# Patient Record
Sex: FEMALE | Race: BLACK OR AFRICAN AMERICAN | NOT HISPANIC OR LATINO | Employment: OTHER | URBAN - METROPOLITAN AREA
[De-identification: names, ages, dates, MRNs, and addresses within clinical notes are randomized per-mention and may not be internally consistent; named-entity substitution may affect disease eponyms.]

---

## 2018-03-23 ENCOUNTER — OFFICE VISIT (OUTPATIENT)
Dept: INTERNAL MEDICINE | Facility: CLINIC | Age: 78
End: 2018-03-23
Attending: INTERNAL MEDICINE
Payer: MEDICARE

## 2018-03-23 VITALS
WEIGHT: 121.5 LBS | HEIGHT: 65 IN | SYSTOLIC BLOOD PRESSURE: 131 MMHG | DIASTOLIC BLOOD PRESSURE: 68 MMHG | BODY MASS INDEX: 20.24 KG/M2 | HEART RATE: 85 BPM | OXYGEN SATURATION: 99 %

## 2018-03-23 DIAGNOSIS — E78.5 HYPERLIPIDEMIA, UNSPECIFIED HYPERLIPIDEMIA TYPE: ICD-10-CM

## 2018-03-23 DIAGNOSIS — R10.11 CHRONIC RUQ PAIN: ICD-10-CM

## 2018-03-23 DIAGNOSIS — R41.3 MEMORY DEFICITS: ICD-10-CM

## 2018-03-23 DIAGNOSIS — R25.1 TREMOR: ICD-10-CM

## 2018-03-23 DIAGNOSIS — G89.29 CHRONIC RUQ PAIN: ICD-10-CM

## 2018-03-23 DIAGNOSIS — I10 HYPERTENSION, BENIGN: ICD-10-CM

## 2018-03-23 DIAGNOSIS — R63.4 WEIGHT LOSS: Primary | ICD-10-CM

## 2018-03-23 DIAGNOSIS — K80.20 CALCULUS OF GALLBLADDER WITHOUT CHOLECYSTITIS WITHOUT OBSTRUCTION: ICD-10-CM

## 2018-03-23 PROCEDURE — 99204 OFFICE O/P NEW MOD 45 MIN: CPT | Mod: S$GLB,,, | Performed by: INTERNAL MEDICINE

## 2018-03-23 PROCEDURE — 3075F SYST BP GE 130 - 139MM HG: CPT | Mod: CPTII,S$GLB,, | Performed by: INTERNAL MEDICINE

## 2018-03-23 PROCEDURE — 99999 PR PBB SHADOW E&M-NEW PATIENT-LVL IV: CPT | Mod: PBBFAC,,, | Performed by: INTERNAL MEDICINE

## 2018-03-23 PROCEDURE — 3078F DIAST BP <80 MM HG: CPT | Mod: CPTII,S$GLB,, | Performed by: INTERNAL MEDICINE

## 2018-03-23 RX ORDER — FERROUS SULFATE 325(65) MG
325 TABLET, DELAYED RELEASE (ENTERIC COATED) ORAL
COMMUNITY

## 2018-03-23 RX ORDER — LISINOPRIL AND HYDROCHLOROTHIAZIDE 10; 12.5 MG/1; MG/1
1 TABLET ORAL DAILY
Qty: 90 TABLET | Refills: 3
Start: 2018-03-23 | End: 2018-03-23 | Stop reason: SDUPTHER

## 2018-03-23 RX ORDER — LISINOPRIL 10 MG/1
10 TABLET ORAL DAILY
COMMUNITY
End: 2018-03-23 | Stop reason: CLARIF

## 2018-03-23 RX ORDER — LISINOPRIL AND HYDROCHLOROTHIAZIDE 10; 12.5 MG/1; MG/1
1 TABLET ORAL DAILY
Qty: 90 TABLET | Refills: 1 | Status: ON HOLD | OUTPATIENT
Start: 2018-03-23 | End: 2018-07-12 | Stop reason: HOSPADM

## 2018-03-23 RX ORDER — ROSUVASTATIN CALCIUM 5 MG/1
5 TABLET, COATED ORAL DAILY
COMMUNITY
End: 2018-03-23 | Stop reason: SDUPTHER

## 2018-03-23 RX ORDER — ROSUVASTATIN CALCIUM 5 MG/1
5 TABLET, COATED ORAL DAILY
Qty: 90 TABLET | Refills: 1 | Status: ON HOLD | OUTPATIENT
Start: 2018-03-23 | End: 2018-07-12 | Stop reason: HOSPADM

## 2018-03-23 RX ORDER — CYANOCOBALAMIN (VITAMIN B-12) 500 MCG
TABLET ORAL DAILY
COMMUNITY
End: 2020-10-06

## 2018-03-23 RX ORDER — CHOLESTYRAMINE 4 G/4.8G
POWDER, FOR SUSPENSION ORAL
COMMUNITY
End: 2022-02-11

## 2018-03-23 NOTE — PROGRESS NOTES
"Subjective:   Patient ID: Shahram Hills is a 77 y.o. female  Chief complaint:   Chief Complaint   Patient presents with    [facility]  Pt here to [facility] care  Prior pcp Dr. Barreto    Hld: on crestor  HTN: taking lisinopril  Taking iron: rec by prior pcp - taking for many years - does not recall hx of anemia    Today reports wt loss over past year- 140 at baseline currently 121# on our scale  Was having ruq pain - this is now chronic over past few months - same in quality and frequency.   Eating less over past year   + Poor appetite + occ early satiety  Just dx with gallstone by US 2 weeks ago when seen by GI for thsi and symptoms and MRI abd scheduled  No fevers, easy bruising or bleeding, no night sweats.   No cough.   Started wearing size 10 pants around gregor and now fitting loose    Makes herself eat.   Currently wearing 10 and 8 size  GI: Dr. Alvarado - pt reports just had blood work with Dr. Grace - planning on MRI Friday  Has f/u with GI in April after mri    Hx of memory deficits + difficulty with short term memory over past 6 months. No falls  Not locked self out of house   Paying bills on time  No burining food   + Head tremor over past few years  No gait changes or incontinence.     Does not think had dexa  Declines all vaccines  Mmg: declines for now - she may consider this in future  Never had cscope - declines    Review of Systems    Objective:  Vitals:    03/23/18 1500   BP: 131/68   Pulse: 85   SpO2: 99%   Weight: 55.1 kg (121 lb 7.6 oz)   Height: 5' 5" (1.651 m)     Body mass index is 20.21 kg/m².    Physical Exam   Constitutional: She is oriented to person, place, and time. She appears well-developed and well-nourished.   HENT:   Head: Normocephalic and atraumatic.   Right Ear: External ear normal.   Left Ear: External ear normal.   Nose: Nose normal.   Mouth/Throat: Oropharynx is clear and moist. No oropharyngeal exudate.   No carotid bruits   Eyes: Conjunctivae and EOM are " "normal.   Neck: Neck supple. No thyromegaly present.   Cardiovascular: Normal rate, regular rhythm, normal heart sounds and intact distal pulses.    Pulmonary/Chest: Effort normal and breath sounds normal.   Abdominal: Soft. Bowel sounds are normal. She exhibits no distension and no mass. There is tenderness. There is no rebound and no guarding. No hernia.   Mild ttp at ruq - no rebound or guarding    Musculoskeletal: She exhibits no edema or tenderness.   Lymphadenopathy:     She has no cervical adenopathy.   Neurological: She is alert and oriented to person, place, and time.   + head tremor   Skin: Skin is warm and dry.   Psychiatric: Her behavior is normal. Thought content normal.   Vitals reviewed.    Assessment:  1. Tremor    2. Memory deficits    3. Weight loss    4. Calculus of gallbladder without cholecystitis without obstruction    5. Chronic RUQ pain    6. Hypertension, benign    7. Hyperlipidemia, unspecified hyperlipidemia type        Plan:  Shahram was seen today for [facility] care.    Diagnoses and all orders for this visit:    Tremor  -     Ambulatory Referral to Neurology  Present for a few years   Refer to neuro     Memory deficits  -     Ambulatory Referral to Neurology  See below - review records for baseline labs   Will order tsh, b12, rpr, hiv if not checked by prior pcp    Weight loss  GI eval started - may be related to chronic ruq pain and gallstone  rtc in 4 weeks for wt check  GINNY completed to obtain labs by GI and labs by PCP  MRI abd scheduled  Denies "B" symptoms  rec add snacks and inc intake at breakfast and lunch     Calculus of gallbladder without cholecystitis without obstruction  See below    Chronic RUQ pain  Followed by gi - mild ruq ttp today - no guarding - per pt and her sister this is at baseline    Hypertension, benign  Controlled, cont med    Hyperlipidemia, unspecified hyperlipidemia type  Stable, cont med    Other orders  -     Discontinue: lisinopril-hydrochlorothiazide " (PRINZIDE,ZESTORETIC) 10-12.5 mg per tablet; Take 1 tablet by mouth once daily.  -     rosuvastatin (CRESTOR) 5 MG tablet; Take 1 tablet (5 mg total) by mouth once daily.  -     lisinopril-hydrochlorothiazide (PRINZIDE,ZESTORETIC) 10-12.5 mg per tablet; Take 1 tablet by mouth once daily.        Health Maintenance   Topic Date Due    Lipid Panel  1940    DEXA SCAN  05/08/1978    Zoster Vaccine  05/08/1998    Pneumococcal (65+) (1 of 2 - PCV13) 05/08/2003    TETANUS VACCINE  11/17/2015    Influenza Vaccine  08/01/2017

## 2018-04-05 ENCOUNTER — DOCUMENTATION ONLY (OUTPATIENT)
Dept: INTERNAL MEDICINE | Facility: CLINIC | Age: 78
End: 2018-04-05

## 2018-04-05 NOTE — PROGRESS NOTES
Labs 3/9/18 ordered by GI Dr. Grace received:     CMP wnl x bili 1.8, CO2 33  Cr 0.6  CBC wnl  TSH wnl  ESR wnl  B12 low 173  AFP wnl at 2.6      HPI  Review of Systems  Physical Exam

## 2018-04-20 ENCOUNTER — OFFICE VISIT (OUTPATIENT)
Dept: INTERNAL MEDICINE | Facility: CLINIC | Age: 78
End: 2018-04-20
Attending: INTERNAL MEDICINE
Payer: MEDICARE

## 2018-04-20 ENCOUNTER — TELEPHONE (OUTPATIENT)
Dept: ADMINISTRATIVE | Facility: HOSPITAL | Age: 78
End: 2018-04-20

## 2018-04-20 VITALS
HEART RATE: 86 BPM | DIASTOLIC BLOOD PRESSURE: 61 MMHG | SYSTOLIC BLOOD PRESSURE: 112 MMHG | OXYGEN SATURATION: 98 % | WEIGHT: 117.5 LBS | BODY MASS INDEX: 19.58 KG/M2 | HEIGHT: 65 IN

## 2018-04-20 DIAGNOSIS — E53.8 B12 DEFICIENCY: ICD-10-CM

## 2018-04-20 DIAGNOSIS — Z12.39 ENCOUNTER FOR SCREENING FOR MALIGNANT NEOPLASM OF BREAST: ICD-10-CM

## 2018-04-20 DIAGNOSIS — R63.4 WEIGHT LOSS: Primary | ICD-10-CM

## 2018-04-20 PROCEDURE — 3074F SYST BP LT 130 MM HG: CPT | Mod: CPTII,S$GLB,, | Performed by: INTERNAL MEDICINE

## 2018-04-20 PROCEDURE — 3078F DIAST BP <80 MM HG: CPT | Mod: CPTII,S$GLB,, | Performed by: INTERNAL MEDICINE

## 2018-04-20 PROCEDURE — 99214 OFFICE O/P EST MOD 30 MIN: CPT | Mod: S$GLB,,, | Performed by: INTERNAL MEDICINE

## 2018-04-20 PROCEDURE — 99999 PR PBB SHADOW E&M-EST. PATIENT-LVL IV: CPT | Mod: PBBFAC,,, | Performed by: INTERNAL MEDICINE

## 2018-04-20 RX ORDER — LIFITEGRAST 50 MG/ML
SOLUTION/ DROPS OPHTHALMIC
Refills: 3 | COMMUNITY
Start: 2018-04-02

## 2018-04-20 RX ORDER — LANOLIN ALCOHOL/MO/W.PET/CERES
1000 CREAM (GRAM) TOPICAL DAILY
COMMUNITY
Start: 2018-04-20

## 2018-04-20 NOTE — PROGRESS NOTES
"Subjective:   Patient ID: Shahram Hills is a 77 y.o. female  Chief complaint:   Chief Complaint   Patient presents with    Weight Check       HPI  Pt here with her sister who is her primary caregiver    Pt here for 4 week wt check - est care at [facility]  Have not received old records from prior pcp   Lost 5 pounds since last seen - no fevers or chills, night sweats, easy bruising or bleeding    Has had MRI abd and CT chest ordered by GI specialist ([facility]) but has not recieved results   Our clinic did received results of labs ordered by gi: cbc, cmp, sed rate, TSH wnl, D wnl at 38  K 3.4  B12 low - 170s  She is still Skipping meals - still with early satiety   Still with ruq pain - has known hx of gallstones  Had CT chest with contrast per [facility] notes and MRI abdomen  Had hysterectomy 2/2 fibroids - no vaginal bleeding. no recent mmg    Has appt with neuro for memory deficits   Review of Systems    Objective:  Vitals:    04/20/18 1112   BP: 112/61   Pulse: 86   SpO2: 98%   Weight: 53.3 kg (117 lb 8.1 oz)   Height: 5' 5" (1.651 m)     Body mass index is 19.55 kg/m².    Physical Exam   Constitutional: She is oriented to person, place, and time. She appears well-developed and well-nourished.   HENT:   Head: Normocephalic and atraumatic.   Eyes: Conjunctivae and EOM are normal.   Neck: Normal range of motion. Neck supple.   Cardiovascular: Normal rate, regular rhythm and intact distal pulses.    Pulmonary/Chest: Effort normal and breath sounds normal. Right breast exhibits no inverted nipple, no mass, no nipple discharge, no skin change and no tenderness. Left breast exhibits no inverted nipple, no mass, no nipple discharge, no skin change and no tenderness. Breasts are symmetrical. There is no breast swelling.   Abdominal: Soft. Normal appearance and bowel sounds are normal.   Genitourinary: No breast tenderness, discharge or bleeding.   Neurological: She is alert and oriented to person, place, and time. She has normal " strength. Gait normal.   Skin: Skin is warm, dry and intact. No cyanosis. Nails show no clubbing.   Psychiatric: She has a normal mood and affect. Her speech is normal and behavior is normal. Cognition and memory are normal.   Vitals reviewed.    Assessment:  1. Weight loss    2. B12 deficiency    3. Encounter for screening for malignant neoplasm of breast        Plan:  Shahram was seen today for weight check.    Diagnoses and all orders for this visit:    B12 deficiency  -     cyanocobalamin (VITAMIN B-12) 1000 MCG tablet; Take 1 tablet (1,000 mcg total) by mouth once daily    Weight loss  See below     Encounter for screening for malignant neoplasm of breast  -     Mammo Digital Screening Bilat with CAD; Future    get records from GI - MRI , CT scan  Had hysterectomy 2/2 fibroids  Normal breast exam - get mmg   Defer to GI for scopes  Ensure or boost tid in bt meals  Ad bp or nutella to snacks   Eat small reg meals - do not skip meals   Keep appt with neuro and schedule f/u with GI    Will order dexa and flp after review old records if due at this time   Health Maintenance   Topic Date Due    Lipid Panel  1940    DEXA SCAN  05/08/1978    Pneumococcal (65+) (1 of 2 - PCV13) 05/08/2003    TETANUS VACCINE  03/23/2028    Zoster Vaccine  Addressed    Influenza Vaccine  Addressed

## 2018-04-23 ENCOUNTER — TELEPHONE (OUTPATIENT)
Dept: INTERNAL MEDICINE | Facility: CLINIC | Age: 78
End: 2018-04-23

## 2018-04-23 ENCOUNTER — HOSPITAL ENCOUNTER (OUTPATIENT)
Dept: RADIOLOGY | Facility: OTHER | Age: 78
Discharge: HOME | End: 2018-04-23
Attending: INTERNAL MEDICINE
Payer: MEDICARE

## 2018-04-23 VITALS — WEIGHT: 117.5 LBS | BODY MASS INDEX: 19.58 KG/M2 | HEIGHT: 65 IN

## 2018-04-23 DIAGNOSIS — Z12.39 ENCOUNTER FOR SCREENING FOR MALIGNANT NEOPLASM OF BREAST: ICD-10-CM

## 2018-04-23 PROCEDURE — 77067 SCR MAMMO BI INCL CAD: CPT | Mod: 26,,, | Performed by: RADIOLOGY

## 2018-04-23 PROCEDURE — 77067 SCR MAMMO BI INCL CAD: CPT | Mod: TC

## 2018-04-27 ENCOUNTER — TELEPHONE (OUTPATIENT)
Dept: ADMINISTRATIVE | Facility: HOSPITAL | Age: 78
End: 2018-04-27

## 2018-04-27 NOTE — TELEPHONE ENCOUNTER
CHEST CT W/CONTRAST 4.6.18 FROM [facility] RECEIVED AND SCANNED INTO PATIENT CHART CAN BE REVIEWED UNDER MEDIA TAB.

## 2018-05-08 NOTE — TELEPHONE ENCOUNTER
Please call pt or her sister and inquire if she has f/u with her gastroenterologist - if so what is the next step in eval her symptoms  - if not then is her appt scheduled?     - please call [facility] and inquire if they can send her MRI abdomen results to our office

## 2018-05-09 NOTE — TELEPHONE ENCOUNTER
Spoke with Ms Omalley and she did not seem to understand my questions and she states that Ms Mcguire will be home at 400pm. Will call back at this time

## 2018-05-10 NOTE — TELEPHONE ENCOUNTER
Spoke w/ pt sister Shayla and she stated that pt seen   this Monday or Tuesday she is not sure but she remember they did a test that consist of putting an light down in her stomach (she does not remember the name of the procedure).   I called  office and they confirmed that pt was seen for an office visit on 05/08/2018 and they are in the process of faxing all of pt results over .    Pt sister also stated that  next step is to complete an biopsy of her liver , the nurse in  office has also confirmed that this order has been put in and they are contacting pt today to schedule an appt to complete biopsy

## 2018-05-11 NOTE — TELEPHONE ENCOUNTER
Update reviewed - pt being evaluated for liver masses and c/f mets - primary unknown at this time - liver Biopsy planned     ADDENDUM: received records from [facility] office of Dr. Grace    CT chest with contrast showed normal lungs but innumerable hepatic mets with 7mm lytic lesion at T6 vertebral body most likely representing metastatic disease. Pt seen by GI at appt 5/8/2018 and liver biopsy scheduled per records

## 2018-05-16 ENCOUNTER — PATIENT OUTREACH (OUTPATIENT)
Dept: ADMINISTRATIVE | Facility: HOSPITAL | Age: 78
End: 2018-05-16

## 2018-05-16 NOTE — PROGRESS NOTES
[facility] is committed to your overall health.  To help you get the most out of each of your visits, we will review your information to make sure you are up to date on all of your recommended tests and/or procedures.       Your PCP  Trixie Xie MD   found that you may be due for:       Health Maintenance Due   Topic Date Due    DEXA SCAN  05/08/1978    Pneumococcal (65+) (1 of 2 - PCV13) 05/08/2003     You are more than welcome to visit our pharmacy here on campus to receive your vaccinations on the same day of your upcoming scheduled visit.           If you have had any of the above done at another facility, please bring the records or information with you so that your record at [facility] will be complete.  If you would like to schedule any of these, please contact me.     If you are currently taking medication, please bring it with you to your appointment for review.     Also, if you have any type of Advanced Directives, please bring them with you to your office visit so we may scan them into your chart.       Thank you for Choosing [facility] for your healthcare needs.        Additional Information  If you have questions, you can email [email or web address] or call [phone]  to talk to our [facility] staff. Remember, [facility] is NOT to be used for urgent needs. For medical emergencies, dial 911.

## 2018-05-28 ENCOUNTER — OFFICE VISIT (OUTPATIENT)
Dept: NEUROLOGY | Facility: CLINIC | Age: 78
End: 2018-05-28
Attending: INTERNAL MEDICINE
Payer: MEDICARE

## 2018-05-28 VITALS
DIASTOLIC BLOOD PRESSURE: 73 MMHG | HEART RATE: 100 BPM | BODY MASS INDEX: 19.47 KG/M2 | HEIGHT: 65 IN | WEIGHT: 116.88 LBS | SYSTOLIC BLOOD PRESSURE: 107 MMHG

## 2018-05-28 DIAGNOSIS — E53.8 B12 DEFICIENCY: ICD-10-CM

## 2018-05-28 DIAGNOSIS — E78.5 HYPERLIPIDEMIA, UNSPECIFIED HYPERLIPIDEMIA TYPE: ICD-10-CM

## 2018-05-28 DIAGNOSIS — R63.4 WEIGHT LOSS: ICD-10-CM

## 2018-05-28 DIAGNOSIS — R16.0 LIVER MASSES: ICD-10-CM

## 2018-05-28 DIAGNOSIS — R41.3 MEMORY DEFICITS: Primary | ICD-10-CM

## 2018-05-28 DIAGNOSIS — I10 ESSENTIAL HYPERTENSION: ICD-10-CM

## 2018-05-28 PROCEDURE — 99204 OFFICE O/P NEW MOD 45 MIN: CPT | Mod: S$GLB,,, | Performed by: PSYCHIATRY & NEUROLOGY

## 2018-05-28 PROCEDURE — 3078F DIAST BP <80 MM HG: CPT | Mod: CPTII,S$GLB,, | Performed by: PSYCHIATRY & NEUROLOGY

## 2018-05-28 PROCEDURE — 99999 PR PBB SHADOW E&M-EST. PATIENT-LVL III: CPT | Mod: PBBFAC,,, | Performed by: PSYCHIATRY & NEUROLOGY

## 2018-05-28 PROCEDURE — 3074F SYST BP LT 130 MM HG: CPT | Mod: CPTII,S$GLB,, | Performed by: PSYCHIATRY & NEUROLOGY

## 2018-05-28 NOTE — PROGRESS NOTES
Subjective:       Patient ID: Shahram Hills is a 78 y.o. female.    Chief Complaint:  Memory Loss      History of Present Illness  HPI   This is an 78-year-old  female who was referred for evaluation of cognitive difficulties.  The patient lives with the sister who was in her 60s.  Her sister was present today and collaborated with the history.  She reports that she has had intermittent difficulty with recall of recent conversations she may have had an occasional difficulty with recalling of recent information however she is otherwise quite functional at and has no difficulty with taking care of her day-to-day activities including possible hygiene.  She has no difficulty with managing her medications, her finances and doing the cooking.  The patient does not drive.  She denies any headaches or visual difficulties.  Sleep is good however she has decreased appetite and has gradually lost weight over the past 1 year.  She is presently being evaluated by Gastroenterology for liver masses with a strong possibility of metastatic disease or hepatic cancer.  Recent medical records from gastroenterology including a biopsy report was not available for review.  Recent lab studies were reviewed and is noted that her B12 levels were low.  She was just started on OTC B12 by her primary care physician.       Review of Systems  Review of Systems   Constitutional: Positive for unexpected weight change.   HENT: Negative.  Negative for hearing loss.    Eyes: Negative.  Negative for visual disturbance.   Respiratory: Negative.  Negative for shortness of breath.    Cardiovascular: Negative.  Negative for chest pain and palpitations.   Gastrointestinal: Negative.    Genitourinary: Negative.    Musculoskeletal: Negative.  Negative for back pain, gait problem and neck pain.   Skin: Negative.    Neurological: Negative.  Negative for tremors, seizures, syncope, speech difficulty, weakness, numbness and headaches.    Psychiatric/Behavioral: Positive for decreased concentration. Negative for confusion.       Objective:      Neurologic Exam     Mental Status   Oriented to person, place, and time.   Oriented to person.   Oriented to place.   Disoriented to day. Oriented to year, month, date and season.   Registration: recalls 3 of 3 objects. Recall at 5 minutes: recalls 2 of 3 objects. Follows 3 step commands.   Attention: normal. Concentration: normal.   Speech: speech is normal   Level of consciousness: alert  Knowledge: good. Able to perform simple calculations.   Able to name object. Able to read. Able to repeat. Able to write. Normal comprehension.    No difficulty spelling backwards     Cranial Nerves   Cranial nerves II through XII intact.     Motor Exam   Muscle bulk: normal  Overall muscle tone: normal    Strength   Strength 5/5 throughout.     Sensory Exam   Light touch normal.   Proprioception normal.   Pinprick normal.     Gait, Coordination, and Reflexes     Gait  Gait: normal    Coordination   Romberg: negative  Finger to nose coordination: normal    Tremor   Resting tremor: absent  Intention tremor: The patient has minimal  titubation.  Action tremor: absent    Reflexes   Right brachioradialis: 1+  Left brachioradialis: 1+  Right biceps: 1+  Left biceps: 1+  Right triceps: 1+  Left triceps: 1+  Right patellar: 1+  Left patellar: 1+  Right achilles: 1+  Left achilles: 1+  Right plantar: normal  Left plantar: normal      Physical Exam   Constitutional: She is oriented to person, place, and time. She appears well-developed and well-nourished.   HENT:   Head: Normocephalic and atraumatic.   Neck: Normal range of motion. Neck supple. Carotid bruit is not present.   Neurological: She is oriented to person, place, and time. She has normal strength. She has a normal Finger-Nose-Finger Test and a normal Romberg Test. Gait normal.   Reflex Scores:       Tricep reflexes are 1+ on the right side and 1+ on the left side.        Bicep reflexes are 1+ on the right side and 1+ on the left side.       Brachioradialis reflexes are 1+ on the right side and 1+ on the left side.       Patellar reflexes are 1+ on the right side and 1+ on the left side.       Achilles reflexes are 1+ on the right side and 1+ on the left side.  Psychiatric: Her speech is normal.   Vitals reviewed.        Assessment:        1. Memory deficits    2. Hyperlipidemia, unspecified hyperlipidemia type    3. B12 deficiency    4. Liver masses    5. Weight loss    6. Essential hypertension            Plan:        Discussed with patient and sister.  Her memory difficulties and minimal and may be related to inattention.  In addition the B12 deficiency may be a contributing factor.  Other complicating factors may be the possibility of neoplastic disease as the patient is presently being worked up for her liver masses.  Will get an MRI scan of the brain, noncontrast.  Advised patient to continue with B12 supplementation.  She will follow up with me on an as-needed basis depending on the neoplastic workup.

## 2018-05-28 NOTE — PATIENT INSTRUCTIONS
Discussed with patient and sister.  Her memory difficulties and minimal and may be related to inattention.  In addition the B12 deficiency may be a contributing factor.  Other complicating factors may be the possibility of neoplastic disease as the patient is presently being worked up for her liver masses.  Will get an MRI scan of the brain, noncontrast.  Advised patient to continue with B12 supplementation.  She will follow up with me on an as-needed basis depending on the neoplastic workup.

## 2018-05-29 ENCOUNTER — OFFICE VISIT (OUTPATIENT)
Dept: INTERNAL MEDICINE | Facility: CLINIC | Age: 78
End: 2018-05-29
Attending: INTERNAL MEDICINE
Payer: MEDICARE

## 2018-05-29 VITALS
BODY MASS INDEX: 19.47 KG/M2 | SYSTOLIC BLOOD PRESSURE: 124 MMHG | HEART RATE: 94 BPM | HEIGHT: 65 IN | DIASTOLIC BLOOD PRESSURE: 63 MMHG | WEIGHT: 116.88 LBS | OXYGEN SATURATION: 98 %

## 2018-05-29 DIAGNOSIS — I10 ESSENTIAL HYPERTENSION: ICD-10-CM

## 2018-05-29 DIAGNOSIS — C7B.8 METASTATIC MALIGNANT NEUROENDOCRINE TUMOR TO LIVER: Primary | ICD-10-CM

## 2018-05-29 PROCEDURE — 99214 OFFICE O/P EST MOD 30 MIN: CPT | Mod: S$GLB,,, | Performed by: INTERNAL MEDICINE

## 2018-05-29 PROCEDURE — 99999 PR PBB SHADOW E&M-EST. PATIENT-LVL IV: CPT | Mod: PBBFAC,,, | Performed by: INTERNAL MEDICINE

## 2018-05-29 PROCEDURE — 3078F DIAST BP <80 MM HG: CPT | Mod: CPTII,S$GLB,, | Performed by: INTERNAL MEDICINE

## 2018-05-29 PROCEDURE — 3074F SYST BP LT 130 MM HG: CPT | Mod: CPTII,S$GLB,, | Performed by: INTERNAL MEDICINE

## 2018-05-29 NOTE — PROGRESS NOTES
"Subjective:   Patient ID: Shahram Hills is a 78 y.o. female  Chief complaint:   Chief Complaint   Patient presents with    Hypertension     f/u    Flank Pain       HPI   Pt here for f/u of wt loss, ruq pain, and liver masses   Had liver bx arranged her her gastroenterologist at [facility]   Received records andreviewed with pt and her sister who is also present with pt today  Drinking ensure and making herself eat - wt stable over past month     Dx with metastatic well differentiated neuroendocrine tumor - discussed dx with pt, sister and family member Ambreen on speaker phone.       HTN: well controlled con current med regimen     Review of Systems    Objective:  Vitals:    05/29/18 1125   BP: 124/63   Pulse: 94   SpO2: 98%   Weight: 53 kg (116 lb 13.5 oz)   Height: 5' 5" (1.651 m)     Body mass index is 19.44 kg/m².    Physical Exam   Constitutional: She is oriented to person, place, and time. She appears well-developed and well-nourished.   HENT:   Head: Normocephalic and atraumatic.   Eyes: Conjunctivae and EOM are normal.   Neck: Normal range of motion. Neck supple.   Cardiovascular: Normal rate, regular rhythm and intact distal pulses.    Pulmonary/Chest: Effort normal and breath sounds normal.   Abdominal: Soft. Normal appearance and bowel sounds are normal.   Neurological: She is alert and oriented to person, place, and time. She has normal strength. Gait normal.   Skin: Skin is warm, dry and intact. No cyanosis. Nails show no clubbing.   Psychiatric: She has a normal mood and affect. Her speech is normal and behavior is normal. Cognition and memory are normal.   Vitals reviewed.      Assessment:  1. 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 Essential hypertension        Plan:  Shahram was seen today for hypertension and flank pain.    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     Health Maintenance   Topic Date Due    DEXA SCAN  05/08/1978    Pneumococcal (65+) (1 of 2 - PCV13) 05/08/2003    Influenza Vaccine  08/01/2018    Lipid Panel  11/27/2022    TETANUS VACCINE  03/23/2028    Zoster Vaccine  Addressed

## 2018-06-01 ENCOUNTER — APPOINTMENT (OUTPATIENT)
Dept: RADIATION THERAPY | Facility: OTHER | Age: 78
End: 2018-06-01
Attending: RADIOLOGY
Payer: MEDICARE

## 2018-06-04 ENCOUNTER — HOSPITAL ENCOUNTER (OUTPATIENT)
Dept: RADIOLOGY | Facility: OTHER | Age: 78
Discharge: HOME | End: 2018-06-04
Attending: PSYCHIATRY & NEUROLOGY
Payer: MEDICARE

## 2018-06-04 DIAGNOSIS — R41.3 MEMORY DEFICITS: ICD-10-CM

## 2018-06-04 DIAGNOSIS — E78.5 HYPERLIPIDEMIA, UNSPECIFIED HYPERLIPIDEMIA TYPE: ICD-10-CM

## 2018-06-04 PROCEDURE — 70551 MRI BRAIN STEM W/O DYE: CPT | Mod: 26,,, | Performed by: RADIOLOGY

## 2018-06-04 PROCEDURE — 70551 MRI BRAIN STEM W/O DYE: CPT | Mod: TC

## 2018-06-06 NOTE — PROGRESS NOTES
"  CC:  Metastatic low grade, well differentiated neuroendocrine tumor    HPI:  Ms. Hills is an 79 yo woman who presents today for further evaluation of neuroendocrine tumor. She initially presented with diarrhea, abdominal discomfort, weight loss. She was evaluated at [facility] and underwent workup below:  US abdomen on 3/14/18 showed numerous bilobar mixed echogenicity and mildly vascular hepatic lesions. Cholelithiasis without e/o acute cholecystitis.   MRI abdomen on 3/30/2018 showed innumerable hepatic metastases. L3 vertebral body metastasis with soft tissue component along the right margin of the L3 and upper L4 vertebral bodies, filling the right L3/4 neural foramen, and extending into the anterior aspect of the spinal canal on the right at the L3 and upper T4 levels. Associated with mild spinal canal narrowing.  CT chest on 2018 showed one lucent nodule measuring 7 mm in the T7 vertebral body, most likely representing metastatic disease.    EGD on 18 showed normal duodenum. Schatzki's ring in the lower third of the esophagus. Grade 1 esophagitis in the GE junction c/w mild distal esophagitis. Congestion and erythema in the antrum, pre-pyloric region and stomach body c/w gastritis. Biopsy of the stomach antrum showed mild chronic gastritis, neg for H. pylori.   Labs on 2018: WBC 6.9, H/H 11.6/34.3, MCV 87.5, plt count 279. On 3/9/18: Vit B12 level 173, folic acid 6.6  She was started on oral vit B12 and underwent a liver biopsy on 18. Pathology showed "metastatic well differentiated neuroendocrine tumor. Ki67 3%    She presents today for further management. Has lost 26 lbs over the past 3-4 months, also has diarrhea. No flushing, wheezing, palpitations. Has been having pain in right flank radiating down the right leg. No tingling/numbness, weakness, bladder or bowel incontinence.     ECO        Past Medical History:   Diagnosis Date    B12 deficiency     Depression     per pcp note " 7/2017 and given prozac and diazepam     Hyperlipidemia     Hypertension     PVC (premature ventricular contraction)     seen on ekg from prior pcp    Weight loss     reviewed prior pcp Dr. Russo notes 2017 - labs reviewed: cmp wnl x tbili 1.5, tsh wnl, A1c 5.0, cbc wnl,D 36, hiv neg, hep c neg, hep b surg ag neg          Family History   Problem Relation Age of Onset    Glaucoma Mother     Cancer Father         unknown type    Diabetes Sister     Cancer Brother         lung cancer, + tobacco    Diabetes Brother     Hypertension Brother     Stroke Brother     Asthma Sister     No Known Problems Sister     Hyperlipidemia Sister        Past Surgical History:   Procedure Laterality Date    ESOPHAGOGASTRODUODENOSCOPY  05/04/2018    esophageal ring, grade 1 esophagitis, gastritis     HYSTERECTOMY      fibroids       Social History     Social History    Marital status:      Spouse name: N/A    Number of children: N/A    Years of education: N/A     Occupational History    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 Negative for activity change, chills, fatigue and fever.   HENT: Negative for mouth sores, nosebleeds, tinnitus, trouble swallowing and voice change.    Eyes: Negative for pain, redness and visual disturbance.   Respiratory: Negative for cough, shortness of breath and wheezing.    Cardiovascular: Negative for chest pain, palpitations and leg swelling.   Gastrointestinal: Positive for abdominal pain and diarrhea. Negative for blood in stool, constipation, nausea and  vomiting.   Endocrine: Negative for polydipsia, polyphagia and polyuria.   Genitourinary: Negative for frequency and hematuria.   Musculoskeletal: Negative for arthralgias, back pain, myalgias, neck pain and neck stiffness.        Right flank pain radiating down the right leg   Skin: Negative for color change, pallor, rash and wound.   Neurological: Negative for tremors, seizures, syncope, speech difficulty, weakness, light-headedness, numbness and headaches.   Hematological: Negative for adenopathy. Does not bruise/bleed easily.   Psychiatric/Behavioral: Negative for confusion and hallucinations. The patient is not nervous/anxious.    All other systems reviewed and are negative.      Objective:  Physical Exam   Constitutional: She is oriented to person, place, and time. She appears well-developed and well-nourished. No distress.   HENT:   Head: Normocephalic and atraumatic.   Mouth/Throat: Oropharynx is clear and moist. No oropharyngeal exudate.   Eyes: Conjunctivae and EOM are normal. Pupils are equal, round, and reactive to light. No scleral icterus.   Neck: Normal range of motion. Neck supple. No JVD present. No thyromegaly present.   Cardiovascular: Normal rate, regular rhythm, normal heart sounds and intact distal pulses.  Exam reveals no gallop and no friction rub.    No murmur heard.  Pulmonary/Chest: Effort normal and breath sounds normal. No respiratory distress. She has no wheezes. She has no rales.   Abdominal: Soft. Bowel sounds are normal. She exhibits no distension and no mass. There is hepatomegaly (liver edge 5 cm below the right costal margin). There is no splenomegaly. There is no tenderness. No hernia.   Musculoskeletal: Normal range of motion. She exhibits no edema, tenderness or deformity.   Lymphadenopathy:     She has no cervical adenopathy.   Neurological: She is alert and oriented to person, place, and time. No cranial nerve deficit. She exhibits normal muscle tone.   Skin: Skin is warm and  dry. No rash noted. She is not diaphoretic. No erythema. No pallor.   Psychiatric: She has a normal mood and affect. Her behavior is normal. Judgment and thought content normal.   Vitals reviewed.      Labs:  Labs from today pending. Previous labs reviewed. Unremarkable except for low vit B12.     Assessment and Plan:  1. 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 Metastasis to bone    3. Weight loss    4. Diarrhea, unspecified type    5. Cancer associated pain    6. Other malignant neuroendocrine tumors       1.6  - Long discussion with Ms. Hills and her family re the diagnosis of metastatic neuroendocrine tumor. It is metastatic and not curative. Treatment will be of a palliative intent and to control her symptoms from the tumor. She does have a low grade well differentiated tumor which usually grows very slowly. Given her symptoms, I will start lanreotide to improve her symptoms. We discussed the side effects including injection site reaction, anemia, weight loss, abd pain, flatulence, and worsening of her gallbladder stones. She understands. Hand-outs provided to patient.   - Gallium scan  - will get MRI abdomen images from [facility] to be uploaded to our system  - request OSH pathology to be reviewed by our pathologist    2.  - She does have right flank pain radiating down the right leg. She can hold her urine and bowel movement for a while but not very long. No weakness or numbness in her legs  - dex 4 mg q6h  - MRI spine  - stat refer to rad onc for palliative radiation  - asked her to go to ER if her incontinence gets worse or if she develops weakness or numbness in her legs      3.  - 2/2 tumor  - refer to nutrition    4.  - will start lanreotide    5.  - tramadol sent to pharmacy      RTC after Gallium scan to review results and start lanreotide    I spent 60 minutes with patient with at least 50% of the time on face-to-face counseling.     Distress Screening Results: Psychosocial Distress  screening score of Distress Score: 2 noted and reviewed. No intervention indicated.

## 2018-06-07 ENCOUNTER — LAB VISIT (OUTPATIENT)
Dept: LAB | Facility: OTHER | Age: 78
End: 2018-06-07
Attending: INTERNAL MEDICINE
Payer: MEDICARE

## 2018-06-07 ENCOUNTER — INITIAL CONSULT (OUTPATIENT)
Dept: HEMATOLOGY/ONCOLOGY | Facility: CLINIC | Age: 78
End: 2018-06-07
Attending: INTERNAL MEDICINE
Payer: MEDICARE

## 2018-06-07 VITALS
HEART RATE: 82 BPM | BODY MASS INDEX: 19.76 KG/M2 | HEIGHT: 64 IN | SYSTOLIC BLOOD PRESSURE: 110 MMHG | OXYGEN SATURATION: 99 % | DIASTOLIC BLOOD PRESSURE: 61 MMHG | TEMPERATURE: 98 F | RESPIRATION RATE: 18 BRPM | WEIGHT: 115.75 LBS

## 2018-06-07 DIAGNOSIS — R19.7 DIARRHEA, UNSPECIFIED TYPE: ICD-10-CM

## 2018-06-07 DIAGNOSIS — G89.3 CANCER ASSOCIATED PAIN: ICD-10-CM

## 2018-06-07 DIAGNOSIS — C7B.8 METASTATIC MALIGNANT NEUROENDOCRINE TUMOR TO LIVER: Primary | ICD-10-CM

## 2018-06-07 DIAGNOSIS — C79.51 METASTASIS TO BONE: ICD-10-CM

## 2018-06-07 DIAGNOSIS — C7B.8 METASTATIC MALIGNANT NEUROENDOCRINE TUMOR TO LIVER: ICD-10-CM

## 2018-06-07 DIAGNOSIS — R63.4 WEIGHT LOSS: ICD-10-CM

## 2018-06-07 DIAGNOSIS — C7A.8 OTHER MALIGNANT NEUROENDOCRINE TUMORS: ICD-10-CM

## 2018-06-07 LAB
ALBUMIN SERPL BCP-MCNC: 3.8 G/DL
ALP SERPL-CCNC: 50 U/L
ALT SERPL W/O P-5'-P-CCNC: 12 U/L
ANION GAP SERPL CALC-SCNC: 10 MMOL/L
AST SERPL-CCNC: 22 U/L
BILIRUB SERPL-MCNC: 1.6 MG/DL
BUN SERPL-MCNC: 6 MG/DL
CALCIUM SERPL-MCNC: 10.1 MG/DL
CHLORIDE SERPL-SCNC: 101 MMOL/L
CO2 SERPL-SCNC: 29 MMOL/L
CREAT SERPL-MCNC: 0.7 MG/DL
ERYTHROCYTE [DISTWIDTH] IN BLOOD BY AUTOMATED COUNT: 12.6 %
EST. GFR  (AFRICAN AMERICAN): >60 ML/MIN/1.73 M^2
EST. GFR  (NON AFRICAN AMERICAN): >60 ML/MIN/1.73 M^2
GLUCOSE SERPL-MCNC: 104 MG/DL
HCT VFR BLD AUTO: 39.1 %
HGB BLD-MCNC: 12.6 G/DL
MCH RBC QN AUTO: 29.4 PG
MCHC RBC AUTO-ENTMCNC: 32.2 G/DL
MCV RBC AUTO: 91 FL
NEUTROPHILS # BLD AUTO: 4.4 K/UL
PLATELET # BLD AUTO: 319 K/UL
PMV BLD AUTO: 10 FL
POTASSIUM SERPL-SCNC: 3.5 MMOL/L
PROT SERPL-MCNC: 7.5 G/DL
RBC # BLD AUTO: 4.29 M/UL
SODIUM SERPL-SCNC: 140 MMOL/L
WBC # BLD AUTO: 8.01 K/UL

## 2018-06-07 PROCEDURE — 85027 COMPLETE CBC AUTOMATED: CPT

## 2018-06-07 PROCEDURE — 36415 COLL VENOUS BLD VENIPUNCTURE: CPT

## 2018-06-07 PROCEDURE — 99205 OFFICE O/P NEW HI 60 MIN: CPT | Mod: S$GLB,,, | Performed by: INTERNAL MEDICINE

## 2018-06-07 PROCEDURE — 86316 IMMUNOASSAY TUMOR OTHER: CPT

## 2018-06-07 PROCEDURE — 3078F DIAST BP <80 MM HG: CPT | Mod: CPTII,S$GLB,, | Performed by: INTERNAL MEDICINE

## 2018-06-07 PROCEDURE — 99999 PR PBB SHADOW E&M-EST. PATIENT-LVL IV: CPT | Mod: PBBFAC,,, | Performed by: INTERNAL MEDICINE

## 2018-06-07 PROCEDURE — 3074F SYST BP LT 130 MM HG: CPT | Mod: CPTII,S$GLB,, | Performed by: INTERNAL MEDICINE

## 2018-06-07 PROCEDURE — 80053 COMPREHEN METABOLIC PANEL: CPT

## 2018-06-07 RX ORDER — DEXAMETHASONE 4 MG/1
4 TABLET ORAL EVERY 6 HOURS
Qty: 120 TABLET | Refills: 0 | Status: SHIPPED | OUTPATIENT
Start: 2018-06-07 | End: 2018-07-11 | Stop reason: DRUGHIGH

## 2018-06-07 RX ORDER — TRAMADOL HYDROCHLORIDE 50 MG/1
50 TABLET ORAL EVERY 6 HOURS PRN
Qty: 50 TABLET | Refills: 0 | Status: SHIPPED | OUTPATIENT
Start: 2018-06-07 | End: 2018-06-17

## 2018-06-07 NOTE — LETTER
June 7, 2018      Trixie Xie MD  [address]  [facility] [address]           [facility] - Hematology Oncology  [address], [address]  [facility] [address]  Phone: [phone]          Patient: Shahram Hills   MR Number: [id number]   YOB: 1940   Date of Visit: 6/7/2018       Dear Dr. Trixie Xie:    Thank you for referring Shahram Hills to me for evaluation. Attached you will find relevant portions of my assessment and plan of care.    If you have questions, please do not hesitate to call me. I look forward to following Shahram Hills along with you.    Sincerely,    Sebastian Granados MD    Enclosure  CC:  No Recipients    If you would like to receive this communication electronically, please contact [email or web address] or [phone] to request more information on [facility] access.    For providers and/or their staff who would like to refer a patient to [facility], please contact us through our one-stop-shop provider referral line, [facility], at [phone].    If you feel you have received this communication in error or would no longer like to receive these types of communications, please e-mail [email or web address]

## 2018-06-07 NOTE — Clinical Note
Need to request her slides to be reviewed by our pathologist and add Ki-67 test on tissue CBC, CMP, chromogranin A today Schedule for gallium scan Verify with prior auth re lanreotide RTC after gallium scan with appt for lanreotide

## 2018-06-08 ENCOUNTER — TELEPHONE (OUTPATIENT)
Dept: HEMATOLOGY/ONCOLOGY | Facility: CLINIC | Age: 78
End: 2018-06-08

## 2018-06-08 ENCOUNTER — HOSPITAL ENCOUNTER (OUTPATIENT)
Dept: RADIOLOGY | Facility: HOSPITAL | Age: 78
Discharge: HOME | End: 2018-06-08
Attending: INTERNAL MEDICINE
Payer: MEDICARE

## 2018-06-08 DIAGNOSIS — C79.51 METASTASIS TO BONE: ICD-10-CM

## 2018-06-08 PROCEDURE — 72158 MRI LUMBAR SPINE W/O & W/DYE: CPT | Mod: 26,,, | Performed by: RADIOLOGY

## 2018-06-08 PROCEDURE — 72156 MRI NECK SPINE W/O & W/DYE: CPT | Mod: 26,,, | Performed by: RADIOLOGY

## 2018-06-08 PROCEDURE — 25500020 PHARM REV CODE 255: Performed by: INTERNAL MEDICINE

## 2018-06-08 PROCEDURE — 72157 MRI CHEST SPINE W/O & W/DYE: CPT | Mod: 26,,, | Performed by: RADIOLOGY

## 2018-06-08 PROCEDURE — 72156 MRI NECK SPINE W/O & W/DYE: CPT | Mod: TC

## 2018-06-08 PROCEDURE — A9585 GADOBUTROL INJECTION: HCPCS | Performed by: INTERNAL MEDICINE

## 2018-06-08 PROCEDURE — 72157 MRI CHEST SPINE W/O & W/DYE: CPT | Mod: TC

## 2018-06-08 PROCEDURE — 72158 MRI LUMBAR SPINE W/O & W/DYE: CPT | Mod: TC

## 2018-06-08 RX ORDER — GADOBUTROL 604.72 MG/ML
5 INJECTION INTRAVENOUS
Status: COMPLETED | OUTPATIENT
Start: 2018-06-08 | End: 2018-06-08

## 2018-06-08 RX ADMIN — GADOBUTROL 5 ML: 604.72 INJECTION INTRAVENOUS at 08:06

## 2018-06-08 NOTE — TELEPHONE ENCOUNTER
Called and spoke with Mrs Hills. Informed Mrs Hills, per Dr Granados, her inpatient admittance has been cancelled. Her admitting Dx does not meet criteria for her admit per her insurance. Dr Granados has ordered MRI of L,T and C Spine today @ 7:00pm at [facility]. Patient instructed not to eat 4 hours prior to her MRI. Patient not to eat or drink anything after 3:00pm today.  Informed Ms Hills and her sister, once MRI has been completed, it will be determined if she will be admitted to impatient service at that time. Also informed Mrs Hills, Dr Granados will be on impatient service this weekend, if she will be admitted she will be the physician on call.

## 2018-06-09 ENCOUNTER — HOSPITAL ENCOUNTER (EMERGENCY)
Facility: HOSPITAL | Age: 78
Discharge: HOME | End: 2018-06-09
Attending: EMERGENCY MEDICINE
Payer: MEDICARE

## 2018-06-09 VITALS
TEMPERATURE: 98 F | DIASTOLIC BLOOD PRESSURE: 62 MMHG | WEIGHT: 114 LBS | OXYGEN SATURATION: 100 % | HEART RATE: 70 BPM | SYSTOLIC BLOOD PRESSURE: 107 MMHG | HEIGHT: 64 IN | RESPIRATION RATE: 18 BRPM | BODY MASS INDEX: 19.46 KG/M2

## 2018-06-09 DIAGNOSIS — C79.51 BONY METASTASIS: Primary | ICD-10-CM

## 2018-06-09 PROCEDURE — 99283 EMERGENCY DEPT VISIT LOW MDM: CPT

## 2018-06-09 PROCEDURE — 99283 EMERGENCY DEPT VISIT LOW MDM: CPT | Mod: ,,, | Performed by: EMERGENCY MEDICINE

## 2018-06-09 PROCEDURE — 99284 EMERGENCY DEPT VISIT MOD MDM: CPT | Mod: GC,,, | Performed by: INTERNAL MEDICINE

## 2018-06-09 NOTE — DISCHARGE INSTRUCTIONS
-Follow up with Neurosurgery for outpatient appointment  -Follow-up with Oncologist for outpatient appointment  -Follow-up Rad Onc for outpatient appointment.  -Return to ED with worsening urinary incontinence or new fecal incontinence, numbness/tingling, loss of motion, fever, chills.

## 2018-06-09 NOTE — ED NOTES
Pt identifiers Shahram Hills were checked and are correct  LOC: The patient is awake, alert, aware of environment with an appropriate affect. Oriented x3, speaking appropriately  APPEARANCE: Pt resting comfortably, in no acute distress, pt is clean and well groomed, clothing properly fastened  SKIN: Skin warm, dry and intact, normal skin turgor, moist mucus membranes  RESPIRATORY: Airway is open and patent, respirations are spontaneous, even and unlabored, normal effort and rate Breath sounds clear brendan to all lung fields on auscultation  CARDIAC: Normal rate and rhythm, no peripheral edema noted, capillary refill < 3 seconds, bilateral radial pulses 2+ Dorsalis pedis pulses palpable and strong   ABDOMEN: Soft, nontender, nondistended. Bowel sounds present to all four quad of abd on auscultation  NEUROLOGIC: PERRL, facial expression is symmetrical, patient moving all extremities spontaneously, normal sensation in all extremities when touched with a finger.  Follows all commands appropriately  MUSCULOSKELETAL: No obvious deformities.

## 2018-06-09 NOTE — CONSULTS
[facility]  Hematology/Oncology  Consult Note    Patient Name: Shahram Hills  MRN: [id number]  Admission Date: 6/9/2018  Hospital Length of Stay: 0 days  Code Status: No Order   Attending Provider: Cierra Ken MD  Consulting Provider: Eloy Soto MD  Primary Care Physician: Trixie Xie MD  Principal Problem:<principal problem not specified>    Consults  Subjective:     HPI: Ms. 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 Sebastian Granados. She recently presented to oncologist 2 days ago (06/07) with complaints of urinary incontinence and right flank pain that radiated down leg. She is scheduled to lanreotide therapy in the near future. Also started on dex 4 mg q6h with rad onc consult for spinal mets. MRI obtained demonstrating spinal lesions stable from prior scans. Patient was called by Dr. Granados and was told to present to ED for hospital admission. Per inpatient heme/onc service, NSGY had been contacted and it was their desire to evaluate patient. Per patient, she has had no increased urinary incontinence the last few days. Pain improved with tramadol. No numbness/tingling, fecal incontinence.      Medications:  Continuous Infusions:  Scheduled Meds:  PRN Meds:     Review of patient's allergies indicates:  No Known Allergies     Past Medical History:   Diagnosis Date    B12 deficiency     Depression     per pcp note 7/2017 and given prozac and diazepam     Hyperlipidemia     Hypertension     PVC (premature ventricular contraction)     seen on ekg from prior pcp    Weight loss     reviewed prior pcp Dr. Russo notes 2017 - labs reviewed: cmp wnl x tbili 1.5, tsh wnl, A1c 5.0, cbc wnl,D 36, hiv neg, hep c neg, hep b surg ag neg      Past Surgical History:   Procedure Laterality Date    ESOPHAGOGASTRODUODENOSCOPY  05/04/2018    esophageal ring, grade 1 esophagitis, gastritis      HYSTERECTOMY      fibroids     Family History     Problem Relation (Age of Onset)    Asthma Sister    COPD Brother    Cancer Brother    Diabetes Sister, Brother    Emphysema Brother    Glaucoma Mother    Heart attack Father, Sister    Hyperlipidemia Sister, Sister    Hypertension Brother    No Known Problems Sister    Stroke Brother        Social History Main Topics    Smoking status: Never Smoker    Smokeless tobacco: Never Used    Alcohol use No      Comment: stopped in 2007 - hx of social drinking    Drug use: No    Sexual activity: No       Review of Systems   Constitutional: Positive for appetite change and unexpected weight change.   HENT: Negative for congestion, rhinorrhea and sore throat.    Eyes: Negative for visual disturbance.   Respiratory: Negative for cough and shortness of breath.    Cardiovascular: Negative for chest pain, palpitations and leg swelling.   Gastrointestinal: Negative for abdominal pain, constipation, diarrhea, nausea and vomiting.   Musculoskeletal: Positive for back pain.   Skin: Negative for color change, rash and wound.   Neurological: Negative for dizziness, weakness, light-headedness, numbness and headaches.   Psychiatric/Behavioral: Negative for sleep disturbance.     Objective:     Vital Signs (Most Recent):  Temp: 98.2 °F (36.8 °C) (06/09/18 1531)  Pulse: 70 (06/09/18 1531)  Resp: 18 (06/09/18 1531)  BP: 107/62 (06/09/18 1531)  SpO2: 100 % (06/09/18 1531) Vital Signs (24h Range):  Temp:  [98.2 °F (36.8 °C)] 98.2 °F (36.8 °C)  Pulse:  [70-88] 70  Resp:  [18] 18  SpO2:  [98 %-100 %] 100 %  BP: (107-109)/(62-65) 107/62     Weight: 51.7 kg (114 lb)  Body mass index is 19.57 kg/m².  Body surface area is 1.53 meters squared.    No intake or output data in the 24 hours ending 06/09/18 1605    Physical Exam  Constitutional: She appears well-developed.   HENT:   Head: Normocephalic and atraumatic.   Eyes: EOM are normal. Pupils are equal, round, and reactive to light.   Neck:  Normal range of motion. Neck supple.   Cardiovascular: Normal rate, regular rhythm and normal heart sounds.   Pulmonary/Chest:   Decreased breath sounds right lower & middle lobes   Abdominal: Soft. Bowel sounds are normal. There is no tenderness.   Musculoskeletal: Normal range of motion. She exhibits no edema or tenderness.   Neurological: She is alert and oriented to person, place, and time.   Skin: Skin is warm and dry. No erythema.   Psychiatric: She has a normal mood and affect. Thought content normal.     Significant Labs:   N/A    Diagnostic Results:    MRI Spine W WO Cont 6/8/18:     Marrow replacing metastatic lesion of the L3 vertebral body with associated extra osseous expansion and complete effacement of the right L3-L4 neural foramina and additional effacement of the right lateral recess.  There is additional lateral extension and abutment of the right psoas muscle.  Superimposed degenerative change at this level results in mild spinal canal stenosis.  No other osseous metastatic lesions.    Enhancing hepatic lesions corresponding to patient's history of metastatic neuroendocrine tumor.    Assessment/Plan:     L3 Vertebral Lesion  Metastatic NET    - ER discussed with Neurosurgery Consult service today, no plan for intervention from their standpoint. Can follow up outpatient.   - advised to continue with Decadron 4 mg Q6H and start PPI for GI PPx.   - patient clinically doing well otherwise, denies any complaints and request to be discharged if no plan for intervention. ER in agreement in discharge.    - discussed with her primary oncologist, Dr. Granados, who agreed with discharge and f/u outpatient with her and consultants.   - scheduled for Gallium Scan on 6/13.   - patient will f/u with Dr. Granados on 6/15.   - will make arrangements for outpatient Neurosurgery and Radiation Oncology follow up this week.   - patient advised to call Dr. Granados or visit ER if she has progressively worsening back, lower extremities  weakness, and urinary/bowel incontinence.        Thank you for your consult.     Eloy Soto MD  Hematology/Oncology  [facility]

## 2018-06-09 NOTE — ED TRIAGE NOTES
"Pt was sent by Dr Granados after a "spot showed pressing against her nerve on her spinal column " in the MRI  Pt states she had right side and leg pain "for a while" and was scheduled for an MRI last night  States has no pain at present   "

## 2018-06-09 NOTE — ED NOTES
Hospitalists in to examine and consult with patient - hold IV lock and labwork orders until after consult as they may be cancelled

## 2018-06-09 NOTE — ED PROVIDER NOTES
Encounter Date: 6/9/2018       History     Chief Complaint   Patient presents with    Abnormal MRI     mri last night and called to come to er today neuro endocrine tumor     Shahram Hills is an 78 yr old female with malignant neuroendocrine tumor (low grade, well differentiated) with metastasis to liver & spine who presents to ED per advice of her oncologist, Dr. Sebastian Granados. She recently presented to oncologist 2 days ago (06/07) with complaints of urinary incontinence and right flank pain that radiated down leg. She was started on lanreotide therapy for tumor. Also started on dex 4 mg q6h with rad onc consult for spinal mets. MRI obtained demonstrating spinal lesions stable from prior scans. Patient was called by Dr. Granados and was told to present to ED for hospital admission. Per inpatient heme/onc service, NSGY had been contacted and it was their desire to evaluate patient. Per patient, she has had no increased urinary incontinence the last few days. Pain improved with tramadol. No numbness/tingling, fecal incontinence.          Review of patient's allergies indicates:  No Known Allergies  Past Medical History:   Diagnosis Date    B12 deficiency     Depression     per pcp note 7/2017 and given prozac and diazepam     Hyperlipidemia     Hypertension     PVC (premature ventricular contraction)     seen on ekg from prior pcp    Weight loss     reviewed prior pcp Dr. Russo notes 2017 - labs reviewed: cmp wnl x tbili 1.5, tsh wnl, A1c 5.0, cbc wnl,D 36, hiv neg, hep c neg, hep b surg ag neg      Past Surgical History:   Procedure Laterality Date    ESOPHAGOGASTRODUODENOSCOPY  05/04/2018    esophageal ring, grade 1 esophagitis, gastritis     HYSTERECTOMY      fibroids     Family History   Problem Relation Age of Onset    Glaucoma Mother     Heart attack Father     Diabetes Sister     Cancer Brother         lung cancer, + tobacco    Diabetes Brother     Hypertension Brother     Stroke Brother      Emphysema Brother     COPD Brother     Asthma Sister     No Known Problems Sister     Hyperlipidemia Sister     Hyperlipidemia Sister     Heart attack Sister      Social History   Substance Use Topics    Smoking status: Never Smoker    Smokeless tobacco: Never Used    Alcohol use No      Comment: stopped in 2007 - hx of social drinking     Review of Systems   Constitutional: Positive for appetite change (decreased) and unexpected weight change.   HENT: Negative for congestion, rhinorrhea and sore throat.    Eyes: Negative for visual disturbance.   Respiratory: Negative for cough and shortness of breath.    Cardiovascular: Negative for chest pain, palpitations and leg swelling.   Gastrointestinal: Negative for abdominal pain, constipation, diarrhea, nausea and vomiting.   Genitourinary:        Urinary incontinence   Musculoskeletal: Positive for back pain.   Skin: Negative for color change, rash and wound.   Neurological: Negative for dizziness, weakness, light-headedness, numbness and headaches.   Psychiatric/Behavioral: Negative for sleep disturbance.       Physical Exam     Initial Vitals [06/09/18 1243]   BP Pulse Resp Temp SpO2   109/65 88 18 98.2 °F (36.8 °C) 98 %      MAP       79.67         Physical Exam    Constitutional: She appears well-developed.   HENT:   Head: Normocephalic and atraumatic.   Eyes: EOM are normal. Pupils are equal, round, and reactive to light.   Neck: Normal range of motion. Neck supple.   constricted   Cardiovascular: Normal rate, regular rhythm and normal heart sounds.   Pulmonary/Chest:   Decreased breath sounds right lower & middle lobes   Abdominal: Soft. Bowel sounds are normal. There is no tenderness.   Musculoskeletal: Normal range of motion. She exhibits no edema or tenderness.   Neurological: She is alert and oriented to person, place, and time.   Skin: Skin is warm and dry. No erythema.   Psychiatric: She has a normal mood and affect. Thought content normal.          ED Course   Procedures  Labs Reviewed - No data to display       No orders to display        Medical Decision Making:   Initial Assessment:   Shahram Hills is 78 yr old female with metastatic neuroendocrine tumor to liver & spine who presents for admission to heme/onc service for NSGY evaluation of spinal lesions.   Differential Diagnosis:   Radiculopathy vs spinal canal stenosis vs spinal cord compression.  ED Management:  Heme/onc contacted  NSGY contacted    4:02 PM  Evaluated by heme/onc & NSGY services. Patient is medically stable for discharge. Appointments with outpatient radiation oncology and neurosurgery have been arranged.  Other:   I discussed test(s) with the performing physician.       <> Summary of the Findings: Discussed with Dr. Cierra Garza.  I have discussed this case with another health care provider.       <> Summary of the Discussion: Discussed with NSGY resident & Heme/Onc fellow.              Attending Attestation:   Physician Attestation Statement for Resident:  As the supervising MD   Physician Attestation Statement: I have personally seen and examined this patient.   I agree with the above history. -: 77 yo f, h/o neuroendocrine malignancy, referred to ED for NSG eval, had MRI yesterday demonstrating large lesion at L3/L4 (though in heme/onc note there's documentation of MRI from March 2018 at OSH that demonstrated similar lesion at L3).  No neurologic sx (no LE weakness, numbness, no bowel/bladder sx).  Will d/c NSG but anticipate pt can continue outpatient work-up   As the supervising MD I agree with the above PE.    As the supervising MD I agree with the above treatment, course, plan, and disposition.  I have reviewed the following: old records at this facility.                       Clinical Impression:   The encounter diagnosis was Bony metastasis.                             Cierra Ken MD  06/10/18 1042

## 2018-06-10 DIAGNOSIS — C79.51 BONE METASTASIS: ICD-10-CM

## 2018-06-10 DIAGNOSIS — C7B.8 METASTATIC MALIGNANT NEUROENDOCRINE TUMOR TO LIVER: Primary | ICD-10-CM

## 2018-06-11 ENCOUNTER — TELEPHONE (OUTPATIENT)
Dept: ADMINISTRATIVE | Facility: HOSPITAL | Age: 78
End: 2018-06-11

## 2018-06-11 ENCOUNTER — INITIAL CONSULT (OUTPATIENT)
Dept: RADIATION ONCOLOGY | Facility: CLINIC | Age: 78
End: 2018-06-11
Attending: RADIOLOGY
Payer: MEDICARE

## 2018-06-11 VITALS
DIASTOLIC BLOOD PRESSURE: 63 MMHG | HEIGHT: 64 IN | HEART RATE: 88 BPM | BODY MASS INDEX: 20 KG/M2 | SYSTOLIC BLOOD PRESSURE: 131 MMHG | RESPIRATION RATE: 18 BRPM | TEMPERATURE: 98 F | WEIGHT: 117.13 LBS

## 2018-06-11 DIAGNOSIS — C7B.8 SECONDARY NEUROENDOCRINE TUMOR OF BONE: ICD-10-CM

## 2018-06-11 PROCEDURE — 3075F SYST BP GE 130 - 139MM HG: CPT | Mod: CPTII,S$GLB,, | Performed by: RADIOLOGY

## 2018-06-11 PROCEDURE — 3078F DIAST BP <80 MM HG: CPT | Mod: CPTII,S$GLB,, | Performed by: RADIOLOGY

## 2018-06-11 PROCEDURE — 99999 PR PBB SHADOW E&M-EST. PATIENT-LVL III: CPT | Mod: PBBFAC,,, | Performed by: RADIOLOGY

## 2018-06-11 PROCEDURE — 99204 OFFICE O/P NEW MOD 45 MIN: CPT | Mod: S$GLB,,, | Performed by: RADIOLOGY

## 2018-06-11 NOTE — PROGRESS NOTES
REFERRING PHYSICIAN: Dr. 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  Liver biopsy revealed well differentiated neuroendocrine carcinoma.  She was seen by Dr. Granados on 6/07/18 with complaints of urinary incontinence and right flank pain that radiated down leg. She is scheduled to lanreotide therapy in the near future. She was started on dexamethasone 4 mg q6h.    MRI of the brain and spinal axis showed a marrow replacing metastatic lesion of the L3 vertebral body with associated extra osseous expansion and complete effacement of the right L3-L4 neural foramina and additional effacement of the right lateral recess.  There is additional lateral extension and abutment of the right psoas muscle.  Superimposed degenerative change at this level results in mild spinal canal stenosis.   The remainder of the brain and spine were negative for metastatic disease.  She was instructed to go to the ED yesterday for neurosurgical evaluation at their request.  Per Dr. Soto's note from yesterday neurosurgery does not plan any intervention, and the patient was discharged.  I have been consulted for consideration of palliative RT.  She was discharged from the ED on tramadol with improvement in her pain.    Today, she is here with her niece and feels well.  She did have some right leg pain this morning that was relieved by Tramadol, and she is currently pain free.  She denies having had low back pain.  Minimal R LE weakness when initially standing.  She was having frequent diarrhea which has been relieved by cholestyramine.  She had complained of needing to urinate every time she had a loose stool, but now that her stool is controlled she has control of her urine and  stool separately.  She thinks her weight is stable now at around 115#.    ECO  ECOG SCORE           REVIEW OF SYSTEMS:   As above.  In addition, patient denies headaches, visual problems, dizziness, chest pain, shortness of breath, cough, nausea, vomiting, diarrhea, or any new bony pains.  Patient also denies easy bruising, skin rashes, or numbness or tingling.    PAST MEDICAL HISTORY:  Past Medical History:   Diagnosis Date    B12 deficiency     Depression     per pcp note 2017 and given prozac and diazepam     Hyperlipidemia     Hypertension     PVC (premature ventricular contraction)     seen on ekg from prior pcp    Weight loss     reviewed prior pcp Dr. Russo notes 2017 - labs reviewed: cmp wnl x tbili 1.5, tsh wnl, A1c 5.0, cbc wnl,D 36, hiv neg, hep c neg, hep b surg ag neg        PAST SURGICAL HISTORY:  Past Surgical History:   Procedure Laterality Date    ESOPHAGOGASTRODUODENOSCOPY  2018    esophageal ring, grade 1 esophagitis, gastritis     HYSTERECTOMY      fibroids       ALLERGIES:   Review of patient's allergies indicates:  No Known Allergies    MEDICATIONS:  Current Outpatient Prescriptions   Medication Sig    cholecalciferol, vitamin D3, (VITAMIN D3) 400 unit Tab Take by mouth once daily.    cholestyramine-aspartame (CHOLESTYRAMINE LIGHT) 4 gram PwPk Take by mouth.    cyanocobalamin (VITAMIN B-12) 1000 MCG tablet Take 1 tablet (1,000 mcg total) by mouth once daily.    dexamethasone (DECADRON) 4 MG Tab Take 1 tablet (4 mg total) by mouth every 6 (six) hours.    ferrous sulfate 325 (65 FE) MG EC tablet Take 325 mg by mouth 3 (three) times daily with meals.    lisinopril-hydrochlorothiazide (PRINZIDE,ZESTORETIC) 10-12.5 mg per tablet Take 1 tablet by mouth once daily.    rosuvastatin (CRESTOR) 5 MG tablet Take 1 tablet (5 mg total) by mouth once daily.    traMADol (ULTRAM) 50 mg tablet Take 1 tablet (50 mg total) by mouth every 6 (six) hours as needed for Pain.    XIIDRA 5  "% Dpet INSTILL ONE DROP INTO OU BID     No current facility-administered medications for this visit.        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  Retired caregiver.      FAMILY HISTORY:  Family History   Problem Relation Age of Onset    Glaucoma Mother     Heart attack Father     Diabetes Sister     Cancer Brother         lung cancer, + tobacco    Diabetes Brother     Hypertension Brother     Stroke Brother     Emphysema Brother     COPD Brother     Asthma Sister     No Known Problems Sister     Hyperlipidemia Sister     Hyperlipidemia Sister     Heart attack Sister          PHYSICAL EXAMINATION:  /63 (BP Location: Right arm, Patient Position: Sitting)   Pulse 88   Temp 97.6 °F (36.4 °C) (Oral)   Resp 18   Ht 5' 4" (1.626 m)   Wt 53.1 kg (117 lb 1.6 oz)   BMI 20.10 kg/m²   GENERAL: Patient is alert and oriented, in no acute distress.  HEENT:Extraocular muscles are intact.  Oropharynx is clear without lesions.    LYMPH: There is no cervical or supraclavicular lymphadenopathy palpated.    HEART: Regular rate and rhythm.  LUNGS: Clear to auscultation bilaterally.  ABDOMEN:Soft, nontender, nondistended, without hepatosplenomegaly.  Normoactive bowel sounds.  EXTREMITIES: No clubbing, cyanosis, or edema.  MSK: Spine is nontender.  NEUROLOGICAL: Cranial nerve II through XII grossly intact.  Sensation is intact.  Strength is 5 out of 5 in the upper and lower extremities " bilaterally.  Gait is normal.    ASSESSMENT:  L3 vertebral body metastasis with extension to R psoas muscle, stage IV, with resultant R leg pain and questionable urinary incontinence.  Pain c/w tramadol.    PLAN:  Palliative RT to the area of the L3 metastasis with the goal of pain control, and relief/prevention of any neuropathy and/or urinary/stool incontinence.    The risks, benefits, and side effects of radiation were explained in detail to the patient and her niece.  All of their questions were answered and informed consent was signed.  I plan to see the patient back for radiation planning CT as soon as possible.  Plan will be for 30Gy/10 fractions over two weeks, M-F.    I spent approximately 45 minutes reviewing the available records and evaluating the patient, out of which over 50% of the time was spent face to face with the patient in counseling and coordinating this patient's care.

## 2018-06-11 NOTE — LETTER
June 11, 2018      Sebastian Granados MD  [address]  [address]  [facility] [address]           [facility]  [address]  [facility] [address]  Phone: [phone]          Patient: Shahram Hills   MR Number: [id number]   YOB: 1940   Date of Visit: 6/11/2018       Dear Dr. Sebastian Granados:    Thank you for referring Shahram Hills to me for evaluation. Attached you will find relevant portions of my assessment and plan of care.    If you have questions, please do not hesitate to call me. I look forward to following Shahram Hills along with you.    Sincerely,    Gamaliel Adames MD    Enclosure  CC:  No Recipients    If you would like to receive this communication electronically, please contact [email or web address] or [phone] to request more information on [facility] Link access.    For providers and/or their staff who would like to refer a patient to [facility], please contact us through our one-stop-shop provider referral line, [facility], at [phone].    If you feel you have received this communication in error or would no longer like to receive these types of communications, please e-mail [email or web address]

## 2018-06-12 DIAGNOSIS — C7B.8 METASTATIC MALIGNANT NEUROENDOCRINE TUMOR TO LIVER: Primary | ICD-10-CM

## 2018-06-12 LAB — CGA SERPL-MCNC: 6320 NG/ML (ref 0–95)

## 2018-06-13 ENCOUNTER — HOSPITAL ENCOUNTER (OUTPATIENT)
Dept: RADIATION THERAPY | Facility: HOSPITAL | Age: 78
Discharge: HOME | End: 2018-06-13
Attending: RADIOLOGY
Payer: MEDICARE

## 2018-06-13 ENCOUNTER — HOSPITAL ENCOUNTER (OUTPATIENT)
Dept: RADIOLOGY | Facility: HOSPITAL | Age: 78
Discharge: HOME | End: 2018-06-13
Attending: INTERNAL MEDICINE
Payer: MEDICARE

## 2018-06-13 DIAGNOSIS — C7B.8 METASTATIC MALIGNANT NEUROENDOCRINE TUMOR TO LIVER: ICD-10-CM

## 2018-06-13 DIAGNOSIS — C7A.8 OTHER MALIGNANT NEUROENDOCRINE TUMORS: ICD-10-CM

## 2018-06-13 LAB — POCT GLUCOSE: 93 MG/DL (ref 70–110)

## 2018-06-13 PROCEDURE — 77263 THER RADIOLOGY TX PLNG CPLX: CPT | Mod: ,,, | Performed by: RADIOLOGY

## 2018-06-13 PROCEDURE — 77290 THER RAD SIMULAJ FIELD CPLX: CPT | Mod: 26,,, | Performed by: RADIOLOGY

## 2018-06-13 PROCEDURE — 78815 PET IMAGE W/CT SKULL-THIGH: CPT | Mod: TC,PI

## 2018-06-13 PROCEDURE — A9587 GALLIUM GA-68: HCPCS | Mod: TB

## 2018-06-13 PROCEDURE — 78815 PET IMAGE W/CT SKULL-THIGH: CPT | Mod: 26,PI,, | Performed by: RADIOLOGY

## 2018-06-13 PROCEDURE — 77014 HC CT GUIDANCE RADIATION THERAPY FLDS PLACEMENT: CPT | Mod: TC | Performed by: RADIOLOGY

## 2018-06-13 PROCEDURE — 77290 THER RAD SIMULAJ FIELD CPLX: CPT | Mod: TC | Performed by: RADIOLOGY

## 2018-06-14 NOTE — PROGRESS NOTES
"                                                         PROGRESS NOTE    Subjective:       Patient ID: Shahram Hills is a 78 y.o. female.    Chief Complaint:  Metastatic well differentiated, low grade neuroendocrine tumor of the ileum, with mets to liver, bones, LN    Oncologic History:  1. Ms. Hilsl is an 77 yo woman who initially saw me on 6/7/18 for further evaluation of neuroendocrine tumor. She initially presented with diarrhea, abdominal discomfort, weight loss. She was evaluated at [facility] and underwent workup below:  US abdomen on 3/14/18 showed numerous bilobar mixed echogenicity and mildly vascular hepatic lesions. Cholelithiasis without e/o acute cholecystitis.   MRI abdomen on 3/30/2018 showed innumerable hepatic metastases. L3 vertebral body metastasis with soft tissue component along the right margin of the L3 and upper L4 vertebral bodies, filling the right L3/4 neural foramen, and extending into the anterior aspect of the spinal canal on the right at the L3 and upper T4 levels. Associated with mild spinal canal narrowing.  CT chest on 4/6/2018 showed one lucent nodule measuring 7 mm in the T7 vertebral body, most likely representing metastatic disease.    EGD on 5/4/18 showed normal duodenum. Schatzki's ring in the lower third of the esophagus. Grade 1 esophagitis in the GE junction c/w mild distal esophagitis. Congestion and erythema in the antrum, pre-pyloric region and stomach body c/w gastritis. Biopsy of the stomach antrum showed mild chronic gastritis, neg for H. pylori.   Labs on 5/9/2018: WBC 6.9, H/H 11.6/34.3, MCV 87.5, plt count 279. On 3/9/18: Vit B12 level 173, folic acid 6.6  She was started on oral vit B12 and underwent a liver biopsy on 5/18/18. Pathology showed "metastatic well differentiated neuroendocrine tumor. Ki67 3%"     She saw me on 6/7/18 and complained of weight loss of 26 lbs over the past 3-4 months, also has diarrhea. No flushing, wheezing, palpitations. Has " "been having pain in right flank radiating down the right leg. No tingling/numbness, weakness. She can hold her bladder but when she urinates her diarrhea would come out as well. Started on dex 4 mg q6h the evening of 18.     2. MRI spine on 18 showed "Marrow replacing metastatic lesion of the L3 vertebral body with associated extra osseous expansion and complete effacement of the right L3-L4 neural foramina and additional effacement of the right lateral recess.  There is additional lateral extension and abutment of the right psoas muscle.  Superimposed degenerative change at this level results in mild spinal canal stenosis." I sent the patient to ED on 18 where she was evaluated by neurosurgery. Neurosurgery did not feel she was a candidate for surgical intervention.     3. Seen by Dr. Adames on 18. Will start radiation to the area of the L3 metastasis with the goal of pain control, and relief/prevention of any neuropathy and/or urinary/stool incontinence.    4. 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  There is an adjacent metastatic lymph node, diffuse liver metastases, and multiple bone metastases. Index primary neoplasm SUV max 33.13. Adjacent lymph node SUV max 23. L3 bone metastasis SUV max 36.86. Left lobe SUV max 46.13      History of Present Illness:   Mr. Hills returns for follow up. Doing well. Right leg pain better since tramadol. Taking dex 4 mg q6h and prilosec. Saw Dr. Adames this Monday and will start radiation to L3 metastasis next Monday. Diarrhea improved.     ECO    ROS:   See HPI. Otherwise negative.     Physical Examination:   Vital signs reviewed.   Gen: well hydrated, well developed, in no acute distress.  HEENT: normocephalic, anicteric, PERRLA, EOMI, oropharynx clear  Neck: supple, no JVD, thyromegaly, cervical or supraclavicular LAD  Lungs: CTAB, no wheezes or rales  Heart: RRR, no M/R/G  Abdomen: soft, no tenderness, " non-distended, liver edge 5 cm below the right costal margin, no splenomegaly, no mass, or hernia.   Ext: no cyanosis, edema, deformity  Neuro: alert and oriented x 4, no focal neuro deficit  Skin: no rash, open wounds or ulcers  Psych: pleasant and appropriate mood and affect    Objective:     Diagnostic Tests:  Gallium study on 6/13/18: Distal ileal primary neoplasm consistent with a carcinoid.  There is an adjacent metastatic lymph node, diffuse liver metastases, and multiple bone metastases. Index primary neoplasm SUV max 33.13. Adjacent lymph node SUV max 23. L3 bone metastasis SUV max 36.86. Left lobe SUV max 46.13    Laboratory Data:  Labs reviewed. Chromogranin A 6320  Assessment/Plan:     1. Primary malignant neuroendocrine tumor of ileum    2. 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 Metastasis to bone    4. Malignant neoplasm metastatic to intra-abdominal lymph node    5. Diarrhea, unspecified type    6. Cancer related pain      1-4  - Discussed Gallium scan result. Neuroendocrine tumor of ileum with mets to liver, bones, lymph node.  - lanreotide not approved yet. Will have her return for lanreotide once it gets approved  - will start palliative radiation next Monday  - c/w decadron 4 q6h with prilosec    5.  - better now    6.  - c/w tramadol prn    RTC in 2 weeks    Electronically signed by Sebastian Granados

## 2018-06-15 ENCOUNTER — OFFICE VISIT (OUTPATIENT)
Dept: HEMATOLOGY/ONCOLOGY | Facility: CLINIC | Age: 78
End: 2018-06-15
Payer: MEDICARE

## 2018-06-15 VITALS
RESPIRATION RATE: 18 BRPM | DIASTOLIC BLOOD PRESSURE: 67 MMHG | OXYGEN SATURATION: 100 % | WEIGHT: 118.63 LBS | HEART RATE: 106 BPM | TEMPERATURE: 99 F | HEIGHT: 64 IN | SYSTOLIC BLOOD PRESSURE: 132 MMHG | BODY MASS INDEX: 20.25 KG/M2

## 2018-06-15 DIAGNOSIS — C7B.8 METASTATIC MALIGNANT NEUROENDOCRINE TUMOR TO LIVER: ICD-10-CM

## 2018-06-15 DIAGNOSIS — C7A.8 PRIMARY MALIGNANT NEUROENDOCRINE TUMOR OF ILEUM: Primary | ICD-10-CM

## 2018-06-15 DIAGNOSIS — R19.7 DIARRHEA, UNSPECIFIED TYPE: ICD-10-CM

## 2018-06-15 DIAGNOSIS — G89.3 CANCER RELATED PAIN: ICD-10-CM

## 2018-06-15 DIAGNOSIS — C77.2 MALIGNANT NEOPLASM METASTATIC TO INTRA-ABDOMINAL LYMPH NODE: ICD-10-CM

## 2018-06-15 DIAGNOSIS — C79.51 METASTASIS TO BONE: ICD-10-CM

## 2018-06-15 PROCEDURE — 77295 3-D RADIOTHERAPY PLAN: CPT | Mod: TC | Performed by: RADIOLOGY

## 2018-06-15 PROCEDURE — 88321 CONSLTJ&REPRT SLD PREP ELSWR: CPT | Mod: ,,, | Performed by: PATHOLOGY

## 2018-06-15 PROCEDURE — 99214 OFFICE O/P EST MOD 30 MIN: CPT | Mod: S$GLB,,, | Performed by: INTERNAL MEDICINE

## 2018-06-15 PROCEDURE — 99999 PR PBB SHADOW E&M-EST. PATIENT-LVL III: CPT | Mod: PBBFAC,,, | Performed by: INTERNAL MEDICINE

## 2018-06-15 PROCEDURE — 3075F SYST BP GE 130 - 139MM HG: CPT | Mod: CPTII,S$GLB,, | Performed by: INTERNAL MEDICINE

## 2018-06-15 PROCEDURE — 77295 3-D RADIOTHERAPY PLAN: CPT | Mod: 26,,, | Performed by: RADIOLOGY

## 2018-06-15 PROCEDURE — 77300 RADIATION THERAPY DOSE PLAN: CPT | Mod: TC | Performed by: RADIOLOGY

## 2018-06-15 PROCEDURE — 77334 RADIATION TREATMENT AID(S): CPT | Mod: TC | Performed by: RADIOLOGY

## 2018-06-15 PROCEDURE — 3078F DIAST BP <80 MM HG: CPT | Mod: CPTII,S$GLB,, | Performed by: INTERNAL MEDICINE

## 2018-06-15 PROCEDURE — 77334 RADIATION TREATMENT AID(S): CPT | Mod: 26,,, | Performed by: RADIOLOGY

## 2018-06-15 PROCEDURE — 77300 RADIATION THERAPY DOSE PLAN: CPT | Mod: 26,,, | Performed by: RADIOLOGY

## 2018-06-15 NOTE — Clinical Note
Schedule for lanreotide injection next Friday. However if lanreotide is approved early next week will give it to her as soon as it gets approved. RTC in 2 weeks with CBC, CMP

## 2018-06-18 ENCOUNTER — DOCUMENTATION ONLY (OUTPATIENT)
Dept: RADIATION ONCOLOGY | Facility: CLINIC | Age: 78
End: 2018-06-18

## 2018-06-18 DIAGNOSIS — C7B.8 SECONDARY NEUROENDOCRINE TUMOR OF BONE: Primary | ICD-10-CM

## 2018-06-18 PROCEDURE — 77387 GUIDANCE FOR RADJ TX DLVR: CPT | Mod: TC | Performed by: RADIOLOGY

## 2018-06-18 PROCEDURE — G6002 STEREOSCOPIC X-RAY GUIDANCE: HCPCS | Mod: 26,,, | Performed by: RADIOLOGY

## 2018-06-18 PROCEDURE — 77417 THER RADIOLOGY PORT IMAGE(S): CPT | Performed by: RADIOLOGY

## 2018-06-18 PROCEDURE — 77412 RADIATION TX DELIVERY LVL 3: CPT | Performed by: RADIOLOGY

## 2018-06-18 RX ORDER — HYDROCODONE BITARTRATE AND ACETAMINOPHEN 5; 325 MG/1; MG/1
1-2 TABLET ORAL EVERY 6 HOURS PRN
Qty: 90 TABLET | Refills: 0 | Status: SHIPPED | OUTPATIENT
Start: 2018-06-18 | End: 2018-08-17

## 2018-06-18 NOTE — PLAN OF CARE
Problem: Patient Care Overview  Goal: Plan of Care Review  Outcome: Ongoing (interventions implemented as appropriate)  Day 1 of XLT to L4. Nursing education done and questions answered.

## 2018-06-19 ENCOUNTER — TELEPHONE (OUTPATIENT)
Dept: HEMATOLOGY/ONCOLOGY | Facility: CLINIC | Age: 78
End: 2018-06-19

## 2018-06-19 PROCEDURE — G6002 STEREOSCOPIC X-RAY GUIDANCE: HCPCS | Mod: 26,,, | Performed by: RADIOLOGY

## 2018-06-19 PROCEDURE — 77412 RADIATION TX DELIVERY LVL 3: CPT | Performed by: RADIOLOGY

## 2018-06-19 PROCEDURE — 77387 GUIDANCE FOR RADJ TX DLVR: CPT | Mod: TC | Performed by: RADIOLOGY

## 2018-06-20 PROCEDURE — 77387 GUIDANCE FOR RADJ TX DLVR: CPT | Mod: TC | Performed by: RADIOLOGY

## 2018-06-20 PROCEDURE — G6002 STEREOSCOPIC X-RAY GUIDANCE: HCPCS | Mod: 26,,, | Performed by: RADIOLOGY

## 2018-06-20 PROCEDURE — 77412 RADIATION TX DELIVERY LVL 3: CPT | Performed by: RADIOLOGY

## 2018-06-21 ENCOUNTER — TELEPHONE (OUTPATIENT)
Dept: HEMATOLOGY/ONCOLOGY | Facility: CLINIC | Age: 78
End: 2018-06-21

## 2018-06-21 PROCEDURE — 77412 RADIATION TX DELIVERY LVL 3: CPT | Performed by: RADIOLOGY

## 2018-06-21 PROCEDURE — G6002 STEREOSCOPIC X-RAY GUIDANCE: HCPCS | Mod: 26,,, | Performed by: RADIOLOGY

## 2018-06-21 PROCEDURE — 77387 GUIDANCE FOR RADJ TX DLVR: CPT | Mod: TC | Performed by: RADIOLOGY

## 2018-06-21 NOTE — TELEPHONE ENCOUNTER
----- Message from Estelle Eddy sent at 6/21/2018  8:51 AM CDT -----  Contact: sister Shayla Rosenthal  Patient's sister stated that the nurse called the patient yesterday but she has health problems & really can't remember what the call was about.  Please call Mrs Rosenthal as she is the care taker at [phone]

## 2018-06-21 NOTE — TELEPHONE ENCOUNTER
Informed Mrs. Rosenthal that it was the Registered Dietician Yina Nguyễn who called the pt yesterday. I stated that I think Ms. Nguyễn was calling to set up an appt with the pt and asked Mrs. Rosenthal if she needed the contact number. Mrs. Rosenthal stated she has Ms. Nguyễn's contact number and will call her. I voiced understanding.

## 2018-06-22 ENCOUNTER — INFUSION (OUTPATIENT)
Dept: INFUSION THERAPY | Facility: HOSPITAL | Age: 78
End: 2018-06-22
Attending: INTERNAL MEDICINE
Payer: MEDICARE

## 2018-06-22 DIAGNOSIS — C7B.8 METASTATIC MALIGNANT NEUROENDOCRINE TUMOR TO LIVER: Primary | ICD-10-CM

## 2018-06-22 PROCEDURE — G6002 STEREOSCOPIC X-RAY GUIDANCE: HCPCS | Mod: 26,,, | Performed by: RADIOLOGY

## 2018-06-22 PROCEDURE — 96402 CHEMO HORMON ANTINEOPL SQ/IM: CPT

## 2018-06-22 PROCEDURE — 77412 RADIATION TX DELIVERY LVL 3: CPT | Performed by: RADIOLOGY

## 2018-06-22 PROCEDURE — 63600175 PHARM REV CODE 636 W HCPCS: Mod: JG | Performed by: INTERNAL MEDICINE

## 2018-06-22 PROCEDURE — 77336 RADIATION PHYSICS CONSULT: CPT | Performed by: RADIOLOGY

## 2018-06-22 PROCEDURE — 77387 GUIDANCE FOR RADJ TX DLVR: CPT | Mod: TC | Performed by: RADIOLOGY

## 2018-06-22 RX ORDER — LANREOTIDE ACETATE 120 MG/.5ML
120 INJECTION SUBCUTANEOUS
Status: DISCONTINUED | OUTPATIENT
Start: 2018-06-22 | End: 2018-06-22 | Stop reason: HOSPADM

## 2018-06-22 RX ORDER — LANREOTIDE ACETATE 120 MG/.5ML
120 INJECTION SUBCUTANEOUS
Status: CANCELLED | OUTPATIENT
Start: 2018-06-22

## 2018-06-22 RX ADMIN — LANREOTIDE ACETATE 120 MG: 120 INJECTION SUBCUTANEOUS at 04:06

## 2018-06-22 NOTE — NURSING
2185- Patient arrived ambulatory to the unit for injection.  Patient here for first time lanreotide, medication explained and all questions answered.  Patient tolerated injection without issue and was discharged to home setting.

## 2018-06-25 ENCOUNTER — CLINICAL SUPPORT (OUTPATIENT)
Dept: HEMATOLOGY/ONCOLOGY | Facility: CLINIC | Age: 78
End: 2018-06-25
Payer: MEDICARE

## 2018-06-25 ENCOUNTER — DOCUMENTATION ONLY (OUTPATIENT)
Dept: RADIATION ONCOLOGY | Facility: CLINIC | Age: 78
End: 2018-06-25

## 2018-06-25 ENCOUNTER — OFFICE VISIT (OUTPATIENT)
Dept: NEUROSURGERY | Facility: CLINIC | Age: 78
End: 2018-06-25
Payer: MEDICARE

## 2018-06-25 VITALS
HEIGHT: 64 IN | WEIGHT: 113.56 LBS | TEMPERATURE: 99 F | DIASTOLIC BLOOD PRESSURE: 76 MMHG | WEIGHT: 113.38 LBS | HEART RATE: 107 BPM | BODY MASS INDEX: 19.47 KG/M2 | BODY MASS INDEX: 19.39 KG/M2 | SYSTOLIC BLOOD PRESSURE: 128 MMHG

## 2018-06-25 DIAGNOSIS — C7B.8 METASTATIC MALIGNANT NEUROENDOCRINE TUMOR TO LIVER: ICD-10-CM

## 2018-06-25 DIAGNOSIS — Z71.3 NUTRITIONAL COUNSELING: Primary | ICD-10-CM

## 2018-06-25 DIAGNOSIS — C7B.8 SECONDARY NEUROENDOCRINE TUMOR OF BONE: ICD-10-CM

## 2018-06-25 DIAGNOSIS — C79.51 SPINE METASTASIS: Primary | ICD-10-CM

## 2018-06-25 PROCEDURE — 99204 OFFICE O/P NEW MOD 45 MIN: CPT | Mod: S$GLB,,, | Performed by: NEUROLOGICAL SURGERY

## 2018-06-25 PROCEDURE — 99999 PR PBB SHADOW E&M-EST. PATIENT-LVL III: CPT | Mod: PBBFAC,,, | Performed by: NEUROLOGICAL SURGERY

## 2018-06-25 PROCEDURE — 99999 PR PBB SHADOW E&M-EST. PATIENT-LVL II: CPT | Mod: PBBFAC,,,

## 2018-06-25 PROCEDURE — 3078F DIAST BP <80 MM HG: CPT | Mod: CPTII,S$GLB,, | Performed by: NEUROLOGICAL SURGERY

## 2018-06-25 PROCEDURE — 3074F SYST BP LT 130 MM HG: CPT | Mod: CPTII,S$GLB,, | Performed by: NEUROLOGICAL SURGERY

## 2018-06-25 PROCEDURE — 97802 MEDICAL NUTRITION INDIV IN: CPT | Mod: S$GLB,,, | Performed by: DIETITIAN, REGISTERED

## 2018-06-25 PROCEDURE — 77387 GUIDANCE FOR RADJ TX DLVR: CPT | Mod: TC | Performed by: RADIOLOGY

## 2018-06-25 PROCEDURE — G6002 STEREOSCOPIC X-RAY GUIDANCE: HCPCS | Mod: 26,,, | Performed by: RADIOLOGY

## 2018-06-25 PROCEDURE — 77412 RADIATION TX DELIVERY LVL 3: CPT | Performed by: RADIOLOGY

## 2018-06-25 NOTE — PLAN OF CARE
Problem: Patient Care Overview  Goal: Plan of Care Review  Outcome: Ongoing (interventions implemented as appropriate)  Day 6 of XRT to L spine. Denies pain. States she has a lite headache, swelling in legs. Sweating from time to time and shaking in extremities.

## 2018-06-25 NOTE — PROGRESS NOTES
"Medical Nutrition Therapy Oncology Progress Note    Name: Shahram Hills MRN: [id number]  : 1940    Age: 78 y.o.  Ethnicity: /Black Language: English    Diagnosis: No diagnosis found.    Chemo Regimen:    Referring MD: Dr. Granados Frequency:  Radiation:            Goal of Cancer treatment Palliative         Nutrition Assessment     Chief Complaint:   Chief Complaint   Patient presents with    Nutrition Counseling    Cancer        Anthropometric Measurements:  Height: 5' 4" (1.626 m)  Current Weight: 51.5 kg (113 lb 8.6 oz)  Ideal Body Weight: 120#  Percent Ideal Body Weight:: 94%  BMI: Body mass index is 19.49 kg/m².     Weight History:   Wt Readings from Last 8 Encounters:   18 51.5 kg (113 lb 8.6 oz)   06/15/18 53.8 kg (118 lb 9.7 oz)   18 53.1 kg (117 lb 1.6 oz)   18 51.7 kg (114 lb)   18 52.5 kg (115 lb 11.9 oz)   18 53 kg (116 lb 13.5 oz)   18 53 kg (116 lb 13.5 oz)   18 53.3 kg (117 lb 8 oz)       Medical Health History:  Feeding tube placed: No  Pre-treatment: No    Past Surgical History:   Procedure Laterality Date    ESOPHAGOGASTRODUODENOSCOPY  2018    esophageal ring, grade 1 esophagitis, gastritis     HYSTERECTOMY      fibroids        Medications:   Current Outpatient Prescriptions:     cholecalciferol, vitamin D3, (VITAMIN D3) 400 unit Tab, Take by mouth once daily., Disp: , Rfl:     cholestyramine-aspartame (CHOLESTYRAMINE LIGHT) 4 gram PwPk, Take by mouth., Disp: , Rfl:     cyanocobalamin (VITAMIN B-12) 1000 MCG tablet, Take 1 tablet (1,000 mcg total) by mouth once daily., Disp: , Rfl:     dexamethasone (DECADRON) 4 MG Tab, Take 1 tablet (4 mg total) by mouth every 6 (six) hours., Disp: 120 tablet, Rfl: 0    ferrous sulfate 325 (65 FE) MG EC tablet, Take 325 mg by mouth 3 (three) times daily with meals., Disp: , Rfl:     HYDROcodone-acetaminophen (NORCO) 5-325 mg per tablet, Take 1-2 tablets by mouth every 6 (six) hours as " needed for Pain., Disp: 90 tablet, Rfl: 0    lisinopril-hydrochlorothiazide (PRINZIDE,ZESTORETIC) 10-12.5 mg per tablet, Take 1 tablet by mouth once daily., Disp: 90 tablet, Rfl: 1    rosuvastatin (CRESTOR) 5 MG tablet, Take 1 tablet (5 mg total) by mouth once daily., Disp: 90 tablet, Rfl: 1    XIIDRA 5 % Dpet, INSTILL ONE DROP INTO OU BID, Disp: , Rfl: 3    Last Labs:  Last Labs:  Glucose   Date Value Ref Range Status   06/07/2018 104 70 - 110 mg/dL Final     BUN, Bld   Date Value Ref Range Status   06/07/2018 6 (L) 8 - 23 mg/dL Final     Creatinine   Date Value Ref Range Status   06/07/2018 0.7 0.5 - 1.4 mg/dL Final     Sodium   Date Value Ref Range Status   06/07/2018 140 136 - 145 mmol/L Final     Potassium   Date Value Ref Range Status   06/07/2018 3.5 3.5 - 5.1 mmol/L Final     No results found for: PHOS  Calcium   Date Value Ref Range Status   06/07/2018 10.1 8.7 - 10.5 mg/dL Final     No results found for: PREALBUMIN  Total Protein   Date Value Ref Range Status   06/07/2018 7.5 6.0 - 8.4 g/dL Final     No results found for: CHOL  No results found for: HGBA1C  Hemoglobin   Date Value Ref Range Status   06/07/2018 12.6 12.0 - 16.0 g/dL Final     Hematocrit   Date Value Ref Range Status   06/07/2018 39.1 37.0 - 48.5 % Final     No results found for: IRON  No components found for: FROLATE  No results found for: [id number]  WBC   Date Value Ref Range Status   06/07/2018 8.01 3.90 - 12.70 K/uL Final       Client History/Food Access:    Living Situation: Lives with family   Who: Shops for Groceries? Patient and Sister   Who : Prepares meals? Patient and Sister   Who: Fills prescriptions? Patient and Sister   Are there financial difficulties purchasing food? No   Personal History (occupation, physical activity level, exercise):      Baseline for Outcomes Monitoring  Food and Nutrition History: Pt here with sister for nutrition counseling. Pt with current weight of 113# which is a 5# weight loss in 10 days. Pt  reports UBW of 147# (last weighed in March 2017). Pt states weight loss has been progressive since then with poor appetite. Pt lives with sister who prepares most meals. Pt eats 3 small meals/day and drinks 1 Ensure original/day. Pt recently switched to Ensure Enlive. Encouraged pt to continue Ensure Enlive drinking at least 1/day, ideally 2/day. Discussed importance of adequate calories and protein. Discussed ways to increase calories consumed (nuts, dried fruit, olive oil, butter, nut butter, cream, etc). Pt dislikes cheese. Pt's sister made smoothie this morning with nuts and seeds in addition to fruit and spinach. RD provided pt with Ensure Enlive sample. Encouraged convenience snacks to keep on hand. Pt reports normal bowels. Pt likes to stay active by walking around when she can. Encouraged pt to continue activity to maintain muscle mass.   24-hour recall:  Breakfast: coffee with evaporated milk and sugar, oatmeal with whole milk and sugar OR cereal with whole milk  Lunch: 6 peanut butter cracker sandwiches, apple  Dinner: chicken with potatoes OR cold cut sandwich     Nutritional Needs:  Estimated Needs Method Use    1500 kcal/day [] Predictive Equation: Mckeon-Menno   [x]  30 kcal/kg   Protein 70 g 1.4 gm/kg/day   Fluid 1500 ml 30 ml/kg/day     Food/Nutrient Intake (oral, enteral or parenteral) and Patient Behaviors     Calorie intake: Inadequate   Protein intake: Inadequate     Yes/No    Yes Uses medical food supplements   Yes Cooking techniques to minimize fatigue   No Currently modifying food textures   Yes Able to maintain usual physical actiivty     Nutrition Diagnosis     Nutrition Diagnosis Related to (Etiology) As Evidenced By (Signs/Symptoms)   Inadequate energy intake Physiological causes Estimated intake less than estimated needs                 Nutritional Intervention and Prescription        Nutrition Intervention Energy-modified diet and Protein-modified diet   Goals/Expected Outcomes Pt to  choose high calorie, high protein foods and beverages to promote weight maintenance/gain.  Pt to add calories to food using olive oil, butter, cream, nuts, nut butter, etc.    Progress Initial     Nutrition Intervention Medical food supplement: Commercial beverage   Goals/Expected Outcomes Pt to consume 2 Ensure Enlive daily for additional 700 calories and 40 g protein.   Progress Progressing towards goal     Nutrition Intervention Schedule of food/fluids   Goals/Expected Outcomes Pt to eat at least 3 meals/day with 2 Ensure as snacks.   Progress Initial     Pt needs education? yes (see intervention)    Coordination of Nutrition Care: Comments:   Collaboration with other providers MD         Monitoring and Evaluation     Monitor: weight    Next Visit: PRN    Materials Provided:  1. RD contact information 2. Increasing calories and protein   3. Soft and moist high protein foods 4. Suggestions for increasing calories and protein   5. High calorie, high protein nutrition therapy        Total time: 30 minutes    Nutrition Score:      ©2010 [employer] Oncology Toolkit

## 2018-06-25 NOTE — LETTER
June 26, 2018      Eloy Soto MD  [address]  [facility] [address]           [facility]  [address]  [facility] [address]  Phone: [phone]          Patient: Shahram Hills   MR Number: [id number]   YOB: 1940   Date of Visit: 6/25/2018       Dear Dr. Eloy Soto:    Thank you for referring Shahram Hills to me for evaluation. Attached you will find relevant portions of my assessment and plan of care.    If you have questions, please do not hesitate to call me. I look forward to following Shahram Hills along with you.    Sincerely,    Robe Barrera MD    Enclosure  CC:  No Recipients    If you would like to receive this communication electronically, please contact [email or web address] or [phone] to request more information on [employer] Link access.    For providers and/or their staff who would like to refer a patient to [facility], please contact us through our one-stop-shop provider referral line, [facility], at [phone].    If you feel you have received this communication in error or would no longer like to receive these types of communications, please e-mail [email or web address]

## 2018-06-25 NOTE — PROGRESS NOTES
History & Physical    I, Edilberto Canales, attest that this documentation has been prepared under the direction and in the presence of Robe Barrera MD.    06/26/2018    Chief Complaint   Patient presents with    Consult       History of Present Illness:  Shahram Hills is a 78 y.o. 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 Patient was referred to me by Dr. Eloy Soto MD for evaluation of bone metastases to the lumbar spine.     Patient complains of intermittent right lateral tigh pain. The pain does not radiate past the right knee. Her pain is 4-5/10 in intensity, is worst when she initiates standing up, and improves as she walks around. She states that the leg pain completely resolves when she takes pain medications. Patient states being able to walk without issues and perform her ADLs. Symptoms have been present for about 1 month. Patient denies bowel / bladder changes and denies back pain. Patient is currently receiving chemotherapy and radiation therapy.     Review of patient's allergies indicates:  No Known Allergies    Current Outpatient Prescriptions   Medication Sig Dispense Refill    cholecalciferol, vitamin D3, (VITAMIN D3) 400 unit Tab Take by mouth once daily.      cholestyramine-aspartame (CHOLESTYRAMINE LIGHT) 4 gram PwPk Take by mouth.      cyanocobalamin (VITAMIN B-12) 1000 MCG tablet Take 1 tablet (1,000 mcg total) by mouth once daily.      dexamethasone (DECADRON) 4 MG Tab Take 1 tablet (4 mg total) by mouth every 6 (six) hours. 120 tablet 0    ferrous sulfate 325 (65 FE) MG EC tablet Take 325 mg by mouth 3 (three) times daily with meals.      HYDROcodone-acetaminophen (NORCO) 5-325 mg per tablet Take 1-2 tablets by mouth every 6 (six) hours as needed for Pain. 90 tablet 0    lisinopril-hydrochlorothiazide (PRINZIDE,ZESTORETIC) 10-12.5 mg per tablet Take 1 tablet by mouth once daily. 90 tablet 1    rosuvastatin (CRESTOR) 5 MG tablet Take 1 tablet  (5 mg total) by mouth once daily. 90 tablet 1    XIIDRA 5 % Dpet INSTILL ONE DROP INTO OU BID  3     No current facility-administered medications for this visit.        Past Medical History:   Diagnosis Date    B12 deficiency     Depression     per pcp note 7/2017 and given prozac and diazepam     Hyperlipidemia     Hypertension     PVC (premature ventricular contraction)     seen on ekg from prior pcp    Weight loss     reviewed prior pcp Dr. Russo notes 2017 - labs reviewed: cmp wnl x tbili 1.5, tsh wnl, A1c 5.0, cbc wnl,D 36, hiv neg, hep c neg, hep b surg ag neg        Past Surgical History:   Procedure Laterality Date    ESOPHAGOGASTRODUODENOSCOPY  05/04/2018    esophageal ring, grade 1 esophagitis, gastritis     HYSTERECTOMY      fibroids       Family History   Problem Relation Age of Onset    Glaucoma Mother     Heart attack Father     Diabetes Sister     Cancer Brother         lung cancer, + tobacco    Diabetes Brother     Hypertension Brother     Stroke Brother     Emphysema Brother     COPD Brother     Asthma Sister     No Known Problems Sister     Hyperlipidemia Sister     Hyperlipidemia Sister     Heart attack Sister        Social History   Substance Use Topics    Smoking status: Never Smoker    Smokeless tobacco: Never Used    Alcohol use No      Comment: stopped in 2007 - hx of social drinking        Review of Systems:  Review of Systems   Constitutional: Negative for activity change.   HENT: Negative for congestion.    Eyes: Negative for discharge.   Respiratory: Negative for apnea.    Cardiovascular: Negative for chest pain.   Gastrointestinal: Negative for abdominal distention and constipation.   Endocrine: Negative for cold intolerance.   Genitourinary: Negative for difficulty urinating and enuresis.   Musculoskeletal: Positive for myalgias. Negative for arthralgias and back pain.   Neurological: Negative for dizziness, weakness and headaches.   Psychiatric/Behavioral:  Negative for agitation.       Vital Signs (Most Recent)  Temp: 98.5 °F (36.9 °C) (06/25/18 1038)  Pulse: 107 (06/25/18 1038)  BP: 128/76 (06/25/18 1038)     51.4 kg (113 lb 6.4 oz)       Physical Exam:  Physical Exam:    Constitutional: She appears well-developed.     Eyes: Pupils are equal, round, and reactive to light. EOM are normal. Right eye exhibits no discharge. Left eye exhibits no discharge.     Abdominal: Soft.     Skin: Skin displays no rash on trunk and no rash on extremities.     Psych/Behavior: She is alert. She is oriented to person, place, and time.     Musculoskeletal:        Neck: There is no tenderness.        Back: Range of motion is full.        Right Upper Extremities: Range of motion is full. Muscle strength is 5/5. Tone is normal.        Left Upper Extremities: Range of motion is full. Muscle strength is 5/5. Tone is normal.       Right Lower Extremities: Range of motion is full. Tone is normal.        Left Lower Extremities: Range of motion is full. Muscle strength is 5/5. Tone is normal.     Neurological:        Coordination: She has a normal Romberg Test and normal tandem walking coordination.        Sensory: There is no sensory deficit in the trunk. There is no sensory deficit in the extremities.        DTRs: DTRs are DTRS NORMAL AND SYMMETRICnormal and symmetric. Tricep reflexes are 2+ on the right side and 2+ on the left side. Bicep reflexes are 2+ on the right side and 2+ on the left side. Brachioradialis reflexes are 2+ on the right side and 2+ on the left side. Patellar reflexes are 2+ on the right side and 2+ on the left side. Achilles reflexes are 2+ on the right side and 2+ on the left side. She displays no Babinski's sign on the right side. She displays no Babinski's sign on the left side.        Cranial nerves: Cranial nerve(s) II, III, IV, V, VI, VII, VIII, IX, X, XI and XII are intact.     4/5 iliopsoas on the right side.   Sensation intact to light touch throughout.    Negative Watson's.   Reflexes 2+ throughout.   2+ pitting edema on bilateral lower extremities.     Laboratory  CMP: Reviewed    Diagnostic Results:  MRI: Reviewed   MRI Cervical Spine W WO Cont;  MRI Thoracic Spine W WO Contrast; and MRI Lumbar Spine W WO Contrast, dated 6/08/2018: Reviewed personally, and discussed them with the patient  and her daughter.   Marrow replacing metastatic lesion of the L3 vertebral body with associated extra osseous expansion and complete effacement of the right L3-L4 neural foramina and additional effacement of the right lateral recess.  There is additional lateral extension and abutment of the right psoas muscle.  Superimposed degenerative change at this level results in mild spinal canal stenosis.  These findings are known by the ordering physician as documented in the electronic medical record.    No other osseous metastatic lesions.    Enhancing hepatic lesions corresponding to patient's history of metastatic neuroendocrine tumor.    Other degenerative changes of the cervical and lumbar spine as detailed above.      ASSESSMENT/PLAN:       ICD-10-CM ICD-9-CM   1. Spine metastasis C79.51 198.5   2. Secondary neuroendocrine tumor of bone C7B.8 209.73   3. 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 C7B.8 209.72       PLAN:    1. Metastatic malignant neuroendocrine tumor to the liver with L3 metastatic lesion with invasion to the iliopsoas and L3-4 foramen on the right side. Patient has  radiculopathic signs with 4/5 weakness of the right iliopsoas; otherwise, she is neurologically intact. At this point, I do not recommend any surgical interventions, I rather recommend to continue radiation treatment to the L3 metastatic lesion.  I think that at her age, given that there is no major neurological deficits, the risks of surgery does not justify any possible clinical benefit. I will personally contact Dr. Adames to notify him about these recommendations.     2. Follow up in  clinic in 3 months with MRI W WO contrast of the lumbar spine.     3. I spent 25 minutes with the patent and family, more than 50% in counseling.     Shahram was seen today for consult.    Diagnoses and all orders for this visit:    Spine metastasis    Secondary neuroendocrine tumor of bone    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        I, Dr. Robe Barrera, personally performed the services described in this documentation. All medical record entries made by the scribe were at my direction and in my presence.  I have reviewed the chart and agree that the record reflects my personal performance and is accurate and complete. Robe Barrera MD.  5:44 PM 06/26/2018

## 2018-06-26 ENCOUNTER — TELEPHONE (OUTPATIENT)
Dept: NEUROSURGERY | Facility: CLINIC | Age: 78
End: 2018-06-26

## 2018-06-26 DIAGNOSIS — D49.2 LUMBAR SPINE TUMOR: Primary | ICD-10-CM

## 2018-06-26 PROBLEM — C79.51 SPINE METASTASIS: Status: ACTIVE | Noted: 2018-06-26

## 2018-06-26 PROCEDURE — G6002 STEREOSCOPIC X-RAY GUIDANCE: HCPCS | Mod: 26,,, | Performed by: RADIOLOGY

## 2018-06-26 PROCEDURE — 77417 THER RADIOLOGY PORT IMAGE(S): CPT | Performed by: RADIOLOGY

## 2018-06-26 PROCEDURE — 77412 RADIATION TX DELIVERY LVL 3: CPT | Performed by: RADIOLOGY

## 2018-06-26 PROCEDURE — 77387 GUIDANCE FOR RADJ TX DLVR: CPT | Mod: TC | Performed by: RADIOLOGY

## 2018-06-27 PROCEDURE — 77387 GUIDANCE FOR RADJ TX DLVR: CPT | Mod: TC | Performed by: RADIOLOGY

## 2018-06-27 PROCEDURE — 77412 RADIATION TX DELIVERY LVL 3: CPT | Performed by: RADIOLOGY

## 2018-06-27 PROCEDURE — G6002 STEREOSCOPIC X-RAY GUIDANCE: HCPCS | Mod: 26,,, | Performed by: RADIOLOGY

## 2018-06-28 ENCOUNTER — TELEPHONE (OUTPATIENT)
Dept: HEMATOLOGY/ONCOLOGY | Facility: CLINIC | Age: 78
End: 2018-06-28

## 2018-06-28 PROCEDURE — 77412 RADIATION TX DELIVERY LVL 3: CPT | Performed by: RADIOLOGY

## 2018-06-28 PROCEDURE — G6002 STEREOSCOPIC X-RAY GUIDANCE: HCPCS | Mod: 26,,, | Performed by: RADIOLOGY

## 2018-06-28 PROCEDURE — 77387 GUIDANCE FOR RADJ TX DLVR: CPT | Mod: TC | Performed by: RADIOLOGY

## 2018-06-28 NOTE — TELEPHONE ENCOUNTER
Spoke with pt's caregiver and she stated that she saw Dr. Robe Barrera on Friday while Dr. Granados gave her specific instructions regarding the Decadron, Dr. Barrera gave her new instructions on how to give the pt the Decadron and she wanted to verify that she was giving the medication correctly. I informed pt that she should bring the directions to her follow up visit with Dr. Granados so that she can review the directions with Dr. Granados. The caregiver voiced understanding.

## 2018-06-28 NOTE — TELEPHONE ENCOUNTER
----- Message from Xi Bird MA sent at 6/28/2018  4:49 PM CDT -----  Good Afternoon,   Can staff give the pt care giver  a call to address how  would like the pt to take the Decadron. She seems to be confused as to which physician instructed her on medication management.  Thank You  ----- Message -----  From: Robe Barrera MD  Sent: 6/28/2018   4:48 PM  To: Xi Bird MA    I did not recommend Decadron.    It was probably recommended by radiation oncology.    E  ----- Message -----  From: Xi Bird MA  Sent: 6/28/2018   4:39 PM  To: Robe Barrera MD    Good Afternoon ,  Would you please how you instructed the pt to take the decadron. It is not mentioned in your note.  Thank You  ----- Message -----  From: Rahul Frazier  Sent: 6/28/2018   4:16 PM  To: Holly [facility] Staff    Needs Advice    Reason for call:  Stephanie is asking to speak w/ someone on staff to confirm how the doctor wanted pt to take dexamethasone (DECADRON) 4 MG Tab   Communication Preference: Stephanie (sister) @ [phone]  Additional Information:

## 2018-06-29 ENCOUNTER — OFFICE VISIT (OUTPATIENT)
Dept: HEMATOLOGY/ONCOLOGY | Facility: CLINIC | Age: 78
End: 2018-06-29
Payer: MEDICARE

## 2018-06-29 ENCOUNTER — LAB VISIT (OUTPATIENT)
Dept: LAB | Facility: OTHER | Age: 78
End: 2018-06-29
Attending: INTERNAL MEDICINE
Payer: MEDICARE

## 2018-06-29 VITALS
DIASTOLIC BLOOD PRESSURE: 66 MMHG | SYSTOLIC BLOOD PRESSURE: 104 MMHG | OXYGEN SATURATION: 100 % | RESPIRATION RATE: 16 BRPM | WEIGHT: 114.88 LBS | TEMPERATURE: 99 F | HEART RATE: 92 BPM | BODY MASS INDEX: 19.61 KG/M2 | HEIGHT: 64 IN

## 2018-06-29 DIAGNOSIS — C7B.8 METASTATIC MALIGNANT NEUROENDOCRINE TUMOR TO LIVER: ICD-10-CM

## 2018-06-29 DIAGNOSIS — C7A.8 PRIMARY MALIGNANT NEUROENDOCRINE TUMOR OF ILEUM: Primary | ICD-10-CM

## 2018-06-29 DIAGNOSIS — C79.51 METASTATIC CANCER TO SPINE: ICD-10-CM

## 2018-06-29 DIAGNOSIS — D64.9 ANEMIA, UNSPECIFIED TYPE: ICD-10-CM

## 2018-06-29 DIAGNOSIS — C79.51 METASTASIS TO BONE: ICD-10-CM

## 2018-06-29 DIAGNOSIS — E09.9 DRUG-INDUCED DIABETES MELLITUS: ICD-10-CM

## 2018-06-29 LAB
ALBUMIN SERPL BCP-MCNC: 3.2 G/DL
ALP SERPL-CCNC: 112 U/L
ALT SERPL W/O P-5'-P-CCNC: 97 U/L
ANION GAP SERPL CALC-SCNC: 10 MMOL/L
AST SERPL-CCNC: 45 U/L
BILIRUB SERPL-MCNC: 0.9 MG/DL
BUN SERPL-MCNC: 23 MG/DL
CALCIUM SERPL-MCNC: 9.2 MG/DL
CHLORIDE SERPL-SCNC: 91 MMOL/L
CO2 SERPL-SCNC: 30 MMOL/L
CREAT SERPL-MCNC: 0.7 MG/DL
ERYTHROCYTE [DISTWIDTH] IN BLOOD BY AUTOMATED COUNT: 13.1 %
EST. GFR  (AFRICAN AMERICAN): >60 ML/MIN/1.73 M^2
EST. GFR  (NON AFRICAN AMERICAN): >60 ML/MIN/1.73 M^2
GLUCOSE SERPL-MCNC: 307 MG/DL
HCT VFR BLD AUTO: 29.6 %
HGB BLD-MCNC: 9.7 G/DL
MCH RBC QN AUTO: 29.3 PG
MCHC RBC AUTO-ENTMCNC: 32.8 G/DL
MCV RBC AUTO: 89 FL
NEUTROPHILS # BLD AUTO: 17.8 K/UL
PLATELET # BLD AUTO: 349 K/UL
PMV BLD AUTO: 8.7 FL
POTASSIUM SERPL-SCNC: 3.9 MMOL/L
PROT SERPL-MCNC: 6.3 G/DL
RBC # BLD AUTO: 3.31 M/UL
SODIUM SERPL-SCNC: 131 MMOL/L
WBC # BLD AUTO: 18.98 K/UL

## 2018-06-29 PROCEDURE — G6002 STEREOSCOPIC X-RAY GUIDANCE: HCPCS | Mod: 26,,, | Performed by: RADIOLOGY

## 2018-06-29 PROCEDURE — 80053 COMPREHEN METABOLIC PANEL: CPT

## 2018-06-29 PROCEDURE — 36415 COLL VENOUS BLD VENIPUNCTURE: CPT

## 2018-06-29 PROCEDURE — 77387 GUIDANCE FOR RADJ TX DLVR: CPT | Mod: TC | Performed by: RADIOLOGY

## 2018-06-29 PROCEDURE — 99999 PR PBB SHADOW E&M-EST. PATIENT-LVL III: CPT | Mod: PBBFAC,,, | Performed by: INTERNAL MEDICINE

## 2018-06-29 PROCEDURE — 85027 COMPLETE CBC AUTOMATED: CPT

## 2018-06-29 PROCEDURE — 3074F SYST BP LT 130 MM HG: CPT | Mod: CPTII,S$GLB,, | Performed by: INTERNAL MEDICINE

## 2018-06-29 PROCEDURE — 3078F DIAST BP <80 MM HG: CPT | Mod: CPTII,S$GLB,, | Performed by: INTERNAL MEDICINE

## 2018-06-29 PROCEDURE — 77336 RADIATION PHYSICS CONSULT: CPT | Performed by: RADIOLOGY

## 2018-06-29 PROCEDURE — 99214 OFFICE O/P EST MOD 30 MIN: CPT | Mod: S$GLB,,, | Performed by: INTERNAL MEDICINE

## 2018-06-29 PROCEDURE — 77412 RADIATION TX DELIVERY LVL 3: CPT | Performed by: RADIOLOGY

## 2018-06-29 RX ORDER — METFORMIN HYDROCHLORIDE 1000 MG/1
1000 TABLET ORAL
Qty: 90 TABLET | Refills: 3 | Status: SHIPPED | OUTPATIENT
Start: 2018-06-29 | End: 2018-07-20

## 2018-06-29 NOTE — PROGRESS NOTES
"Subjective:       Patient ID: Shahram Hills is a 78 y.o. female.     Chief Complaint:  Metastatic well differentiated, low grade neuroendocrine tumor of the ileum, with mets to liver, bones, LN     Oncologic History:  1. Ms. Hills is an 79 yo woman who initially saw me on 6/7/18 for further evaluation of neuroendocrine tumor. She initially presented with diarrhea, abdominal discomfort, weight loss. She was evaluated at [facility] and underwent workup below:  US abdomen on 3/14/18 showed numerous bilobar mixed echogenicity and mildly vascular hepatic lesions. Cholelithiasis without e/o acute cholecystitis.   MRI abdomen on 3/30/2018 showed innumerable hepatic metastases. L3 vertebral body metastasis with soft tissue component along the right margin of the L3 and upper L4 vertebral bodies, filling the right L3/4 neural foramen, and extending into the anterior aspect of the spinal canal on the right at the L3 and upper T4 levels. Associated with mild spinal canal narrowing.  CT chest on 4/6/2018 showed one lucent nodule measuring 7 mm in the T7 vertebral body, most likely representing metastatic disease.    EGD on 5/4/18 showed normal duodenum. Schatzki's ring in the lower third of the esophagus. Grade 1 esophagitis in the GE junction c/w mild distal esophagitis. Congestion and erythema in the antrum, pre-pyloric region and stomach body c/w gastritis. Biopsy of the stomach antrum showed mild chronic gastritis, neg for H. pylori.   Labs on 5/9/2018: WBC 6.9, H/H 11.6/34.3, MCV 87.5, plt count 279. On 3/9/18: Vit B12 level 173, folic acid 6.6  She was started on oral vit B12 and underwent a liver biopsy on 5/18/18. Pathology showed "metastatic well differentiated neuroendocrine tumor. Ki67 3%"     She saw me on 6/7/18 and complained of weight loss of 26 lbs over the past 3-4 months, also has diarrhea. No flushing, wheezing, palpitations. Has been having pain in right flank radiating down the right leg. No " "tingling/numbness, weakness. She can hold her bladder but when she urinates her diarrhea would come out as well. Started on dex 4 mg q6h the evening of 18.      2. MRI spine on 18 showed "Marrow replacing metastatic lesion of the L3 vertebral body with associated extra osseous expansion and complete effacement of the right L3-L4 neural foramina and additional effacement of the right lateral recess.  There is additional lateral extension and abutment of the right psoas muscle.  Superimposed degenerative change at this level results in mild spinal canal stenosis." I sent the patient to ED on 18 where she was evaluated by neurosurgery. Neurosurgery did not feel she was a candidate for surgical intervention.      3. Seen by Dr. Adames on 18. palliative radiation to the area of the L3 metastasis 18-18   4. 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  There is an adjacent metastatic lymph node, diffuse liver metastases, and multiple bone metastases. Index primary neoplasm SUV max 33.13. Adjacent lymph node SUV max 23. L3 bone metastasis SUV max 36.86. Left lobe SUV max 46.13   5. Lanreotide started 18.      History of Present Illness:   Mr. Hills returns for follow up. Just got last dose of radiation today. Feels tired. No diarrhea. Pain better. Being weaned off steroids. No incontinence.   ECO     ROS:   See HPI. Otherwise negative.      Physical Examination:   Vital signs reviewed.   Gen: well hydrated, well developed, in no acute distress.  HEENT: normocephalic, anicteric, PERRLA, EOMI, oropharynx clear  Neck: supple, no JVD, thyromegaly, cervical or supraclavicular LAD  Lungs: CTAB, no wheezes or rales  Heart: RRR, no M/R/G  Abdomen: soft, no tenderness, non-distended, liver edge 5 cm below the right costal margin, no splenomegaly, no mass, or hernia.   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  There is an adjacent metastatic lymph node, diffuse liver metastases, and multiple bone metastases. Index primary neoplasm SUV max 33.13. Adjacent lymph node SUV max 23. L3 bone metastasis SUV max 36.86. Left lobe SUV max 46.13     Laboratory Data:  Labs reviewed. Chromogranin A 6320  Assessment/Plan:      1. Primary malignant neuroendocrine tumor of ileum    2. 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 Metastasis to bone    4. 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 Drug-induced diabetes mellitus    6. Anemia, unspecified type      1-3  - s/p lanreotide on 6/22  - next lanreotide on 7/20    4.  - finished palliative radiation  - being weaned off steroids by Dr. Adames    5.  - 2/2 steroids  - start metformin 1000 mg daily  - Ask them to check BS every morning on an empty stomach, then again 2 hours after she eats. Call my office next Tuesday and then every week to report BS. I will adjust metformin dose according. She is being weaned off steroids so hopefully high BS would resolve after she is off steroids    6.  - 2/2 radiation  - no need for transfusion. Monitor    F/U:  RTC in 3 weeks with CBC, CMP, chromogranin A, office visit, then lanreotide.

## 2018-06-30 ENCOUNTER — NURSE TRIAGE (OUTPATIENT)
Dept: ADMINISTRATIVE | Facility: CLINIC | Age: 78
End: 2018-06-30

## 2018-06-30 ENCOUNTER — HOSPITAL ENCOUNTER (EMERGENCY)
Facility: OTHER | Age: 78
Discharge: HOME | End: 2018-06-30
Attending: EMERGENCY MEDICINE
Payer: MEDICARE

## 2018-06-30 VITALS
OXYGEN SATURATION: 100 % | WEIGHT: 114 LBS | HEIGHT: 64 IN | HEART RATE: 75 BPM | RESPIRATION RATE: 18 BRPM | DIASTOLIC BLOOD PRESSURE: 71 MMHG | TEMPERATURE: 99 F | BODY MASS INDEX: 19.46 KG/M2 | SYSTOLIC BLOOD PRESSURE: 129 MMHG

## 2018-06-30 DIAGNOSIS — E86.0 DEHYDRATION: ICD-10-CM

## 2018-06-30 DIAGNOSIS — R73.9 HYPERGLYCEMIA: ICD-10-CM

## 2018-06-30 DIAGNOSIS — R55 NEAR SYNCOPE: Primary | ICD-10-CM

## 2018-06-30 LAB
ALBUMIN SERPL BCP-MCNC: 2.9 G/DL
ALP SERPL-CCNC: 106 U/L
ALT SERPL W/O P-5'-P-CCNC: 87 U/L
ANION GAP SERPL CALC-SCNC: 12 MMOL/L
AST SERPL-CCNC: 64 U/L
BACTERIA #/AREA URNS HPF: NORMAL /HPF
BASOPHILS # BLD AUTO: 0 K/UL
BASOPHILS NFR BLD: 0 %
BILIRUB SERPL-MCNC: 1 MG/DL
BILIRUB UR QL STRIP: NEGATIVE
BUN SERPL-MCNC: 25 MG/DL
CALCIUM SERPL-MCNC: 8.7 MG/DL
CHLORIDE SERPL-SCNC: 94 MMOL/L
CLARITY UR: CLEAR
CO2 SERPL-SCNC: 26 MMOL/L
COLOR UR: YELLOW
CREAT SERPL-MCNC: 0.7 MG/DL
DIFFERENTIAL METHOD: ABNORMAL
EOSINOPHIL # BLD AUTO: 0 K/UL
EOSINOPHIL NFR BLD: 0 %
ERYTHROCYTE [DISTWIDTH] IN BLOOD BY AUTOMATED COUNT: 13 %
EST. GFR  (AFRICAN AMERICAN): >60 ML/MIN/1.73 M^2
EST. GFR  (NON AFRICAN AMERICAN): >60 ML/MIN/1.73 M^2
GLUCOSE SERPL-MCNC: 325 MG/DL
GLUCOSE UR QL STRIP: ABNORMAL
HCT VFR BLD AUTO: 27.1 %
HGB BLD-MCNC: 8.9 G/DL
HGB UR QL STRIP: NEGATIVE
KETONES UR QL STRIP: NEGATIVE
LEUKOCYTE ESTERASE UR QL STRIP: ABNORMAL
LYMPHOCYTES # BLD AUTO: 1.2 K/UL
LYMPHOCYTES NFR BLD: 7.6 %
MAGNESIUM SERPL-MCNC: 1.7 MG/DL
MCH RBC QN AUTO: 29.2 PG
MCHC RBC AUTO-ENTMCNC: 32.8 G/DL
MCV RBC AUTO: 89 FL
MICROSCOPIC COMMENT: NORMAL
MONOCYTES # BLD AUTO: 0.1 K/UL
MONOCYTES NFR BLD: 0.7 %
NEUTROPHILS # BLD AUTO: 14.7 K/UL
NEUTROPHILS NFR BLD: 91.5 %
NITRITE UR QL STRIP: NEGATIVE
PH UR STRIP: 7 [PH] (ref 5–8)
PHOSPHATE SERPL-MCNC: 2.6 MG/DL
PLATELET # BLD AUTO: 305 K/UL
PMV BLD AUTO: 8.7 FL
POCT GLUCOSE: 255 MG/DL (ref 70–110)
POTASSIUM SERPL-SCNC: 3.6 MMOL/L
PROT SERPL-MCNC: 5.7 G/DL
PROT UR QL STRIP: NEGATIVE
RBC # BLD AUTO: 3.05 M/UL
RBC #/AREA URNS HPF: 1 /HPF (ref 0–4)
SODIUM SERPL-SCNC: 132 MMOL/L
SP GR UR STRIP: 1.01 (ref 1–1.03)
SQUAMOUS #/AREA URNS HPF: 2 /HPF
URN SPEC COLLECT METH UR: ABNORMAL
UROBILINOGEN UR STRIP-ACNC: NEGATIVE EU/DL
WBC # BLD AUTO: 16.03 K/UL
WBC #/AREA URNS HPF: 5 /HPF (ref 0–5)
YEAST URNS QL MICRO: NORMAL

## 2018-06-30 PROCEDURE — 96360 HYDRATION IV INFUSION INIT: CPT | Performed by: EMERGENCY MEDICINE

## 2018-06-30 PROCEDURE — 99283 EMERGENCY DEPT VISIT LOW MDM: CPT | Mod: 25 | Performed by: EMERGENCY MEDICINE

## 2018-06-30 PROCEDURE — 96361 HYDRATE IV INFUSION ADD-ON: CPT | Performed by: EMERGENCY MEDICINE

## 2018-06-30 PROCEDURE — 81000 URINALYSIS NONAUTO W/SCOPE: CPT

## 2018-06-30 PROCEDURE — 84100 ASSAY OF PHOSPHORUS: CPT

## 2018-06-30 PROCEDURE — 83735 ASSAY OF MAGNESIUM: CPT

## 2018-06-30 PROCEDURE — 25000003 PHARM REV CODE 250: Performed by: EMERGENCY MEDICINE

## 2018-06-30 PROCEDURE — 82962 GLUCOSE BLOOD TEST: CPT | Performed by: EMERGENCY MEDICINE

## 2018-06-30 PROCEDURE — 85025 COMPLETE CBC W/AUTO DIFF WBC: CPT

## 2018-06-30 PROCEDURE — 80053 COMPREHEN METABOLIC PANEL: CPT

## 2018-06-30 RX ORDER — SODIUM,POTASSIUM PHOSPHATES 280-250MG
1 POWDER IN PACKET (EA) ORAL ONCE
Status: COMPLETED | OUTPATIENT
Start: 2018-06-30 | End: 2018-06-30

## 2018-06-30 RX ADMIN — SODIUM CHLORIDE 1000 ML: 0.9 INJECTION, SOLUTION INTRAVENOUS at 01:06

## 2018-06-30 RX ADMIN — POTASSIUM & SODIUM PHOSPHATES POWDER PACK 280-160-250 MG 1 PACKET: 280-160-250 PACK at 11:06

## 2018-06-30 RX ADMIN — SODIUM CHLORIDE 1000 ML: 0.9 INJECTION, SOLUTION INTRAVENOUS at 11:06

## 2018-06-30 NOTE — ED NOTES
Pt rounding complete. Pt denies any pain or needs at the moment. Pt does not appear to be in acute distress. Respirations are even and unlabored. VSS. Bed is locked and in lowest position with side rails up x2. Call light is within reach. Will continue to monitor. Family remains at the bedside.

## 2018-06-30 NOTE — ED PROVIDER NOTES
"dEncounter Date: 6/30/2018    SCRIBE #1 NOTE: I, Al Lundy, am scribing for, and in the presence of, Dr. Castillo .       History     Chief Complaint   Patient presents with    Dizziness     near syncope this am, feeling dizzy,      Time seen by provider: 10:41 AM    This is a 78 y.o. female, with history of HTN, HLD, and PVCs, who presents with complaint of sudden, intermittent dizziness that began this morning. Patient states she woke up, drank an Ensure and some coffee, and started cooking breakfast with her sister. She reports onset began while she was sitting down talking to her sister. She reportedly experienced severe lightheadedness, but she denies LOC. Per sister, another sister found the patient "slumped over", but she was still conscious. She endorses intermittent cough. She currently feels baseline in the ED. She has not been experiencing fevers, chills, headaches, congestion, rhinorrhea, sore throat, SOB, chest pain, palpitations, abdominal pain, N/V/D, or urinary symptoms. Patient has a history of malignant neuroendocrine tumor with metastasis to liver and spine and completed her last round of radiation therapy yesterday. NKDA.     The patient's sister notes that on her labs which she had drawn yesterday her blood sugar was noted to be high, no history of diabetes.  There oncologist prescribed metformin which she has not yet taken.      The history is provided by the patient and a relative (sister at bedside).     Review of patient's allergies indicates:  No Known Allergies  Past Medical History:   Diagnosis Date    B12 deficiency     Depression     per pcp note 7/2017 and given prozac and diazepam     Hyperlipidemia     Hypertension     PVC (premature ventricular contraction)     seen on ekg from prior pcp    Weight loss     reviewed prior pcp Dr. Russo notes 2017 - labs reviewed: cmp wnl x tbili 1.5, tsh wnl, A1c 5.0, cbc wnl,D 36, hiv neg, hep c neg, hep b surg ag neg      Past Surgical " History:   Procedure Laterality Date    ESOPHAGOGASTRODUODENOSCOPY  05/04/2018    esophageal ring, grade 1 esophagitis, gastritis     EYE SURGERY      cataracts    HYSTERECTOMY      fibroids     Family History   Problem Relation Age of Onset    Glaucoma Mother     Heart attack Father     Diabetes Sister     Cancer Brother         lung cancer, + tobacco    Diabetes Brother     Hypertension Brother     Stroke Brother     Emphysema Brother     COPD Brother     Asthma Sister     No Known Problems Sister     Hyperlipidemia Sister     Hyperlipidemia Sister     Heart attack Sister      Social History   Substance Use Topics    Smoking status: Never Smoker    Smokeless tobacco: Never Used    Alcohol use No      Comment: stopped in 2007 - hx of social drinking     Review of Systems   Constitutional: Negative for chills and fever.   HENT: Negative for congestion, rhinorrhea and sore throat.    Respiratory: Positive for cough (Mild, chronic, unchanged). Negative for shortness of breath.    Cardiovascular: Negative for chest pain and palpitations.   Gastrointestinal: Negative for abdominal pain, diarrhea, nausea and vomiting.   Genitourinary: Negative for decreased urine volume, difficulty urinating, dysuria, frequency and urgency.   Musculoskeletal: Negative for back pain.   Skin: Negative for rash.   Neurological: Positive for dizziness (resolved) and light-headedness (resolved). Negative for syncope, weakness and headaches.   Psychiatric/Behavioral: Negative for confusion.       Physical Exam     Initial Vitals [06/30/18 1023]   BP Pulse Resp Temp SpO2   109/60 (!) 121 18 98.3 °F (36.8 °C) 99 %      MAP       --         Vitals:    06/30/18 1206 06/30/18 1307 06/30/18 1336 06/30/18 1506   BP: 116/62 123/62 119/69 129/66   Pulse: 80 78 78 76   Resp: 17 17 17 16   Temp:       TempSrc:       SpO2: 100% 100% 100% 100%   Weight:       Height:        06/30/18 1533   BP: 129/71   Pulse: 75   Resp: 18   Temp: 98.5  °F (36.9 °C)   TempSrc: Oral   SpO2: 100%   Weight:    Height:        Physical Exam    Nursing note and vitals reviewed.  Constitutional: She appears well-developed and well-nourished. No distress.   HENT:   Head: Normocephalic and atraumatic.   Nose: Nose normal.   Mouth/Throat: Oropharynx is clear and moist.   Eyes: Conjunctivae and EOM are normal. Pupils are equal, round, and reactive to light.   Neck: Normal range of motion. Neck supple.   Cardiovascular: Normal rate, regular rhythm and normal heart sounds.   Pulmonary/Chest: Breath sounds normal. No respiratory distress.   Abdominal: Soft. Bowel sounds are normal. She exhibits no distension. There is no tenderness.   Musculoskeletal: Normal range of motion.   Neurological: She is alert and oriented to person, place, and time. She has normal strength.   Skin: Skin is warm and dry.   Psychiatric: She has a normal mood and affect. Her behavior is normal. Judgment and thought content normal.         ED Course   Procedures  Labs Reviewed   CBC W/ AUTO DIFFERENTIAL - Abnormal; Notable for the following:        Result Value    WBC 16.03 (*)     RBC 3.05 (*)     Hemoglobin 8.9 (*)     Hematocrit 27.1 (*)     MPV 8.7 (*)     Gran # (ANC) 14.7 (*)     Mono # 0.1 (*)     Gran% 91.5 (*)     Lymph% 7.6 (*)     Mono% 0.7 (*)     All other components within normal limits   COMPREHENSIVE METABOLIC PANEL - Abnormal; Notable for the following:     Sodium 132 (*)     Chloride 94 (*)     Glucose 325 (*)     BUN, Bld 25 (*)     Total Protein 5.7 (*)     Albumin 2.9 (*)     AST 64 (*)     ALT 87 (*)     All other components within normal limits   URINALYSIS - Abnormal; Notable for the following:     Glucose, UA 3+ (*)     Leukocytes, UA Trace (*)     All other components within normal limits   PHOSPHORUS - Abnormal; Notable for the following:     Phosphorus 2.6 (*)     All other components within normal limits   POCT GLUCOSE - Abnormal; Notable for the following:     POCT Glucose  255 (*)     All other components within normal limits   MAGNESIUM   URINALYSIS MICROSCOPIC   POCT GLUCOSE MONITORING CONTINUOUS     EKG Readings: (Independently Interpreted)   Initial Reading: No STEMI.   Sinus tachycardia at a rate of 101. Motion artifact is present.        Imaging Results    None          Medical Decision Making:   Clinical Tests:   Lab Tests: Ordered and Reviewed  ED Management:  Emergent evaluation of a 78-year-old female with complaint of a near syncopal episode and dizziness which occurred this morning.  Vital signs reveal tachycardia, afebrile.  She was not orthostatic.  Tachycardia was minimal at time of my exam.  Physical exam did reveal flat veins throughout, nursing staff had difficulty obtaining an IV, but were successful.  She was hydrated with IV fluids, and labs are reflective of dehydration with decreased sodium and chloride, elevated BUN from baseline.  There was also hyperglycemia without ketosis, I suspect since blood sugar greater than 200, this is contributing to her dehydration.  Patient does not have signs and symptoms of diabetes otherwise, but admits that she was prescribed metformin yesterday and has not yet taken it.  EKG did not show any dysrhythmia or acute ischemic change.  I am comfortable with discharge home.  She was hydrated aggressively with 2 L IV fluids and is discharged in improved condition. I suspect etiology of symptoms is dehydration from hyperglycemia.  They are advised to take metformin, drink plenty of fluids, and return for recurrent or new symptoms.  They are advised close follow-up with PCP.            Scribe Attestation:   Scribe #1: I performed the above scribed service and the documentation accurately describes the services I performed. I attest to the accuracy of the note.    Attending Attestation:           Physician Attestation for Scribe:  Physician Attestation Statement for Scribe #1: I, Dr. Castillo, reviewed documentation, as scribed by Al  Nikkie  in my presence, and it is both accurate and complete.                    Clinical Impression:     1. Near syncope    2. Dehydration    3. Hyperglycemia                                   Belgica Castillo MD  06/30/18 1640

## 2018-06-30 NOTE — ED NOTES
"Pt presents with c/o dizziness. Pt reports this morning around 0730, she was sitting in the chair and she began to feel dizzy. Pt states, " she felt light headed" Per pts sister, the pt was sitting in the chair and was slumped over. Pt was responsive at the time. Pt denies any dizziness at the moment.  Pt had last radiation yesterday. Pt denies any urinary or gastro symptoms. Pt denies any fevers. Pt denies any sob or chest pain, N/V/D. Pt has swelling to bilateral ankles that she reports began when she started radiation and reports nothing more unusual the normal.  Pt is AAOx4. RR are even and unlabored. Pt does not appear to be in acute distress. Pt is placed on cont cardiac, bp and pulse ox monitoring. Bed is locked and in lowest position with side rails up x2. Call light is within reach. Family member at the bedside,.   "

## 2018-06-30 NOTE — ED NOTES
Pt rounding complete. Pt denies any pain or needs at the moment. Pt does not appear to be in acute distress. Respirations are even and unlabored. VSS. Bed is locked and in lowest position with side rails up x2. Call light is within reach. Will continue to monitor. Sister is at the bedside.

## 2018-06-30 NOTE — TELEPHONE ENCOUNTER
Reason for Disposition   Heart beating < 50 beats per minute OR > 140 beats per minute    Protocols used: ST WEAKNESS (GENERALIZED) AND FATIGUE-A-AH  family called- states patient was very weak and had a heart rate of 171. Advised ER

## 2018-07-03 ENCOUNTER — TELEPHONE (OUTPATIENT)
Dept: HEMATOLOGY/ONCOLOGY | Facility: CLINIC | Age: 78
End: 2018-07-03

## 2018-07-03 NOTE — TELEPHONE ENCOUNTER
Shayla (pt's sister) stated she's returning Dr. Granados's phone call. Shayla stated the Metformin is working beautifully on pt's blood sugar. On July 1,2018 pt's blood sugar was 394 prior to starting Metformin, today it's 232. Shayla stated Dr. Granados can call her back whenever she gets a chance. I voiced understanding and will inform Dr. Granados.

## 2018-07-03 NOTE — TELEPHONE ENCOUNTER
----- Message from Gertrude Orellana sent at 7/3/2018  9:57 AM CDT -----  x_  1st Request  _  2nd Request  _  3rd Request    Who: LEIGHANN FANG [[id number]]    Why: Patient is returning a call.    What Number to Call Back:[phone]    When to Expect a call back: (Within 24 hours)    Please return the call at earliest convenience. Thanks!

## 2018-07-05 ENCOUNTER — TELEPHONE (OUTPATIENT)
Dept: HEMATOLOGY/ONCOLOGY | Facility: CLINIC | Age: 78
End: 2018-07-05

## 2018-07-05 ENCOUNTER — DOCUMENTATION ONLY (OUTPATIENT)
Dept: RADIATION ONCOLOGY | Facility: CLINIC | Age: 78
End: 2018-07-05

## 2018-07-05 NOTE — TELEPHONE ENCOUNTER
Returned call and spoke with Ms Mcguire, Ms Hills's sister. Per Ms Shayla, Dr Granados request Ms Hills  record her glucose level before and after breakfast x 6 days. She was calling with her readings.  6/30,               7/1,             7/2,            7/3,            7/4             7/5         PC--not taken yet.

## 2018-07-05 NOTE — TELEPHONE ENCOUNTER
Called and spoke with Ms Friedmanil. Per Dr Granados, she wish Ms Hills to take her Metformin 1000mg twice a day. She is to also continue checking her glucose and record the results and call us back with her readings.

## 2018-07-05 NOTE — TELEPHONE ENCOUNTER
----- Message from Familia Norton sent at 7/5/2018  9:14 AM CDT -----  Contact: Shayla, patient's sister            Name of Who is Calling: Shayla, patient's sister      What is the request in detail: Patient's sister called in regards to patient's sugar levels      Can the clinic reply by [facility]: No      What Number to Call Back if not in [facility]: [phone]

## 2018-07-05 NOTE — PLAN OF CARE
Problem: Patient Care Overview  Goal: Plan of Care Review  Outcome: Outcome(s) achieved Date Met: 06/29/18  Last Day of XRT done. No distress noted. Tolerated well.

## 2018-07-07 ENCOUNTER — HOSPITAL ENCOUNTER (EMERGENCY)
Facility: OTHER | Age: 78
Discharge: HOME | End: 2018-07-07
Attending: EMERGENCY MEDICINE
Payer: MEDICARE

## 2018-07-07 VITALS
HEIGHT: 64 IN | OXYGEN SATURATION: 100 % | DIASTOLIC BLOOD PRESSURE: 60 MMHG | HEART RATE: 88 BPM | SYSTOLIC BLOOD PRESSURE: 96 MMHG | RESPIRATION RATE: 20 BRPM | BODY MASS INDEX: 19.46 KG/M2 | WEIGHT: 114 LBS

## 2018-07-07 DIAGNOSIS — R00.0 TACHYCARDIA: ICD-10-CM

## 2018-07-07 DIAGNOSIS — R91.1 NODULE OF LEFT LUNG: ICD-10-CM

## 2018-07-07 DIAGNOSIS — E87.1 HYPONATREMIA: ICD-10-CM

## 2018-07-07 DIAGNOSIS — R55 NEAR SYNCOPE: Primary | ICD-10-CM

## 2018-07-07 DIAGNOSIS — R53.1 WEAKNESS: ICD-10-CM

## 2018-07-07 DIAGNOSIS — R73.9 HYPERGLYCEMIA: ICD-10-CM

## 2018-07-07 DIAGNOSIS — D64.9 ANEMIA, UNSPECIFIED TYPE: ICD-10-CM

## 2018-07-07 DIAGNOSIS — E86.0 DEHYDRATION: ICD-10-CM

## 2018-07-07 DIAGNOSIS — E87.6 HYPOKALEMIA: ICD-10-CM

## 2018-07-07 LAB
ALBUMIN SERPL BCP-MCNC: 2.7 G/DL
ALLENS TEST: ABNORMAL
ALP SERPL-CCNC: 68 U/L
ALT SERPL W/O P-5'-P-CCNC: 32 U/L
ANION GAP SERPL CALC-SCNC: 14 MMOL/L
ANISOCYTOSIS BLD QL SMEAR: SLIGHT
AST SERPL-CCNC: 25 U/L
B-OH-BUTYR BLD STRIP-SCNC: 0.2 MMOL/L
BACTERIA #/AREA URNS HPF: ABNORMAL /HPF
BASO STIPL BLD QL SMEAR: ABNORMAL
BASOPHILS # BLD AUTO: 0 K/UL
BASOPHILS NFR BLD: 0 %
BILIRUB SERPL-MCNC: 1.1 MG/DL
BILIRUB UR QL STRIP: NEGATIVE
BILIRUB UR QL STRIP: NEGATIVE
BUN SERPL-MCNC: 18 MG/DL
CALCIUM SERPL-MCNC: 8.6 MG/DL
CHLORIDE SERPL-SCNC: 94 MMOL/L
CLARITY UR: CLEAR
CLARITY UR: CLEAR
CO2 SERPL-SCNC: 25 MMOL/L
COLOR UR: YELLOW
COLOR UR: YELLOW
CREAT SERPL-MCNC: 0.7 MG/DL
DELSYS: ABNORMAL
DIFFERENTIAL METHOD: ABNORMAL
EOSINOPHIL # BLD AUTO: 0 K/UL
EOSINOPHIL NFR BLD: 0.1 %
ERYTHROCYTE [DISTWIDTH] IN BLOOD BY AUTOMATED COUNT: 13.1 %
EST. GFR  (AFRICAN AMERICAN): >60 ML/MIN/1.73 M^2
EST. GFR  (NON AFRICAN AMERICAN): >60 ML/MIN/1.73 M^2
GLUCOSE SERPL-MCNC: 242 MG/DL
GLUCOSE UR QL STRIP: ABNORMAL
GLUCOSE UR QL STRIP: ABNORMAL
HCO3 UR-SCNC: 33.5 MMOL/L (ref 24–28)
HCT VFR BLD AUTO: 28.2 %
HGB BLD-MCNC: 9.1 G/DL
HGB UR QL STRIP: NEGATIVE
HGB UR QL STRIP: NEGATIVE
KETONES UR QL STRIP: NEGATIVE
KETONES UR QL STRIP: NEGATIVE
LEUKOCYTE ESTERASE UR QL STRIP: ABNORMAL
LEUKOCYTE ESTERASE UR QL STRIP: ABNORMAL
LYMPHOCYTES # BLD AUTO: 1 K/UL
LYMPHOCYTES NFR BLD: 8.2 %
MCH RBC QN AUTO: 28.3 PG
MCHC RBC AUTO-ENTMCNC: 32.3 G/DL
MCV RBC AUTO: 88 FL
MICROSCOPIC COMMENT: ABNORMAL
MONOCYTES # BLD AUTO: 0.1 K/UL
MONOCYTES NFR BLD: 0.6 %
NEUTROPHILS # BLD AUTO: 10.6 K/UL
NEUTROPHILS NFR BLD: 91.1 %
NITRITE UR QL STRIP: NEGATIVE
NITRITE UR QL STRIP: NEGATIVE
PCO2 BLDA: 53 MMHG (ref 35–45)
PH SMN: 7.41 [PH] (ref 7.35–7.45)
PH UR STRIP: 6 [PH] (ref 5–8)
PH UR STRIP: 6 [PH] (ref 5–8)
PLATELET # BLD AUTO: 159 K/UL
PLATELET BLD QL SMEAR: ABNORMAL
PMV BLD AUTO: 9.8 FL
PO2 BLDA: 11 MMHG (ref 40–60)
POC BE: 9 MMOL/L
POC SATURATED O2: 11 % (ref 95–100)
POCT GLUCOSE: 285 MG/DL (ref 70–110)
POLYCHROMASIA BLD QL SMEAR: ABNORMAL
POTASSIUM SERPL-SCNC: 3.2 MMOL/L
PROT SERPL-MCNC: 5.3 G/DL
PROT UR QL STRIP: NEGATIVE
PROT UR QL STRIP: NEGATIVE
RBC # BLD AUTO: 3.22 M/UL
SAMPLE: ABNORMAL
SITE: ABNORMAL
SODIUM SERPL-SCNC: 133 MMOL/L
SP GR UR STRIP: 1.02 (ref 1–1.03)
SP GR UR STRIP: 1.02 (ref 1–1.03)
SQUAMOUS #/AREA URNS HPF: 3 /HPF
TRICHOMONAS UR QL MICRO: ABNORMAL
URN SPEC COLLECT METH UR: ABNORMAL
URN SPEC COLLECT METH UR: ABNORMAL
UROBILINOGEN UR STRIP-ACNC: 1 EU/DL
UROBILINOGEN UR STRIP-ACNC: 1 EU/DL
WBC # BLD AUTO: 11.72 K/UL
WBC #/AREA URNS HPF: 10 /HPF (ref 0–5)
WBC CLUMPS URNS QL MICRO: ABNORMAL

## 2018-07-07 PROCEDURE — 93005 ELECTROCARDIOGRAM TRACING: CPT

## 2018-07-07 PROCEDURE — 85025 COMPLETE CBC W/AUTO DIFF WBC: CPT

## 2018-07-07 PROCEDURE — 82803 BLOOD GASES ANY COMBINATION: CPT

## 2018-07-07 PROCEDURE — 82962 GLUCOSE BLOOD TEST: CPT

## 2018-07-07 PROCEDURE — 99284 EMERGENCY DEPT VISIT MOD MDM: CPT | Mod: 25

## 2018-07-07 PROCEDURE — 25000003 PHARM REV CODE 250: Performed by: EMERGENCY MEDICINE

## 2018-07-07 PROCEDURE — 93010 ELECTROCARDIOGRAM REPORT: CPT | Mod: ,,, | Performed by: INTERNAL MEDICINE

## 2018-07-07 PROCEDURE — 82010 KETONE BODYS QUAN: CPT

## 2018-07-07 PROCEDURE — 81000 URINALYSIS NONAUTO W/SCOPE: CPT

## 2018-07-07 PROCEDURE — 80053 COMPREHEN METABOLIC PANEL: CPT

## 2018-07-07 PROCEDURE — 96360 HYDRATION IV INFUSION INIT: CPT

## 2018-07-07 PROCEDURE — 96361 HYDRATE IV INFUSION ADD-ON: CPT

## 2018-07-07 PROCEDURE — 87040 BLOOD CULTURE FOR BACTERIA: CPT

## 2018-07-07 RX ADMIN — POTASSIUM BICARBONATE 50 MEQ: 25 TABLET, EFFERVESCENT ORAL at 01:07

## 2018-07-07 RX ADMIN — SODIUM CHLORIDE 1000 ML: 0.9 INJECTION, SOLUTION INTRAVENOUS at 12:07

## 2018-07-07 NOTE — ED PROVIDER NOTES
"Encounter Date: 7/7/2018    SCRIBE #1 NOTE: I, Sandra Frost, allan scribing for, and in the presence of, Dr. Giles.       History     Chief Complaint   Patient presents with    Hyperglycemia     EMS called for near syncopal episode this morning.  pt reports being weak, cool, clammy. History of liver cancer with radiation completed Friday, one week ago  BG on scene was 283     Time seen by provider: 11:02 AM    This is a 78 y.o. female with hx of HLD, HTN, and liver CA who presents with complaint of near syncopal episode this morning. Pt states that after she had woken up and had her coffee, she sat on the couch and took her medication (steroid and pain pill) when she felt lightheaded. She dropped the remainder of her medication and family member states that pt was "slumped." She reports no LOC. She had elevated blood glucose at that time. Pt does report loss of appetite, fatigue, and urinary frequency recently with radiation and new medications. She was diagnosed with liver CA May 2018 and had radiation two weeks ago. Pt has not undergone chemotherapy. No known metastasis to the lungs. Pt denies any fever, chills, congestion, rhinorrhea, SOB, cough, chest pain, abdominal pain, N/V/D, constipation, blood in stool, dysuria, hematuria, urinary urgency, flank pain, weakness, dizziness, and confusion. Pt is in no pain. Pt does not smoke, drink, or use drugs. She has no hx of pleural effusion or CHF.      The history is provided by the patient.     Review of patient's allergies indicates:  No Known Allergies  Past Medical History:   Diagnosis Date    B12 deficiency     Depression     per pcp note 7/2017 and given prozac and diazepam     Hyperlipidemia     Hypertension     PVC (premature ventricular contraction)     seen on ekg from prior pcp    Weight loss     reviewed prior pcp Dr. Russo notes 2017 - labs reviewed: cmp wnl x tbili 1.5, tsh wnl, A1c 5.0, cbc wnl,D 36, hiv neg, hep c neg, hep b surg ag neg  "     Past Surgical History:   Procedure Laterality Date    ESOPHAGOGASTRODUODENOSCOPY  05/04/2018    esophageal ring, grade 1 esophagitis, gastritis     EYE SURGERY      cataracts    HYSTERECTOMY      fibroids     Family History   Problem Relation Age of Onset    Glaucoma Mother     Heart attack Father     Diabetes Sister     Cancer Brother         lung cancer, + tobacco    Diabetes Brother     Hypertension Brother     Stroke Brother     Emphysema Brother     COPD Brother     Asthma Sister     No Known Problems Sister     Hyperlipidemia Sister     Hyperlipidemia Sister     Heart attack Sister      Social History   Substance Use Topics    Smoking status: Never Smoker    Smokeless tobacco: Never Used    Alcohol use No      Comment: stopped in 2007 - hx of social drinking     Review of Systems   Constitutional: Positive for appetite change (decreased) and fatigue. Negative for chills and fever.   HENT: Negative for congestion, rhinorrhea and sore throat.    Eyes: Negative for photophobia and redness.   Respiratory: Negative for cough and shortness of breath.    Cardiovascular: Negative for chest pain.   Gastrointestinal: Negative for abdominal pain, blood in stool, constipation, diarrhea, nausea and vomiting.   Genitourinary: Positive for frequency. Negative for decreased urine volume, difficulty urinating, dysuria, flank pain, hematuria and urgency.   Musculoskeletal: Negative for back pain.   Skin: Negative for rash.   Neurological: Negative for dizziness, syncope, weakness and headaches.        Near syncopal episode with associated lightheadedness.   Psychiatric/Behavioral: Negative for confusion.       Physical Exam     Initial Vitals [07/07/18 1022]   BP Pulse Resp Temp SpO2   99/60 106 20 -- 100 %      MAP       --         Physical Exam    Nursing note and vitals reviewed.  Constitutional: She appears well-developed and well-nourished. She is not diaphoretic. No distress.   HENT:   Head:  Normocephalic and atraumatic.   Right Ear: External ear normal.   Left Ear: External ear normal.   Oropharynx is clear and intact.  Moist mucous membranes.   Eyes: Conjunctivae and EOM are normal. Pupils are equal, round, and reactive to light. Right eye exhibits no discharge. Left eye exhibits no discharge.   Conjunctiva are pink, clear, and intact.   Neck: Normal range of motion. Neck supple.   Cardiovascular: Normal rate, regular rhythm and normal heart sounds.   Normal S1, S2. No murmurs, no rubs, no gallops.    Pulmonary/Chest: No respiratory distress. She has no wheezes. She has no rhonchi. She has no rales.   Decreased breath sounds to the R.   Abdominal: Soft. Bowel sounds are normal. She exhibits no distension. There is no tenderness. There is no rebound and no guarding.   No audible bruits.   Musculoskeletal: Normal range of motion. She exhibits no tenderness.   1+ pedal edema bilaterally.   Lymphadenopathy:     She has no cervical adenopathy.   Neurological: She is alert and oriented to person, place, and time. She has normal strength. No sensory deficit.   Skin: Skin is warm and dry. No rash noted. No erythema.   Psychiatric: She has a normal mood and affect. Her behavior is normal.         ED Course   Procedures  Labs Reviewed   CBC W/ AUTO DIFFERENTIAL - Abnormal; Notable for the following:        Result Value    RBC 3.22 (*)     Hemoglobin 9.1 (*)     Hematocrit 28.2 (*)     Gran # (ANC) 10.6 (*)     Mono # 0.1 (*)     Gran% 91.1 (*)     Lymph% 8.2 (*)     Mono% 0.6 (*)     All other components within normal limits   COMPREHENSIVE METABOLIC PANEL - Abnormal; Notable for the following:     Sodium 133 (*)     Potassium 3.2 (*)     Chloride 94 (*)     Glucose 242 (*)     Calcium 8.6 (*)     Total Protein 5.3 (*)     Albumin 2.7 (*)     Total Bilirubin 1.1 (*)     All other components within normal limits   URINALYSIS - Abnormal; Notable for the following:     Glucose, UA 2+ (*)     Leukocytes, UA Trace  (*)     All other components within normal limits   URINALYSIS - Abnormal; Notable for the following:     Glucose, UA 2+ (*)     Leukocytes, UA Trace (*)     All other components within normal limits   URINALYSIS MICROSCOPIC - Abnormal; Notable for the following:     WBC, UA 10 (*)     WBC Clumps, UA Occasional (*)     Trichomonas, UA Rare (*)     All other components within normal limits   POCT GLUCOSE - Abnormal; Notable for the following:     POCT Glucose 285 (*)     All other components within normal limits   ISTAT PROCEDURE - Abnormal; Notable for the following:     POC PCO2 53.0 (*)     POC PO2 11 (*)     POC HCO3 33.5 (*)     POC SATURATED O2 11 (*)     All other components within normal limits   CULTURE, BLOOD    Narrative:     Blood Culture #1   BETA - HYDROXYBUTYRATE, SERUM     EKG Readings: (Independently Interpreted)   NSR at a rate of 97.       Imaging Results          X-Ray Chest AP Portable (Final result)     Abnormal  Result time 07/07/18 12:48:03    Final result by Sanjeev Pang MD (07/07/18 12:48:03)                 Impression:      No radiographic acute intrathoracic process seen.    At least 2 subcentimeter nodules project over the medial left lung base, which remain indeterminate.  Further evaluation with elective/nonemergent CT thorax can be obtained, if 2 year stability cannot be confirmed.    This report was flagged in Epic as abnormal.      Electronically signed by: Sanjeev Pang MD  Date:    07/07/2018  Time:    12:48             Narrative:    EXAMINATION:  XR CHEST AP PORTABLE    CLINICAL HISTORY:  Tachycardia;    TECHNIQUE:  Single frontal view of the chest was performed.    COMPARISON:  MRI thoracic spine 06/08/2018    FINDINGS:  Monitoring leads overlie the chest.  Patient is slightly rotated.There are at least 2 small nodular densities projected over the medial left lung base ranging 5-6 mm each.  Mild nonspecific elevation of the right hemidiaphragm.  Linear opacities project over  the left lung base suggesting subsegmental scarring versus atelectasis.  The lungs are otherwise well expanded without large consolidation, pleural effusion or pneumothorax.    The cardiac silhouette is normal in size. The hilar and mediastinal contours are unremarkable.    Included osseous structures show age-related mild degenerative change without acute or destructive process seen.  PA and lateral views can be obtained.                              X-Rays:   Independently Interpreted Readings:   Chest X-Ray: No acute findings.     Medical Decision Making:   Independently Interpreted Test(s):   I have ordered and independently interpreted X-rays - see prior notes.  I have ordered and independently interpreted EKG Reading(s) - see prior notes  Clinical Tests:   Lab Tests: Ordered and Reviewed  Radiological Study: Ordered and Reviewed  Medical Tests: Ordered and Reviewed            Scribe Attestation:   Scribe #1: I performed the above scribed service and the documentation accurately describes the services I performed. I attest to the accuracy of the note.    Attending Attestation:           Physician Attestation for Scribe:  Physician Attestation Statement for Scribe #1: I, Dr. Gautam, reviewed documentation, as scribed by Sandra Frost in my presence, and it is both accurate and complete.         Attending ED Notes:   Emergent evaluation of 78-year-old female with complaint of a near syncopal episode after taking her medications this morning.  Patient is afebrile, nontoxic-appearing with stable vital signs.  Patient is neurovascularly intact without focal neurologic deficits.PERRLA, EOMI.  Strength 5 of 5 throughout.  Two point discrimination is intact throughout.  Sensation intact to light touch throughout.  Reflexes 2+ throughout.  Good finger to nose task ability. Negative pronator drift.  No papilledema.  No meningeal signs.  Blood gas reveals 7.409.  Patient is no elevation of white blood cell count.  H&H  9.1 and 28.2.  Sodium is 133.  Potassium at 3.2.  Chloride is 94.  Calcium of 8.6.  Total protein 5.3.  Albumin is 2.7.  Blood glucose is 285.  No clinical or diagnostic evidence of DKA.  Chest x-ray reveals 2 subcentimeter nodules over the medial left lung base.  The patient is extensively counseled on her diagnosis and treatment including all diagnostic, laboratory and physical exam findings.  The patient is discharged in good condition and directed follow-up with her PCP in the next 24-48 hours.             Clinical Impression:     1. Near syncope    2. Weakness    3. Tachycardia    4. Dehydration    5. Anemia, unspecified type    6. Hyperglycemia    7. Hypokalemia    8. Hyponatremia    9. Nodule of left lung                                 Inderjit Adair MD  07/08/18 1931

## 2018-07-07 NOTE — ED NOTES
Pt sitting at breakfast table this morning when she experienced sudden weakness and diaphoresis.  This same thing happened about 1 week ago and was seen and treated for hyperglycemia and dehydration.  She states that the symptoms were the same this morning.  Pt finished radiation for liver cancer 1 week ago.  Complains of intermittent abdominal pain and nausea but none present at this time.  Skin warm and dry at present but EMS states she was diaphoretic on scene.

## 2018-07-11 ENCOUNTER — HOSPITAL ENCOUNTER (INPATIENT)
Facility: OTHER | Age: 78
LOS: 1 days | Discharge: HOME HEALTH | DRG: 812 | End: 2018-07-12
Attending: EMERGENCY MEDICINE | Admitting: HOSPITALIST
Payer: MEDICARE

## 2018-07-11 ENCOUNTER — TELEPHONE (OUTPATIENT)
Dept: HEMATOLOGY/ONCOLOGY | Facility: CLINIC | Age: 78
End: 2018-07-11

## 2018-07-11 DIAGNOSIS — E87.1 HYPONATREMIA: ICD-10-CM

## 2018-07-11 DIAGNOSIS — C7B.8 SECONDARY NEUROENDOCRINE TUMOR OF BONE: ICD-10-CM

## 2018-07-11 DIAGNOSIS — E86.0 DEHYDRATION: ICD-10-CM

## 2018-07-11 DIAGNOSIS — C7B.8 METASTATIC MALIGNANT NEUROENDOCRINE TUMOR TO LIVER: ICD-10-CM

## 2018-07-11 DIAGNOSIS — C79.51 SPINE METASTASIS: ICD-10-CM

## 2018-07-11 DIAGNOSIS — C7A.8 NEUROENDOCRINE CARCINOMA METASTATIC TO BONE: ICD-10-CM

## 2018-07-11 DIAGNOSIS — C7B.8 NEUROENDOCRINE CARCINOMA METASTATIC TO BONE: ICD-10-CM

## 2018-07-11 DIAGNOSIS — D64.9 ANEMIA, UNSPECIFIED TYPE: Primary | ICD-10-CM

## 2018-07-11 PROBLEM — R35.0 URINARY FREQUENCY: Status: ACTIVE | Noted: 2018-07-11

## 2018-07-11 PROBLEM — R73.9 HYPERGLYCEMIA: Status: ACTIVE | Noted: 2018-07-11

## 2018-07-11 LAB
ABO + RH BLD: NORMAL
ALBUMIN SERPL BCP-MCNC: 2.7 G/DL
ALBUMIN SERPL BCP-MCNC: 2.9 G/DL
ALP SERPL-CCNC: 54 U/L
ALP SERPL-CCNC: 61 U/L
ALT SERPL W/O P-5'-P-CCNC: 27 U/L
ALT SERPL W/O P-5'-P-CCNC: 28 U/L
ANION GAP SERPL CALC-SCNC: 8 MMOL/L
ANION GAP SERPL CALC-SCNC: 9 MMOL/L
APTT BLDCRRT: <21 SEC
AST SERPL-CCNC: 19 U/L
AST SERPL-CCNC: 20 U/L
BACTERIA #/AREA URNS HPF: ABNORMAL /HPF
BASOPHILS # BLD AUTO: 0 K/UL
BASOPHILS # BLD AUTO: 0.01 K/UL
BASOPHILS NFR BLD: 0 %
BASOPHILS NFR BLD: 0.1 %
BILIRUB SERPL-MCNC: 0.8 MG/DL
BILIRUB SERPL-MCNC: 1 MG/DL
BILIRUB UR QL STRIP: NEGATIVE
BLD GP AB SCN CELLS X3 SERPL QL: NORMAL
BLD PROD TYP BPU: NORMAL
BLOOD UNIT EXPIRATION DATE: NORMAL
BLOOD UNIT TYPE CODE: 6200
BLOOD UNIT TYPE: NORMAL
BUN SERPL-MCNC: 14 MG/DL
BUN SERPL-MCNC: 16 MG/DL
CALCIUM SERPL-MCNC: 8.2 MG/DL
CALCIUM SERPL-MCNC: 8.9 MG/DL
CHLORIDE SERPL-SCNC: 92 MMOL/L
CHLORIDE SERPL-SCNC: 96 MMOL/L
CLARITY UR: CLEAR
CO2 SERPL-SCNC: 27 MMOL/L
CO2 SERPL-SCNC: 29 MMOL/L
CODING SYSTEM: NORMAL
COLOR UR: YELLOW
CREAT SERPL-MCNC: 0.6 MG/DL
CREAT SERPL-MCNC: 0.6 MG/DL
DIFFERENTIAL METHOD: ABNORMAL
DIFFERENTIAL METHOD: ABNORMAL
DISPENSE STATUS: NORMAL
EOSINOPHIL # BLD AUTO: 0 K/UL
EOSINOPHIL # BLD AUTO: 0 K/UL
EOSINOPHIL NFR BLD: 0 %
EOSINOPHIL NFR BLD: 0 %
ERYTHROCYTE [DISTWIDTH] IN BLOOD BY AUTOMATED COUNT: 13.5 %
ERYTHROCYTE [DISTWIDTH] IN BLOOD BY AUTOMATED COUNT: 13.6 %
EST. GFR  (AFRICAN AMERICAN): >60 ML/MIN/1.73 M^2
EST. GFR  (AFRICAN AMERICAN): >60 ML/MIN/1.73 M^2
EST. GFR  (NON AFRICAN AMERICAN): >60 ML/MIN/1.73 M^2
EST. GFR  (NON AFRICAN AMERICAN): >60 ML/MIN/1.73 M^2
GLUCOSE SERPL-MCNC: 163 MG/DL
GLUCOSE SERPL-MCNC: 163 MG/DL
GLUCOSE UR QL STRIP: ABNORMAL
HCT VFR BLD AUTO: 23.2 %
HCT VFR BLD AUTO: 26.2 %
HGB BLD-MCNC: 7.5 G/DL
HGB BLD-MCNC: 8.3 G/DL
HGB UR QL STRIP: ABNORMAL
INR PPP: 0.9
KETONES UR QL STRIP: NEGATIVE
LEUKOCYTE ESTERASE UR QL STRIP: ABNORMAL
LYMPHOCYTES # BLD AUTO: 0.4 K/UL
LYMPHOCYTES # BLD AUTO: 0.5 K/UL
LYMPHOCYTES NFR BLD: 5 %
LYMPHOCYTES NFR BLD: 9.1 %
MAGNESIUM SERPL-MCNC: 1.6 MG/DL
MCH RBC QN AUTO: 27.3 PG
MCH RBC QN AUTO: 27.8 PG
MCHC RBC AUTO-ENTMCNC: 31.7 G/DL
MCHC RBC AUTO-ENTMCNC: 32.3 G/DL
MCV RBC AUTO: 86 FL
MCV RBC AUTO: 86 FL
MICROSCOPIC COMMENT: ABNORMAL
MONOCYTES # BLD AUTO: 0.2 K/UL
MONOCYTES # BLD AUTO: 0.5 K/UL
MONOCYTES NFR BLD: 3.9 %
MONOCYTES NFR BLD: 6.6 %
NEUTROPHILS # BLD AUTO: 5 K/UL
NEUTROPHILS # BLD AUTO: 6.8 K/UL
NEUTROPHILS NFR BLD: 85.8 %
NEUTROPHILS NFR BLD: 87.1 %
NITRITE UR QL STRIP: NEGATIVE
PH UR STRIP: 6 [PH] (ref 5–8)
PHOSPHATE SERPL-MCNC: 2.8 MG/DL
PLATELET # BLD AUTO: 194 K/UL
PLATELET # BLD AUTO: 278 K/UL
PMV BLD AUTO: 9.2 FL
PMV BLD AUTO: 9.5 FL
POCT GLUCOSE: 237 MG/DL (ref 70–110)
POTASSIUM SERPL-SCNC: 4.4 MMOL/L
POTASSIUM SERPL-SCNC: 5 MMOL/L
PROT SERPL-MCNC: 5 G/DL
PROT SERPL-MCNC: 5.5 G/DL
PROT UR QL STRIP: NEGATIVE
PROTHROMBIN TIME: 10.4 SEC
RBC # BLD AUTO: 2.7 M/UL
RBC # BLD AUTO: 3.04 M/UL
RBC #/AREA URNS HPF: 10 /HPF (ref 0–4)
SODIUM SERPL-SCNC: 128 MMOL/L
SODIUM SERPL-SCNC: 133 MMOL/L
SP GR UR STRIP: <=1.005 (ref 1–1.03)
SQUAMOUS #/AREA URNS HPF: 3 /HPF
TRANS ERYTHROCYTES VOL PATIENT: NORMAL ML
TRICHOMONAS UR QL MICRO: ABNORMAL
URN SPEC COLLECT METH UR: ABNORMAL
UROBILINOGEN UR STRIP-ACNC: NEGATIVE EU/DL
WBC # BLD AUTO: 5.85 K/UL
WBC # BLD AUTO: 7.76 K/UL
WBC #/AREA URNS HPF: 6 /HPF (ref 0–5)

## 2018-07-11 PROCEDURE — 63600175 PHARM REV CODE 636 W HCPCS: Performed by: STUDENT IN AN ORGANIZED HEALTH CARE EDUCATION/TRAINING PROGRAM

## 2018-07-11 PROCEDURE — 94761 N-INVAS EAR/PLS OXIMETRY MLT: CPT

## 2018-07-11 PROCEDURE — 85025 COMPLETE CBC W/AUTO DIFF WBC: CPT | Mod: 91

## 2018-07-11 PROCEDURE — 84100 ASSAY OF PHOSPHORUS: CPT

## 2018-07-11 PROCEDURE — 25000003 PHARM REV CODE 250: Performed by: EMERGENCY MEDICINE

## 2018-07-11 PROCEDURE — 85610 PROTHROMBIN TIME: CPT

## 2018-07-11 PROCEDURE — 86920 COMPATIBILITY TEST SPIN: CPT

## 2018-07-11 PROCEDURE — 11000001 HC ACUTE MED/SURG PRIVATE ROOM

## 2018-07-11 PROCEDURE — 80053 COMPREHEN METABOLIC PANEL: CPT | Mod: 91

## 2018-07-11 PROCEDURE — 96365 THER/PROPH/DIAG IV INF INIT: CPT

## 2018-07-11 PROCEDURE — 63600175 PHARM REV CODE 636 W HCPCS: Performed by: EMERGENCY MEDICINE

## 2018-07-11 PROCEDURE — 25000003 PHARM REV CODE 250: Performed by: PHYSICIAN ASSISTANT

## 2018-07-11 PROCEDURE — 87086 URINE CULTURE/COLONY COUNT: CPT

## 2018-07-11 PROCEDURE — 86901 BLOOD TYPING SEROLOGIC RH(D): CPT

## 2018-07-11 PROCEDURE — 80053 COMPREHEN METABOLIC PANEL: CPT

## 2018-07-11 PROCEDURE — 85730 THROMBOPLASTIN TIME PARTIAL: CPT

## 2018-07-11 PROCEDURE — P9021 RED BLOOD CELLS UNIT: HCPCS

## 2018-07-11 PROCEDURE — 81000 URINALYSIS NONAUTO W/SCOPE: CPT

## 2018-07-11 PROCEDURE — 36415 COLL VENOUS BLD VENIPUNCTURE: CPT

## 2018-07-11 PROCEDURE — 99283 EMERGENCY DEPT VISIT LOW MDM: CPT | Mod: 25

## 2018-07-11 PROCEDURE — 96361 HYDRATE IV INFUSION ADD-ON: CPT

## 2018-07-11 PROCEDURE — 99223 1ST HOSP IP/OBS HIGH 75: CPT | Mod: ,,, | Performed by: PHYSICIAN ASSISTANT

## 2018-07-11 PROCEDURE — 83735 ASSAY OF MAGNESIUM: CPT

## 2018-07-11 PROCEDURE — 36430 TRANSFUSION BLD/BLD COMPNT: CPT

## 2018-07-11 RX ORDER — SODIUM CHLORIDE 9 MG/ML
INJECTION, SOLUTION INTRAVENOUS CONTINUOUS
Status: DISCONTINUED | OUTPATIENT
Start: 2018-07-11 | End: 2018-07-12

## 2018-07-11 RX ORDER — HYDROCODONE BITARTRATE AND ACETAMINOPHEN 500; 5 MG/1; MG/1
TABLET ORAL
Status: DISCONTINUED | OUTPATIENT
Start: 2018-07-11 | End: 2018-07-12 | Stop reason: HOSPADM

## 2018-07-11 RX ORDER — GLUCAGON 1 MG
1 KIT INJECTION
Status: DISCONTINUED | OUTPATIENT
Start: 2018-07-11 | End: 2018-07-12 | Stop reason: HOSPADM

## 2018-07-11 RX ORDER — DEXAMETHASONE 2 MG/1
2 TABLET ORAL
COMMUNITY
End: 2018-07-20 | Stop reason: ALTCHOICE

## 2018-07-11 RX ORDER — ACETAMINOPHEN 325 MG/1
650 TABLET ORAL EVERY 4 HOURS PRN
Status: DISCONTINUED | OUTPATIENT
Start: 2018-07-11 | End: 2018-07-12 | Stop reason: HOSPADM

## 2018-07-11 RX ORDER — TRAMADOL HYDROCHLORIDE 50 MG/1
50 TABLET ORAL EVERY 6 HOURS PRN
Status: DISCONTINUED | OUTPATIENT
Start: 2018-07-11 | End: 2018-07-12 | Stop reason: HOSPADM

## 2018-07-11 RX ORDER — INSULIN ASPART 100 [IU]/ML
0-5 INJECTION, SOLUTION INTRAVENOUS; SUBCUTANEOUS
Status: DISCONTINUED | OUTPATIENT
Start: 2018-07-11 | End: 2018-07-12 | Stop reason: HOSPADM

## 2018-07-11 RX ORDER — DEXAMETHASONE 0.5 MG/1
2 TABLET ORAL DAILY
Status: DISCONTINUED | OUTPATIENT
Start: 2018-07-12 | End: 2018-07-12

## 2018-07-11 RX ORDER — ONDANSETRON 2 MG/ML
4 INJECTION INTRAMUSCULAR; INTRAVENOUS EVERY 8 HOURS PRN
Status: DISCONTINUED | OUTPATIENT
Start: 2018-07-11 | End: 2018-07-12 | Stop reason: HOSPADM

## 2018-07-11 RX ORDER — DEXAMETHASONE 4 MG/1
4 TABLET ORAL EVERY 6 HOURS
Status: DISCONTINUED | OUTPATIENT
Start: 2018-07-11 | End: 2018-07-12

## 2018-07-11 RX ORDER — IBUPROFEN 200 MG
24 TABLET ORAL
Status: DISCONTINUED | OUTPATIENT
Start: 2018-07-11 | End: 2018-07-12 | Stop reason: HOSPADM

## 2018-07-11 RX ORDER — IBUPROFEN 200 MG
16 TABLET ORAL
Status: DISCONTINUED | OUTPATIENT
Start: 2018-07-11 | End: 2018-07-12 | Stop reason: HOSPADM

## 2018-07-11 RX ORDER — SODIUM CHLORIDE 0.9 % (FLUSH) 0.9 %
5 SYRINGE (ML) INJECTION
Status: DISCONTINUED | OUTPATIENT
Start: 2018-07-11 | End: 2018-07-12 | Stop reason: HOSPADM

## 2018-07-11 RX ORDER — PROCHLORPERAZINE EDISYLATE 5 MG/ML
5 INJECTION INTRAMUSCULAR; INTRAVENOUS EVERY 6 HOURS PRN
Status: DISCONTINUED | OUTPATIENT
Start: 2018-07-11 | End: 2018-07-12 | Stop reason: HOSPADM

## 2018-07-11 RX ORDER — METRONIDAZOLE 500 MG/1
500 TABLET ORAL
Status: COMPLETED | OUTPATIENT
Start: 2018-07-11 | End: 2018-07-11

## 2018-07-11 RX ORDER — LANOLIN ALCOHOL/MO/W.PET/CERES
1000 CREAM (GRAM) TOPICAL DAILY
Status: DISCONTINUED | OUTPATIENT
Start: 2018-07-12 | End: 2018-07-12 | Stop reason: HOSPADM

## 2018-07-11 RX ORDER — HYDROCODONE BITARTRATE AND ACETAMINOPHEN 5; 325 MG/1; MG/1
1 TABLET ORAL EVERY 6 HOURS PRN
Status: DISCONTINUED | OUTPATIENT
Start: 2018-07-11 | End: 2018-07-12 | Stop reason: HOSPADM

## 2018-07-11 RX ORDER — ONDANSETRON 8 MG/1
8 TABLET, ORALLY DISINTEGRATING ORAL EVERY 8 HOURS PRN
Status: DISCONTINUED | OUTPATIENT
Start: 2018-07-11 | End: 2018-07-12 | Stop reason: HOSPADM

## 2018-07-11 RX ADMIN — SODIUM CHLORIDE: 0.9 INJECTION, SOLUTION INTRAVENOUS at 05:07

## 2018-07-11 RX ADMIN — SODIUM CHLORIDE 1000 ML: 0.9 INJECTION, SOLUTION INTRAVENOUS at 12:07

## 2018-07-11 RX ADMIN — METRONIDAZOLE 500 MG: 500 TABLET ORAL at 04:07

## 2018-07-11 RX ADMIN — DEXAMETHASONE 4 MG: 4 TABLET ORAL at 11:07

## 2018-07-11 RX ADMIN — CEFTRIAXONE 1 G: 1 INJECTION, SOLUTION INTRAVENOUS at 04:07

## 2018-07-11 NOTE — SUBJECTIVE & OBJECTIVE
Past Medical History:   Diagnosis Date    Anemia 7/11/2018    B12 deficiency     Depression     per pcp note 7/2017 and given prozac and diazepam     Hyperlipidemia     Hypertension     PVC (premature ventricular contraction)     seen on ekg from prior pcp    Weight loss     reviewed prior pcp Dr. Russo notes 2017 - labs reviewed: cmp wnl x tbili 1.5, tsh wnl, A1c 5.0, cbc wnl,D 36, hiv neg, hep c neg, hep b surg ag neg        Past Surgical History:   Procedure Laterality Date    ESOPHAGOGASTRODUODENOSCOPY  05/04/2018    esophageal ring, grade 1 esophagitis, gastritis     EYE SURGERY      cataracts    HYSTERECTOMY      fibroids       Review of patient's allergies indicates:  No Known Allergies    No current facility-administered medications on file prior to encounter.      Current Outpatient Prescriptions on File Prior to Encounter   Medication Sig    cholecalciferol, vitamin D3, (VITAMIN D3) 400 unit Tab Take by mouth once daily.    cholestyramine-aspartame (CHOLESTYRAMINE LIGHT) 4 gram PwPk Take by mouth.    cyanocobalamin (VITAMIN B-12) 1000 MCG tablet Take 1 tablet (1,000 mcg total) by mouth once daily.    ferrous sulfate 325 (65 FE) MG EC tablet Take 325 mg by mouth 3 (three) times daily with meals.    HYDROcodone-acetaminophen (NORCO) 5-325 mg per tablet Take 1-2 tablets by mouth every 6 (six) hours as needed for Pain.    lisinopril-hydrochlorothiazide (PRINZIDE,ZESTORETIC) 10-12.5 mg per tablet Take 1 tablet by mouth once daily.    metFORMIN (GLUCOPHAGE) 1000 MG tablet Take 1 tablet (1,000 mg total) by mouth daily with breakfast.    rosuvastatin (CRESTOR) 5 MG tablet Take 1 tablet (5 mg total) by mouth once daily.    XIIDRA 5 % Dpet INSTILL ONE DROP INTO OU BID    [DISCONTINUED] dexamethasone (DECADRON) 4 MG Tab Take 1 tablet (4 mg total) by mouth every 6 (six) hours.     Family History     Problem Relation (Age of Onset)    Asthma Sister    COPD Brother    Cancer Brother    Diabetes  Sister, Brother    Emphysema Brother    Glaucoma Mother    Heart attack Father, Sister    Hyperlipidemia Sister, Sister    Hypertension Brother    No Known Problems Sister    Stroke Brother        Social History Main Topics    Smoking status: Never Smoker    Smokeless tobacco: Never Used    Alcohol use No      Comment: stopped in 2007 - hx of social drinking    Drug use: No    Sexual activity: No     Review of Systems   Constitutional: Positive for activity change, appetite change and fatigue. Negative for chills, diaphoresis and fever.   HENT: Negative for congestion, sinus pressure and sore throat.    Eyes: Negative.    Respiratory: Negative for cough, chest tightness and shortness of breath.    Cardiovascular: Positive for leg swelling. Negative for chest pain and palpitations.   Gastrointestinal: Negative for abdominal distention, abdominal pain, blood in stool, diarrhea, nausea and vomiting.   Genitourinary: Positive for frequency. Negative for decreased urine volume, difficulty urinating, dysuria, flank pain and urgency.   Musculoskeletal: Negative for back pain.   Skin: Negative.    Neurological: Positive for weakness (generalized). Negative for dizziness, light-headedness, numbness and headaches.   Psychiatric/Behavioral: Negative for agitation.     Objective:     Vital Signs (Most Recent):  Temp: 97.7 °F (36.5 °C) (07/11/18 1136)  Pulse: 82 (07/11/18 1544)  Resp: 17 (07/11/18 1544)  BP: 118/64 (07/11/18 1544)  SpO2: 100 % (07/11/18 1544) Vital Signs (24h Range):  Temp:  [97.7 °F (36.5 °C)] 97.7 °F (36.5 °C)  Pulse:  [] 82  Resp:  [13-18] 17  SpO2:  [99 %-100 %] 100 %  BP: (109-125)/(58-74) 118/64     Weight: 51.7 kg (114 lb)  Body mass index is 19.57 kg/m².    Physical Exam   Constitutional: She is oriented to person, place, and time. She appears well-developed and well-nourished. No distress.   HENT:   Head: Normocephalic and atraumatic.   Mouth/Throat: Oropharynx is clear and moist.   Eyes:  Conjunctivae are normal. Pupils are equal, round, and reactive to light. Right eye exhibits no discharge. Left eye exhibits no discharge.   Neck: Normal range of motion. Neck supple. No JVD present.   Cardiovascular: Normal rate, regular rhythm, normal heart sounds and intact distal pulses.    Pulmonary/Chest: Effort normal and breath sounds normal. No respiratory distress. She has no wheezes.   Abdominal: Soft. Bowel sounds are normal. She exhibits no distension. There is no tenderness. There is no guarding.   Musculoskeletal: Normal range of motion. She exhibits edema (1+ b/l). She exhibits no tenderness or deformity.   Neurological: She is alert and oriented to person, place, and time. No cranial nerve deficit.   Skin: Skin is warm and dry. She is not diaphoretic.   Psychiatric: She has a normal mood and affect. Her behavior is normal.   Nursing note and vitals reviewed.        CRANIAL NERVES     CN III, IV, VI   Pupils are equal, round, and reactive to light.       Significant Labs:   CBC:   Recent Labs  Lab 07/11/18  1352   WBC 7.76   HGB 7.5*   HCT 23.2*        CMP:   Recent Labs  Lab 07/11/18  1352   *   K 4.4   CL 92*   CO2 27   *   BUN 16   CREATININE 0.6   CALCIUM 8.2*   PROT 5.0*   ALBUMIN 2.7*   BILITOT 0.8   ALKPHOS 54*   AST 20   ALT 27   ANIONGAP 9   EGFRNONAA >60     Urine Studies:   Recent Labs  Lab 07/11/18  1233   COLORU Yellow   APPEARANCEUA Clear   PHUR 6.0   SPECGRAV <=1.005*   PROTEINUA Negative   GLUCUA Trace*   KETONESU Negative   BILIRUBINUA Negative   OCCULTUA 3+*   NITRITE Negative   UROBILINOGEN Negative   LEUKOCYTESUR 1+*   RBCUA 10*   WBCUA 6*   BACTERIA Moderate*   SQUAMEPITHEL 3     All pertinent labs within the past 24 hours have been reviewed.    Significant Imaging:  Imaging Results    None

## 2018-07-11 NOTE — HPI
77 y/o female with Hx of neuroendocrine tumor of the ileum, with mets to liver, bones, LN s/p palliative radiation presents to ED with c/o generalized weakness and poor appetite. States she has felt weak for the last few days and was seen in the ED three days ago for near syncopal episode. Per daughter, patient's blood pressure was 82/50 and her heart rate was 106 today. She reports chronically decreased appetite and trouble swallowing with limited PO intake. Patient states she has been thirsty, drinking a lot of water, and experiencing urinary frequency. Denies fevers, chills, headaches, dizziness, congestion, rhinorrhea, sore throat, cough, SOB, chest pain, abdominal pain, N/V/D, or dysuria.   In ED Labs showed mild hyponatremia and drop in Hgb to 7.5, was normal one month ago and dropped to 9.1 4 days ago. No sign active GI bleed. Admitted for transfusion and IVF's.

## 2018-07-11 NOTE — ED TRIAGE NOTES
Pt presents today with her sister, she was sent over by her PCP for dehydration and because her glucose was 263 in office. Pt states she's experiencing fatigue and weakness. Glucose levels have been running high. Pt reports lack of appetite, but forces herself to eat and denies nausea. Pt is AAOx4, pt denies chest pain, SOB, or dizziness. No fever/chills. Pt reports dry mouth but states she drinks plenty of water. Legs have swelling bilaterally, 2+ edema, pedal pulses felt bilaterally. Pt denies any leg pain. Skin is warm, intact, and dry. Mucus membranes are dry and pink. No difficulty urinating, bowels are normal, had one today.

## 2018-07-11 NOTE — PLAN OF CARE
PCP:  Trixie Xie       [facility]    Sister will drive home on face sheet    Denies substance abuse mental health issues       07/11/18 3159   Discharge Assessment   Assessment Type Discharge Planning Assessment   Confirmed/corrected address and phone number on facesheet? Yes   Assessment information obtained from? Patient;Caregiver   Communicated expected length of stay with patient/caregiver yes   Prior to hospitilization cognitive status: Alert/Oriented   Prior to hospitalization functional status: Independent   Current cognitive status: Alert/Oriented   Current Functional Status: Independent   Lives With other relative(s)  (sister)   Able to Return to Prior Arrangements yes   Is patient able to care for self after discharge? Yes   Who are your caregiver(s) and their phone number(s)? on facesheet   Readmission Within The Last 30 Days no previous admission in last 30 days   Patient currently being followed by outpatient case management? No   Patient currently receives any other outside agency services? No   Equipment Currently Used at Home none   Do you have any problems affording any of your prescribed medications? No   Is the patient taking medications as prescribed? yes   Does the patient have transportation home? Yes   Transportation Available family or friend will provide   Does the patient receive services at the [facility]? No   Discharge Plan A Home with family

## 2018-07-11 NOTE — ED NOTES
Tech at bedside attempting to draw type and screen. Report given to RORO Smith. Will transfer pt to unit when type and screen drawn. No change in pt status. Pt denies any pain at this time, admits fatigue, but no worse. Bedside commode remains in room. Pt able to ambulate to commode without assistance. Pt and family updating on plan of care. Bed in lowest, locked position, side rails up x 2. Dolly Jorge, provider  Made aware will release blood unit after type and screen has resulted.

## 2018-07-11 NOTE — ASSESSMENT & PLAN NOTE
- secondary to chronic disease, radiation; no gross evidence GI losses  - transfuse one unit PRBC's  - monitor H&H  - on iron and B12 supplements at home

## 2018-07-11 NOTE — ED NOTES
Hourly rounding completed on pt. Pt resting in stretcher with HOB at 45 degrees in NAD. NS infusing to right hand. Pt requesting something to drink. Family remains at bedside. Side rails up x 2. Call light within reach. Even, unlabored respirations.

## 2018-07-11 NOTE — ASSESSMENT & PLAN NOTE
- possible UTI in immunocompromised patient, (UA + leuk, bacteria, WBC with sxs) Ceftriaxone started in ED, will continue and follow culture

## 2018-07-11 NOTE — ED PROVIDER NOTES
Encounter Date: 7/11/2018    SCRIBE #1 NOTE: I, Al Lundy, am scribing for, and in the presence of, Dr. Alexander .       History     Chief Complaint   Patient presents with    Fatigue     weakness and elevated blood glucose (200's) due to steroids. sent by PCP for dehydration     Time seen by provider: 12:09 PM    This is a 78 y.o. female, with history of HTN and metastatic neuroendocrine tumor s/p palliative radiation on weekly chemo, who presents with complaint of generalized weakness that began this morning. Patient was seen in the ED three days ago for near syncopal episode. Per daughter, patient's blood pressure was 82/50 and her heart rate was 106 today, and her blood glucose has been in the 300s. She reports chronically decreased appetite and trouble swallowing with limited PO intake. Patient states she has been thirsty, drinking a lot of water, and experiencing urinary frequency. She denies fevers, chills, headaches, dizziness, congestion, rhinorrhea, sore throat, cough, SOB, chest pain, abdominal pain, N/V/D, or dysuria        The history is provided by the patient and a relative (daughter at bedside).     Review of patient's allergies indicates:  No Known Allergies  Past Medical History:   Diagnosis Date    Anemia 7/11/2018    B12 deficiency     Depression     per pcp note 7/2017 and given prozac and diazepam     Hyperlipidemia     Hypertension     PVC (premature ventricular contraction)     seen on ekg from prior pcp    Weight loss     reviewed prior pcp Dr. Russo notes 2017 - labs reviewed: cmp wnl x tbili 1.5, tsh wnl, A1c 5.0, cbc wnl,D 36, hiv neg, hep c neg, hep b surg ag neg      Past Surgical History:   Procedure Laterality Date    ESOPHAGOGASTRODUODENOSCOPY  05/04/2018    esophageal ring, grade 1 esophagitis, gastritis     EYE SURGERY      cataracts    HYSTERECTOMY      fibroids     Family History   Problem Relation Age of Onset    Glaucoma Mother     Heart attack Father      Diabetes Sister     Cancer Brother         lung cancer, + tobacco    Diabetes Brother     Hypertension Brother     Stroke Brother     Emphysema Brother     COPD Brother     Asthma Sister     No Known Problems Sister     Hyperlipidemia Sister     Hyperlipidemia Sister     Heart attack Sister      Social History   Substance Use Topics    Smoking status: Never Smoker    Smokeless tobacco: Never Used    Alcohol use No      Comment: stopped in 2007 - hx of social drinking     Review of Systems   Constitutional: Positive for appetite change. Negative for chills and fever.   HENT: Positive for trouble swallowing. Negative for congestion, rhinorrhea and sore throat.    Respiratory: Negative for cough and shortness of breath.    Cardiovascular: Negative for chest pain.   Gastrointestinal: Negative for abdominal pain, diarrhea, nausea and vomiting.   Genitourinary: Positive for frequency. Negative for decreased urine volume and dysuria.   Musculoskeletal: Negative for back pain.   Skin: Negative for rash.   Neurological: Positive for weakness. Negative for dizziness.   Psychiatric/Behavioral: Negative for confusion.       Physical Exam     Initial Vitals [07/11/18 1136]   BP Pulse Resp Temp SpO2   (!) 119/58 101 18 97.7 °F (36.5 °C) 100 %      MAP       --         Physical Exam    Nursing note and vitals reviewed.  Constitutional: She appears well-developed and well-nourished. No distress.   HENT:   Head: Normocephalic and atraumatic.   Dry mucous membranes.   Eyes: Conjunctivae and EOM are normal. Pupils are equal, round, and reactive to light.   Neck: Normal range of motion. Neck supple.   Cardiovascular: Normal rate, regular rhythm and normal heart sounds.   Pulmonary/Chest: Breath sounds normal. No respiratory distress.   Abdominal: Soft. She exhibits no distension. There is no tenderness.   Musculoskeletal: Normal range of motion.   Neurological: She is alert and oriented to person, place, and time. She has  normal strength. No cranial nerve deficit or sensory deficit.   Skin: Skin is warm and dry.   Psychiatric: She has a normal mood and affect. Her behavior is normal. Judgment and thought content normal.         ED Course   Procedures  Labs Reviewed   CBC W/ AUTO DIFFERENTIAL - Abnormal; Notable for the following:        Result Value    RBC 2.70 (*)     Hemoglobin 7.5 (*)     Hematocrit 23.2 (*)     Lymph # 0.4 (*)     Gran% 87.1 (*)     Lymph% 5.0 (*)     All other components within normal limits    Narrative:     [place]oll. [id number] [place] [facility] [place] 07/11/2018 13:17, reason: clots in tube   notified antoine watkinsshanon er @1315   COMPREHENSIVE METABOLIC PANEL - Abnormal; Notable for the following:     Sodium 128 (*)     Chloride 92 (*)     Glucose 163 (*)     Calcium 8.2 (*)     Total Protein 5.0 (*)     Albumin 2.7 (*)     Alkaline Phosphatase 54 (*)     All other components within normal limits    Narrative:     Recoll. [id number] by [place] at 07/11/2018 13:14, reason: Specimen   hemolyzed. Notified Antoine Locke RN ER. Lab to Recollect.   07/11/2018  13:14   URINALYSIS - Abnormal; Notable for the following:     Specific Gravity, UA <=1.005 (*)     Glucose, UA Trace (*)     Occult Blood UA 3+ (*)     Leukocytes, UA 1+ (*)     All other components within normal limits   URINALYSIS MICROSCOPIC - Abnormal; Notable for the following:     RBC, UA 10 (*)     WBC, UA 6 (*)     Bacteria, UA Moderate (*)     Trichomonas, UA Occasional (*)     All other components within normal limits   CULTURE, URINE   MAGNESIUM    Narrative:     Recoll. [id number] by [place] at 07/11/2018 13:14, reason: Specimen   hemolyzed. Notified Antoine Locke RN ER. Lab to Recollect.   07/11/2018  13:14   PHOSPHORUS    Narrative:     Recoll. [id number] by [place] at 07/11/2018 13:14, reason: Specimen   hemolyzed. Notified Antoine Locke RN ER. Lab to Recollect.   07/11/2018  13:14   TYPE & SCREEN   PREPARE RBC SOFT          Imaging Results    None          Medical Decision Making:    Initial Assessment:       78 y.o. female, with history of HTN and metastatic neuroendocrine tumor s/p palliative radiation on weekly chemo, presents after she was found to have low BP and tachycardia at home today. She has chronically poor appetite, and was seen here for near-syncope recently with no acute findings. Likely dehydration causing abnormal vitals, no fever or infectious complaints to suggest sepsis, normal BP on arrival here and non-toxic appearing.     Labs show drop in Hgb to 7.5, was normal one month ago and dropped to 9.1 4 days ago. No sign active GI bleed, likely from radiation side effects. No BROOKE, but she does have Na 128, dropped from low 130s, likely from dehydration. Stable vitals throughout ED stay, feels better with IVF. Discussed with Dr. Granados, her Oncologist, who recommends admission for transfusion given drop in the past month, and IVF to correct hyponatremia. Patient agrees to transfusion and admission, discussed with hospitalist.  U/A with signs of UTI, since immunocompromised and with frequency, will cover with Ceftriaxone and send culture. It also shows trichomonas, which patient has no risk factors for, will give FLagyl pending culture.        Clinical Tests:   Lab Tests: Ordered and Reviewed            Scribe Attestation:   Scribe #1: I performed the above scribed service and the documentation accurately describes the services I performed. I attest to the accuracy of the note.    Attending Attestation:         Attending Critical Care:   Critical Care Times:   Direct Patient Care (initial evaluation, reassessments, and time considering the case)................................................................19 minutes.   Additional History from reviewing old medical records or taking additional history from the family, EMS, PCP, etc.......................5 minutes.   Ordering, Reviewing, and Interpreting Diagnostic  Studies...............................................................................................................5 minutes.   Documentation..................................................................................................................................................................................4 minutes.   Consultation with other Physicians. .................................................................................................................................................5 minutes.   ==============================================================  · Total Critical Care Time - exclusive of procedural time: 38 minutes.  ==============================================================  Critical Care Condition: potentially life-threatening   Critical Care Comments: Anemia, emergent transfusion     Physician Attestation for Scribe:  Physician Attestation Statement for Scribe #1: I, Dr. Alexander, reviewed documentation, as scribed by Al Lundy  in my presence, and it is both accurate and complete.                    Clinical Impression:     1. Anemia, unspecified type    2. Dehydration    3. Hyponatremia                                   Kevin Alexander MD  07/11/18 1551

## 2018-07-11 NOTE — H&P
[facility] Hospital Medicine  History & Physical    Patient Name: Shahram Hills  MRN: [id number]  Admission Date: 7/11/2018  Attending Physician: Raymon Minor MD   Primary Care Provider: Trixie Xie MD         Patient information was obtained from patient, relative(s), past medical records and ER records.     Subjective:     Principal Problem:Anemia    Chief Complaint:   Chief Complaint   Patient presents with    Fatigue     weakness and elevated blood glucose (200's) due to steroids. sent by PCP for dehydration        HPI: 79 y/o female with Hx of neuroendocrine tumor of the ileum, with mets to liver, bones, LN s/p palliative radiation presents to ED with c/o generalized weakness and poor appetite. States she has felt weak for the last few days and was seen in the ED three days ago for near syncopal episode. Per daughter, patient's blood pressure was 82/50 and her heart rate was 106 today. She reports chronically decreased appetite and trouble swallowing with limited PO intake. Patient states she has been thirsty, drinking a lot of water, and experiencing urinary frequency. Denies fevers, chills, headaches, dizziness, congestion, rhinorrhea, sore throat, cough, SOB, chest pain, abdominal pain, N/V/D, or dysuria.   In ED Labs showed mild hyponatremia and drop in Hgb to 7.5, was normal one month ago and dropped to 9.1 4 days ago. No sign active GI bleed. Admitted for transfusion and IVF's.     Past Medical History:   Diagnosis Date    Anemia 7/11/2018    B12 deficiency     Depression     per pcp note 7/2017 and given prozac and diazepam     Hyperlipidemia     Hypertension     PVC (premature ventricular contraction)     seen on ekg from prior pcp    Weight loss     reviewed prior pcp Dr. Russo notes 2017 - labs reviewed: cmp wnl x tbili 1.5, tsh wnl, A1c 5.0, cbc wnl,D 36, hiv neg, hep c neg, hep b surg ag neg        Past Surgical History:   Procedure Laterality Date     ESOPHAGOGASTRODUODENOSCOPY  05/04/2018    esophageal ring, grade 1 esophagitis, gastritis     EYE SURGERY      cataracts    HYSTERECTOMY      fibroids       Review of patient's allergies indicates:  No Known Allergies    No current facility-administered medications on file prior to encounter.      Current Outpatient Prescriptions on File Prior to Encounter   Medication Sig    cholecalciferol, vitamin D3, (VITAMIN D3) 400 unit Tab Take by mouth once daily.    cholestyramine-aspartame (CHOLESTYRAMINE LIGHT) 4 gram PwPk Take by mouth.    cyanocobalamin (VITAMIN B-12) 1000 MCG tablet Take 1 tablet (1,000 mcg total) by mouth once daily.    ferrous sulfate 325 (65 FE) MG EC tablet Take 325 mg by mouth 3 (three) times daily with meals.    HYDROcodone-acetaminophen (NORCO) 5-325 mg per tablet Take 1-2 tablets by mouth every 6 (six) hours as needed for Pain.    lisinopril-hydrochlorothiazide (PRINZIDE,ZESTORETIC) 10-12.5 mg per tablet Take 1 tablet by mouth once daily.    metFORMIN (GLUCOPHAGE) 1000 MG tablet Take 1 tablet (1,000 mg total) by mouth daily with breakfast.    rosuvastatin (CRESTOR) 5 MG tablet Take 1 tablet (5 mg total) by mouth once daily.    XIIDRA 5 % Dpet INSTILL ONE DROP INTO OU BID    [DISCONTINUED] dexamethasone (DECADRON) 4 MG Tab Take 1 tablet (4 mg total) by mouth every 6 (six) hours.     Family History     Problem Relation (Age of Onset)    Asthma Sister    COPD Brother    Cancer Brother    Diabetes Sister, Brother    Emphysema Brother    Glaucoma Mother    Heart attack Father, Sister    Hyperlipidemia Sister, Sister    Hypertension Brother    No Known Problems Sister    Stroke Brother        Social History Main Topics    Smoking status: Never Smoker    Smokeless tobacco: Never Used    Alcohol use No      Comment: stopped in 2007 - hx of social drinking    Drug use: No    Sexual activity: No     Review of Systems   Constitutional: Positive for activity change, appetite change and  fatigue. Negative for chills, diaphoresis and fever.   HENT: Negative for congestion, sinus pressure and sore throat.    Eyes: Negative.    Respiratory: Negative for cough, chest tightness and shortness of breath.    Cardiovascular: Positive for leg swelling. Negative for chest pain and palpitations.   Gastrointestinal: Negative for abdominal distention, abdominal pain, blood in stool, diarrhea, nausea and vomiting.   Genitourinary: Positive for frequency. Negative for decreased urine volume, difficulty urinating, dysuria, flank pain and urgency.   Musculoskeletal: Negative for back pain.   Skin: Negative.    Neurological: Positive for weakness (generalized). Negative for dizziness, light-headedness, numbness and headaches.   Psychiatric/Behavioral: Negative for agitation.     Objective:     Vital Signs (Most Recent):  Temp: 97.7 °F (36.5 °C) (07/11/18 1136)  Pulse: 82 (07/11/18 1544)  Resp: 17 (07/11/18 1544)  BP: 118/64 (07/11/18 1544)  SpO2: 100 % (07/11/18 1544) Vital Signs (24h Range):  Temp:  [97.7 °F (36.5 °C)] 97.7 °F (36.5 °C)  Pulse:  [] 82  Resp:  [13-18] 17  SpO2:  [99 %-100 %] 100 %  BP: (109-125)/(58-74) 118/64     Weight: 51.7 kg (114 lb)  Body mass index is 19.57 kg/m².    Physical Exam   Constitutional: She is oriented to person, place, and time. She appears well-developed and well-nourished. No distress.   HENT:   Head: Normocephalic and atraumatic.   Mouth/Throat: Oropharynx is clear and moist.   Eyes: Conjunctivae are normal. Pupils are equal, round, and reactive to light. Right eye exhibits no discharge. Left eye exhibits no discharge.   Neck: Normal range of motion. Neck supple. No JVD present.   Cardiovascular: Normal rate, regular rhythm, normal heart sounds and intact distal pulses.    Pulmonary/Chest: Effort normal and breath sounds normal. No respiratory distress. She has no wheezes.   Abdominal: Soft. Bowel sounds are normal. She exhibits no distension. There is no tenderness. There  is no guarding.   Musculoskeletal: Normal range of motion. She exhibits edema (1+ b/l). She exhibits no tenderness or deformity.   Neurological: She is alert and oriented to person, place, and time. No cranial nerve deficit.   Skin: Skin is warm and dry. She is not diaphoretic.   Psychiatric: She has a normal mood and affect. Her behavior is normal.   Nursing note and vitals reviewed.        CRANIAL NERVES     CN III, IV, VI   Pupils are equal, round, and reactive to light.       Significant Labs:   CBC:   Recent Labs  Lab 07/11/18  1352   WBC 7.76   HGB 7.5*   HCT 23.2*        CMP:   Recent Labs  Lab 07/11/18  1352   *   K 4.4   CL 92*   CO2 27   *   BUN 16   CREATININE 0.6   CALCIUM 8.2*   PROT 5.0*   ALBUMIN 2.7*   BILITOT 0.8   ALKPHOS 54*   AST 20   ALT 27   ANIONGAP 9   EGFRNONAA >60     Urine Studies:   Recent Labs  Lab 07/11/18  1233   COLORU Yellow   APPEARANCEUA Clear   PHUR 6.0   SPECGRAV <=1.005*   PROTEINUA Negative   GLUCUA Trace*   KETONESU Negative   BILIRUBINUA Negative   OCCULTUA 3+*   NITRITE Negative   UROBILINOGEN Negative   LEUKOCYTESUR 1+*   RBCUA 10*   WBCUA 6*   BACTERIA Moderate*   SQUAMEPITHEL 3     All pertinent labs within the past 24 hours have been reviewed.    Significant Imaging:  Imaging Results    None           Assessment/Plan:     * Anemia    - secondary to chronic disease, radiation; no gross evidence GI losses  - transfuse one unit PRBC's  - monitor H&H  - on iron and B12 supplements at home        Urinary frequency    - possible UTI in immunocompromised patient, (UA + leuk, bacteria, WBC with sxs) Ceftriaxone started in ED, will continue and follow culture          Hyperglycemia    - secondary to steroids, being weaned off, now currently only on 2 mg daily  - SSI/accuchecks, hold metformin        Hyponatremia    - chronic, now slightly lower suspect secondary to dehydration  - IVF's  - monitor            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 Granados finished palliative radiation, has follow up next week          Essential hypertension    - hold Lisinopril/HCTZ, BP on lower side  - monitor            VTE Risk Mitigation     None   SCDs, TEDs     PT/OT      Dolly Jorge PA-C  Department of Hospital Medicine   [facility]

## 2018-07-11 NOTE — ASSESSMENT & PLAN NOTE
- secondary to steroids, being weaned off, now currently only on 2 mg daily  - SSI/accuchecks, hold metformin

## 2018-07-11 NOTE — TELEPHONE ENCOUNTER
"Spoke with Hector(nephew) regarding pt's c/o severe weakness, dehydration, low blood pressure (80/50 p=106 this morning) and elevated blood sugar=263. Hector states pt c/o severe weakness but denies c/o dizziness, lightheadedness, blurry vision, or "feeling like she's going to pass out". Dr. Granados is aware and stated send pt to Urgent Care.      Spoke with Nidhi Malcolm NP in Urgent Care to add pt to the schedule. Nidhi Malcolm stated that they do not have an open slot available until 2 pm and pt should probably go to the ER.      Spoke with Hector (nephew) and informed him that he needs to take Ms. Perera to ER at [facility] and that I informed Dr. Granados about pt's signs and symptoms. Hector voiced understanding and stated he will take the pt to the ER.       Spoke with Nidhi Malcolm NP in Urgent Care, she stated they can try to see the pt at 12 noon. I informed Nidhi Malcolm that I spoke with Hector (nephew) and already advised him to take the pt to the ER at [facility]. I stated pt probably needs to be seen sooner than 12 noon, Nidhi Malcolm voiced understanding.   "

## 2018-07-12 ENCOUNTER — TELEPHONE (OUTPATIENT)
Dept: HEMATOLOGY/ONCOLOGY | Facility: CLINIC | Age: 78
End: 2018-07-12

## 2018-07-12 VITALS
SYSTOLIC BLOOD PRESSURE: 137 MMHG | HEIGHT: 64 IN | DIASTOLIC BLOOD PRESSURE: 73 MMHG | WEIGHT: 114 LBS | BODY MASS INDEX: 19.46 KG/M2 | HEART RATE: 85 BPM | TEMPERATURE: 98 F | RESPIRATION RATE: 18 BRPM | OXYGEN SATURATION: 100 %

## 2018-07-12 LAB
ALBUMIN SERPL BCP-MCNC: 2.7 G/DL
ALP SERPL-CCNC: 54 U/L
ALT SERPL W/O P-5'-P-CCNC: 23 U/L
ANION GAP SERPL CALC-SCNC: 7 MMOL/L
ANION GAP SERPL CALC-SCNC: 7 MMOL/L
AST SERPL-CCNC: 17 U/L
BACTERIA BLD CULT: NORMAL
BACTERIA UR CULT: NORMAL
BACTERIA UR CULT: NORMAL
BASOPHILS # BLD AUTO: 0 K/UL
BASOPHILS NFR BLD: 0 %
BILIRUB SERPL-MCNC: 2.5 MG/DL
BUN SERPL-MCNC: 10 MG/DL
BUN SERPL-MCNC: 10 MG/DL
CALCIUM SERPL-MCNC: 8.7 MG/DL
CALCIUM SERPL-MCNC: 8.7 MG/DL
CHLORIDE SERPL-SCNC: 100 MMOL/L
CHLORIDE SERPL-SCNC: 100 MMOL/L
CO2 SERPL-SCNC: 29 MMOL/L
CO2 SERPL-SCNC: 29 MMOL/L
CREAT SERPL-MCNC: 0.6 MG/DL
CREAT SERPL-MCNC: 0.6 MG/DL
DIFFERENTIAL METHOD: ABNORMAL
EOSINOPHIL # BLD AUTO: 0 K/UL
EOSINOPHIL NFR BLD: 0 %
ERYTHROCYTE [DISTWIDTH] IN BLOOD BY AUTOMATED COUNT: 13.4 %
ERYTHROCYTE [DISTWIDTH] IN BLOOD BY AUTOMATED COUNT: 13.4 %
EST. GFR  (AFRICAN AMERICAN): >60 ML/MIN/1.73 M^2
EST. GFR  (AFRICAN AMERICAN): >60 ML/MIN/1.73 M^2
EST. GFR  (NON AFRICAN AMERICAN): >60 ML/MIN/1.73 M^2
EST. GFR  (NON AFRICAN AMERICAN): >60 ML/MIN/1.73 M^2
GLUCOSE SERPL-MCNC: 132 MG/DL
GLUCOSE SERPL-MCNC: 132 MG/DL
HCT VFR BLD AUTO: 29.6 %
HCT VFR BLD AUTO: 29.6 %
HGB BLD-MCNC: 9.5 G/DL
HGB BLD-MCNC: 9.5 G/DL
LYMPHOCYTES # BLD AUTO: 0.4 K/UL
LYMPHOCYTES NFR BLD: 8.1 %
MCH RBC QN AUTO: 27.9 PG
MCH RBC QN AUTO: 27.9 PG
MCHC RBC AUTO-ENTMCNC: 32.1 G/DL
MCHC RBC AUTO-ENTMCNC: 32.1 G/DL
MCV RBC AUTO: 87 FL
MCV RBC AUTO: 87 FL
MONOCYTES # BLD AUTO: 0.2 K/UL
MONOCYTES NFR BLD: 3.9 %
NEUTROPHILS # BLD AUTO: 4.7 K/UL
NEUTROPHILS NFR BLD: 86.5 %
PLATELET # BLD AUTO: 247 K/UL
PLATELET # BLD AUTO: 247 K/UL
PMV BLD AUTO: 9.5 FL
PMV BLD AUTO: 9.5 FL
POCT GLUCOSE: 163 MG/DL (ref 70–110)
POCT GLUCOSE: 278 MG/DL (ref 70–110)
POTASSIUM SERPL-SCNC: 4.3 MMOL/L
POTASSIUM SERPL-SCNC: 4.3 MMOL/L
PROT SERPL-MCNC: 5 G/DL
RBC # BLD AUTO: 3.41 M/UL
RBC # BLD AUTO: 3.41 M/UL
SODIUM SERPL-SCNC: 136 MMOL/L
SODIUM SERPL-SCNC: 136 MMOL/L
WBC # BLD AUTO: 5.41 K/UL
WBC # BLD AUTO: 5.41 K/UL

## 2018-07-12 PROCEDURE — 80053 COMPREHEN METABOLIC PANEL: CPT

## 2018-07-12 PROCEDURE — 63600175 PHARM REV CODE 636 W HCPCS: Performed by: PHYSICIAN ASSISTANT

## 2018-07-12 PROCEDURE — 99239 HOSP IP/OBS DSCHRG MGMT >30: CPT | Mod: ,,, | Performed by: PHYSICIAN ASSISTANT

## 2018-07-12 PROCEDURE — 25000003 PHARM REV CODE 250: Performed by: PHYSICIAN ASSISTANT

## 2018-07-12 PROCEDURE — 25000003 PHARM REV CODE 250: Performed by: EMERGENCY MEDICINE

## 2018-07-12 PROCEDURE — 92610 EVALUATE SWALLOWING FUNCTION: CPT

## 2018-07-12 PROCEDURE — 97161 PT EVAL LOW COMPLEX 20 MIN: CPT

## 2018-07-12 PROCEDURE — 36415 COLL VENOUS BLD VENIPUNCTURE: CPT

## 2018-07-12 PROCEDURE — 85025 COMPLETE CBC W/AUTO DIFF WBC: CPT

## 2018-07-12 PROCEDURE — 94761 N-INVAS EAR/PLS OXIMETRY MLT: CPT

## 2018-07-12 PROCEDURE — 97535 SELF CARE MNGMENT TRAINING: CPT

## 2018-07-12 PROCEDURE — 63600175 PHARM REV CODE 636 W HCPCS: Performed by: STUDENT IN AN ORGANIZED HEALTH CARE EDUCATION/TRAINING PROGRAM

## 2018-07-12 PROCEDURE — 97165 OT EVAL LOW COMPLEX 30 MIN: CPT

## 2018-07-12 RX ADMIN — CEFTRIAXONE 1 G: 1 INJECTION, SOLUTION INTRAVENOUS at 10:07

## 2018-07-12 RX ADMIN — INSULIN ASPART 3 UNITS: 100 INJECTION, SOLUTION INTRAVENOUS; SUBCUTANEOUS at 12:07

## 2018-07-12 RX ADMIN — CYANOCOBALAMIN TAB 1000 MCG 1000 MCG: 1000 TAB at 10:07

## 2018-07-12 RX ADMIN — SODIUM CHLORIDE: 0.9 INJECTION, SOLUTION INTRAVENOUS at 07:07

## 2018-07-12 RX ADMIN — DEXAMETHASONE 4 MG: 4 TABLET ORAL at 05:07

## 2018-07-12 NOTE — NURSING
Blood transfusion completed. Patient tolerated well. Reports no needs at the moment. Monitoring closely.

## 2018-07-12 NOTE — PLAN OF CARE
Problem: Physical Therapy Goal  Goal: Physical Therapy Goal  Outcome: Outcome(s) achieved Date Met: 07/12/18  PT evaluation completed. Good activity tolerance including ambulation in halls with no device and with rolling walker; total distance approximately 200 ft. SpO2 99% at rest and 92% with activity. (+) bilateral LE 4+ pitting edema. No significant change in gait with rolling walker compared to without. Pt has access to rolling walker or single point cane at home if she prefers to use it. No further acute PT needs. PT signing off.

## 2018-07-12 NOTE — PT/OT/SLP EVAL
Speech Language Pathology Evaluation  Bedside Swallow    Patient Name:  Shahram Hills   MRN:  [id number]  Admitting Diagnosis: Anemia    Recommendations:                 General Recommendations:  speech pathology to follow 3-5x/week for remediation of mild signs of dysphagia, xerostomia impacting swallow  Diet recommendations:  Regular, Thin   Aspiration Precautions:  1. Double swallow with each bite/sip,  2.  HOB to 90 degrees,   3. main upright 30 minutes post meal  4.  Small bites/sips   5. Increase hydration to reduce effects of xerostomia    General Precautions: Standard,      History:     HPI: 79 y/o female with Hx of neuroendocrine tumor of the ileum, with mets to liver, bones, LN s/p palliative radiation and chemo injections, presents to ED with c/o generalized weakness and poor appetite. States she has felt weak for the last few days and was seen in the ED three days ago for near syncopal episode. Per daughter, patient's blood pressure was 82/50 and her heart rate was 106 today. She reports chronically decreased appetite and trouble swallowing with limited PO intake. Patient states she has been thirsty, drinking a lot of water, and experiencing urinary frequency. Denies fevers, chills, headaches, dizziness, congestion, rhinorrhea, sore throat, cough, SOB, chest pain, abdominal pain, N/V/D, or dysuria.     In ED Labs showed mild hyponatremia and drop in Hgb to 7.5, was normal one month ago and dropped to 9.1 4 days ago. No sign active GI bleed. Admitted for transfusion and IVF's.     Dehydration and dry mucous membranes notes in medical records     Past Medical History:   Diagnosis Date    Anemia 7/11/2018    B12 deficiency     Depression     per pcp note 7/2017 and given prozac and diazepam     Hyperlipidemia     Hypertension     PVC (premature ventricular contraction)     seen on ekg from prior pcp    Weight loss     reviewed prior pcp Dr. Russo notes 2017 - labs reviewed: cmp wnl x tbili 1.5,  "tsh wnl, A1c 5.0, cbc wnl,D 36, hiv neg, hep c neg, hep b surg ag neg        Past Surgical History:   Procedure Laterality Date    ESOPHAGOGASTRODUODENOSCOPY  05/04/2018    esophageal ring, grade 1 esophagitis, gastritis     EYE SURGERY      cataracts    HYSTERECTOMY      fibroids       Social History: Patient lives with sister    Prior Intubation HX:  None known    Prior diet: regular with thin. She reports difficulty swallowing solid foods with sensation of foods sticking in her throat for about 2-3 weeks. She reports loss of appetite "since all this started." She also reports dry mouth       Subjective     · Pt is alert, cooperative, sitting up in chair s/p OT session. SLP present for swallow evaluation and observed RN delivering oral medications    Objective:     COGNITIVE STATUS: Pt is alert, cooperative, oriented to person, place, date, reason for hospitalization with 100% acc. Self reference orientation aide in room to verify exact date. Eb to selectively attend to commands, questions, evaluation task for 30 min session with no redirection and with mild distractions in the environment (multiple caregivers, hallway door open.) Able to follow simple 1-3 step commands with % acc. Able to answer simple personal yes/no questions with 100% acc. Able to participate in conversations regarding recent medical hx and swallow issues. Verbal expression is fluent and patient able to express wants, needs, participate in her care using complete and organized sentences.    Oral Musculature Evaluation  · Oral Musculature: WFL, general weakness  · Dentition: present and adequate  · Secretion Management: adequate  · Mucosal Quality: dry  · Mandibular Strength and Mobility: WFL  · Oral Labial Strength and Mobility: WFL  · Lingual Strength and Mobility: WFL  · Volitional Swallow: prompt, with adequate hyolaryngeal elevation on subjective palpation  · Voice Prior to PO Intake: clear, mildly low volume  · Oral Musculature " Comments: Face is symmetrical at rest and during smile. Minimal generalized weakness due to age age and deconditioning. Labial and lingual strength and ROM are WFL. Minimal dcr in lip seal and jaw strength against resistance. Lingual protusion is midline. Pt able to lateralize, elevate and retract tongue. Vocal quality is clear. Speech is 100% intelligible at the conversational level    Bedside Swallow Eval:   Consistencies Assessed:  · Thin liquids single and multiple sips from a cup  · Puree 1 tsp amounts  · Solids maribeth cracker   · Oral meds with RN present    Oral Phase:   · Lip seal, ability to form a cohesive bolus and a-p transport of liquids, purees and solids was WFL.  · Mild signs of oral residuals with purees and dry solids with need for multiple oral swallows  · Able to safely masticate solids    Pharyngeal Phase:   · Trigger of the swallow reflex appears delayed.  · Adequate laryngeal elevation on subjective palpation during the swallow\  · No overt signs of airway threat or aspiration on intake of thin liquids, purees or solids: no coughing, throat clearing or change in vocal quality. Able to consume 3 oz of water in successive sips with no signs of airway threat  · Mild signs of pharyngeal residuals with purees and solids with need for 2-3 swallows per bolus.  · Pt reporting dcr sensation of food sticking in throat during this assessment.      Assessment:     Shahram Hills is a 78 y.o. female with an SLP diagnosis of minimal to mild Dysphagia.  She presents with dx of dehydration and dry mucous membranes that may be impacting pharyngeal transit of purees and solids. Complaint of trouble swallowing may be associated with xerostomia.     Goals:    SLP Goals        Problem: SLP Goal    Goal Priority Disciplines Outcome   SLP Goal     SLP Ongoing (interventions implemented as appropriate)   Description:  1. Pt will be able to continue to consume a regular consistency diet with thin liquids with no  signs of airway threat or aspiration and reduced reports of sensation of food sticking in throat.  2. Pt will be able to follow through on compensatory strategies to reduce mild signs of pharyngeal residuals: multiple swallows, alternation of liquids and swallows with min verbal cues                    Plan:     · Patient to be seen:  5 x/week   · Plan of Care expires:  07/20/18  · Plan of Care reviewed with:  patient (RN)   · SLP Follow-Up:  Yes       Discharge recommendations:  home     Time Tracking:     SLP Treatment Date:   07/12/18  Speech Start Time:  1025  Speech Stop Time:  1055     Speech Total Time (min):  30 min    Billable Minutes: Eval Swallow and Oral Function 30 min    Bambi Obrien CCC-SLP  07/12/2018

## 2018-07-12 NOTE — PT/OT/SLP EVAL
"Physical Therapy Evaluation and Discharge Note    Patient Name:  Shahram Hills   MRN:  [id number]    Recommendations:     Discharge Recommendations:  home   Discharge Equipment Recommendations: 3-in-1 commode   Barriers to discharge: None; do not anticipate patient will have difficulty ascending steps into home    Assessment:     Shahram Hills is a 78 y.o. female admitted with a medical diagnosis of Anemia.  At this time, patient is functioning at their prior level of function and does not require further acute PT services.     Recent Surgery: * No surgery found *      Plan:     During this hospitalization, patient does not require further acute PT services.  Please re-consult if situation changes.     Plan of Care Reviewed with: patient    Subjective     Communicated with nurse prior to session.  Patient found sitting up in chair upon PT entry to room, agreeable to evaluation.      Chief Complaint: feeling tired and cold  Patient comments/goals: agreeable to trial walker  Pain/Comfort:  · Pain Rating 1: 0/10  · Pain Rating Post-Intervention 1: 0/10    Patients cultural, spiritual, Syn[employer] conflicts given the current situation: none specified    Living Environment:  Per OT today: "Living Environment: Lives with sister in SS duplex with 4STE and B-handrails; tub/showr combo; standard toilet  Previous level of function: Pt. Reports requiring assist from sisters to get in and out of tub; also occasionally requires assist with LB dressing tasks as RLE sometimes feels weak.  Does not ambulate with a device, but reports furniture cruising while performing household ambulation.  Sister assists with cooking, cleaning, and driving.  Pt. Reports that she mainly instructs sister on cooking tasks, as sister does not know how to cook.  Roles and Routines: Pt. Reports sitting down and watching TV; talking and laughing with siblings.  Equipment Owned:  none   Assistance upon Discharge: Pt. Lives with youngest sister " "able to help.  Two other sisters visit often to assist and also care for disabled brother that lives on other side of [facility]."     Objective:     General Precautions: Standard, fall (ambulate with assist; )   Orthopedic Precautions:N/A   Braces: N/A     Exams:  Cognition: Patient is oriented to name, , name of hospital, month, year and follows approximately 100% of one step commands.    Posture:    -       Rounded shoulders  -       Forward head  -       Kyphosis  Sensation: Intact to light touch bilateral LEs. Denied paresthesias  Skin Integrity: Visible skin intact and small area of darkened skin over R knee approx 1" diameter. Skin intact, pt denies h/o fall although appearance similar to bruising  Edema: Pitting 4+ bilateral distal LEs  Coordination: No coordination impairments identified with functional mobility. No formal testing performed.   LE ROM/Strength: 4/5 bilateral hip flexion, 5/5 bilateral knee ext and ankle dorsiflexion  Tone: No tone impairments identified during functional mobility.   Vital signs: SpO2: 99% on room air at rest and 92% with activity; Heart rate 90 bpm at rest and 98 bpm with activity      Functional Mobility:  Bed Mobility:     Supine to Sit: did not occur  Sit to Supine: did not occur  Initiated and terminated session in sitting  Transfers:     Sit to Stand:  independence with no AD.   Gait: Approximately 200 ft on level tile with supervision initially and first 160 ft with no AD, last 40 ft with rolling walker. Verbal cues for proximity to rolling walker initially.   Balance: mod I for dynamic standing with and without UE support      AM-PAC 6 CLICK MOBILITY  Total Score:24       Therapeutic Activities and Exercises:   Discussed PT plan of care, safety with OOB mobility, option for use of walker, LE edema management, low sodium diet.       Patient left reclined in chair with LEs elevated with all lines intact, call button in reach and nurse notified.    GOALS:    Physical " Therapy Goals     Not on file          Multidisciplinary Problems (Resolved)        Problem: Physical Therapy Goal    Goal Priority Disciplines Outcome Goal Variances Interventions   Physical Therapy Goal   (Resolved)     PT/OT, PT Outcome(s) achieved                     History:     Past Medical History:   Diagnosis Date    Anemia 7/11/2018    B12 deficiency     Depression     per pcp note 7/2017 and given prozac and diazepam     Hyperlipidemia     Hypertension     PVC (premature ventricular contraction)     seen on ekg from prior pcp    Weight loss     reviewed prior pcp Dr. Russo notes 2017 - labs reviewed: cmp wnl x tbili 1.5, tsh wnl, A1c 5.0, cbc wnl,D 36, hiv neg, hep c neg, hep b surg ag neg        Past Surgical History:   Procedure Laterality Date    ESOPHAGOGASTRODUODENOSCOPY  05/04/2018    esophageal ring, grade 1 esophagitis, gastritis     EYE SURGERY      cataracts    HYSTERECTOMY      fibroids       Clinical Decision Making:     History  Co-morbidities and personal factors that may impact the plan of care Examination  Body Structures and Functions, activity limitations and participation restrictions that may impact the plan of care Clinical Presentation   Decision Making/ Complexity Score   Co-morbidities:   [] Time since onset of injury / illness / exacerbation  [] Status of current condition  []Patient's cognitive status and safety concerns    [] Multiple Medical Problems (see med hx)  Personal Factors:   [] Patient's age  [] Prior Level of function   [] Patient's home situation (environment and family support)  [] Patient's level of motivation  [] Expected progression of patient      HISTORY:(criteria)    [] 98864 - no personal factors/history    [] 37560 - has 1-2 personal factor/comorbidity     [] 62253 - has >3 personal factor/comorbidity     Body Regions:  [] Objective examination findings  [] Head     []  Neck  [] Trunk   [] Upper Extremity  [] Lower Extremity    Body Systems:  []  For communication ability, affect, cognition, language, and learning style: the assessment of the ability to make needs known, consciousness, orientation (person, place, and time), expected emotional /behavioral responses, and learning preferences (eg, learning barriers, education  needs)  [] For the neuromuscular system: a general assessment of gross coordinated movement (eg, balance, gait, locomotion, transfers, and transitions) and motor function  (motor control and motor learning)  [] For the musculoskeletal system: the assessment of gross symmetry, gross range of motion, gross strength, height, and weight  [] For the integumentary system: the assessment of pliability(texture), presence of scar formation, skin color, and skin integrity  [] For cardiovascular/pulmonary system: the assessment of heart rate, respiratory rate, blood pressure, and edema     Activity limitations:    [] Patient's cognitive status and saf ety concerns          [] Status of current condition      [] Weight bearing restriction  [] Cardiopulmunary Restriction    Participation Restrictions:   [] Goals and goal agreement with the patient     [] Rehab potential (prognosis) and probable outcome      Examination of Body System: (criteria)    [] 71397 - addressing 1-2 elements    [] 72283 - addressing a total of 3 or more elements     [] 76102 -  Addressing a total of 4 or more elements         Clinical Presentation: (criteria)  Choose one     On examination of body system using standardized tests and measures patient presents with (CHOOSE ONE) elements from any of the following: body structures and functions, activity limitations, and/or participation restrictions.  Leading to a clinical presentation that is considered (CHOOSE ONE)                              Clinical Decision Making  (Eval Complexity):  Choose One     Time Tracking:     PT Received On: 07/12/18  PT Start Time: 1128     PT Stop Time: 1142  PT Total Time (min): 14 min      Billable Minutes: Evaluation 14      Karmen Urbina, PT  07/12/2018

## 2018-07-12 NOTE — PT/OT/SLP EVAL
Occupational Therapy   Evaluation and Treatment    Name: Shahram Hills  MRN: [id number]  Admitting Diagnosis:  Anemia      Recommendations:     Discharge Recommendations: home with home health, home health PT, home health OT  Discharge Equipment Recommendations:  3-in-1 commode, other (see comments), bath bench (possibly RW; will defer to PT)  Barriers to discharge:  Other (Comment), Inaccessible home environment (stairs to enter home)    History:     Occupational Profile:  Living Environment: Lives with sister in SS duplex with 4STE and B-handrails; tub/showr combo; standard toilet  Previous level of function: Pt. Reports requiring assist from sisters to get in and out of tub; also occasionally requires assist with LB dressing tasks as RLE sometimes feels weak.  Does not ambulate with a device, but reports furniture cruising while performing household ambulation.  Sister assists with cooking, cleaning, and driving.  Pt. Reports that she mainly instructs sister on cooking tasks, as sister does not know how to cook.  Roles and Routines: Pt. Reports sitting down and watching TV; talking and laughing with siblings.  Equipment Owned:  none  Assistance upon Discharge: Pt. Lives with youngest sister able to help.  Two other sisters visit often to assist and also care for disabled brother that lives on other side of duplex.     Past Medical History:   Diagnosis Date    Anemia 7/11/2018    B12 deficiency     Depression     per pcp note 7/2017 and given prozac and diazepam     Hyperlipidemia     Hypertension     PVC (premature ventricular contraction)     seen on ekg from prior pcp    Weight loss     reviewed prior pcp Dr. Russo notes 2017 - labs reviewed: cmp wnl x tbili 1.5, tsh wnl, A1c 5.0, cbc wnl,D 36, hiv neg, hep c neg, hep b surg ag neg        Past Surgical History:   Procedure Laterality Date    ESOPHAGOGASTRODUODENOSCOPY  05/04/2018    esophageal ring, grade 1 esophagitis, gastritis     EYE SURGERY       cataracts    HYSTERECTOMY      fibroids       Subjective     Chief Complaint: Pt. With c/o RLE weakness.  Patient/Family stated goals: Return to PLOF.  Communicated with: Nursing prior to session.  Pain/Comfort:  · Pain Rating 1: 0/10  · Pain Rating Post-Intervention 1: 0/10    Patients cultural, spiritual, Confucianist conflicts given the current situation: None stated.     Objective:     Patient found with: peripheral IV, telemetry    General Precautions: Standard, fall   Orthopedic Precautions:N/A   Braces: N/A     Occupational Performance:    Bed Mobility:    · Patient completed Supine to Sit with stand by assistance and with side rail    Functional Mobility/Transfers:  · Patient completed Sit <> Stand Transfer with contact guard assistance  with  no assistive device   · Patient completed Bed <> Chair Transfer using Step Transfer technique with contact guard assistance with rolling walker  · Patient completed Toilet Transfer Step Transfer technique with moderate assistance and steadying with BUE onto furniture with  no AD; required lift/lower assist  · Functional Mobility: Ambulated bed>bathroom without AD with CGA, assist with line management, and cruising onto furniture with UE; Ambulated bathroom>sink>bed with RW and CGA.  Demos improved steadiness with RW; defer to PT.     Activities of Daily Living:  · Grooming: stand by assistance in stance at sink for oral care (scrubbed dentures and brushed gums, tongue and remaining teeth) and washing face  · Toileting: contact guard assistance for steadying in stance for clothing management    Cognitive/Visual Perceptual:  Cognitive/Psychosocial Skills:  -       Oriented to: Person, Place, Time and Situation   -       Follows Commands/attention:Follows one-step commands and Follows two-step commands  -       Communication: clear/fluent  -       Memory: No Deficits noted  -       Safety awareness/insight to disability: intact   Visual/Perceptual:  -grossly intact;  wears reading glasses    Physical Exam:  Postural examination/scapula alignment: -       Rounded shoulders  -       Forward head  Skin integrity: Visible skin intact  Edema:  None noted  Sensation: -       Intact  Dominant hand: -       Right  Upper Extremity Range of Motion:  -       Right Upper Extremity: WFL  -       Left Upper Extremity: WFL  Upper Extremity Strength: -       Right Upper Extremity: 4-/5 gross   -       Left Upper Extremity: 4-/5 gross    Strength: -       Right Upper Extremity: WFL  -       Left Upper Extremity: WFL  Fine Motor Coordination: -       Intact  Left hand, manipulation of objects and Right hand, manipulation of objects    Patient left up in chair with all lines intact, call button in reach, nursing notified and SLP present    [facility] 6 Click:  [facility] Total Score: 20    Treatment & Education:  Educated on role of OT, POC,  functional transfer/ADL safety, and DME use.   Education:    Assessment:   Inittial OT eval/treat complete.  Pt. furniture cruising when not using device with household ambulation task demos impaired dynamic balance/stability.  Requires increased lift/lower assist from standard toilet; MOD A for toilet transfer this day.   OT recommended.  Needs 3-in-1 commode, TTB, and possibly RW (will defer AD recommendation to PT).  To benefit from continued acute care OT services to increase independence in self-care/functional transfers.  OT to follow.     Shahram Hills is a 78 y.o. female with a medical diagnosis of Anemia.  She presents with below deficits decreasing independence in self-care and functional transfers.  Performance deficits affecting function are weakness, impaired endurance, impaired self care skills, impaired functional mobilty, gait instability, impaired balance.      Rehab Prognosis:  Good; patient would benefit from acute skilled OT services to address these deficits and reach maximum level of function.         Clinical Decision Makin.   "OT Low:  "Pt evaluation falls under low complexity for evaluation coding due to performance deficits noted in 1-3 areas as stated above and 0 co-morbities affecting current functional status. Data obtained from problem focused assessments. No modifications or assistance was required for completion of evaluation. Only brief occupational profile and history review completed."     Plan:     Patient to be seen 4 x/week to address the above listed problems via self-care/home management, therapeutic activities, therapeutic exercises  · Plan of Care Expires: 08/12/18  · Plan of Care Reviewed with: patient    This Plan of care has been discussed with the patient who was involved in its development and understands and is in agreement with the identified goals and treatment plan    GOALS:    Occupational Therapy Goals        Problem: Occupational Therapy Goal    Goal Priority Disciplines Outcome Interventions   Occupational Therapy Goal     OT, PT/OT Ongoing (interventions implemented as appropriate)    Description:  Goals to be met by: 8/12/2018     Patient will increase functional independence with ADLs by performing:    LE Dressing with Supervision.  Grooming while standing at sink with Supervision.  Toileting from bedside commode with Supervision for hygiene and clothing management.   Bathing from  edge of bed with Contact Guard Assistance.  Toilet transfer to bedside commode with Contact Guard Assistance.                      Time Tracking:     OT Date of Treatment: 07/12/18  OT Start Time: 0939  OT Stop Time: 1023  OT Total Time (min): 44 min    Billable Minutes:Evaluation 15  Self Care/Home Management 24  Total Time 44 (nursing staff interjected X 5-minutes for medication admin)    Lizz Archuleta, OT  7/12/2018    "

## 2018-07-12 NOTE — PROGRESS NOTES
Discharge instructions reviewed with patient . All questions answered .IV removed and dressed with coban and gauze  . medications and appointments discussed with patient . Side effects of medication discussed. Patient verbalized understanding.Wheelchair transport requested for patient

## 2018-07-12 NOTE — HOSPITAL COURSE
Admitted with generalized weakness, anemia, dehydration. Received IVF's, one unit PRBC's with improvement in symptoms. Home health recommended, DME equipment ordered. Vitals stable, discharged home.

## 2018-07-12 NOTE — TELEPHONE ENCOUNTER
----- Message from Nupur Moreno sent at 7/12/2018 12:07 PM CDT -----  Contact: [phone]  Pt missed a call and would like the nurse to return their call.        Thank you!

## 2018-07-12 NOTE — DISCHARGE SUMMARY
[facility] Hospital Medicine  Discharge Summary      Patient Name: Shahram Hills  MRN: [id number]  Admission Date: 7/11/2018  Hospital Length of Stay: 1 days  Discharge Date and Time:  07/12/2018 2:28 PM  Attending Physician: Raymon Minor MD   Discharging Provider: Dolly Jorge PA-C  Primary Care Provider: Trixie Xie MD      HPI:   79 y/o female with Hx of neuroendocrine tumor of the ileum, with mets to liver, bones, LN s/p palliative radiation presents to ED with c/o generalized weakness and poor appetite. States she has felt weak for the last few days and was seen in the ED three days ago for near syncopal episode. Per daughter, patient's blood pressure was 82/50 and her heart rate was 106 today. She reports chronically decreased appetite and trouble swallowing with limited PO intake. Patient states she has been thirsty, drinking a lot of water, and experiencing urinary frequency. Denies fevers, chills, headaches, dizziness, congestion, rhinorrhea, sore throat, cough, SOB, chest pain, abdominal pain, N/V/D, or dysuria.   In ED Labs showed mild hyponatremia and drop in Hgb to 7.5, was normal one month ago and dropped to 9.1 4 days ago. No sign active GI bleed. Admitted for transfusion and IVF's.     * No surgery found *      Hospital Course:   Admitted with generalized weakness, anemia, dehydration. Received IVF's, one unit PRBC's with improvement in symptoms. Home health recommended, DME equipment ordered. Vitals stable, discharged home.      Consults:   Consults         Status Ordering Provider     Case Management/  Once     Provider:  (Not yet assigned)    Completed DOLLY JORGE          * Anemia    - secondary to chronic disease, radiation; no gross evidence GI losses  -s/p one unit PRBC's  - continue iron and B12 supplements at home        Urinary frequency    - possible UTI in immunocompromised patient  - rec'd 2 doses Ceftriaxone, will follow culture  "and call prescription if needed d/w sister and patient        Hyperglycemia    - secondary to steroids, being weaned off, now currently only on 2 mg daily  - on metformin at home        Hyponatremia    - chronic,  slightly lower on admission suspect secondary to dehydration  - improved now  136            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    Specialties:  DME Provider, Home Health Services  Why:  Home Health   Contact information:  [address] [place] [address] [phone]             [facility]    Specialty:  DME Provider  Why:  rolling walker, bedside commode, & tub transfer bench   Contact information:  [address]  [address]  [place] [id number] [phone]             Schedule an appointment as soon as possible for a visit with Trixie Xie MD.    Specialty:  Internal Medicine  Contact information:  [address] [facility] [address] [phone]                 Patient Instructions:     COMMODE FOR HOME USE   Order Specific Question Answer Comments   Type: Standard    Height: 5' 4" (1.626 m)    Weight: 51.7 " "kg (114 lb)    Does patient have medical equipment at home? none    Length of need (1-99 months): 99      TRANSFER TUB BENCH FOR HOME USE   Order Specific Question Answer Comments   Type of Transfer Tub Bench: Unpadded    Height: 5' 4" (1.626 m)    Weight: 51.7 kg (114 lb)    Does patient have medical equipment at home? none    Length of need (1-99 months): 99      COMMODE FOR HOME USE   Order Specific Question Answer Comments   Type: Standard    Height: 5' 4" (1.626 m)    Weight: 51.7 kg (114 lb)    Does patient have medical equipment at home? none    Length of need (1-99 months): 99      WALKER FOR HOME USE   Order Specific Question Answer Comments   Type of Walker: Adult (5'4"-6'6")    With wheels? Yes    Height: 5' 4" (1.626 m)    Weight: 51.7 kg (114 lb)    Length of need (1-99 months): 99    Does patient have medical equipment at home? none    Please check all that apply: Patient's condition impairs ambulation.    Please check all that apply: Patient is unable to safely ambulate without equipment.    Please check all that apply: Patient needs help to get in and out of chair.    Please check all that apply: Walker will be used for gait training.      TRANSFER TUB BENCH FOR HOME USE   Order Specific Question Answer Comments   Type of Transfer Tub Bench: Unpadded    Height: 5' 4" (1.626 m)    Weight: 51.7 kg (114 lb)    Does patient have medical equipment at home? none    Length of need (1-99 months): 99      Diet Adult Regular     Notify your health care provider if you experience any of the following:  temperature >100.4     Notify your health care provider if you experience any of the following:  persistent nausea and vomiting or diarrhea     Notify your health care provider if you experience any of the following:  severe uncontrolled pain     Notify your health care provider if you experience any of the following:  difficulty breathing or increased cough     Notify your health care provider if you experience " any of the following:  severe persistent headache     Notify your health care provider if you experience any of the following:  persistent dizziness, light-headedness, or visual disturbances     Activity as tolerated         Significant Diagnostic Studies: Labs:   CMP     Recent Labs  Lab 07/11/18  1352 07/11/18  1804 07/12/18  0432   * 133* 136  136   K 4.4 5.0 4.3  4.3   CL 92* 96 100  100   CO2 27 29 29  29   * 163* 132*  132*   BUN 16 14 10  10   CREATININE 0.6 0.6 0.6  0.6   CALCIUM 8.2* 8.9 8.7  8.7   PROT 5.0* 5.5* 5.0*   ALBUMIN 2.7* 2.9* 2.7*   BILITOT 0.8 1.0 2.5*   ALKPHOS 54* 61 54*   AST 20 19 17   ALT 27 28 23   ANIONGAP 9 8 7*  7*   ESTGFRAFRICA >60 >60 >60  >60   EGFRNONAA >60 >60 >60  >60   , CBC     Recent Labs  Lab 07/11/18  1352 07/11/18  1803 07/12/18  0432   WBC 7.76 5.85 5.41  5.41   HGB 7.5* 8.3* 9.5*  9.5*   HCT 23.2* 26.2* 29.6*  29.6*    278 247  247    and All labs within the past 24 hours have been reviewed  Microbiology:   Microbiology Results (last 7 days)     Procedure Component Value Units Date/Time    Urine culture [[id number]] Collected:  07/11/18 1233    Order Status:  No result Specimen:  Urine Updated:  07/11/18 1738    Urine culture **CANNOT BE ORDERED STAT** [[id number]]     Order Status:  Completed Specimen:  Urine from Urine, Clean Catch           Pending Diagnostic Studies:     None         Medications:  Reconciled Home Medications:      Medication List      CONTINUE taking these medications    CHOLESTYRAMINE LIGHT 4 gram Pwpk  Generic drug:  cholestyramine-aspartame  Take by mouth.     cyanocobalamin 1000 MCG tablet  Commonly known as:  VITAMIN B-12  Take 1 tablet (1,000 mcg total) by mouth once daily.     dexamethasone 2 MG tablet  Commonly known as:  DECADRON  Take 2 mg by mouth daily with breakfast.     ferrous sulfate 325 (65 FE) MG EC tablet  Take 325 mg by mouth 3 (three) times daily with meals.     HYDROcodone-acetaminophen 5-325 mg  per tablet  Commonly known as:  NORCO  Take 1-2 tablets by mouth every 6 (six) hours as needed for Pain.     metFORMIN 1000 MG tablet  Commonly known as:  GLUCOPHAGE  Take 1 tablet (1,000 mg total) by mouth daily with breakfast.     VITAMIN D3 400 unit Tab  Generic drug:  cholecalciferol (vitamin D3)  Take by mouth once daily.     XIIDRA 5 % Dpet  Generic drug:  lifitegrast  INSTILL ONE DROP INTO OU BID        STOP taking these medications    lisinopril-hydrochlorothiazide 10-12.5 mg per tablet  Commonly known as:  PRINZIDE,ZESTORETIC     rosuvastatin 5 MG tablet  Commonly known as:  CRESTOR            Indwelling Lines/Drains at time of discharge:   Lines/Drains/Airways          No matching active lines, drains, or airways          Time spent on the discharge of patient: >30 minutes  Patient was seen and examined on the date of discharge and determined to be suitable for discharge.         Dolly Jorge PA-C  Department of Hospital Medicine  [facility]

## 2018-07-12 NOTE — PLAN OF CARE
Problem: Patient Care Overview  Goal: Plan of Care Review  Outcome: Ongoing (interventions implemented as appropriate)  Pt remained free of falls and injuries. Ambulates to bedside commode on room air. VSS. Skin tear to Rt buttocks. Applied barrier cream, mepilex, and put pt on wedge. No complaints of pain at this time. Bed low and locked, call light within reach, side rails up X2. Will continue to monitor.

## 2018-07-12 NOTE — ASSESSMENT & PLAN NOTE
- possible UTI in immunocompromised patient  - rec'd 2 doses Ceftriaxone, will follow culture and call prescription if needed d/w sister and patient

## 2018-07-12 NOTE — PLAN OF CARE
DME - delivered 7/12/18 [id number]  One - TT- Bench   One - BS- Commode  One - Rolling Walker Tyrone Osei, [facility]  Case Management  [phone]

## 2018-07-12 NOTE — PLAN OF CARE
[facility]  [address] [place]. 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 Double swallow with each bite/sip,  2.  HOB to 90 degrees,   3. main upright 30 minutes post meal  4.  Small bites/sips   5. Increase hydration to reduce effects of xerostomia    Activity: as tolerated    Nursing:   SN to complete comprehensive assessment including routine vital signs. Instruct on disease process and s/s of complications to report to MD. Review/verify medication list sent home with the patient at time of discharge  and instruct patient/caregiver as needed. Frequency may be adjusted depending on start of care date.    Notify MD if SBP > 160 or < 90; DBP > 90 or < 50; HR > 120 or < 50; Temp > 101;     CONSULTS:     Physical Therapy to evaluate and treat. Evaluate for home safety and equipment needs; Establish/upgrade home exercise program. Perform / instruct on therapeutic exercises, gait training, transfer training, and Range of Motion.    Occupational Therapy to evaluate and treat. Evaluate home environment for safety and  equipment needs. Perform/Instruct on transfers, ADL training, ROM, and therapeutic exercises.    Speech Therapy  to evaluate and treat for:  Language  Swallowing  Cognition                                                Aide to provide assistance with personal care, ADLs, and vital signs    Medications: Review discharge medications with patient and family and provide education.       Shahram Hills   Home Medication Instructions SHUBHAM:[id number]    Printed on:07/12/18 9495   Medication Information                      cholecalciferol, vitamin D3, (VITAMIN D3) 400 unit Tab  Take by mouth once daily.             cholestyramine-aspartame (CHOLESTYRAMINE LIGHT) 4 gram PwPk  Take by mouth.             cyanocobalamin (VITAMIN B-12) 1000 MCG tablet  Take 1 tablet (1,000 mcg total) by mouth once daily.             dexamethasone (DECADRON) 2 MG tablet  Take 2 mg by mouth daily with breakfast.             ferrous sulfate 325 (65 FE) MG EC tablet  Take 325 mg by mouth 3 (three) times daily with meals.             HYDROcodone-acetaminophen (NORCO) 5-325 mg per tablet  Take 1-2 tablets by mouth every 6 (six) hours as needed for Pain.             metFORMIN (GLUCOPHAGE) 1000 MG tablet  Take 1 tablet (1,000 mg total) by mouth daily with breakfast.             XIIDRA 5 % Dpet  INSTILL ONE DROP INTO OU BID                   _________________________________  Raymon Minor MD      7/12/2018

## 2018-07-12 NOTE — DISCHARGE INSTRUCTIONS
Your blood pressure medication Prinzide (Lisinopril/hydrochlorathiazide) has been held during your stay. Follow with your primary care provider in the next few days before resuming. Check with your provider regarding continuing Crestor.

## 2018-07-12 NOTE — PLAN OF CARE
Problem: Occupational Therapy Goal  Goal: Occupational Therapy Goal  Goals to be met by: 8/12/2018     Patient will increase functional independence with ADLs by performing:    LE Dressing with Supervision.  Grooming while standing at sink with Supervision.  Toileting from bedside commode with Supervision for hygiene and clothing management.   Bathing from  edge of bed with Contact Guard Assistance.  Toilet transfer to bedside commode with Contact Guard Assistance.    Outcome: Ongoing (interventions implemented as appropriate)  Initial OT eval/treat complete.  Needs 3-in-1 commode and TTB .  Recommend HH OT; agreeable to HH OT only after explaining what this would include.  Pt. Agreeable to equipment recommendations if covered by insurance; given options for TTB as far as cheaper and even donated options.  To benefit from continued acute care OT services to increase independence in self-care/functional transfers.  Refer to OT eval/treat note for details.  OT to follow.

## 2018-07-12 NOTE — PLAN OF CARE
Problem: SLP Goal  Goal: SLP Goal  1. Pt will be able to continue to consume a regular consistency diet with thin liquids with no signs of airway threat or aspiration and reduced reports of sensation of food sticking in throat.  2. Pt will be able to follow through on compensatory strategies to reduce mild signs of pharyngeal residuals: multiple swallows, alternation of liquids and swallows with min verbal cues  Outcome: Ongoing (interventions implemented as appropriate)  Pt admitted due to generalized weakness, reports of loss of appetite, and trouble swallowing. Pt with cancer dx and referred to speech pathology for evaluation of swallowing    Comments: Speech to follow pt 3-5x/week for remediation of signs of mild dysphagia

## 2018-07-12 NOTE — PLAN OF CARE
Discussed discharge disposition with patient including therapy's recommendation for home health & DME - patient request I speak with her sister Shayla - spoke with Shayla who agreed to recommendations - no previous experience with home health & open to any agency that will accept her insurance - referral forwarded to [facility] via [facility] & they accepted patient - discussed out of pocket expense for tub transfer bench & patient agreed to cover - Dennis from [facility] gave approval to pull rolling walker & bedside commode from our supply - [employer] to deliver to bedside      07/12/18 1227   Final Note   Assessment Type Final Discharge Note   Discharge Disposition Home-Health   What phone number can be called within the next 1-3 days to see how you are doing after discharge? [phone]   Discharge plans and expectations educations in teach back method with documentation complete? Yes   [facility] Referral Info   Post Acute Recommendation Home-care   Facility Name [facility]

## 2018-07-12 NOTE — ASSESSMENT & PLAN NOTE
- secondary to steroids, being weaned off, now currently only on 2 mg daily  - on metformin at home

## 2018-07-12 NOTE — ASSESSMENT & PLAN NOTE
- secondary to chronic disease, radiation; no gross evidence GI losses  -s/p one unit PRBC's  - continue iron and B12 supplements at home

## 2018-07-12 NOTE — TELEPHONE ENCOUNTER
LVM for pt informing her to call Dr. Granados's office when she is discharged to let us know how she is doing. Also stated pt has a follow up appt with Dr. Granados on 7/20/18 with lab work at 1 pm and follow up appt at 2 pm and to call Dr. Granados's office if she has any further questions or concerns.

## 2018-07-13 NOTE — PT/OT/SLP DISCHARGE
Occupational Therapy Discharge Summary    Shahram Hills  MRN: [id number]   Principal Problem: Anemia      Patient Discharged from acute Occupational Therapy on 7/12/18.  Please refer to prior OT note dated 7/12/18 for functional status.    Assessment:      Patient appropriate for care in another setting.    Objective:     GOALS:    Occupational Therapy Goals        Problem: Occupational Therapy Goal    Goal Priority Disciplines Outcome Interventions   Occupational Therapy Goal     OT, PT/OT Ongoing (interventions implemented as appropriate)    Description:  Goals to be met by: 8/12/2018     Patient will increase functional independence with ADLs by performing:    LE Dressing with Supervision.  Grooming while standing at sink with Supervision.  Toileting from bedside commode with Supervision for hygiene and clothing management.   Bathing from  edge of bed with Contact Guard Assistance.  Toilet transfer to bedside commode with Contact Guard Assistance.                      Reasons for Discontinuation of Therapy Services  Transfer to alternate level of care.      Plan:     Patient Discharged to: Home with Home Health Service    KIM Comer  7/13/2018

## 2018-07-13 NOTE — NURSING
"Shayla patients sister called for clarification of the administration of patients blood pressure medication.  Explained to Shayla med was held at discharge due to lower blood pressure.  Please follow up with PCP regarding possible continuation of medication. Sister verbalized understanding stated "will call office this morning"  "

## 2018-07-16 ENCOUNTER — TELEPHONE (OUTPATIENT)
Dept: INTERNAL MEDICINE | Facility: CLINIC | Age: 78
End: 2018-07-16

## 2018-07-16 NOTE — PHYSICIAN QUERY
"PT Name: Shahram Hills  MR #: [id number]    Physician Query Form - Hematology Clarification      CDS: Lina Reynoso RN, CDI     Contact information:[email or web address]    This form is a permanent document in the medical record.      Query Date: July 16, 2018    By submitting this query, we are merely seeking further clarification of documentation. Please utilize your independent clinical judgment when addressing the question(s) below.    The Medical record contains the following:   Indicators  Supporting Clinical Findings Location in Medical Record   x "Anemia" documented Anemia  - secondary to chronic disease, radiation; no gross evidence GI losses  -s/p one unit PRBC's  - continue iron and B12 supplements at home D/C Summary 7/12   x H & H =  7/11 7/11 7/12 7/12   Hgb 7.5  8.3  9.5  9.5    Hct 23.2  26.2  29.6  29.6       Lab Results    BP =                     HR=      "GI bleeding" documented      Acute bleeding (Non GI site)     x Transfusion(s) Transfuse RBC 1 Unit       Status: Completed 07/11/18 2347  Unit:   18  [id number]  [id number] Blood Admin Flow sheet    Treatment:     x Other:  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 Granados finished palliative radiation, has follow up next week    No sign active GI bleed, likely from radiation side effects.  D/C Summary 7/12        ED Note 7/11     Provider, please specify diagnosis or diagnoses associated with above clinical findings.    [  ] Chronic blood loss anemia    [ x ] Anemia of chronic disease ( Specify chronic disease)      [ x Neoplastic disease      [ x ] Due to Chemotherapy      [  ] Other (Specify) _______________________________     [  ] Clinically Undetermined     [  ] Other Hematological Diagnosis (please specify): _________________________________    [  ] Clinically Undetermined       Please document in your progress notes daily for the duration of treatment, until resolved, and include in your discharge summary. "

## 2018-07-16 NOTE — TELEPHONE ENCOUNTER
Spoke w/ pt sister and informed her of PCP rec and advice   Pt sister verbally agree and understands and has no further questions

## 2018-07-16 NOTE — TELEPHONE ENCOUNTER
----- Message from Janice Angulo sent at 7/16/2018 12:13 PM CDT -----  Contact: Pt's niece            Name of Who is Calling: Pt's niece      What is the request in detail: Pt was in the hospital and pt's medication for her blood pressure was changed. Pt's niece is asking to speak to the clinical team to discuss the changes that were made to her medication. Pt's niece also wanted to schedule a sooner appt than 08/7. Attempt made, first available is 08/22.      Can the clinic reply by [facility]: N      What Number to Call Back if not in [employer] [facility]: Shayla Rosenthal (Sister) [phone]

## 2018-07-16 NOTE — TELEPHONE ENCOUNTER
Agree with holding bp med since normotensive at home - rec cont to monitor bp and if persistently > 140/90 then let me know and will plan to resume bp med at that time.   - ok to stop crestor and will discuss further at upcoming appt   - please notify sister of recs - thanks!

## 2018-07-16 NOTE — TELEPHONE ENCOUNTER
Pt sister called in stating that pt was admitted wensday discharged thursday  Pt was directed to stop pressure RX (lisnopril) and her Crestor until she see here PCP b/c her pressure had drop and she is anemic   Pt sister stated that pt pressure has been stable since not taking RX   BP reading from today (07/16/2018) : 110/71    Pt sister just wanted to inform PCP of this information b/c she was a lil worried about pt stopping RX but since pt readings has been normal , pt sister states that she is not too worried as she was.     I have pt scheduled for an sooner appt than what was scheduled . Pt appt schedule for 07/27/2018.  Pt sister agree w/ appt and has no further questions

## 2018-07-19 ENCOUNTER — INFUSION (OUTPATIENT)
Dept: INFUSION THERAPY | Facility: HOSPITAL | Age: 78
End: 2018-07-19
Attending: INTERNAL MEDICINE
Payer: MEDICARE

## 2018-07-19 VITALS — HEART RATE: 103 BPM | SYSTOLIC BLOOD PRESSURE: 109 MMHG | DIASTOLIC BLOOD PRESSURE: 64 MMHG | RESPIRATION RATE: 16 BRPM

## 2018-07-19 DIAGNOSIS — C7B.8 METASTATIC MALIGNANT NEUROENDOCRINE TUMOR TO LIVER: Primary | ICD-10-CM

## 2018-07-19 PROCEDURE — 63600175 PHARM REV CODE 636 W HCPCS: Mod: JG | Performed by: INTERNAL MEDICINE

## 2018-07-19 PROCEDURE — 96372 THER/PROPH/DIAG INJ SC/IM: CPT

## 2018-07-19 RX ORDER — LANREOTIDE ACETATE 120 MG/.5ML
120 INJECTION SUBCUTANEOUS
Status: CANCELLED | OUTPATIENT
Start: 2018-07-19

## 2018-07-19 RX ORDER — LANREOTIDE ACETATE 120 MG/.5ML
120 INJECTION SUBCUTANEOUS
Status: DISCONTINUED | OUTPATIENT
Start: 2018-07-19 | End: 2018-07-19 | Stop reason: HOSPADM

## 2018-07-19 RX ADMIN — LANREOTIDE ACETATE 120 MG: 120 INJECTION SUBCUTANEOUS at 02:07

## 2018-07-19 NOTE — NURSING
Pt arrived for Lanreotide.  Pt tolerated injection SQ to Left buttocks.  Pt discharged to home in wheelchair with family

## 2018-07-20 ENCOUNTER — LAB VISIT (OUTPATIENT)
Dept: LAB | Facility: OTHER | Age: 78
End: 2018-07-20
Attending: INTERNAL MEDICINE
Payer: MEDICARE

## 2018-07-20 ENCOUNTER — OFFICE VISIT (OUTPATIENT)
Dept: HEMATOLOGY/ONCOLOGY | Facility: CLINIC | Age: 78
End: 2018-07-20
Payer: MEDICARE

## 2018-07-20 VITALS
OXYGEN SATURATION: 100 % | RESPIRATION RATE: 16 BRPM | SYSTOLIC BLOOD PRESSURE: 116 MMHG | HEART RATE: 90 BPM | BODY MASS INDEX: 19.5 KG/M2 | HEIGHT: 64 IN | WEIGHT: 114.19 LBS | TEMPERATURE: 99 F | DIASTOLIC BLOOD PRESSURE: 72 MMHG

## 2018-07-20 DIAGNOSIS — D64.9 ANEMIA, UNSPECIFIED TYPE: ICD-10-CM

## 2018-07-20 DIAGNOSIS — C7B.8 METASTATIC MALIGNANT NEUROENDOCRINE TUMOR TO LIVER: ICD-10-CM

## 2018-07-20 DIAGNOSIS — M79.604 PAIN OF RIGHT LOWER EXTREMITY: ICD-10-CM

## 2018-07-20 DIAGNOSIS — C7A.8 PRIMARY MALIGNANT NEUROENDOCRINE TUMOR OF ILEUM: ICD-10-CM

## 2018-07-20 DIAGNOSIS — C7A.8 NEUROENDOCRINE CARCINOMA METASTATIC TO BONE: ICD-10-CM

## 2018-07-20 DIAGNOSIS — R19.7 DIARRHEA, UNSPECIFIED TYPE: ICD-10-CM

## 2018-07-20 DIAGNOSIS — C7B.8 NEUROENDOCRINE CARCINOMA METASTATIC TO BONE: ICD-10-CM

## 2018-07-20 DIAGNOSIS — D3A.8 NEUROENDOCRINE NEOPLASM OF GASTROINTESTINAL TRACT: Primary | ICD-10-CM

## 2018-07-20 DIAGNOSIS — C79.51 SPINE METASTASIS: ICD-10-CM

## 2018-07-20 DIAGNOSIS — R73.9 HYPERGLYCEMIA: ICD-10-CM

## 2018-07-20 LAB
ALBUMIN SERPL BCP-MCNC: 2.9 G/DL
ALP SERPL-CCNC: 58 U/L
ALT SERPL W/O P-5'-P-CCNC: 29 U/L
ANION GAP SERPL CALC-SCNC: 7 MMOL/L
AST SERPL-CCNC: 25 U/L
BILIRUB SERPL-MCNC: 0.9 MG/DL
BUN SERPL-MCNC: 9 MG/DL
CALCIUM SERPL-MCNC: 9 MG/DL
CHLORIDE SERPL-SCNC: 96 MMOL/L
CO2 SERPL-SCNC: 29 MMOL/L
CREAT SERPL-MCNC: 0.6 MG/DL
ERYTHROCYTE [DISTWIDTH] IN BLOOD BY AUTOMATED COUNT: 13.7 %
EST. GFR  (AFRICAN AMERICAN): >60 ML/MIN/1.73 M^2
EST. GFR  (NON AFRICAN AMERICAN): >60 ML/MIN/1.73 M^2
GLUCOSE SERPL-MCNC: 105 MG/DL
HCT VFR BLD AUTO: 31.2 %
HGB BLD-MCNC: 9.8 G/DL
MCH RBC QN AUTO: 27.3 PG
MCHC RBC AUTO-ENTMCNC: 31.4 G/DL
MCV RBC AUTO: 87 FL
NEUTROPHILS # BLD AUTO: 3.8 K/UL
PLATELET # BLD AUTO: 361 K/UL
PMV BLD AUTO: 9 FL
POTASSIUM SERPL-SCNC: 4 MMOL/L
PROT SERPL-MCNC: 5.9 G/DL
RBC # BLD AUTO: 3.59 M/UL
SODIUM SERPL-SCNC: 132 MMOL/L
WBC # BLD AUTO: 5.88 K/UL

## 2018-07-20 PROCEDURE — 99999 PR PBB SHADOW E&M-EST. PATIENT-LVL III: CPT | Mod: PBBFAC,,, | Performed by: INTERNAL MEDICINE

## 2018-07-20 PROCEDURE — 36415 COLL VENOUS BLD VENIPUNCTURE: CPT

## 2018-07-20 PROCEDURE — 3074F SYST BP LT 130 MM HG: CPT | Mod: CPTII,S$GLB,, | Performed by: INTERNAL MEDICINE

## 2018-07-20 PROCEDURE — 86316 IMMUNOASSAY TUMOR OTHER: CPT

## 2018-07-20 PROCEDURE — 99215 OFFICE O/P EST HI 40 MIN: CPT | Mod: S$GLB,,, | Performed by: INTERNAL MEDICINE

## 2018-07-20 PROCEDURE — 85027 COMPLETE CBC AUTOMATED: CPT

## 2018-07-20 PROCEDURE — 80053 COMPREHEN METABOLIC PANEL: CPT

## 2018-07-20 PROCEDURE — 3078F DIAST BP <80 MM HG: CPT | Mod: CPTII,S$GLB,, | Performed by: INTERNAL MEDICINE

## 2018-07-20 NOTE — PROGRESS NOTES
"Subjective:       Patient ID: Shahram Hills is a 78 y.o. female.     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 Ms. Hills is an 77 yo woman who initially saw me on 6/7/18 for further evaluation of neuroendocrine tumor. She initially presented with diarrhea, abdominal discomfort, weight loss. She was evaluated at [facility] and underwent workup below:  US abdomen on 3/14/18 showed numerous bilobar mixed echogenicity and mildly vascular hepatic lesions. Cholelithiasis without e/o acute cholecystitis.   MRI abdomen on 3/30/2018 showed innumerable hepatic metastases. L3 vertebral body metastasis with soft tissue component along the right margin of the L3 and upper L4 vertebral bodies, filling the right L3/4 neural foramen, and extending into the anterior aspect of the spinal canal on the right at the L3 and upper T4 levels. Associated with mild spinal canal narrowing.  CT chest on 4/6/2018 showed one lucent nodule measuring 7 mm in the T7 vertebral body, most likely representing metastatic disease.    EGD on 5/4/18 showed normal duodenum. Schatzki's ring in the lower third of the esophagus. Grade 1 esophagitis in the GE junction c/w mild distal esophagitis. Congestion and erythema in the antrum, pre-pyloric region and stomach body c/w gastritis. Biopsy of the stomach antrum showed mild chronic gastritis, neg for H. pylori.   Labs on 5/9/2018: WBC 6.9, H/H 11.6/34.3, MCV 87.5, plt count 279. On 3/9/18: Vit B12 level 173, folic acid 6.6  She was started on oral vit B12 and underwent a liver biopsy on 5/18/18. Pathology showed "metastatic well differentiated neuroendocrine tumor. Ki67 3%"     She saw me on 6/7/18 and complained of weight loss of 26 lbs over the past 3-4 months, also has diarrhea. No flushing, wheezing, palpitations. Has been having pain in right flank radiating down the right leg. No " "tingling/numbness, weakness. She can hold her bladder but when she urinates her diarrhea would come out as well. Started on dex 4 mg q6h the evening of 18.      2. MRI spine on 18 showed "Marrow replacing metastatic lesion of the L3 vertebral body with associated extra osseous expansion and complete effacement of the right L3-L4 neural foramina and additional effacement of the right lateral recess.  There is additional lateral extension and abutment of the right psoas muscle.  Superimposed degenerative change at this level results in mild spinal canal stenosis." I sent the patient to ED on 18 where she was evaluated by neurosurgery. Neurosurgery did not feel she was a candidate for surgical intervention.      3. Seen by Dr. Adames on 18. palliative radiation to the area of the L3 metastasis 18-18   4. 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  There is an adjacent metastatic lymph node, diffuse liver metastases, and multiple bone metastases. Index primary neoplasm SUV max 33.13. Adjacent lymph node SUV max 23. L3 bone metastasis SUV max 36.86. Left lobe SUV max 46.13   5. Lanreotide started 18. 18.     History of Present Illness:   Mr. Hills returns for follow up. Had a couple of episodes of near syncope that prompted ER visits due to hyperglycemia causing dehydration. Treated with IVF. Got blood transfusion for anemia. Feels better today.   ECO     ROS:   See HPI. Otherwise negative.      Physical Examination:   Vital signs reviewed.   Gen: well hydrated, well developed, in no acute distress.  HEENT: normocephalic, anicteric, PERRLA, EOMI, oropharynx clear  Neck: supple, no JVD, thyromegaly, cervical or supraclavicular LAD  Lungs: CTAB, no wheezes or rales  Heart: RRR, no M/R/G  Abdomen: soft, no tenderness, non-distended, liver edge 5 cm below the right costal margin, no splenomegaly, no mass, or hernia.   Ext: no cyanosis, " edema, deformity  Neuro: alert and oriented x 4, no focal neuro deficit  Skin: no rash, open wounds or ulcers  Psych: pleasant and appropriate mood and affect     Objective:      Diagnostic Tests:  Gallium study on 6/13/18: Distal ileal primary neoplasm consistent with a carcinoid.  There is an adjacent metastatic lymph node, diffuse liver metastases, and multiple bone metastases. Index primary neoplasm SUV max 33.13. Adjacent lymph node SUV max 23. L3 bone metastasis SUV max 36.86. Left lobe SUV max 46.13     Laboratory Data:  Labs reviewed. Chromogranin A from today pending  Assessment/Plan:      1. Neuroendocrine neoplasm of gastrointestinal tract    2. 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 Spine metastasis    5. Diarrhea, unspecified type    6. Pain of right lower extremity    7. Hyperglycemia    8. Anemia, unspecified type        1-4  - s/p lanreotide on 6/22 and 7/19  - next lanreotide due in 4 weeks  - restage after 3 cycles     5.  - controlled    6.  - from spine met. Resolved    7.  - 2/2 steroids  - weaned off steroids since 7/16. BS much better now. Stop metformin    8.  - 2/2 radiation. S/p transfusion  - stable. Monitor      RTC in 4 weeks with labs. Due for lanreotide

## 2018-07-24 LAB — CGA SERPL-MCNC: 2767 NG/ML (ref 0–95)

## 2018-07-27 ENCOUNTER — OFFICE VISIT (OUTPATIENT)
Dept: INTERNAL MEDICINE | Facility: CLINIC | Age: 78
End: 2018-07-27
Attending: INTERNAL MEDICINE
Payer: MEDICARE

## 2018-07-27 VITALS
HEART RATE: 94 BPM | BODY MASS INDEX: 19.46 KG/M2 | HEIGHT: 64 IN | DIASTOLIC BLOOD PRESSURE: 64 MMHG | WEIGHT: 114 LBS | OXYGEN SATURATION: 98 % | SYSTOLIC BLOOD PRESSURE: 119 MMHG

## 2018-07-27 DIAGNOSIS — R63.4 WEIGHT LOSS: ICD-10-CM

## 2018-07-27 DIAGNOSIS — C7B.8 METASTATIC MALIGNANT NEUROENDOCRINE TUMOR TO LIVER: ICD-10-CM

## 2018-07-27 DIAGNOSIS — R73.9 HYPERGLYCEMIA: ICD-10-CM

## 2018-07-27 DIAGNOSIS — I10 ESSENTIAL HYPERTENSION: Primary | ICD-10-CM

## 2018-07-27 PROCEDURE — 99999 PR PBB SHADOW E&M-EST. PATIENT-LVL III: CPT | Mod: PBBFAC,,, | Performed by: INTERNAL MEDICINE

## 2018-07-27 PROCEDURE — 3074F SYST BP LT 130 MM HG: CPT | Mod: CPTII,S$GLB,, | Performed by: INTERNAL MEDICINE

## 2018-07-27 PROCEDURE — 3078F DIAST BP <80 MM HG: CPT | Mod: CPTII,S$GLB,, | Performed by: INTERNAL MEDICINE

## 2018-07-27 PROCEDURE — 99214 OFFICE O/P EST MOD 30 MIN: CPT | Mod: S$GLB,,, | Performed by: INTERNAL MEDICINE

## 2018-07-27 NOTE — PROGRESS NOTES
"Subjective:   Patient ID: Shahram Hills is a 78 y.o. female  Chief complaint: No chief complaint on file.      HPI  Pt here for hospital f/u  Here today with her sister   followed closely by h/o for neuroendocrine tumor with spine mets - last seen 7/20  Weight loss initially prior to dx - wt has been stable over past 2 months   She reports appetite has improved and intake improved   HTN: off of bp meds and bp wnl today - reviewed home bp readings and  Largely 110-120s/70s - 123/77, 140/78, 116/74, 111/78, 116/77    Developed hyperglycemia when received steroids. No longer taking and has since stopped metformin   Am fasting bg 110-120s after keeping log   Before lunch 144  No hx of diabetes     Review of Systems    Objective:  Vitals:    07/27/18 1455   BP: 119/64   Pulse: 94   SpO2: 98%   Weight: 51.7 kg (113 lb 15.7 oz)   Height: 5' 4" (1.626 m)     Body mass index is 19.56 kg/m².    Physical Exam   Constitutional: She is oriented to person, place, and time. No distress.   HENT:   Head: Normocephalic and atraumatic.   Eyes: Conjunctivae and EOM are normal.   Neck: Normal range of motion. Neck supple.   Cardiovascular: Normal rate, regular rhythm and intact distal pulses.    Pulmonary/Chest: Effort normal and breath sounds normal.   Abdominal: Soft. Normal appearance and bowel sounds are normal.   Enlarged liver    Musculoskeletal: She exhibits no tenderness or deformity.   Neurological: She is alert and oriented to person, place, and time. She has normal strength. Gait normal.   Skin: Skin is warm, dry and intact. She is not diaphoretic. No cyanosis. Nails show no clubbing.   Psychiatric: She has a normal mood and affect. Her speech is normal and behavior is normal. Cognition and memory are normal.   Vitals reviewed.      Assessment:  1. Essential hypertension    2. Hyperglycemia    3. Weight loss    4. 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        Plan:  Diagnoses and all orders for this " visit:    Essential hypertension  Controlled off of meds   Cont home monitoring   If persistently elevated > 140/90 she will let me know     Hyperglycemia  -     Hemoglobin A1c; Future  Improved since stopping steroids   Cont fasting bg checks 2-3 times per week for now   Check a1c   Will consider d/c bg checks pending trneds    Weight loss  Stable, rec small frequent meals  Boost or ensure    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  Followed by h/o     Health Maintenance   Topic Date Due    Influenza Vaccine  08/01/2018    DEXA SCAN  06/11/2021    Lipid Panel  11/27/2022    TETANUS VACCINE  03/23/2028    Zoster Vaccine  Addressed    Pneumococcal (65+)  Excluded

## 2018-07-30 ENCOUNTER — TELEPHONE (OUTPATIENT)
Dept: HEMATOLOGY/ONCOLOGY | Facility: CLINIC | Age: 78
End: 2018-07-30

## 2018-07-30 NOTE — TELEPHONE ENCOUNTER
----- Message from Robinson Puckett sent at 7/24/2018 12:18 PM CDT -----  Contact: Daughter  Will like to reschedule 8.20 infusion appt due to another appt     Contact::[phone]

## 2018-08-16 ENCOUNTER — INFUSION (OUTPATIENT)
Dept: INFUSION THERAPY | Facility: HOSPITAL | Age: 78
End: 2018-08-16
Attending: INTERNAL MEDICINE
Payer: MEDICARE

## 2018-08-16 DIAGNOSIS — C7B.8 METASTATIC MALIGNANT NEUROENDOCRINE TUMOR TO LIVER: Primary | ICD-10-CM

## 2018-08-16 PROCEDURE — 96372 THER/PROPH/DIAG INJ SC/IM: CPT

## 2018-08-16 PROCEDURE — 63600175 PHARM REV CODE 636 W HCPCS: Mod: JG | Performed by: INTERNAL MEDICINE

## 2018-08-16 RX ORDER — LANREOTIDE ACETATE 120 MG/.5ML
120 INJECTION SUBCUTANEOUS
Status: CANCELLED | OUTPATIENT
Start: 2018-08-16

## 2018-08-16 RX ORDER — LANREOTIDE ACETATE 120 MG/.5ML
120 INJECTION SUBCUTANEOUS
Status: DISCONTINUED | OUTPATIENT
Start: 2018-08-16 | End: 2018-08-16 | Stop reason: HOSPADM

## 2018-08-16 RX ADMIN — LANREOTIDE ACETATE 120 MG: 120 INJECTION SUBCUTANEOUS at 03:08

## 2018-08-17 ENCOUNTER — LAB VISIT (OUTPATIENT)
Dept: LAB | Facility: OTHER | Age: 78
End: 2018-08-17
Attending: INTERNAL MEDICINE
Payer: MEDICARE

## 2018-08-17 ENCOUNTER — OFFICE VISIT (OUTPATIENT)
Dept: HEMATOLOGY/ONCOLOGY | Facility: CLINIC | Age: 78
End: 2018-08-17
Payer: MEDICARE

## 2018-08-17 VITALS
HEIGHT: 64 IN | TEMPERATURE: 98 F | RESPIRATION RATE: 16 BRPM | SYSTOLIC BLOOD PRESSURE: 146 MMHG | OXYGEN SATURATION: 99 % | HEART RATE: 91 BPM | BODY MASS INDEX: 19.64 KG/M2 | WEIGHT: 115.06 LBS | DIASTOLIC BLOOD PRESSURE: 86 MMHG

## 2018-08-17 DIAGNOSIS — D63.0 ANEMIA IN NEOPLASTIC DISEASE: ICD-10-CM

## 2018-08-17 DIAGNOSIS — D3A.8 NEUROENDOCRINE NEOPLASM OF GASTROINTESTINAL TRACT: ICD-10-CM

## 2018-08-17 DIAGNOSIS — R73.9 HYPERGLYCEMIA: ICD-10-CM

## 2018-08-17 DIAGNOSIS — C7B.01 SECONDARY CARCINOID TUMORS OF DISTANT LYMPH NODES: ICD-10-CM

## 2018-08-17 DIAGNOSIS — C7B.8 NEUROENDOCRINE CARCINOMA METASTATIC TO BONE: ICD-10-CM

## 2018-08-17 DIAGNOSIS — C7A.8 NEUROENDOCRINE CARCINOMA METASTATIC TO BONE: ICD-10-CM

## 2018-08-17 DIAGNOSIS — I10 ESSENTIAL HYPERTENSION: ICD-10-CM

## 2018-08-17 DIAGNOSIS — D75.839 THROMBOCYTOSIS: ICD-10-CM

## 2018-08-17 DIAGNOSIS — C7B.8 METASTATIC MALIGNANT NEUROENDOCRINE TUMOR TO LIVER: ICD-10-CM

## 2018-08-17 DIAGNOSIS — C79.51 SPINE METASTASIS: ICD-10-CM

## 2018-08-17 DIAGNOSIS — C7A.012 MALIGNANT CARCINOID TUMOR OF ILEUM: Primary | ICD-10-CM

## 2018-08-17 LAB
ALBUMIN SERPL BCP-MCNC: 2.8 G/DL
ALP SERPL-CCNC: 66 U/L
ALT SERPL W/O P-5'-P-CCNC: 9 U/L
ANION GAP SERPL CALC-SCNC: 11 MMOL/L
AST SERPL-CCNC: 22 U/L
BILIRUB SERPL-MCNC: 0.6 MG/DL
BUN SERPL-MCNC: 9 MG/DL
CALCIUM SERPL-MCNC: 9.2 MG/DL
CHLORIDE SERPL-SCNC: 99 MMOL/L
CO2 SERPL-SCNC: 28 MMOL/L
CREAT SERPL-MCNC: 0.6 MG/DL
ERYTHROCYTE [DISTWIDTH] IN BLOOD BY AUTOMATED COUNT: 15.1 %
EST. GFR  (AFRICAN AMERICAN): >60 ML/MIN/1.73 M^2
EST. GFR  (NON AFRICAN AMERICAN): >60 ML/MIN/1.73 M^2
ESTIMATED AVG GLUCOSE: 117 MG/DL
GLUCOSE SERPL-MCNC: 106 MG/DL
HBA1C MFR BLD HPLC: 5.7 %
HCT VFR BLD AUTO: 33.2 %
HGB BLD-MCNC: 9.7 G/DL
MCH RBC QN AUTO: 24.4 PG
MCHC RBC AUTO-ENTMCNC: 29.2 G/DL
MCV RBC AUTO: 83 FL
NEUTROPHILS # BLD AUTO: 5.4 K/UL
PLATELET # BLD AUTO: 492 K/UL
PMV BLD AUTO: 9.2 FL
POTASSIUM SERPL-SCNC: 3.4 MMOL/L
PROT SERPL-MCNC: 7.1 G/DL
RBC # BLD AUTO: 3.98 M/UL
SODIUM SERPL-SCNC: 138 MMOL/L
WBC # BLD AUTO: 7.97 K/UL

## 2018-08-17 PROCEDURE — 99215 OFFICE O/P EST HI 40 MIN: CPT | Mod: S$GLB,,, | Performed by: INTERNAL MEDICINE

## 2018-08-17 PROCEDURE — 80053 COMPREHEN METABOLIC PANEL: CPT

## 2018-08-17 PROCEDURE — 85027 COMPLETE CBC AUTOMATED: CPT

## 2018-08-17 PROCEDURE — 3077F SYST BP >= 140 MM HG: CPT | Mod: CPTII,S$GLB,, | Performed by: INTERNAL MEDICINE

## 2018-08-17 PROCEDURE — 99999 PR PBB SHADOW E&M-EST. PATIENT-LVL III: CPT | Mod: PBBFAC,,, | Performed by: INTERNAL MEDICINE

## 2018-08-17 PROCEDURE — 36415 COLL VENOUS BLD VENIPUNCTURE: CPT

## 2018-08-17 PROCEDURE — 86316 IMMUNOASSAY TUMOR OTHER: CPT

## 2018-08-17 PROCEDURE — 3079F DIAST BP 80-89 MM HG: CPT | Mod: CPTII,S$GLB,, | Performed by: INTERNAL MEDICINE

## 2018-08-17 PROCEDURE — 83036 HEMOGLOBIN GLYCOSYLATED A1C: CPT

## 2018-08-17 NOTE — Clinical Note
Get CBC, CMP, chromogranin A, ferritin, iron, and PET dotatate (gallium study) on 9/11/18RTC on 9/14, see me, then get lanreotidePatient prefer appointment after 3 pm

## 2018-08-17 NOTE — PROGRESS NOTES
"Subjective:       Patient ID: Shahram Hills is a 78 y.o. female.     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 Ms. Hills is an 77 yo woman who initially saw me on 6/7/18 for further evaluation of neuroendocrine tumor. She initially presented with diarrhea, abdominal discomfort, weight loss. She was evaluated at [facility] and underwent workup below:  US abdomen on 3/14/18 showed numerous bilobar mixed echogenicity and mildly vascular hepatic lesions. Cholelithiasis without e/o acute cholecystitis.   MRI abdomen on 3/30/2018 showed innumerable hepatic metastases. L3 vertebral body metastasis with soft tissue component along the right margin of the L3 and upper L4 vertebral bodies, filling the right L3/4 neural foramen, and extending into the anterior aspect of the spinal canal on the right at the L3 and upper T4 levels. Associated with mild spinal canal narrowing.  CT chest on 4/6/2018 showed one lucent nodule measuring 7 mm in the T7 vertebral body, most likely representing metastatic disease.    EGD on 5/4/18 showed normal duodenum. Schatzki's ring in the lower third of the esophagus. Grade 1 esophagitis in the GE junction c/w mild distal esophagitis. Congestion and erythema in the antrum, pre-pyloric region and stomach body c/w gastritis. Biopsy of the stomach antrum showed mild chronic gastritis, neg for H. pylori.   Labs on 5/9/2018: WBC 6.9, H/H 11.6/34.3, MCV 87.5, plt count 279. On 3/9/18: Vit B12 level 173, folic acid 6.6  She was started on oral vit B12 and underwent a liver biopsy on 5/18/18. Pathology showed "metastatic well differentiated neuroendocrine tumor. Ki67 3%"     She saw me on 6/7/18 and complained of weight loss of 26 lbs over the past 3-4 months, also has diarrhea. No flushing, wheezing, palpitations. Has been having pain in right flank radiating down the right leg. No " "tingling/numbness, weakness. She can hold her bladder but when she urinates her diarrhea would come out as well. Started on dex 4 mg q6h the evening of 18.      2. MRI spine on 18 showed "Marrow replacing metastatic lesion of the L3 vertebral body with associated extra osseous expansion and complete effacement of the right L3-L4 neural foramina and additional effacement of the right lateral recess.  There is additional lateral extension and abutment of the right psoas muscle.  Superimposed degenerative change at this level results in mild spinal canal stenosis." I sent the patient to ED on 18 where she was evaluated by neurosurgery. Neurosurgery did not feel she was a candidate for surgical intervention.      3. Seen by Dr. Adames on 18. palliative radiation to the area of the L3 metastasis 18-18   4. 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  There is an adjacent metastatic lymph node, diffuse liver metastases, and multiple bone metastases. Index primary neoplasm SUV max 33.13. Adjacent lymph node SUV max 23. L3 bone metastasis SUV max 36.86. Left lobe SUV max 46.13   5. Lanreotide /. 7/. /.      History of Present Illness:   Mr. Hills returns for follow up. Has been feeling great. Occasional soreness in right knee but denies other complaints. Energy level much better. Diarrhea very occasional and mild.     ECO     ROS:   See HPI. Otherwise negative.      Physical Examination:   Vital signs reviewed.   Gen: well hydrated, well developed, in no acute distress.  HEENT: normocephalic, anicteric, PERRLA, EOMI, oropharynx clear  Neck: supple, no JVD, thyromegaly, cervical or supraclavicular LAD  Lungs: CTAB, no wheezes or rales  Heart: RRR, no M/R/G  Abdomen: soft, no tenderness, non-distended, liver edge 5 cm below the right costal margin, no splenomegaly, no mass, or hernia.   Ext: no cyanosis, edema, deformity  Neuro: alert " and oriented x 4, no focal neuro deficit  Skin: no rash, open wounds or ulcers  Psych: pleasant and appropriate mood and affect     Objective:      Diagnostic Tests:  Gallium study on 6/13/18: Distal ileal primary neoplasm consistent with a carcinoid.  There is an adjacent metastatic lymph node, diffuse liver metastases, and multiple bone metastases. Index primary neoplasm SUV max 33.13. Adjacent lymph node SUV max 23. L3 bone metastasis SUV max 36.86. Left lobe SUV max 46.13     Laboratory Data:  Labs reviewed. Chromogranin A from today pending  Assessment/Plan:      1. Malignant carcinoid tumor of ileum    2. 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 Secondary carcinoid tumors of distant lymph nodes    4. 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 Spine metastasis    6. Essential hypertension    7. Anemia in neoplastic disease    8. Thrombocytosis           1-4  - doing well  - s/p lanreotide 8/16/18  - next lanreotide due in 4 weeks  - RTC in 4 weeks with gallium study     6.  - good control.  C/w current mds     7.  - mild and stable  - check iron, ferritin on return     8.  - reactive vs iron deficiency  - check iron studies on return

## 2018-08-22 LAB — CGA SERPL-MCNC: 2180 NG/ML (ref 0–95)

## 2018-09-13 ENCOUNTER — HOSPITAL ENCOUNTER (OUTPATIENT)
Dept: RADIOLOGY | Facility: HOSPITAL | Age: 78
Discharge: HOME | End: 2018-09-13
Attending: INTERNAL MEDICINE
Payer: MEDICARE

## 2018-09-13 DIAGNOSIS — C7A.012 MALIGNANT CARCINOID TUMOR OF ILEUM: ICD-10-CM

## 2018-09-13 PROCEDURE — A9587 GALLIUM GA-68: HCPCS | Mod: TB

## 2018-09-13 PROCEDURE — 78815 PET IMAGE W/CT SKULL-THIGH: CPT | Mod: 26,PS,, | Performed by: RADIOLOGY

## 2018-09-13 PROCEDURE — 78815 PET IMAGE W/CT SKULL-THIGH: CPT | Mod: TC

## 2018-09-14 ENCOUNTER — OFFICE VISIT (OUTPATIENT)
Dept: HEMATOLOGY/ONCOLOGY | Facility: CLINIC | Age: 78
End: 2018-09-14
Payer: MEDICARE

## 2018-09-14 VITALS
RESPIRATION RATE: 16 BRPM | OXYGEN SATURATION: 99 % | WEIGHT: 117.5 LBS | HEART RATE: 85 BPM | TEMPERATURE: 99 F | DIASTOLIC BLOOD PRESSURE: 90 MMHG | SYSTOLIC BLOOD PRESSURE: 136 MMHG | BODY MASS INDEX: 20.06 KG/M2 | HEIGHT: 64 IN

## 2018-09-14 DIAGNOSIS — C7A.8 NEUROENDOCRINE CARCINOMA METASTATIC TO BONE: ICD-10-CM

## 2018-09-14 DIAGNOSIS — T38.0X5A STEROID-INDUCED HYPERGLYCEMIA: ICD-10-CM

## 2018-09-14 DIAGNOSIS — R73.9 STEROID-INDUCED HYPERGLYCEMIA: ICD-10-CM

## 2018-09-14 DIAGNOSIS — C7A.012 MALIGNANT CARCINOID TUMOR OF ILEUM: Primary | ICD-10-CM

## 2018-09-14 DIAGNOSIS — C7B.8 METASTATIC MALIGNANT NEUROENDOCRINE TUMOR TO LIVER: ICD-10-CM

## 2018-09-14 DIAGNOSIS — C79.51 SPINE METASTASIS: ICD-10-CM

## 2018-09-14 DIAGNOSIS — C7B.8 NEUROENDOCRINE CARCINOMA METASTATIC TO BONE: ICD-10-CM

## 2018-09-14 PROCEDURE — 99999 PR PBB SHADOW E&M-EST. PATIENT-LVL III: CPT | Mod: PBBFAC,,, | Performed by: INTERNAL MEDICINE

## 2018-09-14 PROCEDURE — 3075F SYST BP GE 130 - 139MM HG: CPT | Mod: CPTII,,, | Performed by: INTERNAL MEDICINE

## 2018-09-14 PROCEDURE — 1101F PT FALLS ASSESS-DOCD LE1/YR: CPT | Mod: CPTII,,, | Performed by: INTERNAL MEDICINE

## 2018-09-14 PROCEDURE — 99214 OFFICE O/P EST MOD 30 MIN: CPT | Mod: S$PBB,,, | Performed by: INTERNAL MEDICINE

## 2018-09-14 PROCEDURE — 99213 OFFICE O/P EST LOW 20 MIN: CPT | Mod: PBBFAC | Performed by: INTERNAL MEDICINE

## 2018-09-14 PROCEDURE — 3080F DIAST BP >= 90 MM HG: CPT | Mod: CPTII,,, | Performed by: INTERNAL MEDICINE

## 2018-09-14 NOTE — PROGRESS NOTES
"Subjective:       Patient ID: Shahram Hills is a 78 y.o. female.     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 Ms. Hills is an 79 yo woman who initially saw me on 6/7/18 for further evaluation of neuroendocrine tumor. She initially presented with diarrhea, abdominal discomfort, weight loss. She was evaluated at [facility] and underwent workup below:  US abdomen on 3/14/18 showed numerous bilobar mixed echogenicity and mildly vascular hepatic lesions. Cholelithiasis without e/o acute cholecystitis.   MRI abdomen on 3/30/2018 showed innumerable hepatic metastases. L3 vertebral body metastasis with soft tissue component along the right margin of the L3 and upper L4 vertebral bodies, filling the right L3/4 neural foramen, and extending into the anterior aspect of the spinal canal on the right at the L3 and upper T4 levels. Associated with mild spinal canal narrowing.  CT chest on 4/6/2018 showed one lucent nodule measuring 7 mm in the T7 vertebral body, most likely representing metastatic disease.    EGD on 5/4/18 showed normal duodenum. Schatzki's ring in the lower third of the esophagus. Grade 1 esophagitis in the GE junction c/w mild distal esophagitis. Congestion and erythema in the antrum, pre-pyloric region and stomach body c/w gastritis. Biopsy of the stomach antrum showed mild chronic gastritis, neg for H. pylori.   Labs on 5/9/2018: WBC 6.9, H/H 11.6/34.3, MCV 87.5, plt count 279. On 3/9/18: Vit B12 level 173, folic acid 6.6  She was started on oral vit B12 and underwent a liver biopsy on 5/18/18. Pathology showed "metastatic well differentiated neuroendocrine tumor. Ki67 3%"     She saw me on 6/7/18 and complained of weight loss of 26 lbs over the past 3-4 months, also has diarrhea. No flushing, wheezing, palpitations. Has been having pain in right flank radiating down the right leg. No " "tingling/numbness, weakness. She can hold her bladder but when she urinates her diarrhea would come out as well. Started on dex 4 mg q6h the evening of 18.      2. MRI spine on 18 showed "Marrow replacing metastatic lesion of the L3 vertebral body with associated extra osseous expansion and complete effacement of the right L3-L4 neural foramina and additional effacement of the right lateral recess.  There is additional lateral extension and abutment of the right psoas muscle.  Superimposed degenerative change at this level results in mild spinal canal stenosis." I sent the patient to ED on 18 where she was evaluated by neurosurgery. Neurosurgery did not feel she was a candidate for surgical intervention.      3. Seen by Dr. Adames on 18. palliative radiation to the area of the L3 metastasis 18-18   4. 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  There is an adjacent metastatic lymph node, diffuse liver metastases, and multiple bone metastases. Index primary neoplasm SUV max 33.13. Adjacent lymph node SUV max 23. L3 bone metastasis SUV max 36.86. Left lobe SUV max 46.13   5. Lanreotide /. 7//. 8//. /     History of Present Illness:   Mr. Hills returns for follow up. Has been feeling great. Occasional soreness in right knee but denies other complaints. Energy level much better. Denies diarrhea, facial flush, SOB.      ECO     ROS:   See HPI. Otherwise negative.      Physical Examination:   Vital signs reviewed.   Gen: well hydrated, well developed, in no acute distress.  HEENT: normocephalic, anicteric, PERRLA, EOMI, oropharynx clear  Neck: supple, no JVD, thyromegaly, cervical or supraclavicular LAD  Lungs: CTAB, no wheezes or rales  Heart: RRR, no M/R/G  Abdomen: soft, no tenderness, non-distended, liver edge 5 cm below the right costal margin, no splenomegaly, no mass, or hernia.   Ext: no cyanosis, edema, deformity  Neuro: " alert and oriented x 4, no focal neuro deficit  Skin: no rash, open wounds or ulcers  Psych: pleasant and appropriate mood and affect     Objective:      Diagnostic Tests:  Gallium study on 9/13/18:   FINDINGS:  Quality of the study: Adequate.    Intraluminal mass within the distal ileum SUV max 19.23, previously 33.13.    Adjacent lymph node metastasis SUV max 14.32, previously 23.    L3 vertebral body metastasis SUV max 17.3, previously 36.86.    Left hepatic lobe metastasis SUV max 34.87, previous 46.13.    Redemonstration of numerous hypermetabolic hepatic lesions and several additional osseous lesions involving the sternum, C7 vertebral body, and left femur.    Physiologic uptake of the tracer is seen within the pituitary, liver, spleen, kidneys, adrenal glands and bowel.      Impression       Interval decrease in SUV max values of the index lesions.  No new lesions identified.          Laboratory Data:  Labs pending    Assessment/Plan:      1. Malignant carcinoid tumor of ileum    2. 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    4. Spine metastasis    5. Steroid-induced hyperglycemia        1-4  - doing well  - gallium study showed interim response  - next Lanreotide on 9/17/18  - RTC in 4 weeks    5.  - They showed me her BS log at home. Mostly good  - resolved after steroids stopped

## 2018-09-17 ENCOUNTER — INFUSION (OUTPATIENT)
Dept: INFUSION THERAPY | Facility: HOSPITAL | Age: 78
End: 2018-09-17
Attending: INTERNAL MEDICINE
Payer: MEDICARE

## 2018-09-17 DIAGNOSIS — C7B.8 METASTATIC MALIGNANT NEUROENDOCRINE TUMOR TO LIVER: Primary | ICD-10-CM

## 2018-09-17 PROCEDURE — 96372 THER/PROPH/DIAG INJ SC/IM: CPT

## 2018-09-17 PROCEDURE — 63600175 PHARM REV CODE 636 W HCPCS: Mod: JG | Performed by: INTERNAL MEDICINE

## 2018-09-17 RX ORDER — LANREOTIDE ACETATE 120 MG/.5ML
120 INJECTION SUBCUTANEOUS
Status: CANCELLED | OUTPATIENT
Start: 2018-09-17

## 2018-09-17 RX ORDER — LANREOTIDE ACETATE 120 MG/.5ML
120 INJECTION SUBCUTANEOUS
Status: DISCONTINUED | OUTPATIENT
Start: 2018-09-17 | End: 2018-09-17 | Stop reason: HOSPADM

## 2018-09-17 RX ADMIN — LANREOTIDE ACETATE 120 MG: 120 INJECTION SUBCUTANEOUS at 02:09

## 2018-09-27 ENCOUNTER — HOSPITAL ENCOUNTER (OUTPATIENT)
Dept: RADIOLOGY | Facility: HOSPITAL | Age: 78
Discharge: HOME | End: 2018-09-27
Attending: NEUROLOGICAL SURGERY
Payer: MEDICARE

## 2018-09-27 ENCOUNTER — OFFICE VISIT (OUTPATIENT)
Dept: NEUROSURGERY | Facility: CLINIC | Age: 78
End: 2018-09-27
Payer: MEDICARE

## 2018-09-27 VITALS
BODY MASS INDEX: 19.91 KG/M2 | WEIGHT: 116 LBS | DIASTOLIC BLOOD PRESSURE: 93 MMHG | HEART RATE: 77 BPM | TEMPERATURE: 98 F | SYSTOLIC BLOOD PRESSURE: 165 MMHG

## 2018-09-27 DIAGNOSIS — C7B.8 SECONDARY NEUROENDOCRINE TUMOR OF BONE: ICD-10-CM

## 2018-09-27 DIAGNOSIS — D49.2 LUMBAR SPINE TUMOR: ICD-10-CM

## 2018-09-27 DIAGNOSIS — M54.16 LUMBAR RADICULOPATHY: ICD-10-CM

## 2018-09-27 DIAGNOSIS — C79.51 SPINE METASTASIS: Primary | ICD-10-CM

## 2018-09-27 PROCEDURE — 72158 MRI LUMBAR SPINE W/O & W/DYE: CPT | Mod: 26,,, | Performed by: RADIOLOGY

## 2018-09-27 PROCEDURE — 72158 MRI LUMBAR SPINE W/O & W/DYE: CPT | Mod: TC

## 2018-09-27 PROCEDURE — 3077F SYST BP >= 140 MM HG: CPT | Mod: CPTII,,, | Performed by: NEUROLOGICAL SURGERY

## 2018-09-27 PROCEDURE — 1101F PT FALLS ASSESS-DOCD LE1/YR: CPT | Mod: CPTII,,, | Performed by: NEUROLOGICAL SURGERY

## 2018-09-27 PROCEDURE — 99213 OFFICE O/P EST LOW 20 MIN: CPT | Mod: S$PBB,,, | Performed by: NEUROLOGICAL SURGERY

## 2018-09-27 PROCEDURE — 25500020 PHARM REV CODE 255: Performed by: NEUROLOGICAL SURGERY

## 2018-09-27 PROCEDURE — 99999 PR PBB SHADOW E&M-EST. PATIENT-LVL III: CPT | Mod: PBBFAC,,, | Performed by: NEUROLOGICAL SURGERY

## 2018-09-27 PROCEDURE — 99213 OFFICE O/P EST LOW 20 MIN: CPT | Mod: PBBFAC,25 | Performed by: NEUROLOGICAL SURGERY

## 2018-09-27 PROCEDURE — 3080F DIAST BP >= 90 MM HG: CPT | Mod: CPTII,,, | Performed by: NEUROLOGICAL SURGERY

## 2018-09-27 PROCEDURE — A9585 GADOBUTROL INJECTION: HCPCS | Performed by: NEUROLOGICAL SURGERY

## 2018-09-27 RX ORDER — GADOBUTROL 604.72 MG/ML
5 INJECTION INTRAVENOUS
Status: COMPLETED | OUTPATIENT
Start: 2018-09-27 | End: 2018-09-27

## 2018-09-27 RX ADMIN — GADOBUTROL 5 ML: 604.72 INJECTION INTRAVENOUS at 10:09

## 2018-09-27 NOTE — PROGRESS NOTES
History & Physical    I, Edilberto Canales, attest that this documentation has been prepared under the direction and in the presence of Robe Barrera MD.    09/29/2018    Chief Complaint   Patient presents with    Follow-up       History of Present Illness:  Shahram Hills is a 78 y.o. 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 Patient presents for follow up evaluation.     Patient states that her right lateral thigh pain is improved from the last time I saw her, now 1-2/10 in intensity. Patient reports that she is still completing all her ADLs. Patient is scheduled for a 5th round of radiation therapy in 7 days.     Interval History, dated 6/25/2018:   Shahram Hills is a 78 y.o. 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 Patient was referred to me by Dr. Eloy Soto MD for evaluation of bone metastases to the lumbar spine.      Patient complains of intermittent right lateral thigh pain. The pain does not radiate past the right knee. Her pain is 4-5/10 in intensity, is worst when she initiates standing up, and improves as she walks around. She states that the leg pain completely resolves when she takes pain medications. Patient states being able to walk without issues and perform her ADLs. Symptoms have been present for about 1 month. Patient denies bowel / bladder changes and denies back pain. Patient is currently receiving chemotherapy and radiation therapy.     Review of patient's allergies indicates:  No Known Allergies    Current Outpatient Medications   Medication Sig Dispense Refill    cholecalciferol, vitamin D3, (VITAMIN D3) 400 unit Tab Take by mouth once daily.      cholestyramine-aspartame (CHOLESTYRAMINE LIGHT) 4 gram PwPk Take by mouth.      cyanocobalamin (VITAMIN B-12) 1000 MCG tablet Take 1 tablet (1,000 mcg total) by mouth once daily.      ferrous sulfate 325 (65 FE) MG EC tablet  Take 325 mg by mouth 3 (three) times daily with meals.      XIIDRA 5 % Dpet INSTILL ONE DROP INTO OU BID  3     No current facility-administered medications for this visit.        Past Medical History:   Diagnosis Date    Anemia 7/11/2018    B12 deficiency     Depression     per pcp note 7/2017 and given prozac and diazepam     Hyperlipidemia     Hypertension     PVC (premature ventricular contraction)     seen on ekg from prior pcp    Weight loss     reviewed prior pcp Dr. Russo notes 2017 - labs reviewed: cmp wnl x tbili 1.5, tsh wnl, A1c 5.0, cbc wnl,D 36, hiv neg, hep c neg, hep b surg ag neg        Past Surgical History:   Procedure Laterality Date    ESOPHAGOGASTRODUODENOSCOPY  05/04/2018    esophageal ring, grade 1 esophagitis, gastritis     EYE SURGERY      cataracts    HYSTERECTOMY      fibroids       Family History   Problem Relation Age of Onset    Glaucoma Mother     Heart attack Father     Diabetes Sister     Cancer Brother         lung cancer, + tobacco    Diabetes Brother     Hypertension Brother     Stroke Brother     Emphysema Brother     COPD Brother     Asthma Sister     No Known Problems Sister     Hyperlipidemia Sister     Hyperlipidemia Sister     Heart attack Sister        Social History     Tobacco Use    Smoking status: Never Smoker    Smokeless tobacco: Never Used   Substance Use Topics    Alcohol use: No     Comment: stopped in 2007 - hx of social drinking    Drug use: No        Review of Systems:  Review of Systems   Constitutional: Negative for activity change.   HENT: Negative for congestion.    Eyes: Negative for discharge.   Respiratory: Negative for apnea.    Cardiovascular: Negative for chest pain.   Gastrointestinal: Negative for abdominal distention.   Endocrine: Negative for cold intolerance.   Genitourinary: Negative for difficulty urinating.   Musculoskeletal: Negative for arthralgias.   Neurological: Negative for dizziness, weakness and  headaches.   Psychiatric/Behavioral: Negative for agitation.       Vital Signs (Most Recent)  Temp: 98.3 °F (36.8 °C) (09/27/18 1113)  Pulse: 77 (09/27/18 1113)  BP: (!) 165/93 (09/27/18 1115)     52.6 kg (116 lb)       Physical Exam:  Physical Exam:    Constitutional: She appears well-developed.     Eyes: Pupils are equal, round, and reactive to light. EOM are normal. Right eye exhibits no discharge. Left eye exhibits no discharge.     Abdominal: Soft.     Skin: Skin displays no rash on trunk and no rash on extremities.     Psych/Behavior: She is alert. She is oriented to person, place, and time.     Musculoskeletal:        Neck: There is no tenderness.        Back: Range of motion is full.        Right Upper Extremities: Range of motion is full. Muscle strength is 5/5. Tone is normal.        Left Upper Extremities: Range of motion is full. Muscle strength is 5/5. Tone is normal.       Right Lower Extremities: Range of motion is full. Muscle strength is 5/5. Tone is normal.        Left Lower Extremities: Range of motion is full. Muscle strength is 5/5. Tone is normal.     Neurological:        Coordination: She has a normal Romberg Test and normal tandem walking coordination.        Sensory: There is no sensory deficit in the trunk. There is no sensory deficit in the extremities.        DTRs: DTRs are DTRS NORMAL AND SYMMETRICnormal and symmetric. Tricep reflexes are 2+ on the right side and 2+ on the left side. Bicep reflexes are 2+ on the right side and 2+ on the left side. Brachioradialis reflexes are 2+ on the right side and 2+ on the left side. Patellar reflexes are 2+ on the right side and 2+ on the left side. Achilles reflexes are 2+ on the right side and 2+ on the left side. She displays no Babinski's sign on the right side. She displays no Babinski's sign on the left side.        Cranial nerves: Cranial nerve(s) II, III, IV, V, VI, VII, VIII, IX, X, XI and XII are intact.     Negative straight leg raising  test.   5/5 strength throughout.     Laboratory  CBC: Reviewed  CMP: Reviewed    Diagnostic Results:  MRI: Reviewed  MRI Lumbar Spine W WO Contrast, dated 9/27/2018: Reviewed personally and discussed them with the patient and her daughters..   In this patient with a history of neuroendocrine tumor, there is redemonstration of a metastatic lesion within the L3 vertebral body with improvement in the degree of extra osseous extension when compared to prior MRI 06/08/2018.  Continued complete effacement of the right L3-4 neural foramen.  2. Multiple lesions within the liver consistent with known hepatic metastatic disease.  This report was flagged in Epic as abnormal.    ASSESSMENT/PLAN:       ICD-10-CM ICD-9-CM   1. Spine metastasis C79.51 198.5   2. Secondary neuroendocrine tumor of bone C7B.8 209.73   3. Lumbar radiculopathy M54.16 724.4       PLAN:    1. L3 metastatic malignant neuroendocrine tumor with invasion to the iliopsoas and to the L3-4 foramen. Patient is back to baseline neurologic status sand has resolution of her radicular pain. Tumor on repeat MRI shows a decrease in size after radiation treatment, with less compression over the L3-4 foramen. At this point, I do not recommend any neurosurgical interventions. Patient will follow up with us PRN.     2. I spent 25 minutes with the patient and family, more than 50% in counseling about the above mentioned issues.        Shahram was seen today for follow-up.    Diagnoses and all orders for this visit:    Spine metastasis    Secondary neuroendocrine tumor of bone    Lumbar radiculopathy      I, Dr. Robe Barrera, personally performed the services described in this documentation. All medical record entries made by the scribe were at my direction and in my presence.  I have reviewed the chart and agree that the record reflects my personal performance and is accurate and complete. Robe Barrera MD.  7:05 AM 09/29/2018

## 2018-09-29 PROBLEM — M54.16 LUMBAR RADICULOPATHY: Status: ACTIVE | Noted: 2018-09-29

## 2018-10-12 ENCOUNTER — INFUSION (OUTPATIENT)
Dept: INFUSION THERAPY | Facility: OTHER | Age: 78
End: 2018-10-12
Attending: INTERNAL MEDICINE
Payer: MEDICARE

## 2018-10-12 ENCOUNTER — LAB VISIT (OUTPATIENT)
Dept: LAB | Facility: OTHER | Age: 78
End: 2018-10-12
Attending: INTERNAL MEDICINE
Payer: MEDICARE

## 2018-10-12 ENCOUNTER — OFFICE VISIT (OUTPATIENT)
Dept: HEMATOLOGY/ONCOLOGY | Facility: CLINIC | Age: 78
End: 2018-10-12
Payer: MEDICARE

## 2018-10-12 VITALS
DIASTOLIC BLOOD PRESSURE: 91 MMHG | SYSTOLIC BLOOD PRESSURE: 161 MMHG | BODY MASS INDEX: 20.25 KG/M2 | TEMPERATURE: 98 F | HEIGHT: 64 IN | OXYGEN SATURATION: 99 % | RESPIRATION RATE: 16 BRPM | HEART RATE: 78 BPM | WEIGHT: 118.63 LBS

## 2018-10-12 DIAGNOSIS — I10 ESSENTIAL HYPERTENSION: ICD-10-CM

## 2018-10-12 DIAGNOSIS — C7B.8 METASTATIC MALIGNANT NEUROENDOCRINE TUMOR TO LIVER: ICD-10-CM

## 2018-10-12 DIAGNOSIS — C7A.8 NEUROENDOCRINE CARCINOMA METASTATIC TO BONE: ICD-10-CM

## 2018-10-12 DIAGNOSIS — C79.51 SPINE METASTASIS: ICD-10-CM

## 2018-10-12 DIAGNOSIS — C7A.012 MALIGNANT CARCINOID TUMOR OF ILEUM: Primary | ICD-10-CM

## 2018-10-12 DIAGNOSIS — C7B.8 METASTATIC MALIGNANT NEUROENDOCRINE TUMOR TO LIVER: Primary | ICD-10-CM

## 2018-10-12 DIAGNOSIS — C7B.8 NEUROENDOCRINE CARCINOMA METASTATIC TO BONE: ICD-10-CM

## 2018-10-12 DIAGNOSIS — C7A.012 MALIGNANT CARCINOID TUMOR OF ILEUM: ICD-10-CM

## 2018-10-12 LAB
ALBUMIN SERPL BCP-MCNC: 3.7 G/DL
ALP SERPL-CCNC: 61 U/L
ALT SERPL W/O P-5'-P-CCNC: 7 U/L
ANION GAP SERPL CALC-SCNC: 11 MMOL/L
AST SERPL-CCNC: 16 U/L
BILIRUB SERPL-MCNC: 1.2 MG/DL
BUN SERPL-MCNC: 7 MG/DL
CALCIUM SERPL-MCNC: 9.9 MG/DL
CHLORIDE SERPL-SCNC: 103 MMOL/L
CO2 SERPL-SCNC: 31 MMOL/L
CREAT SERPL-MCNC: 0.7 MG/DL
ERYTHROCYTE [DISTWIDTH] IN BLOOD BY AUTOMATED COUNT: 16.4 %
EST. GFR  (AFRICAN AMERICAN): >60 ML/MIN/1.73 M^2
EST. GFR  (NON AFRICAN AMERICAN): >60 ML/MIN/1.73 M^2
GLUCOSE SERPL-MCNC: 94 MG/DL
HCT VFR BLD AUTO: 39.7 %
HGB BLD-MCNC: 12.4 G/DL
MCH RBC QN AUTO: 26.2 PG
MCHC RBC AUTO-ENTMCNC: 31.2 G/DL
MCV RBC AUTO: 84 FL
NEUTROPHILS # BLD AUTO: 4.1 K/UL
PLATELET # BLD AUTO: 248 K/UL
PMV BLD AUTO: 10.4 FL
POTASSIUM SERPL-SCNC: 3.1 MMOL/L
PROT SERPL-MCNC: 7.4 G/DL
RBC # BLD AUTO: 4.74 M/UL
SODIUM SERPL-SCNC: 145 MMOL/L
WBC # BLD AUTO: 6.68 K/UL

## 2018-10-12 PROCEDURE — 3077F SYST BP >= 140 MM HG: CPT | Mod: CPTII,,, | Performed by: INTERNAL MEDICINE

## 2018-10-12 PROCEDURE — 96372 THER/PROPH/DIAG INJ SC/IM: CPT

## 2018-10-12 PROCEDURE — 86316 IMMUNOASSAY TUMOR OTHER: CPT

## 2018-10-12 PROCEDURE — 80053 COMPREHEN METABOLIC PANEL: CPT

## 2018-10-12 PROCEDURE — 99214 OFFICE O/P EST MOD 30 MIN: CPT | Mod: S$PBB,,, | Performed by: INTERNAL MEDICINE

## 2018-10-12 PROCEDURE — 3080F DIAST BP >= 90 MM HG: CPT | Mod: CPTII,,, | Performed by: INTERNAL MEDICINE

## 2018-10-12 PROCEDURE — 63600175 PHARM REV CODE 636 W HCPCS: Mod: JG | Performed by: INTERNAL MEDICINE

## 2018-10-12 PROCEDURE — 1101F PT FALLS ASSESS-DOCD LE1/YR: CPT | Mod: CPTII,,, | Performed by: INTERNAL MEDICINE

## 2018-10-12 PROCEDURE — 99213 OFFICE O/P EST LOW 20 MIN: CPT | Mod: PBBFAC,25 | Performed by: INTERNAL MEDICINE

## 2018-10-12 PROCEDURE — 85027 COMPLETE CBC AUTOMATED: CPT

## 2018-10-12 PROCEDURE — 99999 PR PBB SHADOW E&M-EST. PATIENT-LVL III: CPT | Mod: PBBFAC,,, | Performed by: INTERNAL MEDICINE

## 2018-10-12 RX ORDER — LANREOTIDE ACETATE 120 MG/.5ML
120 INJECTION SUBCUTANEOUS
Status: DISCONTINUED | OUTPATIENT
Start: 2018-10-12 | End: 2018-10-12 | Stop reason: HOSPADM

## 2018-10-12 RX ADMIN — LANREOTIDE ACETATE 120 MG: 120 INJECTION SUBCUTANEOUS at 03:10

## 2018-10-12 NOTE — PROGRESS NOTES
"Subjective:       Patient ID: Shahram Hills is a 78 y.o. female.     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 Ms. Hills is an 79 yo woman who initially saw me on 6/7/18 for further evaluation of neuroendocrine tumor. She initially presented with diarrhea, abdominal discomfort, weight loss. She was evaluated at [facility] and underwent workup below:  US abdomen on 3/14/18 showed numerous bilobar mixed echogenicity and mildly vascular hepatic lesions. Cholelithiasis without e/o acute cholecystitis.   MRI abdomen on 3/30/2018 showed innumerable hepatic metastases. L3 vertebral body metastasis with soft tissue component along the right margin of the L3 and upper L4 vertebral bodies, filling the right L3/4 neural foramen, and extending into the anterior aspect of the spinal canal on the right at the L3 and upper T4 levels. Associated with mild spinal canal narrowing.  CT chest on 4/6/2018 showed one lucent nodule measuring 7 mm in the T7 vertebral body, most likely representing metastatic disease.    EGD on 5/4/18 showed normal duodenum. Schatzki's ring in the lower third of the esophagus. Grade 1 esophagitis in the GE junction c/w mild distal esophagitis. Congestion and erythema in the antrum, pre-pyloric region and stomach body c/w gastritis. Biopsy of the stomach antrum showed mild chronic gastritis, neg for H. pylori.   Labs on 5/9/2018: WBC 6.9, H/H 11.6/34.3, MCV 87.5, plt count 279. On 3/9/18: Vit B12 level 173, folic acid 6.6  She was started on oral vit B12 and underwent a liver biopsy on 5/18/18. Pathology showed "metastatic well differentiated neuroendocrine tumor. Ki67 3%"     She saw me on 6/7/18 and complained of weight loss of 26 lbs over the past 3-4 months, also has diarrhea. No flushing, wheezing, palpitations. Has been having pain in right flank radiating down the right leg. No " "tingling/numbness, weakness. She can hold her bladder but when she urinates her diarrhea would come out as well. Started on dex 4 mg q6h the evening of 18.      2. MRI spine on 18 showed "Marrow replacing metastatic lesion of the L3 vertebral body with associated extra osseous expansion and complete effacement of the right L3-L4 neural foramina and additional effacement of the right lateral recess.  There is additional lateral extension and abutment of the right psoas muscle.  Superimposed degenerative change at this level results in mild spinal canal stenosis." I sent the patient to ED on 18 where she was evaluated by neurosurgery. Neurosurgery did not feel she was a candidate for surgical intervention.      3. Seen by Dr. Adames on 18. palliative radiation to the area of the L3 metastasis 18-18   4. 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  There is an adjacent metastatic lymph node, diffuse liver metastases, and multiple bone metastases. Index primary neoplasm SUV max 33.13. Adjacent lymph node SUV max 23. L3 bone metastasis SUV max 36.86. Left lobe SUV max 46.13   5. Lanreotide /. 7/. 8//. 9/, 10/12/18     History of Present Illness:   Mr. Hills returns for follow up. Has been feeling well. Occasional right leg pain. Denies other complaints. No diarrhea, facial flushing, SOB      ECO     ROS:   See HPI. Otherwise negative.      Physical Examination:   Vital signs reviewed.   Gen: well hydrated, well developed, in no acute distress.  HEENT: normocephalic, anicteric, PERRLA, EOMI, oropharynx clear  Neck: supple, no JVD, thyromegaly, cervical or supraclavicular LAD  Lungs: CTAB, no wheezes or rales  Heart: RRR, no M/R/G  Abdomen: soft, no tenderness, non-distended, no hepatomegaly, no splenomegaly, no mass, or hernia.   Ext: no cyanosis, edema, deformity  Neuro: alert and oriented x 4, no focal neuro deficit  Skin: no " rash, open wounds or ulcers  Psych: pleasant and appropriate mood and affect     Objective:      Diagnostic Tests:  Gallium study on 9/13/18:        FINDINGS:  Quality of the study: Adequate.    Intraluminal mass within the distal ileum SUV max 19.23, previously 33.13.    Adjacent lymph node metastasis SUV max 14.32, previously 23.    L3 vertebral body metastasis SUV max 17.3, previously 36.86.    Left hepatic lobe metastasis SUV max 34.87, previous 46.13.    Redemonstration of numerous hypermetabolic hepatic lesions and several additional osseous lesions involving the sternum, C7 vertebral body, and left femur.    Physiologic uptake of the tracer is seen within the pituitary, liver, spleen, kidneys, adrenal glands and bowel.       Impression         Interval decrease in SUV max values of the index lesions.  No new lesions identified.            Laboratory Data:  CBC normal.      Assessment/Plan:      1. Malignant carcinoid tumor of ileum    2. 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 Spine metastasis    4. 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 Essential hypertension         1-4  - doing well  - last gallium study in Sep showed interim response. Next restaging scan due in December  - lanreotide today  - RTC in 4 weeks with labs and lanreotid    5.  - BP was elevated in the office. She is asymptomatic. Asked her to check her BP at home every day and to call Dr. Xie's office if it remains high

## 2018-10-12 NOTE — PLAN OF CARE
Problem: Patient Care Overview  Goal: Plan of Care Review  Outcome: Ongoing (interventions implemented as appropriate)  Pt tolerated Lanreotide injection without issue. VSS. AVS given.

## 2018-10-12 NOTE — Clinical Note
Get lanreotide injection today. Cancel lanreotide injection next Monday. RTC in 4 weeks, get CBC, CMP, chromogranin A, see me, then get lanreotide

## 2018-10-17 LAB — CGA SERPL-MCNC: 2179 NG/ML (ref 0–95)

## 2018-11-12 ENCOUNTER — INFUSION (OUTPATIENT)
Dept: INFUSION THERAPY | Facility: OTHER | Age: 78
End: 2018-11-12
Attending: INTERNAL MEDICINE
Payer: MEDICARE

## 2018-11-12 ENCOUNTER — LAB VISIT (OUTPATIENT)
Dept: LAB | Facility: OTHER | Age: 78
End: 2018-11-12
Attending: INTERNAL MEDICINE
Payer: MEDICARE

## 2018-11-12 ENCOUNTER — OFFICE VISIT (OUTPATIENT)
Dept: HEMATOLOGY/ONCOLOGY | Facility: CLINIC | Age: 78
End: 2018-11-12
Payer: MEDICARE

## 2018-11-12 VITALS
RESPIRATION RATE: 16 BRPM | HEART RATE: 80 BPM | OXYGEN SATURATION: 98 % | WEIGHT: 118.63 LBS | DIASTOLIC BLOOD PRESSURE: 88 MMHG | TEMPERATURE: 98 F | BODY MASS INDEX: 20.25 KG/M2 | SYSTOLIC BLOOD PRESSURE: 162 MMHG | HEIGHT: 64 IN

## 2018-11-12 DIAGNOSIS — C79.51 SPINE METASTASIS: ICD-10-CM

## 2018-11-12 DIAGNOSIS — C7B.8 METASTATIC MALIGNANT NEUROENDOCRINE TUMOR TO LIVER: Primary | ICD-10-CM

## 2018-11-12 DIAGNOSIS — C7A.8 NEUROENDOCRINE CARCINOMA METASTATIC TO BONE: ICD-10-CM

## 2018-11-12 DIAGNOSIS — C7B.8 METASTATIC MALIGNANT NEUROENDOCRINE TUMOR TO LIVER: ICD-10-CM

## 2018-11-12 DIAGNOSIS — I10 ESSENTIAL HYPERTENSION: ICD-10-CM

## 2018-11-12 DIAGNOSIS — C7A.012 MALIGNANT CARCINOID TUMOR OF ILEUM: ICD-10-CM

## 2018-11-12 DIAGNOSIS — C7A.012 MALIGNANT CARCINOID TUMOR OF ILEUM: Primary | ICD-10-CM

## 2018-11-12 DIAGNOSIS — C7B.8 NEUROENDOCRINE CARCINOMA METASTATIC TO BONE: ICD-10-CM

## 2018-11-12 LAB
ALBUMIN SERPL BCP-MCNC: 4.1 G/DL
ALP SERPL-CCNC: 69 U/L
ALT SERPL W/O P-5'-P-CCNC: 10 U/L
ANION GAP SERPL CALC-SCNC: 9 MMOL/L
AST SERPL-CCNC: 21 U/L
BILIRUB SERPL-MCNC: 2 MG/DL
BUN SERPL-MCNC: 10 MG/DL
CALCIUM SERPL-MCNC: 10.4 MG/DL
CHLORIDE SERPL-SCNC: 103 MMOL/L
CO2 SERPL-SCNC: 32 MMOL/L
CREAT SERPL-MCNC: 0.7 MG/DL
ERYTHROCYTE [DISTWIDTH] IN BLOOD BY AUTOMATED COUNT: 14.8 %
EST. GFR  (AFRICAN AMERICAN): >60 ML/MIN/1.73 M^2
EST. GFR  (NON AFRICAN AMERICAN): >60 ML/MIN/1.73 M^2
GLUCOSE SERPL-MCNC: 113 MG/DL
HCT VFR BLD AUTO: 43.2 %
HGB BLD-MCNC: 13.7 G/DL
MCH RBC QN AUTO: 27.6 PG
MCHC RBC AUTO-ENTMCNC: 31.7 G/DL
MCV RBC AUTO: 87 FL
NEUTROPHILS # BLD AUTO: 3.9 K/UL
PLATELET # BLD AUTO: 226 K/UL
PMV BLD AUTO: 9.9 FL
POTASSIUM SERPL-SCNC: 3.8 MMOL/L
PROT SERPL-MCNC: 8.1 G/DL
RBC # BLD AUTO: 4.96 M/UL
SODIUM SERPL-SCNC: 144 MMOL/L
WBC # BLD AUTO: 6.29 K/UL

## 2018-11-12 PROCEDURE — 86316 IMMUNOASSAY TUMOR OTHER: CPT | Mod: HCWC

## 2018-11-12 PROCEDURE — 80053 COMPREHEN METABOLIC PANEL: CPT | Mod: HCWC

## 2018-11-12 PROCEDURE — 63600175 PHARM REV CODE 636 W HCPCS: Mod: JG,HCWC | Performed by: INTERNAL MEDICINE

## 2018-11-12 PROCEDURE — 99999 PR PBB SHADOW E&M-EST. PATIENT-LVL IV: CPT | Mod: PBBFAC,HCWC,, | Performed by: INTERNAL MEDICINE

## 2018-11-12 PROCEDURE — 1101F PT FALLS ASSESS-DOCD LE1/YR: CPT | Mod: CPTII,HCWC,S$GLB, | Performed by: INTERNAL MEDICINE

## 2018-11-12 PROCEDURE — 85027 COMPLETE CBC AUTOMATED: CPT | Mod: HCWC

## 2018-11-12 PROCEDURE — 99214 OFFICE O/P EST MOD 30 MIN: CPT | Mod: HCWC,S$GLB,, | Performed by: INTERNAL MEDICINE

## 2018-11-12 PROCEDURE — 96372 THER/PROPH/DIAG INJ SC/IM: CPT | Mod: HCWC

## 2018-11-12 PROCEDURE — 36415 COLL VENOUS BLD VENIPUNCTURE: CPT | Mod: HCWC

## 2018-11-12 PROCEDURE — 3077F SYST BP >= 140 MM HG: CPT | Mod: CPTII,HCWC,S$GLB, | Performed by: INTERNAL MEDICINE

## 2018-11-12 PROCEDURE — 3079F DIAST BP 80-89 MM HG: CPT | Mod: CPTII,HCWC,S$GLB, | Performed by: INTERNAL MEDICINE

## 2018-11-12 RX ORDER — AMLODIPINE BESYLATE 5 MG/1
5 TABLET ORAL DAILY
Qty: 30 TABLET | Refills: 11 | Status: SHIPPED | OUTPATIENT
Start: 2018-11-12 | End: 2018-11-30

## 2018-11-12 RX ADMIN — LANREOTIDE ACETATE 120 MG: 60 INJECTION SUBCUTANEOUS at 02:11

## 2018-11-12 NOTE — Clinical Note
On 12/7, get CBC, CMP, chromogranin A, CT C/A/P. Return to see me on 12/10 then get lanreotide same day

## 2018-11-12 NOTE — PROGRESS NOTES
"Subjective:       Patient ID: Shahram Hills is a 78 y.o. female.     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 Ms. Hills is an 79 yo woman who initially saw me on 6/7/18 for further evaluation of neuroendocrine tumor. She initially presented with diarrhea, abdominal discomfort, weight loss. She was evaluated at [facility] and underwent workup below:  US abdomen on 3/14/18 showed numerous bilobar mixed echogenicity and mildly vascular hepatic lesions. Cholelithiasis without e/o acute cholecystitis.   MRI abdomen on 3/30/2018 showed innumerable hepatic metastases. L3 vertebral body metastasis with soft tissue component along the right margin of the L3 and upper L4 vertebral bodies, filling the right L3/4 neural foramen, and extending into the anterior aspect of the spinal canal on the right at the L3 and upper T4 levels. Associated with mild spinal canal narrowing.  CT chest on 4/6/2018 showed one lucent nodule measuring 7 mm in the T7 vertebral body, most likely representing metastatic disease.    EGD on 5/4/18 showed normal duodenum. Schatzki's ring in the lower third of the esophagus. Grade 1 esophagitis in the GE junction c/w mild distal esophagitis. Congestion and erythema in the antrum, pre-pyloric region and stomach body c/w gastritis. Biopsy of the stomach antrum showed mild chronic gastritis, neg for H. pylori.   Labs on 5/9/2018: WBC 6.9, H/H 11.6/34.3, MCV 87.5, plt count 279. On 3/9/18: Vit B12 level 173, folic acid 6.6  She was started on oral vit B12 and underwent a liver biopsy on 5/18/18. Pathology showed "metastatic well differentiated neuroendocrine tumor. Ki67 3%"     She saw me on 6/7/18 and complained of weight loss of 26 lbs over the past 3-4 months, also has diarrhea. No flushing, wheezing, palpitations. Has been having pain in right flank radiating down the right leg. No " "tingling/numbness, weakness. She can hold her bladder but when she urinates her diarrhea would come out as well. Started on dex 4 mg q6h the evening of 18.      2. MRI spine on 18 showed "Marrow replacing metastatic lesion of the L3 vertebral body with associated extra osseous expansion and complete effacement of the right L3-L4 neural foramina and additional effacement of the right lateral recess.  There is additional lateral extension and abutment of the right psoas muscle.  Superimposed degenerative change at this level results in mild spinal canal stenosis." I sent the patient to ED on 18 where she was evaluated by neurosurgery. Neurosurgery did not feel she was a candidate for surgical intervention.      3. Seen by Dr. Adames on 18. palliative radiation to the area of the L3 metastasis 18-18   4. 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  There is an adjacent metastatic lymph node, diffuse liver metastases, and multiple bone metastases. Index primary neoplasm SUV max 33.13. Adjacent lymph node SUV max 23. L3 bone metastasis SUV max 36.86. Left lobe SUV max 46.13   5. Lanreotide /. 7/. 8//. 9/, 10/12/18, 18     History of Present Illness:   Mr. Hills returns for follow up. Has been feeling well. Checks her BP every day. Sometimes SBP will be elevated 140-160s range.      ECO     ROS:   See HPI. Otherwise negative.      Physical Examination:   Vital signs reviewed.   Gen: well hydrated, well developed, in no acute distress.  HEENT: normocephalic, anicteric, PERRLA, EOMI, oropharynx clear  Neck: supple, no JVD, thyromegaly, cervical or supraclavicular LAD  Lungs: CTAB, no wheezes or rales  Heart: RRR, no M/R/G  Abdomen: soft, no tenderness, non-distended, no hepatomegaly, no splenomegaly, no mass, or hernia.   Ext: no cyanosis, edema, deformity  Neuro: alert and oriented x 4, no focal neuro deficit  Skin: no rash, " open wounds or ulcers  Psych: pleasant and appropriate mood and affect     Objective:      Diagnostic Tests:  Gallium study on 9/13/18:           FINDINGS:  Quality of the study: Adequate.    Intraluminal mass within the distal ileum SUV max 19.23, previously 33.13.    Adjacent lymph node metastasis SUV max 14.32, previously 23.    L3 vertebral body metastasis SUV max 17.3, previously 36.86.    Left hepatic lobe metastasis SUV max 34.87, previous 46.13.    Redemonstration of numerous hypermetabolic hepatic lesions and several additional osseous lesions involving the sternum, C7 vertebral body, and left femur.    Physiologic uptake of the tracer is seen within the pituitary, liver, spleen, kidneys, adrenal glands and bowel.       Impression         Interval decrease in SUV max values of the index lesions.  No new lesions identified.            Laboratory Data:  Labs unremarkable     Assessment/Plan:      1. Malignant carcinoid tumor of ileum    2. 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 Spine metastasis    4. 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 Essential hypertension        1-4  - doing well  - last gallium study in Sep showed interim response. Next restaging scan due in December on return  - lanreotide today  - RTC in 4 weeks with labs and lanreotide. Get CT C/A/P prior to return     5.  - BP was elevated in the office on two separate occasions. It was also intermittently elevated 140-160s at home.   - start amlodipine 5 mg daily.        Distress Screening Results: Psychosocial Distress screening score of Distress Score: 0 noted and reviewed. No intervention indicated.

## 2018-11-12 NOTE — PLAN OF CARE
Problem: Patient Care Overview  Goal: Plan of Care Review  Outcome: Ongoing (interventions implemented as appropriate)  Lanreotide administered, patient tolerated well. D/C'd home with sister. RTC one month.

## 2018-11-14 LAB — CGA SERPL-MCNC: 1770 NG/ML (ref 0–95)

## 2018-11-30 ENCOUNTER — OFFICE VISIT (OUTPATIENT)
Dept: INTERNAL MEDICINE | Facility: CLINIC | Age: 78
End: 2018-11-30
Attending: INTERNAL MEDICINE
Payer: MEDICARE

## 2018-11-30 VITALS
HEART RATE: 96 BPM | WEIGHT: 120.56 LBS | OXYGEN SATURATION: 98 % | BODY MASS INDEX: 20.58 KG/M2 | DIASTOLIC BLOOD PRESSURE: 84 MMHG | HEIGHT: 64 IN | SYSTOLIC BLOOD PRESSURE: 130 MMHG

## 2018-11-30 DIAGNOSIS — E78.49 OTHER HYPERLIPIDEMIA: ICD-10-CM

## 2018-11-30 DIAGNOSIS — R73.01 IFG (IMPAIRED FASTING GLUCOSE): ICD-10-CM

## 2018-11-30 DIAGNOSIS — I10 ESSENTIAL HYPERTENSION: Primary | ICD-10-CM

## 2018-11-30 PROBLEM — R63.4 WEIGHT LOSS: Status: RESOLVED | Noted: 2018-03-23 | Resolved: 2018-11-30

## 2018-11-30 PROBLEM — E87.1 HYPONATREMIA: Status: RESOLVED | Noted: 2018-07-11 | Resolved: 2018-11-30

## 2018-11-30 PROCEDURE — 3079F DIAST BP 80-89 MM HG: CPT | Mod: CPTII,HCWC,S$GLB, | Performed by: INTERNAL MEDICINE

## 2018-11-30 PROCEDURE — 1101F PT FALLS ASSESS-DOCD LE1/YR: CPT | Mod: CPTII,HCWC,S$GLB, | Performed by: INTERNAL MEDICINE

## 2018-11-30 PROCEDURE — 99999 PR PBB SHADOW E&M-EST. PATIENT-LVL III: CPT | Mod: PBBFAC,,, | Performed by: INTERNAL MEDICINE

## 2018-11-30 PROCEDURE — 99214 OFFICE O/P EST MOD 30 MIN: CPT | Mod: HCWC,S$GLB,, | Performed by: INTERNAL MEDICINE

## 2018-11-30 PROCEDURE — 3075F SYST BP GE 130 - 139MM HG: CPT | Mod: CPTII,HCWC,S$GLB, | Performed by: INTERNAL MEDICINE

## 2018-11-30 RX ORDER — AMLODIPINE BESYLATE 5 MG/1
5 TABLET ORAL DAILY
Qty: 90 TABLET | Refills: 3 | Status: SHIPPED | OUTPATIENT
Start: 2018-11-30 | End: 2019-11-26

## 2018-11-30 NOTE — PROGRESS NOTES
"Subjective:   Patient ID: Shahram Hills is a 78 y.o. female  Chief complaint:   Chief Complaint   Patient presents with    Hyperglycemia     f/u       HPI    Pt here for f/u of hyperglycemia     Weight loss: dx with cancer since onset - appetite "is great"  At this time - drinking ensure in bt meals  Weight today is 120 pounds up around 6 pounds since summer and dx with CA    Followed by Dr. Granados h/o for carcinoid tumor with mets to bone, liver, LN  had MRI spine metastatic lesion of the L3 vertebral body - was evaluated by neurosurgery - was not a candidate for surgical intervention.   Seen by Dr. Adames on 6/11/18. palliative radiation to the area of the L3 metastasis 6/18/18-6/29/18   - had appt with h/o scheduled and lcv 11/12/2018    HTN: on amlodipine 5 and tolerating well - home bp have been bt 120-133/60-70  bp today 130/84  Not nervous today but after furtther discussion going to heme/onc appts is a source of stress    Pt developed hyperglycemia when recevied steroids earlier this year - was placed on metformin at that time and no longer receiving steroids and metformin was stopped   No longer checking bg since off of steroids   No hx of diabetes is past   Last a1c 5.7% 8/17/18    HLD: tx with crestor in past but this was stopped after dx above and abnormal liver labs     Review of Systems    Objective:  Vitals:    11/30/18 1033   BP: 130/84   Pulse: 96   SpO2: 98%   Weight: 54.7 kg (120 lb 9.5 oz)   Height: 5' 4" (1.626 m)     Body mass index is 20.7 kg/m².    Physical Exam   Constitutional: She is oriented to person, place, and time. She appears well-developed and well-nourished.   HENT:   Head: Normocephalic and atraumatic.   Eyes: Conjunctivae and EOM are normal.   Neck: Normal range of motion. Neck supple.   Cardiovascular: Normal rate, regular rhythm and intact distal pulses.   Pulmonary/Chest: Effort normal and breath sounds normal.   Abdominal: Soft. Normal appearance and bowel sounds are normal. "   Musculoskeletal: She exhibits no edema or tenderness.   Neurological: She is alert and oriented to person, place, and time. She has normal strength. Gait normal.   Skin: Skin is warm, dry and intact. No cyanosis. Nails show no clubbing.   Psychiatric: She has a normal mood and affect. Her speech is normal and behavior is normal. Cognition and memory are normal.   Vitals reviewed.      Assessment:  1. Essential hypertension    2. Other hyperlipidemia    3. IFG (impaired fasting glucose)        Plan:  Shahram was seen today for hyperglycemia.    Diagnoses and all orders for this visit:    Essential hypertension  -     amLODIPine (NORVASC) 5 MG tablet; Take 1 tablet (5 mg total) by mouth once daily.  Controlled today - cont med and cont home monitoring  Low salt diet   Declined dig htn program     Other hyperlipidemia  -     Lipid panel; Future  -     TSH; Future  Manage with diet - rx stopped due to abnormal lft's when dx with carcinoid     IFG (impaired fasting glucose)  -     Hemoglobin A1c; Future  Check a1c today - prev steroid induced - improved after steroids subsided   Declined vaccines - will let me know if reconsiders    Health Maintenance   Topic Date Due    Influenza Vaccine  08/01/2018    DEXA SCAN  06/11/2021    Lipid Panel  11/27/2022    TETANUS VACCINE  03/23/2028    Zoster Vaccine  Addressed    Pneumococcal Vaccine (65+ Low/Medium Risk)  Discontinued

## 2018-12-07 ENCOUNTER — LAB VISIT (OUTPATIENT)
Dept: LAB | Facility: OTHER | Age: 78
End: 2018-12-07
Attending: INTERNAL MEDICINE
Payer: MEDICARE

## 2018-12-07 ENCOUNTER — HOSPITAL ENCOUNTER (OUTPATIENT)
Dept: RADIOLOGY | Facility: OTHER | Age: 78
Discharge: HOME | End: 2018-12-07
Attending: INTERNAL MEDICINE
Payer: MEDICARE

## 2018-12-07 DIAGNOSIS — C7A.012 MALIGNANT CARCINOID TUMOR OF ILEUM: ICD-10-CM

## 2018-12-07 DIAGNOSIS — C7A.8 NEUROENDOCRINE CARCINOMA METASTATIC TO BONE: ICD-10-CM

## 2018-12-07 DIAGNOSIS — R73.01 IFG (IMPAIRED FASTING GLUCOSE): ICD-10-CM

## 2018-12-07 DIAGNOSIS — C7B.8 NEUROENDOCRINE CARCINOMA METASTATIC TO BONE: ICD-10-CM

## 2018-12-07 DIAGNOSIS — C79.51 SPINE METASTASIS: ICD-10-CM

## 2018-12-07 DIAGNOSIS — C7B.8 METASTATIC MALIGNANT NEUROENDOCRINE TUMOR TO LIVER: ICD-10-CM

## 2018-12-07 DIAGNOSIS — E78.49 OTHER HYPERLIPIDEMIA: ICD-10-CM

## 2018-12-07 LAB
ALBUMIN SERPL BCP-MCNC: 3.8 G/DL
ALP SERPL-CCNC: 75 U/L
ALT SERPL W/O P-5'-P-CCNC: 9 U/L
ANION GAP SERPL CALC-SCNC: 9 MMOL/L
AST SERPL-CCNC: 18 U/L
BILIRUB SERPL-MCNC: 2.3 MG/DL
BUN SERPL-MCNC: 8 MG/DL
CALCIUM SERPL-MCNC: 10.3 MG/DL
CHLORIDE SERPL-SCNC: 103 MMOL/L
CHOLEST SERPL-MCNC: 266 MG/DL
CHOLEST/HDLC SERPL: 2.8 {RATIO}
CO2 SERPL-SCNC: 33 MMOL/L
CREAT SERPL-MCNC: 0.7 MG/DL
ERYTHROCYTE [DISTWIDTH] IN BLOOD BY AUTOMATED COUNT: 13.7 %
EST. GFR  (AFRICAN AMERICAN): >60 ML/MIN/1.73 M^2
EST. GFR  (NON AFRICAN AMERICAN): >60 ML/MIN/1.73 M^2
ESTIMATED AVG GLUCOSE: 114 MG/DL
GLUCOSE SERPL-MCNC: 112 MG/DL
HBA1C MFR BLD HPLC: 5.6 %
HCT VFR BLD AUTO: 42.1 %
HDLC SERPL-MCNC: 96 MG/DL
HDLC SERPL: 36.1 %
HGB BLD-MCNC: 13.5 G/DL
LDLC SERPL CALC-MCNC: 149.8 MG/DL
MCH RBC QN AUTO: 28.8 PG
MCHC RBC AUTO-ENTMCNC: 32.1 G/DL
MCV RBC AUTO: 90 FL
NEUTROPHILS # BLD AUTO: 3.2 K/UL
NONHDLC SERPL-MCNC: 170 MG/DL
PLATELET # BLD AUTO: 274 K/UL
PMV BLD AUTO: 10.3 FL
POTASSIUM SERPL-SCNC: 4.2 MMOL/L
PROT SERPL-MCNC: 7.7 G/DL
RBC # BLD AUTO: 4.69 M/UL
SODIUM SERPL-SCNC: 145 MMOL/L
TRIGL SERPL-MCNC: 101 MG/DL
TSH SERPL DL<=0.005 MIU/L-ACNC: 0.68 UIU/ML
WBC # BLD AUTO: 5.77 K/UL

## 2018-12-07 PROCEDURE — 74177 CT ABD & PELVIS W/CONTRAST: CPT | Mod: TC,HCWC

## 2018-12-07 PROCEDURE — 84443 ASSAY THYROID STIM HORMONE: CPT | Mod: HCWC

## 2018-12-07 PROCEDURE — 36415 COLL VENOUS BLD VENIPUNCTURE: CPT | Mod: HCWC

## 2018-12-07 PROCEDURE — 80061 LIPID PANEL: CPT | Mod: HCWC

## 2018-12-07 PROCEDURE — 71260 CT THORAX DX C+: CPT | Mod: 26,HCWC,, | Performed by: RADIOLOGY

## 2018-12-07 PROCEDURE — 83036 HEMOGLOBIN GLYCOSYLATED A1C: CPT | Mod: HCWC

## 2018-12-07 PROCEDURE — 86316 IMMUNOASSAY TUMOR OTHER: CPT | Mod: HCWC

## 2018-12-07 PROCEDURE — 85027 COMPLETE CBC AUTOMATED: CPT | Mod: HCWC

## 2018-12-07 PROCEDURE — 25500020 PHARM REV CODE 255: Mod: HCWC | Performed by: INTERNAL MEDICINE

## 2018-12-07 PROCEDURE — 80053 COMPREHEN METABOLIC PANEL: CPT | Mod: HCWC

## 2018-12-07 PROCEDURE — 74177 CT ABD & PELVIS W/CONTRAST: CPT | Mod: 26,HCWC,, | Performed by: RADIOLOGY

## 2018-12-07 RX ADMIN — IOHEXOL 75 ML: 350 INJECTION, SOLUTION INTRAVENOUS at 01:12

## 2018-12-07 RX ADMIN — IOHEXOL 15 ML: 350 INJECTION, SOLUTION INTRAVENOUS at 02:12

## 2018-12-10 ENCOUNTER — INFUSION (OUTPATIENT)
Dept: INFUSION THERAPY | Facility: OTHER | Age: 78
End: 2018-12-10
Attending: INTERNAL MEDICINE
Payer: MEDICARE

## 2018-12-10 ENCOUNTER — OFFICE VISIT (OUTPATIENT)
Dept: HEMATOLOGY/ONCOLOGY | Facility: CLINIC | Age: 78
End: 2018-12-10
Payer: MEDICARE

## 2018-12-10 VITALS
BODY MASS INDEX: 21.04 KG/M2 | WEIGHT: 123.25 LBS | HEIGHT: 64 IN | RESPIRATION RATE: 16 BRPM | HEART RATE: 91 BPM | DIASTOLIC BLOOD PRESSURE: 79 MMHG | TEMPERATURE: 97 F | SYSTOLIC BLOOD PRESSURE: 143 MMHG | OXYGEN SATURATION: 100 %

## 2018-12-10 DIAGNOSIS — C79.51 SPINE METASTASIS: ICD-10-CM

## 2018-12-10 DIAGNOSIS — C7B.8 METASTATIC MALIGNANT NEUROENDOCRINE TUMOR TO LIVER: Primary | ICD-10-CM

## 2018-12-10 DIAGNOSIS — I10 ESSENTIAL HYPERTENSION: ICD-10-CM

## 2018-12-10 DIAGNOSIS — C7A.8 NEUROENDOCRINE CARCINOMA METASTATIC TO BONE: ICD-10-CM

## 2018-12-10 DIAGNOSIS — C7A.012 MALIGNANT CARCINOID TUMOR OF ILEUM: Primary | ICD-10-CM

## 2018-12-10 DIAGNOSIS — C7B.8 NEUROENDOCRINE CARCINOMA METASTATIC TO BONE: ICD-10-CM

## 2018-12-10 DIAGNOSIS — C7B.8 METASTATIC MALIGNANT NEUROENDOCRINE TUMOR TO LIVER: ICD-10-CM

## 2018-12-10 DIAGNOSIS — Z51.11 ENCOUNTER FOR ANTINEOPLASTIC CHEMOTHERAPY: ICD-10-CM

## 2018-12-10 PROCEDURE — 3077F SYST BP >= 140 MM HG: CPT | Mod: CPTII,HCWC,S$GLB, | Performed by: INTERNAL MEDICINE

## 2018-12-10 PROCEDURE — 99999 PR PBB SHADOW E&M-EST. PATIENT-LVL III: CPT | Mod: PBBFAC,HCWC,, | Performed by: INTERNAL MEDICINE

## 2018-12-10 PROCEDURE — 1101F PT FALLS ASSESS-DOCD LE1/YR: CPT | Mod: CPTII,HCWC,S$GLB, | Performed by: INTERNAL MEDICINE

## 2018-12-10 PROCEDURE — 63600175 PHARM REV CODE 636 W HCPCS: Mod: JG,HCWC | Performed by: INTERNAL MEDICINE

## 2018-12-10 PROCEDURE — 99214 OFFICE O/P EST MOD 30 MIN: CPT | Mod: HCWC,S$GLB,, | Performed by: INTERNAL MEDICINE

## 2018-12-10 PROCEDURE — 96372 THER/PROPH/DIAG INJ SC/IM: CPT | Mod: HCWC

## 2018-12-10 PROCEDURE — 3078F DIAST BP <80 MM HG: CPT | Mod: CPTII,HCWC,S$GLB, | Performed by: INTERNAL MEDICINE

## 2018-12-10 RX ADMIN — LANREOTIDE ACETATE 120 MG: 60 INJECTION SUBCUTANEOUS at 11:12

## 2018-12-10 NOTE — PLAN OF CARE
Problem: Patient Care Overview  Goal: Plan of Care Review  Outcome: Ongoing (interventions implemented as appropriate)  Lanreotide administered, patient tolerated well. VSS, NAD. D/C'd home with family.

## 2018-12-10 NOTE — PROGRESS NOTES
"Subjective:       Patient ID: Shahram Hills is a 78 y.o. female.     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 Ms. Hills is an 79 yo woman who initially saw me on 6/7/18 for further evaluation of neuroendocrine tumor. She initially presented with diarrhea, abdominal discomfort, weight loss. She was evaluated at [facility] and underwent workup below:  US abdomen on 3/14/18 showed numerous bilobar mixed echogenicity and mildly vascular hepatic lesions. Cholelithiasis without e/o acute cholecystitis.   MRI abdomen on 3/30/2018 showed innumerable hepatic metastases. L3 vertebral body metastasis with soft tissue component along the right margin of the L3 and upper L4 vertebral bodies, filling the right L3/4 neural foramen, and extending into the anterior aspect of the spinal canal on the right at the L3 and upper T4 levels. Associated with mild spinal canal narrowing.  CT chest on 4/6/2018 showed one lucent nodule measuring 7 mm in the T7 vertebral body, most likely representing metastatic disease.    EGD on 5/4/18 showed normal duodenum. Schatzki's ring in the lower third of the esophagus. Grade 1 esophagitis in the GE junction c/w mild distal esophagitis. Congestion and erythema in the antrum, pre-pyloric region and stomach body c/w gastritis. Biopsy of the stomach antrum showed mild chronic gastritis, neg for H. pylori.   Labs on 5/9/2018: WBC 6.9, H/H 11.6/34.3, MCV 87.5, plt count 279. On 3/9/18: Vit B12 level 173, folic acid 6.6  She was started on oral vit B12 and underwent a liver biopsy on 5/18/18. Pathology showed "metastatic well differentiated neuroendocrine tumor. Ki67 3%"     She saw me on 6/7/18 and complained of weight loss of 26 lbs over the past 3-4 months, also has diarrhea. No flushing, wheezing, palpitations. Has been having pain in right flank radiating down the right leg. No " "tingling/numbness, weakness. She can hold her bladder but when she urinates her diarrhea would come out as well. Started on dex 4 mg q6h the evening of 18.      2. MRI spine on 18 showed "Marrow replacing metastatic lesion of the L3 vertebral body with associated extra osseous expansion and complete effacement of the right L3-L4 neural foramina and additional effacement of the right lateral recess.  There is additional lateral extension and abutment of the right psoas muscle.  Superimposed degenerative change at this level results in mild spinal canal stenosis." I sent the patient to ED on 18 where she was evaluated by neurosurgery. Neurosurgery did not feel she was a candidate for surgical intervention.      3. Seen by Dr. Adames on 18. palliative radiation to the area of the L3 metastasis 18-18   4. 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  There is an adjacent metastatic lymph node, diffuse liver metastases, and multiple bone metastases. Index primary neoplasm SUV max 33.13. Adjacent lymph node SUV max 23. L3 bone metastasis SUV max 36.86. Left lobe SUV max 46.13   5. Lanreotide /. 7/. 8//. 9/, 10/12/18, 18, 12/10/18     History of Present Illness:   Mr. Hills returns for follow up. Has been feeling well. Checks her BP every day. SBP in 120-130's range at home     ECO     ROS:   See HPI. Otherwise negative.      Physical Examination:   Vital signs reviewed.   Gen: well hydrated, well developed, in no acute distress.  HEENT: normocephalic, anicteric, PERRLA, EOMI, oropharynx clear  Neck: supple, no JVD, thyromegaly, cervical or supraclavicular LAD  Lungs: CTAB, no wheezes or rales  Heart: RRR, no M/R/G  Abdomen: soft, no tenderness, non-distended, no hepatomegaly, no splenomegaly, no mass, or hernia.   Ext: no cyanosis, edema, deformity  Neuro: alert and oriented x 4, no focal neuro deficit  Skin: no rash, open " wounds or ulcers  Psych: pleasant and appropriate mood and affect     Objective:      Diagnostic Tests:  CT 12/07/18:  Grossly stable disease with grossly unchanged size of the terminal ileum mass, adjacent lymph node, multiple liver metastasis, and multiple osseous metastasis    RECIST SUMMARY:    Date of prior examination for comparison: And PET-CT 09/13/2018, no contrasted imaging available    Lesion 1: Segment 4 of the liver.  6.0 cm cm.  Axial image 98/202    Lesion 2: Terminal ileum.  2.5 cm.  Axial image 153/202.    Lesion 3: Mesenteric nodule.  1.5 cm.  Axial image 141/202    Laboratory Data:  Labs reviewed     Assessment/Plan:      1. Malignant carcinoid tumor of ileum    2. 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 Spine metastasis    4. 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 Encounter for antineoplastic chemotherapy    6. Essential hypertension        1-5  - doing well  - CT scan showed stable disease. Next due in March 2019  - lanreotide today  - RTC in 4 weeks with labs and lanreotide.      5.  - BP well controlled at home  - C/w amlodipine 5 mg daily

## 2018-12-11 LAB — CGA SERPL-MCNC: 1939 NG/ML (ref 0–95)

## 2018-12-26 ENCOUNTER — TELEPHONE (OUTPATIENT)
Dept: ADMINISTRATIVE | Facility: CLINIC | Age: 78
End: 2018-12-26

## 2018-12-26 NOTE — PROGRESS NOTES
HH SOC with [facility], Dr Raymon Minor.  Patient received SN,PT,OT ST, and HHA services.

## 2019-01-07 ENCOUNTER — INFUSION (OUTPATIENT)
Dept: INFUSION THERAPY | Facility: OTHER | Age: 79
End: 2019-01-07
Attending: INTERNAL MEDICINE
Payer: MEDICARE

## 2019-01-07 ENCOUNTER — OFFICE VISIT (OUTPATIENT)
Dept: HEMATOLOGY/ONCOLOGY | Facility: CLINIC | Age: 79
End: 2019-01-07
Payer: MEDICARE

## 2019-01-07 ENCOUNTER — TELEPHONE (OUTPATIENT)
Dept: HEMATOLOGY/ONCOLOGY | Facility: CLINIC | Age: 79
End: 2019-01-07

## 2019-01-07 VITALS
SYSTOLIC BLOOD PRESSURE: 160 MMHG | HEART RATE: 84 BPM | DIASTOLIC BLOOD PRESSURE: 85 MMHG | OXYGEN SATURATION: 100 % | TEMPERATURE: 98 F | WEIGHT: 123 LBS | RESPIRATION RATE: 16 BRPM | BODY MASS INDEX: 21 KG/M2 | HEIGHT: 64 IN

## 2019-01-07 DIAGNOSIS — C7A.8 NEUROENDOCRINE CARCINOMA METASTATIC TO BONE: ICD-10-CM

## 2019-01-07 DIAGNOSIS — I10 ESSENTIAL HYPERTENSION: ICD-10-CM

## 2019-01-07 DIAGNOSIS — E87.6 HYPOKALEMIA: ICD-10-CM

## 2019-01-07 DIAGNOSIS — C7A.012 MALIGNANT CARCINOID TUMOR OF ILEUM: Primary | ICD-10-CM

## 2019-01-07 DIAGNOSIS — C7B.8 METASTATIC MALIGNANT NEUROENDOCRINE TUMOR TO LIVER: Primary | ICD-10-CM

## 2019-01-07 DIAGNOSIS — C7B.8 NEUROENDOCRINE CARCINOMA METASTATIC TO BONE: ICD-10-CM

## 2019-01-07 DIAGNOSIS — C7B.8 METASTATIC MALIGNANT NEUROENDOCRINE TUMOR TO LIVER: ICD-10-CM

## 2019-01-07 PROCEDURE — 1101F PT FALLS ASSESS-DOCD LE1/YR: CPT | Mod: CPTII,S$GLB,, | Performed by: INTERNAL MEDICINE

## 2019-01-07 PROCEDURE — 1101F PR PT FALLS ASSESS DOC 0-1 FALLS W/OUT INJ PAST YR: ICD-10-PCS | Mod: CPTII,S$GLB,, | Performed by: INTERNAL MEDICINE

## 2019-01-07 PROCEDURE — 96372 THER/PROPH/DIAG INJ SC/IM: CPT

## 2019-01-07 PROCEDURE — 99214 PR OFFICE/OUTPT VISIT, EST, LEVL IV, 30-39 MIN: ICD-10-PCS | Mod: S$GLB,,, | Performed by: INTERNAL MEDICINE

## 2019-01-07 PROCEDURE — 63600175 PHARM REV CODE 636 W HCPCS: Mod: JG | Performed by: INTERNAL MEDICINE

## 2019-01-07 PROCEDURE — 99999 PR PBB SHADOW E&M-EST. PATIENT-LVL III: CPT | Mod: PBBFAC,,, | Performed by: INTERNAL MEDICINE

## 2019-01-07 PROCEDURE — 3079F DIAST BP 80-89 MM HG: CPT | Mod: CPTII,S$GLB,, | Performed by: INTERNAL MEDICINE

## 2019-01-07 PROCEDURE — 3079F PR MOST RECENT DIASTOLIC BLOOD PRESSURE 80-89 MM HG: ICD-10-PCS | Mod: CPTII,S$GLB,, | Performed by: INTERNAL MEDICINE

## 2019-01-07 PROCEDURE — 99999 PR PBB SHADOW E&M-EST. PATIENT-LVL III: ICD-10-PCS | Mod: PBBFAC,,, | Performed by: INTERNAL MEDICINE

## 2019-01-07 PROCEDURE — 3077F PR MOST RECENT SYSTOLIC BLOOD PRESSURE >= 140 MM HG: ICD-10-PCS | Mod: CPTII,S$GLB,, | Performed by: INTERNAL MEDICINE

## 2019-01-07 PROCEDURE — 99214 OFFICE O/P EST MOD 30 MIN: CPT | Mod: S$GLB,,, | Performed by: INTERNAL MEDICINE

## 2019-01-07 PROCEDURE — 3077F SYST BP >= 140 MM HG: CPT | Mod: CPTII,S$GLB,, | Performed by: INTERNAL MEDICINE

## 2019-01-07 RX ORDER — POTASSIUM CHLORIDE 20 MEQ/1
40 TABLET, EXTENDED RELEASE ORAL DAILY
Qty: 4 TABLET | Refills: 0 | Status: SHIPPED | OUTPATIENT
Start: 2019-01-07 | End: 2019-01-07 | Stop reason: SDUPTHER

## 2019-01-07 RX ORDER — POTASSIUM CHLORIDE 20 MEQ/1
TABLET, EXTENDED RELEASE ORAL
Qty: 4 TABLET | Refills: 0 | Status: SHIPPED | OUTPATIENT
Start: 2019-01-07 | End: 2019-08-20

## 2019-01-07 RX ADMIN — LANREOTIDE ACETATE 120 MG: 120 INJECTION SUBCUTANEOUS at 11:01

## 2019-01-07 NOTE — PROGRESS NOTES
"Subjective:       Patient ID: Shahram Hills is a 78 y.o. female.     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 Ms. Hills is an 77 yo woman who initially saw me on 6/7/18 for further evaluation of neuroendocrine tumor. She initially presented with diarrhea, abdominal discomfort, weight loss. She was evaluated at [facility] and underwent workup below:  US abdomen on 3/14/18 showed numerous bilobar mixed echogenicity and mildly vascular hepatic lesions. Cholelithiasis without e/o acute cholecystitis.   MRI abdomen on 3/30/2018 showed innumerable hepatic metastases. L3 vertebral body metastasis with soft tissue component along the right margin of the L3 and upper L4 vertebral bodies, filling the right L3/4 neural foramen, and extending into the anterior aspect of the spinal canal on the right at the L3 and upper T4 levels. Associated with mild spinal canal narrowing.  CT chest on 4/6/2018 showed one lucent nodule measuring 7 mm in the T7 vertebral body, most likely representing metastatic disease.    EGD on 5/4/18 showed normal duodenum. Schatzki's ring in the lower third of the esophagus. Grade 1 esophagitis in the GE junction c/w mild distal esophagitis. Congestion and erythema in the antrum, pre-pyloric region and stomach body c/w gastritis. Biopsy of the stomach antrum showed mild chronic gastritis, neg for H. pylori.   Labs on 5/9/2018: WBC 6.9, H/H 11.6/34.3, MCV 87.5, plt count 279. On 3/9/18: Vit B12 level 173, folic acid 6.6  She was started on oral vit B12 and underwent a liver biopsy on 5/18/18. Pathology showed "metastatic well differentiated neuroendocrine tumor. Ki67 3%"     She saw me on 6/7/18 and complained of weight loss of 26 lbs over the past 3-4 months, also has diarrhea. No flushing, wheezing, palpitations. Has been having pain in right flank radiating down the right leg. No " "tingling/numbness, weakness. She can hold her bladder but when she urinates her diarrhea would come out as well. Started on dex 4 mg q6h the evening of 18.      2. MRI spine on 18 showed "Marrow replacing metastatic lesion of the L3 vertebral body with associated extra osseous expansion and complete effacement of the right L3-L4 neural foramina and additional effacement of the right lateral recess.  There is additional lateral extension and abutment of the right psoas muscle.  Superimposed degenerative change at this level results in mild spinal canal stenosis." I sent the patient to ED on 18 where she was evaluated by neurosurgery. Neurosurgery did not feel she was a candidate for surgical intervention.      3. Seen by Dr. Adames on 18. palliative radiation to the area of the L3 metastasis 18-18   4. 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  There is an adjacent metastatic lymph node, diffuse liver metastases, and multiple bone metastases. Index primary neoplasm SUV max 33.13. Adjacent lymph node SUV max 23. L3 bone metastasis SUV max 36.86. Left lobe SUV max 46.13   5. Lanreotide /. 7/. 8//. 9/, 10/12/18, 18, 12/10/18, 19     History of Present Illness:   Mr. Hills returns for follow up. Has been feeling well. Checks her BP every day. SBP in 130's range at home     ECO     ROS:   See HPI. Otherwise negative.      Physical Examination:   Vital signs reviewed.   Gen: well hydrated, well developed, in no acute distress.  HEENT: normocephalic, anicteric, PERRLA, EOMI, oropharynx clear  Neck: supple, no JVD, thyromegaly, cervical or supraclavicular LAD  Lungs: CTAB, no wheezes or rales  Heart: RRR, no M/R/G  Abdomen: soft, no tenderness, non-distended,  liver palpated 6.5 cm below the right costal margin, no splenomegaly, no mass, or hernia.   Ext: no cyanosis, edema, deformity  Neuro: alert and oriented x 4, no " focal neuro deficit  Skin: no rash, open wounds or ulcers  Psych: pleasant and appropriate mood and affect     Objective:      Diagnostic Tests:  CT 12/07/18:  Grossly stable disease with grossly unchanged size of the terminal ileum mass, adjacent lymph node, multiple liver metastasis, and multiple osseous metastasis    RECIST SUMMARY:    Date of prior examination for comparison: And PET-CT 09/13/2018, no contrasted imaging available    Lesion 1: Segment 4 of the liver.  6.0 cm cm.  Axial image 98/202    Lesion 2: Terminal ileum.  2.5 cm.  Axial image 153/202.    Lesion 3: Mesenteric nodule.  1.5 cm.  Axial image 141/202     Laboratory Data:  Labs reviewed. Bilirubin improved.      Assessment/Plan:      1. Malignant carcinoid tumor of ileum    2. 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    4. Essential hypertension    5. Hypokalemia        1-3  - doing well  - CT scan 12/7 showed stable disease. Next due in March 2019  - lanreotide today  - discussed with patient that given the majority of disease burden is in the liver, will talk to IR re Y90 ablation.   - RTC in 4 weeks with labs and lanreotide.      4.  - BP well controlled at home  - C/w amlodipine 5 mg daily    5.  - K-dur 40 mEq daily x 2 days

## 2019-01-07 NOTE — PLAN OF CARE
Problem: Adult Inpatient Plan of Care  Goal: Plan of Care Review  Outcome: Ongoing (interventions implemented as appropriate)  Lanreotide administered, patient tolerated well. VSS, NAD. D/C'd home with self.

## 2019-01-07 NOTE — Clinical Note
Please call patient and let her know her potassium level is slightly low. Sent prescription of K-dur to pharmacy. Take 2 tablets (40 mEq) daily for two days.

## 2019-01-07 NOTE — TELEPHONE ENCOUNTER
----- Message from Shirin Rose sent at 1/7/2019  4:41 PM CST -----  Contact: Pt family   Type:  Patient Returning Call    Who Called:zain   Who Left Message for Patient:   Does the patient know what this is regarding?: [facility] Call Back Number: [phone]  Additional Information:   Thank You  LORRAINE Rose

## 2019-01-07 NOTE — TELEPHONE ENCOUNTER
Returned call and spoke with Ms Crook. Ms Crook states when Ms Hills was seen in clinic today, Dr Granados informed Ms Hilsl she will need to have a procedure done and she was unable to explain the information to Ms Ambreen so Ms Hills ask Ms Ambreen to call Dr Granados. Informed Ms Ambreen, I am unable to discuss Ms Hills medical information with her without Ms Hills authorization. I called and spoke with Ms Hills and she verbally states yes she informed Dr Granados she is authorized to discuss her medical information with Ms Crook.   Informed Ms Chaoaux I will forward a message to Dr Granados and have her return the call and speak with Ms Crook.

## 2019-01-07 NOTE — TELEPHONE ENCOUNTER
----- Message from Elena Andujar sent at 1/7/2019  2:00 PM CST -----  Contact: Ambreen ( cousin )   Name of Who is Calling: Ambreen ( cousin )       What is the request in detail:Ambreen ( leannsin ) is requesting a call in regards to the patients upcoming procedure she states she has some questions to ask the doctor..... Please contact to further discuss and advise.       Can the clinic reply by [facility]: no       [facility]  [phone]

## 2019-01-07 NOTE — TELEPHONE ENCOUNTER
Spoke with Ms Crook and explained the rationale and procedure of Y90 ablation. She understands and will talk to Ms Hills.

## 2019-01-07 NOTE — Clinical Note
RTC on 2/11 with CBC, CMP, chromogranin A. See me, then get lanreotide. Refer to IR for Y90 ablation

## 2019-01-08 ENCOUNTER — TELEPHONE (OUTPATIENT)
Dept: INTERVENTIONAL RADIOLOGY/VASCULAR | Facility: CLINIC | Age: 79
End: 2019-01-08

## 2019-01-08 ENCOUNTER — TELEPHONE (OUTPATIENT)
Dept: HEMATOLOGY/ONCOLOGY | Facility: CLINIC | Age: 79
End: 2019-01-08

## 2019-01-08 NOTE — TELEPHONE ENCOUNTER
Called and spoke with Ms Hills. Per Dr Granados:  Please call patient and let her know her potassium level is slightly low. Sent prescription of K-dur to pharmacy. Take 2 tablets (40 mEq) daily for two days.  Ms Hills has received her prescription and she will start taking her medication today.

## 2019-01-08 NOTE — TELEPHONE ENCOUNTER
----- Message from Carina Briggs MA sent at 1/8/2019  1:10 PM CST -----  Pt has a consult at [facility] on 01/18/2019.  We wont have anything until 01/21.   ----- Message -----  From: Anthony Pinto MD  Sent: 1/8/2019  10:55 AM  To: Claire Prince, NP, Carina Briggs MA, #    I think most data suggests that chemoembolization is best locoregional therapy for NET mets to liver.  Would proceed with that over Y90 if you are ok with that.  Carina, can you get her into clinic as soon as we can.    Thanks,  Anthony  ----- Message -----  From: Sebastian Granados MD  Sent: 1/7/2019  11:48 AM  To: Anthony Pinto MD, Blair Cruz Staff    [facility],     Will you be able to do Y90 ablation for this patient? Metastatic neuroendocrine tumor of ileum. Majority of disease in the liver. Thanks.    Sebastian

## 2019-01-21 ENCOUNTER — INITIAL CONSULT (OUTPATIENT)
Dept: INTERVENTIONAL RADIOLOGY/VASCULAR | Facility: CLINIC | Age: 79
End: 2019-01-21
Payer: MEDICARE

## 2019-01-21 VITALS
DIASTOLIC BLOOD PRESSURE: 90 MMHG | HEIGHT: 64 IN | HEART RATE: 87 BPM | WEIGHT: 122.31 LBS | BODY MASS INDEX: 20.88 KG/M2 | SYSTOLIC BLOOD PRESSURE: 144 MMHG

## 2019-01-21 DIAGNOSIS — C7B.8 METASTATIC MALIGNANT NEUROENDOCRINE TUMOR TO LIVER: Primary | ICD-10-CM

## 2019-01-21 PROCEDURE — 1101F PT FALLS ASSESS-DOCD LE1/YR: CPT | Mod: CPTII,S$GLB,, | Performed by: FAMILY MEDICINE

## 2019-01-21 PROCEDURE — 3077F SYST BP >= 140 MM HG: CPT | Mod: CPTII,S$GLB,, | Performed by: FAMILY MEDICINE

## 2019-01-21 PROCEDURE — 99214 PR OFFICE/OUTPT VISIT, EST, LEVL IV, 30-39 MIN: ICD-10-PCS | Mod: S$GLB,,, | Performed by: FAMILY MEDICINE

## 2019-01-21 PROCEDURE — 99999 PR PBB SHADOW E&M-EST. PATIENT-LVL III: CPT | Mod: PBBFAC,,,

## 2019-01-21 PROCEDURE — 1101F PR PT FALLS ASSESS DOC 0-1 FALLS W/OUT INJ PAST YR: ICD-10-PCS | Mod: CPTII,S$GLB,, | Performed by: FAMILY MEDICINE

## 2019-01-21 PROCEDURE — 3077F PR MOST RECENT SYSTOLIC BLOOD PRESSURE >= 140 MM HG: ICD-10-PCS | Mod: CPTII,S$GLB,, | Performed by: FAMILY MEDICINE

## 2019-01-21 PROCEDURE — 99214 OFFICE O/P EST MOD 30 MIN: CPT | Mod: S$GLB,,, | Performed by: FAMILY MEDICINE

## 2019-01-21 PROCEDURE — 3080F PR MOST RECENT DIASTOLIC BLOOD PRESSURE >= 90 MM HG: ICD-10-PCS | Mod: CPTII,S$GLB,, | Performed by: FAMILY MEDICINE

## 2019-01-21 PROCEDURE — 3080F DIAST BP >= 90 MM HG: CPT | Mod: CPTII,S$GLB,, | Performed by: FAMILY MEDICINE

## 2019-01-21 PROCEDURE — 99999 PR PBB SHADOW E&M-EST. PATIENT-LVL III: ICD-10-PCS | Mod: PBBFAC,,,

## 2019-02-05 DIAGNOSIS — C7B.8 METASTATIC MALIGNANT NEUROENDOCRINE TUMOR TO LIVER: Primary | ICD-10-CM

## 2019-02-11 ENCOUNTER — INFUSION (OUTPATIENT)
Dept: INFUSION THERAPY | Facility: OTHER | Age: 79
End: 2019-02-11
Attending: INTERNAL MEDICINE
Payer: MEDICARE

## 2019-02-11 ENCOUNTER — OFFICE VISIT (OUTPATIENT)
Dept: HEMATOLOGY/ONCOLOGY | Facility: CLINIC | Age: 79
End: 2019-02-11
Payer: MEDICARE

## 2019-02-11 ENCOUNTER — LAB VISIT (OUTPATIENT)
Dept: LAB | Facility: OTHER | Age: 79
End: 2019-02-11
Attending: INTERNAL MEDICINE
Payer: MEDICARE

## 2019-02-11 VITALS
HEIGHT: 64 IN | BODY MASS INDEX: 21.04 KG/M2 | TEMPERATURE: 98 F | RESPIRATION RATE: 16 BRPM | OXYGEN SATURATION: 98 % | HEART RATE: 87 BPM | DIASTOLIC BLOOD PRESSURE: 77 MMHG | WEIGHT: 123.25 LBS | SYSTOLIC BLOOD PRESSURE: 131 MMHG

## 2019-02-11 DIAGNOSIS — C7B.8 METASTATIC MALIGNANT NEUROENDOCRINE TUMOR TO LIVER: Primary | ICD-10-CM

## 2019-02-11 DIAGNOSIS — C7B.8 METASTATIC MALIGNANT NEUROENDOCRINE TUMOR TO LIVER: ICD-10-CM

## 2019-02-11 DIAGNOSIS — C7A.8 NEUROENDOCRINE CARCINOMA METASTATIC TO BONE: ICD-10-CM

## 2019-02-11 DIAGNOSIS — C7B.8 NEUROENDOCRINE CARCINOMA METASTATIC TO BONE: ICD-10-CM

## 2019-02-11 DIAGNOSIS — I10 ESSENTIAL HYPERTENSION: ICD-10-CM

## 2019-02-11 DIAGNOSIS — C79.51 SPINE METASTASIS: ICD-10-CM

## 2019-02-11 DIAGNOSIS — C7A.012 MALIGNANT CARCINOID TUMOR OF ILEUM: Primary | ICD-10-CM

## 2019-02-11 DIAGNOSIS — C7A.012 MALIGNANT CARCINOID TUMOR OF ILEUM: ICD-10-CM

## 2019-02-11 LAB
ALBUMIN SERPL BCP-MCNC: 3.5 G/DL
ALP SERPL-CCNC: 76 U/L
ALT SERPL W/O P-5'-P-CCNC: 10 U/L
ANION GAP SERPL CALC-SCNC: 11 MMOL/L
AST SERPL-CCNC: 23 U/L
BILIRUB SERPL-MCNC: 1.7 MG/DL
BUN SERPL-MCNC: 8 MG/DL
CALCIUM SERPL-MCNC: 9.9 MG/DL
CHLORIDE SERPL-SCNC: 102 MMOL/L
CO2 SERPL-SCNC: 28 MMOL/L
CREAT SERPL-MCNC: 0.7 MG/DL
ERYTHROCYTE [DISTWIDTH] IN BLOOD BY AUTOMATED COUNT: 12.7 %
EST. GFR  (AFRICAN AMERICAN): >60 ML/MIN/1.73 M^2
EST. GFR  (NON AFRICAN AMERICAN): >60 ML/MIN/1.73 M^2
GLUCOSE SERPL-MCNC: 118 MG/DL
HCT VFR BLD AUTO: 39.5 %
HGB BLD-MCNC: 13 G/DL
MCH RBC QN AUTO: 29.1 PG
MCHC RBC AUTO-ENTMCNC: 32.9 G/DL
MCV RBC AUTO: 88 FL
NEUTROPHILS # BLD AUTO: 3.4 K/UL
PLATELET # BLD AUTO: 288 K/UL
PMV BLD AUTO: 10.2 FL
POTASSIUM SERPL-SCNC: 3.8 MMOL/L
PROT SERPL-MCNC: 7.4 G/DL
RBC # BLD AUTO: 4.47 M/UL
SODIUM SERPL-SCNC: 141 MMOL/L
WBC # BLD AUTO: 5.48 K/UL

## 2019-02-11 PROCEDURE — 63600175 PHARM REV CODE 636 W HCPCS: Mod: HCNC,JG | Performed by: INTERNAL MEDICINE

## 2019-02-11 PROCEDURE — 99214 OFFICE O/P EST MOD 30 MIN: CPT | Mod: HCNC,S$GLB,, | Performed by: INTERNAL MEDICINE

## 2019-02-11 PROCEDURE — 86316 IMMUNOASSAY TUMOR OTHER: CPT | Mod: HCNC

## 2019-02-11 PROCEDURE — 85027 COMPLETE CBC AUTOMATED: CPT | Mod: HCNC

## 2019-02-11 PROCEDURE — 99999 PR PBB SHADOW E&M-EST. PATIENT-LVL III: CPT | Mod: PBBFAC,HCNC,, | Performed by: INTERNAL MEDICINE

## 2019-02-11 PROCEDURE — 99999 PR PBB SHADOW E&M-EST. PATIENT-LVL III: ICD-10-PCS | Mod: PBBFAC,HCNC,, | Performed by: INTERNAL MEDICINE

## 2019-02-11 PROCEDURE — 3078F PR MOST RECENT DIASTOLIC BLOOD PRESSURE < 80 MM HG: ICD-10-PCS | Mod: HCNC,CPTII,S$GLB, | Performed by: INTERNAL MEDICINE

## 2019-02-11 PROCEDURE — 1101F PR PT FALLS ASSESS DOC 0-1 FALLS W/OUT INJ PAST YR: ICD-10-PCS | Mod: HCNC,CPTII,S$GLB, | Performed by: INTERNAL MEDICINE

## 2019-02-11 PROCEDURE — 3075F PR MOST RECENT SYSTOLIC BLOOD PRESS GE 130-139MM HG: ICD-10-PCS | Mod: HCNC,CPTII,S$GLB, | Performed by: INTERNAL MEDICINE

## 2019-02-11 PROCEDURE — 1101F PT FALLS ASSESS-DOCD LE1/YR: CPT | Mod: HCNC,CPTII,S$GLB, | Performed by: INTERNAL MEDICINE

## 2019-02-11 PROCEDURE — 3078F DIAST BP <80 MM HG: CPT | Mod: HCNC,CPTII,S$GLB, | Performed by: INTERNAL MEDICINE

## 2019-02-11 PROCEDURE — 96372 THER/PROPH/DIAG INJ SC/IM: CPT | Mod: HCNC

## 2019-02-11 PROCEDURE — 80053 COMPREHEN METABOLIC PANEL: CPT | Mod: HCNC

## 2019-02-11 PROCEDURE — 99214 PR OFFICE/OUTPT VISIT, EST, LEVL IV, 30-39 MIN: ICD-10-PCS | Mod: HCNC,S$GLB,, | Performed by: INTERNAL MEDICINE

## 2019-02-11 PROCEDURE — 36415 COLL VENOUS BLD VENIPUNCTURE: CPT | Mod: HCNC

## 2019-02-11 PROCEDURE — 3075F SYST BP GE 130 - 139MM HG: CPT | Mod: HCNC,CPTII,S$GLB, | Performed by: INTERNAL MEDICINE

## 2019-02-11 RX ORDER — LANREOTIDE ACETATE 120 MG/.5ML
120 INJECTION SUBCUTANEOUS
Status: CANCELLED | OUTPATIENT
Start: 2019-02-11

## 2019-02-11 RX ORDER — LANREOTIDE ACETATE 120 MG/.5ML
120 INJECTION SUBCUTANEOUS
Status: DISCONTINUED | OUTPATIENT
Start: 2019-02-11 | End: 2019-02-11 | Stop reason: HOSPADM

## 2019-02-11 RX ADMIN — LANREOTIDE ACETATE 120 MG: 120 INJECTION SUBCUTANEOUS at 11:02

## 2019-02-11 NOTE — PLAN OF CARE
Problem: Adult Inpatient Plan of Care  Goal: Plan of Care Review  Outcome: Ongoing (interventions implemented as appropriate)  Lanreotide administered, patient tolerated well. VSS, NAD. D/C'd home with family.

## 2019-02-11 NOTE — PROGRESS NOTES
"Subjective:       Patient ID: Shahram Hills is a 78 y.o. female.     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 Ms. Hills is an 79 yo woman who initially saw me on 6/7/18 for further evaluation of neuroendocrine tumor. She initially presented with diarrhea, abdominal discomfort, weight loss. She was evaluated at [facility] and underwent workup below:  US abdomen on 3/14/18 showed numerous bilobar mixed echogenicity and mildly vascular hepatic lesions. Cholelithiasis without e/o acute cholecystitis.   MRI abdomen on 3/30/2018 showed innumerable hepatic metastases. L3 vertebral body metastasis with soft tissue component along the right margin of the L3 and upper L4 vertebral bodies, filling the right L3/4 neural foramen, and extending into the anterior aspect of the spinal canal on the right at the L3 and upper T4 levels. Associated with mild spinal canal narrowing.  CT chest on 4/6/2018 showed one lucent nodule measuring 7 mm in the T7 vertebral body, most likely representing metastatic disease.    EGD on 5/4/18 showed normal duodenum. Schatzki's ring in the lower third of the esophagus. Grade 1 esophagitis in the GE junction c/w mild distal esophagitis. Congestion and erythema in the antrum, pre-pyloric region and stomach body c/w gastritis. Biopsy of the stomach antrum showed mild chronic gastritis, neg for H. pylori.   Labs on 5/9/2018: WBC 6.9, H/H 11.6/34.3, MCV 87.5, plt count 279. On 3/9/18: Vit B12 level 173, folic acid 6.6  She was started on oral vit B12 and underwent a liver biopsy on 5/18/18. Pathology showed "metastatic well differentiated neuroendocrine tumor. Ki67 3%"     She saw me on 6/7/18 and complained of weight loss of 26 lbs over the past 3-4 months, also has diarrhea. No flushing, wheezing, palpitations. Has been having pain in right flank radiating down the right leg. No " "tingling/numbness, weakness. She can hold her bladder but when she urinates her diarrhea would come out as well. Started on dex 4 mg q6h the evening of 18.      2. MRI spine on 18 showed "Marrow replacing metastatic lesion of the L3 vertebral body with associated extra osseous expansion and complete effacement of the right L3-L4 neural foramina and additional effacement of the right lateral recess.  There is additional lateral extension and abutment of the right psoas muscle.  Superimposed degenerative change at this level results in mild spinal canal stenosis." I sent the patient to ED on 18 where she was evaluated by neurosurgery. Neurosurgery did not feel she was a candidate for surgical intervention.      3. Seen by Dr. Adames on 18. palliative radiation to the area of the L3 metastasis 18-18   4. 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  There is an adjacent metastatic lymph node, diffuse liver metastases, and multiple bone metastases. Index primary neoplasm SUV max 33.13. Adjacent lymph node SUV max 23. L3 bone metastasis SUV max 36.86. Left lobe SUV max 46.13   5. Lanreotide /. 7/. //. /, 10/12/18, 18, 12/10/18, 19, 19     History of Present Illness:   Mr. Hills returns for follow up. feeling well. Occasional right leg pain which has significantly improved compared to prior. Denies other complaints. Seen by IR. Scheduled for chemoembolization this Thursday.      ECO     ROS:   See HPI. Otherwise negative.      Physical Examination:   Vital signs reviewed.   Gen: well hydrated, well developed, in no acute distress.  HEENT: normocephalic, anicteric, PERRLA, EOMI, oropharynx clear  Neck: supple, no JVD, thyromegaly, cervical or supraclavicular LAD  Lungs: CTAB, no wheezes or rales  Heart: RRR, no M/R/G  Abdomen: soft, no tenderness, non-distended,  liver palpated 4.5 cm below the right costal " margin, no splenomegaly, no mass, or hernia.   Ext: no cyanosis, edema, deformity  Neuro: alert and oriented x 4, no focal neuro deficit  Skin: no rash, open wounds or ulcers  Psych: pleasant and appropriate mood and affect     Objective:      Diagnostic Tests:  CT 12/07/18:  Grossly stable disease with grossly unchanged size of the terminal ileum mass, adjacent lymph node, multiple liver metastasis, and multiple osseous metastasis    RECIST SUMMARY:    Date of prior examination for comparison: And PET-CT 09/13/2018, no contrasted imaging available    Lesion 1: Segment 4 of the liver.  6.0 cm cm.  Axial image 98/202    Lesion 2: Terminal ileum.  2.5 cm.  Axial image 153/202.    Lesion 3: Mesenteric nodule.  1.5 cm.  Axial image 141/202     Laboratory Data:  Labs reviewed. Bilirubin 1.7     Assessment/Plan:      1. Malignant carcinoid tumor of ileum    2. 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 Spine metastasis    4. 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 Essential hypertension        1-4  - doing well  - CT scan 12/7 showed stable disease. Next due on return 3/8/19  - lanreotide today  - scheduled for TACE this Thursday  - RTC in 4 weeks with labs, CT scan, and lanreotide.      5.  - BP well controlled   - C/w current meds

## 2019-02-13 ENCOUNTER — TELEPHONE (OUTPATIENT)
Dept: INTERVENTIONAL RADIOLOGY/VASCULAR | Facility: HOSPITAL | Age: 79
End: 2019-02-13

## 2019-02-13 ENCOUNTER — TELEPHONE (OUTPATIENT)
Dept: INTERVENTIONAL RADIOLOGY/VASCULAR | Facility: HOSPITAL | Age: 79
End: 2019-02-13
Payer: MEDICARE

## 2019-02-13 VITALS — WEIGHT: 120 LBS | BODY MASS INDEX: 20.49 KG/M2 | HEIGHT: 64 IN

## 2019-02-13 DIAGNOSIS — C7B.8 METASTATIC MALIGNANT NEUROENDOCRINE TUMOR TO LIVER: Primary | ICD-10-CM

## 2019-02-13 RX ORDER — MAGNESIUM SULFATE HEPTAHYDRATE 40 MG/ML
2 INJECTION, SOLUTION INTRAVENOUS ONCE
Status: CANCELLED | OUTPATIENT
Start: 2019-02-13

## 2019-02-13 RX ORDER — MIDAZOLAM HYDROCHLORIDE 1 MG/ML
1 INJECTION INTRAMUSCULAR; INTRAVENOUS
Status: CANCELLED | OUTPATIENT
Start: 2019-02-13

## 2019-02-13 RX ORDER — HEPARIN SODIUM 200 [USP'U]/100ML
500 INJECTION, SOLUTION INTRAVENOUS CONTINUOUS
Status: CANCELLED | OUTPATIENT
Start: 2019-02-13

## 2019-02-13 RX ORDER — FENTANYL CITRATE 50 UG/ML
50 INJECTION, SOLUTION INTRAMUSCULAR; INTRAVENOUS
Status: CANCELLED | OUTPATIENT
Start: 2019-02-13

## 2019-02-14 ENCOUNTER — HOSPITAL ENCOUNTER (OUTPATIENT)
Dept: INTERVENTIONAL RADIOLOGY/VASCULAR | Facility: HOSPITAL | Age: 79
Discharge: HOME | End: 2019-02-14
Attending: FAMILY MEDICINE | Admitting: RADIOLOGY
Payer: MEDICARE

## 2019-02-14 ENCOUNTER — HOSPITAL ENCOUNTER (OUTPATIENT)
Facility: HOSPITAL | Age: 79
Discharge: HOME | End: 2019-02-15
Attending: RADIOLOGY | Admitting: RADIOLOGY
Payer: MEDICARE

## 2019-02-14 VITALS
DIASTOLIC BLOOD PRESSURE: 66 MMHG | TEMPERATURE: 98 F | OXYGEN SATURATION: 97 % | SYSTOLIC BLOOD PRESSURE: 128 MMHG | RESPIRATION RATE: 16 BRPM | HEART RATE: 78 BPM

## 2019-02-14 DIAGNOSIS — C7B.8 METASTATIC MALIGNANT NEUROENDOCRINE TUMOR TO LIVER: ICD-10-CM

## 2019-02-14 DIAGNOSIS — C22.0 HCC (HEPATOCELLULAR CARCINOMA): ICD-10-CM

## 2019-02-14 DIAGNOSIS — D3A.8 NEUROENDOCRINE TUMOR: ICD-10-CM

## 2019-02-14 LAB — CGA SERPL-MCNC: 2004 NG/ML (ref 0–95)

## 2019-02-14 PROCEDURE — 75774 ARTERY X-RAY EACH VESSEL: CPT | Mod: TC,59,HCNC

## 2019-02-14 PROCEDURE — 36247 INS CATH ABD/L-EXT ART 3RD: CPT | Mod: HCNC,RT

## 2019-02-14 PROCEDURE — 99152 PR MOD CONSCIOUS SEDATION, SAME PHYS, 5+ YRS, FIRST 15 MIN: ICD-10-PCS | Mod: HCNC,,, | Performed by: RADIOLOGY

## 2019-02-14 PROCEDURE — 99152 MOD SED SAME PHYS/QHP 5/>YRS: CPT | Mod: HCNC,,, | Performed by: RADIOLOGY

## 2019-02-14 PROCEDURE — 63600175 PHARM REV CODE 636 W HCPCS: Mod: HCNC | Performed by: RADIOLOGY

## 2019-02-14 PROCEDURE — 36247 PR PLACE CATH SUBSUBSELECT ART,ABD/PEL: ICD-10-PCS | Mod: 51,HCNC,RT, | Performed by: RADIOLOGY

## 2019-02-14 PROCEDURE — 75774 PR  ANGIO EA ADDNL SELECTV VESSEL: ICD-10-PCS | Mod: 26,59,HCNC, | Performed by: RADIOLOGY

## 2019-02-14 PROCEDURE — 75726 ARTERY X-RAYS ABDOMEN: CPT | Mod: 26,59,HCNC, | Performed by: RADIOLOGY

## 2019-02-14 PROCEDURE — 75726 CHG ANGIO VISCERAL SELECTV/SUBSELEC: ICD-10-PCS | Mod: 26,59,HCNC, | Performed by: RADIOLOGY

## 2019-02-14 PROCEDURE — 96420 CHEMO IA PUSH TECNIQUE: CPT | Mod: HCNC

## 2019-02-14 PROCEDURE — 75726 ARTERY X-RAYS ABDOMEN: CPT | Mod: TC,59,HCNC

## 2019-02-14 PROCEDURE — 25000003 PHARM REV CODE 250: Mod: HCNC | Performed by: RADIOLOGY

## 2019-02-14 PROCEDURE — 37243 VASC EMBOLIZE/OCCLUDE ORGAN: CPT | Mod: HCNC,,, | Performed by: RADIOLOGY

## 2019-02-14 PROCEDURE — 63600175 PHARM REV CODE 636 W HCPCS: Mod: HCNC | Performed by: NURSE PRACTITIONER

## 2019-02-14 PROCEDURE — 99153 MOD SED SAME PHYS/QHP EA: CPT

## 2019-02-14 PROCEDURE — 99152 MOD SED SAME PHYS/QHP 5/>YRS: CPT

## 2019-02-14 PROCEDURE — C1760 CLOSURE DEV, VASC: HCPCS | Mod: HCNC

## 2019-02-14 PROCEDURE — G0269 OCCLUSIVE DEVICE IN VEIN ART: HCPCS | Mod: HCNC

## 2019-02-14 PROCEDURE — 36247 INS CATH ABD/L-EXT ART 3RD: CPT | Mod: 51,HCNC,RT, | Performed by: RADIOLOGY

## 2019-02-14 PROCEDURE — 63600175 PHARM REV CODE 636 W HCPCS: Performed by: NURSE PRACTITIONER

## 2019-02-14 PROCEDURE — 37243 IR EMBOLIZATION COMP FOR TUMOR_ORGAN ISCHEMIA_INFARC: ICD-10-PCS | Mod: HCNC,,, | Performed by: RADIOLOGY

## 2019-02-14 PROCEDURE — G0378 HOSPITAL OBSERVATION PER HR: HCPCS | Mod: HCNC

## 2019-02-14 PROCEDURE — 25000003 PHARM REV CODE 250: Mod: HCNC | Performed by: NURSE PRACTITIONER

## 2019-02-14 PROCEDURE — 75774 ARTERY X-RAY EACH VESSEL: CPT | Mod: 26,59,HCNC, | Performed by: RADIOLOGY

## 2019-02-14 RX ORDER — HYDROCODONE BITARTRATE AND ACETAMINOPHEN 5; 325 MG/1; MG/1
1 TABLET ORAL EVERY 4 HOURS PRN
Status: DISCONTINUED | OUTPATIENT
Start: 2019-02-14 | End: 2019-02-15 | Stop reason: HOSPADM

## 2019-02-14 RX ORDER — MIDAZOLAM HYDROCHLORIDE 1 MG/ML
INJECTION INTRAMUSCULAR; INTRAVENOUS CODE/TRAUMA/SEDATION MEDICATION
Status: COMPLETED | OUTPATIENT
Start: 2019-02-14 | End: 2019-02-14

## 2019-02-14 RX ORDER — MIDAZOLAM HYDROCHLORIDE 1 MG/ML
1 INJECTION INTRAMUSCULAR; INTRAVENOUS
Status: DISCONTINUED | OUTPATIENT
Start: 2019-02-14 | End: 2019-02-15 | Stop reason: HOSPADM

## 2019-02-14 RX ORDER — PROMETHAZINE HYDROCHLORIDE 25 MG/ML
25 INJECTION, SOLUTION INTRAMUSCULAR; INTRAVENOUS EVERY 6 HOURS PRN
Status: DISCONTINUED | OUTPATIENT
Start: 2019-02-14 | End: 2019-02-14

## 2019-02-14 RX ORDER — LIDOCAINE HYDROCHLORIDE 10 MG/ML
1 INJECTION, SOLUTION EPIDURAL; INFILTRATION; INTRACAUDAL; PERINEURAL ONCE
Status: DISCONTINUED | OUTPATIENT
Start: 2019-02-14 | End: 2019-02-14 | Stop reason: HOSPADM

## 2019-02-14 RX ORDER — SODIUM CHLORIDE 9 MG/ML
INJECTION, SOLUTION INTRAVENOUS CONTINUOUS
Status: DISPENSED | OUTPATIENT
Start: 2019-02-14 | End: 2019-02-14

## 2019-02-14 RX ORDER — HEPARIN SODIUM 200 [USP'U]/100ML
500 INJECTION, SOLUTION INTRAVENOUS CONTINUOUS
Status: DISCONTINUED | OUTPATIENT
Start: 2019-02-14 | End: 2019-02-15 | Stop reason: HOSPADM

## 2019-02-14 RX ORDER — FENTANYL CITRATE 50 UG/ML
50 INJECTION, SOLUTION INTRAMUSCULAR; INTRAVENOUS
Status: DISCONTINUED | OUTPATIENT
Start: 2019-02-14 | End: 2019-02-15 | Stop reason: HOSPADM

## 2019-02-14 RX ORDER — LIDOCAINE HYDROCHLORIDE 10 MG/ML
1 INJECTION, SOLUTION EPIDURAL; INFILTRATION; INTRACAUDAL; PERINEURAL ONCE AS NEEDED
Status: COMPLETED | OUTPATIENT
Start: 2019-02-14 | End: 2019-02-14

## 2019-02-14 RX ORDER — ONDANSETRON 2 MG/ML
4 INJECTION INTRAMUSCULAR; INTRAVENOUS ONCE
Status: COMPLETED | OUTPATIENT
Start: 2019-02-14 | End: 2019-02-14

## 2019-02-14 RX ORDER — FENTANYL CITRATE 50 UG/ML
INJECTION, SOLUTION INTRAMUSCULAR; INTRAVENOUS CODE/TRAUMA/SEDATION MEDICATION
Status: COMPLETED | OUTPATIENT
Start: 2019-02-14 | End: 2019-02-14

## 2019-02-14 RX ORDER — MAGNESIUM SULFATE HEPTAHYDRATE 40 MG/ML
2 INJECTION, SOLUTION INTRAVENOUS ONCE
Status: COMPLETED | OUTPATIENT
Start: 2019-02-14 | End: 2019-02-14

## 2019-02-14 RX ORDER — ONDANSETRON 2 MG/ML
4 INJECTION INTRAMUSCULAR; INTRAVENOUS EVERY 6 HOURS PRN
Status: DISCONTINUED | OUTPATIENT
Start: 2019-02-14 | End: 2019-02-15 | Stop reason: HOSPADM

## 2019-02-14 RX ORDER — SODIUM CHLORIDE 9 MG/ML
INJECTION, SOLUTION INTRAVENOUS CONTINUOUS
Status: DISCONTINUED | OUTPATIENT
Start: 2019-02-14 | End: 2019-02-15 | Stop reason: HOSPADM

## 2019-02-14 RX ORDER — DIPHENHYDRAMINE HYDROCHLORIDE 50 MG/ML
50 INJECTION INTRAMUSCULAR; INTRAVENOUS ONCE
Status: COMPLETED | OUTPATIENT
Start: 2019-02-14 | End: 2019-02-14

## 2019-02-14 RX ORDER — ONDANSETRON 2 MG/ML
INJECTION INTRAMUSCULAR; INTRAVENOUS CODE/TRAUMA/SEDATION MEDICATION
Status: COMPLETED | OUTPATIENT
Start: 2019-02-14 | End: 2019-02-14

## 2019-02-14 RX ADMIN — MIDAZOLAM HYDROCHLORIDE 1 MG: 1 INJECTION, SOLUTION INTRAMUSCULAR; INTRAVENOUS at 01:02

## 2019-02-14 RX ADMIN — DOXORUBICIN HYDROCHLORIDE 40 MG: 2 INJECTION, SOLUTION INTRAVENOUS at 02:02

## 2019-02-14 RX ADMIN — PROMETHAZINE HYDROCHLORIDE 12.5 MG: 25 INJECTION INTRAMUSCULAR; INTRAVENOUS at 09:02

## 2019-02-14 RX ADMIN — AMPICILLIN SODIUM AND SULBACTAM SODIUM 3 G: 2; 1 INJECTION, POWDER, FOR SOLUTION INTRAMUSCULAR; INTRAVENOUS at 11:02

## 2019-02-14 RX ADMIN — MAGNESIUM SULFATE IN WATER 2 G: 40 INJECTION, SOLUTION INTRAVENOUS at 12:02

## 2019-02-14 RX ADMIN — HYDROCORTISONE SODIUM SUCCINATE 200 MG: 100 INJECTION, POWDER, FOR SOLUTION INTRAMUSCULAR; INTRAVENOUS at 11:02

## 2019-02-14 RX ADMIN — ONDANSETRON 4 MG: 2 INJECTION INTRAMUSCULAR; INTRAVENOUS at 02:02

## 2019-02-14 RX ADMIN — FENTANYL CITRATE 50 MCG: 50 INJECTION, SOLUTION INTRAMUSCULAR; INTRAVENOUS at 01:02

## 2019-02-14 RX ADMIN — OCTREOTIDE ACETATE 500 MCG/HR: 1000 INJECTION, SOLUTION INTRAVENOUS; SUBCUTANEOUS at 01:02

## 2019-02-14 RX ADMIN — LIDOCAINE HYDROCHLORIDE 10 MG: 10 INJECTION, SOLUTION EPIDURAL; INFILTRATION; INTRACAUDAL; PERINEURAL at 11:02

## 2019-02-14 RX ADMIN — SODIUM CHLORIDE: 0.9 INJECTION, SOLUTION INTRAVENOUS at 11:02

## 2019-02-14 RX ADMIN — DIPHENHYDRAMINE HYDROCHLORIDE 50 MG: 50 INJECTION, SOLUTION INTRAMUSCULAR; INTRAVENOUS at 11:02

## 2019-02-14 RX ADMIN — FENTANYL CITRATE 50 MCG: 50 INJECTION, SOLUTION INTRAMUSCULAR; INTRAVENOUS at 02:02

## 2019-02-14 NOTE — PROGRESS NOTES
"TACE procedure completed. Hemostasis achieved to right groin at 1420. HOB to remain flat until 1620.    Patient endorses "funny feeling" in abdomen. 50mcg of fentanyl given per MD instructions. VSS.   "

## 2019-02-14 NOTE — H&P
Radiology History & Physical      SUBJECTIVE:     Chief Complaint: hepatic neuroendocrine tumors    History of Present Illness:  Shahram Hills is a 78 y.o. female with metastatic neuroendocrine tumors to the liver who presents to IR for a TACE.     Past Medical History:   Diagnosis Date    Anemia 7/11/2018    B12 deficiency     Depression     per pcp note 7/2017 and given prozac and diazepam     Hyperlipidemia     Hypertension     PVC (premature ventricular contraction)     seen on ekg from prior pcp    Weight loss     reviewed prior pcp Dr. Russo notes 2017 - labs reviewed: cmp wnl x tbili 1.5, tsh wnl, A1c 5.0, cbc wnl,D 36, hiv neg, hep c neg, hep b surg ag neg      Past Surgical History:   Procedure Laterality Date    ESOPHAGOGASTRODUODENOSCOPY  05/04/2018    esophageal ring, grade 1 esophagitis, gastritis     EYE SURGERY      cataracts    HYSTERECTOMY      fibroids       Home Meds:   Prior to Admission medications    Medication Sig Start Date End Date Taking? Authorizing Provider   amLODIPine (NORVASC) 5 MG tablet Take 1 tablet (5 mg total) by mouth once daily. 11/30/18 11/30/19  Trixie Xie MD   cholecalciferol, vitamin D3, (VITAMIN D3) 400 unit Tab Take by mouth once daily.    Historical Provider, MD   cholestyramine-aspartame (CHOLESTYRAMINE LIGHT) 4 gram PwPk Take by mouth.    Historical Provider, MD   cyanocobalamin (VITAMIN B-12) 1000 MCG tablet Take 1 tablet (1,000 mcg total) by mouth once daily. 4/20/18   Trixie Xie MD   ferrous sulfate 325 (65 FE) MG EC tablet Take 325 mg by mouth 3 (three) times daily with meals.    Historical Provider, MD   potassium chloride SA (K-DUR,KLOR-CON) 20 MEQ tablet TAKE 2 TABLETS(40 MEQ) BY MOUTH EVERY DAY FOR 2 DAYS  Patient taking differently: No sig reported 1/7/19   Sebastian Granados MD   XIIDRA 5 % Dpet INSTILL ONE DROP INTO OU BID 4/2/18   Historical Provider, MD     Anticoagulants/Antiplatelets: no anticoagulation    Allergies: Review  of patient's allergies indicates:  No Known Allergies  Sedation History:  no adverse reactions    Review of Systems:   Hematological: no known coagulopathies  Respiratory: no shortness of breath  Cardiovascular: no chest pain  Gastrointestinal: no abdominal pain  Genito-Urinary: no dysuria  Musculoskeletal: negative  Neurological: no TIA or stroke symptoms         OBJECTIVE:     Vital Signs (Most Recent)       Physical Exam:  ASA: 2  Mallampati: 2    General: no acute distress  Mental Status: alert and oriented to person, place and time  HEENT: normocephalic, atraumatic  Chest: unlabored breathing  Heart: regular heart rate  Abdomen: nondistended  Extremity: moves all extremities    Laboratory  Lab Results   Component Value Date    INR 1.0 02/14/2019       Lab Results   Component Value Date    WBC 6.98 02/14/2019    HGB 13.8 02/14/2019    HCT 44.1 02/14/2019    MCV 91 02/14/2019     02/14/2019      Lab Results   Component Value Date     (H) 02/14/2019     02/14/2019    K 3.8 02/14/2019     02/14/2019    CO2 30 (H) 02/14/2019    BUN 10 02/14/2019    CREATININE 0.7 02/14/2019    CALCIUM 10.0 02/14/2019    MG 1.6 07/11/2018    ALT 8 (L) 02/14/2019    AST 17 02/14/2019    ALBUMIN 3.9 02/14/2019    BILITOT 1.8 (H) 02/14/2019    BILIDIR 0.6 (H) 02/14/2019       ASSESSMENT/PLAN:     Sedation Plan: moderate    Patient will undergo a TACE to liver lesions.      Yarely Ramos MD   Department of Radiology  PGY II Resident  Pager: [phone]

## 2019-02-14 NOTE — NURSING
Patient arrived to the unit via stretcher AAOX4 vital signs stable. Safety and infection precautions taken. Patient is comfortable at this time,bed is locked and in a low position with call light in reach. Will cont to monitor.

## 2019-02-14 NOTE — PLAN OF CARE
Problem: Adult Inpatient Plan of Care  Goal: Plan of Care Review  Outcome: Ongoing (interventions implemented as appropriate)  Pt arrived to unit via stretcher. AAOx4. VSS on RA. No c/o pain/discomfort. R groin drsg CDI. Ambulated to toilet with stand by assist. Oriented pt to room and call bell. Bed in lowest locked position and call light within reach. NAD noted. Will continue to monitor.

## 2019-02-15 VITALS
OXYGEN SATURATION: 98 % | RESPIRATION RATE: 17 BRPM | BODY MASS INDEX: 20.49 KG/M2 | HEIGHT: 64 IN | HEART RATE: 103 BPM | DIASTOLIC BLOOD PRESSURE: 75 MMHG | TEMPERATURE: 99 F | WEIGHT: 120 LBS | SYSTOLIC BLOOD PRESSURE: 134 MMHG

## 2019-02-15 DIAGNOSIS — C7B.8 METASTATIC MALIGNANT NEUROENDOCRINE TUMOR TO LIVER: Primary | ICD-10-CM

## 2019-02-15 PROCEDURE — G0378 HOSPITAL OBSERVATION PER HR: HCPCS | Mod: HCNC

## 2019-02-15 RX ORDER — AMOXICILLIN AND CLAVULANATE POTASSIUM 500; 125 MG/1; MG/1
1 TABLET, FILM COATED ORAL 2 TIMES DAILY
Qty: 14 TABLET | Refills: 0 | Status: SHIPPED | OUTPATIENT
Start: 2019-02-15 | End: 2019-02-15 | Stop reason: SDUPTHER

## 2019-02-15 RX ORDER — OXYCODONE HYDROCHLORIDE 5 MG/1
5 TABLET ORAL EVERY 4 HOURS PRN
Qty: 10 TABLET | Refills: 0 | Status: SHIPPED | OUTPATIENT
Start: 2019-02-15 | End: 2019-02-15 | Stop reason: SDUPTHER

## 2019-02-15 RX ORDER — OXYCODONE HYDROCHLORIDE 5 MG/1
5 TABLET ORAL EVERY 4 HOURS PRN
Qty: 10 TABLET | Refills: 0 | Status: ON HOLD | OUTPATIENT
Start: 2019-02-15 | End: 2019-03-22 | Stop reason: HOSPADM

## 2019-02-15 RX ORDER — ONDANSETRON 4 MG/1
4 TABLET, FILM COATED ORAL EVERY 6 HOURS PRN
Qty: 28 TABLET | Refills: 0 | Status: ON HOLD | OUTPATIENT
Start: 2019-02-15 | End: 2019-03-22 | Stop reason: HOSPADM

## 2019-02-15 RX ORDER — ONDANSETRON 4 MG/1
4 TABLET, FILM COATED ORAL EVERY 6 HOURS PRN
Qty: 28 TABLET | Refills: 0 | Status: SHIPPED | OUTPATIENT
Start: 2019-02-15 | End: 2019-02-15 | Stop reason: SDUPTHER

## 2019-02-15 RX ORDER — AMOXICILLIN AND CLAVULANATE POTASSIUM 500; 125 MG/1; MG/1
1 TABLET, FILM COATED ORAL 2 TIMES DAILY
Qty: 14 TABLET | Refills: 0 | Status: SHIPPED | OUTPATIENT
Start: 2019-02-15 | End: 2019-02-22

## 2019-02-15 NOTE — NURSING
Pt discharged home to care of self/family. Ambulatory with steady gait. Discharge medications reviewed with patient and education provided. Pt to follow up with PCP at pre established appointment time. Paper prescriptions given for patient to fill at pharmacy of choice. Patient and family member acknowledge and verbalize understanding of all discharge instructions and follow up care. Transported home with family.

## 2019-02-15 NOTE — PLAN OF CARE
Problem: Adult Inpatient Plan of Care  Goal: Plan of Care Review  Pt AAOx4. Denies pain or SOB. Pt is ambulating with steady gait. Pressure dressing to R groin is clean dry and intact. No bruising or hematoma noted. Pt educated on site care. Remains free from injury and falls. Plan of care reviewed with pt.

## 2019-02-15 NOTE — DISCHARGE SUMMARY
Radiology Discharge Summary      Hospital Course: TACE performed on 2/14/19 by IR.  Patient had nausea post-procedure that improved overnight.  She reports feeling much better this morning and is ready for discharge.  Denies any pain.     Admit Date: 2/14/2019  Discharge Date: 02/15/2019     Instructions Given to Patient: Yes  Diet: Resume prior diet  Activity: activity as tolerated    Description of Condition on Discharge: Stable  Vital Signs (Most Recent): Temp: 98.7 °F (37.1 °C) (02/15/19 0722)  Pulse: 86 (02/15/19 0722)  Resp: 17 (02/15/19 0722)  BP: (!) 148/80 (02/15/19 0722)  SpO2: 100 % (02/15/19 0722)    Discharge Disposition: Home    Discharge Diagnosis: hepatic neuroendocrine tumors     Procedure performed: TACE to liver    Follow-up: IR will contact patient for follow up. Patient instructed to call if there is any complication, post procedural pain, or signs of infection. Return care to primary ordering provider.      Yarely Ramos MD   Department of Radiology  PGY II Resident  Pager: [phone]

## 2019-02-15 NOTE — PLAN OF CARE
Problem: Adult Inpatient Plan of Care  Goal: Plan of Care Review  Fall precautions,maintained,no falls sustained. Pt. Denies complaint of pain,vomited large amount undigested food earlier in shift,nausea relieved with IV phenergan. No signs of infection,afebrile dressing to right groin dry and intact no edema noted site soft. Continue current plan of care.

## 2019-02-18 ENCOUNTER — OUTPATIENT CASE MANAGEMENT (OUTPATIENT)
Dept: ADMINISTRATIVE | Facility: OTHER | Age: 79
End: 2019-02-18

## 2019-02-18 NOTE — PROGRESS NOTES
The following patient has been assigned to Paradise Grey RN with Outpatient Complex Care Management for high risk screening.    Reason: High Risk Recently Discharged    [facility] at [phone] with any questions.    Thank you,    Jessica Mohan    Outpatient Case Management

## 2019-02-19 ENCOUNTER — OUTPATIENT CASE MANAGEMENT (OUTPATIENT)
Dept: ADMINISTRATIVE | Facility: OTHER | Age: 79
End: 2019-02-19

## 2019-02-19 NOTE — PROGRESS NOTES
Summary  Call to pt and explained program in detail to pt and her sister.  Sister states she is caring for Ms. rodriguez and their brother.  She states they have all their needs met and declined OPCM at this time.  I gave her my contact information for any future needs that may arise.    Interventions  Chart reviewed  Case closed

## 2019-02-26 DIAGNOSIS — C7B.8 METASTATIC MALIGNANT NEUROENDOCRINE TUMOR TO LIVER: Primary | ICD-10-CM

## 2019-03-08 ENCOUNTER — HOSPITAL ENCOUNTER (OUTPATIENT)
Dept: RADIOLOGY | Facility: OTHER | Age: 79
Discharge: HOME | End: 2019-03-08
Attending: INTERNAL MEDICINE
Payer: MEDICARE

## 2019-03-08 DIAGNOSIS — C7A.012 MALIGNANT CARCINOID TUMOR OF ILEUM: ICD-10-CM

## 2019-03-08 PROCEDURE — 74177 CT ABD & PELVIS W/CONTRAST: CPT | Mod: 26,HCNC,, | Performed by: RADIOLOGY

## 2019-03-08 PROCEDURE — 71260 CT CHEST ABDOMEN PELVIS WITH CONTRAST (XPD): ICD-10-PCS | Mod: 26,HCNC,, | Performed by: RADIOLOGY

## 2019-03-08 PROCEDURE — 74177 CT CHEST ABDOMEN PELVIS WITH CONTRAST (XPD): ICD-10-PCS | Mod: 26,HCNC,, | Performed by: RADIOLOGY

## 2019-03-08 PROCEDURE — 74177 CT ABD & PELVIS W/CONTRAST: CPT | Mod: TC,HCNC

## 2019-03-08 PROCEDURE — 71260 CT THORAX DX C+: CPT | Mod: 26,HCNC,, | Performed by: RADIOLOGY

## 2019-03-08 PROCEDURE — 25500020 PHARM REV CODE 255: Mod: HCNC | Performed by: INTERNAL MEDICINE

## 2019-03-08 RX ADMIN — IOHEXOL 75 ML: 350 INJECTION, SOLUTION INTRAVENOUS at 11:03

## 2019-03-08 RX ADMIN — IOHEXOL 30 ML: 350 INJECTION, SOLUTION INTRAVENOUS at 12:03

## 2019-03-11 ENCOUNTER — OFFICE VISIT (OUTPATIENT)
Dept: HEMATOLOGY/ONCOLOGY | Facility: CLINIC | Age: 79
End: 2019-03-11
Payer: MEDICARE

## 2019-03-11 ENCOUNTER — INFUSION (OUTPATIENT)
Dept: INFUSION THERAPY | Facility: HOSPITAL | Age: 79
End: 2019-03-11
Attending: INTERNAL MEDICINE
Payer: MEDICARE

## 2019-03-11 VITALS
SYSTOLIC BLOOD PRESSURE: 131 MMHG | TEMPERATURE: 97 F | BODY MASS INDEX: 20.89 KG/M2 | HEIGHT: 64 IN | OXYGEN SATURATION: 99 % | WEIGHT: 122.38 LBS | DIASTOLIC BLOOD PRESSURE: 74 MMHG | HEART RATE: 87 BPM | RESPIRATION RATE: 17 BRPM

## 2019-03-11 DIAGNOSIS — C79.51 SPINE METASTASIS: ICD-10-CM

## 2019-03-11 DIAGNOSIS — Z71.89 ACP (ADVANCE CARE PLANNING): ICD-10-CM

## 2019-03-11 DIAGNOSIS — D75.839 THROMBOCYTOSIS: ICD-10-CM

## 2019-03-11 DIAGNOSIS — C7A.8 NEUROENDOCRINE CARCINOMA METASTATIC TO BONE: ICD-10-CM

## 2019-03-11 DIAGNOSIS — C7B.8 METASTATIC MALIGNANT NEUROENDOCRINE TUMOR TO LIVER: ICD-10-CM

## 2019-03-11 DIAGNOSIS — Z66 DNR (DO NOT RESUSCITATE): ICD-10-CM

## 2019-03-11 DIAGNOSIS — I10 ESSENTIAL HYPERTENSION: ICD-10-CM

## 2019-03-11 DIAGNOSIS — C7B.8 NEUROENDOCRINE CARCINOMA METASTATIC TO BONE: ICD-10-CM

## 2019-03-11 DIAGNOSIS — C7A.012 MALIGNANT CARCINOID TUMOR OF ILEUM: Primary | ICD-10-CM

## 2019-03-11 DIAGNOSIS — C7B.8 METASTATIC MALIGNANT NEUROENDOCRINE TUMOR TO LIVER: Primary | ICD-10-CM

## 2019-03-11 PROCEDURE — 63600175 PHARM REV CODE 636 W HCPCS: Mod: HCNC,JG | Performed by: INTERNAL MEDICINE

## 2019-03-11 PROCEDURE — 3075F SYST BP GE 130 - 139MM HG: CPT | Mod: HCNC,CPTII,S$GLB, | Performed by: INTERNAL MEDICINE

## 2019-03-11 PROCEDURE — 3078F PR MOST RECENT DIASTOLIC BLOOD PRESSURE < 80 MM HG: ICD-10-PCS | Mod: HCNC,CPTII,S$GLB, | Performed by: INTERNAL MEDICINE

## 2019-03-11 PROCEDURE — 3075F PR MOST RECENT SYSTOLIC BLOOD PRESS GE 130-139MM HG: ICD-10-PCS | Mod: HCNC,CPTII,S$GLB, | Performed by: INTERNAL MEDICINE

## 2019-03-11 PROCEDURE — 99214 PR OFFICE/OUTPT VISIT, EST, LEVL IV, 30-39 MIN: ICD-10-PCS | Mod: HCNC,S$GLB,, | Performed by: INTERNAL MEDICINE

## 2019-03-11 PROCEDURE — 99214 OFFICE O/P EST MOD 30 MIN: CPT | Mod: HCNC,S$GLB,, | Performed by: INTERNAL MEDICINE

## 2019-03-11 PROCEDURE — 96372 THER/PROPH/DIAG INJ SC/IM: CPT | Mod: HCNC

## 2019-03-11 PROCEDURE — 99999 PR PBB SHADOW E&M-EST. PATIENT-LVL III: ICD-10-PCS | Mod: PBBFAC,HCNC,, | Performed by: INTERNAL MEDICINE

## 2019-03-11 PROCEDURE — 1101F PR PT FALLS ASSESS DOC 0-1 FALLS W/OUT INJ PAST YR: ICD-10-PCS | Mod: HCNC,CPTII,S$GLB, | Performed by: INTERNAL MEDICINE

## 2019-03-11 PROCEDURE — 99497 ADVNCD CARE PLAN 30 MIN: CPT | Mod: HCNC,S$GLB,, | Performed by: INTERNAL MEDICINE

## 2019-03-11 PROCEDURE — 99999 PR PBB SHADOW E&M-EST. PATIENT-LVL III: CPT | Mod: PBBFAC,HCNC,, | Performed by: INTERNAL MEDICINE

## 2019-03-11 PROCEDURE — 3078F DIAST BP <80 MM HG: CPT | Mod: HCNC,CPTII,S$GLB, | Performed by: INTERNAL MEDICINE

## 2019-03-11 PROCEDURE — 99497 PR ADVNCD CARE PLAN 30 MIN: ICD-10-PCS | Mod: HCNC,S$GLB,, | Performed by: INTERNAL MEDICINE

## 2019-03-11 PROCEDURE — 1101F PT FALLS ASSESS-DOCD LE1/YR: CPT | Mod: HCNC,CPTII,S$GLB, | Performed by: INTERNAL MEDICINE

## 2019-03-11 RX ORDER — LANREOTIDE ACETATE 120 MG/.5ML
120 INJECTION SUBCUTANEOUS
Status: DISCONTINUED | OUTPATIENT
Start: 2019-03-11 | End: 2019-03-11 | Stop reason: HOSPADM

## 2019-03-11 RX ORDER — LANREOTIDE ACETATE 120 MG/.5ML
120 INJECTION SUBCUTANEOUS
Status: CANCELLED | OUTPATIENT
Start: 2019-03-11

## 2019-03-11 RX ADMIN — LANREOTIDE ACETATE 120 MG: 120 INJECTION SUBCUTANEOUS at 04:03

## 2019-03-11 NOTE — PROGRESS NOTES
"Subjective:       Patient ID: Shahram Hills is a 78 y.o. female.     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 Ms. Hills is an 79 yo woman who initially saw me on 6/7/18 for further evaluation of neuroendocrine tumor. She initially presented with diarrhea, abdominal discomfort, weight loss. She was evaluated at [facility] and underwent workup below:  US abdomen on 3/14/18 showed numerous bilobar mixed echogenicity and mildly vascular hepatic lesions. Cholelithiasis without e/o acute cholecystitis.   MRI abdomen on 3/30/2018 showed innumerable hepatic metastases. L3 vertebral body metastasis with soft tissue component along the right margin of the L3 and upper L4 vertebral bodies, filling the right L3/4 neural foramen, and extending into the anterior aspect of the spinal canal on the right at the L3 and upper T4 levels. Associated with mild spinal canal narrowing.  CT chest on 4/6/2018 showed one lucent nodule measuring 7 mm in the T7 vertebral body, most likely representing metastatic disease.    EGD on 5/4/18 showed normal duodenum. Schatzki's ring in the lower third of the esophagus. Grade 1 esophagitis in the GE junction c/w mild distal esophagitis. Congestion and erythema in the antrum, pre-pyloric region and stomach body c/w gastritis. Biopsy of the stomach antrum showed mild chronic gastritis, neg for H. pylori.   Labs on 5/9/2018: WBC 6.9, H/H 11.6/34.3, MCV 87.5, plt count 279. On 3/9/18: Vit B12 level 173, folic acid 6.6  She was started on oral vit B12 and underwent a liver biopsy on 5/18/18. Pathology showed "metastatic well differentiated neuroendocrine tumor. Ki67 3%"     She saw me on 6/7/18 and complained of weight loss of 26 lbs over the past 3-4 months, also has diarrhea. No flushing, wheezing, palpitations. Has been having pain in right flank radiating down the right leg. No " "tingling/numbness, weakness. She can hold her bladder but when she urinates her diarrhea would come out as well. Started on dex 4 mg q6h the evening of 18.      2. MRI spine on 18 showed "Marrow replacing metastatic lesion of the L3 vertebral body with associated extra osseous expansion and complete effacement of the right L3-L4 neural foramina and additional effacement of the right lateral recess.  There is additional lateral extension and abutment of the right psoas muscle.  Superimposed degenerative change at this level results in mild spinal canal stenosis." I sent the patient to ED on 18 where she was evaluated by neurosurgery. Neurosurgery did not feel she was a candidate for surgical intervention.      3. Seen by Dr. Adames on 18. palliative radiation to the area of the L3 metastasis 18-18   4. 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  There is an adjacent metastatic lymph node, diffuse liver metastases, and multiple bone metastases. Index primary neoplasm SUV max 33.13. Adjacent lymph node SUV max 23. L3 bone metastasis SUV max 36.86. Left lobe SUV max 46.13   5. Lanreotide /. 7/. //. /, 10/12/18, 18, 12/10/18, 19, 19, 3/11/19  6. TACE to the right hepatic lobe on 19.      History of Present Illness:   Mr. Hills returns for follow up. feeling well. No pain. Denies other complaints. Got TACE to the right hepatic lobe on 19. Scheduled for TACE on 3/21.      ECO     ROS:   See HPI. Otherwise negative.      Physical Examination:   Vital signs reviewed.   Gen: well hydrated, well developed, in no acute distress.  HEENT: normocephalic, anicteric, PERRLA, EOMI, oropharynx clear  Neck: supple, no JVD, thyromegaly, cervical or supraclavicular LAD  Lungs: CTAB, no wheezes or rales  Heart: RRR, no M/R/G  Abdomen: soft, no tenderness, non-distended,  liver palpated 5.0 cm below the right costal " margin, no splenomegaly, no mass, or hernia.   Ext: no cyanosis, edema, deformity  Neuro: alert and oriented x 4, no focal neuro deficit  Skin: no rash, open wounds or ulcers  Psych: pleasant and appropriate mood and affect     Objective:      Diagnostic Tests:  CT 3/8/2019:  Stable appearance of the known ileal mass, adjacent mesenteric lymph nodes, multiple hepatic masses, and multiple bony lesions.    Stable 5 mm nodule in the right upper lobe.    Additional stable incidental findings as above.    RECIST SUMMARY:    Date of prior examination for comparison: 12/07/2018    Lesion 1: Segment 4 of the liver.  5.9 cm, previously 6.0 cm.  Series 3, image 42    Lesion 2: Terminal ileum.  2.6 cm, previously 2.5 cm.  Series 3 image 90    Lesion 3: Mesenteric nodule.  1.5 cm., previously 1.5 cm.  Series 3, image 83.     Laboratory Data:  Labs reviewed. Bilirubin 1.5. plt count 423     Assessment/Plan:      1. Malignant carcinoid tumor of ileum    2. 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 Spine metastasis    4. 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 Essential hypertension    6. Thrombocytosis    7. ACP (advance care planning)    8. DNR (do not resuscitate)        1-4  - doing well  - CT scan 3/8 showed stable disease.   - lanreotide today  - repeat TACE on 3/21  - RTC in 4 weeks     5.  - BP well controlled   - C/w current meds    6.  - likely reactive  - monitor    7.  Advance Care Planning     Power of   I initiated the process of advance care planning today and explained the importance of this process to the patient.  I introduced the concept of advance directives to the patient, as well. Then the patient received detailed information about the importance of designating a Health Care Power of  (HCPOA). She was also instructed to communicate with this person about their wishes for future healthcare, should she become sick and lose decision-making capacity. The patient has not  previously appointed a HCPOA. After our discussion, the patient has decided to complete a HCPOA and has appointed her sister and NAME: Shayla Rosenthal.     Living Will  During this visit, I engaged the patient in the advance care planning process.  The patient and I reviewed the role for advance directives and their purpose in directing future healthcare if the patient's unable to speak for him/herself.  At this point in time, the patient does have full decision-making capacity.  We discussed different extreme health states that she could experience, and reviewed what kind of medical care she would want in those situations.  The patient communicated that if she were comatose and had little chance of a meaningful recovery, she would not want machines/life-sustaining treatments used.  In addition to the above preference, other important end-of-life issues for the patient include being comfortable.  The patient has not completed a living will to reflect these preferences.  The patient  has already designated a healthcare power of  to make decisions on her behalf.         [facility]  I engaged the patient in a conversation about advance care planning and we specifically addressed what the goals of care would be moving forward, in light of the patient's change in clinical status, specifically stage IV neuroendocrine tumor of ileum.  We did specifically address the patient's likely prognosis, which is fair .  We explored the patient's values and preferences for future care.  The patient endorses that what is most important right now is to focus on extending life as long as possible, even it it means sacrificing quality    Accordingly, we have decided that the best plan to meet the patient's goals includes continuing with treatment    I did explain the role for hospice care at this stage of the patient's illness, including its ability to help the patient live with the best quality of life possible.  We will not be making a  hospice referral from the clinic today.       Code Status  The patient wishes to have a natural, peaceful death at the end of life.  Along those lines, the patient does not wish to have CPR or other invasive treatments performed when her heart and/or breathing stops.  I communicated to the patient that a DNR order would be placed in her medical record to reflect this preference.      20 minutes on advance care planning    Total of 30 minutes with patient

## 2019-03-20 ENCOUNTER — TELEPHONE (OUTPATIENT)
Dept: INTERVENTIONAL RADIOLOGY/VASCULAR | Facility: HOSPITAL | Age: 79
End: 2019-03-20

## 2019-03-20 RX ORDER — FENTANYL CITRATE 50 UG/ML
50 INJECTION, SOLUTION INTRAMUSCULAR; INTRAVENOUS
Status: CANCELLED | OUTPATIENT
Start: 2019-03-20

## 2019-03-20 RX ORDER — HEPARIN SODIUM 200 [USP'U]/100ML
500 INJECTION, SOLUTION INTRAVENOUS CONTINUOUS
Status: CANCELLED | OUTPATIENT
Start: 2019-03-20

## 2019-03-20 RX ORDER — MIDAZOLAM HYDROCHLORIDE 1 MG/ML
1 INJECTION INTRAMUSCULAR; INTRAVENOUS
Status: CANCELLED | OUTPATIENT
Start: 2019-03-20

## 2019-03-21 ENCOUNTER — TELEPHONE (OUTPATIENT)
Dept: INTERVENTIONAL RADIOLOGY/VASCULAR | Facility: HOSPITAL | Age: 79
End: 2019-03-21

## 2019-03-21 ENCOUNTER — HOSPITAL ENCOUNTER (OUTPATIENT)
Facility: HOSPITAL | Age: 79
Discharge: HOME | End: 2019-03-22
Attending: RADIOLOGY | Admitting: RADIOLOGY
Payer: MEDICARE

## 2019-03-21 DIAGNOSIS — R16.0 LIVER MASSES: Primary | ICD-10-CM

## 2019-03-21 DIAGNOSIS — C7B.8 METASTATIC MALIGNANT NEUROENDOCRINE TUMOR TO LIVER: ICD-10-CM

## 2019-03-21 DIAGNOSIS — C79.51 SPINE METASTASIS: ICD-10-CM

## 2019-03-21 DIAGNOSIS — C22.0 HCC (HEPATOCELLULAR CARCINOMA): ICD-10-CM

## 2019-03-21 DIAGNOSIS — C7B.8 SECONDARY NEUROENDOCRINE TUMOR OF BONE: ICD-10-CM

## 2019-03-21 LAB
ALBUMIN SERPL BCP-MCNC: 3.5 G/DL
ALP SERPL-CCNC: 74 U/L
ALT SERPL W/O P-5'-P-CCNC: 7 U/L
ANION GAP SERPL CALC-SCNC: 10 MMOL/L
AST SERPL-CCNC: 19 U/L
BASOPHILS # BLD AUTO: 0.02 K/UL
BASOPHILS NFR BLD: 0.3 %
BILIRUB DIRECT SERPL-MCNC: 0.5 MG/DL
BILIRUB SERPL-MCNC: 1.5 MG/DL
BUN SERPL-MCNC: 9 MG/DL
CALCIUM SERPL-MCNC: 9.7 MG/DL
CHLORIDE SERPL-SCNC: 104 MMOL/L
CO2 SERPL-SCNC: 24 MMOL/L
CREAT SERPL-MCNC: 0.6 MG/DL
DIFFERENTIAL METHOD: ABNORMAL
EOSINOPHIL # BLD AUTO: 0 K/UL
EOSINOPHIL NFR BLD: 0.7 %
ERYTHROCYTE [DISTWIDTH] IN BLOOD BY AUTOMATED COUNT: 12.6 %
EST. GFR  (AFRICAN AMERICAN): >60 ML/MIN/1.73 M^2
EST. GFR  (NON AFRICAN AMERICAN): >60 ML/MIN/1.73 M^2
GLUCOSE SERPL-MCNC: 118 MG/DL
HCT VFR BLD AUTO: 36 %
HGB BLD-MCNC: 11.3 G/DL
IMM GRANULOCYTES # BLD AUTO: 0.02 K/UL
IMM GRANULOCYTES NFR BLD AUTO: 0.3 %
INR PPP: 1.1
LYMPHOCYTES # BLD AUTO: 1.6 K/UL
LYMPHOCYTES NFR BLD: 26.1 %
MCH RBC QN AUTO: 28.7 PG
MCHC RBC AUTO-ENTMCNC: 31.4 G/DL
MCV RBC AUTO: 91 FL
MONOCYTES # BLD AUTO: 0.5 K/UL
MONOCYTES NFR BLD: 8.4 %
NEUTROPHILS # BLD AUTO: 3.8 K/UL
NEUTROPHILS NFR BLD: 64.2 %
NRBC BLD-RTO: 0 /100 WBC
PLATELET # BLD AUTO: 207 K/UL
PMV BLD AUTO: 10.1 FL
POTASSIUM SERPL-SCNC: 3.7 MMOL/L
PROT SERPL-MCNC: 7 G/DL
PROTHROMBIN TIME: 11.5 SEC
RBC # BLD AUTO: 3.94 M/UL
SODIUM SERPL-SCNC: 138 MMOL/L
WBC # BLD AUTO: 5.98 K/UL

## 2019-03-21 PROCEDURE — 85610 PROTHROMBIN TIME: CPT | Mod: HCNC

## 2019-03-21 PROCEDURE — 25000003 PHARM REV CODE 250: Mod: HCNC | Performed by: RADIOLOGY

## 2019-03-21 PROCEDURE — 63600175 PHARM REV CODE 636 W HCPCS: Mod: HCNC | Performed by: FAMILY MEDICINE

## 2019-03-21 PROCEDURE — 25000003 PHARM REV CODE 250: Mod: HCNC | Performed by: FAMILY MEDICINE

## 2019-03-21 PROCEDURE — 85025 COMPLETE CBC W/AUTO DIFF WBC: CPT | Mod: HCNC

## 2019-03-21 PROCEDURE — 25000003 PHARM REV CODE 250: Mod: HCNC | Performed by: STUDENT IN AN ORGANIZED HEALTH CARE EDUCATION/TRAINING PROGRAM

## 2019-03-21 PROCEDURE — 36415 COLL VENOUS BLD VENIPUNCTURE: CPT | Mod: HCNC

## 2019-03-21 PROCEDURE — 82248 BILIRUBIN DIRECT: CPT | Mod: HCNC

## 2019-03-21 PROCEDURE — 25000003 PHARM REV CODE 250: Mod: HCNC | Performed by: NURSE PRACTITIONER

## 2019-03-21 PROCEDURE — 63600175 PHARM REV CODE 636 W HCPCS: Mod: HCNC | Performed by: NURSE PRACTITIONER

## 2019-03-21 PROCEDURE — 63600175 PHARM REV CODE 636 W HCPCS: Mod: HCNC | Performed by: RADIOLOGY

## 2019-03-21 PROCEDURE — 80053 COMPREHEN METABOLIC PANEL: CPT | Mod: HCNC

## 2019-03-21 RX ORDER — HYDROMORPHONE HYDROCHLORIDE 1 MG/ML
1 INJECTION, SOLUTION INTRAMUSCULAR; INTRAVENOUS; SUBCUTANEOUS EVERY 4 HOURS PRN
Status: DISCONTINUED | OUTPATIENT
Start: 2019-03-21 | End: 2019-03-22 | Stop reason: HOSPADM

## 2019-03-21 RX ORDER — HYDROCODONE BITARTRATE AND ACETAMINOPHEN 5; 325 MG/1; MG/1
1 TABLET ORAL EVERY 4 HOURS PRN
Status: DISCONTINUED | OUTPATIENT
Start: 2019-03-21 | End: 2019-03-22 | Stop reason: HOSPADM

## 2019-03-21 RX ORDER — HYDROMORPHONE HYDROCHLORIDE 1 MG/ML
INJECTION, SOLUTION INTRAMUSCULAR; INTRAVENOUS; SUBCUTANEOUS CODE/TRAUMA/SEDATION MEDICATION
Status: COMPLETED | OUTPATIENT
Start: 2019-03-21 | End: 2019-03-21

## 2019-03-21 RX ORDER — AMLODIPINE BESYLATE 5 MG/1
5 TABLET ORAL DAILY
Status: DISCONTINUED | OUTPATIENT
Start: 2019-03-22 | End: 2019-03-22

## 2019-03-21 RX ORDER — ONDANSETRON 4 MG/1
4 TABLET, FILM COATED ORAL EVERY 6 HOURS PRN
Status: DISCONTINUED | OUTPATIENT
Start: 2019-03-21 | End: 2019-03-22

## 2019-03-21 RX ORDER — KETOROLAC TROMETHAMINE 10 MG/1
10 TABLET, FILM COATED ORAL EVERY 6 HOURS PRN
Status: DISCONTINUED | OUTPATIENT
Start: 2019-03-21 | End: 2019-03-22 | Stop reason: HOSPADM

## 2019-03-21 RX ORDER — LIDOCAINE HYDROCHLORIDE 10 MG/ML
1 INJECTION, SOLUTION EPIDURAL; INFILTRATION; INTRACAUDAL; PERINEURAL ONCE
Status: COMPLETED | OUTPATIENT
Start: 2019-03-21 | End: 2019-03-21

## 2019-03-21 RX ORDER — LIDOCAINE HYDROCHLORIDE 10 MG/ML
1 INJECTION, SOLUTION EPIDURAL; INFILTRATION; INTRACAUDAL; PERINEURAL ONCE AS NEEDED
Status: DISCONTINUED | OUTPATIENT
Start: 2019-03-21 | End: 2019-03-21 | Stop reason: HOSPADM

## 2019-03-21 RX ORDER — NITROGLYCERIN 5 MG/ML
INJECTION, SOLUTION INTRAVENOUS CODE/TRAUMA/SEDATION MEDICATION
Status: COMPLETED | OUTPATIENT
Start: 2019-03-21 | End: 2019-03-21

## 2019-03-21 RX ORDER — MAGNESIUM SULFATE HEPTAHYDRATE 40 MG/ML
2 INJECTION, SOLUTION INTRAVENOUS ONCE
Status: COMPLETED | OUTPATIENT
Start: 2019-03-21 | End: 2019-03-21

## 2019-03-21 RX ORDER — FENTANYL CITRATE 50 UG/ML
INJECTION, SOLUTION INTRAMUSCULAR; INTRAVENOUS CODE/TRAUMA/SEDATION MEDICATION
Status: COMPLETED | OUTPATIENT
Start: 2019-03-21 | End: 2019-03-21

## 2019-03-21 RX ORDER — DIPHENHYDRAMINE HYDROCHLORIDE 50 MG/ML
50 INJECTION INTRAMUSCULAR; INTRAVENOUS ONCE
Status: DISCONTINUED | OUTPATIENT
Start: 2019-03-21 | End: 2019-03-21

## 2019-03-21 RX ORDER — HEPARIN SODIUM 1000 [USP'U]/ML
INJECTION, SOLUTION INTRAVENOUS; SUBCUTANEOUS CODE/TRAUMA/SEDATION MEDICATION
Status: COMPLETED | OUTPATIENT
Start: 2019-03-21 | End: 2019-03-21

## 2019-03-21 RX ORDER — DIPHENHYDRAMINE HCL 25 MG
50 CAPSULE ORAL ONCE
Status: COMPLETED | OUTPATIENT
Start: 2019-03-21 | End: 2019-03-21

## 2019-03-21 RX ORDER — MIDAZOLAM HYDROCHLORIDE 1 MG/ML
INJECTION INTRAMUSCULAR; INTRAVENOUS CODE/TRAUMA/SEDATION MEDICATION
Status: COMPLETED | OUTPATIENT
Start: 2019-03-21 | End: 2019-03-21

## 2019-03-21 RX ORDER — SODIUM CHLORIDE 9 MG/ML
INJECTION, SOLUTION INTRAVENOUS CONTINUOUS
Status: DISCONTINUED | OUTPATIENT
Start: 2019-03-21 | End: 2019-03-22 | Stop reason: HOSPADM

## 2019-03-21 RX ADMIN — MIDAZOLAM HYDROCHLORIDE 1 MG: 1 INJECTION, SOLUTION INTRAMUSCULAR; INTRAVENOUS at 02:03

## 2019-03-21 RX ADMIN — KETOROLAC TROMETHAMINE 10 MG: 10 TABLET, FILM COATED ORAL at 10:03

## 2019-03-21 RX ADMIN — DIPHENHYDRAMINE HYDROCHLORIDE 50 MG: 25 CAPSULE ORAL at 02:03

## 2019-03-21 RX ADMIN — NITROGLYCERIN 200 MCG: 5 INJECTION, SOLUTION INTRAVENOUS at 03:03

## 2019-03-21 RX ADMIN — ONDANSETRON 4 MG: 4 TABLET, FILM COATED ORAL at 10:03

## 2019-03-21 RX ADMIN — HYDROCORTISONE SODIUM SUCCINATE 200 MG: 100 INJECTION, POWDER, FOR SOLUTION INTRAMUSCULAR; INTRAVENOUS at 02:03

## 2019-03-21 RX ADMIN — AMPICILLIN SODIUM AND SULBACTAM SODIUM 3 G: 2; 1 INJECTION, POWDER, FOR SOLUTION INTRAMUSCULAR; INTRAVENOUS at 02:03

## 2019-03-21 RX ADMIN — OCTREOTIDE ACETATE 500 MCG/HR: 1000 INJECTION, SOLUTION INTRAVENOUS; SUBCUTANEOUS at 01:03

## 2019-03-21 RX ADMIN — FENTANYL CITRATE 50 MCG: 50 INJECTION, SOLUTION INTRAMUSCULAR; INTRAVENOUS at 02:03

## 2019-03-21 RX ADMIN — HEPARIN SODIUM 3000 UNITS: 1000 INJECTION, SOLUTION INTRAVENOUS; SUBCUTANEOUS at 03:03

## 2019-03-21 RX ADMIN — HYDROMORPHONE HYDROCHLORIDE 0.5 MG: 1 INJECTION, SOLUTION INTRAMUSCULAR; INTRAVENOUS; SUBCUTANEOUS at 03:03

## 2019-03-21 RX ADMIN — MAGNESIUM SULFATE HEPTAHYDRATE 2 G: 40 INJECTION, SOLUTION INTRAVENOUS at 12:03

## 2019-03-21 RX ADMIN — LIDOCAINE HYDROCHLORIDE 10 MG: 10 INJECTION, SOLUTION EPIDURAL; INFILTRATION; INTRACAUDAL; PERINEURAL at 11:03

## 2019-03-21 RX ADMIN — SODIUM CHLORIDE: 0.9 INJECTION, SOLUTION INTRAVENOUS at 11:03

## 2019-03-21 NOTE — PROGRESS NOTES
IR called, spoke with Kassy. Notified her that patient was difficult IV stick/lab draw. Able to get minimal blood, sent off to lab. Orders for Mag IV and Octreotide IV. Will administer when sent up from pharmacy. Amp, Benadryl and Hydrocortisone, will administer when called from IV to give.

## 2019-03-21 NOTE — H&P
Radiology History & Physical      SUBJECTIVE:     Chief Complaint: Neuroendocrine tumor with metastasis to liver    History of Present Illness:  Shahram Hills is a 78 y.o. female who presents for TACE  Past Medical History:   Diagnosis Date    Anemia 7/11/2018    B12 deficiency     Depression     per pcp note 7/2017 and given prozac and diazepam     Hyperlipidemia     Hypertension     PVC (premature ventricular contraction)     seen on ekg from prior pcp    Weight loss     reviewed prior pcp Dr. Russo notes 2017 - labs reviewed: cmp wnl x tbili 1.5, tsh wnl, A1c 5.0, cbc wnl,D 36, hiv neg, hep c neg, hep b surg ag neg      Past Surgical History:   Procedure Laterality Date    EMBOLIZATION, BLOOD VESSEL N/A 2/14/2019    Performed by [facility] at [facility] OR [address] [place] [address]    ESOPHAGOGASTRODUODENOSCOPY  05/04/2018    esophageal ring, grade 1 esophagitis, gastritis     EYE SURGERY      cataracts    HYSTERECTOMY      fibroids       Home Meds:   Prior to Admission medications    Medication Sig Start Date End Date Taking? Authorizing Provider   amLODIPine (NORVASC) 5 MG tablet Take 1 tablet (5 mg total) by mouth once daily. 11/30/18 11/30/19 Yes Trixie Xie MD   cholecalciferol, vitamin D3, (VITAMIN D3) 400 unit Tab Take by mouth once daily.   Yes Historical Provider, MD   cholestyramine-aspartame (CHOLESTYRAMINE LIGHT) 4 gram PwPk Take by mouth.   Yes Historical Provider, MD   cyanocobalamin (VITAMIN B-12) 1000 MCG tablet Take 1 tablet (1,000 mcg total) by mouth once daily. 4/20/18  Yes Trixie Xie MD   ferrous sulfate 325 (65 FE) MG EC tablet Take 325 mg by mouth 3 (three) times daily with meals.   Yes Historical Provider, MD   ondansetron (ZOFRAN) 4 MG tablet Take 1 tablet (4 mg total) by mouth every 6 (six) hours as needed for Nausea. 2/15/19  Yes Yarely Ramos MD   oxyCODONE (ROXICODONE) 5 MG immediate release tablet Take 1 tablet (5 mg total) by mouth every 4 (four)  hours as needed for Pain. 2/15/19  Yes Yarely Ramos MD   potassium chloride SA (K-DUR,KLOR-CON) 20 MEQ tablet TAKE 2 TABLETS(40 MEQ) BY MOUTH EVERY DAY FOR 2 DAYS  Patient taking differently: No sig reported 1/7/19  Yes Sebastian Granados MD   XIIDRA 5 % Dpet INSTILL ONE DROP INTO OU BID 4/2/18  Yes Historical Provider, MD     Anticoagulants/Antiplatelets: no anticoagulation    Allergies: Review of patient's allergies indicates:  No Known Allergies  Sedation History:  no adverse reactions    Review of Systems:   Hematological: no known coagulopathies  Respiratory: no shortness of breath  Cardiovascular: no chest pain  Gastrointestinal: no abdominal pain  Genito-Urinary: no dysuria  Musculoskeletal: negative  Neurological: no TIA or stroke symptoms         OBJECTIVE:     Vital Signs (Most Recent)  Temp: 97.8 °F (36.6 °C) (03/21/19 1134)  Pulse: 88 (03/21/19 1134)  Resp: 16 (03/21/19 1134)  BP: 133/76 (03/21/19 1134)  SpO2: 99 % (03/21/19 1134)    Physical Exam:  ASA: II  Mallampati: III    General: no acute distress  Mental Status: alert and oriented to person, place and time  HEENT: normocephalic, atraumatic  Chest: unlabored breathing  Heart: regular heart rate  Abdomen: nondistended  Extremity: moves all extremities    Laboratory  Lab Results   Component Value Date    INR 1.1 03/21/2019       Lab Results   Component Value Date    WBC 5.98 03/21/2019    HGB 11.3 (L) 03/21/2019    HCT 36.0 (L) 03/21/2019    MCV 91 03/21/2019     03/21/2019      Lab Results   Component Value Date     (H) 03/21/2019     03/21/2019    K 3.7 03/21/2019     03/21/2019    CO2 24 03/21/2019    BUN 9 03/21/2019    CREATININE 0.6 03/21/2019    CALCIUM 9.7 03/21/2019    MG 1.6 07/11/2018    ALT 7 (L) 03/21/2019    AST 19 03/21/2019    ALBUMIN 3.5 03/21/2019    BILITOT 1.5 (H) 03/21/2019    BILIDIR 0.5 (H) 03/21/2019       ASSESSMENT/PLAN:     Sedation Plan: Moderate  Patient will undergo cTACE of the L hepatic  lobe.  Overnight admission    Vishal Juarez M.D.   PGY-2  Radiology

## 2019-03-21 NOTE — PROGRESS NOTES
Received bedside report from JC Hendricks. No acute distress noted. VSS. Will continue to monitor.

## 2019-03-21 NOTE — PROCEDURES
"Radiology Post-Procedure Note    Pre Op Diagnosis: Hepatocellular carcinoma    Post Op Diagnosis: Hepatocellular carcinoma    Procedure: Chemoembolization    Procedure Performed by: Colt      Written Informed Consent Obtained: Yes    Specimen Removed: None    Estimated Blood Loss: Minimal    Findings:     After placement of a left radial artery sheath, a 5-Kinyarwanda catheter was inserted and angiography of the celiac artery for anatomic evaluation and localization of hepatic tumor.  A microcatheter was introduced into feeding arteries of a left hepatic lobe tumor and a mixture of doxorubicin and lipiodol were injected until near stagnant flow was achieved.  Post procedural angiography revealed no complications.    Left radial artery sheath was removed.  Hemostasis was achieved using a TR band.  There was no hematoma at the time of hemostasis.    The patient tolerated procedure well.  Please see Imaging report for further details.    Bridger Rock MD ([facility])  Radiology PGY-5  [phone]      "

## 2019-03-21 NOTE — PROGRESS NOTES
Pt states last time she was here for a procedure, she received IV Benadryl and her hand and arm turned red from medication. Redness only lasted about 5 mins, no other issues after that. IR called, MD notified, order to be changed to PO Benadryl.

## 2019-03-22 VITALS
HEART RATE: 104 BPM | OXYGEN SATURATION: 95 % | WEIGHT: 117.31 LBS | HEIGHT: 64 IN | RESPIRATION RATE: 16 BRPM | BODY MASS INDEX: 20.03 KG/M2 | TEMPERATURE: 99 F | DIASTOLIC BLOOD PRESSURE: 82 MMHG | SYSTOLIC BLOOD PRESSURE: 146 MMHG

## 2019-03-22 DIAGNOSIS — C7B.8 METASTATIC MALIGNANT NEUROENDOCRINE TUMOR TO LIVER: Primary | ICD-10-CM

## 2019-03-22 PROCEDURE — 63600175 PHARM REV CODE 636 W HCPCS: Mod: HCNC | Performed by: STUDENT IN AN ORGANIZED HEALTH CARE EDUCATION/TRAINING PROGRAM

## 2019-03-22 PROCEDURE — 25000003 PHARM REV CODE 250: Mod: HCNC | Performed by: STUDENT IN AN ORGANIZED HEALTH CARE EDUCATION/TRAINING PROGRAM

## 2019-03-22 RX ORDER — OXYCODONE HYDROCHLORIDE 5 MG/1
5 TABLET ORAL EVERY 4 HOURS PRN
Qty: 20 TABLET | Refills: 0 | Status: SHIPPED | OUTPATIENT
Start: 2019-03-22 | End: 2019-08-20

## 2019-03-22 RX ORDER — KETOROLAC TROMETHAMINE 30 MG/ML
15 INJECTION, SOLUTION INTRAMUSCULAR; INTRAVENOUS ONCE
Status: COMPLETED | OUTPATIENT
Start: 2019-03-22 | End: 2019-03-22

## 2019-03-22 RX ORDER — ONDANSETRON 4 MG/1
4 TABLET, FILM COATED ORAL EVERY 6 HOURS PRN
Qty: 28 TABLET | Refills: 0 | Status: SHIPPED | OUTPATIENT
Start: 2019-03-22 | End: 2021-12-28

## 2019-03-22 RX ORDER — ONDANSETRON 2 MG/ML
4 INJECTION INTRAMUSCULAR; INTRAVENOUS EVERY 8 HOURS PRN
Status: DISCONTINUED | OUTPATIENT
Start: 2019-03-22 | End: 2019-03-22 | Stop reason: HOSPADM

## 2019-03-22 RX ORDER — AMLODIPINE BESYLATE 5 MG/1
5 TABLET ORAL DAILY
Status: DISCONTINUED | OUTPATIENT
Start: 2019-03-22 | End: 2019-03-22 | Stop reason: HOSPADM

## 2019-03-22 RX ORDER — AMOXICILLIN AND CLAVULANATE POTASSIUM 500; 125 MG/1; MG/1
1 TABLET, FILM COATED ORAL 2 TIMES DAILY
Qty: 14 TABLET | Refills: 0 | Status: SHIPPED | OUTPATIENT
Start: 2019-03-22 | End: 2019-03-29

## 2019-03-22 RX ADMIN — HYDROCODONE BITARTRATE AND ACETAMINOPHEN 1 TABLET: 5; 325 TABLET ORAL at 02:03

## 2019-03-22 RX ADMIN — AMLODIPINE BESYLATE 5 MG: 5 TABLET ORAL at 05:03

## 2019-03-22 RX ADMIN — ONDANSETRON 4 MG: 2 INJECTION INTRAMUSCULAR; INTRAVENOUS at 05:03

## 2019-03-22 RX ADMIN — KETOROLAC TROMETHAMINE 15 MG: 30 INJECTION, SOLUTION INTRAMUSCULAR at 09:03

## 2019-03-22 RX ADMIN — ONDANSETRON 4 MG: 2 INJECTION INTRAMUSCULAR; INTRAVENOUS at 02:03

## 2019-03-22 NOTE — DISCHARGE SUMMARY
"Radiology Discharge Summary      Hospital Course: No complications    Admit Date: 3/21/2019  Discharge Date: 03/22/2019     Instructions Given to Patient: Yes  Diet: Resume prior diet  Activity: activity as tolerated and no driving for today    Description of Condition on Discharge: Stable  Vital Signs (Most Recent): Temp: 99.1 °F (37.3 °C) (03/22/19 1630)  Pulse: 104 (03/22/19 1630)  Resp: 16 (03/22/19 1630)  BP: (!) 146/82 (03/22/19 1630)  SpO2: 95 % (03/22/19 1630)    Discharge Disposition: Home    Discharge Diagnosis: neuroendocrine tumor     Follow-up: as scheduled    Bridger Rock MD ([facility])  Radiology PGY-5  [phone]      "

## 2019-03-22 NOTE — PROGRESS NOTES
Radiology Consult    Shahram Hills is a 78 y.o. female with a history of neuroendocrine tumor status post chemoembolization yesterday. Patietn having moderate abdominal pain and Nausea this morning.    Past Medical History:   Diagnosis Date    Anemia 7/11/2018    B12 deficiency     Depression     per pcp note 7/2017 and given prozac and diazepam     Hyperlipidemia     Hypertension     PVC (premature ventricular contraction)     seen on ekg from prior pcp    Weight loss     reviewed prior pcp Dr. Russo notes 2017 - labs reviewed: cmp wnl x tbili 1.5, tsh wnl, A1c 5.0, cbc wnl,D 36, hiv neg, hep c neg, hep b surg ag neg      Past Surgical History:   Procedure Laterality Date    EMBOLIZATION, BLOOD VESSEL N/A 2/14/2019    [facility]    ESOPHAGOGASTRODUODENOSCOPY  05/04/2018    esophageal ring, grade 1 esophagitis, gastritis     EYE SURGERY      cataracts    HYSTERECTOMY      fibroids         Scheduled Meds:    amLODIPine  5 mg Oral Daily    DOXOrubicin/NS chemo embolization  50 mg Intra-arterial Once    And    lipiodol ultra fluide  20 mL Intra-arterial Once    ketorolac  15 mg Intravenous Once     Continuous Infusions:    sodium chloride 0.9% 75 mL/hr at 03/21/19 1155    octreotide infusion (50 mcg/mL) 500 mcg/hr (03/21/19 1305)     PRN Meds:HYDROcodone-acetaminophen, HYDROmorphone, ketorolac, ondansetron    Allergies: Review of patient's allergies indicates:  No Known Allergies    Labs:  Recent Labs   Lab 03/21/19  1129   INR 1.1       Recent Labs   Lab 03/21/19  1310   WBC 5.98   HGB 11.3*   HCT 36.0*   MCV 91         Recent Labs   Lab 03/21/19  1129   *      K 3.7      CO2 24   BUN 9   CREATININE 0.6   CALCIUM 9.7   ALT 7*   AST 19   ALBUMIN 3.5   BILITOT 1.5*   BILIDIR 0.5*         Vitals (Most Recent):  Temp: 97.9 °F (36.6 °C) (03/22/19 0837)  Pulse: 101 (03/22/19 0837)  Resp: 18 (03/22/19 0837)  BP: (!) 159/86  "(03/22/19 0837)  SpO2: 98 % (03/22/19 0837)    Plan:   Will monitor over the morning to assess for improvement in pain and nausea. Giving a dose of IV toradol this AM    With improvement will plan on discharge this afternoon.    Bridger Rock MD ([facility])  Radiology PGY-5  [phone]    "

## 2019-03-22 NOTE — PLAN OF CARE
Problem: Pain Acute  Goal: Optimal Pain Control  Outcome: Ongoing (interventions implemented as appropriate)  Pt given pain medication as needed for pain relief to ABD (surgical site post TACE). Pt encouraged to let nursing staff know if pain becomes increased.    Problem: Nausea and Vomiting  Goal: Fluid and Electrolyte Balance  Outcome: Ongoing (interventions implemented as appropriate)  Pt given medication for nausea/vomiting. Pt reports vomiting decreased, nausea still mildly present. Carina Neumann RN

## 2019-03-22 NOTE — NURSING
DBP noted to be elevated; pt with hx of HTN; due for Amlodipine in AM: pt also with multiple episodes of N/V unrelieved by administered zofran; provider on call notified of above; orders provided; WCTM

## 2019-03-22 NOTE — NURSING
Pt explained discharge instructions, verbalized understanding. Pt reports nausea and pain significantly decreased. Ate dinner, tolerated well. Pt awaiting ride, IV removed. Pt will be discharged via wheelchair escort pending ride. Carina Neumann RN

## 2019-03-22 NOTE — PLAN OF CARE
Problem: Adult Inpatient Plan of Care  Goal: Plan of Care Review  Outcome: Outcome(s) achieved Date Met: 03/22/19  Received pt lying in bed AAOx4; c/o nausea with brownish emesis noted; vomited multiple times throughout night with c/o abd pain; provider on call notified; orders provided accordingly; able to tolerate crackers and applesauce; VS with DBP noted to be elevated; AM dose of Amlodipine given early; L radial entry point intact; CMS check intact; +ppp; denies numbness/tingling to site; PCO reviewed with pt; verbalized understanding; assessment per flowsheet; meds administered per MAR; safety/falls precautions in effect    Plan: post procedure monitoring, pain/symptom management    Pending: AM labs

## 2019-03-22 NOTE — NURSING
Pt in bed with no distress. Plan for the night and meds discussed. Pt oriented to room and unit. Call light in reach. Fall precautions maintained. Reassurance given

## 2019-04-11 ENCOUNTER — INFUSION (OUTPATIENT)
Dept: INFUSION THERAPY | Facility: OTHER | Age: 79
End: 2019-04-11
Attending: INTERNAL MEDICINE
Payer: MEDICARE

## 2019-04-11 ENCOUNTER — OFFICE VISIT (OUTPATIENT)
Dept: HEMATOLOGY/ONCOLOGY | Facility: CLINIC | Age: 79
End: 2019-04-11
Payer: MEDICARE

## 2019-04-11 VITALS
BODY MASS INDEX: 20.51 KG/M2 | SYSTOLIC BLOOD PRESSURE: 130 MMHG | RESPIRATION RATE: 16 BRPM | DIASTOLIC BLOOD PRESSURE: 84 MMHG | WEIGHT: 120.13 LBS | TEMPERATURE: 97 F | HEART RATE: 90 BPM | OXYGEN SATURATION: 98 % | HEIGHT: 64 IN

## 2019-04-11 DIAGNOSIS — D75.839 THROMBOCYTOSIS: ICD-10-CM

## 2019-04-11 DIAGNOSIS — C7B.8 METASTATIC MALIGNANT NEUROENDOCRINE TUMOR TO LIVER: Primary | ICD-10-CM

## 2019-04-11 DIAGNOSIS — I10 ESSENTIAL HYPERTENSION: ICD-10-CM

## 2019-04-11 DIAGNOSIS — C7A.8 NEUROENDOCRINE CARCINOMA METASTATIC TO BONE: ICD-10-CM

## 2019-04-11 DIAGNOSIS — C7B.8 METASTATIC MALIGNANT NEUROENDOCRINE TUMOR TO LIVER: ICD-10-CM

## 2019-04-11 DIAGNOSIS — C7A.012 MALIGNANT CARCINOID TUMOR OF ILEUM: Primary | ICD-10-CM

## 2019-04-11 DIAGNOSIS — C7B.8 NEUROENDOCRINE CARCINOMA METASTATIC TO BONE: ICD-10-CM

## 2019-04-11 PROCEDURE — 99214 PR OFFICE/OUTPT VISIT, EST, LEVL IV, 30-39 MIN: ICD-10-PCS | Mod: HCNC,S$GLB,, | Performed by: INTERNAL MEDICINE

## 2019-04-11 PROCEDURE — 3079F PR MOST RECENT DIASTOLIC BLOOD PRESSURE 80-89 MM HG: ICD-10-PCS | Mod: HCNC,CPTII,S$GLB, | Performed by: INTERNAL MEDICINE

## 2019-04-11 PROCEDURE — 3079F DIAST BP 80-89 MM HG: CPT | Mod: HCNC,CPTII,S$GLB, | Performed by: INTERNAL MEDICINE

## 2019-04-11 PROCEDURE — 99999 PR PBB SHADOW E&M-EST. PATIENT-LVL III: ICD-10-PCS | Mod: PBBFAC,HCNC,, | Performed by: INTERNAL MEDICINE

## 2019-04-11 PROCEDURE — 96372 THER/PROPH/DIAG INJ SC/IM: CPT | Mod: HCNC

## 2019-04-11 PROCEDURE — 1101F PR PT FALLS ASSESS DOC 0-1 FALLS W/OUT INJ PAST YR: ICD-10-PCS | Mod: HCNC,CPTII,S$GLB, | Performed by: INTERNAL MEDICINE

## 2019-04-11 PROCEDURE — 99214 OFFICE O/P EST MOD 30 MIN: CPT | Mod: HCNC,S$GLB,, | Performed by: INTERNAL MEDICINE

## 2019-04-11 PROCEDURE — 99999 PR PBB SHADOW E&M-EST. PATIENT-LVL III: CPT | Mod: PBBFAC,HCNC,, | Performed by: INTERNAL MEDICINE

## 2019-04-11 PROCEDURE — 3075F PR MOST RECENT SYSTOLIC BLOOD PRESS GE 130-139MM HG: ICD-10-PCS | Mod: HCNC,CPTII,S$GLB, | Performed by: INTERNAL MEDICINE

## 2019-04-11 PROCEDURE — 1101F PT FALLS ASSESS-DOCD LE1/YR: CPT | Mod: HCNC,CPTII,S$GLB, | Performed by: INTERNAL MEDICINE

## 2019-04-11 PROCEDURE — 63600175 PHARM REV CODE 636 W HCPCS: Mod: HCNC,JG | Performed by: INTERNAL MEDICINE

## 2019-04-11 PROCEDURE — 3075F SYST BP GE 130 - 139MM HG: CPT | Mod: HCNC,CPTII,S$GLB, | Performed by: INTERNAL MEDICINE

## 2019-04-11 RX ORDER — LANREOTIDE ACETATE 120 MG/.5ML
120 INJECTION SUBCUTANEOUS
Status: DISCONTINUED | OUTPATIENT
Start: 2019-04-11 | End: 2019-04-11 | Stop reason: HOSPADM

## 2019-04-11 RX ORDER — LANREOTIDE ACETATE 120 MG/.5ML
120 INJECTION SUBCUTANEOUS
Status: CANCELLED | OUTPATIENT
Start: 2019-04-11

## 2019-04-11 RX ADMIN — LANREOTIDE ACETATE 120 MG: 120 INJECTION SUBCUTANEOUS at 11:04

## 2019-04-11 NOTE — PLAN OF CARE
Problem: Adult Inpatient Plan of Care  Goal: Patient-Specific Goal (Individualization)  Outcome: Ongoing (interventions implemented as appropriate)  Lanreotide injection to Left buttock complete. Pt tolerated well. VSS. NAD.   Pt verbalized understanding of discharge instructions before leaving with family member.

## 2019-04-11 NOTE — PROGRESS NOTES
"Subjective:       Patient ID: Shahram Hills is a 78 y.o. female.     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 Ms. Hills is an 79 yo woman who initially saw me on 6/7/18 for further evaluation of neuroendocrine tumor. She initially presented with diarrhea, abdominal discomfort, weight loss. She was evaluated at [facility] and underwent workup below:  US abdomen on 3/14/18 showed numerous bilobar mixed echogenicity and mildly vascular hepatic lesions. Cholelithiasis without e/o acute cholecystitis.   MRI abdomen on 3/30/2018 showed innumerable hepatic metastases. L3 vertebral body metastasis with soft tissue component along the right margin of the L3 and upper L4 vertebral bodies, filling the right L3/4 neural foramen, and extending into the anterior aspect of the spinal canal on the right at the L3 and upper T4 levels. Associated with mild spinal canal narrowing.  CT chest on 4/6/2018 showed one lucent nodule measuring 7 mm in the T7 vertebral body, most likely representing metastatic disease.    EGD on 5/4/18 showed normal duodenum. Schatzki's ring in the lower third of the esophagus. Grade 1 esophagitis in the GE junction c/w mild distal esophagitis. Congestion and erythema in the antrum, pre-pyloric region and stomach body c/w gastritis. Biopsy of the stomach antrum showed mild chronic gastritis, neg for H. pylori.   Labs on 5/9/2018: WBC 6.9, H/H 11.6/34.3, MCV 87.5, plt count 279. On 3/9/18: Vit B12 level 173, folic acid 6.6  She was started on oral vit B12 and underwent a liver biopsy on 5/18/18. Pathology showed "metastatic well differentiated neuroendocrine tumor. Ki67 3%"     She saw me on 6/7/18 and complained of weight loss of 26 lbs over the past 3-4 months, also has diarrhea. No flushing, wheezing, palpitations. Has been having pain in right flank radiating down the right leg. No " "tingling/numbness, weakness. She can hold her bladder but when she urinates her diarrhea would come out as well. Started on dex 4 mg q6h the evening of 18.      2. MRI spine on 18 showed "Marrow replacing metastatic lesion of the L3 vertebral body with associated extra osseous expansion and complete effacement of the right L3-L4 neural foramina and additional effacement of the right lateral recess.  There is additional lateral extension and abutment of the right psoas muscle.  Superimposed degenerative change at this level results in mild spinal canal stenosis." I sent the patient to ED on 18 where she was evaluated by neurosurgery. Neurosurgery did not feel she was a candidate for surgical intervention.      3. Seen by Dr. Adames on 18. palliative radiation to the area of the L3 metastasis 18-18   4. 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  There is an adjacent metastatic lymph node, diffuse liver metastases, and multiple bone metastases. Index primary neoplasm SUV max 33.13. Adjacent lymph node SUV max 23. L3 bone metastasis SUV max 36.86. Left lobe SUV max 46.13   5. Lanreotide /. 7/. //. /, 10/12/18, 18, 12/10/18, 19, 19, 3/11/19  6. TACE to the right hepatic lobe on 19. TACE to the left hepatic lobe on 3/21/19.      History of Present Illness:   Mr. Hills returns for follow up. feeling well. No pain. had some stomach upset after she got TACE which has since resolved. No diarrhea. +rhinorrhea     ECO     ROS:   See HPI. Otherwise negative.      Physical Examination:   Vital signs reviewed.   Gen: well hydrated, well developed, in no acute distress.  HEENT: normocephalic, anicteric, PERRLA, EOMI, oropharynx clear  Neck: supple, no JVD, thyromegaly, cervical or supraclavicular LAD  Lungs: CTAB, no wheezes or rales  Heart: RRR, no M/R/G  Abdomen: soft, no tenderness, non-distended,  liver palpated " 6.0 cm below the right costal margin, no splenomegaly, no mass, or hernia.   Ext: no cyanosis, edema, deformity  Neuro: alert and oriented x 4, no focal neuro deficit  Skin: no rash, open wounds or ulcers  Psych: pleasant and appropriate mood and affect     Objective:      Diagnostic Tests:  CT 3/8/2019:  Stable appearance of the known ileal mass, adjacent mesenteric lymph nodes, multiple hepatic masses, and multiple bony lesions.    Stable 5 mm nodule in the right upper lobe.    Additional stable incidental findings as above.    RECIST SUMMARY:    Date of prior examination for comparison: 12/07/2018    Lesion 1: Segment 4 of the liver.  5.9 cm, previously 6.0 cm.  Series 3, image 42    Lesion 2: Terminal ileum.  2.6 cm, previously 2.5 cm.  Series 3 image 90    Lesion 3: Mesenteric nodule.  1.5 cm., previously 1.5 cm.  Series 3, image 83.     Laboratory Data:  Labs reviewed. Bilirubin 1.5. plt count 423     Assessment/Plan:      1. Malignant carcinoid tumor of ileum    2. 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    4. Thrombocytosis    5. Essential hypertension           1-3  - doing well. S/p TACE  - CT scan 3/8 showed stable disease.   - lanreotide today  - repeat TACE on 3/21  - RTC in 4 weeks  - next CT scan due in June 4.  - likely reactive  - monitor       5.  - BP well controlled   - C/w current meds

## 2019-05-13 ENCOUNTER — LAB VISIT (OUTPATIENT)
Dept: LAB | Facility: OTHER | Age: 79
End: 2019-05-13
Attending: INTERNAL MEDICINE
Payer: MEDICARE

## 2019-05-13 ENCOUNTER — OFFICE VISIT (OUTPATIENT)
Dept: HEMATOLOGY/ONCOLOGY | Facility: CLINIC | Age: 79
End: 2019-05-13
Payer: MEDICARE

## 2019-05-13 ENCOUNTER — INFUSION (OUTPATIENT)
Dept: INFUSION THERAPY | Facility: OTHER | Age: 79
End: 2019-05-13
Attending: INTERNAL MEDICINE
Payer: MEDICARE

## 2019-05-13 VITALS
RESPIRATION RATE: 16 BRPM | WEIGHT: 124.75 LBS | DIASTOLIC BLOOD PRESSURE: 80 MMHG | TEMPERATURE: 98 F | OXYGEN SATURATION: 99 % | HEART RATE: 78 BPM | BODY MASS INDEX: 21.3 KG/M2 | SYSTOLIC BLOOD PRESSURE: 152 MMHG | HEIGHT: 64 IN

## 2019-05-13 DIAGNOSIS — C7B.8 NEUROENDOCRINE CARCINOMA METASTATIC TO BONE: ICD-10-CM

## 2019-05-13 DIAGNOSIS — Z51.11 ENCOUNTER FOR ANTINEOPLASTIC CHEMOTHERAPY: ICD-10-CM

## 2019-05-13 DIAGNOSIS — C7A.8 NEUROENDOCRINE CARCINOMA METASTATIC TO BONE: ICD-10-CM

## 2019-05-13 DIAGNOSIS — C79.51 SPINE METASTASIS: ICD-10-CM

## 2019-05-13 DIAGNOSIS — C7A.012 MALIGNANT CARCINOID TUMOR OF ILEUM: Primary | ICD-10-CM

## 2019-05-13 DIAGNOSIS — I10 ESSENTIAL HYPERTENSION: ICD-10-CM

## 2019-05-13 DIAGNOSIS — C7A.012 MALIGNANT CARCINOID TUMOR OF ILEUM: ICD-10-CM

## 2019-05-13 DIAGNOSIS — C7B.8 METASTATIC MALIGNANT NEUROENDOCRINE TUMOR TO LIVER: ICD-10-CM

## 2019-05-13 DIAGNOSIS — C7B.8 METASTATIC MALIGNANT NEUROENDOCRINE TUMOR TO LIVER: Primary | ICD-10-CM

## 2019-05-13 LAB
ALBUMIN SERPL BCP-MCNC: 3.4 G/DL (ref 3.5–5.2)
ALP SERPL-CCNC: 70 U/L (ref 55–135)
ALT SERPL W/O P-5'-P-CCNC: 10 U/L (ref 10–44)
ANION GAP SERPL CALC-SCNC: 6 MMOL/L (ref 8–16)
AST SERPL-CCNC: 16 U/L (ref 10–40)
BILIRUB SERPL-MCNC: 1.2 MG/DL (ref 0.1–1)
BUN SERPL-MCNC: 11 MG/DL (ref 8–23)
CALCIUM SERPL-MCNC: 9.5 MG/DL (ref 8.7–10.5)
CHLORIDE SERPL-SCNC: 103 MMOL/L (ref 95–110)
CO2 SERPL-SCNC: 31 MMOL/L (ref 23–29)
CREAT SERPL-MCNC: 0.8 MG/DL (ref 0.5–1.4)
ERYTHROCYTE [DISTWIDTH] IN BLOOD BY AUTOMATED COUNT: 12.7 % (ref 11.5–14.5)
EST. GFR  (AFRICAN AMERICAN): >60 ML/MIN/1.73 M^2
EST. GFR  (NON AFRICAN AMERICAN): >60 ML/MIN/1.73 M^2
GLUCOSE SERPL-MCNC: 182 MG/DL (ref 70–110)
HCT VFR BLD AUTO: 39.9 % (ref 37–48.5)
HGB BLD-MCNC: 13.1 G/DL (ref 12–16)
MCH RBC QN AUTO: 29.6 PG (ref 27–31)
MCHC RBC AUTO-ENTMCNC: 32.8 G/DL (ref 32–36)
MCV RBC AUTO: 90 FL (ref 82–98)
NEUTROPHILS # BLD AUTO: 3.5 K/UL (ref 1.8–7.7)
PLATELET # BLD AUTO: 275 K/UL (ref 150–350)
PMV BLD AUTO: 9.8 FL (ref 9.2–12.9)
POTASSIUM SERPL-SCNC: 3.9 MMOL/L (ref 3.5–5.1)
PROT SERPL-MCNC: 7 G/DL (ref 6–8.4)
RBC # BLD AUTO: 4.43 M/UL (ref 4–5.4)
SODIUM SERPL-SCNC: 140 MMOL/L (ref 136–145)
WBC # BLD AUTO: 5.34 K/UL (ref 3.9–12.7)

## 2019-05-13 PROCEDURE — 85027 COMPLETE CBC AUTOMATED: CPT | Mod: HCNC

## 2019-05-13 PROCEDURE — 86316 IMMUNOASSAY TUMOR OTHER: CPT | Mod: HCNC

## 2019-05-13 PROCEDURE — 99999 PR PBB SHADOW E&M-EST. PATIENT-LVL III: ICD-10-PCS | Mod: PBBFAC,HCNC,, | Performed by: INTERNAL MEDICINE

## 2019-05-13 PROCEDURE — 36415 COLL VENOUS BLD VENIPUNCTURE: CPT | Mod: HCNC

## 2019-05-13 PROCEDURE — 99214 OFFICE O/P EST MOD 30 MIN: CPT | Mod: HCNC,S$GLB,, | Performed by: INTERNAL MEDICINE

## 2019-05-13 PROCEDURE — 3077F SYST BP >= 140 MM HG: CPT | Mod: HCNC,CPTII,S$GLB, | Performed by: INTERNAL MEDICINE

## 2019-05-13 PROCEDURE — 99214 PR OFFICE/OUTPT VISIT, EST, LEVL IV, 30-39 MIN: ICD-10-PCS | Mod: HCNC,S$GLB,, | Performed by: INTERNAL MEDICINE

## 2019-05-13 PROCEDURE — 80053 COMPREHEN METABOLIC PANEL: CPT | Mod: HCNC

## 2019-05-13 PROCEDURE — 96372 THER/PROPH/DIAG INJ SC/IM: CPT | Mod: HCNC

## 2019-05-13 PROCEDURE — 1101F PR PT FALLS ASSESS DOC 0-1 FALLS W/OUT INJ PAST YR: ICD-10-PCS | Mod: HCNC,CPTII,S$GLB, | Performed by: INTERNAL MEDICINE

## 2019-05-13 PROCEDURE — 3077F PR MOST RECENT SYSTOLIC BLOOD PRESSURE >= 140 MM HG: ICD-10-PCS | Mod: HCNC,CPTII,S$GLB, | Performed by: INTERNAL MEDICINE

## 2019-05-13 PROCEDURE — 1101F PT FALLS ASSESS-DOCD LE1/YR: CPT | Mod: HCNC,CPTII,S$GLB, | Performed by: INTERNAL MEDICINE

## 2019-05-13 PROCEDURE — 3079F PR MOST RECENT DIASTOLIC BLOOD PRESSURE 80-89 MM HG: ICD-10-PCS | Mod: HCNC,CPTII,S$GLB, | Performed by: INTERNAL MEDICINE

## 2019-05-13 PROCEDURE — 63600175 PHARM REV CODE 636 W HCPCS: Mod: HCNC,JG | Performed by: INTERNAL MEDICINE

## 2019-05-13 PROCEDURE — 99999 PR PBB SHADOW E&M-EST. PATIENT-LVL III: CPT | Mod: PBBFAC,HCNC,, | Performed by: INTERNAL MEDICINE

## 2019-05-13 PROCEDURE — 3079F DIAST BP 80-89 MM HG: CPT | Mod: HCNC,CPTII,S$GLB, | Performed by: INTERNAL MEDICINE

## 2019-05-13 RX ORDER — LANREOTIDE ACETATE 120 MG/.5ML
120 INJECTION SUBCUTANEOUS
Status: CANCELLED | OUTPATIENT
Start: 2019-05-13

## 2019-05-13 RX ORDER — LANREOTIDE ACETATE 120 MG/.5ML
120 INJECTION SUBCUTANEOUS
Status: DISCONTINUED | OUTPATIENT
Start: 2019-05-13 | End: 2019-05-13 | Stop reason: HOSPADM

## 2019-05-13 RX ADMIN — LANREOTIDE ACETATE 120 MG: 120 INJECTION SUBCUTANEOUS at 10:05

## 2019-05-13 NOTE — PROGRESS NOTES
"Subjective:       Patient ID: Shahram Hills is a 78 y.o. female.     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 Ms. Hills is an 79 yo woman who initially saw me on 6/7/18 for further evaluation of neuroendocrine tumor. She initially presented with diarrhea, abdominal discomfort, weight loss. She was evaluated at [facility] and underwent workup below:  US abdomen on 3/14/18 showed numerous bilobar mixed echogenicity and mildly vascular hepatic lesions. Cholelithiasis without e/o acute cholecystitis.   MRI abdomen on 3/30/2018 showed innumerable hepatic metastases. L3 vertebral body metastasis with soft tissue component along the right margin of the L3 and upper L4 vertebral bodies, filling the right L3/4 neural foramen, and extending into the anterior aspect of the spinal canal on the right at the L3 and upper T4 levels. Associated with mild spinal canal narrowing.  CT chest on 4/6/2018 showed one lucent nodule measuring 7 mm in the T7 vertebral body, most likely representing metastatic disease.    EGD on 5/4/18 showed normal duodenum. Schatzki's ring in the lower third of the esophagus. Grade 1 esophagitis in the GE junction c/w mild distal esophagitis. Congestion and erythema in the antrum, pre-pyloric region and stomach body c/w gastritis. Biopsy of the stomach antrum showed mild chronic gastritis, neg for H. pylori.   Labs on 5/9/2018: WBC 6.9, H/H 11.6/34.3, MCV 87.5, plt count 279. On 3/9/18: Vit B12 level 173, folic acid 6.6  She was started on oral vit B12 and underwent a liver biopsy on 5/18/18. Pathology showed "metastatic well differentiated neuroendocrine tumor. Ki67 3%"     She saw me on 6/7/18 and complained of weight loss of 26 lbs over the past 3-4 months, also has diarrhea. No flushing, wheezing, palpitations. Has been having pain in right flank radiating down the right leg. No " "tingling/numbness, weakness. She can hold her bladder but when she urinates her diarrhea would come out as well. Started on dex 4 mg q6h the evening of 18.      2. MRI spine on 18 showed "Marrow replacing metastatic lesion of the L3 vertebral body with associated extra osseous expansion and complete effacement of the right L3-L4 neural foramina and additional effacement of the right lateral recess.  There is additional lateral extension and abutment of the right psoas muscle.  Superimposed degenerative change at this level results in mild spinal canal stenosis." I sent the patient to ED on 18 where she was evaluated by neurosurgery. Neurosurgery did not feel she was a candidate for surgical intervention.      3. Seen by Dr. Adames on 18. palliative radiation to the area of the L3 metastasis 18-18   4. 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  There is an adjacent metastatic lymph node, diffuse liver metastases, and multiple bone metastases. Index primary neoplasm SUV max 33.13. Adjacent lymph node SUV max 23. L3 bone metastasis SUV max 36.86. Left lobe SUV max 46.13   5. Lanreotide /. /. //. /, 10/12/18, 18, 12/10/18, 19, 19, 3/11/19  6. TACE to the right hepatic lobe on 19. TACE to the left hepatic lobe on 3/21/19.      History of Present Illness:   Mr. Hills returns for follow up. feeling well. No pain. no diarrhea, palpitations, wheezing.      ECO     ROS:   See HPI. Otherwise negative.      Physical Examination:   Vital signs reviewed.   Gen: well hydrated, well developed, in no acute distress.  HEENT: normocephalic, anicteric, PERRLA, EOMI, oropharynx clear  Neck: supple, no JVD, thyromegaly, cervical or supraclavicular LAD  Lungs: CTAB, no wheezes or rales  Heart: RRR, no M/R/G  Abdomen: soft, no tenderness, non-distended,  liver palpated 5.0 cm below the right costal margin, no splenomegaly, " no mass, or hernia.   Ext: no cyanosis, edema, deformity  Neuro: alert and oriented x 4, no focal neuro deficit  Skin: no rash, open wounds or ulcers  Psych: pleasant and appropriate mood and affect     Objective:      Diagnostic Tests:  CT 3/8/2019:  Stable appearance of the known ileal mass, adjacent mesenteric lymph nodes, multiple hepatic masses, and multiple bony lesions.    Stable 5 mm nodule in the right upper lobe.    Additional stable incidental findings as above.    RECIST SUMMARY:    Date of prior examination for comparison: 12/07/2018    Lesion 1: Segment 4 of the liver.  5.9 cm, previously 6.0 cm.  Series 3, image 42    Lesion 2: Terminal ileum.  2.6 cm, previously 2.5 cm.  Series 3 image 90    Lesion 3: Mesenteric nodule.  1.5 cm., previously 1.5 cm.  Series 3, image 83.     Laboratory Data:  Labs reviewed. CBC normal, bilirubin 1.2     Assessment/Plan:      1. Malignant carcinoid tumor of ileum    2. 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 Spine metastasis    4. 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 Encounter for antineoplastic chemotherapy    6. Essential hypertension           1-5  - doing well. On lanreotide. S/p TACE  - CT scan 3/8 showed stable disease.   - she is scheduled for MRI abdomen by IR for tomorrow. Will see if we can add CT chest. If not, will restage in 3 months  - lanreotide today     6  - BP slightly elevated today  - C/w current meds  - monitor

## 2019-05-13 NOTE — PLAN OF CARE
Problem: Adult Inpatient Plan of Care  Goal: Plan of Care Review  Outcome: Ongoing (interventions implemented as appropriate)  Pt tolerated lanreotide injection without issue. VSS. AVS given. Pt d.c home with instructions to return 6/10/19.

## 2019-05-14 ENCOUNTER — HOSPITAL ENCOUNTER (OUTPATIENT)
Dept: RADIOLOGY | Facility: HOSPITAL | Age: 79
Discharge: HOME | End: 2019-05-14
Attending: FAMILY MEDICINE
Payer: MEDICARE

## 2019-05-14 DIAGNOSIS — C7B.8 METASTATIC MALIGNANT NEUROENDOCRINE TUMOR TO LIVER: ICD-10-CM

## 2019-05-14 LAB — CGA SERPL-MCNC: 1002 NG/ML (ref 0–95)

## 2019-05-14 PROCEDURE — 74183 MRI ABD W/O CNTR FLWD CNTR: CPT | Mod: 26,HCNC,, | Performed by: RADIOLOGY

## 2019-05-14 PROCEDURE — 25500020 PHARM REV CODE 255: Mod: HCNC | Performed by: FAMILY MEDICINE

## 2019-05-14 PROCEDURE — 74183 MRI ABDOMEN W WO CONTRAST: ICD-10-PCS | Mod: 26,HCNC,, | Performed by: RADIOLOGY

## 2019-05-14 PROCEDURE — A9585 GADOBUTROL INJECTION: HCPCS | Mod: HCNC | Performed by: FAMILY MEDICINE

## 2019-05-14 PROCEDURE — 74183 MRI ABD W/O CNTR FLWD CNTR: CPT | Mod: TC,HCNC

## 2019-05-14 RX ORDER — GADOBUTROL 604.72 MG/ML
10 INJECTION INTRAVENOUS
Status: COMPLETED | OUTPATIENT
Start: 2019-05-14 | End: 2019-05-14

## 2019-05-14 RX ADMIN — GADOBUTROL 10 ML: 604.72 INJECTION INTRAVENOUS at 01:05

## 2019-05-16 ENCOUNTER — OFFICE VISIT (OUTPATIENT)
Dept: INTERVENTIONAL RADIOLOGY/VASCULAR | Facility: CLINIC | Age: 79
End: 2019-05-16
Payer: MEDICARE

## 2019-05-16 VITALS
DIASTOLIC BLOOD PRESSURE: 82 MMHG | HEIGHT: 64 IN | BODY MASS INDEX: 21.56 KG/M2 | WEIGHT: 126.31 LBS | HEART RATE: 86 BPM | SYSTOLIC BLOOD PRESSURE: 152 MMHG

## 2019-05-16 DIAGNOSIS — C7B.8 METASTATIC MALIGNANT NEUROENDOCRINE TUMOR TO LIVER: Primary | ICD-10-CM

## 2019-05-16 PROCEDURE — 99212 PR OFFICE/OUTPT VISIT, EST, LEVL II, 10-19 MIN: ICD-10-PCS | Mod: HCNC,S$GLB,, | Performed by: FAMILY MEDICINE

## 2019-05-16 PROCEDURE — 99212 OFFICE O/P EST SF 10 MIN: CPT | Mod: HCNC,S$GLB,, | Performed by: FAMILY MEDICINE

## 2019-05-16 PROCEDURE — 1101F PT FALLS ASSESS-DOCD LE1/YR: CPT | Mod: HCNC,CPTII,S$GLB, | Performed by: FAMILY MEDICINE

## 2019-05-16 PROCEDURE — 3079F DIAST BP 80-89 MM HG: CPT | Mod: HCNC,CPTII,S$GLB, | Performed by: FAMILY MEDICINE

## 2019-05-16 PROCEDURE — 99999 PR PBB SHADOW E&M-EST. PATIENT-LVL III: ICD-10-PCS | Mod: PBBFAC,HCNC,, | Performed by: FAMILY MEDICINE

## 2019-05-16 PROCEDURE — 3077F SYST BP >= 140 MM HG: CPT | Mod: HCNC,CPTII,S$GLB, | Performed by: FAMILY MEDICINE

## 2019-05-16 PROCEDURE — 3079F PR MOST RECENT DIASTOLIC BLOOD PRESSURE 80-89 MM HG: ICD-10-PCS | Mod: HCNC,CPTII,S$GLB, | Performed by: FAMILY MEDICINE

## 2019-05-16 PROCEDURE — 99999 PR PBB SHADOW E&M-EST. PATIENT-LVL III: CPT | Mod: PBBFAC,HCNC,, | Performed by: FAMILY MEDICINE

## 2019-05-16 PROCEDURE — 3077F PR MOST RECENT SYSTOLIC BLOOD PRESSURE >= 140 MM HG: ICD-10-PCS | Mod: HCNC,CPTII,S$GLB, | Performed by: FAMILY MEDICINE

## 2019-05-16 PROCEDURE — 1101F PR PT FALLS ASSESS DOC 0-1 FALLS W/OUT INJ PAST YR: ICD-10-PCS | Mod: HCNC,CPTII,S$GLB, | Performed by: FAMILY MEDICINE

## 2019-05-17 NOTE — PROGRESS NOTES
"Subjective:       Patient ID: Shahram Hills is a 79 y.o. female.    Chief Complaint: Cancer    Patient here for follow up of neuroendocrine tumors in her liver. She was diagnosed in 2018. She underwent a biopsy of her liver on 5/18/2018. Pathology report noted "metastatic well differentiated neuroendocrine tumor." Further work up showed she also has metastasis to bone and lymph nodes. She was recently treated with chemoembolization on 3/21/2019. She reports feeling well. She denies any abdominal pain or distention. She is accompanied by her sister.     Review of Systems   Constitutional: Negative for activity change, appetite change, chills, fatigue and fever.   Respiratory: Negative for cough, shortness of breath, wheezing and stridor.    Cardiovascular: Negative for chest pain, palpitations and leg swelling.   Gastrointestinal: Negative for abdominal distention, abdominal pain, constipation, diarrhea, nausea and vomiting.       Objective:      Physical Exam   Constitutional: She is oriented to person, place, and time. She appears well-developed and well-nourished. No distress.   Cardiovascular: Normal rate, regular rhythm and normal heart sounds. Exam reveals no gallop and no friction rub.   No murmur heard.  Pulmonary/Chest: Effort normal and breath sounds normal. No stridor. No respiratory distress. She has no wheezes. She has no rales.   Abdominal: Soft. Bowel sounds are normal. She exhibits no distension and no mass. There is no tenderness. There is no guarding.   Neurological: She is alert and oriented to person, place, and time.   Skin: Skin is warm and dry. She is not diaphoretic.   Psychiatric: She has a normal mood and affect.   Vitals reviewed.      Imaging:   MRI 5/14/2019  Impression       1. Innumerable enhancing hepatic lesions consistent with metastatic disease, grossly stable in comparison to prior exam.  Index lesion in hepatic segment 4 demonstrates persistent restricted diffusion and " arterial enhancement.  2. Enhancing lesion within the L3 vertebral body.  3. Left-sided mesenteric lymphadenopathy.  4. Cholelithiasis.  RECIST SUMMARY:    Date of prior examination for comparison: 03/08/2019    Lesion 1: Hepatic segment 4 lesion.  5.4 cm. Series 10 image 46.  Prior measurement 5.9 cm.     Assessment:       1. 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        Plan:         Reviewed MRI with Dr. Pinto. Explained to patient liver lesions are stable and recommendation of Dr. Pinto is to repeat imaging in 3 months. Patient verbalized understanding and agreement. Clinic phone number provided. We will call to schedule f/u as it gets closer to that time.

## 2019-06-10 ENCOUNTER — LAB VISIT (OUTPATIENT)
Dept: LAB | Facility: OTHER | Age: 79
End: 2019-06-10
Attending: INTERNAL MEDICINE
Payer: MEDICARE

## 2019-06-10 ENCOUNTER — OFFICE VISIT (OUTPATIENT)
Dept: HEMATOLOGY/ONCOLOGY | Facility: CLINIC | Age: 79
End: 2019-06-10
Payer: MEDICARE

## 2019-06-10 ENCOUNTER — INFUSION (OUTPATIENT)
Dept: INFUSION THERAPY | Facility: OTHER | Age: 79
End: 2019-06-10
Attending: INTERNAL MEDICINE
Payer: MEDICARE

## 2019-06-10 VITALS
WEIGHT: 127.19 LBS | RESPIRATION RATE: 16 BRPM | BODY MASS INDEX: 21.71 KG/M2 | HEIGHT: 64 IN | DIASTOLIC BLOOD PRESSURE: 81 MMHG | TEMPERATURE: 97 F | OXYGEN SATURATION: 99 % | HEART RATE: 78 BPM | SYSTOLIC BLOOD PRESSURE: 156 MMHG

## 2019-06-10 DIAGNOSIS — C7B.8 METASTATIC MALIGNANT NEUROENDOCRINE TUMOR TO LIVER: Primary | ICD-10-CM

## 2019-06-10 DIAGNOSIS — C7B.8 METASTATIC MALIGNANT NEUROENDOCRINE TUMOR TO LIVER: ICD-10-CM

## 2019-06-10 DIAGNOSIS — C7A.8 NEUROENDOCRINE CARCINOMA METASTATIC TO BONE: ICD-10-CM

## 2019-06-10 DIAGNOSIS — C79.51 SPINE METASTASIS: ICD-10-CM

## 2019-06-10 DIAGNOSIS — I10 ESSENTIAL HYPERTENSION: ICD-10-CM

## 2019-06-10 DIAGNOSIS — C7A.012 MALIGNANT CARCINOID TUMOR OF ILEUM: Primary | ICD-10-CM

## 2019-06-10 DIAGNOSIS — C7B.8 NEUROENDOCRINE CARCINOMA METASTATIC TO BONE: ICD-10-CM

## 2019-06-10 DIAGNOSIS — C7A.012 MALIGNANT CARCINOID TUMOR OF ILEUM: ICD-10-CM

## 2019-06-10 LAB
ALBUMIN SERPL BCP-MCNC: 3.9 G/DL (ref 3.5–5.2)
ALP SERPL-CCNC: 79 U/L (ref 55–135)
ALT SERPL W/O P-5'-P-CCNC: 10 U/L (ref 10–44)
ANION GAP SERPL CALC-SCNC: 10 MMOL/L (ref 8–16)
AST SERPL-CCNC: 19 U/L (ref 10–40)
BILIRUB SERPL-MCNC: 1.5 MG/DL (ref 0.1–1)
BUN SERPL-MCNC: 8 MG/DL (ref 8–23)
CALCIUM SERPL-MCNC: 9.7 MG/DL (ref 8.7–10.5)
CHLORIDE SERPL-SCNC: 102 MMOL/L (ref 95–110)
CO2 SERPL-SCNC: 31 MMOL/L (ref 23–29)
CREAT SERPL-MCNC: 0.7 MG/DL (ref 0.5–1.4)
ERYTHROCYTE [DISTWIDTH] IN BLOOD BY AUTOMATED COUNT: 12.6 % (ref 11.5–14.5)
EST. GFR  (AFRICAN AMERICAN): >60 ML/MIN/1.73 M^2
EST. GFR  (NON AFRICAN AMERICAN): >60 ML/MIN/1.73 M^2
GLUCOSE SERPL-MCNC: 92 MG/DL (ref 70–110)
HCT VFR BLD AUTO: 42 % (ref 37–48.5)
HGB BLD-MCNC: 13.7 G/DL (ref 12–16)
MCH RBC QN AUTO: 29.2 PG (ref 27–31)
MCHC RBC AUTO-ENTMCNC: 32.6 G/DL (ref 32–36)
MCV RBC AUTO: 90 FL (ref 82–98)
NEUTROPHILS # BLD AUTO: 3.3 K/UL (ref 1.8–7.7)
PLATELET # BLD AUTO: 290 K/UL (ref 150–350)
PMV BLD AUTO: 10.4 FL (ref 9.2–12.9)
POTASSIUM SERPL-SCNC: 4 MMOL/L (ref 3.5–5.1)
PROT SERPL-MCNC: 7.6 G/DL (ref 6–8.4)
RBC # BLD AUTO: 4.69 M/UL (ref 4–5.4)
SODIUM SERPL-SCNC: 143 MMOL/L (ref 136–145)
WBC # BLD AUTO: 5.43 K/UL (ref 3.9–12.7)

## 2019-06-10 PROCEDURE — 99215 PR OFFICE/OUTPT VISIT, EST, LEVL V, 40-54 MIN: ICD-10-PCS | Mod: HCNC,S$GLB,, | Performed by: INTERNAL MEDICINE

## 2019-06-10 PROCEDURE — 3077F SYST BP >= 140 MM HG: CPT | Mod: HCNC,CPTII,S$GLB, | Performed by: INTERNAL MEDICINE

## 2019-06-10 PROCEDURE — 99999 PR PBB SHADOW E&M-EST. PATIENT-LVL III: ICD-10-PCS | Mod: PBBFAC,HCNC,, | Performed by: INTERNAL MEDICINE

## 2019-06-10 PROCEDURE — 85027 COMPLETE CBC AUTOMATED: CPT | Mod: HCNC

## 2019-06-10 PROCEDURE — 1101F PR PT FALLS ASSESS DOC 0-1 FALLS W/OUT INJ PAST YR: ICD-10-PCS | Mod: HCNC,CPTII,S$GLB, | Performed by: INTERNAL MEDICINE

## 2019-06-10 PROCEDURE — 99215 OFFICE O/P EST HI 40 MIN: CPT | Mod: HCNC,S$GLB,, | Performed by: INTERNAL MEDICINE

## 2019-06-10 PROCEDURE — 96372 THER/PROPH/DIAG INJ SC/IM: CPT | Mod: HCNC

## 2019-06-10 PROCEDURE — 3079F PR MOST RECENT DIASTOLIC BLOOD PRESSURE 80-89 MM HG: ICD-10-PCS | Mod: HCNC,CPTII,S$GLB, | Performed by: INTERNAL MEDICINE

## 2019-06-10 PROCEDURE — 99999 PR PBB SHADOW E&M-EST. PATIENT-LVL III: CPT | Mod: PBBFAC,HCNC,, | Performed by: INTERNAL MEDICINE

## 2019-06-10 PROCEDURE — 80053 COMPREHEN METABOLIC PANEL: CPT | Mod: HCNC

## 2019-06-10 PROCEDURE — 63600175 PHARM REV CODE 636 W HCPCS: Mod: HCNC,JG | Performed by: INTERNAL MEDICINE

## 2019-06-10 PROCEDURE — 86316 IMMUNOASSAY TUMOR OTHER: CPT | Mod: HCNC

## 2019-06-10 PROCEDURE — 3077F PR MOST RECENT SYSTOLIC BLOOD PRESSURE >= 140 MM HG: ICD-10-PCS | Mod: HCNC,CPTII,S$GLB, | Performed by: INTERNAL MEDICINE

## 2019-06-10 PROCEDURE — 1101F PT FALLS ASSESS-DOCD LE1/YR: CPT | Mod: HCNC,CPTII,S$GLB, | Performed by: INTERNAL MEDICINE

## 2019-06-10 PROCEDURE — 36415 COLL VENOUS BLD VENIPUNCTURE: CPT | Mod: HCNC

## 2019-06-10 PROCEDURE — 3079F DIAST BP 80-89 MM HG: CPT | Mod: HCNC,CPTII,S$GLB, | Performed by: INTERNAL MEDICINE

## 2019-06-10 RX ORDER — LANREOTIDE ACETATE 120 MG/.5ML
120 INJECTION SUBCUTANEOUS
Status: CANCELLED | OUTPATIENT
Start: 2019-06-10

## 2019-06-10 RX ORDER — LANREOTIDE ACETATE 120 MG/.5ML
120 INJECTION SUBCUTANEOUS
Status: DISCONTINUED | OUTPATIENT
Start: 2019-06-10 | End: 2019-06-10 | Stop reason: HOSPADM

## 2019-06-10 RX ADMIN — LANREOTIDE ACETATE 120 MG: 120 INJECTION SUBCUTANEOUS at 12:06

## 2019-06-10 NOTE — PROGRESS NOTES
"Subjective:       Patient ID: Shahram Hills is a 78 y.o. female.     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 Ms. Hills is an 77 yo woman who initially saw me on 6/7/18 for further evaluation of neuroendocrine tumor. She initially presented with diarrhea, abdominal discomfort, weight loss. She was evaluated at [facility] and underwent workup below:  US abdomen on 3/14/18 showed numerous bilobar mixed echogenicity and mildly vascular hepatic lesions. Cholelithiasis without e/o acute cholecystitis.   MRI abdomen on 3/30/2018 showed innumerable hepatic metastases. L3 vertebral body metastasis with soft tissue component along the right margin of the L3 and upper L4 vertebral bodies, filling the right L3/4 neural foramen, and extending into the anterior aspect of the spinal canal on the right at the L3 and upper T4 levels. Associated with mild spinal canal narrowing.  CT chest on 4/6/2018 showed one lucent nodule measuring 7 mm in the T7 vertebral body, most likely representing metastatic disease.    EGD on 5/4/18 showed normal duodenum. Schatzki's ring in the lower third of the esophagus. Grade 1 esophagitis in the GE junction c/w mild distal esophagitis. Congestion and erythema in the antrum, pre-pyloric region and stomach body c/w gastritis. Biopsy of the stomach antrum showed mild chronic gastritis, neg for H. pylori.   Labs on 5/9/2018: WBC 6.9, H/H 11.6/34.3, MCV 87.5, plt count 279. On 3/9/18: Vit B12 level 173, folic acid 6.6  She was started on oral vit B12 and underwent a liver biopsy on 5/18/18. Pathology showed "metastatic well differentiated neuroendocrine tumor. Ki67 3%"     She saw me on 6/7/18 and complained of weight loss of 26 lbs over the past 3-4 months, also has diarrhea. No flushing, wheezing, palpitations. Has been having pain in right flank radiating down the right leg. No " "tingling/numbness, weakness. She can hold her bladder but when she urinates her diarrhea would come out as well. Started on dex 4 mg q6h the evening of 18.      2. MRI spine on 18 showed "Marrow replacing metastatic lesion of the L3 vertebral body with associated extra osseous expansion and complete effacement of the right L3-L4 neural foramina and additional effacement of the right lateral recess.  There is additional lateral extension and abutment of the right psoas muscle.  Superimposed degenerative change at this level results in mild spinal canal stenosis." I sent the patient to ED on 18 where she was evaluated by neurosurgery. Neurosurgery did not feel she was a candidate for surgical intervention.      3. Seen by Dr. Adames on 18. palliative radiation to the area of the L3 metastasis 18-18   4. 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  There is an adjacent metastatic lymph node, diffuse liver metastases, and multiple bone metastases. Index primary neoplasm SUV max 33.13. Adjacent lymph node SUV max 23. L3 bone metastasis SUV max 36.86. Left lobe SUV max 46.13   5. Lanreotide started on 18  6. TACE to the right hepatic lobe on 19. TACE to the left hepatic lobe on 3/21/19.      History of Present Illness:   Mr. Hills returns for follow up. feeling well. No pain. no diarrhea, palpitations, wheezing. she did not show for the CT chest      ECO     ROS:   See HPI. Otherwise negative.      Physical Examination:   Vital signs reviewed.   Gen: well hydrated, well developed, in no acute distress.  HEENT: normocephalic, anicteric, PERRLA, EOMI, oropharynx clear  Neck: supple, no JVD, thyromegaly, cervical or supraclavicular LAD  Lungs: CTAB, no wheezes or rales  Heart: RRR, no M/R/G  Abdomen: soft, no tenderness, non-distended,  liver palpated 5.0 cm below the right costal margin, no splenomegaly, no mass, or hernia.   Ext: no " cyanosis, edema, deformity  Neuro: alert and oriented x 4, no focal neuro deficit  Skin: no rash, open wounds or ulcers  Psych: pleasant and appropriate mood and affect     Objective:      Diagnostic Tests:  CT 3/8/2019:  Stable appearance of the known ileal mass, adjacent mesenteric lymph nodes, multiple hepatic masses, and multiple bony lesions.    Stable 5 mm nodule in the right upper lobe.    Additional stable incidental findings as above.    RECIST SUMMARY:    Date of prior examination for comparison: 12/07/2018    Lesion 1: Segment 4 of the liver.  5.9 cm, previously 6.0 cm.  Series 3, image 42    Lesion 2: Terminal ileum.  2.6 cm, previously 2.5 cm.  Series 3 image 90    Lesion 3: Mesenteric nodule.  1.5 cm., previously 1.5 cm.  Series 3, image 83.    MRI abdomen on 5/14/19:  1. Innumerable enhancing hepatic lesions consistent with metastatic disease, grossly stable in comparison to prior exam.  Index lesion in hepatic segment 4 demonstrates persistent restricted diffusion and arterial enhancement.  2. Enhancing lesion within the L3 vertebral body.  3. Left-sided mesenteric lymphadenopathy.  4. Cholelithiasis.  RECIST SUMMARY:    Date of prior examination for comparison: 03/08/2019    Lesion 1: Hepatic segment 4 lesion.  5.4 cm. Series 10 image 46.  Prior measurement 5.9 cm.     Laboratory Data:  Labs reviewed. CBC normal, bilirubin 1.2     Assessment/Plan:      1. Malignant carcinoid tumor of ileum    2. 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 Spine metastasis    4. 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 Encounter for antineoplastic chemotherapy    6. Essential hypertension             1-5  - doing well. On lanreotide. S/p TACE  - last MRI abdomen showed stable disease  - chromogranin stable  - c/w lanreotide  - will get restaging CT chest/pelvis and MRI abdomen in the beginning of August     6  - BP slightly elevated today  - C/w current meds  - monitor

## 2019-06-13 LAB — CGA SERPL-MCNC: 1074 NG/ML (ref 0–95)

## 2019-07-08 ENCOUNTER — OFFICE VISIT (OUTPATIENT)
Dept: HEMATOLOGY/ONCOLOGY | Facility: CLINIC | Age: 79
End: 2019-07-08
Payer: MEDICARE

## 2019-07-08 ENCOUNTER — LAB VISIT (OUTPATIENT)
Dept: LAB | Facility: OTHER | Age: 79
End: 2019-07-08
Attending: INTERNAL MEDICINE
Payer: MEDICARE

## 2019-07-08 ENCOUNTER — INFUSION (OUTPATIENT)
Dept: INFUSION THERAPY | Facility: OTHER | Age: 79
End: 2019-07-08
Attending: INTERNAL MEDICINE
Payer: MEDICARE

## 2019-07-08 VITALS
TEMPERATURE: 98 F | WEIGHT: 132.06 LBS | DIASTOLIC BLOOD PRESSURE: 79 MMHG | BODY MASS INDEX: 22.55 KG/M2 | RESPIRATION RATE: 16 BRPM | OXYGEN SATURATION: 99 % | SYSTOLIC BLOOD PRESSURE: 135 MMHG | HEIGHT: 64 IN | HEART RATE: 87 BPM

## 2019-07-08 DIAGNOSIS — C7B.8 METASTATIC MALIGNANT NEUROENDOCRINE TUMOR TO LIVER: ICD-10-CM

## 2019-07-08 DIAGNOSIS — C7B.8 NEUROENDOCRINE CARCINOMA METASTATIC TO BONE: ICD-10-CM

## 2019-07-08 DIAGNOSIS — C79.51 SPINE METASTASIS: ICD-10-CM

## 2019-07-08 DIAGNOSIS — C7B.8 METASTATIC MALIGNANT NEUROENDOCRINE TUMOR TO LIVER: Primary | ICD-10-CM

## 2019-07-08 DIAGNOSIS — C7A.012 MALIGNANT CARCINOID TUMOR OF ILEUM: Primary | ICD-10-CM

## 2019-07-08 DIAGNOSIS — I10 ESSENTIAL HYPERTENSION: ICD-10-CM

## 2019-07-08 DIAGNOSIS — C7A.8 NEUROENDOCRINE CARCINOMA METASTATIC TO BONE: ICD-10-CM

## 2019-07-08 LAB
ALBUMIN SERPL BCP-MCNC: 3.7 G/DL (ref 3.5–5.2)
ALP SERPL-CCNC: 77 U/L (ref 55–135)
ALT SERPL W/O P-5'-P-CCNC: 10 U/L (ref 10–44)
ANION GAP SERPL CALC-SCNC: 12 MMOL/L (ref 8–16)
AST SERPL-CCNC: 17 U/L (ref 10–40)
BILIRUB SERPL-MCNC: 1.2 MG/DL (ref 0.1–1)
BUN SERPL-MCNC: 8 MG/DL (ref 8–23)
CALCIUM SERPL-MCNC: 9.9 MG/DL (ref 8.7–10.5)
CHLORIDE SERPL-SCNC: 103 MMOL/L (ref 95–110)
CO2 SERPL-SCNC: 28 MMOL/L (ref 23–29)
CREAT SERPL-MCNC: 0.8 MG/DL (ref 0.5–1.4)
ERYTHROCYTE [DISTWIDTH] IN BLOOD BY AUTOMATED COUNT: 12.8 % (ref 11.5–14.5)
EST. GFR  (AFRICAN AMERICAN): >60 ML/MIN/1.73 M^2
EST. GFR  (NON AFRICAN AMERICAN): >60 ML/MIN/1.73 M^2
GLUCOSE SERPL-MCNC: 159 MG/DL (ref 70–110)
HCT VFR BLD AUTO: 40.1 % (ref 37–48.5)
HGB BLD-MCNC: 13.1 G/DL (ref 12–16)
MCH RBC QN AUTO: 29.2 PG (ref 27–31)
MCHC RBC AUTO-ENTMCNC: 32.7 G/DL (ref 32–36)
MCV RBC AUTO: 90 FL (ref 82–98)
NEUTROPHILS # BLD AUTO: 3.8 K/UL (ref 1.8–7.7)
PLATELET # BLD AUTO: 260 K/UL (ref 150–350)
PMV BLD AUTO: 10.4 FL (ref 9.2–12.9)
POTASSIUM SERPL-SCNC: 3.9 MMOL/L (ref 3.5–5.1)
PROT SERPL-MCNC: 7.3 G/DL (ref 6–8.4)
RBC # BLD AUTO: 4.48 M/UL (ref 4–5.4)
SODIUM SERPL-SCNC: 143 MMOL/L (ref 136–145)
WBC # BLD AUTO: 6.07 K/UL (ref 3.9–12.7)

## 2019-07-08 PROCEDURE — 3078F PR MOST RECENT DIASTOLIC BLOOD PRESSURE < 80 MM HG: ICD-10-PCS | Mod: HCNC,CPTII,S$GLB, | Performed by: INTERNAL MEDICINE

## 2019-07-08 PROCEDURE — 99999 PR PBB SHADOW E&M-EST. PATIENT-LVL III: CPT | Mod: PBBFAC,HCNC,, | Performed by: INTERNAL MEDICINE

## 2019-07-08 PROCEDURE — 3078F DIAST BP <80 MM HG: CPT | Mod: HCNC,CPTII,S$GLB, | Performed by: INTERNAL MEDICINE

## 2019-07-08 PROCEDURE — 96372 THER/PROPH/DIAG INJ SC/IM: CPT | Mod: HCNC

## 2019-07-08 PROCEDURE — 86316 IMMUNOASSAY TUMOR OTHER: CPT | Mod: HCNC

## 2019-07-08 PROCEDURE — 99999 PR PBB SHADOW E&M-EST. PATIENT-LVL III: ICD-10-PCS | Mod: PBBFAC,HCNC,, | Performed by: INTERNAL MEDICINE

## 2019-07-08 PROCEDURE — 85027 COMPLETE CBC AUTOMATED: CPT | Mod: HCNC

## 2019-07-08 PROCEDURE — 3075F SYST BP GE 130 - 139MM HG: CPT | Mod: HCNC,CPTII,S$GLB, | Performed by: INTERNAL MEDICINE

## 2019-07-08 PROCEDURE — 1101F PT FALLS ASSESS-DOCD LE1/YR: CPT | Mod: HCNC,CPTII,S$GLB, | Performed by: INTERNAL MEDICINE

## 2019-07-08 PROCEDURE — 99215 OFFICE O/P EST HI 40 MIN: CPT | Mod: HCNC,S$GLB,, | Performed by: INTERNAL MEDICINE

## 2019-07-08 PROCEDURE — 99215 PR OFFICE/OUTPT VISIT, EST, LEVL V, 40-54 MIN: ICD-10-PCS | Mod: HCNC,S$GLB,, | Performed by: INTERNAL MEDICINE

## 2019-07-08 PROCEDURE — 80053 COMPREHEN METABOLIC PANEL: CPT | Mod: HCNC

## 2019-07-08 PROCEDURE — 63600175 PHARM REV CODE 636 W HCPCS: Mod: HCNC,JG | Performed by: INTERNAL MEDICINE

## 2019-07-08 PROCEDURE — 3075F PR MOST RECENT SYSTOLIC BLOOD PRESS GE 130-139MM HG: ICD-10-PCS | Mod: HCNC,CPTII,S$GLB, | Performed by: INTERNAL MEDICINE

## 2019-07-08 PROCEDURE — 1101F PR PT FALLS ASSESS DOC 0-1 FALLS W/OUT INJ PAST YR: ICD-10-PCS | Mod: HCNC,CPTII,S$GLB, | Performed by: INTERNAL MEDICINE

## 2019-07-08 RX ORDER — LANREOTIDE ACETATE 120 MG/.5ML
120 INJECTION SUBCUTANEOUS
Status: CANCELLED | OUTPATIENT
Start: 2019-07-08

## 2019-07-08 RX ORDER — LANREOTIDE ACETATE 120 MG/.5ML
120 INJECTION SUBCUTANEOUS
Status: DISCONTINUED | OUTPATIENT
Start: 2019-07-08 | End: 2019-07-08 | Stop reason: HOSPADM

## 2019-07-08 RX ADMIN — LANREOTIDE ACETATE 120 MG: 120 INJECTION SUBCUTANEOUS at 11:07

## 2019-07-08 NOTE — PROGRESS NOTES
"Subjective:       Patient ID: Shahram Hills is a 78 y.o. female.     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 Ms. Hills is an 79 yo woman who initially saw me on 6/7/18 for further evaluation of neuroendocrine tumor. She initially presented with diarrhea, abdominal discomfort, weight loss. She was evaluated at [facility] and underwent workup below:  US abdomen on 3/14/18 showed numerous bilobar mixed echogenicity and mildly vascular hepatic lesions. Cholelithiasis without e/o acute cholecystitis.   MRI abdomen on 3/30/2018 showed innumerable hepatic metastases. L3 vertebral body metastasis with soft tissue component along the right margin of the L3 and upper L4 vertebral bodies, filling the right L3/4 neural foramen, and extending into the anterior aspect of the spinal canal on the right at the L3 and upper T4 levels. Associated with mild spinal canal narrowing.  CT chest on 4/6/2018 showed one lucent nodule measuring 7 mm in the T7 vertebral body, most likely representing metastatic disease.    EGD on 5/4/18 showed normal duodenum. Schatzki's ring in the lower third of the esophagus. Grade 1 esophagitis in the GE junction c/w mild distal esophagitis. Congestion and erythema in the antrum, pre-pyloric region and stomach body c/w gastritis. Biopsy of the stomach antrum showed mild chronic gastritis, neg for H. pylori.   Labs on 5/9/2018: WBC 6.9, H/H 11.6/34.3, MCV 87.5, plt count 279. On 3/9/18: Vit B12 level 173, folic acid 6.6  She was started on oral vit B12 and underwent a liver biopsy on 5/18/18. Pathology showed "metastatic well differentiated neuroendocrine tumor. Ki67 3%"     She saw me on 6/7/18 and complained of weight loss of 26 lbs over the past 3-4 months, also has diarrhea. No flushing, wheezing, palpitations. Has been having pain in right flank radiating down the right leg. No " "tingling/numbness, weakness. She can hold her bladder but when she urinates her diarrhea would come out as well. Started on dex 4 mg q6h the evening of 18.      2. MRI spine on 18 showed "Marrow replacing metastatic lesion of the L3 vertebral body with associated extra osseous expansion and complete effacement of the right L3-L4 neural foramina and additional effacement of the right lateral recess.  There is additional lateral extension and abutment of the right psoas muscle.  Superimposed degenerative change at this level results in mild spinal canal stenosis." I sent the patient to ED on 18 where she was evaluated by neurosurgery. Neurosurgery did not feel she was a candidate for surgical intervention.      3. Seen by Dr. Adames on 18. palliative radiation to the area of the L3 metastasis 18-18   4. 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  There is an adjacent metastatic lymph node, diffuse liver metastases, and multiple bone metastases. Index primary neoplasm SUV max 33.13. Adjacent lymph node SUV max 23. L3 bone metastasis SUV max 36.86. Left lobe SUV max 46.13   5. Lanreotide started on 18  6. TACE to the right hepatic lobe on 19. TACE to the left hepatic lobe on 3/21/19.      History of Present Illness:   Mr. Hills returns for follow up. feeling well. No pain. no diarrhea, palpitations, wheezing.      ECO     ROS:   See HPI. Otherwise negative.      Physical Examination:   Vital signs reviewed.   Gen: well hydrated, well developed, in no acute distress.  HEENT: normocephalic, anicteric, PERRLA, EOMI, oropharynx clear  Neck: supple, no JVD, thyromegaly, cervical or supraclavicular LAD  Lungs: CTAB, no wheezes or rales  Heart: RRR, no M/R/G  Abdomen: soft, no tenderness, non-distended,  liver palpated 5.0 cm below the right costal margin, no splenomegaly, no mass, or hernia.   Ext: no cyanosis, edema, deformity  Neuro: alert " and oriented x 4, no focal neuro deficit  Skin: no rash, open wounds or ulcers  Psych: pleasant and appropriate mood and affect     Objective:      Diagnostic Tests:  CT 3/8/2019:  Stable appearance of the known ileal mass, adjacent mesenteric lymph nodes, multiple hepatic masses, and multiple bony lesions.    Stable 5 mm nodule in the right upper lobe.    Additional stable incidental findings as above.    RECIST SUMMARY:    Date of prior examination for comparison: 12/07/2018    Lesion 1: Segment 4 of the liver.  5.9 cm, previously 6.0 cm.  Series 3, image 42    Lesion 2: Terminal ileum.  2.6 cm, previously 2.5 cm.  Series 3 image 90    Lesion 3: Mesenteric nodule.  1.5 cm., previously 1.5 cm.  Series 3, image 83.     MRI abdomen on 5/14/19:  1. Innumerable enhancing hepatic lesions consistent with metastatic disease, grossly stable in comparison to prior exam.  Index lesion in hepatic segment 4 demonstrates persistent restricted diffusion and arterial enhancement.  2. Enhancing lesion within the L3 vertebral body.  3. Left-sided mesenteric lymphadenopathy.  4. Cholelithiasis.  RECIST SUMMARY:    Date of prior examination for comparison: 03/08/2019    Lesion 1: Hepatic segment 4 lesion.  5.4 cm. Series 10 image 46.  Prior measurement 5.9 cm.     Laboratory Data:  Labs reviewed. WBC 6.07, Hb 13.1, plt count 260, creatinine 0.8, total bilirubin 1.2, chromogranin A pending     Assessment/Plan:      1. Malignant carcinoid tumor of ileum    2. 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 Spine metastasis    5. Essential hypertension         1-4  - doing well. On lanreotide. S/p TACE  - last MRI abdomen in May showed stable disease  - c/w lanreotide  - chromogranin A has been very stable. Will get restaging CT chest/pelvis and MRI abdomen in September 5.  - BP good  - c/w current meds

## 2019-07-08 NOTE — PLAN OF CARE
Problem: Adult Inpatient Plan of Care  Goal: Patient-Specific Goal (Individualization)  Outcome: Ongoing (interventions implemented as appropriate)  Lanreotide injection to right buttock complete. Pt tolerated well. VSS. NAD. AVS provided. Pt verbalized understanding of discharge instructions before leaving with family member.

## 2019-07-10 LAB — CGA SERPL-MCNC: 1204 NG/ML (ref 0–95)

## 2019-08-05 ENCOUNTER — INFUSION (OUTPATIENT)
Dept: INFUSION THERAPY | Facility: OTHER | Age: 79
End: 2019-08-05
Attending: INTERNAL MEDICINE
Payer: MEDICARE

## 2019-08-05 ENCOUNTER — OFFICE VISIT (OUTPATIENT)
Dept: HEMATOLOGY/ONCOLOGY | Facility: CLINIC | Age: 79
End: 2019-08-05
Payer: MEDICARE

## 2019-08-05 ENCOUNTER — TELEPHONE (OUTPATIENT)
Dept: INTERNAL MEDICINE | Facility: CLINIC | Age: 79
End: 2019-08-05

## 2019-08-05 VITALS
DIASTOLIC BLOOD PRESSURE: 82 MMHG | HEIGHT: 64 IN | SYSTOLIC BLOOD PRESSURE: 142 MMHG | BODY MASS INDEX: 22.77 KG/M2 | WEIGHT: 133.38 LBS | RESPIRATION RATE: 16 BRPM | OXYGEN SATURATION: 99 % | HEART RATE: 85 BPM | TEMPERATURE: 98 F

## 2019-08-05 DIAGNOSIS — I10 ESSENTIAL HYPERTENSION: ICD-10-CM

## 2019-08-05 DIAGNOSIS — C7B.8 METASTATIC MALIGNANT NEUROENDOCRINE TUMOR TO LIVER: Primary | ICD-10-CM

## 2019-08-05 DIAGNOSIS — C7A.8 NEUROENDOCRINE CARCINOMA METASTATIC TO BONE: ICD-10-CM

## 2019-08-05 DIAGNOSIS — R73.9 HYPERGLYCEMIA: Primary | ICD-10-CM

## 2019-08-05 DIAGNOSIS — R73.9 HYPERGLYCEMIA: ICD-10-CM

## 2019-08-05 DIAGNOSIS — C79.51 SPINE METASTASIS: ICD-10-CM

## 2019-08-05 DIAGNOSIS — C7B.8 NEUROENDOCRINE CARCINOMA METASTATIC TO BONE: ICD-10-CM

## 2019-08-05 PROCEDURE — 1101F PR PT FALLS ASSESS DOC 0-1 FALLS W/OUT INJ PAST YR: ICD-10-PCS | Mod: HCNC,CPTII,S$GLB, | Performed by: INTERNAL MEDICINE

## 2019-08-05 PROCEDURE — 3079F DIAST BP 80-89 MM HG: CPT | Mod: HCNC,CPTII,S$GLB, | Performed by: INTERNAL MEDICINE

## 2019-08-05 PROCEDURE — 3079F PR MOST RECENT DIASTOLIC BLOOD PRESSURE 80-89 MM HG: ICD-10-PCS | Mod: HCNC,CPTII,S$GLB, | Performed by: INTERNAL MEDICINE

## 2019-08-05 PROCEDURE — 1101F PT FALLS ASSESS-DOCD LE1/YR: CPT | Mod: HCNC,CPTII,S$GLB, | Performed by: INTERNAL MEDICINE

## 2019-08-05 PROCEDURE — 99214 PR OFFICE/OUTPT VISIT, EST, LEVL IV, 30-39 MIN: ICD-10-PCS | Mod: HCNC,S$GLB,, | Performed by: INTERNAL MEDICINE

## 2019-08-05 PROCEDURE — 3077F SYST BP >= 140 MM HG: CPT | Mod: HCNC,CPTII,S$GLB, | Performed by: INTERNAL MEDICINE

## 2019-08-05 PROCEDURE — 99999 PR PBB SHADOW E&M-EST. PATIENT-LVL IV: CPT | Mod: PBBFAC,HCNC,, | Performed by: INTERNAL MEDICINE

## 2019-08-05 PROCEDURE — 63600175 PHARM REV CODE 636 W HCPCS: Mod: HCNC,JG | Performed by: INTERNAL MEDICINE

## 2019-08-05 PROCEDURE — 96372 THER/PROPH/DIAG INJ SC/IM: CPT | Mod: HCNC

## 2019-08-05 PROCEDURE — 99214 OFFICE O/P EST MOD 30 MIN: CPT | Mod: HCNC,S$GLB,, | Performed by: INTERNAL MEDICINE

## 2019-08-05 PROCEDURE — 99999 PR PBB SHADOW E&M-EST. PATIENT-LVL IV: ICD-10-PCS | Mod: PBBFAC,HCNC,, | Performed by: INTERNAL MEDICINE

## 2019-08-05 PROCEDURE — 3077F PR MOST RECENT SYSTOLIC BLOOD PRESSURE >= 140 MM HG: ICD-10-PCS | Mod: HCNC,CPTII,S$GLB, | Performed by: INTERNAL MEDICINE

## 2019-08-05 RX ORDER — LANREOTIDE ACETATE 120 MG/.5ML
120 INJECTION SUBCUTANEOUS
Status: DISCONTINUED | OUTPATIENT
Start: 2019-08-05 | End: 2019-08-05 | Stop reason: HOSPADM

## 2019-08-05 RX ORDER — LANREOTIDE ACETATE 120 MG/.5ML
120 INJECTION SUBCUTANEOUS
Status: CANCELLED | OUTPATIENT
Start: 2019-08-05

## 2019-08-05 RX ADMIN — LANREOTIDE ACETATE 120 MG: 120 INJECTION SUBCUTANEOUS at 11:08

## 2019-08-05 NOTE — Clinical Note
Please talk to radiology. Will get CT chest and MRI abdomen/pelvis on 8/30 instead of CT C/A/P. thanks

## 2019-08-05 NOTE — PROGRESS NOTES
"Subjective:       Patient ID: Shahram Hills is a 79 y.o. female.     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 Ms. Hills is an 77 yo woman who initially saw me on 6/7/18 for further evaluation of neuroendocrine tumor. She initially presented with diarrhea, abdominal discomfort, weight loss. She was evaluated at [facility] and underwent workup below:  US abdomen on 3/14/18 showed numerous bilobar mixed echogenicity and mildly vascular hepatic lesions. Cholelithiasis without e/o acute cholecystitis.   MRI abdomen on 3/30/2018 showed innumerable hepatic metastases. L3 vertebral body metastasis with soft tissue component along the right margin of the L3 and upper L4 vertebral bodies, filling the right L3/4 neural foramen, and extending into the anterior aspect of the spinal canal on the right at the L3 and upper T4 levels. Associated with mild spinal canal narrowing.  CT chest on 4/6/2018 showed one lucent nodule measuring 7 mm in the T7 vertebral body, most likely representing metastatic disease.    EGD on 5/4/18 showed normal duodenum. Schatzki's ring in the lower third of the esophagus. Grade 1 esophagitis in the GE junction c/w mild distal esophagitis. Congestion and erythema in the antrum, pre-pyloric region and stomach body c/w gastritis. Biopsy of the stomach antrum showed mild chronic gastritis, neg for H. pylori.   Labs on 5/9/2018: WBC 6.9, H/H 11.6/34.3, MCV 87.5, plt count 279. On 3/9/18: Vit B12 level 173, folic acid 6.6  She was started on oral vit B12 and underwent a liver biopsy on 5/18/18. Pathology showed "metastatic well differentiated neuroendocrine tumor. Ki67 3%"     She saw me on 6/7/18 and complained of weight loss of 26 lbs over the past 3-4 months, also has diarrhea. No flushing, wheezing, palpitations. Has been having pain in right flank radiating down the right leg. No " "tingling/numbness, weakness. She can hold her bladder but when she urinates her diarrhea would come out as well. Started on dex 4 mg q6h the evening of 18.      2. MRI spine on 18 showed "Marrow replacing metastatic lesion of the L3 vertebral body with associated extra osseous expansion and complete effacement of the right L3-L4 neural foramina and additional effacement of the right lateral recess.  There is additional lateral extension and abutment of the right psoas muscle.  Superimposed degenerative change at this level results in mild spinal canal stenosis." I sent the patient to ED on 18 where she was evaluated by neurosurgery. Neurosurgery did not feel she was a candidate for surgical intervention.      3. Seen by Dr. Adames on 18. palliative radiation to the area of the L3 metastasis 18-18   4. 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  There is an adjacent metastatic lymph node, diffuse liver metastases, and multiple bone metastases. Index primary neoplasm SUV max 33.13. Adjacent lymph node SUV max 23. L3 bone metastasis SUV max 36.86. Left lobe SUV max 46.13   5. Lanreotide started on 18  6. TACE to the right hepatic lobe on 19. TACE to the left hepatic lobe on 3/21/19.      History of Present Illness:   Mr. Hills returns for follow up. feels well. Had two days of soft BM last week, which resolved on its own. No flushing, wheezing, SOB. No pain.      ECO     ROS:   See HPI. Otherwise negative.      Physical Examination:   Vital signs reviewed.   Gen: well hydrated, well developed, in no acute distress.  HEENT: normocephalic, anicteric, PERRLA, EOMI, oropharynx clear  Neck: supple, no JVD, thyromegaly, cervical or supraclavicular LAD  Lungs: CTAB, no wheezes or rales  Heart: RRR, no M/R/G  Abdomen: soft, no tenderness, non-distended,  liver palpated 5.0 cm below the right costal margin, no splenomegaly, no mass, or hernia. "   Ext: no cyanosis, edema, deformity  Neuro: alert and oriented x 4, no focal neuro deficit  Skin: no rash, open wounds or ulcers  Psych: pleasant and appropriate mood and affect     Objective:      Diagnostic Tests:  CT 3/8/2019:  Stable appearance of the known ileal mass, adjacent mesenteric lymph nodes, multiple hepatic masses, and multiple bony lesions.    Stable 5 mm nodule in the right upper lobe.    Additional stable incidental findings as above.    RECIST SUMMARY:    Date of prior examination for comparison: 12/07/2018    Lesion 1: Segment 4 of the liver.  5.9 cm, previously 6.0 cm.  Series 3, image 42    Lesion 2: Terminal ileum.  2.6 cm, previously 2.5 cm.  Series 3 image 90    Lesion 3: Mesenteric nodule.  1.5 cm., previously 1.5 cm.  Series 3, image 83.     MRI abdomen on 5/14/19:  1. Innumerable enhancing hepatic lesions consistent with metastatic disease, grossly stable in comparison to prior exam.  Index lesion in hepatic segment 4 demonstrates persistent restricted diffusion and arterial enhancement.  2. Enhancing lesion within the L3 vertebral body.  3. Left-sided mesenteric lymphadenopathy.  4. Cholelithiasis.  RECIST SUMMARY:    Date of prior examination for comparison: 03/08/2019    Lesion 1: Hepatic segment 4 lesion.  5.4 cm. Series 10 image 46.  Prior measurement 5.9 cm.     Laboratory Data:  Labs reviewed. WBC 5.56, Hb 13.4, plt count 247, glucose 228, creatinine 0.8, bilirubin 1.6     Assessment/Plan:      1. 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 Spine metastasis    4. Hyperglycemia    5. Essential hypertension        1-3  - doing well. On lanreotide. S/p TACE  - last MRI abdomen in May showed stable disease  - c/w lanreotide  - restaging CT chest and MRI abdomen/pelvis in one month    4.  - messaged Dr Xie. Dr Xie's office will schedule tests and f/u    5.  - BP ok  - F/u with PCP

## 2019-08-05 NOTE — TELEPHONE ENCOUNTER
----- Message from Sebastian Granados MD sent at 8/5/2019 10:46 AM CDT -----  Pj Marcum,     Ms Hills's blood sugar is 228 on nonfasting CMP today. She is not on any steroids. Has been doing well from the cancer perspective. Will you be able to follow up with her on the blood sugar? Thanks.     Sebastian

## 2019-08-05 NOTE — TELEPHONE ENCOUNTER
Received message from oncologist that pt's sugar was elevated on recent labs     Please arrange fasting glucose and a1c through lab this week   - please see if she can come for appt - looks like there is availability on 8/20 and 8/21 -I held an appt slot for her on 8/20 if this works for her (and her sister magalis herman who is her primary caregiver)

## 2019-08-05 NOTE — PLAN OF CARE
Problem: Adult Inpatient Plan of Care  Goal: Patient-Specific Goal (Individualization)  Outcome: Ongoing (interventions implemented as appropriate)  Lanreotide to right buttock complete. Pt tolerated well. VSS. NAD.   AVS provided. Pt verbalized understanding of discharge instructions before leaving with self.

## 2019-08-06 ENCOUNTER — LAB VISIT (OUTPATIENT)
Dept: LAB | Facility: OTHER | Age: 79
End: 2019-08-06
Attending: INTERNAL MEDICINE
Payer: MEDICARE

## 2019-08-06 DIAGNOSIS — R73.9 HYPERGLYCEMIA: ICD-10-CM

## 2019-08-06 LAB
ESTIMATED AVG GLUCOSE: 123 MG/DL (ref 68–131)
GLUCOSE SERPL-MCNC: 142 MG/DL (ref 70–110)
HBA1C MFR BLD HPLC: 5.9 % (ref 4–5.6)

## 2019-08-06 PROCEDURE — 82947 ASSAY GLUCOSE BLOOD QUANT: CPT | Mod: HCNC

## 2019-08-06 PROCEDURE — 36415 COLL VENOUS BLD VENIPUNCTURE: CPT | Mod: HCNC

## 2019-08-06 PROCEDURE — 83036 HEMOGLOBIN GLYCOSYLATED A1C: CPT | Mod: HCNC

## 2019-08-20 ENCOUNTER — OFFICE VISIT (OUTPATIENT)
Dept: INTERNAL MEDICINE | Facility: CLINIC | Age: 79
End: 2019-08-20
Attending: INTERNAL MEDICINE
Payer: MEDICARE

## 2019-08-20 VITALS
BODY MASS INDEX: 22.92 KG/M2 | SYSTOLIC BLOOD PRESSURE: 118 MMHG | HEIGHT: 64 IN | DIASTOLIC BLOOD PRESSURE: 78 MMHG | OXYGEN SATURATION: 99 % | HEART RATE: 80 BPM | WEIGHT: 134.25 LBS

## 2019-08-20 DIAGNOSIS — E78.5 HYPERLIPIDEMIA, UNSPECIFIED HYPERLIPIDEMIA TYPE: ICD-10-CM

## 2019-08-20 DIAGNOSIS — R73.9 HYPERGLYCEMIA: Primary | ICD-10-CM

## 2019-08-20 PROCEDURE — 1101F PT FALLS ASSESS-DOCD LE1/YR: CPT | Mod: HCNC,CPTII,S$GLB, | Performed by: INTERNAL MEDICINE

## 2019-08-20 PROCEDURE — 3074F SYST BP LT 130 MM HG: CPT | Mod: HCNC,CPTII,S$GLB, | Performed by: INTERNAL MEDICINE

## 2019-08-20 PROCEDURE — 3078F PR MOST RECENT DIASTOLIC BLOOD PRESSURE < 80 MM HG: ICD-10-PCS | Mod: HCNC,CPTII,S$GLB, | Performed by: INTERNAL MEDICINE

## 2019-08-20 PROCEDURE — 99214 OFFICE O/P EST MOD 30 MIN: CPT | Mod: HCNC,S$GLB,, | Performed by: INTERNAL MEDICINE

## 2019-08-20 PROCEDURE — 1101F PR PT FALLS ASSESS DOC 0-1 FALLS W/OUT INJ PAST YR: ICD-10-PCS | Mod: HCNC,CPTII,S$GLB, | Performed by: INTERNAL MEDICINE

## 2019-08-20 PROCEDURE — 99999 PR PBB SHADOW E&M-EST. PATIENT-LVL III: CPT | Mod: PBBFAC,HCNC,, | Performed by: INTERNAL MEDICINE

## 2019-08-20 PROCEDURE — 99999 PR PBB SHADOW E&M-EST. PATIENT-LVL III: ICD-10-PCS | Mod: PBBFAC,HCNC,, | Performed by: INTERNAL MEDICINE

## 2019-08-20 PROCEDURE — 3078F DIAST BP <80 MM HG: CPT | Mod: HCNC,CPTII,S$GLB, | Performed by: INTERNAL MEDICINE

## 2019-08-20 PROCEDURE — 99214 PR OFFICE/OUTPT VISIT, EST, LEVL IV, 30-39 MIN: ICD-10-PCS | Mod: HCNC,S$GLB,, | Performed by: INTERNAL MEDICINE

## 2019-08-20 PROCEDURE — 3074F PR MOST RECENT SYSTOLIC BLOOD PRESSURE < 130 MM HG: ICD-10-PCS | Mod: HCNC,CPTII,S$GLB, | Performed by: INTERNAL MEDICINE

## 2019-08-20 RX ORDER — METFORMIN HYDROCHLORIDE 500 MG/1
500 TABLET, EXTENDED RELEASE ORAL
Qty: 90 TABLET | Refills: 3 | Status: SHIPPED | OUTPATIENT
Start: 2019-08-20 | End: 2020-09-03 | Stop reason: SDUPTHER

## 2019-08-20 NOTE — PATIENT INSTRUCTIONS
stevia sweet substitute    Vaccines in pharmacy:   - shingrix vaccines - shingles   - flu vaccine    Pneumonia vaccine - prevnar 13

## 2019-08-20 NOTE — PROGRESS NOTES
"Subjective:   Patient ID: Shahram Hills is a 79 y.o. female  Chief complaint:   Chief Complaint   Patient presents with    Follow-up       HPI   Pt here for f/u of:   Here with her sister Shayla Rosenthal today     Hyperglycemia: elevated sugar on recent labs with h/o - drank coffee with 2 scoops of sugar that morning but does nto recall eating just prior labs  Fasting sugar 142 but may have had coffee with sugar prior to this   a1c 5.9  - was prev on metformin 1000mg bid when on steroids for tx of malignancy and this was d/c'd when pdn stopped     Has gained wieght over past few months and maintained 132-134# over past 2 months     Followed by Dr. Granados in h/o for neuroendocrine tumor and stable from this standpoint    HTN: controlled today   - taking amlodipine reg  - bp at goal today   - Checking bp at home and 120-130/70s    Taking iron, b12, fergon, biotin 5000    Review of Systems    Objective:  Vitals:    08/20/19 1250   BP: 118/78   Pulse: 80   SpO2: 99%   Weight: 60.9 kg (134 lb 4.2 oz)   Height: 5' 4" (1.626 m)     Body mass index is 23.05 kg/m².    Physical Exam   Constitutional: She is oriented to person, place, and time. She appears well-developed and well-nourished.   HENT:   Head: Normocephalic and atraumatic.   Eyes: Conjunctivae and EOM are normal.   Neck: Normal range of motion. Neck supple.   Cardiovascular: Normal rate, regular rhythm and intact distal pulses.   Pulmonary/Chest: Effort normal and breath sounds normal.   Abdominal: Soft. Normal appearance and bowel sounds are normal.   Musculoskeletal: She exhibits no tenderness or deformity.   Neurological: She is alert and oriented to person, place, and time. She has normal strength. Gait normal.   Skin: Skin is warm, dry and intact. No cyanosis. Nails show no clubbing.   Psychiatric: She has a normal mood and affect. Her speech is normal and behavior is normal. Cognition and memory are normal.   Vitals reviewed.      Assessment:  1. Hyperglycemia  "   2. Hyperlipidemia, unspecified hyperlipidemia type        Plan:  Shahram was seen today for follow-up.    Diagnoses and all orders for this visit:    Hyperglycemia  -     Hemoglobin A1c; Future  -     Glucose, fasting; Future  Repeat labs in 3 months - check fasting and has labs arranged in a few weeks - rec check as fasting as unclear if prior labs were fasting    Resume metformin     Hyperlipidemia, unspecified hyperlipidemia type  -     TSH; Future  -     Lipid panel; Future  Managed by diet - mediterranean diet    Other orders  -     metFORMIN (GLUCOPHAGE-XR) 500 MG 24 hr tablet; Take 1 tablet (500 mg total) by mouth daily with breakfast.  -     (In Office Administered) Pneumococcal Conjugate Vaccine (13 Valent) (IM)    Has labs ordered in a few weeks - will check fasting as well     Health Maintenance   Topic Date Due    Pneumococcal Vaccine (65+ High/Highest Risk) (1 of 2 - PCV13) 05/08/2003    DEXA SCAN  06/11/2021    Lipid Panel  12/07/2023    TETANUS VACCINE  03/23/2028

## 2019-08-22 ENCOUNTER — IMMUNIZATION (OUTPATIENT)
Dept: PHARMACY | Facility: CLINIC | Age: 79
End: 2019-08-22
Payer: MEDICARE

## 2019-08-30 ENCOUNTER — HOSPITAL ENCOUNTER (OUTPATIENT)
Dept: RADIOLOGY | Facility: OTHER | Age: 79
Discharge: HOME | End: 2019-08-30
Attending: INTERNAL MEDICINE
Payer: MEDICARE

## 2019-08-30 DIAGNOSIS — C7B.8 METASTATIC MALIGNANT NEUROENDOCRINE TUMOR TO LIVER: ICD-10-CM

## 2019-08-30 PROCEDURE — 71260 CT THORAX DX C+: CPT | Mod: 26,HCNC,, | Performed by: RADIOLOGY

## 2019-08-30 PROCEDURE — 71260 CT THORAX DX C+: CPT | Mod: TC,HCNC

## 2019-08-30 PROCEDURE — 71260 CT CHEST WITH CONTRAST: ICD-10-PCS | Mod: 26,HCNC,, | Performed by: RADIOLOGY

## 2019-08-30 PROCEDURE — 25500020 PHARM REV CODE 255: Mod: HCNC | Performed by: INTERNAL MEDICINE

## 2019-08-30 RX ADMIN — IOHEXOL 75 ML: 350 INJECTION, SOLUTION INTRAVENOUS at 09:08

## 2019-09-01 NOTE — PROGRESS NOTES
"Subjective:       Patient ID: Shahram Hills is a 79 y.o. female.     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 Ms. Hills is an 79 yo woman who initially saw me on 6/7/18 for further evaluation of neuroendocrine tumor. She initially presented with diarrhea, abdominal discomfort, weight loss. She was evaluated at [facility] and underwent workup below:  US abdomen on 3/14/18 showed numerous bilobar mixed echogenicity and mildly vascular hepatic lesions. Cholelithiasis without e/o acute cholecystitis.   MRI abdomen on 3/30/2018 showed innumerable hepatic metastases. L3 vertebral body metastasis with soft tissue component along the right margin of the L3 and upper L4 vertebral bodies, filling the right L3/4 neural foramen, and extending into the anterior aspect of the spinal canal on the right at the L3 and upper T4 levels. Associated with mild spinal canal narrowing.  CT chest on 4/6/2018 showed one lucent nodule measuring 7 mm in the T7 vertebral body, most likely representing metastatic disease.    EGD on 5/4/18 showed normal duodenum. Schatzki's ring in the lower third of the esophagus. Grade 1 esophagitis in the GE junction c/w mild distal esophagitis. Congestion and erythema in the antrum, pre-pyloric region and stomach body c/w gastritis. Biopsy of the stomach antrum showed mild chronic gastritis, neg for H. pylori.   Labs on 5/9/2018: WBC 6.9, H/H 11.6/34.3, MCV 87.5, plt count 279. On 3/9/18: Vit B12 level 173, folic acid 6.6  She was started on oral vit B12 and underwent a liver biopsy on 5/18/18. Pathology showed "metastatic well differentiated neuroendocrine tumor. Ki67 3%"     She saw me on 6/7/18 and complained of weight loss of 26 lbs over the past 3-4 months, also has diarrhea. No flushing, wheezing, palpitations. Has been having pain in right flank radiating down the right leg. No " "tingling/numbness, weakness. She can hold her bladder but when she urinates her diarrhea would come out as well. Started on dex 4 mg q6h the evening of 18.      2. MRI spine on 18 showed "Marrow replacing metastatic lesion of the L3 vertebral body with associated extra osseous expansion and complete effacement of the right L3-L4 neural foramina and additional effacement of the right lateral recess.  There is additional lateral extension and abutment of the right psoas muscle.  Superimposed degenerative change at this level results in mild spinal canal stenosis." I sent the patient to ED on 18 where she was evaluated by neurosurgery. Neurosurgery did not feel she was a candidate for surgical intervention.      3. Seen by Dr. Adames on 18. palliative radiation to the area of the L3 metastasis 18-18   4. 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  There is an adjacent metastatic lymph node, diffuse liver metastases, and multiple bone metastases. Index primary neoplasm SUV max 33.13. Adjacent lymph node SUV max 23. L3 bone metastasis SUV max 36.86. Left lobe SUV max 46.13   5. Lanreotide started on 18  6. TACE to the right hepatic lobe on 19. TACE to the left hepatic lobe on 3/21/19.      History of Present Illness:   Mr. Hills returns for follow up. Feels well. She sometimes feels bad when she wakes up in the morning, but felt better after she drinks her coffee and starts moving around.      ECO     ROS:   See HPI. Otherwise negative.      Physical Examination:   Vital signs reviewed.   Gen: well hydrated, well developed, in no acute distress.  HEENT: normocephalic, anicteric, PERRLA, EOMI, oropharynx clear  Neck: supple, no JVD, thyromegaly, cervical or supraclavicular LAD  Lungs: CTAB, no wheezes or rales  Heart: RRR, no M/R/G  Abdomen: soft, no tenderness, non-distended,  liver palpated 5.0 cm below the right costal margin, no " splenomegaly, no mass, or hernia.   Ext: no cyanosis, edema, deformity  Neuro: alert and oriented x 4, no focal neuro deficit  Skin: no rash, open wounds or ulcers  Psych: pleasant and appropriate mood and affect     Objective:      Diagnostic Tests:  CT chest 8/30/19:  No significant detrimental change when compared to prior exam.  No new nodules or masses in the chest.  Stable 5 mm right upper lobe pulmonary nodule.  Stable lucent lesion within the T6 vertebral body.    Multiple hepatic lesions consistent with patient's known metastatic neuroendocrine tumor.  Comparison with prior exam is limited in this dedicated imaging of the chest.     MRI abdomen on 5/14/19:  1. Innumerable enhancing hepatic lesions consistent with metastatic disease, grossly stable in comparison to prior exam.  Index lesion in hepatic segment 4 demonstrates persistent restricted diffusion and arterial enhancement.  2. Enhancing lesion within the L3 vertebral body.  3. Left-sided mesenteric lymphadenopathy.  4. Cholelithiasis.  RECIST SUMMARY:    Date of prior examination for comparison: 03/08/2019    Lesion 1: Hepatic segment 4 lesion.  5.4 cm. Series 10 image 46.  Prior measurement 5.9 cm.     Laboratory Data:  Labs reviewed. CBC normal, glucose 125, bilirubin 1.6, creatinine 0.8, chromogranin A stable at 935     Assessment/Plan:      1. Malignant carcinoid tumor of ileum    2. 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 Spine metastasis    4. 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 Essential hypertension    6. Hyperglycemia        1-4  - doing well. On lanreotide. S/p TACE  - c/w lanreotide  - CT chest showed stable disease. MRI abdomen/pelvis scheduled for this Friday. Will call patient with the result.   - RTC in one month for continued lanreotide    5.  - BP good  - c/w current meds    6.  - on metformin 500 mg daily.   - BS better today  - f/u with Dr Xie

## 2019-09-03 ENCOUNTER — INFUSION (OUTPATIENT)
Dept: INFUSION THERAPY | Facility: OTHER | Age: 79
End: 2019-09-03
Attending: INTERNAL MEDICINE
Payer: MEDICARE

## 2019-09-03 ENCOUNTER — OFFICE VISIT (OUTPATIENT)
Dept: HEMATOLOGY/ONCOLOGY | Facility: CLINIC | Age: 79
End: 2019-09-03
Payer: MEDICARE

## 2019-09-03 VITALS
HEART RATE: 87 BPM | WEIGHT: 134.06 LBS | RESPIRATION RATE: 16 BRPM | BODY MASS INDEX: 22.89 KG/M2 | OXYGEN SATURATION: 98 % | HEIGHT: 64 IN | SYSTOLIC BLOOD PRESSURE: 132 MMHG | TEMPERATURE: 97 F | DIASTOLIC BLOOD PRESSURE: 80 MMHG

## 2019-09-03 DIAGNOSIS — C79.51 SPINE METASTASIS: ICD-10-CM

## 2019-09-03 DIAGNOSIS — I10 ESSENTIAL HYPERTENSION: ICD-10-CM

## 2019-09-03 DIAGNOSIS — C7A.012 MALIGNANT CARCINOID TUMOR OF ILEUM: Primary | ICD-10-CM

## 2019-09-03 DIAGNOSIS — C7B.8 METASTATIC MALIGNANT NEUROENDOCRINE TUMOR TO LIVER: Primary | ICD-10-CM

## 2019-09-03 DIAGNOSIS — C7B.8 METASTATIC MALIGNANT NEUROENDOCRINE TUMOR TO LIVER: ICD-10-CM

## 2019-09-03 DIAGNOSIS — C7A.8 NEUROENDOCRINE CARCINOMA METASTATIC TO BONE: ICD-10-CM

## 2019-09-03 DIAGNOSIS — R73.9 HYPERGLYCEMIA: ICD-10-CM

## 2019-09-03 DIAGNOSIS — C7B.8 NEUROENDOCRINE CARCINOMA METASTATIC TO BONE: ICD-10-CM

## 2019-09-03 PROCEDURE — 1101F PT FALLS ASSESS-DOCD LE1/YR: CPT | Mod: HCNC,CPTII,S$GLB, | Performed by: INTERNAL MEDICINE

## 2019-09-03 PROCEDURE — 96372 THER/PROPH/DIAG INJ SC/IM: CPT | Mod: HCNC

## 2019-09-03 PROCEDURE — 3079F PR MOST RECENT DIASTOLIC BLOOD PRESSURE 80-89 MM HG: ICD-10-PCS | Mod: HCNC,CPTII,S$GLB, | Performed by: INTERNAL MEDICINE

## 2019-09-03 PROCEDURE — 63600175 PHARM REV CODE 636 W HCPCS: Mod: JG,HCNC | Performed by: INTERNAL MEDICINE

## 2019-09-03 PROCEDURE — 99214 OFFICE O/P EST MOD 30 MIN: CPT | Mod: HCNC,S$GLB,, | Performed by: INTERNAL MEDICINE

## 2019-09-03 PROCEDURE — 1101F PR PT FALLS ASSESS DOC 0-1 FALLS W/OUT INJ PAST YR: ICD-10-PCS | Mod: HCNC,CPTII,S$GLB, | Performed by: INTERNAL MEDICINE

## 2019-09-03 PROCEDURE — 99999 PR PBB SHADOW E&M-EST. PATIENT-LVL III: CPT | Mod: PBBFAC,HCNC,, | Performed by: INTERNAL MEDICINE

## 2019-09-03 PROCEDURE — 99499 UNLISTED E&M SERVICE: CPT | Mod: HCNC,S$GLB,, | Performed by: INTERNAL MEDICINE

## 2019-09-03 PROCEDURE — 3075F PR MOST RECENT SYSTOLIC BLOOD PRESS GE 130-139MM HG: ICD-10-PCS | Mod: HCNC,CPTII,S$GLB, | Performed by: INTERNAL MEDICINE

## 2019-09-03 PROCEDURE — 3079F DIAST BP 80-89 MM HG: CPT | Mod: HCNC,CPTII,S$GLB, | Performed by: INTERNAL MEDICINE

## 2019-09-03 PROCEDURE — 3075F SYST BP GE 130 - 139MM HG: CPT | Mod: HCNC,CPTII,S$GLB, | Performed by: INTERNAL MEDICINE

## 2019-09-03 PROCEDURE — 99999 PR PBB SHADOW E&M-EST. PATIENT-LVL III: ICD-10-PCS | Mod: PBBFAC,HCNC,, | Performed by: INTERNAL MEDICINE

## 2019-09-03 PROCEDURE — 99499 RISK ADDL DX/OHS AUDIT: ICD-10-PCS | Mod: HCNC,S$GLB,, | Performed by: INTERNAL MEDICINE

## 2019-09-03 PROCEDURE — 99214 PR OFFICE/OUTPT VISIT, EST, LEVL IV, 30-39 MIN: ICD-10-PCS | Mod: HCNC,S$GLB,, | Performed by: INTERNAL MEDICINE

## 2019-09-03 RX ORDER — LANREOTIDE ACETATE 120 MG/.5ML
120 INJECTION SUBCUTANEOUS
Status: CANCELLED | OUTPATIENT
Start: 2019-09-03

## 2019-09-03 RX ORDER — LANREOTIDE ACETATE 120 MG/.5ML
120 INJECTION SUBCUTANEOUS
Status: DISCONTINUED | OUTPATIENT
Start: 2019-09-03 | End: 2019-09-03 | Stop reason: HOSPADM

## 2019-09-03 RX ADMIN — LANREOTIDE ACETATE 120 MG: 120 INJECTION SUBCUTANEOUS at 11:09

## 2019-09-06 ENCOUNTER — HOSPITAL ENCOUNTER (OUTPATIENT)
Dept: RADIOLOGY | Facility: HOSPITAL | Age: 79
Discharge: HOME | End: 2019-09-06
Attending: INTERNAL MEDICINE
Payer: MEDICARE

## 2019-09-06 PROCEDURE — 25500020 PHARM REV CODE 255: Mod: HCNC | Performed by: INTERNAL MEDICINE

## 2019-09-06 PROCEDURE — 74183 MRI ABD W/O CNTR FLWD CNTR: CPT | Mod: 26,HCNC,, | Performed by: RADIOLOGY

## 2019-09-06 PROCEDURE — 74183 MRI ABDOMEN-PELVIS W W/O CONTRAST (XPD): ICD-10-PCS | Mod: 26,HCNC,, | Performed by: RADIOLOGY

## 2019-09-06 PROCEDURE — 72197 MRI PELVIS W/O & W/DYE: CPT | Mod: TC,HCNC

## 2019-09-06 PROCEDURE — A9585 GADOBUTROL INJECTION: HCPCS | Mod: HCNC | Performed by: INTERNAL MEDICINE

## 2019-09-06 PROCEDURE — 72197 MRI ABDOMEN-PELVIS W W/O CONTRAST (XPD): ICD-10-PCS | Mod: 26,HCNC,, | Performed by: RADIOLOGY

## 2019-09-06 PROCEDURE — 72197 MRI PELVIS W/O & W/DYE: CPT | Mod: 26,HCNC,, | Performed by: RADIOLOGY

## 2019-09-06 RX ORDER — GADOBUTROL 604.72 MG/ML
10 INJECTION INTRAVENOUS
Status: COMPLETED | OUTPATIENT
Start: 2019-09-06 | End: 2019-09-06

## 2019-09-06 RX ADMIN — GADOBUTROL 10 ML: 604.72 INJECTION INTRAVENOUS at 04:09

## 2019-10-08 ENCOUNTER — OFFICE VISIT (OUTPATIENT)
Dept: HEMATOLOGY/ONCOLOGY | Facility: CLINIC | Age: 79
End: 2019-10-08
Payer: MEDICARE

## 2019-10-08 ENCOUNTER — INFUSION (OUTPATIENT)
Dept: INFUSION THERAPY | Facility: OTHER | Age: 79
End: 2019-10-08
Attending: INTERNAL MEDICINE
Payer: MEDICARE

## 2019-10-08 VITALS
SYSTOLIC BLOOD PRESSURE: 157 MMHG | OXYGEN SATURATION: 99 % | BODY MASS INDEX: 22.89 KG/M2 | TEMPERATURE: 97 F | DIASTOLIC BLOOD PRESSURE: 85 MMHG | WEIGHT: 134.06 LBS | RESPIRATION RATE: 16 BRPM | HEIGHT: 64 IN | HEART RATE: 79 BPM

## 2019-10-08 DIAGNOSIS — I10 ESSENTIAL HYPERTENSION: ICD-10-CM

## 2019-10-08 DIAGNOSIS — C79.51 SPINE METASTASIS: ICD-10-CM

## 2019-10-08 DIAGNOSIS — C7B.8 METASTATIC MALIGNANT NEUROENDOCRINE TUMOR TO LIVER: ICD-10-CM

## 2019-10-08 DIAGNOSIS — C7A.012 MALIGNANT CARCINOID TUMOR OF ILEUM: Primary | ICD-10-CM

## 2019-10-08 DIAGNOSIS — C7B.8 METASTATIC MALIGNANT NEUROENDOCRINE TUMOR TO LIVER: Primary | ICD-10-CM

## 2019-10-08 DIAGNOSIS — C7B.8 NEUROENDOCRINE CARCINOMA METASTATIC TO BONE: ICD-10-CM

## 2019-10-08 DIAGNOSIS — C7A.8 NEUROENDOCRINE CARCINOMA METASTATIC TO BONE: ICD-10-CM

## 2019-10-08 PROCEDURE — 99999 PR PBB SHADOW E&M-EST. PATIENT-LVL III: ICD-10-PCS | Mod: PBBFAC,HCNC,, | Performed by: INTERNAL MEDICINE

## 2019-10-08 PROCEDURE — 3077F SYST BP >= 140 MM HG: CPT | Mod: HCNC,CPTII,S$GLB, | Performed by: INTERNAL MEDICINE

## 2019-10-08 PROCEDURE — 1101F PR PT FALLS ASSESS DOC 0-1 FALLS W/OUT INJ PAST YR: ICD-10-PCS | Mod: HCNC,CPTII,S$GLB, | Performed by: INTERNAL MEDICINE

## 2019-10-08 PROCEDURE — 3077F PR MOST RECENT SYSTOLIC BLOOD PRESSURE >= 140 MM HG: ICD-10-PCS | Mod: HCNC,CPTII,S$GLB, | Performed by: INTERNAL MEDICINE

## 2019-10-08 PROCEDURE — 96372 THER/PROPH/DIAG INJ SC/IM: CPT | Mod: HCNC

## 2019-10-08 PROCEDURE — 63600175 PHARM REV CODE 636 W HCPCS: Mod: JG,HCNC | Performed by: INTERNAL MEDICINE

## 2019-10-08 PROCEDURE — 3079F DIAST BP 80-89 MM HG: CPT | Mod: HCNC,CPTII,S$GLB, | Performed by: INTERNAL MEDICINE

## 2019-10-08 PROCEDURE — 99999 PR PBB SHADOW E&M-EST. PATIENT-LVL III: CPT | Mod: PBBFAC,HCNC,, | Performed by: INTERNAL MEDICINE

## 2019-10-08 PROCEDURE — 99214 PR OFFICE/OUTPT VISIT, EST, LEVL IV, 30-39 MIN: ICD-10-PCS | Mod: HCNC,S$GLB,, | Performed by: INTERNAL MEDICINE

## 2019-10-08 PROCEDURE — 3079F PR MOST RECENT DIASTOLIC BLOOD PRESSURE 80-89 MM HG: ICD-10-PCS | Mod: HCNC,CPTII,S$GLB, | Performed by: INTERNAL MEDICINE

## 2019-10-08 PROCEDURE — 99214 OFFICE O/P EST MOD 30 MIN: CPT | Mod: HCNC,S$GLB,, | Performed by: INTERNAL MEDICINE

## 2019-10-08 PROCEDURE — 1101F PT FALLS ASSESS-DOCD LE1/YR: CPT | Mod: HCNC,CPTII,S$GLB, | Performed by: INTERNAL MEDICINE

## 2019-10-08 RX ORDER — LANREOTIDE ACETATE 120 MG/.5ML
120 INJECTION SUBCUTANEOUS
Status: DISCONTINUED | OUTPATIENT
Start: 2019-10-08 | End: 2019-10-08 | Stop reason: HOSPADM

## 2019-10-08 RX ADMIN — LANREOTIDE ACETATE 120 MG: 120 INJECTION SUBCUTANEOUS at 11:10

## 2019-10-08 NOTE — PROGRESS NOTES
"Subjective:       Patient ID: Shahram Hills is a 79 y.o. female.     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 Ms. Hills is an 77 yo woman who initially saw me on 6/7/18 for further evaluation of neuroendocrine tumor. She initially presented with diarrhea, abdominal discomfort, weight loss. She was evaluated at [facility] and underwent workup below:  US abdomen on 3/14/18 showed numerous bilobar mixed echogenicity and mildly vascular hepatic lesions. Cholelithiasis without e/o acute cholecystitis.   MRI abdomen on 3/30/2018 showed innumerable hepatic metastases. L3 vertebral body metastasis with soft tissue component along the right margin of the L3 and upper L4 vertebral bodies, filling the right L3/4 neural foramen, and extending into the anterior aspect of the spinal canal on the right at the L3 and upper T4 levels. Associated with mild spinal canal narrowing.  CT chest on 4/6/2018 showed one lucent nodule measuring 7 mm in the T7 vertebral body, most likely representing metastatic disease.    EGD on 5/4/18 showed normal duodenum. Schatzki's ring in the lower third of the esophagus. Grade 1 esophagitis in the GE junction c/w mild distal esophagitis. Congestion and erythema in the antrum, pre-pyloric region and stomach body c/w gastritis. Biopsy of the stomach antrum showed mild chronic gastritis, neg for H. pylori.   Labs on 5/9/2018: WBC 6.9, H/H 11.6/34.3, MCV 87.5, plt count 279. On 3/9/18: Vit B12 level 173, folic acid 6.6  She was started on oral vit B12 and underwent a liver biopsy on 5/18/18. Pathology showed "metastatic well differentiated neuroendocrine tumor. Ki67 3%"     She saw me on 6/7/18 and complained of weight loss of 26 lbs over the past 3-4 months, also has diarrhea. No flushing, wheezing, palpitations. Has been having pain in right flank radiating down the right leg. No " "tingling/numbness, weakness. She can hold her bladder but when she urinates her diarrhea would come out as well. Started on dex 4 mg q6h the evening of 18.      2. MRI spine on 18 showed "Marrow replacing metastatic lesion of the L3 vertebral body with associated extra osseous expansion and complete effacement of the right L3-L4 neural foramina and additional effacement of the right lateral recess.  There is additional lateral extension and abutment of the right psoas muscle.  Superimposed degenerative change at this level results in mild spinal canal stenosis." I sent the patient to ED on 18 where she was evaluated by neurosurgery. Neurosurgery did not feel she was a candidate for surgical intervention.      3. Seen by Dr. Adames on 18. palliative radiation to the area of the L3 metastasis 18-18   4. 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  There is an adjacent metastatic lymph node, diffuse liver metastases, and multiple bone metastases. Index primary neoplasm SUV max 33.13. Adjacent lymph node SUV max 23. L3 bone metastasis SUV max 36.86. Left lobe SUV max 46.13   5. Lanreotide started on 18  6. TACE to the right hepatic lobe on 19. TACE to the left hepatic lobe on 3/21/19.      History of Present Illness:   Mr. Hills returns for follow up. Feels well. No complaints. No SOB, wheezing, diarrhea, palpitations.      ECO     ROS:   See HPI. Otherwise negative.      Physical Examination:   Vital signs reviewed.   Gen: well hydrated, well developed, in no acute distress.  HEENT: normocephalic, anicteric, PERRLA, EOMI, oropharynx clear  Neck: supple, no JVD, thyromegaly, cervical or supraclavicular LAD  Lungs: CTAB, no wheezes or rales  Heart: RRR, no M/R/G  Abdomen: soft, no tenderness, non-distended,  liver palpated 5.0 cm below the right costal margin, no splenomegaly, no mass, or hernia.   Ext: no cyanosis, edema, " deformity  Neuro: alert and oriented x 4, no focal neuro deficit  Skin: no rash, open wounds or ulcers  Psych: pleasant and appropriate mood and affect     Objective:      Diagnostic Tests:  CT chest 8/30/19:  No significant detrimental change when compared to prior exam.  No new nodules or masses in the chest.  Stable 5 mm right upper lobe pulmonary nodule.  Stable lucent lesion within the T6 vertebral body.    Multiple hepatic lesions consistent with patient's known metastatic neuroendocrine tumor.  Comparison with prior exam is limited in this dedicated imaging of the chest.     MRI abdomen 9/6/19:  Innumerable enhancing hepatic lesions consistent with metastatic disease grossly stable in number and size when compared to prior examination.  Index lesion in segment 4 demonstrates no significant change.    Enhancing lesion of L3 vertebral body similar to prior exam.    RECIST SUMMARY:    Date of prior examination for comparison: 05/14/2019    Lesion 1: Hepatic segment 4.  5.3 cm. Series 17 image 47.  Prior measurement 5.4 cm.     Laboratory Data:  Labs reviewed. CBC normal, glucose 125, bili 1.5 stable     Assessment/Plan:      1. Malignant carcinoid tumor of ileum    2. 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 Spine metastasis    4. 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 Essential hypertension        1-4  - doing well. On lanreotide. S/p TACE  - c/w lanreotide  - last CT/MRI showed stable disease. Restage in December.   - RTC in one month for continued lanreotide     5.  - BP okay  - c/w current meds

## 2019-11-08 ENCOUNTER — INFUSION (OUTPATIENT)
Dept: INFUSION THERAPY | Facility: OTHER | Age: 79
End: 2019-11-08
Attending: INTERNAL MEDICINE
Payer: MEDICARE

## 2019-11-08 ENCOUNTER — OFFICE VISIT (OUTPATIENT)
Dept: HEMATOLOGY/ONCOLOGY | Facility: CLINIC | Age: 79
End: 2019-11-08
Payer: MEDICARE

## 2019-11-08 VITALS
RESPIRATION RATE: 16 BRPM | WEIGHT: 136.69 LBS | OXYGEN SATURATION: 99 % | BODY MASS INDEX: 23.34 KG/M2 | HEART RATE: 87 BPM | SYSTOLIC BLOOD PRESSURE: 151 MMHG | HEIGHT: 64 IN | TEMPERATURE: 98 F | DIASTOLIC BLOOD PRESSURE: 81 MMHG

## 2019-11-08 DIAGNOSIS — C7B.8 METASTATIC MALIGNANT NEUROENDOCRINE TUMOR TO LIVER: Primary | ICD-10-CM

## 2019-11-08 DIAGNOSIS — C79.51 SPINE METASTASIS: ICD-10-CM

## 2019-11-08 DIAGNOSIS — C7A.012 MALIGNANT CARCINOID TUMOR OF ILEUM: Primary | ICD-10-CM

## 2019-11-08 DIAGNOSIS — C7B.8 NEUROENDOCRINE CARCINOMA METASTATIC TO BONE: ICD-10-CM

## 2019-11-08 DIAGNOSIS — I10 ESSENTIAL HYPERTENSION: ICD-10-CM

## 2019-11-08 DIAGNOSIS — C7B.8 METASTATIC MALIGNANT NEUROENDOCRINE TUMOR TO LIVER: ICD-10-CM

## 2019-11-08 DIAGNOSIS — C7A.8 NEUROENDOCRINE CARCINOMA METASTATIC TO BONE: ICD-10-CM

## 2019-11-08 DIAGNOSIS — E11.9 TYPE 2 DIABETES MELLITUS WITHOUT COMPLICATION, WITHOUT LONG-TERM CURRENT USE OF INSULIN: ICD-10-CM

## 2019-11-08 PROCEDURE — 3079F DIAST BP 80-89 MM HG: CPT | Mod: HCNC,CPTII,S$GLB, | Performed by: INTERNAL MEDICINE

## 2019-11-08 PROCEDURE — 99215 OFFICE O/P EST HI 40 MIN: CPT | Mod: HCNC,S$GLB,, | Performed by: INTERNAL MEDICINE

## 2019-11-08 PROCEDURE — 3077F SYST BP >= 140 MM HG: CPT | Mod: HCNC,CPTII,S$GLB, | Performed by: INTERNAL MEDICINE

## 2019-11-08 PROCEDURE — 3079F PR MOST RECENT DIASTOLIC BLOOD PRESSURE 80-89 MM HG: ICD-10-PCS | Mod: HCNC,CPTII,S$GLB, | Performed by: INTERNAL MEDICINE

## 2019-11-08 PROCEDURE — 99215 PR OFFICE/OUTPT VISIT, EST, LEVL V, 40-54 MIN: ICD-10-PCS | Mod: HCNC,S$GLB,, | Performed by: INTERNAL MEDICINE

## 2019-11-08 PROCEDURE — 99499 UNLISTED E&M SERVICE: CPT | Mod: HCNC,S$GLB,, | Performed by: INTERNAL MEDICINE

## 2019-11-08 PROCEDURE — 96372 THER/PROPH/DIAG INJ SC/IM: CPT | Mod: HCNC

## 2019-11-08 PROCEDURE — 99499 RISK ADDL DX/OHS AUDIT: ICD-10-PCS | Mod: HCNC,S$GLB,, | Performed by: INTERNAL MEDICINE

## 2019-11-08 PROCEDURE — 63600175 PHARM REV CODE 636 W HCPCS: Mod: JG,HCNC | Performed by: INTERNAL MEDICINE

## 2019-11-08 PROCEDURE — 3077F PR MOST RECENT SYSTOLIC BLOOD PRESSURE >= 140 MM HG: ICD-10-PCS | Mod: HCNC,CPTII,S$GLB, | Performed by: INTERNAL MEDICINE

## 2019-11-08 PROCEDURE — 1101F PT FALLS ASSESS-DOCD LE1/YR: CPT | Mod: HCNC,CPTII,S$GLB, | Performed by: INTERNAL MEDICINE

## 2019-11-08 PROCEDURE — 1101F PR PT FALLS ASSESS DOC 0-1 FALLS W/OUT INJ PAST YR: ICD-10-PCS | Mod: HCNC,CPTII,S$GLB, | Performed by: INTERNAL MEDICINE

## 2019-11-08 PROCEDURE — 99999 PR PBB SHADOW E&M-EST. PATIENT-LVL III: CPT | Mod: PBBFAC,HCNC,, | Performed by: INTERNAL MEDICINE

## 2019-11-08 PROCEDURE — 99999 PR PBB SHADOW E&M-EST. PATIENT-LVL III: ICD-10-PCS | Mod: PBBFAC,HCNC,, | Performed by: INTERNAL MEDICINE

## 2019-11-08 RX ORDER — LANREOTIDE ACETATE 120 MG/.5ML
120 INJECTION SUBCUTANEOUS
Status: CANCELLED | OUTPATIENT
Start: 2019-11-08

## 2019-11-08 RX ORDER — LANREOTIDE ACETATE 120 MG/.5ML
120 INJECTION SUBCUTANEOUS
Status: DISCONTINUED | OUTPATIENT
Start: 2019-11-08 | End: 2019-11-08 | Stop reason: HOSPADM

## 2019-11-08 RX ADMIN — LANREOTIDE ACETATE 120 MG: 120 INJECTION SUBCUTANEOUS at 09:11

## 2019-11-08 NOTE — Clinical Note
RTC in 4 weeks to see me and get lanreotide. Before she returns, please schedule CBC, CMP, chromogranin A, CT chest, MRI abdomen and pelvis

## 2019-11-19 ENCOUNTER — LAB VISIT (OUTPATIENT)
Dept: LAB | Facility: OTHER | Age: 79
End: 2019-11-19
Attending: INTERNAL MEDICINE
Payer: MEDICARE

## 2019-11-19 DIAGNOSIS — R73.9 HYPERGLYCEMIA: ICD-10-CM

## 2019-11-19 DIAGNOSIS — E78.5 HYPERLIPIDEMIA, UNSPECIFIED HYPERLIPIDEMIA TYPE: ICD-10-CM

## 2019-11-19 LAB
CHOLEST SERPL-MCNC: 273 MG/DL (ref 120–199)
CHOLEST/HDLC SERPL: 3 {RATIO} (ref 2–5)
ESTIMATED AVG GLUCOSE: 120 MG/DL (ref 68–131)
GLUCOSE SERPL-MCNC: 136 MG/DL (ref 70–110)
HBA1C MFR BLD HPLC: 5.8 % (ref 4–5.6)
HDLC SERPL-MCNC: 91 MG/DL (ref 40–75)
HDLC SERPL: 33.3 % (ref 20–50)
LDLC SERPL CALC-MCNC: 164.4 MG/DL (ref 63–159)
NONHDLC SERPL-MCNC: 182 MG/DL
TRIGL SERPL-MCNC: 88 MG/DL (ref 30–150)
TSH SERPL DL<=0.005 MIU/L-ACNC: 0.74 UIU/ML (ref 0.4–4)

## 2019-11-19 PROCEDURE — 84443 ASSAY THYROID STIM HORMONE: CPT | Mod: HCNC

## 2019-11-19 PROCEDURE — 82947 ASSAY GLUCOSE BLOOD QUANT: CPT | Mod: HCNC

## 2019-11-19 PROCEDURE — 80061 LIPID PANEL: CPT | Mod: HCNC

## 2019-11-19 PROCEDURE — 36415 COLL VENOUS BLD VENIPUNCTURE: CPT | Mod: HCNC

## 2019-11-19 PROCEDURE — 83036 HEMOGLOBIN GLYCOSYLATED A1C: CPT | Mod: HCNC

## 2019-11-22 ENCOUNTER — HOSPITAL ENCOUNTER (OUTPATIENT)
Dept: RADIOLOGY | Facility: HOSPITAL | Age: 79
Discharge: HOME | End: 2019-11-22
Attending: INTERNAL MEDICINE
Payer: MEDICARE

## 2019-11-22 DIAGNOSIS — C7A.012 MALIGNANT CARCINOID TUMOR OF ILEUM: ICD-10-CM

## 2019-11-22 PROCEDURE — 72197 MRI PELVIS W/O & W/DYE: CPT | Mod: 26,HCNC,, | Performed by: RADIOLOGY

## 2019-11-22 PROCEDURE — 74183 MRI ABDOMEN-PELVIS W W/O CONTRAST (XPD): ICD-10-PCS | Mod: 26,HCNC,, | Performed by: RADIOLOGY

## 2019-11-22 PROCEDURE — 25500020 PHARM REV CODE 255: Mod: HCNC | Performed by: INTERNAL MEDICINE

## 2019-11-22 PROCEDURE — 74183 MRI ABD W/O CNTR FLWD CNTR: CPT | Mod: 26,HCNC,, | Performed by: RADIOLOGY

## 2019-11-22 PROCEDURE — 71250 CT THORAX DX C-: CPT | Mod: TC,HCNC

## 2019-11-22 PROCEDURE — 71250 CT CHEST WITHOUT CONTRAST: ICD-10-PCS | Mod: 26,HCNC,, | Performed by: RADIOLOGY

## 2019-11-22 PROCEDURE — 71250 CT THORAX DX C-: CPT | Mod: 26,HCNC,, | Performed by: RADIOLOGY

## 2019-11-22 PROCEDURE — A9585 GADOBUTROL INJECTION: HCPCS | Mod: HCNC | Performed by: INTERNAL MEDICINE

## 2019-11-22 PROCEDURE — 72197 MRI PELVIS W/O & W/DYE: CPT | Mod: TC,HCNC

## 2019-11-22 PROCEDURE — 72197 MRI ABDOMEN-PELVIS W W/O CONTRAST (XPD): ICD-10-PCS | Mod: 26,HCNC,, | Performed by: RADIOLOGY

## 2019-11-22 RX ORDER — GADOBUTROL 604.72 MG/ML
10 INJECTION INTRAVENOUS
Status: COMPLETED | OUTPATIENT
Start: 2019-11-22 | End: 2019-11-22

## 2019-11-22 RX ADMIN — GADOBUTROL 10 ML: 604.72 INJECTION INTRAVENOUS at 12:11

## 2019-11-26 ENCOUNTER — OFFICE VISIT (OUTPATIENT)
Dept: INTERNAL MEDICINE | Facility: CLINIC | Age: 79
End: 2019-11-26
Attending: INTERNAL MEDICINE
Payer: MEDICARE

## 2019-11-26 VITALS
SYSTOLIC BLOOD PRESSURE: 130 MMHG | HEIGHT: 64 IN | BODY MASS INDEX: 23.29 KG/M2 | HEART RATE: 87 BPM | DIASTOLIC BLOOD PRESSURE: 80 MMHG | OXYGEN SATURATION: 97 % | WEIGHT: 136.44 LBS

## 2019-11-26 DIAGNOSIS — E78.5 HYPERLIPIDEMIA, UNSPECIFIED HYPERLIPIDEMIA TYPE: ICD-10-CM

## 2019-11-26 DIAGNOSIS — C7B.8 METASTATIC MALIGNANT NEUROENDOCRINE TUMOR TO LIVER: ICD-10-CM

## 2019-11-26 DIAGNOSIS — R73.01 IFG (IMPAIRED FASTING GLUCOSE): ICD-10-CM

## 2019-11-26 DIAGNOSIS — I10 ESSENTIAL HYPERTENSION: Primary | ICD-10-CM

## 2019-11-26 PROCEDURE — 1159F MED LIST DOCD IN RCRD: CPT | Mod: HCNC,S$GLB,, | Performed by: INTERNAL MEDICINE

## 2019-11-26 PROCEDURE — 3075F PR MOST RECENT SYSTOLIC BLOOD PRESS GE 130-139MM HG: ICD-10-PCS | Mod: HCNC,CPTII,S$GLB, | Performed by: INTERNAL MEDICINE

## 2019-11-26 PROCEDURE — 1126F PR PAIN SEVERITY QUANTIFIED, NO PAIN PRESENT: ICD-10-PCS | Mod: HCNC,S$GLB,, | Performed by: INTERNAL MEDICINE

## 2019-11-26 PROCEDURE — 3079F DIAST BP 80-89 MM HG: CPT | Mod: HCNC,CPTII,S$GLB, | Performed by: INTERNAL MEDICINE

## 2019-11-26 PROCEDURE — 3075F SYST BP GE 130 - 139MM HG: CPT | Mod: HCNC,CPTII,S$GLB, | Performed by: INTERNAL MEDICINE

## 2019-11-26 PROCEDURE — 1101F PR PT FALLS ASSESS DOC 0-1 FALLS W/OUT INJ PAST YR: ICD-10-PCS | Mod: HCNC,CPTII,S$GLB, | Performed by: INTERNAL MEDICINE

## 2019-11-26 PROCEDURE — 3079F PR MOST RECENT DIASTOLIC BLOOD PRESSURE 80-89 MM HG: ICD-10-PCS | Mod: HCNC,CPTII,S$GLB, | Performed by: INTERNAL MEDICINE

## 2019-11-26 PROCEDURE — 1101F PT FALLS ASSESS-DOCD LE1/YR: CPT | Mod: HCNC,CPTII,S$GLB, | Performed by: INTERNAL MEDICINE

## 2019-11-26 PROCEDURE — 99214 PR OFFICE/OUTPT VISIT, EST, LEVL IV, 30-39 MIN: ICD-10-PCS | Mod: HCNC,S$GLB,, | Performed by: INTERNAL MEDICINE

## 2019-11-26 PROCEDURE — 99999 PR PBB SHADOW E&M-EST. PATIENT-LVL III: CPT | Mod: PBBFAC,HCNC,, | Performed by: INTERNAL MEDICINE

## 2019-11-26 PROCEDURE — 1126F AMNT PAIN NOTED NONE PRSNT: CPT | Mod: HCNC,S$GLB,, | Performed by: INTERNAL MEDICINE

## 2019-11-26 PROCEDURE — 99999 PR PBB SHADOW E&M-EST. PATIENT-LVL III: ICD-10-PCS | Mod: PBBFAC,HCNC,, | Performed by: INTERNAL MEDICINE

## 2019-11-26 PROCEDURE — 99214 OFFICE O/P EST MOD 30 MIN: CPT | Mod: HCNC,S$GLB,, | Performed by: INTERNAL MEDICINE

## 2019-11-26 PROCEDURE — 1159F PR MEDICATION LIST DOCUMENTED IN MEDICAL RECORD: ICD-10-PCS | Mod: HCNC,S$GLB,, | Performed by: INTERNAL MEDICINE

## 2019-11-26 RX ORDER — EZETIMIBE 10 MG/1
10 TABLET ORAL DAILY
Qty: 90 TABLET | Refills: 1 | Status: SHIPPED | OUTPATIENT
Start: 2019-11-26 | End: 2020-05-25 | Stop reason: SDUPTHER

## 2019-11-26 RX ORDER — AMLODIPINE BESYLATE 10 MG/1
10 TABLET ORAL DAILY
Qty: 90 TABLET | Refills: 3 | Status: SHIPPED | OUTPATIENT
Start: 2019-11-26 | End: 2020-10-06 | Stop reason: SDUPTHER

## 2019-11-26 NOTE — PROGRESS NOTES
"Subjective:   Patient ID: Shahram Hills is a 79 y.o. female  Chief complaint:   Chief Complaint   Patient presents with    Hypertension       HPI  Here for htn f/u   Here today alone today     HTN:   - she reports that home bp readings are 130-160/80-90s  None < 130/80  - taking amlodipine 5mg daily    IFG:  Reviewed labs:   a1c in prediabetes range and stable  Fasting glu 136 recently     - prev the result of fasting bg was 142 but at [facility] there was concern that this was not a true fasting level and plan was to repeat as above   - she reports level of 136 was fasting   Stopped using sugar in coffee since last seen   reports loves sweets   henry XR emtformin 500mg once daily     Metastatic well differentiated, 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    Physical Exam   Constitutional: She is oriented to person, place, and time. She appears well-developed and well-nourished.   HENT:   Head: Normocephalic and atraumatic.   Eyes: Conjunctivae and EOM are normal.   Neck: Normal range of motion. Neck supple.   Cardiovascular: Normal rate, regular rhythm and intact distal pulses.   Pulmonary/Chest: Effort normal and breath sounds normal.   Abdominal: Soft. Normal appearance and bowel sounds are normal.   Musculoskeletal: She exhibits no edema or tenderness.   Neurological: She is alert and oriented to person, place, and time. She has normal strength. Gait normal.   Skin: Skin is warm, dry and intact. No cyanosis. Nails show no clubbing.   Psychiatric: She has a normal mood and affect. Her speech is normal and behavior is normal. Cognition and memory are " normal.   Vitals reviewed.      Assessment:  1. Essential hypertension    2. IFG (impaired fasting glucose)    3. Hyperlipidemia, unspecified hyperlipidemia type    4. 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        Plan:  Shahram was seen today for hypertension.    Diagnoses and all orders for this visit:    Essential hypertension  -     amLODIPine (NORVASC) 10 MG tablet; Take 1 tablet (10 mg total) by mouth once daily.  Inc ccb to 10  rtc in 2 weeks for bp check     IFG (impaired fasting glucose)  -     Hemoglobin A1c; Future  -     Glucose, fasting; Future  If has 2nd true fasting bg > or = to 126 then will dx with diabetes   136 reading fasting   Prior 142 level not fasting   Cont metformin and diet modification     Hyperlipidemia, unspecified hyperlipidemia type  -     Lipid panel; Future  -     Comprehensive metabolic panel; Future  rec mediterranean diet   Hold off on statin due to liver mets   Trial of zetia   Potential side effects of medication discussed with patient. If the patient has any issues with medication, patient  understands to let me know. Return to clinic and ER prompts given.     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  Stable - followed by h/o     Other orders  -     ezetimibe (ZETIA) 10 mg tablet; Take 1 tablet (10 mg total) by mouth once daily.    Check labs in 3 months   rtc in 2 weeks for bp check   pvax 23 shot today  shingrix through pharmacy     Health Maintenance   Topic Date Due    Pneumococcal Vaccine (65+ High/Highest Risk) (2 of 2 - PPSV23) 10/15/2019    DEXA SCAN  06/11/2021    Lipid Panel  11/19/2024    TETANUS VACCINE  03/23/2028

## 2019-12-06 ENCOUNTER — INFUSION (OUTPATIENT)
Dept: INFUSION THERAPY | Facility: OTHER | Age: 79
End: 2019-12-06
Attending: INTERNAL MEDICINE
Payer: MEDICARE

## 2019-12-06 ENCOUNTER — LAB VISIT (OUTPATIENT)
Dept: LAB | Facility: OTHER | Age: 79
End: 2019-12-06
Attending: INTERNAL MEDICINE
Payer: MEDICARE

## 2019-12-06 ENCOUNTER — OFFICE VISIT (OUTPATIENT)
Dept: HEMATOLOGY/ONCOLOGY | Facility: CLINIC | Age: 79
End: 2019-12-06
Payer: MEDICARE

## 2019-12-06 VITALS
RESPIRATION RATE: 16 BRPM | BODY MASS INDEX: 23.11 KG/M2 | DIASTOLIC BLOOD PRESSURE: 76 MMHG | HEIGHT: 64 IN | HEART RATE: 83 BPM | SYSTOLIC BLOOD PRESSURE: 120 MMHG | OXYGEN SATURATION: 100 % | WEIGHT: 135.38 LBS | TEMPERATURE: 98 F

## 2019-12-06 DIAGNOSIS — E11.9 TYPE 2 DIABETES MELLITUS WITHOUT COMPLICATION, WITHOUT LONG-TERM CURRENT USE OF INSULIN: ICD-10-CM

## 2019-12-06 DIAGNOSIS — C79.51 SPINE METASTASIS: ICD-10-CM

## 2019-12-06 DIAGNOSIS — C7A.012 MALIGNANT CARCINOID TUMOR OF ILEUM: ICD-10-CM

## 2019-12-06 DIAGNOSIS — C7A.012 MALIGNANT CARCINOID TUMOR OF ILEUM: Primary | ICD-10-CM

## 2019-12-06 DIAGNOSIS — I70.0 AORTIC ATHEROSCLEROSIS: ICD-10-CM

## 2019-12-06 DIAGNOSIS — C7B.8 METASTATIC MALIGNANT NEUROENDOCRINE TUMOR TO LIVER: ICD-10-CM

## 2019-12-06 DIAGNOSIS — C7A.8 NEUROENDOCRINE CARCINOMA METASTATIC TO BONE: ICD-10-CM

## 2019-12-06 DIAGNOSIS — I10 ESSENTIAL HYPERTENSION: ICD-10-CM

## 2019-12-06 DIAGNOSIS — C7B.8 METASTATIC MALIGNANT NEUROENDOCRINE TUMOR TO LIVER: Primary | ICD-10-CM

## 2019-12-06 DIAGNOSIS — C7B.8 NEUROENDOCRINE CARCINOMA METASTATIC TO BONE: ICD-10-CM

## 2019-12-06 LAB
ALBUMIN SERPL BCP-MCNC: 3.7 G/DL (ref 3.5–5.2)
ALP SERPL-CCNC: 66 U/L (ref 55–135)
ALT SERPL W/O P-5'-P-CCNC: 9 U/L (ref 10–44)
ANION GAP SERPL CALC-SCNC: 10 MMOL/L (ref 8–16)
AST SERPL-CCNC: 17 U/L (ref 10–40)
BASOPHILS # BLD AUTO: 0.01 K/UL (ref 0–0.2)
BASOPHILS NFR BLD: 0.2 % (ref 0–1.9)
BILIRUB SERPL-MCNC: 1.7 MG/DL (ref 0.1–1)
BUN SERPL-MCNC: 8 MG/DL (ref 8–23)
CALCIUM SERPL-MCNC: 9.8 MG/DL (ref 8.7–10.5)
CHLORIDE SERPL-SCNC: 104 MMOL/L (ref 95–110)
CO2 SERPL-SCNC: 29 MMOL/L (ref 23–29)
CREAT SERPL-MCNC: 0.8 MG/DL (ref 0.5–1.4)
DIFFERENTIAL METHOD: NORMAL
EOSINOPHIL # BLD AUTO: 0 K/UL (ref 0–0.5)
EOSINOPHIL NFR BLD: 0.7 % (ref 0–8)
ERYTHROCYTE [DISTWIDTH] IN BLOOD BY AUTOMATED COUNT: 12.2 % (ref 11.5–14.5)
EST. GFR  (AFRICAN AMERICAN): >60 ML/MIN/1.73 M^2
EST. GFR  (NON AFRICAN AMERICAN): >60 ML/MIN/1.73 M^2
GLUCOSE SERPL-MCNC: 132 MG/DL (ref 70–110)
HCT VFR BLD AUTO: 40 % (ref 37–48.5)
HGB BLD-MCNC: 12.9 G/DL (ref 12–16)
IMM GRANULOCYTES # BLD AUTO: 0.01 K/UL (ref 0–0.04)
IMM GRANULOCYTES NFR BLD AUTO: 0.2 % (ref 0–0.5)
LYMPHOCYTES # BLD AUTO: 1.7 K/UL (ref 1–4.8)
LYMPHOCYTES NFR BLD: 31 % (ref 18–48)
MCH RBC QN AUTO: 29.2 PG (ref 27–31)
MCHC RBC AUTO-ENTMCNC: 32.3 G/DL (ref 32–36)
MCV RBC AUTO: 91 FL (ref 82–98)
MONOCYTES # BLD AUTO: 0.6 K/UL (ref 0.3–1)
MONOCYTES NFR BLD: 10.1 % (ref 4–15)
NEUTROPHILS # BLD AUTO: 3.2 K/UL (ref 1.8–7.7)
NEUTROPHILS NFR BLD: 57.8 % (ref 38–73)
NRBC BLD-RTO: 0 /100 WBC
PLATELET # BLD AUTO: 259 K/UL (ref 150–350)
PMV BLD AUTO: 10 FL (ref 9.2–12.9)
POTASSIUM SERPL-SCNC: 4.5 MMOL/L (ref 3.5–5.1)
PROT SERPL-MCNC: 7.2 G/DL (ref 6–8.4)
RBC # BLD AUTO: 4.42 M/UL (ref 4–5.4)
SODIUM SERPL-SCNC: 143 MMOL/L (ref 136–145)
WBC # BLD AUTO: 5.45 K/UL (ref 3.9–12.7)

## 2019-12-06 PROCEDURE — 1101F PT FALLS ASSESS-DOCD LE1/YR: CPT | Mod: HCNC,CPTII,S$GLB, | Performed by: INTERNAL MEDICINE

## 2019-12-06 PROCEDURE — 1126F AMNT PAIN NOTED NONE PRSNT: CPT | Mod: HCNC,S$GLB,, | Performed by: INTERNAL MEDICINE

## 2019-12-06 PROCEDURE — 85025 COMPLETE CBC W/AUTO DIFF WBC: CPT | Mod: HCNC

## 2019-12-06 PROCEDURE — 99214 PR OFFICE/OUTPT VISIT, EST, LEVL IV, 30-39 MIN: ICD-10-PCS | Mod: HCNC,S$GLB,, | Performed by: INTERNAL MEDICINE

## 2019-12-06 PROCEDURE — 3074F SYST BP LT 130 MM HG: CPT | Mod: HCNC,CPTII,S$GLB, | Performed by: INTERNAL MEDICINE

## 2019-12-06 PROCEDURE — 86316 IMMUNOASSAY TUMOR OTHER: CPT | Mod: HCNC

## 2019-12-06 PROCEDURE — 3078F PR MOST RECENT DIASTOLIC BLOOD PRESSURE < 80 MM HG: ICD-10-PCS | Mod: HCNC,CPTII,S$GLB, | Performed by: INTERNAL MEDICINE

## 2019-12-06 PROCEDURE — 63600175 PHARM REV CODE 636 W HCPCS: Mod: JG,HCNC | Performed by: INTERNAL MEDICINE

## 2019-12-06 PROCEDURE — 36415 COLL VENOUS BLD VENIPUNCTURE: CPT | Mod: HCNC

## 2019-12-06 PROCEDURE — 1126F PR PAIN SEVERITY QUANTIFIED, NO PAIN PRESENT: ICD-10-PCS | Mod: HCNC,S$GLB,, | Performed by: INTERNAL MEDICINE

## 2019-12-06 PROCEDURE — 99499 UNLISTED E&M SERVICE: CPT | Mod: HCNC,S$GLB,, | Performed by: INTERNAL MEDICINE

## 2019-12-06 PROCEDURE — 1159F MED LIST DOCD IN RCRD: CPT | Mod: HCNC,S$GLB,, | Performed by: INTERNAL MEDICINE

## 2019-12-06 PROCEDURE — 99499 RISK ADDL DX/OHS AUDIT: ICD-10-PCS | Mod: HCNC,S$GLB,, | Performed by: INTERNAL MEDICINE

## 2019-12-06 PROCEDURE — 3078F DIAST BP <80 MM HG: CPT | Mod: HCNC,CPTII,S$GLB, | Performed by: INTERNAL MEDICINE

## 2019-12-06 PROCEDURE — 99214 OFFICE O/P EST MOD 30 MIN: CPT | Mod: HCNC,S$GLB,, | Performed by: INTERNAL MEDICINE

## 2019-12-06 PROCEDURE — 1159F PR MEDICATION LIST DOCUMENTED IN MEDICAL RECORD: ICD-10-PCS | Mod: HCNC,S$GLB,, | Performed by: INTERNAL MEDICINE

## 2019-12-06 PROCEDURE — 80053 COMPREHEN METABOLIC PANEL: CPT | Mod: HCNC

## 2019-12-06 PROCEDURE — 1101F PR PT FALLS ASSESS DOC 0-1 FALLS W/OUT INJ PAST YR: ICD-10-PCS | Mod: HCNC,CPTII,S$GLB, | Performed by: INTERNAL MEDICINE

## 2019-12-06 PROCEDURE — 99999 PR PBB SHADOW E&M-EST. PATIENT-LVL III: ICD-10-PCS | Mod: PBBFAC,HCNC,, | Performed by: INTERNAL MEDICINE

## 2019-12-06 PROCEDURE — 3074F PR MOST RECENT SYSTOLIC BLOOD PRESSURE < 130 MM HG: ICD-10-PCS | Mod: HCNC,CPTII,S$GLB, | Performed by: INTERNAL MEDICINE

## 2019-12-06 PROCEDURE — 99999 PR PBB SHADOW E&M-EST. PATIENT-LVL III: CPT | Mod: PBBFAC,HCNC,, | Performed by: INTERNAL MEDICINE

## 2019-12-06 PROCEDURE — 96372 THER/PROPH/DIAG INJ SC/IM: CPT | Mod: HCNC

## 2019-12-06 RX ORDER — LANREOTIDE ACETATE 120 MG/.5ML
120 INJECTION SUBCUTANEOUS
Status: CANCELLED | OUTPATIENT
Start: 2019-12-06

## 2019-12-06 RX ORDER — LANREOTIDE ACETATE 120 MG/.5ML
120 INJECTION SUBCUTANEOUS
Status: DISCONTINUED | OUTPATIENT
Start: 2019-12-06 | End: 2019-12-06 | Stop reason: HOSPADM

## 2019-12-06 RX ADMIN — LANREOTIDE ACETATE 120 MG: 120 INJECTION SUBCUTANEOUS at 11:12

## 2019-12-06 NOTE — PROGRESS NOTES
"Subjective:       Patient ID: Shahram Hills is a 79 y.o. female.     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 Ms. Hills is an 79 yo woman who initially saw me on 6/7/18 for further evaluation of neuroendocrine tumor. She initially presented with diarrhea, abdominal discomfort, weight loss. She was evaluated at [facility] and underwent workup below:  US abdomen on 3/14/18 showed numerous bilobar mixed echogenicity and mildly vascular hepatic lesions. Cholelithiasis without e/o acute cholecystitis.   MRI abdomen on 3/30/2018 showed innumerable hepatic metastases. L3 vertebral body metastasis with soft tissue component along the right margin of the L3 and upper L4 vertebral bodies, filling the right L3/4 neural foramen, and extending into the anterior aspect of the spinal canal on the right at the L3 and upper T4 levels. Associated with mild spinal canal narrowing.  CT chest on 4/6/2018 showed one lucent nodule measuring 7 mm in the T7 vertebral body, most likely representing metastatic disease.    EGD on 5/4/18 showed normal duodenum. Schatzki's ring in the lower third of the esophagus. Grade 1 esophagitis in the GE junction c/w mild distal esophagitis. Congestion and erythema in the antrum, pre-pyloric region and stomach body c/w gastritis. Biopsy of the stomach antrum showed mild chronic gastritis, neg for H. pylori.   Labs on 5/9/2018: WBC 6.9, H/H 11.6/34.3, MCV 87.5, plt count 279. On 3/9/18: Vit B12 level 173, folic acid 6.6  She was started on oral vit B12 and underwent a liver biopsy on 5/18/18. Pathology showed "metastatic well differentiated neuroendocrine tumor. Ki67 3%"     She saw me on 6/7/18 and complained of weight loss of 26 lbs over the past 3-4 months, also has diarrhea. No flushing, wheezing, palpitations. Has been having pain in right flank radiating down " "the right leg. No tingling/numbness, weakness. She can hold her bladder but when she urinates her diarrhea would come out as well. Started on dex 4 mg q6h the evening of 18.      2. MRI spine on 18 showed "Marrow replacing metastatic lesion of the L3 vertebral body with associated extra osseous expansion and complete effacement of the right L3-L4 neural foramina and additional effacement of the right lateral recess.  There is additional lateral extension and abutment of the right psoas muscle.  Superimposed degenerative change at this level results in mild spinal canal stenosis." I sent the patient to ED on 18 where she was evaluated by neurosurgery. Neurosurgery did not feel she was a candidate for surgical intervention.      3. Seen by Dr. Adames on 18. palliative radiation to the area of the L3 metastasis 18-18   4. 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  There is an adjacent metastatic lymph node, diffuse liver metastases, and multiple bone metastases. Index primary neoplasm SUV max 33.13. Adjacent lymph node SUV max 23. L3 bone metastasis SUV max 36.86. Left lobe SUV max 46.13   5. Lanreotide started on 18  6. TACE to the right hepatic lobe on 19. TACE to the left hepatic lobe on 3/21/19.      History of Present Illness:   Mr. Hills returns for follow up. felt tired when she got up this morning but feels well now. Denies pain, diarrhea, SOB, flushing, or other complaints.      ECO     ROS:   See HPI. Otherwise negative.      Physical Examination:   Vital signs reviewed.   Gen: well hydrated, well developed, in no acute distress.  HEENT: normocephalic, anicteric, PERRLA, EOMI, oropharynx clear  Neck: supple, no JVD, thyromegaly, cervical or supraclavicular LAD  Lungs: CTAB, no wheezes or rales  Heart: RRR, no M/R/G  Abdomen: soft, no tenderness, non-distended,  liver palpated 5.0 cm below the right costal margin, no " splenomegaly, no mass, or hernia.   Ext: no cyanosis, edema, deformity  Neuro: alert and oriented x 4, no focal neuro deficit  Skin: no rash, open wounds or ulcers  Psych: pleasant and appropriate mood and affect     Objective:      Diagnostic Tests:  CT chest 11/22/2019:  Unchanged, subcentimeter pulmonary nodules.  No new or enlarging pulmonary nodule to suggest interval development of metastatic disease.    Multiple hepatic lesions better evaluated on the MRI of the same date consistent with metastatic disease.     MRI abdomen/pelvis 11/22/2019:    1. Innumerable enhancing hepatic lesions consistent with metastatic disease, grossly unchanged in comparison with prior.  2. Unchanged primary neuroendocrine tumor of the distal ileum with adjacent mesenteric lymphadenopathy.  3. Unchanged enhancing osseous metastasis within the L3 vertebral body.  4. Colonic diverticulosis without acute diverticulitis.  RECIST SUMMARY:    Date of prior examination for comparison: 09/06/2019    Lesion 1: Hepatic segment 4.  5.3 cm. Series 18 image 44.  Prior measurement 5.3 cm.     Laboratory Data:  Labs reviewed. CBC normal, bili 1.7 stable     Assessment/Plan:      1. Malignant carcinoid tumor of ileum    2. 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 Spine metastasis    4. 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 Essential hypertension    6. Aortic atherosclerosis    7. Type 2 diabetes mellitus without complication, without long-term current use of insulin        1-4  - doing well. On lanreotide. S/p TACE  - c/w lanreotide  - last CT/MRI showed stable disease. Restaging scan in March  - RTC in one month for continued lanreotide     5.  - BP good  - c/w current meds    6.  - asymptomatic  - c/w zetia    7.  - BS good  - c/w current meds

## 2019-12-06 NOTE — PLAN OF CARE
Lanreotide injection to right buttock complete. Pt tolerated well. VSS. NAD.   Pt verbalized understanding of discharge instructions before leaving with daughter.

## 2019-12-08 LAB — CGA SERPL-MCNC: 876 NG/ML (ref 0–160)

## 2019-12-10 ENCOUNTER — CLINICAL SUPPORT (OUTPATIENT)
Dept: INTERNAL MEDICINE | Facility: CLINIC | Age: 79
End: 2019-12-10
Payer: MEDICARE

## 2019-12-10 ENCOUNTER — TELEPHONE (OUTPATIENT)
Dept: INTERNAL MEDICINE | Facility: CLINIC | Age: 79
End: 2019-12-10

## 2019-12-10 VITALS — OXYGEN SATURATION: 98 % | SYSTOLIC BLOOD PRESSURE: 124 MMHG | DIASTOLIC BLOOD PRESSURE: 82 MMHG | HEART RATE: 88 BPM

## 2019-12-10 PROCEDURE — 99999 PR PBB SHADOW E&M-EST. PATIENT-LVL I: CPT | Mod: PBBFAC,HCNC,,

## 2019-12-10 PROCEDURE — 99999 PR PBB SHADOW E&M-EST. PATIENT-LVL I: ICD-10-PCS | Mod: PBBFAC,HCNC,,

## 2019-12-10 NOTE — PROGRESS NOTES
Shahram Chaoaux 79 y.o. female is here today for Blood Pressure check.   History of HTN yes.    Review of patient's allergies indicates:  No Known Allergies  Creatinine   Date Value Ref Range Status   12/06/2019 0.8 0.5 - 1.4 mg/dL Final     Sodium   Date Value Ref Range Status   12/06/2019 143 136 - 145 mmol/L Final     Potassium   Date Value Ref Range Status   12/06/2019 4.5 3.5 - 5.1 mmol/L Final   ]  Patient verifies taking blood pressure medications on a regular bases at the same time of the day.     Current Outpatient Medications:     amLODIPine (NORVASC) 10 MG tablet, Take 1 tablet (10 mg total) by mouth once daily., Disp: 90 tablet, Rfl: 3    cholecalciferol, vitamin D3, (VITAMIN D3) 400 unit Tab, Take by mouth once daily., Disp: , Rfl:     cholestyramine-aspartame (CHOLESTYRAMINE LIGHT) 4 gram PwPk, Take by mouth., Disp: , Rfl:     cyanocobalamin (VITAMIN B-12) 1000 MCG tablet, Take 1 tablet (1,000 mcg total) by mouth once daily., Disp: , Rfl:     ezetimibe (ZETIA) 10 mg tablet, Take 1 tablet (10 mg total) by mouth once daily., Disp: 90 tablet, Rfl: 1    ferrous sulfate 325 (65 FE) MG EC tablet, Take 325 mg by mouth 3 (three) times daily with meals., Disp: , Rfl:     FLUZONE HIGH-DOSE 2019-20, PF, 180 mcg/0.5 mL Syrg, ADM 0.5ML IM UTD, Disp: , Rfl: 0    metFORMIN (GLUCOPHAGE-XR) 500 MG 24 hr tablet, Take 1 tablet (500 mg total) by mouth daily with breakfast., Disp: 90 tablet, Rfl: 3    ondansetron (ZOFRAN) 4 MG tablet, Take 1 tablet (4 mg total) by mouth every 6 (six) hours as needed for Nausea., Disp: 28 tablet, Rfl: 0    XIIDRA 5 % Dpet, INSTILL ONE DROP INTO OU BID, Disp: , Rfl: 3  Does patient have record of home blood pressure readings yes. Readings have been averaging 130/85.   Last dose of blood pressure medication was taken at 0900 this morning (amlodipine).  Patient is asymptomatic.    BP: 124/82 , Pulse: 88.    Dr. Xie notified.

## 2019-12-11 ENCOUNTER — IMMUNIZATION (OUTPATIENT)
Dept: PHARMACY | Facility: CLINIC | Age: 79
End: 2019-12-11
Payer: MEDICARE

## 2019-12-11 ENCOUNTER — IMMUNIZATION (OUTPATIENT)
Dept: PHARMACY | Facility: CLINIC | Age: 79
End: 2019-12-11

## 2020-01-03 ENCOUNTER — LAB VISIT (OUTPATIENT)
Dept: LAB | Facility: OTHER | Age: 80
End: 2020-01-03
Attending: INTERNAL MEDICINE
Payer: MEDICARE

## 2020-01-03 ENCOUNTER — INFUSION (OUTPATIENT)
Dept: INFUSION THERAPY | Facility: OTHER | Age: 80
End: 2020-01-03
Attending: INTERNAL MEDICINE
Payer: MEDICARE

## 2020-01-03 ENCOUNTER — OFFICE VISIT (OUTPATIENT)
Dept: HEMATOLOGY/ONCOLOGY | Facility: CLINIC | Age: 80
End: 2020-01-03
Payer: MEDICARE

## 2020-01-03 VITALS
DIASTOLIC BLOOD PRESSURE: 73 MMHG | OXYGEN SATURATION: 90 % | SYSTOLIC BLOOD PRESSURE: 141 MMHG | HEART RATE: 80 BPM | RESPIRATION RATE: 16 BRPM | TEMPERATURE: 97 F | BODY MASS INDEX: 22.99 KG/M2 | HEIGHT: 64 IN | WEIGHT: 134.69 LBS

## 2020-01-03 DIAGNOSIS — E11.9 TYPE 2 DIABETES MELLITUS WITHOUT COMPLICATION, WITHOUT LONG-TERM CURRENT USE OF INSULIN: ICD-10-CM

## 2020-01-03 DIAGNOSIS — C7B.8 METASTATIC MALIGNANT NEUROENDOCRINE TUMOR TO LIVER: ICD-10-CM

## 2020-01-03 DIAGNOSIS — C7A.8 NEUROENDOCRINE CARCINOMA METASTATIC TO BONE: ICD-10-CM

## 2020-01-03 DIAGNOSIS — C7B.8 NEUROENDOCRINE CARCINOMA METASTATIC TO BONE: ICD-10-CM

## 2020-01-03 DIAGNOSIS — C7B.8 METASTATIC MALIGNANT NEUROENDOCRINE TUMOR TO LIVER: Primary | ICD-10-CM

## 2020-01-03 DIAGNOSIS — C7A.012 MALIGNANT CARCINOID TUMOR OF ILEUM: Primary | ICD-10-CM

## 2020-01-03 DIAGNOSIS — C7A.012 MALIGNANT CARCINOID TUMOR OF ILEUM: ICD-10-CM

## 2020-01-03 DIAGNOSIS — I10 ESSENTIAL HYPERTENSION: ICD-10-CM

## 2020-01-03 LAB
ALBUMIN SERPL BCP-MCNC: 3.8 G/DL (ref 3.5–5.2)
ALP SERPL-CCNC: 74 U/L (ref 55–135)
ALT SERPL W/O P-5'-P-CCNC: 9 U/L (ref 10–44)
ANION GAP SERPL CALC-SCNC: 11 MMOL/L (ref 8–16)
AST SERPL-CCNC: 17 U/L (ref 10–40)
BASOPHILS # BLD AUTO: 0.01 K/UL (ref 0–0.2)
BASOPHILS NFR BLD: 0.2 % (ref 0–1.9)
BILIRUB SERPL-MCNC: 1.4 MG/DL (ref 0.1–1)
BUN SERPL-MCNC: 8 MG/DL (ref 8–23)
CALCIUM SERPL-MCNC: 9.8 MG/DL (ref 8.7–10.5)
CHLORIDE SERPL-SCNC: 104 MMOL/L (ref 95–110)
CO2 SERPL-SCNC: 27 MMOL/L (ref 23–29)
CREAT SERPL-MCNC: 0.7 MG/DL (ref 0.5–1.4)
DIFFERENTIAL METHOD: ABNORMAL
EOSINOPHIL # BLD AUTO: 0 K/UL (ref 0–0.5)
EOSINOPHIL NFR BLD: 0.7 % (ref 0–8)
ERYTHROCYTE [DISTWIDTH] IN BLOOD BY AUTOMATED COUNT: 12.4 % (ref 11.5–14.5)
EST. GFR  (AFRICAN AMERICAN): >60 ML/MIN/1.73 M^2
EST. GFR  (NON AFRICAN AMERICAN): >60 ML/MIN/1.73 M^2
GLUCOSE SERPL-MCNC: 132 MG/DL (ref 70–110)
HCT VFR BLD AUTO: 42 % (ref 37–48.5)
HGB BLD-MCNC: 13.1 G/DL (ref 12–16)
IMM GRANULOCYTES # BLD AUTO: 0.01 K/UL (ref 0–0.04)
IMM GRANULOCYTES NFR BLD AUTO: 0.2 % (ref 0–0.5)
LYMPHOCYTES # BLD AUTO: 1.8 K/UL (ref 1–4.8)
LYMPHOCYTES NFR BLD: 32.4 % (ref 18–48)
MCH RBC QN AUTO: 28.4 PG (ref 27–31)
MCHC RBC AUTO-ENTMCNC: 31.2 G/DL (ref 32–36)
MCV RBC AUTO: 91 FL (ref 82–98)
MONOCYTES # BLD AUTO: 0.4 K/UL (ref 0.3–1)
MONOCYTES NFR BLD: 7.4 % (ref 4–15)
NEUTROPHILS # BLD AUTO: 3.3 K/UL (ref 1.8–7.7)
NEUTROPHILS NFR BLD: 59.1 % (ref 38–73)
NRBC BLD-RTO: 0 /100 WBC
PLATELET # BLD AUTO: 246 K/UL (ref 150–350)
PMV BLD AUTO: 10.1 FL (ref 9.2–12.9)
POTASSIUM SERPL-SCNC: 4.3 MMOL/L (ref 3.5–5.1)
PROT SERPL-MCNC: 7.4 G/DL (ref 6–8.4)
RBC # BLD AUTO: 4.61 M/UL (ref 4–5.4)
SODIUM SERPL-SCNC: 142 MMOL/L (ref 136–145)
WBC # BLD AUTO: 5.52 K/UL (ref 3.9–12.7)

## 2020-01-03 PROCEDURE — 99999 PR PBB SHADOW E&M-EST. PATIENT-LVL III: ICD-10-PCS | Mod: PBBFAC,HCNC,, | Performed by: INTERNAL MEDICINE

## 2020-01-03 PROCEDURE — 3077F PR MOST RECENT SYSTOLIC BLOOD PRESSURE >= 140 MM HG: ICD-10-PCS | Mod: HCNC,CPTII,S$GLB, | Performed by: INTERNAL MEDICINE

## 2020-01-03 PROCEDURE — 1159F MED LIST DOCD IN RCRD: CPT | Mod: HCNC,S$GLB,, | Performed by: INTERNAL MEDICINE

## 2020-01-03 PROCEDURE — 1159F PR MEDICATION LIST DOCUMENTED IN MEDICAL RECORD: ICD-10-PCS | Mod: HCNC,S$GLB,, | Performed by: INTERNAL MEDICINE

## 2020-01-03 PROCEDURE — 99999 PR PBB SHADOW E&M-EST. PATIENT-LVL III: CPT | Mod: PBBFAC,HCNC,, | Performed by: INTERNAL MEDICINE

## 2020-01-03 PROCEDURE — 96372 THER/PROPH/DIAG INJ SC/IM: CPT | Mod: HCNC

## 2020-01-03 PROCEDURE — 85025 COMPLETE CBC W/AUTO DIFF WBC: CPT | Mod: HCNC

## 2020-01-03 PROCEDURE — 86316 IMMUNOASSAY TUMOR OTHER: CPT | Mod: HCNC

## 2020-01-03 PROCEDURE — 63600175 PHARM REV CODE 636 W HCPCS: Mod: JG,HCNC | Performed by: INTERNAL MEDICINE

## 2020-01-03 PROCEDURE — 80053 COMPREHEN METABOLIC PANEL: CPT | Mod: HCNC

## 2020-01-03 PROCEDURE — 1126F AMNT PAIN NOTED NONE PRSNT: CPT | Mod: HCNC,S$GLB,, | Performed by: INTERNAL MEDICINE

## 2020-01-03 PROCEDURE — 3077F SYST BP >= 140 MM HG: CPT | Mod: HCNC,CPTII,S$GLB, | Performed by: INTERNAL MEDICINE

## 2020-01-03 PROCEDURE — 1126F PR PAIN SEVERITY QUANTIFIED, NO PAIN PRESENT: ICD-10-PCS | Mod: HCNC,S$GLB,, | Performed by: INTERNAL MEDICINE

## 2020-01-03 PROCEDURE — 99214 PR OFFICE/OUTPT VISIT, EST, LEVL IV, 30-39 MIN: ICD-10-PCS | Mod: HCNC,S$GLB,, | Performed by: INTERNAL MEDICINE

## 2020-01-03 PROCEDURE — 1101F PT FALLS ASSESS-DOCD LE1/YR: CPT | Mod: HCNC,CPTII,S$GLB, | Performed by: INTERNAL MEDICINE

## 2020-01-03 PROCEDURE — 1101F PR PT FALLS ASSESS DOC 0-1 FALLS W/OUT INJ PAST YR: ICD-10-PCS | Mod: HCNC,CPTII,S$GLB, | Performed by: INTERNAL MEDICINE

## 2020-01-03 PROCEDURE — 3078F DIAST BP <80 MM HG: CPT | Mod: HCNC,CPTII,S$GLB, | Performed by: INTERNAL MEDICINE

## 2020-01-03 PROCEDURE — 36415 COLL VENOUS BLD VENIPUNCTURE: CPT | Mod: HCNC

## 2020-01-03 PROCEDURE — 3078F PR MOST RECENT DIASTOLIC BLOOD PRESSURE < 80 MM HG: ICD-10-PCS | Mod: HCNC,CPTII,S$GLB, | Performed by: INTERNAL MEDICINE

## 2020-01-03 PROCEDURE — 99214 OFFICE O/P EST MOD 30 MIN: CPT | Mod: HCNC,S$GLB,, | Performed by: INTERNAL MEDICINE

## 2020-01-03 RX ORDER — LANREOTIDE ACETATE 120 MG/.5ML
120 INJECTION SUBCUTANEOUS
Status: DISCONTINUED | OUTPATIENT
Start: 2020-01-03 | End: 2020-01-03 | Stop reason: HOSPADM

## 2020-01-03 RX ORDER — LANREOTIDE ACETATE 120 MG/.5ML
120 INJECTION SUBCUTANEOUS
Status: CANCELLED | OUTPATIENT
Start: 2020-01-03

## 2020-01-03 RX ADMIN — LANREOTIDE ACETATE 120 MG: 120 INJECTION SUBCUTANEOUS at 11:01

## 2020-01-03 NOTE — PROGRESS NOTES
"Subjective:       Patient ID: Shahram Hills is a 79 y.o. female.     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 Ms. Hills is an 79 yo woman who initially saw me on 6/7/18 for further evaluation of neuroendocrine tumor. She initially presented with diarrhea, abdominal discomfort, weight loss. She was evaluated at [facility] and underwent workup below:  US abdomen on 3/14/18 showed numerous bilobar mixed echogenicity and mildly vascular hepatic lesions. Cholelithiasis without e/o acute cholecystitis.   MRI abdomen on 3/30/2018 showed innumerable hepatic metastases. L3 vertebral body metastasis with soft tissue component along the right margin of the L3 and upper L4 vertebral bodies, filling the right L3/4 neural foramen, and extending into the anterior aspect of the spinal canal on the right at the L3 and upper T4 levels. Associated with mild spinal canal narrowing.  CT chest on 4/6/2018 showed one lucent nodule measuring 7 mm in the T7 vertebral body, most likely representing metastatic disease.    EGD on 5/4/18 showed normal duodenum. Schatzki's ring in the lower third of the esophagus. Grade 1 esophagitis in the GE junction c/w mild distal esophagitis. Congestion and erythema in the antrum, pre-pyloric region and stomach body c/w gastritis. Biopsy of the stomach antrum showed mild chronic gastritis, neg for H. pylori.   Labs on 5/9/2018: WBC 6.9, H/H 11.6/34.3, MCV 87.5, plt count 279. On 3/9/18: Vit B12 level 173, folic acid 6.6  She was started on oral vit B12 and underwent a liver biopsy on 5/18/18. Pathology showed "metastatic well differentiated neuroendocrine tumor. Ki67 3%"     She saw me on 6/7/18 and complained of weight loss of 26 lbs over the past 3-4 months, also has diarrhea. No flushing, wheezing, palpitations. Has been having pain in right flank radiating down " "the right leg. No tingling/numbness, weakness. She can hold her bladder but when she urinates her diarrhea would come out as well. Started on dex 4 mg q6h the evening of 18.      2. MRI spine on 18 showed "Marrow replacing metastatic lesion of the L3 vertebral body with associated extra osseous expansion and complete effacement of the right L3-L4 neural foramina and additional effacement of the right lateral recess.  There is additional lateral extension and abutment of the right psoas muscle.  Superimposed degenerative change at this level results in mild spinal canal stenosis." I sent the patient to ED on 18 where she was evaluated by neurosurgery. Neurosurgery did not feel she was a candidate for surgical intervention.      3. Seen by Dr. Adames on 18. palliative radiation to the area of the L3 metastasis 18-18   4. 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  There is an adjacent metastatic lymph node, diffuse liver metastases, and multiple bone metastases. Index primary neoplasm SUV max 33.13. Adjacent lymph node SUV max 23. L3 bone metastasis SUV max 36.86. Left lobe SUV max 46.13   5. Lanreotide started on 18  6. TACE to the right hepatic lobe on 19. TACE to the left hepatic lobe on 3/21/19.      History of Present Illness:   Mr. Hills returns for follow up. Has been feeling well. No complaints. No SOB, diaphoresis, diarrhea.      ECO     ROS:   See HPI. Otherwise negative.      Physical Examination:   Vital signs reviewed.   Gen: well hydrated, well developed, in no acute distress.  HEENT: normocephalic, anicteric, PERRLA, EOMI, oropharynx clear  Neck: supple, no JVD, thyromegaly, cervical or supraclavicular LAD  Lungs: CTAB, no wheezes or rales  Heart: RRR, no M/R/G  Abdomen: soft, no tenderness, non-distended,  liver palpated 5.0 cm below the right costal margin, no splenomegaly, no mass, or hernia.   Ext: no cyanosis, " edema, deformity  Neuro: alert and oriented x 4, no focal neuro deficit  Skin: no rash, open wounds or ulcers  Psych: pleasant and appropriate mood and affect     Objective:      Diagnostic Tests:  CT chest 11/22/2019:  Unchanged, subcentimeter pulmonary nodules.  No new or enlarging pulmonary nodule to suggest interval development of metastatic disease.    Multiple hepatic lesions better evaluated on the MRI of the same date consistent with metastatic disease.     MRI abdomen/pelvis 11/22/2019:    1. Innumerable enhancing hepatic lesions consistent with metastatic disease, grossly unchanged in comparison with prior.  2. Unchanged primary neuroendocrine tumor of the distal ileum with adjacent mesenteric lymphadenopathy.  3. Unchanged enhancing osseous metastasis within the L3 vertebral body.  4. Colonic diverticulosis without acute diverticulitis.  RECIST SUMMARY:    Date of prior examination for comparison: 09/06/2019    Lesion 1: Hepatic segment 4.  5.3 cm. Series 18 image 44.  Prior measurement 5.3 cm.     Laboratory Data:  Labs reviewed. CBC normal, bili 1.4 stable     Assessment/Plan:      1. Malignant carcinoid tumor of ileum    2. 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    4. Type 2 diabetes mellitus without complication, without long-term current use of insulin    5. Essential hypertension        1-3  - doing well. On lanreotide. S/p TACE  - c/w lanreotide  - last CT/MRI showed stable disease. Restaging scan in March  - C in one month for continued lanreotide     4.   - BS good  - c/w current meds    5.  - BP good  - c/w current meds

## 2020-01-05 LAB — CGA SERPL-MCNC: 910 NG/ML (ref 0–160)

## 2020-01-08 ENCOUNTER — PES CALL (OUTPATIENT)
Dept: ADMINISTRATIVE | Facility: CLINIC | Age: 80
End: 2020-01-08

## 2020-01-31 ENCOUNTER — INFUSION (OUTPATIENT)
Dept: INFUSION THERAPY | Facility: OTHER | Age: 80
End: 2020-01-31
Attending: INTERNAL MEDICINE
Payer: MEDICARE

## 2020-01-31 ENCOUNTER — OFFICE VISIT (OUTPATIENT)
Dept: HEMATOLOGY/ONCOLOGY | Facility: CLINIC | Age: 80
End: 2020-01-31
Payer: MEDICARE

## 2020-01-31 VITALS
SYSTOLIC BLOOD PRESSURE: 125 MMHG | WEIGHT: 134.69 LBS | BODY MASS INDEX: 22.99 KG/M2 | TEMPERATURE: 98 F | OXYGEN SATURATION: 99 % | RESPIRATION RATE: 17 BRPM | HEART RATE: 82 BPM | DIASTOLIC BLOOD PRESSURE: 69 MMHG | HEIGHT: 64 IN

## 2020-01-31 DIAGNOSIS — C7B.8 METASTATIC MALIGNANT NEUROENDOCRINE TUMOR TO LIVER: ICD-10-CM

## 2020-01-31 DIAGNOSIS — C7A.012 MALIGNANT CARCINOID TUMOR OF ILEUM: Primary | ICD-10-CM

## 2020-01-31 DIAGNOSIS — I10 ESSENTIAL HYPERTENSION: ICD-10-CM

## 2020-01-31 DIAGNOSIS — C7B.8 METASTATIC MALIGNANT NEUROENDOCRINE TUMOR TO LIVER: Primary | ICD-10-CM

## 2020-01-31 DIAGNOSIS — C79.51 SPINE METASTASIS: ICD-10-CM

## 2020-01-31 DIAGNOSIS — E11.9 TYPE 2 DIABETES MELLITUS WITHOUT COMPLICATION, WITHOUT LONG-TERM CURRENT USE OF INSULIN: ICD-10-CM

## 2020-01-31 PROCEDURE — 1126F PR PAIN SEVERITY QUANTIFIED, NO PAIN PRESENT: ICD-10-PCS | Mod: HCNC,S$GLB,, | Performed by: INTERNAL MEDICINE

## 2020-01-31 PROCEDURE — 96372 THER/PROPH/DIAG INJ SC/IM: CPT | Mod: HCNC

## 2020-01-31 PROCEDURE — 99999 PR PBB SHADOW E&M-EST. PATIENT-LVL III: CPT | Mod: PBBFAC,HCNC,, | Performed by: INTERNAL MEDICINE

## 2020-01-31 PROCEDURE — 3078F DIAST BP <80 MM HG: CPT | Mod: HCNC,CPTII,S$GLB, | Performed by: INTERNAL MEDICINE

## 2020-01-31 PROCEDURE — 3074F PR MOST RECENT SYSTOLIC BLOOD PRESSURE < 130 MM HG: ICD-10-PCS | Mod: HCNC,CPTII,S$GLB, | Performed by: INTERNAL MEDICINE

## 2020-01-31 PROCEDURE — 99215 OFFICE O/P EST HI 40 MIN: CPT | Mod: HCNC,S$GLB,, | Performed by: INTERNAL MEDICINE

## 2020-01-31 PROCEDURE — 1159F PR MEDICATION LIST DOCUMENTED IN MEDICAL RECORD: ICD-10-PCS | Mod: HCNC,S$GLB,, | Performed by: INTERNAL MEDICINE

## 2020-01-31 PROCEDURE — 99215 PR OFFICE/OUTPT VISIT, EST, LEVL V, 40-54 MIN: ICD-10-PCS | Mod: HCNC,S$GLB,, | Performed by: INTERNAL MEDICINE

## 2020-01-31 PROCEDURE — 3078F PR MOST RECENT DIASTOLIC BLOOD PRESSURE < 80 MM HG: ICD-10-PCS | Mod: HCNC,CPTII,S$GLB, | Performed by: INTERNAL MEDICINE

## 2020-01-31 PROCEDURE — 1159F MED LIST DOCD IN RCRD: CPT | Mod: HCNC,S$GLB,, | Performed by: INTERNAL MEDICINE

## 2020-01-31 PROCEDURE — 63600175 PHARM REV CODE 636 W HCPCS: Mod: JG,HCNC | Performed by: INTERNAL MEDICINE

## 2020-01-31 PROCEDURE — 3074F SYST BP LT 130 MM HG: CPT | Mod: HCNC,CPTII,S$GLB, | Performed by: INTERNAL MEDICINE

## 2020-01-31 PROCEDURE — 1101F PT FALLS ASSESS-DOCD LE1/YR: CPT | Mod: HCNC,CPTII,S$GLB, | Performed by: INTERNAL MEDICINE

## 2020-01-31 PROCEDURE — 1126F AMNT PAIN NOTED NONE PRSNT: CPT | Mod: HCNC,S$GLB,, | Performed by: INTERNAL MEDICINE

## 2020-01-31 PROCEDURE — 1101F PR PT FALLS ASSESS DOC 0-1 FALLS W/OUT INJ PAST YR: ICD-10-PCS | Mod: HCNC,CPTII,S$GLB, | Performed by: INTERNAL MEDICINE

## 2020-01-31 PROCEDURE — 99999 PR PBB SHADOW E&M-EST. PATIENT-LVL III: ICD-10-PCS | Mod: PBBFAC,HCNC,, | Performed by: INTERNAL MEDICINE

## 2020-01-31 RX ORDER — LANREOTIDE ACETATE 120 MG/.5ML
120 INJECTION SUBCUTANEOUS
Status: DISCONTINUED | OUTPATIENT
Start: 2020-01-31 | End: 2020-01-31 | Stop reason: HOSPADM

## 2020-01-31 RX ORDER — LANREOTIDE ACETATE 120 MG/.5ML
120 INJECTION SUBCUTANEOUS
Status: CANCELLED | OUTPATIENT
Start: 2020-01-31

## 2020-01-31 RX ADMIN — LANREOTIDE ACETATE 120 MG: 120 INJECTION SUBCUTANEOUS at 09:01

## 2020-01-31 NOTE — PROGRESS NOTES
"Subjective:       Patient ID: Sahhram Hills is a 79 y.o. female.     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 Ms. Hills is an 77 yo woman who initially saw me on 6/7/18 for further evaluation of neuroendocrine tumor. She initially presented with diarrhea, abdominal discomfort, weight loss. She was evaluated at [facility] and underwent workup below:  US abdomen on 3/14/18 showed numerous bilobar mixed echogenicity and mildly vascular hepatic lesions. Cholelithiasis without e/o acute cholecystitis.   MRI abdomen on 3/30/2018 showed innumerable hepatic metastases. L3 vertebral body metastasis with soft tissue component along the right margin of the L3 and upper L4 vertebral bodies, filling the right L3/4 neural foramen, and extending into the anterior aspect of the spinal canal on the right at the L3 and upper T4 levels. Associated with mild spinal canal narrowing.  CT chest on 4/6/2018 showed one lucent nodule measuring 7 mm in the T7 vertebral body, most likely representing metastatic disease.    EGD on 5/4/18 showed normal duodenum. Schatzki's ring in the lower third of the esophagus. Grade 1 esophagitis in the GE junction c/w mild distal esophagitis. Congestion and erythema in the antrum, pre-pyloric region and stomach body c/w gastritis. Biopsy of the stomach antrum showed mild chronic gastritis, neg for H. pylori.   Labs on 5/9/2018: WBC 6.9, H/H 11.6/34.3, MCV 87.5, plt count 279. On 3/9/18: Vit B12 level 173, folic acid 6.6  She was started on oral vit B12 and underwent a liver biopsy on 5/18/18. Pathology showed "metastatic well differentiated neuroendocrine tumor. Ki67 3%"     She saw me on 6/7/18 and complained of weight loss of 26 lbs over the past 3-4 months, also has diarrhea. No flushing, wheezing, palpitations. Has been having pain in right flank radiating down " "the right leg. No tingling/numbness, weakness. She can hold her bladder but when she urinates her diarrhea would come out as well. Started on dex 4 mg q6h the evening of 18.      2. MRI spine on 18 showed "Marrow replacing metastatic lesion of the L3 vertebral body with associated extra osseous expansion and complete effacement of the right L3-L4 neural foramina and additional effacement of the right lateral recess.  There is additional lateral extension and abutment of the right psoas muscle.  Superimposed degenerative change at this level results in mild spinal canal stenosis." I sent the patient to ED on 18 where she was evaluated by neurosurgery. Neurosurgery did not feel she was a candidate for surgical intervention.      3. Seen by Dr. Adames on 18. palliative radiation to the area of the L3 metastasis 18-18   4. 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  There is an adjacent metastatic lymph node, diffuse liver metastases, and multiple bone metastases. Index primary neoplasm SUV max 33.13. Adjacent lymph node SUV max 23. L3 bone metastasis SUV max 36.86. Left lobe SUV max 46.13   5. Lanreotide started on 18  6. TACE to the right hepatic lobe on 19. TACE to the left hepatic lobe on 3/21/19.      History of Present Illness:   Mr. Hills returns for follow up. Has been feeling well. No complaints. No SOB, diaphoresis, diarrhea.      ECO     ROS:   See HPI. Otherwise negative.      Physical Examination:   Vital signs reviewed.   Gen: well hydrated, well developed, in no acute distress.  HEENT: normocephalic, anicteric, PERRLA, EOMI, oropharynx clear  Neck: supple, no JVD, thyromegaly, cervical or supraclavicular LAD  Lungs: CTAB, no wheezes or rales  Heart: RRR, no M/R/G  Abdomen: soft, no tenderness, non-distended,  liver palpated 5.0 cm below the right costal margin, no splenomegaly, no mass, or hernia.   Ext: no cyanosis, " edema, deformity  Neuro: alert and oriented x 4, no focal neuro deficit  Skin: no rash, open wounds or ulcers  Psych: pleasant and appropriate mood and affect     Objective:      Diagnostic Tests:  CT chest 11/22/2019:  Unchanged, subcentimeter pulmonary nodules.  No new or enlarging pulmonary nodule to suggest interval development of metastatic disease.    Multiple hepatic lesions better evaluated on the MRI of the same date consistent with metastatic disease.     MRI abdomen/pelvis 11/22/2019:    1. Innumerable enhancing hepatic lesions consistent with metastatic disease, grossly unchanged in comparison with prior.  2. Unchanged primary neuroendocrine tumor of the distal ileum with adjacent mesenteric lymphadenopathy.  3. Unchanged enhancing osseous metastasis within the L3 vertebral body.  4. Colonic diverticulosis without acute diverticulitis.  RECIST SUMMARY:    Date of prior examination for comparison: 09/06/2019    Lesion 1: Hepatic segment 4.  5.3 cm. Series 18 image 44.  Prior measurement 5.3 cm.     Laboratory Data:  Labs reviewed. CBC normal. Bili 1.6. Chromogranin A pending     Assessment and Plan:      1. Malignant carcinoid tumor of ileum    2. 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 Spine metastasis    4. Type 2 diabetes mellitus without complication, without long-term current use of insulin    5. Essential hypertension        1-3  - doing well. On lanreotide. S/p TACE  - c/w lanreotide  - last CT/MRI showed stable disease. Restaging scan in one month on return  - RTC in one month for continued lanreotide     4.   - BS good  - c/w current meds     5.  - BP good  - c/w current meds

## 2020-01-31 NOTE — Clinical Note
RTC on 2/28, see me, then get lanreotide. Get CBC, CMP, chromogranin A, CT chest, MRI abdomen/pelvis 2-3 days prior to return

## 2020-01-31 NOTE — PLAN OF CARE
Lanreotide administered no reaction. Patient tolerated well. Patient accompanied by family friend for discharge home. No apparent distress noted. Discharge instructions given to patient. Patient understands instructions. Follow up appointment scheduled.

## 2020-02-20 ENCOUNTER — IMMUNIZATION (OUTPATIENT)
Dept: PHARMACY | Facility: CLINIC | Age: 80
End: 2020-02-20
Payer: MEDICARE

## 2020-02-26 ENCOUNTER — HOSPITAL ENCOUNTER (OUTPATIENT)
Dept: RADIOLOGY | Facility: OTHER | Age: 80
Discharge: HOME | End: 2020-02-26
Attending: INTERNAL MEDICINE
Payer: MEDICARE

## 2020-02-26 DIAGNOSIS — C7B.8 METASTATIC MALIGNANT NEUROENDOCRINE TUMOR TO LIVER: ICD-10-CM

## 2020-02-26 PROCEDURE — 72197 MRI ABDOMEN-PELVIS W W/O CONTRAST (XPD): ICD-10-PCS | Mod: 26,HCNC,, | Performed by: RADIOLOGY

## 2020-02-26 PROCEDURE — 71250 CT THORAX DX C-: CPT | Mod: TC,HCNC

## 2020-02-26 PROCEDURE — 72197 MRI PELVIS W/O & W/DYE: CPT | Mod: 26,HCNC,, | Performed by: RADIOLOGY

## 2020-02-26 PROCEDURE — 71250 CT CHEST WITHOUT CONTRAST: ICD-10-PCS | Mod: 26,HCNC,, | Performed by: RADIOLOGY

## 2020-02-26 PROCEDURE — 72197 MRI PELVIS W/O & W/DYE: CPT | Mod: TC,HCNC

## 2020-02-26 PROCEDURE — 25500020 PHARM REV CODE 255: Mod: HCNC | Performed by: INTERNAL MEDICINE

## 2020-02-26 PROCEDURE — A9585 GADOBUTROL INJECTION: HCPCS | Mod: HCNC | Performed by: INTERNAL MEDICINE

## 2020-02-26 PROCEDURE — 71250 CT THORAX DX C-: CPT | Mod: 26,HCNC,, | Performed by: RADIOLOGY

## 2020-02-26 PROCEDURE — 74183 MRI ABDOMEN-PELVIS W W/O CONTRAST (XPD): ICD-10-PCS | Mod: 26,HCNC,, | Performed by: RADIOLOGY

## 2020-02-26 PROCEDURE — 74183 MRI ABD W/O CNTR FLWD CNTR: CPT | Mod: 26,HCNC,, | Performed by: RADIOLOGY

## 2020-02-26 RX ORDER — GADOBUTROL 604.72 MG/ML
6 INJECTION INTRAVENOUS
Status: COMPLETED | OUTPATIENT
Start: 2020-02-26 | End: 2020-02-26

## 2020-02-26 RX ADMIN — GADOBUTROL 6 ML: 604.72 INJECTION INTRAVENOUS at 10:02

## 2020-02-28 ENCOUNTER — INFUSION (OUTPATIENT)
Dept: INFUSION THERAPY | Facility: OTHER | Age: 80
End: 2020-02-28
Attending: INTERNAL MEDICINE
Payer: MEDICARE

## 2020-02-28 ENCOUNTER — OFFICE VISIT (OUTPATIENT)
Dept: HEMATOLOGY/ONCOLOGY | Facility: CLINIC | Age: 80
End: 2020-02-28
Payer: MEDICARE

## 2020-02-28 VITALS
TEMPERATURE: 98 F | WEIGHT: 133.38 LBS | OXYGEN SATURATION: 99 % | HEIGHT: 64 IN | SYSTOLIC BLOOD PRESSURE: 132 MMHG | DIASTOLIC BLOOD PRESSURE: 71 MMHG | HEART RATE: 82 BPM | BODY MASS INDEX: 22.77 KG/M2 | RESPIRATION RATE: 18 BRPM

## 2020-02-28 DIAGNOSIS — C7B.8 NEUROENDOCRINE CARCINOMA METASTATIC TO BONE: ICD-10-CM

## 2020-02-28 DIAGNOSIS — C7A.012 MALIGNANT CARCINOID TUMOR OF ILEUM: Primary | ICD-10-CM

## 2020-02-28 DIAGNOSIS — C7A.8 NEUROENDOCRINE CARCINOMA METASTATIC TO BONE: ICD-10-CM

## 2020-02-28 DIAGNOSIS — C7B.8 METASTATIC MALIGNANT NEUROENDOCRINE TUMOR TO LIVER: Primary | ICD-10-CM

## 2020-02-28 DIAGNOSIS — I10 ESSENTIAL HYPERTENSION: ICD-10-CM

## 2020-02-28 DIAGNOSIS — E11.9 TYPE 2 DIABETES MELLITUS WITHOUT COMPLICATION, WITHOUT LONG-TERM CURRENT USE OF INSULIN: ICD-10-CM

## 2020-02-28 DIAGNOSIS — C7B.8 METASTATIC MALIGNANT NEUROENDOCRINE TUMOR TO LIVER: ICD-10-CM

## 2020-02-28 DIAGNOSIS — C79.51 SPINE METASTASIS: ICD-10-CM

## 2020-02-28 PROCEDURE — 1159F PR MEDICATION LIST DOCUMENTED IN MEDICAL RECORD: ICD-10-PCS | Mod: HCNC,S$GLB,, | Performed by: INTERNAL MEDICINE

## 2020-02-28 PROCEDURE — 96372 THER/PROPH/DIAG INJ SC/IM: CPT | Mod: HCNC

## 2020-02-28 PROCEDURE — 3078F DIAST BP <80 MM HG: CPT | Mod: HCNC,CPTII,S$GLB, | Performed by: INTERNAL MEDICINE

## 2020-02-28 PROCEDURE — 99999 PR PBB SHADOW E&M-EST. PATIENT-LVL III: ICD-10-PCS | Mod: PBBFAC,HCNC,, | Performed by: INTERNAL MEDICINE

## 2020-02-28 PROCEDURE — 1101F PT FALLS ASSESS-DOCD LE1/YR: CPT | Mod: HCNC,CPTII,S$GLB, | Performed by: INTERNAL MEDICINE

## 2020-02-28 PROCEDURE — 3075F PR MOST RECENT SYSTOLIC BLOOD PRESS GE 130-139MM HG: ICD-10-PCS | Mod: HCNC,CPTII,S$GLB, | Performed by: INTERNAL MEDICINE

## 2020-02-28 PROCEDURE — 3075F SYST BP GE 130 - 139MM HG: CPT | Mod: HCNC,CPTII,S$GLB, | Performed by: INTERNAL MEDICINE

## 2020-02-28 PROCEDURE — 3078F PR MOST RECENT DIASTOLIC BLOOD PRESSURE < 80 MM HG: ICD-10-PCS | Mod: HCNC,CPTII,S$GLB, | Performed by: INTERNAL MEDICINE

## 2020-02-28 PROCEDURE — 1126F AMNT PAIN NOTED NONE PRSNT: CPT | Mod: HCNC,S$GLB,, | Performed by: INTERNAL MEDICINE

## 2020-02-28 PROCEDURE — 99999 PR PBB SHADOW E&M-EST. PATIENT-LVL III: CPT | Mod: PBBFAC,HCNC,, | Performed by: INTERNAL MEDICINE

## 2020-02-28 PROCEDURE — 99215 OFFICE O/P EST HI 40 MIN: CPT | Mod: HCNC,S$GLB,, | Performed by: INTERNAL MEDICINE

## 2020-02-28 PROCEDURE — 99215 PR OFFICE/OUTPT VISIT, EST, LEVL V, 40-54 MIN: ICD-10-PCS | Mod: HCNC,S$GLB,, | Performed by: INTERNAL MEDICINE

## 2020-02-28 PROCEDURE — 1126F PR PAIN SEVERITY QUANTIFIED, NO PAIN PRESENT: ICD-10-PCS | Mod: HCNC,S$GLB,, | Performed by: INTERNAL MEDICINE

## 2020-02-28 PROCEDURE — 63600175 PHARM REV CODE 636 W HCPCS: Mod: JG,HCNC | Performed by: INTERNAL MEDICINE

## 2020-02-28 PROCEDURE — 1159F MED LIST DOCD IN RCRD: CPT | Mod: HCNC,S$GLB,, | Performed by: INTERNAL MEDICINE

## 2020-02-28 PROCEDURE — 1101F PR PT FALLS ASSESS DOC 0-1 FALLS W/OUT INJ PAST YR: ICD-10-PCS | Mod: HCNC,CPTII,S$GLB, | Performed by: INTERNAL MEDICINE

## 2020-02-28 RX ORDER — LANREOTIDE ACETATE 120 MG/.5ML
120 INJECTION SUBCUTANEOUS
Status: DISCONTINUED | OUTPATIENT
Start: 2020-02-28 | End: 2020-02-28 | Stop reason: HOSPADM

## 2020-02-28 RX ORDER — LANREOTIDE ACETATE 120 MG/.5ML
120 INJECTION SUBCUTANEOUS
Status: CANCELLED | OUTPATIENT
Start: 2020-02-28

## 2020-02-28 RX ADMIN — LANREOTIDE ACETATE 120 MG: 120 INJECTION SUBCUTANEOUS at 09:02

## 2020-02-28 NOTE — Clinical Note
RTC on 3/27 with CBC, CMP, chromogranin A, see me, then get lanreotide. If I am not here, she can see NICOLAS.

## 2020-02-28 NOTE — PROGRESS NOTES
"Subjective:       Patient ID: Shahram Hills is a 79 y.o. female.     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 Ms. Hills is an 77 yo woman who initially saw me on 6/7/18 for further evaluation of neuroendocrine tumor. She initially presented with diarrhea, abdominal discomfort, weight loss. She was evaluated at [facility] and underwent workup below:  US abdomen on 3/14/18 showed numerous bilobar mixed echogenicity and mildly vascular hepatic lesions. Cholelithiasis without e/o acute cholecystitis.   MRI abdomen on 3/30/2018 showed innumerable hepatic metastases. L3 vertebral body metastasis with soft tissue component along the right margin of the L3 and upper L4 vertebral bodies, filling the right L3/4 neural foramen, and extending into the anterior aspect of the spinal canal on the right at the L3 and upper T4 levels. Associated with mild spinal canal narrowing.  CT chest on 4/6/2018 showed one lucent nodule measuring 7 mm in the T7 vertebral body, most likely representing metastatic disease.    EGD on 5/4/18 showed normal duodenum. Schatzki's ring in the lower third of the esophagus. Grade 1 esophagitis in the GE junction c/w mild distal esophagitis. Congestion and erythema in the antrum, pre-pyloric region and stomach body c/w gastritis. Biopsy of the stomach antrum showed mild chronic gastritis, neg for H. pylori.   Labs on 5/9/2018: WBC 6.9, H/H 11.6/34.3, MCV 87.5, plt count 279. On 3/9/18: Vit B12 level 173, folic acid 6.6  She was started on oral vit B12 and underwent a liver biopsy on 5/18/18. Pathology showed "metastatic well differentiated neuroendocrine tumor. Ki67 3%"     She saw me on 6/7/18 and complained of weight loss of 26 lbs over the past 3-4 months, also has diarrhea. No flushing, wheezing, palpitations. Has been having pain in right flank radiating down " "the right leg. No tingling/numbness, weakness. She can hold her bladder but when she urinates her diarrhea would come out as well. Started on dex 4 mg q6h the evening of 18.      2. MRI spine on 18 showed "Marrow replacing metastatic lesion of the L3 vertebral body with associated extra osseous expansion and complete effacement of the right L3-L4 neural foramina and additional effacement of the right lateral recess.  There is additional lateral extension and abutment of the right psoas muscle.  Superimposed degenerative change at this level results in mild spinal canal stenosis." I sent the patient to ED on 18 where she was evaluated by neurosurgery. Neurosurgery did not feel she was a candidate for surgical intervention.      3. Seen by Dr. Adames on 18. palliative radiation to the area of the L3 metastasis 18-18   4. 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  There is an adjacent metastatic lymph node, diffuse liver metastases, and multiple bone metastases. Index primary neoplasm SUV max 33.13. Adjacent lymph node SUV max 23. L3 bone metastasis SUV max 36.86. Left lobe SUV max 46.13   5. Lanreotide every 4 weeks started on 18  6. TACE to the right hepatic lobe on 19. TACE to the left hepatic lobe on 3/21/19.      History of Present Illness:   Mr. Hills returns for follow up. Has been feeling well. No complaints. No SOB, diaphoresis, diarrhea.      ECO     ROS:   See HPI. Otherwise negative.      Physical Examination:   Vital signs reviewed.   Gen: well hydrated, well developed, in no acute distress.  HEENT: normocephalic, anicteric, PERRLA, EOMI, oropharynx clear  Neck: supple, no JVD, thyromegaly, cervical or supraclavicular LAD  Lungs: CTAB, no wheezes or rales  Heart: RRR, no M/R/G  Abdomen: soft, no tenderness, non-distended,  liver palpated 5.0 cm below the right costal margin, no splenomegaly, no mass, or hernia.   Ext: no " cyanosis, edema, deformity  Neuro: alert and oriented x 4, no focal neuro deficit  Skin: no rash, open wounds or ulcers  Psych: pleasant and appropriate mood and affect     Objective:      Diagnostic Tests:  CT chest 02/26/2020:  No evidence of metastatic disease to the chest.  Subcentimeter pulmonary nodules are stable and likely benign.    MRI abdomen/pelvis 02/26/2020:    Stable exam.    Numerous enhancing hepatic lesions are unchanged in size and number.  The enhancing mass of the distal ileum is again identified and not significantly changed.  The enhancing L3 osseous lesion is not significantly changed.    RECIST SUMMARY:    Date of prior examination for comparison: 11/22/2019    Lesion 1: Hepatic segment 4.  5.2 cm (previously 5.3 cm).       Laboratory Data:  Labs reviewed. CBC normal. Bili 1.4. Chromogranin A pending     Assessment and Plan:      1. Malignant carcinoid tumor of ileum    2. 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 Spine metastasis    5. Type 2 diabetes mellitus without complication, without long-term current use of insulin    6. Essential hypertension        1-4  - doing well. On lanreotide. S/p TACE  - discussed CT and MRI results. Stable disease.   - c/w lanreotide, CBC, CMP, chromogranin A every 4 weeks  - next scan can be done in 4-6 months given stability of disease  - RTC in 4 weeks for follow up     4.   - BS good  - c/w current meds     5.  - BP good  - c/w current meds

## 2020-03-05 ENCOUNTER — TELEPHONE (OUTPATIENT)
Dept: INTERNAL MEDICINE | Facility: CLINIC | Age: 80
End: 2020-03-05

## 2020-03-25 ENCOUNTER — INFUSION (OUTPATIENT)
Dept: INFUSION THERAPY | Facility: OTHER | Age: 80
End: 2020-03-25
Attending: INTERNAL MEDICINE
Payer: MEDICARE

## 2020-03-25 ENCOUNTER — LAB VISIT (OUTPATIENT)
Dept: LAB | Facility: OTHER | Age: 80
End: 2020-03-25
Attending: INTERNAL MEDICINE
Payer: MEDICARE

## 2020-03-25 VITALS
WEIGHT: 133.81 LBS | HEART RATE: 83 BPM | RESPIRATION RATE: 16 BRPM | OXYGEN SATURATION: 98 % | DIASTOLIC BLOOD PRESSURE: 69 MMHG | SYSTOLIC BLOOD PRESSURE: 146 MMHG | BODY MASS INDEX: 22.85 KG/M2 | TEMPERATURE: 97 F | HEIGHT: 64 IN

## 2020-03-25 DIAGNOSIS — C7B.8 METASTATIC MALIGNANT NEUROENDOCRINE TUMOR TO LIVER: Primary | ICD-10-CM

## 2020-03-25 DIAGNOSIS — C7A.012 MALIGNANT CARCINOID TUMOR OF ILEUM: ICD-10-CM

## 2020-03-25 LAB
ALBUMIN SERPL BCP-MCNC: 4.1 G/DL (ref 3.5–5.2)
ALP SERPL-CCNC: 78 U/L (ref 55–135)
ALT SERPL W/O P-5'-P-CCNC: 9 U/L (ref 10–44)
ANION GAP SERPL CALC-SCNC: 10 MMOL/L (ref 8–16)
AST SERPL-CCNC: 17 U/L (ref 10–40)
BASOPHILS # BLD AUTO: 0.02 K/UL (ref 0–0.2)
BASOPHILS NFR BLD: 0.3 % (ref 0–1.9)
BILIRUB SERPL-MCNC: 1.5 MG/DL (ref 0.1–1)
BUN SERPL-MCNC: 11 MG/DL (ref 8–23)
CALCIUM SERPL-MCNC: 10 MG/DL (ref 8.7–10.5)
CHLORIDE SERPL-SCNC: 103 MMOL/L (ref 95–110)
CO2 SERPL-SCNC: 29 MMOL/L (ref 23–29)
CREAT SERPL-MCNC: 0.8 MG/DL (ref 0.5–1.4)
DIFFERENTIAL METHOD: ABNORMAL
EOSINOPHIL # BLD AUTO: 0 K/UL (ref 0–0.5)
EOSINOPHIL NFR BLD: 0.4 % (ref 0–8)
ERYTHROCYTE [DISTWIDTH] IN BLOOD BY AUTOMATED COUNT: 12.4 % (ref 11.5–14.5)
EST. GFR  (AFRICAN AMERICAN): >60 ML/MIN/1.73 M^2
EST. GFR  (NON AFRICAN AMERICAN): >60 ML/MIN/1.73 M^2
GLUCOSE SERPL-MCNC: 154 MG/DL (ref 70–110)
HCT VFR BLD AUTO: 42.3 % (ref 37–48.5)
HGB BLD-MCNC: 13.4 G/DL (ref 12–16)
IMM GRANULOCYTES # BLD AUTO: 0.01 K/UL (ref 0–0.04)
IMM GRANULOCYTES NFR BLD AUTO: 0.1 % (ref 0–0.5)
LYMPHOCYTES # BLD AUTO: 2.1 K/UL (ref 1–4.8)
LYMPHOCYTES NFR BLD: 28.6 % (ref 18–48)
MCH RBC QN AUTO: 28.8 PG (ref 27–31)
MCHC RBC AUTO-ENTMCNC: 31.7 G/DL (ref 32–36)
MCV RBC AUTO: 91 FL (ref 82–98)
MONOCYTES # BLD AUTO: 0.5 K/UL (ref 0.3–1)
MONOCYTES NFR BLD: 6.6 % (ref 4–15)
NEUTROPHILS # BLD AUTO: 4.8 K/UL (ref 1.8–7.7)
NEUTROPHILS NFR BLD: 64 % (ref 38–73)
NRBC BLD-RTO: 0 /100 WBC
PLATELET # BLD AUTO: 257 K/UL (ref 150–350)
PMV BLD AUTO: 10.6 FL (ref 9.2–12.9)
POTASSIUM SERPL-SCNC: 4.1 MMOL/L (ref 3.5–5.1)
PROT SERPL-MCNC: 8 G/DL (ref 6–8.4)
RBC # BLD AUTO: 4.66 M/UL (ref 4–5.4)
SODIUM SERPL-SCNC: 142 MMOL/L (ref 136–145)
WBC # BLD AUTO: 7.42 K/UL (ref 3.9–12.7)

## 2020-03-25 PROCEDURE — 63600175 PHARM REV CODE 636 W HCPCS: Mod: JG,HCNC | Performed by: INTERNAL MEDICINE

## 2020-03-25 PROCEDURE — 96372 THER/PROPH/DIAG INJ SC/IM: CPT | Mod: HCNC

## 2020-03-25 PROCEDURE — 86316 IMMUNOASSAY TUMOR OTHER: CPT | Mod: HCNC

## 2020-03-25 PROCEDURE — 80053 COMPREHEN METABOLIC PANEL: CPT | Mod: HCNC

## 2020-03-25 PROCEDURE — 85025 COMPLETE CBC W/AUTO DIFF WBC: CPT | Mod: HCNC

## 2020-03-25 PROCEDURE — 36415 COLL VENOUS BLD VENIPUNCTURE: CPT | Mod: HCNC

## 2020-03-25 RX ORDER — LANREOTIDE ACETATE 120 MG/.5ML
120 INJECTION SUBCUTANEOUS
Status: DISCONTINUED | OUTPATIENT
Start: 2020-03-25 | End: 2020-03-25 | Stop reason: HOSPADM

## 2020-03-25 RX ORDER — LANREOTIDE ACETATE 120 MG/.5ML
120 INJECTION SUBCUTANEOUS
Status: CANCELLED | OUTPATIENT
Start: 2020-03-25

## 2020-03-25 RX ADMIN — LANREOTIDE ACETATE 120 MG: 120 INJECTION SUBCUTANEOUS at 11:03

## 2020-03-25 NOTE — PLAN OF CARE
Lanreotide injection complete. Pt tolerated well. VSS. NAD. Pt verbalized understanding of discharge instructions before leaving with self.

## 2020-03-28 LAB — CGA SERPL-MCNC: 1297 NG/ML (ref 0–160)

## 2020-04-17 DIAGNOSIS — Z13.9 SCREENING FOR CONDITION: Primary | ICD-10-CM

## 2020-04-17 NOTE — PROGRESS NOTES
Phoned the patient.    Discussed the following with the patient:  In an effort to protect our immunocompromised patients from potential exposure to COVID-19, [facility] will now require all patients receiving an infusion, an injection, and/or radiation therapy to be tested for COVID-19 prior to their appointment.  All patients currently under treatment will be tested immediately and patients initiating new treatment cycles or with one-time appointments (injections, transfusions, etc.) must be tested within 72 hours of their appointment.    Symptom Patient's Response   Fever YES/NO: no   Cough YES/NO: no   Shortness of breath  YES/NO: no   Difficulty breathing YES/NO: no   GI symptoms such as diarrhea or nausea YES/NO: no   Loss of taste YES/NO: no   Loss of smell YES/NO: no   Other Screening Patient's Response   Has the patient previously undergone COVID-19 testing? YES/NO: no;      If yes to the question directly above, what was the result? not applicable   Has the patient been in close contact with someone who has undergone COVID-19 testing? YES/NO: no;      If yes to the question directly above, what was the result? not applicable     The patient's 24-hour COVID-19 test was ordered and scheduled.  Reviewed the appointment date, time, and location with the patient.    Advised the patient that she can expect the results to take approximately 24 hours and that someone will reach out to her regarding her results.  Advised the patient that her treatment appointment will be rescheduled if she tests positive for COVID-19.    Questions were answered to the patient's satisfaction, and the patient verbalized understanding of information and agreement with the plan.     The above was completed in accordance with instructions and guidelines set forth by [facility].

## 2020-04-20 ENCOUNTER — LAB VISIT (OUTPATIENT)
Dept: INTERNAL MEDICINE | Facility: CLINIC | Age: 80
End: 2020-04-20
Payer: MEDICARE

## 2020-04-20 DIAGNOSIS — Z13.9 SCREENING FOR CONDITION: ICD-10-CM

## 2020-04-20 LAB — SARS-COV-2 RNA RESP QL NAA+PROBE: NOT DETECTED

## 2020-04-20 PROCEDURE — U0002 COVID-19 LAB TEST NON-CDC: HCPCS | Mod: HCNC

## 2020-04-20 NOTE — PROGRESS NOTES
To:  JC Carroll-  Please phone this patient with her negative COVID-19 test results following the scripting and protocol we have reviewed.  If my assistance is needed, don't hesitate to reach out.  Thanks!  -Robinson Melo, DNP, APRN, FNP-BC, AOCNP  [facility], 3rd Floor  [facility] [address], [place], [place]  [id number] [phone]

## 2020-04-22 ENCOUNTER — INFUSION (OUTPATIENT)
Dept: INFUSION THERAPY | Facility: OTHER | Age: 80
End: 2020-04-22
Attending: INTERNAL MEDICINE
Payer: MEDICARE

## 2020-04-22 ENCOUNTER — OFFICE VISIT (OUTPATIENT)
Dept: HEMATOLOGY/ONCOLOGY | Facility: CLINIC | Age: 80
End: 2020-04-22
Attending: INTERNAL MEDICINE
Payer: MEDICARE

## 2020-04-22 ENCOUNTER — LAB VISIT (OUTPATIENT)
Dept: LAB | Facility: OTHER | Age: 80
End: 2020-04-22
Attending: INTERNAL MEDICINE
Payer: MEDICARE

## 2020-04-22 VITALS
DIASTOLIC BLOOD PRESSURE: 88 MMHG | RESPIRATION RATE: 16 BRPM | HEART RATE: 84 BPM | WEIGHT: 134.5 LBS | HEIGHT: 64 IN | SYSTOLIC BLOOD PRESSURE: 158 MMHG | BODY MASS INDEX: 22.96 KG/M2 | TEMPERATURE: 98 F | OXYGEN SATURATION: 99 %

## 2020-04-22 DIAGNOSIS — C7B.8 SECONDARY NEUROENDOCRINE TUMOR OF BONE: ICD-10-CM

## 2020-04-22 DIAGNOSIS — C7A.012 MALIGNANT CARCINOID TUMOR OF ILEUM: ICD-10-CM

## 2020-04-22 DIAGNOSIS — C7B.8 METASTATIC MALIGNANT NEUROENDOCRINE TUMOR TO LIVER: Primary | ICD-10-CM

## 2020-04-22 LAB
ALBUMIN SERPL BCP-MCNC: 4.3 G/DL (ref 3.5–5.2)
ALP SERPL-CCNC: 72 U/L (ref 55–135)
ALT SERPL W/O P-5'-P-CCNC: 11 U/L (ref 10–44)
ANION GAP SERPL CALC-SCNC: 11 MMOL/L (ref 8–16)
AST SERPL-CCNC: 18 U/L (ref 10–40)
BASOPHILS # BLD AUTO: 0.02 K/UL (ref 0–0.2)
BASOPHILS NFR BLD: 0.3 % (ref 0–1.9)
BILIRUB SERPL-MCNC: 1.4 MG/DL (ref 0.1–1)
BUN SERPL-MCNC: 10 MG/DL (ref 8–23)
CALCIUM SERPL-MCNC: 10.3 MG/DL (ref 8.7–10.5)
CHLORIDE SERPL-SCNC: 103 MMOL/L (ref 95–110)
CO2 SERPL-SCNC: 30 MMOL/L (ref 23–29)
CREAT SERPL-MCNC: 0.8 MG/DL (ref 0.5–1.4)
DIFFERENTIAL METHOD: ABNORMAL
EOSINOPHIL # BLD AUTO: 0 K/UL (ref 0–0.5)
EOSINOPHIL NFR BLD: 0.3 % (ref 0–8)
ERYTHROCYTE [DISTWIDTH] IN BLOOD BY AUTOMATED COUNT: 12.4 % (ref 11.5–14.5)
EST. GFR  (AFRICAN AMERICAN): >60 ML/MIN/1.73 M^2
EST. GFR  (NON AFRICAN AMERICAN): >60 ML/MIN/1.73 M^2
GLUCOSE SERPL-MCNC: 144 MG/DL (ref 70–110)
HCT VFR BLD AUTO: 43.5 % (ref 37–48.5)
HGB BLD-MCNC: 13.8 G/DL (ref 12–16)
IMM GRANULOCYTES # BLD AUTO: 0.02 K/UL (ref 0–0.04)
IMM GRANULOCYTES NFR BLD AUTO: 0.3 % (ref 0–0.5)
LYMPHOCYTES # BLD AUTO: 2 K/UL (ref 1–4.8)
LYMPHOCYTES NFR BLD: 30.6 % (ref 18–48)
MCH RBC QN AUTO: 28.6 PG (ref 27–31)
MCHC RBC AUTO-ENTMCNC: 31.7 G/DL (ref 32–36)
MCV RBC AUTO: 90 FL (ref 82–98)
MONOCYTES # BLD AUTO: 0.5 K/UL (ref 0.3–1)
MONOCYTES NFR BLD: 7 % (ref 4–15)
NEUTROPHILS # BLD AUTO: 4.1 K/UL (ref 1.8–7.7)
NEUTROPHILS NFR BLD: 61.5 % (ref 38–73)
NRBC BLD-RTO: 0 /100 WBC
PLATELET # BLD AUTO: 267 K/UL (ref 150–350)
PMV BLD AUTO: 9.7 FL (ref 9.2–12.9)
POTASSIUM SERPL-SCNC: 4.9 MMOL/L (ref 3.5–5.1)
PROT SERPL-MCNC: 8.1 G/DL (ref 6–8.4)
RBC # BLD AUTO: 4.82 M/UL (ref 4–5.4)
SODIUM SERPL-SCNC: 144 MMOL/L (ref 136–145)
WBC # BLD AUTO: 6.6 K/UL (ref 3.9–12.7)

## 2020-04-22 PROCEDURE — 99999 PR PBB SHADOW E&M-EST. PATIENT-LVL III: ICD-10-PCS | Mod: PBBFAC,HCNC,, | Performed by: INTERNAL MEDICINE

## 2020-04-22 PROCEDURE — 99214 PR OFFICE/OUTPT VISIT, EST, LEVL IV, 30-39 MIN: ICD-10-PCS | Mod: HCNC,S$GLB,, | Performed by: INTERNAL MEDICINE

## 2020-04-22 PROCEDURE — 1101F PT FALLS ASSESS-DOCD LE1/YR: CPT | Mod: HCNC,CPTII,S$GLB, | Performed by: INTERNAL MEDICINE

## 2020-04-22 PROCEDURE — 1101F PR PT FALLS ASSESS DOC 0-1 FALLS W/OUT INJ PAST YR: ICD-10-PCS | Mod: HCNC,CPTII,S$GLB, | Performed by: INTERNAL MEDICINE

## 2020-04-22 PROCEDURE — 85025 COMPLETE CBC W/AUTO DIFF WBC: CPT | Mod: HCNC

## 2020-04-22 PROCEDURE — 1159F PR MEDICATION LIST DOCUMENTED IN MEDICAL RECORD: ICD-10-PCS | Mod: HCNC,S$GLB,, | Performed by: INTERNAL MEDICINE

## 2020-04-22 PROCEDURE — 96372 THER/PROPH/DIAG INJ SC/IM: CPT | Mod: HCNC

## 2020-04-22 PROCEDURE — 99499 RISK ADDL DX/OHS AUDIT: ICD-10-PCS | Mod: HCNC,S$GLB,, | Performed by: INTERNAL MEDICINE

## 2020-04-22 PROCEDURE — 80053 COMPREHEN METABOLIC PANEL: CPT | Mod: HCNC

## 2020-04-22 PROCEDURE — 3079F PR MOST RECENT DIASTOLIC BLOOD PRESSURE 80-89 MM HG: ICD-10-PCS | Mod: HCNC,CPTII,S$GLB, | Performed by: INTERNAL MEDICINE

## 2020-04-22 PROCEDURE — 99214 OFFICE O/P EST MOD 30 MIN: CPT | Mod: HCNC,S$GLB,, | Performed by: INTERNAL MEDICINE

## 2020-04-22 PROCEDURE — 1159F MED LIST DOCD IN RCRD: CPT | Mod: HCNC,S$GLB,, | Performed by: INTERNAL MEDICINE

## 2020-04-22 PROCEDURE — 3077F SYST BP >= 140 MM HG: CPT | Mod: HCNC,CPTII,S$GLB, | Performed by: INTERNAL MEDICINE

## 2020-04-22 PROCEDURE — 3077F PR MOST RECENT SYSTOLIC BLOOD PRESSURE >= 140 MM HG: ICD-10-PCS | Mod: HCNC,CPTII,S$GLB, | Performed by: INTERNAL MEDICINE

## 2020-04-22 PROCEDURE — 36415 COLL VENOUS BLD VENIPUNCTURE: CPT | Mod: HCNC

## 2020-04-22 PROCEDURE — 1126F PR PAIN SEVERITY QUANTIFIED, NO PAIN PRESENT: ICD-10-PCS | Mod: HCNC,S$GLB,, | Performed by: INTERNAL MEDICINE

## 2020-04-22 PROCEDURE — 99499 UNLISTED E&M SERVICE: CPT | Mod: HCNC,S$GLB,, | Performed by: INTERNAL MEDICINE

## 2020-04-22 PROCEDURE — 99999 PR PBB SHADOW E&M-EST. PATIENT-LVL III: CPT | Mod: PBBFAC,HCNC,, | Performed by: INTERNAL MEDICINE

## 2020-04-22 PROCEDURE — 86316 IMMUNOASSAY TUMOR OTHER: CPT | Mod: HCNC

## 2020-04-22 PROCEDURE — 1126F AMNT PAIN NOTED NONE PRSNT: CPT | Mod: HCNC,S$GLB,, | Performed by: INTERNAL MEDICINE

## 2020-04-22 PROCEDURE — 3079F DIAST BP 80-89 MM HG: CPT | Mod: HCNC,CPTII,S$GLB, | Performed by: INTERNAL MEDICINE

## 2020-04-22 PROCEDURE — 63600175 PHARM REV CODE 636 W HCPCS: Mod: JG,HCNC | Performed by: INTERNAL MEDICINE

## 2020-04-22 RX ORDER — LANREOTIDE ACETATE 120 MG/.5ML
120 INJECTION SUBCUTANEOUS
Status: CANCELLED | OUTPATIENT
Start: 2020-04-22

## 2020-04-22 RX ORDER — LANREOTIDE ACETATE 120 MG/.5ML
120 INJECTION SUBCUTANEOUS
Status: DISCONTINUED | OUTPATIENT
Start: 2020-04-22 | End: 2020-04-22 | Stop reason: HOSPADM

## 2020-04-22 RX ADMIN — LANREOTIDE ACETATE 120 MG: 120 INJECTION SUBCUTANEOUS at 10:04

## 2020-04-22 NOTE — PROGRESS NOTES
Subjective:       Patient ID: Shahram Hills is a 79 y.o. female.    Chief Complaint: No chief complaint on file.    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 Ms. Hills is an 78 yo woman who initially saw Dr. Granados on 6/7/18 for further evaluation of neuroendocrine tumor. She initially presented with diarrhea, abdominal discomfort, weight loss. She was evaluated at [facility] and underwent workup below:  US abdomen on 3/14/18 showed numerous bilobar mixed echogenicity and mildly vascular hepatic lesions. Cholelithiasis without e/o acute cholecystitis.   MRI abdomen on 3/30/2018 showed innumerable hepatic metastases. L3 vertebral body metastasis with soft tissue component along the right margin of the L3 and upper L4 vertebral bodies, filling the right L3/4 neural foramen, and extending into the anterior aspect of the spinal canal on the right at the L3 and upper T4 levels. Associated with mild spinal canal narrowing.  CT chest on 4/6/2018 showed one lucent nodule measuring 7 mm in the T7 vertebral body, most likely representing metastatic disease.    EGD on 5/4/18 showed normal duodenum. Schatzki's ring in the lower third of the esophagus. Grade 1 esophagitis in the GE junction c/w mild distal esophagitis. Congestion and erythema in the antrum, pre-pyloric region and stomach body c/w gastritis. Biopsy of the stomach antrum showed mild chronic gastritis, neg for H. pylori.   Labs on 5/9/2018: WBC 6.9, H/H 11.6/34.3, MCV 87.5, plt count 279. On 3/9/18: Vit B12  "level 173, folic acid 6.6  She was started on oral vit B12 and underwent a liver biopsy on 5/18/18. Pathology showed "metastatic well differentiated neuroendocrine tumor. Ki67 3%"     She saw Dr. Granados on 6/7/18 and complained of weight loss of 26 lbs over the past 3-4 months, also has diarrhea. No flushing, wheezing, palpitations. Has been having pain in right flank radiating down the right leg. No tingling/numbness, weakness. She can hold her bladder but when she urinates her diarrhea would come out as well. Started on dex 4 mg q6h the evening of 6/7/18.      2. MRI spine on 6/8/18 showed "Marrow replacing metastatic lesion of the L3 vertebral body with associated extra osseous expansion and complete effacement of the right L3-L4 neural foramina and additional effacement of the right lateral recess.  There is additional lateral extension and abutment of the right psoas muscle.  Superimposed degenerative change at this level results in mild spinal canal stenosis." I sent the patient to ED on 6/9/18 where she was evaluated by neurosurgery. Neurosurgery did not feel she was a candidate for surgical intervention.      3. Seen by Dr. Adames on 6/11/18. palliative radiation to the area of the L3 metastasis 6/18/18-6/29/18   4. Gallium study on 6/13/18: Distal ileal primary neoplasm consistent with a carcinoid.  There is an adjacent metastatic lymph node, diffuse liver metastases, and multiple bone metastases. Index primary neoplasm SUV max 33.13. Adjacent lymph node SUV max 23. L3 bone metastasis SUV max 36.86. Left lobe SUV max 46.13     Review of Systems   Constitutional: Negative for activity change, appetite change, chills, fatigue, fever and unexpected weight change.   HENT: Negative for congestion, mouth sores, postnasal drip, rhinorrhea, sore throat and trouble swallowing.    Gastrointestinal: Negative for abdominal distention, abdominal pain, blood in stool, constipation, diarrhea, nausea and vomiting. "   Genitourinary: Negative for decreased urine volume, difficulty urinating, dysuria, frequency and urgency.   Musculoskeletal: Negative for arthralgias and back pain.   Skin: Negative for color change, pallor and rash.   Neurological: Negative for dizziness, weakness, numbness and headaches.   Hematological: Does not bruise/bleed easily.   Psychiatric/Behavioral: Negative for dysphoric mood and sleep disturbance. The patient is not nervous/anxious.        Objective:      Physical Exam   Constitutional: She is oriented to person, place, and time. She appears well-developed and well-nourished. No distress.   Very pleasant  Presents alone  ECOG=0   HENT:   Head: Normocephalic and atraumatic.   Mouth/Throat: Oropharynx is clear and moist. No oropharyngeal exudate.   Eyes: Pupils are equal, round, and reactive to light. Conjunctivae and EOM are normal. No scleral icterus.   Neck: Normal range of motion. Neck supple. No JVD present. No thyromegaly present.   Cardiovascular: Normal rate, regular rhythm and normal heart sounds. Exam reveals no gallop and no friction rub.   No murmur heard.  Pulmonary/Chest: Effort normal and breath sounds normal. No respiratory distress. She has no wheezes. She has no rales.   Abdominal: Soft. Bowel sounds are normal. She exhibits no distension and no mass. There is hepatomegaly (liver edge 5 cm below the right costal margin). There is no splenomegaly. There is no tenderness. No hernia.   No organomegaly   Musculoskeletal: Normal range of motion. She exhibits edema (trace at ankles). She exhibits no tenderness or deformity.   Lymphadenopathy:     She has no cervical adenopathy.   Neurological: She is alert and oriented to person, place, and time.   Skin: Skin is warm and dry. No rash noted. She is not diaphoretic. No erythema. No pallor.   Psychiatric: She has a normal mood and affect. Her behavior is normal. Judgment and thought content normal.   Nursing note and vitals reviewed.   Labs-  reviewed   Assessment:       1. 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 Secondary neuroendocrine tumor of bone        Plan:     1-2. Doing well  Clinically stable and last imaging satble  Due to be reimaged between June and August depending on how she is doing  Continue Lanreotide every 4 weeks   RTC 4 weeks for injection and visit  Knows to call for any issues

## 2020-04-24 LAB — CGA SERPL-MCNC: 1062 NG/ML (ref 0–160)

## 2020-05-19 ENCOUNTER — LAB VISIT (OUTPATIENT)
Dept: INTERNAL MEDICINE | Facility: CLINIC | Age: 80
End: 2020-05-19
Payer: MEDICARE

## 2020-05-19 ENCOUNTER — LAB VISIT (OUTPATIENT)
Dept: LAB | Facility: HOSPITAL | Age: 80
End: 2020-05-19
Attending: INTERNAL MEDICINE
Payer: MEDICARE

## 2020-05-19 DIAGNOSIS — C7B.8 SECONDARY NEUROENDOCRINE TUMOR OF BONE: ICD-10-CM

## 2020-05-19 DIAGNOSIS — Z13.9 SCREENING FOR CONDITION: Primary | ICD-10-CM

## 2020-05-19 DIAGNOSIS — Z13.9 SCREENING FOR CONDITION: ICD-10-CM

## 2020-05-19 DIAGNOSIS — C7B.8 METASTATIC MALIGNANT NEUROENDOCRINE TUMOR TO LIVER: ICD-10-CM

## 2020-05-19 LAB
ALBUMIN SERPL BCP-MCNC: 3.8 G/DL (ref 3.5–5.2)
ALP SERPL-CCNC: 68 U/L (ref 55–135)
ALT SERPL W/O P-5'-P-CCNC: 8 U/L (ref 10–44)
ANION GAP SERPL CALC-SCNC: 10 MMOL/L (ref 8–16)
AST SERPL-CCNC: 19 U/L (ref 10–40)
BASOPHILS # BLD AUTO: 0.03 K/UL (ref 0–0.2)
BASOPHILS NFR BLD: 0.6 % (ref 0–1.9)
BILIRUB SERPL-MCNC: 1.3 MG/DL (ref 0.1–1)
BUN SERPL-MCNC: 11 MG/DL (ref 8–23)
CALCIUM SERPL-MCNC: 9.7 MG/DL (ref 8.7–10.5)
CHLORIDE SERPL-SCNC: 103 MMOL/L (ref 95–110)
CO2 SERPL-SCNC: 28 MMOL/L (ref 23–29)
CREAT SERPL-MCNC: 0.8 MG/DL (ref 0.5–1.4)
DIFFERENTIAL METHOD: ABNORMAL
EOSINOPHIL # BLD AUTO: 0 K/UL (ref 0–0.5)
EOSINOPHIL NFR BLD: 0.2 % (ref 0–8)
ERYTHROCYTE [DISTWIDTH] IN BLOOD BY AUTOMATED COUNT: 12.2 % (ref 11.5–14.5)
EST. GFR  (AFRICAN AMERICAN): >60 ML/MIN/1.73 M^2
EST. GFR  (NON AFRICAN AMERICAN): >60 ML/MIN/1.73 M^2
GLUCOSE SERPL-MCNC: 133 MG/DL (ref 70–110)
HCT VFR BLD AUTO: 40.6 % (ref 37–48.5)
HGB BLD-MCNC: 12.7 G/DL (ref 12–16)
IMM GRANULOCYTES # BLD AUTO: 0.02 K/UL (ref 0–0.04)
IMM GRANULOCYTES NFR BLD AUTO: 0.4 % (ref 0–0.5)
LYMPHOCYTES # BLD AUTO: 1.6 K/UL (ref 1–4.8)
LYMPHOCYTES NFR BLD: 31.8 % (ref 18–48)
MCH RBC QN AUTO: 29.1 PG (ref 27–31)
MCHC RBC AUTO-ENTMCNC: 31.3 G/DL (ref 32–36)
MCV RBC AUTO: 93 FL (ref 82–98)
MONOCYTES # BLD AUTO: 1 K/UL (ref 0.3–1)
MONOCYTES NFR BLD: 19 % (ref 4–15)
NEUTROPHILS # BLD AUTO: 2.5 K/UL (ref 1.8–7.7)
NEUTROPHILS NFR BLD: 48 % (ref 38–73)
NRBC BLD-RTO: 0 /100 WBC
PLATELET # BLD AUTO: 258 K/UL (ref 150–350)
PMV BLD AUTO: 10 FL (ref 9.2–12.9)
POTASSIUM SERPL-SCNC: 3.6 MMOL/L (ref 3.5–5.1)
PROT SERPL-MCNC: 7.4 G/DL (ref 6–8.4)
RBC # BLD AUTO: 4.37 M/UL (ref 4–5.4)
SODIUM SERPL-SCNC: 141 MMOL/L (ref 136–145)
WBC # BLD AUTO: 5.1 K/UL (ref 3.9–12.7)

## 2020-05-19 PROCEDURE — 36415 COLL VENOUS BLD VENIPUNCTURE: CPT | Mod: HCNC

## 2020-05-19 PROCEDURE — 80053 COMPREHEN METABOLIC PANEL: CPT | Mod: HCNC

## 2020-05-19 PROCEDURE — U0003 INFECTIOUS AGENT DETECTION BY NUCLEIC ACID (DNA OR RNA); SEVERE ACUTE RESPIRATORY SYNDROME CORONAVIRUS 2 (SARS-COV-2) (CORONAVIRUS DISEASE [COVID-19]), AMPLIFIED PROBE TECHNIQUE, MAKING USE OF HIGH THROUGHPUT TECHNOLOGIES AS DESCRIBED BY CMS-2020-01-R: HCPCS | Mod: HCNC

## 2020-05-19 PROCEDURE — 85025 COMPLETE CBC W/AUTO DIFF WBC: CPT | Mod: HCNC

## 2020-05-19 NOTE — PROGRESS NOTES
05/19/2020      Phoned the patient.      Discussed the following with the patient:  In an effort to protect our immunocompromised patients from potential exposure to COVID-19, [facility] will now require all patients receiving an infusion, an injection, and/or radiation therapy to be tested for COVID-19 prior to their appointment.  All patients currently under treatment will be tested immediately, and patients initiating new treatment cycles or with one-time appointments (injections, transfusions, etc.) must be tested within 72 hours of their appointment.      Symptom Patient's Response   Fever YES/NO: no   Cough YES/NO: no   Shortness of breath  YES/NO: no   Difficulty breathing YES/NO: no   GI symptoms such as diarrhea or nausea YES/NO: no   Loss of taste YES/NO: no   Loss of smell YES/NO: no     Other Screening Patient's Response   Has the patient previously undergone COVID-19 testing? YES/NO: yes     If yes to the question directly above, what was the result? negative   Has the patient been in close contact with someone who has undergone COVID-19 testing? YES/NO: no     If yes to the question directly above, what was the result? not applicable      The patient's 24-hour COVID-19 test was ordered and scheduled.  Reviewed the appointment date, time, and location with the patient.      Advised the patient that she can expect the results to take approximately 24 hours.  Advised the patient that someone will reach out to her regarding her results if she tests positive, as her treatment appointment will be rescheduled if she tests positive for COVID-19.  Also advised the patient that if she does not hear back from our office or if she is told by our office she has tested negative for COVID-19, she can proceed with treatment as originally planned.     Questions were answered to the patient's satisfaction, and the patient verbalized understanding of information and agreement with the plan.       The above was completed  in accordance with instructions and guidelines set forth by [facility].     Signed,        Alicia Cavazos RN     Date:  05/19/2020

## 2020-05-20 ENCOUNTER — OFFICE VISIT (OUTPATIENT)
Dept: HEMATOLOGY/ONCOLOGY | Facility: CLINIC | Age: 80
End: 2020-05-20
Attending: INTERNAL MEDICINE
Payer: MEDICARE

## 2020-05-20 ENCOUNTER — INFUSION (OUTPATIENT)
Dept: INFUSION THERAPY | Facility: OTHER | Age: 80
End: 2020-05-20
Attending: INTERNAL MEDICINE
Payer: MEDICARE

## 2020-05-20 VITALS
TEMPERATURE: 99 F | HEIGHT: 64 IN | WEIGHT: 134.69 LBS | SYSTOLIC BLOOD PRESSURE: 153 MMHG | HEART RATE: 104 BPM | OXYGEN SATURATION: 98 % | RESPIRATION RATE: 18 BRPM | DIASTOLIC BLOOD PRESSURE: 84 MMHG | BODY MASS INDEX: 22.99 KG/M2

## 2020-05-20 DIAGNOSIS — C7B.8 METASTATIC MALIGNANT NEUROENDOCRINE TUMOR TO LIVER: Primary | ICD-10-CM

## 2020-05-20 DIAGNOSIS — U07.1 COVID-19 VIRUS DETECTED: ICD-10-CM

## 2020-05-20 DIAGNOSIS — C7B.8 SECONDARY NEUROENDOCRINE TUMOR OF BONE: ICD-10-CM

## 2020-05-20 LAB — SARS-COV-2 RNA RESP QL NAA+PROBE: DETECTED

## 2020-05-20 PROCEDURE — 3077F PR MOST RECENT SYSTOLIC BLOOD PRESSURE >= 140 MM HG: ICD-10-PCS | Mod: HCNC,CPTII,S$GLB, | Performed by: INTERNAL MEDICINE

## 2020-05-20 PROCEDURE — 1159F MED LIST DOCD IN RCRD: CPT | Mod: HCNC,S$GLB,, | Performed by: INTERNAL MEDICINE

## 2020-05-20 PROCEDURE — 1101F PT FALLS ASSESS-DOCD LE1/YR: CPT | Mod: HCNC,CPTII,S$GLB, | Performed by: INTERNAL MEDICINE

## 2020-05-20 PROCEDURE — 99999 PR PBB SHADOW E&M-EST. PATIENT-LVL III: ICD-10-PCS | Mod: PBBFAC,HCNC,, | Performed by: INTERNAL MEDICINE

## 2020-05-20 PROCEDURE — 99999 PR PBB SHADOW E&M-EST. PATIENT-LVL III: CPT | Mod: PBBFAC,HCNC,, | Performed by: INTERNAL MEDICINE

## 2020-05-20 PROCEDURE — 3079F DIAST BP 80-89 MM HG: CPT | Mod: HCNC,CPTII,S$GLB, | Performed by: INTERNAL MEDICINE

## 2020-05-20 PROCEDURE — 96372 THER/PROPH/DIAG INJ SC/IM: CPT | Mod: HCNC

## 2020-05-20 PROCEDURE — 1126F AMNT PAIN NOTED NONE PRSNT: CPT | Mod: HCNC,S$GLB,, | Performed by: INTERNAL MEDICINE

## 2020-05-20 PROCEDURE — 63600175 PHARM REV CODE 636 W HCPCS: Mod: JG,HCNC | Performed by: INTERNAL MEDICINE

## 2020-05-20 PROCEDURE — 1126F PR PAIN SEVERITY QUANTIFIED, NO PAIN PRESENT: ICD-10-PCS | Mod: HCNC,S$GLB,, | Performed by: INTERNAL MEDICINE

## 2020-05-20 PROCEDURE — 99214 OFFICE O/P EST MOD 30 MIN: CPT | Mod: HCNC,S$GLB,, | Performed by: INTERNAL MEDICINE

## 2020-05-20 PROCEDURE — 3079F PR MOST RECENT DIASTOLIC BLOOD PRESSURE 80-89 MM HG: ICD-10-PCS | Mod: HCNC,CPTII,S$GLB, | Performed by: INTERNAL MEDICINE

## 2020-05-20 PROCEDURE — 3077F SYST BP >= 140 MM HG: CPT | Mod: HCNC,CPTII,S$GLB, | Performed by: INTERNAL MEDICINE

## 2020-05-20 PROCEDURE — 99214 PR OFFICE/OUTPT VISIT, EST, LEVL IV, 30-39 MIN: ICD-10-PCS | Mod: HCNC,S$GLB,, | Performed by: INTERNAL MEDICINE

## 2020-05-20 PROCEDURE — 1159F PR MEDICATION LIST DOCUMENTED IN MEDICAL RECORD: ICD-10-PCS | Mod: HCNC,S$GLB,, | Performed by: INTERNAL MEDICINE

## 2020-05-20 PROCEDURE — 1101F PR PT FALLS ASSESS DOC 0-1 FALLS W/OUT INJ PAST YR: ICD-10-PCS | Mod: HCNC,CPTII,S$GLB, | Performed by: INTERNAL MEDICINE

## 2020-05-20 RX ORDER — LANREOTIDE ACETATE 120 MG/.5ML
120 INJECTION SUBCUTANEOUS
Status: DISCONTINUED | OUTPATIENT
Start: 2020-05-20 | End: 2020-05-20 | Stop reason: HOSPADM

## 2020-05-20 RX ORDER — LANREOTIDE ACETATE 120 MG/.5ML
120 INJECTION SUBCUTANEOUS
Status: CANCELLED | OUTPATIENT
Start: 2020-05-20

## 2020-05-20 RX ADMIN — LANREOTIDE ACETATE 120 MG: 120 INJECTION SUBCUTANEOUS at 10:05

## 2020-05-20 NOTE — PROGRESS NOTES
Subjective:       Patient ID: Shahram Hills is a 80 y.o. female.    Chief Complaint: No chief complaint on file.    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    In the interval she reports that she has done well.  Reports a stable weight and eating well  Denies pain complaints  Denies fevers, chills or infectious complaints  No diarrhea or flushing  Notes no bowel changes     Oncologic History:  1. Ms. Hills is an 78 yo woman who initially saw Dr. Granados on 6/7/18 for further evaluation of neuroendocrine tumor. She initially presented with diarrhea, abdominal discomfort, weight loss. She was evaluated at [facility] and underwent workup below:  US abdomen on 3/14/18 showed numerous bilobar mixed echogenicity and mildly vascular hepatic lesions. Cholelithiasis without e/o acute cholecystitis.   MRI abdomen on 3/30/2018 showed innumerable hepatic metastases. L3 vertebral body metastasis with soft tissue component along the right margin of the L3 and upper L4 vertebral bodies, filling the right L3/4 neural foramen, and extending into the anterior aspect of the spinal canal on the right at the L3 and upper T4 levels. Associated with mild spinal canal narrowing.  CT chest on 4/6/2018 showed one lucent nodule measuring 7 mm in the T7 vertebral body, most likely representing metastatic disease.    EGD on 5/4/18 showed normal duodenum. Schatzki's ring in the lower third of the esophagus. Grade 1 esophagitis in the GE junction c/w mild distal esophagitis. Congestion and erythema in the antrum, pre-pyloric region and stomach body c/w gastritis. Biopsy of the stomach antrum showed mild chronic gastritis, neg for H. pylori.   Labs on 5/9/2018: WBC 6.9, H/H 11.6/34.3, MCV 87.5, plt count 279. On 3/9/18:  "Vit B12 level 173, folic acid 6.6  She was started on oral vit B12 and underwent a liver biopsy on 5/18/18. Pathology showed "metastatic well differentiated neuroendocrine tumor. Ki67 3%"     She saw Dr. Granados on 6/7/18 and complained of weight loss of 26 lbs over the past 3-4 months, also has diarrhea. No flushing, wheezing, palpitations. Has been having pain in right flank radiating down the right leg. No tingling/numbness, weakness. She can hold her bladder but when she urinates her diarrhea would come out as well. Started on dex 4 mg q6h the evening of 6/7/18.      2. MRI spine on 6/8/18 showed "Marrow replacing metastatic lesion of the L3 vertebral body with associated extra osseous expansion and complete effacement of the right L3-L4 neural foramina and additional effacement of the right lateral recess.  There is additional lateral extension and abutment of the right psoas muscle.  Superimposed degenerative change at this level results in mild spinal canal stenosis." I sent the patient to ED on 6/9/18 where she was evaluated by neurosurgery. Neurosurgery did not feel she was a candidate for surgical intervention.      3. Seen by Dr. Adames on 6/11/18. palliative radiation to the area of the L3 metastasis 6/18/18-6/29/18   4. Gallium study on 6/13/18: Distal ileal primary neoplasm consistent with a carcinoid.  There is an adjacent metastatic lymph node, diffuse liver metastases, and multiple bone metastases. Index primary neoplasm SUV max 33.13. Adjacent lymph node SUV max 23. L3 bone metastasis SUV max 36.86. Left lobe SUV max 46.13     Review of Systems   Constitutional: Negative for activity change, appetite change, chills, fatigue, fever and unexpected weight change.   HENT: Negative for congestion, mouth sores, postnasal drip, rhinorrhea, sore throat and trouble swallowing.    Gastrointestinal: Negative for abdominal distention, abdominal pain, blood in stool, constipation, diarrhea, nausea and vomiting. "   Genitourinary: Negative for decreased urine volume, difficulty urinating, dysuria, frequency and urgency.   Musculoskeletal: Negative for arthralgias and back pain.   Skin: Negative for color change, pallor and rash.   Neurological: Negative for dizziness, weakness, numbness and headaches.   Hematological: Does not bruise/bleed easily.   Psychiatric/Behavioral: Negative for dysphoric mood and sleep disturbance. The patient is not nervous/anxious.        Objective:      Physical Exam   Constitutional: She is oriented to person, place, and time. She appears well-developed and well-nourished. No distress.   Very pleasant  Presents alone  ECOG=0   HENT:   Head: Normocephalic and atraumatic.   Mouth/Throat: Oropharynx is clear and moist. No oropharyngeal exudate.   Eyes: Pupils are equal, round, and reactive to light. Conjunctivae are normal. No scleral icterus.   Neck: Normal range of motion. Neck supple. No JVD present. No thyromegaly present.   Cardiovascular: Normal rate, regular rhythm and normal heart sounds. Exam reveals no gallop and no friction rub.   No murmur heard.  Pulmonary/Chest: Effort normal and breath sounds normal. No respiratory distress. She has no wheezes. She has no rales.   Abdominal: Soft. Bowel sounds are normal. She exhibits no distension and no mass. There is hepatomegaly (liver edge 5 cm below the right costal margin). There is no splenomegaly. There is no tenderness. No hernia.   No organomegaly   Musculoskeletal: Normal range of motion. She exhibits edema (trace at ankles). She exhibits no tenderness or deformity.   Lymphadenopathy:     She has no cervical adenopathy.   Neurological: She is alert and oriented to person, place, and time.   Skin: Skin is warm and dry. No rash noted. She is not diaphoretic. No erythema. No pallor.   Psychiatric: She has a normal mood and affect. Her behavior is normal. Judgment and thought content normal.   Nursing note and vitals reviewed.   Labs- reviewed    Assessment:       1. 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 Secondary neuroendocrine tumor of bone        Plan:     Doing well  Clinically stable and last imaging satble  Due to be reimaged between June and August depending on how she is doing- she requests that these be scheduled on a Monday, Thursday or Friday for her  Continue Lanreotide every 4 weeks   RTC 4 weeks for injection and visit with Dr. Granados  Knows to call for any issues

## 2020-05-21 ENCOUNTER — OFFICE VISIT (OUTPATIENT)
Dept: INTERNAL MEDICINE | Facility: CLINIC | Age: 80
End: 2020-05-21
Payer: MEDICARE

## 2020-05-21 ENCOUNTER — TELEPHONE (OUTPATIENT)
Dept: HEMATOLOGY/ONCOLOGY | Facility: CLINIC | Age: 80
End: 2020-05-21

## 2020-05-21 ENCOUNTER — PATIENT MESSAGE (OUTPATIENT)
Dept: HEMATOLOGY/ONCOLOGY | Facility: CLINIC | Age: 80
End: 2020-05-21

## 2020-05-21 ENCOUNTER — TELEPHONE (OUTPATIENT)
Dept: INTERNAL MEDICINE | Facility: CLINIC | Age: 80
End: 2020-05-21

## 2020-05-21 DIAGNOSIS — Z13.9 SCREENING FOR CONDITION: ICD-10-CM

## 2020-05-21 DIAGNOSIS — U07.1 COVID-19 VIRUS DETECTED: Primary | ICD-10-CM

## 2020-05-21 DIAGNOSIS — C7B.8 METASTATIC MALIGNANT NEUROENDOCRINE TUMOR TO LIVER: ICD-10-CM

## 2020-05-21 DIAGNOSIS — U07.1 COVID-19 VIRUS INFECTION: Primary | ICD-10-CM

## 2020-05-21 PROCEDURE — 1101F PR PT FALLS ASSESS DOC 0-1 FALLS W/OUT INJ PAST YR: ICD-10-PCS | Mod: HCNC,CPTII,95, | Performed by: FAMILY MEDICINE

## 2020-05-21 PROCEDURE — 1101F PT FALLS ASSESS-DOCD LE1/YR: CPT | Mod: HCNC,CPTII,95, | Performed by: FAMILY MEDICINE

## 2020-05-21 PROCEDURE — 1159F PR MEDICATION LIST DOCUMENTED IN MEDICAL RECORD: ICD-10-PCS | Mod: HCNC,95,, | Performed by: FAMILY MEDICINE

## 2020-05-21 PROCEDURE — 99213 PR OFFICE/OUTPT VISIT, EST, LEVL III, 20-29 MIN: ICD-10-PCS | Mod: HCNC,95,, | Performed by: FAMILY MEDICINE

## 2020-05-21 PROCEDURE — 1159F MED LIST DOCD IN RCRD: CPT | Mod: HCNC,95,, | Performed by: FAMILY MEDICINE

## 2020-05-21 PROCEDURE — 99213 OFFICE O/P EST LOW 20 MIN: CPT | Mod: HCNC,95,, | Performed by: FAMILY MEDICINE

## 2020-05-21 NOTE — TELEPHONE ENCOUNTER
----- Message from Cayla Vargas sent at 5/21/2020 10:49 AM CDT -----  Contact: Kamila     Name of Who is Calling:Kamila       What is the request in detail: Pt niece states she was test positive for COVID 19 ,asked what should pt do Please contact to further discuss and advise    Can the clinic reply by [facility]:No       What Number to Call Back if not in [facility]: [phone]

## 2020-05-21 NOTE — TELEPHONE ENCOUNTER
- Please arrange virtual visit with a provider today with availability (since I am out of the office today) to evaluate pt since her screening test from [facility] was positive.

## 2020-05-21 NOTE — TELEPHONE ENCOUNTER
05/21/2020    - COVID-19 testing Collection Date: 5/19/2020 Collection Time:   3:40 PM  - This result was communicated to the patient by Robinson Melo DNP on 05/21/2020 as below.     Phoned the patient.    Symptom Patient's Response   Fever YES/NO: no   Cough YES/NO: no   Shortness of breath  YES/NO: no   Difficulty breathing YES/NO: no   GI symptoms such as diarrhea or nausea YES/NO: no   Loss of taste YES/NO: no   Loss of smell YES/NO: no     Other Screening Patient's Response   Has the patient previously undergone COVID-19 testing? YES/NO: yes, x1     If yes to the question directly above, what was the result? negative on 04/20/2020, positive on 05/19/2020   Has the patient been in close contact with someone who has undergone COVID-19 testing? YES/NO: patient and family possibly exposed to an individual with confirmed vs suspected COVID-19   If yes to the question directly above, what was the result? As directly above     Notified the patient that her COVID-19 test came back POSITIVE.  Prompt follow-up is needed via one of the following two options, both of which were discussed with the patient:  Option 1)  Virtual visit, which can be set up as a same-day appointment, with an Internal Medicine provider if desired by the patient; or Option 2) COVID Symptom Tracker program if the patient feels there is no need for a visit at this time--This program sends automated messages to the patient daily for 14 days, and the patient presses a certain button if not feeling well and will then be triaged to receive an urgent follow-up call.  The patient has elected to proceed with both Options 1 and 2.  I have facilitated moving forward with this by scheduling her for a virtual visit today and reaching out to the patient to determine which phone number she would like the program's text messages sent to.    Advised the patient that before she can return for her therapy (infusion, injection, and/or radiation therapy), she must  undergo COVID-19 retesting 7 days after the original test date (of 05/19/2020) and be symptom-free for a minimum of 7 days.  If the repeat test is negative, the treatment physician should determine if it is safe to reinitiate treatment.    Questions were answered to patient's satisfaction, and patient verbalized understanding of information and agreement with the plan.     Team notified:  Dr. Mckoy, Dr. Xie.  Reminder for COVID-19 retest:  Set.  Phone numbers provided to patient:  [facility] COVID-19 Info Line, [facility] 24/7 Nurse On-Call Line, my contact information.    The above was completed in accordance with instructions and guidelines set forth by [facility].    Signed,        Robinson Melo, [facility]     Date:  05/21/2020

## 2020-05-21 NOTE — TELEPHONE ENCOUNTER
Patient jennifer said she had spoke to someone right before I called and the matter has been taken care of.

## 2020-05-21 NOTE — TELEPHONE ENCOUNTER
05/21/2020    - COVID-19 testing Collection Date: 5/19/2020 Collection Time:   3:40 PM = POSITIVE   - Attempted to communicate this result to the patient on 05/21/2020 at 8:30 AM, but she did answer.  LVM with my contact information, and asked the patient to return my call at her earliest convenience.    The above was completed in accordance with instructions and guidelines set forth by [facility].    Signed,        Robinson Melo, DNP     Date:  05/21/2020

## 2020-05-21 NOTE — PATIENT INSTRUCTIONS
Please monitor yourself for symptoms, such as cough, shortness of breath, or fever. If you develop any of those symptoms, please let me know. If you have severe symptoms, such as significant trouble breathing please call 911 or go to the emergency department.   Below is a link to a document from the [place] Department of Health with recommendations to prevent the spread of COVID-19. Please read and follow the instructions for prevention.     [email or web address]     You can copy and paste the link into your browser to access it. Please let me know if you have trouble opening it.

## 2020-05-21 NOTE — PROGRESS NOTES
Subjective:       Patient ID: Shahram Hills is a 80 y.o. female.    Chief Complaint: COVID 19    HPI  This patient is new to me.   Shahram Hills is a 80 y.o. year old female with HTN, HLD, malignant neuroendocrine tumor who presents today for COVID 19 infection.     The patient location is:  family member's home in [place]  The chief complaint leading to consultation is: COVID 19   Visit type: audiovisual  Total time spent with patient: 21 mins  Each patient to whom he or she provides medical services by telemedicine is:  (1) informed of the relationship between the physician and patient and the respective role of any other health care provider with respect to management of the patient; and (2) notified that he or she may decline to receive medical services by telemedicine and may withdraw from such care at any time.  Patient agreed to this telemedicine visit today in an effort to avoid face-to-face visits due to the current COVID 19 pandemic.    Patient has a family member with her during this visit.    Patient was diagnosed with COVID-19 on 05/19/2020.  It seems like this test was ordered just for routine testing due to her being a cancer patient under treatment.  The patient reports that she is completely asymptomatic.  She was not tested because of symptoms.  She denies fever, shortness of breath, headache, sore throat, loss of taste or smell.  She is overall feeling well.  Her appetite is normal.  She is currently staying in a different city with several family members.  She has her own bedroom, but the family requests more information about how out of protect the other family members.    I personally reviewed Past Medical History, Past Surgical History, Social History, and Family History    Review of Systems   Constitutional: Negative for activity change, chills, fatigue, fever and unexpected weight change.   HENT: Negative for congestion, hearing loss, rhinorrhea and trouble swallowing.    Eyes:  Negative for discharge and visual disturbance.   Respiratory: Negative for cough, chest tightness, shortness of breath and wheezing.    Cardiovascular: Negative for chest pain and palpitations.   Gastrointestinal: Negative for abdominal pain, blood in stool, constipation, diarrhea, nausea and vomiting.   Endocrine: Negative for polydipsia and polyuria.   Genitourinary: Negative for difficulty urinating, dysuria, hematuria and menstrual problem.   Musculoskeletal: Negative for arthralgias, joint swelling and neck pain.   Skin: Negative for rash.   Neurological: Negative for dizziness, syncope, weakness and headaches.   Psychiatric/Behavioral: Negative for confusion, dysphoric mood and sleep disturbance. The patient is not nervous/anxious.        Objective:      There were no vitals filed for this visit.  Physical Exam   Constitutional: She is oriented to person, place, and time. She appears well-developed and well-nourished. No distress.   HENT:   Head: Normocephalic and atraumatic.   Eyes: Conjunctivae are normal.   Neck: Normal range of motion.   Pulmonary/Chest: Effort normal. No respiratory distress.   Neurological: She is alert and oriented to person, place, and time.   Skin: She is not diaphoretic.   Psychiatric: She has a normal mood and affect. Her behavior is normal. Thought content normal.       Assessment:       1. COVID-19 virus infection    2. 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        Plan:      Diagnoses and all orders for this visit:    COVID-19 virus infection  Discussed with patient that she should monitor herself for symptoms.  She is also enrolled in the symptom monitoring program.  Extensively discussed precautions to take to help prevent the spread of the virus amongst her family members.  Patient and her family member expressed understanding.  Advised patient to contact our office if she develops symptoms.  If her symptoms are severe she should seek emergency  treatment.    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  Noted.  Continue current management per oncology.

## 2020-05-22 ENCOUNTER — TELEPHONE (OUTPATIENT)
Dept: INTERNAL MEDICINE | Facility: CLINIC | Age: 80
End: 2020-05-22

## 2020-05-22 ENCOUNTER — OFFICE VISIT (OUTPATIENT)
Dept: URGENT CARE | Facility: CLINIC | Age: 80
End: 2020-05-22
Payer: MEDICARE

## 2020-05-22 VITALS — OXYGEN SATURATION: 97 % | TEMPERATURE: 98 F | HEART RATE: 62 BPM | RESPIRATION RATE: 18 BRPM

## 2020-05-22 DIAGNOSIS — U07.1 COVID-19 VIRUS INFECTION: ICD-10-CM

## 2020-05-22 DIAGNOSIS — J02.9 SORE THROAT: Primary | ICD-10-CM

## 2020-05-22 LAB
CTP QC/QA: YES
MOLECULAR STREP A: NEGATIVE

## 2020-05-22 PROCEDURE — 99214 OFFICE O/P EST MOD 30 MIN: CPT | Mod: S$GLB,,, | Performed by: PHYSICIAN ASSISTANT

## 2020-05-22 PROCEDURE — 87651 POCT STREP A MOLECULAR: ICD-10-PCS | Mod: QW,S$GLB,, | Performed by: PHYSICIAN ASSISTANT

## 2020-05-22 PROCEDURE — 99214 PR OFFICE/OUTPT VISIT, EST, LEVL IV, 30-39 MIN: ICD-10-PCS | Mod: S$GLB,,, | Performed by: PHYSICIAN ASSISTANT

## 2020-05-22 PROCEDURE — 87651 STREP A DNA AMP PROBE: CPT | Mod: QW,S$GLB,, | Performed by: PHYSICIAN ASSISTANT

## 2020-05-22 NOTE — TELEPHONE ENCOUNTER
Spoke with daughter, they are on way to urgent care to both be tested.      ----- Message from Sheila Elizalde sent at 5/22/2020  7:04 AM CDT -----  Contact: pt   Kamila would like to be called back regarding Pt having  possible strep throat everyone in house has it.   can be reached at [phone]

## 2020-05-22 NOTE — PROGRESS NOTES
"Subjective:       Patient ID: Shahram Hills is a 80 y.o. female.    Vitals:  tympanic temperature is 98.2 °F (36.8 °C). Her pulse is 62. Her respiration is 18 and oxygen saturation is 97%.     Chief Complaint: Sore Throat    80 y.o. Female presents for consideration of sore throat. Patient reports "itchy, scratchy throat" which began Wednesday. She recently received positive Covid results on 5/19/2020, but reports that she is asymptomatic otherwise. She reports that several family members recently tested positive for Strep, therefore she is concerned. She denies attempted treatment.     Sore Throat    This is a new problem. The current episode started in the past 7 days (2 days). The problem has been gradually worsening. Sore throat worse side: both. There has been no fever. The pain is at a severity of 0/10. The patient is experiencing no pain. Pertinent negatives include no abdominal pain, congestion, coughing, diarrhea, drooling, ear discharge, ear pain, headaches, hoarse voice, plugged ear sensation, neck pain, shortness of breath, stridor, swollen glands, trouble swallowing or vomiting. She has had no exposure to strep or mono. She has tried nothing for the symptoms. The treatment provided no relief.       Constitution: Negative for chills, sweating, fatigue and fever.   HENT: Positive for sore throat. Negative for ear pain, ear discharge, drooling, congestion, postnasal drip, sinus pain, sinus pressure, trouble swallowing and voice change.         Itchy throat   Scratchy throat   Neck: negative. Negative for neck pain, neck stiffness and painful lymph nodes.   Cardiovascular: Negative.  Negative for chest pain and palpitations.   Eyes: Negative for eye itching, eye pain and eye redness.   Respiratory: Negative for chest tightness, cough, sputum production, bloody sputum, COPD, shortness of breath, stridor, wheezing and asthma.    Gastrointestinal: Negative for abdominal pain, nausea, vomiting, " constipation and diarrhea.   Endocrine: negative.   Genitourinary: Negative.  Negative for dysuria.   Musculoskeletal: Negative for muscle ache.   Skin: Negative for rash.   Allergic/Immunologic: Negative for seasonal allergies and asthma.   Neurological: Negative for headaches.   Hematologic/Lymphatic: Negative for swollen lymph nodes.   Psychiatric/Behavioral: Negative.  Negative for confusion.       Objective:      Physical Exam   Constitutional: She is oriented to person, place, and time. Vital signs are normal. She appears well-developed and well-nourished. She is cooperative.  Non-toxic appearance. She does not have a sickly appearance. She does not appear ill. No distress.   Patient was seen virtually using [facility] due to State of Emergency for the COVID-19 outbreak.     HENT:   Head: Normocephalic and atraumatic.   Right Ear: Tympanic membrane, external ear and ear canal normal.   Left Ear: Tympanic membrane, external ear and ear canal normal.   Nose: Nose normal.   Mouth/Throat: Uvula is midline and mucous membranes are normal. No oral lesions. No trismus in the jaw. No uvula swelling. Posterior oropharyngeal erythema (mild) present. No oropharyngeal exudate, posterior oropharyngeal edema, tonsillar abscesses or cobblestoning.   Eyes: Pupils are equal, round, and reactive to light. Conjunctivae, EOM and lids are normal.   Neck: Trachea normal, normal range of motion, full passive range of motion without pain and phonation normal. Neck supple.   Cardiovascular: Normal rate, regular rhythm, normal heart sounds and intact distal pulses.   Pulmonary/Chest: Effort normal and breath sounds normal. No stridor. No respiratory distress. She has no decreased breath sounds. She has no wheezes. She has no rhonchi. She has no rales.   Abdominal: Normal appearance. There is no guarding.   Musculoskeletal: Normal range of motion.   Lymphadenopathy:     She has no cervical adenopathy.   Neurological: She is alert and  oriented to person, place, and time.   Skin: Skin is intact, not diaphoretic and no rash.   Psychiatric: She has a normal mood and affect. Her behavior is normal. Judgment and thought content normal.   Nursing note and vitals reviewed.    Results for orders placed or performed in visit on 05/22/20   POCT Strep A, Molecular   Result Value Ref Range    Molecular Strep A, POC Negative Negative     Acceptable Yes             Assessment:       1. Sore throat    2. COVID-19 virus infection        Plan:         Sore throat  -     POCT Strep A, Molecular    COVID-19 virus infection         - Vitals are reassuring.  - Molecular strep negative.  - Advised patient to stay home and self quarantine until asymptomatic due to recent Covid diagnosis.   - Educated patient regarding medications for symptomatic relief of sore throat, including cough drops and Tylenol for pain.  - Strict ED precautions given for any emergent symptoms.    I have discussed the diagnosis, treatment plan and recommendations for follow-up with primary care, and patient verbalized understanding and is agreeable to the plan. AVS printed and given to patient upon discharge with information regarding this visit. All questions were addressed prior to discharge.    Monica Hart PA-C

## 2020-05-22 NOTE — PATIENT INSTRUCTIONS
You can purchase cough drops over the counter to soothe your sore throat.    You can take Tylenol as needed for pain.     If your symptoms do not improve, you should follow-up with primary care. If your symptoms worsen, go to the emergency room.       Instructions for Patients with Confirmed or Suspected COVID-19    If you are awaiting your test result, you will either be called or it will be released to the patient portal.  If you have any questions about your test, please visit [email or web address] or call our COVID-19 information line at [phone].       Stay home and stay away from family members and friends. The CDC says, you can leave home after these three things have happened: 1) You have had no fever for at least 72 hours (that is three full days of no fever without the use of medicine that reduces fevers) 2) AND other symptoms have improved (for example, when your cough or shortness of breath have improved) 3) AND at least 7 days have passed since your symptoms first appeared.   Separate yourself from other people and animals in your home.   Call ahead before visiting your doctor.   Wear a facemask.   Cover your coughs and sneezes.   Wash your hands often with soap and water; hand  can be used, too.   Avoid sharing personal household items.   Wipe down surfaces used daily.   Monitor your symptoms. Seek prompt medical attention if your illness is worsening (e.g., difficulty breathing).    Before seeking care, call your healthcare provider.   If you have a medical emergency and need to call 911, notify the dispatch personnel that you have, or are being evaluated for COVID-19. If possible, put on a facemask before emergency medical services arrive.        Recommended precautions for household members, intimate partners, and caregivers in a home setting of a patient with symptomatic laboratory-confirmed COVID-19 or a patient under investigation.  Household members, intimate  partners, and caregivers in the home setting awaiting tests results have close contact with a person with symptomatic, laboratory-confirmed COVID-19 or a person under investigation. Close contacts should monitor their health; they should call their provider right away if they develop symptoms suggestive of COVID-19 (e.g., fever, cough, shortness of breath).    Close contacts should also follow these recommendations:   Make sure that you understand and can help the patient follow their provider's instructions for medication(s) and care. You should help the patient with basic needs in the home and provide support for getting groceries, prescriptions, and other personal needs.   Monitor the patient's symptoms. If the patient is getting sicker, call his or her healthcare provider and tell them that the patient has laboratory-confirmed COVID-19. If the patient has a medical emergency and you need to call 911, notify the dispatch personnel that the patient has, or is being evaluated for COVID-19.   Household members should stay in another room or be  from the patient. Household members should use a separate bedroom and bathroom, if available.   Prohibit visitors.   Household members should care for any pets in the home.   Make sure that shared spaces in the home have good air flow, such as by an air conditioner or an opened window, weather permitting.   Perform hand hygiene frequently. Wash your hands often with soap and water for at least 20 seconds or use an alcohol-based hand  (that contains > 60% alcohol) covering all surfaces of your hands and rubbing them together until they feel dry. Soap and water should be used preferentially.   Avoid touching your eyes, nose, and mouth.   The patient should wear a facemask. If the patient is not able to wear a facemask (for example, because it causes trouble breathing), caregivers should wear a mask when they are in the same room as the  patient.   Wear a disposable facemask and gloves when you touch or have contact with the patient's blood, stool, or body fluids, such as saliva, sputum, nasal mucus, vomit, urine.  o Throw out disposable facemasks and gloves after using them. Do not reuse.  o When removing personal protective equipment, first remove and dispose of gloves. Then, immediately clean your hands with soap and water or alcohol-based hand . Next, remove and dispose of facemask, and immediately clean your hands again with soap and water or alcohol-based hand .   You should not share dishes, drinking glasses, cups, eating utensils, towels, bedding, or other items with the patient. After the patient uses these items, you should wash them thoroughly (see below Wash laundry thoroughly).   Clean all high-touch surfaces, such as counters, tabletops, doorknobs, bathroom fixtures, toilets, phones, keyboards, tablets, and bedside tables, every day. Also, clean any surfaces that may have blood, stool, or body fluids on them.   Use a household cleaning spray or wipe, according to the label instructions. Labels contain instructions for safe and effective use of the cleaning product including precautions you should take when applying the product, such as wearing gloves and making sure you have good ventilation during use of the product.   Wash laundry thoroughly.  o Immediately remove and wash clothes or bedding that have blood, stool, or body fluids on them.  o Wear disposable gloves while handling soiled items and keep soiled items away from your body. Clean your hands (with soap and water or an alcohol-based hand ) immediately after removing your gloves.  o Read and follow directions on labels of laundry or clothing items and detergent. In general, using a normal laundry detergent according to washing machine instructions and dry thoroughly using the warmest temperatures recommended on the clothing label.   Place  all used disposable gloves, facemasks, and other contaminated items in a lined container before disposing of them with other household waste. Clean your hands (with soap and water or an alcohol-based hand ) immediately after handling these items. Soap and water should be used preferentially if hands are visibly dirty.   Discuss any additional questions with your state or local health department or healthcare provider. Check available hours when contacting your local health department.    For more information see CDC link below.      https://www.cdc.gov/coronavirus/2019-ncov/hcp/guidance-prevent-spread.html#precautions        Sources:  CDC, [facility] and [facility]

## 2020-05-24 ENCOUNTER — TELEPHONE (OUTPATIENT)
Dept: URGENT CARE | Facility: CLINIC | Age: 80
End: 2020-05-24

## 2020-05-24 NOTE — TELEPHONE ENCOUNTER
Courtesy call. Spoke with pt informed her that her covid swab is negative. Pt stated that she is feeling better. Thanks for calling.

## 2020-05-25 RX ORDER — EZETIMIBE 10 MG/1
10 TABLET ORAL DAILY
Qty: 90 TABLET | Refills: 1 | Status: SHIPPED | OUTPATIENT
Start: 2020-05-25 | End: 2020-10-06 | Stop reason: SDUPTHER

## 2020-05-26 ENCOUNTER — LAB VISIT (OUTPATIENT)
Dept: URGENT CARE | Facility: CLINIC | Age: 80
End: 2020-05-26
Payer: MEDICARE

## 2020-05-26 VITALS — HEART RATE: 64 BPM | TEMPERATURE: 98 F | OXYGEN SATURATION: 98 %

## 2020-05-26 DIAGNOSIS — U07.1 COVID-19 VIRUS DETECTED: ICD-10-CM

## 2020-05-26 DIAGNOSIS — Z13.9 SCREENING FOR CONDITION: ICD-10-CM

## 2020-05-26 PROCEDURE — U0003 INFECTIOUS AGENT DETECTION BY NUCLEIC ACID (DNA OR RNA); SEVERE ACUTE RESPIRATORY SYNDROME CORONAVIRUS 2 (SARS-COV-2) (CORONAVIRUS DISEASE [COVID-19]), AMPLIFIED PROBE TECHNIQUE, MAKING USE OF HIGH THROUGHPUT TECHNOLOGIES AS DESCRIBED BY CMS-2020-01-R: HCPCS | Mod: HCNC

## 2020-05-27 ENCOUNTER — TELEPHONE (OUTPATIENT)
Dept: HEMATOLOGY/ONCOLOGY | Facility: CLINIC | Age: 80
End: 2020-05-27

## 2020-05-27 LAB — SARS-COV-2 RNA RESP QL NAA+PROBE: DETECTED

## 2020-05-27 NOTE — TELEPHONE ENCOUNTER
05/27/2020     - COVID-19 retesting Collection Date: 5/26/2020 = POSITIVE  - This result was communicated to the patient by Robinson Melo DNP on 05/27/2020 as below.      Phoned the patient.  We discussed the below.     Symptom Patient's Response   Fever YES/NO: no   Cough YES/NO: no   Shortness of breath  YES/NO: no   Difficulty breathing YES/NO: no   GI symptoms such as diarrhea or nausea YES/NO: no   Loss of taste YES/NO: no   Loss of smell YES/NO: no      Other Screening Patient's Response   Has the patient previously undergone COVID-19 testing? YES/NO: yes, x1      If yes to the question directly above, what was the result? negative on 04/20/2020, positive on 05/19/2020, positive on 05/26/2020   Has the patient been in close contact with someone who has undergone COVID-19 testing? YES/NO: yes, 6 live-in relatives have been tested   If yes to the question directly above, what was the result? 3 positive, and 3 negative      Notified the patient that her COVID-19 retest came back POSITIVE.  The patient stated she continues to receive text messages from the COVID-19 Symptom Tracker Program.  Of note, she has been counseled by Dr. Aziza Quijano regarding COVID-19.  The patient and I re-discussed measures she should take to reduce the likelihood of viral transmission.     Per the updated [facility] COVID+ decision tree as of 05/15/2020, advised the patient that I would re-contact her on Monday or Tuesday of next week (ie 13-14 days after her original COVID+ test date) to determine whether symptoms have developed.  If deemed appropriate by her treating provider, the patient will be able to resume treatment on 06/02/2020 if she continues to remain symptom-free.     Questions were answered to patient's satisfaction, and patient verbalized understanding of information and agreement with the plan.      Team notified:  Dr. Granados, Dr. Mckoy, Dr. Xie, Dr. Quijano.  Reminder for call to patient:  Set.    The above was  completed in accordance with instructions and guidelines set forth by [facility].     Signed,           Robinson Melo, DNP      Date:  05/27/2020

## 2020-06-04 ENCOUNTER — TELEPHONE (OUTPATIENT)
Dept: HEMATOLOGY/ONCOLOGY | Facility: CLINIC | Age: 80
End: 2020-06-04

## 2020-06-04 NOTE — TELEPHONE ENCOUNTER
Phoned the patient regarding COVID-19 questionnaire below.  No answer x 3.  LVM x 2.  So long as she has remained (and continues to remain) COVID-asymptomatic, as her original COVID+ was on 05/19/2020, if now if deemed appropriate by her treating provider, the patient will be able to resume treatment at this time.    Symptom Patient's Response   Fever    Cough    Shortness of breath     Difficulty breathing    GI symptoms such as diarrhea or nausea    Loss of taste    Loss of smell       Other Screening Patient's Response   Has the patient previously undergone COVID-19 testing?       If yes to the question directly above, what was the result? negative on 04/20/2020, positive on 05/19/2020, positive on 05/26/2020   Has the patient been in close contact with someone who has undergone COVID-19 testing? yes   If yes to the question directly above, what was the result?        The above was completed in accordance with instructions and guidelines set forth by [facility].     Signed,           Robinson Melo, MIGUEL      Date:  05/27/2020

## 2020-06-05 ENCOUNTER — TELEPHONE (OUTPATIENT)
Dept: HEMATOLOGY/ONCOLOGY | Facility: CLINIC | Age: 80
End: 2020-06-05

## 2020-06-05 NOTE — TELEPHONE ENCOUNTER
Call to pt.  Pt unavailable.   Left message for pt to return call.   Callback number provided.     Robinson Melo, MIGUEL  You; Sebastian Granados MD 14 hours ago (4:30 PM)      Cierra-  Can you please retry to get in touch with Ms. Shahram Farmer on 6/5 and ask her the attached questionnaire?  From a COVID standpoint she can proceed with treatment with a surgical mask if questionnaire is negative.  If questionnaire is positive/concerning, please consult with Dr. Mckoy and Dr. Granados, as I will be out of the office on Friday.  Thanks!  -Robinson

## 2020-06-05 NOTE — TELEPHONE ENCOUNTER
Returned call to pt.   Symptom Patient's Response   Fever YES/NO: no   Cough YES/NO: no   Shortness of breath  YES/NO: no   Difficulty breathing YES/NO: no   GI symptoms such as diarrhea or nausea YES/NO: no   Loss of taste YES/NO: no   Loss of smell YES/NO: no     Other Screening Patient's Response   Has the patient previously undergone COVID-19 testing? YES/NO: yes     If yes to the question directly above, what was the result? positive   Has the patient been in close contact with someone who has undergone COVID-19 testing? YES/NO: yes     If yes to the question directly above, what was the result?      Informed pt since negative questionnaire, she can proceed with tx as scheduled with use of surgical mask.   Pt verbalized understanding and thanked nurse.             ----- Message from Yasmine Marlow sent at 6/5/2020  9:34 AM CDT -----  Contact: pt relative  Reason: Returning call     Communication: [phone] Jg

## 2020-06-19 ENCOUNTER — LAB VISIT (OUTPATIENT)
Dept: LAB | Facility: OTHER | Age: 80
End: 2020-06-19
Attending: INTERNAL MEDICINE
Payer: MEDICARE

## 2020-06-19 DIAGNOSIS — C7B.8 METASTATIC MALIGNANT NEUROENDOCRINE TUMOR TO LIVER: ICD-10-CM

## 2020-06-19 DIAGNOSIS — C7B.8 SECONDARY NEUROENDOCRINE TUMOR OF BONE: ICD-10-CM

## 2020-06-19 LAB
ALBUMIN SERPL BCP-MCNC: 3.9 G/DL (ref 3.5–5.2)
ALP SERPL-CCNC: 78 U/L (ref 55–135)
ALT SERPL W/O P-5'-P-CCNC: 10 U/L (ref 10–44)
ANION GAP SERPL CALC-SCNC: 11 MMOL/L (ref 8–16)
AST SERPL-CCNC: 18 U/L (ref 10–40)
BASOPHILS # BLD AUTO: 0.01 K/UL (ref 0–0.2)
BASOPHILS NFR BLD: 0.2 % (ref 0–1.9)
BILIRUB SERPL-MCNC: 1.5 MG/DL (ref 0.1–1)
BUN SERPL-MCNC: 8 MG/DL (ref 8–23)
CALCIUM SERPL-MCNC: 9.7 MG/DL (ref 8.7–10.5)
CHLORIDE SERPL-SCNC: 105 MMOL/L (ref 95–110)
CO2 SERPL-SCNC: 27 MMOL/L (ref 23–29)
CREAT SERPL-MCNC: 0.7 MG/DL (ref 0.5–1.4)
DIFFERENTIAL METHOD: ABNORMAL
EOSINOPHIL # BLD AUTO: 0 K/UL (ref 0–0.5)
EOSINOPHIL NFR BLD: 0.5 % (ref 0–8)
ERYTHROCYTE [DISTWIDTH] IN BLOOD BY AUTOMATED COUNT: 12.7 % (ref 11.5–14.5)
EST. GFR  (AFRICAN AMERICAN): >60 ML/MIN/1.73 M^2
EST. GFR  (NON AFRICAN AMERICAN): >60 ML/MIN/1.73 M^2
GLUCOSE SERPL-MCNC: 116 MG/DL (ref 70–110)
HCT VFR BLD AUTO: 39.8 % (ref 37–48.5)
HGB BLD-MCNC: 12.7 G/DL (ref 12–16)
IMM GRANULOCYTES # BLD AUTO: 0.02 K/UL (ref 0–0.04)
IMM GRANULOCYTES NFR BLD AUTO: 0.3 % (ref 0–0.5)
LYMPHOCYTES # BLD AUTO: 2 K/UL (ref 1–4.8)
LYMPHOCYTES NFR BLD: 32.1 % (ref 18–48)
MCH RBC QN AUTO: 28.7 PG (ref 27–31)
MCHC RBC AUTO-ENTMCNC: 31.9 G/DL (ref 32–36)
MCV RBC AUTO: 90 FL (ref 82–98)
MONOCYTES # BLD AUTO: 0.6 K/UL (ref 0.3–1)
MONOCYTES NFR BLD: 9.3 % (ref 4–15)
NEUTROPHILS # BLD AUTO: 3.6 K/UL (ref 1.8–7.7)
NEUTROPHILS NFR BLD: 57.6 % (ref 38–73)
NRBC BLD-RTO: 0 /100 WBC
PLATELET # BLD AUTO: 246 K/UL (ref 150–350)
PMV BLD AUTO: 9.9 FL (ref 9.2–12.9)
POTASSIUM SERPL-SCNC: 3.5 MMOL/L (ref 3.5–5.1)
PROT SERPL-MCNC: 7.4 G/DL (ref 6–8.4)
RBC # BLD AUTO: 4.42 M/UL (ref 4–5.4)
SODIUM SERPL-SCNC: 143 MMOL/L (ref 136–145)
WBC # BLD AUTO: 6.24 K/UL (ref 3.9–12.7)

## 2020-06-19 PROCEDURE — 85025 COMPLETE CBC W/AUTO DIFF WBC: CPT | Mod: HCNC

## 2020-06-19 PROCEDURE — 36415 COLL VENOUS BLD VENIPUNCTURE: CPT | Mod: HCNC

## 2020-06-19 PROCEDURE — 80053 COMPREHEN METABOLIC PANEL: CPT | Mod: HCNC

## 2020-06-22 ENCOUNTER — OFFICE VISIT (OUTPATIENT)
Dept: HEMATOLOGY/ONCOLOGY | Facility: CLINIC | Age: 80
End: 2020-06-22
Payer: MEDICARE

## 2020-06-22 ENCOUNTER — HOSPITAL ENCOUNTER (OUTPATIENT)
Dept: RADIOLOGY | Facility: OTHER | Age: 80
Discharge: HOME | End: 2020-06-22
Attending: INTERNAL MEDICINE
Payer: MEDICARE

## 2020-06-22 ENCOUNTER — OFFICE VISIT (OUTPATIENT)
Dept: INTERNAL MEDICINE | Facility: CLINIC | Age: 80
End: 2020-06-22
Payer: MEDICARE

## 2020-06-22 ENCOUNTER — INFUSION (OUTPATIENT)
Dept: INFUSION THERAPY | Facility: OTHER | Age: 80
End: 2020-06-22
Attending: INTERNAL MEDICINE
Payer: MEDICARE

## 2020-06-22 VITALS
SYSTOLIC BLOOD PRESSURE: 126 MMHG | BODY MASS INDEX: 25.11 KG/M2 | WEIGHT: 136.44 LBS | OXYGEN SATURATION: 98 % | DIASTOLIC BLOOD PRESSURE: 70 MMHG | HEIGHT: 62 IN | HEART RATE: 94 BPM

## 2020-06-22 VITALS
OXYGEN SATURATION: 99 % | WEIGHT: 137.38 LBS | DIASTOLIC BLOOD PRESSURE: 83 MMHG | SYSTOLIC BLOOD PRESSURE: 155 MMHG | BODY MASS INDEX: 23.45 KG/M2 | HEIGHT: 64 IN | TEMPERATURE: 98 F | HEART RATE: 93 BPM | RESPIRATION RATE: 12 BRPM

## 2020-06-22 DIAGNOSIS — C7A.8 NEUROENDOCRINE CARCINOMA METASTATIC TO BONE: ICD-10-CM

## 2020-06-22 DIAGNOSIS — C7B.8 METASTATIC MALIGNANT NEUROENDOCRINE TUMOR TO LIVER: ICD-10-CM

## 2020-06-22 DIAGNOSIS — G89.29 CHRONIC RUQ PAIN: ICD-10-CM

## 2020-06-22 DIAGNOSIS — Z00.00 ENCOUNTER FOR PREVENTIVE HEALTH EXAMINATION: Primary | ICD-10-CM

## 2020-06-22 DIAGNOSIS — C7B.8 METASTATIC MALIGNANT NEUROENDOCRINE TUMOR TO LIVER: Primary | ICD-10-CM

## 2020-06-22 DIAGNOSIS — I70.0 ATHEROSCLEROSIS OF AORTA: ICD-10-CM

## 2020-06-22 DIAGNOSIS — D64.9 ANEMIA, UNSPECIFIED TYPE: ICD-10-CM

## 2020-06-22 DIAGNOSIS — E11.9 TYPE 2 DIABETES MELLITUS WITHOUT COMPLICATION, WITHOUT LONG-TERM CURRENT USE OF INSULIN: ICD-10-CM

## 2020-06-22 DIAGNOSIS — H91.90 HEARING LOSS, UNSPECIFIED HEARING LOSS TYPE, UNSPECIFIED LATERALITY: ICD-10-CM

## 2020-06-22 DIAGNOSIS — R60.0 BILATERAL LEG EDEMA: ICD-10-CM

## 2020-06-22 DIAGNOSIS — C7B.8 NEUROENDOCRINE CARCINOMA METASTATIC TO BONE: ICD-10-CM

## 2020-06-22 DIAGNOSIS — Z66 DNR (DO NOT RESUSCITATE): ICD-10-CM

## 2020-06-22 DIAGNOSIS — D3A.8 NEUROENDOCRINE TUMOR: ICD-10-CM

## 2020-06-22 DIAGNOSIS — R16.0 LIVER MASSES: ICD-10-CM

## 2020-06-22 DIAGNOSIS — E53.8 B12 DEFICIENCY: ICD-10-CM

## 2020-06-22 DIAGNOSIS — R73.9 HYPERGLYCEMIA: ICD-10-CM

## 2020-06-22 DIAGNOSIS — M54.16 LUMBAR RADICULOPATHY: ICD-10-CM

## 2020-06-22 DIAGNOSIS — E78.5 HYPERLIPIDEMIA, UNSPECIFIED HYPERLIPIDEMIA TYPE: ICD-10-CM

## 2020-06-22 DIAGNOSIS — R41.3 MEMORY DEFICITS: ICD-10-CM

## 2020-06-22 DIAGNOSIS — I10 ESSENTIAL HYPERTENSION: ICD-10-CM

## 2020-06-22 DIAGNOSIS — R10.11 CHRONIC RUQ PAIN: ICD-10-CM

## 2020-06-22 DIAGNOSIS — C79.51 SPINE METASTASIS: ICD-10-CM

## 2020-06-22 DIAGNOSIS — C22.0 HCC (HEPATOCELLULAR CARCINOMA): ICD-10-CM

## 2020-06-22 DIAGNOSIS — C7A.012 MALIGNANT CARCINOID TUMOR OF ILEUM: Primary | ICD-10-CM

## 2020-06-22 DIAGNOSIS — C7A.012 MALIGNANT CARCINOID TUMOR OF ILEUM: ICD-10-CM

## 2020-06-22 DIAGNOSIS — C7B.8 SECONDARY NEUROENDOCRINE TUMOR OF BONE: ICD-10-CM

## 2020-06-22 DIAGNOSIS — K80.20 CALCULUS OF GALLBLADDER WITHOUT CHOLECYSTITIS WITHOUT OBSTRUCTION: ICD-10-CM

## 2020-06-22 PROCEDURE — 3077F SYST BP >= 140 MM HG: CPT | Mod: HCNC,CPTII,S$GLB, | Performed by: INTERNAL MEDICINE

## 2020-06-22 PROCEDURE — 1159F PR MEDICATION LIST DOCUMENTED IN MEDICAL RECORD: ICD-10-PCS | Mod: HCNC,S$GLB,, | Performed by: INTERNAL MEDICINE

## 2020-06-22 PROCEDURE — 3079F DIAST BP 80-89 MM HG: CPT | Mod: HCNC,CPTII,S$GLB, | Performed by: INTERNAL MEDICINE

## 2020-06-22 PROCEDURE — 93970 EXTREMITY STUDY: CPT | Mod: TC,HCNC

## 2020-06-22 PROCEDURE — 3079F PR MOST RECENT DIASTOLIC BLOOD PRESSURE 80-89 MM HG: ICD-10-PCS | Mod: HCNC,CPTII,S$GLB, | Performed by: INTERNAL MEDICINE

## 2020-06-22 PROCEDURE — 1126F AMNT PAIN NOTED NONE PRSNT: CPT | Mod: HCNC,S$GLB,, | Performed by: INTERNAL MEDICINE

## 2020-06-22 PROCEDURE — 99499 UNLISTED E&M SERVICE: CPT | Mod: HCNC,S$GLB,, | Performed by: NURSE PRACTITIONER

## 2020-06-22 PROCEDURE — 1101F PR PT FALLS ASSESS DOC 0-1 FALLS W/OUT INJ PAST YR: ICD-10-PCS | Mod: HCNC,CPTII,S$GLB, | Performed by: INTERNAL MEDICINE

## 2020-06-22 PROCEDURE — 3078F DIAST BP <80 MM HG: CPT | Mod: HCNC,CPTII,S$GLB, | Performed by: NURSE PRACTITIONER

## 2020-06-22 PROCEDURE — G0439 PPPS, SUBSEQ VISIT: HCPCS | Mod: HCNC,S$GLB,, | Performed by: NURSE PRACTITIONER

## 2020-06-22 PROCEDURE — 3074F SYST BP LT 130 MM HG: CPT | Mod: HCNC,CPTII,S$GLB, | Performed by: NURSE PRACTITIONER

## 2020-06-22 PROCEDURE — 99499 UNLISTED E&M SERVICE: CPT | Mod: HCNC,S$GLB,, | Performed by: INTERNAL MEDICINE

## 2020-06-22 PROCEDURE — 99214 OFFICE O/P EST MOD 30 MIN: CPT | Mod: HCNC,S$GLB,, | Performed by: INTERNAL MEDICINE

## 2020-06-22 PROCEDURE — 1159F MED LIST DOCD IN RCRD: CPT | Mod: HCNC,S$GLB,, | Performed by: INTERNAL MEDICINE

## 2020-06-22 PROCEDURE — 96372 THER/PROPH/DIAG INJ SC/IM: CPT | Mod: HCNC

## 2020-06-22 PROCEDURE — 93970 US LOWER EXTREMITY VEINS BILATERAL: ICD-10-PCS | Mod: 26,HCNC,, | Performed by: RADIOLOGY

## 2020-06-22 PROCEDURE — 3077F PR MOST RECENT SYSTOLIC BLOOD PRESSURE >= 140 MM HG: ICD-10-PCS | Mod: HCNC,CPTII,S$GLB, | Performed by: INTERNAL MEDICINE

## 2020-06-22 PROCEDURE — 99499 RISK ADDL DX/OHS AUDIT: ICD-10-PCS | Mod: HCNC,S$GLB,, | Performed by: INTERNAL MEDICINE

## 2020-06-22 PROCEDURE — 99999 PR PBB SHADOW E&M-EST. PATIENT-LVL IV: CPT | Mod: PBBFAC,HCNC,, | Performed by: NURSE PRACTITIONER

## 2020-06-22 PROCEDURE — 99214 PR OFFICE/OUTPT VISIT, EST, LEVL IV, 30-39 MIN: ICD-10-PCS | Mod: HCNC,S$GLB,, | Performed by: INTERNAL MEDICINE

## 2020-06-22 PROCEDURE — 63600175 PHARM REV CODE 636 W HCPCS: Mod: JG,HCNC | Performed by: INTERNAL MEDICINE

## 2020-06-22 PROCEDURE — 1101F PT FALLS ASSESS-DOCD LE1/YR: CPT | Mod: HCNC,CPTII,S$GLB, | Performed by: INTERNAL MEDICINE

## 2020-06-22 PROCEDURE — 99499 RISK ADDL DX/OHS AUDIT: ICD-10-PCS | Mod: HCNC,S$GLB,, | Performed by: NURSE PRACTITIONER

## 2020-06-22 PROCEDURE — 1126F PR PAIN SEVERITY QUANTIFIED, NO PAIN PRESENT: ICD-10-PCS | Mod: HCNC,S$GLB,, | Performed by: INTERNAL MEDICINE

## 2020-06-22 PROCEDURE — 93970 EXTREMITY STUDY: CPT | Mod: 26,HCNC,, | Performed by: RADIOLOGY

## 2020-06-22 PROCEDURE — G0439 PR MEDICARE ANNUAL WELLNESS SUBSEQUENT VISIT: ICD-10-PCS | Mod: HCNC,S$GLB,, | Performed by: NURSE PRACTITIONER

## 2020-06-22 PROCEDURE — 3078F PR MOST RECENT DIASTOLIC BLOOD PRESSURE < 80 MM HG: ICD-10-PCS | Mod: HCNC,CPTII,S$GLB, | Performed by: NURSE PRACTITIONER

## 2020-06-22 PROCEDURE — 99999 PR PBB SHADOW E&M-EST. PATIENT-LVL V: ICD-10-PCS | Mod: PBBFAC,HCNC,, | Performed by: INTERNAL MEDICINE

## 2020-06-22 PROCEDURE — 99999 PR PBB SHADOW E&M-EST. PATIENT-LVL IV: ICD-10-PCS | Mod: PBBFAC,HCNC,, | Performed by: NURSE PRACTITIONER

## 2020-06-22 PROCEDURE — 3074F PR MOST RECENT SYSTOLIC BLOOD PRESSURE < 130 MM HG: ICD-10-PCS | Mod: HCNC,CPTII,S$GLB, | Performed by: NURSE PRACTITIONER

## 2020-06-22 PROCEDURE — 99999 PR PBB SHADOW E&M-EST. PATIENT-LVL V: CPT | Mod: PBBFAC,HCNC,, | Performed by: INTERNAL MEDICINE

## 2020-06-22 RX ORDER — LANREOTIDE ACETATE 120 MG/.5ML
120 INJECTION SUBCUTANEOUS
Status: DISCONTINUED | OUTPATIENT
Start: 2020-06-22 | End: 2020-06-22 | Stop reason: HOSPADM

## 2020-06-22 RX ORDER — LANREOTIDE ACETATE 120 MG/.5ML
120 INJECTION SUBCUTANEOUS
Status: CANCELLED | OUTPATIENT
Start: 2020-06-22

## 2020-06-22 RX ADMIN — LANREOTIDE ACETATE 120 MG: 120 INJECTION SUBCUTANEOUS at 11:06

## 2020-06-22 NOTE — PROGRESS NOTES
"Shahram Hills presented for a  Medicare AWV and comprehensive Health Risk Assessment today. The following components were reviewed and updated:    · Medical history  · Family History  · Social history  · Allergies and Current Medications  · Health Risk Assessment  · Health Maintenance  · Care Team     ** See Completed Assessments for Annual Wellness Visit within the encounter summary.**       The following assessments were completed:  · Living Situation  · CAGE  · Depression Screening  · Timed Get Up and Go  · Whisper Test  · Cognitive Function Screening  · Nutrition Screening  · ADL Screening  · PAQ Screening          Vitals:    06/22/20 1233   BP: 126/70   BP Location: Left arm   Patient Position: Sitting   Pulse: 94   SpO2: 98%   Weight: 61.9 kg (136 lb 7.4 oz)   Height: 5' 2" (1.575 m)     Body mass index is 24.96 kg/m².     Physical Exam  Vitals signs reviewed.   Constitutional:       Appearance: Normal appearance. She is well-developed.   HENT:      Head: Normocephalic and atraumatic. Not macrocephalic and not microcephalic. No raccoon eyes, Wagner's sign, abrasion, contusion, right periorbital erythema, left periorbital erythema or laceration. Hair is normal.      Right Ear: No decreased hearing noted. No laceration, drainage, swelling or tenderness. No middle ear effusion. No foreign body. No mastoid tenderness. No hemotympanum. Tympanic membrane is not injected, scarred, perforated, erythematous, retracted or bulging. Tympanic membrane has normal mobility.      Left Ear: No decreased hearing noted. No laceration, drainage, swelling or tenderness.  No middle ear effusion. No foreign body. No mastoid tenderness. No hemotympanum. Tympanic membrane is not injected, scarred, perforated, erythematous, retracted or bulging. Tympanic membrane has normal mobility.      Nose: Nose normal. No nasal deformity, laceration or mucosal edema.      Mouth/Throat:      Pharynx: Uvula midline.   Eyes:      General: Lids are " normal. No scleral icterus.     Conjunctiva/sclera: Conjunctivae normal.   Neck:      Musculoskeletal: Neck supple. Normal range of motion. No edema, erythema or spinous process tenderness.      Thyroid: No thyroid mass or thyromegaly.      Trachea: Trachea normal.   Cardiovascular:      Rate and Rhythm: Normal rate and regular rhythm.      Heart sounds: No murmur. No friction rub. No gallop.    Pulmonary:      Effort: Pulmonary effort is normal. No respiratory distress.      Breath sounds: Normal breath sounds. No stridor. No wheezing, rhonchi or rales.   Chest:      Chest wall: No tenderness.   Abdominal:      Palpations: Abdomen is soft.   Musculoskeletal: Normal range of motion.   Lymphadenopathy:      Head:      Right side of head: No submental, submandibular, tonsillar, preauricular or posterior auricular adenopathy.      Left side of head: No submental, submandibular, tonsillar, preauricular, posterior auricular or occipital adenopathy.   Skin:     General: Skin is warm and dry.   Neurological:      Mental Status: She is alert and oriented to person, place, and time.   Psychiatric:         Behavior: Behavior normal.         Thought Content: Thought content normal.         Judgment: Judgment normal.         Diagnoses and health risks identified today and associated recommendations/orders:    1. Encounter for preventive health examination  Annual Health Risk Assessment (HRA) visit today.  Counseling and referral of health maintenance and preventative health measures performed.  Patient given annual wellness paperwork to take home.  Encouraged to return in 1 year for subsequent HRA visit.     2. Atherosclerosis of aorta  Chronic. Stable. Noted on CT Chest on 8/30/19. Continue current treatment plan as previously prescribed by PCP.    3. Essential hypertension  Chronic. Stable. Controlled. Encouraged to increase exercise as tolerated (moderate-intensity aerobic activity and muscle-strengthening activities)  improve diet to heart healthy, low sodium diet. Continue current treatment plan as previously prescribed by PCP.    4. Hyperlipidemia, unspecified hyperlipidemia type  Chronic. Stable. Continue current treatment plan as previously prescribed by PCP.    5. Lumbar radiculopathy  Chronic. Stable. Continue current treatment plan as previously prescribed by PCP.    6. Memory deficits  Chronic. Stable. Continue current treatment plan as previously prescribed by PCP.    7. Anemia, unspecified type  Chronic. Stable. Continue current treatment plan as previously prescribed by PCP.    8. HCC (hepatocellular carcinoma)  Chronic. Stable. Continue current treatment plan as previously prescribed by PCP.    9. Malignant carcinoid tumor of ileum  Chronic. Stable. Continue current treatment plan as previously prescribed by PCP.    10. 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 Stable. Continue current treatment plan as previously prescribed by PCP.    11. 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 Stable. Continue current treatment plan as previously prescribed by PCP.    12. Neuroendocrine tumor  Chronic. Stable. Continue current treatment plan as previously prescribed by PCP.    13. Secondary neuroendocrine tumor of bone  Chronic. Stable. Continue current treatment plan as previously prescribed by PCP.    14. Spine metastasis  Chronic. Stable. Continue current treatment plan as previously prescribed by PCP.    15. B12 deficiency  Chronic. Stable. Continue current treatment plan as previously prescribed by PCP.    16. Hyperglycemia  Chronic. Stable. Continue current treatment plan as previously prescribed by PCP.    17. Type 2 diabetes mellitus without complication, without long-term current use of insulin  Chronic. Stable. Continue current treatment plan as previously prescribed by PCP.    18. Calculus of gallbladder without cholecystitis without obstruction  Chronic. Stable. Continue current treatment  plan as previously prescribed by PCP.    19. Chronic RUQ pain  Chronic. Stable. Continue current treatment plan as previously prescribed by PCP.    20. Liver masses  Chronic. Stable. Continue current treatment plan as previously prescribed by PCP.    21. DNR (do not resuscitate)  Chronic. Stable. Continue current treatment plan as previously prescribed by PCP.    22. Hearing loss, unspecified hearing loss type, unspecified laterality  - Ambulatory referral/consult to ENT; Future      Provided Shahram with a 5-10 year written screening schedule and personal prevention plan. Recommendations were developed using the USPSTF age appropriate recommendations. Education, counseling, and referrals were provided as needed. After Visit Summary printed and given to patient which includes a list of additional screenings\tests needed.    Follow up in about 1 year (around 6/22/2021).    Phan Nguyen NP  I offered to discuss end of life issues, including information on how to make advance directives that the patient could use to name someone who would make medical decisions on their behalf if they became too ill to make themselves.    ___Patient declined  _X_Patient is interested, I provided paper work and offered to discuss.

## 2020-06-22 NOTE — PATIENT INSTRUCTIONS
Counseling and Referral of Other Preventative  (Italic type indicates deductible and co-insurance are waived)    Patient Name: Shahram Hills  Today's Date: 6/22/2020    Health Maintenance       Date Due Completion Date    Foot Exam 05/08/1950 ---    Urine Microalbumin 05/08/1950 ---    Pneumococcal Vaccine (65+ High/Highest Risk) (2 of 2 - PPSV23) 10/15/2019 8/20/2019    Hemoglobin A1c 08/26/2020 2/26/2020    Eye Exam 09/23/2020 9/23/2019    Lipid Panel 02/26/2021 2/26/2020    Override on 11/27/2017: Done    DEXA SCAN 06/11/2021 6/11/2018 (ClinicallyNA)    Override on 6/11/2018: Not Clinically Appropriate    TETANUS VACCINE 12/10/2029 12/10/2019    Override on 3/23/2018: Declined        No orders of the defined types were placed in this encounter.    The following information is provided to all patients.  This information is to help you find resources for any of the problems found today that may be affecting your health:                Living healthy guide: www.[facility].[place].gov      Understanding Diabetes: www.diabetes.org      Eating healthy: www.cdc.gov/healthyweight      CDC home safety checklist: www.cdc.gov/steadi/patient.html      Agency on Aging: www.goea.[place].gov      Alcoholics anonymous (AA): www.aa.org      Physical Activity: www.leonard.nih.gov/ne8mjio      Tobacco use: www.quitwithusla.org

## 2020-06-22 NOTE — Clinical Note
Check US leg today. RTC in 4 weeks with CBC, CMP, chromogranin A, CT chest, MRI abdomen/pelvis done prior to return. See me then get lanreotide

## 2020-06-22 NOTE — PROGRESS NOTES
"  Subjective:       Patient ID: Shahram Hills is a 80 y.o. female.     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 Ms. Hills is an 77 yo woman who initially saw me on 6/7/18 for further evaluation of neuroendocrine tumor. She initially presented with diarrhea, abdominal discomfort, weight loss. She was evaluated at [facility] and underwent workup below:  US abdomen on 3/14/18 showed numerous bilobar mixed echogenicity and mildly vascular hepatic lesions. Cholelithiasis without e/o acute cholecystitis.   MRI abdomen on 3/30/2018 showed innumerable hepatic metastases. L3 vertebral body metastasis with soft tissue component along the right margin of the L3 and upper L4 vertebral bodies, filling the right L3/4 neural foramen, and extending into the anterior aspect of the spinal canal on the right at the L3 and upper T4 levels. Associated with mild spinal canal narrowing.  CT chest on 4/6/2018 showed one lucent nodule measuring 7 mm in the T7 vertebral body, most likely representing metastatic disease.    EGD on 5/4/18 showed normal duodenum. Schatzki's ring in the lower third of the esophagus. Grade 1 esophagitis in the GE junction c/w mild distal esophagitis. Congestion and erythema in the antrum, pre-pyloric region and stomach body c/w gastritis. Biopsy of the stomach antrum showed mild chronic gastritis, neg for H. pylori.   Labs on 5/9/2018: WBC 6.9, H/H 11.6/34.3, MCV 87.5, plt count 279. On 3/9/18: Vit B12 level 173, folic acid 6.6  She was started on oral vit B12 and underwent a liver biopsy on 5/18/18. Pathology showed "metastatic well differentiated neuroendocrine tumor. Ki67 3%"     She saw me on 6/7/18 and complained of weight loss of 26 lbs over the past 3-4 months, also has diarrhea. No flushing, wheezing, palpitations. Has been having pain in right flank radiating down " "the right leg. No tingling/numbness, weakness. She can hold her bladder but when she urinates her diarrhea would come out as well. Started on dex 4 mg q6h the evening of 18.      2. MRI spine on 18 showed "Marrow replacing metastatic lesion of the L3 vertebral body with associated extra osseous expansion and complete effacement of the right L3-L4 neural foramina and additional effacement of the right lateral recess.  There is additional lateral extension and abutment of the right psoas muscle.  Superimposed degenerative change at this level results in mild spinal canal stenosis." I sent the patient to ED on 18 where she was evaluated by neurosurgery. Neurosurgery did not feel she was a candidate for surgical intervention.      3. Seen by Dr. Adames on 18. palliative radiation to the area of the L3 metastasis 18-18   4. 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  There is an adjacent metastatic lymph node, diffuse liver metastases, and multiple bone metastases. Index primary neoplasm SUV max 33.13. Adjacent lymph node SUV max 23. L3 bone metastasis SUV max 36.86. Left lobe SUV max 46.13   5. Lanreotide every 4 weeks started on 18  6. TACE to the right hepatic lobe on 19. TACE to the left hepatic lobe on 3/21/19.      History of Present Illness:   Mr. Hills returns for follow up. Has been feeling well. No complaints. Nephew  from COVID. She was also tested positive one month ago, but remained asymptomatic. Noted mild bilateral leg swelling which improved after elevation.      ECO     ROS:   See HPI. Otherwise negative.      Physical Examination:   Vital signs reviewed.   Gen: well hydrated, well developed, in no acute distress.  HEENT: normocephalic, anicteric, PERRLA, EOMI, oropharynx clear  Neck: supple, no JVD, thyromegaly, cervical or supraclavicular LAD  Lungs: CTAB, no wheezes or rales  Heart: RRR, no M/R/G  Abdomen: soft, " no tenderness, non-distended,  liver palpated 5.0 cm below the right costal margin, no splenomegaly, no mass, or hernia.   Ext: b/l 1+ edema R>L  Neuro: alert and oriented x 4, no focal neuro deficit  Skin: no rash, open wounds or ulcers  Psych: pleasant and appropriate mood and affect     Objective:      Diagnostic Tests:  CT chest 02/26/2020:  No evidence of metastatic disease to the chest.  Subcentimeter pulmonary nodules are stable and likely benign.     MRI abdomen/pelvis 02/26/2020:     Stable exam.    Numerous enhancing hepatic lesions are unchanged in size and number.  The enhancing mass of the distal ileum is again identified and not significantly changed.  The enhancing L3 osseous lesion is not significantly changed.    RECIST SUMMARY:    Date of prior examination for comparison: 11/22/2019    Lesion 1: Hepatic segment 4.  5.2 cm (previously 5.3 cm).        Laboratory Data:  Labs reviewed. CBC normal. Bili 1.5.      Assessment and Plan:      1. Malignant carcinoid tumor of ileum    2. 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 Spine metastasis    4. 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 Bilateral leg edema    6. Type 2 diabetes mellitus without complication, without long-term current use of insulin    7. Essential hypertension        1-4  - doing well. On lanreotide. S/p TACE  - c/w lanreotide every 4 weeks. Get lanreotide today  - RTC in one month with restaging CT chest and MRI A/P     5.  - check US leg    6.  - BS good  - c/w current meds     7.  - BP good  - c/w current meds

## 2020-06-23 ENCOUNTER — TELEPHONE (OUTPATIENT)
Dept: HEMATOLOGY/ONCOLOGY | Facility: CLINIC | Age: 80
End: 2020-06-23

## 2020-06-23 NOTE — TELEPHONE ENCOUNTER
Called and spoke with Ms Mcguire, Ms Hills's sister. Informed Ms Mcguire, Ms Hills's LE U/S was negative, no blood clot in her legs. Ms Mcguire voiced verbal understanding.

## 2020-07-17 ENCOUNTER — HOSPITAL ENCOUNTER (OUTPATIENT)
Dept: RADIOLOGY | Facility: HOSPITAL | Age: 80
Discharge: HOME | End: 2020-07-17
Attending: INTERNAL MEDICINE
Payer: MEDICARE

## 2020-07-17 DIAGNOSIS — C7A.012 MALIGNANT CARCINOID TUMOR OF ILEUM: ICD-10-CM

## 2020-07-17 PROCEDURE — 25500020 PHARM REV CODE 255: Mod: HCNC | Performed by: INTERNAL MEDICINE

## 2020-07-17 PROCEDURE — 74183 MRI ABDOMEN-PELVIS W W/O CONTRAST (XPD): ICD-10-PCS | Mod: 26,HCNC,, | Performed by: RADIOLOGY

## 2020-07-17 PROCEDURE — A9585 GADOBUTROL INJECTION: HCPCS | Mod: HCNC | Performed by: INTERNAL MEDICINE

## 2020-07-17 PROCEDURE — 72197 MRI ABDOMEN-PELVIS W W/O CONTRAST (XPD): ICD-10-PCS | Mod: 26,HCNC,, | Performed by: RADIOLOGY

## 2020-07-17 PROCEDURE — 71250 CT THORAX DX C-: CPT | Mod: TC,HCNC

## 2020-07-17 PROCEDURE — 71250 CT THORAX DX C-: CPT | Mod: 26,HCNC,, | Performed by: RADIOLOGY

## 2020-07-17 PROCEDURE — 72197 MRI PELVIS W/O & W/DYE: CPT | Mod: 26,HCNC,, | Performed by: RADIOLOGY

## 2020-07-17 PROCEDURE — 74183 MRI ABD W/O CNTR FLWD CNTR: CPT | Mod: 26,HCNC,, | Performed by: RADIOLOGY

## 2020-07-17 PROCEDURE — 71250 CT CHEST WITHOUT CONTRAST: ICD-10-PCS | Mod: 26,HCNC,, | Performed by: RADIOLOGY

## 2020-07-17 PROCEDURE — 72197 MRI PELVIS W/O & W/DYE: CPT | Mod: TC,HCNC

## 2020-07-17 RX ORDER — GADOBUTROL 604.72 MG/ML
10 INJECTION INTRAVENOUS
Status: COMPLETED | OUTPATIENT
Start: 2020-07-17 | End: 2020-07-17

## 2020-07-17 RX ADMIN — GADOBUTROL 10 ML: 604.72 INJECTION INTRAVENOUS at 08:07

## 2020-07-20 ENCOUNTER — INFUSION (OUTPATIENT)
Dept: INFUSION THERAPY | Facility: OTHER | Age: 80
End: 2020-07-20
Attending: INTERNAL MEDICINE
Payer: MEDICARE

## 2020-07-20 ENCOUNTER — OFFICE VISIT (OUTPATIENT)
Dept: HEMATOLOGY/ONCOLOGY | Facility: CLINIC | Age: 80
End: 2020-07-20
Payer: MEDICARE

## 2020-07-20 VITALS
HEIGHT: 64 IN | BODY MASS INDEX: 23.07 KG/M2 | SYSTOLIC BLOOD PRESSURE: 154 MMHG | TEMPERATURE: 98 F | RESPIRATION RATE: 18 BRPM | OXYGEN SATURATION: 100 % | DIASTOLIC BLOOD PRESSURE: 85 MMHG | HEART RATE: 98 BPM | WEIGHT: 135.13 LBS

## 2020-07-20 DIAGNOSIS — C7B.8 METASTATIC MALIGNANT NEUROENDOCRINE TUMOR TO LIVER: Primary | ICD-10-CM

## 2020-07-20 DIAGNOSIS — E11.9 TYPE 2 DIABETES MELLITUS WITHOUT COMPLICATION, WITHOUT LONG-TERM CURRENT USE OF INSULIN: ICD-10-CM

## 2020-07-20 DIAGNOSIS — C7B.8 METASTATIC MALIGNANT NEUROENDOCRINE TUMOR TO LIVER: ICD-10-CM

## 2020-07-20 DIAGNOSIS — C7A.012 MALIGNANT CARCINOID TUMOR OF ILEUM: Primary | ICD-10-CM

## 2020-07-20 DIAGNOSIS — C79.51 SPINE METASTASIS: ICD-10-CM

## 2020-07-20 DIAGNOSIS — I10 ESSENTIAL HYPERTENSION: ICD-10-CM

## 2020-07-20 PROCEDURE — 1159F MED LIST DOCD IN RCRD: CPT | Mod: HCNC,S$GLB,, | Performed by: INTERNAL MEDICINE

## 2020-07-20 PROCEDURE — 1101F PR PT FALLS ASSESS DOC 0-1 FALLS W/OUT INJ PAST YR: ICD-10-PCS | Mod: HCNC,CPTII,S$GLB, | Performed by: INTERNAL MEDICINE

## 2020-07-20 PROCEDURE — 1126F AMNT PAIN NOTED NONE PRSNT: CPT | Mod: HCNC,S$GLB,, | Performed by: INTERNAL MEDICINE

## 2020-07-20 PROCEDURE — 3079F DIAST BP 80-89 MM HG: CPT | Mod: HCNC,CPTII,S$GLB, | Performed by: INTERNAL MEDICINE

## 2020-07-20 PROCEDURE — 1159F PR MEDICATION LIST DOCUMENTED IN MEDICAL RECORD: ICD-10-PCS | Mod: HCNC,S$GLB,, | Performed by: INTERNAL MEDICINE

## 2020-07-20 PROCEDURE — 99999 PR PBB SHADOW E&M-EST. PATIENT-LVL IV: ICD-10-PCS | Mod: PBBFAC,HCNC,, | Performed by: INTERNAL MEDICINE

## 2020-07-20 PROCEDURE — 3079F PR MOST RECENT DIASTOLIC BLOOD PRESSURE 80-89 MM HG: ICD-10-PCS | Mod: HCNC,CPTII,S$GLB, | Performed by: INTERNAL MEDICINE

## 2020-07-20 PROCEDURE — 96401 CHEMO ANTI-NEOPL SQ/IM: CPT | Mod: HCNC

## 2020-07-20 PROCEDURE — 3077F SYST BP >= 140 MM HG: CPT | Mod: HCNC,CPTII,S$GLB, | Performed by: INTERNAL MEDICINE

## 2020-07-20 PROCEDURE — 1101F PT FALLS ASSESS-DOCD LE1/YR: CPT | Mod: HCNC,CPTII,S$GLB, | Performed by: INTERNAL MEDICINE

## 2020-07-20 PROCEDURE — 3077F PR MOST RECENT SYSTOLIC BLOOD PRESSURE >= 140 MM HG: ICD-10-PCS | Mod: HCNC,CPTII,S$GLB, | Performed by: INTERNAL MEDICINE

## 2020-07-20 PROCEDURE — 99214 PR OFFICE/OUTPT VISIT, EST, LEVL IV, 30-39 MIN: ICD-10-PCS | Mod: HCNC,S$GLB,, | Performed by: INTERNAL MEDICINE

## 2020-07-20 PROCEDURE — 63600175 PHARM REV CODE 636 W HCPCS: Mod: JG,HCNC | Performed by: INTERNAL MEDICINE

## 2020-07-20 PROCEDURE — 99999 PR PBB SHADOW E&M-EST. PATIENT-LVL IV: CPT | Mod: PBBFAC,HCNC,, | Performed by: INTERNAL MEDICINE

## 2020-07-20 PROCEDURE — 1126F PR PAIN SEVERITY QUANTIFIED, NO PAIN PRESENT: ICD-10-PCS | Mod: HCNC,S$GLB,, | Performed by: INTERNAL MEDICINE

## 2020-07-20 PROCEDURE — 96372 THER/PROPH/DIAG INJ SC/IM: CPT

## 2020-07-20 PROCEDURE — 99214 OFFICE O/P EST MOD 30 MIN: CPT | Mod: HCNC,S$GLB,, | Performed by: INTERNAL MEDICINE

## 2020-07-20 RX ORDER — LANREOTIDE ACETATE 120 MG/.5ML
120 INJECTION SUBCUTANEOUS
Status: CANCELLED | OUTPATIENT
Start: 2020-07-20

## 2020-07-20 RX ORDER — LANREOTIDE ACETATE 120 MG/.5ML
120 INJECTION SUBCUTANEOUS
Status: DISCONTINUED | OUTPATIENT
Start: 2020-07-20 | End: 2020-07-20 | Stop reason: HOSPADM

## 2020-07-20 RX ADMIN — LANREOTIDE ACETATE 120 MG: 120 INJECTION SUBCUTANEOUS at 09:07

## 2020-07-20 NOTE — PROGRESS NOTES
"Subjective:       Patient ID: Shahram Hills is a 80 y.o. female.     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 Ms. Hills is an 79 yo woman who initially saw me on 6/7/18 for further evaluation of neuroendocrine tumor. She initially presented with diarrhea, abdominal discomfort, weight loss. She was evaluated at [facility] and underwent workup below:  US abdomen on 3/14/18 showed numerous bilobar mixed echogenicity and mildly vascular hepatic lesions. Cholelithiasis without e/o acute cholecystitis.   MRI abdomen on 3/30/2018 showed innumerable hepatic metastases. L3 vertebral body metastasis with soft tissue component along the right margin of the L3 and upper L4 vertebral bodies, filling the right L3/4 neural foramen, and extending into the anterior aspect of the spinal canal on the right at the L3 and upper T4 levels. Associated with mild spinal canal narrowing.  CT chest on 4/6/2018 showed one lucent nodule measuring 7 mm in the T7 vertebral body, most likely representing metastatic disease.    EGD on 5/4/18 showed normal duodenum. Schatzki's ring in the lower third of the esophagus. Grade 1 esophagitis in the GE junction c/w mild distal esophagitis. Congestion and erythema in the antrum, pre-pyloric region and stomach body c/w gastritis. Biopsy of the stomach antrum showed mild chronic gastritis, neg for H. pylori.   Labs on 5/9/2018: WBC 6.9, H/H 11.6/34.3, MCV 87.5, plt count 279. On 3/9/18: Vit B12 level 173, folic acid 6.6  She was started on oral vit B12 and underwent a liver biopsy on 5/18/18. Pathology showed "metastatic well differentiated neuroendocrine tumor. Ki67 3%"     She saw me on 6/7/18 and complained of weight loss of 26 lbs over the past 3-4 months, also has diarrhea. No flushing, wheezing, palpitations. Has been having pain in right flank radiating down " "the right leg. No tingling/numbness, weakness. She can hold her bladder but when she urinates her diarrhea would come out as well. Started on dex 4 mg q6h the evening of 18.      2. MRI spine on 18 showed "Marrow replacing metastatic lesion of the L3 vertebral body with associated extra osseous expansion and complete effacement of the right L3-L4 neural foramina and additional effacement of the right lateral recess.  There is additional lateral extension and abutment of the right psoas muscle.  Superimposed degenerative change at this level results in mild spinal canal stenosis." I sent the patient to ED on 18 where she was evaluated by neurosurgery. Neurosurgery did not feel she was a candidate for surgical intervention.      3. Seen by Dr. Adames on 18. palliative radiation to the area of the L3 metastasis 18-18   4. 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  There is an adjacent metastatic lymph node, diffuse liver metastases, and multiple bone metastases. Index primary neoplasm SUV max 33.13. Adjacent lymph node SUV max 23. L3 bone metastasis SUV max 36.86. Left lobe SUV max 46.13   5. Lanreotide every 4 weeks started on 18  6. TACE to the right hepatic lobe on 19. TACE to the left hepatic lobe on 3/21/19.      History of Present Illness:   Mr. Hills returns for follow up. Has been feeling well. No complaints.      ECO     ROS:   See HPI. Otherwise negative.      Physical Examination:   Vital signs reviewed.   Gen: well hydrated, well developed, in no acute distress.  HEENT: normocephalic, anicteric, PERRLA, EOMI, oropharynx clear  Neck: supple, no JVD, thyromegaly, cervical or supraclavicular LAD  Lungs: CTAB, no wheezes or rales  Heart: RRR, no M/R/G  Abdomen: soft, no tenderness, non-distended,  liver palpated 4.0 cm below the right costal margin, no splenomegaly, no mass, or hernia.   Ext: b/l LE trace edema  Neuro: alert " and oriented x 4, no focal neuro deficit  Skin: no rash, open wounds or ulcers  Psych: pleasant and appropriate mood and affect     Objective:      Diagnostic Tests:  CT chest 7/17/2020:     Two subcentimeter pulmonary nodules within the right upper and left lower lobes, both stable dating back to at least 12/07/2018, favored a benign etiology.     Two pulmonary micronodules within the right lung, stable dating back to at least 08/30/2019.     Sclerotic foci within the manubrium and sternum in keeping with known metastatic disease.     MRI abdomen/pelvis 7/17/2020:  Stable examination.  Numerous hepatic masses, distal ileal mass, and enhancing L3 vertebral body lesion appear similar to prior examination.     RECIST SUMMARY:     Date of prior examination for comparison: 02/26/2020.     Lesion 1: Liver.  5.2 cm. Series 18 image 43.  Prior measurement 5.2 cm.     Lesion 2: Distal ileum.  2.6 cm. Series 23 image 15.  Prior measurement 2.6 cm.        Laboratory Data:  Labs reviewed. CBC normal. Bili 1.5.      Assessment and Plan:      1. Malignant carcinoid tumor of ileum    2. 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 Spine metastasis    4. Type 2 diabetes mellitus without complication, without long-term current use of insulin    5. Essential hypertension        1-3  - doing well. On lanreotide. S/p TACE  - discussed CT and MRI result. Stable disease  - c/w lanreotide every 4 weeks. Get lanreotide today  - RTC in 4 weeks  - given stable disease, will get next restaging scan in Jan 2020 in the absence of symptoms    4.  - BS good  - c/w current meds     5.  - BP good  - c/w current meds

## 2020-08-10 ENCOUNTER — CLINICAL SUPPORT (OUTPATIENT)
Dept: OTOLARYNGOLOGY | Facility: CLINIC | Age: 80
End: 2020-08-10
Payer: MEDICARE

## 2020-08-10 ENCOUNTER — OFFICE VISIT (OUTPATIENT)
Dept: OTOLARYNGOLOGY | Facility: CLINIC | Age: 80
End: 2020-08-10
Payer: MEDICARE

## 2020-08-10 ENCOUNTER — LAB VISIT (OUTPATIENT)
Dept: LAB | Facility: OTHER | Age: 80
End: 2020-08-10
Attending: OTOLARYNGOLOGY
Payer: MEDICARE

## 2020-08-10 VITALS
WEIGHT: 136.88 LBS | SYSTOLIC BLOOD PRESSURE: 158 MMHG | DIASTOLIC BLOOD PRESSURE: 93 MMHG | TEMPERATURE: 97 F | HEART RATE: 86 BPM | BODY MASS INDEX: 23.37 KG/M2 | HEIGHT: 64 IN

## 2020-08-10 DIAGNOSIS — H90.3 ASYMMETRICAL SENSORINEURAL HEARING LOSS: Primary | ICD-10-CM

## 2020-08-10 DIAGNOSIS — C7B.8 NEUROENDOCRINE CARCINOMA METASTATIC TO BONE: ICD-10-CM

## 2020-08-10 DIAGNOSIS — H90.3 ASYMMETRICAL SENSORINEURAL HEARING LOSS: ICD-10-CM

## 2020-08-10 DIAGNOSIS — R29.2 ABNORMAL ACOUSTIC REFLEX: ICD-10-CM

## 2020-08-10 DIAGNOSIS — R03.0 ELEVATED BLOOD PRESSURE READING: ICD-10-CM

## 2020-08-10 DIAGNOSIS — C7A.8 NEUROENDOCRINE CARCINOMA METASTATIC TO BONE: ICD-10-CM

## 2020-08-10 LAB
CREAT SERPL-MCNC: 0.7 MG/DL (ref 0.5–1.4)
EST. GFR  (AFRICAN AMERICAN): >60 ML/MIN/1.73 M^2
EST. GFR  (NON AFRICAN AMERICAN): >60 ML/MIN/1.73 M^2

## 2020-08-10 PROCEDURE — 99204 PR OFFICE/OUTPT VISIT, NEW, LEVL IV, 45-59 MIN: ICD-10-PCS | Mod: S$GLB,,, | Performed by: OTOLARYNGOLOGY

## 2020-08-10 PROCEDURE — 92550 TYMPANOMETRY & REFLEX THRESH: CPT | Mod: S$GLB,,, | Performed by: AUDIOLOGIST-HEARING AID FITTER

## 2020-08-10 PROCEDURE — 36415 COLL VENOUS BLD VENIPUNCTURE: CPT | Mod: HCNC

## 2020-08-10 PROCEDURE — 3080F DIAST BP >= 90 MM HG: CPT | Mod: CPTII,S$GLB,, | Performed by: OTOLARYNGOLOGY

## 2020-08-10 PROCEDURE — 99204 OFFICE O/P NEW MOD 45 MIN: CPT | Mod: S$GLB,,, | Performed by: OTOLARYNGOLOGY

## 2020-08-10 PROCEDURE — 3077F PR MOST RECENT SYSTOLIC BLOOD PRESSURE >= 140 MM HG: ICD-10-PCS | Mod: CPTII,S$GLB,, | Performed by: OTOLARYNGOLOGY

## 2020-08-10 PROCEDURE — 3077F SYST BP >= 140 MM HG: CPT | Mod: CPTII,S$GLB,, | Performed by: OTOLARYNGOLOGY

## 2020-08-10 PROCEDURE — 1101F PR PT FALLS ASSESS DOC 0-1 FALLS W/OUT INJ PAST YR: ICD-10-PCS | Mod: CPTII,S$GLB,, | Performed by: OTOLARYNGOLOGY

## 2020-08-10 PROCEDURE — 1159F MED LIST DOCD IN RCRD: CPT | Mod: S$GLB,,, | Performed by: OTOLARYNGOLOGY

## 2020-08-10 PROCEDURE — 3080F PR MOST RECENT DIASTOLIC BLOOD PRESSURE >= 90 MM HG: ICD-10-PCS | Mod: CPTII,S$GLB,, | Performed by: OTOLARYNGOLOGY

## 2020-08-10 PROCEDURE — 92550 PR TYMPANOMETRY AND REFLEX THRESHOLD MEASUREMENTS: ICD-10-PCS | Mod: S$GLB,,, | Performed by: AUDIOLOGIST-HEARING AID FITTER

## 2020-08-10 PROCEDURE — 82565 ASSAY OF CREATININE: CPT | Mod: HCNC

## 2020-08-10 PROCEDURE — 92557 PR COMPREHENSIVE HEARING TEST: ICD-10-PCS | Mod: S$GLB,,, | Performed by: AUDIOLOGIST-HEARING AID FITTER

## 2020-08-10 PROCEDURE — 92557 COMPREHENSIVE HEARING TEST: CPT | Mod: S$GLB,,, | Performed by: AUDIOLOGIST-HEARING AID FITTER

## 2020-08-10 PROCEDURE — 1101F PT FALLS ASSESS-DOCD LE1/YR: CPT | Mod: CPTII,S$GLB,, | Performed by: OTOLARYNGOLOGY

## 2020-08-10 PROCEDURE — 1159F PR MEDICATION LIST DOCUMENTED IN MEDICAL RECORD: ICD-10-PCS | Mod: S$GLB,,, | Performed by: OTOLARYNGOLOGY

## 2020-08-10 NOTE — LETTER
August 21, 2020      Phan Nguyen, HUY  [address]  [address]  [facility] [address]           [facility] [address]  [address],   [facility] [address]  Phone: [phone]  Fax: [phone]          Patient: Shahram Hills   MR Number: [id number]   YOB: 1940   Date of Visit: 8/10/2020       Dear Phan Nguyen:    Thank you for referring Shahram Hills to me for evaluation. Attached you will find relevant portions of my assessment and plan of care.    If you have questions, please do not hesitate to call me. I look forward to following Shahram Hills along with you.    Sincerely,    Jeanne Marshall MD    Enclosure  CC:  No Recipients    If you would like to receive this communication electronically, please contact [email or web address] or [phone] to request more information on amazingtunes Link access.    For providers and/or their staff who would like to refer a patient to [facility], please contact us through our one-stop-shop provider referral line, [facility], at [phone].    If you feel you have received this communication in error or would no longer like to receive these types of communications, please e-mail [email or web address]

## 2020-08-10 NOTE — PATIENT INSTRUCTIONS
Proceed with MRI as ordered, then follow up.      Monitor blood pressure at home and follow up with PCP if remains elevated.

## 2020-08-10 NOTE — PROGRESS NOTES
Jose Mckeon, CCC-A  Audiologist - [facility] [address] [place] [address]  [email or web address]  [phone]    Patient: Shahram Hills   MRN: [id number]  [address] [facility]   Home Phone [phone]   Work Phone Not on file.   Mobile [phone]   : 1940  LINARES: 8/10/2020      AUDIOLOGICAL EVALUATION      RECOMMENDATIONS:   It is recommended that Shahram Hills:   Follow up medically with Dr. Marshall to assess her asymmetrical hearing loss.   Receive binaural hearing aids to improve speech understanding, pending medical clearance.   Continue to receive audiological monitoring annually.   Use precaution and/or hearing protection in noisy environments.    If you should have any questions or concerns regarding the above information, please do not hesitate to contact me at [phone].      _______________________________  Jose Mckeon, CCC-A, F-AAA  Audiologist

## 2020-08-10 NOTE — Clinical Note
Your patient, Shahram Hills, was recently seen for an audiogram.  My assessment and recommendations are enclosed.    If you should have any questions or concerns, please contact me at [phone].     Sincerely,  Jose Mckeon, CCC-A, F-[facility]  Audiologist  [facility]

## 2020-08-14 ENCOUNTER — HOSPITAL ENCOUNTER (OUTPATIENT)
Dept: RADIOLOGY | Facility: OTHER | Age: 80
Discharge: HOME | End: 2020-08-14
Attending: OTOLARYNGOLOGY
Payer: MEDICARE

## 2020-08-14 DIAGNOSIS — H90.3 ASYMMETRICAL SENSORINEURAL HEARING LOSS: ICD-10-CM

## 2020-08-14 PROCEDURE — 70553 MRI IAC/TEMPORAL BONES W W/O CONTRAST: ICD-10-PCS | Mod: 26,HCNC,, | Performed by: RADIOLOGY

## 2020-08-14 PROCEDURE — 70553 MRI BRAIN STEM W/O & W/DYE: CPT | Mod: 26,HCNC,, | Performed by: RADIOLOGY

## 2020-08-14 PROCEDURE — 70553 MRI BRAIN STEM W/O & W/DYE: CPT | Mod: TC,HCNC

## 2020-08-14 PROCEDURE — A9585 GADOBUTROL INJECTION: HCPCS | Mod: HCNC | Performed by: OTOLARYNGOLOGY

## 2020-08-14 PROCEDURE — 25500020 PHARM REV CODE 255: Mod: HCNC | Performed by: OTOLARYNGOLOGY

## 2020-08-14 RX ORDER — GADOBUTROL 604.72 MG/ML
6 INJECTION INTRAVENOUS
Status: COMPLETED | OUTPATIENT
Start: 2020-08-14 | End: 2020-08-14

## 2020-08-14 RX ADMIN — GADOBUTROL 6 ML: 604.72 INJECTION INTRAVENOUS at 09:08

## 2020-08-17 ENCOUNTER — INFUSION (OUTPATIENT)
Dept: INFUSION THERAPY | Facility: OTHER | Age: 80
End: 2020-08-17
Attending: INTERNAL MEDICINE
Payer: MEDICARE

## 2020-08-17 ENCOUNTER — OFFICE VISIT (OUTPATIENT)
Dept: HEMATOLOGY/ONCOLOGY | Facility: CLINIC | Age: 80
End: 2020-08-17
Payer: MEDICARE

## 2020-08-17 ENCOUNTER — TELEPHONE (OUTPATIENT)
Dept: HEMATOLOGY/ONCOLOGY | Facility: CLINIC | Age: 80
End: 2020-08-17

## 2020-08-17 VITALS
HEIGHT: 64 IN | DIASTOLIC BLOOD PRESSURE: 84 MMHG | HEART RATE: 84 BPM | BODY MASS INDEX: 23.26 KG/M2 | WEIGHT: 136.25 LBS | SYSTOLIC BLOOD PRESSURE: 137 MMHG | TEMPERATURE: 98 F | OXYGEN SATURATION: 98 % | RESPIRATION RATE: 16 BRPM

## 2020-08-17 DIAGNOSIS — E87.5 HYPERKALEMIA: ICD-10-CM

## 2020-08-17 DIAGNOSIS — C7A.8 NEUROENDOCRINE CARCINOMA METASTATIC TO BONE: ICD-10-CM

## 2020-08-17 DIAGNOSIS — C7B.8 METASTATIC MALIGNANT NEUROENDOCRINE TUMOR TO LIVER: ICD-10-CM

## 2020-08-17 DIAGNOSIS — C7B.8 METASTATIC MALIGNANT NEUROENDOCRINE TUMOR TO LIVER: Primary | ICD-10-CM

## 2020-08-17 DIAGNOSIS — E11.9 TYPE 2 DIABETES MELLITUS WITHOUT COMPLICATION, WITHOUT LONG-TERM CURRENT USE OF INSULIN: ICD-10-CM

## 2020-08-17 DIAGNOSIS — I10 ESSENTIAL HYPERTENSION: ICD-10-CM

## 2020-08-17 DIAGNOSIS — C7A.012 MALIGNANT CARCINOID TUMOR OF ILEUM: Primary | ICD-10-CM

## 2020-08-17 DIAGNOSIS — C7B.8 NEUROENDOCRINE CARCINOMA METASTATIC TO BONE: ICD-10-CM

## 2020-08-17 PROCEDURE — 1159F PR MEDICATION LIST DOCUMENTED IN MEDICAL RECORD: ICD-10-PCS | Mod: HCNC,S$GLB,, | Performed by: INTERNAL MEDICINE

## 2020-08-17 PROCEDURE — 99999 PR PBB SHADOW E&M-EST. PATIENT-LVL IV: ICD-10-PCS | Mod: PBBFAC,HCNC,, | Performed by: INTERNAL MEDICINE

## 2020-08-17 PROCEDURE — 99214 OFFICE O/P EST MOD 30 MIN: CPT | Mod: HCNC,S$GLB,, | Performed by: INTERNAL MEDICINE

## 2020-08-17 PROCEDURE — 99999 PR PBB SHADOW E&M-EST. PATIENT-LVL IV: CPT | Mod: PBBFAC,HCNC,, | Performed by: INTERNAL MEDICINE

## 2020-08-17 PROCEDURE — 3075F PR MOST RECENT SYSTOLIC BLOOD PRESS GE 130-139MM HG: ICD-10-PCS | Mod: HCNC,CPTII,S$GLB, | Performed by: INTERNAL MEDICINE

## 2020-08-17 PROCEDURE — 1159F MED LIST DOCD IN RCRD: CPT | Mod: HCNC,S$GLB,, | Performed by: INTERNAL MEDICINE

## 2020-08-17 PROCEDURE — 99214 PR OFFICE/OUTPT VISIT, EST, LEVL IV, 30-39 MIN: ICD-10-PCS | Mod: HCNC,S$GLB,, | Performed by: INTERNAL MEDICINE

## 2020-08-17 PROCEDURE — 3079F PR MOST RECENT DIASTOLIC BLOOD PRESSURE 80-89 MM HG: ICD-10-PCS | Mod: HCNC,CPTII,S$GLB, | Performed by: INTERNAL MEDICINE

## 2020-08-17 PROCEDURE — 1101F PR PT FALLS ASSESS DOC 0-1 FALLS W/OUT INJ PAST YR: ICD-10-PCS | Mod: HCNC,CPTII,S$GLB, | Performed by: INTERNAL MEDICINE

## 2020-08-17 PROCEDURE — 96372 THER/PROPH/DIAG INJ SC/IM: CPT | Mod: HCNC

## 2020-08-17 PROCEDURE — 1126F PR PAIN SEVERITY QUANTIFIED, NO PAIN PRESENT: ICD-10-PCS | Mod: HCNC,S$GLB,, | Performed by: INTERNAL MEDICINE

## 2020-08-17 PROCEDURE — 3075F SYST BP GE 130 - 139MM HG: CPT | Mod: HCNC,CPTII,S$GLB, | Performed by: INTERNAL MEDICINE

## 2020-08-17 PROCEDURE — 1126F AMNT PAIN NOTED NONE PRSNT: CPT | Mod: HCNC,S$GLB,, | Performed by: INTERNAL MEDICINE

## 2020-08-17 PROCEDURE — 63600175 PHARM REV CODE 636 W HCPCS: Mod: JG,HCNC | Performed by: INTERNAL MEDICINE

## 2020-08-17 PROCEDURE — 3079F DIAST BP 80-89 MM HG: CPT | Mod: HCNC,CPTII,S$GLB, | Performed by: INTERNAL MEDICINE

## 2020-08-17 PROCEDURE — 1101F PT FALLS ASSESS-DOCD LE1/YR: CPT | Mod: HCNC,CPTII,S$GLB, | Performed by: INTERNAL MEDICINE

## 2020-08-17 RX ORDER — LANREOTIDE ACETATE 120 MG/.5ML
120 INJECTION SUBCUTANEOUS
Status: CANCELLED | OUTPATIENT
Start: 2020-08-17

## 2020-08-17 RX ORDER — LANREOTIDE ACETATE 120 MG/.5ML
120 INJECTION SUBCUTANEOUS
Status: DISCONTINUED | OUTPATIENT
Start: 2020-08-17 | End: 2020-08-17 | Stop reason: HOSPADM

## 2020-08-17 RX ADMIN — LANREOTIDE ACETATE 120 MG: 120 INJECTION SUBCUTANEOUS at 11:08

## 2020-08-17 NOTE — TELEPHONE ENCOUNTER
Returned call and spoke with Ms Mcguire, Ms Jeana sister. Ms Hills has been prescribed Kayexalate and she's to take 60ml (one dose) and Ms Mcguire was not given medicine cup to measure the correct amount. Informed Ms Mcguire, she can get a cup from us. She will come to the clinic today.

## 2020-08-17 NOTE — PROGRESS NOTES
"Subjective:       Patient ID: Shahram Hills is a 80 y.o. female.     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 Ms. Hills is an 77 yo woman who initially saw me on 6/7/18 for further evaluation of neuroendocrine tumor. She initially presented with diarrhea, abdominal discomfort, weight loss. She was evaluated at [facility] and underwent workup below:  US abdomen on 3/14/18 showed numerous bilobar mixed echogenicity and mildly vascular hepatic lesions. Cholelithiasis without e/o acute cholecystitis.   MRI abdomen on 3/30/2018 showed innumerable hepatic metastases. L3 vertebral body metastasis with soft tissue component along the right margin of the L3 and upper L4 vertebral bodies, filling the right L3/4 neural foramen, and extending into the anterior aspect of the spinal canal on the right at the L3 and upper T4 levels. Associated with mild spinal canal narrowing.  CT chest on 4/6/2018 showed one lucent nodule measuring 7 mm in the T7 vertebral body, most likely representing metastatic disease.    EGD on 5/4/18 showed normal duodenum. Schatzki's ring in the lower third of the esophagus. Grade 1 esophagitis in the GE junction c/w mild distal esophagitis. Congestion and erythema in the antrum, pre-pyloric region and stomach body c/w gastritis. Biopsy of the stomach antrum showed mild chronic gastritis, neg for H. pylori.   Labs on 5/9/2018: WBC 6.9, H/H 11.6/34.3, MCV 87.5, plt count 279. On 3/9/18: Vit B12 level 173, folic acid 6.6  She was started on oral vit B12 and underwent a liver biopsy on 5/18/18. Pathology showed "metastatic well differentiated neuroendocrine tumor. Ki67 3%"     She saw me on 6/7/18 and complained of weight loss of 26 lbs over the past 3-4 months, also has diarrhea. No flushing, wheezing, palpitations. Has been having pain in right flank radiating down " "the right leg. No tingling/numbness, weakness. She can hold her bladder but when she urinates her diarrhea would come out as well. Started on dex 4 mg q6h the evening of 18.      2. MRI spine on 18 showed "Marrow replacing metastatic lesion of the L3 vertebral body with associated extra osseous expansion and complete effacement of the right L3-L4 neural foramina and additional effacement of the right lateral recess.  There is additional lateral extension and abutment of the right psoas muscle.  Superimposed degenerative change at this level results in mild spinal canal stenosis." I sent the patient to ED on 18 where she was evaluated by neurosurgery. Neurosurgery did not feel she was a candidate for surgical intervention.      3. Seen by Dr. Adames on 18. palliative radiation to the area of the L3 metastasis 18-18   4. 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  There is an adjacent metastatic lymph node, diffuse liver metastases, and multiple bone metastases. Index primary neoplasm SUV max 33.13. Adjacent lymph node SUV max 23. L3 bone metastasis SUV max 36.86. Left lobe SUV max 46.13   5. Lanreotide every 4 weeks started on 18  6. TACE to the right hepatic lobe on 19. TACE to the left hepatic lobe on 3/21/19.      History of Present Illness:   Mr. Hills returns for follow up. Has been feeling well. No complaints.      ECO     ROS:   See HPI. Otherwise negative.      Physical Examination:   Vital signs reviewed.   Gen: well hydrated, well developed, in no acute distress.  HEENT: normocephalic, anicteric, PERRLA, EOMI, oropharynx clear  Neck: supple, no JVD, thyromegaly, cervical or supraclavicular LAD  Lungs: CTAB, no wheezes or rales  Heart: RRR, no M/R/G  Abdomen: soft, no tenderness, non-distended,  liver palpated 4.0 cm below the right costal margin, no splenomegaly, no mass, or hernia.   Ext: b/l LE trace edema  Neuro: alert " and oriented x 4, no focal neuro deficit  Skin: no rash, open wounds or ulcers  Psych: pleasant and appropriate mood and affect     Objective:      Diagnostic Tests:  CT chest 7/17/2020:     Two subcentimeter pulmonary nodules within the right upper and left lower lobes, both stable dating back to at least 12/07/2018, favored a benign etiology.     Two pulmonary micronodules within the right lung, stable dating back to at least 08/30/2019.     Sclerotic foci within the manubrium and sternum in keeping with known metastatic disease.     MRI abdomen/pelvis 7/17/2020:  Stable examination.  Numerous hepatic masses, distal ileal mass, and enhancing L3 vertebral body lesion appear similar to prior examination.     RECIST SUMMARY:     Date of prior examination for comparison: 02/26/2020.     Lesion 1: Liver.  5.2 cm. Series 18 image 43.  Prior measurement 5.2 cm.     Lesion 2: Distal ileum.  2.6 cm. Series 23 image 15.  Prior measurement 2.6 cm.        Laboratory Data:  Labs reviewed. CBC normal. Bili 1.2. K 5.5     Assessment and Plan:      1. Malignant carcinoid tumor of ileum    2. 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    4. Hyperkalemia    5. Type 2 diabetes mellitus without complication, without long-term current use of insulin    6. Essential hypertension        1-3  - doing well. On lanreotide. S/p TACE  - last CT and MRI in July 2020 showed stable disease. Next scan in Jan 2021  - c/w lanreotide every 4 weeks. Get lanreotide today  - RTC in 4 weeks     4.  - unclear cause. Not on medication that can increase K  - mild.   - kayexalate x 1    5.  - BS good  - c/w current meds     6.  - BP good  - c/w current meds    RTC in 4 weeks

## 2020-08-17 NOTE — TELEPHONE ENCOUNTER
"----- Message from Alicia Cavazos RN sent at 8/17/2020 12:27 PM CDT -----    ----- Message -----  From: Arturo Malone  Sent: 8/17/2020  12:05 PM CDT  To: [facility] Cortes Staff    Consult/Advisory:    Name Of Caller: Shayla Rosenthal   Relationship to the Pt?: sister   Contact Preference?: [phone]    Provider Name: Dr Granados     What is the nature of the call?:  Pt sister would like a callback to discuss new prescription of sodium polystyrene (KAYEXALATE) 15 gram/60 mL Susp    Additional Notes:  "Thank you for all that you do for our patients'"             "

## 2020-08-24 ENCOUNTER — TELEPHONE (OUTPATIENT)
Dept: OTOLARYNGOLOGY | Facility: CLINIC | Age: 80
End: 2020-08-24

## 2020-08-24 ENCOUNTER — OFFICE VISIT (OUTPATIENT)
Dept: OTOLARYNGOLOGY | Facility: CLINIC | Age: 80
End: 2020-08-24

## 2020-08-24 DIAGNOSIS — C7B.8 NEUROENDOCRINE CARCINOMA METASTATIC TO BONE: ICD-10-CM

## 2020-08-24 DIAGNOSIS — C7A.8 NEUROENDOCRINE CARCINOMA METASTATIC TO BONE: ICD-10-CM

## 2020-08-24 DIAGNOSIS — R29.2 ABNORMAL ACOUSTIC REFLEX: ICD-10-CM

## 2020-08-24 DIAGNOSIS — H90.3 ASYMMETRICAL SENSORINEURAL HEARING LOSS: ICD-10-CM

## 2020-08-24 PROCEDURE — 1101F PT FALLS ASSESS-DOCD LE1/YR: CPT | Mod: CPTII,95,, | Performed by: OTOLARYNGOLOGY

## 2020-08-24 PROCEDURE — 1159F PR MEDICATION LIST DOCUMENTED IN MEDICAL RECORD: ICD-10-PCS | Mod: 95,,, | Performed by: OTOLARYNGOLOGY

## 2020-08-24 PROCEDURE — 99214 PR OFFICE/OUTPT VISIT, EST, LEVL IV, 30-39 MIN: ICD-10-PCS | Mod: 95,,, | Performed by: OTOLARYNGOLOGY

## 2020-08-24 PROCEDURE — 1101F PR PT FALLS ASSESS DOC 0-1 FALLS W/OUT INJ PAST YR: ICD-10-PCS | Mod: CPTII,95,, | Performed by: OTOLARYNGOLOGY

## 2020-08-24 PROCEDURE — 99214 OFFICE O/P EST MOD 30 MIN: CPT | Mod: 95,,, | Performed by: OTOLARYNGOLOGY

## 2020-08-24 PROCEDURE — 1159F MED LIST DOCD IN RCRD: CPT | Mod: 95,,, | Performed by: OTOLARYNGOLOGY

## 2020-08-24 NOTE — PROGRESS NOTES
"Subjective:       Patient ID: Shahram Hills is a 80 y.o. female.  Patient location:  Personal residence/non healthcare site in [place]  The visit type:  Virtual visit with synchronous audio and video  Time spent with patient:  15 min    Chief Complaint: Follow-up    She was a new patient for me at her last visit on 08/10/2020 with history as follows:   She is a new patient for me here today to have her ears and hearing checked.  Had recent wellness visit at PCP's office with hearing screening and referred.  Denies ear or hearing problems or prior otologic history.  No otalgia, otorrhea, tinnitus.  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  No nasal or throat or other otolaryngologic complaints.  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    Interval history:   Evaluation at last visit revealed bilateral hearing loss, primarily sensorineural with asymmetry at 6000 hertz, worse AD with loss of ipsi reflexes.  Elected to proceed with MRI and appointment now to discuss results and recommendations.  No new complaints.  Family members are with her for the virtual visit today.            Review of Systems     Constitutional: Negative for chills and fever.      HENT: Positive for hearing loss.  Negative for ear discharge, ear pain, nosebleeds, ringing in the ears, stuffy nose and voice change.      Eyes:  Negative for change in eyesight.     Respiratory:  Negative for cough and shortness of breath.      Cardiovascular:  Negative for chest pain.     Gastrointestinal:  Positive for acid reflux. Negative for abdominal pain and diarrhea.     Musc: Positive for aching joints.     Neurological: Negative for dizziness and seizures.      Hematologic: Negative for swollen glands.                Objective:            Physical Exam  Constitutional:       General: She is not in acute distress.  HENT:      Head: Atraumatic.      Nose: No rhinorrhea.   Neck: "      Trachea: Phonation normal.   Pulmonary:      Effort: Pulmonary effort is normal. No respiratory distress.   Neurological:      Mental Status: She is alert and oriented to person, place, and time.   Psychiatric:         Mood and Affect: Mood normal.         Speech: Speech normal.         Tests / Results:    Showed audiogram from last visit to patient again and family members and reviewed results and answered questions.    MRI of IAC's and temporal bones with and without contrast done 08/14/2020 report reviewed and answered questions.          Assessment:       1. Asymmetrical sensorineural hearing loss    2. Abnormal acoustic reflex    3. 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  Would like to schedule hearing aid consultation.  Hearing protection.  Recheck one year with audiogram unless change or problems prior.

## 2020-08-24 NOTE — PATIENT INSTRUCTIONS
Set up hearing aid consultation.  Hearing protection.  Recheck 1 year with audiogram unless change or problems prior.

## 2020-08-24 NOTE — PROGRESS NOTES
Subjective:       Patient ID: Shahram Hills is a 80 y.o. female.    Chief Complaint: Check ears and hearing    She is a new patient for me here today to have her ears and hearing checked.  Had recent wellness visit at PCPs office with hearing screening and referred.  Denies ear or hearing problems or prior otologic history.  No otalgia, otorrhea, tinnitus.  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  No nasal or throat or other otolaryngologic complaints.  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          Review of Systems     Constitutional: Negative for chills and fever.      HENT: Positive for hearing loss.  Negative for ear discharge, ear pain, mouth sores, nosebleeds, ringing in the ears, trouble swallowing and voice change.      Eyes:  Negative for change in eyesight.     Respiratory:  Negative for cough and shortness of breath.      Cardiovascular:  Negative for chest pain.     Gastrointestinal:  Positive for acid reflux. Negative for abdominal pain and diarrhea.     Musc: Positive for aching joints.     Neurological: Negative for dizziness and seizures.      Hematologic: Negative for swollen glands.                Objective:        Vitals:    08/10/20 1034   BP: (!) 158/93   Pulse: 86   Temp: 97.2 °F (36.2 °C)     Body mass index is 23.5 kg/m².  Physical Exam  Constitutional:       General: She is not in acute distress.     Appearance: She is well-developed.   HENT:      Head: Normocephalic and atraumatic.      Right Ear: Tympanic membrane, ear canal and external ear normal.      Left Ear: Tympanic membrane, ear canal and external ear normal.      Nose: No nasal deformity, mucosal edema or rhinorrhea.      Mouth/Throat:      Mouth: No oral lesions.      Pharynx: No oropharyngeal exudate, posterior oropharyngeal erythema or uvula swelling.   Neck:      Musculoskeletal: Neck supple.      Trachea: Phonation normal.   Pulmonary:       Effort: Pulmonary effort is normal. No respiratory distress.   Lymphadenopathy:      Cervical: No cervical adenopathy.   Skin:     General: Skin is warm and dry.   Neurological:      Mental Status: She is alert and oriented to person, place, and time.   Psychiatric:         Behavior: Behavior normal.         Tests / Results:        Reviewed above audiogram which demonstrates bilateral hearing loss which is primarily sensorineural with asymmetry at 6000 hertz, worse AD with loss of ipsi reflexes.        Assessment:       1. Asymmetrical sensorineural hearing loss    2. 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 Elevated blood pressure reading        Plan:       Reviewed above and considerations and recommendations and answered questions.  Will proceed with MRI of brain and IAC's without and with contrast.  Follow-up results in next few weeks.  Monitor blood pressure and follow-up with PCP if remains elevated.

## 2020-08-24 NOTE — TELEPHONE ENCOUNTER
Spoke with patient and sister Shayla and told her that I would speak with Dr. Jorge on Wednesday and get phone number of provider that would take their [employer] benefit

## 2020-09-02 ENCOUNTER — TELEPHONE (OUTPATIENT)
Dept: OTOLARYNGOLOGY | Facility: CLINIC | Age: 80
End: 2020-09-02

## 2020-09-02 NOTE — TELEPHONE ENCOUNTER
Spoke with sister and gave rec. From Dr. Jorge for hearing aid consultation and eval. [phone]

## 2020-09-03 DIAGNOSIS — E11.9 TYPE 2 DIABETES MELLITUS WITHOUT COMPLICATION, WITHOUT LONG-TERM CURRENT USE OF INSULIN: Primary | ICD-10-CM

## 2020-09-03 NOTE — TELEPHONE ENCOUNTER
----- Message from Richelle Burch sent at 9/3/2020  2:51 PM CDT -----  Regarding: refill  Type: RX Refill Request    Who Called: pt    Have you contacted your pharmacy:    Refill or New RxmetFORMIN (GLUCOPHAGE-XR) 500 MG 24 hr tablet    RX Name and Strength    How is the patient currently taking it? (ex. 1XDay):    Is this a 30 day or 90 day RX:    Preferred Pharmacy with phone number:  [id number] - [place], [place] - [address]  [address]  [facility] [address]  Phone: [phone] Fax: [phone]     or Mail Order    Ordering Provider:    Would the patient rather a call back or a response via [facility]? call    Best Call Back Number:[phone] (home)       Additional Information:

## 2020-09-03 NOTE — TELEPHONE ENCOUNTER
Care Due:                  Date            Visit Type   Department     Provider  --------------------------------------------------------------------------------                                ESTABLISHED   [facility] Internal  Last Visit: 11-      PATIENT      Med            Trixie Reinaldo Preet  Next Visit: None Scheduled  None         None Found                                                            Last  Test          Frequency    Reason                     Performed    Due Date  --------------------------------------------------------------------------------    Office Visit  12 months..  amLODIPine, ezetimibe....  11- 11-    Powered by [employer]. Reference number: [id number]. 9/03/2020 2:55:50 PM CDT

## 2020-09-04 RX ORDER — METFORMIN HYDROCHLORIDE 500 MG/1
500 TABLET, EXTENDED RELEASE ORAL
Qty: 90 TABLET | Refills: 0 | Status: SHIPPED | OUTPATIENT
Start: 2020-09-04 | End: 2020-10-06 | Stop reason: SDUPTHER

## 2020-09-04 NOTE — TELEPHONE ENCOUNTER
Provider Staff:     Please schedule patient for Annual and link Labs (A1C) with future lab appointment on 9/14/2020    Please also check with your provider if any further labs need to be added and scheduled together.    Thanks!  [facility]     Appointments  past 12m or future 3m with PCP    Date Provider   Last Visit   11/26/2019 Trixie Xie MD   Next Visit   Visit date not found Trixie Xie MD     Note composed:4:02 PM 09/04/2020

## 2020-09-04 NOTE — TELEPHONE ENCOUNTER
Refill Authorization Note    is requesting a refill authorization.    Brief assessment and rationale for refill: APPROVE: needs labs/appt     Medication-related problems identified:   Requires labs  Requires appointment    Medication Therapy Plan: needs appt(annual); NTBL(A1C)    Medication reconciliation completed: No                    Comments:      Orders Placed This Encounter    Hemoglobin A1C    metFORMIN (GLUCOPHAGE-XR) 500 MG ER 24hr tablet      Requested Prescriptions   Signed Prescriptions Disp Refills    metFORMIN (GLUCOPHAGE-XR) 500 MG ER 24hr tablet 90 tablet 0     Sig: Take 1 tablet (500 mg total) by mouth daily with breakfast.       Endocrinology:  Diabetes - Biguanides Failed - 9/3/2020  3:04 PM        Failed - HBA1C is 7.9 or below and within 180 days     Hemoglobin A1C   Date Value Ref Range Status   02/26/2020 6.0 (H) 4.0 - 5.6 % Final     Comment:     ADA Screening Guidelines:  5.7-6.4%  Consistent with prediabetes  >or=6.5%  Consistent with diabetes  High levels of fetal hemoglobin interfere with the HbA1C  assay. Heterozygous hemoglobin variants (HbS, HgC, etc)do  not significantly interfere with this assay.   However, presence of multiple variants may affect accuracy.     11/19/2019 5.8 (H) 4.0 - 5.6 % Final     Comment:     ADA Screening Guidelines:  5.7-6.4%  Consistent with prediabetes  >or=6.5%  Consistent with diabetes  High levels of fetal hemoglobin interfere with the HbA1C  assay. Heterozygous hemoglobin variants (HbS, HgC, etc)do  not significantly interfere with this assay.   However, presence of multiple variants may affect accuracy.     08/06/2019 5.9 (H) 4.0 - 5.6 % Final     Comment:     ADA Screening Guidelines:  5.7-6.4%  Consistent with prediabetes  >or=6.5%  Consistent with diabetes  High levels of fetal hemoglobin interfere with the HbA1C  assay. Heterozygous hemoglobin variants (HbS, HgC, etc)do  not significantly interfere with this assay.   However,  presence of multiple variants may affect accuracy.                Passed - Patient is at least 18 years old        Passed - Office visit in past 12 months or future 90 days.     Recent Outpatient Visits            1 week ago Asymmetrical sensorineural hearing loss    [facility] [address] Jeanne Marshall MD    2 weeks ago Malignant carcinoid tumor of ileum    [facility] [address] Sebastian Granados MD    3 weeks ago Asymmetrical sensorineural hearing loss    [facility] [address] Jeanne TORRES Marshall MD    1 month ago Malignant carcinoid tumor of ileum    [facility] Sebastian Granados MD    2 months ago Encounter for preventive health examination    [facility] [address] Phan Nguyen NP          Future Appointments              In 1 week LAB, SAME DAY [facility]    In 1 week Sebastian Granados MD [facility] [address]    In 1 week INJECTION, [facility] CHEMO [facility] [address]                Passed - Cr is 1.3 or below and within 360 days     Creatinine   Date Value Ref Range Status   08/17/2020 0.7 0.5 - 1.4 mg/dL Final   08/10/2020 0.7 0.5 - 1.4 mg/dL Final   07/17/2020 0.7 0.5 - 1.4 mg/dL Final              Passed - eGFR is 30 or above and within 360 days     eGFR if non    Date Value Ref Range Status   08/17/2020 >60 >60 mL/min/1.73 m^2 Final     Comment:     Calculation used to obtain the estimated glomerular filtration  rate (eGFR) is the CKD-EPI equation.      08/10/2020 >60 >60 mL/min/1.73 m^2 Final     Comment:     Calculation used to obtain the estimated glomerular filtration  rate (eGFR) is the CKD-EPI equation.      07/17/2020 >60.0 >60 mL/min/1.73 m^2 Final     Comment:     Calculation used to obtain the estimated glomerular filtration  rate (eGFR) is the CKD-EPI equation.        eGFR if    Date Value Ref Range Status   08/17/2020  >60 >60 mL/min/1.73 m^2 Final   08/10/2020 >60 >60 mL/min/1.73 m^2 Final   07/17/2020 >60.0 >60 mL/min/1.73 m^2 Final                  Appointments  past 12m or future 3m with PCP    Date Provider   Last Visit   11/26/2019 Trixie Xie MD   Next Visit   Visit date not found Trixie Xie MD   ED visits in past 90 days: [unfilled]     Note composed:4:02 PM 09/04/2020

## 2020-09-08 ENCOUNTER — TELEPHONE (OUTPATIENT)
Dept: OTOLARYNGOLOGY | Facility: CLINIC | Age: 80
End: 2020-09-08

## 2020-09-08 ENCOUNTER — TELEPHONE (OUTPATIENT)
Dept: INTERNAL MEDICINE | Facility: CLINIC | Age: 80
End: 2020-09-08

## 2020-09-08 NOTE — TELEPHONE ENCOUNTER
----- Message from Lena Lewis sent at 9/8/2020  1:06 PM CDT -----  Regarding: Patient call back  Who called: Shayla (sister)    What is the request in detail: Patient's sister is requesting a call back. She states she has a message from a Dr. Rhodes. She states she does not remember the providers name specifically. She states she would like some information regarding the hearing test and the MRI. She states she needs to bring it to the physician on Friday and she would prefer to pick it up today or tomorrow   Please advise.    Can the clinic reply by [facility]? No    Best call back number: [phone]    Additional Information: N/A

## 2020-09-08 NOTE — TELEPHONE ENCOUNTER
Spoke with pt and let her know this was sent in. Pt states she picked up Rx.    ----- Message from Jaiden Taylor sent at 9/4/2020  4:05 PM CDT -----  Name of Who is Calling: LEIGHANN FANG [[id number]]    What is the request in detail: Patient state pharmacy didn't receive RX metFORMIN (GLUCOPHAGE-XR Please contact to further discuss and advise      Can the clinic reply by [facility]: No     [facility]: [phone] (home)

## 2020-09-14 ENCOUNTER — OFFICE VISIT (OUTPATIENT)
Dept: HEMATOLOGY/ONCOLOGY | Facility: CLINIC | Age: 80
End: 2020-09-14
Payer: MEDICARE

## 2020-09-14 ENCOUNTER — INFUSION (OUTPATIENT)
Dept: INFUSION THERAPY | Facility: OTHER | Age: 80
End: 2020-09-14
Attending: INTERNAL MEDICINE
Payer: MEDICARE

## 2020-09-14 VITALS
OXYGEN SATURATION: 99 % | RESPIRATION RATE: 12 BRPM | HEIGHT: 64 IN | SYSTOLIC BLOOD PRESSURE: 136 MMHG | DIASTOLIC BLOOD PRESSURE: 75 MMHG | HEART RATE: 89 BPM | BODY MASS INDEX: 23.34 KG/M2 | TEMPERATURE: 99 F | WEIGHT: 136.69 LBS

## 2020-09-14 DIAGNOSIS — C7B.8 METASTATIC MALIGNANT NEUROENDOCRINE TUMOR TO LIVER: Primary | ICD-10-CM

## 2020-09-14 DIAGNOSIS — C7A.012 MALIGNANT CARCINOID TUMOR OF ILEUM: Primary | ICD-10-CM

## 2020-09-14 DIAGNOSIS — C7A.8 NEUROENDOCRINE CARCINOMA METASTATIC TO BONE: ICD-10-CM

## 2020-09-14 DIAGNOSIS — E11.9 TYPE 2 DIABETES MELLITUS WITHOUT COMPLICATION, WITHOUT LONG-TERM CURRENT USE OF INSULIN: ICD-10-CM

## 2020-09-14 DIAGNOSIS — C7B.8 NEUROENDOCRINE CARCINOMA METASTATIC TO BONE: ICD-10-CM

## 2020-09-14 DIAGNOSIS — I70.0 ATHEROSCLEROSIS OF AORTA: ICD-10-CM

## 2020-09-14 DIAGNOSIS — C7B.8 METASTATIC MALIGNANT NEUROENDOCRINE TUMOR TO LIVER: ICD-10-CM

## 2020-09-14 DIAGNOSIS — I10 ESSENTIAL HYPERTENSION: ICD-10-CM

## 2020-09-14 PROCEDURE — 3075F PR MOST RECENT SYSTOLIC BLOOD PRESS GE 130-139MM HG: ICD-10-PCS | Mod: HCNC,CPTII,S$GLB, | Performed by: INTERNAL MEDICINE

## 2020-09-14 PROCEDURE — 99214 PR OFFICE/OUTPT VISIT, EST, LEVL IV, 30-39 MIN: ICD-10-PCS | Mod: HCNC,S$GLB,, | Performed by: INTERNAL MEDICINE

## 2020-09-14 PROCEDURE — 1101F PT FALLS ASSESS-DOCD LE1/YR: CPT | Mod: HCNC,CPTII,S$GLB, | Performed by: INTERNAL MEDICINE

## 2020-09-14 PROCEDURE — 1126F PR PAIN SEVERITY QUANTIFIED, NO PAIN PRESENT: ICD-10-PCS | Mod: HCNC,S$GLB,, | Performed by: INTERNAL MEDICINE

## 2020-09-14 PROCEDURE — 1159F MED LIST DOCD IN RCRD: CPT | Mod: HCNC,S$GLB,, | Performed by: INTERNAL MEDICINE

## 2020-09-14 PROCEDURE — 1101F PR PT FALLS ASSESS DOC 0-1 FALLS W/OUT INJ PAST YR: ICD-10-PCS | Mod: HCNC,CPTII,S$GLB, | Performed by: INTERNAL MEDICINE

## 2020-09-14 PROCEDURE — 99999 PR PBB SHADOW E&M-EST. PATIENT-LVL IV: ICD-10-PCS | Mod: PBBFAC,HCNC,, | Performed by: INTERNAL MEDICINE

## 2020-09-14 PROCEDURE — 3078F DIAST BP <80 MM HG: CPT | Mod: HCNC,CPTII,S$GLB, | Performed by: INTERNAL MEDICINE

## 2020-09-14 PROCEDURE — 96372 THER/PROPH/DIAG INJ SC/IM: CPT | Mod: HCNC

## 2020-09-14 PROCEDURE — 99999 PR PBB SHADOW E&M-EST. PATIENT-LVL IV: CPT | Mod: PBBFAC,HCNC,, | Performed by: INTERNAL MEDICINE

## 2020-09-14 PROCEDURE — 3078F PR MOST RECENT DIASTOLIC BLOOD PRESSURE < 80 MM HG: ICD-10-PCS | Mod: HCNC,CPTII,S$GLB, | Performed by: INTERNAL MEDICINE

## 2020-09-14 PROCEDURE — 1159F PR MEDICATION LIST DOCUMENTED IN MEDICAL RECORD: ICD-10-PCS | Mod: HCNC,S$GLB,, | Performed by: INTERNAL MEDICINE

## 2020-09-14 PROCEDURE — 99214 OFFICE O/P EST MOD 30 MIN: CPT | Mod: HCNC,S$GLB,, | Performed by: INTERNAL MEDICINE

## 2020-09-14 PROCEDURE — 96402 CHEMO HORMON ANTINEOPL SQ/IM: CPT | Mod: HCNC

## 2020-09-14 PROCEDURE — 3075F SYST BP GE 130 - 139MM HG: CPT | Mod: HCNC,CPTII,S$GLB, | Performed by: INTERNAL MEDICINE

## 2020-09-14 PROCEDURE — 1126F AMNT PAIN NOTED NONE PRSNT: CPT | Mod: HCNC,S$GLB,, | Performed by: INTERNAL MEDICINE

## 2020-09-14 PROCEDURE — 63600175 PHARM REV CODE 636 W HCPCS: Mod: JG,HCNC | Performed by: INTERNAL MEDICINE

## 2020-09-14 RX ORDER — LANREOTIDE ACETATE 120 MG/.5ML
120 INJECTION SUBCUTANEOUS
Status: DISCONTINUED | OUTPATIENT
Start: 2020-09-14 | End: 2020-09-14 | Stop reason: HOSPADM

## 2020-09-14 RX ORDER — LANREOTIDE ACETATE 120 MG/.5ML
120 INJECTION SUBCUTANEOUS
Status: CANCELLED | OUTPATIENT
Start: 2020-09-14

## 2020-09-14 RX ADMIN — LANREOTIDE ACETATE 120 MG: 120 INJECTION SUBCUTANEOUS at 10:09

## 2020-09-14 NOTE — PROGRESS NOTES
"Subjective:       Patient ID: Shahram Hills is a 80 y.o. female.     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 Ms. Hills is an 79 yo woman who initially saw me on 6/7/18 for further evaluation of neuroendocrine tumor. She initially presented with diarrhea, abdominal discomfort, weight loss. She was evaluated at [facility] and underwent workup below:  US abdomen on 3/14/18 showed numerous bilobar mixed echogenicity and mildly vascular hepatic lesions. Cholelithiasis without e/o acute cholecystitis.   MRI abdomen on 3/30/2018 showed innumerable hepatic metastases. L3 vertebral body metastasis with soft tissue component along the right margin of the L3 and upper L4 vertebral bodies, filling the right L3/4 neural foramen, and extending into the anterior aspect of the spinal canal on the right at the L3 and upper T4 levels. Associated with mild spinal canal narrowing.  CT chest on 4/6/2018 showed one lucent nodule measuring 7 mm in the T7 vertebral body, most likely representing metastatic disease.    EGD on 5/4/18 showed normal duodenum. Schatzki's ring in the lower third of the esophagus. Grade 1 esophagitis in the GE junction c/w mild distal esophagitis. Congestion and erythema in the antrum, pre-pyloric region and stomach body c/w gastritis. Biopsy of the stomach antrum showed mild chronic gastritis, neg for H. pylori.   Labs on 5/9/2018: WBC 6.9, H/H 11.6/34.3, MCV 87.5, plt count 279. On 3/9/18: Vit B12 level 173, folic acid 6.6  She was started on oral vit B12 and underwent a liver biopsy on 5/18/18. Pathology showed "metastatic well differentiated neuroendocrine tumor. Ki67 3%"     She saw me on 6/7/18 and complained of weight loss of 26 lbs over the past 3-4 months, also has diarrhea. No flushing, wheezing, palpitations. Has been having pain in right flank radiating down " "the right leg. No tingling/numbness, weakness. She can hold her bladder but when she urinates her diarrhea would come out as well. Started on dex 4 mg q6h the evening of 18.      2. MRI spine on 18 showed "Marrow replacing metastatic lesion of the L3 vertebral body with associated extra osseous expansion and complete effacement of the right L3-L4 neural foramina and additional effacement of the right lateral recess.  There is additional lateral extension and abutment of the right psoas muscle.  Superimposed degenerative change at this level results in mild spinal canal stenosis." I sent the patient to ED on 18 where she was evaluated by neurosurgery. Neurosurgery did not feel she was a candidate for surgical intervention.      3. Seen by Dr. Adames on 18. palliative radiation to the area of the L3 metastasis 18-18   4. 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  There is an adjacent metastatic lymph node, diffuse liver metastases, and multiple bone metastases. Index primary neoplasm SUV max 33.13. Adjacent lymph node SUV max 23. L3 bone metastasis SUV max 36.86. Left lobe SUV max 46.13   5. Lanreotide every 4 weeks started on 18  6. TACE to the right hepatic lobe on 19. TACE to the left hepatic lobe on 3/21/19.      History of Present Illness:   Mr. Hills returns for follow up. Has been feeling well. No complaints.      ECO     ROS:   See HPI. Otherwise negative.      Physical Examination:   Vital signs reviewed.   Gen: well hydrated, well developed, in no acute distress.  HEENT: normocephalic, anicteric, PERRLA, EOMI, oropharynx clear  Neck: supple, no JVD, thyromegaly, cervical or supraclavicular LAD  Lungs: CTAB, no wheezes or rales  Heart: RRR, no M/R/G  Abdomen: soft, no tenderness, non-distended,  no hepatomegaly, no splenomegaly, no mass, or hernia.   Ext: b/l LE trace edema, chronic  Neuro: alert and oriented x 4, no focal " neuro deficit  Skin: no rash, open wounds or ulcers  Psych: pleasant and appropriate mood and affect     Objective:      Diagnostic Tests:  CT chest 7/17/2020:     Two subcentimeter pulmonary nodules within the right upper and left lower lobes, both stable dating back to at least 12/07/2018, favored a benign etiology.     Two pulmonary micronodules within the right lung, stable dating back to at least 08/30/2019.     Sclerotic foci within the manubrium and sternum in keeping with known metastatic disease.     MRI abdomen/pelvis 7/17/2020:  Stable examination.  Numerous hepatic masses, distal ileal mass, and enhancing L3 vertebral body lesion appear similar to prior examination.     RECIST SUMMARY:     Date of prior examination for comparison: 02/26/2020.     Lesion 1: Liver.  5.2 cm. Series 18 image 43.  Prior measurement 5.2 cm.     Lesion 2: Distal ileum.  2.6 cm. Series 23 image 15.  Prior measurement 2.6 cm.        Laboratory Data:  Labs reviewed. CBC normal. Bili 1.2.     Assessment and Plan:      1. Malignant carcinoid tumor of ileum    2. 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    4. Atherosclerosis of aorta    5. Type 2 diabetes mellitus without complication, without long-term current use of insulin    6. Essential hypertension        1-3  - doing well. On lanreotide. S/p TACE  - CT and MRI in July 2020 showed stable disease. Next scan in Jan 2021  - c/w lanreotide every 4 weeks. Get lanreotide today  - RTC in 4 weeks     4.  - on ezetimide  - continue     5.  - BS good  - c/w current meds     6.  - BP good  - c/w current meds     RTC in 4 weeks  Their multiple questions were answered in the clinic. Encouraged to call should issues arise.

## 2020-09-29 ENCOUNTER — PATIENT MESSAGE (OUTPATIENT)
Dept: OTHER | Facility: OTHER | Age: 80
End: 2020-09-29

## 2020-10-06 ENCOUNTER — OFFICE VISIT (OUTPATIENT)
Dept: INTERNAL MEDICINE | Facility: CLINIC | Age: 80
End: 2020-10-06
Attending: INTERNAL MEDICINE
Payer: MEDICARE

## 2020-10-06 VITALS
OXYGEN SATURATION: 98 % | DIASTOLIC BLOOD PRESSURE: 76 MMHG | HEIGHT: 64 IN | WEIGHT: 136.88 LBS | HEART RATE: 99 BPM | SYSTOLIC BLOOD PRESSURE: 128 MMHG | BODY MASS INDEX: 23.37 KG/M2

## 2020-10-06 DIAGNOSIS — C7B.8 NEUROENDOCRINE CARCINOMA METASTATIC TO BONE: ICD-10-CM

## 2020-10-06 DIAGNOSIS — E11.9 CONTROLLED TYPE 2 DIABETES MELLITUS WITHOUT COMPLICATION, WITHOUT LONG-TERM CURRENT USE OF INSULIN: Primary | ICD-10-CM

## 2020-10-06 DIAGNOSIS — E78.5 HYPERLIPIDEMIA, UNSPECIFIED HYPERLIPIDEMIA TYPE: ICD-10-CM

## 2020-10-06 DIAGNOSIS — E53.8 B12 DEFICIENCY: ICD-10-CM

## 2020-10-06 DIAGNOSIS — I10 ESSENTIAL HYPERTENSION: ICD-10-CM

## 2020-10-06 DIAGNOSIS — C7A.8 NEUROENDOCRINE CARCINOMA METASTATIC TO BONE: ICD-10-CM

## 2020-10-06 PROCEDURE — 3074F PR MOST RECENT SYSTOLIC BLOOD PRESSURE < 130 MM HG: ICD-10-PCS | Mod: HCNC,CPTII,S$GLB, | Performed by: INTERNAL MEDICINE

## 2020-10-06 PROCEDURE — 3074F SYST BP LT 130 MM HG: CPT | Mod: HCNC,CPTII,S$GLB, | Performed by: INTERNAL MEDICINE

## 2020-10-06 PROCEDURE — 1159F PR MEDICATION LIST DOCUMENTED IN MEDICAL RECORD: ICD-10-PCS | Mod: HCNC,S$GLB,, | Performed by: INTERNAL MEDICINE

## 2020-10-06 PROCEDURE — 1126F PR PAIN SEVERITY QUANTIFIED, NO PAIN PRESENT: ICD-10-PCS | Mod: HCNC,S$GLB,, | Performed by: INTERNAL MEDICINE

## 2020-10-06 PROCEDURE — 1101F PT FALLS ASSESS-DOCD LE1/YR: CPT | Mod: HCNC,CPTII,S$GLB, | Performed by: INTERNAL MEDICINE

## 2020-10-06 PROCEDURE — 99999 PR PBB SHADOW E&M-EST. PATIENT-LVL IV: CPT | Mod: PBBFAC,HCNC,, | Performed by: INTERNAL MEDICINE

## 2020-10-06 PROCEDURE — 1159F MED LIST DOCD IN RCRD: CPT | Mod: HCNC,S$GLB,, | Performed by: INTERNAL MEDICINE

## 2020-10-06 PROCEDURE — 1126F AMNT PAIN NOTED NONE PRSNT: CPT | Mod: HCNC,S$GLB,, | Performed by: INTERNAL MEDICINE

## 2020-10-06 PROCEDURE — 99214 OFFICE O/P EST MOD 30 MIN: CPT | Mod: HCNC,S$GLB,, | Performed by: INTERNAL MEDICINE

## 2020-10-06 PROCEDURE — 99214 PR OFFICE/OUTPT VISIT, EST, LEVL IV, 30-39 MIN: ICD-10-PCS | Mod: HCNC,S$GLB,, | Performed by: INTERNAL MEDICINE

## 2020-10-06 PROCEDURE — 3078F PR MOST RECENT DIASTOLIC BLOOD PRESSURE < 80 MM HG: ICD-10-PCS | Mod: HCNC,CPTII,S$GLB, | Performed by: INTERNAL MEDICINE

## 2020-10-06 PROCEDURE — 99999 PR PBB SHADOW E&M-EST. PATIENT-LVL IV: ICD-10-PCS | Mod: PBBFAC,HCNC,, | Performed by: INTERNAL MEDICINE

## 2020-10-06 PROCEDURE — 3078F DIAST BP <80 MM HG: CPT | Mod: HCNC,CPTII,S$GLB, | Performed by: INTERNAL MEDICINE

## 2020-10-06 PROCEDURE — 1101F PR PT FALLS ASSESS DOC 0-1 FALLS W/OUT INJ PAST YR: ICD-10-PCS | Mod: HCNC,CPTII,S$GLB, | Performed by: INTERNAL MEDICINE

## 2020-10-06 RX ORDER — AMLODIPINE BESYLATE 10 MG/1
10 TABLET ORAL DAILY
Qty: 90 TABLET | Refills: 3 | Status: SHIPPED | OUTPATIENT
Start: 2020-10-06 | End: 2021-12-28 | Stop reason: SDUPTHER

## 2020-10-06 RX ORDER — EZETIMIBE 10 MG/1
10 TABLET ORAL DAILY
Qty: 90 TABLET | Refills: 3 | Status: SHIPPED | OUTPATIENT
Start: 2020-10-06 | End: 2021-10-12 | Stop reason: SDUPTHER

## 2020-10-06 RX ORDER — METFORMIN HYDROCHLORIDE 500 MG/1
500 TABLET, EXTENDED RELEASE ORAL
Qty: 90 TABLET | Refills: 3 | Status: SHIPPED | OUTPATIENT
Start: 2020-10-06 | End: 2021-10-12 | Stop reason: SDUPTHER

## 2020-10-06 RX ORDER — KETOCONAZOLE 20 MG/G
CREAM TOPICAL 2 TIMES DAILY
Qty: 60 G | Refills: 0 | Status: SHIPPED | OUTPATIENT
Start: 2020-10-06 | End: 2021-12-28

## 2020-10-06 NOTE — PROGRESS NOTES
"Subjective:   Patient ID: Shahram Hills is a 80 y.o. female  Chief complaint:   Chief Complaint   Patient presents with    Annual Exam       HPI    Here for HTN follow up     Dx with Covid 5/19/2020 check tested positive as part of routine testing prior to receiving tx      Here today with sister:    Discussed dx of diabetes with mult fasting elevated bg on chart check   Prev IFG and henry metformin daily     Reviewed labs:   A1c stable     - agrees to make eye appt today with her provider   Foot exam today   Urine screening today   Flu vaccine today     HTN:   - she reports that home bp readings are 120/70s  - taking amlodipine 10mg daily and took this am     Metastatic well differentiated, 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    Physical Exam  Vitals signs reviewed.   Constitutional:       Appearance: Normal appearance. She is well-developed.   HENT:      Head: Normocephalic and atraumatic.      Right Ear: Tympanic membrane, ear canal and external ear normal.      Left Ear: Tympanic membrane, ear canal and external ear normal.      Nose:      Comments: Wearing mask   Eyes:      Extraocular Movements: Extraocular movements intact.      Conjunctiva/sclera: Conjunctivae normal.   Neck:      Musculoskeletal: Normal range of motion and neck supple.      Thyroid: No thyromegaly.   Cardiovascular:      Rate and Rhythm: Normal rate and " regular rhythm.      Pulses: Normal pulses.           Dorsalis pedis pulses are 2+ on the right side and 2+ on the left side.        Posterior tibial pulses are 2+ on the right side and 2+ on the left side.      Heart sounds: Normal heart sounds.   Pulmonary:      Effort: Pulmonary effort is normal.      Breath sounds: Normal breath sounds.   Abdominal:      General: Bowel sounds are normal.      Palpations: Abdomen is soft.   Musculoskeletal:         General: No swelling or tenderness.      Right foot: Normal range of motion. No deformity.      Left foot: Normal range of motion. No deformity.   Feet:      Right foot:      Protective Sensation: 4 sites tested. 4 sites sensed.      Skin integrity: No ulcer, blister, skin breakdown, erythema, warmth, callus or dry skin.      Left foot:      Protective Sensation: 4 sites tested. 4 sites sensed.      Skin integrity: No ulcer, blister, skin breakdown, erythema, warmth, callus or dry skin.   Lymphadenopathy:      Cervical: No cervical adenopathy.   Skin:     General: Skin is warm and dry.      Capillary Refill: Capillary refill takes less than 2 seconds.      Nails: There is no clubbing.     Neurological:      General: No focal deficit present.      Mental Status: She is alert and oriented to person, place, and time. Mental status is at baseline.      Gait: Gait normal.   Psychiatric:         Mood and Affect: Mood normal.         Speech: Speech normal.         Behavior: Behavior normal.         Thought Content: Thought content normal.         Judgment: Judgment normal.         Assessment:  1. Controlled type 2 diabetes mellitus without complication, without long-term current use of insulin    2. Essential hypertension    3. Hyperlipidemia, unspecified hyperlipidemia type    4. 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 B12 deficiency        Plan:  Shahram was seen today for hypertension.    Diagnoses and all orders for this visit:    Reviewed dx criteria of diabetes      Foot exam today   Urine screen today   rec flu vaccine today   Cont metformin and other meds   She agrees to schedule eye exam with her provider   Refer to diab education     Check labs in 3 months   rtc in 2 weeks for bp check   pvax 23 shot today  shingrix through pharmacy     All questions were answered and pt verbalized understanding of plan.     Health Maintenance   Topic Date Due    Urine Microalbumin  05/08/1950    Eye Exam  09/23/2020    Pneumococcal Vaccine (65+ High/Highest Risk) (2 of 2 - PPSV23) 06/22/2021 (Originally 10/15/2019)    Lipid Panel  02/26/2021    Hemoglobin A1c  03/14/2021    DEXA SCAN  06/11/2021    Foot Exam  10/08/2021    TETANUS VACCINE  12/10/2029

## 2020-10-07 ENCOUNTER — IMMUNIZATION (OUTPATIENT)
Dept: PHARMACY | Facility: CLINIC | Age: 80
End: 2020-10-07
Payer: MEDICARE

## 2020-10-08 PROBLEM — R73.9 HYPERGLYCEMIA: Status: RESOLVED | Noted: 2018-07-11 | Resolved: 2020-10-08

## 2020-10-08 PROBLEM — R16.0 LIVER MASSES: Status: RESOLVED | Noted: 2018-05-28 | Resolved: 2020-10-08

## 2020-10-13 ENCOUNTER — INFUSION (OUTPATIENT)
Dept: INFUSION THERAPY | Facility: OTHER | Age: 80
End: 2020-10-13
Attending: INTERNAL MEDICINE
Payer: MEDICARE

## 2020-10-13 ENCOUNTER — OFFICE VISIT (OUTPATIENT)
Dept: HEMATOLOGY/ONCOLOGY | Facility: CLINIC | Age: 80
End: 2020-10-13
Payer: MEDICARE

## 2020-10-13 ENCOUNTER — LAB VISIT (OUTPATIENT)
Dept: LAB | Facility: OTHER | Age: 80
End: 2020-10-13
Attending: INTERNAL MEDICINE
Payer: MEDICARE

## 2020-10-13 VITALS
BODY MASS INDEX: 22.99 KG/M2 | TEMPERATURE: 97 F | DIASTOLIC BLOOD PRESSURE: 66 MMHG | SYSTOLIC BLOOD PRESSURE: 126 MMHG | OXYGEN SATURATION: 100 % | HEART RATE: 83 BPM | HEIGHT: 64 IN | RESPIRATION RATE: 18 BRPM | WEIGHT: 134.69 LBS

## 2020-10-13 DIAGNOSIS — C7B.8 METASTATIC MALIGNANT NEUROENDOCRINE TUMOR TO LIVER: Primary | ICD-10-CM

## 2020-10-13 DIAGNOSIS — I10 ESSENTIAL HYPERTENSION: ICD-10-CM

## 2020-10-13 DIAGNOSIS — C7B.8 METASTATIC MALIGNANT NEUROENDOCRINE TUMOR TO LIVER: ICD-10-CM

## 2020-10-13 DIAGNOSIS — C7B.8 NEUROENDOCRINE CARCINOMA METASTATIC TO BONE: ICD-10-CM

## 2020-10-13 DIAGNOSIS — C7A.012 MALIGNANT CARCINOID TUMOR OF ILEUM: Primary | ICD-10-CM

## 2020-10-13 DIAGNOSIS — C7A.8 NEUROENDOCRINE CARCINOMA METASTATIC TO BONE: ICD-10-CM

## 2020-10-13 DIAGNOSIS — C79.51 SPINE METASTASIS: ICD-10-CM

## 2020-10-13 DIAGNOSIS — E11.9 TYPE 2 DIABETES MELLITUS WITHOUT COMPLICATION, WITHOUT LONG-TERM CURRENT USE OF INSULIN: ICD-10-CM

## 2020-10-13 DIAGNOSIS — C7A.012 MALIGNANT CARCINOID TUMOR OF ILEUM: ICD-10-CM

## 2020-10-13 PROBLEM — R80.9 MICROALBUMINURIA DUE TO TYPE 2 DIABETES MELLITUS: Status: ACTIVE | Noted: 2020-10-13

## 2020-10-13 PROBLEM — E11.29 MICROALBUMINURIA DUE TO TYPE 2 DIABETES MELLITUS: Status: ACTIVE | Noted: 2020-10-13

## 2020-10-13 LAB
ALBUMIN SERPL BCP-MCNC: 4.3 G/DL (ref 3.5–5.2)
ALP SERPL-CCNC: 76 U/L (ref 55–135)
ALT SERPL W/O P-5'-P-CCNC: 8 U/L (ref 10–44)
ANION GAP SERPL CALC-SCNC: 13 MMOL/L (ref 8–16)
AST SERPL-CCNC: 18 U/L (ref 10–40)
BASOPHILS # BLD AUTO: 0.02 K/UL (ref 0–0.2)
BASOPHILS NFR BLD: 0.3 % (ref 0–1.9)
BILIRUB SERPL-MCNC: 1.5 MG/DL (ref 0.1–1)
BUN SERPL-MCNC: 8 MG/DL (ref 8–23)
CALCIUM SERPL-MCNC: 10 MG/DL (ref 8.7–10.5)
CHLORIDE SERPL-SCNC: 103 MMOL/L (ref 95–110)
CO2 SERPL-SCNC: 26 MMOL/L (ref 23–29)
CREAT SERPL-MCNC: 0.8 MG/DL (ref 0.5–1.4)
DIFFERENTIAL METHOD: ABNORMAL
EOSINOPHIL # BLD AUTO: 0 K/UL (ref 0–0.5)
EOSINOPHIL NFR BLD: 0.6 % (ref 0–8)
ERYTHROCYTE [DISTWIDTH] IN BLOOD BY AUTOMATED COUNT: 12.3 % (ref 11.5–14.5)
EST. GFR  (AFRICAN AMERICAN): >60 ML/MIN/1.73 M^2
EST. GFR  (NON AFRICAN AMERICAN): >60 ML/MIN/1.73 M^2
GLUCOSE SERPL-MCNC: 149 MG/DL (ref 70–110)
HCT VFR BLD AUTO: 44.4 % (ref 37–48.5)
HGB BLD-MCNC: 14.2 G/DL (ref 12–16)
IMM GRANULOCYTES # BLD AUTO: 0.02 K/UL (ref 0–0.04)
IMM GRANULOCYTES NFR BLD AUTO: 0.3 % (ref 0–0.5)
LYMPHOCYTES # BLD AUTO: 1.9 K/UL (ref 1–4.8)
LYMPHOCYTES NFR BLD: 26.4 % (ref 18–48)
MCH RBC QN AUTO: 28.6 PG (ref 27–31)
MCHC RBC AUTO-ENTMCNC: 32 G/DL (ref 32–36)
MCV RBC AUTO: 89 FL (ref 82–98)
MONOCYTES # BLD AUTO: 0.5 K/UL (ref 0.3–1)
MONOCYTES NFR BLD: 7.4 % (ref 4–15)
NEUTROPHILS # BLD AUTO: 4.6 K/UL (ref 1.8–7.7)
NEUTROPHILS NFR BLD: 65 % (ref 38–73)
NRBC BLD-RTO: 0 /100 WBC
PLATELET # BLD AUTO: ABNORMAL K/UL (ref 150–350)
PLATELET BLD QL SMEAR: ABNORMAL
PMV BLD AUTO: ABNORMAL FL (ref 9.2–12.9)
POTASSIUM SERPL-SCNC: 4.5 MMOL/L (ref 3.5–5.1)
PROT SERPL-MCNC: 8.2 G/DL (ref 6–8.4)
RBC # BLD AUTO: 4.97 M/UL (ref 4–5.4)
SODIUM SERPL-SCNC: 142 MMOL/L (ref 136–145)
WBC # BLD AUTO: 7.02 K/UL (ref 3.9–12.7)

## 2020-10-13 PROCEDURE — 63600175 PHARM REV CODE 636 W HCPCS: Mod: JG,HCNC | Performed by: INTERNAL MEDICINE

## 2020-10-13 PROCEDURE — 1159F MED LIST DOCD IN RCRD: CPT | Mod: HCNC,S$GLB,, | Performed by: INTERNAL MEDICINE

## 2020-10-13 PROCEDURE — 36415 COLL VENOUS BLD VENIPUNCTURE: CPT | Mod: HCNC

## 2020-10-13 PROCEDURE — 1126F PR PAIN SEVERITY QUANTIFIED, NO PAIN PRESENT: ICD-10-PCS | Mod: HCNC,S$GLB,, | Performed by: INTERNAL MEDICINE

## 2020-10-13 PROCEDURE — 3078F PR MOST RECENT DIASTOLIC BLOOD PRESSURE < 80 MM HG: ICD-10-PCS | Mod: HCNC,CPTII,S$GLB, | Performed by: INTERNAL MEDICINE

## 2020-10-13 PROCEDURE — 99214 OFFICE O/P EST MOD 30 MIN: CPT | Mod: HCNC,S$GLB,, | Performed by: INTERNAL MEDICINE

## 2020-10-13 PROCEDURE — 99499 UNLISTED E&M SERVICE: CPT | Mod: HCNC,S$GLB,, | Performed by: INTERNAL MEDICINE

## 2020-10-13 PROCEDURE — 3074F SYST BP LT 130 MM HG: CPT | Mod: HCNC,CPTII,S$GLB, | Performed by: INTERNAL MEDICINE

## 2020-10-13 PROCEDURE — 86316 IMMUNOASSAY TUMOR OTHER: CPT | Mod: HCNC

## 2020-10-13 PROCEDURE — 99999 PR PBB SHADOW E&M-EST. PATIENT-LVL III: CPT | Mod: PBBFAC,HCNC,, | Performed by: INTERNAL MEDICINE

## 2020-10-13 PROCEDURE — 99999 PR PBB SHADOW E&M-EST. PATIENT-LVL III: ICD-10-PCS | Mod: PBBFAC,HCNC,, | Performed by: INTERNAL MEDICINE

## 2020-10-13 PROCEDURE — 85025 COMPLETE CBC W/AUTO DIFF WBC: CPT | Mod: HCNC

## 2020-10-13 PROCEDURE — 80053 COMPREHEN METABOLIC PANEL: CPT | Mod: HCNC

## 2020-10-13 PROCEDURE — 1159F PR MEDICATION LIST DOCUMENTED IN MEDICAL RECORD: ICD-10-PCS | Mod: HCNC,S$GLB,, | Performed by: INTERNAL MEDICINE

## 2020-10-13 PROCEDURE — 3078F DIAST BP <80 MM HG: CPT | Mod: HCNC,CPTII,S$GLB, | Performed by: INTERNAL MEDICINE

## 2020-10-13 PROCEDURE — 96401 CHEMO ANTI-NEOPL SQ/IM: CPT | Mod: HCNC

## 2020-10-13 PROCEDURE — 1101F PR PT FALLS ASSESS DOC 0-1 FALLS W/OUT INJ PAST YR: ICD-10-PCS | Mod: HCNC,CPTII,S$GLB, | Performed by: INTERNAL MEDICINE

## 2020-10-13 PROCEDURE — 99499 RISK ADDL DX/OHS AUDIT: ICD-10-PCS | Mod: S$GLB,,, | Performed by: INTERNAL MEDICINE

## 2020-10-13 PROCEDURE — 99214 PR OFFICE/OUTPT VISIT, EST, LEVL IV, 30-39 MIN: ICD-10-PCS | Mod: HCNC,S$GLB,, | Performed by: INTERNAL MEDICINE

## 2020-10-13 PROCEDURE — 1126F AMNT PAIN NOTED NONE PRSNT: CPT | Mod: HCNC,S$GLB,, | Performed by: INTERNAL MEDICINE

## 2020-10-13 PROCEDURE — 96372 THER/PROPH/DIAG INJ SC/IM: CPT | Mod: HCNC

## 2020-10-13 PROCEDURE — 1101F PT FALLS ASSESS-DOCD LE1/YR: CPT | Mod: HCNC,CPTII,S$GLB, | Performed by: INTERNAL MEDICINE

## 2020-10-13 PROCEDURE — 3074F PR MOST RECENT SYSTOLIC BLOOD PRESSURE < 130 MM HG: ICD-10-PCS | Mod: HCNC,CPTII,S$GLB, | Performed by: INTERNAL MEDICINE

## 2020-10-13 RX ORDER — LANREOTIDE ACETATE 120 MG/.5ML
120 INJECTION SUBCUTANEOUS
Status: DISCONTINUED | OUTPATIENT
Start: 2020-10-13 | End: 2020-10-13 | Stop reason: HOSPADM

## 2020-10-13 RX ORDER — LANREOTIDE ACETATE 120 MG/.5ML
120 INJECTION SUBCUTANEOUS
Status: CANCELLED | OUTPATIENT
Start: 2020-10-13

## 2020-10-13 RX ORDER — LOSARTAN POTASSIUM 25 MG/1
25 TABLET ORAL DAILY
Qty: 90 TABLET | Refills: 1 | Status: SHIPPED | OUTPATIENT
Start: 2020-10-13 | End: 2021-04-05 | Stop reason: SDUPTHER

## 2020-10-13 RX ADMIN — LANREOTIDE ACETATE 120 MG: 120 INJECTION SUBCUTANEOUS at 10:10

## 2020-10-13 NOTE — PROGRESS NOTES
Patient, Shahram Hills (MRN #[id number]), presented with a recent Platelet count less than 150 K/uL consistent with the definition of thrombocytopenia (ICD10 - D69.6).    Platelets   Date Value Ref Range Status   10/13/2020 SEE COMMENT 150 - 350 K/uL Final     Comment:     Unable to report platelet count due to clumping.     The patient's thrombocytopenia was monitored, evaluated, addressed and/or treated. This addendum to the medical record is made on 10/13/2020.

## 2020-10-13 NOTE — PLAN OF CARE
Lanreotide injection to right buttock complete. Pt tolerated well. VSS. NAD. Pt verbalized understanding of discharge instructions.

## 2020-10-13 NOTE — PROGRESS NOTES
"Subjective:       Patient ID: Shahram Hills is a 80 y.o. female.     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 Ms. Hills is an 77 yo woman who initially saw me on 6/7/18 for further evaluation of neuroendocrine tumor. She initially presented with diarrhea, abdominal discomfort, weight loss. She was evaluated at [facility] and underwent workup below:  US abdomen on 3/14/18 showed numerous bilobar mixed echogenicity and mildly vascular hepatic lesions. Cholelithiasis without e/o acute cholecystitis.   MRI abdomen on 3/30/2018 showed innumerable hepatic metastases. L3 vertebral body metastasis with soft tissue component along the right margin of the L3 and upper L4 vertebral bodies, filling the right L3/4 neural foramen, and extending into the anterior aspect of the spinal canal on the right at the L3 and upper T4 levels. Associated with mild spinal canal narrowing.  CT chest on 4/6/2018 showed one lucent nodule measuring 7 mm in the T7 vertebral body, most likely representing metastatic disease.    EGD on 5/4/18 showed normal duodenum. Schatzki's ring in the lower third of the esophagus. Grade 1 esophagitis in the GE junction c/w mild distal esophagitis. Congestion and erythema in the antrum, pre-pyloric region and stomach body c/w gastritis. Biopsy of the stomach antrum showed mild chronic gastritis, neg for H. pylori.   Labs on 5/9/2018: WBC 6.9, H/H 11.6/34.3, MCV 87.5, plt count 279. On 3/9/18: Vit B12 level 173, folic acid 6.6  She was started on oral vit B12 and underwent a liver biopsy on 5/18/18. Pathology showed "metastatic well differentiated neuroendocrine tumor. Ki67 3%"     She saw me on 6/7/18 and complained of weight loss of 26 lbs over the past 3-4 months, also has diarrhea. No flushing, wheezing, palpitations. Has been having pain in right flank radiating down " "the right leg. No tingling/numbness, weakness. She can hold her bladder but when she urinates her diarrhea would come out as well. Started on dex 4 mg q6h the evening of 18.      2. MRI spine on 18 showed "Marrow replacing metastatic lesion of the L3 vertebral body with associated extra osseous expansion and complete effacement of the right L3-L4 neural foramina and additional effacement of the right lateral recess.  There is additional lateral extension and abutment of the right psoas muscle.  Superimposed degenerative change at this level results in mild spinal canal stenosis." I sent the patient to ED on 18 where she was evaluated by neurosurgery. Neurosurgery did not feel she was a candidate for surgical intervention.      3. Seen by Dr. Adames on 18. palliative radiation to the area of the L3 metastasis 18-18   4. 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  There is an adjacent metastatic lymph node, diffuse liver metastases, and multiple bone metastases. Index primary neoplasm SUV max 33.13. Adjacent lymph node SUV max 23. L3 bone metastasis SUV max 36.86. Left lobe SUV max 46.13   5. Lanreotide every 4 weeks started on 18  6. TACE to the right hepatic lobe on 19. TACE to the left hepatic lobe on 3/21/19.      History of Present Illness:   Mr. Hills returns for follow up. Has been feeling well. No complaints.      ECO     ROS:   See HPI. Otherwise negative.      Physical Examination:   Vital signs reviewed.   Gen: well hydrated, well developed, in no acute distress.  HEENT: normocephalic, anicteric, PERRLA, EOMI, oropharynx clear  Neck: supple, no JVD, thyromegaly, cervical or supraclavicular LAD  Lungs: CTAB, no wheezes or rales  Heart: RRR, no M/R/G  Abdomen: soft, no tenderness, non-distended,  no hepatomegaly, no splenomegaly, no mass, or hernia.   Ext: b/l LE trace edema, chronic  Neuro: alert and oriented x 4, no focal " neuro deficit  Skin: no rash, open wounds or ulcers  Psych: pleasant and appropriate mood and affect     Objective:      Laboratory Data:  Labs reviewed. CBC normal. Bili 1.5. Chromogranin A has been stable. Today's value pending    Imaging Data:  CT chest 7/17/2020:     Two subcentimeter pulmonary nodules within the right upper and left lower lobes, both stable dating back to at least 12/07/2018, favored a benign etiology.     Two pulmonary micronodules within the right lung, stable dating back to at least 08/30/2019.     Sclerotic foci within the manubrium and sternum in keeping with known metastatic disease.     MRI abdomen/pelvis 7/17/2020:  Stable examination.  Numerous hepatic masses, distal ileal mass, and enhancing L3 vertebral body lesion appear similar to prior examination.     RECIST SUMMARY:     Date of prior examination for comparison: 02/26/2020.     Lesion 1: Liver.  5.2 cm. Series 18 image 43.  Prior measurement 5.2 cm.     Lesion 2: Distal ileum.  2.6 cm. Series 23 image 15.  Prior measurement 2.6 cm.     Assessment and Plan:      1. Malignant carcinoid tumor of ileum    2. 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 Spine metastasis    5. Type 2 diabetes mellitus without complication, without long-term current use of insulin    6. Essential hypertension      1-4.  - doing well. On lanreotide. S/p TACE  - CT and MRI in July 2020 showed stable disease. Next scan in Jan 2021  - c/w lanreotide every 4 weeks. Get lanreotide today  - RTC in 4 weeks     5.  - BS good  - c/w current meds     6.  - BP good  - c/w current meds     RTC in 4 weeks  Their multiple questions were answered in the clinic. Encouraged to call should issues arise

## 2020-10-16 LAB — CGA SERPL-MCNC: 764 NG/ML (ref 0–103)

## 2020-11-10 ENCOUNTER — OFFICE VISIT (OUTPATIENT)
Dept: HEMATOLOGY/ONCOLOGY | Facility: CLINIC | Age: 80
End: 2020-11-10
Payer: MEDICARE

## 2020-11-10 ENCOUNTER — INFUSION (OUTPATIENT)
Dept: INFUSION THERAPY | Facility: OTHER | Age: 80
End: 2020-11-10
Attending: INTERNAL MEDICINE
Payer: MEDICARE

## 2020-11-10 ENCOUNTER — LAB VISIT (OUTPATIENT)
Dept: LAB | Facility: OTHER | Age: 80
End: 2020-11-10
Attending: INTERNAL MEDICINE
Payer: MEDICARE

## 2020-11-10 VITALS
OXYGEN SATURATION: 98 % | WEIGHT: 136 LBS | HEART RATE: 81 BPM | SYSTOLIC BLOOD PRESSURE: 153 MMHG | DIASTOLIC BLOOD PRESSURE: 81 MMHG | RESPIRATION RATE: 18 BRPM | HEIGHT: 64 IN | BODY MASS INDEX: 23.22 KG/M2 | TEMPERATURE: 97 F

## 2020-11-10 DIAGNOSIS — C7B.8 METASTATIC MALIGNANT NEUROENDOCRINE TUMOR TO LIVER: ICD-10-CM

## 2020-11-10 DIAGNOSIS — I10 ESSENTIAL HYPERTENSION: ICD-10-CM

## 2020-11-10 DIAGNOSIS — E11.9 TYPE 2 DIABETES MELLITUS WITHOUT COMPLICATION, WITHOUT LONG-TERM CURRENT USE OF INSULIN: ICD-10-CM

## 2020-11-10 DIAGNOSIS — C7B.8 NEUROENDOCRINE CARCINOMA METASTATIC TO BONE: ICD-10-CM

## 2020-11-10 DIAGNOSIS — C7A.8 NEUROENDOCRINE CARCINOMA METASTATIC TO BONE: ICD-10-CM

## 2020-11-10 DIAGNOSIS — C7A.012 MALIGNANT CARCINOID TUMOR OF ILEUM: Primary | ICD-10-CM

## 2020-11-10 DIAGNOSIS — C79.51 SPINE METASTASIS: ICD-10-CM

## 2020-11-10 DIAGNOSIS — C7B.8 METASTATIC MALIGNANT NEUROENDOCRINE TUMOR TO LIVER: Primary | ICD-10-CM

## 2020-11-10 LAB
ALBUMIN SERPL BCP-MCNC: 3.9 G/DL (ref 3.5–5.2)
ALP SERPL-CCNC: 67 U/L (ref 55–135)
ALT SERPL W/O P-5'-P-CCNC: 8 U/L (ref 10–44)
ANION GAP SERPL CALC-SCNC: 12 MMOL/L (ref 8–16)
AST SERPL-CCNC: 14 U/L (ref 10–40)
BASOPHILS # BLD AUTO: 0.03 K/UL (ref 0–0.2)
BASOPHILS NFR BLD: 0.4 % (ref 0–1.9)
BILIRUB SERPL-MCNC: 1.5 MG/DL (ref 0.1–1)
BUN SERPL-MCNC: 9 MG/DL (ref 8–23)
CALCIUM SERPL-MCNC: 9.7 MG/DL (ref 8.7–10.5)
CHLORIDE SERPL-SCNC: 102 MMOL/L (ref 95–110)
CO2 SERPL-SCNC: 28 MMOL/L (ref 23–29)
CREAT SERPL-MCNC: 0.7 MG/DL (ref 0.5–1.4)
DIFFERENTIAL METHOD: NORMAL
EOSINOPHIL # BLD AUTO: 0.1 K/UL (ref 0–0.5)
EOSINOPHIL NFR BLD: 0.8 % (ref 0–8)
ERYTHROCYTE [DISTWIDTH] IN BLOOD BY AUTOMATED COUNT: 12 % (ref 11.5–14.5)
EST. GFR  (AFRICAN AMERICAN): >60 ML/MIN/1.73 M^2
EST. GFR  (NON AFRICAN AMERICAN): >60 ML/MIN/1.73 M^2
GLUCOSE SERPL-MCNC: 150 MG/DL (ref 70–110)
HCT VFR BLD AUTO: 41.3 % (ref 37–48.5)
HGB BLD-MCNC: 13.4 G/DL (ref 12–16)
IMM GRANULOCYTES # BLD AUTO: 0.01 K/UL (ref 0–0.04)
IMM GRANULOCYTES NFR BLD AUTO: 0.1 % (ref 0–0.5)
LYMPHOCYTES # BLD AUTO: 2.3 K/UL (ref 1–4.8)
LYMPHOCYTES NFR BLD: 31.4 % (ref 18–48)
MCH RBC QN AUTO: 29.1 PG (ref 27–31)
MCHC RBC AUTO-ENTMCNC: 32.4 G/DL (ref 32–36)
MCV RBC AUTO: 90 FL (ref 82–98)
MONOCYTES # BLD AUTO: 0.6 K/UL (ref 0.3–1)
MONOCYTES NFR BLD: 7.9 % (ref 4–15)
NEUTROPHILS # BLD AUTO: 4.4 K/UL (ref 1.8–7.7)
NEUTROPHILS NFR BLD: 59.4 % (ref 38–73)
NRBC BLD-RTO: 0 /100 WBC
PLATELET # BLD AUTO: 287 K/UL (ref 150–350)
PMV BLD AUTO: 10.1 FL (ref 9.2–12.9)
POTASSIUM SERPL-SCNC: 4.6 MMOL/L (ref 3.5–5.1)
PROT SERPL-MCNC: 7.5 G/DL (ref 6–8.4)
RBC # BLD AUTO: 4.61 M/UL (ref 4–5.4)
SODIUM SERPL-SCNC: 142 MMOL/L (ref 136–145)
WBC # BLD AUTO: 7.46 K/UL (ref 3.9–12.7)

## 2020-11-10 PROCEDURE — 3077F SYST BP >= 140 MM HG: CPT | Mod: HCNC,CPTII,S$GLB, | Performed by: INTERNAL MEDICINE

## 2020-11-10 PROCEDURE — 3079F PR MOST RECENT DIASTOLIC BLOOD PRESSURE 80-89 MM HG: ICD-10-PCS | Mod: HCNC,CPTII,S$GLB, | Performed by: INTERNAL MEDICINE

## 2020-11-10 PROCEDURE — 36415 COLL VENOUS BLD VENIPUNCTURE: CPT | Mod: HCNC

## 2020-11-10 PROCEDURE — 99215 PR OFFICE/OUTPT VISIT, EST, LEVL V, 40-54 MIN: ICD-10-PCS | Mod: HCNC,S$GLB,, | Performed by: INTERNAL MEDICINE

## 2020-11-10 PROCEDURE — 1126F PR PAIN SEVERITY QUANTIFIED, NO PAIN PRESENT: ICD-10-PCS | Mod: HCNC,S$GLB,, | Performed by: INTERNAL MEDICINE

## 2020-11-10 PROCEDURE — 1101F PT FALLS ASSESS-DOCD LE1/YR: CPT | Mod: HCNC,CPTII,S$GLB, | Performed by: INTERNAL MEDICINE

## 2020-11-10 PROCEDURE — 85025 COMPLETE CBC W/AUTO DIFF WBC: CPT | Mod: HCNC

## 2020-11-10 PROCEDURE — 99215 OFFICE O/P EST HI 40 MIN: CPT | Mod: HCNC,S$GLB,, | Performed by: INTERNAL MEDICINE

## 2020-11-10 PROCEDURE — 80053 COMPREHEN METABOLIC PANEL: CPT | Mod: HCNC

## 2020-11-10 PROCEDURE — 1159F MED LIST DOCD IN RCRD: CPT | Mod: HCNC,S$GLB,, | Performed by: INTERNAL MEDICINE

## 2020-11-10 PROCEDURE — 99999 PR PBB SHADOW E&M-EST. PATIENT-LVL IV: ICD-10-PCS | Mod: PBBFAC,HCNC,, | Performed by: INTERNAL MEDICINE

## 2020-11-10 PROCEDURE — 3077F PR MOST RECENT SYSTOLIC BLOOD PRESSURE >= 140 MM HG: ICD-10-PCS | Mod: HCNC,CPTII,S$GLB, | Performed by: INTERNAL MEDICINE

## 2020-11-10 PROCEDURE — 1159F PR MEDICATION LIST DOCUMENTED IN MEDICAL RECORD: ICD-10-PCS | Mod: HCNC,S$GLB,, | Performed by: INTERNAL MEDICINE

## 2020-11-10 PROCEDURE — 3079F DIAST BP 80-89 MM HG: CPT | Mod: HCNC,CPTII,S$GLB, | Performed by: INTERNAL MEDICINE

## 2020-11-10 PROCEDURE — 86316 IMMUNOASSAY TUMOR OTHER: CPT | Mod: HCNC

## 2020-11-10 PROCEDURE — 1126F AMNT PAIN NOTED NONE PRSNT: CPT | Mod: HCNC,S$GLB,, | Performed by: INTERNAL MEDICINE

## 2020-11-10 PROCEDURE — 96372 THER/PROPH/DIAG INJ SC/IM: CPT | Mod: HCNC

## 2020-11-10 PROCEDURE — 63600175 PHARM REV CODE 636 W HCPCS: Mod: JG,HCNC | Performed by: INTERNAL MEDICINE

## 2020-11-10 PROCEDURE — 99999 PR PBB SHADOW E&M-EST. PATIENT-LVL IV: CPT | Mod: PBBFAC,HCNC,, | Performed by: INTERNAL MEDICINE

## 2020-11-10 PROCEDURE — 1101F PR PT FALLS ASSESS DOC 0-1 FALLS W/OUT INJ PAST YR: ICD-10-PCS | Mod: HCNC,CPTII,S$GLB, | Performed by: INTERNAL MEDICINE

## 2020-11-10 RX ORDER — LANREOTIDE ACETATE 120 MG/.5ML
120 INJECTION SUBCUTANEOUS
Status: CANCELLED | OUTPATIENT
Start: 2020-11-10

## 2020-11-10 RX ORDER — LANREOTIDE ACETATE 120 MG/.5ML
120 INJECTION SUBCUTANEOUS
Status: DISCONTINUED | OUTPATIENT
Start: 2020-11-10 | End: 2020-11-10 | Stop reason: HOSPADM

## 2020-11-10 RX ADMIN — LANREOTIDE ACETATE 120 MG: 120 INJECTION SUBCUTANEOUS at 11:11

## 2020-11-10 NOTE — PLAN OF CARE
Lanreotide injection administered, no reaction. Patient tolerated well. No apparent distress noted. Discharge instructions given to patient. Patient understands instructions.

## 2020-11-10 NOTE — PROGRESS NOTES
"Subjective:       Patient ID: Shahram Hills is a 80 y.o. female.     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 Ms. Hills is an 77 yo woman who initially saw me on 6/7/18 for further evaluation of neuroendocrine tumor. She initially presented with diarrhea, abdominal discomfort, weight loss. She was evaluated at [facility] and underwent workup below:  US abdomen on 3/14/18 showed numerous bilobar mixed echogenicity and mildly vascular hepatic lesions. Cholelithiasis without e/o acute cholecystitis.   MRI abdomen on 3/30/2018 showed innumerable hepatic metastases. L3 vertebral body metastasis with soft tissue component along the right margin of the L3 and upper L4 vertebral bodies, filling the right L3/4 neural foramen, and extending into the anterior aspect of the spinal canal on the right at the L3 and upper T4 levels. Associated with mild spinal canal narrowing.  CT chest on 4/6/2018 showed one lucent nodule measuring 7 mm in the T7 vertebral body, most likely representing metastatic disease.    EGD on 5/4/18 showed normal duodenum. Schatzki's ring in the lower third of the esophagus. Grade 1 esophagitis in the GE junction c/w mild distal esophagitis. Congestion and erythema in the antrum, pre-pyloric region and stomach body c/w gastritis. Biopsy of the stomach antrum showed mild chronic gastritis, neg for H. pylori.   Labs on 5/9/2018: WBC 6.9, H/H 11.6/34.3, MCV 87.5, plt count 279. On 3/9/18: Vit B12 level 173, folic acid 6.6  She was started on oral vit B12 and underwent a liver biopsy on 5/18/18. Pathology showed "metastatic well differentiated neuroendocrine tumor. Ki67 3%"     She saw me on 6/7/18 and complained of weight loss of 26 lbs over the past 3-4 months, also has diarrhea. No flushing, wheezing, palpitations. Has been having pain in right flank radiating down " "the right leg. No tingling/numbness, weakness. She can hold her bladder but when she urinates her diarrhea would come out as well. Started on dex 4 mg q6h the evening of 18.      2. MRI spine on 18 showed "Marrow replacing metastatic lesion of the L3 vertebral body with associated extra osseous expansion and complete effacement of the right L3-L4 neural foramina and additional effacement of the right lateral recess.  There is additional lateral extension and abutment of the right psoas muscle.  Superimposed degenerative change at this level results in mild spinal canal stenosis." I sent the patient to ED on 18 where she was evaluated by neurosurgery. Neurosurgery did not feel she was a candidate for surgical intervention.      3. Seen by Dr. Adames on 18. palliative radiation to the area of the L3 metastasis 18-18   4. 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  There is an adjacent metastatic lymph node, diffuse liver metastases, and multiple bone metastases. Index primary neoplasm SUV max 33.13. Adjacent lymph node SUV max 23. L3 bone metastasis SUV max 36.86. Left lobe SUV max 46.13   5. Lanreotide every 4 weeks started on 18  6. TACE to the right hepatic lobe on 19. TACE to the left hepatic lobe on 3/21/19.      History of Present Illness:   Mr. Hills returns for follow up. Has been feeling well. No complaints.      ECO     ROS:   See HPI. Otherwise negative.      Physical Examination:   Vital signs reviewed.   Gen: well hydrated, well developed, in no acute distress.  HEENT: normocephalic, anicteric, PERRLA, EOMI, oropharynx clear  Neck: supple, no JVD, thyromegaly, cervical or supraclavicular LAD  Lungs: CTAB, no wheezes or rales  Heart: RRR, no M/R/G  Abdomen: soft, no tenderness, non-distended,  no hepatomegaly, no splenomegaly, no mass, or hernia.   Ext: b/l LE trace edema, chronic  Neuro: alert and oriented x 4, no focal " neuro deficit  Skin: no rash, open wounds or ulcers  Psych: pleasant and appropriate mood and affect     Objective:      Laboratory Data:  Labs reviewed. CBC normal. Bili 1.5. Chromogranin A has been stable. Today's value pending     Imaging Data:  CT chest 7/17/2020:     Two subcentimeter pulmonary nodules within the right upper and left lower lobes, both stable dating back to at least 12/07/2018, favored a benign etiology.     Two pulmonary micronodules within the right lung, stable dating back to at least 08/30/2019.     Sclerotic foci within the manubrium and sternum in keeping with known metastatic disease.     MRI abdomen/pelvis 7/17/2020:  Stable examination.  Numerous hepatic masses, distal ileal mass, and enhancing L3 vertebral body lesion appear similar to prior examination.     RECIST SUMMARY:     Date of prior examination for comparison: 02/26/2020.     Lesion 1: Liver.  5.2 cm. Series 18 image 43.  Prior measurement 5.2 cm.     Lesion 2: Distal ileum.  2.6 cm. Series 23 image 15.  Prior measurement 2.6 cm.     Assessment and Plan:      1. Malignant carcinoid tumor of ileum    2. 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    4. Spine metastasis    5. Type 2 diabetes mellitus without complication, without long-term current use of insulin    6. Essential hypertension        1-4.  - doing well. On lanreotide. S/p TACE  - CT and MRI in July 2020 showed stable disease. Next scan in Jan 2021  - c/w lanreotide every 4 weeks. Get lanreotide today  - RTC in 4 weeks     5.  - BS good  - c/w current meds     6.  - BP mildly elevated today. It was good when patient checked it at home this morning  - c/w current meds     RTC in 4 weeks  Their multiple questions were answered in the clinic. Encouraged to call should issues arise

## 2020-11-12 ENCOUNTER — PATIENT OUTREACH (OUTPATIENT)
Dept: ADMINISTRATIVE | Facility: HOSPITAL | Age: 80
End: 2020-11-12

## 2020-11-13 LAB — CGA SERPL-MCNC: 919 NG/ML (ref 0–103)

## 2020-12-08 ENCOUNTER — INFUSION (OUTPATIENT)
Dept: INFUSION THERAPY | Facility: OTHER | Age: 80
End: 2020-12-08
Attending: INTERNAL MEDICINE
Payer: MEDICARE

## 2020-12-08 ENCOUNTER — LAB VISIT (OUTPATIENT)
Dept: LAB | Facility: OTHER | Age: 80
End: 2020-12-08
Attending: INTERNAL MEDICINE
Payer: MEDICARE

## 2020-12-08 ENCOUNTER — OFFICE VISIT (OUTPATIENT)
Dept: HEMATOLOGY/ONCOLOGY | Facility: CLINIC | Age: 80
End: 2020-12-08
Payer: MEDICARE

## 2020-12-08 VITALS
TEMPERATURE: 98 F | DIASTOLIC BLOOD PRESSURE: 76 MMHG | RESPIRATION RATE: 18 BRPM | SYSTOLIC BLOOD PRESSURE: 136 MMHG | HEIGHT: 64 IN | BODY MASS INDEX: 23.07 KG/M2 | HEART RATE: 89 BPM | WEIGHT: 135.13 LBS | OXYGEN SATURATION: 100 %

## 2020-12-08 DIAGNOSIS — E11.9 TYPE 2 DIABETES MELLITUS WITHOUT COMPLICATION, WITHOUT LONG-TERM CURRENT USE OF INSULIN: ICD-10-CM

## 2020-12-08 DIAGNOSIS — C7A.012 MALIGNANT CARCINOID TUMOR OF ILEUM: ICD-10-CM

## 2020-12-08 DIAGNOSIS — C7B.8 METASTATIC MALIGNANT NEUROENDOCRINE TUMOR TO LIVER: Primary | ICD-10-CM

## 2020-12-08 DIAGNOSIS — C7B.8 METASTATIC MALIGNANT NEUROENDOCRINE TUMOR TO LIVER: ICD-10-CM

## 2020-12-08 DIAGNOSIS — C7B.8 NEUROENDOCRINE CARCINOMA METASTATIC TO BONE: ICD-10-CM

## 2020-12-08 DIAGNOSIS — C7A.012 MALIGNANT CARCINOID TUMOR OF ILEUM: Primary | ICD-10-CM

## 2020-12-08 DIAGNOSIS — I10 ESSENTIAL HYPERTENSION: ICD-10-CM

## 2020-12-08 DIAGNOSIS — C7A.8 NEUROENDOCRINE CARCINOMA METASTATIC TO BONE: ICD-10-CM

## 2020-12-08 LAB
ALBUMIN SERPL BCP-MCNC: 3.9 G/DL (ref 3.5–5.2)
ALP SERPL-CCNC: 61 U/L (ref 55–135)
ALT SERPL W/O P-5'-P-CCNC: 10 U/L (ref 10–44)
ANION GAP SERPL CALC-SCNC: 10 MMOL/L (ref 8–16)
AST SERPL-CCNC: 16 U/L (ref 10–40)
BASOPHILS # BLD AUTO: 0.02 K/UL (ref 0–0.2)
BASOPHILS NFR BLD: 0.3 % (ref 0–1.9)
BILIRUB SERPL-MCNC: 1.4 MG/DL (ref 0.1–1)
BUN SERPL-MCNC: 9 MG/DL (ref 8–23)
CALCIUM SERPL-MCNC: 9.5 MG/DL (ref 8.7–10.5)
CHLORIDE SERPL-SCNC: 103 MMOL/L (ref 95–110)
CO2 SERPL-SCNC: 29 MMOL/L (ref 23–29)
CREAT SERPL-MCNC: 0.8 MG/DL (ref 0.5–1.4)
DIFFERENTIAL METHOD: ABNORMAL
EOSINOPHIL # BLD AUTO: 0.1 K/UL (ref 0–0.5)
EOSINOPHIL NFR BLD: 0.7 % (ref 0–8)
ERYTHROCYTE [DISTWIDTH] IN BLOOD BY AUTOMATED COUNT: 12.1 % (ref 11.5–14.5)
EST. GFR  (AFRICAN AMERICAN): >60 ML/MIN/1.73 M^2
EST. GFR  (NON AFRICAN AMERICAN): >60 ML/MIN/1.73 M^2
GLUCOSE SERPL-MCNC: 194 MG/DL (ref 70–110)
HCT VFR BLD AUTO: 40.5 % (ref 37–48.5)
HGB BLD-MCNC: 12.7 G/DL (ref 12–16)
IMM GRANULOCYTES # BLD AUTO: 0.02 K/UL (ref 0–0.04)
IMM GRANULOCYTES NFR BLD AUTO: 0.3 % (ref 0–0.5)
LYMPHOCYTES # BLD AUTO: 2.3 K/UL (ref 1–4.8)
LYMPHOCYTES NFR BLD: 32.5 % (ref 18–48)
MCH RBC QN AUTO: 28 PG (ref 27–31)
MCHC RBC AUTO-ENTMCNC: 31.4 G/DL (ref 32–36)
MCV RBC AUTO: 89 FL (ref 82–98)
MONOCYTES # BLD AUTO: 0.6 K/UL (ref 0.3–1)
MONOCYTES NFR BLD: 7.9 % (ref 4–15)
NEUTROPHILS # BLD AUTO: 4 K/UL (ref 1.8–7.7)
NEUTROPHILS NFR BLD: 58.3 % (ref 38–73)
NRBC BLD-RTO: 0 /100 WBC
PLATELET # BLD AUTO: 278 K/UL (ref 150–350)
PMV BLD AUTO: 9.9 FL (ref 9.2–12.9)
POTASSIUM SERPL-SCNC: 5 MMOL/L (ref 3.5–5.1)
PROT SERPL-MCNC: 7.3 G/DL (ref 6–8.4)
RBC # BLD AUTO: 4.53 M/UL (ref 4–5.4)
SODIUM SERPL-SCNC: 142 MMOL/L (ref 136–145)
WBC # BLD AUTO: 6.92 K/UL (ref 3.9–12.7)

## 2020-12-08 PROCEDURE — 1101F PR PT FALLS ASSESS DOC 0-1 FALLS W/OUT INJ PAST YR: ICD-10-PCS | Mod: HCNC,CPTII,S$GLB, | Performed by: INTERNAL MEDICINE

## 2020-12-08 PROCEDURE — 1126F PR PAIN SEVERITY QUANTIFIED, NO PAIN PRESENT: ICD-10-PCS | Mod: HCNC,S$GLB,, | Performed by: INTERNAL MEDICINE

## 2020-12-08 PROCEDURE — 3075F SYST BP GE 130 - 139MM HG: CPT | Mod: HCNC,CPTII,S$GLB, | Performed by: INTERNAL MEDICINE

## 2020-12-08 PROCEDURE — 1101F PT FALLS ASSESS-DOCD LE1/YR: CPT | Mod: HCNC,CPTII,S$GLB, | Performed by: INTERNAL MEDICINE

## 2020-12-08 PROCEDURE — 99214 OFFICE O/P EST MOD 30 MIN: CPT | Mod: HCNC,S$GLB,, | Performed by: INTERNAL MEDICINE

## 2020-12-08 PROCEDURE — 36415 COLL VENOUS BLD VENIPUNCTURE: CPT | Mod: HCNC

## 2020-12-08 PROCEDURE — 85025 COMPLETE CBC W/AUTO DIFF WBC: CPT | Mod: HCNC

## 2020-12-08 PROCEDURE — 3288F FALL RISK ASSESSMENT DOCD: CPT | Mod: HCNC,CPTII,S$GLB, | Performed by: INTERNAL MEDICINE

## 2020-12-08 PROCEDURE — 3288F PR FALLS RISK ASSESSMENT DOCUMENTED: ICD-10-PCS | Mod: HCNC,CPTII,S$GLB, | Performed by: INTERNAL MEDICINE

## 2020-12-08 PROCEDURE — 96372 THER/PROPH/DIAG INJ SC/IM: CPT | Mod: HCNC

## 2020-12-08 PROCEDURE — 99999 PR PBB SHADOW E&M-EST. PATIENT-LVL IV: CPT | Mod: PBBFAC,HCNC,, | Performed by: INTERNAL MEDICINE

## 2020-12-08 PROCEDURE — 80053 COMPREHEN METABOLIC PANEL: CPT | Mod: HCNC

## 2020-12-08 PROCEDURE — 3078F DIAST BP <80 MM HG: CPT | Mod: HCNC,CPTII,S$GLB, | Performed by: INTERNAL MEDICINE

## 2020-12-08 PROCEDURE — 3078F PR MOST RECENT DIASTOLIC BLOOD PRESSURE < 80 MM HG: ICD-10-PCS | Mod: HCNC,CPTII,S$GLB, | Performed by: INTERNAL MEDICINE

## 2020-12-08 PROCEDURE — 1159F PR MEDICATION LIST DOCUMENTED IN MEDICAL RECORD: ICD-10-PCS | Mod: HCNC,S$GLB,, | Performed by: INTERNAL MEDICINE

## 2020-12-08 PROCEDURE — 1126F AMNT PAIN NOTED NONE PRSNT: CPT | Mod: HCNC,S$GLB,, | Performed by: INTERNAL MEDICINE

## 2020-12-08 PROCEDURE — 86316 IMMUNOASSAY TUMOR OTHER: CPT | Mod: HCNC

## 2020-12-08 PROCEDURE — 3075F PR MOST RECENT SYSTOLIC BLOOD PRESS GE 130-139MM HG: ICD-10-PCS | Mod: HCNC,CPTII,S$GLB, | Performed by: INTERNAL MEDICINE

## 2020-12-08 PROCEDURE — 63600175 PHARM REV CODE 636 W HCPCS: Mod: JG,HCNC | Performed by: INTERNAL MEDICINE

## 2020-12-08 PROCEDURE — 99214 PR OFFICE/OUTPT VISIT, EST, LEVL IV, 30-39 MIN: ICD-10-PCS | Mod: HCNC,S$GLB,, | Performed by: INTERNAL MEDICINE

## 2020-12-08 PROCEDURE — 99999 PR PBB SHADOW E&M-EST. PATIENT-LVL IV: ICD-10-PCS | Mod: PBBFAC,HCNC,, | Performed by: INTERNAL MEDICINE

## 2020-12-08 PROCEDURE — 1159F MED LIST DOCD IN RCRD: CPT | Mod: HCNC,S$GLB,, | Performed by: INTERNAL MEDICINE

## 2020-12-08 RX ORDER — LANREOTIDE ACETATE 120 MG/.5ML
120 INJECTION SUBCUTANEOUS
Status: DISCONTINUED | OUTPATIENT
Start: 2020-12-08 | End: 2020-12-08 | Stop reason: HOSPADM

## 2020-12-08 RX ORDER — LANREOTIDE ACETATE 120 MG/.5ML
120 INJECTION SUBCUTANEOUS
Status: CANCELLED | OUTPATIENT
Start: 2020-12-08

## 2020-12-08 RX ADMIN — LANREOTIDE ACETATE 120 MG: 120 INJECTION SUBCUTANEOUS at 10:12

## 2020-12-08 NOTE — Clinical Note
Get CBC, CMP, chromogranin A, POC creatinine, CT chest, MRI abdomen/pelvis on 1/4. See me on 1/5 then get lanreotide

## 2020-12-08 NOTE — PROGRESS NOTES
"Subjective:       Patient ID: Shahram Hills is a 80 y.o. female.     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 Ms. Hills is an 77 yo woman who initially saw me on 6/7/18 for further evaluation of neuroendocrine tumor. She initially presented with diarrhea, abdominal discomfort, weight loss. She was evaluated at [facility] and underwent workup below:  US abdomen on 3/14/18 showed numerous bilobar mixed echogenicity and mildly vascular hepatic lesions. Cholelithiasis without e/o acute cholecystitis.   MRI abdomen on 3/30/2018 showed innumerable hepatic metastases. L3 vertebral body metastasis with soft tissue component along the right margin of the L3 and upper L4 vertebral bodies, filling the right L3/4 neural foramen, and extending into the anterior aspect of the spinal canal on the right at the L3 and upper T4 levels. Associated with mild spinal canal narrowing.  CT chest on 4/6/2018 showed one lucent nodule measuring 7 mm in the T7 vertebral body, most likely representing metastatic disease.    EGD on 5/4/18 showed normal duodenum. Schatzki's ring in the lower third of the esophagus. Grade 1 esophagitis in the GE junction c/w mild distal esophagitis. Congestion and erythema in the antrum, pre-pyloric region and stomach body c/w gastritis. Biopsy of the stomach antrum showed mild chronic gastritis, neg for H. pylori.   Labs on 5/9/2018: WBC 6.9, H/H 11.6/34.3, MCV 87.5, plt count 279. On 3/9/18: Vit B12 level 173, folic acid 6.6  She was started on oral vit B12 and underwent a liver biopsy on 5/18/18. Pathology showed "metastatic well differentiated neuroendocrine tumor. Ki67 3%"     She saw me on 6/7/18 and complained of weight loss of 26 lbs over the past 3-4 months, also has diarrhea. No flushing, wheezing, palpitations. Has been having pain in right flank radiating down " "the right leg. No tingling/numbness, weakness. She can hold her bladder but when she urinates her diarrhea would come out as well. Started on dex 4 mg q6h the evening of 18.      2. MRI spine on 18 showed "Marrow replacing metastatic lesion of the L3 vertebral body with associated extra osseous expansion and complete effacement of the right L3-L4 neural foramina and additional effacement of the right lateral recess.  There is additional lateral extension and abutment of the right psoas muscle.  Superimposed degenerative change at this level results in mild spinal canal stenosis." I sent the patient to ED on 18 where she was evaluated by neurosurgery. Neurosurgery did not feel she was a candidate for surgical intervention.      3. Seen by Dr. Adames on 18. palliative radiation to the area of the L3 metastasis 18-18   4. 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  There is an adjacent metastatic lymph node, diffuse liver metastases, and multiple bone metastases. Index primary neoplasm SUV max 33.13. Adjacent lymph node SUV max 23. L3 bone metastasis SUV max 36.86. Left lobe SUV max 46.13   5. Lanreotide every 4 weeks started on 18  6. TACE to the right hepatic lobe on 19. TACE to the left hepatic lobe on 3/21/19.      History of Present Illness:   Mr. Hills returns for follow up. Has been feeling well. No complaints.      ECO     ROS:   See HPI. Otherwise negative.      Physical Examination:   Vital signs reviewed.   Gen: well hydrated, well developed, in no acute distress.  HEENT: normocephalic, anicteric, PERRLA, EOMI, oropharynx clear  Neck: supple, no JVD, thyromegaly, cervical or supraclavicular LAD  Lungs: CTAB, no wheezes or rales  Heart: RRR, no M/R/G  Abdomen: soft, no tenderness, non-distended,  liver edge 3 cm below the right costal margin, no splenomegaly, no mass, or hernia.   Ext: b/l LE trace edema, chronic  Neuro: " alert and oriented x 4, no focal neuro deficit  Skin: no rash, open wounds or ulcers  Psych: pleasant and appropriate mood and affect     Objective:      Laboratory Data:  Labs reviewed. CBC normal. Chromogranin A has been stable. Today's value pending     Imaging Data:  CT chest 7/17/2020:     Two subcentimeter pulmonary nodules within the right upper and left lower lobes, both stable dating back to at least 12/07/2018, favored a benign etiology.     Two pulmonary micronodules within the right lung, stable dating back to at least 08/30/2019.     Sclerotic foci within the manubrium and sternum in keeping with known metastatic disease.     MRI abdomen/pelvis 7/17/2020:  Stable examination.  Numerous hepatic masses, distal ileal mass, and enhancing L3 vertebral body lesion appear similar to prior examination.     RECIST SUMMARY:     Date of prior examination for comparison: 02/26/2020.     Lesion 1: Liver.  5.2 cm. Series 18 image 43.  Prior measurement 5.2 cm.     Lesion 2: Distal ileum.  2.6 cm. Series 23 image 15.  Prior measurement 2.6 cm.     Assessment and Plan:      1. Malignant carcinoid tumor of ileum    2. 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    4. Essential hypertension    5. Type 2 diabetes mellitus without complication, without long-term current use of insulin        1-3  - Ms Hills is an 81 yo woman with well differentiated low-grade neuroendocrine tumor of the ileum with mets to liver and bones.   - doing well. On lanreotide. S/p TACE  - CT and MRI in July 2020 showed stable disease. Next scan in Jan 2021  - c/w lanreotide every 4 weeks. Get lanreotide today  - RTC in 4 weeks with restaging scan prior     4.  - BP good  - c/w current meds    5.  - BS good  - c/w current meds       RTC in 4 weeks  Their multiple questions were answered in the clinic. Encouraged to call should issues arise

## 2020-12-08 NOTE — PLAN OF CARE
Lanreotide injection complete. Pt tolerated well. VSS. Pt verbalized understanding of discharge instructions before leaving.

## 2020-12-11 ENCOUNTER — PATIENT MESSAGE (OUTPATIENT)
Dept: OTHER | Facility: OTHER | Age: 80
End: 2020-12-11

## 2020-12-13 LAB — CGA SERPL-MCNC: 812 NG/ML (ref 0–103)

## 2020-12-21 ENCOUNTER — PES CALL (OUTPATIENT)
Dept: ADMINISTRATIVE | Facility: CLINIC | Age: 80
End: 2020-12-21

## 2021-01-04 ENCOUNTER — LAB VISIT (OUTPATIENT)
Dept: LAB | Facility: OTHER | Age: 81
End: 2021-01-04
Attending: INTERNAL MEDICINE
Payer: MEDICARE

## 2021-01-04 ENCOUNTER — HOSPITAL ENCOUNTER (OUTPATIENT)
Dept: RADIOLOGY | Facility: OTHER | Age: 81
Discharge: HOME | End: 2021-01-04
Attending: INTERNAL MEDICINE
Payer: MEDICARE

## 2021-01-04 DIAGNOSIS — C7A.012 MALIGNANT CARCINOID TUMOR OF ILEUM: ICD-10-CM

## 2021-01-04 LAB
ALBUMIN SERPL BCP-MCNC: 3.9 G/DL (ref 3.5–5.2)
ALP SERPL-CCNC: 71 U/L (ref 55–135)
ALT SERPL W/O P-5'-P-CCNC: 7 U/L (ref 10–44)
ANION GAP SERPL CALC-SCNC: 9 MMOL/L (ref 8–16)
AST SERPL-CCNC: 17 U/L (ref 10–40)
BASOPHILS # BLD AUTO: 0.01 K/UL (ref 0–0.2)
BASOPHILS NFR BLD: 0.1 % (ref 0–1.9)
BILIRUB SERPL-MCNC: 1.7 MG/DL (ref 0.1–1)
BUN SERPL-MCNC: 10 MG/DL (ref 8–23)
CALCIUM SERPL-MCNC: 9.8 MG/DL (ref 8.7–10.5)
CHLORIDE SERPL-SCNC: 103 MMOL/L (ref 95–110)
CO2 SERPL-SCNC: 29 MMOL/L (ref 23–29)
CREAT SERPL-MCNC: 0.7 MG/DL (ref 0.5–1.4)
DIFFERENTIAL METHOD: NORMAL
EOSINOPHIL # BLD AUTO: 0 K/UL (ref 0–0.5)
EOSINOPHIL NFR BLD: 0.3 % (ref 0–8)
ERYTHROCYTE [DISTWIDTH] IN BLOOD BY AUTOMATED COUNT: 12.3 % (ref 11.5–14.5)
EST. GFR  (AFRICAN AMERICAN): >60 ML/MIN/1.73 M^2
EST. GFR  (NON AFRICAN AMERICAN): >60 ML/MIN/1.73 M^2
GLUCOSE SERPL-MCNC: 140 MG/DL (ref 70–110)
HCT VFR BLD AUTO: 40.8 % (ref 37–48.5)
HGB BLD-MCNC: 13.1 G/DL (ref 12–16)
IMM GRANULOCYTES # BLD AUTO: 0.01 K/UL (ref 0–0.04)
IMM GRANULOCYTES NFR BLD AUTO: 0.1 % (ref 0–0.5)
LYMPHOCYTES # BLD AUTO: 1.8 K/UL (ref 1–4.8)
LYMPHOCYTES NFR BLD: 27.1 % (ref 18–48)
MCH RBC QN AUTO: 28.7 PG (ref 27–31)
MCHC RBC AUTO-ENTMCNC: 32.1 G/DL (ref 32–36)
MCV RBC AUTO: 89 FL (ref 82–98)
MONOCYTES # BLD AUTO: 0.5 K/UL (ref 0.3–1)
MONOCYTES NFR BLD: 7.4 % (ref 4–15)
NEUTROPHILS # BLD AUTO: 4.4 K/UL (ref 1.8–7.7)
NEUTROPHILS NFR BLD: 65 % (ref 38–73)
NRBC BLD-RTO: 0 /100 WBC
PLATELET # BLD AUTO: 283 K/UL (ref 150–350)
PMV BLD AUTO: 9.6 FL (ref 9.2–12.9)
POTASSIUM SERPL-SCNC: 4.8 MMOL/L (ref 3.5–5.1)
PROT SERPL-MCNC: 7.4 G/DL (ref 6–8.4)
RBC # BLD AUTO: 4.57 M/UL (ref 4–5.4)
SODIUM SERPL-SCNC: 141 MMOL/L (ref 136–145)
WBC # BLD AUTO: 6.75 K/UL (ref 3.9–12.7)

## 2021-01-04 PROCEDURE — 71260 CT THORAX DX C+: CPT | Mod: TC,HCNC

## 2021-01-04 PROCEDURE — A9585 GADOBUTROL INJECTION: HCPCS | Mod: HCNC | Performed by: INTERNAL MEDICINE

## 2021-01-04 PROCEDURE — 71260 CT CHEST WITH CONTRAST: ICD-10-PCS | Mod: 26,HCNC,, | Performed by: RADIOLOGY

## 2021-01-04 PROCEDURE — 86316 IMMUNOASSAY TUMOR OTHER: CPT | Mod: HCNC

## 2021-01-04 PROCEDURE — 80053 COMPREHEN METABOLIC PANEL: CPT | Mod: HCNC

## 2021-01-04 PROCEDURE — 71260 CT THORAX DX C+: CPT | Mod: 26,HCNC,, | Performed by: RADIOLOGY

## 2021-01-04 PROCEDURE — 72197 MRI ABDOMEN-PELVIS W W/O CONTRAST (XPD): ICD-10-PCS | Mod: 26,HCNC,, | Performed by: RADIOLOGY

## 2021-01-04 PROCEDURE — 85025 COMPLETE CBC W/AUTO DIFF WBC: CPT | Mod: HCNC

## 2021-01-04 PROCEDURE — 25500020 PHARM REV CODE 255: Mod: HCNC | Performed by: INTERNAL MEDICINE

## 2021-01-04 PROCEDURE — 72197 MRI PELVIS W/O & W/DYE: CPT | Mod: TC,HCNC

## 2021-01-04 PROCEDURE — 36415 COLL VENOUS BLD VENIPUNCTURE: CPT | Mod: HCNC

## 2021-01-04 PROCEDURE — 74183 MRI ABDOMEN-PELVIS W W/O CONTRAST (XPD): ICD-10-PCS | Mod: 26,HCNC,, | Performed by: RADIOLOGY

## 2021-01-04 PROCEDURE — 74183 MRI ABD W/O CNTR FLWD CNTR: CPT | Mod: 26,HCNC,, | Performed by: RADIOLOGY

## 2021-01-04 PROCEDURE — 72197 MRI PELVIS W/O & W/DYE: CPT | Mod: 26,HCNC,, | Performed by: RADIOLOGY

## 2021-01-04 RX ORDER — GADOBUTROL 604.72 MG/ML
6 INJECTION INTRAVENOUS
Status: COMPLETED | OUTPATIENT
Start: 2021-01-04 | End: 2021-01-04

## 2021-01-04 RX ADMIN — IOHEXOL 75 ML: 350 INJECTION, SOLUTION INTRAVENOUS at 09:01

## 2021-01-04 RX ADMIN — GADOBUTROL 6 ML: 604.72 INJECTION INTRAVENOUS at 09:01

## 2021-01-05 ENCOUNTER — OFFICE VISIT (OUTPATIENT)
Dept: HEMATOLOGY/ONCOLOGY | Facility: CLINIC | Age: 81
End: 2021-01-05
Payer: MEDICARE

## 2021-01-05 ENCOUNTER — INFUSION (OUTPATIENT)
Dept: INFUSION THERAPY | Facility: OTHER | Age: 81
End: 2021-01-05
Attending: INTERNAL MEDICINE
Payer: MEDICARE

## 2021-01-05 VITALS
DIASTOLIC BLOOD PRESSURE: 75 MMHG | BODY MASS INDEX: 23.29 KG/M2 | OXYGEN SATURATION: 100 % | RESPIRATION RATE: 16 BRPM | HEART RATE: 101 BPM | HEIGHT: 64 IN | TEMPERATURE: 99 F | SYSTOLIC BLOOD PRESSURE: 136 MMHG | WEIGHT: 136.44 LBS

## 2021-01-05 DIAGNOSIS — C7A.8 NEUROENDOCRINE CARCINOMA METASTATIC TO BONE: ICD-10-CM

## 2021-01-05 DIAGNOSIS — C7A.012 MALIGNANT CARCINOID TUMOR OF ILEUM: Primary | ICD-10-CM

## 2021-01-05 DIAGNOSIS — E11.9 TYPE 2 DIABETES MELLITUS WITHOUT COMPLICATION, WITHOUT LONG-TERM CURRENT USE OF INSULIN: ICD-10-CM

## 2021-01-05 DIAGNOSIS — C7B.8 METASTATIC MALIGNANT NEUROENDOCRINE TUMOR TO LIVER: ICD-10-CM

## 2021-01-05 DIAGNOSIS — C7B.8 NEUROENDOCRINE CARCINOMA METASTATIC TO BONE: ICD-10-CM

## 2021-01-05 DIAGNOSIS — C7B.8 METASTATIC MALIGNANT NEUROENDOCRINE TUMOR TO LIVER: Primary | ICD-10-CM

## 2021-01-05 DIAGNOSIS — I10 ESSENTIAL HYPERTENSION: ICD-10-CM

## 2021-01-05 PROCEDURE — 99214 OFFICE O/P EST MOD 30 MIN: CPT | Mod: HCNC,S$GLB,, | Performed by: INTERNAL MEDICINE

## 2021-01-05 PROCEDURE — 3288F FALL RISK ASSESSMENT DOCD: CPT | Mod: HCNC,CPTII,S$GLB, | Performed by: INTERNAL MEDICINE

## 2021-01-05 PROCEDURE — 1159F PR MEDICATION LIST DOCUMENTED IN MEDICAL RECORD: ICD-10-PCS | Mod: HCNC,S$GLB,, | Performed by: INTERNAL MEDICINE

## 2021-01-05 PROCEDURE — 1126F AMNT PAIN NOTED NONE PRSNT: CPT | Mod: HCNC,S$GLB,, | Performed by: INTERNAL MEDICINE

## 2021-01-05 PROCEDURE — 96372 THER/PROPH/DIAG INJ SC/IM: CPT | Mod: HCNC

## 2021-01-05 PROCEDURE — 3078F DIAST BP <80 MM HG: CPT | Mod: HCNC,CPTII,S$GLB, | Performed by: INTERNAL MEDICINE

## 2021-01-05 PROCEDURE — 3075F SYST BP GE 130 - 139MM HG: CPT | Mod: HCNC,CPTII,S$GLB, | Performed by: INTERNAL MEDICINE

## 2021-01-05 PROCEDURE — 3078F PR MOST RECENT DIASTOLIC BLOOD PRESSURE < 80 MM HG: ICD-10-PCS | Mod: HCNC,CPTII,S$GLB, | Performed by: INTERNAL MEDICINE

## 2021-01-05 PROCEDURE — 1101F PT FALLS ASSESS-DOCD LE1/YR: CPT | Mod: HCNC,CPTII,S$GLB, | Performed by: INTERNAL MEDICINE

## 2021-01-05 PROCEDURE — 63600175 PHARM REV CODE 636 W HCPCS: Mod: JG,HCNC | Performed by: INTERNAL MEDICINE

## 2021-01-05 PROCEDURE — 3075F PR MOST RECENT SYSTOLIC BLOOD PRESS GE 130-139MM HG: ICD-10-PCS | Mod: HCNC,CPTII,S$GLB, | Performed by: INTERNAL MEDICINE

## 2021-01-05 PROCEDURE — 1101F PR PT FALLS ASSESS DOC 0-1 FALLS W/OUT INJ PAST YR: ICD-10-PCS | Mod: HCNC,CPTII,S$GLB, | Performed by: INTERNAL MEDICINE

## 2021-01-05 PROCEDURE — 99999 PR PBB SHADOW E&M-EST. PATIENT-LVL IV: ICD-10-PCS | Mod: PBBFAC,HCNC,, | Performed by: INTERNAL MEDICINE

## 2021-01-05 PROCEDURE — 99999 PR PBB SHADOW E&M-EST. PATIENT-LVL IV: CPT | Mod: PBBFAC,HCNC,, | Performed by: INTERNAL MEDICINE

## 2021-01-05 PROCEDURE — 1126F PR PAIN SEVERITY QUANTIFIED, NO PAIN PRESENT: ICD-10-PCS | Mod: HCNC,S$GLB,, | Performed by: INTERNAL MEDICINE

## 2021-01-05 PROCEDURE — 99214 PR OFFICE/OUTPT VISIT, EST, LEVL IV, 30-39 MIN: ICD-10-PCS | Mod: HCNC,S$GLB,, | Performed by: INTERNAL MEDICINE

## 2021-01-05 PROCEDURE — 3288F PR FALLS RISK ASSESSMENT DOCUMENTED: ICD-10-PCS | Mod: HCNC,CPTII,S$GLB, | Performed by: INTERNAL MEDICINE

## 2021-01-05 PROCEDURE — 1159F MED LIST DOCD IN RCRD: CPT | Mod: HCNC,S$GLB,, | Performed by: INTERNAL MEDICINE

## 2021-01-05 RX ORDER — LANREOTIDE ACETATE 120 MG/.5ML
120 INJECTION SUBCUTANEOUS
Status: DISCONTINUED | OUTPATIENT
Start: 2021-01-05 | End: 2021-01-05 | Stop reason: HOSPADM

## 2021-01-05 RX ORDER — LANREOTIDE ACETATE 120 MG/.5ML
120 INJECTION SUBCUTANEOUS
Status: CANCELLED | OUTPATIENT
Start: 2021-01-05

## 2021-01-05 RX ADMIN — LANREOTIDE ACETATE 120 MG: 120 INJECTION SUBCUTANEOUS at 11:01

## 2021-01-06 LAB — CGA SERPL-MCNC: 781 NG/ML (ref 0–103)

## 2021-01-29 ENCOUNTER — TELEPHONE (OUTPATIENT)
Dept: HEMATOLOGY/ONCOLOGY | Facility: CLINIC | Age: 81
End: 2021-01-29

## 2021-02-01 ENCOUNTER — INFUSION (OUTPATIENT)
Dept: INFUSION THERAPY | Facility: HOSPITAL | Age: 81
End: 2021-02-01
Attending: INTERNAL MEDICINE
Payer: MEDICARE

## 2021-02-01 ENCOUNTER — OFFICE VISIT (OUTPATIENT)
Dept: HEMATOLOGY/ONCOLOGY | Facility: CLINIC | Age: 81
End: 2021-02-01
Payer: MEDICARE

## 2021-02-01 VITALS
WEIGHT: 135.19 LBS | OXYGEN SATURATION: 98 % | DIASTOLIC BLOOD PRESSURE: 77 MMHG | HEIGHT: 64 IN | SYSTOLIC BLOOD PRESSURE: 135 MMHG | HEART RATE: 88 BPM | BODY MASS INDEX: 23.08 KG/M2 | RESPIRATION RATE: 16 BRPM | TEMPERATURE: 98 F

## 2021-02-01 DIAGNOSIS — C7B.8 METASTATIC MALIGNANT NEUROENDOCRINE TUMOR TO LIVER: ICD-10-CM

## 2021-02-01 DIAGNOSIS — C7B.8 NEUROENDOCRINE CARCINOMA METASTATIC TO BONE: ICD-10-CM

## 2021-02-01 DIAGNOSIS — C7A.8 NEUROENDOCRINE CARCINOMA METASTATIC TO BONE: ICD-10-CM

## 2021-02-01 DIAGNOSIS — C7A.012 MALIGNANT CARCINOID TUMOR OF ILEUM: Primary | ICD-10-CM

## 2021-02-01 DIAGNOSIS — C7B.8 METASTATIC MALIGNANT NEUROENDOCRINE TUMOR TO LIVER: Primary | ICD-10-CM

## 2021-02-01 DIAGNOSIS — I10 ESSENTIAL HYPERTENSION: ICD-10-CM

## 2021-02-01 DIAGNOSIS — C79.51 SPINE METASTASIS: ICD-10-CM

## 2021-02-01 DIAGNOSIS — E11.9 TYPE 2 DIABETES MELLITUS WITHOUT COMPLICATION, WITHOUT LONG-TERM CURRENT USE OF INSULIN: ICD-10-CM

## 2021-02-01 PROCEDURE — 99214 OFFICE O/P EST MOD 30 MIN: CPT | Mod: S$GLB,,, | Performed by: INTERNAL MEDICINE

## 2021-02-01 PROCEDURE — 99999 PR PBB SHADOW E&M-EST. PATIENT-LVL IV: ICD-10-PCS | Mod: PBBFAC,,, | Performed by: INTERNAL MEDICINE

## 2021-02-01 PROCEDURE — 3078F PR MOST RECENT DIASTOLIC BLOOD PRESSURE < 80 MM HG: ICD-10-PCS | Mod: CPTII,S$GLB,, | Performed by: INTERNAL MEDICINE

## 2021-02-01 PROCEDURE — 1126F AMNT PAIN NOTED NONE PRSNT: CPT | Mod: S$GLB,,, | Performed by: INTERNAL MEDICINE

## 2021-02-01 PROCEDURE — 1126F PR PAIN SEVERITY QUANTIFIED, NO PAIN PRESENT: ICD-10-PCS | Mod: S$GLB,,, | Performed by: INTERNAL MEDICINE

## 2021-02-01 PROCEDURE — 1101F PR PT FALLS ASSESS DOC 0-1 FALLS W/OUT INJ PAST YR: ICD-10-PCS | Mod: CPTII,S$GLB,, | Performed by: INTERNAL MEDICINE

## 2021-02-01 PROCEDURE — 1159F PR MEDICATION LIST DOCUMENTED IN MEDICAL RECORD: ICD-10-PCS | Mod: S$GLB,,, | Performed by: INTERNAL MEDICINE

## 2021-02-01 PROCEDURE — 63600175 PHARM REV CODE 636 W HCPCS: Mod: JG | Performed by: INTERNAL MEDICINE

## 2021-02-01 PROCEDURE — 3288F PR FALLS RISK ASSESSMENT DOCUMENTED: ICD-10-PCS | Mod: CPTII,S$GLB,, | Performed by: INTERNAL MEDICINE

## 2021-02-01 PROCEDURE — 99214 PR OFFICE/OUTPT VISIT, EST, LEVL IV, 30-39 MIN: ICD-10-PCS | Mod: S$GLB,,, | Performed by: INTERNAL MEDICINE

## 2021-02-01 PROCEDURE — 3078F DIAST BP <80 MM HG: CPT | Mod: CPTII,S$GLB,, | Performed by: INTERNAL MEDICINE

## 2021-02-01 PROCEDURE — 99999 PR PBB SHADOW E&M-EST. PATIENT-LVL IV: CPT | Mod: PBBFAC,,, | Performed by: INTERNAL MEDICINE

## 2021-02-01 PROCEDURE — 3075F SYST BP GE 130 - 139MM HG: CPT | Mod: CPTII,S$GLB,, | Performed by: INTERNAL MEDICINE

## 2021-02-01 PROCEDURE — 96372 THER/PROPH/DIAG INJ SC/IM: CPT

## 2021-02-01 PROCEDURE — 1159F MED LIST DOCD IN RCRD: CPT | Mod: S$GLB,,, | Performed by: INTERNAL MEDICINE

## 2021-02-01 PROCEDURE — 1101F PT FALLS ASSESS-DOCD LE1/YR: CPT | Mod: CPTII,S$GLB,, | Performed by: INTERNAL MEDICINE

## 2021-02-01 PROCEDURE — 3288F FALL RISK ASSESSMENT DOCD: CPT | Mod: CPTII,S$GLB,, | Performed by: INTERNAL MEDICINE

## 2021-02-01 PROCEDURE — 3075F PR MOST RECENT SYSTOLIC BLOOD PRESS GE 130-139MM HG: ICD-10-PCS | Mod: CPTII,S$GLB,, | Performed by: INTERNAL MEDICINE

## 2021-02-01 RX ORDER — LANREOTIDE ACETATE 120 MG/.5ML
120 INJECTION SUBCUTANEOUS
Status: DISCONTINUED | OUTPATIENT
Start: 2021-02-01 | End: 2021-02-01 | Stop reason: HOSPADM

## 2021-02-01 RX ORDER — LANREOTIDE ACETATE 120 MG/.5ML
120 INJECTION SUBCUTANEOUS
Status: CANCELLED | OUTPATIENT
Start: 2021-02-01

## 2021-02-01 RX ADMIN — LANREOTIDE ACETATE 120 MG: 120 INJECTION SUBCUTANEOUS at 12:02

## 2021-03-01 ENCOUNTER — OFFICE VISIT (OUTPATIENT)
Dept: HEMATOLOGY/ONCOLOGY | Facility: CLINIC | Age: 81
End: 2021-03-01
Payer: MEDICARE

## 2021-03-01 ENCOUNTER — INFUSION (OUTPATIENT)
Dept: INFUSION THERAPY | Facility: HOSPITAL | Age: 81
End: 2021-03-01
Attending: INTERNAL MEDICINE
Payer: MEDICARE

## 2021-03-01 VITALS
HEIGHT: 64 IN | TEMPERATURE: 98 F | WEIGHT: 135.31 LBS | BODY MASS INDEX: 23.1 KG/M2 | SYSTOLIC BLOOD PRESSURE: 143 MMHG | OXYGEN SATURATION: 98 % | HEART RATE: 93 BPM | DIASTOLIC BLOOD PRESSURE: 74 MMHG | RESPIRATION RATE: 16 BRPM

## 2021-03-01 DIAGNOSIS — C7B.8 NEUROENDOCRINE CARCINOMA METASTATIC TO BONE: ICD-10-CM

## 2021-03-01 DIAGNOSIS — C7A.012 MALIGNANT CARCINOID TUMOR OF ILEUM: Primary | ICD-10-CM

## 2021-03-01 DIAGNOSIS — D49.9 IMMUNODEFICIENCY SECONDARY TO NEOPLASM: ICD-10-CM

## 2021-03-01 DIAGNOSIS — I70.0 ATHEROSCLEROSIS OF AORTA: ICD-10-CM

## 2021-03-01 DIAGNOSIS — D84.81 IMMUNODEFICIENCY SECONDARY TO NEOPLASM: ICD-10-CM

## 2021-03-01 DIAGNOSIS — C79.51 SPINE METASTASIS: ICD-10-CM

## 2021-03-01 DIAGNOSIS — C7A.8 NEUROENDOCRINE CARCINOMA METASTATIC TO BONE: ICD-10-CM

## 2021-03-01 DIAGNOSIS — C7B.8 METASTATIC MALIGNANT NEUROENDOCRINE TUMOR TO LIVER: ICD-10-CM

## 2021-03-01 DIAGNOSIS — C7B.8 METASTATIC MALIGNANT NEUROENDOCRINE TUMOR TO LIVER: Primary | ICD-10-CM

## 2021-03-01 DIAGNOSIS — E11.9 TYPE 2 DIABETES MELLITUS WITHOUT COMPLICATION, WITHOUT LONG-TERM CURRENT USE OF INSULIN: ICD-10-CM

## 2021-03-01 DIAGNOSIS — I10 ESSENTIAL HYPERTENSION: ICD-10-CM

## 2021-03-01 PROCEDURE — 1159F MED LIST DOCD IN RCRD: CPT | Mod: S$GLB,,, | Performed by: INTERNAL MEDICINE

## 2021-03-01 PROCEDURE — 99499 UNLISTED E&M SERVICE: CPT | Mod: S$GLB,,, | Performed by: INTERNAL MEDICINE

## 2021-03-01 PROCEDURE — 1125F PR PAIN SEVERITY QUANTIFIED, PAIN PRESENT: ICD-10-PCS | Mod: S$GLB,,, | Performed by: INTERNAL MEDICINE

## 2021-03-01 PROCEDURE — 3288F PR FALLS RISK ASSESSMENT DOCUMENTED: ICD-10-PCS | Mod: CPTII,S$GLB,, | Performed by: INTERNAL MEDICINE

## 2021-03-01 PROCEDURE — 3288F FALL RISK ASSESSMENT DOCD: CPT | Mod: CPTII,S$GLB,, | Performed by: INTERNAL MEDICINE

## 2021-03-01 PROCEDURE — 3078F PR MOST RECENT DIASTOLIC BLOOD PRESSURE < 80 MM HG: ICD-10-PCS | Mod: CPTII,S$GLB,, | Performed by: INTERNAL MEDICINE

## 2021-03-01 PROCEDURE — 1101F PT FALLS ASSESS-DOCD LE1/YR: CPT | Mod: CPTII,S$GLB,, | Performed by: INTERNAL MEDICINE

## 2021-03-01 PROCEDURE — 1101F PR PT FALLS ASSESS DOC 0-1 FALLS W/OUT INJ PAST YR: ICD-10-PCS | Mod: CPTII,S$GLB,, | Performed by: INTERNAL MEDICINE

## 2021-03-01 PROCEDURE — 1125F AMNT PAIN NOTED PAIN PRSNT: CPT | Mod: S$GLB,,, | Performed by: INTERNAL MEDICINE

## 2021-03-01 PROCEDURE — 99999 PR PBB SHADOW E&M-EST. PATIENT-LVL IV: CPT | Mod: PBBFAC,,, | Performed by: INTERNAL MEDICINE

## 2021-03-01 PROCEDURE — 3077F PR MOST RECENT SYSTOLIC BLOOD PRESSURE >= 140 MM HG: ICD-10-PCS | Mod: CPTII,S$GLB,, | Performed by: INTERNAL MEDICINE

## 2021-03-01 PROCEDURE — 63600175 PHARM REV CODE 636 W HCPCS: Mod: JG | Performed by: INTERNAL MEDICINE

## 2021-03-01 PROCEDURE — 99214 PR OFFICE/OUTPT VISIT, EST, LEVL IV, 30-39 MIN: ICD-10-PCS | Mod: S$GLB,,, | Performed by: INTERNAL MEDICINE

## 2021-03-01 PROCEDURE — 3077F SYST BP >= 140 MM HG: CPT | Mod: CPTII,S$GLB,, | Performed by: INTERNAL MEDICINE

## 2021-03-01 PROCEDURE — 96372 THER/PROPH/DIAG INJ SC/IM: CPT

## 2021-03-01 PROCEDURE — 99999 PR PBB SHADOW E&M-EST. PATIENT-LVL IV: ICD-10-PCS | Mod: PBBFAC,,, | Performed by: INTERNAL MEDICINE

## 2021-03-01 PROCEDURE — 1159F PR MEDICATION LIST DOCUMENTED IN MEDICAL RECORD: ICD-10-PCS | Mod: S$GLB,,, | Performed by: INTERNAL MEDICINE

## 2021-03-01 PROCEDURE — 3078F DIAST BP <80 MM HG: CPT | Mod: CPTII,S$GLB,, | Performed by: INTERNAL MEDICINE

## 2021-03-01 PROCEDURE — 99499 RISK ADDL DX/OHS AUDIT: ICD-10-PCS | Mod: S$GLB,,, | Performed by: INTERNAL MEDICINE

## 2021-03-01 PROCEDURE — 99214 OFFICE O/P EST MOD 30 MIN: CPT | Mod: S$GLB,,, | Performed by: INTERNAL MEDICINE

## 2021-03-01 RX ORDER — LANREOTIDE ACETATE 120 MG/.5ML
120 INJECTION SUBCUTANEOUS
Status: DISCONTINUED | OUTPATIENT
Start: 2021-03-01 | End: 2021-03-01 | Stop reason: HOSPADM

## 2021-03-01 RX ORDER — LANREOTIDE ACETATE 120 MG/.5ML
120 INJECTION SUBCUTANEOUS
Status: CANCELLED | OUTPATIENT
Start: 2021-03-01

## 2021-03-01 RX ADMIN — LANREOTIDE ACETATE 120 MG: 120 INJECTION SUBCUTANEOUS at 09:03

## 2021-03-08 ENCOUNTER — OFFICE VISIT (OUTPATIENT)
Dept: INTERNAL MEDICINE | Facility: CLINIC | Age: 81
End: 2021-03-08
Payer: MEDICARE

## 2021-03-08 VITALS
HEART RATE: 98 BPM | BODY MASS INDEX: 24.67 KG/M2 | OXYGEN SATURATION: 98 % | DIASTOLIC BLOOD PRESSURE: 64 MMHG | HEIGHT: 62 IN | WEIGHT: 134.06 LBS | SYSTOLIC BLOOD PRESSURE: 138 MMHG

## 2021-03-08 DIAGNOSIS — I10 ESSENTIAL HYPERTENSION: ICD-10-CM

## 2021-03-08 DIAGNOSIS — E11.9 TYPE 2 DIABETES MELLITUS WITHOUT COMPLICATION, WITHOUT LONG-TERM CURRENT USE OF INSULIN: ICD-10-CM

## 2021-03-08 DIAGNOSIS — R41.3 MEMORY DEFICITS: ICD-10-CM

## 2021-03-08 DIAGNOSIS — I70.0 ATHEROSCLEROSIS OF AORTA: ICD-10-CM

## 2021-03-08 DIAGNOSIS — K80.20 CALCULUS OF GALLBLADDER WITHOUT CHOLECYSTITIS WITHOUT OBSTRUCTION: ICD-10-CM

## 2021-03-08 DIAGNOSIS — C22.0 HCC (HEPATOCELLULAR CARCINOMA): ICD-10-CM

## 2021-03-08 DIAGNOSIS — C7A.8 NEUROENDOCRINE CARCINOMA METASTATIC TO BONE: ICD-10-CM

## 2021-03-08 DIAGNOSIS — R80.9 MICROALBUMINURIA DUE TO TYPE 2 DIABETES MELLITUS: ICD-10-CM

## 2021-03-08 DIAGNOSIS — E11.29 MICROALBUMINURIA DUE TO TYPE 2 DIABETES MELLITUS: ICD-10-CM

## 2021-03-08 DIAGNOSIS — C7A.012 MALIGNANT CARCINOID TUMOR OF ILEUM: ICD-10-CM

## 2021-03-08 DIAGNOSIS — D3A.8 NEUROENDOCRINE TUMOR: ICD-10-CM

## 2021-03-08 DIAGNOSIS — R35.0 URINARY FREQUENCY: ICD-10-CM

## 2021-03-08 DIAGNOSIS — Z66 DNR (DO NOT RESUSCITATE): ICD-10-CM

## 2021-03-08 DIAGNOSIS — C7B.8 NEUROENDOCRINE CARCINOMA METASTATIC TO BONE: ICD-10-CM

## 2021-03-08 DIAGNOSIS — G89.29 CHRONIC RUQ PAIN: ICD-10-CM

## 2021-03-08 DIAGNOSIS — C79.51 SPINE METASTASIS: ICD-10-CM

## 2021-03-08 DIAGNOSIS — R10.11 CHRONIC RUQ PAIN: ICD-10-CM

## 2021-03-08 DIAGNOSIS — E78.5 HYPERLIPIDEMIA, UNSPECIFIED HYPERLIPIDEMIA TYPE: ICD-10-CM

## 2021-03-08 DIAGNOSIS — D64.9 ANEMIA, UNSPECIFIED TYPE: ICD-10-CM

## 2021-03-08 DIAGNOSIS — C7B.8 METASTATIC MALIGNANT NEUROENDOCRINE TUMOR TO LIVER: ICD-10-CM

## 2021-03-08 DIAGNOSIS — Z00.00 ENCOUNTER FOR PREVENTIVE HEALTH EXAMINATION: Primary | ICD-10-CM

## 2021-03-08 DIAGNOSIS — M54.16 LUMBAR RADICULOPATHY: ICD-10-CM

## 2021-03-08 DIAGNOSIS — E53.8 B12 DEFICIENCY: ICD-10-CM

## 2021-03-08 DIAGNOSIS — C7B.8 SECONDARY NEUROENDOCRINE TUMOR OF BONE: ICD-10-CM

## 2021-03-08 PROCEDURE — 1126F PR PAIN SEVERITY QUANTIFIED, NO PAIN PRESENT: ICD-10-PCS | Mod: S$GLB,,, | Performed by: NURSE PRACTITIONER

## 2021-03-08 PROCEDURE — 1126F AMNT PAIN NOTED NONE PRSNT: CPT | Mod: S$GLB,,, | Performed by: NURSE PRACTITIONER

## 2021-03-08 PROCEDURE — 1101F PT FALLS ASSESS-DOCD LE1/YR: CPT | Mod: CPTII,S$GLB,, | Performed by: NURSE PRACTITIONER

## 2021-03-08 PROCEDURE — G0439 PPPS, SUBSEQ VISIT: HCPCS | Mod: S$GLB,,, | Performed by: NURSE PRACTITIONER

## 2021-03-08 PROCEDURE — 99999 PR PBB SHADOW E&M-EST. PATIENT-LVL III: CPT | Mod: PBBFAC,,, | Performed by: NURSE PRACTITIONER

## 2021-03-08 PROCEDURE — G0439 PR MEDICARE ANNUAL WELLNESS SUBSEQUENT VISIT: ICD-10-PCS | Mod: S$GLB,,, | Performed by: NURSE PRACTITIONER

## 2021-03-08 PROCEDURE — 99999 PR PBB SHADOW E&M-EST. PATIENT-LVL III: ICD-10-PCS | Mod: PBBFAC,,, | Performed by: NURSE PRACTITIONER

## 2021-03-08 PROCEDURE — 1158F ADVNC CARE PLAN TLK DOCD: CPT | Mod: S$GLB,,, | Performed by: NURSE PRACTITIONER

## 2021-03-08 PROCEDURE — 3288F FALL RISK ASSESSMENT DOCD: CPT | Mod: CPTII,S$GLB,, | Performed by: NURSE PRACTITIONER

## 2021-03-08 PROCEDURE — 3075F PR MOST RECENT SYSTOLIC BLOOD PRESS GE 130-139MM HG: ICD-10-PCS | Mod: CPTII,S$GLB,, | Performed by: NURSE PRACTITIONER

## 2021-03-08 PROCEDURE — 99499 UNLISTED E&M SERVICE: CPT | Mod: S$GLB,,, | Performed by: NURSE PRACTITIONER

## 2021-03-08 PROCEDURE — 3075F SYST BP GE 130 - 139MM HG: CPT | Mod: CPTII,S$GLB,, | Performed by: NURSE PRACTITIONER

## 2021-03-08 PROCEDURE — 3078F DIAST BP <80 MM HG: CPT | Mod: CPTII,S$GLB,, | Performed by: NURSE PRACTITIONER

## 2021-03-08 PROCEDURE — 1158F PR ADVANCE CARE PLANNING DISCUSS DOCUMENTED IN MEDICAL RECORD: ICD-10-PCS | Mod: S$GLB,,, | Performed by: NURSE PRACTITIONER

## 2021-03-08 PROCEDURE — 99499 RISK ADDL DX/OHS AUDIT: ICD-10-PCS | Mod: S$GLB,,, | Performed by: NURSE PRACTITIONER

## 2021-03-08 PROCEDURE — 3288F PR FALLS RISK ASSESSMENT DOCUMENTED: ICD-10-PCS | Mod: CPTII,S$GLB,, | Performed by: NURSE PRACTITIONER

## 2021-03-08 PROCEDURE — 1101F PR PT FALLS ASSESS DOC 0-1 FALLS W/OUT INJ PAST YR: ICD-10-PCS | Mod: CPTII,S$GLB,, | Performed by: NURSE PRACTITIONER

## 2021-03-08 PROCEDURE — 3078F PR MOST RECENT DIASTOLIC BLOOD PRESSURE < 80 MM HG: ICD-10-PCS | Mod: CPTII,S$GLB,, | Performed by: NURSE PRACTITIONER

## 2021-03-29 ENCOUNTER — OFFICE VISIT (OUTPATIENT)
Dept: HEMATOLOGY/ONCOLOGY | Facility: CLINIC | Age: 81
End: 2021-03-29
Payer: MEDICARE

## 2021-03-29 ENCOUNTER — INFUSION (OUTPATIENT)
Dept: INFUSION THERAPY | Facility: HOSPITAL | Age: 81
End: 2021-03-29
Payer: MEDICARE

## 2021-03-29 VITALS
TEMPERATURE: 98 F | SYSTOLIC BLOOD PRESSURE: 156 MMHG | RESPIRATION RATE: 18 BRPM | WEIGHT: 134 LBS | DIASTOLIC BLOOD PRESSURE: 78 MMHG | HEART RATE: 87 BPM | BODY MASS INDEX: 24.51 KG/M2

## 2021-03-29 DIAGNOSIS — I70.0 ATHEROSCLEROSIS OF AORTA: ICD-10-CM

## 2021-03-29 DIAGNOSIS — D84.81 IMMUNODEFICIENCY SECONDARY TO NEOPLASM: ICD-10-CM

## 2021-03-29 DIAGNOSIS — C7B.8 METASTATIC MALIGNANT NEUROENDOCRINE TUMOR TO LIVER: Primary | ICD-10-CM

## 2021-03-29 DIAGNOSIS — C7A.012 MALIGNANT CARCINOID TUMOR OF ILEUM: Primary | ICD-10-CM

## 2021-03-29 DIAGNOSIS — C7A.8 NEUROENDOCRINE CARCINOMA METASTATIC TO BONE: ICD-10-CM

## 2021-03-29 DIAGNOSIS — E11.9 TYPE 2 DIABETES MELLITUS WITHOUT COMPLICATION, WITHOUT LONG-TERM CURRENT USE OF INSULIN: ICD-10-CM

## 2021-03-29 DIAGNOSIS — I10 ESSENTIAL HYPERTENSION: ICD-10-CM

## 2021-03-29 DIAGNOSIS — C7B.8 NEUROENDOCRINE CARCINOMA METASTATIC TO BONE: ICD-10-CM

## 2021-03-29 DIAGNOSIS — C79.51 SPINE METASTASIS: ICD-10-CM

## 2021-03-29 DIAGNOSIS — C7B.8 METASTATIC MALIGNANT NEUROENDOCRINE TUMOR TO LIVER: ICD-10-CM

## 2021-03-29 DIAGNOSIS — D49.9 IMMUNODEFICIENCY SECONDARY TO NEOPLASM: ICD-10-CM

## 2021-03-29 PROCEDURE — 3078F DIAST BP <80 MM HG: CPT | Mod: CPTII,S$GLB,, | Performed by: PHYSICIAN ASSISTANT

## 2021-03-29 PROCEDURE — 99499 RISK ADDL DX/OHS AUDIT: ICD-10-PCS | Mod: S$GLB,,, | Performed by: PHYSICIAN ASSISTANT

## 2021-03-29 PROCEDURE — 3077F SYST BP >= 140 MM HG: CPT | Mod: CPTII,S$GLB,, | Performed by: PHYSICIAN ASSISTANT

## 2021-03-29 PROCEDURE — 99215 OFFICE O/P EST HI 40 MIN: CPT | Mod: S$GLB,,, | Performed by: PHYSICIAN ASSISTANT

## 2021-03-29 PROCEDURE — 63600175 PHARM REV CODE 636 W HCPCS: Mod: JG | Performed by: INTERNAL MEDICINE

## 2021-03-29 PROCEDURE — 99999 PR PBB SHADOW E&M-EST. PATIENT-LVL III: CPT | Mod: PBBFAC,,, | Performed by: PHYSICIAN ASSISTANT

## 2021-03-29 PROCEDURE — 3077F PR MOST RECENT SYSTOLIC BLOOD PRESSURE >= 140 MM HG: ICD-10-PCS | Mod: CPTII,S$GLB,, | Performed by: PHYSICIAN ASSISTANT

## 2021-03-29 PROCEDURE — 3078F PR MOST RECENT DIASTOLIC BLOOD PRESSURE < 80 MM HG: ICD-10-PCS | Mod: CPTII,S$GLB,, | Performed by: PHYSICIAN ASSISTANT

## 2021-03-29 PROCEDURE — 99215 PR OFFICE/OUTPT VISIT, EST, LEVL V, 40-54 MIN: ICD-10-PCS | Mod: S$GLB,,, | Performed by: PHYSICIAN ASSISTANT

## 2021-03-29 PROCEDURE — 96372 THER/PROPH/DIAG INJ SC/IM: CPT

## 2021-03-29 PROCEDURE — 99999 PR PBB SHADOW E&M-EST. PATIENT-LVL III: ICD-10-PCS | Mod: PBBFAC,,, | Performed by: PHYSICIAN ASSISTANT

## 2021-03-29 PROCEDURE — 99499 UNLISTED E&M SERVICE: CPT | Mod: S$GLB,,, | Performed by: PHYSICIAN ASSISTANT

## 2021-03-29 PROCEDURE — 1159F MED LIST DOCD IN RCRD: CPT | Mod: S$GLB,,, | Performed by: PHYSICIAN ASSISTANT

## 2021-03-29 PROCEDURE — 1159F PR MEDICATION LIST DOCUMENTED IN MEDICAL RECORD: ICD-10-PCS | Mod: S$GLB,,, | Performed by: PHYSICIAN ASSISTANT

## 2021-03-29 RX ORDER — LANREOTIDE ACETATE 120 MG/.5ML
120 INJECTION SUBCUTANEOUS
Status: CANCELLED | OUTPATIENT
Start: 2021-03-29

## 2021-03-29 RX ORDER — LANREOTIDE ACETATE 120 MG/.5ML
120 INJECTION SUBCUTANEOUS
Status: DISCONTINUED | OUTPATIENT
Start: 2021-03-29 | End: 2021-03-29 | Stop reason: HOSPADM

## 2021-03-29 RX ADMIN — LANREOTIDE ACETATE 120 MG: 120 INJECTION SUBCUTANEOUS at 02:03

## 2021-04-05 RX ORDER — LOSARTAN POTASSIUM 25 MG/1
25 TABLET ORAL DAILY
Qty: 90 TABLET | Refills: 3 | Status: SHIPPED | OUTPATIENT
Start: 2021-04-05 | End: 2021-05-25

## 2021-04-13 ENCOUNTER — TELEPHONE (OUTPATIENT)
Dept: HEMATOLOGY/ONCOLOGY | Facility: CLINIC | Age: 81
End: 2021-04-13

## 2021-04-26 ENCOUNTER — OFFICE VISIT (OUTPATIENT)
Dept: HEMATOLOGY/ONCOLOGY | Facility: CLINIC | Age: 81
End: 2021-04-26
Payer: MEDICARE

## 2021-04-26 ENCOUNTER — INFUSION (OUTPATIENT)
Dept: INFUSION THERAPY | Facility: HOSPITAL | Age: 81
End: 2021-04-26
Attending: INTERNAL MEDICINE
Payer: MEDICARE

## 2021-04-26 VITALS
TEMPERATURE: 98 F | WEIGHT: 134.63 LBS | SYSTOLIC BLOOD PRESSURE: 131 MMHG | HEART RATE: 89 BPM | DIASTOLIC BLOOD PRESSURE: 73 MMHG | BODY MASS INDEX: 24.78 KG/M2 | HEIGHT: 62 IN | OXYGEN SATURATION: 98 % | RESPIRATION RATE: 16 BRPM

## 2021-04-26 DIAGNOSIS — C7A.012 MALIGNANT CARCINOID TUMOR OF ILEUM: Primary | ICD-10-CM

## 2021-04-26 DIAGNOSIS — C7B.8 METASTATIC MALIGNANT NEUROENDOCRINE TUMOR TO LIVER: Primary | ICD-10-CM

## 2021-04-26 DIAGNOSIS — D84.81 IMMUNODEFICIENCY SECONDARY TO NEOPLASM: ICD-10-CM

## 2021-04-26 DIAGNOSIS — I70.0 ATHEROSCLEROSIS OF AORTA: ICD-10-CM

## 2021-04-26 DIAGNOSIS — C7B.8 METASTATIC MALIGNANT NEUROENDOCRINE TUMOR TO LIVER: ICD-10-CM

## 2021-04-26 DIAGNOSIS — E11.9 TYPE 2 DIABETES MELLITUS WITHOUT COMPLICATION, WITHOUT LONG-TERM CURRENT USE OF INSULIN: ICD-10-CM

## 2021-04-26 DIAGNOSIS — I10 ESSENTIAL HYPERTENSION: ICD-10-CM

## 2021-04-26 DIAGNOSIS — C7A.8 NEUROENDOCRINE CARCINOMA METASTATIC TO BONE: ICD-10-CM

## 2021-04-26 DIAGNOSIS — D49.9 IMMUNODEFICIENCY SECONDARY TO NEOPLASM: ICD-10-CM

## 2021-04-26 DIAGNOSIS — C79.51 SPINE METASTASIS: ICD-10-CM

## 2021-04-26 DIAGNOSIS — C7B.8 NEUROENDOCRINE CARCINOMA METASTATIC TO BONE: ICD-10-CM

## 2021-04-26 PROCEDURE — 1126F PR PAIN SEVERITY QUANTIFIED, NO PAIN PRESENT: ICD-10-PCS | Mod: S$GLB,,, | Performed by: INTERNAL MEDICINE

## 2021-04-26 PROCEDURE — 3288F FALL RISK ASSESSMENT DOCD: CPT | Mod: CPTII,S$GLB,, | Performed by: INTERNAL MEDICINE

## 2021-04-26 PROCEDURE — 63600175 PHARM REV CODE 636 W HCPCS: Mod: JG | Performed by: INTERNAL MEDICINE

## 2021-04-26 PROCEDURE — 1126F AMNT PAIN NOTED NONE PRSNT: CPT | Mod: S$GLB,,, | Performed by: INTERNAL MEDICINE

## 2021-04-26 PROCEDURE — 1101F PR PT FALLS ASSESS DOC 0-1 FALLS W/OUT INJ PAST YR: ICD-10-PCS | Mod: CPTII,S$GLB,, | Performed by: INTERNAL MEDICINE

## 2021-04-26 PROCEDURE — 99999 PR PBB SHADOW E&M-EST. PATIENT-LVL IV: ICD-10-PCS | Mod: PBBFAC,,, | Performed by: INTERNAL MEDICINE

## 2021-04-26 PROCEDURE — 1159F MED LIST DOCD IN RCRD: CPT | Mod: S$GLB,,, | Performed by: INTERNAL MEDICINE

## 2021-04-26 PROCEDURE — 99215 PR OFFICE/OUTPT VISIT, EST, LEVL V, 40-54 MIN: ICD-10-PCS | Mod: S$GLB,,, | Performed by: INTERNAL MEDICINE

## 2021-04-26 PROCEDURE — 99215 OFFICE O/P EST HI 40 MIN: CPT | Mod: S$GLB,,, | Performed by: INTERNAL MEDICINE

## 2021-04-26 PROCEDURE — 3288F PR FALLS RISK ASSESSMENT DOCUMENTED: ICD-10-PCS | Mod: CPTII,S$GLB,, | Performed by: INTERNAL MEDICINE

## 2021-04-26 PROCEDURE — 99999 PR PBB SHADOW E&M-EST. PATIENT-LVL IV: CPT | Mod: PBBFAC,,, | Performed by: INTERNAL MEDICINE

## 2021-04-26 PROCEDURE — 1159F PR MEDICATION LIST DOCUMENTED IN MEDICAL RECORD: ICD-10-PCS | Mod: S$GLB,,, | Performed by: INTERNAL MEDICINE

## 2021-04-26 PROCEDURE — 96372 THER/PROPH/DIAG INJ SC/IM: CPT

## 2021-04-26 PROCEDURE — 1101F PT FALLS ASSESS-DOCD LE1/YR: CPT | Mod: CPTII,S$GLB,, | Performed by: INTERNAL MEDICINE

## 2021-04-26 RX ORDER — LANREOTIDE ACETATE 120 MG/.5ML
120 INJECTION SUBCUTANEOUS
Status: DISCONTINUED | OUTPATIENT
Start: 2021-04-26 | End: 2021-04-26 | Stop reason: HOSPADM

## 2021-04-26 RX ORDER — LANREOTIDE ACETATE 120 MG/.5ML
120 INJECTION SUBCUTANEOUS
Status: CANCELLED | OUTPATIENT
Start: 2021-04-26

## 2021-04-26 RX ADMIN — LANREOTIDE ACETATE 120 MG: 120 INJECTION SUBCUTANEOUS at 09:04

## 2021-04-28 ENCOUNTER — PATIENT MESSAGE (OUTPATIENT)
Dept: RESEARCH | Facility: HOSPITAL | Age: 81
End: 2021-04-28

## 2021-04-29 ENCOUNTER — TELEPHONE (OUTPATIENT)
Dept: HEMATOLOGY/ONCOLOGY | Facility: CLINIC | Age: 81
End: 2021-04-29

## 2021-05-06 ENCOUNTER — TELEPHONE (OUTPATIENT)
Dept: ADMINISTRATIVE | Facility: HOSPITAL | Age: 81
End: 2021-05-06

## 2021-05-25 ENCOUNTER — LAB VISIT (OUTPATIENT)
Dept: LAB | Facility: OTHER | Age: 81
End: 2021-05-25
Attending: INTERNAL MEDICINE
Payer: MEDICARE

## 2021-05-25 ENCOUNTER — PATIENT OUTREACH (OUTPATIENT)
Dept: ADMINISTRATIVE | Facility: HOSPITAL | Age: 81
End: 2021-05-25

## 2021-05-25 ENCOUNTER — OFFICE VISIT (OUTPATIENT)
Dept: INTERNAL MEDICINE | Facility: CLINIC | Age: 81
End: 2021-05-25
Attending: INTERNAL MEDICINE
Payer: MEDICARE

## 2021-05-25 VITALS
DIASTOLIC BLOOD PRESSURE: 80 MMHG | HEART RATE: 80 BPM | OXYGEN SATURATION: 99 % | SYSTOLIC BLOOD PRESSURE: 140 MMHG | BODY MASS INDEX: 24.75 KG/M2 | HEIGHT: 62 IN | WEIGHT: 134.5 LBS

## 2021-05-25 DIAGNOSIS — R80.9 CONTROLLED TYPE 2 DIABETES MELLITUS WITH MICROALBUMINURIA, WITHOUT LONG-TERM CURRENT USE OF INSULIN: Primary | ICD-10-CM

## 2021-05-25 DIAGNOSIS — E11.29 CONTROLLED TYPE 2 DIABETES MELLITUS WITH MICROALBUMINURIA, WITHOUT LONG-TERM CURRENT USE OF INSULIN: Primary | ICD-10-CM

## 2021-05-25 DIAGNOSIS — N95.9 MENOPAUSAL PROBLEM: ICD-10-CM

## 2021-05-25 DIAGNOSIS — I10 HYPERTENSION, BENIGN: ICD-10-CM

## 2021-05-25 DIAGNOSIS — E11.29 MICROALBUMINURIA DUE TO TYPE 2 DIABETES MELLITUS: ICD-10-CM

## 2021-05-25 DIAGNOSIS — E78.5 HYPERLIPIDEMIA, UNSPECIFIED HYPERLIPIDEMIA TYPE: ICD-10-CM

## 2021-05-25 DIAGNOSIS — C7B.8 METASTATIC MALIGNANT NEUROENDOCRINE TUMOR TO LIVER: ICD-10-CM

## 2021-05-25 DIAGNOSIS — R80.9 MICROALBUMINURIA DUE TO TYPE 2 DIABETES MELLITUS: ICD-10-CM

## 2021-05-25 DIAGNOSIS — C7A.012 MALIGNANT CARCINOID TUMOR OF ILEUM: ICD-10-CM

## 2021-05-25 DIAGNOSIS — E11.9 CONTROLLED TYPE 2 DIABETES MELLITUS WITHOUT COMPLICATION, WITHOUT LONG-TERM CURRENT USE OF INSULIN: ICD-10-CM

## 2021-05-25 DIAGNOSIS — E53.8 B12 DEFICIENCY: ICD-10-CM

## 2021-05-25 DIAGNOSIS — I10 ESSENTIAL HYPERTENSION: ICD-10-CM

## 2021-05-25 LAB
ALBUMIN SERPL BCP-MCNC: 3.9 G/DL (ref 3.5–5.2)
ALP SERPL-CCNC: 64 U/L (ref 55–135)
ALT SERPL W/O P-5'-P-CCNC: 9 U/L (ref 10–44)
ANION GAP SERPL CALC-SCNC: 10 MMOL/L (ref 8–16)
AST SERPL-CCNC: 16 U/L (ref 10–40)
BASOPHILS # BLD AUTO: 0.02 K/UL (ref 0–0.2)
BASOPHILS NFR BLD: 0.3 % (ref 0–1.9)
BILIRUB SERPL-MCNC: 1.5 MG/DL (ref 0.1–1)
BUN SERPL-MCNC: 9 MG/DL (ref 8–23)
CALCIUM SERPL-MCNC: 9.9 MG/DL (ref 8.7–10.5)
CHLORIDE SERPL-SCNC: 103 MMOL/L (ref 95–110)
CHOLEST SERPL-MCNC: 229 MG/DL (ref 120–199)
CHOLEST/HDLC SERPL: 2.7 {RATIO} (ref 2–5)
CO2 SERPL-SCNC: 27 MMOL/L (ref 23–29)
CREAT SERPL-MCNC: 0.7 MG/DL (ref 0.5–1.4)
DIFFERENTIAL METHOD: ABNORMAL
EOSINOPHIL # BLD AUTO: 0.1 K/UL (ref 0–0.5)
EOSINOPHIL NFR BLD: 0.8 % (ref 0–8)
ERYTHROCYTE [DISTWIDTH] IN BLOOD BY AUTOMATED COUNT: 12.3 % (ref 11.5–14.5)
EST. GFR  (AFRICAN AMERICAN): >60 ML/MIN/1.73 M^2
EST. GFR  (NON AFRICAN AMERICAN): >60 ML/MIN/1.73 M^2
ESTIMATED AVG GLUCOSE: 131 MG/DL (ref 68–131)
GLUCOSE SERPL-MCNC: 137 MG/DL (ref 70–110)
HBA1C MFR BLD: 6.2 % (ref 4–5.6)
HCT VFR BLD AUTO: 41.6 % (ref 37–48.5)
HDLC SERPL-MCNC: 85 MG/DL (ref 40–75)
HDLC SERPL: 37.1 % (ref 20–50)
HGB BLD-MCNC: 13.1 G/DL (ref 12–16)
IMM GRANULOCYTES # BLD AUTO: 0.01 K/UL (ref 0–0.04)
IMM GRANULOCYTES NFR BLD AUTO: 0.2 % (ref 0–0.5)
LDLC SERPL CALC-MCNC: 124.8 MG/DL (ref 63–159)
LYMPHOCYTES # BLD AUTO: 2.3 K/UL (ref 1–4.8)
LYMPHOCYTES NFR BLD: 34.2 % (ref 18–48)
MCH RBC QN AUTO: 28.8 PG (ref 27–31)
MCHC RBC AUTO-ENTMCNC: 31.5 G/DL (ref 32–36)
MCV RBC AUTO: 91 FL (ref 82–98)
MONOCYTES # BLD AUTO: 0.5 K/UL (ref 0.3–1)
MONOCYTES NFR BLD: 8 % (ref 4–15)
NEUTROPHILS # BLD AUTO: 3.8 K/UL (ref 1.8–7.7)
NEUTROPHILS NFR BLD: 56.5 % (ref 38–73)
NONHDLC SERPL-MCNC: 144 MG/DL
NRBC BLD-RTO: 0 /100 WBC
PLATELET # BLD AUTO: 194 K/UL (ref 150–450)
PMV BLD AUTO: 10.8 FL (ref 9.2–12.9)
POTASSIUM SERPL-SCNC: 4.3 MMOL/L (ref 3.5–5.1)
PROT SERPL-MCNC: 7.6 G/DL (ref 6–8.4)
RBC # BLD AUTO: 4.55 M/UL (ref 4–5.4)
SODIUM SERPL-SCNC: 140 MMOL/L (ref 136–145)
TRIGL SERPL-MCNC: 96 MG/DL (ref 30–150)
TSH SERPL DL<=0.005 MIU/L-ACNC: 0.72 UIU/ML (ref 0.4–4)
WBC # BLD AUTO: 6.64 K/UL (ref 3.9–12.7)

## 2021-05-25 PROCEDURE — 99214 OFFICE O/P EST MOD 30 MIN: CPT | Mod: S$GLB,,, | Performed by: INTERNAL MEDICINE

## 2021-05-25 PROCEDURE — 86316 IMMUNOASSAY TUMOR OTHER: CPT | Performed by: INTERNAL MEDICINE

## 2021-05-25 PROCEDURE — 1101F PR PT FALLS ASSESS DOC 0-1 FALLS W/OUT INJ PAST YR: ICD-10-PCS | Mod: CPTII,S$GLB,, | Performed by: INTERNAL MEDICINE

## 2021-05-25 PROCEDURE — 99999 PR PBB SHADOW E&M-EST. PATIENT-LVL IV: ICD-10-PCS | Mod: PBBFAC,,, | Performed by: INTERNAL MEDICINE

## 2021-05-25 PROCEDURE — 3288F FALL RISK ASSESSMENT DOCD: CPT | Mod: CPTII,S$GLB,, | Performed by: INTERNAL MEDICINE

## 2021-05-25 PROCEDURE — 1159F MED LIST DOCD IN RCRD: CPT | Mod: S$GLB,,, | Performed by: INTERNAL MEDICINE

## 2021-05-25 PROCEDURE — 36415 COLL VENOUS BLD VENIPUNCTURE: CPT | Performed by: INTERNAL MEDICINE

## 2021-05-25 PROCEDURE — 83036 HEMOGLOBIN GLYCOSYLATED A1C: CPT | Performed by: INTERNAL MEDICINE

## 2021-05-25 PROCEDURE — 99999 PR PBB SHADOW E&M-EST. PATIENT-LVL IV: CPT | Mod: PBBFAC,,, | Performed by: INTERNAL MEDICINE

## 2021-05-25 PROCEDURE — 99214 PR OFFICE/OUTPT VISIT, EST, LEVL IV, 30-39 MIN: ICD-10-PCS | Mod: S$GLB,,, | Performed by: INTERNAL MEDICINE

## 2021-05-25 PROCEDURE — 1159F PR MEDICATION LIST DOCUMENTED IN MEDICAL RECORD: ICD-10-PCS | Mod: S$GLB,,, | Performed by: INTERNAL MEDICINE

## 2021-05-25 PROCEDURE — 1126F AMNT PAIN NOTED NONE PRSNT: CPT | Mod: S$GLB,,, | Performed by: INTERNAL MEDICINE

## 2021-05-25 PROCEDURE — 80061 LIPID PANEL: CPT | Performed by: INTERNAL MEDICINE

## 2021-05-25 PROCEDURE — 80053 COMPREHEN METABOLIC PANEL: CPT | Performed by: INTERNAL MEDICINE

## 2021-05-25 PROCEDURE — 3288F PR FALLS RISK ASSESSMENT DOCUMENTED: ICD-10-PCS | Mod: CPTII,S$GLB,, | Performed by: INTERNAL MEDICINE

## 2021-05-25 PROCEDURE — 84443 ASSAY THYROID STIM HORMONE: CPT | Performed by: INTERNAL MEDICINE

## 2021-05-25 PROCEDURE — 1126F PR PAIN SEVERITY QUANTIFIED, NO PAIN PRESENT: ICD-10-PCS | Mod: S$GLB,,, | Performed by: INTERNAL MEDICINE

## 2021-05-25 PROCEDURE — 1101F PT FALLS ASSESS-DOCD LE1/YR: CPT | Mod: CPTII,S$GLB,, | Performed by: INTERNAL MEDICINE

## 2021-05-25 PROCEDURE — 85025 COMPLETE CBC W/AUTO DIFF WBC: CPT | Performed by: INTERNAL MEDICINE

## 2021-05-25 RX ORDER — LOSARTAN POTASSIUM 50 MG/1
50 TABLET ORAL DAILY
Qty: 90 TABLET | Refills: 3 | Status: ON HOLD | OUTPATIENT
Start: 2021-05-25 | End: 2022-02-12 | Stop reason: SDUPTHER

## 2021-05-25 RX ORDER — INSULIN PUMP SYRINGE, 3 ML
EACH MISCELLANEOUS
Qty: 1 EACH | Refills: 0 | Status: SHIPPED | OUTPATIENT
Start: 2021-05-25 | End: 2022-05-25

## 2021-05-25 RX ORDER — LANCETS
EACH MISCELLANEOUS
Qty: 100 EACH | Refills: 11 | Status: SHIPPED | OUTPATIENT
Start: 2021-05-25

## 2021-05-26 ENCOUNTER — TELEPHONE (OUTPATIENT)
Dept: ADMINISTRATIVE | Facility: OTHER | Age: 81
End: 2021-05-26

## 2021-05-26 LAB — CGA SERPL-MCNC: 760 NG/ML

## 2021-05-28 ENCOUNTER — OFFICE VISIT (OUTPATIENT)
Dept: HEMATOLOGY/ONCOLOGY | Facility: CLINIC | Age: 81
End: 2021-05-28
Payer: MEDICARE

## 2021-05-28 ENCOUNTER — INFUSION (OUTPATIENT)
Dept: INFUSION THERAPY | Facility: HOSPITAL | Age: 81
End: 2021-05-28
Payer: MEDICARE

## 2021-05-28 VITALS
OXYGEN SATURATION: 97 % | SYSTOLIC BLOOD PRESSURE: 142 MMHG | HEART RATE: 85 BPM | TEMPERATURE: 98 F | HEIGHT: 62 IN | BODY MASS INDEX: 24.5 KG/M2 | WEIGHT: 133.13 LBS | RESPIRATION RATE: 18 BRPM | DIASTOLIC BLOOD PRESSURE: 73 MMHG

## 2021-05-28 DIAGNOSIS — C79.51 SPINE METASTASIS: ICD-10-CM

## 2021-05-28 DIAGNOSIS — E11.9 TYPE 2 DIABETES MELLITUS WITHOUT COMPLICATION, WITHOUT LONG-TERM CURRENT USE OF INSULIN: ICD-10-CM

## 2021-05-28 DIAGNOSIS — C7A.8 NEUROENDOCRINE CARCINOMA METASTATIC TO BONE: ICD-10-CM

## 2021-05-28 DIAGNOSIS — C7B.8 METASTATIC MALIGNANT NEUROENDOCRINE TUMOR TO LIVER: ICD-10-CM

## 2021-05-28 DIAGNOSIS — D84.81 IMMUNODEFICIENCY SECONDARY TO NEOPLASM: ICD-10-CM

## 2021-05-28 DIAGNOSIS — C7B.8 METASTATIC MALIGNANT NEUROENDOCRINE TUMOR TO LIVER: Primary | ICD-10-CM

## 2021-05-28 DIAGNOSIS — C7A.012 MALIGNANT CARCINOID TUMOR OF ILEUM: Primary | ICD-10-CM

## 2021-05-28 DIAGNOSIS — I10 ESSENTIAL HYPERTENSION: ICD-10-CM

## 2021-05-28 DIAGNOSIS — C7B.8 NEUROENDOCRINE CARCINOMA METASTATIC TO BONE: ICD-10-CM

## 2021-05-28 DIAGNOSIS — I70.0 ATHEROSCLEROSIS OF AORTA: ICD-10-CM

## 2021-05-28 DIAGNOSIS — D49.9 IMMUNODEFICIENCY SECONDARY TO NEOPLASM: ICD-10-CM

## 2021-05-28 PROCEDURE — 99215 OFFICE O/P EST HI 40 MIN: CPT | Mod: S$GLB,,, | Performed by: PHYSICIAN ASSISTANT

## 2021-05-28 PROCEDURE — 99999 PR PBB SHADOW E&M-EST. PATIENT-LVL IV: ICD-10-PCS | Mod: PBBFAC,,, | Performed by: PHYSICIAN ASSISTANT

## 2021-05-28 PROCEDURE — 99999 PR PBB SHADOW E&M-EST. PATIENT-LVL IV: CPT | Mod: PBBFAC,,, | Performed by: PHYSICIAN ASSISTANT

## 2021-05-28 PROCEDURE — 1159F PR MEDICATION LIST DOCUMENTED IN MEDICAL RECORD: ICD-10-PCS | Mod: S$GLB,,, | Performed by: PHYSICIAN ASSISTANT

## 2021-05-28 PROCEDURE — 99215 PR OFFICE/OUTPT VISIT, EST, LEVL V, 40-54 MIN: ICD-10-PCS | Mod: S$GLB,,, | Performed by: PHYSICIAN ASSISTANT

## 2021-05-28 PROCEDURE — 63600175 PHARM REV CODE 636 W HCPCS: Mod: JG | Performed by: INTERNAL MEDICINE

## 2021-05-28 PROCEDURE — 96372 THER/PROPH/DIAG INJ SC/IM: CPT

## 2021-05-28 PROCEDURE — 1159F MED LIST DOCD IN RCRD: CPT | Mod: S$GLB,,, | Performed by: PHYSICIAN ASSISTANT

## 2021-05-28 RX ORDER — LANREOTIDE ACETATE 120 MG/.5ML
120 INJECTION SUBCUTANEOUS
Status: DISCONTINUED | OUTPATIENT
Start: 2021-05-28 | End: 2021-05-28 | Stop reason: HOSPADM

## 2021-05-28 RX ORDER — LANREOTIDE ACETATE 120 MG/.5ML
120 INJECTION SUBCUTANEOUS
Status: CANCELLED | OUTPATIENT
Start: 2021-05-28

## 2021-05-28 RX ADMIN — LANREOTIDE ACETATE 120 MG: 120 INJECTION SUBCUTANEOUS at 09:05

## 2021-05-31 ENCOUNTER — HOSPITAL ENCOUNTER (OUTPATIENT)
Dept: RADIOLOGY | Facility: OTHER | Age: 81
Discharge: HOME | End: 2021-05-31
Attending: INTERNAL MEDICINE
Payer: MEDICARE

## 2021-05-31 DIAGNOSIS — N95.9 MENOPAUSAL PROBLEM: ICD-10-CM

## 2021-05-31 PROCEDURE — 77080 DXA BONE DENSITY AXIAL: CPT | Mod: 26,,, | Performed by: RADIOLOGY

## 2021-05-31 PROCEDURE — 77080 DXA BONE DENSITY AXIAL: CPT | Mod: TC

## 2021-05-31 PROCEDURE — 77080 DEXA BONE DENSITY SPINE HIP: ICD-10-PCS | Mod: 26,,, | Performed by: RADIOLOGY

## 2021-06-14 ENCOUNTER — PATIENT OUTREACH (OUTPATIENT)
Dept: ADMINISTRATIVE | Facility: HOSPITAL | Age: 81
End: 2021-06-14

## 2021-06-14 ENCOUNTER — CLINICAL SUPPORT (OUTPATIENT)
Dept: DIABETES | Facility: CLINIC | Age: 81
End: 2021-06-14
Attending: INTERNAL MEDICINE
Payer: MEDICARE

## 2021-06-14 VITALS
WEIGHT: 135.13 LBS | SYSTOLIC BLOOD PRESSURE: 130 MMHG | BODY MASS INDEX: 24.87 KG/M2 | DIASTOLIC BLOOD PRESSURE: 80 MMHG | HEIGHT: 62 IN

## 2021-06-14 DIAGNOSIS — R80.9 CONTROLLED TYPE 2 DIABETES MELLITUS WITH MICROALBUMINURIA, WITHOUT LONG-TERM CURRENT USE OF INSULIN: ICD-10-CM

## 2021-06-14 DIAGNOSIS — E11.29 CONTROLLED TYPE 2 DIABETES MELLITUS WITH MICROALBUMINURIA, WITHOUT LONG-TERM CURRENT USE OF INSULIN: ICD-10-CM

## 2021-06-14 PROCEDURE — G0108 PR DIAB MANAGE TRN  PER INDIV: ICD-10-PCS | Mod: S$GLB,,, | Performed by: FAMILY MEDICINE

## 2021-06-14 PROCEDURE — G0108 DIAB MANAGE TRN  PER INDIV: HCPCS | Mod: S$GLB,,, | Performed by: FAMILY MEDICINE

## 2021-06-14 PROCEDURE — 99999 PR PBB SHADOW E&M-EST. PATIENT-LVL III: CPT | Mod: PBBFAC,,,

## 2021-06-14 PROCEDURE — 99999 PR PBB SHADOW E&M-EST. PATIENT-LVL III: ICD-10-PCS | Mod: PBBFAC,,,

## 2021-06-25 ENCOUNTER — OFFICE VISIT (OUTPATIENT)
Dept: HEMATOLOGY/ONCOLOGY | Facility: CLINIC | Age: 81
End: 2021-06-25
Payer: MEDICARE

## 2021-06-25 ENCOUNTER — INFUSION (OUTPATIENT)
Dept: INFUSION THERAPY | Facility: HOSPITAL | Age: 81
End: 2021-06-25
Payer: MEDICARE

## 2021-06-25 VITALS
WEIGHT: 131.81 LBS | OXYGEN SATURATION: 98 % | HEIGHT: 62 IN | HEART RATE: 84 BPM | RESPIRATION RATE: 16 BRPM | SYSTOLIC BLOOD PRESSURE: 128 MMHG | DIASTOLIC BLOOD PRESSURE: 74 MMHG | BODY MASS INDEX: 24.26 KG/M2 | TEMPERATURE: 98 F

## 2021-06-25 DIAGNOSIS — C7A.8 NEUROENDOCRINE CARCINOMA METASTATIC TO BONE: ICD-10-CM

## 2021-06-25 DIAGNOSIS — E87.5 HYPERKALEMIA: ICD-10-CM

## 2021-06-25 DIAGNOSIS — C79.51 SPINE METASTASIS: ICD-10-CM

## 2021-06-25 DIAGNOSIS — D49.9 IMMUNODEFICIENCY SECONDARY TO NEOPLASM: ICD-10-CM

## 2021-06-25 DIAGNOSIS — C7B.8 METASTATIC MALIGNANT NEUROENDOCRINE TUMOR TO LIVER: Primary | ICD-10-CM

## 2021-06-25 DIAGNOSIS — C7B.8 NEUROENDOCRINE CARCINOMA METASTATIC TO BONE: ICD-10-CM

## 2021-06-25 DIAGNOSIS — I70.0 ATHEROSCLEROSIS OF AORTA: ICD-10-CM

## 2021-06-25 DIAGNOSIS — C7B.8 METASTATIC MALIGNANT NEUROENDOCRINE TUMOR TO LIVER: ICD-10-CM

## 2021-06-25 DIAGNOSIS — E11.9 TYPE 2 DIABETES MELLITUS WITHOUT COMPLICATION, WITHOUT LONG-TERM CURRENT USE OF INSULIN: ICD-10-CM

## 2021-06-25 DIAGNOSIS — C7A.012 MALIGNANT CARCINOID TUMOR OF ILEUM: Primary | ICD-10-CM

## 2021-06-25 DIAGNOSIS — I10 ESSENTIAL HYPERTENSION: ICD-10-CM

## 2021-06-25 DIAGNOSIS — D84.81 IMMUNODEFICIENCY SECONDARY TO NEOPLASM: ICD-10-CM

## 2021-06-25 PROCEDURE — 99999 PR PBB SHADOW E&M-EST. PATIENT-LVL IV: ICD-10-PCS | Mod: PBBFAC,,, | Performed by: PHYSICIAN ASSISTANT

## 2021-06-25 PROCEDURE — 96372 THER/PROPH/DIAG INJ SC/IM: CPT

## 2021-06-25 PROCEDURE — 1159F MED LIST DOCD IN RCRD: CPT | Mod: S$GLB,,, | Performed by: PHYSICIAN ASSISTANT

## 2021-06-25 PROCEDURE — 99214 OFFICE O/P EST MOD 30 MIN: CPT | Mod: S$GLB,,, | Performed by: PHYSICIAN ASSISTANT

## 2021-06-25 PROCEDURE — 63600175 PHARM REV CODE 636 W HCPCS: Mod: JG | Performed by: INTERNAL MEDICINE

## 2021-06-25 PROCEDURE — 99499 UNLISTED E&M SERVICE: CPT | Mod: S$GLB,,, | Performed by: PHYSICIAN ASSISTANT

## 2021-06-25 PROCEDURE — 99999 PR PBB SHADOW E&M-EST. PATIENT-LVL IV: CPT | Mod: PBBFAC,,, | Performed by: PHYSICIAN ASSISTANT

## 2021-06-25 PROCEDURE — 99214 PR OFFICE/OUTPT VISIT, EST, LEVL IV, 30-39 MIN: ICD-10-PCS | Mod: S$GLB,,, | Performed by: PHYSICIAN ASSISTANT

## 2021-06-25 PROCEDURE — 99499 RISK ADDL DX/OHS AUDIT: ICD-10-PCS | Mod: S$GLB,,, | Performed by: PHYSICIAN ASSISTANT

## 2021-06-25 PROCEDURE — 1159F PR MEDICATION LIST DOCUMENTED IN MEDICAL RECORD: ICD-10-PCS | Mod: S$GLB,,, | Performed by: PHYSICIAN ASSISTANT

## 2021-06-25 RX ORDER — LANREOTIDE ACETATE 120 MG/.5ML
120 INJECTION SUBCUTANEOUS
Status: CANCELLED | OUTPATIENT
Start: 2021-06-25

## 2021-06-25 RX ORDER — LANREOTIDE ACETATE 120 MG/.5ML
120 INJECTION SUBCUTANEOUS
Status: DISCONTINUED | OUTPATIENT
Start: 2021-06-25 | End: 2021-06-25 | Stop reason: HOSPADM

## 2021-06-25 RX ADMIN — LANREOTIDE ACETATE 120 MG: 120 INJECTION SUBCUTANEOUS at 09:06

## 2021-06-29 ENCOUNTER — PATIENT OUTREACH (OUTPATIENT)
Dept: ADMINISTRATIVE | Facility: HOSPITAL | Age: 81
End: 2021-06-29

## 2021-07-19 NOTE — PLAN OF CARE
Lanreotide injection complete. Pt tolerated well. VSS. NAD. AVS provided. Pt verbalized understanding of discharge instructions before leaving.     lr

## 2021-07-23 ENCOUNTER — HOSPITAL ENCOUNTER (OUTPATIENT)
Dept: RADIOLOGY | Facility: HOSPITAL | Age: 81
Discharge: HOME | End: 2021-07-23
Attending: PHYSICIAN ASSISTANT
Payer: MEDICARE

## 2021-07-23 DIAGNOSIS — C7A.8 NEUROENDOCRINE CARCINOMA METASTATIC TO BONE: ICD-10-CM

## 2021-07-23 DIAGNOSIS — C7A.012 MALIGNANT CARCINOID TUMOR OF ILEUM: ICD-10-CM

## 2021-07-23 DIAGNOSIS — C7B.8 NEUROENDOCRINE CARCINOMA METASTATIC TO BONE: ICD-10-CM

## 2021-07-23 PROCEDURE — 71260 CT THORAX DX C+: CPT | Mod: 26,,, | Performed by: RADIOLOGY

## 2021-07-23 PROCEDURE — 71260 CT THORAX DX C+: CPT | Mod: TC

## 2021-07-23 PROCEDURE — 25500020 PHARM REV CODE 255: Performed by: PHYSICIAN ASSISTANT

## 2021-07-23 PROCEDURE — 74183 MRI ABDOMEN-PELVIS W W/O CONTRAST (XPD): ICD-10-PCS | Mod: 26,,, | Performed by: RADIOLOGY

## 2021-07-23 PROCEDURE — 71260 CT CHEST WITH CONTRAST: ICD-10-PCS | Mod: 26,,, | Performed by: RADIOLOGY

## 2021-07-23 PROCEDURE — 72197 MRI PELVIS W/O & W/DYE: CPT | Mod: TC

## 2021-07-23 PROCEDURE — 74183 MRI ABD W/O CNTR FLWD CNTR: CPT | Mod: 26,,, | Performed by: RADIOLOGY

## 2021-07-23 PROCEDURE — 72197 MRI ABDOMEN-PELVIS W W/O CONTRAST (XPD): ICD-10-PCS | Mod: 26,,, | Performed by: RADIOLOGY

## 2021-07-23 PROCEDURE — 72197 MRI PELVIS W/O & W/DYE: CPT | Mod: 26,,, | Performed by: RADIOLOGY

## 2021-07-23 PROCEDURE — A9585 GADOBUTROL INJECTION: HCPCS | Performed by: PHYSICIAN ASSISTANT

## 2021-07-23 RX ORDER — GADOBUTROL 604.72 MG/ML
10 INJECTION INTRAVENOUS
Status: COMPLETED | OUTPATIENT
Start: 2021-07-23 | End: 2021-07-23

## 2021-07-23 RX ADMIN — GADOBUTROL 10 ML: 604.72 INJECTION INTRAVENOUS at 01:07

## 2021-07-23 RX ADMIN — IOHEXOL 75 ML: 350 INJECTION, SOLUTION INTRAVENOUS at 01:07

## 2021-07-26 ENCOUNTER — INFUSION (OUTPATIENT)
Dept: INFUSION THERAPY | Facility: HOSPITAL | Age: 81
End: 2021-07-26
Payer: MEDICARE

## 2021-07-26 ENCOUNTER — OFFICE VISIT (OUTPATIENT)
Dept: HEMATOLOGY/ONCOLOGY | Facility: CLINIC | Age: 81
End: 2021-07-26
Payer: MEDICARE

## 2021-07-26 VITALS
TEMPERATURE: 98 F | BODY MASS INDEX: 24.54 KG/M2 | HEIGHT: 62 IN | SYSTOLIC BLOOD PRESSURE: 121 MMHG | WEIGHT: 133.38 LBS | OXYGEN SATURATION: 97 % | DIASTOLIC BLOOD PRESSURE: 70 MMHG | RESPIRATION RATE: 16 BRPM | HEART RATE: 89 BPM

## 2021-07-26 DIAGNOSIS — C7A.012 MALIGNANT CARCINOID TUMOR OF ILEUM: Primary | ICD-10-CM

## 2021-07-26 DIAGNOSIS — C7B.8 NEUROENDOCRINE CARCINOMA METASTATIC TO BONE: ICD-10-CM

## 2021-07-26 DIAGNOSIS — C7B.8 METASTATIC MALIGNANT NEUROENDOCRINE TUMOR TO LIVER: ICD-10-CM

## 2021-07-26 DIAGNOSIS — D49.9 IMMUNODEFICIENCY SECONDARY TO NEOPLASM: ICD-10-CM

## 2021-07-26 DIAGNOSIS — C7B.8 METASTATIC MALIGNANT NEUROENDOCRINE TUMOR TO LIVER: Primary | ICD-10-CM

## 2021-07-26 DIAGNOSIS — C7A.8 NEUROENDOCRINE CARCINOMA METASTATIC TO BONE: ICD-10-CM

## 2021-07-26 DIAGNOSIS — E11.9 TYPE 2 DIABETES MELLITUS WITHOUT COMPLICATION, WITHOUT LONG-TERM CURRENT USE OF INSULIN: ICD-10-CM

## 2021-07-26 DIAGNOSIS — C79.51 SPINE METASTASIS: ICD-10-CM

## 2021-07-26 DIAGNOSIS — I10 ESSENTIAL HYPERTENSION: ICD-10-CM

## 2021-07-26 DIAGNOSIS — D84.81 IMMUNODEFICIENCY SECONDARY TO NEOPLASM: ICD-10-CM

## 2021-07-26 PROCEDURE — 99215 OFFICE O/P EST HI 40 MIN: CPT | Mod: S$GLB,,, | Performed by: INTERNAL MEDICINE

## 2021-07-26 PROCEDURE — 3074F PR MOST RECENT SYSTOLIC BLOOD PRESSURE < 130 MM HG: ICD-10-PCS | Mod: CPTII,S$GLB,, | Performed by: INTERNAL MEDICINE

## 2021-07-26 PROCEDURE — 1159F PR MEDICATION LIST DOCUMENTED IN MEDICAL RECORD: ICD-10-PCS | Mod: CPTII,S$GLB,, | Performed by: INTERNAL MEDICINE

## 2021-07-26 PROCEDURE — 1160F PR REVIEW ALL MEDS BY PRESCRIBER/CLIN PHARMACIST DOCUMENTED: ICD-10-PCS | Mod: CPTII,S$GLB,, | Performed by: INTERNAL MEDICINE

## 2021-07-26 PROCEDURE — 1101F PT FALLS ASSESS-DOCD LE1/YR: CPT | Mod: CPTII,S$GLB,, | Performed by: INTERNAL MEDICINE

## 2021-07-26 PROCEDURE — 63600175 PHARM REV CODE 636 W HCPCS: Mod: JG | Performed by: INTERNAL MEDICINE

## 2021-07-26 PROCEDURE — 96372 THER/PROPH/DIAG INJ SC/IM: CPT

## 2021-07-26 PROCEDURE — 3078F PR MOST RECENT DIASTOLIC BLOOD PRESSURE < 80 MM HG: ICD-10-PCS | Mod: CPTII,S$GLB,, | Performed by: INTERNAL MEDICINE

## 2021-07-26 PROCEDURE — 1126F PR PAIN SEVERITY QUANTIFIED, NO PAIN PRESENT: ICD-10-PCS | Mod: CPTII,S$GLB,, | Performed by: INTERNAL MEDICINE

## 2021-07-26 PROCEDURE — 1160F RVW MEDS BY RX/DR IN RCRD: CPT | Mod: CPTII,S$GLB,, | Performed by: INTERNAL MEDICINE

## 2021-07-26 PROCEDURE — 3288F PR FALLS RISK ASSESSMENT DOCUMENTED: ICD-10-PCS | Mod: CPTII,S$GLB,, | Performed by: INTERNAL MEDICINE

## 2021-07-26 PROCEDURE — 1126F AMNT PAIN NOTED NONE PRSNT: CPT | Mod: CPTII,S$GLB,, | Performed by: INTERNAL MEDICINE

## 2021-07-26 PROCEDURE — 99215 PR OFFICE/OUTPT VISIT, EST, LEVL V, 40-54 MIN: ICD-10-PCS | Mod: S$GLB,,, | Performed by: INTERNAL MEDICINE

## 2021-07-26 PROCEDURE — 3078F DIAST BP <80 MM HG: CPT | Mod: CPTII,S$GLB,, | Performed by: INTERNAL MEDICINE

## 2021-07-26 PROCEDURE — 99999 PR PBB SHADOW E&M-EST. PATIENT-LVL IV: ICD-10-PCS | Mod: PBBFAC,,, | Performed by: INTERNAL MEDICINE

## 2021-07-26 PROCEDURE — 99499 RISK ADDL DX/OHS AUDIT: ICD-10-PCS | Mod: HCNC,S$GLB,, | Performed by: INTERNAL MEDICINE

## 2021-07-26 PROCEDURE — 1159F MED LIST DOCD IN RCRD: CPT | Mod: CPTII,S$GLB,, | Performed by: INTERNAL MEDICINE

## 2021-07-26 PROCEDURE — 3288F FALL RISK ASSESSMENT DOCD: CPT | Mod: CPTII,S$GLB,, | Performed by: INTERNAL MEDICINE

## 2021-07-26 PROCEDURE — 3074F SYST BP LT 130 MM HG: CPT | Mod: CPTII,S$GLB,, | Performed by: INTERNAL MEDICINE

## 2021-07-26 PROCEDURE — 99499 UNLISTED E&M SERVICE: CPT | Mod: HCNC,S$GLB,, | Performed by: INTERNAL MEDICINE

## 2021-07-26 PROCEDURE — 1101F PR PT FALLS ASSESS DOC 0-1 FALLS W/OUT INJ PAST YR: ICD-10-PCS | Mod: CPTII,S$GLB,, | Performed by: INTERNAL MEDICINE

## 2021-07-26 PROCEDURE — 99999 PR PBB SHADOW E&M-EST. PATIENT-LVL IV: CPT | Mod: PBBFAC,,, | Performed by: INTERNAL MEDICINE

## 2021-07-26 RX ORDER — LANREOTIDE ACETATE 120 MG/.5ML
120 INJECTION SUBCUTANEOUS
Status: DISCONTINUED | OUTPATIENT
Start: 2021-07-26 | End: 2021-07-26 | Stop reason: HOSPADM

## 2021-07-26 RX ORDER — LANREOTIDE ACETATE 120 MG/.5ML
120 INJECTION SUBCUTANEOUS
Status: CANCELLED | OUTPATIENT
Start: 2021-07-26

## 2021-07-26 RX ADMIN — LANREOTIDE ACETATE 120 MG: 120 INJECTION SUBCUTANEOUS at 01:07

## 2021-08-02 ENCOUNTER — TELEPHONE (OUTPATIENT)
Dept: INTERNAL MEDICINE | Facility: CLINIC | Age: 81
End: 2021-08-02

## 2021-08-23 ENCOUNTER — LAB VISIT (OUTPATIENT)
Dept: LAB | Facility: HOSPITAL | Age: 81
End: 2021-08-23
Payer: MEDICARE

## 2021-08-23 DIAGNOSIS — C7A.012 MALIGNANT CARCINOID TUMOR OF ILEUM: ICD-10-CM

## 2021-08-23 LAB
ALBUMIN SERPL BCP-MCNC: 3.9 G/DL (ref 3.5–5.2)
ALP SERPL-CCNC: 55 U/L (ref 55–135)
ALT SERPL W/O P-5'-P-CCNC: 10 U/L (ref 10–44)
ANION GAP SERPL CALC-SCNC: 12 MMOL/L (ref 8–16)
AST SERPL-CCNC: 18 U/L (ref 10–40)
BASOPHILS # BLD AUTO: 0.02 K/UL (ref 0–0.2)
BASOPHILS NFR BLD: 0.3 % (ref 0–1.9)
BILIRUB SERPL-MCNC: 1.9 MG/DL (ref 0.1–1)
BUN SERPL-MCNC: 11 MG/DL (ref 8–23)
CALCIUM SERPL-MCNC: 10 MG/DL (ref 8.7–10.5)
CHLORIDE SERPL-SCNC: 104 MMOL/L (ref 95–110)
CO2 SERPL-SCNC: 24 MMOL/L (ref 23–29)
CREAT SERPL-MCNC: 0.7 MG/DL (ref 0.5–1.4)
DIFFERENTIAL METHOD: ABNORMAL
EOSINOPHIL # BLD AUTO: 0 K/UL (ref 0–0.5)
EOSINOPHIL NFR BLD: 0.3 % (ref 0–8)
ERYTHROCYTE [DISTWIDTH] IN BLOOD BY AUTOMATED COUNT: 12.3 % (ref 11.5–14.5)
EST. GFR  (AFRICAN AMERICAN): >60 ML/MIN/1.73 M^2
EST. GFR  (NON AFRICAN AMERICAN): >60 ML/MIN/1.73 M^2
GLUCOSE SERPL-MCNC: 119 MG/DL (ref 70–110)
HCT VFR BLD AUTO: 42.3 % (ref 37–48.5)
HGB BLD-MCNC: 13.5 G/DL (ref 12–16)
IMM GRANULOCYTES # BLD AUTO: 0.01 K/UL (ref 0–0.04)
IMM GRANULOCYTES NFR BLD AUTO: 0.2 % (ref 0–0.5)
LYMPHOCYTES # BLD AUTO: 2 K/UL (ref 1–4.8)
LYMPHOCYTES NFR BLD: 30.9 % (ref 18–48)
MCH RBC QN AUTO: 28.9 PG (ref 27–31)
MCHC RBC AUTO-ENTMCNC: 31.9 G/DL (ref 32–36)
MCV RBC AUTO: 91 FL (ref 82–98)
MONOCYTES # BLD AUTO: 0.5 K/UL (ref 0.3–1)
MONOCYTES NFR BLD: 7.4 % (ref 4–15)
NEUTROPHILS # BLD AUTO: 4 K/UL (ref 1.8–7.7)
NEUTROPHILS NFR BLD: 60.9 % (ref 38–73)
NRBC BLD-RTO: 0 /100 WBC
PLATELET # BLD AUTO: 234 K/UL (ref 150–450)
PMV BLD AUTO: 10.3 FL (ref 9.2–12.9)
POTASSIUM SERPL-SCNC: 3.8 MMOL/L (ref 3.5–5.1)
PROT SERPL-MCNC: 7.4 G/DL (ref 6–8.4)
RBC # BLD AUTO: 4.67 M/UL (ref 4–5.4)
SODIUM SERPL-SCNC: 140 MMOL/L (ref 136–145)
WBC # BLD AUTO: 6.6 K/UL (ref 3.9–12.7)

## 2021-08-23 PROCEDURE — 85025 COMPLETE CBC W/AUTO DIFF WBC: CPT | Performed by: INTERNAL MEDICINE

## 2021-08-23 PROCEDURE — 36415 COLL VENOUS BLD VENIPUNCTURE: CPT | Performed by: INTERNAL MEDICINE

## 2021-08-23 PROCEDURE — 86316 IMMUNOASSAY TUMOR OTHER: CPT | Performed by: INTERNAL MEDICINE

## 2021-08-23 PROCEDURE — 80053 COMPREHEN METABOLIC PANEL: CPT | Performed by: INTERNAL MEDICINE

## 2021-08-24 ENCOUNTER — OFFICE VISIT (OUTPATIENT)
Dept: INTERNAL MEDICINE | Facility: CLINIC | Age: 81
End: 2021-08-24
Attending: INTERNAL MEDICINE
Payer: MEDICARE

## 2021-08-24 ENCOUNTER — OFFICE VISIT (OUTPATIENT)
Dept: HEMATOLOGY/ONCOLOGY | Facility: CLINIC | Age: 81
End: 2021-08-24
Payer: MEDICARE

## 2021-08-24 ENCOUNTER — INFUSION (OUTPATIENT)
Dept: INFUSION THERAPY | Facility: HOSPITAL | Age: 81
End: 2021-08-24
Payer: MEDICARE

## 2021-08-24 VITALS
SYSTOLIC BLOOD PRESSURE: 135 MMHG | DIASTOLIC BLOOD PRESSURE: 72 MMHG | BODY MASS INDEX: 23.37 KG/M2 | WEIGHT: 131.88 LBS | HEART RATE: 75 BPM | RESPIRATION RATE: 18 BRPM | OXYGEN SATURATION: 97 % | HEIGHT: 63 IN | TEMPERATURE: 98 F

## 2021-08-24 VITALS
SYSTOLIC BLOOD PRESSURE: 126 MMHG | OXYGEN SATURATION: 97 % | HEIGHT: 63 IN | BODY MASS INDEX: 23.43 KG/M2 | WEIGHT: 132.25 LBS | HEART RATE: 87 BPM | DIASTOLIC BLOOD PRESSURE: 72 MMHG

## 2021-08-24 DIAGNOSIS — T45.1X5A IMMUNODEFICIENCY SECONDARY TO CHEMOTHERAPY: ICD-10-CM

## 2021-08-24 DIAGNOSIS — C7B.8 METASTATIC MALIGNANT NEUROENDOCRINE TUMOR TO LIVER: Primary | ICD-10-CM

## 2021-08-24 DIAGNOSIS — E11.9 TYPE 2 DIABETES MELLITUS WITHOUT COMPLICATION, WITHOUT LONG-TERM CURRENT USE OF INSULIN: ICD-10-CM

## 2021-08-24 DIAGNOSIS — E53.8 B12 DEFICIENCY: ICD-10-CM

## 2021-08-24 DIAGNOSIS — C7B.8 METASTATIC MALIGNANT NEUROENDOCRINE TUMOR TO LIVER: ICD-10-CM

## 2021-08-24 DIAGNOSIS — E11.9 CONTROLLED TYPE 2 DIABETES MELLITUS WITHOUT COMPLICATION, WITHOUT LONG-TERM CURRENT USE OF INSULIN: Primary | ICD-10-CM

## 2021-08-24 DIAGNOSIS — Z79.899 IMMUNODEFICIENCY SECONDARY TO CHEMOTHERAPY: ICD-10-CM

## 2021-08-24 DIAGNOSIS — C79.51 SPINE METASTASIS: ICD-10-CM

## 2021-08-24 DIAGNOSIS — C7A.012 MALIGNANT CARCINOID TUMOR OF ILEUM: ICD-10-CM

## 2021-08-24 DIAGNOSIS — D49.9 IMMUNODEFICIENCY SECONDARY TO NEOPLASM: ICD-10-CM

## 2021-08-24 DIAGNOSIS — C7B.8 NEUROENDOCRINE CARCINOMA METASTATIC TO BONE: Primary | ICD-10-CM

## 2021-08-24 DIAGNOSIS — D84.81 IMMUNODEFICIENCY SECONDARY TO NEOPLASM: ICD-10-CM

## 2021-08-24 DIAGNOSIS — D84.821 IMMUNODEFICIENCY SECONDARY TO CHEMOTHERAPY: ICD-10-CM

## 2021-08-24 DIAGNOSIS — C22.0 HCC (HEPATOCELLULAR CARCINOMA): ICD-10-CM

## 2021-08-24 DIAGNOSIS — I10 ESSENTIAL HYPERTENSION: ICD-10-CM

## 2021-08-24 DIAGNOSIS — C7A.8 NEUROENDOCRINE CARCINOMA METASTATIC TO BONE: Primary | ICD-10-CM

## 2021-08-24 DIAGNOSIS — E78.5 HYPERLIPIDEMIA, UNSPECIFIED HYPERLIPIDEMIA TYPE: ICD-10-CM

## 2021-08-24 LAB — CGA SERPL-MCNC: 1013 NG/ML

## 2021-08-24 PROCEDURE — 3288F PR FALLS RISK ASSESSMENT DOCUMENTED: ICD-10-PCS | Mod: CPTII,S$GLB,, | Performed by: INTERNAL MEDICINE

## 2021-08-24 PROCEDURE — 3075F PR MOST RECENT SYSTOLIC BLOOD PRESS GE 130-139MM HG: ICD-10-PCS | Mod: CPTII,S$GLB,, | Performed by: INTERNAL MEDICINE

## 2021-08-24 PROCEDURE — 99214 PR OFFICE/OUTPT VISIT, EST, LEVL IV, 30-39 MIN: ICD-10-PCS | Mod: S$GLB,,, | Performed by: INTERNAL MEDICINE

## 2021-08-24 PROCEDURE — 1101F PT FALLS ASSESS-DOCD LE1/YR: CPT | Mod: CPTII,S$GLB,, | Performed by: INTERNAL MEDICINE

## 2021-08-24 PROCEDURE — 99214 OFFICE O/P EST MOD 30 MIN: CPT | Mod: S$GLB,,, | Performed by: INTERNAL MEDICINE

## 2021-08-24 PROCEDURE — 3074F SYST BP LT 130 MM HG: CPT | Mod: CPTII,S$GLB,, | Performed by: INTERNAL MEDICINE

## 2021-08-24 PROCEDURE — 1126F AMNT PAIN NOTED NONE PRSNT: CPT | Mod: CPTII,S$GLB,, | Performed by: INTERNAL MEDICINE

## 2021-08-24 PROCEDURE — 3074F PR MOST RECENT SYSTOLIC BLOOD PRESSURE < 130 MM HG: ICD-10-PCS | Mod: CPTII,S$GLB,, | Performed by: INTERNAL MEDICINE

## 2021-08-24 PROCEDURE — 3288F FALL RISK ASSESSMENT DOCD: CPT | Mod: CPTII,S$GLB,, | Performed by: INTERNAL MEDICINE

## 2021-08-24 PROCEDURE — 63600175 PHARM REV CODE 636 W HCPCS: Mod: JG | Performed by: INTERNAL MEDICINE

## 2021-08-24 PROCEDURE — 1159F MED LIST DOCD IN RCRD: CPT | Mod: CPTII,S$GLB,, | Performed by: INTERNAL MEDICINE

## 2021-08-24 PROCEDURE — 1159F PR MEDICATION LIST DOCUMENTED IN MEDICAL RECORD: ICD-10-PCS | Mod: CPTII,S$GLB,, | Performed by: INTERNAL MEDICINE

## 2021-08-24 PROCEDURE — 99215 OFFICE O/P EST HI 40 MIN: CPT | Mod: S$GLB,,, | Performed by: INTERNAL MEDICINE

## 2021-08-24 PROCEDURE — 99215 PR OFFICE/OUTPT VISIT, EST, LEVL V, 40-54 MIN: ICD-10-PCS | Mod: S$GLB,,, | Performed by: INTERNAL MEDICINE

## 2021-08-24 PROCEDURE — 1160F RVW MEDS BY RX/DR IN RCRD: CPT | Mod: CPTII,S$GLB,, | Performed by: INTERNAL MEDICINE

## 2021-08-24 PROCEDURE — 3078F DIAST BP <80 MM HG: CPT | Mod: CPTII,S$GLB,, | Performed by: INTERNAL MEDICINE

## 2021-08-24 PROCEDURE — 1126F PR PAIN SEVERITY QUANTIFIED, NO PAIN PRESENT: ICD-10-PCS | Mod: CPTII,S$GLB,, | Performed by: INTERNAL MEDICINE

## 2021-08-24 PROCEDURE — 99999 PR PBB SHADOW E&M-EST. PATIENT-LVL IV: ICD-10-PCS | Mod: PBBFAC,,, | Performed by: INTERNAL MEDICINE

## 2021-08-24 PROCEDURE — 1160F PR REVIEW ALL MEDS BY PRESCRIBER/CLIN PHARMACIST DOCUMENTED: ICD-10-PCS | Mod: CPTII,S$GLB,, | Performed by: INTERNAL MEDICINE

## 2021-08-24 PROCEDURE — 96372 THER/PROPH/DIAG INJ SC/IM: CPT

## 2021-08-24 PROCEDURE — 99999 PR PBB SHADOW E&M-EST. PATIENT-LVL III: ICD-10-PCS | Mod: PBBFAC,,, | Performed by: INTERNAL MEDICINE

## 2021-08-24 PROCEDURE — 99999 PR PBB SHADOW E&M-EST. PATIENT-LVL IV: CPT | Mod: PBBFAC,,, | Performed by: INTERNAL MEDICINE

## 2021-08-24 PROCEDURE — 1101F PR PT FALLS ASSESS DOC 0-1 FALLS W/OUT INJ PAST YR: ICD-10-PCS | Mod: CPTII,S$GLB,, | Performed by: INTERNAL MEDICINE

## 2021-08-24 PROCEDURE — 3078F PR MOST RECENT DIASTOLIC BLOOD PRESSURE < 80 MM HG: ICD-10-PCS | Mod: CPTII,S$GLB,, | Performed by: INTERNAL MEDICINE

## 2021-08-24 PROCEDURE — 99999 PR PBB SHADOW E&M-EST. PATIENT-LVL III: CPT | Mod: PBBFAC,,, | Performed by: INTERNAL MEDICINE

## 2021-08-24 PROCEDURE — 3075F SYST BP GE 130 - 139MM HG: CPT | Mod: CPTII,S$GLB,, | Performed by: INTERNAL MEDICINE

## 2021-08-24 RX ORDER — LANREOTIDE ACETATE 120 MG/.5ML
120 INJECTION SUBCUTANEOUS
Status: CANCELLED | OUTPATIENT
Start: 2021-08-24

## 2021-08-24 RX ORDER — LANREOTIDE ACETATE 120 MG/.5ML
120 INJECTION SUBCUTANEOUS
Status: DISCONTINUED | OUTPATIENT
Start: 2021-08-24 | End: 2021-08-24 | Stop reason: HOSPADM

## 2021-08-24 RX ADMIN — LANREOTIDE ACETATE 120 MG: 120 INJECTION SUBCUTANEOUS at 08:08

## 2021-09-17 ENCOUNTER — LAB VISIT (OUTPATIENT)
Dept: LAB | Facility: HOSPITAL | Age: 81
End: 2021-09-17
Attending: INTERNAL MEDICINE
Payer: MEDICARE

## 2021-09-17 DIAGNOSIS — C7A.012 MALIGNANT CARCINOID TUMOR OF ILEUM: ICD-10-CM

## 2021-09-17 LAB
ALBUMIN SERPL BCP-MCNC: 4 G/DL (ref 3.5–5.2)
ALP SERPL-CCNC: 60 U/L (ref 55–135)
ALT SERPL W/O P-5'-P-CCNC: 9 U/L (ref 10–44)
ANION GAP SERPL CALC-SCNC: 12 MMOL/L (ref 8–16)
AST SERPL-CCNC: 16 U/L (ref 10–40)
BASOPHILS # BLD AUTO: 0.03 K/UL (ref 0–0.2)
BASOPHILS NFR BLD: 0.4 % (ref 0–1.9)
BILIRUB SERPL-MCNC: 1.6 MG/DL (ref 0.1–1)
BUN SERPL-MCNC: 9 MG/DL (ref 8–23)
CALCIUM SERPL-MCNC: 10.6 MG/DL (ref 8.7–10.5)
CHLORIDE SERPL-SCNC: 103 MMOL/L (ref 95–110)
CO2 SERPL-SCNC: 25 MMOL/L (ref 23–29)
CREAT SERPL-MCNC: 0.7 MG/DL (ref 0.5–1.4)
DIFFERENTIAL METHOD: ABNORMAL
EOSINOPHIL # BLD AUTO: 0 K/UL (ref 0–0.5)
EOSINOPHIL NFR BLD: 0.4 % (ref 0–8)
ERYTHROCYTE [DISTWIDTH] IN BLOOD BY AUTOMATED COUNT: 12.2 % (ref 11.5–14.5)
EST. GFR  (AFRICAN AMERICAN): >60 ML/MIN/1.73 M^2
EST. GFR  (NON AFRICAN AMERICAN): >60 ML/MIN/1.73 M^2
GLUCOSE SERPL-MCNC: 98 MG/DL (ref 70–110)
HCT VFR BLD AUTO: 43.3 % (ref 37–48.5)
HGB BLD-MCNC: 13.6 G/DL (ref 12–16)
IMM GRANULOCYTES # BLD AUTO: 0.02 K/UL (ref 0–0.04)
IMM GRANULOCYTES NFR BLD AUTO: 0.2 % (ref 0–0.5)
LYMPHOCYTES # BLD AUTO: 2.4 K/UL (ref 1–4.8)
LYMPHOCYTES NFR BLD: 27.6 % (ref 18–48)
MCH RBC QN AUTO: 28.9 PG (ref 27–31)
MCHC RBC AUTO-ENTMCNC: 31.4 G/DL (ref 32–36)
MCV RBC AUTO: 92 FL (ref 82–98)
MONOCYTES # BLD AUTO: 0.7 K/UL (ref 0.3–1)
MONOCYTES NFR BLD: 7.6 % (ref 4–15)
NEUTROPHILS # BLD AUTO: 5.4 K/UL (ref 1.8–7.7)
NEUTROPHILS NFR BLD: 63.8 % (ref 38–73)
NRBC BLD-RTO: 0 /100 WBC
PLATELET # BLD AUTO: 264 K/UL (ref 150–450)
PMV BLD AUTO: 10.7 FL (ref 9.2–12.9)
POTASSIUM SERPL-SCNC: 4.3 MMOL/L (ref 3.5–5.1)
PROT SERPL-MCNC: 7.7 G/DL (ref 6–8.4)
RBC # BLD AUTO: 4.71 M/UL (ref 4–5.4)
SODIUM SERPL-SCNC: 140 MMOL/L (ref 136–145)
WBC # BLD AUTO: 8.5 K/UL (ref 3.9–12.7)

## 2021-09-17 PROCEDURE — 80053 COMPREHEN METABOLIC PANEL: CPT | Performed by: INTERNAL MEDICINE

## 2021-09-17 PROCEDURE — 86316 IMMUNOASSAY TUMOR OTHER: CPT | Performed by: INTERNAL MEDICINE

## 2021-09-17 PROCEDURE — 85025 COMPLETE CBC W/AUTO DIFF WBC: CPT | Performed by: INTERNAL MEDICINE

## 2021-09-20 ENCOUNTER — INFUSION (OUTPATIENT)
Dept: INFUSION THERAPY | Facility: HOSPITAL | Age: 81
End: 2021-09-20
Payer: MEDICARE

## 2021-09-20 ENCOUNTER — OFFICE VISIT (OUTPATIENT)
Dept: HEMATOLOGY/ONCOLOGY | Facility: CLINIC | Age: 81
End: 2021-09-20
Payer: MEDICARE

## 2021-09-20 VITALS
DIASTOLIC BLOOD PRESSURE: 70 MMHG | BODY MASS INDEX: 23.12 KG/M2 | RESPIRATION RATE: 16 BRPM | TEMPERATURE: 98 F | WEIGHT: 130.5 LBS | OXYGEN SATURATION: 97 % | SYSTOLIC BLOOD PRESSURE: 136 MMHG | HEIGHT: 63 IN | HEART RATE: 79 BPM

## 2021-09-20 DIAGNOSIS — C7B.8 METASTATIC MALIGNANT NEUROENDOCRINE TUMOR TO LIVER: Primary | ICD-10-CM

## 2021-09-20 DIAGNOSIS — R63.4 WEIGHT LOSS: ICD-10-CM

## 2021-09-20 DIAGNOSIS — D84.81 IMMUNODEFICIENCY SECONDARY TO NEOPLASM: ICD-10-CM

## 2021-09-20 DIAGNOSIS — C7B.8 NEUROENDOCRINE CARCINOMA METASTATIC TO BONE: ICD-10-CM

## 2021-09-20 DIAGNOSIS — D49.9 IMMUNODEFICIENCY SECONDARY TO NEOPLASM: ICD-10-CM

## 2021-09-20 DIAGNOSIS — C7A.8 NEUROENDOCRINE CARCINOMA METASTATIC TO BONE: ICD-10-CM

## 2021-09-20 DIAGNOSIS — C7A.012 MALIGNANT CARCINOID TUMOR OF ILEUM: Primary | ICD-10-CM

## 2021-09-20 DIAGNOSIS — I10 ESSENTIAL HYPERTENSION: ICD-10-CM

## 2021-09-20 DIAGNOSIS — E11.9 TYPE 2 DIABETES MELLITUS WITHOUT COMPLICATION, WITHOUT LONG-TERM CURRENT USE OF INSULIN: ICD-10-CM

## 2021-09-20 DIAGNOSIS — C7B.8 METASTATIC MALIGNANT NEUROENDOCRINE TUMOR TO LIVER: ICD-10-CM

## 2021-09-20 LAB — CGA SERPL-MCNC: 1127 NG/ML

## 2021-09-20 PROCEDURE — 3288F FALL RISK ASSESSMENT DOCD: CPT | Mod: CPTII,S$GLB,, | Performed by: INTERNAL MEDICINE

## 2021-09-20 PROCEDURE — 1101F PT FALLS ASSESS-DOCD LE1/YR: CPT | Mod: CPTII,S$GLB,, | Performed by: INTERNAL MEDICINE

## 2021-09-20 PROCEDURE — 3288F PR FALLS RISK ASSESSMENT DOCUMENTED: ICD-10-PCS | Mod: CPTII,S$GLB,, | Performed by: INTERNAL MEDICINE

## 2021-09-20 PROCEDURE — 99215 PR OFFICE/OUTPT VISIT, EST, LEVL V, 40-54 MIN: ICD-10-PCS | Mod: S$GLB,,, | Performed by: INTERNAL MEDICINE

## 2021-09-20 PROCEDURE — 1160F PR REVIEW ALL MEDS BY PRESCRIBER/CLIN PHARMACIST DOCUMENTED: ICD-10-PCS | Mod: CPTII,S$GLB,, | Performed by: INTERNAL MEDICINE

## 2021-09-20 PROCEDURE — 3078F PR MOST RECENT DIASTOLIC BLOOD PRESSURE < 80 MM HG: ICD-10-PCS | Mod: CPTII,S$GLB,, | Performed by: INTERNAL MEDICINE

## 2021-09-20 PROCEDURE — 99999 PR PBB SHADOW E&M-EST. PATIENT-LVL V: ICD-10-PCS | Mod: PBBFAC,,, | Performed by: INTERNAL MEDICINE

## 2021-09-20 PROCEDURE — 1126F PR PAIN SEVERITY QUANTIFIED, NO PAIN PRESENT: ICD-10-PCS | Mod: CPTII,S$GLB,, | Performed by: INTERNAL MEDICINE

## 2021-09-20 PROCEDURE — 3075F PR MOST RECENT SYSTOLIC BLOOD PRESS GE 130-139MM HG: ICD-10-PCS | Mod: CPTII,S$GLB,, | Performed by: INTERNAL MEDICINE

## 2021-09-20 PROCEDURE — 99999 PR PBB SHADOW E&M-EST. PATIENT-LVL V: CPT | Mod: PBBFAC,,, | Performed by: INTERNAL MEDICINE

## 2021-09-20 PROCEDURE — 1159F MED LIST DOCD IN RCRD: CPT | Mod: CPTII,S$GLB,, | Performed by: INTERNAL MEDICINE

## 2021-09-20 PROCEDURE — 99215 OFFICE O/P EST HI 40 MIN: CPT | Mod: S$GLB,,, | Performed by: INTERNAL MEDICINE

## 2021-09-20 PROCEDURE — 1159F PR MEDICATION LIST DOCUMENTED IN MEDICAL RECORD: ICD-10-PCS | Mod: CPTII,S$GLB,, | Performed by: INTERNAL MEDICINE

## 2021-09-20 PROCEDURE — 1126F AMNT PAIN NOTED NONE PRSNT: CPT | Mod: CPTII,S$GLB,, | Performed by: INTERNAL MEDICINE

## 2021-09-20 PROCEDURE — 96372 THER/PROPH/DIAG INJ SC/IM: CPT

## 2021-09-20 PROCEDURE — 1160F RVW MEDS BY RX/DR IN RCRD: CPT | Mod: CPTII,S$GLB,, | Performed by: INTERNAL MEDICINE

## 2021-09-20 PROCEDURE — 3078F DIAST BP <80 MM HG: CPT | Mod: CPTII,S$GLB,, | Performed by: INTERNAL MEDICINE

## 2021-09-20 PROCEDURE — 3075F SYST BP GE 130 - 139MM HG: CPT | Mod: CPTII,S$GLB,, | Performed by: INTERNAL MEDICINE

## 2021-09-20 PROCEDURE — 1101F PR PT FALLS ASSESS DOC 0-1 FALLS W/OUT INJ PAST YR: ICD-10-PCS | Mod: CPTII,S$GLB,, | Performed by: INTERNAL MEDICINE

## 2021-09-20 PROCEDURE — 63600175 PHARM REV CODE 636 W HCPCS: Mod: JG | Performed by: INTERNAL MEDICINE

## 2021-09-20 RX ORDER — LANREOTIDE ACETATE 120 MG/.5ML
120 INJECTION SUBCUTANEOUS
Status: CANCELLED | OUTPATIENT
Start: 2021-09-20

## 2021-09-20 RX ORDER — LANREOTIDE ACETATE 120 MG/.5ML
120 INJECTION SUBCUTANEOUS
Status: DISCONTINUED | OUTPATIENT
Start: 2021-09-20 | End: 2021-09-20 | Stop reason: HOSPADM

## 2021-09-20 RX ADMIN — LANREOTIDE ACETATE 120 MG: 120 INJECTION SUBCUTANEOUS at 01:09

## 2021-10-12 DIAGNOSIS — E78.5 HYPERLIPIDEMIA, UNSPECIFIED HYPERLIPIDEMIA TYPE: ICD-10-CM

## 2021-10-12 DIAGNOSIS — E11.9 CONTROLLED TYPE 2 DIABETES MELLITUS WITHOUT COMPLICATION, WITHOUT LONG-TERM CURRENT USE OF INSULIN: Primary | ICD-10-CM

## 2021-10-12 RX ORDER — EZETIMIBE 10 MG/1
10 TABLET ORAL DAILY
Qty: 90 TABLET | Refills: 3 | Status: SHIPPED | OUTPATIENT
Start: 2021-10-12 | End: 2021-12-28 | Stop reason: SDUPTHER

## 2021-10-12 RX ORDER — METFORMIN HYDROCHLORIDE 500 MG/1
500 TABLET, EXTENDED RELEASE ORAL
Qty: 90 TABLET | Refills: 3 | Status: SHIPPED | OUTPATIENT
Start: 2021-10-12 | End: 2021-12-28 | Stop reason: SDUPTHER

## 2021-10-15 ENCOUNTER — HOSPITAL ENCOUNTER (OUTPATIENT)
Dept: RADIOLOGY | Facility: OTHER | Age: 81
Discharge: HOME | End: 2021-10-15
Attending: INTERNAL MEDICINE
Payer: MEDICARE

## 2021-10-15 DIAGNOSIS — C7A.012 MALIGNANT CARCINOID TUMOR OF ILEUM: ICD-10-CM

## 2021-10-15 PROCEDURE — 71250 CT THORAX DX C-: CPT | Mod: 26,HCNC,, | Performed by: RADIOLOGY

## 2021-10-15 PROCEDURE — 71250 CT CHEST WITHOUT CONTRAST: ICD-10-PCS | Mod: 26,HCNC,, | Performed by: RADIOLOGY

## 2021-10-15 PROCEDURE — 71250 CT THORAX DX C-: CPT | Mod: TC,HCNC

## 2021-10-15 PROCEDURE — 72197 MRI ABDOMEN-PELVIS W W/O CONTRAST (XPD): ICD-10-PCS | Mod: 26,HCNC,, | Performed by: RADIOLOGY

## 2021-10-15 PROCEDURE — 72197 MRI PELVIS W/O & W/DYE: CPT | Mod: 26,HCNC,, | Performed by: RADIOLOGY

## 2021-10-15 PROCEDURE — 74183 MRI ABD W/O CNTR FLWD CNTR: CPT | Mod: 26,HCNC,, | Performed by: RADIOLOGY

## 2021-10-15 PROCEDURE — A9585 GADOBUTROL INJECTION: HCPCS | Mod: HCNC | Performed by: INTERNAL MEDICINE

## 2021-10-15 PROCEDURE — 72197 MRI PELVIS W/O & W/DYE: CPT | Mod: TC,HCNC

## 2021-10-15 PROCEDURE — 25500020 PHARM REV CODE 255: Mod: HCNC | Performed by: INTERNAL MEDICINE

## 2021-10-15 PROCEDURE — 74183 MRI ABDOMEN-PELVIS W W/O CONTRAST (XPD): ICD-10-PCS | Mod: 26,HCNC,, | Performed by: RADIOLOGY

## 2021-10-15 RX ORDER — GADOBUTROL 604.72 MG/ML
6 INJECTION INTRAVENOUS
Status: COMPLETED | OUTPATIENT
Start: 2021-10-15 | End: 2021-10-15

## 2021-10-15 RX ADMIN — GADOBUTROL 6 ML: 604.72 INJECTION INTRAVENOUS at 10:10

## 2021-10-18 ENCOUNTER — OFFICE VISIT (OUTPATIENT)
Dept: HEMATOLOGY/ONCOLOGY | Facility: CLINIC | Age: 81
End: 2021-10-18
Payer: MEDICARE

## 2021-10-18 ENCOUNTER — INFUSION (OUTPATIENT)
Dept: INFUSION THERAPY | Facility: HOSPITAL | Age: 81
End: 2021-10-18
Payer: MEDICARE

## 2021-10-18 VITALS
WEIGHT: 130.19 LBS | SYSTOLIC BLOOD PRESSURE: 140 MMHG | HEART RATE: 85 BPM | DIASTOLIC BLOOD PRESSURE: 73 MMHG | BODY MASS INDEX: 23.06 KG/M2 | OXYGEN SATURATION: 98 % | RESPIRATION RATE: 18 BRPM

## 2021-10-18 DIAGNOSIS — I10 ESSENTIAL HYPERTENSION: ICD-10-CM

## 2021-10-18 DIAGNOSIS — D84.81 IMMUNODEFICIENCY SECONDARY TO NEOPLASM: ICD-10-CM

## 2021-10-18 DIAGNOSIS — C7B.8 METASTATIC MALIGNANT NEUROENDOCRINE TUMOR TO LIVER: Primary | ICD-10-CM

## 2021-10-18 DIAGNOSIS — E87.5 HYPERKALEMIA: ICD-10-CM

## 2021-10-18 DIAGNOSIS — C7B.8 NEUROENDOCRINE CARCINOMA METASTATIC TO BONE: ICD-10-CM

## 2021-10-18 DIAGNOSIS — C7B.8 METASTATIC MALIGNANT NEUROENDOCRINE TUMOR TO LIVER: ICD-10-CM

## 2021-10-18 DIAGNOSIS — D49.9 IMMUNODEFICIENCY SECONDARY TO NEOPLASM: ICD-10-CM

## 2021-10-18 DIAGNOSIS — C7A.012 MALIGNANT CARCINOID TUMOR OF ILEUM: Primary | ICD-10-CM

## 2021-10-18 DIAGNOSIS — C7A.8 NEUROENDOCRINE CARCINOMA METASTATIC TO BONE: ICD-10-CM

## 2021-10-18 DIAGNOSIS — E11.9 TYPE 2 DIABETES MELLITUS WITHOUT COMPLICATION, WITHOUT LONG-TERM CURRENT USE OF INSULIN: ICD-10-CM

## 2021-10-18 DIAGNOSIS — C7B.8 SECONDARY NEUROENDOCRINE TUMOR OF BONE: ICD-10-CM

## 2021-10-18 PROCEDURE — 3077F PR MOST RECENT SYSTOLIC BLOOD PRESSURE >= 140 MM HG: ICD-10-PCS | Mod: HCNC,CPTII,S$GLB, | Performed by: INTERNAL MEDICINE

## 2021-10-18 PROCEDURE — 63600175 PHARM REV CODE 636 W HCPCS: Mod: JG,HCNC | Performed by: INTERNAL MEDICINE

## 2021-10-18 PROCEDURE — 99499 RISK ADDL DX/OHS AUDIT: ICD-10-PCS | Mod: S$GLB,,, | Performed by: INTERNAL MEDICINE

## 2021-10-18 PROCEDURE — 99499 UNLISTED E&M SERVICE: CPT | Mod: S$GLB,,, | Performed by: INTERNAL MEDICINE

## 2021-10-18 PROCEDURE — 99999 PR PBB SHADOW E&M-EST. PATIENT-LVL III: CPT | Mod: PBBFAC,HCNC,, | Performed by: INTERNAL MEDICINE

## 2021-10-18 PROCEDURE — 99215 OFFICE O/P EST HI 40 MIN: CPT | Mod: HCNC,S$GLB,, | Performed by: INTERNAL MEDICINE

## 2021-10-18 PROCEDURE — 96372 THER/PROPH/DIAG INJ SC/IM: CPT | Mod: HCNC

## 2021-10-18 PROCEDURE — 3288F PR FALLS RISK ASSESSMENT DOCUMENTED: ICD-10-PCS | Mod: HCNC,CPTII,S$GLB, | Performed by: INTERNAL MEDICINE

## 2021-10-18 PROCEDURE — 1159F MED LIST DOCD IN RCRD: CPT | Mod: HCNC,CPTII,S$GLB, | Performed by: INTERNAL MEDICINE

## 2021-10-18 PROCEDURE — 1101F PT FALLS ASSESS-DOCD LE1/YR: CPT | Mod: HCNC,CPTII,S$GLB, | Performed by: INTERNAL MEDICINE

## 2021-10-18 PROCEDURE — 1126F AMNT PAIN NOTED NONE PRSNT: CPT | Mod: HCNC,CPTII,S$GLB, | Performed by: INTERNAL MEDICINE

## 2021-10-18 PROCEDURE — 1160F RVW MEDS BY RX/DR IN RCRD: CPT | Mod: HCNC,CPTII,S$GLB, | Performed by: INTERNAL MEDICINE

## 2021-10-18 PROCEDURE — 99215 PR OFFICE/OUTPT VISIT, EST, LEVL V, 40-54 MIN: ICD-10-PCS | Mod: HCNC,S$GLB,, | Performed by: INTERNAL MEDICINE

## 2021-10-18 PROCEDURE — 1160F PR REVIEW ALL MEDS BY PRESCRIBER/CLIN PHARMACIST DOCUMENTED: ICD-10-PCS | Mod: HCNC,CPTII,S$GLB, | Performed by: INTERNAL MEDICINE

## 2021-10-18 PROCEDURE — 1159F PR MEDICATION LIST DOCUMENTED IN MEDICAL RECORD: ICD-10-PCS | Mod: HCNC,CPTII,S$GLB, | Performed by: INTERNAL MEDICINE

## 2021-10-18 PROCEDURE — 3078F DIAST BP <80 MM HG: CPT | Mod: HCNC,CPTII,S$GLB, | Performed by: INTERNAL MEDICINE

## 2021-10-18 PROCEDURE — 3078F PR MOST RECENT DIASTOLIC BLOOD PRESSURE < 80 MM HG: ICD-10-PCS | Mod: HCNC,CPTII,S$GLB, | Performed by: INTERNAL MEDICINE

## 2021-10-18 PROCEDURE — 1101F PR PT FALLS ASSESS DOC 0-1 FALLS W/OUT INJ PAST YR: ICD-10-PCS | Mod: HCNC,CPTII,S$GLB, | Performed by: INTERNAL MEDICINE

## 2021-10-18 PROCEDURE — 1126F PR PAIN SEVERITY QUANTIFIED, NO PAIN PRESENT: ICD-10-PCS | Mod: HCNC,CPTII,S$GLB, | Performed by: INTERNAL MEDICINE

## 2021-10-18 PROCEDURE — 3077F SYST BP >= 140 MM HG: CPT | Mod: HCNC,CPTII,S$GLB, | Performed by: INTERNAL MEDICINE

## 2021-10-18 PROCEDURE — 99999 PR PBB SHADOW E&M-EST. PATIENT-LVL III: ICD-10-PCS | Mod: PBBFAC,HCNC,, | Performed by: INTERNAL MEDICINE

## 2021-10-18 PROCEDURE — 3288F FALL RISK ASSESSMENT DOCD: CPT | Mod: HCNC,CPTII,S$GLB, | Performed by: INTERNAL MEDICINE

## 2021-10-18 RX ORDER — LANREOTIDE ACETATE 120 MG/.5ML
120 INJECTION SUBCUTANEOUS
Status: CANCELLED | OUTPATIENT
Start: 2021-10-18

## 2021-10-18 RX ORDER — LANREOTIDE ACETATE 120 MG/.5ML
120 INJECTION SUBCUTANEOUS
Status: DISCONTINUED | OUTPATIENT
Start: 2021-10-18 | End: 2021-10-18 | Stop reason: HOSPADM

## 2021-10-18 RX ADMIN — LANREOTIDE ACETATE 120 MG: 120 INJECTION SUBCUTANEOUS at 03:10

## 2021-10-19 ENCOUNTER — TELEPHONE (OUTPATIENT)
Dept: HEMATOLOGY/ONCOLOGY | Facility: CLINIC | Age: 81
End: 2021-10-19

## 2021-11-10 ENCOUNTER — IMMUNIZATION (OUTPATIENT)
Dept: PHARMACY | Facility: CLINIC | Age: 81
End: 2021-11-10
Payer: MEDICARE

## 2021-11-16 ENCOUNTER — OFFICE VISIT (OUTPATIENT)
Dept: HEMATOLOGY/ONCOLOGY | Facility: CLINIC | Age: 81
End: 2021-11-16
Payer: MEDICARE

## 2021-11-16 ENCOUNTER — LAB VISIT (OUTPATIENT)
Dept: LAB | Facility: HOSPITAL | Age: 81
End: 2021-11-16
Payer: MEDICARE

## 2021-11-16 ENCOUNTER — INFUSION (OUTPATIENT)
Dept: INFUSION THERAPY | Facility: HOSPITAL | Age: 81
End: 2021-11-16
Payer: MEDICARE

## 2021-11-16 VITALS
DIASTOLIC BLOOD PRESSURE: 78 MMHG | OXYGEN SATURATION: 98 % | BODY MASS INDEX: 23.12 KG/M2 | TEMPERATURE: 98 F | RESPIRATION RATE: 16 BRPM | HEIGHT: 63 IN | WEIGHT: 130.5 LBS | HEART RATE: 83 BPM | SYSTOLIC BLOOD PRESSURE: 132 MMHG

## 2021-11-16 DIAGNOSIS — C7B.8 SECONDARY NEUROENDOCRINE TUMOR OF BONE: ICD-10-CM

## 2021-11-16 DIAGNOSIS — C7B.8 METASTATIC MALIGNANT NEUROENDOCRINE TUMOR TO LIVER: Primary | ICD-10-CM

## 2021-11-16 DIAGNOSIS — C7A.012 MALIGNANT CARCINOID TUMOR OF ILEUM: ICD-10-CM

## 2021-11-16 DIAGNOSIS — C7A.8 NEUROENDOCRINE CARCINOMA METASTATIC TO BONE: ICD-10-CM

## 2021-11-16 DIAGNOSIS — D49.9 IMMUNODEFICIENCY SECONDARY TO NEOPLASM: ICD-10-CM

## 2021-11-16 DIAGNOSIS — C7A.012 MALIGNANT CARCINOID TUMOR OF ILEUM: Primary | ICD-10-CM

## 2021-11-16 DIAGNOSIS — D84.81 IMMUNODEFICIENCY SECONDARY TO NEOPLASM: ICD-10-CM

## 2021-11-16 DIAGNOSIS — I10 ESSENTIAL HYPERTENSION: ICD-10-CM

## 2021-11-16 DIAGNOSIS — E11.9 TYPE 2 DIABETES MELLITUS WITHOUT COMPLICATION, WITHOUT LONG-TERM CURRENT USE OF INSULIN: ICD-10-CM

## 2021-11-16 DIAGNOSIS — C7B.8 NEUROENDOCRINE CARCINOMA METASTATIC TO BONE: ICD-10-CM

## 2021-11-16 DIAGNOSIS — C7B.8 METASTATIC MALIGNANT NEUROENDOCRINE TUMOR TO LIVER: ICD-10-CM

## 2021-11-16 LAB
ALBUMIN SERPL BCP-MCNC: 3.8 G/DL (ref 3.5–5.2)
ALP SERPL-CCNC: 57 U/L (ref 55–135)
ALT SERPL W/O P-5'-P-CCNC: 13 U/L (ref 10–44)
ANION GAP SERPL CALC-SCNC: 7 MMOL/L (ref 8–16)
AST SERPL-CCNC: 18 U/L (ref 10–40)
BILIRUB SERPL-MCNC: 1.8 MG/DL (ref 0.1–1)
BUN SERPL-MCNC: 9 MG/DL (ref 8–23)
CALCIUM SERPL-MCNC: 10.1 MG/DL (ref 8.7–10.5)
CHLORIDE SERPL-SCNC: 103 MMOL/L (ref 95–110)
CO2 SERPL-SCNC: 31 MMOL/L (ref 23–29)
CREAT SERPL-MCNC: 0.7 MG/DL (ref 0.5–1.4)
ERYTHROCYTE [DISTWIDTH] IN BLOOD BY AUTOMATED COUNT: 12.3 % (ref 11.5–14.5)
EST. GFR  (AFRICAN AMERICAN): >60 ML/MIN/1.73 M^2
EST. GFR  (NON AFRICAN AMERICAN): >60 ML/MIN/1.73 M^2
GLUCOSE SERPL-MCNC: 160 MG/DL (ref 70–110)
HCT VFR BLD AUTO: 39.7 % (ref 37–48.5)
HGB BLD-MCNC: 12.7 G/DL (ref 12–16)
IMM GRANULOCYTES # BLD AUTO: 0.02 K/UL (ref 0–0.04)
MCH RBC QN AUTO: 28.8 PG (ref 27–31)
MCHC RBC AUTO-ENTMCNC: 32 G/DL (ref 32–36)
MCV RBC AUTO: 90 FL (ref 82–98)
NEUTROPHILS # BLD AUTO: 3.8 K/UL (ref 1.8–7.7)
PLATELET # BLD AUTO: 250 K/UL (ref 150–450)
PMV BLD AUTO: 10.2 FL (ref 9.2–12.9)
POTASSIUM SERPL-SCNC: 4.7 MMOL/L (ref 3.5–5.1)
PROT SERPL-MCNC: 7.2 G/DL (ref 6–8.4)
RBC # BLD AUTO: 4.41 M/UL (ref 4–5.4)
SODIUM SERPL-SCNC: 141 MMOL/L (ref 136–145)
WBC # BLD AUTO: 6.21 K/UL (ref 3.9–12.7)

## 2021-11-16 PROCEDURE — 1101F PT FALLS ASSESS-DOCD LE1/YR: CPT | Mod: HCNC,CPTII,S$GLB, | Performed by: INTERNAL MEDICINE

## 2021-11-16 PROCEDURE — 99214 PR OFFICE/OUTPT VISIT, EST, LEVL IV, 30-39 MIN: ICD-10-PCS | Mod: HCNC,S$GLB,, | Performed by: INTERNAL MEDICINE

## 2021-11-16 PROCEDURE — 3075F PR MOST RECENT SYSTOLIC BLOOD PRESS GE 130-139MM HG: ICD-10-PCS | Mod: HCNC,CPTII,S$GLB, | Performed by: INTERNAL MEDICINE

## 2021-11-16 PROCEDURE — 3288F FALL RISK ASSESSMENT DOCD: CPT | Mod: HCNC,CPTII,S$GLB, | Performed by: INTERNAL MEDICINE

## 2021-11-16 PROCEDURE — 3075F SYST BP GE 130 - 139MM HG: CPT | Mod: HCNC,CPTII,S$GLB, | Performed by: INTERNAL MEDICINE

## 2021-11-16 PROCEDURE — 3078F DIAST BP <80 MM HG: CPT | Mod: HCNC,CPTII,S$GLB, | Performed by: INTERNAL MEDICINE

## 2021-11-16 PROCEDURE — 3078F PR MOST RECENT DIASTOLIC BLOOD PRESSURE < 80 MM HG: ICD-10-PCS | Mod: HCNC,CPTII,S$GLB, | Performed by: INTERNAL MEDICINE

## 2021-11-16 PROCEDURE — 1160F RVW MEDS BY RX/DR IN RCRD: CPT | Mod: HCNC,CPTII,S$GLB, | Performed by: INTERNAL MEDICINE

## 2021-11-16 PROCEDURE — 1126F PR PAIN SEVERITY QUANTIFIED, NO PAIN PRESENT: ICD-10-PCS | Mod: HCNC,CPTII,S$GLB, | Performed by: INTERNAL MEDICINE

## 2021-11-16 PROCEDURE — 3288F PR FALLS RISK ASSESSMENT DOCUMENTED: ICD-10-PCS | Mod: HCNC,CPTII,S$GLB, | Performed by: INTERNAL MEDICINE

## 2021-11-16 PROCEDURE — 86316 IMMUNOASSAY TUMOR OTHER: CPT | Mod: HCNC | Performed by: INTERNAL MEDICINE

## 2021-11-16 PROCEDURE — 1159F PR MEDICATION LIST DOCUMENTED IN MEDICAL RECORD: ICD-10-PCS | Mod: HCNC,CPTII,S$GLB, | Performed by: INTERNAL MEDICINE

## 2021-11-16 PROCEDURE — 85027 COMPLETE CBC AUTOMATED: CPT | Mod: HCNC | Performed by: INTERNAL MEDICINE

## 2021-11-16 PROCEDURE — 1126F AMNT PAIN NOTED NONE PRSNT: CPT | Mod: HCNC,CPTII,S$GLB, | Performed by: INTERNAL MEDICINE

## 2021-11-16 PROCEDURE — 1159F MED LIST DOCD IN RCRD: CPT | Mod: HCNC,CPTII,S$GLB, | Performed by: INTERNAL MEDICINE

## 2021-11-16 PROCEDURE — 99214 OFFICE O/P EST MOD 30 MIN: CPT | Mod: HCNC,S$GLB,, | Performed by: INTERNAL MEDICINE

## 2021-11-16 PROCEDURE — 99999 PR PBB SHADOW E&M-EST. PATIENT-LVL IV: ICD-10-PCS | Mod: PBBFAC,HCNC,, | Performed by: INTERNAL MEDICINE

## 2021-11-16 PROCEDURE — 63600175 PHARM REV CODE 636 W HCPCS: Mod: JG,HCNC | Performed by: INTERNAL MEDICINE

## 2021-11-16 PROCEDURE — 99999 PR PBB SHADOW E&M-EST. PATIENT-LVL IV: CPT | Mod: PBBFAC,HCNC,, | Performed by: INTERNAL MEDICINE

## 2021-11-16 PROCEDURE — 1101F PR PT FALLS ASSESS DOC 0-1 FALLS W/OUT INJ PAST YR: ICD-10-PCS | Mod: HCNC,CPTII,S$GLB, | Performed by: INTERNAL MEDICINE

## 2021-11-16 PROCEDURE — 36415 COLL VENOUS BLD VENIPUNCTURE: CPT | Mod: HCNC | Performed by: INTERNAL MEDICINE

## 2021-11-16 PROCEDURE — 80053 COMPREHEN METABOLIC PANEL: CPT | Mod: HCNC | Performed by: INTERNAL MEDICINE

## 2021-11-16 PROCEDURE — 1160F PR REVIEW ALL MEDS BY PRESCRIBER/CLIN PHARMACIST DOCUMENTED: ICD-10-PCS | Mod: HCNC,CPTII,S$GLB, | Performed by: INTERNAL MEDICINE

## 2021-11-16 PROCEDURE — 96372 THER/PROPH/DIAG INJ SC/IM: CPT | Mod: HCNC

## 2021-11-16 RX ORDER — LANREOTIDE ACETATE 120 MG/.5ML
120 INJECTION SUBCUTANEOUS
Status: CANCELLED | OUTPATIENT
Start: 2021-11-16

## 2021-11-16 RX ORDER — LANREOTIDE ACETATE 120 MG/.5ML
120 INJECTION SUBCUTANEOUS
Status: DISCONTINUED | OUTPATIENT
Start: 2021-11-16 | End: 2021-11-16 | Stop reason: HOSPADM

## 2021-11-16 RX ADMIN — LANREOTIDE ACETATE 120 MG: 120 INJECTION SUBCUTANEOUS at 08:11

## 2021-11-18 LAB — CGA SERPL-MCNC: 1654 NG/ML

## 2021-11-23 ENCOUNTER — LAB VISIT (OUTPATIENT)
Dept: LAB | Facility: OTHER | Age: 81
End: 2021-11-23
Attending: INTERNAL MEDICINE
Payer: MEDICARE

## 2021-11-23 DIAGNOSIS — E11.9 CONTROLLED TYPE 2 DIABETES MELLITUS WITHOUT COMPLICATION, WITHOUT LONG-TERM CURRENT USE OF INSULIN: ICD-10-CM

## 2021-11-23 PROCEDURE — 82570 ASSAY OF URINE CREATININE: CPT | Mod: HCNC | Performed by: INTERNAL MEDICINE

## 2021-11-24 LAB
ALBUMIN/CREAT UR: NORMAL UG/MG (ref 0–30)
CREAT UR-MCNC: 16.8 MG/DL (ref 15–325)
MICROALBUMIN UR DL<=1MG/L-MCNC: <5 UG/ML

## 2021-12-07 ENCOUNTER — IMMUNIZATION (OUTPATIENT)
Dept: INTERNAL MEDICINE | Facility: CLINIC | Age: 81
End: 2021-12-07
Payer: MEDICARE

## 2021-12-07 DIAGNOSIS — Z23 NEED FOR VACCINATION: Primary | ICD-10-CM

## 2021-12-07 PROCEDURE — 0013A COVID-19, MRNA, LNP-S, PF, 100 MCG/0.5 ML DOSE VACCINE: CPT | Mod: HCNC,PBBFAC | Performed by: INTERNAL MEDICINE

## 2021-12-07 PROCEDURE — 91301 COVID-19, MRNA, LNP-S, PF, 100 MCG/0.5 ML DOSE VACCINE: CPT | Mod: HCNC,PBBFAC | Performed by: INTERNAL MEDICINE

## 2021-12-13 ENCOUNTER — LAB VISIT (OUTPATIENT)
Dept: LAB | Facility: HOSPITAL | Age: 81
End: 2021-12-13
Attending: INTERNAL MEDICINE
Payer: MEDICARE

## 2021-12-13 DIAGNOSIS — C7A.012 MALIGNANT CARCINOID TUMOR OF ILEUM: ICD-10-CM

## 2021-12-13 LAB
ALBUMIN SERPL BCP-MCNC: 4 G/DL (ref 3.5–5.2)
ALP SERPL-CCNC: 70 U/L (ref 55–135)
ALT SERPL W/O P-5'-P-CCNC: 13 U/L (ref 10–44)
ANION GAP SERPL CALC-SCNC: 10 MMOL/L (ref 8–16)
AST SERPL-CCNC: 19 U/L (ref 10–40)
BILIRUB SERPL-MCNC: 1.8 MG/DL (ref 0.1–1)
BUN SERPL-MCNC: 10 MG/DL (ref 8–23)
CALCIUM SERPL-MCNC: 10.3 MG/DL (ref 8.7–10.5)
CHLORIDE SERPL-SCNC: 104 MMOL/L (ref 95–110)
CO2 SERPL-SCNC: 28 MMOL/L (ref 23–29)
CREAT SERPL-MCNC: 0.8 MG/DL (ref 0.5–1.4)
ERYTHROCYTE [DISTWIDTH] IN BLOOD BY AUTOMATED COUNT: 12 % (ref 11.5–14.5)
EST. GFR  (AFRICAN AMERICAN): >60 ML/MIN/1.73 M^2
EST. GFR  (NON AFRICAN AMERICAN): >60 ML/MIN/1.73 M^2
GLUCOSE SERPL-MCNC: 159 MG/DL (ref 70–110)
HCT VFR BLD AUTO: 44.5 % (ref 37–48.5)
HGB BLD-MCNC: 13.9 G/DL (ref 12–16)
IMM GRANULOCYTES # BLD AUTO: 0.02 K/UL (ref 0–0.04)
MCH RBC QN AUTO: 29 PG (ref 27–31)
MCHC RBC AUTO-ENTMCNC: 31.2 G/DL (ref 32–36)
MCV RBC AUTO: 93 FL (ref 82–98)
NEUTROPHILS # BLD AUTO: 4.8 K/UL (ref 1.8–7.7)
PLATELET # BLD AUTO: 312 K/UL (ref 150–450)
PMV BLD AUTO: 10.5 FL (ref 9.2–12.9)
POTASSIUM SERPL-SCNC: 4.5 MMOL/L (ref 3.5–5.1)
PROT SERPL-MCNC: 7.9 G/DL (ref 6–8.4)
RBC # BLD AUTO: 4.79 M/UL (ref 4–5.4)
SODIUM SERPL-SCNC: 142 MMOL/L (ref 136–145)
WBC # BLD AUTO: 7.73 K/UL (ref 3.9–12.7)

## 2021-12-13 PROCEDURE — 36415 COLL VENOUS BLD VENIPUNCTURE: CPT | Mod: HCNC | Performed by: INTERNAL MEDICINE

## 2021-12-13 PROCEDURE — 86316 IMMUNOASSAY TUMOR OTHER: CPT | Mod: HCNC | Performed by: INTERNAL MEDICINE

## 2021-12-13 PROCEDURE — 80053 COMPREHEN METABOLIC PANEL: CPT | Mod: HCNC | Performed by: INTERNAL MEDICINE

## 2021-12-13 PROCEDURE — 85027 COMPLETE CBC AUTOMATED: CPT | Mod: HCNC | Performed by: INTERNAL MEDICINE

## 2021-12-14 ENCOUNTER — OFFICE VISIT (OUTPATIENT)
Dept: HEMATOLOGY/ONCOLOGY | Facility: CLINIC | Age: 81
End: 2021-12-14
Payer: MEDICARE

## 2021-12-14 ENCOUNTER — INFUSION (OUTPATIENT)
Dept: INFUSION THERAPY | Facility: HOSPITAL | Age: 81
End: 2021-12-14
Payer: MEDICARE

## 2021-12-14 VITALS
BODY MASS INDEX: 22.82 KG/M2 | RESPIRATION RATE: 18 BRPM | HEIGHT: 63 IN | DIASTOLIC BLOOD PRESSURE: 80 MMHG | WEIGHT: 128.81 LBS | SYSTOLIC BLOOD PRESSURE: 130 MMHG | TEMPERATURE: 98 F | OXYGEN SATURATION: 99 % | HEART RATE: 75 BPM

## 2021-12-14 DIAGNOSIS — I10 ESSENTIAL HYPERTENSION: ICD-10-CM

## 2021-12-14 DIAGNOSIS — C79.51 SPINE METASTASIS: ICD-10-CM

## 2021-12-14 DIAGNOSIS — C7B.8 METASTATIC MALIGNANT NEUROENDOCRINE TUMOR TO LIVER: Primary | ICD-10-CM

## 2021-12-14 DIAGNOSIS — E11.9 TYPE 2 DIABETES MELLITUS WITHOUT COMPLICATION, WITHOUT LONG-TERM CURRENT USE OF INSULIN: ICD-10-CM

## 2021-12-14 DIAGNOSIS — C7B.8 NEUROENDOCRINE CARCINOMA METASTATIC TO BONE: ICD-10-CM

## 2021-12-14 DIAGNOSIS — D84.81 IMMUNODEFICIENCY SECONDARY TO NEOPLASM: ICD-10-CM

## 2021-12-14 DIAGNOSIS — C7B.8 METASTATIC MALIGNANT NEUROENDOCRINE TUMOR TO LIVER: ICD-10-CM

## 2021-12-14 DIAGNOSIS — C7A.012 MALIGNANT CARCINOID TUMOR OF ILEUM: Primary | ICD-10-CM

## 2021-12-14 DIAGNOSIS — C7A.8 NEUROENDOCRINE CARCINOMA METASTATIC TO BONE: ICD-10-CM

## 2021-12-14 DIAGNOSIS — D49.9 IMMUNODEFICIENCY SECONDARY TO NEOPLASM: ICD-10-CM

## 2021-12-14 PROCEDURE — 99999 PR PBB SHADOW E&M-EST. PATIENT-LVL IV: CPT | Mod: PBBFAC,HCNC,, | Performed by: INTERNAL MEDICINE

## 2021-12-14 PROCEDURE — 96372 THER/PROPH/DIAG INJ SC/IM: CPT | Mod: HCNC

## 2021-12-14 PROCEDURE — 99999 PR PBB SHADOW E&M-EST. PATIENT-LVL IV: ICD-10-PCS | Mod: PBBFAC,HCNC,, | Performed by: INTERNAL MEDICINE

## 2021-12-14 PROCEDURE — 63600175 PHARM REV CODE 636 W HCPCS: Mod: JG,HCNC | Performed by: INTERNAL MEDICINE

## 2021-12-14 PROCEDURE — 99215 PR OFFICE/OUTPT VISIT, EST, LEVL V, 40-54 MIN: ICD-10-PCS | Mod: HCNC,S$GLB,, | Performed by: INTERNAL MEDICINE

## 2021-12-14 PROCEDURE — 99215 OFFICE O/P EST HI 40 MIN: CPT | Mod: HCNC,S$GLB,, | Performed by: INTERNAL MEDICINE

## 2021-12-14 RX ORDER — LANREOTIDE ACETATE 120 MG/.5ML
120 INJECTION SUBCUTANEOUS
Status: DISCONTINUED | OUTPATIENT
Start: 2021-12-14 | End: 2021-12-14 | Stop reason: HOSPADM

## 2021-12-14 RX ORDER — LANCETS 33 GAUGE
EACH MISCELLANEOUS
COMMUNITY
Start: 2021-07-12

## 2021-12-14 RX ORDER — LOSARTAN POTASSIUM 25 MG/1
TABLET ORAL
COMMUNITY
Start: 2021-07-03 | End: 2021-12-28

## 2021-12-14 RX ORDER — LATANOPROST 50 UG/ML
1 SOLUTION/ DROPS OPHTHALMIC NIGHTLY
COMMUNITY
Start: 2021-11-08

## 2021-12-14 RX ORDER — LANREOTIDE ACETATE 120 MG/.5ML
120 INJECTION SUBCUTANEOUS
Status: CANCELLED | OUTPATIENT
Start: 2021-12-14

## 2021-12-14 RX ORDER — SODIUM POLYSTYRENE SULFONATE 15 G/60ML
SUSPENSION ORAL; RECTAL
COMMUNITY
Start: 2021-10-18 | End: 2022-02-11

## 2021-12-14 RX ADMIN — LANREOTIDE ACETATE 120 MG: 120 INJECTION SUBCUTANEOUS at 08:12

## 2021-12-15 LAB — CGA SERPL-MCNC: 1830 NG/ML

## 2021-12-28 ENCOUNTER — OFFICE VISIT (OUTPATIENT)
Dept: INTERNAL MEDICINE | Facility: CLINIC | Age: 81
End: 2021-12-28
Attending: INTERNAL MEDICINE
Payer: MEDICARE

## 2021-12-28 VITALS
HEIGHT: 63 IN | BODY MASS INDEX: 23.12 KG/M2 | DIASTOLIC BLOOD PRESSURE: 70 MMHG | OXYGEN SATURATION: 97 % | SYSTOLIC BLOOD PRESSURE: 130 MMHG | HEART RATE: 90 BPM | WEIGHT: 130.5 LBS

## 2021-12-28 DIAGNOSIS — E11.9 CONTROLLED TYPE 2 DIABETES MELLITUS WITHOUT COMPLICATION, WITHOUT LONG-TERM CURRENT USE OF INSULIN: ICD-10-CM

## 2021-12-28 DIAGNOSIS — E78.5 HYPERLIPIDEMIA, UNSPECIFIED HYPERLIPIDEMIA TYPE: ICD-10-CM

## 2021-12-28 DIAGNOSIS — C7B.8 METASTATIC MALIGNANT NEUROENDOCRINE TUMOR TO LIVER: ICD-10-CM

## 2021-12-28 DIAGNOSIS — I10 ESSENTIAL HYPERTENSION: Primary | ICD-10-CM

## 2021-12-28 DIAGNOSIS — E53.8 B12 DEFICIENCY: ICD-10-CM

## 2021-12-28 PROCEDURE — 1126F PR PAIN SEVERITY QUANTIFIED, NO PAIN PRESENT: ICD-10-PCS | Mod: HCNC,CPTII,S$GLB, | Performed by: INTERNAL MEDICINE

## 2021-12-28 PROCEDURE — 3078F DIAST BP <80 MM HG: CPT | Mod: HCNC,CPTII,S$GLB, | Performed by: INTERNAL MEDICINE

## 2021-12-28 PROCEDURE — 1101F PR PT FALLS ASSESS DOC 0-1 FALLS W/OUT INJ PAST YR: ICD-10-PCS | Mod: HCNC,CPTII,S$GLB, | Performed by: INTERNAL MEDICINE

## 2021-12-28 PROCEDURE — 1159F PR MEDICATION LIST DOCUMENTED IN MEDICAL RECORD: ICD-10-PCS | Mod: HCNC,CPTII,S$GLB, | Performed by: INTERNAL MEDICINE

## 2021-12-28 PROCEDURE — 1101F PT FALLS ASSESS-DOCD LE1/YR: CPT | Mod: HCNC,CPTII,S$GLB, | Performed by: INTERNAL MEDICINE

## 2021-12-28 PROCEDURE — 99999 PR PBB SHADOW E&M-EST. PATIENT-LVL IV: ICD-10-PCS | Mod: PBBFAC,HCNC,, | Performed by: INTERNAL MEDICINE

## 2021-12-28 PROCEDURE — 3078F PR MOST RECENT DIASTOLIC BLOOD PRESSURE < 80 MM HG: ICD-10-PCS | Mod: HCNC,CPTII,S$GLB, | Performed by: INTERNAL MEDICINE

## 2021-12-28 PROCEDURE — 1159F MED LIST DOCD IN RCRD: CPT | Mod: HCNC,CPTII,S$GLB, | Performed by: INTERNAL MEDICINE

## 2021-12-28 PROCEDURE — 3288F FALL RISK ASSESSMENT DOCD: CPT | Mod: HCNC,CPTII,S$GLB, | Performed by: INTERNAL MEDICINE

## 2021-12-28 PROCEDURE — 3075F PR MOST RECENT SYSTOLIC BLOOD PRESS GE 130-139MM HG: ICD-10-PCS | Mod: HCNC,CPTII,S$GLB, | Performed by: INTERNAL MEDICINE

## 2021-12-28 PROCEDURE — 3288F PR FALLS RISK ASSESSMENT DOCUMENTED: ICD-10-PCS | Mod: HCNC,CPTII,S$GLB, | Performed by: INTERNAL MEDICINE

## 2021-12-28 PROCEDURE — 99999 PR PBB SHADOW E&M-EST. PATIENT-LVL IV: CPT | Mod: PBBFAC,HCNC,, | Performed by: INTERNAL MEDICINE

## 2021-12-28 PROCEDURE — 1160F PR REVIEW ALL MEDS BY PRESCRIBER/CLIN PHARMACIST DOCUMENTED: ICD-10-PCS | Mod: HCNC,CPTII,S$GLB, | Performed by: INTERNAL MEDICINE

## 2021-12-28 PROCEDURE — 3075F SYST BP GE 130 - 139MM HG: CPT | Mod: HCNC,CPTII,S$GLB, | Performed by: INTERNAL MEDICINE

## 2021-12-28 PROCEDURE — 99214 PR OFFICE/OUTPT VISIT, EST, LEVL IV, 30-39 MIN: ICD-10-PCS | Mod: HCNC,S$GLB,, | Performed by: INTERNAL MEDICINE

## 2021-12-28 PROCEDURE — 1126F AMNT PAIN NOTED NONE PRSNT: CPT | Mod: HCNC,CPTII,S$GLB, | Performed by: INTERNAL MEDICINE

## 2021-12-28 PROCEDURE — 1160F RVW MEDS BY RX/DR IN RCRD: CPT | Mod: HCNC,CPTII,S$GLB, | Performed by: INTERNAL MEDICINE

## 2021-12-28 PROCEDURE — 99214 OFFICE O/P EST MOD 30 MIN: CPT | Mod: HCNC,S$GLB,, | Performed by: INTERNAL MEDICINE

## 2021-12-28 RX ORDER — AMLODIPINE BESYLATE 10 MG/1
10 TABLET ORAL DAILY
Qty: 90 TABLET | Refills: 3 | Status: ON HOLD | OUTPATIENT
Start: 2021-12-28 | End: 2022-02-12 | Stop reason: SDUPTHER

## 2021-12-28 RX ORDER — METFORMIN HYDROCHLORIDE 500 MG/1
500 TABLET, EXTENDED RELEASE ORAL
Qty: 90 TABLET | Refills: 3 | Status: SHIPPED | OUTPATIENT
Start: 2021-12-28 | End: 2022-02-25

## 2021-12-28 RX ORDER — EZETIMIBE 10 MG/1
10 TABLET ORAL DAILY
Qty: 90 TABLET | Refills: 3 | Status: SHIPPED | OUTPATIENT
Start: 2021-12-28 | End: 2023-01-09 | Stop reason: SDUPTHER

## 2021-12-31 PROBLEM — D3A.8 NEUROENDOCRINE TUMOR: Status: RESOLVED | Noted: 2019-02-14 | Resolved: 2021-12-31

## 2022-01-12 ENCOUNTER — HOSPITAL ENCOUNTER (OUTPATIENT)
Dept: RADIOLOGY | Facility: HOSPITAL | Age: 82
Discharge: HOME | End: 2022-01-12
Attending: INTERNAL MEDICINE
Payer: MEDICARE

## 2022-01-12 DIAGNOSIS — C7A.012 MALIGNANT CARCINOID TUMOR OF ILEUM: ICD-10-CM

## 2022-01-12 PROCEDURE — 72197 MRI PELVIS W/O & W/DYE: CPT | Mod: 26,HCNC,, | Performed by: RADIOLOGY

## 2022-01-12 PROCEDURE — 71250 CT CHEST WITHOUT CONTRAST: ICD-10-PCS | Mod: 26,HCNC,, | Performed by: RADIOLOGY

## 2022-01-12 PROCEDURE — A9585 GADOBUTROL INJECTION: HCPCS | Mod: HCNC | Performed by: INTERNAL MEDICINE

## 2022-01-12 PROCEDURE — 72197 MRI PELVIS W/O & W/DYE: CPT | Mod: TC,HCNC

## 2022-01-12 PROCEDURE — 71250 CT THORAX DX C-: CPT | Mod: 26,HCNC,, | Performed by: RADIOLOGY

## 2022-01-12 PROCEDURE — 74183 MRI ABDOMEN-PELVIS W W/O CONTRAST (XPD): ICD-10-PCS | Mod: 26,HCNC,, | Performed by: RADIOLOGY

## 2022-01-12 PROCEDURE — 74183 MRI ABD W/O CNTR FLWD CNTR: CPT | Mod: 26,HCNC,, | Performed by: RADIOLOGY

## 2022-01-12 PROCEDURE — 25500020 PHARM REV CODE 255: Mod: HCNC | Performed by: INTERNAL MEDICINE

## 2022-01-12 PROCEDURE — 71250 CT THORAX DX C-: CPT | Mod: TC,HCNC

## 2022-01-12 PROCEDURE — 72197 MRI ABDOMEN-PELVIS W W/O CONTRAST (XPD): ICD-10-PCS | Mod: 26,HCNC,, | Performed by: RADIOLOGY

## 2022-01-12 RX ORDER — GADOBUTROL 604.72 MG/ML
10 INJECTION INTRAVENOUS
Status: COMPLETED | OUTPATIENT
Start: 2022-01-12 | End: 2022-01-12

## 2022-01-12 RX ADMIN — GADOBUTROL 10 ML: 604.72 INJECTION INTRAVENOUS at 02:01

## 2022-01-14 ENCOUNTER — INFUSION (OUTPATIENT)
Dept: INFUSION THERAPY | Facility: HOSPITAL | Age: 82
End: 2022-01-14
Payer: MEDICARE

## 2022-01-14 ENCOUNTER — OFFICE VISIT (OUTPATIENT)
Dept: HEMATOLOGY/ONCOLOGY | Facility: CLINIC | Age: 82
End: 2022-01-14
Payer: MEDICARE

## 2022-01-14 VITALS
WEIGHT: 130.81 LBS | TEMPERATURE: 98 F | HEART RATE: 80 BPM | OXYGEN SATURATION: 99 % | BODY MASS INDEX: 22.33 KG/M2 | DIASTOLIC BLOOD PRESSURE: 78 MMHG | HEIGHT: 64 IN | SYSTOLIC BLOOD PRESSURE: 137 MMHG | RESPIRATION RATE: 18 BRPM

## 2022-01-14 DIAGNOSIS — C7B.8 METASTATIC MALIGNANT NEUROENDOCRINE TUMOR TO LIVER: ICD-10-CM

## 2022-01-14 DIAGNOSIS — C7B.8 NEUROENDOCRINE CARCINOMA METASTATIC TO BONE: ICD-10-CM

## 2022-01-14 DIAGNOSIS — D49.9 IMMUNODEFICIENCY SECONDARY TO NEOPLASM: ICD-10-CM

## 2022-01-14 DIAGNOSIS — C7B.8 METASTATIC MALIGNANT NEUROENDOCRINE TUMOR TO LIVER: Primary | ICD-10-CM

## 2022-01-14 DIAGNOSIS — E11.9 TYPE 2 DIABETES MELLITUS WITHOUT COMPLICATION, WITHOUT LONG-TERM CURRENT USE OF INSULIN: ICD-10-CM

## 2022-01-14 DIAGNOSIS — D84.81 IMMUNODEFICIENCY SECONDARY TO NEOPLASM: ICD-10-CM

## 2022-01-14 DIAGNOSIS — I70.0 ATHEROSCLEROSIS OF AORTA: ICD-10-CM

## 2022-01-14 DIAGNOSIS — C7A.8 NEUROENDOCRINE CARCINOMA METASTATIC TO BONE: ICD-10-CM

## 2022-01-14 DIAGNOSIS — C7A.012 MALIGNANT CARCINOID TUMOR OF ILEUM: Primary | ICD-10-CM

## 2022-01-14 DIAGNOSIS — I10 ESSENTIAL HYPERTENSION: ICD-10-CM

## 2022-01-14 PROCEDURE — 99999 PR PBB SHADOW E&M-EST. PATIENT-LVL IV: CPT | Mod: PBBFAC,HCNC,, | Performed by: INTERNAL MEDICINE

## 2022-01-14 PROCEDURE — 99214 PR OFFICE/OUTPT VISIT, EST, LEVL IV, 30-39 MIN: ICD-10-PCS | Mod: HCNC,S$GLB,, | Performed by: INTERNAL MEDICINE

## 2022-01-14 PROCEDURE — 1126F PR PAIN SEVERITY QUANTIFIED, NO PAIN PRESENT: ICD-10-PCS | Mod: HCNC,CPTII,S$GLB, | Performed by: INTERNAL MEDICINE

## 2022-01-14 PROCEDURE — 3078F PR MOST RECENT DIASTOLIC BLOOD PRESSURE < 80 MM HG: ICD-10-PCS | Mod: HCNC,CPTII,S$GLB, | Performed by: INTERNAL MEDICINE

## 2022-01-14 PROCEDURE — 99499 RISK ADDL DX/OHS AUDIT: ICD-10-PCS | Mod: HCNC,S$GLB,, | Performed by: INTERNAL MEDICINE

## 2022-01-14 PROCEDURE — 1160F PR REVIEW ALL MEDS BY PRESCRIBER/CLIN PHARMACIST DOCUMENTED: ICD-10-PCS | Mod: HCNC,CPTII,S$GLB, | Performed by: INTERNAL MEDICINE

## 2022-01-14 PROCEDURE — 96372 THER/PROPH/DIAG INJ SC/IM: CPT | Mod: HCNC

## 2022-01-14 PROCEDURE — 99499 UNLISTED E&M SERVICE: CPT | Mod: HCNC,S$GLB,, | Performed by: INTERNAL MEDICINE

## 2022-01-14 PROCEDURE — 1126F AMNT PAIN NOTED NONE PRSNT: CPT | Mod: HCNC,CPTII,S$GLB, | Performed by: INTERNAL MEDICINE

## 2022-01-14 PROCEDURE — 63600175 PHARM REV CODE 636 W HCPCS: Mod: JG,HCNC | Performed by: INTERNAL MEDICINE

## 2022-01-14 PROCEDURE — 3075F SYST BP GE 130 - 139MM HG: CPT | Mod: HCNC,CPTII,S$GLB, | Performed by: INTERNAL MEDICINE

## 2022-01-14 PROCEDURE — 99214 OFFICE O/P EST MOD 30 MIN: CPT | Mod: HCNC,S$GLB,, | Performed by: INTERNAL MEDICINE

## 2022-01-14 PROCEDURE — 1159F PR MEDICATION LIST DOCUMENTED IN MEDICAL RECORD: ICD-10-PCS | Mod: HCNC,CPTII,S$GLB, | Performed by: INTERNAL MEDICINE

## 2022-01-14 PROCEDURE — 3078F DIAST BP <80 MM HG: CPT | Mod: HCNC,CPTII,S$GLB, | Performed by: INTERNAL MEDICINE

## 2022-01-14 PROCEDURE — 3288F FALL RISK ASSESSMENT DOCD: CPT | Mod: HCNC,CPTII,S$GLB, | Performed by: INTERNAL MEDICINE

## 2022-01-14 PROCEDURE — 99999 PR PBB SHADOW E&M-EST. PATIENT-LVL IV: ICD-10-PCS | Mod: PBBFAC,HCNC,, | Performed by: INTERNAL MEDICINE

## 2022-01-14 PROCEDURE — 3075F PR MOST RECENT SYSTOLIC BLOOD PRESS GE 130-139MM HG: ICD-10-PCS | Mod: HCNC,CPTII,S$GLB, | Performed by: INTERNAL MEDICINE

## 2022-01-14 PROCEDURE — 1159F MED LIST DOCD IN RCRD: CPT | Mod: HCNC,CPTII,S$GLB, | Performed by: INTERNAL MEDICINE

## 2022-01-14 PROCEDURE — 3288F PR FALLS RISK ASSESSMENT DOCUMENTED: ICD-10-PCS | Mod: HCNC,CPTII,S$GLB, | Performed by: INTERNAL MEDICINE

## 2022-01-14 PROCEDURE — 1160F RVW MEDS BY RX/DR IN RCRD: CPT | Mod: HCNC,CPTII,S$GLB, | Performed by: INTERNAL MEDICINE

## 2022-01-14 PROCEDURE — 1101F PT FALLS ASSESS-DOCD LE1/YR: CPT | Mod: HCNC,CPTII,S$GLB, | Performed by: INTERNAL MEDICINE

## 2022-01-14 PROCEDURE — 1101F PR PT FALLS ASSESS DOC 0-1 FALLS W/OUT INJ PAST YR: ICD-10-PCS | Mod: HCNC,CPTII,S$GLB, | Performed by: INTERNAL MEDICINE

## 2022-01-14 RX ORDER — LANREOTIDE ACETATE 120 MG/.5ML
120 INJECTION SUBCUTANEOUS
Status: CANCELLED | OUTPATIENT
Start: 2022-01-14

## 2022-01-14 RX ORDER — LANREOTIDE ACETATE 120 MG/.5ML
120 INJECTION SUBCUTANEOUS
Status: DISCONTINUED | OUTPATIENT
Start: 2022-01-14 | End: 2022-01-14 | Stop reason: HOSPADM

## 2022-01-14 RX ADMIN — LANREOTIDE ACETATE 120 MG: 120 INJECTION SUBCUTANEOUS at 02:01

## 2022-01-14 NOTE — PROGRESS NOTES
"Subjective:       Patient ID: Shahram Hills is a 81 y.o. female.     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 Ms. Hills is an 77 yo woman who initially saw me on 6/7/18 for further evaluation of neuroendocrine tumor. She initially presented with diarrhea, abdominal discomfort, weight loss. She was evaluated at [facility] and underwent workup below:  US abdomen on 3/14/18 showed numerous bilobar mixed echogenicity and mildly vascular hepatic lesions. Cholelithiasis without e/o acute cholecystitis.   MRI abdomen on 3/30/2018 showed innumerable hepatic metastases. L3 vertebral body metastasis with soft tissue component along the right margin of the L3 and upper L4 vertebral bodies, filling the right L3/4 neural foramen, and extending into the anterior aspect of the spinal canal on the right at the L3 and upper T4 levels. Associated with mild spinal canal narrowing.  CT chest on 4/6/2018 showed one lucent nodule measuring 7 mm in the T7 vertebral body, most likely representing metastatic disease.    EGD on 5/4/18 showed normal duodenum. Schatzki's ring in the lower third of the esophagus. Grade 1 esophagitis in the GE junction c/w mild distal esophagitis. Congestion and erythema in the antrum, pre-pyloric region and stomach body c/w gastritis. Biopsy of the stomach antrum showed mild chronic gastritis, neg for H. pylori.   Labs on 5/9/2018: WBC 6.9, H/H 11.6/34.3, MCV 87.5, plt count 279. On 3/9/18: Vit B12 level 173, folic acid 6.6  She was started on oral vit B12 and underwent a liver biopsy on 5/18/18. Pathology showed "metastatic well differentiated neuroendocrine tumor. Ki67 3%"     She saw me on 6/7/18 and complained of weight loss of 26 lbs over the past 3-4 months, also has diarrhea. No flushing, wheezing, palpitations. Has been having pain in right flank radiating down " "the right leg. No tingling/numbness, weakness. She can hold her bladder but when she urinates her diarrhea would come out as well. Started on dex 4 mg q6h the evening of 18.      2. MRI spine on 18 showed "Marrow replacing metastatic lesion of the L3 vertebral body with associated extra osseous expansion and complete effacement of the right L3-L4 neural foramina and additional effacement of the right lateral recess.  There is additional lateral extension and abutment of the right psoas muscle.  Superimposed degenerative change at this level results in mild spinal canal stenosis." I sent the patient to ED on 18 where she was evaluated by neurosurgery. Neurosurgery did not feel she was a candidate for surgical intervention.      3. Seen by Dr. Adames on 18. palliative radiation to the area of the L3 metastasis 18-18   4. 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  There is an adjacent metastatic lymph node, diffuse liver metastases, and multiple bone metastases. Index primary neoplasm SUV max 33.13. Adjacent lymph node SUV max 23. L3 bone metastasis SUV max 36.86. Left lobe SUV max 46.13   5. Lanreotide every 4 weeks started on 18  6. TACE to the right hepatic lobe on 19. TACE to the left hepatic lobe on 3/21/19.      History of Present Illness:   Ms. Hills returns for follow up. Feeling well. Occasional diarrhea which resolved spontaneously.      ECO     ROS:   See HPI. Otherwise negative.      Physical Examination:   Vital signs reviewed.   Gen: well hydrated, well developed, in no acute distress.  HEENT: normocephalic, anicteric, PERRLA, EOMI, oropharynx clear  Neck: supple, no JVD, thyromegaly, cervical or supraclavicular LAD  Lungs: CTAB, no wheezes or rales  Heart: RRR, no M/R/G  Abdomen: soft, no tenderness, non-distended, no hepatomegaly, no splenomegaly, no mass, or hernia.   Ext:: no edema  Neuro: alert and oriented x 4, no " focal neuro deficit  Skin: no rash, open wounds or ulcers  Psych: pleasant and appropriate mood and affect     Objective:      Laboratory Data:  Labs reviewed. CBC, CMP stable. Bilirubin 1.9. Chromogranin A 1944     Imaging Data:  CT chest 1/12/22:  . Newly seen solid nodule at the left lung base, measuring 0.5 cm.  Clinical considerations will determine further workup and/or follow-up.  2. Stable additional subcentimeter pulmonary nodules bilaterally.  3. Unchanged appearance of sclerotic focus with central lucency in the right manubrium.  4. Limited evaluation of multiple hypoattenuating lesions in the liver.  5. Additional findings as above.    MRI abdomen/pelvis 1/12/22:  1. Patient with neuroendocrine tumor.  Interval progression of hepatic metastasis with several lesions increased in size.  Stable appearance of the ileal lesion and osseous metastasis.  2. Mildly increased size of a prominent mesenteric lymph nodes.  3. Cholelithiasis with stable biliary dilatation.  4. Additional findings as above.  RECIST SUMMARY:     Date of prior examination for comparison: 10/15/2021     Lesion 1: Left hepatic lobe.  6.3 cm. Series 20 image 49.  Prior measurement 5.5 cm.     Lesion 2: Distal ileum.  3.0 cm. Series 26 image 8.  Prior measurement 3.1 cm.     Lesion 3: Right hepatic dome.  5.3 cm. Series 20 image 21.  Prior measurement 5.2 cm.     Assessment and Plan:      1. Malignant carcinoid tumor of ileum    2. 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 Immunodeficiency secondary to neoplasm    5. Atherosclerosis of aorta    6. Essential hypertension    7. Type 2 diabetes mellitus without complication, without long-term current use of insulin      1-4  - Ms Jeana is an 80 yo woman with well differentiated low-grade neuroendocrine tumor of the ileum with mets to liver and bones. On lanreotide every 4 weeks. S/p TACE to the right hepatic lobe on  2/14/19. TACE to the left hepatic lobe on 3/21/19.   - reviewed CT and MRI result with patient. Very small left lung nodule. Will monitor. Discussed progression mainly in the liver. Spoke with Dr Pinto. Can do TACE again. Patient agreeable  - messaged Dr Pinto. His office will call patient to schedule TACE  - c/w lanreotide every 4 weeks  - restaging scan every 3 months. If continued progression will change treatment, likely to PRRT  - RTC in 4 weeks     5.  - on zetia. Continue    6.  - BP controlled  - c/w current medication    7.  - BS controlled  - c/w current medication      RTC in one month   Their questions were answered in the clinic. Encouraged to call should issues arise

## 2022-01-14 NOTE — PLAN OF CARE
Pt presented to unit for Lanreotide injection. Pt verbalized her agreement to proceed with tx. Tolerated tx without difficulty and voiced no complaints this visit.

## 2022-01-20 ENCOUNTER — TELEPHONE (OUTPATIENT)
Dept: HEMATOLOGY/ONCOLOGY | Facility: CLINIC | Age: 82
End: 2022-01-20
Payer: MEDICARE

## 2022-01-20 DIAGNOSIS — C7B.8 METASTATIC MALIGNANT NEUROENDOCRINE TUMOR TO LIVER: Primary | ICD-10-CM

## 2022-01-20 NOTE — TELEPHONE ENCOUNTER
----- Message from Placido Ellsworth sent at 1/20/2022  9:34 AM CST -----      Name of Who is Calling: LEIGHANN FANG [[id number]]      What is the request in detail: Pt sister called requesting a call back today.Please contact to further discuss and advise.          Can the clinic reply by [facility]: N      What Number [facility] Back [facility]: [phone]

## 2022-02-07 RX ORDER — SODIUM CHLORIDE 9 MG/ML
INJECTION, SOLUTION INTRAVENOUS CONTINUOUS
Status: CANCELLED | OUTPATIENT
Start: 2022-02-07

## 2022-02-07 RX ORDER — FENTANYL CITRATE 50 UG/ML
50 INJECTION, SOLUTION INTRAMUSCULAR; INTRAVENOUS
Status: CANCELLED | OUTPATIENT
Start: 2022-02-07

## 2022-02-07 RX ORDER — LIDOCAINE HYDROCHLORIDE 10 MG/ML
1 INJECTION, SOLUTION EPIDURAL; INFILTRATION; INTRACAUDAL; PERINEURAL ONCE AS NEEDED
Status: CANCELLED | OUTPATIENT
Start: 2022-02-07 | End: 2033-07-06

## 2022-02-07 RX ORDER — DIPHENHYDRAMINE HYDROCHLORIDE 50 MG/ML
50 INJECTION INTRAMUSCULAR; INTRAVENOUS ONCE
Status: CANCELLED | OUTPATIENT
Start: 2022-02-07 | End: 2022-02-07

## 2022-02-07 RX ORDER — MIDAZOLAM HYDROCHLORIDE 1 MG/ML
1 INJECTION INTRAMUSCULAR; INTRAVENOUS
Status: CANCELLED | OUTPATIENT
Start: 2022-02-07

## 2022-02-07 RX ORDER — MAGNESIUM SULFATE HEPTAHYDRATE 40 MG/ML
2 INJECTION, SOLUTION INTRAVENOUS ONCE
Status: CANCELLED | OUTPATIENT
Start: 2022-02-07 | End: 2022-02-07

## 2022-02-10 ENCOUNTER — PATIENT MESSAGE (OUTPATIENT)
Dept: INTERVENTIONAL RADIOLOGY/VASCULAR | Facility: HOSPITAL | Age: 82
End: 2022-02-10
Payer: MEDICARE

## 2022-02-10 NOTE — NURSING
pre-procedure call complete, spoke with patient's sister/caregiver (Shayla), patient instructed not to eat or drink anything after midnight the night before procedure, patient aware that she will need someone to provide transport home (no driving for at least 72 hours after procedure), patient will also need someone to monitor her for 8 hours after discharge, medications reviewed, arrival time and location given, expected length of stay reviewed, Covid screening complete, verbalized understanding of the above.

## 2022-02-11 ENCOUNTER — HOSPITAL ENCOUNTER (OUTPATIENT)
Facility: HOSPITAL | Age: 82
Discharge: HOME | End: 2022-02-12
Attending: HOSPITALIST | Admitting: FAMILY MEDICINE
Payer: MEDICARE

## 2022-02-11 ENCOUNTER — HOSPITAL ENCOUNTER (OUTPATIENT)
Dept: INTERVENTIONAL RADIOLOGY/VASCULAR | Facility: HOSPITAL | Age: 82
Discharge: HOME | End: 2022-02-11
Attending: PHYSICIAN ASSISTANT
Payer: MEDICARE

## 2022-02-11 VITALS
OXYGEN SATURATION: 100 % | TEMPERATURE: 97 F | HEIGHT: 64 IN | RESPIRATION RATE: 16 BRPM | BODY MASS INDEX: 22.88 KG/M2 | WEIGHT: 134 LBS | DIASTOLIC BLOOD PRESSURE: 55 MMHG | HEART RATE: 97 BPM | SYSTOLIC BLOOD PRESSURE: 130 MMHG

## 2022-02-11 DIAGNOSIS — C7A.8 NEUROENDOCRINE CANCER: ICD-10-CM

## 2022-02-11 DIAGNOSIS — D3A.8 NEUROENDOCRINE TUMOR: ICD-10-CM

## 2022-02-11 DIAGNOSIS — I44.1 2ND DEGREE AV BLOCK: ICD-10-CM

## 2022-02-11 DIAGNOSIS — C7B.8 METASTATIC MALIGNANT NEUROENDOCRINE TUMOR TO LIVER: ICD-10-CM

## 2022-02-11 DIAGNOSIS — I10 HYPERTENSION, BENIGN: ICD-10-CM

## 2022-02-11 DIAGNOSIS — I10 ESSENTIAL HYPERTENSION: ICD-10-CM

## 2022-02-11 DIAGNOSIS — I49.3 PVC'S (PREMATURE VENTRICULAR CONTRACTIONS): Primary | ICD-10-CM

## 2022-02-11 DIAGNOSIS — I49.3 PREMATURE VENTRICULAR COMPLEX: ICD-10-CM

## 2022-02-11 DIAGNOSIS — R07.9 CHEST PAIN: ICD-10-CM

## 2022-02-11 LAB
MAGNESIUM SERPL-MCNC: 2.1 MG/DL (ref 1.6–2.6)
PHOSPHATE SERPL-MCNC: 3.6 MG/DL (ref 2.7–4.5)
POCT GLUCOSE: 132 MG/DL (ref 70–110)
POCT GLUCOSE: 237 MG/DL (ref 70–110)

## 2022-02-11 PROCEDURE — 25000003 PHARM REV CODE 250: Mod: HCNC | Performed by: PHYSICIAN ASSISTANT

## 2022-02-11 PROCEDURE — 75726 ARTERY X-RAYS ABDOMEN: CPT | Mod: TC,HCNC | Performed by: RADIOLOGY

## 2022-02-11 PROCEDURE — 75726 ARTERY X-RAYS ABDOMEN: CPT | Mod: 26,59,HCNC, | Performed by: RADIOLOGY

## 2022-02-11 PROCEDURE — 36247 INS CATH ABD/L-EXT ART 3RD: CPT | Mod: HCNC,RT | Performed by: RADIOLOGY

## 2022-02-11 PROCEDURE — 75887 VEIN X-RAY LIVER W/O HEMODYN: CPT | Mod: 26,HCNC,, | Performed by: RADIOLOGY

## 2022-02-11 PROCEDURE — 76937 PR  US GUIDE, VASCULAR ACCESS: ICD-10-PCS | Mod: 26,HCNC,, | Performed by: RADIOLOGY

## 2022-02-11 PROCEDURE — 25000003 PHARM REV CODE 250: Mod: HCNC | Performed by: RADIOLOGY

## 2022-02-11 PROCEDURE — 75774 ARTERY X-RAY EACH VESSEL: CPT | Mod: TC,HCNC | Performed by: RADIOLOGY

## 2022-02-11 PROCEDURE — 75726 CHG ANGIO VISCERAL SELECTV/SUBSELEC: ICD-10-PCS | Mod: 26,59,HCNC, | Performed by: RADIOLOGY

## 2022-02-11 PROCEDURE — 25000003 PHARM REV CODE 250: Mod: HCNC

## 2022-02-11 PROCEDURE — 96372 THER/PROPH/DIAG INJ SC/IM: CPT | Mod: HCNC | Performed by: STUDENT IN AN ORGANIZED HEALTH CARE EDUCATION/TRAINING PROGRAM

## 2022-02-11 PROCEDURE — 99152 MOD SED SAME PHYS/QHP 5/>YRS: CPT | Mod: HCNC,,, | Performed by: RADIOLOGY

## 2022-02-11 PROCEDURE — 36247 INS CATH ABD/L-EXT ART 3RD: CPT | Mod: 51,HCNC,, | Performed by: RADIOLOGY

## 2022-02-11 PROCEDURE — 75774 ARTERY X-RAY EACH VESSEL: CPT | Mod: 26,59,HCNC, | Performed by: RADIOLOGY

## 2022-02-11 PROCEDURE — 36248 INS CATH ABD/L-EXT ART ADDL: CPT | Mod: HCNC,,, | Performed by: RADIOLOGY

## 2022-02-11 PROCEDURE — G0378 HOSPITAL OBSERVATION PER HR: HCPCS | Mod: HCNC

## 2022-02-11 PROCEDURE — 99152 MOD SED SAME PHYS/QHP 5/>YRS: CPT | Mod: HCNC | Performed by: RADIOLOGY

## 2022-02-11 PROCEDURE — 75887 VEIN X-RAY LIVER W/O HEMODYN: CPT | Mod: TC,HCNC | Performed by: RADIOLOGY

## 2022-02-11 PROCEDURE — 83735 ASSAY OF MAGNESIUM: CPT | Mod: HCNC | Performed by: STUDENT IN AN ORGANIZED HEALTH CARE EDUCATION/TRAINING PROGRAM

## 2022-02-11 PROCEDURE — 93005 ELECTROCARDIOGRAM TRACING: CPT | Mod: HCNC

## 2022-02-11 PROCEDURE — 63600175 PHARM REV CODE 636 W HCPCS: Mod: HCNC | Performed by: PHYSICIAN ASSISTANT

## 2022-02-11 PROCEDURE — 76937 US GUIDE VASCULAR ACCESS: CPT | Mod: 26,HCNC,, | Performed by: RADIOLOGY

## 2022-02-11 PROCEDURE — 99220 PR INITIAL OBSERVATION CARE,LEVL III: ICD-10-PCS | Mod: HCNC,GC,, | Performed by: HOSPITALIST

## 2022-02-11 PROCEDURE — G0379 DIRECT REFER HOSPITAL OBSERV: HCPCS | Mod: HCNC

## 2022-02-11 PROCEDURE — 36247 PR PLACE CATH SUBSUBSELECT ART,ABD/PEL: ICD-10-PCS | Mod: 51,HCNC,, | Performed by: RADIOLOGY

## 2022-02-11 PROCEDURE — 84100 ASSAY OF PHOSPHORUS: CPT | Mod: HCNC | Performed by: STUDENT IN AN ORGANIZED HEALTH CARE EDUCATION/TRAINING PROGRAM

## 2022-02-11 PROCEDURE — 25500020 PHARM REV CODE 255: Mod: HCNC | Performed by: PHYSICIAN ASSISTANT

## 2022-02-11 PROCEDURE — 37243 VASC EMBOLIZE/OCCLUDE ORGAN: CPT | Mod: HCNC | Performed by: RADIOLOGY

## 2022-02-11 PROCEDURE — 99220 PR INITIAL OBSERVATION CARE,LEVL III: CPT | Mod: HCNC,GC,, | Performed by: HOSPITALIST

## 2022-02-11 PROCEDURE — 36415 COLL VENOUS BLD VENIPUNCTURE: CPT | Mod: HCNC | Performed by: STUDENT IN AN ORGANIZED HEALTH CARE EDUCATION/TRAINING PROGRAM

## 2022-02-11 PROCEDURE — 96420 CHEMO IA PUSH TECNIQUE: CPT | Mod: HCNC | Performed by: RADIOLOGY

## 2022-02-11 PROCEDURE — 37243 IR EMBOLIZATION COMP FOR TUMOR_ORGAN ISCHEMIA_INFARC: ICD-10-PCS | Mod: HCNC,,, | Performed by: RADIOLOGY

## 2022-02-11 PROCEDURE — C1769 GUIDE WIRE: HCPCS | Mod: HCNC

## 2022-02-11 PROCEDURE — 36248 PR PR INS CATH ABD/L-EXT ART ADDL 2ND ORD/3RD ORD/BYD: ICD-10-PCS | Mod: HCNC,,, | Performed by: RADIOLOGY

## 2022-02-11 PROCEDURE — 93010 ELECTROCARDIOGRAM REPORT: CPT | Mod: HCNC,,, | Performed by: INTERNAL MEDICINE

## 2022-02-11 PROCEDURE — 63600175 PHARM REV CODE 636 W HCPCS: Mod: HCNC | Performed by: RADIOLOGY

## 2022-02-11 PROCEDURE — 75774 PR  ANGIO EA ADDNL SELECTV VESSEL: ICD-10-PCS | Mod: 26,59,HCNC, | Performed by: RADIOLOGY

## 2022-02-11 PROCEDURE — 36248 INS CATH ABD/L-EXT ART ADDL: CPT | Mod: HCNC | Performed by: RADIOLOGY

## 2022-02-11 PROCEDURE — 75887 PR  PERCUT XHEPATIC PORTOGRAM: ICD-10-PCS | Mod: 26,HCNC,, | Performed by: RADIOLOGY

## 2022-02-11 PROCEDURE — 93010 EKG 12-LEAD: ICD-10-PCS | Mod: HCNC,,, | Performed by: INTERNAL MEDICINE

## 2022-02-11 PROCEDURE — G0269 OCCLUSIVE DEVICE IN VEIN ART: HCPCS | Mod: HCNC | Performed by: RADIOLOGY

## 2022-02-11 PROCEDURE — 63600175 PHARM REV CODE 636 W HCPCS: Mod: HCNC | Performed by: STUDENT IN AN ORGANIZED HEALTH CARE EDUCATION/TRAINING PROGRAM

## 2022-02-11 PROCEDURE — C1887 CATHETER, GUIDING: HCPCS | Mod: HCNC

## 2022-02-11 PROCEDURE — 99153 MOD SED SAME PHYS/QHP EA: CPT | Mod: HCNC | Performed by: RADIOLOGY

## 2022-02-11 PROCEDURE — 99152 PR MOD CONSCIOUS SEDATION, SAME PHYS, 5+ YRS, FIRST 15 MIN: ICD-10-PCS | Mod: HCNC,,, | Performed by: RADIOLOGY

## 2022-02-11 PROCEDURE — 76937 US GUIDE VASCULAR ACCESS: CPT | Mod: TC,HCNC | Performed by: RADIOLOGY

## 2022-02-11 RX ORDER — AMOXICILLIN AND CLAVULANATE POTASSIUM 875; 125 MG/1; MG/1
1 TABLET, FILM COATED ORAL 2 TIMES DAILY
Qty: 14 TABLET | Refills: 0 | Status: ON HOLD | OUTPATIENT
Start: 2022-02-11 | End: 2022-02-12 | Stop reason: SDUPTHER

## 2022-02-11 RX ORDER — AMOXICILLIN AND CLAVULANATE POTASSIUM 875; 125 MG/1; MG/1
1 TABLET, FILM COATED ORAL 2 TIMES DAILY
Qty: 14 TABLET | Refills: 0 | Status: SHIPPED | OUTPATIENT
Start: 2022-02-11 | End: 2022-02-11 | Stop reason: SDUPTHER

## 2022-02-11 RX ORDER — CHOLECALCIFEROL (VITAMIN D3) 25 MCG
400 TABLET ORAL DAILY
COMMUNITY

## 2022-02-11 RX ORDER — IBUPROFEN 200 MG
24 TABLET ORAL
Status: DISCONTINUED | OUTPATIENT
Start: 2022-02-11 | End: 2022-02-12 | Stop reason: HOSPADM

## 2022-02-11 RX ORDER — DEXTROSE MONOHYDRATE, SODIUM CHLORIDE, AND POTASSIUM CHLORIDE 50; 1.49; 4.5 G/1000ML; G/1000ML; G/1000ML
INJECTION, SOLUTION INTRAVENOUS CONTINUOUS
Status: DISCONTINUED | OUTPATIENT
Start: 2022-02-11 | End: 2022-02-12 | Stop reason: HOSPADM

## 2022-02-11 RX ORDER — LIDOCAINE HYDROCHLORIDE 10 MG/ML
1 INJECTION, SOLUTION EPIDURAL; INFILTRATION; INTRACAUDAL; PERINEURAL ONCE AS NEEDED
Status: DISCONTINUED | OUTPATIENT
Start: 2022-02-11 | End: 2022-02-12 | Stop reason: HOSPADM

## 2022-02-11 RX ORDER — GLUCAGON 1 MG
1 KIT INJECTION
Status: DISCONTINUED | OUTPATIENT
Start: 2022-02-11 | End: 2022-02-12 | Stop reason: HOSPADM

## 2022-02-11 RX ORDER — ONDANSETRON 4 MG/1
4 TABLET, ORALLY DISINTEGRATING ORAL EVERY 6 HOURS PRN
Status: DISCONTINUED | OUTPATIENT
Start: 2022-02-11 | End: 2022-02-12 | Stop reason: HOSPADM

## 2022-02-11 RX ORDER — NITROGLYCERIN 5 MG/ML
INJECTION, SOLUTION INTRAVENOUS CODE/TRAUMA/SEDATION MEDICATION
Status: COMPLETED | OUTPATIENT
Start: 2022-02-11 | End: 2022-02-11

## 2022-02-11 RX ORDER — IBUPROFEN 200 MG
16 TABLET ORAL
Status: DISCONTINUED | OUTPATIENT
Start: 2022-02-11 | End: 2022-02-12 | Stop reason: HOSPADM

## 2022-02-11 RX ORDER — SODIUM CHLORIDE 0.9 % (FLUSH) 0.9 %
10 SYRINGE (ML) INJECTION EVERY 12 HOURS PRN
Status: DISCONTINUED | OUTPATIENT
Start: 2022-02-11 | End: 2022-02-12 | Stop reason: HOSPADM

## 2022-02-11 RX ORDER — OXYCODONE HYDROCHLORIDE 5 MG/1
5 TABLET ORAL EVERY 6 HOURS PRN
Status: DISCONTINUED | OUTPATIENT
Start: 2022-02-11 | End: 2022-02-11

## 2022-02-11 RX ORDER — AMOXICILLIN AND CLAVULANATE POTASSIUM 875; 125 MG/1; MG/1
1 TABLET, FILM COATED ORAL 2 TIMES DAILY
Status: DISCONTINUED | OUTPATIENT
Start: 2022-02-11 | End: 2022-02-12 | Stop reason: HOSPADM

## 2022-02-11 RX ORDER — HEPARIN SODIUM 5000 [USP'U]/ML
5000 INJECTION, SOLUTION INTRAVENOUS; SUBCUTANEOUS EVERY 8 HOURS
Status: DISCONTINUED | OUTPATIENT
Start: 2022-02-11 | End: 2022-02-12 | Stop reason: HOSPADM

## 2022-02-11 RX ORDER — MIDAZOLAM HYDROCHLORIDE 1 MG/ML
INJECTION INTRAMUSCULAR; INTRAVENOUS CODE/TRAUMA/SEDATION MEDICATION
Status: COMPLETED | OUTPATIENT
Start: 2022-02-11 | End: 2022-02-11

## 2022-02-11 RX ORDER — ONDANSETRON 2 MG/ML
INJECTION INTRAMUSCULAR; INTRAVENOUS CODE/TRAUMA/SEDATION MEDICATION
Status: COMPLETED | OUTPATIENT
Start: 2022-02-11 | End: 2022-02-11

## 2022-02-11 RX ORDER — INSULIN ASPART 100 [IU]/ML
0-5 INJECTION, SOLUTION INTRAVENOUS; SUBCUTANEOUS
Status: DISCONTINUED | OUTPATIENT
Start: 2022-02-11 | End: 2022-02-12 | Stop reason: HOSPADM

## 2022-02-11 RX ORDER — FENTANYL CITRATE 50 UG/ML
INJECTION, SOLUTION INTRAMUSCULAR; INTRAVENOUS CODE/TRAUMA/SEDATION MEDICATION
Status: COMPLETED | OUTPATIENT
Start: 2022-02-11 | End: 2022-02-11

## 2022-02-11 RX ORDER — ACETAMINOPHEN 325 MG/1
650 TABLET ORAL EVERY 6 HOURS PRN
Status: DISCONTINUED | OUTPATIENT
Start: 2022-02-11 | End: 2022-02-12 | Stop reason: HOSPADM

## 2022-02-11 RX ORDER — LIDOCAINE HYDROCHLORIDE 10 MG/ML
INJECTION INFILTRATION; PERINEURAL CODE/TRAUMA/SEDATION MEDICATION
Status: COMPLETED | OUTPATIENT
Start: 2022-02-11 | End: 2022-02-11

## 2022-02-11 RX ORDER — ONDANSETRON 4 MG/1
4 TABLET, ORALLY DISINTEGRATING ORAL EVERY 6 HOURS PRN
Qty: 20 TABLET | Refills: 0 | Status: SHIPPED | OUTPATIENT
Start: 2022-02-11 | End: 2023-03-14

## 2022-02-11 RX ORDER — DIPHENHYDRAMINE HYDROCHLORIDE 50 MG/ML
50 INJECTION INTRAMUSCULAR; INTRAVENOUS ONCE
Status: COMPLETED | OUTPATIENT
Start: 2022-02-11 | End: 2022-02-11

## 2022-02-11 RX ORDER — MIDAZOLAM HYDROCHLORIDE 1 MG/ML
1 INJECTION INTRAMUSCULAR; INTRAVENOUS
Status: DISCONTINUED | OUTPATIENT
Start: 2022-02-11 | End: 2022-02-12 | Stop reason: HOSPADM

## 2022-02-11 RX ORDER — NALOXONE HCL 0.4 MG/ML
0.02 VIAL (ML) INJECTION
Status: DISCONTINUED | OUTPATIENT
Start: 2022-02-11 | End: 2022-02-12 | Stop reason: HOSPADM

## 2022-02-11 RX ORDER — OXYCODONE HYDROCHLORIDE 5 MG/1
5 TABLET ORAL EVERY 6 HOURS PRN
Qty: 20 TABLET | Refills: 0 | Status: SHIPPED | OUTPATIENT
Start: 2022-02-11 | End: 2022-06-21

## 2022-02-11 RX ORDER — HEPARIN SODIUM 1000 [USP'U]/ML
INJECTION, SOLUTION INTRAVENOUS; SUBCUTANEOUS CODE/TRAUMA/SEDATION MEDICATION
Status: COMPLETED | OUTPATIENT
Start: 2022-02-11 | End: 2022-02-11

## 2022-02-11 RX ORDER — FENTANYL CITRATE 50 UG/ML
50 INJECTION, SOLUTION INTRAMUSCULAR; INTRAVENOUS
Status: DISCONTINUED | OUTPATIENT
Start: 2022-02-11 | End: 2022-02-12 | Stop reason: HOSPADM

## 2022-02-11 RX ORDER — OXYCODONE HYDROCHLORIDE 5 MG/1
5 TABLET ORAL EVERY 4 HOURS PRN
Status: DISCONTINUED | OUTPATIENT
Start: 2022-02-11 | End: 2022-02-12 | Stop reason: HOSPADM

## 2022-02-11 RX ORDER — SODIUM CHLORIDE 9 MG/ML
INJECTION, SOLUTION INTRAVENOUS CONTINUOUS
Status: DISCONTINUED | OUTPATIENT
Start: 2022-02-11 | End: 2022-02-12 | Stop reason: HOSPADM

## 2022-02-11 RX ORDER — MAGNESIUM SULFATE HEPTAHYDRATE 40 MG/ML
2 INJECTION, SOLUTION INTRAVENOUS ONCE
Status: COMPLETED | OUTPATIENT
Start: 2022-02-11 | End: 2022-02-11

## 2022-02-11 RX ORDER — HEPARIN SODIUM 200 [USP'U]/100ML
INJECTION, SOLUTION INTRAVENOUS
Status: COMPLETED | OUTPATIENT
Start: 2022-02-11 | End: 2022-02-11

## 2022-02-11 RX ORDER — OXYCODONE HYDROCHLORIDE 5 MG/1
5 TABLET ORAL EVERY 6 HOURS PRN
Qty: 20 TABLET | Refills: 0 | Status: SHIPPED | OUTPATIENT
Start: 2022-02-11 | End: 2022-02-11 | Stop reason: SDUPTHER

## 2022-02-11 RX ORDER — ONDANSETRON 4 MG/1
4 TABLET, ORALLY DISINTEGRATING ORAL EVERY 6 HOURS PRN
Qty: 20 TABLET | Refills: 0 | Status: SHIPPED | OUTPATIENT
Start: 2022-02-11 | End: 2022-02-11 | Stop reason: SDUPTHER

## 2022-02-11 RX ADMIN — FENTANYL CITRATE 50 MCG: 50 INJECTION, SOLUTION INTRAMUSCULAR; INTRAVENOUS at 03:02

## 2022-02-11 RX ADMIN — DIPHENHYDRAMINE HYDROCHLORIDE 50 MG: 50 INJECTION, SOLUTION INTRAMUSCULAR; INTRAVENOUS at 01:02

## 2022-02-11 RX ADMIN — ONDANSETRON 4 MG: 2 INJECTION, SOLUTION INTRAMUSCULAR; INTRAVENOUS at 03:02

## 2022-02-11 RX ADMIN — IOHEXOL 40 ML: 300 INJECTION, SOLUTION INTRAVENOUS at 04:02

## 2022-02-11 RX ADMIN — NITROGLYCERIN 200 MCG: 5 INJECTION, SOLUTION INTRAVENOUS at 03:02

## 2022-02-11 RX ADMIN — HEPARIN SODIUM 3000 UNITS: 1000 INJECTION, SOLUTION INTRAVENOUS; SUBCUTANEOUS at 03:02

## 2022-02-11 RX ADMIN — SODIUM CHLORIDE: 0.9 INJECTION, SOLUTION INTRAVENOUS at 11:02

## 2022-02-11 RX ADMIN — HEPARIN SODIUM IN SODIUM CHLORIDE 1 UNITS/HR: 200 INJECTION INTRAVENOUS at 03:02

## 2022-02-11 RX ADMIN — OCTREOTIDE ACETATE 500 MCG/HR: 1000 INJECTION, SOLUTION INTRAVENOUS; SUBCUTANEOUS at 03:02

## 2022-02-11 RX ADMIN — AMOXICILLIN AND CLAVULANATE POTASSIUM 1 TABLET: 875; 125 TABLET, FILM COATED ORAL at 08:02

## 2022-02-11 RX ADMIN — HEPARIN SODIUM 5000 UNITS: 5000 INJECTION INTRAVENOUS; SUBCUTANEOUS at 08:02

## 2022-02-11 RX ADMIN — AMPICILLIN SODIUM AND SULBACTAM SODIUM 3 G: 2; 1 INJECTION, POWDER, FOR SOLUTION INTRAMUSCULAR; INTRAVENOUS at 12:02

## 2022-02-11 RX ADMIN — MAGNESIUM SULFATE 2 G: 2 INJECTION INTRAVENOUS at 03:02

## 2022-02-11 RX ADMIN — INSULIN ASPART 1 UNITS: 100 INJECTION, SOLUTION INTRAVENOUS; SUBCUTANEOUS at 08:02

## 2022-02-11 RX ADMIN — LIDOCAINE HYDROCHLORIDE 5 ML: 10 INJECTION, SOLUTION INFILTRATION; PERINEURAL at 03:02

## 2022-02-11 RX ADMIN — DOXORUBICIN HYDROCHLORIDE 16 MG: 2 INJECTION, SOLUTION INTRAVENOUS at 03:02

## 2022-02-11 RX ADMIN — MIDAZOLAM HYDROCHLORIDE 1 MG: 1 INJECTION INTRAMUSCULAR; INTRAVENOUS at 03:02

## 2022-02-11 RX ADMIN — ETHIODIZED OIL 6 ML: 480 INJECTION INTRA-ARTERIAL; INTRALYMPHATIC; INTRAUTERINE at 03:02

## 2022-02-11 RX ADMIN — HYDROCORTISONE SODIUM SUCCINATE 200 MG: 100 INJECTION, POWDER, FOR SOLUTION INTRAMUSCULAR; INTRAVENOUS at 01:02

## 2022-02-11 NOTE — PLAN OF CARE
Patient arrived to room. PIV placed x2. Admit assessment completed. Plan of care discussed with patient. Will monitor. Call light within reach, inst in use. Blood sugar 132

## 2022-02-11 NOTE — CARE UPDATE
Air completely removed from TR band and site redressed with sterile dressing and 4x4 graham. Will continue to monitor for s/s of bleeding and hematoma.

## 2022-02-11 NOTE — CARE UPDATE
Patient arrived to room 4 in MPU with nurse. Patient in SR with frequent PVCs upon arrival to room. Dr. Pinto to room to see patient. Will continue to monitor for s/s of distress. See vs in computer.

## 2022-02-11 NOTE — DISCHARGE INSTRUCTIONS
For scheduling: Call  at [phone]    For questions or concerns call: [facility] 8 AM- 5PM [phone]. Radiology resident on call [phone].    For immediate concerns that are not emergent, you may call our radiology clinic at: [phone]    Discharge Instructions for Hepatic Angiography  You had a procedure called hepatic angiography. This is an X-ray study of the blood vessels that supply your liver. During  the procedure, a catheter (thin, flexible tube) was inserted into one of your blood vessels through a small incision. A  specially trained doctor called an interventional radiologist usually does the procedure. Heres what to do at home  afterward.  Home care  Follow your doctor's recommendations on when it is safe to drive after the procedure.  Rest according to your doctor's instructions after the procedure. Most people are able to resume normal activity  within a few days.  Dont lift anything heavier than 10 pounds for 3 to 4 days.  Avoid strenuous activity for 2 weeks after the procedure.  Exercise according to your doctors recommendations.  You can shower the day after the procedure.  Ask your doctor when it is safe to swim or take a bath.  Take your medications exactly as directed. Dont skip doses.  Unless directed otherwise, drink 6 to 8 glasses of water a day to prevent dehydration and to help flush your body of  the dye that was used during your procedure.  Take your temperature and check the place where your incision was made for signs of infection (redness, swelling, or  warmth) every day for a week.  Follow-up care  Make a follow-up appointment as directed by our staff.  If you have stitches or staples, see your doctor to have them removed 7 to 10 days after your procedure.  Ask your doctor when you can return to work.  Date Last Reviewed: 6/14/2015  © 8611-9687 [employer]. [address] [place] [address], [place] [address]. All rights reserved. This information  is not  intended as a substitute for professional medical care. Always follow your healthcare professional's instructions.

## 2022-02-11 NOTE — CARE UPDATE
Patient now in bigeminal PVCs. B/p=120's/70s. Dr. Pinto notified. Patient asymptomatic of rhythm. Will continue monitor for s/s of distress.

## 2022-02-11 NOTE — H&P
Radiology History & Physical      SUBJECTIVE:     Chief Complaint: Here for my liver.    History of Present Illness:  Shahram Hills is a 81 y.o. female with metastatic neuroendocrine tumor with metastasis to the liver who presents for cTACE.    Past Medical History:   Diagnosis Date    Anemia 7/11/2018    B12 deficiency     Depression     per pcp note 7/2017 and given prozac and diazepam     Hyperlipidemia     Hypertension     PVC (premature ventricular contraction)     seen on ekg from prior pcp    Weight loss     reviewed prior pcp Dr. Russo notes 2017 - labs reviewed: cmp wnl x tbili 1.5, tsh wnl, A1c 5.0, cbc wnl,D 36, hiv neg, hep c neg, hep b surg ag neg      Past Surgical History:   Procedure Laterality Date    EMBOLIZATION N/A 2/14/2019    Procedure: EMBOLIZATION, BLOOD VESSEL;  Surgeon: [facility] Diagnostic Provider;  [facility];  Service: Radiology;  Laterality: N/A;  Room 189/[facility]    EMBOLIZATION N/A 3/21/2019    Procedure: EMBOLIZATION, BLOOD VESSEL;  Surgeon: [facility] Diagnostic Provider;  [facility];  Service: Radiology;  Laterality: N/A;  Room 189/[facility]    ESOPHAGOGASTRODUODENOSCOPY  05/04/2018    esophageal ring, grade 1 esophagitis, gastritis     EYE SURGERY Bilateral     cataract removal    HYSTERECTOMY      fibroids       Home Meds:   Prior to Admission medications    Medication Sig Start Date End Date Taking? Authorizing Provider   amLODIPine (NORVASC) 10 MG tablet Take 1 tablet (10 mg total) by mouth once daily. 12/28/21 12/28/22 Yes Trixie Xie MD   blood sugar diagnostic Strp To check BG 2 times daily, to use with insurance preferred meter 5/25/21  Yes Trixie Xie MD   blood-glucose meter kit To check BG 2 times daily, to use with insurance preferred meter 5/25/21 5/25/22 Yes Trixie Xie MD   cyanocobalamin (VITAMIN B-12) 1000 MCG tablet Take 1 tablet (1,000 mcg total) by mouth once daily. 4/20/18  Yes Trixie MULLEN  MD Anne-Marie   ezetimibe (ZETIA) 10 mg tablet Take 1 tablet (10 mg total) by mouth once daily. 12/28/21 12/28/22 Yes Trixie Xei MD   ferrous sulfate 325 (65 FE) MG EC tablet Take 325 mg by mouth 3 (three) times daily with meals.   Yes Historical Provider   lancets Misc To check BG 2 times daily, to use with insurance preferred meter 5/25/21  Yes Trixie Xie MD   latanoprost 0.005 % ophthalmic solution Place 1 drop into both eyes nightly. 11/8/21  Yes Historical Provider   losartan (COZAAR) 50 MG tablet Take 1 tablet (50 mg total) by mouth once daily. 5/25/21 5/25/22 Yes Trixie Xie MD   metFORMIN (GLUCOPHAGE-XR) 500 MG ER 24hr tablet Take 1 tablet (500 mg total) by mouth daily with breakfast. 12/28/21 12/28/22 Yes Trixie Xie MD   TRUEPLUS LANCETS 33 gauge Misc USE TO CHECK BLOOD SUGAR TWICE DAILY 7/12/21  Yes Historical Provider   vitamin D (VITAMIN D3) 1000 units Tab Take 400 Units by mouth once daily.   Yes Historical Provider   XIIDRA 5 % Dpet INSTILL ONE DROP INTO OU BID 4/2/18  Yes Historical Provider   cholestyramine-aspartame (QUESTRAN LIGHT) 4 gram PwPk Take by mouth.  2/11/22  Historical Provider   SPS, WITH SORBITOL, 30-40 gram/120 mL Enem TAKE 60 ML BY MOUTH ONCE FOR 1 DOSE 10/18/21 2/11/22  Historical Provider     Anticoagulants/Antiplatelets: no anticoagulation    Allergies: Review of patient's allergies indicates:  No Known Allergies  Sedation History:  no adverse reactions    Review of Systems:   Hematological: no known coagulopathies  Respiratory: no shortness of breath  Cardiovascular: no chest pain  Gastrointestinal: no abdominal pain  Genito-Urinary: no dysuria  Musculoskeletal: negative  Neurological: no TIA or stroke symptoms         OBJECTIVE:     Vital Signs (Most Recent)  Temp: 97.2 °F (36.2 °C) (02/11/22 1126)  Pulse: 87 (02/11/22 1126)  Resp: 16 (02/11/22 1126)  BP: 135/67 (02/11/22 1133)  SpO2: 99 % (02/11/22 1126)    Physical Exam:  ASA: 3  Mallampati:  3    General: no acute distress  Mental Status: alert and oriented to person, place and time  HEENT: normocephalic, atraumatic  Chest: unlabored breathing  Heart: regular heart rate  Abdomen: nondistended  Extremity: moves all extremities    Laboratory  Lab Results   Component Value Date    INR 1.1 02/11/2022       Lab Results   Component Value Date    WBC 8.24 02/11/2022    HGB 14.1 02/11/2022    HCT 44.8 02/11/2022    MCV 92 02/11/2022     02/11/2022      Lab Results   Component Value Date     (H) 01/12/2022     01/12/2022    K 4.4 01/12/2022     01/12/2022    CO2 30 (H) 01/12/2022    BUN 9 01/12/2022    CREATININE 0.7 01/12/2022    CALCIUM 10.2 01/12/2022    MG 1.6 07/11/2018    ALT 12 01/12/2022    AST 24 01/12/2022    ALBUMIN 4.0 01/12/2022    BILITOT 1.9 (H) 01/12/2022    BILIDIR 0.5 (H) 03/21/2019       ASSESSMENT/PLAN:     Sedation Plan: up to moderate  Patient will undergo cTACE.        Mohan Raphael MD  Radiology PGY-2  [facility]

## 2022-02-11 NOTE — PLAN OF CARE
Patient heart rhythm converted back to sinus rhythm with a rate >60 with PACs and PVCs. EKG performed confirming rhythm. Blood pressure hypotensive. MD notified and at bedside. Patient received IV bolus of 500ccs of normal saline. Pressure stabilized to systolic 129/53. Patient stable for transport to MPU.

## 2022-02-11 NOTE — PLAN OF CARE
Patient AAOx3, no distress noted, respirations even and unlabored, vital signs stable, will continue to monitor. Acceptance of education, consents signed, H/P done. Labs reviewed. Patient in IR Room 188 for C-TACE procedure. Moderate sedation to be administered. Patient prepped and draped in sterile fashion.

## 2022-02-11 NOTE — CARE UPDATE
3cc of air removed from TR band. Will continue to remove 3cc of air every 10mins until complete. No s/s of bleeding or hematoma noted.

## 2022-02-11 NOTE — PLAN OF CARE
Patient AAOx3, no distress noted, heart rhythm in second degree block with 2:1 ratio in 40s (MD Aware; EKG ordered STAT and staff en route to bedside), vital signs otherwise stable, respirations even and unlabored, will continue to monitor. TR band inflated with 12mLs of air on left wrist; band to be deflated 2-3mLs every 15 minutes starting at 1700 on 2/11/2022. Left radial site clean, dry, and intact; no bleeding or hematoma noted. Patient to be transferred to MPU [place] 4 for 2 hour post procedural monitoring. Report called to MPU RN

## 2022-02-12 VITALS
TEMPERATURE: 98 F | DIASTOLIC BLOOD PRESSURE: 62 MMHG | HEIGHT: 64 IN | HEART RATE: 89 BPM | OXYGEN SATURATION: 99 % | WEIGHT: 129.19 LBS | SYSTOLIC BLOOD PRESSURE: 106 MMHG | BODY MASS INDEX: 22.06 KG/M2 | RESPIRATION RATE: 16 BRPM

## 2022-02-12 LAB
ALBUMIN SERPL BCP-MCNC: 3.1 G/DL (ref 3.5–5.2)
ALP SERPL-CCNC: 57 U/L (ref 55–135)
ALT SERPL W/O P-5'-P-CCNC: 17 U/L (ref 10–44)
ANION GAP SERPL CALC-SCNC: 11 MMOL/L (ref 8–16)
AST SERPL-CCNC: 25 U/L (ref 10–40)
BASOPHILS # BLD AUTO: 0.02 K/UL (ref 0–0.2)
BASOPHILS NFR BLD: 0.2 % (ref 0–1.9)
BILIRUB SERPL-MCNC: 2.2 MG/DL (ref 0.1–1)
BUN SERPL-MCNC: 7 MG/DL (ref 8–23)
CALCIUM SERPL-MCNC: 9 MG/DL (ref 8.7–10.5)
CHLORIDE SERPL-SCNC: 105 MMOL/L (ref 95–110)
CO2 SERPL-SCNC: 26 MMOL/L (ref 23–29)
CREAT SERPL-MCNC: 0.6 MG/DL (ref 0.5–1.4)
DIFFERENTIAL METHOD: ABNORMAL
EOSINOPHIL # BLD AUTO: 0 K/UL (ref 0–0.5)
EOSINOPHIL NFR BLD: 0.2 % (ref 0–8)
ERYTHROCYTE [DISTWIDTH] IN BLOOD BY AUTOMATED COUNT: 12.2 % (ref 11.5–14.5)
EST. GFR  (AFRICAN AMERICAN): >60 ML/MIN/1.73 M^2
EST. GFR  (NON AFRICAN AMERICAN): >60 ML/MIN/1.73 M^2
GLUCOSE SERPL-MCNC: 132 MG/DL (ref 70–110)
HCT VFR BLD AUTO: 37.3 % (ref 37–48.5)
HGB BLD-MCNC: 12.4 G/DL (ref 12–16)
IMM GRANULOCYTES # BLD AUTO: 0.02 K/UL (ref 0–0.04)
IMM GRANULOCYTES NFR BLD AUTO: 0.2 % (ref 0–0.5)
LYMPHOCYTES # BLD AUTO: 1.5 K/UL (ref 1–4.8)
LYMPHOCYTES NFR BLD: 15.7 % (ref 18–48)
MAGNESIUM SERPL-MCNC: 1.8 MG/DL (ref 1.6–2.6)
MCH RBC QN AUTO: 29.6 PG (ref 27–31)
MCHC RBC AUTO-ENTMCNC: 33.2 G/DL (ref 32–36)
MCV RBC AUTO: 89 FL (ref 82–98)
MONOCYTES # BLD AUTO: 0.7 K/UL (ref 0.3–1)
MONOCYTES NFR BLD: 7.5 % (ref 4–15)
NEUTROPHILS # BLD AUTO: 7.5 K/UL (ref 1.8–7.7)
NEUTROPHILS NFR BLD: 76.2 % (ref 38–73)
NRBC BLD-RTO: 0 /100 WBC
PHOSPHATE SERPL-MCNC: 3 MG/DL (ref 2.7–4.5)
PLATELET # BLD AUTO: 252 K/UL (ref 150–450)
PMV BLD AUTO: 11 FL (ref 9.2–12.9)
POCT GLUCOSE: 132 MG/DL (ref 70–110)
POCT GLUCOSE: 153 MG/DL (ref 70–110)
POTASSIUM SERPL-SCNC: 3.3 MMOL/L (ref 3.5–5.1)
PROT SERPL-MCNC: 6 G/DL (ref 6–8.4)
RBC # BLD AUTO: 4.19 M/UL (ref 4–5.4)
SODIUM SERPL-SCNC: 142 MMOL/L (ref 136–145)
WBC # BLD AUTO: 9.77 K/UL (ref 3.9–12.7)

## 2022-02-12 PROCEDURE — 85025 COMPLETE CBC W/AUTO DIFF WBC: CPT | Mod: HCNC | Performed by: STUDENT IN AN ORGANIZED HEALTH CARE EDUCATION/TRAINING PROGRAM

## 2022-02-12 PROCEDURE — 63600175 PHARM REV CODE 636 W HCPCS: Mod: HCNC | Performed by: STUDENT IN AN ORGANIZED HEALTH CARE EDUCATION/TRAINING PROGRAM

## 2022-02-12 PROCEDURE — G0378 HOSPITAL OBSERVATION PER HR: HCPCS | Mod: HCNC

## 2022-02-12 PROCEDURE — 25000003 PHARM REV CODE 250: Mod: HCNC

## 2022-02-12 PROCEDURE — 96365 THER/PROPH/DIAG IV INF INIT: CPT | Performed by: HOSPITALIST

## 2022-02-12 PROCEDURE — 99217 PR OBSERVATION CARE DISCHARGE: ICD-10-PCS | Mod: HCNC,GC,, | Performed by: HOSPITALIST

## 2022-02-12 PROCEDURE — 83735 ASSAY OF MAGNESIUM: CPT | Mod: HCNC | Performed by: STUDENT IN AN ORGANIZED HEALTH CARE EDUCATION/TRAINING PROGRAM

## 2022-02-12 PROCEDURE — 25000003 PHARM REV CODE 250: Mod: HCNC | Performed by: STUDENT IN AN ORGANIZED HEALTH CARE EDUCATION/TRAINING PROGRAM

## 2022-02-12 PROCEDURE — 99217 PR OBSERVATION CARE DISCHARGE: CPT | Mod: HCNC,GC,, | Performed by: HOSPITALIST

## 2022-02-12 PROCEDURE — 97165 OT EVAL LOW COMPLEX 30 MIN: CPT | Mod: HCNC

## 2022-02-12 PROCEDURE — 96366 THER/PROPH/DIAG IV INF ADDON: CPT | Performed by: HOSPITALIST

## 2022-02-12 PROCEDURE — 96372 THER/PROPH/DIAG INJ SC/IM: CPT | Mod: HCNC,59 | Performed by: STUDENT IN AN ORGANIZED HEALTH CARE EDUCATION/TRAINING PROGRAM

## 2022-02-12 PROCEDURE — 84100 ASSAY OF PHOSPHORUS: CPT | Mod: HCNC | Performed by: STUDENT IN AN ORGANIZED HEALTH CARE EDUCATION/TRAINING PROGRAM

## 2022-02-12 PROCEDURE — 36415 COLL VENOUS BLD VENIPUNCTURE: CPT | Mod: HCNC | Performed by: STUDENT IN AN ORGANIZED HEALTH CARE EDUCATION/TRAINING PROGRAM

## 2022-02-12 PROCEDURE — 80053 COMPREHEN METABOLIC PANEL: CPT | Mod: HCNC | Performed by: STUDENT IN AN ORGANIZED HEALTH CARE EDUCATION/TRAINING PROGRAM

## 2022-02-12 PROCEDURE — 97161 PT EVAL LOW COMPLEX 20 MIN: CPT | Mod: HCNC

## 2022-02-12 RX ORDER — AMOXICILLIN AND CLAVULANATE POTASSIUM 875; 125 MG/1; MG/1
1 TABLET, FILM COATED ORAL 2 TIMES DAILY
Qty: 12 TABLET | Refills: 0 | Status: SHIPPED | OUTPATIENT
Start: 2022-02-12 | End: 2022-02-18

## 2022-02-12 RX ORDER — AMLODIPINE BESYLATE 10 MG/1
TABLET ORAL
Qty: 90 TABLET | Refills: 3 | Status: SHIPPED | OUTPATIENT
Start: 2022-02-12 | End: 2022-04-08

## 2022-02-12 RX ORDER — MAGNESIUM SULFATE HEPTAHYDRATE 40 MG/ML
2 INJECTION, SOLUTION INTRAVENOUS ONCE
Status: COMPLETED | OUTPATIENT
Start: 2022-02-12 | End: 2022-02-12

## 2022-02-12 RX ORDER — POTASSIUM CHLORIDE 750 MG/1
30 CAPSULE, EXTENDED RELEASE ORAL ONCE
Status: COMPLETED | OUTPATIENT
Start: 2022-02-12 | End: 2022-02-12

## 2022-02-12 RX ORDER — LOSARTAN POTASSIUM 50 MG/1
TABLET ORAL
Qty: 90 TABLET | Refills: 3 | Status: SHIPPED | OUTPATIENT
Start: 2022-02-12 | End: 2022-02-25 | Stop reason: SDUPTHER

## 2022-02-12 RX ADMIN — AMOXICILLIN AND CLAVULANATE POTASSIUM 1 TABLET: 875; 125 TABLET, FILM COATED ORAL at 09:02

## 2022-02-12 RX ADMIN — HEPARIN SODIUM 5000 UNITS: 5000 INJECTION INTRAVENOUS; SUBCUTANEOUS at 02:02

## 2022-02-12 RX ADMIN — POTASSIUM CHLORIDE 30 MEQ: 10 CAPSULE, COATED, EXTENDED RELEASE ORAL at 09:02

## 2022-02-12 RX ADMIN — MAGNESIUM SULFATE IN WATER 2 G: 40 INJECTION, SOLUTION INTRAVENOUS at 09:02

## 2022-02-12 RX ADMIN — HEPARIN SODIUM 5000 UNITS: 5000 INJECTION INTRAVENOUS; SUBCUTANEOUS at 05:02

## 2022-02-12 NOTE — NURSING
IV d/gadiel with cath intact x2. AVS reviewed with patient. Sister and nephew at bedside. She voiced understanding of meds and follow up appt. She left ambulating with sister and nephew.

## 2022-02-12 NOTE — ASSESSMENT & PLAN NOTE
Patient noted to have multiple PVCs after undergoing IR procedure. She does not recall every being told of any abnormal heart rhythms and does not recognize the term PVC. PVCs have been noted in her chart since 2018. The patient currently feels well and is asymptomatic. Physical exam is unremarkable.     --continue with telemetry monitoring  --monitor electrolytes, k>4, mg>2

## 2022-02-12 NOTE — PT/OT/SLP EVAL
"Occupational Therapy   Evaluation and Discharge Note    Name: Shahram Hills  MRN: [id number]  Admitting Diagnosis:  PVC's (premature ventricular contractions)   Recent Surgery: * No surgery found *      Recommendations:     Discharge Recommendations: home  Discharge Equipment Recommendations:  none  Barriers to discharge:  None    Assessment:     Shahram Hills is a 81 y.o. female with a medical diagnosis of PVC's (premature ventricular contractions). Pt presents with increased endurance. Pt completed  Sit <> stand and Toilet transfer independently. Pt is able  To walk with Supervision with no AD. At this time, patient is functioning at their prior level of function and does not require further acute OT services.     Plan:     During this hospitalization, patient does not require further acute OT services.  Please re-consult if situation changes.    · Plan of Care Reviewed with: patient    Subjective     Chief Complaint: " I feel great, I'm fine"    Occupational Profile:  Living Environment: Pt lives with her sister in a duplex with 4 PATRICK and bilateral hand rails. Pt's nephew lives next door. Pt has a tub/shower style bathroom without grab bars.  Previous level of function: Pt reports independence in all ADLs and IADLs except driving 2* her sister driving her around as needed.  Equipment Used at home:  none  Assistance upon Discharge: Pt has good family support, Pt's sister and nephew will be able to assist as needed.    Pain/Comfort:  · Pain Rating 1: 0/10    Patients cultural, spiritual, Jewish conflicts given the current situation:      Objective:     Communicated with:RN prior to session.  Patient found up in chair with peripheral IV,telemetry upon OT entry to room.    General Precautions: Standard, fall   Orthopedic Precautions:N/A   Braces: N/A  Respiratory Status: Room air     Occupational Performance:    Bed Mobility:    · No bed mobility observed, Pt walked from bed to chair independently upon OT " arrival.    Functional Mobility/Transfers:  · Patient completed Sit <> Stand Transfer with independence  with  no assistive device   · Patient completed Toilet Transfer Step Transfer technique with independence with  no AD  · Functional Mobility: Pt walked ~150' with Supervision and no AD    Activities of Daily Living:  · Toileting: independence with clothes management and hygiene.    Cognitive/Visual Perceptual:  Cognitive/Psychosocial Skills:     -       Follows Commands/attention:Follows multistep  commands  -       Communication: clear/fluent  -       Safety awareness/insight to disability: intact   -       Mood/Affect/Coping skills/emotional control: Appropriate to situation, Happy and Pleasant    Physical Exam:  Upper Extremity Range of Motion:  -       Right Upper Extremity: WNL  -       Left Upper Extremity: WNL  Upper Extremity Strength:    -       Right Upper Extremity: WNL  -       Left Upper Extremity: WNL   Strength:    -       Right Upper Extremity: WNL  -       Left Upper Extremity: WNL    AMPAC 6 Click ADL:  AMPAC Total Score: 22    Treatment & Education:  Educated on Role of OT and OT d/c planning.  Educated on functional mobility and transfer safety    Education:    Patient left up in chair with all lines intact and call button in reach    GOALS:   Multidisciplinary Problems     Occupational Therapy Goals     Not on file                History:     Past Medical History:   Diagnosis Date    Anemia 7/11/2018    B12 deficiency     Depression     per pcp note 7/2017 and given prozac and diazepam     Hyperlipidemia     Hypertension     PVC (premature ventricular contraction)     seen on ekg from prior pcp    Weight loss     reviewed prior pcp Dr. Russo notes 2017 - labs reviewed: cmp wnl x tbili 1.5, tsh wnl, A1c 5.0, cbc wnl,D 36, hiv neg, hep c neg, hep b surg ag neg        Past Surgical History:   Procedure Laterality Date    EMBOLIZATION N/A 2/14/2019    Procedure: EMBOLIZATION, BLOOD  VESSEL;  Surgeon: [facility] Diagnostic Provider;  Location: [facility] OR 2ND FLR;  Service: Radiology;  Laterality: N/A;  Room 189/[facility]    EMBOLIZATION N/A 3/21/2019    Procedure: EMBOLIZATION, BLOOD VESSEL;  Surgeon: [facility] Diagnostic Provider;  Location: [facility] OR 2ND FLR;  Service: Radiology;  Laterality: N/A;  Room 189/[facility]    ESOPHAGOGASTRODUODENOSCOPY  05/04/2018    esophageal ring, grade 1 esophagitis, gastritis     EYE SURGERY Bilateral     cataract removal    HYSTERECTOMY      fibroids       Time Tracking:     OT Date of Treatment: 02/12/22  OT Start Time: 1018  OT Stop Time: 1029  OT Total Time (min): 11 min    Billable Minutes:Evaluation 11    2/12/2022

## 2022-02-12 NOTE — H&P
[facility] - Transplant [facility] Medicine  History & Physical    Patient Name: Shahram Hills  MRN: [id number]  Patient Class: OP- Observation  Admission Date: 2/11/2022  Attending Physician: Charles Schneider MD   Primary Care Provider: Trixie Xie MD         Patient information was obtained from patient, past medical records and ER records.     Subjective:     Principal Problem:PVC's (premature ventricular contractions)    Chief Complaint: No chief complaint on file.       HPI: Ms. 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 During the procedure, she had multiple PACs and PVCs and at one point was hypotensive, which responded to IVF resuscitation. Following the procedure she kept having multiple asymptomatic PVCs and the decision was made to admit to hospital medicine for further monitoring and management. When examined at bedside the patient appears comfortable and is hemodynamically stable. She states that she has been feeling well and came in today for a procedure for her cancer. She endorses dizziness which occurs after standing on occasion. She denies any chest pain, shortness of breath, abdominal pain, diaphoresis,  palpitations, or light headedness. She is a non-smoker. She does not drink alcohol or use recreational drugs. She lives with her sister and ambulates without assistance of a cane or a walker.       Past Medical History:   Diagnosis Date    Anemia 7/11/2018    B12 deficiency     Depression     per pcp note 7/2017 and given prozac and diazepam     Hyperlipidemia     Hypertension     PVC (premature ventricular contraction)     seen on ekg from prior pcp    Weight loss     reviewed prior pcp Dr. Russo notes 2017 - labs reviewed: cmp wnl x tbili 1.5, tsh wnl, A1c 5.0,  cbc wnl,D 36, hiv neg, hep c neg, hep b surg ag neg        Past Surgical History:   Procedure Laterality Date    EMBOLIZATION N/A 2/14/2019    Procedure: EMBOLIZATION, BLOOD VESSEL;  Surgeon: [facility] Diagnostic Provider;  Location: [facility] OR 2ND FLR;  Service: Radiology;  Laterality: N/A;  Room 189/[facility]    EMBOLIZATION N/A 3/21/2019    Procedure: EMBOLIZATION, BLOOD VESSEL;  Surgeon: [facility] Diagnostic Provider;  Location: [facility] OR 2ND FLR;  Service: Radiology;  Laterality: N/A;  Room 189/[facility]    ESOPHAGOGASTRODUODENOSCOPY  05/04/2018    esophageal ring, grade 1 esophagitis, gastritis     EYE SURGERY Bilateral     cataract removal    HYSTERECTOMY      fibroids       Review of patient's allergies indicates:  No Known Allergies    Current Facility-Administered Medications on File Prior to Encounter   Medication    0.9%  NaCl infusion    ampicillin-sulbactam 3 g in sodium chloride 0.9 % 100 mL IVPB (ready to mix system)    dextrose 5 % and 0.45 % NaCl with KCl 20 mEq infusion    [COMPLETED] diphenhydrAMINE injection 50 mg    [COMPLETED] DOXOrubicin/NS chemo embolization    [COMPLETED] ethiodized oiL (LIPIODOL) 480 mg iodine/mL injection 20 mL    fentaNYL 50 mcg/mL injection 50 mcg    [COMPLETED] fentaNYL 50 mcg/mL injection    [COMPLETED] heparin (porcine) injection    [COMPLETED] heparin infusion 1,000 units/500 ml in 0.9% NaCl (pressure line flush)    [COMPLETED] hydrocortisone sodium succinate injection 200 mg    [COMPLETED] iohexoL (OMNIPAQUE 300) injection 100 mL    LIDOcaine (PF) 10 mg/ml (1%) injection 10 mg    [COMPLETED] LIDOcaine HCL 10 mg/ml (1%) injection    [COMPLETED] magnesium sulfate 2g in water 50mL IVPB (premix)    midazolam (VERSED) 1 mg/mL injection 1 mg    [COMPLETED] midazolam (VERSED) 1 mg/mL injection    [COMPLETED] nitroGLYCERIN injection    octreotide (SANDOSTATIN) 5,000 mcg in sodium chloride 0.9% 100 mL infusion    octreotide (SANDOSTATIN) 500 mcg in sodium chloride  0.9% IVPB    ondansetron (ZOFRAN) 16 mg in sodium chloride 0.9% 50 mL IVPB    [COMPLETED] ondansetron injection    oxyCODONE immediate release tablet 5 mg     Current Outpatient Medications on File Prior to Encounter   Medication Sig    amLODIPine (NORVASC) 10 MG tablet Take 1 tablet (10 mg total) by mouth once daily.    amoxicillin-clavulanate 875-125mg (AUGMENTIN) 875-125 mg per tablet Take 1 tablet by mouth 2 (two) times daily.    blood sugar diagnostic Strp To check BG 2 times daily, to use with insurance preferred meter    blood-glucose meter kit To check BG 2 times daily, to use with insurance preferred meter    cyanocobalamin (VITAMIN B-12) 1000 MCG tablet Take 1 tablet (1,000 mcg total) by mouth once daily.    ezetimibe (ZETIA) 10 mg tablet Take 1 tablet (10 mg total) by mouth once daily.    ferrous sulfate 325 (65 FE) MG EC tablet Take 325 mg by mouth 3 (three) times daily with meals.    lancets Misc To check BG 2 times daily, to use with insurance preferred meter    latanoprost 0.005 % ophthalmic solution Place 1 drop into both eyes nightly.    losartan (COZAAR) 50 MG tablet Take 1 tablet (50 mg total) by mouth once daily.    metFORMIN (GLUCOPHAGE-XR) 500 MG ER 24hr tablet Take 1 tablet (500 mg total) by mouth daily with breakfast.    ondansetron (ZOFRAN-ODT) 4 MG TbDL Take 1 tablet (4 mg total) by mouth every 6 (six) hours as needed (nausea, vomting).    oxyCODONE (ROXICODONE) 5 MG immediate release tablet Take 1 tablet (5 mg total) by mouth every 6 (six) hours as needed for Pain.    TRUEPLUS LANCETS 33 gauge Misc USE TO CHECK BLOOD SUGAR TWICE DAILY    vitamin D (VITAMIN D3) 1000 units Tab Take 400 Units by mouth once daily.    XIIDRA 5 % Dpet INSTILL ONE DROP INTO OU BID    [DISCONTINUED] amoxicillin-clavulanate 875-125mg (AUGMENTIN) 875-125 mg per tablet Take 1 tablet by mouth 2 (two) times a day.    [DISCONTINUED] cholestyramine-aspartame (QUESTRAN LIGHT) 4 gram PwPk Take by  mouth.    [DISCONTINUED] ondansetron (ZOFRAN-ODT) 4 MG TbDL Take 1 tablet (4 mg total) by mouth every 6 (six) hours as needed (nausea, vomiting).    [DISCONTINUED] oxyCODONE (ROXICODONE) 5 MG immediate release tablet Take 1 tablet (5 mg total) by mouth every 6 (six) hours as needed for Pain.    [DISCONTINUED] SPS, WITH SORBITOL, 30-40 gram/120 mL Enem TAKE 60 ML BY MOUTH ONCE FOR 1 DOSE     Family History     Problem Relation (Age of Onset)    Asthma Sister    COPD Brother    Cancer Father, Brother    Diabetes Sister, Brother    Emphysema Brother    Glaucoma Mother    Heart attack Father, Sister    Hyperlipidemia Sister    Hypertension Mother, Brother    No Known Problems Sister    Stroke Brother        Tobacco Use    Smoking status: Never Smoker    Smokeless tobacco: Never Used   Substance and Sexual Activity    Alcohol use: No     Comment: stopped in 2007 - hx of social drinking    Drug use: Never    Sexual activity: Not Currently     Partners: Male     Review of Systems   Constitutional: Negative for chills and fever.   HENT: Negative for sore throat and trouble swallowing.    Eyes: Negative for visual disturbance.   Respiratory: Negative for shortness of breath.    Cardiovascular: Negative for chest pain and palpitations.   Gastrointestinal: Positive for diarrhea. Negative for abdominal pain, nausea and vomiting.   Genitourinary: Negative for difficulty urinating, dysuria and hematuria.   Musculoskeletal: Negative for back pain and neck pain.   Skin: Negative for rash.   Neurological: Positive for dizziness. Negative for syncope and headaches.   Psychiatric/Behavioral: Negative for confusion.     Objective:     Vital Signs (Most Recent):    Vital Signs (24h Range):  Temp:  [97.2 °F (36.2 °C)-97.3 °F (36.3 °C)] 97.3 °F (36.3 °C)  Pulse:  [] 97  Resp:  [13-20] 16  SpO2:  [99 %-100 %] 100 %  BP: (109-149)/(53-88) 130/55        There is no height or weight on file to calculate BMI.    Physical  Exam  Constitutional:       Appearance: Normal appearance.   HENT:      Head: Normocephalic and atraumatic.      Mouth/Throat:      Mouth: Mucous membranes are moist.      Pharynx: Oropharynx is clear. No oropharyngeal exudate.   Eyes:      General: No scleral icterus.     Extraocular Movements: Extraocular movements intact.      Pupils: Pupils are equal, round, and reactive to light.   Cardiovascular:      Rate and Rhythm: Normal rate and regular rhythm.      Pulses: Normal pulses.      Heart sounds: Normal heart sounds. No murmur heard.      Pulmonary:      Effort: Pulmonary effort is normal.      Breath sounds: Normal breath sounds. No wheezing.   Abdominal:      General: Abdomen is flat.      Palpations: Abdomen is soft.      Tenderness: There is no abdominal tenderness.   Musculoskeletal:         General: No swelling or tenderness.      Cervical back: Neck supple.      Right lower leg: No edema.      Left lower leg: No edema.   Skin:     General: Skin is warm and dry.      Findings: No rash.   Neurological:      General: No focal deficit present.      Mental Status: She is alert and oriented to person, place, and time.   Psychiatric:         Mood and Affect: Mood normal.         Behavior: Behavior normal.         Thought Content: Thought content normal.           CRANIAL NERVES     CN III, IV, VI   Pupils are equal, round, and reactive to light.       Significant Labs:   All pertinent labs within the past 24 hours have been reviewed.  CBC:   Recent Labs   Lab 02/11/22  1046   WBC 8.24   HGB 14.1   HCT 44.8        CMP:   Recent Labs   Lab 02/11/22  1046   *   K 4.4      CO2 30*   *   BUN 10   CREATININE 0.7   CALCIUM 10.5   PROT 7.6   ALBUMIN 4.0   BILITOT 2.3*   ALKPHOS 68   AST 21   ALT 15   ANIONGAP 13   EGFRNONAA >60.0       Significant Imaging: I have reviewed all pertinent imaging results/findings within the past 24 hours.    Assessment/Plan:     * PVC's (premature ventricular  contractions)  Patient noted to have multiple PVCs after undergoing IR procedure. She does not recall every being told of any abnormal heart rhythms and does not recognize the term PVC. PVCs have been noted in her chart since 2018. The patient currently feels well and is asymptomatic. Physical exam is unremarkable.     --continue with telemetry monitoring  --monitor electrolytes, k>4, mg>2      Type 2 diabetes mellitus without complication, without long-term current use of insulin  --Diabetic diet  --Accuchecks AC & QHS  --LDSSI  --Avoid hypoglycemia. PRNs in place    Hyperlipidemia  -- resume ezetimibe outpatient. Holding inpatient.      Essential hypertension  Hold home BP medications for now in the setting of hypotension during her procedure      VTE Risk Mitigation (From admission, onward)         Ordered     heparin (porcine) injection 5,000 Units  Every 8 hours         02/11/22 1850     IP VTE HIGH RISK PATIENT  Once         02/11/22 1850     Place sequential compression device  Until discontinued         02/11/22 1850                   Gus Chua MD   PGY-1  Department of Hospital Medicine   [facility] - Transplant Stepdown

## 2022-02-12 NOTE — PROGRESS NOTES
Shahram Hills is a 81 y.o. female with metastatic neuroendocrine tumor with metastasis to the liver. She had a cTACE done 2/11/22. In recovery, her BP was low with frequent PVCs and bigeminy. She was admitted to medicine for observation.    Today the patient feels fine. She does not have have new symptoms. Eating fine. Ambulating without issue. Having bowel movements. Overnight, patient's BP was normal. Her tele continued to show PVCs and bigeminy.     Patient is okay to discharge from an IR standpoint pending any other medical/cardiology evaluation.    Mohan Raphael MD  Radiology PGY-2  [facility]

## 2022-02-12 NOTE — DISCHARGE INSTRUCTIONS
Ms. Hills, you were seen in the hospital for monitoring due to an abnormal heart rhythm (premature ventricular contractions). These are generally okay if you don't feel symptoms. Several things stemming from this visit:     1- Regarding your blood pressure:  -your blood pressure has been normal while you've been hospitalized  -please hold your amlodipine and losartan (anti-hypertensives) and resume then when instructed to do so by your primary care physician    2- Regarding the premature ventricular contractions:  -we're discharging you with a cardiac monitor- should be delivered around Tuesday- so your primary care doctor can continue monitoring your heart rhythm    3- Regarding the procedure you had for your liver:  -everything went well  -the radiologists who performed the procedure would like for you to take an antibiotic called augmentin twice a day for a total of 6 more days  -we have sent this antibiotic to your preferred pharmacy- [facility] on [address]    4- Lastly, we would like for you to have a follow-up with your primary care physician within the week to ensure all these things are followed    Best regards!

## 2022-02-12 NOTE — HPI
Ms. 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 During the procedure, she had multiple PACs and PVCs and at one point was hypotensive, which responded to IVF resuscitation. Following the procedure she kept having multiple asymptomatic PVCs and the decision was made to admit to hospital medicine for further monitoring and management. When examined at bedside the patient appears comfortable and is hemodynamically stable. She states that she has been feeling well and came in today for a procedure for her cancer. She endorses dizziness which occurs after standing on occasion. She denies any chest pain, shortness of breath, abdominal pain, diaphoresis,  palpitations, or light headedness. She is a non-smoker. She does not drink alcohol or use recreational drugs. She lives with her sister and ambulates without assistance of a cane or a walker.

## 2022-02-12 NOTE — SUBJECTIVE & OBJECTIVE
Review of Systems   Constitutional: Negative for chills and fever.   HENT: Negative for sore throat and trouble swallowing.    Eyes: Negative for visual disturbance.   Respiratory: Negative for shortness of breath.    Cardiovascular: Negative for chest pain and palpitations.   Gastrointestinal: Positive for diarrhea. Negative for abdominal pain, nausea and vomiting.   Genitourinary: Negative for difficulty urinating, dysuria and hematuria.   Musculoskeletal: Negative for back pain and neck pain.   Skin: Negative for rash.   Neurological: Positive for dizziness. Negative for syncope and headaches.   Psychiatric/Behavioral: Negative for confusion.     Objective:     Vital Signs (Most Recent):  Temp: 98.3 °F (36.8 °C) (02/12/22 1123)  Pulse: 89 (02/12/22 1521)  Resp: 16 (02/12/22 1510)  BP: 106/62 (02/12/22 1510)  SpO2: 99 % (02/12/22 1510) Vital Signs (24h Range):  Temp:  [97.3 °F (36.3 °C)-98.3 °F (36.8 °C)] 98.3 °F (36.8 °C)  Pulse:  [] 89  Resp:  [13-20] 16  SpO2:  [96 %-100 %] 99 %  BP: (106-134)/(53-75) 106/62     Weight: 58.6 kg (129 lb 3 oz)  Body mass index is 22.18 kg/m².    Intake/Output Summary (Last 24 hours) at 2/12/2022 1523  Last data filed at 2/12/2022 1300  Gross per 24 hour   Intake 1030 ml   Output 650 ml   Net 380 ml      Physical Exam  Constitutional:       Appearance: Normal appearance.   HENT:      Head: Normocephalic and atraumatic.      Mouth/Throat:      Mouth: Mucous membranes are moist.      Pharynx: Oropharynx is clear. No oropharyngeal exudate.   Eyes:      General: No scleral icterus.     Extraocular Movements: Extraocular movements intact.      Pupils: Pupils are equal, round, and reactive to light.   Cardiovascular:      Rate and Rhythm: Normal rate and regular rhythm.      Pulses: Normal pulses.      Heart sounds: Normal heart sounds. No murmur heard.      Pulmonary:      Effort: Pulmonary effort is normal.      Breath sounds: Normal breath sounds. No wheezing.   Abdominal:       General: Abdomen is flat.      Palpations: Abdomen is soft.      Tenderness: There is no abdominal tenderness.   Musculoskeletal:         General: No swelling or tenderness.      Cervical back: Neck supple.      Right lower leg: No edema.      Left lower leg: No edema.   Skin:     General: Skin is warm and dry.      Findings: No rash.   Neurological:      General: No focal deficit present.      Mental Status: She is alert and oriented to person, place, and time.   Psychiatric:         Mood and Affect: Mood normal.         Behavior: Behavior normal.         Thought Content: Thought content normal.         Significant Labs:   All pertinent labs within the past 24 hours have been reviewed.  CBC:   Recent Labs   Lab 02/11/22  1046 02/12/22  0645   WBC 8.24 9.77   HGB 14.1 12.4   HCT 44.8 37.3    252     CMP:   Recent Labs   Lab 02/11/22  1046 02/12/22  0645   * 142   K 4.4 3.3*    105   CO2 30* 26   * 132*   BUN 10 7*   CREATININE 0.7 0.6   CALCIUM 10.5 9.0   PROT 7.6 6.0   ALBUMIN 4.0 3.1*   BILITOT 2.3* 2.2*   ALKPHOS 68 57   AST 21 25   ALT 15 17   ANIONGAP 13 11   EGFRNONAA >60.0 >60.0       Significant Imaging: I have reviewed all pertinent imaging results/findings within the past 24 hours.

## 2022-02-12 NOTE — HOSPITAL COURSE
Ms. Hills was admitted to hospital medicine directly from  following cTACE for closer monitoring of frequent PVCs. She was placed on telemetry monitoring and had episodic PVCs, though she was always asymptomatic. Electrolytes were replaced as needed. She was stable for discharge home on 02/12. She will undergo outpatient Echo and Holter Monitor and follow up with her PCP.

## 2022-02-12 NOTE — NURSING
Pt arrived to U room 53247 via stretcher with belongings. Pt AAOx4, VSS, no complaints. Pt ambulatory independently to toilet. Tele box requested.

## 2022-02-12 NOTE — NURSING
- Pt AAOx4, afebrile, VSS, RA, ambulatory independently  - ACHS accuchecks. 1 unit of insulin given last night  - Dressing to L wrist C/D/I  - Tele monitoring in place. PVCs and trigeminy continues   - Visi monitor on  - No c/o pain, N/V/D  - Bed in lowest locked position, call light and personal items in reach, nonskid socks on, WCTM

## 2022-02-12 NOTE — DISCHARGE SUMMARY
[facility]  Discharge Summary      Patient Name: Shahram Hills  MRN: [id number]  Patient Class: OP- Observation  Admission Date: 2/11/2022  Hospital Length of Stay: 0 days  Discharge Date and Time:  02/12/2022 3:25 PM  Attending Physician: Charles Schneider MD   Discharging Provider: Gus Chua MD  Primary Care Provider: Trixie Xie MD  [facility] Medicine Team: [facility] 3 Gus Chua MD    HPI:   Ms. 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 During the procedure, she had multiple PACs and PVCs and at one point was hypotensive, which responded to IVF resuscitation. Following the procedure she kept having multiple asymptomatic PVCs and the decision was made to admit to hospital medicine for further monitoring and management. When examined at bedside the patient appears comfortable and is hemodynamically stable. She states that she has been feeling well and came in today for a procedure for her cancer. She endorses dizziness which occurs after standing on occasion. She denies any chest pain, shortness of breath, abdominal pain, diaphoresis,  palpitations, or light headedness. She is a non-smoker. She does not drink alcohol or use recreational drugs. She lives with her sister and ambulates without assistance of a cane or a walker.       * No surgery found *      Hospital Course:   Ms. Hills was admitted to hospital medicine directly from  following cTACE for closer monitoring of frequent PVCs. She was placed on telemetry monitoring and had episodic PVCs, though she was always asymptomatic. Electrolytes were replaced as needed. She was stable for discharge home on 02/12. She will undergo outpatient Echo and Holter Monitor and follow up with her  PCP.       Goals of Care Treatment Preferences:  Code Status: Full Code        Review of Systems   Constitutional: Negative for chills and fever.   HENT: Negative for sore throat and trouble swallowing.    Eyes: Negative for visual disturbance.   Respiratory: Negative for shortness of breath.    Cardiovascular: Negative for chest pain and palpitations.   Gastrointestinal: Positive for diarrhea. Negative for abdominal pain, nausea and vomiting.   Genitourinary: Negative for difficulty urinating, dysuria and hematuria.   Musculoskeletal: Negative for back pain and neck pain.   Skin: Negative for rash.   Neurological: Positive for dizziness. Negative for syncope and headaches.   Psychiatric/Behavioral: Negative for confusion.     Objective:     Vital Signs (Most Recent):  Temp: 98.3 °F (36.8 °C) (02/12/22 1123)  Pulse: 89 (02/12/22 1521)  Resp: 16 (02/12/22 1510)  BP: 106/62 (02/12/22 1510)  SpO2: 99 % (02/12/22 1510) Vital Signs (24h Range):  Temp:  [97.3 °F (36.3 °C)-98.3 °F (36.8 °C)] 98.3 °F (36.8 °C)  Pulse:  [] 89  Resp:  [13-20] 16  SpO2:  [96 %-100 %] 99 %  BP: (106-134)/(53-75) 106/62     Weight: 58.6 kg (129 lb 3 oz)  Body mass index is 22.18 kg/m².    Intake/Output Summary (Last 24 hours) at 2/12/2022 1523  Last data filed at 2/12/2022 1300  Gross per 24 hour   Intake 1030 ml   Output 650 ml   Net 380 ml      Physical Exam  Constitutional:       Appearance: Normal appearance.   HENT:      Head: Normocephalic and atraumatic.      Mouth/Throat:      Mouth: Mucous membranes are moist.      Pharynx: Oropharynx is clear. No oropharyngeal exudate.   Eyes:      General: No scleral icterus.     Extraocular Movements: Extraocular movements intact.      Pupils: Pupils are equal, round, and reactive to light.   Cardiovascular:      Rate and Rhythm: Normal rate and regular rhythm.      Pulses: Normal pulses.      Heart sounds: Normal heart sounds. No murmur heard.      Pulmonary:      Effort: Pulmonary effort is  normal.      Breath sounds: Normal breath sounds. No wheezing.   Abdominal:      General: Abdomen is flat.      Palpations: Abdomen is soft.      Tenderness: There is no abdominal tenderness.   Musculoskeletal:         General: No swelling or tenderness.      Cervical back: Neck supple.      Right lower leg: No edema.      Left lower leg: No edema.   Skin:     General: Skin is warm and dry.      Findings: No rash.   Neurological:      General: No focal deficit present.      Mental Status: She is alert and oriented to person, place, and time.   Psychiatric:         Mood and Affect: Mood normal.         Behavior: Behavior normal.         Thought Content: Thought content normal.         Significant Labs:   All pertinent labs within the past 24 hours have been reviewed.  CBC:   Recent Labs   Lab 02/11/22  1046 02/12/22  0645   WBC 8.24 9.77   HGB 14.1 12.4   HCT 44.8 37.3    252     CMP:   Recent Labs   Lab 02/11/22  1046 02/12/22  0645   * 142   K 4.4 3.3*    105   CO2 30* 26   * 132*   BUN 10 7*   CREATININE 0.7 0.6   CALCIUM 10.5 9.0   PROT 7.6 6.0   ALBUMIN 4.0 3.1*   BILITOT 2.3* 2.2*   ALKPHOS 68 57   AST 21 25   ALT 15 17   ANIONGAP 13 11   EGFRNONAA >60.0 >60.0       Significant Imaging: I have reviewed all pertinent imaging results/findings within the past 24 hours.        Consults:     No new Assessment & Plan notes have been filed under this hospital service since the last note was generated.  Service: Hospital Medicine    Final Active Diagnoses:    Diagnosis Date Noted POA    PRINCIPAL PROBLEM:  PVC's (premature ventricular contractions) [I49.3] 02/11/2022 Unknown    Type 2 diabetes mellitus without complication, without long-term current use of insulin [E11.9] 12/06/2019 Yes    B12 deficiency [E53.8] 05/28/2018 Yes    Essential hypertension [I10] 03/23/2018 Yes    Hyperlipidemia [E78.5] 03/23/2018 Yes      Problems Resolved During this Admission:       Discharged Condition:  stable    Disposition: Home or Self Care    Follow Up:    Patient Instructions:      Holter monitor - 72 hour   Standing Status: Future Standing Exp. Date: 02/12/23     Order Specific Question Answer Comments   Release to patient Immediate      Echo   Standing Status: Future Standing Exp. Date: 02/12/23     Order Specific Question Answer Comments   Release to patient Immediate          Pending Diagnostic Studies:     Procedure Component Value Units Date/Time    Echo [[id number]]     Order Status: Sent Lab Status: No result          Medications:  Reconciled Home Medications:      Medication List      CHANGE how you take these medications    amLODIPine 10 MG tablet  Commonly known as: NORVASC  HOLD UNTIL SEEN BY YOUR PRIMARY CARE PHYSICIAN  What changed:   · how much to take  · how to take this  · when to take this  · additional instructions     losartan 50 MG tablet  Commonly known as: COZAAR  HOLD UNTIL SEEN BY YOUR PRIMARY CARE PHYSICIAN  What changed:   · how much to take  · how to take this  · when to take this  · additional instructions        CONTINUE taking these medications    amoxicillin-clavulanate 875-125mg 875-125 mg per tablet  Commonly known as: AUGMENTIN  Take 1 tablet by mouth 2 (two) times daily. for 6 days     blood sugar diagnostic Strp  To check BG 2 times daily, to use with insurance preferred meter     blood-glucose meter kit  To check BG 2 times daily, to use with insurance preferred meter     cyanocobalamin 1000 MCG tablet  Commonly known as: VITAMIN B-12  Take 1 tablet (1,000 mcg total) by mouth once daily.     ezetimibe 10 mg tablet  Commonly known as: ZETIA  Take 1 tablet (10 mg total) by mouth once daily.     ferrous sulfate 325 (65 FE) MG EC tablet  Take 325 mg by mouth 3 (three) times daily with meals.     * lancets Misc  To check BG 2 times daily, to use with insurance preferred meter     * TRUEPLUS LANCETS 33 gauge Misc  Generic drug: lancets  USE TO CHECK BLOOD SUGAR TWICE DAILY      latanoprost 0.005 % ophthalmic solution  Place 1 drop into both eyes nightly.     metFORMIN 500 MG ER 24hr tablet  Commonly known as: GLUCOPHAGE-XR  Take 1 tablet (500 mg total) by mouth daily with breakfast.     ondansetron 4 MG Tbdl  Commonly known as: ZOFRAN-ODT  Take 1 tablet (4 mg total) by mouth every 6 (six) hours as needed (nausea, vomting).     oxyCODONE 5 MG immediate release tablet  Commonly known as: ROXICODONE  Take 1 tablet (5 mg total) by mouth every 6 (six) hours as needed for Pain.     vitamin D 1000 units Tab  Commonly known as: VITAMIN D3  Take 400 Units by mouth once daily.     XIIDRA 5 % Dpet  Generic drug: lifitegrast  INSTILL ONE DROP INTO OU BID         * This list has 2 medication(s) that are the same as other medications prescribed for you. Read the directions carefully, and ask your doctor or other care provider to review them with you.                Indwelling Lines/Drains at time of discharge:   Lines/Drains/Airways     None                 Time spent on the discharge of patient: 35 minutes         Gus Chua MD   PGY-1  Department of Hospital Medicine  [facility] - Transplant Stepdown

## 2022-02-12 NOTE — SUBJECTIVE & OBJECTIVE
Past Medical History:   Diagnosis Date    Anemia 7/11/2018    B12 deficiency     Depression     per pcp note 7/2017 and given prozac and diazepam     Hyperlipidemia     Hypertension     PVC (premature ventricular contraction)     seen on ekg from prior pcp    Weight loss     reviewed prior pcp Dr. Russo notes 2017 - labs reviewed: cmp wnl x tbili 1.5, tsh wnl, A1c 5.0, cbc wnl,D 36, hiv neg, hep c neg, hep b surg ag neg        Past Surgical History:   Procedure Laterality Date    EMBOLIZATION N/A 2/14/2019    Procedure: EMBOLIZATION, BLOOD VESSEL;  Surgeon: [facility] Diagnostic Provider;  [facility];  Service: Radiology;  Laterality: N/A;  Room 189/[facility]    EMBOLIZATION N/A 3/21/2019    Procedure: EMBOLIZATION, BLOOD VESSEL;  Surgeon: [facility] Diagnostic Provider;  [facility];  Service: Radiology;  Laterality: N/A;  Room 189/[facility]    ESOPHAGOGASTRODUODENOSCOPY  05/04/2018    esophageal ring, grade 1 esophagitis, gastritis     EYE SURGERY Bilateral     cataract removal    HYSTERECTOMY      fibroids       Review of patient's allergies indicates:  No Known Allergies    Current Facility-Administered Medications on File Prior to Encounter   Medication    0.9%  NaCl infusion    ampicillin-sulbactam 3 g in sodium chloride 0.9 % 100 mL IVPB (ready to mix system)    dextrose 5 % and 0.45 % NaCl with KCl 20 mEq infusion    [COMPLETED] diphenhydrAMINE injection 50 mg    [COMPLETED] DOXOrubicin/NS chemo embolization    [COMPLETED] ethiodized oiL (LIPIODOL) 480 mg iodine/mL injection 20 mL    fentaNYL 50 mcg/mL injection 50 mcg    [COMPLETED] fentaNYL 50 mcg/mL injection    [COMPLETED] heparin (porcine) injection    [COMPLETED] heparin infusion 1,000 units/500 ml in 0.9% NaCl (pressure line flush)    [COMPLETED] hydrocortisone sodium succinate injection 200 mg    [COMPLETED] iohexoL (OMNIPAQUE 300) injection 100 mL    LIDOcaine (PF) 10 mg/ml (1%) injection 10 mg     [COMPLETED] LIDOcaine HCL 10 mg/ml (1%) injection    [COMPLETED] magnesium sulfate 2g in water 50mL IVPB (premix)    midazolam (VERSED) 1 mg/mL injection 1 mg    [COMPLETED] midazolam (VERSED) 1 mg/mL injection    [COMPLETED] nitroGLYCERIN injection    octreotide (SANDOSTATIN) 5,000 mcg in sodium chloride 0.9% 100 mL infusion    octreotide (SANDOSTATIN) 500 mcg in sodium chloride 0.9% IVPB    ondansetron (ZOFRAN) 16 mg in sodium chloride 0.9% 50 mL IVPB    [COMPLETED] ondansetron injection    oxyCODONE immediate release tablet 5 mg     Current Outpatient Medications on File Prior to Encounter   Medication Sig    amLODIPine (NORVASC) 10 MG tablet Take 1 tablet (10 mg total) by mouth once daily.    amoxicillin-clavulanate 875-125mg (AUGMENTIN) 875-125 mg per tablet Take 1 tablet by mouth 2 (two) times daily.    blood sugar diagnostic Strp To check BG 2 times daily, to use with insurance preferred meter    blood-glucose meter kit To check BG 2 times daily, to use with insurance preferred meter    cyanocobalamin (VITAMIN B-12) 1000 MCG tablet Take 1 tablet (1,000 mcg total) by mouth once daily.    ezetimibe (ZETIA) 10 mg tablet Take 1 tablet (10 mg total) by mouth once daily.    ferrous sulfate 325 (65 FE) MG EC tablet Take 325 mg by mouth 3 (three) times daily with meals.    lancets Misc To check BG 2 times daily, to use with insurance preferred meter    latanoprost 0.005 % ophthalmic solution Place 1 drop into both eyes nightly.    losartan (COZAAR) 50 MG tablet Take 1 tablet (50 mg total) by mouth once daily.    metFORMIN (GLUCOPHAGE-XR) 500 MG ER 24hr tablet Take 1 tablet (500 mg total) by mouth daily with breakfast.    ondansetron (ZOFRAN-ODT) 4 MG TbDL Take 1 tablet (4 mg total) by mouth every 6 (six) hours as needed (nausea, vomting).    oxyCODONE (ROXICODONE) 5 MG immediate release tablet Take 1 tablet (5 mg total) by mouth every 6 (six) hours as needed for Pain.    TRUEPLUS LANCETS 33  gauge Misc USE TO CHECK BLOOD SUGAR TWICE DAILY    vitamin D (VITAMIN D3) 1000 units Tab Take 400 Units by mouth once daily.    XIIDRA 5 % Dpet INSTILL ONE DROP INTO OU BID    [DISCONTINUED] amoxicillin-clavulanate 875-125mg (AUGMENTIN) 875-125 mg per tablet Take 1 tablet by mouth 2 (two) times a day.    [DISCONTINUED] cholestyramine-aspartame (QUESTRAN LIGHT) 4 gram PwPk Take by mouth.    [DISCONTINUED] ondansetron (ZOFRAN-ODT) 4 MG TbDL Take 1 tablet (4 mg total) by mouth every 6 (six) hours as needed (nausea, vomiting).    [DISCONTINUED] oxyCODONE (ROXICODONE) 5 MG immediate release tablet Take 1 tablet (5 mg total) by mouth every 6 (six) hours as needed for Pain.    [DISCONTINUED] SPS, WITH SORBITOL, 30-40 gram/120 mL Enem TAKE 60 ML BY MOUTH ONCE FOR 1 DOSE     Family History     Problem Relation (Age of Onset)    Asthma Sister    COPD Brother    Cancer Father, Brother    Diabetes Sister, Brother    Emphysema Brother    Glaucoma Mother    Heart attack Father, Sister    Hyperlipidemia Sister    Hypertension Mother, Brother    No Known Problems Sister    Stroke Brother        Tobacco Use    Smoking status: Never Smoker    Smokeless tobacco: Never Used   Substance and Sexual Activity    Alcohol use: No     Comment: stopped in 2007 - hx of social drinking    Drug use: Never    Sexual activity: Not Currently     Partners: Male     Review of Systems   Constitutional: Negative for chills and fever.   HENT: Negative for sore throat and trouble swallowing.    Eyes: Negative for visual disturbance.   Respiratory: Negative for shortness of breath.    Cardiovascular: Negative for chest pain and palpitations.   Gastrointestinal: Positive for diarrhea. Negative for abdominal pain, nausea and vomiting.   Genitourinary: Negative for difficulty urinating, dysuria and hematuria.   Musculoskeletal: Negative for back pain and neck pain.   Skin: Negative for rash.   Neurological: Positive for dizziness. Negative for  syncope and headaches.   Psychiatric/Behavioral: Negative for confusion.     Objective:     Vital Signs (Most Recent):    Vital Signs (24h Range):  Temp:  [97.2 °F (36.2 °C)-97.3 °F (36.3 °C)] 97.3 °F (36.3 °C)  Pulse:  [] 97  Resp:  [13-20] 16  SpO2:  [99 %-100 %] 100 %  BP: (109-149)/(53-88) 130/55        There is no height or weight on file to calculate BMI.    Physical Exam  Constitutional:       Appearance: Normal appearance.   HENT:      Head: Normocephalic and atraumatic.      Mouth/Throat:      Mouth: Mucous membranes are moist.      Pharynx: Oropharynx is clear. No oropharyngeal exudate.   Eyes:      General: No scleral icterus.     Extraocular Movements: Extraocular movements intact.      Pupils: Pupils are equal, round, and reactive to light.   Cardiovascular:      Rate and Rhythm: Normal rate and regular rhythm.      Pulses: Normal pulses.      Heart sounds: Normal heart sounds. No murmur heard.      Pulmonary:      Effort: Pulmonary effort is normal.      Breath sounds: Normal breath sounds. No wheezing.   Abdominal:      General: Abdomen is flat.      Palpations: Abdomen is soft.      Tenderness: There is no abdominal tenderness.   Musculoskeletal:         General: No swelling or tenderness.      Cervical back: Neck supple.      Right lower leg: No edema.      Left lower leg: No edema.   Skin:     General: Skin is warm and dry.      Findings: No rash.   Neurological:      General: No focal deficit present.      Mental Status: She is alert and oriented to person, place, and time.   Psychiatric:         Mood and Affect: Mood normal.         Behavior: Behavior normal.         Thought Content: Thought content normal.           CRANIAL NERVES     CN III, IV, VI   Pupils are equal, round, and reactive to light.       Significant Labs:   All pertinent labs within the past 24 hours have been reviewed.  CBC:   Recent Labs   Lab 02/11/22  1046   WBC 8.24   HGB 14.1   HCT 44.8        CMP:   Recent  Labs   Lab 02/11/22  1046   *   K 4.4      CO2 30*   *   BUN 10   CREATININE 0.7   CALCIUM 10.5   PROT 7.6   ALBUMIN 4.0   BILITOT 2.3*   ALKPHOS 68   AST 21   ALT 15   ANIONGAP 13   EGFRNONAA >60.0       Significant Imaging: I have reviewed all pertinent imaging results/findings within the past 24 hours.

## 2022-02-12 NOTE — PT/OT/SLP EVAL
"Physical Therapy Co-Evaluation and Discharge Note    Patient Name:  Shahram Hills   MRN:  [id number]    Recommendations:     Discharge Recommendations:  home   Discharge Equipment Recommendations: none   Barriers to discharge: None    Assessment:     Shahram Hills is a 81 y.o. female admitted with a medical diagnosis of PVC's (premature ventricular contractions). Patient is independent with all functional mobility at this time and is at or near their functional baseline and is able to demonstrate independent transfers and household distance ambulation without AD. As a result, patient is without further acute care PT needs at this time. Please re-consult if pt has a change in status, will sign off.    Recent Surgery: * No surgery found *      Plan:     During this hospitalization, patient does not require further acute PT services.  Please re-consult if situation changes.      Subjective     Chief Complaint: "I feel great!"  Patient/Family Comments/goals: d/c home  Pain/Comfort:  · Pain Rating 1: 0/10    Patients cultural, spiritual, Baptism conflicts given the current situation:      Living Environment: pt lives in a duplex with her sister, nephew lives next door. 4 PATRICK with bilateral hand rails  Prior level of function: independent without AD, does not drive  Support available upon discharge: family  Equipment owned: none    Objective:     Communicated with RN prior to session.  Patient found supine with telemetry,peripheral IV  upon PT entry to room.    Co-treatment performed for this visit due to patient need for two skilled therapists to ensure patient and staff safety and to accommodate for patient activity tolerance/pain management     General Precautions: Standard, fall   Orthopedic Precautions:N/A   Braces: N/A     Exams:  · RLE ROM: WFL  · RLE Strength: WFL  · LLE ROM: WFL  · LLE Strength: WFL    Functional Mobility:  · Transfers: independent sit<>stand from recliner  · Gait: Patient ambulated " ~150 ft without use of AD with independence, appropriate gait mechanics and safety awareness noted   · Balance: intact, no overt LOB noted      Therapeutic Activities and Exercises:   Education provided re: d/c planning, PT POC, safety in mobility. Pt verbalizes understanding.     AM-PAC 6 CLICK MOBILITY  Total Score:24     Patient left up in chair with all lines intact and call button in reach.    GOALS:   Multidisciplinary Problems     Physical Therapy Goals     Not on file                History:     Past Medical History:   Diagnosis Date    Anemia 7/11/2018    B12 deficiency     Depression     per pcp note 7/2017 and given prozac and diazepam     Hyperlipidemia     Hypertension     PVC (premature ventricular contraction)     seen on ekg from prior pcp    Weight loss     reviewed prior pcp Dr. Russo notes 2017 - labs reviewed: cmp wnl x tbili 1.5, tsh wnl, A1c 5.0, cbc wnl,D 36, hiv neg, hep c neg, hep b surg ag neg        Past Surgical History:   Procedure Laterality Date    EMBOLIZATION N/A 2/14/2019    Procedure: EMBOLIZATION, BLOOD VESSEL;  Surgeon: [facility] Diagnostic Provider;  [facility];  Service: Radiology;  Laterality: N/A;  Room 189/[facility]    EMBOLIZATION N/A 3/21/2019    Procedure: EMBOLIZATION, BLOOD VESSEL;  Surgeon: [facility] Diagnostic Provider;  [facility];  Service: Radiology;  Laterality: N/A;  Room 189/[facility]    ESOPHAGOGASTRODUODENOSCOPY  05/04/2018    esophageal ring, grade 1 esophagitis, gastritis     EYE SURGERY Bilateral     cataract removal    HYSTERECTOMY      fibroids       Time Tracking:     PT Received On: 02/12/22  PT Start Time: 1017     PT Stop Time: 1026  PT Total Time (min): 9 min     Billable Minutes: Evaluation 9 min      Monica Pandya, PT  02/12/2022

## 2022-02-14 ENCOUNTER — TELEPHONE (OUTPATIENT)
Dept: INTERNAL MEDICINE | Facility: CLINIC | Age: 82
End: 2022-02-14
Payer: MEDICARE

## 2022-02-14 ENCOUNTER — TELEPHONE (OUTPATIENT)
Dept: INTERVENTIONAL RADIOLOGY/VASCULAR | Facility: CLINIC | Age: 82
End: 2022-02-14
Payer: MEDICARE

## 2022-02-14 ENCOUNTER — LAB VISIT (OUTPATIENT)
Dept: LAB | Facility: OTHER | Age: 82
End: 2022-02-14
Attending: INTERNAL MEDICINE
Payer: MEDICARE

## 2022-02-14 DIAGNOSIS — I49.1 PREMATURE ATRIAL CONTRACTIONS: Primary | ICD-10-CM

## 2022-02-14 DIAGNOSIS — C7A.012 MALIGNANT CARCINOID TUMOR OF ILEUM: ICD-10-CM

## 2022-02-14 DIAGNOSIS — D3A.8 NEUROENDOCRINE TUMOR: Primary | ICD-10-CM

## 2022-02-14 DIAGNOSIS — C7B.8 METASTATIC MALIGNANT NEUROENDOCRINE TUMOR TO LIVER: ICD-10-CM

## 2022-02-14 LAB
ALBUMIN SERPL BCP-MCNC: 3.3 G/DL (ref 3.5–5.2)
ALP SERPL-CCNC: 65 U/L (ref 55–135)
ALT SERPL W/O P-5'-P-CCNC: 24 U/L (ref 10–44)
ANION GAP SERPL CALC-SCNC: 8 MMOL/L (ref 8–16)
AST SERPL-CCNC: 28 U/L (ref 10–40)
BILIRUB SERPL-MCNC: 2.1 MG/DL (ref 0.1–1)
BUN SERPL-MCNC: 6 MG/DL (ref 8–23)
CALCIUM SERPL-MCNC: 9.7 MG/DL (ref 8.7–10.5)
CHLORIDE SERPL-SCNC: 100 MMOL/L (ref 95–110)
CO2 SERPL-SCNC: 30 MMOL/L (ref 23–29)
CREAT SERPL-MCNC: 0.8 MG/DL (ref 0.5–1.4)
ERYTHROCYTE [DISTWIDTH] IN BLOOD BY AUTOMATED COUNT: 12.1 % (ref 11.5–14.5)
EST. GFR  (AFRICAN AMERICAN): >60 ML/MIN/1.73 M^2
EST. GFR  (NON AFRICAN AMERICAN): >60 ML/MIN/1.73 M^2
GLUCOSE SERPL-MCNC: 234 MG/DL (ref 70–110)
HCT VFR BLD AUTO: 37.7 % (ref 37–48.5)
HGB BLD-MCNC: 12.1 G/DL (ref 12–16)
IMM GRANULOCYTES # BLD AUTO: 0.05 K/UL (ref 0–0.04)
MCH RBC QN AUTO: 28.9 PG (ref 27–31)
MCHC RBC AUTO-ENTMCNC: 32.1 G/DL (ref 32–36)
MCV RBC AUTO: 90 FL (ref 82–98)
NEUTROPHILS # BLD AUTO: 7.8 K/UL (ref 1.8–7.7)
PLATELET # BLD AUTO: 202 K/UL (ref 150–450)
PMV BLD AUTO: 10.8 FL (ref 9.2–12.9)
POTASSIUM SERPL-SCNC: 3.6 MMOL/L (ref 3.5–5.1)
PROT SERPL-MCNC: 7 G/DL (ref 6–8.4)
RBC # BLD AUTO: 4.18 M/UL (ref 4–5.4)
SODIUM SERPL-SCNC: 138 MMOL/L (ref 136–145)
WBC # BLD AUTO: 11.24 K/UL (ref 3.9–12.7)

## 2022-02-14 PROCEDURE — 85027 COMPLETE CBC AUTOMATED: CPT | Mod: HCNC | Performed by: INTERNAL MEDICINE

## 2022-02-14 PROCEDURE — 80053 COMPREHEN METABOLIC PANEL: CPT | Mod: HCNC | Performed by: INTERNAL MEDICINE

## 2022-02-14 PROCEDURE — 86316 IMMUNOASSAY TUMOR OTHER: CPT | Mod: HCNC | Performed by: INTERNAL MEDICINE

## 2022-02-14 NOTE — TELEPHONE ENCOUNTER
Returned Shayla's call. Pt scheduled for first available that pt and sister are able to make due to sister's work schedule on 2/25/2022. Informed to call us for any needs at all prior to then, especially and B/P issues as pt's HTN meds were reportedly changed during hospital visit.    States understanding and agreement.

## 2022-02-14 NOTE — TELEPHONE ENCOUNTER
----- Message from Anthony Terrazas sent at 2/14/2022  9:45 AM CST -----  Name of Who is Calling: Shayla (sister)         What is the request in detail: Shayla is calling to speak to the nurse in regards to a few questions to scheduling an hosp f/u ASAP. Please advise          Can the clinic reply by [facility]: Yes         What Number to Call Back if not in [facility]: [phone]

## 2022-02-14 NOTE — TELEPHONE ENCOUNTER
Call pt schedule 1 month follow up labs (s/p C-Tace Blair). Spoke to pt sister pt schedule on Friday 3/11/22 at 930 am at our [facility] location. Pt does not need clinic appt.     For future procedures, Pt will need anesthesia per Dr. Pinto.

## 2022-02-15 ENCOUNTER — INFUSION (OUTPATIENT)
Dept: INFUSION THERAPY | Facility: HOSPITAL | Age: 82
End: 2022-02-15
Attending: INTERNAL MEDICINE
Payer: MEDICARE

## 2022-02-15 ENCOUNTER — OFFICE VISIT (OUTPATIENT)
Dept: HEMATOLOGY/ONCOLOGY | Facility: CLINIC | Age: 82
End: 2022-02-15
Payer: MEDICARE

## 2022-02-15 VITALS
OXYGEN SATURATION: 98 % | SYSTOLIC BLOOD PRESSURE: 144 MMHG | TEMPERATURE: 98 F | DIASTOLIC BLOOD PRESSURE: 68 MMHG | BODY MASS INDEX: 22.35 KG/M2 | HEIGHT: 64 IN | RESPIRATION RATE: 18 BRPM | WEIGHT: 130.88 LBS | HEART RATE: 89 BPM

## 2022-02-15 DIAGNOSIS — C7A.8 NEUROENDOCRINE CARCINOMA METASTATIC TO BONE: ICD-10-CM

## 2022-02-15 DIAGNOSIS — C7A.012 MALIGNANT CARCINOID TUMOR OF ILEUM: Primary | ICD-10-CM

## 2022-02-15 DIAGNOSIS — C79.51 SPINE METASTASIS: ICD-10-CM

## 2022-02-15 DIAGNOSIS — C7B.8 METASTATIC MALIGNANT NEUROENDOCRINE TUMOR TO LIVER: ICD-10-CM

## 2022-02-15 DIAGNOSIS — D49.9 IMMUNODEFICIENCY SECONDARY TO NEOPLASM: ICD-10-CM

## 2022-02-15 DIAGNOSIS — I10 ESSENTIAL HYPERTENSION: ICD-10-CM

## 2022-02-15 DIAGNOSIS — C7B.8 NEUROENDOCRINE CARCINOMA METASTATIC TO BONE: ICD-10-CM

## 2022-02-15 DIAGNOSIS — C7B.8 METASTATIC MALIGNANT NEUROENDOCRINE TUMOR TO LIVER: Primary | ICD-10-CM

## 2022-02-15 DIAGNOSIS — I49.3 PVC'S (PREMATURE VENTRICULAR CONTRACTIONS): ICD-10-CM

## 2022-02-15 DIAGNOSIS — D84.81 IMMUNODEFICIENCY SECONDARY TO NEOPLASM: ICD-10-CM

## 2022-02-15 DIAGNOSIS — C7B.8 SECONDARY NEUROENDOCRINE TUMOR OF BONE: ICD-10-CM

## 2022-02-15 DIAGNOSIS — E11.9 TYPE 2 DIABETES MELLITUS WITHOUT COMPLICATION, WITHOUT LONG-TERM CURRENT USE OF INSULIN: ICD-10-CM

## 2022-02-15 LAB — CGA SERPL-MCNC: 1353 NG/ML

## 2022-02-15 PROCEDURE — 99215 PR OFFICE/OUTPT VISIT, EST, LEVL V, 40-54 MIN: ICD-10-PCS | Mod: HCNC,S$GLB,, | Performed by: INTERNAL MEDICINE

## 2022-02-15 PROCEDURE — 96372 THER/PROPH/DIAG INJ SC/IM: CPT | Mod: HCNC

## 2022-02-15 PROCEDURE — 1126F PR PAIN SEVERITY QUANTIFIED, NO PAIN PRESENT: ICD-10-PCS | Mod: HCNC,CPTII,S$GLB, | Performed by: INTERNAL MEDICINE

## 2022-02-15 PROCEDURE — 3288F FALL RISK ASSESSMENT DOCD: CPT | Mod: HCNC,CPTII,S$GLB, | Performed by: INTERNAL MEDICINE

## 2022-02-15 PROCEDURE — 3078F PR MOST RECENT DIASTOLIC BLOOD PRESSURE < 80 MM HG: ICD-10-PCS | Mod: HCNC,CPTII,S$GLB, | Performed by: INTERNAL MEDICINE

## 2022-02-15 PROCEDURE — 1160F PR REVIEW ALL MEDS BY PRESCRIBER/CLIN PHARMACIST DOCUMENTED: ICD-10-PCS | Mod: HCNC,CPTII,S$GLB, | Performed by: INTERNAL MEDICINE

## 2022-02-15 PROCEDURE — 1101F PT FALLS ASSESS-DOCD LE1/YR: CPT | Mod: HCNC,CPTII,S$GLB, | Performed by: INTERNAL MEDICINE

## 2022-02-15 PROCEDURE — 1159F PR MEDICATION LIST DOCUMENTED IN MEDICAL RECORD: ICD-10-PCS | Mod: HCNC,CPTII,S$GLB, | Performed by: INTERNAL MEDICINE

## 2022-02-15 PROCEDURE — 99999 PR PBB SHADOW E&M-EST. PATIENT-LVL IV: ICD-10-PCS | Mod: PBBFAC,HCNC,, | Performed by: INTERNAL MEDICINE

## 2022-02-15 PROCEDURE — 3077F PR MOST RECENT SYSTOLIC BLOOD PRESSURE >= 140 MM HG: ICD-10-PCS | Mod: HCNC,CPTII,S$GLB, | Performed by: INTERNAL MEDICINE

## 2022-02-15 PROCEDURE — 1126F AMNT PAIN NOTED NONE PRSNT: CPT | Mod: HCNC,CPTII,S$GLB, | Performed by: INTERNAL MEDICINE

## 2022-02-15 PROCEDURE — 1101F PR PT FALLS ASSESS DOC 0-1 FALLS W/OUT INJ PAST YR: ICD-10-PCS | Mod: HCNC,CPTII,S$GLB, | Performed by: INTERNAL MEDICINE

## 2022-02-15 PROCEDURE — 3077F SYST BP >= 140 MM HG: CPT | Mod: HCNC,CPTII,S$GLB, | Performed by: INTERNAL MEDICINE

## 2022-02-15 PROCEDURE — 99999 PR PBB SHADOW E&M-EST. PATIENT-LVL IV: CPT | Mod: PBBFAC,HCNC,, | Performed by: INTERNAL MEDICINE

## 2022-02-15 PROCEDURE — 1159F MED LIST DOCD IN RCRD: CPT | Mod: HCNC,CPTII,S$GLB, | Performed by: INTERNAL MEDICINE

## 2022-02-15 PROCEDURE — 3288F PR FALLS RISK ASSESSMENT DOCUMENTED: ICD-10-PCS | Mod: HCNC,CPTII,S$GLB, | Performed by: INTERNAL MEDICINE

## 2022-02-15 PROCEDURE — 63600175 PHARM REV CODE 636 W HCPCS: Mod: JG,HCNC | Performed by: INTERNAL MEDICINE

## 2022-02-15 PROCEDURE — 3078F DIAST BP <80 MM HG: CPT | Mod: HCNC,CPTII,S$GLB, | Performed by: INTERNAL MEDICINE

## 2022-02-15 PROCEDURE — 99215 OFFICE O/P EST HI 40 MIN: CPT | Mod: HCNC,S$GLB,, | Performed by: INTERNAL MEDICINE

## 2022-02-15 PROCEDURE — 1160F RVW MEDS BY RX/DR IN RCRD: CPT | Mod: HCNC,CPTII,S$GLB, | Performed by: INTERNAL MEDICINE

## 2022-02-15 RX ORDER — LANREOTIDE ACETATE 120 MG/.5ML
120 INJECTION SUBCUTANEOUS
Status: DISCONTINUED | OUTPATIENT
Start: 2022-02-15 | End: 2022-02-15 | Stop reason: HOSPADM

## 2022-02-15 RX ORDER — LANREOTIDE ACETATE 120 MG/.5ML
120 INJECTION SUBCUTANEOUS
Status: CANCELLED | OUTPATIENT
Start: 2022-02-15

## 2022-02-15 RX ADMIN — LANREOTIDE ACETATE 120 MG: 120 INJECTION SUBCUTANEOUS at 08:02

## 2022-02-15 NOTE — PROGRESS NOTES
"Subjective:       Patient ID: Shahram Hills is a 81 y.o. female.     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 Ms. Hills is an 77 yo woman who initially saw me on 6/7/18 for further evaluation of neuroendocrine tumor. She initially presented with diarrhea, abdominal discomfort, weight loss. She was evaluated at [facility] and underwent workup below:  US abdomen on 3/14/18 showed numerous bilobar mixed echogenicity and mildly vascular hepatic lesions. Cholelithiasis without e/o acute cholecystitis.   MRI abdomen on 3/30/2018 showed innumerable hepatic metastases. L3 vertebral body metastasis with soft tissue component along the right margin of the L3 and upper L4 vertebral bodies, filling the right L3/4 neural foramen, and extending into the anterior aspect of the spinal canal on the right at the L3 and upper T4 levels. Associated with mild spinal canal narrowing.  CT chest on 4/6/2018 showed one lucent nodule measuring 7 mm in the T7 vertebral body, most likely representing metastatic disease.    EGD on 5/4/18 showed normal duodenum. Schatzki's ring in the lower third of the esophagus. Grade 1 esophagitis in the GE junction c/w mild distal esophagitis. Congestion and erythema in the antrum, pre-pyloric region and stomach body c/w gastritis. Biopsy of the stomach antrum showed mild chronic gastritis, neg for H. pylori.   Labs on 5/9/2018: WBC 6.9, H/H 11.6/34.3, MCV 87.5, plt count 279. On 3/9/18: Vit B12 level 173, folic acid 6.6  She was started on oral vit B12 and underwent a liver biopsy on 5/18/18. Pathology showed "metastatic well differentiated neuroendocrine tumor. Ki67 3%"     She saw me on 6/7/18 and complained of weight loss of 26 lbs over the past 3-4 months, also has diarrhea. No flushing, wheezing, palpitations. Has been having pain in right flank radiating down " "the right leg. No tingling/numbness, weakness. She can hold her bladder but when she urinates her diarrhea would come out as well. Started on dex 4 mg q6h the evening of 18.      2. MRI spine on 18 showed "Marrow replacing metastatic lesion of the L3 vertebral body with associated extra osseous expansion and complete effacement of the right L3-L4 neural foramina and additional effacement of the right lateral recess.  There is additional lateral extension and abutment of the right psoas muscle.  Superimposed degenerative change at this level results in mild spinal canal stenosis." I sent the patient to ED on 18 where she was evaluated by neurosurgery. Neurosurgery did not feel she was a candidate for surgical intervention.      3. Seen by Dr. Adames on 18. palliative radiation to the area of the L3 metastasis 18-18   4. 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  There is an adjacent metastatic lymph node, diffuse liver metastases, and multiple bone metastases. Index primary neoplasm SUV max 33.13. Adjacent lymph node SUV max 23. L3 bone metastasis SUV max 36.86. Left lobe SUV max 46.13   5. Lanreotide every 4 weeks started on 18  6. TACE to the right hepatic lobe on 19. TACE to the left hepatic lobe on 3/21/19.   7. TACE to the right hepatic lobe on 22     History of Present Illness:   Ms. Hills returns for follow up. Feeling well. Had TACE to the right hepatic lobe on 22. Admitted overnight for observation due to frequent PVCs. Patient is feeling well. Scheduled to see Dr Xie on .      ECO     ROS:   See HPI. Otherwise negative.      Physical Examination:   Vital signs reviewed.   Gen: well hydrated, well developed, in no acute distress.  HEENT: normocephalic, anicteric, PERRLA, EOMI, oropharynx clear  Neck: supple, no JVD, thyromegaly, cervical or supraclavicular LAD  Lungs: CTAB, no wheezes or rales  Heart: " regular rate, irregular, no M/R/G  Abdomen: soft, no tenderness, non-distended, no hepatomegaly, no splenomegaly, no mass, or hernia.   Ext:: no edema  Neuro: alert and oriented x 4, no focal neuro deficit  Skin: no rash, open wounds or ulcers  Psych: pleasant and appropriate mood and affect     Objective:      Laboratory Data:  Labs reviewed. CBC, CMP stable. Bilirubin 2.1. Chromogranin A pending     Imaging Data:  CT chest 1/12/22:  . Newly seen solid nodule at the left lung base, measuring 0.5 cm.  Clinical considerations will determine further workup and/or follow-up.  2. Stable additional subcentimeter pulmonary nodules bilaterally.  3. Unchanged appearance of sclerotic focus with central lucency in the right manubrium.  4. Limited evaluation of multiple hypoattenuating lesions in the liver.  5. Additional findings as above.    MRI abdomen/pelvis 1/12/22:  1. Patient with neuroendocrine tumor.  Interval progression of hepatic metastasis with several lesions increased in size.  Stable appearance of the ileal lesion and osseous metastasis.  2. Mildly increased size of a prominent mesenteric lymph nodes.  3. Cholelithiasis with stable biliary dilatation.  4. Additional findings as above.  RECIST SUMMARY:     Date of prior examination for comparison: 10/15/2021     Lesion 1: Left hepatic lobe.  6.3 cm. Series 20 image 49.  Prior measurement 5.5 cm.     Lesion 2: Distal ileum.  3.0 cm. Series 26 image 8.  Prior measurement 3.1 cm.     Lesion 3: Right hepatic dome.  5.3 cm. Series 20 image 21.  Prior measurement 5.2 cm.     Assessment and Plan:      1. Malignant carcinoid tumor of ileum    2. 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 Spine metastasis    4. Secondary neuroendocrine tumor of bone    5. 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    6. Immunodeficiency secondary to neoplasm    7. Type 2 diabetes mellitus without complication, without long-term current use of insulin    8. PVC's (premature  ventricular contractions)    9. Essential hypertension      1-6  - Ms Hills is an 80 yo woman with well differentiated low-grade neuroendocrine tumor of the ileum with mets to liver and bones. On lanreotide every 4 weeks. S/p TACE to the right hepatic lobe on 2/14/19. TACE to the left hepatic lobe on 3/21/19.   - CT/MRI 1/12/22 showed mild progression in the liver. S/p TACE to the right hepatic lobe on 2/11/22  - doing well. Lanreotide today  - c/w lanreotide every 4 weeks  - RTC in one month. If liver function stable will message Dr Pinto re treating the left hepatic lobe     7.  - BS slightly elevated. Discussed with patient  - f/u with Dr Xie    8.  - f/u with Dr Xie    9.  - BP controlled  - amlodipine and cozaar was held on discharge. F/u with PCP    RTC in one month   Their questions were answered in the clinic. Encouraged to call should issues arise      Route Chart for Scheduling    Med Onc Chart Routing      Follow up with physician 4 weeks.   Follow up with JUNAID    Labs CBC and CMP   Lab interval:  Chromogranin A   Imaging    Pharmacy appointment No pharmacy appointment needed      Other referrals No additional referrals needed           Therapy Plan Information  5. Medications  lanreotide injection 120 mg  120 mg, Subcutaneous, Every visit

## 2022-02-15 NOTE — NURSING
Patient received Lanreotide injection SQ to left hip.  Tolerated well. Band-aid applied to site. C/D/I.  Patient ambulated off unit with steady gait.

## 2022-02-17 ENCOUNTER — TELEPHONE (OUTPATIENT)
Dept: INTERNAL MEDICINE | Facility: CLINIC | Age: 82
End: 2022-02-17
Payer: MEDICARE

## 2022-02-17 NOTE — TELEPHONE ENCOUNTER
----- Message from Lena Lewis sent at 2/17/2022 12:05 PM CST -----  Regarding: Patient call back  Who called: Prema     What is the request in detail: Patient is requesting a call back. She states her pharmacy is holding two of her medications and was advised se needed to contact the staff. She is not sure which medications are being held or why. She would like to further discuss.   Please advise.    Can the clinic reply by [facility]? No    Best call back number: [phone]    Additional Information: N/A

## 2022-02-17 NOTE — TELEPHONE ENCOUNTER
Patient sister states that she contact pharmacy in regards to what medication she is referencing so that staff can look further in details to what is going on with patient medication being placed on hold and why.

## 2022-02-25 ENCOUNTER — OFFICE VISIT (OUTPATIENT)
Dept: INTERNAL MEDICINE | Facility: CLINIC | Age: 82
End: 2022-02-25
Attending: INTERNAL MEDICINE
Payer: MEDICARE

## 2022-02-25 VITALS
BODY MASS INDEX: 22.32 KG/M2 | SYSTOLIC BLOOD PRESSURE: 148 MMHG | HEART RATE: 54 BPM | WEIGHT: 130.75 LBS | OXYGEN SATURATION: 98 % | DIASTOLIC BLOOD PRESSURE: 82 MMHG | HEIGHT: 64 IN

## 2022-02-25 DIAGNOSIS — I49.3 PVC'S (PREMATURE VENTRICULAR CONTRACTIONS): ICD-10-CM

## 2022-02-25 DIAGNOSIS — I10 HYPERTENSION, BENIGN: ICD-10-CM

## 2022-02-25 DIAGNOSIS — E11.9 CONTROLLED TYPE 2 DIABETES MELLITUS WITHOUT COMPLICATION, WITHOUT LONG-TERM CURRENT USE OF INSULIN: Primary | ICD-10-CM

## 2022-02-25 DIAGNOSIS — I49.1 PAC (PREMATURE ATRIAL CONTRACTION): ICD-10-CM

## 2022-02-25 DIAGNOSIS — C7B.8 METASTATIC MALIGNANT NEUROENDOCRINE TUMOR TO LIVER: ICD-10-CM

## 2022-02-25 PROCEDURE — 1159F PR MEDICATION LIST DOCUMENTED IN MEDICAL RECORD: ICD-10-PCS | Mod: HCNC,CPTII,S$GLB, | Performed by: INTERNAL MEDICINE

## 2022-02-25 PROCEDURE — 3077F PR MOST RECENT SYSTOLIC BLOOD PRESSURE >= 140 MM HG: ICD-10-PCS | Mod: HCNC,CPTII,S$GLB, | Performed by: INTERNAL MEDICINE

## 2022-02-25 PROCEDURE — 3079F PR MOST RECENT DIASTOLIC BLOOD PRESSURE 80-89 MM HG: ICD-10-PCS | Mod: HCNC,CPTII,S$GLB, | Performed by: INTERNAL MEDICINE

## 2022-02-25 PROCEDURE — 1160F RVW MEDS BY RX/DR IN RCRD: CPT | Mod: HCNC,CPTII,S$GLB, | Performed by: INTERNAL MEDICINE

## 2022-02-25 PROCEDURE — 3077F SYST BP >= 140 MM HG: CPT | Mod: HCNC,CPTII,S$GLB, | Performed by: INTERNAL MEDICINE

## 2022-02-25 PROCEDURE — 99999 PR PBB SHADOW E&M-EST. PATIENT-LVL IV: CPT | Mod: PBBFAC,HCNC,, | Performed by: INTERNAL MEDICINE

## 2022-02-25 PROCEDURE — 1126F AMNT PAIN NOTED NONE PRSNT: CPT | Mod: HCNC,CPTII,S$GLB, | Performed by: INTERNAL MEDICINE

## 2022-02-25 PROCEDURE — 3079F DIAST BP 80-89 MM HG: CPT | Mod: HCNC,CPTII,S$GLB, | Performed by: INTERNAL MEDICINE

## 2022-02-25 PROCEDURE — 1126F PR PAIN SEVERITY QUANTIFIED, NO PAIN PRESENT: ICD-10-PCS | Mod: HCNC,CPTII,S$GLB, | Performed by: INTERNAL MEDICINE

## 2022-02-25 PROCEDURE — 99214 PR OFFICE/OUTPT VISIT, EST, LEVL IV, 30-39 MIN: ICD-10-PCS | Mod: HCNC,S$GLB,, | Performed by: INTERNAL MEDICINE

## 2022-02-25 PROCEDURE — 99999 PR PBB SHADOW E&M-EST. PATIENT-LVL IV: ICD-10-PCS | Mod: PBBFAC,HCNC,, | Performed by: INTERNAL MEDICINE

## 2022-02-25 PROCEDURE — 1101F PR PT FALLS ASSESS DOC 0-1 FALLS W/OUT INJ PAST YR: ICD-10-PCS | Mod: HCNC,CPTII,S$GLB, | Performed by: INTERNAL MEDICINE

## 2022-02-25 PROCEDURE — 1159F MED LIST DOCD IN RCRD: CPT | Mod: HCNC,CPTII,S$GLB, | Performed by: INTERNAL MEDICINE

## 2022-02-25 PROCEDURE — 3288F PR FALLS RISK ASSESSMENT DOCUMENTED: ICD-10-PCS | Mod: HCNC,CPTII,S$GLB, | Performed by: INTERNAL MEDICINE

## 2022-02-25 PROCEDURE — 99214 OFFICE O/P EST MOD 30 MIN: CPT | Mod: HCNC,S$GLB,, | Performed by: INTERNAL MEDICINE

## 2022-02-25 PROCEDURE — 3288F FALL RISK ASSESSMENT DOCD: CPT | Mod: HCNC,CPTII,S$GLB, | Performed by: INTERNAL MEDICINE

## 2022-02-25 PROCEDURE — 1101F PT FALLS ASSESS-DOCD LE1/YR: CPT | Mod: HCNC,CPTII,S$GLB, | Performed by: INTERNAL MEDICINE

## 2022-02-25 PROCEDURE — 1160F PR REVIEW ALL MEDS BY PRESCRIBER/CLIN PHARMACIST DOCUMENTED: ICD-10-PCS | Mod: HCNC,CPTII,S$GLB, | Performed by: INTERNAL MEDICINE

## 2022-02-25 RX ORDER — LOSARTAN POTASSIUM 50 MG/1
TABLET ORAL
Qty: 90 TABLET | Refills: 3 | Status: SHIPPED | OUTPATIENT
Start: 2022-02-25 | End: 2022-02-25 | Stop reason: SDUPTHER

## 2022-02-25 RX ORDER — METFORMIN HYDROCHLORIDE 500 MG/1
1000 TABLET, EXTENDED RELEASE ORAL
Qty: 180 TABLET | Refills: 3 | Status: SHIPPED | OUTPATIENT
Start: 2022-02-25 | End: 2023-03-14

## 2022-02-25 RX ORDER — LOSARTAN POTASSIUM 50 MG/1
50 TABLET ORAL DAILY
Qty: 90 TABLET | Refills: 3 | Status: SHIPPED | OUTPATIENT
Start: 2022-02-25 | End: 2023-03-14 | Stop reason: SDUPTHER

## 2022-02-25 NOTE — PROGRESS NOTES
"Subjective:   Patient ID: Shahram Hills is a 81 y.o. female  Chief complaint: No chief complaint on file.      HPI  Here for hospital discharge follow up   Seen by h/o 2/15  She is accompanied by her sister today     Had TACE to the right hepatic lobe on 22. Admitted overnight for observation due to frequent PVCs  - During the procedure, she had multiple PACs and PVCs - became hypotensive - given IVFs and bp responded approp   - had mult PVCs - asymptomatic   - denied chest pain, shortness of breath, abdominal pain, diaphoresis,  palpitations, or light headedness  - lifelong on-smoker    Outpatient echo and holter ordered - needs to be scheduled     Diabetes:   Here for diabetes follow up   bg was > 200 on recent labs outpatient - was not fasting   Fasting b, 150, 150, 150, 145  Taking metformin daily     HTN:  bp meds held since hospitalization   bp [id number], hr: 54-96  - discussed resuming losartan at 50mg and continuing to hold amlodipine and will add back pending further bp readings  - prev was on amlodipine 10, losartan 50mg    Review of Systems    Objective:  Vitals:    22 1117   BP: (!) 148/82   BP Location: Left arm   Patient Position: Sitting   BP Method: Small (Manual)   Pulse: (!) 54   SpO2: 98%   Weight: 59.3 kg (130 lb 11.7 oz)   Height: 5' 4" (1.626 m)     Body mass index is 22.44 kg/m².    Physical Exam  Vitals reviewed.   Constitutional:       Appearance: Normal appearance. She is well-developed.   HENT:      Head: Normocephalic and atraumatic.      Nose:      Comments: Wearing mask  Eyes:      Extraocular Movements: Extraocular movements intact.      Conjunctiva/sclera: Conjunctivae normal.   Cardiovascular:      Rate and Rhythm: Normal rate and regular rhythm.      Pulses: Normal pulses.      Heart sounds: Normal heart sounds.   Pulmonary:      Effort: Pulmonary effort is normal.      Breath sounds: Normal breath sounds.   Abdominal:      General: Bowel sounds " are normal.      Palpations: Abdomen is soft. There is mass (ruq mass).   Musculoskeletal:         General: No swelling or tenderness. Normal range of motion.      Cervical back: Normal range of motion and neck supple.   Skin:     General: Skin is warm and dry.      Capillary Refill: Capillary refill takes less than 2 seconds.      Nails: There is no clubbing.   Neurological:      General: No focal deficit present.      Mental Status: She is alert. Mental status is at baseline.      Gait: Gait normal.   Psychiatric:         Mood and Affect: Mood normal.         Speech: Speech normal.         Behavior: Behavior normal.         Thought Content: Thought content normal.         Judgment: Judgment normal.         Assessment:  1. Controlled type 2 diabetes mellitus without complication, without long-term current use of insulin    2. Hypertension, benign    3. PAC (premature atrial contraction)    4. 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 PVC's (premature ventricular contractions)        Plan:  Diagnoses and all orders for this visit:    Controlled type 2 diabetes mellitus without complication, without long-term current use of insulin  -     metFORMIN (GLUCOPHAGE-XR) 500 MG ER 24hr tablet; Take 2 tablets (1,000 mg total) by mouth daily with breakfast.    Hypertension, benign  -     Discontinue: losartan (COZAAR) 50 MG tablet; HOLD UNTIL SEEN BY YOUR PRIMARY CARE PHYSICIAN  -     losartan (COZAAR) 50 MG tablet; Take 1 tablet (50 mg total) by mouth once daily.    PAC (premature atrial contraction)  -     Ambulatory referral/consult to Cardiology; Future    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    PVC's (premature ventricular contractions)      Resume losartan 50mg   NV for bp check in 3-4 weeks   F/u with me in 6-8 weeks in person   Will add back amlodipine pending bp response     Check a1c and flp 3/15 lab appt   Inc metformin   Cont home bg logs and bp logs     Schedule echo and holter and  cards appt after these are completed     Health Maintenance   Topic Date Due    Hemoglobin A1c  05/23/2022    Foot Exam  05/25/2022    Lipid Panel  05/25/2022    Eye Exam  12/10/2022    DEXA Scan  05/31/2024    TETANUS VACCINE  12/10/2029

## 2022-03-11 ENCOUNTER — HOSPITAL ENCOUNTER (OUTPATIENT)
Dept: CARDIOLOGY | Facility: OTHER | Age: 82
Discharge: HOME | End: 2022-03-11
Attending: STUDENT IN AN ORGANIZED HEALTH CARE EDUCATION/TRAINING PROGRAM
Payer: MEDICARE

## 2022-03-11 ENCOUNTER — LAB VISIT (OUTPATIENT)
Dept: LAB | Facility: OTHER | Age: 82
End: 2022-03-11
Attending: INTERNAL MEDICINE
Payer: MEDICARE

## 2022-03-11 ENCOUNTER — HOSPITAL ENCOUNTER (OUTPATIENT)
Dept: CARDIOLOGY | Facility: OTHER | Age: 82
Discharge: HOME | End: 2022-03-11
Attending: HOSPITALIST
Payer: MEDICARE

## 2022-03-11 VITALS
DIASTOLIC BLOOD PRESSURE: 82 MMHG | SYSTOLIC BLOOD PRESSURE: 148 MMHG | HEIGHT: 64 IN | BODY MASS INDEX: 22.2 KG/M2 | WEIGHT: 130 LBS

## 2022-03-11 DIAGNOSIS — I49.1 PREMATURE ATRIAL CONTRACTIONS: ICD-10-CM

## 2022-03-11 DIAGNOSIS — D3A.8 NEUROENDOCRINE TUMOR: ICD-10-CM

## 2022-03-11 DIAGNOSIS — C7A.012 MALIGNANT CARCINOID TUMOR OF ILEUM: ICD-10-CM

## 2022-03-11 DIAGNOSIS — C7B.8 METASTATIC MALIGNANT NEUROENDOCRINE TUMOR TO LIVER: ICD-10-CM

## 2022-03-11 DIAGNOSIS — I49.3 PVC'S (PREMATURE VENTRICULAR CONTRACTIONS): ICD-10-CM

## 2022-03-11 DIAGNOSIS — E78.5 HYPERLIPIDEMIA, UNSPECIFIED HYPERLIPIDEMIA TYPE: ICD-10-CM

## 2022-03-11 DIAGNOSIS — E11.9 CONTROLLED TYPE 2 DIABETES MELLITUS WITHOUT COMPLICATION, WITHOUT LONG-TERM CURRENT USE OF INSULIN: ICD-10-CM

## 2022-03-11 LAB
ALBUMIN SERPL BCP-MCNC: 3.6 G/DL (ref 3.5–5.2)
ALBUMIN SERPL BCP-MCNC: 3.6 G/DL (ref 3.5–5.2)
ALP SERPL-CCNC: 84 U/L (ref 55–135)
ALP SERPL-CCNC: 84 U/L (ref 55–135)
ALT SERPL W/O P-5'-P-CCNC: 10 U/L (ref 10–44)
ALT SERPL W/O P-5'-P-CCNC: 9 U/L (ref 10–44)
ANION GAP SERPL CALC-SCNC: 10 MMOL/L (ref 8–16)
ANION GAP SERPL CALC-SCNC: 10 MMOL/L (ref 8–16)
ASCENDING AORTA: 2.94 CM
AST SERPL-CCNC: 18 U/L (ref 10–40)
AST SERPL-CCNC: 18 U/L (ref 10–40)
AV INDEX (PROSTH): 0.51
AV MEAN GRADIENT: 5 MMHG
AV PEAK GRADIENT: 8 MMHG
AV VALVE AREA: 1.85 CM2
AV VELOCITY RATIO: 0.55
BASOPHILS # BLD AUTO: 0.02 K/UL (ref 0–0.2)
BASOPHILS NFR BLD: 0.2 % (ref 0–1.9)
BILIRUB DIRECT SERPL-MCNC: 0.7 MG/DL (ref 0.1–0.3)
BILIRUB SERPL-MCNC: 2 MG/DL (ref 0.1–1)
BILIRUB SERPL-MCNC: 2 MG/DL (ref 0.1–1)
BSA FOR ECHO PROCEDURE: 1.63 M2
BUN SERPL-MCNC: 8 MG/DL (ref 8–23)
BUN SERPL-MCNC: 8 MG/DL (ref 8–23)
CALCIUM SERPL-MCNC: 10.1 MG/DL (ref 8.7–10.5)
CALCIUM SERPL-MCNC: 10.1 MG/DL (ref 8.7–10.5)
CHLORIDE SERPL-SCNC: 104 MMOL/L (ref 95–110)
CHLORIDE SERPL-SCNC: 105 MMOL/L (ref 95–110)
CHOLEST SERPL-MCNC: 202 MG/DL (ref 120–199)
CHOLEST/HDLC SERPL: 2.3 {RATIO} (ref 2–5)
CO2 SERPL-SCNC: 29 MMOL/L (ref 23–29)
CO2 SERPL-SCNC: 29 MMOL/L (ref 23–29)
CREAT SERPL-MCNC: 0.8 MG/DL (ref 0.5–1.4)
CREAT SERPL-MCNC: 0.8 MG/DL (ref 0.5–1.4)
CV ECHO LV RWT: 0.43 CM
DIFFERENTIAL METHOD: ABNORMAL
DOP CALC AO PEAK VEL: 1.41 M/S
DOP CALC AO VTI: 30.05 CM
DOP CALC LVOT AREA: 3.6 CM2
DOP CALC LVOT DIAMETER: 2.15 CM
DOP CALC LVOT PEAK VEL: 0.77 M/S
DOP CALC LVOT STROKE VOLUME: 55.66 CM3
DOP CALCLVOT PEAK VEL VTI: 15.34 CM
E WAVE DECELERATION TIME: 157.76 MSEC
E/A RATIO: 0.75
E/E' RATIO: 14 M/S
ECHO LV POSTERIOR WALL: 0.81 CM (ref 0.6–1.1)
EJECTION FRACTION: 60 %
EOSINOPHIL # BLD AUTO: 0.1 K/UL (ref 0–0.5)
EOSINOPHIL NFR BLD: 0.6 % (ref 0–8)
ERYTHROCYTE [DISTWIDTH] IN BLOOD BY AUTOMATED COUNT: 13.2 % (ref 11.5–14.5)
ERYTHROCYTE [DISTWIDTH] IN BLOOD BY AUTOMATED COUNT: 13.2 % (ref 11.5–14.5)
EST. GFR  (AFRICAN AMERICAN): >60 ML/MIN/1.73 M^2
EST. GFR  (AFRICAN AMERICAN): >60 ML/MIN/1.73 M^2
EST. GFR  (NON AFRICAN AMERICAN): >60 ML/MIN/1.73 M^2
EST. GFR  (NON AFRICAN AMERICAN): >60 ML/MIN/1.73 M^2
ESTIMATED AVG GLUCOSE: 131 MG/DL (ref 68–131)
FRACTIONAL SHORTENING: 34 % (ref 28–44)
GLUCOSE SERPL-MCNC: 155 MG/DL (ref 70–110)
GLUCOSE SERPL-MCNC: 156 MG/DL (ref 70–110)
HBA1C MFR BLD: 6.2 % (ref 4–5.6)
HCT VFR BLD AUTO: 41.6 % (ref 37–48.5)
HCT VFR BLD AUTO: 41.6 % (ref 37–48.5)
HDLC SERPL-MCNC: 86 MG/DL (ref 40–75)
HDLC SERPL: 42.6 % (ref 20–50)
HGB BLD-MCNC: 13 G/DL (ref 12–16)
HGB BLD-MCNC: 13 G/DL (ref 12–16)
IMM GRANULOCYTES # BLD AUTO: 0.01 K/UL (ref 0–0.04)
IMM GRANULOCYTES # BLD AUTO: 0.01 K/UL (ref 0–0.04)
IMM GRANULOCYTES NFR BLD AUTO: 0.1 % (ref 0–0.5)
INTERVENTRICULAR SEPTUM: 0.89 CM (ref 0.6–1.1)
LA MAJOR: 4.4 CM
LA MINOR: 4.63 CM
LA WIDTH: 3.84 CM
LDLC SERPL CALC-MCNC: 99.8 MG/DL (ref 63–159)
LEFT ATRIUM SIZE: 3.5 CM
LEFT ATRIUM VOLUME INDEX MOD: 28.2 ML/M2
LEFT ATRIUM VOLUME INDEX: 31.6 ML/M2
LEFT ATRIUM VOLUME MOD: 46 CM3
LEFT ATRIUM VOLUME: 51.55 CM3
LEFT INTERNAL DIMENSION IN SYSTOLE: 2.52 CM (ref 2.1–4)
LEFT VENTRICLE DIASTOLIC VOLUME INDEX: 37.87 ML/M2
LEFT VENTRICLE DIASTOLIC VOLUME: 61.73 ML
LEFT VENTRICLE MASS INDEX: 57 G/M2
LEFT VENTRICLE SYSTOLIC VOLUME INDEX: 13.9 ML/M2
LEFT VENTRICLE SYSTOLIC VOLUME: 22.68 ML
LEFT VENTRICULAR INTERNAL DIMENSION IN DIASTOLE: 3.79 CM (ref 3.5–6)
LEFT VENTRICULAR MASS: 92.98 G
LV LATERAL E/E' RATIO: 13 M/S
LV SEPTAL E/E' RATIO: 15.17 M/S
LYMPHOCYTES # BLD AUTO: 2.2 K/UL (ref 1–4.8)
LYMPHOCYTES NFR BLD: 26.9 % (ref 18–48)
MCH RBC QN AUTO: 28.6 PG (ref 27–31)
MCH RBC QN AUTO: 28.6 PG (ref 27–31)
MCHC RBC AUTO-ENTMCNC: 31.3 G/DL (ref 32–36)
MCHC RBC AUTO-ENTMCNC: 31.3 G/DL (ref 32–36)
MCV RBC AUTO: 91 FL (ref 82–98)
MCV RBC AUTO: 91 FL (ref 82–98)
MONOCYTES # BLD AUTO: 0.6 K/UL (ref 0.3–1)
MONOCYTES NFR BLD: 7.7 % (ref 4–15)
MV PEAK A VEL: 1.21 M/S
MV PEAK E VEL: 0.91 M/S
MV STENOSIS PRESSURE HALF TIME: 45.75 MS
MV VALVE AREA P 1/2 METHOD: 4.81 CM2
NEUTROPHILS # BLD AUTO: 5.3 K/UL (ref 1.8–7.7)
NEUTROPHILS # BLD AUTO: 5.3 K/UL (ref 1.8–7.7)
NEUTROPHILS NFR BLD: 64.5 % (ref 38–73)
NONHDLC SERPL-MCNC: 116 MG/DL
NRBC BLD-RTO: 0 /100 WBC
PISA MRMAX VEL: 0.05 M/S
PISA TR MAX VEL: 2.16 M/S
PLATELET # BLD AUTO: 319 K/UL (ref 150–450)
PLATELET # BLD AUTO: 319 K/UL (ref 150–450)
PMV BLD AUTO: 10.4 FL (ref 9.2–12.9)
PMV BLD AUTO: 10.4 FL (ref 9.2–12.9)
POTASSIUM SERPL-SCNC: 4 MMOL/L (ref 3.5–5.1)
POTASSIUM SERPL-SCNC: 4.1 MMOL/L (ref 3.5–5.1)
PROT SERPL-MCNC: 7.5 G/DL (ref 6–8.4)
PROT SERPL-MCNC: 7.5 G/DL (ref 6–8.4)
PULM VEIN S/D RATIO: 1.5
PV PEAK D VEL: 0.38 M/S
PV PEAK S VEL: 0.57 M/S
PV PEAK VELOCITY: 0.8 CM/S
RA MAJOR: 4.39 CM
RA PRESSURE: 3 MMHG
RBC # BLD AUTO: 4.55 M/UL (ref 4–5.4)
RBC # BLD AUTO: 4.55 M/UL (ref 4–5.4)
RIGHT VENTRICULAR END-DIASTOLIC DIMENSION: 2.58 CM
RV TISSUE DOPPLER FREE WALL SYSTOLIC VELOCITY 1 (APICAL 4 CHAMBER VIEW): 10.28 CM/S
SINUS: 2.79 CM
SODIUM SERPL-SCNC: 143 MMOL/L (ref 136–145)
SODIUM SERPL-SCNC: 144 MMOL/L (ref 136–145)
STJ: 2.38 CM
TDI LATERAL: 0.07 M/S
TDI SEPTAL: 0.06 M/S
TDI: 0.07 M/S
TR MAX PG: 19 MMHG
TRICUSPID ANNULAR PLANE SYSTOLIC EXCURSION: 2.07 CM
TRIGL SERPL-MCNC: 81 MG/DL (ref 30–150)
TV REST PULMONARY ARTERY PRESSURE: 22 MMHG
WBC # BLD AUTO: 8.19 K/UL (ref 3.9–12.7)
WBC # BLD AUTO: 8.19 K/UL (ref 3.9–12.7)

## 2022-03-11 PROCEDURE — 86316 IMMUNOASSAY TUMOR OTHER: CPT | Performed by: INTERNAL MEDICINE

## 2022-03-11 PROCEDURE — 93227 XTRNL ECG REC<48 HR R&I: CPT | Mod: ,,, | Performed by: INTERNAL MEDICINE

## 2022-03-11 PROCEDURE — 93306 TTE W/DOPPLER COMPLETE: CPT

## 2022-03-11 PROCEDURE — 93306 ECHO (CUPID ONLY): ICD-10-PCS | Mod: 26,,, | Performed by: INTERNAL MEDICINE

## 2022-03-11 PROCEDURE — 93226 XTRNL ECG REC<48 HR SCAN A/R: CPT

## 2022-03-11 PROCEDURE — 93227 HOLTER MONITOR - 48 HOUR (CUPID ONLY): ICD-10-PCS | Mod: ,,, | Performed by: INTERNAL MEDICINE

## 2022-03-11 PROCEDURE — 80061 LIPID PANEL: CPT | Performed by: INTERNAL MEDICINE

## 2022-03-11 PROCEDURE — 93306 TTE W/DOPPLER COMPLETE: CPT | Mod: 26,,, | Performed by: INTERNAL MEDICINE

## 2022-03-11 PROCEDURE — 85025 COMPLETE CBC W/AUTO DIFF WBC: CPT | Performed by: PHYSICIAN ASSISTANT

## 2022-03-11 PROCEDURE — 36415 COLL VENOUS BLD VENIPUNCTURE: CPT | Performed by: INTERNAL MEDICINE

## 2022-03-11 PROCEDURE — 80053 COMPREHEN METABOLIC PANEL: CPT | Mod: 91 | Performed by: INTERNAL MEDICINE

## 2022-03-11 PROCEDURE — 83036 HEMOGLOBIN GLYCOSYLATED A1C: CPT | Performed by: INTERNAL MEDICINE

## 2022-03-11 PROCEDURE — 80053 COMPREHEN METABOLIC PANEL: CPT | Performed by: PHYSICIAN ASSISTANT

## 2022-03-11 PROCEDURE — 82248 BILIRUBIN DIRECT: CPT | Performed by: PHYSICIAN ASSISTANT

## 2022-03-12 LAB — CGA SERPL-MCNC: 1658 NG/ML

## 2022-03-14 ENCOUNTER — OFFICE VISIT (OUTPATIENT)
Dept: CARDIOLOGY | Facility: CLINIC | Age: 82
End: 2022-03-14
Attending: INTERNAL MEDICINE
Payer: MEDICARE

## 2022-03-14 ENCOUNTER — CLINICAL SUPPORT (OUTPATIENT)
Dept: INTERNAL MEDICINE | Facility: CLINIC | Age: 82
End: 2022-03-14
Payer: MEDICARE

## 2022-03-14 VITALS
HEART RATE: 53 BPM | HEIGHT: 64 IN | BODY MASS INDEX: 22 KG/M2 | SYSTOLIC BLOOD PRESSURE: 136 MMHG | SYSTOLIC BLOOD PRESSURE: 140 MMHG | OXYGEN SATURATION: 98 % | DIASTOLIC BLOOD PRESSURE: 76 MMHG | HEART RATE: 53 BPM | WEIGHT: 128.88 LBS | DIASTOLIC BLOOD PRESSURE: 70 MMHG | OXYGEN SATURATION: 97 %

## 2022-03-14 DIAGNOSIS — I49.1 PAC (PREMATURE ATRIAL CONTRACTION): ICD-10-CM

## 2022-03-14 DIAGNOSIS — I70.0 ATHEROSCLEROSIS OF AORTA: ICD-10-CM

## 2022-03-14 DIAGNOSIS — I49.3 PVC'S (PREMATURE VENTRICULAR CONTRACTIONS): Primary | ICD-10-CM

## 2022-03-14 PROCEDURE — 1159F PR MEDICATION LIST DOCUMENTED IN MEDICAL RECORD: ICD-10-PCS | Mod: CPTII,S$GLB,, | Performed by: INTERNAL MEDICINE

## 2022-03-14 PROCEDURE — 99999 PR PBB SHADOW E&M-EST. PATIENT-LVL IV: CPT | Mod: PBBFAC,,, | Performed by: INTERNAL MEDICINE

## 2022-03-14 PROCEDURE — 3078F DIAST BP <80 MM HG: CPT | Mod: CPTII,S$GLB,, | Performed by: INTERNAL MEDICINE

## 2022-03-14 PROCEDURE — 1160F RVW MEDS BY RX/DR IN RCRD: CPT | Mod: CPTII,S$GLB,, | Performed by: INTERNAL MEDICINE

## 2022-03-14 PROCEDURE — 99204 OFFICE O/P NEW MOD 45 MIN: CPT | Mod: S$GLB,,, | Performed by: INTERNAL MEDICINE

## 2022-03-14 PROCEDURE — 99204 PR OFFICE/OUTPT VISIT, NEW, LEVL IV, 45-59 MIN: ICD-10-PCS | Mod: S$GLB,,, | Performed by: INTERNAL MEDICINE

## 2022-03-14 PROCEDURE — 1159F MED LIST DOCD IN RCRD: CPT | Mod: CPTII,S$GLB,, | Performed by: INTERNAL MEDICINE

## 2022-03-14 PROCEDURE — 3078F PR MOST RECENT DIASTOLIC BLOOD PRESSURE < 80 MM HG: ICD-10-PCS | Mod: CPTII,S$GLB,, | Performed by: INTERNAL MEDICINE

## 2022-03-14 PROCEDURE — 99999 PR PBB SHADOW E&M-EST. PATIENT-LVL II: CPT | Mod: PBBFAC,,,

## 2022-03-14 PROCEDURE — 99999 PR PBB SHADOW E&M-EST. PATIENT-LVL II: ICD-10-PCS | Mod: PBBFAC,,,

## 2022-03-14 PROCEDURE — 99999 PR PBB SHADOW E&M-EST. PATIENT-LVL IV: ICD-10-PCS | Mod: PBBFAC,,, | Performed by: INTERNAL MEDICINE

## 2022-03-14 PROCEDURE — 3077F SYST BP >= 140 MM HG: CPT | Mod: CPTII,S$GLB,, | Performed by: INTERNAL MEDICINE

## 2022-03-14 PROCEDURE — 3077F PR MOST RECENT SYSTOLIC BLOOD PRESSURE >= 140 MM HG: ICD-10-PCS | Mod: CPTII,S$GLB,, | Performed by: INTERNAL MEDICINE

## 2022-03-14 PROCEDURE — 1160F PR REVIEW ALL MEDS BY PRESCRIBER/CLIN PHARMACIST DOCUMENTED: ICD-10-PCS | Mod: CPTII,S$GLB,, | Performed by: INTERNAL MEDICINE

## 2022-03-14 NOTE — PROGRESS NOTES
Shahram Hills 81 y.o. female is here today for Blood Pressure check.   History of HTN yes.    Review of patient's allergies indicates:  No Known Allergies  Creatinine   Date Value Ref Range Status   03/11/2022 0.8 0.5 - 1.4 mg/dL Final   03/11/2022 0.8 0.5 - 1.4 mg/dL Final     Sodium   Date Value Ref Range Status   03/11/2022 143 136 - 145 mmol/L Final   03/11/2022 144 136 - 145 mmol/L Final     Potassium   Date Value Ref Range Status   03/11/2022 4.1 3.5 - 5.1 mmol/L Final   03/11/2022 4.0 3.5 - 5.1 mmol/L Final   ]  Patient verifies taking blood pressure medications on a regular basis at the same time of the day.     Current Outpatient Medications:     amLODIPine (NORVASC) 10 MG tablet, HOLD UNTIL SEEN BY YOUR PRIMARY CARE PHYSICIAN (Patient not taking: Reported on 2/25/2022), Disp: 90 tablet, Rfl: 3    blood sugar diagnostic Strp, To check BG 2 times daily, to use with insurance preferred meter, Disp: 100 each, Rfl: 11    blood-glucose meter kit, To check BG 2 times daily, to use with insurance preferred meter, Disp: 1 each, Rfl: 0    cyanocobalamin (VITAMIN B-12) 1000 MCG tablet, Take 1 tablet (1,000 mcg total) by mouth once daily., Disp: , Rfl:     ezetimibe (ZETIA) 10 mg tablet, Take 1 tablet (10 mg total) by mouth once daily., Disp: 90 tablet, Rfl: 3    ferrous sulfate 325 (65 FE) MG EC tablet, Take 325 mg by mouth 3 (three) times daily with meals., Disp: , Rfl:     lancets Misc, To check BG 2 times daily, to use with insurance preferred meter, Disp: 100 each, Rfl: 11    latanoprost 0.005 % ophthalmic solution, Place 1 drop into both eyes nightly., Disp: , Rfl:     losartan (COZAAR) 50 MG tablet, Take 1 tablet (50 mg total) by mouth once daily., Disp: 90 tablet, Rfl: 3    metFORMIN (GLUCOPHAGE-XR) 500 MG ER 24hr tablet, Take 2 tablets (1,000 mg total) by mouth daily with breakfast., Disp: 180 tablet, Rfl: 3    ondansetron (ZOFRAN-ODT) 4 MG TbDL, Take 1 tablet (4 mg total) by mouth every 6  (six) hours as needed (nausea, vomting)., Disp: 20 tablet, Rfl: 0    oxyCODONE (ROXICODONE) 5 MG immediate release tablet, Take 1 tablet (5 mg total) by mouth every 6 (six) hours as needed for Pain., Disp: 20 tablet, Rfl: 0    TRUEPLUS LANCETS 33 gauge Misc, USE TO CHECK BLOOD SUGAR TWICE DAILY, Disp: , Rfl:     vitamin D (VITAMIN D3) 1000 units Tab, Take 400 Units by mouth once daily., Disp: , Rfl:     XIIDRA 5 % Dpet, INSTILL ONE DROP INTO OU BID, Disp: , Rfl: 3     Does patient have record of home blood pressure readings yes. Readings have been averaging 132/70   150/?.   Last dose of blood pressure medication was taken at 0930 this morning  Patient is asymptomatic.   BP: (!) 148/80 , Pulse: (!) 54 ..  Blood pressure reading after 15 minutes was 136/70, Pulse 53  Dr. Xie notified.

## 2022-03-14 NOTE — Clinical Note
Does patient have record of home blood pressure readings yes. Readings have been averaging 132/70   150/?.  Last dose of blood pressure medication was taken at 0930 this morning Patient is asymptomatic.  BP: (!) 148/80 , Pulse: (!) 54 .. Blood pressure reading after 15 minutes was 136/70, Pulse 53

## 2022-03-14 NOTE — PROGRESS NOTES
[facility]    Chief Complaint  Chief Complaint   Patient presents with    Heart Problem       HPI:    The patient was undergoing chemo embolization of liver metastases.  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  During the procedure, she had multiple ventricular and supraventricular ectopic beats.  She was therefore kept for overnight observation.  There is no documentation of any further arrhythmias.  Her ectopy is asymptomatic.  She has no palpitations.  She has no problems with exertional dyspnea or chest discomfort.  No syncope or presyncope.  No prior cardiac problems or workup.    Medications  Current Outpatient Medications   Medication Sig Dispense Refill    cyanocobalamin (VITAMIN B-12) 1000 MCG tablet Take 1 tablet (1,000 mcg total) by mouth once daily.      ezetimibe (ZETIA) 10 mg tablet Take 1 tablet (10 mg total) by mouth once daily. 90 tablet 3    ferrous sulfate 325 (65 FE) MG EC tablet Take 325 mg by mouth 3 (three) times daily with meals.      latanoprost 0.005 % ophthalmic solution Place 1 drop into both eyes nightly.      losartan (COZAAR) 50 MG tablet Take 1 tablet (50 mg total) by mouth once daily. 90 tablet 3    metFORMIN (GLUCOPHAGE-XR) 500 MG ER 24hr tablet Take 2 tablets (1,000 mg total) by mouth daily with breakfast. 180 tablet 3    ondansetron (ZOFRAN-ODT) 4 MG TbDL Take 1 tablet (4 mg total) by mouth every 6 (six) hours as needed (nausea, vomting). 20 tablet 0    oxyCODONE (ROXICODONE) 5 MG immediate release tablet Take 1 tablet (5 mg total) by mouth every 6 (six) hours as needed for Pain. 20 tablet 0    vitamin D (VITAMIN D3) 1000 units Tab Take 400 Units by mouth once daily.      XIIDRA 5 % Dpet INSTILL ONE DROP INTO OU BID  3    amLODIPine (NORVASC) 10 MG tablet HOLD UNTIL SEEN BY YOUR PRIMARY CARE PHYSICIAN 90 tablet 3    blood sugar diagnostic Strp To check BG 2 times daily, to use with insurance preferred meter  100 each 11    blood-glucose meter kit To check BG 2 times daily, to use with insurance preferred meter 1 each 0    lancets Misc To check BG 2 times daily, to use with insurance preferred meter 100 each 11    TRUEPLUS LANCETS 33 gauge Misc USE TO CHECK BLOOD SUGAR TWICE DAILY       No current facility-administered medications for this visit.        History  Past Medical History:   Diagnosis Date    Anemia 7/11/2018    B12 deficiency     Depression     per pcp note 7/2017 and given prozac and diazepam     Hyperlipidemia     Hypertension     Weight loss     reviewed prior pcp Dr. Russo notes 2017 - labs reviewed: cmp wnl x tbili 1.5, tsh wnl, A1c 5.0, cbc wnl,D 36, hiv neg, hep c neg, hep b surg ag neg      Past Surgical History:   Procedure Laterality Date    EMBOLIZATION N/A 2/14/2019    Procedure: EMBOLIZATION, BLOOD VESSEL;  Surgeon: [facility] Diagnostic Provider;  Location: [facility] OR [address] [place] [address];  Service: Radiology;  Laterality: N/A;  Room 189/[place]    EMBOLIZATION N/A 3/21/2019    Procedure: EMBOLIZATION, BLOOD VESSEL;  Surgeon: [facility] Diagnostic Provider;  [facility];  Service: Radiology;  Laterality: N/A;  Room 189/[place]    ESOPHAGOGASTRODUODENOSCOPY  05/04/2018    esophageal ring, grade 1 esophagitis, gastritis     EYE SURGERY Bilateral     cataract removal    HYSTERECTOMY      fibroids     Social History     Socioeconomic History    Marital status:    Occupational History    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   Tobacco Use    Smoking status: Never Smoker    Smokeless tobacco: Never Used   Substance and Sexual Activity    Alcohol use: No     Comment: stopped in 2007 - hx of social drinking    Drug use: Never    Sexual activity: Not Currently     Partners: Male   Social History Narrative    Living in [facility]    Not getting reg exericse     Family History   Problem Relation Age of Onset    Glaucoma Mother     Hypertension Mother     Heart attack Father      Cancer Father     Diabetes Sister     Cancer Brother         lung cancer, + tobacco    Diabetes Brother     Hypertension Brother     Stroke Brother     Emphysema Brother     COPD Brother     Asthma Sister     No Known Problems Sister     Hyperlipidemia Sister     Heart attack Sister         Allergies  Review of patient's allergies indicates:  No Known Allergies    Review of Systems   Review of Systems   Constitutional: Negative for malaise/fatigue, weight gain and weight loss.   Eyes: Negative for visual disturbance.   Cardiovascular: Negative for chest pain, claudication, cyanosis, dyspnea on exertion, irregular heartbeat, leg swelling, near-syncope, orthopnea, palpitations, paroxysmal nocturnal dyspnea and syncope.   Respiratory: Negative for cough, hemoptysis, shortness of breath, sleep disturbances due to breathing and wheezing.    Hematologic/Lymphatic: Negative for bleeding problem. Does not bruise/bleed easily.   Skin: Negative for poor wound healing.   Musculoskeletal: Negative for muscle cramps and myalgias.   Gastrointestinal: Negative for abdominal pain, anorexia, diarrhea, heartburn, hematemesis, hematochezia, melena, nausea and vomiting.   Genitourinary: Negative for hematuria and nocturia.   Neurological: Negative for excessive daytime sleepiness, dizziness, focal weakness, light-headedness and weakness.       Physical Exam  Vitals:    03/14/22 1345   BP: (!) 140/76   Pulse: (!) 53     Wt Readings from Last 1 Encounters:   03/14/22 58.5 kg (128 lb 14.4 oz)     Physical Exam  Vitals and nursing note reviewed.   Constitutional:       General: She is not in acute distress.     Appearance: She is not toxic-appearing or diaphoretic.   HENT:      Head: Normocephalic and atraumatic.      Mouth/Throat:      Lips: Pink.      Mouth: Mucous membranes are moist.   Eyes:      General: No scleral icterus.     Conjunctiva/sclera: Conjunctivae normal.   Neck:      Thyroid: No thyromegaly.      Vascular: No  carotid bruit, hepatojugular reflux or JVD.      Trachea: Trachea normal.   Cardiovascular:      Rate and Rhythm: Normal rate and regular rhythm. Occasional extrasystoles are present.     Chest Wall: PMI is not displaced.      Pulses:           Carotid pulses are 2+ on the right side and 2+ on the left side.       Radial pulses are 2+ on the right side and 2+ on the left side.      Heart sounds: S1 normal and S2 normal. No murmur heard.    No friction rub. No S3 or S4 sounds.   Pulmonary:      Effort: Pulmonary effort is normal. No accessory muscle usage or respiratory distress.      Breath sounds: Normal breath sounds and air entry. No decreased breath sounds, wheezing, rhonchi or rales.   Abdominal:      General: Bowel sounds are normal. There is no distension or abdominal bruit.      Palpations: Abdomen is soft. There is no hepatomegaly, splenomegaly or pulsatile mass.      Tenderness: There is no abdominal tenderness.   Musculoskeletal:         General: No tenderness or deformity.      Right lower leg: No edema.      Left lower leg: No edema.   Skin:     General: Skin is warm and dry.      Capillary Refill: Capillary refill takes less than 2 seconds.      Coloration: Skin is not cyanotic or pale.      Nails: There is no clubbing.   Neurological:      General: No focal deficit present.      Mental Status: She is alert and oriented to person, place, and time.   Psychiatric:         Attention and Perception: Attention normal.         Mood and Affect: Mood normal.         Speech: Speech normal.         Behavior: Behavior normal. Behavior is cooperative.         Labs  Hospital Outpatient Visit on 03/11/2022   Component Date Value Ref Range Status    Ascending aorta 03/11/2022 2.94  cm Final    STJ 03/11/2022 2.38  cm Final    AV mean gradient 03/11/2022 5  mmHg Final    Ao peak bhargav 03/11/2022 1.41  m/s Final    Ao VTI 03/11/2022 30.05  cm Final    IVS 03/11/2022 0.89  0.6 - 1.1 cm Final    LA size 03/11/2022  3.50  cm Final    Left Atrium Major Axis 03/11/2022 4.40  cm Final    Left Atrium Minor Axis 03/11/2022 4.63  cm Final    LVIDd 03/11/2022 3.79  3.5 - 6.0 cm Final    LVIDs 03/11/2022 2.52  2.1 - 4.0 cm Final    LVOT diameter 03/11/2022 2.15  cm Final    LVOT peak VTI 03/11/2022 15.34  cm Final    Posterior Wall 03/11/2022 0.81  0.6 - 1.1 cm Final    MV Peak A Anthony 03/11/2022 1.21  m/s Final    E wave deceleration time 03/11/2022 157.76  msec Final    MV Peak E Anthony 03/11/2022 0.91  m/s Final    PV Peak D Anthony 03/11/2022 0.38  m/s Final    PV Peak S Anthony 03/11/2022 0.57  m/s Final    RA Major Axis 03/11/2022 4.39  cm Final    RVDD 03/11/2022 2.58  cm Final    Sinus 03/11/2022 2.79  cm Final    TAPSE 03/11/2022 2.07  cm Final    TR Max Anthony 03/11/2022 2.16  m/s Final    LA WIDTH 03/11/2022 3.84  cm Final    PV PEAK VELOCITY 03/11/2022 0.80  cm/s Final    MV stenosis pressure 1/2 time 03/11/2022 45.75  ms Final    LV Diastolic Volume 03/11/2022 61.73  mL Final    LV Systolic Volume 03/11/2022 22.68  mL Final    LVOT peak anthony 03/11/2022 0.77  m/s Final    Mr max anthony 03/11/2022 0.05  m/s Final    LA volume (mod) 03/11/2022 46.00  cm3 Final    RV S' 03/11/2022 10.28  cm/s Final    TDI LATERAL 03/11/2022 0.07  m/s Final    TDI SEPTAL 03/11/2022 0.06  m/s Final    LV LATERAL E/E' RATIO 03/11/2022 13.00  m/s Final    LV SEPTAL E/E' RATIO 03/11/2022 15.17  m/s Final    FS 03/11/2022 34  % Final    LA volume 03/11/2022 51.55  cm3 Final    LV mass 03/11/2022 92.98  g Final    Left Ventricle Relative Wall Thick* 03/11/2022 0.43  cm Final    AV valve area 03/11/2022 1.85  cm2 Final    AV Velocity Ratio 03/11/2022 0.55   Final    AV index (prosthetic) 03/11/2022 0.51   Final    MV valve area p 1/2 method 03/11/2022 4.81  cm2 Final    E/A ratio 03/11/2022 0.75   Final    Mean e' 03/11/2022 0.07  m/s Final    Pulm vein S/D ratio 03/11/2022 1.50   Final    LVOT area 03/11/2022 3.6  cm2 Final     LVOT stroke volume 03/11/2022 55.66  cm3 Final    AV peak gradient 03/11/2022 8  mmHg Final    E/E' ratio 03/11/2022 14.00  m/s Final    Triscuspid Valve Regurgitation Pea* 03/11/2022 19  mmHg Final    BSA 03/11/2022 1.63  m2 Final    Right Atrial Pressure (from IVC) 03/11/2022 3  mmHg Final    TV rest pulmonary artery pressure 03/11/2022 22  mmHg Final    LV Systolic Volume Index 03/11/2022 13.9  mL/m2 Final    LV Diastolic Volume Index 03/11/2022 37.87  mL/m2 Final    LA Volume Index 03/11/2022 31.6  mL/m2 Final    LV Mass Index 03/11/2022 57  g/m2 Final    LA Volume Index (Mod) 03/11/2022 28.2  mL/m2 Final    EF 03/11/2022 60  % Final   Lab Visit on 03/11/2022   Component Date Value Ref Range Status    Sodium 03/11/2022 143  136 - 145 mmol/L Final    Potassium 03/11/2022 4.1  3.5 - 5.1 mmol/L Final    Chloride 03/11/2022 104  95 - 110 mmol/L Final    CO2 03/11/2022 29  23 - 29 mmol/L Final    Glucose 03/11/2022 156 (A) 70 - 110 mg/dL Final    BUN 03/11/2022 8  8 - 23 mg/dL Final    Creatinine 03/11/2022 0.8  0.5 - 1.4 mg/dL Final    Calcium 03/11/2022 10.1  8.7 - 10.5 mg/dL Final    Total Protein 03/11/2022 7.5  6.0 - 8.4 g/dL Final    Albumin 03/11/2022 3.6  3.5 - 5.2 g/dL Final    Total Bilirubin 03/11/2022 2.0 (A) 0.1 - 1.0 mg/dL Final    Comment: For infants and newborns, interpretation of results should be based  on gestational age, weight and in agreement with clinical  observations.    Premature Infant recommended reference ranges:  Up to 24 hours.............<8.0 mg/dL  Up to 48 hours............<12.0 mg/dL  3-5 days..................<15.0 mg/dL  6-29 days.................<15.0 mg/dL      Alkaline Phosphatase 03/11/2022 84  55 - 135 U/L Final    AST 03/11/2022 18  10 - 40 U/L Final    ALT 03/11/2022 9 (A) 10 - 44 U/L Final    Anion Gap 03/11/2022 10  8 - 16 mmol/L Final    eGFR if African American 03/11/2022 >60  >60 mL/min/1.73 m^2 Final    eGFR if non African American  03/11/2022 >60  >60 mL/min/1.73 m^2 Final    Comment: Calculation used to obtain the estimated glomerular filtration  rate (eGFR) is the CKD-EPI equation.       Chromogranin A 03/11/2022 1658 (A) <93 ng/mL Final    Comment: Impaired renal or hepatic function or treatment with proton  pump inhibitors may result in artifactual elevations of   Chromogranin A.    -------------------ADDITIONAL INFORMATION-------------------  This test was developed and its performance characteristics  determined by [facility] in a manner consistent with CLIA  requirements. This test has not been cleared or approved by   the U.S. Food and Drug Administration.    In some immunoassays, the presence of unusually high   concentrations of analyte may result in a high-dose   &quot;hook&quot; effect. This may result in a lower or even normal   measured analyte concentration. If the reported result   is inconsistent with the clinical presentation, the   laboratory should be alerted for troubleshooting. For   diagnostic purposes, these immunoassay results should   always be assessed in conjunction with the patients medical   history, clinical examination and other findings.    The testing method is a homogeneous time-resolved   immunof                           luorescent assay manufactured by [employer]   and performed on the [employer] Kryptor Compact Plus.    Values obtained with different assay methods or kits may be   different and cannot be used interchangeably.    Test results cannot be interpreted as absolute evidence for   the presence or absence of malignant disease.    Test Performed by:  [address] [address]  : Yogesh Roque M.D. Ph.D.; CLIA# [id number]      WBC 03/11/2022 8.19  3.90 - 12.70 K/uL Final    RBC 03/11/2022 4.55  4.00 - 5.40 M/uL Final    Hemoglobin 03/11/2022 13.0  12.0 - 16.0 g/dL Final    Hematocrit 03/11/2022 41.6  37.0  - 48.5 % Final    MCV 03/11/2022 91  82 - 98 fL Final    MCH 03/11/2022 28.6  27.0 - 31.0 pg Final    MCHC 03/11/2022 31.3 (A) 32.0 - 36.0 g/dL Final    RDW 03/11/2022 13.2  11.5 - 14.5 % Final    Platelets 03/11/2022 319  150 - 450 K/uL Final    MPV 03/11/2022 10.4  9.2 - 12.9 fL Final    Gran # (ANC) 03/11/2022 5.3  1.8 - 7.7 K/uL Final    Immature Grans (Abs) 03/11/2022 0.01  0.00 - 0.04 K/uL Final    Comment: Mild elevation in immature granulocytes is non specific and   can be seen in a variety of conditions including stress response,   acute inflammation, trauma and pregnancy. Correlation with other   laboratory and clinical findings is essential.      Hemoglobin A1C 03/11/2022 6.2 (A) 4.0 - 5.6 % Final    Comment: ADA Screening Guidelines:  5.7-6.4%  Consistent with prediabetes  >or=6.5%  Consistent with diabetes    High levels of fetal hemoglobin interfere with the HbA1C  assay. Heterozygous hemoglobin variants (HbS, HgC, etc)do  not significantly interfere with this assay.   However, presence of multiple variants may affect accuracy.      Estimated Avg Glucose 03/11/2022 131  68 - 131 mg/dL Final    Cholesterol 03/11/2022 202 (A) 120 - 199 mg/dL Final    Comment: The National Cholesterol Education Program (NCEP) has set the  following guidelines (reference ranges) for Cholesterol:  Optimal.....................<200 mg/dL  Borderline High.............200-239 mg/dL  High........................> or = 240 mg/dL      Triglycerides 03/11/2022 81  30 - 150 mg/dL Final    Comment: The National Cholesterol Education Program (NCEP) has set the  following guidelines (reference values) for triglycerides:  Normal......................<150 mg/dL  Borderline High.............150-199 mg/dL  High........................200-499 mg/dL      HDL 03/11/2022 86 (A) 40 - 75 mg/dL Final    Comment: The National Cholesterol Education Program (NCEP) has set the  following guidelines (reference values) for HDL  Cholesterol:  Low...............<40 mg/dL  Optimal...........>60 mg/dL      LDL Cholesterol 03/11/2022 99.8  63.0 - 159.0 mg/dL Final    Comment: The National Cholesterol Education Program (NCEP) has set the  following guidelines (reference values) for LDL Cholesterol:  Optimal.......................<130 mg/dL  Borderline High...............130-159 mg/dL  High..........................160-189 mg/dL  Very High.....................>190 mg/dL      HDL/Cholesterol Ratio 03/11/2022 42.6  20.0 - 50.0 % Final    Total Cholesterol/HDL Ratio 03/11/2022 2.3  2.0 - 5.0 Final    Non-HDL Cholesterol 03/11/2022 116  mg/dL Final    Comment: Risk category and Non-HDL cholesterol goals:  Coronary heart disease (CHD)or equivalent (10-year risk of CHD >20%):  Non-HDL cholesterol goal     <130 mg/dL  Two or more CHD risk factors and 10-year risk of CHD <= 20%:  Non-HDL cholesterol goal     <160 mg/dL  0 to 1 CHD risk factor:  Non-HDL cholesterol goal     <190 mg/dL      WBC 03/11/2022 8.19  3.90 - 12.70 K/uL Final    RBC 03/11/2022 4.55  4.00 - 5.40 M/uL Final    Hemoglobin 03/11/2022 13.0  12.0 - 16.0 g/dL Final    Hematocrit 03/11/2022 41.6  37.0 - 48.5 % Final    MCV 03/11/2022 91  82 - 98 fL Final    MCH 03/11/2022 28.6  27.0 - 31.0 pg Final    MCHC 03/11/2022 31.3 (A) 32.0 - 36.0 g/dL Final    RDW 03/11/2022 13.2  11.5 - 14.5 % Final    Platelets 03/11/2022 319  150 - 450 K/uL Final    MPV 03/11/2022 10.4  9.2 - 12.9 fL Final    Immature Granulocytes 03/11/2022 0.1  0.0 - 0.5 % Final    Gran # (ANC) 03/11/2022 5.3  1.8 - 7.7 K/uL Final    Immature Grans (Abs) 03/11/2022 0.01  0.00 - 0.04 K/uL Final    Comment: Mild elevation in immature granulocytes is non specific and   can be seen in a variety of conditions including stress response,   acute inflammation, trauma and pregnancy. Correlation with other   laboratory and clinical findings is essential.      Lymph # 03/11/2022 2.2  1.0 - 4.8 K/uL Final    Mono #  03/11/2022 0.6  0.3 - 1.0 K/uL Final    Eos # 03/11/2022 0.1  0.0 - 0.5 K/uL Final    Baso # 03/11/2022 0.02  0.00 - 0.20 K/uL Final    nRBC 03/11/2022 0  0 /100 WBC Final    Gran % 03/11/2022 64.5  38.0 - 73.0 % Final    Lymph % 03/11/2022 26.9  18.0 - 48.0 % Final    Mono % 03/11/2022 7.7  4.0 - 15.0 % Final    Eosinophil % 03/11/2022 0.6  0.0 - 8.0 % Final    Basophil % 03/11/2022 0.2  0.0 - 1.9 % Final    Differential Method 03/11/2022 Automated   Final    Sodium 03/11/2022 144  136 - 145 mmol/L Final    Potassium 03/11/2022 4.0  3.5 - 5.1 mmol/L Final    Chloride 03/11/2022 105  95 - 110 mmol/L Final    CO2 03/11/2022 29  23 - 29 mmol/L Final    Glucose 03/11/2022 155 (A) 70 - 110 mg/dL Final    BUN 03/11/2022 8  8 - 23 mg/dL Final    Creatinine 03/11/2022 0.8  0.5 - 1.4 mg/dL Final    Calcium 03/11/2022 10.1  8.7 - 10.5 mg/dL Final    Total Protein 03/11/2022 7.5  6.0 - 8.4 g/dL Final    Albumin 03/11/2022 3.6  3.5 - 5.2 g/dL Final    Total Bilirubin 03/11/2022 2.0 (A) 0.1 - 1.0 mg/dL Final    Comment: For infants and newborns, interpretation of results should be based  on gestational age, weight and in agreement with clinical  observations.    Premature Infant recommended reference ranges:  Up to 24 hours.............<8.0 mg/dL  Up to 48 hours............<12.0 mg/dL  3-5 days..................<15.0 mg/dL  6-29 days.................<15.0 mg/dL      Alkaline Phosphatase 03/11/2022 84  55 - 135 U/L Final    AST 03/11/2022 18  10 - 40 U/L Final    ALT 03/11/2022 10  10 - 44 U/L Final    Anion Gap 03/11/2022 10  8 - 16 mmol/L Final    eGFR if African American 03/11/2022 >60  >60 mL/min/1.73 m^2 Final    eGFR if non African American 03/11/2022 >60  >60 mL/min/1.73 m^2 Final    Comment: Calculation used to obtain the estimated glomerular filtration  rate (eGFR) is the CKD-EPI equation.       Bilirubin, Direct 03/11/2022 0.7 (A) 0.1 - 0.3 mg/dL Final   Lab Visit on 02/14/2022   Component  Date Value Ref Range Status    Sodium 02/14/2022 138  136 - 145 mmol/L Final    Potassium 02/14/2022 3.6  3.5 - 5.1 mmol/L Final    Chloride 02/14/2022 100  95 - 110 mmol/L Final    CO2 02/14/2022 30 (A) 23 - 29 mmol/L Final    Glucose 02/14/2022 234 (A) 70 - 110 mg/dL Final    BUN 02/14/2022 6 (A) 8 - 23 mg/dL Final    Creatinine 02/14/2022 0.8  0.5 - 1.4 mg/dL Final    Calcium 02/14/2022 9.7  8.7 - 10.5 mg/dL Final    Total Protein 02/14/2022 7.0  6.0 - 8.4 g/dL Final    Albumin 02/14/2022 3.3 (A) 3.5 - 5.2 g/dL Final    Total Bilirubin 02/14/2022 2.1 (A) 0.1 - 1.0 mg/dL Final    Comment: For infants and newborns, interpretation of results should be based  on gestational age, weight and in agreement with clinical  observations.    Premature Infant recommended reference ranges:  Up to 24 hours.............<8.0 mg/dL  Up to 48 hours............<12.0 mg/dL  3-5 days..................<15.0 mg/dL  6-29 days.................<15.0 mg/dL      Alkaline Phosphatase 02/14/2022 65  55 - 135 U/L Final    AST 02/14/2022 28  10 - 40 U/L Final    ALT 02/14/2022 24  10 - 44 U/L Final    Anion Gap 02/14/2022 8  8 - 16 mmol/L Final    eGFR if African American 02/14/2022 >60  >60 mL/min/1.73 m^2 Final    eGFR if non African American 02/14/2022 >60  >60 mL/min/1.73 m^2 Final    Comment: Calculation used to obtain the estimated glomerular filtration  rate (eGFR) is the CKD-EPI equation.       Chromogranin A 02/14/2022 1353 (A) <93 ng/mL Final    Comment: Impaired renal or hepatic function or treatment with proton  pump inhibitors may result in artifactual elevations of   Chromogranin A.    -------------------ADDITIONAL INFORMATION-------------------  This test was developed and its performance characteristics  determined by [facility] in a manner consistent with CLIA  requirements. This test has not been cleared or approved by   the U.S. Food and Drug Administration.    The testing method is a homogeneous  time-resolved   immunofluorescent assay manufactured by [employer]   and performed on the [employer] Krypt[employer] Compact Plus.    Values obtained with different assay methods or kits may be   different and cannot be used interchangeably.    Test results cannot be interpreted as absolute evidence for   the presence or absence of malignant disease.    Test Performed by:  [facility]  [address] [place] [address]  : Yogesh Roque M.D. Ph.D.; CLIA# [id number]      WBC 02/14/2022 11.24  3.90 - 12.70 K/uL Final    RBC 02/14/2022 4.18  4.00 - 5.40 M/uL Final    Hemoglobin 02/14/2022 12.1  12.0 - 16.0 g/dL Final    Hematocrit 02/14/2022 37.7  37.0 - 48.5 % Final    MCV 02/14/2022 90  82 - 98 fL Final    MCH 02/14/2022 28.9  27.0 - 31.0 pg Final    MCHC 02/14/2022 32.1  32.0 - 36.0 g/dL Final    RDW 02/14/2022 12.1  11.5 - 14.5 % Final    Platelets 02/14/2022 202  150 - 450 K/uL Final    MPV 02/14/2022 10.8  9.2 - 12.9 fL Final    Gran # (ANC) 02/14/2022 7.8 (A) 1.8 - 7.7 K/uL Final    Comment: The ANC is based on a white cell differential from an   automated cell counter. It has not been microscopically   reviewed for the presence of abnormal cells. Clinical   correlation is required.      Immature Grans (Abs) 02/14/2022 0.05 (A) 0.00 - 0.04 K/uL Final    Comment: Mild elevation in immature granulocytes is non specific and   can be seen in a variety of conditions including stress response,   acute inflammation, trauma and pregnancy. Correlation with other   laboratory and clinical findings is essential.     Admission on 02/11/2022, Discharged on 02/12/2022   Component Date Value Ref Range Status    Phosphorus 02/11/2022 3.6  2.7 - 4.5 mg/dL Final    Magnesium 02/11/2022 2.1  1.6 - 2.6 mg/dL Final    POCT Glucose 02/11/2022 237 (A) 70 - 110 mg/dL Final    WBC 02/12/2022 9.77  3.90 - 12.70 K/uL Final    RBC 02/12/2022 4.19  4.00 - 5.40 M/uL  Final    Hemoglobin 02/12/2022 12.4  12.0 - 16.0 g/dL Final    Hematocrit 02/12/2022 37.3  37.0 - 48.5 % Final    MCV 02/12/2022 89  82 - 98 fL Final    MCH 02/12/2022 29.6  27.0 - 31.0 pg Final    MCHC 02/12/2022 33.2  32.0 - 36.0 g/dL Final    RDW 02/12/2022 12.2  11.5 - 14.5 % Final    Platelets 02/12/2022 252  150 - 450 K/uL Final    MPV 02/12/2022 11.0  9.2 - 12.9 fL Final    Immature Granulocytes 02/12/2022 0.2  0.0 - 0.5 % Final    Gran # (ANC) 02/12/2022 7.5  1.8 - 7.7 K/uL Final    Immature Grans (Abs) 02/12/2022 0.02  0.00 - 0.04 K/uL Final    Comment: Mild elevation in immature granulocytes is non specific and   can be seen in a variety of conditions including stress response,   acute inflammation, trauma and pregnancy. Correlation with other   laboratory and clinical findings is essential.      Lymph # 02/12/2022 1.5  1.0 - 4.8 K/uL Final    Mono # 02/12/2022 0.7  0.3 - 1.0 K/uL Final    Eos # 02/12/2022 0.0  0.0 - 0.5 K/uL Final    Baso # 02/12/2022 0.02  0.00 - 0.20 K/uL Final    nRBC 02/12/2022 0  0 /100 WBC Final    Gran % 02/12/2022 76.2 (A) 38.0 - 73.0 % Final    Lymph % 02/12/2022 15.7 (A) 18.0 - 48.0 % Final    Mono % 02/12/2022 7.5  4.0 - 15.0 % Final    Eosinophil % 02/12/2022 0.2  0.0 - 8.0 % Final    Basophil % 02/12/2022 0.2  0.0 - 1.9 % Final    Differential Method 02/12/2022 Automated   Final    Sodium 02/12/2022 142  136 - 145 mmol/L Final    Potassium 02/12/2022 3.3 (A) 3.5 - 5.1 mmol/L Final    Chloride 02/12/2022 105  95 - 110 mmol/L Final    CO2 02/12/2022 26  23 - 29 mmol/L Final    Glucose 02/12/2022 132 (A) 70 - 110 mg/dL Final    BUN 02/12/2022 7 (A) 8 - 23 mg/dL Final    Creatinine 02/12/2022 0.6  0.5 - 1.4 mg/dL Final    Calcium 02/12/2022 9.0  8.7 - 10.5 mg/dL Final    Total Protein 02/12/2022 6.0  6.0 - 8.4 g/dL Final    Albumin 02/12/2022 3.1 (A) 3.5 - 5.2 g/dL Final    Total Bilirubin 02/12/2022 2.2 (A) 0.1 - 1.0 mg/dL Final    Comment: For  infants and newborns, interpretation of results should be based  on gestational age, weight and in agreement with clinical  observations.    Premature Infant recommended reference ranges:  Up to 24 hours.............<8.0 mg/dL  Up to 48 hours............<12.0 mg/dL  3-5 days..................<15.0 mg/dL  6-29 days.................<15.0 mg/dL      Alkaline Phosphatase 02/12/2022 57  55 - 135 U/L Final    AST 02/12/2022 25  10 - 40 U/L Final    ALT 02/12/2022 17  10 - 44 U/L Final    Anion Gap 02/12/2022 11  8 - 16 mmol/L Final    eGFR if African American 02/12/2022 >60.0  >60 mL/min/1.73 m^2 Final    eGFR if non African American 02/12/2022 >60.0  >60 mL/min/1.73 m^2 Final    Comment: Calculation used to obtain the estimated glomerular filtration  rate (eGFR) is the CKD-EPI equation.       Magnesium 02/12/2022 1.8  1.6 - 2.6 mg/dL Final    Phosphorus 02/12/2022 3.0  2.7 - 4.5 mg/dL Final    POCT Glucose 02/12/2022 132 (A) 70 - 110 mg/dL Final    POCT Glucose 02/12/2022 153 (A) 70 - 110 mg/dL Final   Hospital Outpatient Visit on 02/11/2022   Component Date Value Ref Range Status    POCT Glucose 02/11/2022 132 (A) 70 - 110 mg/dL Final   Lab Visit on 02/11/2022   Component Date Value Ref Range Status    Sodium 02/11/2022 146 (A) 136 - 145 mmol/L Final    Potassium 02/11/2022 4.4  3.5 - 5.1 mmol/L Final    Chloride 02/11/2022 103  95 - 110 mmol/L Final    CO2 02/11/2022 30 (A) 23 - 29 mmol/L Final    Glucose 02/11/2022 147 (A) 70 - 110 mg/dL Final    BUN 02/11/2022 10  8 - 23 mg/dL Final    Creatinine 02/11/2022 0.7  0.5 - 1.4 mg/dL Final    Calcium 02/11/2022 10.5  8.7 - 10.5 mg/dL Final    Total Protein 02/11/2022 7.6  6.0 - 8.4 g/dL Final    Albumin 02/11/2022 4.0  3.5 - 5.2 g/dL Final    Total Bilirubin 02/11/2022 2.3 (A) 0.1 - 1.0 mg/dL Final    Comment: For infants and newborns, interpretation of results should be based  on gestational age, weight and in agreement with  clinical  observations.    Premature Infant recommended reference ranges:  Up to 24 hours.............<8.0 mg/dL  Up to 48 hours............<12.0 mg/dL  3-5 days..................<15.0 mg/dL  6-29 days.................<15.0 mg/dL      Alkaline Phosphatase 02/11/2022 68  55 - 135 U/L Final    AST 02/11/2022 21  10 - 40 U/L Final    ALT 02/11/2022 15  10 - 44 U/L Final    Anion Gap 02/11/2022 13  8 - 16 mmol/L Final    eGFR if African American 02/11/2022 >60.0  >60 mL/min/1.73 m^2 Final    eGFR if non African American 02/11/2022 >60.0  >60 mL/min/1.73 m^2 Final    Comment: Calculation used to obtain the estimated glomerular filtration  rate (eGFR) is the CKD-EPI equation.       Bilirubin, Direct 02/11/2022 0.7 (A) 0.1 - 0.3 mg/dL Final    WBC 02/11/2022 8.24  3.90 - 12.70 K/uL Final    RBC 02/11/2022 4.88  4.00 - 5.40 M/uL Final    Hemoglobin 02/11/2022 14.1  12.0 - 16.0 g/dL Final    Hematocrit 02/11/2022 44.8  37.0 - 48.5 % Final    MCV 02/11/2022 92  82 - 98 fL Final    MCH 02/11/2022 28.9  27.0 - 31.0 pg Final    MCHC 02/11/2022 31.5 (A) 32.0 - 36.0 g/dL Final    RDW 02/11/2022 12.3  11.5 - 14.5 % Final    Platelets 02/11/2022 290  150 - 450 K/uL Final    MPV 02/11/2022 10.5  9.2 - 12.9 fL Final    Immature Granulocytes 02/11/2022 0.2  0.0 - 0.5 % Final    Gran # (ANC) 02/11/2022 5.1  1.8 - 7.7 K/uL Final    Immature Grans (Abs) 02/11/2022 0.02  0.00 - 0.04 K/uL Final    Comment: Mild elevation in immature granulocytes is non specific and   can be seen in a variety of conditions including stress response,   acute inflammation, trauma and pregnancy. Correlation with other   laboratory and clinical findings is essential.      Lymph # 02/11/2022 2.4  1.0 - 4.8 K/uL Final    Mono # 02/11/2022 0.7  0.3 - 1.0 K/uL Final    Eos # 02/11/2022 0.0  0.0 - 0.5 K/uL Final    Baso # 02/11/2022 0.03  0.00 - 0.20 K/uL Final    nRBC 02/11/2022 0  0 /100 WBC Final    Gran % 02/11/2022 62.0  38.0 - 73.0 %  Final    Lymph % 02/11/2022 28.6  18.0 - 48.0 % Final    Mono % 02/11/2022 8.4  4.0 - 15.0 % Final    Eosinophil % 02/11/2022 0.4  0.0 - 8.0 % Final    Basophil % 02/11/2022 0.4  0.0 - 1.9 % Final    Differential Method 02/11/2022 Automated   Final    Prothrombin Time 02/11/2022 10.9  9.0 - 12.5 sec Final    INR 02/11/2022 1.1  0.8 - 1.2 Final    Comment: Coumadin Therapy:  2.0 - 3.0 for INR for all indicators except mechanical heart valves  and antiphospholipid syndromes which should use 2.5 - 3.5.     Lab Visit on 01/12/2022   Component Date Value Ref Range Status    Sodium 01/12/2022 139  136 - 145 mmol/L Final    Potassium 01/12/2022 4.4  3.5 - 5.1 mmol/L Final    Chloride 01/12/2022 100  95 - 110 mmol/L Final    CO2 01/12/2022 30 (A) 23 - 29 mmol/L Final    Glucose 01/12/2022 153 (A) 70 - 110 mg/dL Final    BUN 01/12/2022 9  8 - 23 mg/dL Final    Creatinine 01/12/2022 0.7  0.5 - 1.4 mg/dL Final    Calcium 01/12/2022 10.2  8.7 - 10.5 mg/dL Final    Total Protein 01/12/2022 8.0  6.0 - 8.4 g/dL Final    Albumin 01/12/2022 4.0  3.5 - 5.2 g/dL Final    Total Bilirubin 01/12/2022 1.9 (A) 0.1 - 1.0 mg/dL Final    Comment: For infants and newborns, interpretation of results should be based  on gestational age, weight and in agreement with clinical  observations.    Premature Infant recommended reference ranges:  Up to 24 hours.............<8.0 mg/dL  Up to 48 hours............<12.0 mg/dL  3-5 days..................<15.0 mg/dL  6-29 days.................<15.0 mg/dL      Alkaline Phosphatase 01/12/2022 70  55 - 135 U/L Final    AST 01/12/2022 24  10 - 40 U/L Final    ALT 01/12/2022 12  10 - 44 U/L Final    Anion Gap 01/12/2022 9  8 - 16 mmol/L Final    eGFR if African American 01/12/2022 >60.0  >60 mL/min/1.73 m^2 Final    eGFR if non African American 01/12/2022 >60.0  >60 mL/min/1.73 m^2 Final    Comment: Calculation used to obtain the estimated glomerular filtration  rate (eGFR) is the CKD-EPI  equation.       Chromogranin A 01/12/2022 1944 (A) <93 ng/mL Final    Comment: Impaired renal or hepatic function or treatment with proton  pump inhibitors may result in artifactual elevations of   Chromogranin A.    -------------------ADDITIONAL INFORMATION-------------------  This test was developed and its performance characteristics  determined by [facility] in a manner consistent with CLIA  requirements. This test has not been cleared or approved by   the U.S. Food and Drug Administration.    The testing method is a homogeneous time-resolved   immunofluorescent assay manufactured by [employer]   and performed on the [employer] Kryptor Compact Plus.    Values obtained with different assay methods or kits may be   different and cannot be used interchangeably.    Test results cannot be interpreted as absolute evidence for   the presence or absence of malignant disease.    Test Performed by:  [facility]  [address] [place] [address]  : Yogesh Roque M.D. Ph.D.; CLIA# [id number]      WBC 01/12/2022 8.06  3.90 - 12.70 K/uL Final    RBC 01/12/2022 4.72  4.00 - 5.40 M/uL Final    Hemoglobin 01/12/2022 13.7  12.0 - 16.0 g/dL Final    Hematocrit 01/12/2022 43.9  37.0 - 48.5 % Final    MCV 01/12/2022 93  82 - 98 fL Final    MCH 01/12/2022 29.0  27.0 - 31.0 pg Final    MCHC 01/12/2022 31.2 (A) 32.0 - 36.0 g/dL Final    RDW 01/12/2022 11.9  11.5 - 14.5 % Final    Platelets 01/12/2022 323  150 - 450 K/uL Final    MPV 01/12/2022 11.2  9.2 - 12.9 fL Final    Gran # (ANC) 01/12/2022 4.8  1.8 - 7.7 K/uL Final    Comment: The ANC is based on a white cell differential from an   automated cell counter. It has not been microscopically   reviewed for the presence of abnormal cells. Clinical   correlation is required.      Immature Grans (Abs) 01/12/2022 0.02  0.00 - 0.04 K/uL Final    Comment: Mild elevation in immature granulocytes is non  specific and   can be seen in a variety of conditions including stress response,   acute inflammation, trauma and pregnancy. Correlation with other   laboratory and clinical findings is essential.     Lab Visit on 12/13/2021   Component Date Value Ref Range Status    Sodium 12/13/2021 142  136 - 145 mmol/L Final    Potassium 12/13/2021 4.5  3.5 - 5.1 mmol/L Final    Chloride 12/13/2021 104  95 - 110 mmol/L Final    CO2 12/13/2021 28  23 - 29 mmol/L Final    Glucose 12/13/2021 159 (A) 70 - 110 mg/dL Final    BUN 12/13/2021 10  8 - 23 mg/dL Final    Creatinine 12/13/2021 0.8  0.5 - 1.4 mg/dL Final    Calcium 12/13/2021 10.3  8.7 - 10.5 mg/dL Final    Total Protein 12/13/2021 7.9  6.0 - 8.4 g/dL Final    Albumin 12/13/2021 4.0  3.5 - 5.2 g/dL Final    Total Bilirubin 12/13/2021 1.8 (A) 0.1 - 1.0 mg/dL Final    Comment: For infants and newborns, interpretation of results should be based  on gestational age, weight and in agreement with clinical  observations.    Premature Infant recommended reference ranges:  Up to 24 hours.............<8.0 mg/dL  Up to 48 hours............<12.0 mg/dL  3-5 days..................<15.0 mg/dL  6-29 days.................<15.0 mg/dL      Alkaline Phosphatase 12/13/2021 70  55 - 135 U/L Final    AST 12/13/2021 19  10 - 40 U/L Final    ALT 12/13/2021 13  10 - 44 U/L Final    Anion Gap 12/13/2021 10  8 - 16 mmol/L Final    eGFR if African American 12/13/2021 >60.0  >60 mL/min/1.73 m^2 Final    eGFR if non African American 12/13/2021 >60.0  >60 mL/min/1.73 m^2 Final    Comment: Calculation used to obtain the estimated glomerular filtration  rate (eGFR) is the CKD-EPI equation.       Chromogranin A 12/13/2021 1830 (A) <93 ng/mL Final    Comment: Impaired renal or hepatic function or treatment with proton  pump inhibitors may result in artifactual elevations of   Chromogranin A.    -------------------ADDITIONAL INFORMATION-------------------  This test was developed and its  performance characteristics  determined by [facility] in a manner consistent with CLIA  requirements. This test has not been cleared or approved by   the U.S. Food and Drug Administration.    The testing method is a homogeneous time-resolved   immunofluorescent assay manufactured by [employer]   and performed on the [employer] Kryptor Compact Plus.    Values obtained with different assay methods or kits may be   different and cannot be used interchangeably.    Test results cannot be interpreted as absolute evidence for   the presence or absence of malignant disease.    Test Performed by:  [facility]  [address] [place] [address]  : Yogesh Roque M.D. Ph.D.; CLIA# [id number]      WBC 12/13/2021 7.73  3.90 - 12.70 K/uL Final    RBC 12/13/2021 4.79  4.00 - 5.40 M/uL Final    Hemoglobin 12/13/2021 13.9  12.0 - 16.0 g/dL Final    Hematocrit 12/13/2021 44.5  37.0 - 48.5 % Final    MCV 12/13/2021 93  82 - 98 fL Final    MCH 12/13/2021 29.0  27.0 - 31.0 pg Final    MCHC 12/13/2021 31.2 (A) 32.0 - 36.0 g/dL Final    RDW 12/13/2021 12.0  11.5 - 14.5 % Final    Platelets 12/13/2021 312  150 - 450 K/uL Final    MPV 12/13/2021 10.5  9.2 - 12.9 fL Final    Gran # (ANC) 12/13/2021 4.8  1.8 - 7.7 K/uL Final    Comment: The ANC is based on a white cell differential from an   automated cell counter. It has not been microscopically   reviewed for the presence of abnormal cells. Clinical   correlation is required.      Immature Grans (Abs) 12/13/2021 0.02  0.00 - 0.04 K/uL Final    Comment: Mild elevation in immature granulocytes is non specific and   can be seen in a variety of conditions including stress response,   acute inflammation, trauma and pregnancy. Correlation with other   laboratory and clinical findings is essential.     Lab Visit on 11/23/2021   Component Date Value Ref Range Status    Hemoglobin A1C 11/23/2021 6.1 (A) 4.0 - 5.6 %  Final    Comment: ADA Screening Guidelines:  5.7-6.4%  Consistent with prediabetes  >or=6.5%  Consistent with diabetes    High levels of fetal hemoglobin interfere with the HbA1C  assay. Heterozygous hemoglobin variants (HbS, HgC, etc)do  not significantly interfere with this assay.   However, presence of multiple variants may affect accuracy.      Estimated Avg Glucose 11/23/2021 128  68 - 131 mg/dL Final   Lab Visit on 11/23/2021   Component Date Value Ref Range Status    Microalbumin, Urine 11/23/2021 <5.0  ug/mL Final    Creatinine, Urine 11/23/2021 16.8  15.0 - 325.0 mg/dL Final    Microalb/Creat Ratio 11/23/2021 Unable to calculate  0.0 - 30.0 ug/mg Final   There may be more visits with results that are not included.       Imaging  Echo    Result Date: 3/11/2022  · The left ventricle is normal in size with concentric remodeling and normal systolic function. · The estimated ejection fraction is 60%. · Grade I left ventricular diastolic dysfunction. · Normal right ventricular size with normal right ventricular systolic function. · Moderate aortic regurgitation. · Mild mitral regurgitation. · Mild tricuspid regurgitation. · The estimated PA systolic pressure is 22 mmHg. · Normal central venous pressure (3 mmHg).        Assessment  1. PAC (premature atrial contraction)  Asymptomatic  - Ambulatory referral/consult to Cardiology    2. PVC's (premature ventricular contractions)  Asymptomatic    3. Atherosclerosis of aorta  Receiving appropriate risk factor modification      Plan and Discussion    Discussed that her ectopy is asymptomatic.  Workup thus far has not revealed an etiology.  Awaiting results of her Holter monitor.  Given her lack of symptoms either from ectopy or to suggest ischemia, would not pursue an ischemic workup, especially given her age and comorbidities.  Will contact patient after the Holter monitor is completed the decide about need for further testing or treatment.    Follow Up  After  testing      Ralph Bergeron MD

## 2022-03-15 ENCOUNTER — INFUSION (OUTPATIENT)
Dept: INFUSION THERAPY | Facility: HOSPITAL | Age: 82
End: 2022-03-15
Attending: INTERNAL MEDICINE
Payer: MEDICARE

## 2022-03-15 ENCOUNTER — OFFICE VISIT (OUTPATIENT)
Dept: HEMATOLOGY/ONCOLOGY | Facility: CLINIC | Age: 82
End: 2022-03-15
Payer: MEDICARE

## 2022-03-15 VITALS
RESPIRATION RATE: 18 BRPM | WEIGHT: 129 LBS | OXYGEN SATURATION: 98 % | DIASTOLIC BLOOD PRESSURE: 82 MMHG | BODY MASS INDEX: 22.02 KG/M2 | SYSTOLIC BLOOD PRESSURE: 158 MMHG | TEMPERATURE: 98 F | HEART RATE: 89 BPM | HEIGHT: 64 IN

## 2022-03-15 DIAGNOSIS — C7B.8 METASTATIC MALIGNANT NEUROENDOCRINE TUMOR TO LIVER: ICD-10-CM

## 2022-03-15 DIAGNOSIS — C79.51 SPINE METASTASIS: ICD-10-CM

## 2022-03-15 DIAGNOSIS — D84.81 IMMUNODEFICIENCY SECONDARY TO NEOPLASM: ICD-10-CM

## 2022-03-15 DIAGNOSIS — E11.9 TYPE 2 DIABETES MELLITUS WITHOUT COMPLICATION, WITHOUT LONG-TERM CURRENT USE OF INSULIN: ICD-10-CM

## 2022-03-15 DIAGNOSIS — I10 ESSENTIAL HYPERTENSION: ICD-10-CM

## 2022-03-15 DIAGNOSIS — I49.3 PVC'S (PREMATURE VENTRICULAR CONTRACTIONS): ICD-10-CM

## 2022-03-15 DIAGNOSIS — C7A.012 MALIGNANT CARCINOID TUMOR OF ILEUM: Primary | ICD-10-CM

## 2022-03-15 DIAGNOSIS — C7B.8 METASTATIC MALIGNANT NEUROENDOCRINE TUMOR TO LIVER: Primary | ICD-10-CM

## 2022-03-15 DIAGNOSIS — C7B.8 NEUROENDOCRINE CARCINOMA METASTATIC TO BONE: ICD-10-CM

## 2022-03-15 DIAGNOSIS — R19.7 DIARRHEA, UNSPECIFIED TYPE: ICD-10-CM

## 2022-03-15 DIAGNOSIS — D49.9 IMMUNODEFICIENCY SECONDARY TO NEOPLASM: ICD-10-CM

## 2022-03-15 DIAGNOSIS — C7A.8 NEUROENDOCRINE CARCINOMA METASTATIC TO BONE: ICD-10-CM

## 2022-03-15 PROCEDURE — 3079F PR MOST RECENT DIASTOLIC BLOOD PRESSURE 80-89 MM HG: ICD-10-PCS | Mod: CPTII,S$GLB,, | Performed by: INTERNAL MEDICINE

## 2022-03-15 PROCEDURE — 63600175 PHARM REV CODE 636 W HCPCS: Mod: JG | Performed by: INTERNAL MEDICINE

## 2022-03-15 PROCEDURE — 3288F PR FALLS RISK ASSESSMENT DOCUMENTED: ICD-10-PCS | Mod: CPTII,S$GLB,, | Performed by: INTERNAL MEDICINE

## 2022-03-15 PROCEDURE — 1126F AMNT PAIN NOTED NONE PRSNT: CPT | Mod: CPTII,S$GLB,, | Performed by: INTERNAL MEDICINE

## 2022-03-15 PROCEDURE — 1160F PR REVIEW ALL MEDS BY PRESCRIBER/CLIN PHARMACIST DOCUMENTED: ICD-10-PCS | Mod: CPTII,S$GLB,, | Performed by: INTERNAL MEDICINE

## 2022-03-15 PROCEDURE — 96401 CHEMO ANTI-NEOPL SQ/IM: CPT

## 2022-03-15 PROCEDURE — 3079F DIAST BP 80-89 MM HG: CPT | Mod: CPTII,S$GLB,, | Performed by: INTERNAL MEDICINE

## 2022-03-15 PROCEDURE — 1159F PR MEDICATION LIST DOCUMENTED IN MEDICAL RECORD: ICD-10-PCS | Mod: CPTII,S$GLB,, | Performed by: INTERNAL MEDICINE

## 2022-03-15 PROCEDURE — 3077F PR MOST RECENT SYSTOLIC BLOOD PRESSURE >= 140 MM HG: ICD-10-PCS | Mod: CPTII,S$GLB,, | Performed by: INTERNAL MEDICINE

## 2022-03-15 PROCEDURE — 99999 PR PBB SHADOW E&M-EST. PATIENT-LVL IV: CPT | Mod: PBBFAC,,, | Performed by: INTERNAL MEDICINE

## 2022-03-15 PROCEDURE — 1159F MED LIST DOCD IN RCRD: CPT | Mod: CPTII,S$GLB,, | Performed by: INTERNAL MEDICINE

## 2022-03-15 PROCEDURE — 1126F PR PAIN SEVERITY QUANTIFIED, NO PAIN PRESENT: ICD-10-PCS | Mod: CPTII,S$GLB,, | Performed by: INTERNAL MEDICINE

## 2022-03-15 PROCEDURE — 1160F RVW MEDS BY RX/DR IN RCRD: CPT | Mod: CPTII,S$GLB,, | Performed by: INTERNAL MEDICINE

## 2022-03-15 PROCEDURE — 3288F FALL RISK ASSESSMENT DOCD: CPT | Mod: CPTII,S$GLB,, | Performed by: INTERNAL MEDICINE

## 2022-03-15 PROCEDURE — 1101F PR PT FALLS ASSESS DOC 0-1 FALLS W/OUT INJ PAST YR: ICD-10-PCS | Mod: CPTII,S$GLB,, | Performed by: INTERNAL MEDICINE

## 2022-03-15 PROCEDURE — 99215 PR OFFICE/OUTPT VISIT, EST, LEVL V, 40-54 MIN: ICD-10-PCS | Mod: S$GLB,,, | Performed by: INTERNAL MEDICINE

## 2022-03-15 PROCEDURE — 3077F SYST BP >= 140 MM HG: CPT | Mod: CPTII,S$GLB,, | Performed by: INTERNAL MEDICINE

## 2022-03-15 PROCEDURE — 99999 PR PBB SHADOW E&M-EST. PATIENT-LVL IV: ICD-10-PCS | Mod: PBBFAC,,, | Performed by: INTERNAL MEDICINE

## 2022-03-15 PROCEDURE — 1101F PT FALLS ASSESS-DOCD LE1/YR: CPT | Mod: CPTII,S$GLB,, | Performed by: INTERNAL MEDICINE

## 2022-03-15 PROCEDURE — 99215 OFFICE O/P EST HI 40 MIN: CPT | Mod: S$GLB,,, | Performed by: INTERNAL MEDICINE

## 2022-03-15 RX ORDER — LANREOTIDE ACETATE 120 MG/.5ML
120 INJECTION SUBCUTANEOUS
Status: CANCELLED | OUTPATIENT
Start: 2022-03-15

## 2022-03-15 RX ORDER — LANREOTIDE ACETATE 120 MG/.5ML
120 INJECTION SUBCUTANEOUS
Status: DISCONTINUED | OUTPATIENT
Start: 2022-03-15 | End: 2022-03-15 | Stop reason: HOSPADM

## 2022-03-15 RX ADMIN — LANREOTIDE ACETATE 120 MG: 120 INJECTION SUBCUTANEOUS at 09:03

## 2022-03-15 NOTE — PROGRESS NOTES
"Subjective:       Patient ID: Shahram Hills is a 81 y.o. female.     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 Ms. Hills is an 77 yo woman who initially saw me on 6/7/18 for further evaluation of neuroendocrine tumor. She initially presented with diarrhea, abdominal discomfort, weight loss. She was evaluated at [facility] and underwent workup below:  US abdomen on 3/14/18 showed numerous bilobar mixed echogenicity and mildly vascular hepatic lesions. Cholelithiasis without e/o acute cholecystitis.   MRI abdomen on 3/30/2018 showed innumerable hepatic metastases. L3 vertebral body metastasis with soft tissue component along the right margin of the L3 and upper L4 vertebral bodies, filling the right L3/4 neural foramen, and extending into the anterior aspect of the spinal canal on the right at the L3 and upper T4 levels. Associated with mild spinal canal narrowing.  CT chest on 4/6/2018 showed one lucent nodule measuring 7 mm in the T7 vertebral body, most likely representing metastatic disease.    EGD on 5/4/18 showed normal duodenum. Schatzki's ring in the lower third of the esophagus. Grade 1 esophagitis in the GE junction c/w mild distal esophagitis. Congestion and erythema in the antrum, pre-pyloric region and stomach body c/w gastritis. Biopsy of the stomach antrum showed mild chronic gastritis, neg for H. pylori.   Labs on 5/9/2018: WBC 6.9, H/H 11.6/34.3, MCV 87.5, plt count 279. On 3/9/18: Vit B12 level 173, folic acid 6.6  She was started on oral vit B12 and underwent a liver biopsy on 5/18/18. Pathology showed "metastatic well differentiated neuroendocrine tumor. Ki67 3%"     She saw me on 6/7/18 and complained of weight loss of 26 lbs over the past 3-4 months, also has diarrhea. No flushing, wheezing, palpitations. Has been having pain in right flank radiating down " "the right leg. No tingling/numbness, weakness. She can hold her bladder but when she urinates her diarrhea would come out as well. Started on dex 4 mg q6h the evening of 18.      2. MRI spine on 18 showed "Marrow replacing metastatic lesion of the L3 vertebral body with associated extra osseous expansion and complete effacement of the right L3-L4 neural foramina and additional effacement of the right lateral recess.  There is additional lateral extension and abutment of the right psoas muscle.  Superimposed degenerative change at this level results in mild spinal canal stenosis." I sent the patient to ED on 18 where she was evaluated by neurosurgery. Neurosurgery did not feel she was a candidate for surgical intervention.      3. Seen by Dr. Adames on 18. palliative radiation to the area of the L3 metastasis 18-18   4. 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  There is an adjacent metastatic lymph node, diffuse liver metastases, and multiple bone metastases. Index primary neoplasm SUV max 33.13. Adjacent lymph node SUV max 23. L3 bone metastasis SUV max 36.86. Left lobe SUV max 46.13   5. Lanreotide every 4 weeks started on 18  6. TACE to the right hepatic lobe on 19. TACE to the left hepatic lobe on 3/21/19.   7. TACE to the right hepatic lobe on 22     History of Present Illness:   Ms. Hills returns for follow up. Feeling well. Sometimes has loose stools. Had TACE to the right hepatic lobe on 22. Plan is to treat the left hepatic lobe one month after that. Seen by cardiology for frequent PVCs.       ECO     ROS:   See HPI. Otherwise negative.      Physical Examination:   Vital signs reviewed.   Gen: well hydrated, well developed, in no acute distress.  HEENT: normocephalic, anicteric, PERRLA, EOMI, oropharynx clear  Neck: supple, no JVD, thyromegaly, cervical or supraclavicular LAD  Lungs: CTAB, no wheezes or " rales  Heart: regular rate, irregular, no M/R/G  Abdomen: soft, no tenderness, non-distended, no hepatomegaly, no splenomegaly, no mass, or hernia.   Ext:: no edema  Neuro: alert and oriented x 4, no focal neuro deficit  Skin: no rash, open wounds or ulcers  Psych: pleasant and appropriate mood and affect     Objective:      Laboratory Data:  Labs reviewed. CBC, CMP stable. Bilirubin 2.0. Chromogranin 1658     Imaging Data:  CT chest 1/12/22:  . Newly seen solid nodule at the left lung base, measuring 0.5 cm.  Clinical considerations will determine further workup and/or follow-up.  2. Stable additional subcentimeter pulmonary nodules bilaterally.  3. Unchanged appearance of sclerotic focus with central lucency in the right manubrium.  4. Limited evaluation of multiple hypoattenuating lesions in the liver.  5. Additional findings as above.    MRI abdomen/pelvis 1/12/22:  1. Patient with neuroendocrine tumor.  Interval progression of hepatic metastasis with several lesions increased in size.  Stable appearance of the ileal lesion and osseous metastasis.  2. Mildly increased size of a prominent mesenteric lymph nodes.  3. Cholelithiasis with stable biliary dilatation.  4. Additional findings as above.  RECIST SUMMARY:     Date of prior examination for comparison: 10/15/2021     Lesion 1: Left hepatic lobe.  6.3 cm. Series 20 image 49.  Prior measurement 5.5 cm.     Lesion 2: Distal ileum.  3.0 cm. Series 26 image 8.  Prior measurement 3.1 cm.     Lesion 3: Right hepatic dome.  5.3 cm. Series 20 image 21.  Prior measurement 5.2 cm.     Assessment and Plan:      1. Malignant carcinoid tumor of ileum    2. 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 Spine metastasis    4. 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 Immunodeficiency secondary to neoplasm    6. Diarrhea, unspecified type    7. Type 2 diabetes mellitus without complication, without long-term current use of insulin    8. PVC's (premature  ventricular contractions)    9. Essential hypertension      1-5  - Ms Jeana is an 80 yo woman with well differentiated low-grade neuroendocrine tumor of the ileum with mets to liver and bones. On lanreotide every 4 weeks. S/p TACE to the right hepatic lobe on 2/14/19. TACE to the left hepatic lobe on 3/21/19.   - CT/MRI 1/12/22 showed mild progression in the liver. S/p TACE to the right hepatic lobe on 2/11/22  - doing well. Lanreotide today  - messaged Dr Pinto's team re treatment to the left hepatic lobe  - c/w lanreotide every 4 weeks  - RTC in one month. Will do the next staging scan one month after treatment to the left hepatic lobe     6.  - mild. Getting lanreotide  - asked patient to take imodium prn    7.  - BS good  - c/w current meds    8.  - followed by cardiology    9.  - BP slightly elevated  - monitor    RTC in one month   Their questions were answered in the clinic. Encouraged to call should issues arise      Route Chart for Scheduling    Med Onc Chart Routing      Follow up with physician 4 weeks. See me in 4 weeks with CBC, CMP, chromogranin A checked 4 days prior to return, see me then get lanreotide   Follow up with JUNAID    Labs CBC and CMP   Lab interval:  Chromogranin A   Imaging    Pharmacy appointment No pharmacy appointment needed      Other referrals No additional referrals needed           Therapy Plan Information  5. Medications  lanreotide injection 120 mg  120 mg, Subcutaneous, Every visit

## 2022-03-16 ENCOUNTER — TELEPHONE (OUTPATIENT)
Dept: INTERVENTIONAL RADIOLOGY/VASCULAR | Facility: HOSPITAL | Age: 82
End: 2022-03-16
Payer: MEDICARE

## 2022-03-16 DIAGNOSIS — C7B.8 METASTATIC MALIGNANT NEUROENDOCRINE TUMOR TO LIVER: Primary | ICD-10-CM

## 2022-03-16 NOTE — TELEPHONE ENCOUNTER
Spoke to patient, sister Shayla, and niece Geraldine. Explained lab results shows stability and recommendation is to treat left side of liver with repeat chemoembolization. Patient and family members verbalize understanding and agreement. Message sent to schedulers.

## 2022-03-17 ENCOUNTER — TELEPHONE (OUTPATIENT)
Dept: HEMATOLOGY/ONCOLOGY | Facility: CLINIC | Age: 82
End: 2022-03-17
Payer: MEDICARE

## 2022-03-17 ENCOUNTER — TELEPHONE (OUTPATIENT)
Dept: INTERVENTIONAL RADIOLOGY/VASCULAR | Facility: CLINIC | Age: 82
End: 2022-03-17
Payer: MEDICARE

## 2022-03-17 NOTE — TELEPHONE ENCOUNTER
----- Message from Giovanni Adams sent at 3/17/2022  9:57 AM CDT -----  Regarding: Call BAck  Name of Who is Calling : Shayla Rosenthal sister on behalf of LEIGHANN FANG [[id number]              What is the request in detail: Shayla requesting a call back. No details given. Please assist               Can the clinic reply by [facility]: No              What Number to Call Back if not in [facility]:[phone]

## 2022-03-17 NOTE — TELEPHONE ENCOUNTER
Returned phone call from Ms. Chandler. She called her relative Ambreen who is a nurse. We discuss TACE and that the right side of Ms. Hills's liver was treated in February. Her labs are stable and so the recommendation is to now treat the left side. Explained would not want to treat entire liver in one session as this exponentially increases risk of liver failure. Ms. Crook asks if this is routine to treat with TACE every few years since Ms. Hills was also treated with TACE in 2019. I explain the frequency of TACE treatment is patient specific. Ms. Crook verbalizes understanding. No further questions at this time.

## 2022-03-21 ENCOUNTER — TELEPHONE (OUTPATIENT)
Dept: CARDIOLOGY | Facility: CLINIC | Age: 82
End: 2022-03-21
Payer: MEDICARE

## 2022-03-21 LAB
OHS CV EVENT MONITOR DAY: 0
OHS CV HOLTER LENGTH DECIMAL HOURS: 48
OHS CV HOLTER LENGTH HOURS: 48
OHS CV HOLTER LENGTH MINUTES: 0
OHS CV HOLTER SINUS AVERAGE HR: 94
OHS CV HOLTER SINUS MAX HR: 194
OHS CV HOLTER SINUS MIN HR: 66

## 2022-03-21 NOTE — TELEPHONE ENCOUNTER
----- Message from Giovanni Adams sent at 3/21/2022  2:20 PM CDT -----  Regarding: Call Back  Name of Who is Calling: Shayla sister of ANNALISA, LEIGHANN S [[id number]]              What is the request in detail:   Shayla requesting a call back. About the results of  heart monitor for ANNALISALEIGHANN S [[id number]] Please assist            Can the clinic reply by [facility]: No              [facility]: Shayla [phone]

## 2022-03-24 ENCOUNTER — TELEPHONE (OUTPATIENT)
Dept: CARDIOLOGY | Facility: CLINIC | Age: 82
End: 2022-03-24
Payer: MEDICARE

## 2022-03-24 NOTE — TELEPHONE ENCOUNTER
Patient's sister notified.    ----- Message from Tiera Rico RN sent at 3/24/2022  4:07 PM CDT -----    ----- Message -----  From: Ralph Bergeron MD  Sent: 3/24/2022   3:42 PM CDT  To: Tiera Rico RN    She had a lot of extra heart beats like we discussed that she had while in the hospital. But, nothing that should cause any problems or needs treatment.      ----- Message -----  From: Tiera Rico RN  Sent: 3/24/2022   3:38 PM CDT  To: Ralph Bergeron MD      ----- Message -----  From: Zahra Lux  Sent: 3/24/2022   3:20 PM CDT  To: Elyssa Rosas Staff    Who Called: Shayla (Sister)    Name of Test (Lab/Mammo/Etc): Cardiology tests    Date of Test: 03/11/2022    Ordering Provider: Ralph Bergeron MD    Where the test was performed: [facility] Call Back Number: [phone]

## 2022-03-28 ENCOUNTER — IMMUNIZATION (OUTPATIENT)
Dept: PHARMACY | Facility: CLINIC | Age: 82
End: 2022-03-28
Payer: MEDICARE

## 2022-04-08 ENCOUNTER — OFFICE VISIT (OUTPATIENT)
Dept: INTERNAL MEDICINE | Facility: CLINIC | Age: 82
End: 2022-04-08
Attending: INTERNAL MEDICINE
Payer: MEDICARE

## 2022-04-08 VITALS
SYSTOLIC BLOOD PRESSURE: 124 MMHG | HEIGHT: 64 IN | OXYGEN SATURATION: 98 % | HEART RATE: 55 BPM | DIASTOLIC BLOOD PRESSURE: 74 MMHG | WEIGHT: 128.75 LBS | BODY MASS INDEX: 21.98 KG/M2

## 2022-04-08 DIAGNOSIS — R80.9 CONTROLLED TYPE 2 DIABETES MELLITUS WITH MICROALBUMINURIA, WITHOUT LONG-TERM CURRENT USE OF INSULIN: ICD-10-CM

## 2022-04-08 DIAGNOSIS — E11.29 CONTROLLED TYPE 2 DIABETES MELLITUS WITH MICROALBUMINURIA, WITHOUT LONG-TERM CURRENT USE OF INSULIN: ICD-10-CM

## 2022-04-08 DIAGNOSIS — I10 HYPERTENSION, BENIGN: Primary | ICD-10-CM

## 2022-04-08 DIAGNOSIS — C7B.8 METASTATIC MALIGNANT NEUROENDOCRINE TUMOR TO LIVER: ICD-10-CM

## 2022-04-08 PROCEDURE — 3074F PR MOST RECENT SYSTOLIC BLOOD PRESSURE < 130 MM HG: ICD-10-PCS | Mod: CPTII,S$GLB,, | Performed by: INTERNAL MEDICINE

## 2022-04-08 PROCEDURE — 99999 PR PBB SHADOW E&M-EST. PATIENT-LVL IV: ICD-10-PCS | Mod: PBBFAC,,, | Performed by: INTERNAL MEDICINE

## 2022-04-08 PROCEDURE — 1125F PR PAIN SEVERITY QUANTIFIED, PAIN PRESENT: ICD-10-PCS | Mod: CPTII,S$GLB,, | Performed by: INTERNAL MEDICINE

## 2022-04-08 PROCEDURE — 1160F PR REVIEW ALL MEDS BY PRESCRIBER/CLIN PHARMACIST DOCUMENTED: ICD-10-PCS | Mod: CPTII,S$GLB,, | Performed by: INTERNAL MEDICINE

## 2022-04-08 PROCEDURE — 1160F RVW MEDS BY RX/DR IN RCRD: CPT | Mod: CPTII,S$GLB,, | Performed by: INTERNAL MEDICINE

## 2022-04-08 PROCEDURE — 3078F DIAST BP <80 MM HG: CPT | Mod: CPTII,S$GLB,, | Performed by: INTERNAL MEDICINE

## 2022-04-08 PROCEDURE — 3078F PR MOST RECENT DIASTOLIC BLOOD PRESSURE < 80 MM HG: ICD-10-PCS | Mod: CPTII,S$GLB,, | Performed by: INTERNAL MEDICINE

## 2022-04-08 PROCEDURE — 1159F MED LIST DOCD IN RCRD: CPT | Mod: CPTII,S$GLB,, | Performed by: INTERNAL MEDICINE

## 2022-04-08 PROCEDURE — 3074F SYST BP LT 130 MM HG: CPT | Mod: CPTII,S$GLB,, | Performed by: INTERNAL MEDICINE

## 2022-04-08 PROCEDURE — 99999 PR PBB SHADOW E&M-EST. PATIENT-LVL IV: CPT | Mod: PBBFAC,,, | Performed by: INTERNAL MEDICINE

## 2022-04-08 PROCEDURE — 1159F PR MEDICATION LIST DOCUMENTED IN MEDICAL RECORD: ICD-10-PCS | Mod: CPTII,S$GLB,, | Performed by: INTERNAL MEDICINE

## 2022-04-08 PROCEDURE — 99214 PR OFFICE/OUTPT VISIT, EST, LEVL IV, 30-39 MIN: ICD-10-PCS | Mod: S$GLB,,, | Performed by: INTERNAL MEDICINE

## 2022-04-08 PROCEDURE — 99214 OFFICE O/P EST MOD 30 MIN: CPT | Mod: S$GLB,,, | Performed by: INTERNAL MEDICINE

## 2022-04-08 PROCEDURE — 1125F AMNT PAIN NOTED PAIN PRSNT: CPT | Mod: CPTII,S$GLB,, | Performed by: INTERNAL MEDICINE

## 2022-04-08 RX ORDER — AMLODIPINE BESYLATE 5 MG/1
5 TABLET ORAL DAILY
Qty: 30 TABLET | Refills: 1 | Status: SHIPPED | OUTPATIENT
Start: 2022-04-08 | End: 2022-04-25 | Stop reason: SDUPTHER

## 2022-04-08 NOTE — PROGRESS NOTES
"Subjective:   Patient ID: Shahram Hills is a 81 y.o. female  Chief complaint: No chief complaint on file.      HPI    Here for HTN follow up - lcv 2022 for hospital discharge follow up TACE right hep lobe and found to have frequent PVCs  - we resumed losartan at that time   - seen by cards 3/14/2022 and completed holter - at this time no tx indicated    Seen by h/o 2/15  She is accompanied by her sister today     Diabetes:   a1c 6.2   - well controlled and henry metformin    bg was > 200 on recent labs outpatient - was not fasting   Fasting b, 150, 150, 150, 145  Taking metformin daily     HTN:  Today: 110-158/68-96  dayami 130-140/80-90, hr 60-90  Taking losartan 50mg  Previously  bp meds - amlodipine 10 and losartan 50 held since hospitalization   bp [id number], hr: 54-96    Metastatic neuroendocrine tumor to liver:  Followed by h/o and s/p TACE as above     PVCs:  As above   Cards: Elyssa    Review of Systems      Objective:  Vitals:    22 0913   BP: 124/74   BP Location: Left arm   Patient Position: Sitting   Pulse: (!) 55   SpO2: 98%   Weight: 58.4 kg (128 lb 12 oz)   Height: 5' 4" (1.626 m)     Body mass index is 22.1 kg/m².    Physical Exam  Vitals reviewed.   Constitutional:       Appearance: Normal appearance. She is well-developed.   HENT:      Head: Normocephalic and atraumatic.      Nose:      Comments: Wearing mask  Eyes:      Extraocular Movements: Extraocular movements intact.      Conjunctiva/sclera: Conjunctivae normal.   Cardiovascular:      Rate and Rhythm: Normal rate and regular rhythm.      Pulses: Normal pulses.      Heart sounds: Normal heart sounds.   Pulmonary:      Effort: Pulmonary effort is normal.      Breath sounds: Normal breath sounds.   Abdominal:      General: Bowel sounds are normal.      Palpations: Abdomen is soft. There is mass (ruq mass).   Musculoskeletal:         General: No swelling or tenderness. Normal range of motion.      Cervical back: " Normal range of motion and neck supple.   Skin:     General: Skin is warm and dry.      Capillary Refill: Capillary refill takes less than 2 seconds.      Nails: There is no clubbing.   Neurological:      General: No focal deficit present.      Mental Status: She is alert. Mental status is at baseline.      Gait: Gait normal.   Psychiatric:         Mood and Affect: Mood normal.         Speech: Speech normal.         Behavior: Behavior normal.         Thought Content: Thought content normal.         Judgment: Judgment normal.         Assessment:  1. Hypertension, benign    2. 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 Controlled type 2 diabetes mellitus with microalbuminuria, without long-term current use of insulin        Plan:  Diagnoses and all orders for this visit:    Hypertension, benign  -     amLODIPine (NORVASC) 5 MG tablet; Take 1 tablet (5 mg total) by mouth once daily.  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

## 2022-04-14 ENCOUNTER — LAB VISIT (OUTPATIENT)
Dept: LAB | Facility: HOSPITAL | Age: 82
End: 2022-04-14
Payer: MEDICARE

## 2022-04-14 DIAGNOSIS — C7A.012 MALIGNANT CARCINOID TUMOR OF ILEUM: ICD-10-CM

## 2022-04-14 LAB
ALBUMIN SERPL BCP-MCNC: 3.7 G/DL (ref 3.5–5.2)
ALP SERPL-CCNC: 57 U/L (ref 55–135)
ALT SERPL W/O P-5'-P-CCNC: 9 U/L (ref 10–44)
ANION GAP SERPL CALC-SCNC: 7 MMOL/L (ref 8–16)
AST SERPL-CCNC: 17 U/L (ref 10–40)
BILIRUB SERPL-MCNC: 1.9 MG/DL (ref 0.1–1)
BUN SERPL-MCNC: 7 MG/DL (ref 8–23)
CALCIUM SERPL-MCNC: 9.9 MG/DL (ref 8.7–10.5)
CHLORIDE SERPL-SCNC: 101 MMOL/L (ref 95–110)
CO2 SERPL-SCNC: 31 MMOL/L (ref 23–29)
CREAT SERPL-MCNC: 0.7 MG/DL (ref 0.5–1.4)
ERYTHROCYTE [DISTWIDTH] IN BLOOD BY AUTOMATED COUNT: 12.9 % (ref 11.5–14.5)
EST. GFR  (AFRICAN AMERICAN): >60 ML/MIN/1.73 M^2
EST. GFR  (NON AFRICAN AMERICAN): >60 ML/MIN/1.73 M^2
GLUCOSE SERPL-MCNC: 173 MG/DL (ref 70–110)
HCT VFR BLD AUTO: 40.9 % (ref 37–48.5)
HGB BLD-MCNC: 12.8 G/DL (ref 12–16)
IMM GRANULOCYTES # BLD AUTO: 0.01 K/UL (ref 0–0.04)
MCH RBC QN AUTO: 28.6 PG (ref 27–31)
MCHC RBC AUTO-ENTMCNC: 31.3 G/DL (ref 32–36)
MCV RBC AUTO: 91 FL (ref 82–98)
NEUTROPHILS # BLD AUTO: 4.9 K/UL (ref 1.8–7.7)
PLATELET # BLD AUTO: 285 K/UL (ref 150–450)
PMV BLD AUTO: 10.7 FL (ref 9.2–12.9)
POTASSIUM SERPL-SCNC: 4.2 MMOL/L (ref 3.5–5.1)
PROT SERPL-MCNC: 7.1 G/DL (ref 6–8.4)
RBC # BLD AUTO: 4.48 M/UL (ref 4–5.4)
SODIUM SERPL-SCNC: 139 MMOL/L (ref 136–145)
WBC # BLD AUTO: 7.51 K/UL (ref 3.9–12.7)

## 2022-04-14 PROCEDURE — 86316 IMMUNOASSAY TUMOR OTHER: CPT | Performed by: INTERNAL MEDICINE

## 2022-04-14 PROCEDURE — 85027 COMPLETE CBC AUTOMATED: CPT | Performed by: INTERNAL MEDICINE

## 2022-04-14 PROCEDURE — 80053 COMPREHEN METABOLIC PANEL: CPT | Performed by: INTERNAL MEDICINE

## 2022-04-14 PROCEDURE — 36415 COLL VENOUS BLD VENIPUNCTURE: CPT | Performed by: INTERNAL MEDICINE

## 2022-04-15 PROBLEM — E11.29 CONTROLLED TYPE 2 DIABETES MELLITUS WITH MICROALBUMINURIA, WITHOUT LONG-TERM CURRENT USE OF INSULIN: Status: ACTIVE | Noted: 2019-12-06

## 2022-04-15 PROBLEM — R80.9 CONTROLLED TYPE 2 DIABETES MELLITUS WITH MICROALBUMINURIA, WITHOUT LONG-TERM CURRENT USE OF INSULIN: Status: ACTIVE | Noted: 2019-12-06

## 2022-04-15 LAB — CGA SERPL-MCNC: 1540 NG/ML

## 2022-04-18 ENCOUNTER — INFUSION (OUTPATIENT)
Dept: INFUSION THERAPY | Facility: HOSPITAL | Age: 82
End: 2022-04-18
Payer: MEDICARE

## 2022-04-18 ENCOUNTER — OFFICE VISIT (OUTPATIENT)
Dept: HEMATOLOGY/ONCOLOGY | Facility: CLINIC | Age: 82
End: 2022-04-18
Payer: MEDICARE

## 2022-04-18 VITALS
WEIGHT: 125.31 LBS | TEMPERATURE: 98 F | BODY MASS INDEX: 21.39 KG/M2 | RESPIRATION RATE: 18 BRPM | HEIGHT: 64 IN | DIASTOLIC BLOOD PRESSURE: 99 MMHG | SYSTOLIC BLOOD PRESSURE: 139 MMHG | HEART RATE: 65 BPM | OXYGEN SATURATION: 98 %

## 2022-04-18 DIAGNOSIS — D49.9 IMMUNODEFICIENCY SECONDARY TO NEOPLASM: ICD-10-CM

## 2022-04-18 DIAGNOSIS — C7B.8 SECONDARY NEUROENDOCRINE TUMOR OF BONE: ICD-10-CM

## 2022-04-18 DIAGNOSIS — C7A.012 MALIGNANT CARCINOID TUMOR OF ILEUM: ICD-10-CM

## 2022-04-18 DIAGNOSIS — R80.9 CONTROLLED TYPE 2 DIABETES MELLITUS WITH MICROALBUMINURIA, WITHOUT LONG-TERM CURRENT USE OF INSULIN: ICD-10-CM

## 2022-04-18 DIAGNOSIS — C7B.8 METASTATIC MALIGNANT NEUROENDOCRINE TUMOR TO LIVER: Primary | ICD-10-CM

## 2022-04-18 DIAGNOSIS — I10 ESSENTIAL HYPERTENSION: ICD-10-CM

## 2022-04-18 DIAGNOSIS — E11.29 CONTROLLED TYPE 2 DIABETES MELLITUS WITH MICROALBUMINURIA, WITHOUT LONG-TERM CURRENT USE OF INSULIN: ICD-10-CM

## 2022-04-18 DIAGNOSIS — C79.51 SPINE METASTASIS: ICD-10-CM

## 2022-04-18 DIAGNOSIS — R19.7 DIARRHEA, UNSPECIFIED TYPE: ICD-10-CM

## 2022-04-18 DIAGNOSIS — D84.81 IMMUNODEFICIENCY SECONDARY TO NEOPLASM: ICD-10-CM

## 2022-04-18 PROCEDURE — 1160F RVW MEDS BY RX/DR IN RCRD: CPT | Mod: CPTII,S$GLB,, | Performed by: INTERNAL MEDICINE

## 2022-04-18 PROCEDURE — 3080F PR MOST RECENT DIASTOLIC BLOOD PRESSURE >= 90 MM HG: ICD-10-PCS | Mod: CPTII,S$GLB,, | Performed by: INTERNAL MEDICINE

## 2022-04-18 PROCEDURE — 1160F PR REVIEW ALL MEDS BY PRESCRIBER/CLIN PHARMACIST DOCUMENTED: ICD-10-PCS | Mod: CPTII,S$GLB,, | Performed by: INTERNAL MEDICINE

## 2022-04-18 PROCEDURE — 99999 PR PBB SHADOW E&M-EST. PATIENT-LVL IV: CPT | Mod: PBBFAC,,, | Performed by: INTERNAL MEDICINE

## 2022-04-18 PROCEDURE — 3075F PR MOST RECENT SYSTOLIC BLOOD PRESS GE 130-139MM HG: ICD-10-PCS | Mod: CPTII,S$GLB,, | Performed by: INTERNAL MEDICINE

## 2022-04-18 PROCEDURE — 1101F PR PT FALLS ASSESS DOC 0-1 FALLS W/OUT INJ PAST YR: ICD-10-PCS | Mod: CPTII,S$GLB,, | Performed by: INTERNAL MEDICINE

## 2022-04-18 PROCEDURE — 1159F MED LIST DOCD IN RCRD: CPT | Mod: CPTII,S$GLB,, | Performed by: INTERNAL MEDICINE

## 2022-04-18 PROCEDURE — 99215 PR OFFICE/OUTPT VISIT, EST, LEVL V, 40-54 MIN: ICD-10-PCS | Mod: S$GLB,,, | Performed by: INTERNAL MEDICINE

## 2022-04-18 PROCEDURE — 3075F SYST BP GE 130 - 139MM HG: CPT | Mod: CPTII,S$GLB,, | Performed by: INTERNAL MEDICINE

## 2022-04-18 PROCEDURE — 63600175 PHARM REV CODE 636 W HCPCS: Mod: JG | Performed by: INTERNAL MEDICINE

## 2022-04-18 PROCEDURE — 99999 PR PBB SHADOW E&M-EST. PATIENT-LVL IV: ICD-10-PCS | Mod: PBBFAC,,, | Performed by: INTERNAL MEDICINE

## 2022-04-18 PROCEDURE — 3080F DIAST BP >= 90 MM HG: CPT | Mod: CPTII,S$GLB,, | Performed by: INTERNAL MEDICINE

## 2022-04-18 PROCEDURE — 1101F PT FALLS ASSESS-DOCD LE1/YR: CPT | Mod: CPTII,S$GLB,, | Performed by: INTERNAL MEDICINE

## 2022-04-18 PROCEDURE — 99215 OFFICE O/P EST HI 40 MIN: CPT | Mod: S$GLB,,, | Performed by: INTERNAL MEDICINE

## 2022-04-18 PROCEDURE — 1126F PR PAIN SEVERITY QUANTIFIED, NO PAIN PRESENT: ICD-10-PCS | Mod: CPTII,S$GLB,, | Performed by: INTERNAL MEDICINE

## 2022-04-18 PROCEDURE — 3288F PR FALLS RISK ASSESSMENT DOCUMENTED: ICD-10-PCS | Mod: CPTII,S$GLB,, | Performed by: INTERNAL MEDICINE

## 2022-04-18 PROCEDURE — 3288F FALL RISK ASSESSMENT DOCD: CPT | Mod: CPTII,S$GLB,, | Performed by: INTERNAL MEDICINE

## 2022-04-18 PROCEDURE — 1159F PR MEDICATION LIST DOCUMENTED IN MEDICAL RECORD: ICD-10-PCS | Mod: CPTII,S$GLB,, | Performed by: INTERNAL MEDICINE

## 2022-04-18 PROCEDURE — 1126F AMNT PAIN NOTED NONE PRSNT: CPT | Mod: CPTII,S$GLB,, | Performed by: INTERNAL MEDICINE

## 2022-04-18 PROCEDURE — 96372 THER/PROPH/DIAG INJ SC/IM: CPT

## 2022-04-18 RX ORDER — HEPARIN SODIUM 200 [USP'U]/100ML
1000 INJECTION, SOLUTION INTRAVENOUS CONTINUOUS
Status: CANCELLED | OUTPATIENT
Start: 2022-04-18

## 2022-04-18 RX ORDER — LANREOTIDE ACETATE 120 MG/.5ML
120 INJECTION SUBCUTANEOUS
Status: DISCONTINUED | OUTPATIENT
Start: 2022-04-18 | End: 2022-04-18 | Stop reason: HOSPADM

## 2022-04-18 RX ORDER — DIPHENHYDRAMINE HYDROCHLORIDE 50 MG/ML
50 INJECTION INTRAMUSCULAR; INTRAVENOUS ONCE
Status: CANCELLED | OUTPATIENT
Start: 2022-04-18 | End: 2022-04-18

## 2022-04-18 RX ORDER — LIDOCAINE HYDROCHLORIDE 10 MG/ML
1 INJECTION, SOLUTION EPIDURAL; INFILTRATION; INTRACAUDAL; PERINEURAL ONCE AS NEEDED
Status: CANCELLED | OUTPATIENT
Start: 2022-04-18 | End: 2033-09-14

## 2022-04-18 RX ORDER — LIDOCAINE HYDROCHLORIDE 10 MG/ML
1 INJECTION, SOLUTION EPIDURAL; INFILTRATION; INTRACAUDAL; PERINEURAL ONCE
Status: CANCELLED | OUTPATIENT
Start: 2022-04-18 | End: 2022-04-18

## 2022-04-18 RX ORDER — SODIUM CHLORIDE 9 MG/ML
INJECTION, SOLUTION INTRAVENOUS CONTINUOUS
Status: CANCELLED | OUTPATIENT
Start: 2022-04-18

## 2022-04-18 RX ORDER — LANREOTIDE ACETATE 120 MG/.5ML
120 INJECTION SUBCUTANEOUS
Status: CANCELLED | OUTPATIENT
Start: 2022-04-18

## 2022-04-18 RX ADMIN — LANREOTIDE ACETATE 120 MG: 120 INJECTION SUBCUTANEOUS at 03:04

## 2022-04-18 NOTE — PROGRESS NOTES
"Subjective:       Patient ID: Shahram Hills is a 81 y.o. female.     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 Ms. Hills is an 79 yo woman who initially saw me on 6/7/18 for further evaluation of neuroendocrine tumor. She initially presented with diarrhea, abdominal discomfort, weight loss. She was evaluated at [facility] and underwent workup below:  US abdomen on 3/14/18 showed numerous bilobar mixed echogenicity and mildly vascular hepatic lesions. Cholelithiasis without e/o acute cholecystitis.   MRI abdomen on 3/30/2018 showed innumerable hepatic metastases. L3 vertebral body metastasis with soft tissue component along the right margin of the L3 and upper L4 vertebral bodies, filling the right L3/4 neural foramen, and extending into the anterior aspect of the spinal canal on the right at the L3 and upper T4 levels. Associated with mild spinal canal narrowing.  CT chest on 4/6/2018 showed one lucent nodule measuring 7 mm in the T7 vertebral body, most likely representing metastatic disease.    EGD on 5/4/18 showed normal duodenum. Schatzki's ring in the lower third of the esophagus. Grade 1 esophagitis in the GE junction c/w mild distal esophagitis. Congestion and erythema in the antrum, pre-pyloric region and stomach body c/w gastritis. Biopsy of the stomach antrum showed mild chronic gastritis, neg for H. pylori.   Labs on 5/9/2018: WBC 6.9, H/H 11.6/34.3, MCV 87.5, plt count 279. On 3/9/18: Vit B12 level 173, folic acid 6.6  She was started on oral vit B12 and underwent a liver biopsy on 5/18/18. Pathology showed "metastatic well differentiated neuroendocrine tumor. Ki67 3%"     She saw me on 6/7/18 and complained of weight loss of 26 lbs over the past 3-4 months, also has diarrhea. No flushing, wheezing, palpitations. Has been having pain in right flank radiating down " "the right leg. No tingling/numbness, weakness. She can hold her bladder but when she urinates her diarrhea would come out as well. Started on dex 4 mg q6h the evening of 18.      2. MRI spine on 18 showed "Marrow replacing metastatic lesion of the L3 vertebral body with associated extra osseous expansion and complete effacement of the right L3-L4 neural foramina and additional effacement of the right lateral recess.  There is additional lateral extension and abutment of the right psoas muscle.  Superimposed degenerative change at this level results in mild spinal canal stenosis." I sent the patient to ED on 18 where she was evaluated by neurosurgery. Neurosurgery did not feel she was a candidate for surgical intervention.      3. Seen by Dr. Adames on 18. palliative radiation to the area of the L3 metastasis 18-18   4. 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  There is an adjacent metastatic lymph node, diffuse liver metastases, and multiple bone metastases. Index primary neoplasm SUV max 33.13. Adjacent lymph node SUV max 23. L3 bone metastasis SUV max 36.86. Left lobe SUV max 46.13   5. Lanreotide every 4 weeks started on 18  6. TACE to the right hepatic lobe on 19. TACE to the left hepatic lobe on 3/21/19.   7. TACE to the right hepatic lobe on 22     History of Present Illness:   Ms. Hills returns for follow up. Feeling well. Diarrhea is better. About once a week. Imodium helps. Had TACE to the right hepatic lobe on 22. Scheduled for another locoregional procedure on 22.      ECO     ROS:   See HPI. Otherwise negative.      Physical Examination:   Vital signs reviewed.   Gen: well hydrated, well developed, in no acute distress.  HEENT: normocephalic, anicteric, PERRLA, EOMI, oropharynx clear  Neck: supple, no JVD, thyromegaly, cervical or supraclavicular LAD  Lungs: CTAB, no wheezes or rales  Heart: regular " rate, irregular, no M/R/G  Abdomen: soft, no tenderness, non-distended, no hepatomegaly, no splenomegaly, no mass, or hernia.   Ext:: no edema  Neuro: alert and oriented x 4, no focal neuro deficit  Skin: no rash, open wounds or ulcers  Psych: pleasant and appropriate mood and affect     Objective:      Laboratory Data:  Labs reviewed. CBC, CMP stable. Bilirubin 1.9. Chromogranin 1658->1540     Imaging Data:  CT chest 1/12/22:  . Newly seen solid nodule at the left lung base, measuring 0.5 cm.  Clinical considerations will determine further workup and/or follow-up.  2. Stable additional subcentimeter pulmonary nodules bilaterally.  3. Unchanged appearance of sclerotic focus with central lucency in the right manubrium.  4. Limited evaluation of multiple hypoattenuating lesions in the liver.  5. Additional findings as above.    MRI abdomen/pelvis 1/12/22:  1. Patient with neuroendocrine tumor.  Interval progression of hepatic metastasis with several lesions increased in size.  Stable appearance of the ileal lesion and osseous metastasis.  2. Mildly increased size of a prominent mesenteric lymph nodes.  3. Cholelithiasis with stable biliary dilatation.  4. Additional findings as above.  RECIST SUMMARY:     Date of prior examination for comparison: 10/15/2021     Lesion 1: Left hepatic lobe.  6.3 cm. Series 20 image 49.  Prior measurement 5.5 cm.     Lesion 2: Distal ileum.  3.0 cm. Series 26 image 8.  Prior measurement 3.1 cm.     Lesion 3: Right hepatic dome.  5.3 cm. Series 20 image 21.  Prior measurement 5.2 cm.     Assessment and Plan:      1. 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 Malignant carcinoid tumor of ileum    3. Spine metastasis    4. Secondary neuroendocrine tumor of bone    5. Immunodeficiency secondary to neoplasm    6. Controlled type 2 diabetes mellitus with microalbuminuria, without long-term current use of insulin    7. Essential hypertension    8. Diarrhea, unspecified type       1-5  - Ms Hills is an 80 yo woman with well differentiated low-grade neuroendocrine tumor of the ileum with mets to liver and bones. On lanreotide every 4 weeks. S/p TACE to the right hepatic lobe on 2/14/19. TACE to the left hepatic lobe on 3/21/19.   - CT/MRI 1/12/22 showed mild progression in the liver. S/p TACE to the right hepatic lobe on 2/11/22. Scheduled for another locoregional procedure on 4/22/22.   - doing well. Lanreotide today  - c/w lanreotide every 4 weeks  - RTC in one month with restaging scan, which will be one month after treatment to the left hepatic lobe      6.  - BP okay  - c/w current meds    7.  - DBP slightly high in clinic but good at home  - asked patient to continue to monitor BP at home    8.  - very mild. Getting lanreotide. Imodium helps  - c/w lanreotide monthly and imodium prn      RTC in one month   Their questions were answered in the clinic. Encouraged to call should issues arise      Route Chart for Scheduling    Med Onc Chart Routing      Follow up with physician 4 weeks. Get CBC, CMP, chromogranin A, poc creatinine, CT chest, MRI abdomen/pelvis on 5/16 then get lanreotide. see me on 5/20 at 8 am   Follow up with JUNAID    Labs CBC and CMP   Lab interval:  Chromogranin A   Imaging MRI   CT chest   Pharmacy appointment No pharmacy appointment needed      Other referrals No additional referrals needed           Therapy Plan Information  5. Medications  lanreotide injection 120 mg  120 mg, Subcutaneous, Every visit

## 2022-04-21 ENCOUNTER — DOCUMENTATION ONLY (OUTPATIENT)
Dept: PREADMISSION TESTING | Facility: HOSPITAL | Age: 82
End: 2022-04-21
Payer: MEDICARE

## 2022-04-21 ENCOUNTER — ANESTHESIA EVENT (OUTPATIENT)
Dept: INTERVENTIONAL RADIOLOGY/VASCULAR | Facility: HOSPITAL | Age: 82
End: 2022-04-21
Payer: MEDICARE

## 2022-04-22 ENCOUNTER — HOSPITAL ENCOUNTER (OUTPATIENT)
Dept: INTERVENTIONAL RADIOLOGY/VASCULAR | Facility: HOSPITAL | Age: 82
Discharge: HOME | End: 2022-04-22
Attending: FAMILY MEDICINE
Payer: MEDICARE

## 2022-04-22 ENCOUNTER — ANESTHESIA (OUTPATIENT)
Dept: INTERVENTIONAL RADIOLOGY/VASCULAR | Facility: HOSPITAL | Age: 82
End: 2022-04-22
Payer: MEDICARE

## 2022-04-22 VITALS
HEIGHT: 64 IN | DIASTOLIC BLOOD PRESSURE: 75 MMHG | HEART RATE: 78 BPM | SYSTOLIC BLOOD PRESSURE: 127 MMHG | TEMPERATURE: 98 F | WEIGHT: 123 LBS | BODY MASS INDEX: 21 KG/M2 | RESPIRATION RATE: 18 BRPM | OXYGEN SATURATION: 94 %

## 2022-04-22 DIAGNOSIS — C7B.8 METASTATIC MALIGNANT NEUROENDOCRINE TUMOR TO LIVER: ICD-10-CM

## 2022-04-22 PROCEDURE — 37243 IR EMBOLIZATION COMP FOR TUMOR_ORGAN ISCHEMIA_INFARC: ICD-10-PCS | Mod: ,,, | Performed by: RADIOLOGY

## 2022-04-22 PROCEDURE — 76937 PR  US GUIDE, VASCULAR ACCESS: ICD-10-PCS | Mod: 26,,, | Performed by: RADIOLOGY

## 2022-04-22 PROCEDURE — 76377 PR  3D RENDERING W/ IMAGE POSTPROCESS: ICD-10-PCS | Mod: 26,,, | Performed by: RADIOLOGY

## 2022-04-22 PROCEDURE — 75774 ARTERY X-RAY EACH VESSEL: CPT | Mod: TC,59 | Performed by: RADIOLOGY

## 2022-04-22 PROCEDURE — 25000003 PHARM REV CODE 250: Performed by: NURSE ANESTHETIST, CERTIFIED REGISTERED

## 2022-04-22 PROCEDURE — 63600175 PHARM REV CODE 636 W HCPCS: Performed by: RADIOLOGY

## 2022-04-22 PROCEDURE — 76937 US GUIDE VASCULAR ACCESS: CPT | Mod: 26,,, | Performed by: RADIOLOGY

## 2022-04-22 PROCEDURE — 25000003 PHARM REV CODE 250: Performed by: FAMILY MEDICINE

## 2022-04-22 PROCEDURE — 76377 3D RENDER W/INTRP POSTPROCES: CPT | Mod: 26,,, | Performed by: RADIOLOGY

## 2022-04-22 PROCEDURE — 75774 ARTERY X-RAY EACH VESSEL: CPT | Mod: 26,59,, | Performed by: RADIOLOGY

## 2022-04-22 PROCEDURE — D9220A PRA ANESTHESIA: Mod: ANES,,, | Performed by: ANESTHESIOLOGY

## 2022-04-22 PROCEDURE — 37243 VASC EMBOLIZE/OCCLUDE ORGAN: CPT | Performed by: RADIOLOGY

## 2022-04-22 PROCEDURE — 76937 US GUIDE VASCULAR ACCESS: CPT | Mod: TC | Performed by: RADIOLOGY

## 2022-04-22 PROCEDURE — 37000009 HC ANESTHESIA EA ADD 15 MINS

## 2022-04-22 PROCEDURE — 76380 CAT SCAN FOLLOW-UP STUDY: CPT | Mod: 26,59,, | Performed by: RADIOLOGY

## 2022-04-22 PROCEDURE — 75774 PR  ANGIO EA ADDNL SELECTV VESSEL: ICD-10-PCS | Mod: 26,59,, | Performed by: RADIOLOGY

## 2022-04-22 PROCEDURE — 63600175 PHARM REV CODE 636 W HCPCS: Performed by: FAMILY MEDICINE

## 2022-04-22 PROCEDURE — D9220A PRA ANESTHESIA: ICD-10-PCS | Mod: ANES,,, | Performed by: ANESTHESIOLOGY

## 2022-04-22 PROCEDURE — 75726 ARTERY X-RAYS ABDOMEN: CPT | Mod: 26,59,, | Performed by: RADIOLOGY

## 2022-04-22 PROCEDURE — A4550 SURGICAL TRAYS: HCPCS

## 2022-04-22 PROCEDURE — 75726 CHG ANGIO VISCERAL SELECTV/SUBSELEC: ICD-10-PCS | Mod: 26,59,, | Performed by: RADIOLOGY

## 2022-04-22 PROCEDURE — 36247 PR PLACE CATH SUBSUBSELECT ART,ABD/PEL: ICD-10-PCS | Mod: 51,RT,, | Performed by: RADIOLOGY

## 2022-04-22 PROCEDURE — 36247 INS CATH ABD/L-EXT ART 3RD: CPT | Mod: 51,RT,, | Performed by: RADIOLOGY

## 2022-04-22 PROCEDURE — 75887 PR  PERCUT XHEPATIC PORTOGRAM: ICD-10-PCS | Mod: 26,,, | Performed by: RADIOLOGY

## 2022-04-22 PROCEDURE — 75887 VEIN X-RAY LIVER W/O HEMODYN: CPT | Mod: 26,,, | Performed by: RADIOLOGY

## 2022-04-22 PROCEDURE — 63600175 PHARM REV CODE 636 W HCPCS: Performed by: NURSE ANESTHETIST, CERTIFIED REGISTERED

## 2022-04-22 PROCEDURE — 75726 ARTERY X-RAYS ABDOMEN: CPT | Mod: TC,59 | Performed by: RADIOLOGY

## 2022-04-22 PROCEDURE — 76377 3D RENDER W/INTRP POSTPROCES: CPT | Mod: TC | Performed by: RADIOLOGY

## 2022-04-22 PROCEDURE — 25500020 PHARM REV CODE 255: Performed by: FAMILY MEDICINE

## 2022-04-22 PROCEDURE — 75887 VEIN X-RAY LIVER W/O HEMODYN: CPT | Mod: TC | Performed by: RADIOLOGY

## 2022-04-22 PROCEDURE — 76380 PR  CT SCAN,LIMITED/LOCALIZED F/U STUDY: ICD-10-PCS | Mod: 26,59,, | Performed by: RADIOLOGY

## 2022-04-22 PROCEDURE — 76380 CAT SCAN FOLLOW-UP STUDY: CPT | Mod: TC,59 | Performed by: RADIOLOGY

## 2022-04-22 PROCEDURE — 37000008 HC ANESTHESIA 1ST 15 MINUTES

## 2022-04-22 PROCEDURE — G0269 OCCLUSIVE DEVICE IN VEIN ART: HCPCS | Performed by: RADIOLOGY

## 2022-04-22 PROCEDURE — C1894 INTRO/SHEATH, NON-LASER: HCPCS

## 2022-04-22 PROCEDURE — 96420 CHEMO IA PUSH TECNIQUE: CPT | Performed by: RADIOLOGY

## 2022-04-22 PROCEDURE — D9220A PRA ANESTHESIA: Mod: CRNA,,, | Performed by: NURSE ANESTHETIST, CERTIFIED REGISTERED

## 2022-04-22 PROCEDURE — 25500020 PHARM REV CODE 255: Performed by: RADIOLOGY

## 2022-04-22 PROCEDURE — D9220A PRA ANESTHESIA: ICD-10-PCS | Mod: CRNA,,, | Performed by: NURSE ANESTHETIST, CERTIFIED REGISTERED

## 2022-04-22 PROCEDURE — 36247 INS CATH ABD/L-EXT ART 3RD: CPT | Mod: RT | Performed by: RADIOLOGY

## 2022-04-22 RX ORDER — HYDROCODONE BITARTRATE AND ACETAMINOPHEN 5; 325 MG/1; MG/1
1 TABLET ORAL EVERY 4 HOURS PRN
Status: DISCONTINUED | OUTPATIENT
Start: 2022-04-22 | End: 2022-04-23 | Stop reason: HOSPADM

## 2022-04-22 RX ORDER — ONDANSETRON 2 MG/ML
INJECTION INTRAMUSCULAR; INTRAVENOUS
Status: DISCONTINUED | OUTPATIENT
Start: 2022-04-22 | End: 2022-04-22

## 2022-04-22 RX ORDER — OXYCODONE HYDROCHLORIDE 5 MG/1
5 CAPSULE ORAL EVERY 4 HOURS PRN
Qty: 20 CAPSULE | Refills: 0 | Status: SHIPPED | OUTPATIENT
Start: 2022-04-22 | End: 2022-06-21

## 2022-04-22 RX ORDER — PHENYLEPHRINE HCL IN 0.9% NACL 1 MG/10 ML
SYRINGE (ML) INTRAVENOUS
Status: DISCONTINUED | OUTPATIENT
Start: 2022-04-22 | End: 2022-04-22

## 2022-04-22 RX ORDER — SODIUM CHLORIDE 9 MG/ML
INJECTION, SOLUTION INTRAVENOUS CONTINUOUS
Status: DISCONTINUED | OUTPATIENT
Start: 2022-04-22 | End: 2022-04-23 | Stop reason: HOSPADM

## 2022-04-22 RX ORDER — HYDROMORPHONE HYDROCHLORIDE 1 MG/ML
0.2 INJECTION, SOLUTION INTRAMUSCULAR; INTRAVENOUS; SUBCUTANEOUS EVERY 5 MIN PRN
Status: DISCONTINUED | OUTPATIENT
Start: 2022-04-22 | End: 2022-04-23 | Stop reason: HOSPADM

## 2022-04-22 RX ORDER — LIDOCAINE HYDROCHLORIDE 10 MG/ML
1 INJECTION, SOLUTION EPIDURAL; INFILTRATION; INTRACAUDAL; PERINEURAL ONCE AS NEEDED
Status: DISCONTINUED | OUTPATIENT
Start: 2022-04-22 | End: 2022-04-23 | Stop reason: HOSPADM

## 2022-04-22 RX ORDER — LIDOCAINE HYDROCHLORIDE 10 MG/ML
1 INJECTION, SOLUTION EPIDURAL; INFILTRATION; INTRACAUDAL; PERINEURAL ONCE
Status: DISCONTINUED | OUTPATIENT
Start: 2022-04-22 | End: 2022-04-23 | Stop reason: HOSPADM

## 2022-04-22 RX ORDER — MAGNESIUM SULFATE HEPTAHYDRATE 40 MG/ML
2 INJECTION, SOLUTION INTRAVENOUS CONTINUOUS
Status: DISCONTINUED | OUTPATIENT
Start: 2022-04-22 | End: 2022-04-23 | Stop reason: HOSPADM

## 2022-04-22 RX ORDER — DEXAMETHASONE SODIUM PHOSPHATE 100 MG/10ML
INJECTION INTRAMUSCULAR; INTRAVENOUS
Status: DISCONTINUED | OUTPATIENT
Start: 2022-04-22 | End: 2022-04-22

## 2022-04-22 RX ORDER — FENTANYL CITRATE 50 UG/ML
INJECTION, SOLUTION INTRAMUSCULAR; INTRAVENOUS
Status: DISCONTINUED | OUTPATIENT
Start: 2022-04-22 | End: 2022-04-22

## 2022-04-22 RX ORDER — ONDANSETRON 2 MG/ML
4 INJECTION INTRAMUSCULAR; INTRAVENOUS ONCE
Status: DISCONTINUED | OUTPATIENT
Start: 2022-04-22 | End: 2022-04-23 | Stop reason: HOSPADM

## 2022-04-22 RX ORDER — CIPROFLOXACIN 500 MG/1
500 TABLET ORAL 2 TIMES DAILY
Qty: 14 TABLET | Refills: 0 | Status: SHIPPED | OUTPATIENT
Start: 2022-04-22 | End: 2022-04-29

## 2022-04-22 RX ORDER — FENTANYL CITRATE 50 UG/ML
25 INJECTION, SOLUTION INTRAMUSCULAR; INTRAVENOUS EVERY 5 MIN PRN
Status: DISCONTINUED | OUTPATIENT
Start: 2022-04-22 | End: 2022-04-23 | Stop reason: HOSPADM

## 2022-04-22 RX ORDER — DIPHENHYDRAMINE HYDROCHLORIDE 50 MG/ML
50 INJECTION INTRAMUSCULAR; INTRAVENOUS ONCE
Status: COMPLETED | OUTPATIENT
Start: 2022-04-22 | End: 2022-04-22

## 2022-04-22 RX ORDER — FENTANYL CITRATE 50 UG/ML
50 INJECTION, SOLUTION INTRAMUSCULAR; INTRAVENOUS
Status: DISCONTINUED | OUTPATIENT
Start: 2022-04-22 | End: 2022-04-23 | Stop reason: HOSPADM

## 2022-04-22 RX ORDER — PROPOFOL 10 MG/ML
VIAL (ML) INTRAVENOUS
Status: DISCONTINUED | OUTPATIENT
Start: 2022-04-22 | End: 2022-04-22

## 2022-04-22 RX ORDER — MIDAZOLAM HYDROCHLORIDE 1 MG/ML
1 INJECTION INTRAMUSCULAR; INTRAVENOUS
Status: DISCONTINUED | OUTPATIENT
Start: 2022-04-22 | End: 2022-04-23 | Stop reason: HOSPADM

## 2022-04-22 RX ORDER — LIDOCAINE HYDROCHLORIDE 20 MG/ML
INJECTION, SOLUTION EPIDURAL; INFILTRATION; INTRACAUDAL; PERINEURAL
Status: DISCONTINUED | OUTPATIENT
Start: 2022-04-22 | End: 2022-04-22

## 2022-04-22 RX ORDER — HEPARIN SODIUM 200 [USP'U]/100ML
INJECTION, SOLUTION INTRAVENOUS
Status: COMPLETED | OUTPATIENT
Start: 2022-04-22 | End: 2022-04-22

## 2022-04-22 RX ORDER — AMOXICILLIN AND CLAVULANATE POTASSIUM 500; 125 MG/1; MG/1
1 TABLET, FILM COATED ORAL 2 TIMES DAILY
Qty: 14 TABLET | Refills: 0 | Status: SHIPPED | OUTPATIENT
Start: 2022-04-22 | End: 2022-04-29

## 2022-04-22 RX ORDER — ROCURONIUM BROMIDE 10 MG/ML
INJECTION, SOLUTION INTRAVENOUS
Status: DISCONTINUED | OUTPATIENT
Start: 2022-04-22 | End: 2022-04-22

## 2022-04-22 RX ORDER — DIPHENHYDRAMINE HYDROCHLORIDE 50 MG/ML
25 INJECTION INTRAMUSCULAR; INTRAVENOUS EVERY 6 HOURS PRN
Status: DISCONTINUED | OUTPATIENT
Start: 2022-04-22 | End: 2022-04-23 | Stop reason: HOSPADM

## 2022-04-22 RX ORDER — ONDANSETRON 2 MG/ML
4 INJECTION INTRAMUSCULAR; INTRAVENOUS ONCE AS NEEDED
Status: DISCONTINUED | OUTPATIENT
Start: 2022-04-22 | End: 2022-04-23 | Stop reason: HOSPADM

## 2022-04-22 RX ADMIN — DOXORUBICIN HYDROCHLORIDE 25 MG: 2 INJECTION, POWDER, LYOPHILIZED, FOR SOLUTION INTRAVENOUS at 02:04

## 2022-04-22 RX ADMIN — SODIUM CHLORIDE: 9 INJECTION, SOLUTION INTRAVENOUS at 12:04

## 2022-04-22 RX ADMIN — SODIUM CHLORIDE, SODIUM GLUCONATE, SODIUM ACETATE, POTASSIUM CHLORIDE, MAGNESIUM CHLORIDE, SODIUM PHOSPHATE, DIBASIC, AND POTASSIUM PHOSPHATE: .53; .5; .37; .037; .03; .012; .00082 INJECTION, SOLUTION INTRAVENOUS at 01:04

## 2022-04-22 RX ADMIN — Medication 100 MCG: at 02:04

## 2022-04-22 RX ADMIN — IOHEXOL 40 ML: 300 INJECTION, SOLUTION INTRAVENOUS at 02:04

## 2022-04-22 RX ADMIN — OCTREOTIDE ACETATE 500 MCG/HR: 500 INJECTION, SOLUTION INTRAVENOUS; SUBCUTANEOUS at 01:04

## 2022-04-22 RX ADMIN — ETHIODIZED OIL 10 ML: 480 INJECTION INTRA-ARTERIAL; INTRALYMPHATIC; INTRAUTERINE at 02:04

## 2022-04-22 RX ADMIN — MAGNESIUM SULFATE IN WATER 2 G/HR: 40 INJECTION, SOLUTION INTRAVENOUS at 01:04

## 2022-04-22 RX ADMIN — ONDANSETRON 8 MG: 2 INJECTION INTRAMUSCULAR; INTRAVENOUS at 01:04

## 2022-04-22 RX ADMIN — DEXAMETHASONE SODIUM PHOSPHATE 20 MG: 10 INJECTION INTRAMUSCULAR; INTRAVENOUS at 01:04

## 2022-04-22 RX ADMIN — PROPOFOL 130 MG: 10 INJECTION, EMULSION INTRAVENOUS at 01:04

## 2022-04-22 RX ADMIN — LIDOCAINE HYDROCHLORIDE 100 MG: 20 INJECTION, SOLUTION EPIDURAL; INFILTRATION; INTRACAUDAL at 01:04

## 2022-04-22 RX ADMIN — Medication 200 MCG: at 02:04

## 2022-04-22 RX ADMIN — HEPARIN SODIUM IN SODIUM CHLORIDE 1000 UNITS/HR: 200 INJECTION INTRAVENOUS at 02:04

## 2022-04-22 RX ADMIN — FENTANYL CITRATE 50 MCG: 50 INJECTION INTRAMUSCULAR; INTRAVENOUS at 01:04

## 2022-04-22 RX ADMIN — SODIUM CHLORIDE, SODIUM GLUCONATE, SODIUM ACETATE, POTASSIUM CHLORIDE, MAGNESIUM CHLORIDE, SODIUM PHOSPHATE, DIBASIC, AND POTASSIUM PHOSPHATE: .53; .5; .37; .037; .03; .012; .00082 INJECTION, SOLUTION INTRAVENOUS at 02:04

## 2022-04-22 RX ADMIN — SUGAMMADEX 112 MG: 100 INJECTION, SOLUTION INTRAVENOUS at 02:04

## 2022-04-22 RX ADMIN — ROCURONIUM BROMIDE 50 MG: 10 INJECTION INTRAVENOUS at 01:04

## 2022-04-22 RX ADMIN — DIPHENHYDRAMINE HYDROCHLORIDE 50 MG: 50 INJECTION INTRAMUSCULAR; INTRAVENOUS at 01:04

## 2022-04-22 RX ADMIN — SODIUM CHLORIDE 3 G: 9 INJECTION, SOLUTION INTRAVENOUS at 12:04

## 2022-04-22 NOTE — ANESTHESIA PREPROCEDURE EVALUATION
04/22/2022  Shahram Hills is a 81 y.o., female.      Pre-op Assessment          Review of Systems  Anesthesia Hx:  No problems with previous Anesthesia    Social:  Non-Smoker, No Alcohol Use    Hematology/Oncology:        Oncology Comments: Malignant carcinoid tumor of ileum   Cardiovascular:   Hypertension PVD hyperlipidemia    Renal/:   Chronic Renal Disease    Hepatic/GI:   Liver Disease, (HCC)    Musculoskeletal:  Spine Disorders: (Spine metastasis)    Neurological:   Neuromuscular Disease, Memory deficits   Endocrine:   Diabetes    Psych:   depression          Physical Exam  General: Well nourished, Alert and Oriented        Anesthesia Plan  Type of Anesthesia, risks & benefits discussed:    Anesthesia Type: Gen ETT  Intra-op Monitoring Plan: Standard ASA Monitors  Post Op Pain Control Plan: multimodal analgesia  Induction:  IV  Informed Consent: Informed consent signed with the Patient representative and all parties understand the risks and agree with anesthesia plan.  All questions answered.   ASA Score: 3  Day of Surgery Review of History & Physical: H&P Update referred to the surgeon/provider.    Ready For Surgery From Anesthesia Perspective.     .

## 2022-04-22 NOTE — NURSING
Chemo embolization of liver tumorcomplete. Pt tolerated well. VSS. No signs or symptoms of distress noted per anesthesia. Angioseal closure device in place. Hemostasis 1440. Pt to remain flat until 1640. Pt will be transferred to PACU bed after extubation/stabilization escorted by this RN and CRNA who will give report

## 2022-04-22 NOTE — ANESTHESIA PROCEDURE NOTES
Intubation    Date/Time: 4/22/2022 1:38 PM  Performed by: Nicki Hobson CRNA  Authorized by: Dalton Pereira MD     Intubation:     Induction:  Intravenous    Intubated:  Postinduction    Mask Ventilation:  Easy with oral airway    Attempts:  1    Attempted By:  CRNA    Method of Intubation:  Direct    Blade:  Ta 2    Laryngeal View Grade: Grade I - full view of cords      Difficult Airway Encountered?: No      Complications:  None    Airway Device:  Oral endotracheal tube    Airway Device Size:  7.0    Style/Cuff Inflation:  Cuffed (inflated to minimal occlusive pressure)    Inflation Amount (mL):  7    Tube secured:  21    Secured at:  The lips    Placement Verified By:  Capnometry    Complicating Factors:  None    Findings Post-Intubation:  BS equal bilateral and atraumatic/condition of teeth unchanged

## 2022-04-22 NOTE — TRANSFER OF CARE
"Anesthesia Transfer of Care Note    Patient: Shahram Hills    Procedure(s) Performed: * No procedures listed *    Patient location: [facility]    Anesthesia Type: general    Transport from OR: Transported from OR on 6-10 L/min O2 by face mask with adequate spontaneous ventilation    Post pain: adequate analgesia    Post assessment: no apparent anesthetic complications    Post vital signs: stable    Level of consciousness: awake, alert and oriented    Nausea/Vomiting: no nausea/vomiting    Complications: none    Transfer of care protocol was followed      Last vitals:   Visit Vitals  /76 (BP Location: Left arm, Patient Position: Lying)   Pulse 77   Temp 36.6 °C (97.8 °F) (Temporal)   Resp 16   Ht 5' 4" (1.626 m)   Wt 55.8 kg (123 lb)   LMP  (LMP Unknown)   SpO2 99%   Breastfeeding No   BMI 21.11 kg/m²     "

## 2022-04-22 NOTE — PLAN OF CARE
Pt in  for TACE. CRNA and RN transported pt from SSCU to IT in stretcher. CRNA remains at bedside, as procedure will be done with anesthesia.

## 2022-04-22 NOTE — PLAN OF CARE
Patient arrived to room. PIV placed x2. Admit assessment completed. Plan of care discussed with patient. Will monitor. Call light within reach, instructed in use. Call light within reach, instructed in use. Spoke with Janay in IR about pre meds. She stated it was ok to wait until she got down here for the meds to be given.

## 2022-04-22 NOTE — H&P
Radiology History & Physical      SUBJECTIVE:     Chief Complaint: neuroendocrine tumor    History of Present Illness:  Shahram Hills is a 81 y.o. female w PMH metastatic neuroendocrine tumor who presents for TACE.     Past Medical History:   Diagnosis Date    Anemia 7/11/2018    B12 deficiency     Depression     per pcp note 7/2017 and given prozac and diazepam     Hyperlipidemia     Hypertension     Weight loss     reviewed prior pcp Dr. Russo notes 2017 - labs reviewed: cmp wnl x tbili 1.5, tsh wnl, A1c 5.0, cbc wnl,D 36, hiv neg, hep c neg, hep b surg ag neg      Past Surgical History:   Procedure Laterality Date    EMBOLIZATION N/A 2/14/2019    Procedure: EMBOLIZATION, BLOOD VESSEL;  Surgeon: [facility] Provider;  Location: [facility] OR 2ND FLR;  Service: Radiology;  Laterality: N/A;  Room 189/[facility]    EMBOLIZATION N/A 3/21/2019    Procedure: EMBOLIZATION, BLOOD VESSEL;  Surgeon: [facility] Diagnostic Provider;  Location: [facility] OR 2ND FLR;  Service: Radiology;  Laterality: N/A;  Room 189/[facility]    ESOPHAGOGASTRODUODENOSCOPY  05/04/2018    esophageal ring, grade 1 esophagitis, gastritis     EYE SURGERY Bilateral     cataract removal    HYSTERECTOMY      fibroids       Home Meds:   Prior to Admission medications    Medication Sig Start Date End Date Taking? Authorizing Provider   amLODIPine (NORVASC) 5 MG tablet Take 1 tablet (5 mg total) by mouth once daily. 4/8/22 4/8/23 Yes Trixie Xie MD   blood sugar diagnostic Strp To check BG 2 times daily, to use with insurance preferred meter 5/25/21  Yes Trixie Xie MD   blood-glucose meter kit To check BG 2 times daily, to use with insurance preferred meter 5/25/21 5/25/22 Yes Trixie Xie MD   cyanocobalamin (VITAMIN B-12) 1000 MCG tablet Take 1 tablet (1,000 mcg total) by mouth once daily. 4/20/18  Yes Trixie Xie MD   ezetimibe (ZETIA) 10 mg tablet Take 1 tablet (10 mg total) by mouth once daily. 12/28/21 12/28/22  Yes Trixie Xie MD   ferrous sulfate 325 (65 FE) MG EC tablet Take 325 mg by mouth 3 (three) times daily with meals.   Yes Historical Provider   lancets Misc To check BG 2 times daily, to use with insurance preferred meter 5/25/21  Yes Trixie Xie MD   latanoprost 0.005 % ophthalmic solution Place 1 drop into both eyes nightly. 11/8/21  Yes Historical Provider   losartan (COZAAR) 50 MG tablet Take 1 tablet (50 mg total) by mouth once daily.  Patient taking differently: Take 50 mg by mouth every morning. 2/25/22  Yes Trixie Xie MD   metFORMIN (GLUCOPHAGE-XR) 500 MG ER 24hr tablet Take 2 tablets (1,000 mg total) by mouth daily with breakfast. 2/25/22 2/25/23 Yes Trixie Xie MD   TRUEPLUS LANCETS 33 gauge Misc USE TO CHECK BLOOD SUGAR TWICE DAILY 7/12/21  Yes Historical Provider   vitamin D (VITAMIN D3) 1000 units Tab Take 400 Units by mouth once daily.   Yes Historical Provider   XIIDRA 5 % Dpet INSTILL ONE DROP INTO OU BID 4/2/18  Yes Historical Provider   ondansetron (ZOFRAN-ODT) 4 MG TbDL Take 1 tablet (4 mg total) by mouth every 6 (six) hours as needed (nausea, vomting). 2/11/22   Mohan Raphael MD   oxyCODONE (ROXICODONE) 5 MG immediate release tablet Take 1 tablet (5 mg total) by mouth every 6 (six) hours as needed for Pain. 2/11/22   Mohan Raphael MD     Anticoagulants/Antiplatelets: no anticoagulation    Allergies: Review of patient's allergies indicates:  No Known Allergies  Sedation History:  no adverse reactions    Review of Systems:   Hematological: no known coagulopathies  Respiratory: no shortness of breath  Cardiovascular: no chest pain  Gastrointestinal: no abdominal pain  Genito-Urinary: no dysuria  Musculoskeletal: negative  Neurological: no TIA or stroke symptoms         OBJECTIVE:     Vital Signs (Most Recent)  BP: 129/76 (04/22/22 0959)    Physical Exam:  ASA: per anesthesia  Mallampati: per anesthesia    General: no acute distress  Mental Status: alert and  oriented to person, place and time  HEENT: normocephalic, atraumatic  Chest: unlabored breathing  Heart: regular heart rate  Abdomen: nondistended  Extremity: moves all extremities    Laboratory  Lab Results   Component Value Date    INR 1.0 04/22/2022       Lab Results   Component Value Date    WBC 7.43 04/22/2022    HGB 12.8 04/22/2022    HCT 42.2 04/22/2022    MCV 92 04/22/2022     04/22/2022      Lab Results   Component Value Date     (H) 04/22/2022     04/22/2022    K 3.7 04/22/2022     04/22/2022    CO2 32 (H) 04/22/2022    BUN 8 04/22/2022    CREATININE 0.7 04/22/2022    CALCIUM 9.8 04/22/2022    MG 1.8 02/12/2022    ALT 8 (L) 04/22/2022    AST 16 04/22/2022    ALBUMIN 3.7 04/22/2022    BILITOT 1.4 (H) 04/22/2022    BILIDIR 0.5 (H) 04/22/2022       ASSESSMENT/PLAN:     Sedation Plan: per anesthesia  Patient will undergo TACE.    Aston Joseph MD PGY2  Department of Radiology  [facility]

## 2022-04-22 NOTE — DISCHARGE INSTRUCTIONS
For scheduling: Call Carina at [phone]    For questions or concerns call: DEDE MON-FRI 8 AM- 5PM [phone]. Radiology resident on call [phone].    For immediate concerns that are not emergent, you may call our radiology clinic at: [phone]     Leave dressing on 24 hours then may remove and shower. Do not sit in bath water, hot tub, or swim for one week.    Go to ER any symptoms shortness of breath, chest pain, coldness, numbness,or tingling in right foot

## 2022-04-22 NOTE — PLAN OF CARE
Bedrest complete. No complications noted. Patient is stable and ready for discharge. Instructions and prescription given to patient and family. Questions answered. Patient tolerating po liquids with no difficulty. Patient has no pain or states it is a tolerable level for them. Anesthesia consent and surgical consent in chart.

## 2022-04-25 ENCOUNTER — TELEPHONE (OUTPATIENT)
Dept: INTERNAL MEDICINE | Facility: CLINIC | Age: 82
End: 2022-04-25

## 2022-04-25 ENCOUNTER — CLINICAL SUPPORT (OUTPATIENT)
Dept: INTERNAL MEDICINE | Facility: CLINIC | Age: 82
End: 2022-04-25
Payer: MEDICARE

## 2022-04-25 ENCOUNTER — TELEPHONE (OUTPATIENT)
Dept: HEMATOLOGY/ONCOLOGY | Facility: CLINIC | Age: 82
End: 2022-04-25
Payer: MEDICARE

## 2022-04-25 VITALS — DIASTOLIC BLOOD PRESSURE: 68 MMHG | SYSTOLIC BLOOD PRESSURE: 134 MMHG | HEART RATE: 72 BPM | OXYGEN SATURATION: 99 %

## 2022-04-25 DIAGNOSIS — I10 HYPERTENSION, BENIGN: ICD-10-CM

## 2022-04-25 PROCEDURE — 99999 PR PBB SHADOW E&M-EST. PATIENT-LVL III: ICD-10-PCS | Mod: PBBFAC,,,

## 2022-04-25 PROCEDURE — 99999 PR PBB SHADOW E&M-EST. PATIENT-LVL III: CPT | Mod: PBBFAC,,,

## 2022-04-25 RX ORDER — AMLODIPINE BESYLATE 5 MG/1
5 TABLET ORAL DAILY
Qty: 90 TABLET | Refills: 3 | Status: SHIPPED | OUTPATIENT
Start: 2022-04-25 | End: 2022-09-27

## 2022-04-25 NOTE — PROGRESS NOTES
Shahram Chaoaux 81 y.o. female is here for Blood Pressure check. in person  Manual Blood pressure reading was  134/68, Pulse 72. (Checked at the end of the visit)  If high, was it repeated after 15 minutes? No  Diagnosed with Hypertension yes.  Patient took blood pressure medication today yes.  Last dose of blood pressure medication was taken at 04/24/22 @ 1000am. Patient took Norvasc 5 mg, Losartan 50 mg.   All Medications and OTC medication updated yes  Does patient have record of home blood pressure readings / Blood Pressure Log yes.   04/25/22 home reading was 127/66, HR 68  Does the pt have any complaints today in regards to their blood pressure medication? no. Complains of NA. Patient is asymptomatic.   Were you sitting still for 5-10 minutes prior to taking your Blood pressure? yes   Pt's Home blood pressure machine read in office NA  Has your blood pressure monitor ever been checked? no When was last time we checked your blood pressure monitor? NA  Reviewed vitals yes  Follow up date is 05/20/22  Dr. Xie notified.     Creatinine   Date Value Ref Range Status   04/22/2022 0.7 0.5 - 1.4 mg/dL Final     Sodium   Date Value Ref Range Status   04/22/2022 145 136 - 145 mmol/L Final     Potassium   Date Value Ref Range Status   04/22/2022 3.7 3.5 - 5.1 mmol/L Final

## 2022-04-25 NOTE — TELEPHONE ENCOUNTER
Spoke to MsDavid Jeana sister and informed her that per Dr. Xie that Many readings are at goal at home - will continue current meds for now - amlodipine 5 and losartan 50 - refills sent in   Pt sister states understanding with no further questions or concerns.

## 2022-04-25 NOTE — TELEPHONE ENCOUNTER
Many readings are at goal at home - will continue current meds for now - amlodipine 5 and losartan 50 - refills sent in   Please let her know

## 2022-04-25 NOTE — TELEPHONE ENCOUNTER
----- Message from Jenna Rodríguez sent at 4/25/2022 11:37 AM CDT -----  Who called?:PT Stephanie sister      What is the request in detail:PT would like to speak with staff about her up coming appointment. Please advise          Can the clinic reply by [facility]?:Yes        Best Call Back Number:[phone]

## 2022-05-16 ENCOUNTER — HOSPITAL ENCOUNTER (OUTPATIENT)
Dept: RADIOLOGY | Facility: OTHER | Age: 82
Discharge: HOME | End: 2022-05-16
Attending: INTERNAL MEDICINE
Payer: MEDICARE

## 2022-05-16 ENCOUNTER — LAB VISIT (OUTPATIENT)
Dept: LAB | Facility: OTHER | Age: 82
End: 2022-05-16
Attending: INTERNAL MEDICINE
Payer: MEDICARE

## 2022-05-16 ENCOUNTER — INFUSION (OUTPATIENT)
Dept: INFUSION THERAPY | Facility: OTHER | Age: 82
End: 2022-05-16
Attending: INTERNAL MEDICINE
Payer: MEDICARE

## 2022-05-16 VITALS
BODY MASS INDEX: 21.11 KG/M2 | SYSTOLIC BLOOD PRESSURE: 143 MMHG | HEART RATE: 87 BPM | TEMPERATURE: 98 F | OXYGEN SATURATION: 99 % | RESPIRATION RATE: 16 BRPM | WEIGHT: 123.69 LBS | DIASTOLIC BLOOD PRESSURE: 75 MMHG | HEIGHT: 64 IN

## 2022-05-16 DIAGNOSIS — C7B.8 SECONDARY NEUROENDOCRINE TUMOR OF BONE: ICD-10-CM

## 2022-05-16 DIAGNOSIS — C7B.8 METASTATIC MALIGNANT NEUROENDOCRINE TUMOR TO LIVER: Primary | ICD-10-CM

## 2022-05-16 DIAGNOSIS — C7B.8 METASTATIC MALIGNANT NEUROENDOCRINE TUMOR TO LIVER: ICD-10-CM

## 2022-05-16 DIAGNOSIS — C7A.012 MALIGNANT CARCINOID TUMOR OF ILEUM: ICD-10-CM

## 2022-05-16 LAB
ALBUMIN SERPL BCP-MCNC: 3.9 G/DL (ref 3.5–5.2)
ALP SERPL-CCNC: 64 U/L (ref 55–135)
ALT SERPL W/O P-5'-P-CCNC: 10 U/L (ref 10–44)
ANION GAP SERPL CALC-SCNC: 12 MMOL/L (ref 8–16)
AST SERPL-CCNC: 20 U/L (ref 10–40)
BASOPHILS # BLD AUTO: 0.02 K/UL (ref 0–0.2)
BASOPHILS NFR BLD: 0.3 % (ref 0–1.9)
BILIRUB SERPL-MCNC: 1.7 MG/DL (ref 0.1–1)
BUN SERPL-MCNC: 14 MG/DL (ref 8–23)
CALCIUM SERPL-MCNC: 10.3 MG/DL (ref 8.7–10.5)
CHLORIDE SERPL-SCNC: 104 MMOL/L (ref 95–110)
CO2 SERPL-SCNC: 28 MMOL/L (ref 23–29)
CREAT SERPL-MCNC: 0.8 MG/DL (ref 0.5–1.4)
DIFFERENTIAL METHOD: ABNORMAL
EOSINOPHIL # BLD AUTO: 0.1 K/UL (ref 0–0.5)
EOSINOPHIL NFR BLD: 0.8 % (ref 0–8)
ERYTHROCYTE [DISTWIDTH] IN BLOOD BY AUTOMATED COUNT: 13.2 % (ref 11.5–14.5)
EST. GFR  (AFRICAN AMERICAN): >60 ML/MIN/1.73 M^2
EST. GFR  (NON AFRICAN AMERICAN): >60 ML/MIN/1.73 M^2
GLUCOSE SERPL-MCNC: 163 MG/DL (ref 70–110)
HCT VFR BLD AUTO: 40.6 % (ref 37–48.5)
HGB BLD-MCNC: 12.9 G/DL (ref 12–16)
IMM GRANULOCYTES # BLD AUTO: 0.02 K/UL (ref 0–0.04)
IMM GRANULOCYTES NFR BLD AUTO: 0.3 % (ref 0–0.5)
LYMPHOCYTES # BLD AUTO: 1.8 K/UL (ref 1–4.8)
LYMPHOCYTES NFR BLD: 25.5 % (ref 18–48)
MCH RBC QN AUTO: 29 PG (ref 27–31)
MCHC RBC AUTO-ENTMCNC: 31.8 G/DL (ref 32–36)
MCV RBC AUTO: 91 FL (ref 82–98)
MONOCYTES # BLD AUTO: 0.6 K/UL (ref 0.3–1)
MONOCYTES NFR BLD: 8.4 % (ref 4–15)
NEUTROPHILS # BLD AUTO: 4.7 K/UL (ref 1.8–7.7)
NEUTROPHILS NFR BLD: 64.7 % (ref 38–73)
NRBC BLD-RTO: 0 /100 WBC
PLATELET # BLD AUTO: 326 K/UL (ref 150–450)
PMV BLD AUTO: 10.4 FL (ref 9.2–12.9)
POTASSIUM SERPL-SCNC: 4.5 MMOL/L (ref 3.5–5.1)
PROT SERPL-MCNC: 7.8 G/DL (ref 6–8.4)
RBC # BLD AUTO: 4.45 M/UL (ref 4–5.4)
SODIUM SERPL-SCNC: 144 MMOL/L (ref 136–145)
WBC # BLD AUTO: 7.18 K/UL (ref 3.9–12.7)

## 2022-05-16 PROCEDURE — 72197 MRI ABDOMEN-PELVIS W W/O CONTRAST (XPD): ICD-10-PCS | Mod: 26,,, | Performed by: RADIOLOGY

## 2022-05-16 PROCEDURE — 25500020 PHARM REV CODE 255: Performed by: INTERNAL MEDICINE

## 2022-05-16 PROCEDURE — 80053 COMPREHEN METABOLIC PANEL: CPT | Performed by: INTERNAL MEDICINE

## 2022-05-16 PROCEDURE — 71250 CT THORAX DX C-: CPT | Mod: 26,,, | Performed by: RADIOLOGY

## 2022-05-16 PROCEDURE — 71250 CT THORAX DX C-: CPT | Mod: TC

## 2022-05-16 PROCEDURE — 63600175 PHARM REV CODE 636 W HCPCS: Mod: JG | Performed by: INTERNAL MEDICINE

## 2022-05-16 PROCEDURE — 71250 CT CHEST WITHOUT CONTRAST: ICD-10-PCS | Mod: 26,,, | Performed by: RADIOLOGY

## 2022-05-16 PROCEDURE — 72197 MRI PELVIS W/O & W/DYE: CPT | Mod: TC

## 2022-05-16 PROCEDURE — 86316 IMMUNOASSAY TUMOR OTHER: CPT | Performed by: INTERNAL MEDICINE

## 2022-05-16 PROCEDURE — 72197 MRI PELVIS W/O & W/DYE: CPT | Mod: 26,,, | Performed by: RADIOLOGY

## 2022-05-16 PROCEDURE — 85025 COMPLETE CBC W/AUTO DIFF WBC: CPT | Performed by: INTERNAL MEDICINE

## 2022-05-16 PROCEDURE — 96372 THER/PROPH/DIAG INJ SC/IM: CPT | Mod: 59

## 2022-05-16 PROCEDURE — A9585 GADOBUTROL INJECTION: HCPCS | Performed by: INTERNAL MEDICINE

## 2022-05-16 PROCEDURE — 36415 COLL VENOUS BLD VENIPUNCTURE: CPT | Performed by: INTERNAL MEDICINE

## 2022-05-16 PROCEDURE — 74183 MRI ABD W/O CNTR FLWD CNTR: CPT | Mod: 26,,, | Performed by: RADIOLOGY

## 2022-05-16 PROCEDURE — 74183 MRI ABDOMEN-PELVIS W W/O CONTRAST (XPD): ICD-10-PCS | Mod: 26,,, | Performed by: RADIOLOGY

## 2022-05-16 RX ORDER — LANREOTIDE ACETATE 120 MG/.5ML
120 INJECTION SUBCUTANEOUS
Status: CANCELLED | OUTPATIENT
Start: 2022-05-16

## 2022-05-16 RX ORDER — LANREOTIDE ACETATE 120 MG/.5ML
120 INJECTION SUBCUTANEOUS
Status: DISCONTINUED | OUTPATIENT
Start: 2022-05-16 | End: 2022-05-16 | Stop reason: HOSPADM

## 2022-05-16 RX ORDER — GADOBUTROL 604.72 MG/ML
5.5 INJECTION INTRAVENOUS
Status: COMPLETED | OUTPATIENT
Start: 2022-05-16 | End: 2022-05-16

## 2022-05-16 RX ADMIN — GADOBUTROL 5.5 ML: 604.72 INJECTION INTRAVENOUS at 09:05

## 2022-05-16 RX ADMIN — LANREOTIDE ACETATE 120 MG: 120 INJECTION SUBCUTANEOUS at 10:05

## 2022-05-16 NOTE — PLAN OF CARE
Lanreotide injection infusion complete. Pt tolerated well. VSS. NAD. Pt verbalized understanding of discharge instructions before leaving.

## 2022-05-17 LAB — CGA SERPL-MCNC: 1342 NG/ML

## 2022-05-20 ENCOUNTER — OFFICE VISIT (OUTPATIENT)
Dept: HEMATOLOGY/ONCOLOGY | Facility: CLINIC | Age: 82
End: 2022-05-20
Payer: MEDICARE

## 2022-05-20 VITALS
RESPIRATION RATE: 18 BRPM | HEART RATE: 52 BPM | TEMPERATURE: 99 F | WEIGHT: 124.69 LBS | SYSTOLIC BLOOD PRESSURE: 147 MMHG | OXYGEN SATURATION: 98 % | DIASTOLIC BLOOD PRESSURE: 65 MMHG | BODY MASS INDEX: 21.29 KG/M2 | HEIGHT: 64 IN

## 2022-05-20 DIAGNOSIS — D84.81 IMMUNODEFICIENCY SECONDARY TO NEOPLASM: ICD-10-CM

## 2022-05-20 DIAGNOSIS — C7A.8 NEUROENDOCRINE CARCINOMA METASTATIC TO BONE: ICD-10-CM

## 2022-05-20 DIAGNOSIS — C7B.8 NEUROENDOCRINE CARCINOMA METASTATIC TO BONE: ICD-10-CM

## 2022-05-20 DIAGNOSIS — E11.29 CONTROLLED TYPE 2 DIABETES MELLITUS WITH MICROALBUMINURIA, WITHOUT LONG-TERM CURRENT USE OF INSULIN: ICD-10-CM

## 2022-05-20 DIAGNOSIS — I10 ESSENTIAL HYPERTENSION: ICD-10-CM

## 2022-05-20 DIAGNOSIS — D49.9 IMMUNODEFICIENCY SECONDARY TO NEOPLASM: ICD-10-CM

## 2022-05-20 DIAGNOSIS — R80.9 CONTROLLED TYPE 2 DIABETES MELLITUS WITH MICROALBUMINURIA, WITHOUT LONG-TERM CURRENT USE OF INSULIN: ICD-10-CM

## 2022-05-20 DIAGNOSIS — C79.51 SPINE METASTASIS: ICD-10-CM

## 2022-05-20 DIAGNOSIS — R19.7 DIARRHEA, UNSPECIFIED TYPE: ICD-10-CM

## 2022-05-20 DIAGNOSIS — C7A.012 MALIGNANT CARCINOID TUMOR OF ILEUM: Primary | ICD-10-CM

## 2022-05-20 DIAGNOSIS — C7B.8 METASTATIC MALIGNANT NEUROENDOCRINE TUMOR TO LIVER: ICD-10-CM

## 2022-05-20 PROCEDURE — 1126F PR PAIN SEVERITY QUANTIFIED, NO PAIN PRESENT: ICD-10-PCS | Mod: CPTII,S$GLB,, | Performed by: INTERNAL MEDICINE

## 2022-05-20 PROCEDURE — 3288F FALL RISK ASSESSMENT DOCD: CPT | Mod: CPTII,S$GLB,, | Performed by: INTERNAL MEDICINE

## 2022-05-20 PROCEDURE — 3288F PR FALLS RISK ASSESSMENT DOCUMENTED: ICD-10-PCS | Mod: CPTII,S$GLB,, | Performed by: INTERNAL MEDICINE

## 2022-05-20 PROCEDURE — 1160F PR REVIEW ALL MEDS BY PRESCRIBER/CLIN PHARMACIST DOCUMENTED: ICD-10-PCS | Mod: CPTII,S$GLB,, | Performed by: INTERNAL MEDICINE

## 2022-05-20 PROCEDURE — 99215 PR OFFICE/OUTPT VISIT, EST, LEVL V, 40-54 MIN: ICD-10-PCS | Mod: S$GLB,,, | Performed by: INTERNAL MEDICINE

## 2022-05-20 PROCEDURE — 1101F PT FALLS ASSESS-DOCD LE1/YR: CPT | Mod: CPTII,S$GLB,, | Performed by: INTERNAL MEDICINE

## 2022-05-20 PROCEDURE — 1126F AMNT PAIN NOTED NONE PRSNT: CPT | Mod: CPTII,S$GLB,, | Performed by: INTERNAL MEDICINE

## 2022-05-20 PROCEDURE — 99999 PR PBB SHADOW E&M-EST. PATIENT-LVL IV: CPT | Mod: PBBFAC,,, | Performed by: INTERNAL MEDICINE

## 2022-05-20 PROCEDURE — 99999 PR PBB SHADOW E&M-EST. PATIENT-LVL IV: ICD-10-PCS | Mod: PBBFAC,,, | Performed by: INTERNAL MEDICINE

## 2022-05-20 PROCEDURE — 1160F RVW MEDS BY RX/DR IN RCRD: CPT | Mod: CPTII,S$GLB,, | Performed by: INTERNAL MEDICINE

## 2022-05-20 PROCEDURE — 99215 OFFICE O/P EST HI 40 MIN: CPT | Mod: S$GLB,,, | Performed by: INTERNAL MEDICINE

## 2022-05-20 PROCEDURE — 3077F SYST BP >= 140 MM HG: CPT | Mod: CPTII,S$GLB,, | Performed by: INTERNAL MEDICINE

## 2022-05-20 PROCEDURE — 1159F MED LIST DOCD IN RCRD: CPT | Mod: CPTII,S$GLB,, | Performed by: INTERNAL MEDICINE

## 2022-05-20 PROCEDURE — 3077F PR MOST RECENT SYSTOLIC BLOOD PRESSURE >= 140 MM HG: ICD-10-PCS | Mod: CPTII,S$GLB,, | Performed by: INTERNAL MEDICINE

## 2022-05-20 PROCEDURE — 3078F DIAST BP <80 MM HG: CPT | Mod: CPTII,S$GLB,, | Performed by: INTERNAL MEDICINE

## 2022-05-20 PROCEDURE — 3078F PR MOST RECENT DIASTOLIC BLOOD PRESSURE < 80 MM HG: ICD-10-PCS | Mod: CPTII,S$GLB,, | Performed by: INTERNAL MEDICINE

## 2022-05-20 PROCEDURE — 1159F PR MEDICATION LIST DOCUMENTED IN MEDICAL RECORD: ICD-10-PCS | Mod: CPTII,S$GLB,, | Performed by: INTERNAL MEDICINE

## 2022-05-20 PROCEDURE — 1101F PR PT FALLS ASSESS DOC 0-1 FALLS W/OUT INJ PAST YR: ICD-10-PCS | Mod: CPTII,S$GLB,, | Performed by: INTERNAL MEDICINE

## 2022-05-20 NOTE — PROGRESS NOTES
"Subjective:       Patient ID: Shahram Hills is a 82 y.o. female.     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 Ms. Hills is an 79 yo woman who initially saw me on 6/7/18 for further evaluation of neuroendocrine tumor. She initially presented with diarrhea, abdominal discomfort, weight loss. She was evaluated at [facility] and underwent workup below:  US abdomen on 3/14/18 showed numerous bilobar mixed echogenicity and mildly vascular hepatic lesions. Cholelithiasis without e/o acute cholecystitis.   MRI abdomen on 3/30/2018 showed innumerable hepatic metastases. L3 vertebral body metastasis with soft tissue component along the right margin of the L3 and upper L4 vertebral bodies, filling the right L3/4 neural foramen, and extending into the anterior aspect of the spinal canal on the right at the L3 and upper T4 levels. Associated with mild spinal canal narrowing.  CT chest on 4/6/2018 showed one lucent nodule measuring 7 mm in the T7 vertebral body, most likely representing metastatic disease.    EGD on 5/4/18 showed normal duodenum. Schatzki's ring in the lower third of the esophagus. Grade 1 esophagitis in the GE junction c/w mild distal esophagitis. Congestion and erythema in the antrum, pre-pyloric region and stomach body c/w gastritis. Biopsy of the stomach antrum showed mild chronic gastritis, neg for H. pylori.   Labs on 5/9/2018: WBC 6.9, H/H 11.6/34.3, MCV 87.5, plt count 279. On 3/9/18: Vit B12 level 173, folic acid 6.6  She was started on oral vit B12 and underwent a liver biopsy on 5/18/18. Pathology showed "metastatic well differentiated neuroendocrine tumor. Ki67 3%"     She saw me on 6/7/18 and complained of weight loss of 26 lbs over the past 3-4 months, also has diarrhea. No flushing, wheezing, palpitations. Has been having pain in right flank radiating down " "the right leg. No tingling/numbness, weakness. She can hold her bladder but when she urinates her diarrhea would come out as well. Started on dex 4 mg q6h the evening of 18.      2. MRI spine on 18 showed "Marrow replacing metastatic lesion of the L3 vertebral body with associated extra osseous expansion and complete effacement of the right L3-L4 neural foramina and additional effacement of the right lateral recess.  There is additional lateral extension and abutment of the right psoas muscle.  Superimposed degenerative change at this level results in mild spinal canal stenosis." I sent the patient to ED on 18 where she was evaluated by neurosurgery. Neurosurgery did not feel she was a candidate for surgical intervention.      3. Seen by Dr. Adames on 18. palliative radiation to the area of the L3 metastasis 18-18   4. 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  There is an adjacent metastatic lymph node, diffuse liver metastases, and multiple bone metastases. Index primary neoplasm SUV max 33.13. Adjacent lymph node SUV max 23. L3 bone metastasis SUV max 36.86. Left lobe SUV max 46.13   5. Lanreotide every 4 weeks started on 18  6. TACE to the right hepatic lobe on 19. TACE to the left hepatic lobe on 3/21/19.   7. TACE to the right hepatic lobe on 22. S/p conventional chemoembolization performed of the segment 2, 3, 4 branch of the left hepatic artery and segment 8 branch of the left hepatic artery on 22.     History of Present Illness:   Ms. Hills returns for follow up. Feeling well. Has diarrhea about 3 times a week.  Imodium sometimes helps. She noted loss of taste after TACE.      ECO     ROS:   See HPI. Otherwise negative.      Physical Examination:   Vital signs reviewed.   Gen: well hydrated, well developed, in no acute distress.  HEENT: normocephalic, anicteric, PERRLA, EOMI, oropharynx clear  Neck: supple, no " JVD, thyromegaly, cervical or supraclavicular LAD  Lungs: CTAB, no wheezes or rales  Heart: regular rate, irregular, no M/R/G  Abdomen: soft, no tenderness, non-distended, liver edge 3 cm below the right costal margin, no splenomegaly, no mass, or hernia.   Ext:: no edema  Neuro: alert and oriented x 4, no focal neuro deficit  Skin: no rash, open wounds or ulcers  Psych: pleasant and appropriate mood and affect     Objective:      Laboratory Data:  Labs reviewed. CBC, CMP stable. Bilirubin 1.7. Chromogranin 1658->1540->1342     Imaging Data:  CT chest 5/16/22:  Interval decrease in size of the solid nodule at the left lung base when compared to the prior examination with the nodule now measuring 0.3 cm compared to 0.5 cm on the prior examination.     Stable additional subcentimeter pulmonary nodules noted bilaterally.     Interval chemoembolization with development of dense material within some of the low-density liver lesions.  The liver findings are incompletely imaged/characterized on this noncontrast examination and correlation with patient's MRI of the abdomen and pelvis is recommended.     Bilateral adrenal thickening.     Stable sclerotic focus within the manubrium and lucent lesion within the T6 vertebral body.       MRI abdomen/pelvis 5/16/22:  Decrease in size and enhancement in numerous liver masses throughout bilateral hepatic lobes following chemoembolization.     Grossly stable appearance of the ileal mass and osseous metastasis        Assessment and Plan:      1. Malignant carcinoid tumor of ileum    2. 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 Spine metastasis    4. 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 Immunodeficiency secondary to neoplasm    6. Diarrhea, unspecified type    7. Controlled type 2 diabetes mellitus with microalbuminuria, without long-term current use of insulin    8. Essential hypertension      1-5  - Ms Jeana is an 80 yo woman with well differentiated  low-grade neuroendocrine tumor of the ileum with mets to liver and bones. On lanreotide every 4 weeks. S/p TACE to the right hepatic lobe on 2/14/19. TACE to the left hepatic lobe on 3/21/19.   - CT/MRI 1/12/22 showed mild progression in the liver. S/p TACE on 2/11/22 and 4/22/22   - reviewed scan results with patient. Decrease in size in numerous liver masses. Stable disease extrahepatically.   - doing well. Lanreotide received on 5/16  - c/w lanreotide every 4 weeks  - see me on 6/13 when she returns for lanreotid     6.  - 2/2 NET  - mild. 3 diarrhea a week  - c/w imodium prn  - re-evaluate in a month. If getting worse consider short acting octreotide    7.  - BS controlled  - c/w current medication    8.  - BP okay  - c/w current meds    RTC in one month   Their questions were answered in the clinic. Encouraged to call should issues arise      Route Chart for Scheduling    Med Onc Chart Routing      Follow up with physician 3 weeks. Get CBC, CMP, chromogranin A, serotonin level on 6/10. see me on 6/13 then get lanreotide   Follow up with JUNAID    Labs CBC and CMP   Lab interval:  Chromogranin A, serotonin   Imaging    Pharmacy appointment No pharmacy appointment needed      Other referrals No additional referrals needed           Therapy Plan Information  5. Medications  lanreotide injection 120 mg  120 mg, Subcutaneous, Every visit

## 2022-05-24 ENCOUNTER — OFFICE VISIT (OUTPATIENT)
Dept: INTERVENTIONAL RADIOLOGY/VASCULAR | Facility: CLINIC | Age: 82
End: 2022-05-24
Payer: MEDICARE

## 2022-05-24 VITALS
BODY MASS INDEX: 21.22 KG/M2 | HEART RATE: 50 BPM | DIASTOLIC BLOOD PRESSURE: 66 MMHG | WEIGHT: 124.31 LBS | HEIGHT: 64 IN | SYSTOLIC BLOOD PRESSURE: 139 MMHG

## 2022-05-24 DIAGNOSIS — C7B.8 METASTATIC MALIGNANT NEUROENDOCRINE TUMOR TO LIVER: Primary | ICD-10-CM

## 2022-05-24 PROCEDURE — 99213 OFFICE O/P EST LOW 20 MIN: CPT | Mod: S$GLB,,, | Performed by: FAMILY MEDICINE

## 2022-05-24 PROCEDURE — 3078F DIAST BP <80 MM HG: CPT | Mod: CPTII,S$GLB,, | Performed by: FAMILY MEDICINE

## 2022-05-24 PROCEDURE — 1160F RVW MEDS BY RX/DR IN RCRD: CPT | Mod: CPTII,S$GLB,, | Performed by: FAMILY MEDICINE

## 2022-05-24 PROCEDURE — 3078F PR MOST RECENT DIASTOLIC BLOOD PRESSURE < 80 MM HG: ICD-10-PCS | Mod: CPTII,S$GLB,, | Performed by: FAMILY MEDICINE

## 2022-05-24 PROCEDURE — 3075F SYST BP GE 130 - 139MM HG: CPT | Mod: CPTII,S$GLB,, | Performed by: FAMILY MEDICINE

## 2022-05-24 PROCEDURE — 99213 PR OFFICE/OUTPT VISIT, EST, LEVL III, 20-29 MIN: ICD-10-PCS | Mod: S$GLB,,, | Performed by: FAMILY MEDICINE

## 2022-05-24 PROCEDURE — 99999 PR PBB SHADOW E&M-EST. PATIENT-LVL III: CPT | Mod: PBBFAC,,, | Performed by: FAMILY MEDICINE

## 2022-05-24 PROCEDURE — 1160F PR REVIEW ALL MEDS BY PRESCRIBER/CLIN PHARMACIST DOCUMENTED: ICD-10-PCS | Mod: CPTII,S$GLB,, | Performed by: FAMILY MEDICINE

## 2022-05-24 PROCEDURE — 99999 PR PBB SHADOW E&M-EST. PATIENT-LVL III: ICD-10-PCS | Mod: PBBFAC,,, | Performed by: FAMILY MEDICINE

## 2022-05-24 PROCEDURE — 3075F PR MOST RECENT SYSTOLIC BLOOD PRESS GE 130-139MM HG: ICD-10-PCS | Mod: CPTII,S$GLB,, | Performed by: FAMILY MEDICINE

## 2022-05-24 PROCEDURE — 1159F PR MEDICATION LIST DOCUMENTED IN MEDICAL RECORD: ICD-10-PCS | Mod: CPTII,S$GLB,, | Performed by: FAMILY MEDICINE

## 2022-05-24 PROCEDURE — 1159F MED LIST DOCD IN RCRD: CPT | Mod: CPTII,S$GLB,, | Performed by: FAMILY MEDICINE

## 2022-05-26 ENCOUNTER — TELEPHONE (OUTPATIENT)
Dept: HEMATOLOGY/ONCOLOGY | Facility: CLINIC | Age: 82
End: 2022-05-26
Payer: MEDICARE

## 2022-06-10 ENCOUNTER — LAB VISIT (OUTPATIENT)
Dept: LAB | Facility: OTHER | Age: 82
End: 2022-06-10
Attending: INTERNAL MEDICINE
Payer: MEDICARE

## 2022-06-10 DIAGNOSIS — C7A.012 MALIGNANT CARCINOID TUMOR OF ILEUM: ICD-10-CM

## 2022-06-10 LAB
ALBUMIN SERPL BCP-MCNC: 3.7 G/DL (ref 3.5–5.2)
ALP SERPL-CCNC: 60 U/L (ref 55–135)
ALT SERPL W/O P-5'-P-CCNC: 13 U/L (ref 10–44)
ANION GAP SERPL CALC-SCNC: 13 MMOL/L (ref 8–16)
AST SERPL-CCNC: 21 U/L (ref 10–40)
BASOPHILS # BLD AUTO: 0.01 K/UL (ref 0–0.2)
BASOPHILS NFR BLD: 0.1 % (ref 0–1.9)
BILIRUB SERPL-MCNC: 1.7 MG/DL (ref 0.1–1)
BUN SERPL-MCNC: 9 MG/DL (ref 8–23)
CALCIUM SERPL-MCNC: 10.1 MG/DL (ref 8.7–10.5)
CHLORIDE SERPL-SCNC: 102 MMOL/L (ref 95–110)
CO2 SERPL-SCNC: 26 MMOL/L (ref 23–29)
CREAT SERPL-MCNC: 0.7 MG/DL (ref 0.5–1.4)
DIFFERENTIAL METHOD: ABNORMAL
EOSINOPHIL # BLD AUTO: 0.1 K/UL (ref 0–0.5)
EOSINOPHIL NFR BLD: 0.7 % (ref 0–8)
ERYTHROCYTE [DISTWIDTH] IN BLOOD BY AUTOMATED COUNT: 13.1 % (ref 11.5–14.5)
EST. GFR  (AFRICAN AMERICAN): >60 ML/MIN/1.73 M^2
EST. GFR  (NON AFRICAN AMERICAN): >60 ML/MIN/1.73 M^2
GLUCOSE SERPL-MCNC: 159 MG/DL (ref 70–110)
HCT VFR BLD AUTO: 42 % (ref 37–48.5)
HGB BLD-MCNC: 13.3 G/DL (ref 12–16)
IMM GRANULOCYTES # BLD AUTO: 0.02 K/UL (ref 0–0.04)
IMM GRANULOCYTES NFR BLD AUTO: 0.3 % (ref 0–0.5)
LYMPHOCYTES # BLD AUTO: 1.9 K/UL (ref 1–4.8)
LYMPHOCYTES NFR BLD: 25.8 % (ref 18–48)
MCH RBC QN AUTO: 28.8 PG (ref 27–31)
MCHC RBC AUTO-ENTMCNC: 31.7 G/DL (ref 32–36)
MCV RBC AUTO: 91 FL (ref 82–98)
MONOCYTES # BLD AUTO: 0.6 K/UL (ref 0.3–1)
MONOCYTES NFR BLD: 7.8 % (ref 4–15)
NEUTROPHILS # BLD AUTO: 4.7 K/UL (ref 1.8–7.7)
NEUTROPHILS NFR BLD: 65.3 % (ref 38–73)
NRBC BLD-RTO: 0 /100 WBC
PLATELET # BLD AUTO: 318 K/UL (ref 150–450)
PMV BLD AUTO: 11.1 FL (ref 9.2–12.9)
POTASSIUM SERPL-SCNC: 4 MMOL/L (ref 3.5–5.1)
PROT SERPL-MCNC: 7.4 G/DL (ref 6–8.4)
RBC # BLD AUTO: 4.62 M/UL (ref 4–5.4)
SODIUM SERPL-SCNC: 141 MMOL/L (ref 136–145)
WBC # BLD AUTO: 7.16 K/UL (ref 3.9–12.7)

## 2022-06-10 PROCEDURE — 80053 COMPREHEN METABOLIC PANEL: CPT | Performed by: INTERNAL MEDICINE

## 2022-06-10 PROCEDURE — 36415 COLL VENOUS BLD VENIPUNCTURE: CPT | Performed by: INTERNAL MEDICINE

## 2022-06-10 PROCEDURE — 84260 ASSAY OF SEROTONIN: CPT | Performed by: INTERNAL MEDICINE

## 2022-06-10 PROCEDURE — 86316 IMMUNOASSAY TUMOR OTHER: CPT | Performed by: INTERNAL MEDICINE

## 2022-06-10 PROCEDURE — 85025 COMPLETE CBC W/AUTO DIFF WBC: CPT | Performed by: INTERNAL MEDICINE

## 2022-06-11 LAB — CGA SERPL-MCNC: 1614 NG/ML

## 2022-06-13 ENCOUNTER — INFUSION (OUTPATIENT)
Dept: INFUSION THERAPY | Facility: HOSPITAL | Age: 82
End: 2022-06-13
Attending: INTERNAL MEDICINE
Payer: MEDICARE

## 2022-06-13 ENCOUNTER — OFFICE VISIT (OUTPATIENT)
Dept: HEMATOLOGY/ONCOLOGY | Facility: CLINIC | Age: 82
End: 2022-06-13
Payer: MEDICARE

## 2022-06-13 VITALS
WEIGHT: 125.88 LBS | DIASTOLIC BLOOD PRESSURE: 72 MMHG | HEART RATE: 53 BPM | RESPIRATION RATE: 18 BRPM | SYSTOLIC BLOOD PRESSURE: 163 MMHG | BODY MASS INDEX: 21.49 KG/M2 | HEIGHT: 64 IN | TEMPERATURE: 98 F | OXYGEN SATURATION: 97 %

## 2022-06-13 DIAGNOSIS — D49.9 IMMUNODEFICIENCY SECONDARY TO NEOPLASM: ICD-10-CM

## 2022-06-13 DIAGNOSIS — R19.7 DIARRHEA, UNSPECIFIED TYPE: ICD-10-CM

## 2022-06-13 DIAGNOSIS — C7B.8 METASTATIC MALIGNANT NEUROENDOCRINE TUMOR TO LIVER: Primary | ICD-10-CM

## 2022-06-13 DIAGNOSIS — C7B.8 NEUROENDOCRINE CARCINOMA METASTATIC TO BONE: ICD-10-CM

## 2022-06-13 DIAGNOSIS — E11.29 CONTROLLED TYPE 2 DIABETES MELLITUS WITH MICROALBUMINURIA, WITHOUT LONG-TERM CURRENT USE OF INSULIN: ICD-10-CM

## 2022-06-13 DIAGNOSIS — I10 ESSENTIAL HYPERTENSION: ICD-10-CM

## 2022-06-13 DIAGNOSIS — C7A.012 MALIGNANT CARCINOID TUMOR OF ILEUM: Primary | ICD-10-CM

## 2022-06-13 DIAGNOSIS — C7A.8 NEUROENDOCRINE CARCINOMA METASTATIC TO BONE: ICD-10-CM

## 2022-06-13 DIAGNOSIS — D84.81 IMMUNODEFICIENCY SECONDARY TO NEOPLASM: ICD-10-CM

## 2022-06-13 DIAGNOSIS — R80.9 CONTROLLED TYPE 2 DIABETES MELLITUS WITH MICROALBUMINURIA, WITHOUT LONG-TERM CURRENT USE OF INSULIN: ICD-10-CM

## 2022-06-13 DIAGNOSIS — C79.51 SPINE METASTASIS: ICD-10-CM

## 2022-06-13 DIAGNOSIS — C7B.8 METASTATIC MALIGNANT NEUROENDOCRINE TUMOR TO LIVER: ICD-10-CM

## 2022-06-13 PROCEDURE — 1160F PR REVIEW ALL MEDS BY PRESCRIBER/CLIN PHARMACIST DOCUMENTED: ICD-10-PCS | Mod: CPTII,S$GLB,, | Performed by: PHYSICIAN ASSISTANT

## 2022-06-13 PROCEDURE — 99999 PR PBB SHADOW E&M-EST. PATIENT-LVL IV: ICD-10-PCS | Mod: PBBFAC,,, | Performed by: PHYSICIAN ASSISTANT

## 2022-06-13 PROCEDURE — 99499 UNLISTED E&M SERVICE: CPT | Mod: S$GLB,,, | Performed by: PHYSICIAN ASSISTANT

## 2022-06-13 PROCEDURE — 99999 PR PBB SHADOW E&M-EST. PATIENT-LVL IV: CPT | Mod: PBBFAC,,, | Performed by: PHYSICIAN ASSISTANT

## 2022-06-13 PROCEDURE — 1126F PR PAIN SEVERITY QUANTIFIED, NO PAIN PRESENT: ICD-10-PCS | Mod: CPTII,S$GLB,, | Performed by: PHYSICIAN ASSISTANT

## 2022-06-13 PROCEDURE — 1159F PR MEDICATION LIST DOCUMENTED IN MEDICAL RECORD: ICD-10-PCS | Mod: CPTII,S$GLB,, | Performed by: PHYSICIAN ASSISTANT

## 2022-06-13 PROCEDURE — 1101F PT FALLS ASSESS-DOCD LE1/YR: CPT | Mod: CPTII,S$GLB,, | Performed by: PHYSICIAN ASSISTANT

## 2022-06-13 PROCEDURE — 3077F PR MOST RECENT SYSTOLIC BLOOD PRESSURE >= 140 MM HG: ICD-10-PCS | Mod: CPTII,S$GLB,, | Performed by: PHYSICIAN ASSISTANT

## 2022-06-13 PROCEDURE — 3078F DIAST BP <80 MM HG: CPT | Mod: CPTII,S$GLB,, | Performed by: PHYSICIAN ASSISTANT

## 2022-06-13 PROCEDURE — 1126F AMNT PAIN NOTED NONE PRSNT: CPT | Mod: CPTII,S$GLB,, | Performed by: PHYSICIAN ASSISTANT

## 2022-06-13 PROCEDURE — 99215 PR OFFICE/OUTPT VISIT, EST, LEVL V, 40-54 MIN: ICD-10-PCS | Mod: S$GLB,,, | Performed by: PHYSICIAN ASSISTANT

## 2022-06-13 PROCEDURE — 3288F PR FALLS RISK ASSESSMENT DOCUMENTED: ICD-10-PCS | Mod: CPTII,S$GLB,, | Performed by: PHYSICIAN ASSISTANT

## 2022-06-13 PROCEDURE — 3078F PR MOST RECENT DIASTOLIC BLOOD PRESSURE < 80 MM HG: ICD-10-PCS | Mod: CPTII,S$GLB,, | Performed by: PHYSICIAN ASSISTANT

## 2022-06-13 PROCEDURE — 63600175 PHARM REV CODE 636 W HCPCS: Mod: JG | Performed by: INTERNAL MEDICINE

## 2022-06-13 PROCEDURE — 99499 RISK ADDL DX/OHS AUDIT: ICD-10-PCS | Mod: S$GLB,,, | Performed by: PHYSICIAN ASSISTANT

## 2022-06-13 PROCEDURE — 1160F RVW MEDS BY RX/DR IN RCRD: CPT | Mod: CPTII,S$GLB,, | Performed by: PHYSICIAN ASSISTANT

## 2022-06-13 PROCEDURE — 1159F MED LIST DOCD IN RCRD: CPT | Mod: CPTII,S$GLB,, | Performed by: PHYSICIAN ASSISTANT

## 2022-06-13 PROCEDURE — 3077F SYST BP >= 140 MM HG: CPT | Mod: CPTII,S$GLB,, | Performed by: PHYSICIAN ASSISTANT

## 2022-06-13 PROCEDURE — 1101F PR PT FALLS ASSESS DOC 0-1 FALLS W/OUT INJ PAST YR: ICD-10-PCS | Mod: CPTII,S$GLB,, | Performed by: PHYSICIAN ASSISTANT

## 2022-06-13 PROCEDURE — 99215 OFFICE O/P EST HI 40 MIN: CPT | Mod: S$GLB,,, | Performed by: PHYSICIAN ASSISTANT

## 2022-06-13 PROCEDURE — 96372 THER/PROPH/DIAG INJ SC/IM: CPT

## 2022-06-13 PROCEDURE — 3288F FALL RISK ASSESSMENT DOCD: CPT | Mod: CPTII,S$GLB,, | Performed by: PHYSICIAN ASSISTANT

## 2022-06-13 RX ORDER — LANREOTIDE ACETATE 120 MG/.5ML
120 INJECTION SUBCUTANEOUS
Status: DISCONTINUED | OUTPATIENT
Start: 2022-06-13 | End: 2022-06-13 | Stop reason: HOSPADM

## 2022-06-13 RX ORDER — LANREOTIDE ACETATE 120 MG/.5ML
120 INJECTION SUBCUTANEOUS
Status: CANCELLED | OUTPATIENT
Start: 2022-06-13

## 2022-06-13 RX ADMIN — LANREOTIDE ACETATE 120 MG: 120 INJECTION SUBCUTANEOUS at 02:06

## 2022-06-13 NOTE — PROGRESS NOTES
"Subjective:       Patient ID: Shahram Hills is a 82 y.o. female.     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 Ms. Hills is an 79 yo woman who initially saw me on 6/7/18 for further evaluation of neuroendocrine tumor. She initially presented with diarrhea, abdominal discomfort, weight loss. She was evaluated at [facility] and underwent workup below:  US abdomen on 3/14/18 showed numerous bilobar mixed echogenicity and mildly vascular hepatic lesions. Cholelithiasis without e/o acute cholecystitis.   MRI abdomen on 3/30/2018 showed innumerable hepatic metastases. L3 vertebral body metastasis with soft tissue component along the right margin of the L3 and upper L4 vertebral bodies, filling the right L3/4 neural foramen, and extending into the anterior aspect of the spinal canal on the right at the L3 and upper T4 levels. Associated with mild spinal canal narrowing.  CT chest on 4/6/2018 showed one lucent nodule measuring 7 mm in the T7 vertebral body, most likely representing metastatic disease.    EGD on 5/4/18 showed normal duodenum. Schatzki's ring in the lower third of the esophagus. Grade 1 esophagitis in the GE junction c/w mild distal esophagitis. Congestion and erythema in the antrum, pre-pyloric region and stomach body c/w gastritis. Biopsy of the stomach antrum showed mild chronic gastritis, neg for H. pylori.   Labs on 5/9/2018: WBC 6.9, H/H 11.6/34.3, MCV 87.5, plt count 279. On 3/9/18: Vit B12 level 173, folic acid 6.6  She was started on oral vit B12 and underwent a liver biopsy on 5/18/18. Pathology showed "metastatic well differentiated neuroendocrine tumor. Ki67 3%"     She saw me on 6/7/18 and complained of weight loss of 26 lbs over the past 3-4 months, also has diarrhea. No flushing, wheezing, palpitations. Has been having pain in " "right flank radiating down the right leg. No tingling/numbness, weakness. She can hold her bladder but when she urinates her diarrhea would come out as well. Started on dex 4 mg q6h the evening of 6/7/18.      2. MRI spine on 6/8/18 showed "Marrow replacing metastatic lesion of the L3 vertebral body with associated extra osseous expansion and complete effacement of the right L3-L4 neural foramina and additional effacement of the right lateral recess.  There is additional lateral extension and abutment of the right psoas muscle.  Superimposed degenerative change at this level results in mild spinal canal stenosis." I sent the patient to ED on 6/9/18 where she was evaluated by neurosurgery. Neurosurgery did not feel she was a candidate for surgical intervention.      3. Seen by Dr. Adames on 6/11/18. palliative radiation to the area of the L3 metastasis 6/18/18-6/29/18   4. Gallium study on 6/13/18: Distal ileal primary neoplasm consistent with a carcinoid.  There is an adjacent metastatic lymph node, diffuse liver metastases, and multiple bone metastases. Index primary neoplasm SUV max 33.13. Adjacent lymph node SUV max 23. L3 bone metastasis SUV max 36.86. Left lobe SUV max 46.13   5. Lanreotide every 4 weeks started on 6/22/18  6. TACE to the right hepatic lobe on 2/14/19. TACE to the left hepatic lobe on 3/21/19.   7. TACE to the right hepatic lobe on 2/11/22. S/p conventional chemoembolization performed of the segment 2, 3, 4 branch of the left hepatic artery and segment 8 branch of the left hepatic artery on 4/22/22.     History of Present Illness:   Ms. Hills returns for follow up. Feeling well and is eating well. No fever, abdominal pain, nausea or vomiting. Has diarrhea about 3 times a week and she had about 2 episodes last week.  Imodium does help. She does feel that her taste is getting better. She previously noted loss of taste after TACE. Has a good energy level and is looking forward to her trip in " July. Overall, doing well. No report of flushing.      ECO. Presents with her sister.      ROS:   See HPI. Otherwise negative.      Physical Examination:   Vital signs reviewed.   Gen: well hydrated, well developed, in no acute distress.  HEENT: normocephalic, anicteric, EOMI, oropharynx clear  Neck: supple, no JVD  Lungs: CTAB, no wheezes or rales  Heart: regular rate, irregular, no M/R/G  Abdomen: soft, no tenderness, non-distended, no mass, or hernia.   Ext:: no edema  Neuro: alert and oriented x 4, no focal neuro deficit  Skin: no rash, open wounds or ulcers  Psych: pleasant and appropriate mood and affect     Objective:      Laboratory Data:  Labs reviewed. CBC, CMP stable. Bilirubin 1.7. Chromogranin 1658->1540->1342>1614   Serotonin pending from today     Imaging Data:  CT chest 22:  Interval decrease in size of the solid nodule at the left lung base when compared to the prior examination with the nodule now measuring 0.3 cm compared to 0.5 cm on the prior examination.     Stable additional subcentimeter pulmonary nodules noted bilaterally.     Interval chemoembolization with development of dense material within some of the low-density liver lesions.  The liver findings are incompletely imaged/characterized on this noncontrast examination and correlation with patient's MRI of the abdomen and pelvis is recommended.     Bilateral adrenal thickening.     Stable sclerotic focus within the manubrium and lucent lesion within the T6 vertebral body.       MRI abdomen/pelvis 22:  Decrease in size and enhancement in numerous liver masses throughout bilateral hepatic lobes following chemoembolization.     Grossly stable appearance of the ileal mass and osseous metastasis        Assessment and Plan:      1. Malignant carcinoid tumor of ileum    2. 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 Spine metastasis    4. 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 Immunodeficiency secondary to  neoplasm    6. Diarrhea, unspecified type    7. Controlled type 2 diabetes mellitus with microalbuminuria, without long-term current use of insulin    8. Essential hypertension      1-5  - Ms Hills is an 82 yo woman with well differentiated low-grade neuroendocrine tumor of the ileum with mets to liver and bones. On lanreotide every 4 weeks. S/p TACE to the right hepatic lobe on 2/14/19. TACE to the left hepatic lobe on 3/21/19.   - CT/MRI 1/12/22 showed mild progression in the liver. S/p TACE on 2/11/22 and 4/22/22   - Most recent scan on 5/16/2022 displayed Decrease in size in numerous liver masses. Stable disease extrahepatically.   - doing well. Lanreotide received on 5/16 and tolerated it well. Will proceed today. Chromogranin A has increased some but given her positive response on scan, we will continue to monitor and continue with treatment.   - c/w lanreotide every 4 weeks. Proceed today  - RTC in 4 weeks after her vacation with lab work, clinic visit with Dr. Granados and next dose of Lanreotide. Will repeat imaging studies in 3-4 mos     6.  - 2/2 NET  - mild. 3 diarrhea a week  - c/w imodium prn  - re-evaluate in a month. If getting worse consider short acting octreotide    7.  - BS controlled. Slightly elevated today but improved from previously  - c/w current medication    8.  - BP elevated but clinically, she is feeling well. She is taking her medication without complication  - c/w current meds    RTC in one month     Pte and family members displayed understanding of the above encounter and treatment plan. All thoughtful questions were answered to their satisfaction. Pte was advised to notify the care team or proceed to the ER if signs and symptoms worsen.       JARRETT Crum, PA-C  Physician Assistant Certified  Dept of Hematology/Oncology  PA-C to Dr. Granados, Dr. Woods and Dr. Luke      Route Chart for Scheduling    Med Onc Chart Routing      Follow up with physician 4 weeks. Get CBC, CMP,  chromogranin A, serotonin level on 7/12/2022 see Dr Granados 7/15 then get lanreotide   Follow up with JUNAID    Labs CBC and CMP   Lab interval: every 4 weeks  Chromogranin A, serotonin. Please schedule lab work on [place]. Pt going on vacation from 7/1-7/11. Thank you   Imaging    Pharmacy appointment No pharmacy appointment needed      Other referrals No additional referrals needed           Therapy Plan Information  5. Medications  lanreotide injection 120 mg  120 mg, Subcutaneous, Every visit

## 2022-06-15 LAB — SEROTONIN: 5270 NG/ML

## 2022-06-21 ENCOUNTER — OFFICE VISIT (OUTPATIENT)
Dept: INTERNAL MEDICINE | Facility: CLINIC | Age: 82
End: 2022-06-21
Attending: INTERNAL MEDICINE
Payer: MEDICARE

## 2022-06-21 VITALS
BODY MASS INDEX: 21.46 KG/M2 | DIASTOLIC BLOOD PRESSURE: 72 MMHG | SYSTOLIC BLOOD PRESSURE: 122 MMHG | WEIGHT: 125.69 LBS | OXYGEN SATURATION: 98 % | HEART RATE: 94 BPM | HEIGHT: 64 IN

## 2022-06-21 DIAGNOSIS — E53.8 B12 DEFICIENCY: ICD-10-CM

## 2022-06-21 DIAGNOSIS — E11.29 CONTROLLED TYPE 2 DIABETES MELLITUS WITH MICROALBUMINURIA, WITHOUT LONG-TERM CURRENT USE OF INSULIN: ICD-10-CM

## 2022-06-21 DIAGNOSIS — R80.9 MICROALBUMINURIA DUE TO TYPE 2 DIABETES MELLITUS: ICD-10-CM

## 2022-06-21 DIAGNOSIS — E11.29 MICROALBUMINURIA DUE TO TYPE 2 DIABETES MELLITUS: ICD-10-CM

## 2022-06-21 DIAGNOSIS — R80.9 CONTROLLED TYPE 2 DIABETES MELLITUS WITH MICROALBUMINURIA, WITHOUT LONG-TERM CURRENT USE OF INSULIN: ICD-10-CM

## 2022-06-21 DIAGNOSIS — E78.5 HYPERLIPIDEMIA, UNSPECIFIED HYPERLIPIDEMIA TYPE: ICD-10-CM

## 2022-06-21 DIAGNOSIS — I10 ESSENTIAL HYPERTENSION: Primary | ICD-10-CM

## 2022-06-21 PROCEDURE — 1126F AMNT PAIN NOTED NONE PRSNT: CPT | Mod: CPTII,S$GLB,, | Performed by: INTERNAL MEDICINE

## 2022-06-21 PROCEDURE — 3078F DIAST BP <80 MM HG: CPT | Mod: CPTII,S$GLB,, | Performed by: INTERNAL MEDICINE

## 2022-06-21 PROCEDURE — 3074F PR MOST RECENT SYSTOLIC BLOOD PRESSURE < 130 MM HG: ICD-10-PCS | Mod: CPTII,S$GLB,, | Performed by: INTERNAL MEDICINE

## 2022-06-21 PROCEDURE — 3078F PR MOST RECENT DIASTOLIC BLOOD PRESSURE < 80 MM HG: ICD-10-PCS | Mod: CPTII,S$GLB,, | Performed by: INTERNAL MEDICINE

## 2022-06-21 PROCEDURE — 1160F RVW MEDS BY RX/DR IN RCRD: CPT | Mod: CPTII,S$GLB,, | Performed by: INTERNAL MEDICINE

## 2022-06-21 PROCEDURE — 1159F PR MEDICATION LIST DOCUMENTED IN MEDICAL RECORD: ICD-10-PCS | Mod: CPTII,S$GLB,, | Performed by: INTERNAL MEDICINE

## 2022-06-21 PROCEDURE — 99214 PR OFFICE/OUTPT VISIT, EST, LEVL IV, 30-39 MIN: ICD-10-PCS | Mod: S$GLB,,, | Performed by: INTERNAL MEDICINE

## 2022-06-21 PROCEDURE — 99999 PR PBB SHADOW E&M-EST. PATIENT-LVL IV: CPT | Mod: PBBFAC,,, | Performed by: INTERNAL MEDICINE

## 2022-06-21 PROCEDURE — 99999 PR PBB SHADOW E&M-EST. PATIENT-LVL IV: ICD-10-PCS | Mod: PBBFAC,,, | Performed by: INTERNAL MEDICINE

## 2022-06-21 PROCEDURE — 3074F SYST BP LT 130 MM HG: CPT | Mod: CPTII,S$GLB,, | Performed by: INTERNAL MEDICINE

## 2022-06-21 PROCEDURE — 1126F PR PAIN SEVERITY QUANTIFIED, NO PAIN PRESENT: ICD-10-PCS | Mod: CPTII,S$GLB,, | Performed by: INTERNAL MEDICINE

## 2022-06-21 PROCEDURE — 99214 OFFICE O/P EST MOD 30 MIN: CPT | Mod: S$GLB,,, | Performed by: INTERNAL MEDICINE

## 2022-06-21 PROCEDURE — 1159F MED LIST DOCD IN RCRD: CPT | Mod: CPTII,S$GLB,, | Performed by: INTERNAL MEDICINE

## 2022-06-21 PROCEDURE — 1160F PR REVIEW ALL MEDS BY PRESCRIBER/CLIN PHARMACIST DOCUMENTED: ICD-10-PCS | Mod: CPTII,S$GLB,, | Performed by: INTERNAL MEDICINE

## 2022-06-21 NOTE — PROGRESS NOTES
"Subjective:   Patient ID: Shahram Hills is a 82 y.o. female  Chief complaint:   Chief Complaint   Patient presents with    Diabetes     F/u    Hypertension     F/u       HPI    Here for HTN follow up  Accompanied by her sister today     Since lcv started taking amlodipine 5mg bid as received an old rx with those instructions   henry losartan 50 and bp well controlled today   No LH or dizziness   Home log reviewd and dayami are < 130/80     Diabetes:   a1c 6.2  <- 6.1 <- 6.2  - well controlled and henry metformin    bg was fastin, 136, 129, 146   Taking metformin daily   Foot exam today     B12 def - thinks taking supplement daily           - lcv 2022 for hospital discharge follow up TACE right hep lobe and found to have frequent PVCs  - we resumed losartan at that time   - seen by cards 3/14/2022 and completed holter - at this time no tx indicated    Seen by h/o 2/15  She is accompanied by her sister today         HTN:  Today: 110-158/68-96  dayami 130-140/80-90, hr 60-90  Taking losartan 50mg  Previously  bp meds - amlodipine 10 and losartan 50 held since hospitalization   bp [id number], hr: 54-96    Metastatic neuroendocrine tumor to liver:  Followed by h/o and s/p TACE as above     PVCs:  As above   Cards: Elyssa    Review of Systems      Objective:  Vitals:    22 0901   BP: 122/72   BP Location: Left arm   Patient Position: Sitting   Pulse: 94   SpO2: 98%   Weight: 57 kg (125 lb 10.6 oz)   Height: 5' 4" (1.626 m)     Body mass index is 21.57 kg/m².    Physical Exam  Vitals reviewed.   Constitutional:       Appearance: Normal appearance. She is well-developed.   HENT:      Head: Normocephalic and atraumatic.   Eyes:      Extraocular Movements: Extraocular movements intact.      Conjunctiva/sclera: Conjunctivae normal.   Cardiovascular:      Rate and Rhythm: Normal rate and regular rhythm.      Pulses: Normal pulses.           Dorsalis pedis pulses are 2+ on the right side and 2+ on the " left side.        Posterior tibial pulses are 2+ on the right side and 2+ on the left side.      Heart sounds: Normal heart sounds.   Pulmonary:      Effort: Pulmonary effort is normal.      Breath sounds: Normal breath sounds.   Abdominal:      General: Bowel sounds are normal.      Palpations: Abdomen is soft. There is mass (ruq mass).   Musculoskeletal:         General: No swelling or tenderness. Normal range of motion.      Cervical back: Normal range of motion and neck supple.      Right foot: Normal range of motion. No deformity.      Left foot: Normal range of motion. No deformity.   Feet:      Right foot:      Protective Sensation: 4 sites tested. 4 sites sensed.      Skin integrity: No ulcer, blister, skin breakdown, erythema, warmth, callus, dry skin or fissure.      Left foot:      Protective Sensation: 4 sites tested. 4 sites sensed.      Skin integrity: No ulcer, blister, skin breakdown, erythema, warmth, callus, dry skin or fissure.   Lymphadenopathy:      Cervical: No cervical adenopathy.   Skin:     General: Skin is warm and dry.      Capillary Refill: Capillary refill takes less than 2 seconds.      Nails: There is no clubbing.   Neurological:      General: No focal deficit present.      Mental Status: She is alert and oriented to person, place, and time. Mental status is at baseline.      Gait: Gait normal.   Psychiatric:         Mood and Affect: Mood normal.         Speech: Speech normal.         Behavior: Behavior normal.         Thought Content: Thought content normal.         Judgment: Judgment normal.         Assessment:  1. Essential hypertension    2. 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 Hyperlipidemia, unspecified hyperlipidemia type    4. B12 deficiency    5. Controlled type 2 diabetes mellitus with microalbuminuria, without long-term current use of insulin        Plan:  Shahram was seen today for diabetes and hypertension.    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 mellitus  Stable   Cont losartan   Diabetes controlled     Hyperlipidemia, unspecified hyperlipidemia type  Stable - cont med     B12 deficiency  -     Vitamin B12; Future    Controlled type 2 diabetes mellitus with microalbuminuria, without long-term current use of insulin  -     Hemoglobin A1C; Future  -     Ambulatory referral/consult to Podiatry; Future  Stable - cont metformin   a1c at goal   Check in 3 months   Cont bg monitoring    Health Maintenance   Topic Date Due    Foot Exam  05/25/2022    Hemoglobin A1c  09/11/2022    Eye Exam  12/10/2022    Lipid Panel  03/11/2023    DEXA Scan  05/31/2024    TETANUS VACCINE  12/10/2029

## 2022-07-12 ENCOUNTER — LAB VISIT (OUTPATIENT)
Dept: LAB | Facility: OTHER | Age: 82
End: 2022-07-12
Attending: INTERNAL MEDICINE
Payer: MEDICARE

## 2022-07-12 DIAGNOSIS — E11.29 CONTROLLED TYPE 2 DIABETES MELLITUS WITH MICROALBUMINURIA, WITHOUT LONG-TERM CURRENT USE OF INSULIN: ICD-10-CM

## 2022-07-12 DIAGNOSIS — C7A.012 MALIGNANT CARCINOID TUMOR OF ILEUM: ICD-10-CM

## 2022-07-12 DIAGNOSIS — R80.9 CONTROLLED TYPE 2 DIABETES MELLITUS WITH MICROALBUMINURIA, WITHOUT LONG-TERM CURRENT USE OF INSULIN: ICD-10-CM

## 2022-07-12 DIAGNOSIS — E53.8 B12 DEFICIENCY: ICD-10-CM

## 2022-07-12 LAB
ALBUMIN SERPL BCP-MCNC: 3.8 G/DL (ref 3.5–5.2)
ALP SERPL-CCNC: 56 U/L (ref 55–135)
ALT SERPL W/O P-5'-P-CCNC: 15 U/L (ref 10–44)
ANION GAP SERPL CALC-SCNC: 11 MMOL/L (ref 8–16)
AST SERPL-CCNC: 21 U/L (ref 10–40)
BASOPHILS # BLD AUTO: 0.02 K/UL (ref 0–0.2)
BASOPHILS NFR BLD: 0.3 % (ref 0–1.9)
BILIRUB SERPL-MCNC: 1.7 MG/DL (ref 0.1–1)
BUN SERPL-MCNC: 11 MG/DL (ref 8–23)
CALCIUM SERPL-MCNC: 9.9 MG/DL (ref 8.7–10.5)
CHLORIDE SERPL-SCNC: 103 MMOL/L (ref 95–110)
CO2 SERPL-SCNC: 30 MMOL/L (ref 23–29)
CREAT SERPL-MCNC: 0.8 MG/DL (ref 0.5–1.4)
DIFFERENTIAL METHOD: ABNORMAL
EOSINOPHIL # BLD AUTO: 0 K/UL (ref 0–0.5)
EOSINOPHIL NFR BLD: 0.6 % (ref 0–8)
ERYTHROCYTE [DISTWIDTH] IN BLOOD BY AUTOMATED COUNT: 12.7 % (ref 11.5–14.5)
EST. GFR  (AFRICAN AMERICAN): >60 ML/MIN/1.73 M^2
EST. GFR  (NON AFRICAN AMERICAN): >60 ML/MIN/1.73 M^2
ESTIMATED AVG GLUCOSE: 123 MG/DL (ref 68–131)
GLUCOSE SERPL-MCNC: 158 MG/DL (ref 70–110)
HBA1C MFR BLD: 5.9 % (ref 4–5.6)
HCT VFR BLD AUTO: 43.3 % (ref 37–48.5)
HGB BLD-MCNC: 13.8 G/DL (ref 12–16)
IMM GRANULOCYTES # BLD AUTO: 0.03 K/UL (ref 0–0.04)
IMM GRANULOCYTES NFR BLD AUTO: 0.4 % (ref 0–0.5)
LYMPHOCYTES # BLD AUTO: 1.8 K/UL (ref 1–4.8)
LYMPHOCYTES NFR BLD: 26.2 % (ref 18–48)
MCH RBC QN AUTO: 29 PG (ref 27–31)
MCHC RBC AUTO-ENTMCNC: 31.9 G/DL (ref 32–36)
MCV RBC AUTO: 91 FL (ref 82–98)
MONOCYTES # BLD AUTO: 0.5 K/UL (ref 0.3–1)
MONOCYTES NFR BLD: 7.7 % (ref 4–15)
NEUTROPHILS # BLD AUTO: 4.5 K/UL (ref 1.8–7.7)
NEUTROPHILS NFR BLD: 64.8 % (ref 38–73)
NRBC BLD-RTO: 0 /100 WBC
PLATELET # BLD AUTO: 282 K/UL (ref 150–450)
PMV BLD AUTO: 10.7 FL (ref 9.2–12.9)
POTASSIUM SERPL-SCNC: 4.4 MMOL/L (ref 3.5–5.1)
PROT SERPL-MCNC: 7 G/DL (ref 6–8.4)
RBC # BLD AUTO: 4.76 M/UL (ref 4–5.4)
SODIUM SERPL-SCNC: 144 MMOL/L (ref 136–145)
VIT B12 SERPL-MCNC: >2000 PG/ML (ref 210–950)
WBC # BLD AUTO: 6.92 K/UL (ref 3.9–12.7)

## 2022-07-12 PROCEDURE — 86316 IMMUNOASSAY TUMOR OTHER: CPT | Performed by: INTERNAL MEDICINE

## 2022-07-12 PROCEDURE — 36415 COLL VENOUS BLD VENIPUNCTURE: CPT | Performed by: INTERNAL MEDICINE

## 2022-07-12 PROCEDURE — 83036 HEMOGLOBIN GLYCOSYLATED A1C: CPT | Performed by: INTERNAL MEDICINE

## 2022-07-12 PROCEDURE — 80053 COMPREHEN METABOLIC PANEL: CPT | Performed by: INTERNAL MEDICINE

## 2022-07-12 PROCEDURE — 84260 ASSAY OF SEROTONIN: CPT | Performed by: INTERNAL MEDICINE

## 2022-07-12 PROCEDURE — 85025 COMPLETE CBC W/AUTO DIFF WBC: CPT | Performed by: INTERNAL MEDICINE

## 2022-07-12 PROCEDURE — 82607 VITAMIN B-12: CPT | Performed by: INTERNAL MEDICINE

## 2022-07-13 LAB — CGA SERPL-MCNC: 2092 NG/ML

## 2022-07-15 ENCOUNTER — OFFICE VISIT (OUTPATIENT)
Dept: HEMATOLOGY/ONCOLOGY | Facility: CLINIC | Age: 82
End: 2022-07-15
Payer: MEDICARE

## 2022-07-15 ENCOUNTER — INFUSION (OUTPATIENT)
Dept: INFUSION THERAPY | Facility: HOSPITAL | Age: 82
End: 2022-07-15
Payer: MEDICARE

## 2022-07-15 ENCOUNTER — SPECIALTY PHARMACY (OUTPATIENT)
Dept: PHARMACY | Facility: CLINIC | Age: 82
End: 2022-07-15
Payer: MEDICARE

## 2022-07-15 VITALS
HEART RATE: 84 BPM | TEMPERATURE: 98 F | DIASTOLIC BLOOD PRESSURE: 78 MMHG | WEIGHT: 126.75 LBS | HEIGHT: 64 IN | SYSTOLIC BLOOD PRESSURE: 137 MMHG | OXYGEN SATURATION: 98 % | BODY MASS INDEX: 21.64 KG/M2 | RESPIRATION RATE: 18 BRPM

## 2022-07-15 DIAGNOSIS — E34.0 CARCINOID SYNDROME: ICD-10-CM

## 2022-07-15 DIAGNOSIS — C7A.012 MALIGNANT CARCINOID TUMOR OF ILEUM: Primary | ICD-10-CM

## 2022-07-15 DIAGNOSIS — D84.81 IMMUNODEFICIENCY SECONDARY TO NEOPLASM: ICD-10-CM

## 2022-07-15 DIAGNOSIS — C7B.8 METASTATIC MALIGNANT NEUROENDOCRINE TUMOR TO LIVER: Primary | ICD-10-CM

## 2022-07-15 DIAGNOSIS — R80.9 CONTROLLED TYPE 2 DIABETES MELLITUS WITH MICROALBUMINURIA, WITHOUT LONG-TERM CURRENT USE OF INSULIN: ICD-10-CM

## 2022-07-15 DIAGNOSIS — I10 ESSENTIAL HYPERTENSION: ICD-10-CM

## 2022-07-15 DIAGNOSIS — E11.29 CONTROLLED TYPE 2 DIABETES MELLITUS WITH MICROALBUMINURIA, WITHOUT LONG-TERM CURRENT USE OF INSULIN: ICD-10-CM

## 2022-07-15 DIAGNOSIS — C7B.8 SECONDARY NEUROENDOCRINE TUMOR OF BONE: ICD-10-CM

## 2022-07-15 DIAGNOSIS — C79.51 SPINE METASTASIS: ICD-10-CM

## 2022-07-15 DIAGNOSIS — D49.9 IMMUNODEFICIENCY SECONDARY TO NEOPLASM: ICD-10-CM

## 2022-07-15 DIAGNOSIS — C7B.8 METASTATIC MALIGNANT NEUROENDOCRINE TUMOR TO LIVER: ICD-10-CM

## 2022-07-15 PROCEDURE — 63600175 PHARM REV CODE 636 W HCPCS: Mod: JG | Performed by: INTERNAL MEDICINE

## 2022-07-15 PROCEDURE — 3078F PR MOST RECENT DIASTOLIC BLOOD PRESSURE < 80 MM HG: ICD-10-PCS | Mod: CPTII,S$GLB,, | Performed by: INTERNAL MEDICINE

## 2022-07-15 PROCEDURE — 1159F MED LIST DOCD IN RCRD: CPT | Mod: CPTII,S$GLB,, | Performed by: INTERNAL MEDICINE

## 2022-07-15 PROCEDURE — 3288F FALL RISK ASSESSMENT DOCD: CPT | Mod: CPTII,S$GLB,, | Performed by: INTERNAL MEDICINE

## 2022-07-15 PROCEDURE — 99215 PR OFFICE/OUTPT VISIT, EST, LEVL V, 40-54 MIN: ICD-10-PCS | Mod: S$GLB,,, | Performed by: INTERNAL MEDICINE

## 2022-07-15 PROCEDURE — 99999 PR PBB SHADOW E&M-EST. PATIENT-LVL IV: ICD-10-PCS | Mod: PBBFAC,,, | Performed by: INTERNAL MEDICINE

## 2022-07-15 PROCEDURE — 99215 OFFICE O/P EST HI 40 MIN: CPT | Mod: S$GLB,,, | Performed by: INTERNAL MEDICINE

## 2022-07-15 PROCEDURE — 1160F RVW MEDS BY RX/DR IN RCRD: CPT | Mod: CPTII,S$GLB,, | Performed by: INTERNAL MEDICINE

## 2022-07-15 PROCEDURE — 99499 RISK ADDL DX/OHS AUDIT: ICD-10-PCS | Mod: S$GLB,,, | Performed by: INTERNAL MEDICINE

## 2022-07-15 PROCEDURE — 99999 PR PBB SHADOW E&M-EST. PATIENT-LVL IV: CPT | Mod: PBBFAC,,, | Performed by: INTERNAL MEDICINE

## 2022-07-15 PROCEDURE — 3288F PR FALLS RISK ASSESSMENT DOCUMENTED: ICD-10-PCS | Mod: CPTII,S$GLB,, | Performed by: INTERNAL MEDICINE

## 2022-07-15 PROCEDURE — 3075F SYST BP GE 130 - 139MM HG: CPT | Mod: CPTII,S$GLB,, | Performed by: INTERNAL MEDICINE

## 2022-07-15 PROCEDURE — 3075F PR MOST RECENT SYSTOLIC BLOOD PRESS GE 130-139MM HG: ICD-10-PCS | Mod: CPTII,S$GLB,, | Performed by: INTERNAL MEDICINE

## 2022-07-15 PROCEDURE — 96372 THER/PROPH/DIAG INJ SC/IM: CPT

## 2022-07-15 PROCEDURE — 99499 UNLISTED E&M SERVICE: CPT | Mod: S$GLB,,, | Performed by: INTERNAL MEDICINE

## 2022-07-15 PROCEDURE — 1159F PR MEDICATION LIST DOCUMENTED IN MEDICAL RECORD: ICD-10-PCS | Mod: CPTII,S$GLB,, | Performed by: INTERNAL MEDICINE

## 2022-07-15 PROCEDURE — 1160F PR REVIEW ALL MEDS BY PRESCRIBER/CLIN PHARMACIST DOCUMENTED: ICD-10-PCS | Mod: CPTII,S$GLB,, | Performed by: INTERNAL MEDICINE

## 2022-07-15 PROCEDURE — 1101F PT FALLS ASSESS-DOCD LE1/YR: CPT | Mod: CPTII,S$GLB,, | Performed by: INTERNAL MEDICINE

## 2022-07-15 PROCEDURE — 3078F DIAST BP <80 MM HG: CPT | Mod: CPTII,S$GLB,, | Performed by: INTERNAL MEDICINE

## 2022-07-15 PROCEDURE — 1101F PR PT FALLS ASSESS DOC 0-1 FALLS W/OUT INJ PAST YR: ICD-10-PCS | Mod: CPTII,S$GLB,, | Performed by: INTERNAL MEDICINE

## 2022-07-15 RX ORDER — LANREOTIDE ACETATE 120 MG/.5ML
120 INJECTION SUBCUTANEOUS
Status: CANCELLED | OUTPATIENT
Start: 2022-07-15

## 2022-07-15 RX ORDER — OCTREOTIDE ACETATE 100 UG/ML
100 INJECTION, SOLUTION INTRAVENOUS; SUBCUTANEOUS 3 TIMES DAILY PRN
Qty: 30 ML | Refills: 2 | Status: SHIPPED | OUTPATIENT
Start: 2022-07-15 | End: 2023-08-28

## 2022-07-15 RX ORDER — LANREOTIDE ACETATE 120 MG/.5ML
120 INJECTION SUBCUTANEOUS
Status: DISCONTINUED | OUTPATIENT
Start: 2022-07-15 | End: 2022-07-15 | Stop reason: HOSPADM

## 2022-07-15 RX ADMIN — LANREOTIDE ACETATE 120 MG: 120 INJECTION SUBCUTANEOUS at 03:07

## 2022-07-15 NOTE — PROGRESS NOTES
"Subjective:       Patient ID: Shahram Hills is an 82 y.o. female.     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 Ms. Hills is an 79 yo woman who initially saw me on 6/7/18 for further evaluation of neuroendocrine tumor. She initially presented with diarrhea, abdominal discomfort, weight loss. She was evaluated at [facility] and underwent workup below:  US abdomen on 3/14/18 showed numerous bilobar mixed echogenicity and mildly vascular hepatic lesions. Cholelithiasis without e/o acute cholecystitis.   MRI abdomen on 3/30/2018 showed innumerable hepatic metastases. L3 vertebral body metastasis with soft tissue component along the right margin of the L3 and upper L4 vertebral bodies, filling the right L3/4 neural foramen, and extending into the anterior aspect of the spinal canal on the right at the L3 and upper T4 levels. Associated with mild spinal canal narrowing.  CT chest on 4/6/2018 showed one lucent nodule measuring 7 mm in the T7 vertebral body, most likely representing metastatic disease.    EGD on 5/4/18 showed normal duodenum. Schatzki's ring in the lower third of the esophagus. Grade 1 esophagitis in the GE junction c/w mild distal esophagitis. Congestion and erythema in the antrum, pre-pyloric region and stomach body c/w gastritis. Biopsy of the stomach antrum showed mild chronic gastritis, neg for H. pylori.   Labs on 5/9/2018: WBC 6.9, H/H 11.6/34.3, MCV 87.5, plt count 279. On 3/9/18: Vit B12 level 173, folic acid 6.6  She was started on oral vit B12 and underwent a liver biopsy on 5/18/18. Pathology showed "metastatic well differentiated neuroendocrine tumor. Ki67 3%"     She saw me on 6/7/18 and complained of weight loss of 26 lbs over the past 3-4 months, also has diarrhea. No flushing, wheezing, palpitations. Has been having pain in right flank radiating down " "the right leg. No tingling/numbness, weakness. She can hold her bladder but when she urinates her diarrhea would come out as well. Started on dex 4 mg q6h the evening of 18.      2. MRI spine on 18 showed "Marrow replacing metastatic lesion of the L3 vertebral body with associated extra osseous expansion and complete effacement of the right L3-L4 neural foramina and additional effacement of the right lateral recess.  There is additional lateral extension and abutment of the right psoas muscle.  Superimposed degenerative change at this level results in mild spinal canal stenosis." I sent the patient to ED on 18 where she was evaluated by neurosurgery. Neurosurgery did not feel she was a candidate for surgical intervention.      3. Seen by Dr. Adames on 18. palliative radiation to the area of the L3 metastasis 18-18   4. 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  There is an adjacent metastatic lymph node, diffuse liver metastases, and multiple bone metastases. Index primary neoplasm SUV max 33.13. Adjacent lymph node SUV max 23. L3 bone metastasis SUV max 36.86. Left lobe SUV max 46.13   5. Lanreotide every 4 weeks started on 18  6. TACE to the right hepatic lobe on 19. TACE to the left hepatic lobe on 3/21/19.   7. TACE to the right hepatic lobe on 22. S/p conventional chemoembolization performed of the segment 2, 3, 4 branch of the left hepatic artery and segment 8 branch of the left hepatic artery on 22.     History of Present Illness:   Ms. Hills returns for follow up. Feeling well. Still has diarrhea about 3 times a week. Feeling well otherwise.      ECO     ROS:   See HPI. Otherwise negative.      Physical Examination:   Vital signs reviewed.   Gen: well hydrated, well developed, in no acute distress.  HEENT: normocephalic, anicteric, PERRLA, EOMI, oropharynx clear  Neck: supple, no JVD, thyromegaly, cervical or " supraclavicular LAD  Lungs: CTAB, no wheezes or rales  Heart: regular rate, irregular, no M/R/G  Abdomen: soft, no tenderness, non-distended, liver edge 3 cm below the right costal margin, no splenomegaly, no mass, or hernia.   Ext:: no edema  Neuro: alert and oriented x 4, no focal neuro deficit  Skin: no rash, open wounds or ulcers  Psych: pleasant and appropriate mood and affect     Objective:      Laboratory Data:  Labs reviewed. CBC, CMP stable. Bilirubin 1.7. Chromogranin 1658->1540->1342->1614->2092     Imaging Data:  CT chest 5/16/22:  Interval decrease in size of the solid nodule at the left lung base when compared to the prior examination with the nodule now measuring 0.3 cm compared to 0.5 cm on the prior examination.     Stable additional subcentimeter pulmonary nodules noted bilaterally.     Interval chemoembolization with development of dense material within some of the low-density liver lesions.  The liver findings are incompletely imaged/characterized on this noncontrast examination and correlation with patient's MRI of the abdomen and pelvis is recommended.     Bilateral adrenal thickening.     Stable sclerotic focus within the manubrium and lucent lesion within the T6 vertebral body.       MRI abdomen/pelvis 5/16/22:  Decrease in size and enhancement in numerous liver masses throughout bilateral hepatic lobes following chemoembolization.     Grossly stable appearance of the ileal mass and osseous metastasis        Assessment and Plan:      1. Malignant carcinoid tumor of ileum    2. 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 Secondary neuroendocrine tumor of bone    4. Spine metastasis    5. Immunodeficiency secondary to neoplasm    6. Carcinoid syndrome    7. Controlled type 2 diabetes mellitus with microalbuminuria, without long-term current use of insulin    8. Essential hypertension      1-6  - Ms Jeana is an 81 yo woman with well differentiated low-grade neuroendocrine tumor  of the ileum with mets to liver and bones, diagnosed on 5/18/2018. Started lanreotide every 4 weeks on 6/22/2018. S/p TACE to the right hepatic lobe on 2/14/19. TACE to the left hepatic lobe on 3/21/19.   - CT/MRI 1/12/22 showed mild progression in the liver. S/p TACE on 2/11/22 and 4/22/22   - having 3 diarrhea a week. Discussed octreotide 100 mg tid as needed for diarrhea. Patient is willing to try  - octreotide sent to specialty pharmacy  - c/w lanreotide every 4 weeks  - see me in 4 weeks     7.  - BS controlled  - c/w current medication    8.  - BP controlled  - c/w current meds    RTC in one month   Their questions were answered in the clinic. Encouraged to call should issues arise      Route Chart for Scheduling    Med Onc Chart Routing      Follow up with physician 4 weeks. Get CMP, chromogranin A, serotonin on 8/9. see me on 8/12 then get lanreotide   Follow up with JUNAID    Infusion scheduling note    Injection scheduling note    Labs CMP   Lab interval:  Chromogranin A, serotonin   Imaging    Pharmacy appointment    Other referrals            Therapy Plan Information  5. Medications  lanreotide injection 120 mg  120 mg, Subcutaneous, Every visit

## 2022-07-18 NOTE — TELEPHONE ENCOUNTER
Therapy appropriate. Octreotide 100 mg TID for carcinoid syndrome. PA submitted. Awaiting determination

## 2022-07-19 ENCOUNTER — SPECIALTY PHARMACY (OUTPATIENT)
Dept: PHARMACY | Facility: CLINIC | Age: 82
End: 2022-07-19
Payer: MEDICARE

## 2022-07-19 LAB — SEROTONIN: 4480 NG/ML

## 2022-07-19 NOTE — TELEPHONE ENCOUNTER
Outgoing call to pt for initial consult. Informed her that we will need to request a change from Octreotide vials to syringes since the vials are on back order.     I will message her provider's office to request permission to change the current order to the following.     Octreotide 100 mg/ml syringe qty 30 ml  Inject 1 ml under the skin three times daily as need for diarrhea.   2 refills.     Awaiting provider's response. -Ivent opened.     It may be important to note that Ms. Hills's sister requested an in-person consult. She agreed for us to call her once the order is changed so that she can schedule a ride to our facility.

## 2022-07-19 NOTE — TELEPHONE ENCOUNTER
Dr. Granados gave permission to addend the current Octreotide order from vials to syringes.     Patient's sister informed. She insists on an in-person consult, although she will have to schedule a ride. I explained to her that we are able to ship octreotide free of charge and we can possible send a video on administration if she is unable to find a ride. She agreed to call us on tomorrow to determine when she will schedule the in person consult.

## 2022-07-19 NOTE — TELEPHONE ENCOUNTER
Octreotide PA approved 01/01/2022-12/31/2022. Case: [id number]     The estimated copay is $3.95. Pending to initial consult.

## 2022-07-21 NOTE — TELEPHONE ENCOUNTER
Specialty Pharmacy - Initial Clinical Assessment  Specialty Pharmacy - Clinical Intervention    Specialty Medication Orders Linked to Encounter    Flowsheet Row Most Recent Value   Medication #1 octreotide (SANDOSTATIN) 100 mcg/mL Soln (Order#[id number], Rx#[id number])        Patient Diagnosis   C7B.8 - 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    Subjective    Shahram Hills is a 82 y.o. female, who is followed by the specialty pharmacy service for management and education.    Recent Encounters     Date Type Provider Description    07/19/2022 Specialty Pharmacy BELEM GORDON PharmD Initial Clinical Assessment; Clinical Intervention    07/15/2022 Specialty Pharmacy BELEM GORDON PharmD Referral Authorization        Clinical call attempts since last clinical assessment   7/19/2022  6:57 PM - Specialty Pharmacy - Clinical Assessment by Belem Gordon PharmD     Current Outpatient Medications   Medication Sig    amLODIPine (NORVASC) 5 MG tablet Take 1 tablet (5 mg total) by mouth once daily.    blood sugar diagnostic Strp To check BG 2 times daily, to use with insurance preferred meter    blood-glucose meter kit To check BG 2 times daily, to use with insurance preferred meter    cyanocobalamin (VITAMIN B-12) 1000 MCG tablet Take 1 tablet (1,000 mcg total) by mouth once daily.    ezetimibe (ZETIA) 10 mg tablet Take 1 tablet (10 mg total) by mouth once daily.    ferrous sulfate 325 (65 FE) MG EC tablet Take 325 mg by mouth 3 (three) times daily with meals.    lancets Misc To check BG 2 times daily, to use with insurance preferred meter    latanoprost 0.005 % ophthalmic solution Place 1 drop into both eyes nightly.    losartan (COZAAR) 50 MG tablet Take 1 tablet (50 mg total) by mouth once daily. (Patient taking differently: Take 50 mg by mouth every morning.)    metFORMIN (GLUCOPHAGE-XR) 500 MG ER 24hr tablet Take 2 tablets (1,000 mg total) by mouth daily with breakfast.    needle, disp, 22 G 22  "gauge x 1" Ndle 1 application by Misc.(Non-Drug; Combo Route) route 3 (three) times daily as needed (diarrhea).    octreotide acetate (SANDOSTATIN) 100 mcg/mL (1 mL) Syrg Inject 1 mL (0.1 mg total) into the skin 3 (three) times daily as needed (diarrhea).    ondansetron (ZOFRAN-ODT) 4 MG TbDL Take 1 tablet (4 mg total) by mouth every 6 (six) hours as needed (nausea, vomting).    syringe, disposable, 1 mL Syrg 1 application by Misc.(Non-Drug; Combo Route) route 3 (three) times daily as needed (diarrhea).    TRUEPLUS LANCETS 33 gauge Misc USE TO CHECK BLOOD SUGAR TWICE DAILY    vitamin D (VITAMIN D3) 1000 units Tab Take 400 Units by mouth once daily.    XIIDRA 5 % Dpet INSTILL ONE DROP INTO OU BID   Last reviewed on 7/15/2022  2:45 PM by Sebastian Granados MD    Review of patient's allergies indicates:   Allergen Reactions    Epinephrine      carcinoid   Last reviewed on  7/15/2022 3:02 PM by Dutch Calero    Drug Interactions    Drug interactions evaluated: yes  Clinically relevant drug interactions identified: no  Provided the patient with educational material regarding drug interactions: not applicable         Adverse Effects    *All other systems reviewed and are negative       Assessment Questions - Documented Responses    Flowsheet Row Most Recent Value   Assessment    Medication Reconciliation completed for patient Yes   During the past 4 weeks, has patient missed any activities due to condition or medication? No   During the past 4 weeks, did patient have any of the following urgent care visits? None   Goals of Therapy Status Discussed (new start)   Status of the patients ability to self-administer: Is Able   All education points have been covered with patient? No, patient declined- printed education provided   Welcome packet contents reviewed and discussed with patient? Yes   Assesment completed? Yes   Plan Therapy being initiated   Do you need to open a clinical intervention (i-vent)? No   Do you want to " "schedule first shipment? Yes   Medication #1 Assessment Info    Patient status New medication, New to [facility]   Is this medication appropriate for the patient? Yes   Is this medication effective? Not yet started        Refill Questions - Documented Responses    Flowsheet Row Most Recent Value   Patient Availability and HIPAA Verification    Does patient want to proceed with activity? Yes   HIPAA/medical authority confirmed? Yes   Relationship to patient of person spoken to? Self   Refill Screening Questions    When does the patient need to receive the medication? 07/21/22   Refill Delivery Questions    How will the patient receive the medication? Delivery Karyn   When does the patient need to receive the medication? 07/21/22   Shipping Address --  []   Address in [facility] confirmed and updated if neccessary? Yes   Expected Copay ($) 3.95   Is the patient able to afford the medication copay? Yes   Payment Method zero copay   Days supply of Refill 10   Supplies needed? No supplies needed   Refill activity completed? Yes   Refill activity plan Refill scheduled   Shipment/Pickup Date: 07/21/22          Objective    She has a past medical history of Anemia (7/11/2018), B12 deficiency, Depression, Hyperlipidemia, and Weight loss.    Tried/failed medications: Tried Lanreotide in the past (~2018)    BP Readings from Last 4 Encounters:   07/15/22 137/78   06/21/22 122/72   06/13/22 (!) 163/72   05/24/22 139/66     Ht Readings from Last 4 Encounters:   07/15/22 5' 4" (1.626 m)   06/21/22 5' 4" (1.626 m)   06/13/22 5' 4" (1.626 m)   05/24/22 5' 4" (1.626 m)     Wt Readings from Last 4 Encounters:   07/15/22 57.5 kg (126 lb 12.2 oz)   06/21/22 57 kg (125 lb 10.6 oz)   06/13/22 57.1 kg (125 lb 14.1 oz)   05/24/22 56.4 kg (124 lb 5.4 oz)     Recent Labs   Lab Result Units 07/12/22  0945 06/10/22  0905 05/16/22  0739   RBC M/uL 4.76 4.62 4.45   Hemoglobin g/dL 13.8 13.3 12.9   Hematocrit % 43.3 42.0 40.6   WBC K/uL 6.92 " 7.16 7.18   Gran # (ANC) K/uL 4.5 4.7 4.7   Gran % % 64.8 65.3 64.7   Platelets K/uL 282 318 326   Sodium mmol/L 144 141 144   Potassium mmol/L 4.4 4.0 4.5   Chloride mmol/L 103 102 104   Glucose mg/dL 158 H 159 H 163 H   BUN mg/dL 11 9 14   Creatinine mg/dL 0.8 0.7 0.8   Calcium mg/dL 9.9 10.1 10.3   Total Protein g/dL 7.0 7.4 7.8   Albumin g/dL 3.8 3.7 3.9   Total Bilirubin mg/dL 1.7 H 1.7 H 1.7 H   Alkaline Phosphatase U/L 56 60 64   AST U/L 21 21 20   ALT U/L 15 13 10     The goals of cancer treatment include:  · Achieving remission of cancer, if possible  · Reducing tumor size and spread of cancer, if remission is not possible  · Minimizing pain and symptoms of the cancer  · Preventing infection and other complications of treatment  · Promoting adequate nutrition  · Encouraging proper hydration  · Improving or maintaining quality of life  · Maintaining optimal therapy adherence  · Minimizing and managing side effects    Goals of Therapy Status: Discussed (new start)    Assessment/Plan  Patient plans to start therapy on 07/21/22    Interventions added this encounter   Closed: [facility] Provider Intervention: octreotide (SANDOSTATIN) 100 mcg/mL Soln     Indication, dosage, appropriateness, effectiveness, safety and convenience of her specialty medication(s) were reviewed today.     Patient Education   Pharmacist offer to  patient was declined. Printed educational materials will be provided with medication.  Patient did accept verbal education on the following topics:  · Expectations and possible outcomes of therapy  · Proper use, timely administration, and missed dose management  · Duration of therapy  · Side effects, including prevention, minimization, and management  · Contraindications and safety precautions  · New or changed medications, including prescribe and over the counter medications and supplements  · Storage, safe handling, and disposal    In-person consult completed with Patient and her  sister/caregiver.    Tasks added this encounter   No tasks added.   Tasks due within next 3 months   7/19/2022 - Clinical - Initial Assessment     BELEM SIMS, PharmD  [facility]  [address]  [facility] [address]  Phone: [phone]  Fax: [phone]

## 2022-07-22 ENCOUNTER — OFFICE VISIT (OUTPATIENT)
Dept: PODIATRY | Facility: CLINIC | Age: 82
End: 2022-07-22
Attending: INTERNAL MEDICINE
Payer: MEDICARE

## 2022-07-22 VITALS
HEART RATE: 54 BPM | WEIGHT: 126.75 LBS | BODY MASS INDEX: 21.64 KG/M2 | HEIGHT: 64 IN | DIASTOLIC BLOOD PRESSURE: 83 MMHG | SYSTOLIC BLOOD PRESSURE: 131 MMHG

## 2022-07-22 DIAGNOSIS — R80.9 CONTROLLED TYPE 2 DIABETES MELLITUS WITH MICROALBUMINURIA, WITHOUT LONG-TERM CURRENT USE OF INSULIN: ICD-10-CM

## 2022-07-22 DIAGNOSIS — B35.1 ONYCHOMYCOSIS DUE TO DERMATOPHYTE: Primary | ICD-10-CM

## 2022-07-22 DIAGNOSIS — E11.29 CONTROLLED TYPE 2 DIABETES MELLITUS WITH MICROALBUMINURIA, WITHOUT LONG-TERM CURRENT USE OF INSULIN: ICD-10-CM

## 2022-07-22 PROCEDURE — 1159F MED LIST DOCD IN RCRD: CPT | Mod: CPTII,S$GLB,, | Performed by: PODIATRIST

## 2022-07-22 PROCEDURE — 1126F AMNT PAIN NOTED NONE PRSNT: CPT | Mod: CPTII,S$GLB,, | Performed by: PODIATRIST

## 2022-07-22 PROCEDURE — 99204 OFFICE O/P NEW MOD 45 MIN: CPT | Mod: S$GLB,,, | Performed by: PODIATRIST

## 2022-07-22 PROCEDURE — 99999 PR PBB SHADOW E&M-EST. PATIENT-LVL IV: ICD-10-PCS | Mod: PBBFAC,,, | Performed by: PODIATRIST

## 2022-07-22 PROCEDURE — 3079F PR MOST RECENT DIASTOLIC BLOOD PRESSURE 80-89 MM HG: ICD-10-PCS | Mod: CPTII,S$GLB,, | Performed by: PODIATRIST

## 2022-07-22 PROCEDURE — 99999 PR PBB SHADOW E&M-EST. PATIENT-LVL IV: CPT | Mod: PBBFAC,,, | Performed by: PODIATRIST

## 2022-07-22 PROCEDURE — 1159F PR MEDICATION LIST DOCUMENTED IN MEDICAL RECORD: ICD-10-PCS | Mod: CPTII,S$GLB,, | Performed by: PODIATRIST

## 2022-07-22 PROCEDURE — 3075F PR MOST RECENT SYSTOLIC BLOOD PRESS GE 130-139MM HG: ICD-10-PCS | Mod: CPTII,S$GLB,, | Performed by: PODIATRIST

## 2022-07-22 PROCEDURE — 3075F SYST BP GE 130 - 139MM HG: CPT | Mod: CPTII,S$GLB,, | Performed by: PODIATRIST

## 2022-07-22 PROCEDURE — 3288F FALL RISK ASSESSMENT DOCD: CPT | Mod: CPTII,S$GLB,, | Performed by: PODIATRIST

## 2022-07-22 PROCEDURE — 99204 PR OFFICE/OUTPT VISIT, NEW, LEVL IV, 45-59 MIN: ICD-10-PCS | Mod: S$GLB,,, | Performed by: PODIATRIST

## 2022-07-22 PROCEDURE — 3079F DIAST BP 80-89 MM HG: CPT | Mod: CPTII,S$GLB,, | Performed by: PODIATRIST

## 2022-07-22 PROCEDURE — 1101F PT FALLS ASSESS-DOCD LE1/YR: CPT | Mod: CPTII,S$GLB,, | Performed by: PODIATRIST

## 2022-07-22 PROCEDURE — 3288F PR FALLS RISK ASSESSMENT DOCUMENTED: ICD-10-PCS | Mod: CPTII,S$GLB,, | Performed by: PODIATRIST

## 2022-07-22 PROCEDURE — 1126F PR PAIN SEVERITY QUANTIFIED, NO PAIN PRESENT: ICD-10-PCS | Mod: CPTII,S$GLB,, | Performed by: PODIATRIST

## 2022-07-22 PROCEDURE — 1101F PR PT FALLS ASSESS DOC 0-1 FALLS W/OUT INJ PAST YR: ICD-10-PCS | Mod: CPTII,S$GLB,, | Performed by: PODIATRIST

## 2022-07-22 RX ORDER — CICLOPIROX 80 MG/ML
SOLUTION TOPICAL NIGHTLY
Qty: 6.6 ML | Refills: 11 | Status: SHIPPED | OUTPATIENT
Start: 2022-07-22 | End: 2022-12-13

## 2022-07-22 NOTE — PROGRESS NOTES
Subjective:      Patient ID: Shahram Hills is a 82 y.o. female.    Chief Complaint: Diabetic Foot Exam (PCP Trixie Xie MD 6/21/2022)    Shahram is a 82 y.o. female who presents to the clinic upon referral from Dr. Xie  for evaluation and treatment of diabetic feet. Shahram has a past medical history of Anemia (7/11/2018), B12 deficiency, Depression, Hyperlipidemia, and Weight loss. Patient relates no major problem with feet. Only complaints today consist of Diabetes, increased risk amputation needing evaluation/management/optomization of foot care.  No compleints  .    PCP: Trixie Xie MD    Date Last Seen by PCP:   Chief Complaint   Patient presents with    Diabetic Foot Exam     PCP Trixie Xie MD 6/21/2022        Current shoe gear: Casual shoes    Hemoglobin A1C   Date Value Ref Range Status   07/12/2022 5.9 (H) 4.0 - 5.6 % Final     Comment:     ADA Screening Guidelines:  5.7-6.4%  Consistent with prediabetes  >or=6.5%  Consistent with diabetes    High levels of fetal hemoglobin interfere with the HbA1C  assay. Heterozygous hemoglobin variants (HbS, HgC, etc)do  not significantly interfere with this assay.   However, presence of multiple variants may affect accuracy.     03/11/2022 6.2 (H) 4.0 - 5.6 % Final     Comment:     ADA Screening Guidelines:  5.7-6.4%  Consistent with prediabetes  >or=6.5%  Consistent with diabetes    High levels of fetal hemoglobin interfere with the HbA1C  assay. Heterozygous hemoglobin variants (HbS, HgC, etc)do  not significantly interfere with this assay.   However, presence of multiple variants may affect accuracy.     11/23/2021 6.1 (H) 4.0 - 5.6 % Final     Comment:     ADA Screening Guidelines:  5.7-6.4%  Consistent with prediabetes  >or=6.5%  Consistent with diabetes    High levels of fetal hemoglobin interfere with the HbA1C  assay. Heterozygous hemoglobin variants (HbS, HgC, etc)do  not significantly interfere with this assay.   However,  presence of multiple variants may affect accuracy.             Review of Systems   Constitutional: Negative for chills, diaphoresis, fever, malaise/fatigue and night sweats.   Cardiovascular: Negative for claudication, cyanosis, leg swelling and syncope.   Skin: Positive for nail changes. Negative for color change, dry skin, rash, suspicious lesions and unusual hair distribution.   Musculoskeletal: Negative for falls, joint pain, joint swelling, muscle cramps, muscle weakness and stiffness.   Gastrointestinal: Negative for constipation, diarrhea, nausea and vomiting.   Neurological: Negative for brief paralysis, disturbances in coordination, focal weakness, numbness, paresthesias, sensory change and tremors.           Objective:      Physical Exam  Constitutional:       General: She is not in acute distress.     Appearance: She is well-developed. She is not diaphoretic.   Cardiovascular:      Pulses:           Popliteal pulses are 2+ on the right side and 2+ on the left side.        Dorsalis pedis pulses are 2+ on the right side and 2+ on the left side.        Posterior tibial pulses are 2+ on the right side and 2+ on the left side.      Comments: Capillary refill 3 seconds all toes/distal feet, all toes/both feet warm to touch.      Negative lymphadenopathy bilateral popliteal fossa and tarsal tunnel.      Negavie lower extremity edema bilateral.    Musculoskeletal:      Right ankle: No swelling, deformity, ecchymosis or lacerations. Normal range of motion. Normal pulse.      Right Achilles Tendon: Normal. No defects. House's test negative.      Comments: Normal angle, base, station of gait. All ten toes without clubbing, cyanosis, or signs of ischemia.  No pain to palpation bilateral lower extremities.  Range of motion, stability, muscle strength, and muscle tone normal bilateral feet and legs.    Lymphadenopathy:      Lower Body: No right inguinal adenopathy. No left inguinal adenopathy.      Comments:  Negative lymphadenopathy bilateral popliteal fossa and tarsal tunnel.    Negative lymphangitic streaking bilateral feet/ankles/legs.   Skin:     General: Skin is warm and dry.      Capillary Refill: Capillary refill takes 2 to 3 seconds.      Coloration: Skin is not pale.      Findings: No abrasion, bruising, burn, ecchymosis, erythema, laceration, lesion or rash.      Nails: There is no clubbing.      Comments: Skin is normal age and health appropriate color, turgor, texture, and temperature bilateral lower extremities without ulceration, hyperpigmentation, discoloration, masses nodules or cords palpated.  No ecchymosis, erythema, edema, or cardinal signs of infection bilateral lower extremities.       Neurological:      Mental Status: She is alert and oriented to person, place, and time.      Sensory: No sensory deficit.      Motor: No tremor, atrophy or abnormal muscle tone.      Gait: Gait normal.      Comments: Negative tinel sign to percussion sural, superficial peroneal, deep peroneal, saphenous, and posterior tibial nerves right and left ankles and feet.     Psychiatric:         Behavior: Behavior is cooperative.               Assessment:       Encounter Diagnoses   Name Primary?    Controlled type 2 diabetes mellitus with microalbuminuria, without long-term current use of insulin     Onychomycosis due to dermatophyte Yes         Plan:       Shahram was seen today for diabetic foot exam.    Diagnoses and all orders for this visit:    Onychomycosis due to dermatophyte    Controlled type 2 diabetes mellitus with microalbuminuria, without long-term current use of insulin  -     Ambulatory referral/consult to Podiatry    Other orders  -     ciclopirox (PENLAC) 8 % Soln; Apply topically nightly.      I counseled the patient on her conditions, their implications and medical management.        The patient has received literature on basic diabetic foot care.  Patient will inspect feet daily, wear protective shoe  gear when ambulatory, and apply moisturizer to skin as needed to maintain elasticity and help prevent ulceration.    Discussed conservative treatment with shoes of adequate dimensions, material, and style to alleviate symptoms and delay or prevent surgical intervention.    penlac            Follow up in about 1 year (around 7/22/2023).

## 2022-07-27 ENCOUNTER — PES CALL (OUTPATIENT)
Dept: ADMINISTRATIVE | Facility: CLINIC | Age: 82
End: 2022-07-27
Payer: MEDICARE

## 2022-08-09 ENCOUNTER — LAB VISIT (OUTPATIENT)
Dept: LAB | Facility: OTHER | Age: 82
End: 2022-08-09
Attending: INTERNAL MEDICINE
Payer: MEDICARE

## 2022-08-09 DIAGNOSIS — C7A.012 MALIGNANT CARCINOID TUMOR OF ILEUM: ICD-10-CM

## 2022-08-09 LAB
ALBUMIN SERPL BCP-MCNC: 3.8 G/DL (ref 3.5–5.2)
ALP SERPL-CCNC: 56 U/L (ref 55–135)
ALT SERPL W/O P-5'-P-CCNC: 14 U/L (ref 10–44)
ANION GAP SERPL CALC-SCNC: 10 MMOL/L (ref 8–16)
AST SERPL-CCNC: 20 U/L (ref 10–40)
BASOPHILS # BLD AUTO: 0.02 K/UL (ref 0–0.2)
BASOPHILS NFR BLD: 0.3 % (ref 0–1.9)
BILIRUB SERPL-MCNC: 1.8 MG/DL (ref 0.1–1)
BUN SERPL-MCNC: 10 MG/DL (ref 8–23)
CALCIUM SERPL-MCNC: 10 MG/DL (ref 8.7–10.5)
CHLORIDE SERPL-SCNC: 103 MMOL/L (ref 95–110)
CO2 SERPL-SCNC: 30 MMOL/L (ref 23–29)
CREAT SERPL-MCNC: 0.8 MG/DL (ref 0.5–1.4)
DIFFERENTIAL METHOD: ABNORMAL
EOSINOPHIL # BLD AUTO: 0 K/UL (ref 0–0.5)
EOSINOPHIL NFR BLD: 0.5 % (ref 0–8)
ERYTHROCYTE [DISTWIDTH] IN BLOOD BY AUTOMATED COUNT: 12.6 % (ref 11.5–14.5)
EST. GFR  (NO RACE VARIABLE): >60 ML/MIN/1.73 M^2
GLUCOSE SERPL-MCNC: 164 MG/DL (ref 70–110)
HCT VFR BLD AUTO: 43.3 % (ref 37–48.5)
HGB BLD-MCNC: 13.7 G/DL (ref 12–16)
IMM GRANULOCYTES # BLD AUTO: 0.03 K/UL (ref 0–0.04)
IMM GRANULOCYTES NFR BLD AUTO: 0.4 % (ref 0–0.5)
LYMPHOCYTES # BLD AUTO: 2.1 K/UL (ref 1–4.8)
LYMPHOCYTES NFR BLD: 28.8 % (ref 18–48)
MCH RBC QN AUTO: 29 PG (ref 27–31)
MCHC RBC AUTO-ENTMCNC: 31.6 G/DL (ref 32–36)
MCV RBC AUTO: 92 FL (ref 82–98)
MONOCYTES # BLD AUTO: 0.6 K/UL (ref 0.3–1)
MONOCYTES NFR BLD: 7.7 % (ref 4–15)
NEUTROPHILS # BLD AUTO: 4.6 K/UL (ref 1.8–7.7)
NEUTROPHILS NFR BLD: 62.3 % (ref 38–73)
NRBC BLD-RTO: 0 /100 WBC
PLATELET # BLD AUTO: 253 K/UL (ref 150–450)
PMV BLD AUTO: 10.2 FL (ref 9.2–12.9)
POTASSIUM SERPL-SCNC: 4.8 MMOL/L (ref 3.5–5.1)
PROT SERPL-MCNC: 7 G/DL (ref 6–8.4)
RBC # BLD AUTO: 4.72 M/UL (ref 4–5.4)
SODIUM SERPL-SCNC: 143 MMOL/L (ref 136–145)
WBC # BLD AUTO: 7.37 K/UL (ref 3.9–12.7)

## 2022-08-09 PROCEDURE — 80053 COMPREHEN METABOLIC PANEL: CPT | Performed by: INTERNAL MEDICINE

## 2022-08-09 PROCEDURE — 84260 ASSAY OF SEROTONIN: CPT | Performed by: INTERNAL MEDICINE

## 2022-08-09 PROCEDURE — 36415 COLL VENOUS BLD VENIPUNCTURE: CPT | Performed by: INTERNAL MEDICINE

## 2022-08-09 PROCEDURE — 86316 IMMUNOASSAY TUMOR OTHER: CPT | Performed by: INTERNAL MEDICINE

## 2022-08-09 PROCEDURE — 85025 COMPLETE CBC W/AUTO DIFF WBC: CPT | Performed by: INTERNAL MEDICINE

## 2022-08-11 LAB — CGA SERPL-MCNC: 2090 NG/ML

## 2022-08-12 ENCOUNTER — INFUSION (OUTPATIENT)
Dept: INFUSION THERAPY | Facility: HOSPITAL | Age: 82
End: 2022-08-12
Payer: MEDICARE

## 2022-08-12 ENCOUNTER — OFFICE VISIT (OUTPATIENT)
Dept: HEMATOLOGY/ONCOLOGY | Facility: CLINIC | Age: 82
End: 2022-08-12
Payer: MEDICARE

## 2022-08-12 VITALS
DIASTOLIC BLOOD PRESSURE: 78 MMHG | SYSTOLIC BLOOD PRESSURE: 142 MMHG | HEART RATE: 90 BPM | TEMPERATURE: 98 F | BODY MASS INDEX: 21.39 KG/M2 | WEIGHT: 125.31 LBS | RESPIRATION RATE: 18 BRPM | OXYGEN SATURATION: 97 % | HEIGHT: 64 IN

## 2022-08-12 DIAGNOSIS — E34.0 CARCINOID SYNDROME: ICD-10-CM

## 2022-08-12 DIAGNOSIS — C7A.8 NEUROENDOCRINE CARCINOMA METASTATIC TO BONE: ICD-10-CM

## 2022-08-12 DIAGNOSIS — C7A.012 MALIGNANT CARCINOID TUMOR OF ILEUM: Primary | ICD-10-CM

## 2022-08-12 DIAGNOSIS — C79.51 SPINE METASTASIS: ICD-10-CM

## 2022-08-12 DIAGNOSIS — C7B.8 METASTATIC MALIGNANT NEUROENDOCRINE TUMOR TO LIVER: ICD-10-CM

## 2022-08-12 DIAGNOSIS — C7B.8 METASTATIC MALIGNANT NEUROENDOCRINE TUMOR TO LIVER: Primary | ICD-10-CM

## 2022-08-12 DIAGNOSIS — R80.9 CONTROLLED TYPE 2 DIABETES MELLITUS WITH MICROALBUMINURIA, WITHOUT LONG-TERM CURRENT USE OF INSULIN: ICD-10-CM

## 2022-08-12 DIAGNOSIS — C7B.8 NEUROENDOCRINE CARCINOMA METASTATIC TO BONE: ICD-10-CM

## 2022-08-12 DIAGNOSIS — I10 ESSENTIAL HYPERTENSION: ICD-10-CM

## 2022-08-12 DIAGNOSIS — E11.29 CONTROLLED TYPE 2 DIABETES MELLITUS WITH MICROALBUMINURIA, WITHOUT LONG-TERM CURRENT USE OF INSULIN: ICD-10-CM

## 2022-08-12 DIAGNOSIS — R19.7 DIARRHEA, UNSPECIFIED TYPE: ICD-10-CM

## 2022-08-12 PROCEDURE — 1160F PR REVIEW ALL MEDS BY PRESCRIBER/CLIN PHARMACIST DOCUMENTED: ICD-10-PCS | Mod: CPTII,S$GLB,, | Performed by: INTERNAL MEDICINE

## 2022-08-12 PROCEDURE — 3288F PR FALLS RISK ASSESSMENT DOCUMENTED: ICD-10-PCS | Mod: CPTII,S$GLB,, | Performed by: INTERNAL MEDICINE

## 2022-08-12 PROCEDURE — 1101F PT FALLS ASSESS-DOCD LE1/YR: CPT | Mod: CPTII,S$GLB,, | Performed by: INTERNAL MEDICINE

## 2022-08-12 PROCEDURE — 3077F PR MOST RECENT SYSTOLIC BLOOD PRESSURE >= 140 MM HG: ICD-10-PCS | Mod: CPTII,S$GLB,, | Performed by: INTERNAL MEDICINE

## 2022-08-12 PROCEDURE — 1126F PR PAIN SEVERITY QUANTIFIED, NO PAIN PRESENT: ICD-10-PCS | Mod: CPTII,S$GLB,, | Performed by: INTERNAL MEDICINE

## 2022-08-12 PROCEDURE — 99215 PR OFFICE/OUTPT VISIT, EST, LEVL V, 40-54 MIN: ICD-10-PCS | Mod: S$GLB,,, | Performed by: INTERNAL MEDICINE

## 2022-08-12 PROCEDURE — 1160F RVW MEDS BY RX/DR IN RCRD: CPT | Mod: CPTII,S$GLB,, | Performed by: INTERNAL MEDICINE

## 2022-08-12 PROCEDURE — 3077F SYST BP >= 140 MM HG: CPT | Mod: CPTII,S$GLB,, | Performed by: INTERNAL MEDICINE

## 2022-08-12 PROCEDURE — 99215 OFFICE O/P EST HI 40 MIN: CPT | Mod: S$GLB,,, | Performed by: INTERNAL MEDICINE

## 2022-08-12 PROCEDURE — 99999 PR PBB SHADOW E&M-EST. PATIENT-LVL IV: ICD-10-PCS | Mod: PBBFAC,,, | Performed by: INTERNAL MEDICINE

## 2022-08-12 PROCEDURE — 63600175 PHARM REV CODE 636 W HCPCS: Mod: JG | Performed by: INTERNAL MEDICINE

## 2022-08-12 PROCEDURE — 1159F PR MEDICATION LIST DOCUMENTED IN MEDICAL RECORD: ICD-10-PCS | Mod: CPTII,S$GLB,, | Performed by: INTERNAL MEDICINE

## 2022-08-12 PROCEDURE — 1159F MED LIST DOCD IN RCRD: CPT | Mod: CPTII,S$GLB,, | Performed by: INTERNAL MEDICINE

## 2022-08-12 PROCEDURE — 3078F PR MOST RECENT DIASTOLIC BLOOD PRESSURE < 80 MM HG: ICD-10-PCS | Mod: CPTII,S$GLB,, | Performed by: INTERNAL MEDICINE

## 2022-08-12 PROCEDURE — 99999 PR PBB SHADOW E&M-EST. PATIENT-LVL IV: CPT | Mod: PBBFAC,,, | Performed by: INTERNAL MEDICINE

## 2022-08-12 PROCEDURE — 3288F FALL RISK ASSESSMENT DOCD: CPT | Mod: CPTII,S$GLB,, | Performed by: INTERNAL MEDICINE

## 2022-08-12 PROCEDURE — 1101F PR PT FALLS ASSESS DOC 0-1 FALLS W/OUT INJ PAST YR: ICD-10-PCS | Mod: CPTII,S$GLB,, | Performed by: INTERNAL MEDICINE

## 2022-08-12 PROCEDURE — 96372 THER/PROPH/DIAG INJ SC/IM: CPT

## 2022-08-12 PROCEDURE — 3078F DIAST BP <80 MM HG: CPT | Mod: CPTII,S$GLB,, | Performed by: INTERNAL MEDICINE

## 2022-08-12 PROCEDURE — 1126F AMNT PAIN NOTED NONE PRSNT: CPT | Mod: CPTII,S$GLB,, | Performed by: INTERNAL MEDICINE

## 2022-08-12 RX ORDER — LANREOTIDE ACETATE 120 MG/.5ML
120 INJECTION SUBCUTANEOUS
Status: DISCONTINUED | OUTPATIENT
Start: 2022-08-12 | End: 2022-08-12 | Stop reason: HOSPADM

## 2022-08-12 RX ORDER — LANREOTIDE ACETATE 120 MG/.5ML
120 INJECTION SUBCUTANEOUS
Status: CANCELLED | OUTPATIENT
Start: 2022-08-12

## 2022-08-12 RX ADMIN — LANREOTIDE ACETATE 120 MG: 120 INJECTION SUBCUTANEOUS at 03:08

## 2022-08-12 NOTE — PROGRESS NOTES
"Subjective:       Patient ID: Shahram Hills is an 82 y.o. female.     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 Ms. Hills is an 77 yo woman who initially saw me on 6/7/18 for further evaluation of neuroendocrine tumor. She initially presented with diarrhea, abdominal discomfort, weight loss. She was evaluated at [facility] and underwent workup below:  US abdomen on 3/14/18 showed numerous bilobar mixed echogenicity and mildly vascular hepatic lesions. Cholelithiasis without e/o acute cholecystitis.   MRI abdomen on 3/30/2018 showed innumerable hepatic metastases. L3 vertebral body metastasis with soft tissue component along the right margin of the L3 and upper L4 vertebral bodies, filling the right L3/4 neural foramen, and extending into the anterior aspect of the spinal canal on the right at the L3 and upper T4 levels. Associated with mild spinal canal narrowing.  CT chest on 4/6/2018 showed one lucent nodule measuring 7 mm in the T7 vertebral body, most likely representing metastatic disease.    EGD on 5/4/18 showed normal duodenum. Schatzki's ring in the lower third of the esophagus. Grade 1 esophagitis in the GE junction c/w mild distal esophagitis. Congestion and erythema in the antrum, pre-pyloric region and stomach body c/w gastritis. Biopsy of the stomach antrum showed mild chronic gastritis, neg for H. pylori.   Labs on 5/9/2018: WBC 6.9, H/H 11.6/34.3, MCV 87.5, plt count 279. On 3/9/18: Vit B12 level 173, folic acid 6.6  She was started on oral vit B12 and underwent a liver biopsy on 5/18/18. Pathology showed "metastatic well differentiated neuroendocrine tumor. Ki67 3%"     She saw me on 6/7/18 and complained of weight loss of 26 lbs over the past 3-4 months, also has diarrhea. No flushing, wheezing, palpitations. Has been having pain in right flank radiating down " "the right leg. No tingling/numbness, weakness. She can hold her bladder but when she urinates her diarrhea would come out as well. Started on dex 4 mg q6h the evening of 18.      2. MRI spine on 18 showed "Marrow replacing metastatic lesion of the L3 vertebral body with associated extra osseous expansion and complete effacement of the right L3-L4 neural foramina and additional effacement of the right lateral recess.  There is additional lateral extension and abutment of the right psoas muscle.  Superimposed degenerative change at this level results in mild spinal canal stenosis." I sent the patient to ED on 18 where she was evaluated by neurosurgery. Neurosurgery did not feel she was a candidate for surgical intervention.      3. Seen by Dr. Adames on 18. palliative radiation to the area of the L3 metastasis 18-18   4. 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  There is an adjacent metastatic lymph node, diffuse liver metastases, and multiple bone metastases. Index primary neoplasm SUV max 33.13. Adjacent lymph node SUV max 23. L3 bone metastasis SUV max 36.86. Left lobe SUV max 46.13   5. Lanreotide every 4 weeks started on 18  6. TACE to the right hepatic lobe on 19. TACE to the left hepatic lobe on 3/21/19.   7. TACE to the right hepatic lobe on 22. S/p conventional chemoembolization performed of the segment 2, 3, 4 branch of the left hepatic artery and segment 8 branch of the left hepatic artery on 22.     History of Present Illness:   Ms. Hills returns for follow up. Tried short acting octreotide for diarrhea. Helped the day she took it but will have diarrhea the next day. Afraid of needles. Nephew has been giving it.      ECO     ROS:   See HPI. Otherwise negative.      Physical Examination:   Vital signs reviewed.   Gen: well hydrated, well developed, in no acute distress.  HEENT: normocephalic, anicteric, PERRLA, " EOMI, oropharynx clear  Neck: supple, no JVD, thyromegaly, cervical or supraclavicular LAD  Lungs: CTAB, no wheezes or rales  Heart: regular rate, irregular, no M/R/G  Abdomen: soft, no tenderness, non-distended, liver edge 3 cm below the right costal margin, no splenomegaly, no mass, or hernia.   Ext:: no edema  Neuro: alert and oriented x 4, no focal neuro deficit  Skin: no rash, open wounds or ulcers  Psych: pleasant and appropriate mood and affect     Objective:      Laboratory Data:  Labs reviewed. CBC, CMP stable. Bilirubin 1.8. Chromogranin 1658->1540->1342->1614->2092->2090     Imaging Data:  CT chest 5/16/22:  Interval decrease in size of the solid nodule at the left lung base when compared to the prior examination with the nodule now measuring 0.3 cm compared to 0.5 cm on the prior examination.     Stable additional subcentimeter pulmonary nodules noted bilaterally.     Interval chemoembolization with development of dense material within some of the low-density liver lesions.  The liver findings are incompletely imaged/characterized on this noncontrast examination and correlation with patient's MRI of the abdomen and pelvis is recommended.     Bilateral adrenal thickening.     Stable sclerotic focus within the manubrium and lucent lesion within the T6 vertebral body.       MRI abdomen/pelvis 5/16/22:  Decrease in size and enhancement in numerous liver masses throughout bilateral hepatic lobes following chemoembolization.     Grossly stable appearance of the ileal mass and osseous metastasis        Assessment and Plan:      1. Malignant carcinoid tumor of ileum    2. 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 Spine metastasis    4. 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 Carcinoid syndrome    6. Diarrhea, unspecified type    7. Essential hypertension    8. Controlled type 2 diabetes mellitus with microalbuminuria, without long-term current use of insulin      1-6  - Ms Jeana is  an 83 yo woman with well differentiated low-grade neuroendocrine tumor of the ileum with mets to liver and bones, diagnosed on 5/18/2018. Started lanreotide every 4 weeks on 6/22/2018. S/p TACE to the right hepatic lobe on 2/14/19. TACE to the left hepatic lobe on 3/21/19.   - CT/MRI 1/12/22 showed mild progression in the liver. S/p TACE on 2/11/22 and 4/22/22   - octreotide not really helping with her diarrhea. Try xermelo 250 mg tid. Prescription sent to specialty pharmacy  - c/w lanreotide every 4 weeks  - see me in 4 weeks. They will be out of town till Sep 12. Will see her on Sep 13.      7.  - BP controlled  - c/w current medication    8.  - BS controlled  - c/w current meds    RTC in one month   Their questions were answered in the clinic. Encouraged to call should issues arise      Route Chart for Scheduling    Med Onc Chart Routing      Follow up with physician 4 weeks. See me on 9/13 (Tue) with CBC, CMP, chromogranin A, serotonin, see me then get lanreotide.    Follow up with JUNAID    Infusion scheduling note    Injection scheduling note    Labs CMP and CBC   Lab interval:  Chromogranin A, serotonin   Imaging    Pharmacy appointment    Other referrals            Therapy Plan Information  5. Medications  lanreotide injection 120 mg  120 mg, Subcutaneous, Every visit

## 2022-08-12 NOTE — NURSING
Pt tolerated Lanreotide injection to the Right upper gluteal area today. NAD declined AVS. Uses my [facility]. Discharged home. Ambulated independently.

## 2022-08-15 ENCOUNTER — SPECIALTY PHARMACY (OUTPATIENT)
Dept: PHARMACY | Facility: CLINIC | Age: 82
End: 2022-08-15
Payer: MEDICARE

## 2022-08-15 DIAGNOSIS — C7B.8 METASTATIC MALIGNANT NEUROENDOCRINE TUMOR TO LIVER: Primary | ICD-10-CM

## 2022-08-15 LAB — SEROTONIN: 4250 NG/ML

## 2022-08-15 NOTE — TELEPHONE ENCOUNTER
Georgi, this is Telma Sparks with [facility].  We are working on your prescription that your doctor has sent us. We will be working with your insurance to get this approved for you. We will be calling you along the way with updates on your medication.  If you have any questions, you can reach us at [phone].    Welcome call outcome: Patient/caregiver reached

## 2022-08-16 ENCOUNTER — SPECIALTY PHARMACY (OUTPATIENT)
Dept: PHARMACY | Facility: CLINIC | Age: 82
End: 2022-08-16
Payer: MEDICARE

## 2022-08-16 DIAGNOSIS — C7A.012 MALIGNANT CARCINOID TUMOR OF ILEUM: Primary | ICD-10-CM

## 2022-08-16 DIAGNOSIS — C7B.8 METASTATIC MALIGNANT NEUROENDOCRINE TUMOR TO LIVER: ICD-10-CM

## 2022-08-16 DIAGNOSIS — C7A.8 NEUROENDOCRINE CARCINOMA METASTATIC TO BONE: ICD-10-CM

## 2022-08-16 DIAGNOSIS — C7B.8 NEUROENDOCRINE CARCINOMA METASTATIC TO BONE: ICD-10-CM

## 2022-08-16 NOTE — TELEPHONE ENCOUNTER
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 female, who is followed by the specialty pharmacy service for management and education.    Recent Encounters     Date Type Provider Description    08/16/2022 Specialty Pharmacy BELEM GORDON PharmD Initial Clinical Assessment    08/15/2022 Specialty Pharmacy Telma Sparks PharmD Referral Authorization    07/19/2022 Specialty Pharmacy BELEM GORDON PharmD Initial Clinical Assessment; Clinical Intervention    07/15/2022 Specialty Pharmacy BELEM GORDON PharmD Referral Authorization        Clinical call attempts since last clinical assessment   7/19/2022  6:57 PM - Specialty Pharmacy - Clinical Assessment by Belem Gordon PharmD  8/16/2022  4:40 PM - Specialty Pharmacy - Clinical Assessment by Samreen Tate PharmD  8/16/2022  4:41 PM - Specialty Pharmacy - Clinical Assessment by Samreen Tate PharmD     Current Outpatient Medications   Medication Sig Note    amLODIPine (NORVASC) 5 MG tablet Take 1 tablet (5 mg total) by mouth once daily.     ciclopirox (PENLAC) 8 % Soln Apply topically nightly.     cyanocobalamin (VITAMIN B-12) 1000 MCG tablet Take 1 tablet (1,000 mcg total) by mouth once daily.     ezetimibe (ZETIA) 10 mg tablet Take 1 tablet (10 mg total) by mouth once daily.     ferrous sulfate 325 (65 FE) MG EC tablet Take 325 mg by mouth 3 (three) times daily with meals.     latanoprost 0.005 % ophthalmic solution Place 1 drop into both eyes nightly.     losartan (COZAAR) 50 MG tablet Take 1 tablet (50 mg total) by mouth once daily. (Patient taking  "differently: Take 50 mg by mouth every morning.)     metFORMIN (GLUCOPHAGE-XR) 500 MG ER 24hr tablet Take 2 tablets (1,000 mg total) by mouth daily with breakfast.     XIIDRA 5 % Dpet INSTILL ONE DROP INTO OU BID     blood sugar diagnostic Strp To check BG 2 times daily, to use with insurance preferred meter     blood-glucose meter kit To check BG 2 times daily, to use with insurance preferred meter     lancets Misc To check BG 2 times daily, to use with insurance preferred meter     needle, disp, 22 G 22 gauge x 1" Ndle 1 application by Misc.(Non-Drug; Combo Route) route 3 (three) times daily as needed (diarrhea).     octreotide acetate (SANDOSTATIN) 100 mcg/mL (1 mL) Syrg Inject 1 mL (0.1 mg total) into the skin 3 (three) times daily as needed (diarrhea).     ondansetron (ZOFRAN-ODT) 4 MG TbDL Take 1 tablet (4 mg total) by mouth every 6 (six) hours as needed (nausea, vomting).     syringe, disposable, 1 mL Syrg 1 application by Misc.(Non-Drug; Combo Route) route 3 (three) times daily as needed (diarrhea).     telotristat ethyl 250 mg Tab Take 250 mg by mouth 3 (three) times daily.     TRUEPLUS LANCETS 33 gauge Misc USE TO CHECK BLOOD SUGAR TWICE DAILY     vitamin D (VITAMIN D3) 1000 units Tab Take 400 Units by mouth once daily. 8/16/2022: Patient no longer taking   Last reviewed on 8/16/2022  1:28 PM by Clary Gordon, PharmD    Review of patient's allergies indicates:   Allergen Reactions    Epinephrine      carcinoid   Last reviewed on  8/16/2022 1:25 PM by Clary Gordon    Drug Interactions    Clinically relevant drug interactions identified: no           Assessment Questions - Documented Responses    Flowsheet Row Most Recent Value   Assessment    Medication Reconciliation completed for patient Yes   During the past 4 weeks, has patient missed any activities due to condition or medication? No   During the past 4 weeks, did patient have any of the following urgent care visits? None   Goals of " Therapy Status Discussed (new start)   Status of the patients ability to self-administer: Is Able   All education points have been covered with patient? No, patient declined- printed education provided   Welcome packet contents reviewed and discussed with patient? Yes   Assesment completed? Yes   Plan Therapy being initiated   Do you need to open a clinical intervention (i-vent)? No   Do you want to schedule first shipment? Yes   Medication #1 Assessment Info    Patient status New medication, Exisiting to [facility]   Is this medication appropriate for the patient? Yes   Is this medication effective? Not yet started        Refill Questions - Documented Responses    Flowsheet Row Most Recent Value   Patient Availability and HIPAA Verification    Does patient want to proceed with activity? Yes   HIPAA/medical authority confirmed? Yes   Relationship to patient of person spoken to? Care Giver   Refill Screening Questions    When does the patient need to receive the medication? 08/19/22   Refill Delivery Questions    How will the patient receive the medication? Delivery Karyn   When does the patient need to receive the medication? 08/19/22   Shipping Address Home   Address in [facility] confirmed and updated if neccessary? Yes   Expected Copay ($) 9.24   Is the patient able to afford the medication copay? Yes   Payment Method CC on file   Days supply of Refill 28   Supplies needed? No supplies needed   Refill activity completed? Yes   Refill activity plan Refill scheduled   Shipment/Pickup Date: 08/18/22          Objective    She has a past medical history of Anemia (7/11/2018), B12 deficiency, Depression, Hyperlipidemia, and Weight loss.    Tried/failed medications:  Lanreotide and Octreotide. Pt currently on lanreotide and switching from Octreotide to Xermelo.     BP Readings from Last 4 Encounters:   08/12/22 (!) 142/78   07/22/22 131/83   07/15/22 137/78   06/21/22 122/72     Ht Readings from Last 4 Encounters:  "  08/12/22 5' 4" (1.626 m)   07/22/22 5' 4" (1.626 m)   07/15/22 5' 4" (1.626 m)   06/21/22 5' 4" (1.626 m)     Wt Readings from Last 4 Encounters:   08/12/22 56.8 kg (125 lb 5.3 oz)   07/22/22 57.5 kg (126 lb 12.2 oz)   07/15/22 57.5 kg (126 lb 12.2 oz)   06/21/22 57 kg (125 lb 10.6 oz)     Recent Labs   Lab Result Units 08/09/22  0822 07/12/22  0945 06/10/22  0905   RBC M/uL 4.72 4.76 4.62   Hemoglobin g/dL 13.7 13.8 13.3   Hematocrit % 43.3 43.3 42.0   WBC K/uL 7.37 6.92 7.16   Gran # (ANC) K/uL 4.6 4.5 4.7   Gran % % 62.3 64.8 65.3   Platelets K/uL 253 282 318   Sodium mmol/L 143 144 141   Potassium mmol/L 4.8 4.4 4.0   Chloride mmol/L 103 103 102   Glucose mg/dL 164 H 158 H 159 H   BUN mg/dL 10 11 9   Creatinine mg/dL 0.8 0.8 0.7   Calcium mg/dL 10.0 9.9 10.1   Total Protein g/dL 7.0 7.0 7.4   Albumin g/dL 3.8 3.8 3.7   Total Bilirubin mg/dL 1.8 H 1.7 H 1.7 H   Alkaline Phosphatase U/L 56 56 60   AST U/L 20 21 21   ALT U/L 14 15 13     The goals of cancer treatment include:  · Achieving remission of cancer, if possible  · Reducing tumor size and spread of cancer, if remission is not possible  · Minimizing pain and symptoms of the cancer  · Preventing infection and other complications of treatment  · Promoting adequate nutrition  · Encouraging proper hydration  · Improving or maintaining quality of life  · Maintaining optimal therapy adherence  · Minimizing and managing side effects    Goals of Therapy Status: Discussed (new start)    Assessment/Plan  Patient plans to start therapy on 08/19/22      Indication, dosage, appropriateness, effectiveness, safety and convenience of her specialty medication(s) were reviewed today.     Patient Education   Pharmacist offer to  patient was declined. Printed educational materials will be provided with medication.  Patient did accept verbal education on the following topics:  · Expectations and possible outcomes of therapy  · Proper use, timely administration, and " missed dose management  · Duration of therapy  · Side effects, including prevention, minimization, and management  · Contraindications and safety precautions  · New or changed medications, including prescribe and over the counter medications and supplements  · Storage, safe handling, and disposal    Patient states she could not inject herself, so Dr. Granados is switching her from Octreotide SA injections to Xermelo tablets. Patient will continue Lanreotide injections every 4 weeks.     Tasks added this encounter   No tasks added.   Tasks due within next 3 months   8/15/2022 - Clinical - Initial Assessment     BELEM SIMS, PharmD  [facility]  [address] [place], [place] [address]  [facility] [address]  Phone: [phone]  Fax: [phone]

## 2022-08-26 ENCOUNTER — TELEPHONE (OUTPATIENT)
Dept: ADMINISTRATIVE | Facility: CLINIC | Age: 82
End: 2022-08-26
Payer: MEDICARE

## 2022-08-26 NOTE — TELEPHONE ENCOUNTER
Called pt, no answer; left message informing pt I was calling to remind pt of her in office EAWV on 8/29/22 and to return my call if she had any questions; left my name and number

## 2022-08-29 ENCOUNTER — OFFICE VISIT (OUTPATIENT)
Dept: INTERNAL MEDICINE | Facility: CLINIC | Age: 82
End: 2022-08-29
Payer: MEDICARE

## 2022-08-29 VITALS
SYSTOLIC BLOOD PRESSURE: 132 MMHG | DIASTOLIC BLOOD PRESSURE: 90 MMHG | HEART RATE: 71 BPM | OXYGEN SATURATION: 99 % | BODY MASS INDEX: 21.91 KG/M2 | WEIGHT: 123.69 LBS | HEIGHT: 63 IN

## 2022-08-29 DIAGNOSIS — D49.9 IMMUNODEFICIENCY SECONDARY TO NEOPLASM: ICD-10-CM

## 2022-08-29 DIAGNOSIS — D64.9 ANEMIA, UNSPECIFIED TYPE: ICD-10-CM

## 2022-08-29 DIAGNOSIS — I77.1 TORTUOUS AORTA: ICD-10-CM

## 2022-08-29 DIAGNOSIS — I70.0 ATHEROSCLEROSIS OF AORTA: ICD-10-CM

## 2022-08-29 DIAGNOSIS — R10.11 CHRONIC RUQ PAIN: ICD-10-CM

## 2022-08-29 DIAGNOSIS — Z66 DNR (DO NOT RESUSCITATE): ICD-10-CM

## 2022-08-29 DIAGNOSIS — E87.5 HYPERKALEMIA: ICD-10-CM

## 2022-08-29 DIAGNOSIS — D84.81 IMMUNODEFICIENCY SECONDARY TO NEOPLASM: ICD-10-CM

## 2022-08-29 DIAGNOSIS — I10 ESSENTIAL HYPERTENSION: ICD-10-CM

## 2022-08-29 DIAGNOSIS — C7A.012 MALIGNANT CARCINOID TUMOR OF ILEUM: ICD-10-CM

## 2022-08-29 DIAGNOSIS — R41.3 MEMORY DEFICITS: ICD-10-CM

## 2022-08-29 DIAGNOSIS — R80.9 CONTROLLED TYPE 2 DIABETES MELLITUS WITH MICROALBUMINURIA, WITHOUT LONG-TERM CURRENT USE OF INSULIN: ICD-10-CM

## 2022-08-29 DIAGNOSIS — M54.16 LUMBAR RADICULOPATHY: ICD-10-CM

## 2022-08-29 DIAGNOSIS — K80.20 CALCULUS OF GALLBLADDER WITHOUT CHOLECYSTITIS WITHOUT OBSTRUCTION: ICD-10-CM

## 2022-08-29 DIAGNOSIS — E11.29 MICROALBUMINURIA DUE TO TYPE 2 DIABETES MELLITUS: ICD-10-CM

## 2022-08-29 DIAGNOSIS — C7B.8 NEUROENDOCRINE CARCINOMA METASTATIC TO BONE: ICD-10-CM

## 2022-08-29 DIAGNOSIS — C7B.8 METASTATIC MALIGNANT NEUROENDOCRINE TUMOR TO LIVER: ICD-10-CM

## 2022-08-29 DIAGNOSIS — Z00.00 ENCOUNTER FOR PREVENTIVE HEALTH EXAMINATION: Primary | ICD-10-CM

## 2022-08-29 DIAGNOSIS — G89.29 CHRONIC RUQ PAIN: ICD-10-CM

## 2022-08-29 DIAGNOSIS — E11.29 CONTROLLED TYPE 2 DIABETES MELLITUS WITH MICROALBUMINURIA, WITHOUT LONG-TERM CURRENT USE OF INSULIN: ICD-10-CM

## 2022-08-29 DIAGNOSIS — R35.0 URINARY FREQUENCY: ICD-10-CM

## 2022-08-29 DIAGNOSIS — C7B.8 SECONDARY NEUROENDOCRINE TUMOR OF BONE: ICD-10-CM

## 2022-08-29 DIAGNOSIS — E53.8 B12 DEFICIENCY: ICD-10-CM

## 2022-08-29 DIAGNOSIS — C22.0 HCC (HEPATOCELLULAR CARCINOMA): ICD-10-CM

## 2022-08-29 DIAGNOSIS — I49.3 PVC'S (PREMATURE VENTRICULAR CONTRACTIONS): ICD-10-CM

## 2022-08-29 DIAGNOSIS — R80.9 MICROALBUMINURIA DUE TO TYPE 2 DIABETES MELLITUS: ICD-10-CM

## 2022-08-29 DIAGNOSIS — C7A.8 NEUROENDOCRINE CARCINOMA METASTATIC TO BONE: ICD-10-CM

## 2022-08-29 DIAGNOSIS — C79.51 SPINE METASTASIS: ICD-10-CM

## 2022-08-29 DIAGNOSIS — E78.5 HYPERLIPIDEMIA, UNSPECIFIED HYPERLIPIDEMIA TYPE: ICD-10-CM

## 2022-08-29 PROCEDURE — 1170F FXNL STATUS ASSESSED: CPT | Mod: CPTII,S$GLB,, | Performed by: NURSE PRACTITIONER

## 2022-08-29 PROCEDURE — 1160F PR REVIEW ALL MEDS BY PRESCRIBER/CLIN PHARMACIST DOCUMENTED: ICD-10-PCS | Mod: CPTII,S$GLB,, | Performed by: NURSE PRACTITIONER

## 2022-08-29 PROCEDURE — 1159F PR MEDICATION LIST DOCUMENTED IN MEDICAL RECORD: ICD-10-PCS | Mod: CPTII,S$GLB,, | Performed by: NURSE PRACTITIONER

## 2022-08-29 PROCEDURE — 1101F PT FALLS ASSESS-DOCD LE1/YR: CPT | Mod: CPTII,S$GLB,, | Performed by: NURSE PRACTITIONER

## 2022-08-29 PROCEDURE — 3080F DIAST BP >= 90 MM HG: CPT | Mod: CPTII,S$GLB,, | Performed by: NURSE PRACTITIONER

## 2022-08-29 PROCEDURE — 1159F MED LIST DOCD IN RCRD: CPT | Mod: CPTII,S$GLB,, | Performed by: NURSE PRACTITIONER

## 2022-08-29 PROCEDURE — G0439 PR MEDICARE ANNUAL WELLNESS SUBSEQUENT VISIT: ICD-10-PCS | Mod: S$GLB,,, | Performed by: NURSE PRACTITIONER

## 2022-08-29 PROCEDURE — G0439 PPPS, SUBSEQ VISIT: HCPCS | Mod: S$GLB,,, | Performed by: NURSE PRACTITIONER

## 2022-08-29 PROCEDURE — 99999 PR PBB SHADOW E&M-EST. PATIENT-LVL V: CPT | Mod: PBBFAC,,, | Performed by: NURSE PRACTITIONER

## 2022-08-29 PROCEDURE — 3080F PR MOST RECENT DIASTOLIC BLOOD PRESSURE >= 90 MM HG: ICD-10-PCS | Mod: CPTII,S$GLB,, | Performed by: NURSE PRACTITIONER

## 2022-08-29 PROCEDURE — 3075F PR MOST RECENT SYSTOLIC BLOOD PRESS GE 130-139MM HG: ICD-10-PCS | Mod: CPTII,S$GLB,, | Performed by: NURSE PRACTITIONER

## 2022-08-29 PROCEDURE — 3288F FALL RISK ASSESSMENT DOCD: CPT | Mod: CPTII,S$GLB,, | Performed by: NURSE PRACTITIONER

## 2022-08-29 PROCEDURE — 3288F PR FALLS RISK ASSESSMENT DOCUMENTED: ICD-10-PCS | Mod: CPTII,S$GLB,, | Performed by: NURSE PRACTITIONER

## 2022-08-29 PROCEDURE — 99999 PR PBB SHADOW E&M-EST. PATIENT-LVL V: ICD-10-PCS | Mod: PBBFAC,,, | Performed by: NURSE PRACTITIONER

## 2022-08-29 PROCEDURE — 1101F PR PT FALLS ASSESS DOC 0-1 FALLS W/OUT INJ PAST YR: ICD-10-PCS | Mod: CPTII,S$GLB,, | Performed by: NURSE PRACTITIONER

## 2022-08-29 PROCEDURE — 3075F SYST BP GE 130 - 139MM HG: CPT | Mod: CPTII,S$GLB,, | Performed by: NURSE PRACTITIONER

## 2022-08-29 PROCEDURE — 1170F PR FUNCTIONAL STATUS ASSESSED: ICD-10-PCS | Mod: CPTII,S$GLB,, | Performed by: NURSE PRACTITIONER

## 2022-08-29 PROCEDURE — 1160F RVW MEDS BY RX/DR IN RCRD: CPT | Mod: CPTII,S$GLB,, | Performed by: NURSE PRACTITIONER

## 2022-08-29 NOTE — PROGRESS NOTES
"  Shahram Hills presented for a  Medicare AWV and comprehensive Health Risk Assessment today. The following components were reviewed and updated:    Medical history  Family History  Social history  Allergies and Current Medications  Health Risk Assessment  Health Maintenance  Care Team         ** See Completed Assessments for Annual Wellness Visit within the encounter summary.**         The following assessments were completed:  Living Situation  CAGE  Depression Screening  Timed Get Up and Go  Whisper Test  Cognitive Function Screening  Nutrition Screening  ADL Screening  PAQ Screening          Vitals:    08/29/22 0856   BP: (!) 132/90   BP Location: Left arm   Patient Position: Sitting   Pulse: 71   SpO2: 99%   Weight: 56.1 kg (123 lb 10.9 oz)   Height: 5' 3" (1.6 m)     Body mass index is 21.91 kg/m².    Physical Exam  Vitals reviewed.   Constitutional:       Appearance: She is well-developed.   HENT:      Head: Normocephalic and atraumatic. Not macrocephalic and not microcephalic. No raccoon eyes, Wagner's sign, abrasion, contusion, right periorbital erythema, left periorbital erythema or laceration. Hair is normal.      Right Ear: No decreased hearing noted. No laceration, drainage, swelling or tenderness. No middle ear effusion. No foreign body. No mastoid tenderness. No hemotympanum. Tympanic membrane is not injected, scarred, perforated, erythematous, retracted or bulging. Tympanic membrane has normal mobility.      Left Ear: No decreased hearing noted. No laceration, drainage, swelling or tenderness.  No middle ear effusion. No foreign body. No mastoid tenderness. No hemotympanum. Tympanic membrane is not injected, scarred, perforated, erythematous, retracted or bulging. Tympanic membrane has normal mobility.      Nose: Nose normal. No nasal deformity, laceration or mucosal edema.      Mouth/Throat:      Pharynx: Uvula midline.   Eyes:      General: Lids are normal.      Conjunctiva/sclera: Conjunctivae " normal.   Neck:      Thyroid: No thyroid mass or thyromegaly.      Trachea: Trachea normal.   Cardiovascular:      Rate and Rhythm: Normal rate and regular rhythm.   Pulmonary:      Effort: Pulmonary effort is normal. No respiratory distress.      Breath sounds: Normal breath sounds.   Abdominal:      Palpations: Abdomen is soft.   Musculoskeletal:         General: Normal range of motion.      Cervical back: Neck supple. No edema or erythema. No spinous process tenderness or muscular tenderness. Normal range of motion.   Lymphadenopathy:      Head:      Right side of head: No submental, submandibular, tonsillar, preauricular or posterior auricular adenopathy.      Left side of head: No submental, submandibular, tonsillar, preauricular, posterior auricular or occipital adenopathy.   Skin:     General: Skin is warm and dry.   Neurological:      Mental Status: She is alert and oriented to person, place, and time.   Psychiatric:         Behavior: Behavior normal.         Thought Content: Thought content normal.         Judgment: Judgment normal.           Diagnoses and health risks identified today and associated recommendations/orders:    1. Encounter for preventive health examination  Annual Health Risk Assessment (HRA) visit today.  Counseling and referral of health maintenance and preventative health measures performed.  Patient given annual wellness paperwork to take home.  Encouraged to return in 1 year for subsequent HRA visit.    2. Essential hypertension  Chronic. Stable. Controlled. Encouraged to increase exercise as tolerated (moderate-intensity aerobic activity and muscle-strengthening activities) improve diet to heart healthy, low sodium diet. Continue current treatment plan as previously prescribed by PCP.    3. Hyperlipidemia, unspecified hyperlipidemia type  Chronic. Stable. Continue current treatment plan as previously prescribed by PCP.    4. Atherosclerosis of aorta  Chronic. Stable. Continue current  treatment plan as previously prescribed by PCP.    5. Tortuous aorta  Chronic. Stable. Noted on MRI Abdomen from 7/23/21. Continue current treatment plan as previously prescribed by PCP.    6. Hyperkalemia  Chronic. Stable. Continue current treatment plan as previously prescribed by PCP.    7. Spine metastasis  Chronic. Stable. Continue current treatment plan as previously prescribed by PCP.    8. Urinary frequency  Chronic. Stable. Continue current treatment plan as previously prescribed by PCP.    9. PVC's (premature ventricular contractions)  Chronic. Stable. Continue current treatment plan as previously prescribed by PCP.    10. Lumbar radiculopathy  Chronic. Stable. Continue current treatment plan as previously prescribed by PCP.    11. Memory deficits  Chronic. Stable. Continue current treatment plan as previously prescribed by PCP.    12. Secondary neuroendocrine tumor of bone  Chronic. Stable. Continue current treatment plan as previously prescribed by PCP.    13. 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 Stable. Continue current treatment plan as previously prescribed by PCP.    14. 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 Stable. Continue current treatment plan as previously prescribed by PCP.    15. Malignant carcinoid tumor of ileum  Chronic. Stable. Continue current treatment plan as previously prescribed by PCP.    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 Stable. Continue current treatment plan as previously prescribed by PCP.    17. HCC (hepatocellular carcinoma)  Chronic. Stable. Continue current treatment plan as previously prescribed by PCP.    18. Anemia, unspecified type  Chronic. Stable. Continue current treatment plan as previously prescribed by PCP.    19. Microalbuminuria due to type 2 diabetes mellitus  Chronic. Stable. Continue current treatment plan as previously prescribed by PCP.    20. Controlled type 2 diabetes mellitus with microalbuminuria,  without long-term current use of insulin  Chronic. Stable. Last Hgb A1c=5.9 from 7/12/22. Continue current treatment plan as previously prescribed by PCP.    21. Chronic RUQ pain  Chronic. Stable. Continue current treatment plan as previously prescribed by PCP.    22. Calculus of gallbladder without cholecystitis without obstruction  Chronic. Stable. Continue current treatment plan as previously prescribed by PCP.    23. DNR (do not resuscitate)  Chronic. Stable. Continue current treatment plan as previously prescribed by PCP.    24. B12 deficiency  Chronic. Stable. Continue current treatment plan as previously prescribed by PCP.        Provided Shahram with a 5-10 year written screening schedule and personal prevention plan. Recommendations were developed using the USPSTF age appropriate recommendations. Education, counseling, and referrals were provided as needed. After Visit Summary printed and given to patient which includes a list of additional screenings\tests needed.      I offered to discuss end of life issues, including information on how to make advance directives that the patient could use to name someone who would make medical decisions on their behalf if they became too ill to make themselves.    ___Patient declined  _X_Patient is interested, I provided paper work and offered to discuss.    No follow-ups on file.    Phan Nguyen NP  I offered to discuss advanced care planning, including how to pick a person who would make decisions for you if you were unable to make them for yourself, called a health care power of , and what kind of decisions you might make such as use of life sustaining treatments such as ventilators and tube feeding when faced with a life limiting illness recorded on a living will that they will need to know. (How you want to be cared for as you near the end of your natural life)     X Patient is interested in learning more about how to make advanced directives.  I  provided them paperwork and offered to discuss this with them.

## 2022-08-29 NOTE — PATIENT INSTRUCTIONS
Counseling and Referral of Other Preventative  (Italic type indicates deductible and co-insurance are waived)    Patient Name: Shahram Hills  Today's Date: 8/29/2022    Health Maintenance       Date Due Completion Date    COVID-19 Vaccine (4 - Booster for Moderna series) 03/01/2022 12/7/2021    Influenza Vaccine (1) 09/01/2022 11/9/2021    Diabetes Urine Screening 11/23/2022 11/23/2021    Eye Exam 12/10/2022 12/10/2021    Hemoglobin A1c 01/12/2023 7/12/2022    Lipid Panel 03/11/2023 3/11/2022    Override on 11/27/2017: Done    Foot Exam 07/22/2023 7/22/2022    Override on 10/8/2020: Done    DEXA Scan 05/31/2024 5/31/2021    Override on 6/11/2018: Not Clinically Appropriate    TETANUS VACCINE 12/10/2029 12/10/2019    Override on 3/23/2018: Declined        No orders of the defined types were placed in this encounter.    The following information is provided to all patients.  This information is to help you find resources for any of the problems found today that may be affecting your health:                Living healthy guide: www.[facility].[place].gov      Understanding Diabetes: www.diabetes.org      Eating healthy: www.cdc.gov/healthyweight      CDC home safety checklist: www.cdc.gov/steadi/patient.html      Agency on Aging: www.goea.[place].[facility]      Alcoholics anonymous (AA): www.aa.org      Physical Activity: www.leonard.nih.gov/he7hamv      Tobacco use: www.quitwithusla.org

## 2022-09-08 ENCOUNTER — PATIENT MESSAGE (OUTPATIENT)
Dept: PHARMACY | Facility: CLINIC | Age: 82
End: 2022-09-08
Payer: MEDICARE

## 2022-09-12 ENCOUNTER — SPECIALTY PHARMACY (OUTPATIENT)
Dept: PHARMACY | Facility: CLINIC | Age: 82
End: 2022-09-12
Payer: MEDICARE

## 2022-09-12 ENCOUNTER — LAB VISIT (OUTPATIENT)
Dept: LAB | Facility: OTHER | Age: 82
End: 2022-09-12
Attending: INTERNAL MEDICINE
Payer: MEDICARE

## 2022-09-12 DIAGNOSIS — C7A.012 MALIGNANT CARCINOID TUMOR OF ILEUM: ICD-10-CM

## 2022-09-12 LAB
ALBUMIN SERPL BCP-MCNC: 3.9 G/DL (ref 3.5–5.2)
ALP SERPL-CCNC: 64 U/L (ref 55–135)
ALT SERPL W/O P-5'-P-CCNC: 23 U/L (ref 10–44)
ANION GAP SERPL CALC-SCNC: 9 MMOL/L (ref 8–16)
AST SERPL-CCNC: 25 U/L (ref 10–40)
BASOPHILS # BLD AUTO: 0.02 K/UL (ref 0–0.2)
BASOPHILS NFR BLD: 0.2 % (ref 0–1.9)
BILIRUB SERPL-MCNC: 0.6 MG/DL (ref 0.1–1)
BUN SERPL-MCNC: 11 MG/DL (ref 8–23)
CALCIUM SERPL-MCNC: 10.1 MG/DL (ref 8.7–10.5)
CHLORIDE SERPL-SCNC: 104 MMOL/L (ref 95–110)
CO2 SERPL-SCNC: 28 MMOL/L (ref 23–29)
CREAT SERPL-MCNC: 0.7 MG/DL (ref 0.5–1.4)
DIFFERENTIAL METHOD: NORMAL
EOSINOPHIL # BLD AUTO: 0.1 K/UL (ref 0–0.5)
EOSINOPHIL NFR BLD: 0.7 % (ref 0–8)
ERYTHROCYTE [DISTWIDTH] IN BLOOD BY AUTOMATED COUNT: 13 % (ref 11.5–14.5)
EST. GFR  (NO RACE VARIABLE): >60 ML/MIN/1.73 M^2
GLUCOSE SERPL-MCNC: 159 MG/DL (ref 70–110)
HCT VFR BLD AUTO: 42.1 % (ref 37–48.5)
HGB BLD-MCNC: 13.6 G/DL (ref 12–16)
IMM GRANULOCYTES # BLD AUTO: 0.03 K/UL (ref 0–0.04)
IMM GRANULOCYTES NFR BLD AUTO: 0.4 % (ref 0–0.5)
LYMPHOCYTES # BLD AUTO: 2.4 K/UL (ref 1–4.8)
LYMPHOCYTES NFR BLD: 29 % (ref 18–48)
MCH RBC QN AUTO: 29.6 PG (ref 27–31)
MCHC RBC AUTO-ENTMCNC: 32.3 G/DL (ref 32–36)
MCV RBC AUTO: 92 FL (ref 82–98)
MONOCYTES # BLD AUTO: 0.7 K/UL (ref 0.3–1)
MONOCYTES NFR BLD: 8.5 % (ref 4–15)
NEUTROPHILS # BLD AUTO: 5.1 K/UL (ref 1.8–7.7)
NEUTROPHILS NFR BLD: 61.2 % (ref 38–73)
NRBC BLD-RTO: 0 /100 WBC
PLATELET # BLD AUTO: 280 K/UL (ref 150–450)
PMV BLD AUTO: 10.8 FL (ref 9.2–12.9)
POTASSIUM SERPL-SCNC: 4.4 MMOL/L (ref 3.5–5.1)
PROT SERPL-MCNC: 7.3 G/DL (ref 6–8.4)
RBC # BLD AUTO: 4.6 M/UL (ref 4–5.4)
SODIUM SERPL-SCNC: 141 MMOL/L (ref 136–145)
WBC # BLD AUTO: 8.27 K/UL (ref 3.9–12.7)

## 2022-09-12 PROCEDURE — 85025 COMPLETE CBC W/AUTO DIFF WBC: CPT | Performed by: INTERNAL MEDICINE

## 2022-09-12 PROCEDURE — 36415 COLL VENOUS BLD VENIPUNCTURE: CPT | Performed by: INTERNAL MEDICINE

## 2022-09-12 PROCEDURE — 86316 IMMUNOASSAY TUMOR OTHER: CPT | Performed by: INTERNAL MEDICINE

## 2022-09-12 PROCEDURE — 80053 COMPREHEN METABOLIC PANEL: CPT | Performed by: INTERNAL MEDICINE

## 2022-09-12 PROCEDURE — 84260 ASSAY OF SEROTONIN: CPT | Performed by: INTERNAL MEDICINE

## 2022-09-12 NOTE — TELEPHONE ENCOUNTER
Specialty Pharmacy - Refill Coordination    Specialty Medication Orders Linked to Encounter      Flowsheet Row Most Recent Value   Medication #1 telotristat ethyl 250 mg Tab (Order#[id number], Rx#[id number])            Refill Questions - Documented Responses      Flowsheet Row Most Recent Value   Patient Availability and HIPAA Verification    Does patient want to proceed with activity? Yes   HIPAA/medical authority confirmed? Yes   Relationship to patient of person spoken to? Care Giver   Refill Screening Questions    Changes to allergies? No   Changes to medications? No   New conditions since last clinic visit? No   Unplanned office visit, urgent care, ED, or hospital admission in the last 4 weeks? No   How does patient/caregiver feel medication is working? Good   Financial problems or insurance changes? No   How many doses of your specialty medications were missed in the last 4 weeks? 0   Would patient like to speak to a pharmacist? No   When does the patient need to receive the medication? 09/21/22   Refill Delivery Questions    How will the patient receive the medication? Delivery Karyn   When does the patient need to receive the medication? 09/21/22   Shipping Address Home   Address in [facility] confirmed and updated if neccessary? Yes   Expected Copay ($) 9.24   Is the patient able to afford the medication copay? Yes   Payment Method CC on file   Days supply of Refill 28   Supplies needed? No supplies needed   Refill activity completed? Yes   Refill activity plan Refill scheduled   Shipment/Pickup Date: 09/15/22            Current Outpatient Medications   Medication Sig    amLODIPine (NORVASC) 5 MG tablet Take 1 tablet (5 mg total) by mouth once daily.    blood sugar diagnostic Strp To check BG 2 times daily, to use with insurance preferred meter    blood-glucose meter kit To check BG 2 times daily, to use with insurance preferred meter    ciclopirox (PENLAC) 8 % Soln Apply topically nightly. (Patient  "not taking: Reported on 8/29/2022)    cyanocobalamin (VITAMIN B-12) 1000 MCG tablet Take 1 tablet (1,000 mcg total) by mouth once daily.    ezetimibe (ZETIA) 10 mg tablet Take 1 tablet (10 mg total) by mouth once daily.    ferrous sulfate 325 (65 FE) MG EC tablet Take 325 mg by mouth 3 (three) times daily with meals.    lancets Misc To check BG 2 times daily, to use with insurance preferred meter    latanoprost 0.005 % ophthalmic solution Place 1 drop into both eyes nightly.    losartan (COZAAR) 50 MG tablet Take 1 tablet (50 mg total) by mouth once daily. (Patient taking differently: Take 50 mg by mouth every morning.)    metFORMIN (GLUCOPHAGE-XR) 500 MG ER 24hr tablet Take 2 tablets (1,000 mg total) by mouth daily with breakfast.    needle, disp, 22 G 22 gauge x 1" Ndle 1 application by Misc.(Non-Drug; Combo Route) route 3 (three) times daily as needed (diarrhea).    octreotide acetate (SANDOSTATIN) 100 mcg/mL (1 mL) Syrg Inject 1 mL (0.1 mg total) into the skin 3 (three) times daily as needed (diarrhea). (Patient not taking: Reported on 8/29/2022)    ondansetron (ZOFRAN-ODT) 4 MG TbDL Take 1 tablet (4 mg total) by mouth every 6 (six) hours as needed (nausea, vomting). (Patient not taking: Reported on 8/29/2022)    syringe, disposable, 1 mL Syrg 1 application by Misc.(Non-Drug; Combo Route) route 3 (three) times daily as needed (diarrhea).    telotristat ethyl 250 mg Tab Take 1 tablet (250 mg) by mouth 3 (three) times daily.    TRUEPLUS LANCETS 33 gauge Misc USE TO CHECK BLOOD SUGAR TWICE DAILY    vitamin D (VITAMIN D3) 1000 units Tab Take 400 Units by mouth once daily.    XIIDRA 5 % Dpet INSTILL ONE DROP INTO OU BID   Last reviewed on 8/29/2022  9:05 AM by Phan Nguyen, NP    Review of patient's allergies indicates:   Allergen Reactions    Epinephrine      carcinoid    Last reviewed on  8/29/2022 9:03 AM by Phan Nguyen      Tasks added this encounter   10/12/2022 - Refill Call (Auto Added)   Tasks due " within next 3 months   No tasks due.     Candi Simms, Patient Care Assistant  [facility]  [address]  [facility] [address]  Phone: [phone]  Fax: [phone]

## 2022-09-13 ENCOUNTER — OFFICE VISIT (OUTPATIENT)
Dept: HEMATOLOGY/ONCOLOGY | Facility: CLINIC | Age: 82
End: 2022-09-13
Payer: MEDICARE

## 2022-09-13 ENCOUNTER — INFUSION (OUTPATIENT)
Dept: INFUSION THERAPY | Facility: HOSPITAL | Age: 82
End: 2022-09-13
Payer: MEDICARE

## 2022-09-13 VITALS
SYSTOLIC BLOOD PRESSURE: 171 MMHG | OXYGEN SATURATION: 98 % | BODY MASS INDEX: 22.02 KG/M2 | RESPIRATION RATE: 16 BRPM | WEIGHT: 124.25 LBS | HEART RATE: 95 BPM | TEMPERATURE: 97 F | DIASTOLIC BLOOD PRESSURE: 88 MMHG | HEIGHT: 63 IN

## 2022-09-13 DIAGNOSIS — C7A.8 NEUROENDOCRINE CARCINOMA METASTATIC TO BONE: ICD-10-CM

## 2022-09-13 DIAGNOSIS — C7B.8 NEUROENDOCRINE CARCINOMA METASTATIC TO BONE: ICD-10-CM

## 2022-09-13 DIAGNOSIS — C79.51 SPINE METASTASIS: ICD-10-CM

## 2022-09-13 DIAGNOSIS — R19.7 DIARRHEA, UNSPECIFIED TYPE: ICD-10-CM

## 2022-09-13 DIAGNOSIS — C7B.8 METASTATIC MALIGNANT NEUROENDOCRINE TUMOR TO LIVER: ICD-10-CM

## 2022-09-13 DIAGNOSIS — R80.9 CONTROLLED TYPE 2 DIABETES MELLITUS WITH MICROALBUMINURIA, WITHOUT LONG-TERM CURRENT USE OF INSULIN: ICD-10-CM

## 2022-09-13 DIAGNOSIS — C7B.8 METASTATIC MALIGNANT NEUROENDOCRINE TUMOR TO LIVER: Primary | ICD-10-CM

## 2022-09-13 DIAGNOSIS — E11.29 CONTROLLED TYPE 2 DIABETES MELLITUS WITH MICROALBUMINURIA, WITHOUT LONG-TERM CURRENT USE OF INSULIN: ICD-10-CM

## 2022-09-13 DIAGNOSIS — E34.0 CARCINOID SYNDROME: ICD-10-CM

## 2022-09-13 DIAGNOSIS — I10 ESSENTIAL HYPERTENSION: ICD-10-CM

## 2022-09-13 DIAGNOSIS — C7A.012 MALIGNANT CARCINOID TUMOR OF ILEUM: Primary | ICD-10-CM

## 2022-09-13 DIAGNOSIS — I49.3 PVC (PREMATURE VENTRICULAR CONTRACTION): ICD-10-CM

## 2022-09-13 LAB — CGA SERPL-MCNC: 2634 NG/ML

## 2022-09-13 PROCEDURE — 1126F AMNT PAIN NOTED NONE PRSNT: CPT | Mod: CPTII,S$GLB,, | Performed by: INTERNAL MEDICINE

## 2022-09-13 PROCEDURE — 99499 RISK ADDL DX/OHS AUDIT: ICD-10-PCS | Mod: HCNC,S$GLB,, | Performed by: INTERNAL MEDICINE

## 2022-09-13 PROCEDURE — 1159F MED LIST DOCD IN RCRD: CPT | Mod: CPTII,S$GLB,, | Performed by: INTERNAL MEDICINE

## 2022-09-13 PROCEDURE — 99999 PR PBB SHADOW E&M-EST. PATIENT-LVL V: ICD-10-PCS | Mod: PBBFAC,,, | Performed by: INTERNAL MEDICINE

## 2022-09-13 PROCEDURE — 63600175 PHARM REV CODE 636 W HCPCS: Mod: JG | Performed by: INTERNAL MEDICINE

## 2022-09-13 PROCEDURE — 3288F FALL RISK ASSESSMENT DOCD: CPT | Mod: CPTII,S$GLB,, | Performed by: INTERNAL MEDICINE

## 2022-09-13 PROCEDURE — 1101F PR PT FALLS ASSESS DOC 0-1 FALLS W/OUT INJ PAST YR: ICD-10-PCS | Mod: CPTII,S$GLB,, | Performed by: INTERNAL MEDICINE

## 2022-09-13 PROCEDURE — 3077F PR MOST RECENT SYSTOLIC BLOOD PRESSURE >= 140 MM HG: ICD-10-PCS | Mod: CPTII,S$GLB,, | Performed by: INTERNAL MEDICINE

## 2022-09-13 PROCEDURE — 1101F PT FALLS ASSESS-DOCD LE1/YR: CPT | Mod: CPTII,S$GLB,, | Performed by: INTERNAL MEDICINE

## 2022-09-13 PROCEDURE — 3288F PR FALLS RISK ASSESSMENT DOCUMENTED: ICD-10-PCS | Mod: CPTII,S$GLB,, | Performed by: INTERNAL MEDICINE

## 2022-09-13 PROCEDURE — 99999 PR PBB SHADOW E&M-EST. PATIENT-LVL V: CPT | Mod: PBBFAC,,, | Performed by: INTERNAL MEDICINE

## 2022-09-13 PROCEDURE — 3079F DIAST BP 80-89 MM HG: CPT | Mod: CPTII,S$GLB,, | Performed by: INTERNAL MEDICINE

## 2022-09-13 PROCEDURE — 3079F PR MOST RECENT DIASTOLIC BLOOD PRESSURE 80-89 MM HG: ICD-10-PCS | Mod: CPTII,S$GLB,, | Performed by: INTERNAL MEDICINE

## 2022-09-13 PROCEDURE — 1160F PR REVIEW ALL MEDS BY PRESCRIBER/CLIN PHARMACIST DOCUMENTED: ICD-10-PCS | Mod: CPTII,S$GLB,, | Performed by: INTERNAL MEDICINE

## 2022-09-13 PROCEDURE — 3077F SYST BP >= 140 MM HG: CPT | Mod: CPTII,S$GLB,, | Performed by: INTERNAL MEDICINE

## 2022-09-13 PROCEDURE — 96372 THER/PROPH/DIAG INJ SC/IM: CPT

## 2022-09-13 PROCEDURE — 1160F RVW MEDS BY RX/DR IN RCRD: CPT | Mod: CPTII,S$GLB,, | Performed by: INTERNAL MEDICINE

## 2022-09-13 PROCEDURE — 1126F PR PAIN SEVERITY QUANTIFIED, NO PAIN PRESENT: ICD-10-PCS | Mod: CPTII,S$GLB,, | Performed by: INTERNAL MEDICINE

## 2022-09-13 PROCEDURE — 99499 UNLISTED E&M SERVICE: CPT | Mod: HCNC,S$GLB,, | Performed by: INTERNAL MEDICINE

## 2022-09-13 PROCEDURE — 99215 PR OFFICE/OUTPT VISIT, EST, LEVL V, 40-54 MIN: ICD-10-PCS | Mod: S$GLB,,, | Performed by: INTERNAL MEDICINE

## 2022-09-13 PROCEDURE — 1159F PR MEDICATION LIST DOCUMENTED IN MEDICAL RECORD: ICD-10-PCS | Mod: CPTII,S$GLB,, | Performed by: INTERNAL MEDICINE

## 2022-09-13 PROCEDURE — 99215 OFFICE O/P EST HI 40 MIN: CPT | Mod: S$GLB,,, | Performed by: INTERNAL MEDICINE

## 2022-09-13 RX ORDER — LANREOTIDE ACETATE 120 MG/.5ML
120 INJECTION SUBCUTANEOUS
Status: CANCELLED | OUTPATIENT
Start: 2022-09-13

## 2022-09-13 RX ORDER — LANREOTIDE ACETATE 120 MG/.5ML
120 INJECTION SUBCUTANEOUS
Status: DISCONTINUED | OUTPATIENT
Start: 2022-09-13 | End: 2022-09-13 | Stop reason: HOSPADM

## 2022-09-13 RX ADMIN — LANREOTIDE ACETATE 120 MG: 120 INJECTION SUBCUTANEOUS at 03:09

## 2022-09-13 NOTE — NURSING
Patient received Lanreotide injection SQ to right hip.  Tolerated well. Band-aid applied to site. C/D/I.  Patient ambulated off unit with steady gait.

## 2022-09-13 NOTE — PROGRESS NOTES
"Subjective:       Patient ID: Shahram Hills is an 82 y.o. female.     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 Ms. Hills is an 79 yo woman who initially saw me on 6/7/18 for further evaluation of neuroendocrine tumor. She initially presented with diarrhea, abdominal discomfort, weight loss. She was evaluated at [facility] and underwent workup below:  US abdomen on 3/14/18 showed numerous bilobar mixed echogenicity and mildly vascular hepatic lesions. Cholelithiasis without e/o acute cholecystitis.   MRI abdomen on 3/30/2018 showed innumerable hepatic metastases. L3 vertebral body metastasis with soft tissue component along the right margin of the L3 and upper L4 vertebral bodies, filling the right L3/4 neural foramen, and extending into the anterior aspect of the spinal canal on the right at the L3 and upper T4 levels. Associated with mild spinal canal narrowing.  CT chest on 4/6/2018 showed one lucent nodule measuring 7 mm in the T7 vertebral body, most likely representing metastatic disease.    EGD on 5/4/18 showed normal duodenum. Schatzki's ring in the lower third of the esophagus. Grade 1 esophagitis in the GE junction c/w mild distal esophagitis. Congestion and erythema in the antrum, pre-pyloric region and stomach body c/w gastritis. Biopsy of the stomach antrum showed mild chronic gastritis, neg for H. pylori.   Labs on 5/9/2018: WBC 6.9, H/H 11.6/34.3, MCV 87.5, plt count 279. On 3/9/18: Vit B12 level 173, folic acid 6.6  She was started on oral vit B12 and underwent a liver biopsy on 5/18/18. Pathology showed "metastatic well differentiated neuroendocrine tumor. Ki67 3%"     She saw me on 6/7/18 and complained of weight loss of 26 lbs over the past 3-4 months, also has diarrhea. No flushing, wheezing, palpitations. Has been having pain in right flank radiating down " "the right leg. No tingling/numbness, weakness. She can hold her bladder but when she urinates her diarrhea would come out as well. Started on dex 4 mg q6h the evening of 18.      2. MRI spine on 18 showed "Marrow replacing metastatic lesion of the L3 vertebral body with associated extra osseous expansion and complete effacement of the right L3-L4 neural foramina and additional effacement of the right lateral recess.  There is additional lateral extension and abutment of the right psoas muscle.  Superimposed degenerative change at this level results in mild spinal canal stenosis." I sent the patient to ED on 18 where she was evaluated by neurosurgery. Neurosurgery did not feel she was a candidate for surgical intervention.      3. Seen by Dr. Adames on 18. palliative radiation to the area of the L3 metastasis 18-18   4. 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  There is an adjacent metastatic lymph node, diffuse liver metastases, and multiple bone metastases. Index primary neoplasm SUV max 33.13. Adjacent lymph node SUV max 23. L3 bone metastasis SUV max 36.86. Left lobe SUV max 46.13   5. Lanreotide every 4 weeks started on 18  6. TACE to the right hepatic lobe on 19. TACE to the left hepatic lobe on 3/21/19.   7. TACE to the right hepatic lobe on 22. S/p conventional chemoembolization performed of the segment 2, 3, 4 branch of the left hepatic artery and segment 8 branch of the left hepatic artery on 22.     History of Present Illness:   Ms. Hills returns for follow up. Occasional feels malaise throughout the day but generally improves throughout the day. Denies pain, chest pain, shortness of breath, palpitations. Diarrhea improved on newer medication.     ECO     ROS:   See HPI. Otherwise negative.      Physical Examination:   Vital signs reviewed.   Gen: well hydrated, well developed, in no acute distress.  HEENT: " normocephalic, anicteric, PERRLA, EOMI, oropharynx clear  Neck: supple, no JVD, thyromegaly, cervical or supraclavicular LAD  Lungs: CTAB, no wheezes or rales  Heart: regular rate, frequent PVCs, regular pulse, no M/R/G  Abdomen: soft, no tenderness, non-distended, liver edge 3 cm below the right costal margin, no splenomegaly, no mass, or hernia.   Ext:: no edema  Neuro: alert and oriented x 4, no focal neuro deficit  Skin: no rash, open wounds or ulcers  Psych: pleasant and appropriate mood and affect     Objective:      Laboratory Data:  Labs reviewed. CBC, CMP stable. Bilirubin 0.6. Chromogranin 1658->1540->1342->1614->2092->2090-> repeat pending     Imaging Data:  CT chest 5/16/22:  Interval decrease in size of the solid nodule at the left lung base when compared to the prior examination with the nodule now measuring 0.3 cm compared to 0.5 cm on the prior examination.     Stable additional subcentimeter pulmonary nodules noted bilaterally.     Interval chemoembolization with development of dense material within some of the low-density liver lesions.  The liver findings are incompletely imaged/characterized on this noncontrast examination and correlation with patient's MRI of the abdomen and pelvis is recommended.     Bilateral adrenal thickening.     Stable sclerotic focus within the manubrium and lucent lesion within the T6 vertebral body.       MRI abdomen/pelvis 5/16/22:  Decrease in size and enhancement in numerous liver masses throughout bilateral hepatic lobes following chemoembolization.     Grossly stable appearance of the ileal mass and osseous metastasis        Assessment and Plan:      1. Malignant carcinoid tumor of ileum    2. 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 Spine metastasis    4. 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 Carcinoid syndrome    6. Diarrhea, unspecified type    7. Essential hypertension    8. Controlled type 2 diabetes mellitus with microalbuminuria,  without long-term current use of insulin    9. PVC (premature ventricular contraction)        1-6  - Ms Hills is an 81 yo woman with well differentiated low-grade neuroendocrine tumor of the ileum with mets to liver and bones, diagnosed on 5/18/2018. Started lanreotide every 4 weeks on 6/22/2018. S/p TACE to the right hepatic lobe on 2/14/19. TACE to the left hepatic lobe on 3/21/19.   - CT/MRI 1/12/22 showed mild progression in the liver. S/p TACE on 2/11/22 and 4/22/22   - octreotide not really helping with her diarrhea. Try xermelo 250 mg tid which improves symptoms  - c/w lanreotide every 4 weeks  - repeat Gallium PET scan + MRI 11/2022      7.  - /88, notes BP at home 135/76  - c/w current medication    8.  - BS controlled  - c/w current meds    9. Asymptomatic PVCs. Cardio-onc referral & echo for arrhythmia and HTN in malignancy.     RTC in one month   Their questions were answered in the clinic. Encouraged to call should issues arise    Patient discussed with attending physician, Dr. Fuentes, PGY-VI  Hematology/Oncology Fellow    ATTESTATION:    I have personally seen, examined and talked to the patient. I have reviewed Dr. Meraz' note and agreed with the findings, assessment and plan.   Diarrhea well controlled on xermelo. C/w lanreotide every 4 weeks. Schedule gallium PET and MRI liver in Nov. Will review at tumor board. If +disease progression, will see if she can benefit from PRRT and/or TACE.   Frequent PVCs. Get echo. Refer to cardiology. She is asymptomatic from the cardiac perspective.     Sebastian Granados MD  Hematology and Medical Oncology  [facility]     Route Chart for Scheduling    Med Onc Chart Routing  Urgent    Follow up with physician 2 months. please schedule patient for echo and see cardiology oncology (cardiology) ASAP. get CBC, CMP, chromonin A, serotonin on 10/10, see JUNAID on 10/11 and get lanreotide. get CBC, CMP, chromogranin A, serotonin, gallium PET and  MRI abdomen on 11/1. see me on 11/8 then get lanreotide   Follow up with JUNAID 4 weeks.   Infusion scheduling note    Injection scheduling note lanreotide every 4 weeks   Labs CBC and CMP   Lab interval: every 4 weeks  chromogranin A, serotonin   Imaging   Echo first availalble. Gallium PET and MRI abdomen on 11/1/22.   Pharmacy appointment    Other referrals Additional referrals needed         Therapy Plan Information  5. Medications  lanreotide injection 120 mg  120 mg, Subcutaneous, Every visit

## 2022-09-16 LAB — SEROTONIN: 3180 NG/ML

## 2022-09-21 ENCOUNTER — HOSPITAL ENCOUNTER (OUTPATIENT)
Dept: CARDIOLOGY | Facility: HOSPITAL | Age: 82
Discharge: HOME | End: 2022-09-21
Attending: STUDENT IN AN ORGANIZED HEALTH CARE EDUCATION/TRAINING PROGRAM
Payer: MEDICARE

## 2022-09-21 VITALS
HEART RATE: 85 BPM | BODY MASS INDEX: 21.97 KG/M2 | HEIGHT: 63 IN | SYSTOLIC BLOOD PRESSURE: 138 MMHG | WEIGHT: 124 LBS | DIASTOLIC BLOOD PRESSURE: 82 MMHG

## 2022-09-21 DIAGNOSIS — I49.3 PVC (PREMATURE VENTRICULAR CONTRACTION): ICD-10-CM

## 2022-09-21 LAB
ASCENDING AORTA: 2.69 CM
AV INDEX (PROSTH): 0.69
AV MEAN GRADIENT: 2 MMHG
AV PEAK GRADIENT: 4 MMHG
AV VALVE AREA: 2.52 CM2
AV VELOCITY RATIO: 0.66
BSA FOR ECHO PROCEDURE: 1.58 M2
CV ECHO LV RWT: 0.31 CM
DOP CALC AO PEAK VEL: 1.01 M/S
DOP CALC AO VTI: 19.73 CM
DOP CALC LVOT AREA: 3.6 CM2
DOP CALC LVOT DIAMETER: 2.15 CM
DOP CALC LVOT PEAK VEL: 0.67 M/S
DOP CALC LVOT STROKE VOLUME: 49.71 CM3
DOP CALCLVOT PEAK VEL VTI: 13.7 CM
E WAVE DECELERATION TIME: 158.76 MSEC
E/A RATIO: 0.64
E/E' RATIO: 16 M/S
ECHO LV POSTERIOR WALL: 0.66 CM (ref 0.6–1.1)
EJECTION FRACTION: 40 %
FRACTIONAL SHORTENING: 22 % (ref 28–44)
INTERVENTRICULAR SEPTUM: 0.72 CM (ref 0.6–1.1)
IVRT: 119.89 MSEC
LA MAJOR: 4.53 CM
LA MINOR: 4.68 CM
LA WIDTH: 4.34 CM
LEFT ATRIUM SIZE: 3.61 CM
LEFT ATRIUM VOLUME INDEX MOD: 42.6 ML/M2
LEFT ATRIUM VOLUME INDEX: 38.8 ML/M2
LEFT ATRIUM VOLUME MOD: 67.25 CM3
LEFT ATRIUM VOLUME: 61.31 CM3
LEFT INTERNAL DIMENSION IN SYSTOLE: 3.33 CM (ref 2.1–4)
LEFT VENTRICLE DIASTOLIC VOLUME INDEX: 51.47 ML/M2
LEFT VENTRICLE DIASTOLIC VOLUME: 81.32 ML
LEFT VENTRICLE MASS INDEX: 54 G/M2
LEFT VENTRICLE SYSTOLIC VOLUME INDEX: 28.6 ML/M2
LEFT VENTRICLE SYSTOLIC VOLUME: 45.15 ML
LEFT VENTRICULAR INTERNAL DIMENSION IN DIASTOLE: 4.26 CM (ref 3.5–6)
LEFT VENTRICULAR MASS: 85.55 G
LV LATERAL E/E' RATIO: 12.8 M/S
LV SEPTAL E/E' RATIO: 21.33 M/S
MV A" WAVE DURATION": 11.13 MSEC
MV PEAK A VEL: 1 M/S
MV PEAK E VEL: 0.64 M/S
MV STENOSIS PRESSURE HALF TIME: 46.04 MS
MV VALVE AREA P 1/2 METHOD: 4.78 CM2
PULM VEIN S/D RATIO: 1.68
PV PEAK D VEL: 0.25 M/S
PV PEAK S VEL: 0.42 M/S
RA MAJOR: 4.74 CM
RA PRESSURE: 3 MMHG
RA WIDTH: 3.76 CM
RIGHT VENTRICULAR END-DIASTOLIC DIMENSION: 3.29 CM
RV TISSUE DOPPLER FREE WALL SYSTOLIC VELOCITY 1 (APICAL 4 CHAMBER VIEW): 8.9 CM/S
SINUS: 3.02 CM
STJ: 2.46 CM
TDI LATERAL: 0.05 M/S
TDI SEPTAL: 0.03 M/S
TDI: 0.04 M/S
TRICUSPID ANNULAR PLANE SYSTOLIC EXCURSION: 1.81 CM

## 2022-09-21 PROCEDURE — 93306 TTE W/DOPPLER COMPLETE: CPT | Mod: 26,,, | Performed by: INTERNAL MEDICINE

## 2022-09-21 PROCEDURE — 93306 TTE W/DOPPLER COMPLETE: CPT

## 2022-09-21 PROCEDURE — 93306 ECHO (CUPID ONLY): ICD-10-PCS | Mod: 26,,, | Performed by: INTERNAL MEDICINE

## 2022-09-22 ENCOUNTER — TELEPHONE (OUTPATIENT)
Dept: HEMATOLOGY/ONCOLOGY | Facility: CLINIC | Age: 82
End: 2022-09-22
Payer: MEDICARE

## 2022-09-22 ENCOUNTER — TELEPHONE (OUTPATIENT)
Dept: CARDIOLOGY | Facility: CLINIC | Age: 82
End: 2022-09-22
Payer: MEDICARE

## 2022-09-22 NOTE — TELEPHONE ENCOUNTER
Reached out to patient. Scheduled appointment with Dr Bergeron.    ----- Message from Sebastian Granados MD sent at 9/22/2022  9:10 AM CDT -----  HI Dr Bergeron,     I am the medical oncology provider seeing Ms Perera for neuroendorcine tumor. You saw her earlier this year for frequent PVC. Her serotonin is quite elevated so I did an echo to make sure it is fine. It shows EF 40%. She is asymptomatic. Can you see her for further management? thanks

## 2022-09-22 NOTE — TELEPHONE ENCOUNTER
Called and spoke with patient. Discussed LVEF 40% on echo. She is asymptomatic. Recc f/u with Dr Bergeron to see if heart protective medications need to be added. I have also messaged Dr Bergeron's office. Patient understands and agrees with the plan.

## 2022-09-27 ENCOUNTER — OFFICE VISIT (OUTPATIENT)
Dept: CARDIOLOGY | Facility: CLINIC | Age: 82
End: 2022-09-27
Payer: MEDICARE

## 2022-09-27 VITALS
WEIGHT: 124 LBS | SYSTOLIC BLOOD PRESSURE: 142 MMHG | HEIGHT: 63 IN | HEART RATE: 61 BPM | BODY MASS INDEX: 21.97 KG/M2 | DIASTOLIC BLOOD PRESSURE: 78 MMHG | OXYGEN SATURATION: 98 %

## 2022-09-27 DIAGNOSIS — I50.22 CHRONIC SYSTOLIC HEART FAILURE: Primary | ICD-10-CM

## 2022-09-27 PROCEDURE — 99999 PR PBB SHADOW E&M-EST. PATIENT-LVL IV: CPT | Mod: PBBFAC,,, | Performed by: INTERNAL MEDICINE

## 2022-09-27 PROCEDURE — 3078F DIAST BP <80 MM HG: CPT | Mod: CPTII,S$GLB,, | Performed by: INTERNAL MEDICINE

## 2022-09-27 PROCEDURE — 99499 UNLISTED E&M SERVICE: CPT | Mod: HCNC,S$GLB,, | Performed by: INTERNAL MEDICINE

## 2022-09-27 PROCEDURE — 99215 PR OFFICE/OUTPT VISIT, EST, LEVL V, 40-54 MIN: ICD-10-PCS | Mod: S$GLB,,, | Performed by: INTERNAL MEDICINE

## 2022-09-27 PROCEDURE — 3077F SYST BP >= 140 MM HG: CPT | Mod: CPTII,S$GLB,, | Performed by: INTERNAL MEDICINE

## 2022-09-27 PROCEDURE — 99499 RISK ADDL DX/OHS AUDIT: ICD-10-PCS | Mod: HCNC,S$GLB,, | Performed by: INTERNAL MEDICINE

## 2022-09-27 PROCEDURE — 1126F PR PAIN SEVERITY QUANTIFIED, NO PAIN PRESENT: ICD-10-PCS | Mod: CPTII,S$GLB,, | Performed by: INTERNAL MEDICINE

## 2022-09-27 PROCEDURE — 1159F MED LIST DOCD IN RCRD: CPT | Mod: CPTII,S$GLB,, | Performed by: INTERNAL MEDICINE

## 2022-09-27 PROCEDURE — 1126F AMNT PAIN NOTED NONE PRSNT: CPT | Mod: CPTII,S$GLB,, | Performed by: INTERNAL MEDICINE

## 2022-09-27 PROCEDURE — 1159F PR MEDICATION LIST DOCUMENTED IN MEDICAL RECORD: ICD-10-PCS | Mod: CPTII,S$GLB,, | Performed by: INTERNAL MEDICINE

## 2022-09-27 PROCEDURE — 99215 OFFICE O/P EST HI 40 MIN: CPT | Mod: S$GLB,,, | Performed by: INTERNAL MEDICINE

## 2022-09-27 PROCEDURE — 99999 PR PBB SHADOW E&M-EST. PATIENT-LVL IV: ICD-10-PCS | Mod: PBBFAC,,, | Performed by: INTERNAL MEDICINE

## 2022-09-27 PROCEDURE — 1160F PR REVIEW ALL MEDS BY PRESCRIBER/CLIN PHARMACIST DOCUMENTED: ICD-10-PCS | Mod: CPTII,S$GLB,, | Performed by: INTERNAL MEDICINE

## 2022-09-27 PROCEDURE — 1160F RVW MEDS BY RX/DR IN RCRD: CPT | Mod: CPTII,S$GLB,, | Performed by: INTERNAL MEDICINE

## 2022-09-27 PROCEDURE — 3077F PR MOST RECENT SYSTOLIC BLOOD PRESSURE >= 140 MM HG: ICD-10-PCS | Mod: CPTII,S$GLB,, | Performed by: INTERNAL MEDICINE

## 2022-09-27 PROCEDURE — 3078F PR MOST RECENT DIASTOLIC BLOOD PRESSURE < 80 MM HG: ICD-10-PCS | Mod: CPTII,S$GLB,, | Performed by: INTERNAL MEDICINE

## 2022-09-27 RX ORDER — CARVEDILOL 6.25 MG/1
6.25 TABLET ORAL 2 TIMES DAILY WITH MEALS
Qty: 60 TABLET | Refills: 11 | Status: SHIPPED | OUTPATIENT
Start: 2022-09-27 | End: 2022-10-25

## 2022-09-27 NOTE — PROGRESS NOTES
"[facility]    Chief Complaint  Chief Complaint   Patient presents with    Palpitations       HPI:    Returns because of echocardiogram showing mild left ventricular systolic dysfunction.  Seems asymptomatic.  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  No palpitations.  No exertional dyspnea.  No paroxysmal nocturnal dyspnea orthopnea.    Medications  Current Outpatient Medications   Medication Sig Dispense Refill    blood sugar diagnostic Strp To check BG 2 times daily, to use with insurance preferred meter 100 each 11    cyanocobalamin (VITAMIN B-12) 1000 MCG tablet Take 1 tablet (1,000 mcg total) by mouth once daily.      ezetimibe (ZETIA) 10 mg tablet Take 1 tablet (10 mg total) by mouth once daily. 90 tablet 3    ferrous sulfate 325 (65 FE) MG EC tablet Take 325 mg by mouth 3 (three) times daily with meals.      lancets Misc To check BG 2 times daily, to use with insurance preferred meter 100 each 11    latanoprost 0.005 % ophthalmic solution Place 1 drop into both eyes nightly.      losartan (COZAAR) 50 MG tablet Take 1 tablet (50 mg total) by mouth once daily. (Patient taking differently: Take 50 mg by mouth every morning.) 90 tablet 3    metFORMIN (GLUCOPHAGE-XR) 500 MG ER 24hr tablet Take 2 tablets (1,000 mg total) by mouth daily with breakfast. 180 tablet 3    needle, disp, 22 G 22 gauge x 1" Ndle 1 application by Misc.(Non-Drug; Combo Route) route 3 (three) times daily as needed (diarrhea). 30 each 2    syringe, disposable, 1 mL Syrg 1 application by Misc.(Non-Drug; Combo Route) route 3 (three) times daily as needed (diarrhea). 30 each 2    telotristat ethyl 250 mg Tab Take 1 tablet (250 mg) by mouth 3 (three) times daily. 90 tablet 3    TRUEPLUS LANCETS 33 gauge Misc USE TO CHECK BLOOD SUGAR TWICE DAILY      XIIDRA 5 % Dpet INSTILL ONE DROP INTO OU BID  3    blood-glucose meter kit To check BG 2 times daily, to use with insurance preferred meter 1 each 0    " carvediloL (COREG) 6.25 MG tablet Take 1 tablet (6.25 mg total) by mouth 2 (two) times daily with meals. 60 tablet 11    ciclopirox (PENLAC) 8 % Soln Apply topically nightly. (Patient not taking: No sig reported) 6.6 mL 11    octreotide acetate (SANDOSTATIN) 100 mcg/mL (1 mL) Syrg Inject 1 mL (0.1 mg total) into the skin 3 (three) times daily as needed (diarrhea). (Patient not taking: No sig reported) 30 mL 2    ondansetron (ZOFRAN-ODT) 4 MG TbDL Take 1 tablet (4 mg total) by mouth every 6 (six) hours as needed (nausea, vomting). (Patient not taking: No sig reported) 20 tablet 0    vitamin D (VITAMIN D3) 1000 units Tab Take 400 Units by mouth once daily.       No current facility-administered medications for this visit.        History  Past Medical History:   Diagnosis Date    Anemia 7/11/2018    B12 deficiency     Depression     per pcp note 7/2017 and given prozac and diazepam     Hyperlipidemia     Weight loss     reviewed prior pcp Dr. Russo notes 2017 - labs reviewed: cmp wnl x tbili 1.5, tsh wnl, A1c 5.0, cbc wnl,D 36, hiv neg, hep c neg, hep b surg ag neg      Past Surgical History:   Procedure Laterality Date    EMBOLIZATION N/A 2/14/2019    Procedure: EMBOLIZATION, BLOOD VESSEL;  Surgeon: [facility];  Location: [address];  Service: Radiology;  Laterality: N/A;  [facility]    EMBOLIZATION N/A 3/21/2019    Procedure: EMBOLIZATION, BLOOD VESSEL;  Surgeon: [facility];  Location: [facility] OR [address] [place] [address];  Service: Radiology;  Laterality: N/A;  Room 189/[facility]    ESOPHAGOGASTRODUODENOSCOPY  05/04/2018    esophageal ring, grade 1 esophagitis, gastritis     EYE SURGERY Bilateral     cataract removal    HYSTERECTOMY      fibroids     Social History     Socioeconomic History    Marital status:    Occupational History    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   Tobacco Use    Smoking status: Never    Smokeless tobacco: Never   Substance and Sexual Activity    Alcohol  use: No     Comment: stopped in 2007 - hx of social drinking    Drug use: Never    Sexual activity: Not Currently     Partners: Male   Social History Narrative    Living in [place]    Not getting reg exericse     Social Determinants of Health     Financial Resource Strain: Medium Risk    Difficulty of Paying Living Expenses: Somewhat hard   Food Insecurity: No Food Insecurity    Worried About Running Out of Food in the Last Year: Never true    Ran Out of Food in the Last Year: Never true   Transportation Needs: No Transportation Needs    Lack of Transportation (Medical): No    Lack of Transportation (Non-Medical): No   Physical Activity: Inactive    Days of Exercise per Week: 0 days    Minutes of Exercise per Session: 0 min   Stress: No Stress Concern Present    Feeling of Stress : Only a little   Social Connections: Moderately Isolated    Frequency of Communication with Friends and Family: More than three times a week    Frequency of Social Gatherings with Friends and Family: Never    Attends Anabaptist Services: More than 4 times per year    Active Member of Clubs or Organizations: No    Attends Club or Organization Meetings: Never    Marital Status:    Housing Stability: Unknown    Unable to Pay for Housing in the Last Year: No    Unstable Housing in the Last Year: No     Family History   Problem Relation Age of Onset    Glaucoma Mother     Hypertension Mother     Heart attack Father     Cancer Father     Diabetes Sister     Cancer Brother         lung cancer, + tobacco    Diabetes Brother     Hypertension Brother     Stroke Brother     Emphysema Brother     COPD Brother     Asthma Sister     No Known Problems Sister     Hyperlipidemia Sister     Heart attack Sister         Allergies  Review of patient's allergies indicates:   Allergen Reactions    Epinephrine      carcinoid       Review of Systems   Review of Systems   Constitutional: Negative for malaise/fatigue, weight gain and weight loss.   Eyes:   Negative for visual disturbance.   Cardiovascular:  Negative for chest pain, claudication, cyanosis, dyspnea on exertion, irregular heartbeat, leg swelling, near-syncope, orthopnea, palpitations, paroxysmal nocturnal dyspnea and syncope.   Respiratory:  Negative for cough, hemoptysis, shortness of breath, sleep disturbances due to breathing and wheezing.    Hematologic/Lymphatic: Negative for bleeding problem. Does not bruise/bleed easily.   Skin:  Negative for poor wound healing.   Musculoskeletal:  Negative for muscle cramps and myalgias.   Gastrointestinal:  Negative for abdominal pain, anorexia, diarrhea, heartburn, hematemesis, hematochezia, melena, nausea and vomiting.   Genitourinary:  Negative for hematuria and nocturia.   Neurological:  Negative for excessive daytime sleepiness, dizziness, focal weakness, light-headedness and weakness.     Physical Exam  Vitals:    09/27/22 1108   BP: (!) 142/78   Pulse: 61     Wt Readings from Last 1 Encounters:   09/27/22 56.2 kg (124 lb)     Physical Exam  Vitals and nursing note reviewed.   Constitutional:       General: She is not in acute distress.     Appearance: She is not toxic-appearing or diaphoretic.   HENT:      Head: Normocephalic and atraumatic.      Mouth/Throat:      Lips: Pink.      Mouth: Mucous membranes are moist.   Eyes:      General: No scleral icterus.     Conjunctiva/sclera: Conjunctivae normal.   Neck:      Thyroid: No thyromegaly.      Vascular: No carotid bruit, hepatojugular reflux or JVD.      Trachea: Trachea normal.   Cardiovascular:      Rate and Rhythm: Normal rate and regular rhythm. FrequentExtrasystoles are present.     Chest Wall: PMI is not displaced.      Pulses:           Carotid pulses are 2+ on the right side and 2+ on the left side.       Radial pulses are 2+ on the right side and 2+ on the left side.      Heart sounds: S1 normal and S2 normal. No murmur heard.    No friction rub. No S3 or S4 sounds.   Pulmonary:      Effort:  Pulmonary effort is normal. No accessory muscle usage or respiratory distress.      Breath sounds: Normal breath sounds and air entry. No decreased breath sounds, wheezing, rhonchi or rales.   Abdominal:      General: Bowel sounds are normal. There is no distension or abdominal bruit.      Palpations: Abdomen is soft. There is no hepatomegaly, splenomegaly or pulsatile mass.      Tenderness: There is no abdominal tenderness.   Musculoskeletal:         General: No tenderness or deformity.      Right lower leg: No edema.      Left lower leg: No edema.   Skin:     General: Skin is warm and dry.      Capillary Refill: Capillary refill takes less than 2 seconds.      Coloration: Skin is not cyanotic or pale.      Nails: There is no clubbing.   Neurological:      General: No focal deficit present.      Mental Status: She is alert and oriented to person, place, and time.   Psychiatric:         Attention and Perception: Attention normal.         Mood and Affect: Mood normal.         Speech: Speech normal.         Behavior: Behavior normal. Behavior is cooperative.       Labs  Hospital Outpatient Visit on 09/21/2022   Component Date Value Ref Range Status    BSA 09/21/2022 1.58  m2 Final    TDI SEPTAL 09/21/2022 0.03  m/s Final    LV LATERAL E/E' RATIO 09/21/2022 12.80  m/s Final    LV SEPTAL E/E' RATIO 09/21/2022 21.33  m/s Final    LA WIDTH 09/21/2022 4.34  cm Final    TDI LATERAL 09/21/2022 0.05  m/s Final    LVIDd 09/21/2022 4.26  3.5 - 6.0 cm Final    IVS 09/21/2022 0.72  0.6 - 1.1 cm Final    Posterior Wall 09/21/2022 0.66  0.6 - 1.1 cm Final    LVIDs 09/21/2022 3.33  2.1 - 4.0 cm Final    FS 09/21/2022 22  28 - 44 % Final    LA volume 09/21/2022 61.31  cm3 Final    Sinus 09/21/2022 3.02  cm Final    STJ 09/21/2022 2.46  cm Final    Ascending aorta 09/21/2022 2.69  cm Final    LV mass 09/21/2022 85.55  g Final    LA size 09/21/2022 3.61  cm Final    RVDD 09/21/2022 3.29  cm Final    TAPSE 09/21/2022 1.81  cm Final     "RV S' 09/21/2022 8.90  cm/s Final    Left Ventricle Relative Wall Thick* 09/21/2022 0.31  cm Final    AV mean gradient 09/21/2022 2  mmHg Final    AV valve area 09/21/2022 2.52  cm2 Final    AV Velocity Ratio 09/21/2022 0.66   Final    AV index (prosthetic) 09/21/2022 0.69   Final    MV valve area p 1/2 method 09/21/2022 4.78  cm2 Final    E/A ratio 09/21/2022 0.64   Final    Mean e' 09/21/2022 0.04  m/s Final    E wave deceleration time 09/21/2022 158.76  msec Final    IVRT 09/21/2022 119.89  msec Final    MV "A" wave duration 09/21/2022 11.13  msec Final    Pulm vein S/D ratio 09/21/2022 1.68   Final    LVOT diameter 09/21/2022 2.15  cm Final    LVOT area 09/21/2022 3.6  cm2 Final    LVOT peak anthony 09/21/2022 0.67  m/s Final    LVOT peak VTI 09/21/2022 13.70  cm Final    Ao peak anthony 09/21/2022 1.01  m/s Final    Ao VTI 09/21/2022 19.73  cm Final    LVOT stroke volume 09/21/2022 49.71  cm3 Final    AV peak gradient 09/21/2022 4  mmHg Final    E/E' ratio 09/21/2022 16.00  m/s Final    MV Peak E Anthony 09/21/2022 0.64  m/s Final    MV stenosis pressure 1/2 time 09/21/2022 46.04  ms Final    MV Peak A Anthony 09/21/2022 1.00  m/s Final    PV Peak S Anthony 09/21/2022 0.42  m/s Final    PV Peak D Anthony 09/21/2022 0.25  m/s Final    LV Systolic Volume 09/21/2022 45.15  mL Final    LV Systolic Volume Index 09/21/2022 28.6  mL/m2 Final    LV Diastolic Volume 09/21/2022 81.32  mL Final    LV Diastolic Volume Index 09/21/2022 51.47  mL/m2 Final    LA Volume Index 09/21/2022 38.8  mL/m2 Final    LV Mass Index 09/21/2022 54  g/m2 Final    RA Major Axis 09/21/2022 4.74  cm Final    Left Atrium Minor Axis 09/21/2022 4.68  cm Final    Left Atrium Major Axis 09/21/2022 4.53  cm Final    LA Volume Index (Mod) 09/21/2022 42.6  mL/m2 Final    LA volume (mod) 09/21/2022 67.25  cm3 Final    RA Width 09/21/2022 3.76  cm Final    Right Atrial Pressure (from IVC) 09/21/2022 3  mmHg Final    EF 09/21/2022 40  % Final   Lab Visit on 09/12/2022 "   Component Date Value Ref Range Status    Serotonin 09/12/2022 3180 (H)  <=230 ng/mL Final    Comment: -------------------ADDITIONAL INFORMATION-------------------  This test was developed and its performance characteristics   determined by [facility] in a manner consistent with CLIA   requirements. This test has not been cleared or approved by   the U.S. Food and Drug Administration.    Test Performed by:  [facility]  [address] [place] [address]  : Yogesh Roque M.D. Ph.D.; CLIA# [id number]      WBC 09/12/2022 8.27  3.90 - 12.70 K/uL Final    RBC 09/12/2022 4.60  4.00 - 5.40 M/uL Final    Hemoglobin 09/12/2022 13.6  12.0 - 16.0 g/dL Final    Hematocrit 09/12/2022 42.1  37.0 - 48.5 % Final    MCV 09/12/2022 92  82 - 98 fL Final    MCH 09/12/2022 29.6  27.0 - 31.0 pg Final    MCHC 09/12/2022 32.3  32.0 - 36.0 g/dL Final    RDW 09/12/2022 13.0  11.5 - 14.5 % Final    Platelets 09/12/2022 280  150 - 450 K/uL Final    MPV 09/12/2022 10.8  9.2 - 12.9 fL Final    Immature Granulocytes 09/12/2022 0.4  0.0 - 0.5 % Final    Gran # (ANC) 09/12/2022 5.1  1.8 - 7.7 K/uL Final    Immature Grans (Abs) 09/12/2022 0.03  0.00 - 0.04 K/uL Final    Comment: Mild elevation in immature granulocytes is non specific and   can be seen in a variety of conditions including stress response,   acute inflammation, trauma and pregnancy. Correlation with other   laboratory and clinical findings is essential.      Lymph # 09/12/2022 2.4  1.0 - 4.8 K/uL Final    Mono # 09/12/2022 0.7  0.3 - 1.0 K/uL Final    Eos # 09/12/2022 0.1  0.0 - 0.5 K/uL Final    Baso # 09/12/2022 0.02  0.00 - 0.20 K/uL Final    nRBC 09/12/2022 0  0 /100 WBC Final    Gran % 09/12/2022 61.2  38.0 - 73.0 % Final    Lymph % 09/12/2022 29.0  18.0 - 48.0 % Final    Mono % 09/12/2022 8.5  4.0 - 15.0 % Final    Eosinophil % 09/12/2022 0.7  0.0 - 8.0 % Final    Basophil % 09/12/2022 0.2  0.0 - 1.9 % Final     Differential Method 09/12/2022 Automated   Final    Sodium 09/12/2022 141  136 - 145 mmol/L Final    Potassium 09/12/2022 4.4  3.5 - 5.1 mmol/L Final    Chloride 09/12/2022 104  95 - 110 mmol/L Final    CO2 09/12/2022 28  23 - 29 mmol/L Final    Glucose 09/12/2022 159 (H)  70 - 110 mg/dL Final    BUN 09/12/2022 11  8 - 23 mg/dL Final    Creatinine 09/12/2022 0.7  0.5 - 1.4 mg/dL Final    Calcium 09/12/2022 10.1  8.7 - 10.5 mg/dL Final    Total Protein 09/12/2022 7.3  6.0 - 8.4 g/dL Final    Albumin 09/12/2022 3.9  3.5 - 5.2 g/dL Final    Total Bilirubin 09/12/2022 0.6  0.1 - 1.0 mg/dL Final    Comment: For infants and newborns, interpretation of results should be based  on gestational age, weight and in agreement with clinical  observations.    Premature Infant recommended reference ranges:  Up to 24 hours.............<8.0 mg/dL  Up to 48 hours............<12.0 mg/dL  3-5 days..................<15.0 mg/dL  6-29 days.................<15.0 mg/dL      Alkaline Phosphatase 09/12/2022 64  55 - 135 U/L Final    AST 09/12/2022 25  10 - 40 U/L Final    ALT 09/12/2022 23  10 - 44 U/L Final    Anion Gap 09/12/2022 9  8 - 16 mmol/L Final    eGFR 09/12/2022 >60  >60 mL/min/1.73 m^2 Final    Chromogranin A 09/12/2022 2634 (H)  <93 ng/mL Final    Comment: Impaired renal or hepatic function or treatment with proton  pump inhibitors may result in artifactual elevations of   Chromogranin A.    -------------------ADDITIONAL INFORMATION-------------------  This test was developed and its performance characteristics  determined by [facility] in a manner consistent with CLIA  requirements. This test has not been cleared or approved by   the U.S. Food and Drug Administration.    In some immunoassays, the presence of unusually high   concentrations of analyte may result in a high-dose   &quot;hook&quot; effect. This may result in a lower or even normal   measured analyte concentration. If the reported result   is inconsistent with the  clinical presentation, the   laboratory should be alerted for troubleshooting. For   diagnostic purposes, these immunoassay results should   always be assessed in conjunction with the patients medical   history, clinical examination and other findings.    The testing method is a homogeneous time-resolved   immunof                           luorescent assay manufactured by [employer]   and performed on the [employer] Compact Plus.    Values obtained with different assay methods or kits may be   different and cannot be used interchangeably.    Test results cannot be interpreted as absolute evidence for   the presence or absence of malignant disease.    Test Performed by:  [facility] - [address] [address]  : Yogesh Roque M.D. Ph.D.; CLIA# [id number]     Lab Visit on 08/09/2022   Component Date Value Ref Range Status    Serotonin 08/09/2022 4250 (H)  <=230 ng/mL Final    Comment: -------------------ADDITIONAL INFORMATION-------------------  This test was developed and its performance characteristics   determined by [facility] in a manner consistent with CLIA   requirements. This test has not been cleared or approved by   the U.S. Food and Drug Administration.    Test Performed by:  [facility] - [address] [address]  : Yogesh Roque M.D. Ph.D.; CLIA# [id number]      WBC 08/09/2022 7.37  3.90 - 12.70 K/uL Final    RBC 08/09/2022 4.72  4.00 - 5.40 M/uL Final    Hemoglobin 08/09/2022 13.7  12.0 - 16.0 g/dL Final    Hematocrit 08/09/2022 43.3  37.0 - 48.5 % Final    MCV 08/09/2022 92  82 - 98 fL Final    MCH 08/09/2022 29.0  27.0 - 31.0 pg Final    MCHC 08/09/2022 31.6 (L)  32.0 - 36.0 g/dL Final    RDW 08/09/2022 12.6  11.5 - 14.5 % Final    Platelets 08/09/2022 253  150 - 450 K/uL Final    MPV 08/09/2022 10.2  9.2 - 12.9 fL Final    Immature Granulocytes  08/09/2022 0.4  0.0 - 0.5 % Final    Gran # (ANC) 08/09/2022 4.6  1.8 - 7.7 K/uL Final    Immature Grans (Abs) 08/09/2022 0.03  0.00 - 0.04 K/uL Final    Comment: Mild elevation in immature granulocytes is non specific and   can be seen in a variety of conditions including stress response,   acute inflammation, trauma and pregnancy. Correlation with other   laboratory and clinical findings is essential.      Lymph # 08/09/2022 2.1  1.0 - 4.8 K/uL Final    Mono # 08/09/2022 0.6  0.3 - 1.0 K/uL Final    Eos # 08/09/2022 0.0  0.0 - 0.5 K/uL Final    Baso # 08/09/2022 0.02  0.00 - 0.20 K/uL Final    nRBC 08/09/2022 0  0 /100 WBC Final    Gran % 08/09/2022 62.3  38.0 - 73.0 % Final    Lymph % 08/09/2022 28.8  18.0 - 48.0 % Final    Mono % 08/09/2022 7.7  4.0 - 15.0 % Final    Eosinophil % 08/09/2022 0.5  0.0 - 8.0 % Final    Basophil % 08/09/2022 0.3  0.0 - 1.9 % Final    Differential Method 08/09/2022 Automated   Final    Sodium 08/09/2022 143  136 - 145 mmol/L Final    Potassium 08/09/2022 4.8  3.5 - 5.1 mmol/L Final    Chloride 08/09/2022 103  95 - 110 mmol/L Final    CO2 08/09/2022 30 (H)  23 - 29 mmol/L Final    Glucose 08/09/2022 164 (H)  70 - 110 mg/dL Final    BUN 08/09/2022 10  8 - 23 mg/dL Final    Creatinine 08/09/2022 0.8  0.5 - 1.4 mg/dL Final    Calcium 08/09/2022 10.0  8.7 - 10.5 mg/dL Final    Total Protein 08/09/2022 7.0  6.0 - 8.4 g/dL Final    Albumin 08/09/2022 3.8  3.5 - 5.2 g/dL Final    Total Bilirubin 08/09/2022 1.8 (H)  0.1 - 1.0 mg/dL Final    Comment: For infants and newborns, interpretation of results should be based  on gestational age, weight and in agreement with clinical  observations.    Premature Infant recommended reference ranges:  Up to 24 hours.............<8.0 mg/dL  Up to 48 hours............<12.0 mg/dL  3-5 days..................<15.0 mg/dL  6-29 days.................<15.0 mg/dL      Alkaline Phosphatase 08/09/2022 56  55 - 135 U/L Final    AST 08/09/2022 20  10 - 40 U/L Final     ALT 08/09/2022 14  10 - 44 U/L Final    Anion Gap 08/09/2022 10  8 - 16 mmol/L Final    eGFR 08/09/2022 >60  >60 mL/min/1.73 m^2 Final    Chromogranin A 08/09/2022 2090 (H)  <93 ng/mL Final    Comment: Impaired renal or hepatic function or treatment with proton  pump inhibitors may result in artifactual elevations of   Chromogranin A.    -------------------ADDITIONAL INFORMATION-------------------  This test was developed and its performance characteristics  determined by [facility] in a manner consistent with CLIA  requirements. This test has not been cleared or approved by   the U.S. Food and Drug Administration.    In some immunoassays, the presence of unusually high   concentrations of analyte may result in a high-dose   &quot;hook&quot; effect. This may result in a lower or even normal   measured analyte concentration. If the reported result   is inconsistent with the clinical presentation, the   laboratory should be alerted for troubleshooting. For   diagnostic purposes, these immunoassay results should   always be assessed in conjunction with the patients medical   history, clinical examination and other findings.    The testing method is a homogeneous time-resolved   immunof                           luorescent assay manufactured by [employer]   and performed on the [employer] [place] Compact Plus.    Values obtained with different assay methods or kits may be   different and cannot be used interchangeably.    Test results cannot be interpreted as absolute evidence for   the presence or absence of malignant disease.    Test Performed by:  [facility]  [address] [place] [address]  : Yogesh Roque M.D. Ph.D.; CLIA# [id number]     Lab Visit on 07/12/2022   Component Date Value Ref Range Status    Serotonin 07/12/2022 4480 (H)  <=230 ng/mL Final    Comment: -------------------ADDITIONAL INFORMATION-------------------  This test was  developed and its performance characteristics   determined by [facility] in a manner consistent with CLIA   requirements. This test has not been cleared or approved by   the U.S. Food and Drug Administration.    Test Performed by:  [facility]  [address] [place] [address]  : Yogesh Roque M.D. Ph.D.; CLIA# [id number]      WBC 07/12/2022 6.92  3.90 - 12.70 K/uL Final    RBC 07/12/2022 4.76  4.00 - 5.40 M/uL Final    Hemoglobin 07/12/2022 13.8  12.0 - 16.0 g/dL Final    Hematocrit 07/12/2022 43.3  37.0 - 48.5 % Final    MCV 07/12/2022 91  82 - 98 fL Final    MCH 07/12/2022 29.0  27.0 - 31.0 pg Final    MCHC 07/12/2022 31.9 (L)  32.0 - 36.0 g/dL Final    RDW 07/12/2022 12.7  11.5 - 14.5 % Final    Platelets 07/12/2022 282  150 - 450 K/uL Final    MPV 07/12/2022 10.7  9.2 - 12.9 fL Final    Immature Granulocytes 07/12/2022 0.4  0.0 - 0.5 % Final    Gran # (ANC) 07/12/2022 4.5  1.8 - 7.7 K/uL Final    Immature Grans (Abs) 07/12/2022 0.03  0.00 - 0.04 K/uL Final    Comment: Mild elevation in immature granulocytes is non specific and   can be seen in a variety of conditions including stress response,   acute inflammation, trauma and pregnancy. Correlation with other   laboratory and clinical findings is essential.      Lymph # 07/12/2022 1.8  1.0 - 4.8 K/uL Final    Mono # 07/12/2022 0.5  0.3 - 1.0 K/uL Final    Eos # 07/12/2022 0.0  0.0 - 0.5 K/uL Final    Baso # 07/12/2022 0.02  0.00 - 0.20 K/uL Final    nRBC 07/12/2022 0  0 /100 WBC Final    Gran % 07/12/2022 64.8  38.0 - 73.0 % Final    Lymph % 07/12/2022 26.2  18.0 - 48.0 % Final    Mono % 07/12/2022 7.7  4.0 - 15.0 % Final    Eosinophil % 07/12/2022 0.6  0.0 - 8.0 % Final    Basophil % 07/12/2022 0.3  0.0 - 1.9 % Final    Differential Method 07/12/2022 Automated   Final    Sodium 07/12/2022 144  136 - 145 mmol/L Final    Potassium 07/12/2022 4.4  3.5 - 5.1 mmol/L Final    Chloride 07/12/2022 103   95 - 110 mmol/L Final    CO2 07/12/2022 30 (H)  23 - 29 mmol/L Final    Glucose 07/12/2022 158 (H)  70 - 110 mg/dL Final    BUN 07/12/2022 11  8 - 23 mg/dL Final    Creatinine 07/12/2022 0.8  0.5 - 1.4 mg/dL Final    Calcium 07/12/2022 9.9  8.7 - 10.5 mg/dL Final    Total Protein 07/12/2022 7.0  6.0 - 8.4 g/dL Final    Albumin 07/12/2022 3.8  3.5 - 5.2 g/dL Final    Total Bilirubin 07/12/2022 1.7 (H)  0.1 - 1.0 mg/dL Final    Comment: For infants and newborns, interpretation of results should be based  on gestational age, weight and in agreement with clinical  observations.    Premature Infant recommended reference ranges:  Up to 24 hours.............<8.0 mg/dL  Up to 48 hours............<12.0 mg/dL  3-5 days..................<15.0 mg/dL  6-29 days.................<15.0 mg/dL      Alkaline Phosphatase 07/12/2022 56  55 - 135 U/L Final    AST 07/12/2022 21  10 - 40 U/L Final    ALT 07/12/2022 15  10 - 44 U/L Final    Anion Gap 07/12/2022 11  8 - 16 mmol/L Final    eGFR if African American 07/12/2022 >60  >60 mL/min/1.73 m^2 Final    eGFR if non African American 07/12/2022 >60  >60 mL/min/1.73 m^2 Final    Comment: Calculation used to obtain the estimated glomerular filtration  rate (eGFR) is the CKD-EPI equation.       Chromogranin A 07/12/2022 2092 (H)  <93 ng/mL Final    Comment: Impaired renal or hepatic function or treatment with proton  pump inhibitors may result in artifactual elevations of   Chromogranin A.    -------------------ADDITIONAL INFORMATION-------------------  This test was developed and its performance characteristics  determined by [facility] in a manner consistent with CLIA  requirements. This test has not been cleared or approved by   the U.S. Food and Drug Administration.    In some immunoassays, the presence of unusually high   concentrations of analyte may result in a high-dose   &quot;hook&quot; effect. This may result in a lower or even normal   measured analyte concentration. If the  reported result   is inconsistent with the clinical presentation, the   laboratory should be alerted for troubleshooting. For   diagnostic purposes, these immunoassay results should   always be assessed in conjunction with the patients medical   history, clinical examination and other findings.    The testing method is a homogeneous time-resolved   immunof                           luorescent assay manufactured by [employer]   and performed on the [employer] Kryptor Compact Plus.    Values obtained with different assay methods or kits may be   different and cannot be used interchangeably.    Test results cannot be interpreted as absolute evidence for   the presence or absence of malignant disease.    Test Performed by:  [facility] - [address] [address]  : Yogesh Roque M.D. Ph.D.; CLIA# [id number]      Hemoglobin A1C 07/12/2022 5.9 (H)  4.0 - 5.6 % Final    Comment: ADA Screening Guidelines:  5.7-6.4%  Consistent with prediabetes  >or=6.5%  Consistent with diabetes    High levels of fetal hemoglobin interfere with the HbA1C  assay. Heterozygous hemoglobin variants (HbS, HgC, etc)do  not significantly interfere with this assay.   However, presence of multiple variants may affect accuracy.      Estimated Avg Glucose 07/12/2022 123  68 - 131 mg/dL Final    Vitamin B-12 07/12/2022 >2000 (H)  210 - 950 pg/mL Final   Lab Visit on 06/10/2022   Component Date Value Ref Range Status    Serotonin 06/10/2022 5270 (H)  <=230 ng/mL Final    Comment: -------------------ADDITIONAL INFORMATION-------------------  This test was developed and its performance characteristics   determined by [facility] in a manner consistent with CLIA   requirements. This test has not been cleared or approved by   the U.S. Food and Drug Administration.    Test Performed by:  [facility] - [address]  [address]  : Yogesh Roque M.D. Ph.D.; CLIA# [id number]      WBC 06/10/2022 7.16  3.90 - 12.70 K/uL Final    RBC 06/10/2022 4.62  4.00 - 5.40 M/uL Final    Hemoglobin 06/10/2022 13.3  12.0 - 16.0 g/dL Final    Hematocrit 06/10/2022 42.0  37.0 - 48.5 % Final    MCV 06/10/2022 91  82 - 98 fL Final    MCH 06/10/2022 28.8  27.0 - 31.0 pg Final    MCHC 06/10/2022 31.7 (L)  32.0 - 36.0 g/dL Final    RDW 06/10/2022 13.1  11.5 - 14.5 % Final    Platelets 06/10/2022 318  150 - 450 K/uL Final    MPV 06/10/2022 11.1  9.2 - 12.9 fL Final    Immature Granulocytes 06/10/2022 0.3  0.0 - 0.5 % Final    Gran # (ANC) 06/10/2022 4.7  1.8 - 7.7 K/uL Final    Immature Grans (Abs) 06/10/2022 0.02  0.00 - 0.04 K/uL Final    Comment: Mild elevation in immature granulocytes is non specific and   can be seen in a variety of conditions including stress response,   acute inflammation, trauma and pregnancy. Correlation with other   laboratory and clinical findings is essential.      Lymph # 06/10/2022 1.9  1.0 - 4.8 K/uL Final    Mono # 06/10/2022 0.6  0.3 - 1.0 K/uL Final    Eos # 06/10/2022 0.1  0.0 - 0.5 K/uL Final    Baso # 06/10/2022 0.01  0.00 - 0.20 K/uL Final    nRBC 06/10/2022 0  0 /100 WBC Final    Gran % 06/10/2022 65.3  38.0 - 73.0 % Final    Lymph % 06/10/2022 25.8  18.0 - 48.0 % Final    Mono % 06/10/2022 7.8  4.0 - 15.0 % Final    Eosinophil % 06/10/2022 0.7  0.0 - 8.0 % Final    Basophil % 06/10/2022 0.1  0.0 - 1.9 % Final    Differential Method 06/10/2022 Automated   Final    Sodium 06/10/2022 141  136 - 145 mmol/L Final    Potassium 06/10/2022 4.0  3.5 - 5.1 mmol/L Final    Chloride 06/10/2022 102  95 - 110 mmol/L Final    CO2 06/10/2022 26  23 - 29 mmol/L Final    Glucose 06/10/2022 159 (H)  70 - 110 mg/dL Final    BUN 06/10/2022 9  8 - 23 mg/dL Final    Creatinine 06/10/2022 0.7  0.5 - 1.4 mg/dL Final    Calcium 06/10/2022 10.1  8.7 - 10.5 mg/dL Final    Total Protein 06/10/2022 7.4  6.0 - 8.4 g/dL Final     Albumin 06/10/2022 3.7  3.5 - 5.2 g/dL Final    Total Bilirubin 06/10/2022 1.7 (H)  0.1 - 1.0 mg/dL Final    Comment: For infants and newborns, interpretation of results should be based  on gestational age, weight and in agreement with clinical  observations.    Premature Infant recommended reference ranges:  Up to 24 hours.............<8.0 mg/dL  Up to 48 hours............<12.0 mg/dL  3-5 days..................<15.0 mg/dL  6-29 days.................<15.0 mg/dL      Alkaline Phosphatase 06/10/2022 60  55 - 135 U/L Final    AST 06/10/2022 21  10 - 40 U/L Final    ALT 06/10/2022 13  10 - 44 U/L Final    Anion Gap 06/10/2022 13  8 - 16 mmol/L Final    eGFR if African American 06/10/2022 >60  >60 mL/min/1.73 m^2 Final    eGFR if non African American 06/10/2022 >60  >60 mL/min/1.73 m^2 Final    Comment: Calculation used to obtain the estimated glomerular filtration  rate (eGFR) is the CKD-EPI equation.       Chromogranin A 06/10/2022 1614 (H)  <93 ng/mL Final    Comment: Impaired renal or hepatic function or treatment with proton  pump inhibitors may result in artifactual elevations of   Chromogranin A.    -------------------ADDITIONAL INFORMATION-------------------  This test was developed and its performance characteristics  determined by [facility] in a manner consistent with CLIA  requirements. This test has not been cleared or approved by   the U.S. Food and Drug Administration.    In some immunoassays, the presence of unusually high   concentrations of analyte may result in a high-dose   &quot;hook&quot; effect. This may result in a lower or even normal   measured analyte concentration. If the reported result   is inconsistent with the clinical presentation, the   laboratory should be alerted for troubleshooting. For   diagnostic purposes, these immunoassay results should   always be assessed in conjunction with the patients medical   history, clinical examination and other findings.    The testing method is a  homogeneous time-resolved   immunof                           luorescent assay manufactured by [employer]   and performed on the [employer] Kryptor Compact Plus.    Values obtained with different assay methods or kits may be   different and cannot be used interchangeably.    Test results cannot be interpreted as absolute evidence for   the presence or absence of malignant disease.    Test Performed by:  [facility]  [address] [place] [address]  : Yogesh Roque M.D. Ph.D.; CLIA# [id number]     Lab Visit on 05/16/2022   Component Date Value Ref Range Status    WBC 05/16/2022 7.18  3.90 - 12.70 K/uL Final    RBC 05/16/2022 4.45  4.00 - 5.40 M/uL Final    Hemoglobin 05/16/2022 12.9  12.0 - 16.0 g/dL Final    Hematocrit 05/16/2022 40.6  37.0 - 48.5 % Final    MCV 05/16/2022 91  82 - 98 fL Final    MCH 05/16/2022 29.0  27.0 - 31.0 pg Final    MCHC 05/16/2022 31.8 (L)  32.0 - 36.0 g/dL Final    RDW 05/16/2022 13.2  11.5 - 14.5 % Final    Platelets 05/16/2022 326  150 - 450 K/uL Final    MPV 05/16/2022 10.4  9.2 - 12.9 fL Final    Immature Granulocytes 05/16/2022 0.3  0.0 - 0.5 % Final    Gran # (ANC) 05/16/2022 4.7  1.8 - 7.7 K/uL Final    Immature Grans (Abs) 05/16/2022 0.02  0.00 - 0.04 K/uL Final    Comment: Mild elevation in immature granulocytes is non specific and   can be seen in a variety of conditions including stress response,   acute inflammation, trauma and pregnancy. Correlation with other   laboratory and clinical findings is essential.      Lymph # 05/16/2022 1.8  1.0 - 4.8 K/uL Final    Mono # 05/16/2022 0.6  0.3 - 1.0 K/uL Final    Eos # 05/16/2022 0.1  0.0 - 0.5 K/uL Final    Baso # 05/16/2022 0.02  0.00 - 0.20 K/uL Final    nRBC 05/16/2022 0  0 /100 WBC Final    Gran % 05/16/2022 64.7  38.0 - 73.0 % Final    Lymph % 05/16/2022 25.5  18.0 - 48.0 % Final    Mono % 05/16/2022 8.4  4.0 - 15.0 % Final    Eosinophil % 05/16/2022 0.8   0.0 - 8.0 % Final    Basophil % 05/16/2022 0.3  0.0 - 1.9 % Final    Differential Method 05/16/2022 Automated   Final    Sodium 05/16/2022 144  136 - 145 mmol/L Final    Potassium 05/16/2022 4.5  3.5 - 5.1 mmol/L Final    Chloride 05/16/2022 104  95 - 110 mmol/L Final    CO2 05/16/2022 28  23 - 29 mmol/L Final    Glucose 05/16/2022 163 (H)  70 - 110 mg/dL Final    BUN 05/16/2022 14  8 - 23 mg/dL Final    Creatinine 05/16/2022 0.8  0.5 - 1.4 mg/dL Final    Calcium 05/16/2022 10.3  8.7 - 10.5 mg/dL Final    Total Protein 05/16/2022 7.8  6.0 - 8.4 g/dL Final    Albumin 05/16/2022 3.9  3.5 - 5.2 g/dL Final    Total Bilirubin 05/16/2022 1.7 (H)  0.1 - 1.0 mg/dL Final    Comment: For infants and newborns, interpretation of results should be based  on gestational age, weight and in agreement with clinical  observations.    Premature Infant recommended reference ranges:  Up to 24 hours.............<8.0 mg/dL  Up to 48 hours............<12.0 mg/dL  3-5 days..................<15.0 mg/dL  6-29 days.................<15.0 mg/dL      Alkaline Phosphatase 05/16/2022 64  55 - 135 U/L Final    AST 05/16/2022 20  10 - 40 U/L Final    ALT 05/16/2022 10  10 - 44 U/L Final    Anion Gap 05/16/2022 12  8 - 16 mmol/L Final    eGFR if African American 05/16/2022 >60  >60 mL/min/1.73 m^2 Final    eGFR if non African American 05/16/2022 >60  >60 mL/min/1.73 m^2 Final    Comment: Calculation used to obtain the estimated glomerular filtration  rate (eGFR) is the CKD-EPI equation.       Chromogranin A 05/16/2022 1342 (H)  <93 ng/mL Final    Comment: Impaired renal or hepatic function or treatment with proton  pump inhibitors may result in artifactual elevations of   Chromogranin A.    -------------------ADDITIONAL INFORMATION-------------------  This test was developed and its performance characteristics  determined by [facility] in a manner consistent with CLIA  requirements. This test has not been cleared or approved by   the U.S. Food and  Drug Administration.    In some immunoassays, the presence of unusually high   concentrations of analyte may result in a high-dose   &quot;hook&quot; effect. This may result in a lower or even normal   measured analyte concentration. If the reported result   is inconsistent with the clinical presentation, the   laboratory should be alerted for troubleshooting. For   diagnostic purposes, these immunoassay results should   always be assessed in conjunction with the patients medical   history, clinical examination and other findings.    The testing method is a homogeneous time-resolved   immunof                           luorescent assay manufactured by [employer]   and performed on the [employer] Compact Plus.    Values obtained with different assay methods or kits may be   different and cannot be used interchangeably.    Test results cannot be interpreted as absolute evidence for   the presence or absence of malignant disease.    Test Performed by:  [facility]  [address] [id number]  : Yogesh Roque M.D. Ph.D.; CLIA# [id number]     Lab Visit on 04/22/2022   Component Date Value Ref Range Status    Sodium 04/22/2022 145  136 - 145 mmol/L Final    Potassium 04/22/2022 3.7  3.5 - 5.1 mmol/L Final    Chloride 04/22/2022 102  95 - 110 mmol/L Final    CO2 04/22/2022 32 (H)  23 - 29 mmol/L Final    Glucose 04/22/2022 156 (H)  70 - 110 mg/dL Final    BUN 04/22/2022 8  8 - 23 mg/dL Final    Creatinine 04/22/2022 0.7  0.5 - 1.4 mg/dL Final    Calcium 04/22/2022 9.8  8.7 - 10.5 mg/dL Final    Total Protein 04/22/2022 7.2  6.0 - 8.4 g/dL Final    Albumin 04/22/2022 3.7  3.5 - 5.2 g/dL Final    Total Bilirubin 04/22/2022 1.4 (H)  0.1 - 1.0 mg/dL Final    Comment: For infants and newborns, interpretation of results should be based  on gestational age, weight and in agreement with clinical  observations.    Premature Infant recommended  reference ranges:  Up to 24 hours.............<8.0 mg/dL  Up to 48 hours............<12.0 mg/dL  3-5 days..................<15.0 mg/dL  6-29 days.................<15.0 mg/dL      Alkaline Phosphatase 04/22/2022 55  55 - 135 U/L Final    AST 04/22/2022 16  10 - 40 U/L Final    ALT 04/22/2022 8 (L)  10 - 44 U/L Final    Anion Gap 04/22/2022 11  8 - 16 mmol/L Final    eGFR if African American 04/22/2022 >60.0  >60 mL/min/1.73 m^2 Final    eGFR if non African American 04/22/2022 >60.0  >60 mL/min/1.73 m^2 Final    Comment: Calculation used to obtain the estimated glomerular filtration  rate (eGFR) is the CKD-EPI equation.       Bilirubin, Direct 04/22/2022 0.5 (H)  0.1 - 0.3 mg/dL Final    Prothrombin Time 04/22/2022 10.7  9.0 - 12.5 sec Final    INR 04/22/2022 1.0  0.8 - 1.2 Final    Comment: Coumadin Therapy:  2.0 - 3.0 for INR for all indicators except mechanical heart valves  and antiphospholipid syndromes which should use 2.5 - 3.5.      WBC 04/22/2022 7.43  3.90 - 12.70 K/uL Final    RBC 04/22/2022 4.59  4.00 - 5.40 M/uL Final    Hemoglobin 04/22/2022 12.8  12.0 - 16.0 g/dL Final    Hematocrit 04/22/2022 42.2  37.0 - 48.5 % Final    MCV 04/22/2022 92  82 - 98 fL Final    MCH 04/22/2022 27.9  27.0 - 31.0 pg Final    MCHC 04/22/2022 30.3 (L)  32.0 - 36.0 g/dL Final    RDW 04/22/2022 12.8  11.5 - 14.5 % Final    Platelets 04/22/2022 192  150 - 450 K/uL Final    MPV 04/22/2022 10.7  9.2 - 12.9 fL Final    Immature Granulocytes 04/22/2022 0.3  0.0 - 0.5 % Final    Gran # (ANC) 04/22/2022 5.1  1.8 - 7.7 K/uL Final    Immature Grans (Abs) 04/22/2022 0.02  0.00 - 0.04 K/uL Final    Comment: Mild elevation in immature granulocytes is non specific and   can be seen in a variety of conditions including stress response,   acute inflammation, trauma and pregnancy. Correlation with other   laboratory and clinical findings is essential.      Lymph # 04/22/2022 1.8  1.0 - 4.8 K/uL Final    Mono # 04/22/2022 0.5  0.3 - 1.0 K/uL  Final    Eos # 04/22/2022 0.0  0.0 - 0.5 K/uL Final    Baso # 04/22/2022 0.02  0.00 - 0.20 K/uL Final    nRBC 04/22/2022 0  0 /100 WBC Final    Gran % 04/22/2022 68.2  38.0 - 73.0 % Final    Lymph % 04/22/2022 24.1  18.0 - 48.0 % Final    Mono % 04/22/2022 6.7  4.0 - 15.0 % Final    Eosinophil % 04/22/2022 0.4  0.0 - 8.0 % Final    Basophil % 04/22/2022 0.3  0.0 - 1.9 % Final    Differential Method 04/22/2022 Automated   Final   Lab Visit on 04/14/2022   Component Date Value Ref Range Status    Sodium 04/14/2022 139  136 - 145 mmol/L Final    Potassium 04/14/2022 4.2  3.5 - 5.1 mmol/L Final    Chloride 04/14/2022 101  95 - 110 mmol/L Final    CO2 04/14/2022 31 (H)  23 - 29 mmol/L Final    Glucose 04/14/2022 173 (H)  70 - 110 mg/dL Final    BUN 04/14/2022 7 (L)  8 - 23 mg/dL Final    Creatinine 04/14/2022 0.7  0.5 - 1.4 mg/dL Final    Calcium 04/14/2022 9.9  8.7 - 10.5 mg/dL Final    Total Protein 04/14/2022 7.1  6.0 - 8.4 g/dL Final    Albumin 04/14/2022 3.7  3.5 - 5.2 g/dL Final    Total Bilirubin 04/14/2022 1.9 (H)  0.1 - 1.0 mg/dL Final    Comment: For infants and newborns, interpretation of results should be based  on gestational age, weight and in agreement with clinical  observations.    Premature Infant recommended reference ranges:  Up to 24 hours.............<8.0 mg/dL  Up to 48 hours............<12.0 mg/dL  3-5 days..................<15.0 mg/dL  6-29 days.................<15.0 mg/dL      Alkaline Phosphatase 04/14/2022 57  55 - 135 U/L Final    AST 04/14/2022 17  10 - 40 U/L Final    ALT 04/14/2022 9 (L)  10 - 44 U/L Final    Anion Gap 04/14/2022 7 (L)  8 - 16 mmol/L Final    eGFR if African American 04/14/2022 >60.0  >60 mL/min/1.73 m^2 Final    eGFR if non African American 04/14/2022 >60.0  >60 mL/min/1.73 m^2 Final    Comment: Calculation used to obtain the estimated glomerular filtration  rate (eGFR) is the CKD-EPI equation.       Chromogranin A 04/14/2022 1540 (H)  <93 ng/mL Final    Comment: Impaired  renal or hepatic function or treatment with proton  pump inhibitors may result in artifactual elevations of   Chromogranin A.    -------------------ADDITIONAL INFORMATION-------------------  This test was developed and its performance characteristics  determined by [facility] in a manner consistent with CLIA  requirements. This test has not been cleared or approved by   the U.S. Food and Drug Administration.    In some immunoassays, the presence of unusually high   concentrations of analyte may result in a high-dose   &quot;hook&quot; effect. This may result in a lower or even normal   measured analyte concentration. If the reported result   is inconsistent with the clinical presentation, the   laboratory should be alerted for troubleshooting. For   diagnostic purposes, these immunoassay results should   always be assessed in conjunction with the patients medical   history, clinical examination and other findings.    The testing method is a homogeneous time-resolved   immunof                           luorescent assay manufactured by [employer]   and performed on the [employer] Kryptor Compact Plus.    Values obtained with different assay methods or kits may be   different and cannot be used interchangeably.    Test results cannot be interpreted as absolute evidence for   the presence or absence of malignant disease.    Test Performed by:  [facility]  [address] [place] [address]  : Yogesh Roque M.D. Ph.D.; CLIA# [id number]      WBC 04/14/2022 7.51  3.90 - 12.70 K/uL Final    RBC 04/14/2022 4.48  4.00 - 5.40 M/uL Final    Hemoglobin 04/14/2022 12.8  12.0 - 16.0 g/dL Final    Hematocrit 04/14/2022 40.9  37.0 - 48.5 % Final    MCV 04/14/2022 91  82 - 98 fL Final    MCH 04/14/2022 28.6  27.0 - 31.0 pg Final    MCHC 04/14/2022 31.3 (L)  32.0 - 36.0 g/dL Final    RDW 04/14/2022 12.9  11.5 - 14.5 % Final    Platelets 04/14/2022 285  150 - 450  K/uL Final    MPV 04/14/2022 10.7  9.2 - 12.9 fL Final    Gran # (ANC) 04/14/2022 4.9  1.8 - 7.7 K/uL Final    Comment: The ANC is based on a white cell differential from an   automated cell counter. It has not been microscopically   reviewed for the presence of abnormal cells. Clinical   correlation is required.      Immature Grans (Abs) 04/14/2022 0.01  0.00 - 0.04 K/uL Final    Comment: Mild elevation in immature granulocytes is non specific and   can be seen in a variety of conditions including stress response,   acute inflammation, trauma and pregnancy. Correlation with other   laboratory and clinical findings is essential.         Imaging  Echo    Result Date: 9/21/2022  · The left ventricle is normal in size with mildly decreased systolic function. The estimated ejection fraction is 40%. · Normal right ventricular size with normal right ventricular systolic function. · Grade I left ventricular diastolic dysfunction. · Mild left atrial enlargement. · Mild aortic regurgitation. · Low/Normal central venous pressure (3 mmHg). · There was frequent atrial and ventricular ectopy during the examination.        Assessment  1. Chronic systolic heart failure  Compensated      Plan and Discussion    Will start carvedilol.  Stop amlodipine so that there is room with her blood pressure.  Close follow-up.    The ASCVD Risk score (Echola DK, et al., 2019) failed to calculate for the following reasons:    The 2019 ASCVD risk score is only valid for ages 40 to 79     Follow Up  Follow up in about 4 weeks (around 10/25/2022).      Ralph Bergeron MD

## 2022-10-10 ENCOUNTER — LAB VISIT (OUTPATIENT)
Dept: LAB | Facility: OTHER | Age: 82
End: 2022-10-10
Attending: INTERNAL MEDICINE
Payer: MEDICARE

## 2022-10-10 DIAGNOSIS — C7B.8 SECONDARY NEUROENDOCRINE TUMOR OF BONE: ICD-10-CM

## 2022-10-10 DIAGNOSIS — C7B.8 METASTATIC MALIGNANT NEUROENDOCRINE TUMOR TO LIVER: ICD-10-CM

## 2022-10-10 DIAGNOSIS — C7A.012 MALIGNANT CARCINOID TUMOR OF ILEUM: ICD-10-CM

## 2022-10-10 LAB
ALBUMIN SERPL BCP-MCNC: 3.6 G/DL (ref 3.5–5.2)
ALP SERPL-CCNC: 62 U/L (ref 55–135)
ALT SERPL W/O P-5'-P-CCNC: 19 U/L (ref 10–44)
ANION GAP SERPL CALC-SCNC: 8 MMOL/L (ref 8–16)
AST SERPL-CCNC: 20 U/L (ref 10–40)
BASOPHILS # BLD AUTO: 0.02 K/UL (ref 0–0.2)
BASOPHILS NFR BLD: 0.3 % (ref 0–1.9)
BILIRUB SERPL-MCNC: 0.6 MG/DL (ref 0.1–1)
BUN SERPL-MCNC: 10 MG/DL (ref 8–23)
CALCIUM SERPL-MCNC: 9.2 MG/DL (ref 8.7–10.5)
CHLORIDE SERPL-SCNC: 105 MMOL/L (ref 95–110)
CO2 SERPL-SCNC: 27 MMOL/L (ref 23–29)
CREAT SERPL-MCNC: 0.7 MG/DL (ref 0.5–1.4)
DIFFERENTIAL METHOD: NORMAL
EOSINOPHIL # BLD AUTO: 0.1 K/UL (ref 0–0.5)
EOSINOPHIL NFR BLD: 0.8 % (ref 0–8)
ERYTHROCYTE [DISTWIDTH] IN BLOOD BY AUTOMATED COUNT: 12.6 % (ref 11.5–14.5)
EST. GFR  (NO RACE VARIABLE): >60 ML/MIN/1.73 M^2
GLUCOSE SERPL-MCNC: 164 MG/DL (ref 70–110)
HCT VFR BLD AUTO: 40.8 % (ref 37–48.5)
HGB BLD-MCNC: 13.2 G/DL (ref 12–16)
IMM GRANULOCYTES # BLD AUTO: 0.02 K/UL (ref 0–0.04)
IMM GRANULOCYTES NFR BLD AUTO: 0.3 % (ref 0–0.5)
LYMPHOCYTES # BLD AUTO: 1.8 K/UL (ref 1–4.8)
LYMPHOCYTES NFR BLD: 25.4 % (ref 18–48)
MCH RBC QN AUTO: 29.5 PG (ref 27–31)
MCHC RBC AUTO-ENTMCNC: 32.4 G/DL (ref 32–36)
MCV RBC AUTO: 91 FL (ref 82–98)
MONOCYTES # BLD AUTO: 0.6 K/UL (ref 0.3–1)
MONOCYTES NFR BLD: 8.4 % (ref 4–15)
NEUTROPHILS # BLD AUTO: 4.6 K/UL (ref 1.8–7.7)
NEUTROPHILS NFR BLD: 64.8 % (ref 38–73)
NRBC BLD-RTO: 0 /100 WBC
PLATELET # BLD AUTO: 267 K/UL (ref 150–450)
PMV BLD AUTO: 10.9 FL (ref 9.2–12.9)
POTASSIUM SERPL-SCNC: 3.7 MMOL/L (ref 3.5–5.1)
PROT SERPL-MCNC: 7 G/DL (ref 6–8.4)
RBC # BLD AUTO: 4.48 M/UL (ref 4–5.4)
SODIUM SERPL-SCNC: 140 MMOL/L (ref 136–145)
WBC # BLD AUTO: 7.16 K/UL (ref 3.9–12.7)

## 2022-10-10 PROCEDURE — 84260 ASSAY OF SEROTONIN: CPT | Performed by: INTERNAL MEDICINE

## 2022-10-10 PROCEDURE — 86316 IMMUNOASSAY TUMOR OTHER: CPT | Performed by: INTERNAL MEDICINE

## 2022-10-10 PROCEDURE — 80053 COMPREHEN METABOLIC PANEL: CPT | Performed by: INTERNAL MEDICINE

## 2022-10-10 PROCEDURE — 85025 COMPLETE CBC W/AUTO DIFF WBC: CPT | Performed by: INTERNAL MEDICINE

## 2022-10-10 PROCEDURE — 36415 COLL VENOUS BLD VENIPUNCTURE: CPT | Performed by: INTERNAL MEDICINE

## 2022-10-11 ENCOUNTER — OFFICE VISIT (OUTPATIENT)
Dept: HEMATOLOGY/ONCOLOGY | Facility: CLINIC | Age: 82
End: 2022-10-11
Payer: MEDICARE

## 2022-10-11 ENCOUNTER — INFUSION (OUTPATIENT)
Dept: INFUSION THERAPY | Facility: HOSPITAL | Age: 82
End: 2022-10-11
Attending: INTERNAL MEDICINE
Payer: MEDICARE

## 2022-10-11 VITALS
WEIGHT: 124.56 LBS | TEMPERATURE: 98 F | HEIGHT: 63 IN | DIASTOLIC BLOOD PRESSURE: 82 MMHG | BODY MASS INDEX: 22.07 KG/M2 | OXYGEN SATURATION: 97 % | SYSTOLIC BLOOD PRESSURE: 144 MMHG | HEART RATE: 77 BPM | RESPIRATION RATE: 18 BRPM

## 2022-10-11 DIAGNOSIS — R80.9 CONTROLLED TYPE 2 DIABETES MELLITUS WITH MICROALBUMINURIA, WITHOUT LONG-TERM CURRENT USE OF INSULIN: ICD-10-CM

## 2022-10-11 DIAGNOSIS — C7B.8 METASTATIC MALIGNANT NEUROENDOCRINE TUMOR TO LIVER: Primary | ICD-10-CM

## 2022-10-11 DIAGNOSIS — I49.3 PVC (PREMATURE VENTRICULAR CONTRACTION): ICD-10-CM

## 2022-10-11 DIAGNOSIS — E34.0 CARCINOID SYNDROME: ICD-10-CM

## 2022-10-11 DIAGNOSIS — E11.29 CONTROLLED TYPE 2 DIABETES MELLITUS WITH MICROALBUMINURIA, WITHOUT LONG-TERM CURRENT USE OF INSULIN: ICD-10-CM

## 2022-10-11 DIAGNOSIS — C7A.8 NEUROENDOCRINE CARCINOMA METASTATIC TO BONE: ICD-10-CM

## 2022-10-11 DIAGNOSIS — C7B.8 METASTATIC MALIGNANT NEUROENDOCRINE TUMOR TO LIVER: ICD-10-CM

## 2022-10-11 DIAGNOSIS — C7A.012 MALIGNANT CARCINOID TUMOR OF ILEUM: Primary | ICD-10-CM

## 2022-10-11 DIAGNOSIS — R19.7 DIARRHEA, UNSPECIFIED TYPE: ICD-10-CM

## 2022-10-11 DIAGNOSIS — C79.51 SPINE METASTASIS: ICD-10-CM

## 2022-10-11 DIAGNOSIS — C7B.8 SECONDARY NEUROENDOCRINE TUMOR OF BONE: ICD-10-CM

## 2022-10-11 DIAGNOSIS — I10 ESSENTIAL HYPERTENSION: ICD-10-CM

## 2022-10-11 DIAGNOSIS — C7B.8 NEUROENDOCRINE CARCINOMA METASTATIC TO BONE: ICD-10-CM

## 2022-10-11 PROCEDURE — 99215 PR OFFICE/OUTPT VISIT, EST, LEVL V, 40-54 MIN: ICD-10-PCS | Mod: S$GLB,,, | Performed by: PHYSICIAN ASSISTANT

## 2022-10-11 PROCEDURE — 1126F AMNT PAIN NOTED NONE PRSNT: CPT | Mod: CPTII,S$GLB,, | Performed by: PHYSICIAN ASSISTANT

## 2022-10-11 PROCEDURE — 99999 PR PBB SHADOW E&M-EST. PATIENT-LVL IV: CPT | Mod: PBBFAC,,, | Performed by: PHYSICIAN ASSISTANT

## 2022-10-11 PROCEDURE — 3288F FALL RISK ASSESSMENT DOCD: CPT | Mod: CPTII,S$GLB,, | Performed by: PHYSICIAN ASSISTANT

## 2022-10-11 PROCEDURE — 63600175 PHARM REV CODE 636 W HCPCS: Mod: JG | Performed by: INTERNAL MEDICINE

## 2022-10-11 PROCEDURE — 3079F PR MOST RECENT DIASTOLIC BLOOD PRESSURE 80-89 MM HG: ICD-10-PCS | Mod: CPTII,S$GLB,, | Performed by: PHYSICIAN ASSISTANT

## 2022-10-11 PROCEDURE — 1159F MED LIST DOCD IN RCRD: CPT | Mod: CPTII,S$GLB,, | Performed by: PHYSICIAN ASSISTANT

## 2022-10-11 PROCEDURE — 1160F PR REVIEW ALL MEDS BY PRESCRIBER/CLIN PHARMACIST DOCUMENTED: ICD-10-PCS | Mod: CPTII,S$GLB,, | Performed by: PHYSICIAN ASSISTANT

## 2022-10-11 PROCEDURE — 1101F PR PT FALLS ASSESS DOC 0-1 FALLS W/OUT INJ PAST YR: ICD-10-PCS | Mod: CPTII,S$GLB,, | Performed by: PHYSICIAN ASSISTANT

## 2022-10-11 PROCEDURE — 3079F DIAST BP 80-89 MM HG: CPT | Mod: CPTII,S$GLB,, | Performed by: PHYSICIAN ASSISTANT

## 2022-10-11 PROCEDURE — 3077F PR MOST RECENT SYSTOLIC BLOOD PRESSURE >= 140 MM HG: ICD-10-PCS | Mod: CPTII,S$GLB,, | Performed by: PHYSICIAN ASSISTANT

## 2022-10-11 PROCEDURE — 1159F PR MEDICATION LIST DOCUMENTED IN MEDICAL RECORD: ICD-10-PCS | Mod: CPTII,S$GLB,, | Performed by: PHYSICIAN ASSISTANT

## 2022-10-11 PROCEDURE — 99215 OFFICE O/P EST HI 40 MIN: CPT | Mod: S$GLB,,, | Performed by: PHYSICIAN ASSISTANT

## 2022-10-11 PROCEDURE — 99499 UNLISTED E&M SERVICE: CPT | Mod: HCNC,S$GLB,, | Performed by: PHYSICIAN ASSISTANT

## 2022-10-11 PROCEDURE — 1126F PR PAIN SEVERITY QUANTIFIED, NO PAIN PRESENT: ICD-10-PCS | Mod: CPTII,S$GLB,, | Performed by: PHYSICIAN ASSISTANT

## 2022-10-11 PROCEDURE — 3288F PR FALLS RISK ASSESSMENT DOCUMENTED: ICD-10-PCS | Mod: CPTII,S$GLB,, | Performed by: PHYSICIAN ASSISTANT

## 2022-10-11 PROCEDURE — 96372 THER/PROPH/DIAG INJ SC/IM: CPT

## 2022-10-11 PROCEDURE — 99499 RISK ADDL DX/OHS AUDIT: ICD-10-PCS | Mod: HCNC,S$GLB,, | Performed by: PHYSICIAN ASSISTANT

## 2022-10-11 PROCEDURE — 99999 PR PBB SHADOW E&M-EST. PATIENT-LVL IV: ICD-10-PCS | Mod: PBBFAC,,, | Performed by: PHYSICIAN ASSISTANT

## 2022-10-11 PROCEDURE — 3077F SYST BP >= 140 MM HG: CPT | Mod: CPTII,S$GLB,, | Performed by: PHYSICIAN ASSISTANT

## 2022-10-11 PROCEDURE — 1101F PT FALLS ASSESS-DOCD LE1/YR: CPT | Mod: CPTII,S$GLB,, | Performed by: PHYSICIAN ASSISTANT

## 2022-10-11 PROCEDURE — 1160F RVW MEDS BY RX/DR IN RCRD: CPT | Mod: CPTII,S$GLB,, | Performed by: PHYSICIAN ASSISTANT

## 2022-10-11 RX ORDER — LANREOTIDE ACETATE 120 MG/.5ML
120 INJECTION SUBCUTANEOUS
Status: CANCELLED | OUTPATIENT
Start: 2022-10-11

## 2022-10-11 RX ORDER — LANREOTIDE ACETATE 120 MG/.5ML
120 INJECTION SUBCUTANEOUS
Status: DISCONTINUED | OUTPATIENT
Start: 2022-10-11 | End: 2022-10-11 | Stop reason: HOSPADM

## 2022-10-11 RX ADMIN — LANREOTIDE ACETATE 120 MG: 120 INJECTION SUBCUTANEOUS at 02:10

## 2022-10-11 NOTE — NURSING
Patient ambulated onto unit independently. Lanreotide administered to left buttock without issue. Discharged home.

## 2022-10-11 NOTE — PROGRESS NOTES
"Subjective:       Patient ID: Shahram Hills is an 82 y.o. female.     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 Ms. Hills is an 77 yo woman who initially saw me on 6/7/18 for further evaluation of neuroendocrine tumor. She initially presented with diarrhea, abdominal discomfort, weight loss. She was evaluated at [facility] and underwent workup below:  US abdomen on 3/14/18 showed numerous bilobar mixed echogenicity and mildly vascular hepatic lesions. Cholelithiasis without e/o acute cholecystitis.   MRI abdomen on 3/30/2018 showed innumerable hepatic metastases. L3 vertebral body metastasis with soft tissue component along the right margin of the L3 and upper L4 vertebral bodies, filling the right L3/4 neural foramen, and extending into the anterior aspect of the spinal canal on the right at the L3 and upper T4 levels. Associated with mild spinal canal narrowing.  CT chest on 4/6/2018 showed one lucent nodule measuring 7 mm in the T7 vertebral body, most likely representing metastatic disease.    EGD on 5/4/18 showed normal duodenum. Schatzki's ring in the lower third of the esophagus. Grade 1 esophagitis in the GE junction c/w mild distal esophagitis. Congestion and erythema in the antrum, pre-pyloric region and stomach body c/w gastritis. Biopsy of the stomach antrum showed mild chronic gastritis, neg for H. pylori.   Labs on 5/9/2018: WBC 6.9, H/H 11.6/34.3, MCV 87.5, plt count 279. On 3/9/18: Vit B12 level 173, folic acid 6.6  She was started on oral vit B12 and underwent a liver biopsy on 5/18/18. Pathology showed "metastatic well differentiated neuroendocrine tumor. Ki67 3%"     She saw me on 6/7/18 and complained of weight loss of 26 lbs over the past 3-4 months, also has diarrhea. No flushing, wheezing, palpitations. Has been having pain in " "right flank radiating down the right leg. No tingling/numbness, weakness. She can hold her bladder but when she urinates her diarrhea would come out as well. Started on dex 4 mg q6h the evening of 6/7/18.      2. MRI spine on 6/8/18 showed "Marrow replacing metastatic lesion of the L3 vertebral body with associated extra osseous expansion and complete effacement of the right L3-L4 neural foramina and additional effacement of the right lateral recess.  There is additional lateral extension and abutment of the right psoas muscle.  Superimposed degenerative change at this level results in mild spinal canal stenosis." I sent the patient to ED on 6/9/18 where she was evaluated by neurosurgery. Neurosurgery did not feel she was a candidate for surgical intervention.      3. Seen by Dr. Adames on 6/11/18. palliative radiation to the area of the L3 metastasis 6/18/18-6/29/18   4. Gallium study on 6/13/18: Distal ileal primary neoplasm consistent with a carcinoid.  There is an adjacent metastatic lymph node, diffuse liver metastases, and multiple bone metastases. Index primary neoplasm SUV max 33.13. Adjacent lymph node SUV max 23. L3 bone metastasis SUV max 36.86. Left lobe SUV max 46.13   5. Lanreotide every 4 weeks started on 6/22/18  6. TACE to the right hepatic lobe on 2/14/19. TACE to the left hepatic lobe on 3/21/19.   7. TACE to the right hepatic lobe on 2/11/22. S/p conventional chemoembolization performed of the segment 2, 3, 4 branch of the left hepatic artery and segment 8 branch of the left hepatic artery on 4/22/22.     History of Present Illness:   Ms. Hills returns for follow up. Occasional feels malaise throughout the day but generally improves throughout the day. Denies pain, chest pain, shortness of breath, palpitations. Diarrhea improved on newer medication. She is eating well and has a good appetite. She continues to tolerate the Lanreotide well without complication. Continues to try and remain active " throughout the day. Overall, doing well.     ECO. Presents with her caregiver today.      ROS:   See HPI. Otherwise negative.      Physical Examination:   Vital signs reviewed.   Gen: well hydrated, well developed, in no acute distress.  HEENT: normocephalic, anicteric, EOMI, oropharynx clear  Neck: supple, no JVD  Lungs: CTAB, no wheezes or rales  Heart: regular rate, frequent PVCs, regular pulse, no M/R/G  Abdomen: soft, no tenderness, non-distended, no mass, or hernia.   Ext:: no edema  Neuro: alert and oriented x 4, no focal neuro deficit  Skin: no rash, open wounds or ulcers  Psych: pleasant and appropriate mood and affect     Objective:      Laboratory Data:  Labs reviewed. CBC, CMP stable. Bilirubin 0.6. Chromogranin 1658->1540->1342->1614->2092->2090-> repeat pending from today  Serotonin pending from today    Imaging Data:  CT chest 22:  Interval decrease in size of the solid nodule at the left lung base when compared to the prior examination with the nodule now measuring 0.3 cm compared to 0.5 cm on the prior examination.     Stable additional subcentimeter pulmonary nodules noted bilaterally.     Interval chemoembolization with development of dense material within some of the low-density liver lesions.  The liver findings are incompletely imaged/characterized on this noncontrast examination and correlation with patient's MRI of the abdomen and pelvis is recommended.     Bilateral adrenal thickening.     Stable sclerotic focus within the manubrium and lucent lesion within the T6 vertebral body.       MRI abdomen/pelvis 22:  Decrease in size and enhancement in numerous liver masses throughout bilateral hepatic lobes following chemoembolization.     Grossly stable appearance of the ileal mass and osseous metastasis        Assessment and Plan:      1. Malignant carcinoid tumor of ileum    2. 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 Spine metastasis    4. Metastatic malignant  neuroendocrine tumor to liver    5. Secondary neuroendocrine tumor of bone    6. Carcinoid syndrome    7. Diarrhea, unspecified type    8. Essential hypertension    9. Controlled type 2 diabetes mellitus with microalbuminuria, without long-term current use of insulin    10. PVC (premature ventricular contraction)          1-7  - Ms Hills is an 83 yo woman with well differentiated low-grade neuroendocrine tumor of the ileum with mets to liver and bones, diagnosed on 5/18/2018. Started lanreotide every 4 weeks on 6/22/2018. S/p TACE to the right hepatic lobe on 2/14/19. TACE to the left hepatic lobe on 3/21/19.   - CT/MRI 1/12/22 showed mild progression in the liver. S/p TACE on 2/11/22 and 4/22/22   - octreotide was not really helping with her diarrhea. We asked her to try xermelo 250 mg tid which improves symptoms  - Doing well today and tolerating the medication well. Lab work has been reviewed. Chromogranin A and serotonin levels are pending  - c/w lanreotide every 4 weeks  - repeat Gallium PET scan + MRI in 4 weeks with lab work and clinic visit with Dr. Granados     8.  - /82, notes BP at home 135/76. Doing well today.  - c/w current medication    9.  - BS stable. Continue to f/u with PCP as well.   - c/w current meds    10. Asymptomatic PVCs. Cardio-onc referral & echo for arrhythmia and HTN in malignancy.     RTC in one month     Pte and family members displayed understanding of the above encounter and treatment plan. All thoughtful questions were answered to their satisfaction. Pte was advised to notify the care team or proceed to the ER if signs and symptoms worsen.       JARRETT Crum, PA-C  Physician Assistant Certified  Dept of Hematology/Oncology  PA-C to Dr. Granados, Dr. Woods and Dr. Luke     Route Chart for Scheduling    Med Onc Chart Routing      Follow up with physician 4 weeks. RTC to see Dr. Granados with cbc, cmp, chromogranin A, serotonin, scans and next lanreotide injection   Follow up with  JUNAID 2 months. See JUNAID in 8 weeks with lab work and next lanreotide injection   Infusion scheduling note    Injection scheduling note    Labs CBC and CMP   Lab interval: every 4 weeks  chromogranin A, serotonin   Imaging    Pharmacy appointment    Other referrals          Therapy Plan Information  5. Medications  lanreotide injection 120 mg  120 mg, Subcutaneous, Every visit

## 2022-10-12 ENCOUNTER — SPECIALTY PHARMACY (OUTPATIENT)
Dept: PHARMACY | Facility: CLINIC | Age: 82
End: 2022-10-12
Payer: MEDICARE

## 2022-10-12 LAB — CGA SERPL-MCNC: 2807 NG/ML

## 2022-10-12 NOTE — TELEPHONE ENCOUNTER
Specialty Pharmacy - Refill Coordination    Specialty Medication Orders Linked to Encounter      Flowsheet Row Most Recent Value   Medication #1 telotristat ethyl 250 mg Tab (Order#[id number], Rx#[id number])            Refill Questions - Documented Responses      Flowsheet Row Most Recent Value   Patient Availability and HIPAA Verification    Does patient want to proceed with activity? Yes   Relationship to patient of person spoken to? Care Giver   Refill Screening Questions    Changes to allergies? No   Changes to medications? Yes   New conditions since last clinic visit? No   Unplanned office visit, urgent care, ED, or hospital admission in the last 4 weeks? No   How does patient/caregiver feel medication is working? Very good   Financial problems or insurance changes? No   How many doses of your specialty medications were missed in the last 4 weeks? 0   Would patient like to speak to a pharmacist? No   When does the patient need to receive the medication? 10/18/22   Refill Delivery Questions    How will the patient receive the medication? [facility]   When does the patient need to receive the medication? 10/18/22   Shipping Address Home   Expected Copay ($) 0   Is the patient able to afford the medication copay? Yes   Payment Method zero copay   Days supply of Refill 28   Supplies needed? No supplies needed   Refill activity completed? Yes   Refill activity plan Refill scheduled   Shipment/Pickup Date: 10/13/22            Current Outpatient Medications   Medication Sig    blood sugar diagnostic Strp To check BG 2 times daily, to use with insurance preferred meter    blood-glucose meter kit To check BG 2 times daily, to use with insurance preferred meter    carvediloL (COREG) 6.25 MG tablet Take 1 tablet (6.25 mg total) by mouth 2 (two) times daily with meals.    ciclopirox (PENLAC) 8 % Soln Apply topically nightly. (Patient not taking: No sig reported)    cyanocobalamin (VITAMIN B-12) 1000 MCG tablet Take 1 tablet  "(1,000 mcg total) by mouth once daily.    ezetimibe (ZETIA) 10 mg tablet Take 1 tablet (10 mg total) by mouth once daily.    ferrous sulfate 325 (65 FE) MG EC tablet Take 325 mg by mouth 3 (three) times daily with meals.    lancets Misc To check BG 2 times daily, to use with insurance preferred meter    latanoprost 0.005 % ophthalmic solution Place 1 drop into both eyes nightly.    losartan (COZAAR) 50 MG tablet Take 1 tablet (50 mg total) by mouth once daily. (Patient taking differently: Take 50 mg by mouth every morning.)    metFORMIN (GLUCOPHAGE-XR) 500 MG ER 24hr tablet Take 2 tablets (1,000 mg total) by mouth daily with breakfast.    needle, disp, 22 G 22 gauge x 1" Ndle 1 application by Misc.(Non-Drug; Combo Route) route 3 (three) times daily as needed (diarrhea).    octreotide acetate (SANDOSTATIN) 100 mcg/mL (1 mL) Syrg Inject 1 mL (0.1 mg total) into the skin 3 (three) times daily as needed (diarrhea). (Patient not taking: No sig reported)    ondansetron (ZOFRAN-ODT) 4 MG TbDL Take 1 tablet (4 mg total) by mouth every 6 (six) hours as needed (nausea, vomting). (Patient not taking: No sig reported)    syringe, disposable, 1 mL Syrg 1 application by Misc.(Non-Drug; Combo Route) route 3 (three) times daily as needed (diarrhea).    telotristat ethyl 250 mg Tab Take 1 tablet (250 mg) by mouth 3 (three) times daily.    TRUEPLUS LANCETS 33 gauge Misc USE TO CHECK BLOOD SUGAR TWICE DAILY    vitamin D (VITAMIN D3) 1000 units Tab Take 400 Units by mouth once daily.    XIIDRA 5 % Dpet INSTILL ONE DROP INTO OU BID   Last reviewed on 10/11/2022  2:13 PM by Marian Rainey PA-C    Review of patient's allergies indicates:   Allergen Reactions    Epinephrine      carcinoid    Last reviewed on  10/11/2022 2:26 PM by Paulina Laird    Interventions added this encounter   Closed: [facility] Patient Intervention - Drug therapy appropriateness     Tasks added this encounter   11/8/2022 - Refill Call (Auto Added)   Tasks due within " next 3 months   No tasks due.     Leslie Colmenares, PharmD  [facility]  [address]  [facility] [address]  Phone: [phone]  Fax: [phone]

## 2022-10-14 LAB — SEROTONIN: 3410 NG/ML

## 2022-10-21 ENCOUNTER — TELEPHONE (OUTPATIENT)
Dept: CARDIOLOGY | Facility: CLINIC | Age: 82
End: 2022-10-21
Payer: MEDICARE

## 2022-10-25 ENCOUNTER — OFFICE VISIT (OUTPATIENT)
Dept: CARDIOLOGY | Facility: CLINIC | Age: 82
End: 2022-10-25
Payer: MEDICARE

## 2022-10-25 VITALS
DIASTOLIC BLOOD PRESSURE: 72 MMHG | HEART RATE: 86 BPM | WEIGHT: 122.38 LBS | SYSTOLIC BLOOD PRESSURE: 128 MMHG | OXYGEN SATURATION: 96 % | BODY MASS INDEX: 21.68 KG/M2

## 2022-10-25 DIAGNOSIS — I10 PRIMARY HYPERTENSION: ICD-10-CM

## 2022-10-25 DIAGNOSIS — I50.22 CHRONIC SYSTOLIC HEART FAILURE: Primary | ICD-10-CM

## 2022-10-25 PROCEDURE — 1101F PT FALLS ASSESS-DOCD LE1/YR: CPT | Mod: CPTII,S$GLB,, | Performed by: INTERNAL MEDICINE

## 2022-10-25 PROCEDURE — 3074F SYST BP LT 130 MM HG: CPT | Mod: CPTII,S$GLB,, | Performed by: INTERNAL MEDICINE

## 2022-10-25 PROCEDURE — 99214 OFFICE O/P EST MOD 30 MIN: CPT | Mod: S$GLB,,, | Performed by: INTERNAL MEDICINE

## 2022-10-25 PROCEDURE — 1160F PR REVIEW ALL MEDS BY PRESCRIBER/CLIN PHARMACIST DOCUMENTED: ICD-10-PCS | Mod: CPTII,S$GLB,, | Performed by: INTERNAL MEDICINE

## 2022-10-25 PROCEDURE — 3288F PR FALLS RISK ASSESSMENT DOCUMENTED: ICD-10-PCS | Mod: CPTII,S$GLB,, | Performed by: INTERNAL MEDICINE

## 2022-10-25 PROCEDURE — 99999 PR PBB SHADOW E&M-EST. PATIENT-LVL III: ICD-10-PCS | Mod: PBBFAC,,, | Performed by: INTERNAL MEDICINE

## 2022-10-25 PROCEDURE — 1126F AMNT PAIN NOTED NONE PRSNT: CPT | Mod: CPTII,S$GLB,, | Performed by: INTERNAL MEDICINE

## 2022-10-25 PROCEDURE — 3288F FALL RISK ASSESSMENT DOCD: CPT | Mod: CPTII,S$GLB,, | Performed by: INTERNAL MEDICINE

## 2022-10-25 PROCEDURE — 99999 PR PBB SHADOW E&M-EST. PATIENT-LVL III: CPT | Mod: PBBFAC,,, | Performed by: INTERNAL MEDICINE

## 2022-10-25 PROCEDURE — 3078F DIAST BP <80 MM HG: CPT | Mod: CPTII,S$GLB,, | Performed by: INTERNAL MEDICINE

## 2022-10-25 PROCEDURE — 99214 PR OFFICE/OUTPT VISIT, EST, LEVL IV, 30-39 MIN: ICD-10-PCS | Mod: S$GLB,,, | Performed by: INTERNAL MEDICINE

## 2022-10-25 PROCEDURE — 1159F PR MEDICATION LIST DOCUMENTED IN MEDICAL RECORD: ICD-10-PCS | Mod: CPTII,S$GLB,, | Performed by: INTERNAL MEDICINE

## 2022-10-25 PROCEDURE — 1160F RVW MEDS BY RX/DR IN RCRD: CPT | Mod: CPTII,S$GLB,, | Performed by: INTERNAL MEDICINE

## 2022-10-25 PROCEDURE — 3078F PR MOST RECENT DIASTOLIC BLOOD PRESSURE < 80 MM HG: ICD-10-PCS | Mod: CPTII,S$GLB,, | Performed by: INTERNAL MEDICINE

## 2022-10-25 PROCEDURE — 1101F PR PT FALLS ASSESS DOC 0-1 FALLS W/OUT INJ PAST YR: ICD-10-PCS | Mod: CPTII,S$GLB,, | Performed by: INTERNAL MEDICINE

## 2022-10-25 PROCEDURE — 3074F PR MOST RECENT SYSTOLIC BLOOD PRESSURE < 130 MM HG: ICD-10-PCS | Mod: CPTII,S$GLB,, | Performed by: INTERNAL MEDICINE

## 2022-10-25 PROCEDURE — 1126F PR PAIN SEVERITY QUANTIFIED, NO PAIN PRESENT: ICD-10-PCS | Mod: CPTII,S$GLB,, | Performed by: INTERNAL MEDICINE

## 2022-10-25 PROCEDURE — 1159F MED LIST DOCD IN RCRD: CPT | Mod: CPTII,S$GLB,, | Performed by: INTERNAL MEDICINE

## 2022-10-25 RX ORDER — CARVEDILOL 12.5 MG/1
12.5 TABLET ORAL 2 TIMES DAILY WITH MEALS
Qty: 180 TABLET | Refills: 3 | Status: SHIPPED | OUTPATIENT
Start: 2022-10-25 | End: 2023-07-11 | Stop reason: SDUPTHER

## 2022-10-25 NOTE — PROGRESS NOTES
"[facility]    Chief Complaint  Chief Complaint   Patient presents with    Congestive Heart Failure       HPI:    Feels well.  No problems with switch to carvedilol.  Reports that her blood pressure still running high at home.    Medications  Current Outpatient Medications   Medication Sig Dispense Refill    blood sugar diagnostic Strp To check BG 2 times daily, to use with insurance preferred meter 100 each 11    blood-glucose meter kit To check BG 2 times daily, to use with insurance preferred meter 1 each 0    carvediloL (COREG) 12.5 MG tablet Take 1 tablet (12.5 mg total) by mouth 2 (two) times daily with meals. 180 tablet 3    ciclopirox (PENLAC) 8 % Soln Apply topically nightly. (Patient not taking: No sig reported) 6.6 mL 11    cyanocobalamin (VITAMIN B-12) 1000 MCG tablet Take 1 tablet (1,000 mcg total) by mouth once daily.      ezetimibe (ZETIA) 10 mg tablet Take 1 tablet (10 mg total) by mouth once daily. 90 tablet 3    ferrous sulfate 325 (65 FE) MG EC tablet Take 325 mg by mouth 3 (three) times daily with meals.      lancets Misc To check BG 2 times daily, to use with insurance preferred meter 100 each 11    latanoprost 0.005 % ophthalmic solution Place 1 drop into both eyes nightly.      losartan (COZAAR) 50 MG tablet Take 1 tablet (50 mg total) by mouth once daily. (Patient taking differently: Take 50 mg by mouth every morning.) 90 tablet 3    metFORMIN (GLUCOPHAGE-XR) 500 MG ER 24hr tablet Take 2 tablets (1,000 mg total) by mouth daily with breakfast. 180 tablet 3    needle, disp, 22 G 22 gauge x 1" Ndle 1 application by Misc.(Non-Drug; Combo Route) route 3 (three) times daily as needed (diarrhea). 30 each 2    octreotide acetate (SANDOSTATIN) 100 mcg/mL (1 mL) Syrg Inject 1 mL (0.1 mg total) into the skin 3 (three) times daily as needed (diarrhea). (Patient not taking: No sig reported) 30 mL 2    ondansetron (ZOFRAN-ODT) 4 MG TbDL Take 1 tablet (4 mg total) by mouth every 6 (six) hours " as needed (nausea, vomting). (Patient not taking: No sig reported) 20 tablet 0    syringe, disposable, 1 mL Syrg 1 application by Misc.(Non-Drug; Combo Route) route 3 (three) times daily as needed (diarrhea). 30 each 2    telotristat ethyl 250 mg Tab Take 1 tablet (250 mg) by mouth 3 (three) times daily. 90 tablet 3    TRUEPLUS LANCETS 33 gauge Misc USE TO CHECK BLOOD SUGAR TWICE DAILY      vitamin D (VITAMIN D3) 1000 units Tab Take 400 Units by mouth once daily.      XIIDRA 5 % Dpet INSTILL ONE DROP INTO OU BID  3     No current facility-administered medications for this visit.        History  Past Medical History:   Diagnosis Date    Anemia 7/11/2018    B12 deficiency     Depression     per pcp note 7/2017 and given prozac and diazepam     Hyperlipidemia     Weight loss     reviewed prior pcp Dr. Russo notes 2017 - labs reviewed: cmp wnl x tbili 1.5, tsh wnl, A1c 5.0, cbc wnl,D 36, hiv neg, hep c neg, hep b surg ag neg      Past Surgical History:   Procedure Laterality Date    EMBOLIZATION N/A 2/14/2019    Procedure: EMBOLIZATION, BLOOD VESSEL;  Surgeon: [facility] Diagnostic Provider;  Location: [facility] OR [address] [place] [address];  Service: Radiology;  Laterality: N/A;  [facility]    EMBOLIZATION N/A 3/21/2019    Procedure: EMBOLIZATION, BLOOD VESSEL;  Surgeon: 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   Tobacco Use    Smoking status: Never    Smokeless tobacco: Never   Substance and Sexual Activity    Alcohol use: No     Comment: stopped in 2007 - hx of social drinking    Drug use: Never    Sexual activity: Not Currently     Partners: Male   Social  History Narrative    Living in [facility]    Not getting reg exericse     Social Determinants of Health     Financial Resource Strain: Medium Risk    Difficulty of Paying Living Expenses: Somewhat hard   Food Insecurity: No Food Insecurity    Worried About Running Out of Food in the Last Year: Never true    Ran Out of Food in the Last Year: Never true   Transportation Needs: No Transportation Needs    Lack of Transportation (Medical): No    Lack of Transportation (Non-Medical): No   Physical Activity: Inactive    Days of Exercise per Week: 0 days    Minutes of Exercise per Session: 0 min   Stress: No Stress Concern Present    Feeling of Stress : Only a little   Social Connections: Moderately Isolated    Frequency of Communication with Friends and Family: More than three times a week    Frequency of Social Gatherings with Friends and Family: Never    Attends [employer] Services: More than 4 times per year    Active Member of Clubs or Organizations: No    Attends Club or Organization Meetings: Never    Marital Status:    Housing Stability: Unknown    Unable to Pay for Housing in the Last Year: No    Unstable Housing in the Last Year: No     Family History   Problem Relation Age of Onset    Glaucoma Mother     Hypertension Mother     Heart attack Father     Cancer Father     Diabetes Sister     Cancer Brother         lung cancer, + tobacco    Diabetes Brother     Hypertension Brother     Stroke Brother     Emphysema Brother     COPD Brother     Asthma Sister     No Known Problems Sister     Hyperlipidemia Sister     Heart attack Sister         Allergies  Review of patient's allergies indicates:   Allergen Reactions    Epinephrine      carcinoid       Review of Systems   Review of Systems   Constitutional: Negative for malaise/fatigue, weight gain and weight loss.   Eyes:  Negative for visual disturbance.   Cardiovascular:  Negative for chest pain, claudication, cyanosis, dyspnea on exertion, irregular heartbeat, leg  swelling, near-syncope, orthopnea, palpitations, paroxysmal nocturnal dyspnea and syncope.   Respiratory:  Negative for cough, hemoptysis, shortness of breath, sleep disturbances due to breathing and wheezing.    Hematologic/Lymphatic: Negative for bleeding problem. Does not bruise/bleed easily.   Skin:  Negative for poor wound healing.   Musculoskeletal:  Negative for muscle cramps and myalgias.   Gastrointestinal:  Negative for abdominal pain, anorexia, diarrhea, heartburn, hematemesis, hematochezia, melena, nausea and vomiting.   Genitourinary:  Negative for hematuria and nocturia.   Neurological:  Negative for excessive daytime sleepiness, dizziness, focal weakness, light-headedness and weakness.     Physical Exam  Vitals:    10/25/22 0904   BP: 128/72   Pulse: 86     Wt Readings from Last 1 Encounters:   10/25/22 55.5 kg (122 lb 6.4 oz)     Physical Exam  Vitals and nursing note reviewed.   Constitutional:       General: She is not in acute distress.     Appearance: She is not toxic-appearing or diaphoretic.   HENT:      Head: Normocephalic and atraumatic.      Mouth/Throat:      Lips: Pink.      Mouth: Mucous membranes are moist.   Eyes:      General: No scleral icterus.     Conjunctiva/sclera: Conjunctivae normal.   Neck:      Thyroid: No thyromegaly.      Vascular: No carotid bruit, hepatojugular reflux or JVD.      Trachea: Trachea normal.   Cardiovascular:      Rate and Rhythm: Normal rate and regular rhythm. FrequentExtrasystoles are present.     Chest Wall: PMI is not displaced.      Pulses:           Carotid pulses are 2+ on the right side and 2+ on the left side.       Radial pulses are 2+ on the right side and 2+ on the left side.      Heart sounds: S1 normal and S2 normal. No murmur heard.    No friction rub. No S3 or S4 sounds.   Pulmonary:      Effort: Pulmonary effort is normal. No accessory muscle usage or respiratory distress.      Breath sounds: Normal breath sounds and air entry. No decreased  breath sounds, wheezing, rhonchi or rales.   Abdominal:      General: Bowel sounds are normal. There is no distension or abdominal bruit.      Palpations: Abdomen is soft. There is no hepatomegaly, splenomegaly or pulsatile mass.      Tenderness: There is no abdominal tenderness.   Musculoskeletal:         General: No tenderness or deformity.      Right lower leg: No edema.      Left lower leg: No edema.   Skin:     General: Skin is warm and dry.      Capillary Refill: Capillary refill takes less than 2 seconds.      Coloration: Skin is not cyanotic or pale.      Nails: There is no clubbing.   Neurological:      General: No focal deficit present.      Mental Status: She is alert and oriented to person, place, and time.   Psychiatric:         Attention and Perception: Attention normal.         Mood and Affect: Mood normal.         Speech: Speech normal.         Behavior: Behavior normal. Behavior is cooperative.       Labs  Lab Visit on 10/10/2022   Component Date Value Ref Range Status    WBC 10/10/2022 7.16  3.90 - 12.70 K/uL Final    RBC 10/10/2022 4.48  4.00 - 5.40 M/uL Final    Hemoglobin 10/10/2022 13.2  12.0 - 16.0 g/dL Final    Hematocrit 10/10/2022 40.8  37.0 - 48.5 % Final    MCV 10/10/2022 91  82 - 98 fL Final    MCH 10/10/2022 29.5  27.0 - 31.0 pg Final    MCHC 10/10/2022 32.4  32.0 - 36.0 g/dL Final    RDW 10/10/2022 12.6  11.5 - 14.5 % Final    Platelets 10/10/2022 267  150 - 450 K/uL Final    MPV 10/10/2022 10.9  9.2 - 12.9 fL Final    Immature Granulocytes 10/10/2022 0.3  0.0 - 0.5 % Final    Gran # (ANC) 10/10/2022 4.6  1.8 - 7.7 K/uL Final    Immature Grans (Abs) 10/10/2022 0.02  0.00 - 0.04 K/uL Final    Comment: Mild elevation in immature granulocytes is non specific and   can be seen in a variety of conditions including stress response,   acute inflammation, trauma and pregnancy. Correlation with other   laboratory and clinical findings is essential.      Lymph # 10/10/2022 1.8  1.0 - 4.8 K/uL  Final    Mono # 10/10/2022 0.6  0.3 - 1.0 K/uL Final    Eos # 10/10/2022 0.1  0.0 - 0.5 K/uL Final    Baso # 10/10/2022 0.02  0.00 - 0.20 K/uL Final    nRBC 10/10/2022 0  0 /100 WBC Final    Gran % 10/10/2022 64.8  38.0 - 73.0 % Final    Lymph % 10/10/2022 25.4  18.0 - 48.0 % Final    Mono % 10/10/2022 8.4  4.0 - 15.0 % Final    Eosinophil % 10/10/2022 0.8  0.0 - 8.0 % Final    Basophil % 10/10/2022 0.3  0.0 - 1.9 % Final    Differential Method 10/10/2022 Automated   Final    Sodium 10/10/2022 140  136 - 145 mmol/L Final    Potassium 10/10/2022 3.7  3.5 - 5.1 mmol/L Final    Chloride 10/10/2022 105  95 - 110 mmol/L Final    CO2 10/10/2022 27  23 - 29 mmol/L Final    Glucose 10/10/2022 164 (H)  70 - 110 mg/dL Final    BUN 10/10/2022 10  8 - 23 mg/dL Final    Creatinine 10/10/2022 0.7  0.5 - 1.4 mg/dL Final    Calcium 10/10/2022 9.2  8.7 - 10.5 mg/dL Final    Total Protein 10/10/2022 7.0  6.0 - 8.4 g/dL Final    Albumin 10/10/2022 3.6  3.5 - 5.2 g/dL Final    Total Bilirubin 10/10/2022 0.6  0.1 - 1.0 mg/dL Final    Comment: For infants and newborns, interpretation of results should be based  on gestational age, weight and in agreement with clinical  observations.    Premature Infant recommended reference ranges:  Up to 24 hours.............<8.0 mg/dL  Up to 48 hours............<12.0 mg/dL  3-5 days..................<15.0 mg/dL  6-29 days.................<15.0 mg/dL      Alkaline Phosphatase 10/10/2022 62  55 - 135 U/L Final    AST 10/10/2022 20  10 - 40 U/L Final    ALT 10/10/2022 19  10 - 44 U/L Final    Anion Gap 10/10/2022 8  8 - 16 mmol/L Final    eGFR 10/10/2022 >60  >60 mL/min/1.73 m^2 Final    Chromogranin A 10/10/2022 2807 (H)  <93 ng/mL Final    Comment: Impaired renal or hepatic function or treatment with proton  pump inhibitors may result in artifactual elevations of   Chromogranin A.    -------------------ADDITIONAL INFORMATION-------------------  This test was developed and its performance  characteristics  determined by [facility] in a manner consistent with CLIA  requirements. This test has not been cleared or approved by   the U.S. Food and Drug Administration.    In some immunoassays, the presence of unusually high   concentrations of analyte may result in a high-dose   &quot;hook&quot; effect. This may result in a lower or even normal   measured analyte concentration. If the reported result   is inconsistent with the clinical presentation, the   laboratory should be alerted for troubleshooting. For   diagnostic purposes, these immunoassay results should   always be assessed in conjunction with the patients medical   history, clinical examination and other findings.    The testing method is a homogeneous time-resolved   immunof                           luorescent assay manufactured by [employer]   and performed on the [employer] Compact Plus.    Values obtained with different assay methods or kits may be   different and cannot be used interchangeably.    Test results cannot be interpreted as absolute evidence for   the presence or absence of malignant disease.    Test Performed by:  [facility] [place] [address]  : Yogesh Roque M.D. Ph.D.; CLIA# [id number]      Serotonin 10/10/2022 3410 (H)  <=230 ng/mL Final    Comment: -------------------ADDITIONAL INFORMATION-------------------  This test was developed and its performance characteristics   determined by [facility] in a manner consistent with CLIA   requirements. This test has not been cleared or approved by   the U.S. Food and Drug Administration.    Test Performed by:  [facility] - [place] [id number]  : Yogesh Roque M.D. Ph.D.; CLIA# [id number]     Hospital Outpatient Visit on 09/21/2022   Component Date Value Ref Range Status    BSA 09/21/2022 1.58  m2 Final    TDI  "SEPTAL 09/21/2022 0.03  m/s Final    LV LATERAL E/E' RATIO 09/21/2022 12.80  m/s Final    LV SEPTAL E/E' RATIO 09/21/2022 21.33  m/s Final    LA WIDTH 09/21/2022 4.34  cm Final    TDI LATERAL 09/21/2022 0.05  m/s Final    LVIDd 09/21/2022 4.26  3.5 - 6.0 cm Final    IVS 09/21/2022 0.72  0.6 - 1.1 cm Final    Posterior Wall 09/21/2022 0.66  0.6 - 1.1 cm Final    LVIDs 09/21/2022 3.33  2.1 - 4.0 cm Final    FS 09/21/2022 22  28 - 44 % Final    LA volume 09/21/2022 61.31  cm3 Final    Sinus 09/21/2022 3.02  cm Final    STJ 09/21/2022 2.46  cm Final    Ascending aorta 09/21/2022 2.69  cm Final    LV mass 09/21/2022 85.55  g Final    LA size 09/21/2022 3.61  cm Final    RVDD 09/21/2022 3.29  cm Final    TAPSE 09/21/2022 1.81  cm Final    RV S' 09/21/2022 8.90  cm/s Final    Left Ventricle Relative Wall Thick* 09/21/2022 0.31  cm Final    AV mean gradient 09/21/2022 2  mmHg Final    AV valve area 09/21/2022 2.52  cm2 Final    AV Velocity Ratio 09/21/2022 0.66   Final    AV index (prosthetic) 09/21/2022 0.69   Final    MV valve area p 1/2 method 09/21/2022 4.78  cm2 Final    E/A ratio 09/21/2022 0.64   Final    Mean e' 09/21/2022 0.04  m/s Final    E wave deceleration time 09/21/2022 158.76  msec Final    IVRT 09/21/2022 119.89  msec Final    MV "A" wave duration 09/21/2022 11.13  msec Final    Pulm vein S/D ratio 09/21/2022 1.68   Final    LVOT diameter 09/21/2022 2.15  cm Final    LVOT area 09/21/2022 3.6  cm2 Final    LVOT peak anthony 09/21/2022 0.67  m/s Final    LVOT peak VTI 09/21/2022 13.70  cm Final    Ao peak nathony 09/21/2022 1.01  m/s Final    Ao VTI 09/21/2022 19.73  cm Final    LVOT stroke volume 09/21/2022 49.71  cm3 Final    AV peak gradient 09/21/2022 4  mmHg Final    E/E' ratio 09/21/2022 16.00  m/s Final    MV Peak E Anthony 09/21/2022 0.64  m/s Final    MV stenosis pressure 1/2 time 09/21/2022 46.04  ms Final    MV Peak A Anthony 09/21/2022 1.00  m/s Final    PV Peak S Anthony 09/21/2022 0.42  m/s Final    PV Peak D Anthony " 09/21/2022 0.25  m/s Final    LV Systolic Volume 09/21/2022 45.15  mL Final    LV Systolic Volume Index 09/21/2022 28.6  mL/m2 Final    LV Diastolic Volume 09/21/2022 81.32  mL Final    LV Diastolic Volume Index 09/21/2022 51.47  mL/m2 Final    LA Volume Index 09/21/2022 38.8  mL/m2 Final    LV Mass Index 09/21/2022 54  g/m2 Final    RA Major Axis 09/21/2022 4.74  cm Final    Left Atrium Minor Axis 09/21/2022 4.68  cm Final    Left Atrium Major Axis 09/21/2022 4.53  cm Final    LA Volume Index (Mod) 09/21/2022 42.6  mL/m2 Final    LA volume (mod) 09/21/2022 67.25  cm3 Final    RA Width 09/21/2022 3.76  cm Final    Right Atrial Pressure (from IVC) 09/21/2022 3  mmHg Final    EF 09/21/2022 40  % Final   Lab Visit on 09/12/2022   Component Date Value Ref Range Status    Serotonin 09/12/2022 3180 (H)  <=230 ng/mL Final    Comment: -------------------ADDITIONAL INFORMATION-------------------  This test was developed and its performance characteristics   determined by [facility] in a manner consistent with CLIA   requirements. This test has not been cleared or approved by   the U.S. Food and Drug Administration.    Test Performed by:  [facility] - [place] [id number] [place] [address]  : Yogesh Roque M.D. Ph.D.; CLIA# [id number]      WBC 09/12/2022 8.27  3.90 - 12.70 K/uL Final    RBC 09/12/2022 4.60  4.00 - 5.40 M/uL Final    Hemoglobin 09/12/2022 13.6  12.0 - 16.0 g/dL Final    Hematocrit 09/12/2022 42.1  37.0 - 48.5 % Final    MCV 09/12/2022 92  82 - 98 fL Final    MCH 09/12/2022 29.6  27.0 - 31.0 pg Final    MCHC 09/12/2022 32.3  32.0 - 36.0 g/dL Final    RDW 09/12/2022 13.0  11.5 - 14.5 % Final    Platelets 09/12/2022 280  150 - 450 K/uL Final    MPV 09/12/2022 10.8  9.2 - 12.9 fL Final    Immature Granulocytes 09/12/2022 0.4  0.0 - 0.5 % Final    Gran # (ANC) 09/12/2022 5.1  1.8 - 7.7 K/uL Final    Immature Grans (Abs) 09/12/2022 0.03  0.00 - 0.04 K/uL  Final    Comment: Mild elevation in immature granulocytes is non specific and   can be seen in a variety of conditions including stress response,   acute inflammation, trauma and pregnancy. Correlation with other   laboratory and clinical findings is essential.      Lymph # 09/12/2022 2.4  1.0 - 4.8 K/uL Final    Mono # 09/12/2022 0.7  0.3 - 1.0 K/uL Final    Eos # 09/12/2022 0.1  0.0 - 0.5 K/uL Final    Baso # 09/12/2022 0.02  0.00 - 0.20 K/uL Final    nRBC 09/12/2022 0  0 /100 WBC Final    Gran % 09/12/2022 61.2  38.0 - 73.0 % Final    Lymph % 09/12/2022 29.0  18.0 - 48.0 % Final    Mono % 09/12/2022 8.5  4.0 - 15.0 % Final    Eosinophil % 09/12/2022 0.7  0.0 - 8.0 % Final    Basophil % 09/12/2022 0.2  0.0 - 1.9 % Final    Differential Method 09/12/2022 Automated   Final    Sodium 09/12/2022 141  136 - 145 mmol/L Final    Potassium 09/12/2022 4.4  3.5 - 5.1 mmol/L Final    Chloride 09/12/2022 104  95 - 110 mmol/L Final    CO2 09/12/2022 28  23 - 29 mmol/L Final    Glucose 09/12/2022 159 (H)  70 - 110 mg/dL Final    BUN 09/12/2022 11  8 - 23 mg/dL Final    Creatinine 09/12/2022 0.7  0.5 - 1.4 mg/dL Final    Calcium 09/12/2022 10.1  8.7 - 10.5 mg/dL Final    Total Protein 09/12/2022 7.3  6.0 - 8.4 g/dL Final    Albumin 09/12/2022 3.9  3.5 - 5.2 g/dL Final    Total Bilirubin 09/12/2022 0.6  0.1 - 1.0 mg/dL Final    Comment: For infants and newborns, interpretation of results should be based  on gestational age, weight and in agreement with clinical  observations.    Premature Infant recommended reference ranges:  Up to 24 hours.............<8.0 mg/dL  Up to 48 hours............<12.0 mg/dL  3-5 days..................<15.0 mg/dL  6-29 days.................<15.0 mg/dL      Alkaline Phosphatase 09/12/2022 64  55 - 135 U/L Final    AST 09/12/2022 25  10 - 40 U/L Final    ALT 09/12/2022 23  10 - 44 U/L Final    Anion Gap 09/12/2022 9  8 - 16 mmol/L Final    eGFR 09/12/2022 >60  >60 mL/min/1.73 m^2 Final    Chromogranin A  09/12/2022 2634 (H)  <93 ng/mL Final    Comment: Impaired renal or hepatic function or treatment with proton  pump inhibitors may result in artifactual elevations of   Chromogranin A.    -------------------ADDITIONAL INFORMATION-------------------  This test was developed and its performance characteristics  determined by [facility] in a manner consistent with CLIA  requirements. This test has not been cleared or approved by   the U.S. Food and Drug Administration.    In some immunoassays, the presence of unusually high   concentrations of analyte may result in a high-dose   &quot;hook&quot; effect. This may result in a lower or even normal   measured analyte concentration. If the reported result   is inconsistent with the clinical presentation, the   laboratory should be alerted for troubleshooting. For   diagnostic purposes, these immunoassay results should   always be assessed in conjunction with the patients medical   history, clinical examination and other findings.    The testing method is a homogeneous time-resolved   immunof                           luorescent assay manufactured by [employer]   and performed on the [employer] Compact Plus.    Values obtained with different assay methods or kits may be   different and cannot be used interchangeably.    Test results cannot be interpreted as absolute evidence for   the presence or absence of malignant disease.    Test Performed by:  [facility] [place] [address]  : Yogesh Roque M.D. Ph.D.; CLIA# [id number]     Lab Visit on 08/09/2022   Component Date Value Ref Range Status    Serotonin 08/09/2022 4250 (H)  <=230 ng/mL Final    Comment: -------------------ADDITIONAL INFORMATION-------------------  This test was developed and its performance characteristics   determined by [facility] in a manner consistent with CLIA   requirements. This test has not been cleared or  approved by   the U.S. Food and Drug Administration.    Test Performed by:  [facility]  [address] [place] [address]  : Yogesh Roque M.D. Ph.D.; CLIA# [id number]      WBC 08/09/2022 7.37  3.90 - 12.70 K/uL Final    RBC 08/09/2022 4.72  4.00 - 5.40 M/uL Final    Hemoglobin 08/09/2022 13.7  12.0 - 16.0 g/dL Final    Hematocrit 08/09/2022 43.3  37.0 - 48.5 % Final    MCV 08/09/2022 92  82 - 98 fL Final    MCH 08/09/2022 29.0  27.0 - 31.0 pg Final    MCHC 08/09/2022 31.6 (L)  32.0 - 36.0 g/dL Final    RDW 08/09/2022 12.6  11.5 - 14.5 % Final    Platelets 08/09/2022 253  150 - 450 K/uL Final    MPV 08/09/2022 10.2  9.2 - 12.9 fL Final    Immature Granulocytes 08/09/2022 0.4  0.0 - 0.5 % Final    Gran # (ANC) 08/09/2022 4.6  1.8 - 7.7 K/uL Final    Immature Grans (Abs) 08/09/2022 0.03  0.00 - 0.04 K/uL Final    Comment: Mild elevation in immature granulocytes is non specific and   can be seen in a variety of conditions including stress response,   acute inflammation, trauma and pregnancy. Correlation with other   laboratory and clinical findings is essential.      Lymph # 08/09/2022 2.1  1.0 - 4.8 K/uL Final    Mono # 08/09/2022 0.6  0.3 - 1.0 K/uL Final    Eos # 08/09/2022 0.0  0.0 - 0.5 K/uL Final    Baso # 08/09/2022 0.02  0.00 - 0.20 K/uL Final    nRBC 08/09/2022 0  0 /100 WBC Final    Gran % 08/09/2022 62.3  38.0 - 73.0 % Final    Lymph % 08/09/2022 28.8  18.0 - 48.0 % Final    Mono % 08/09/2022 7.7  4.0 - 15.0 % Final    Eosinophil % 08/09/2022 0.5  0.0 - 8.0 % Final    Basophil % 08/09/2022 0.3  0.0 - 1.9 % Final    Differential Method 08/09/2022 Automated   Final    Sodium 08/09/2022 143  136 - 145 mmol/L Final    Potassium 08/09/2022 4.8  3.5 - 5.1 mmol/L Final    Chloride 08/09/2022 103  95 - 110 mmol/L Final    CO2 08/09/2022 30 (H)  23 - 29 mmol/L Final    Glucose 08/09/2022 164 (H)  70 - 110 mg/dL Final    BUN 08/09/2022 10  8 - 23 mg/dL  Final    Creatinine 08/09/2022 0.8  0.5 - 1.4 mg/dL Final    Calcium 08/09/2022 10.0  8.7 - 10.5 mg/dL Final    Total Protein 08/09/2022 7.0  6.0 - 8.4 g/dL Final    Albumin 08/09/2022 3.8  3.5 - 5.2 g/dL Final    Total Bilirubin 08/09/2022 1.8 (H)  0.1 - 1.0 mg/dL Final    Comment: For infants and newborns, interpretation of results should be based  on gestational age, weight and in agreement with clinical  observations.    Premature Infant recommended reference ranges:  Up to 24 hours.............<8.0 mg/dL  Up to 48 hours............<12.0 mg/dL  3-5 days..................<15.0 mg/dL  6-29 days.................<15.0 mg/dL      Alkaline Phosphatase 08/09/2022 56  55 - 135 U/L Final    AST 08/09/2022 20  10 - 40 U/L Final    ALT 08/09/2022 14  10 - 44 U/L Final    Anion Gap 08/09/2022 10  8 - 16 mmol/L Final    eGFR 08/09/2022 >60  >60 mL/min/1.73 m^2 Final    Chromogranin A 08/09/2022 2090 (H)  <93 ng/mL Final    Comment: Impaired renal or hepatic function or treatment with proton  pump inhibitors may result in artifactual elevations of   Chromogranin A.    -------------------ADDITIONAL INFORMATION-------------------  This test was developed and its performance characteristics  determined by [facility] in a manner consistent with CLIA  requirements. This test has not been cleared or approved by   the U.S. Food and Drug Administration.    In some immunoassays, the presence of unusually high   concentrations of analyte may result in a high-dose   &quot;hook&quot; effect. This may result in a lower or even normal   measured analyte concentration. If the reported result   is inconsistent with the clinical presentation, the   laboratory should be alerted for troubleshooting. For   diagnostic purposes, these immunoassay results should   always be assessed in conjunction with the patients medical   history, clinical examination and other findings.    The testing method is a homogeneous time-resolved   immunof                            luorescent assay manufactured by [employer]   and performed on the [employer] Kryptor Compact Plus.    Values obtained with different assay methods or kits may be   different and cannot be used interchangeably.    Test results cannot be interpreted as absolute evidence for   the presence or absence of malignant disease.    Test Performed by:  [facility] [place] [address]  : Yogesh Roque M.D. Ph.D.; CLIA# [id number]     Lab Visit on 07/12/2022   Component Date Value Ref Range Status    Serotonin 07/12/2022 4480 (H)  <=230 ng/mL Final    Comment: -------------------ADDITIONAL INFORMATION-------------------  This test was developed and its performance characteristics   determined by [facility] in a manner consistent with CLIA   requirements. This test has not been cleared or approved by   the U.S. Food and Drug Administration.    Test Performed by:  [facility] [place] [address]  : Yogesh Roque M.D. Ph.D.; CLIA# [id number]      WBC 07/12/2022 6.92  3.90 - 12.70 K/uL Final    RBC 07/12/2022 4.76  4.00 - 5.40 M/uL Final    Hemoglobin 07/12/2022 13.8  12.0 - 16.0 g/dL Final    Hematocrit 07/12/2022 43.3  37.0 - 48.5 % Final    MCV 07/12/2022 91  82 - 98 fL Final    MCH 07/12/2022 29.0  27.0 - 31.0 pg Final    MCHC 07/12/2022 31.9 (L)  32.0 - 36.0 g/dL Final    RDW 07/12/2022 12.7  11.5 - 14.5 % Final    Platelets 07/12/2022 282  150 - 450 K/uL Final    MPV 07/12/2022 10.7  9.2 - 12.9 fL Final    Immature Granulocytes 07/12/2022 0.4  0.0 - 0.5 % Final    Gran # (ANC) 07/12/2022 4.5  1.8 - 7.7 K/uL Final    Immature Grans (Abs) 07/12/2022 0.03  0.00 - 0.04 K/uL Final    Comment: Mild elevation in immature granulocytes is non specific and   can be seen in a variety of conditions including stress response,   acute inflammation, trauma and  pregnancy. Correlation with other   laboratory and clinical findings is essential.      Lymph # 07/12/2022 1.8  1.0 - 4.8 K/uL Final    Mono # 07/12/2022 0.5  0.3 - 1.0 K/uL Final    Eos # 07/12/2022 0.0  0.0 - 0.5 K/uL Final    Baso # 07/12/2022 0.02  0.00 - 0.20 K/uL Final    nRBC 07/12/2022 0  0 /100 WBC Final    Gran % 07/12/2022 64.8  38.0 - 73.0 % Final    Lymph % 07/12/2022 26.2  18.0 - 48.0 % Final    Mono % 07/12/2022 7.7  4.0 - 15.0 % Final    Eosinophil % 07/12/2022 0.6  0.0 - 8.0 % Final    Basophil % 07/12/2022 0.3  0.0 - 1.9 % Final    Differential Method 07/12/2022 Automated   Final    Sodium 07/12/2022 144  136 - 145 mmol/L Final    Potassium 07/12/2022 4.4  3.5 - 5.1 mmol/L Final    Chloride 07/12/2022 103  95 - 110 mmol/L Final    CO2 07/12/2022 30 (H)  23 - 29 mmol/L Final    Glucose 07/12/2022 158 (H)  70 - 110 mg/dL Final    BUN 07/12/2022 11  8 - 23 mg/dL Final    Creatinine 07/12/2022 0.8  0.5 - 1.4 mg/dL Final    Calcium 07/12/2022 9.9  8.7 - 10.5 mg/dL Final    Total Protein 07/12/2022 7.0  6.0 - 8.4 g/dL Final    Albumin 07/12/2022 3.8  3.5 - 5.2 g/dL Final    Total Bilirubin 07/12/2022 1.7 (H)  0.1 - 1.0 mg/dL Final    Comment: For infants and newborns, interpretation of results should be based  on gestational age, weight and in agreement with clinical  observations.    Premature Infant recommended reference ranges:  Up to 24 hours.............<8.0 mg/dL  Up to 48 hours............<12.0 mg/dL  3-5 days..................<15.0 mg/dL  6-29 days.................<15.0 mg/dL      Alkaline Phosphatase 07/12/2022 56  55 - 135 U/L Final    AST 07/12/2022 21  10 - 40 U/L Final    ALT 07/12/2022 15  10 - 44 U/L Final    Anion Gap 07/12/2022 11  8 - 16 mmol/L Final    eGFR if African American 07/12/2022 >60  >60 mL/min/1.73 m^2 Final    eGFR if non African American 07/12/2022 >60  >60 mL/min/1.73 m^2 Final    Comment: Calculation used to obtain the estimated glomerular filtration  rate (eGFR) is the  CKD-EPI equation.       Chromogranin A 07/12/2022 2092 (H)  <93 ng/mL Final    Comment: Impaired renal or hepatic function or treatment with proton  pump inhibitors may result in artifactual elevations of   Chromogranin A.    -------------------ADDITIONAL INFORMATION-------------------  This test was developed and its performance characteristics  determined by [facility] in a manner consistent with CLIA  requirements. This test has not been cleared or approved by   the U.S. Food and Drug Administration.    In some immunoassays, the presence of unusually high   concentrations of analyte may result in a high-dose   &quot;hook&quot; effect. This may result in a lower or even normal   measured analyte concentration. If the reported result   is inconsistent with the clinical presentation, the   laboratory should be alerted for troubleshooting. For   diagnostic purposes, these immunoassay results should   always be assessed in conjunction with the patients medical   history, clinical examination and other findings.    The testing method is a homogeneous time-resolved   immunof                           luorescent assay manufactured by [employer]   and performed on the [employer] Kryptor Compact Plus.    Values obtained with different assay methods or kits may be   different and cannot be used interchangeably.    Test results cannot be interpreted as absolute evidence for   the presence or absence of malignant disease.    Test Performed by:  [facility] [id number] [place] [address]  : Yogesh Roque M.D. Ph.D.; CLIA# [id number]      Hemoglobin A1C 07/12/2022 5.9 (H)  4.0 - 5.6 % Final    Comment: ADA Screening Guidelines:  5.7-6.4%  Consistent with prediabetes  >or=6.5%  Consistent with diabetes    High levels of fetal hemoglobin interfere with the HbA1C  assay. Heterozygous hemoglobin variants (HbS, HgC, etc)do  not significantly interfere with  this assay.   However, presence of multiple variants may affect accuracy.      Estimated Avg Glucose 07/12/2022 123  68 - 131 mg/dL Final    Vitamin B-12 07/12/2022 >2000 (H)  210 - 950 pg/mL Final   Lab Visit on 06/10/2022   Component Date Value Ref Range Status    Serotonin 06/10/2022 5270 (H)  <=230 ng/mL Final    Comment: -------------------ADDITIONAL INFORMATION-------------------  This test was developed and its performance characteristics   determined by [facility] in a manner consistent with CLIA   requirements. This test has not been cleared or approved by   the U.S. Food and Drug Administration.    Test Performed by:  [facility]  [address] [place] [address]  : Yogesh Roque M.D. Ph.D.; CLIA# [id number]      WBC 06/10/2022 7.16  3.90 - 12.70 K/uL Final    RBC 06/10/2022 4.62  4.00 - 5.40 M/uL Final    Hemoglobin 06/10/2022 13.3  12.0 - 16.0 g/dL Final    Hematocrit 06/10/2022 42.0  37.0 - 48.5 % Final    MCV 06/10/2022 91  82 - 98 fL Final    MCH 06/10/2022 28.8  27.0 - 31.0 pg Final    MCHC 06/10/2022 31.7 (L)  32.0 - 36.0 g/dL Final    RDW 06/10/2022 13.1  11.5 - 14.5 % Final    Platelets 06/10/2022 318  150 - 450 K/uL Final    MPV 06/10/2022 11.1  9.2 - 12.9 fL Final    Immature Granulocytes 06/10/2022 0.3  0.0 - 0.5 % Final    Gran # (ANC) 06/10/2022 4.7  1.8 - 7.7 K/uL Final    Immature Grans (Abs) 06/10/2022 0.02  0.00 - 0.04 K/uL Final    Comment: Mild elevation in immature granulocytes is non specific and   can be seen in a variety of conditions including stress response,   acute inflammation, trauma and pregnancy. Correlation with other   laboratory and clinical findings is essential.      Lymph # 06/10/2022 1.9  1.0 - 4.8 K/uL Final    Mono # 06/10/2022 0.6  0.3 - 1.0 K/uL Final    Eos # 06/10/2022 0.1  0.0 - 0.5 K/uL Final    Baso # 06/10/2022 0.01  0.00 - 0.20 K/uL Final    nRBC 06/10/2022 0  0 /100 WBC Final    Gran %  06/10/2022 65.3  38.0 - 73.0 % Final    Lymph % 06/10/2022 25.8  18.0 - 48.0 % Final    Mono % 06/10/2022 7.8  4.0 - 15.0 % Final    Eosinophil % 06/10/2022 0.7  0.0 - 8.0 % Final    Basophil % 06/10/2022 0.1  0.0 - 1.9 % Final    Differential Method 06/10/2022 Automated   Final    Sodium 06/10/2022 141  136 - 145 mmol/L Final    Potassium 06/10/2022 4.0  3.5 - 5.1 mmol/L Final    Chloride 06/10/2022 102  95 - 110 mmol/L Final    CO2 06/10/2022 26  23 - 29 mmol/L Final    Glucose 06/10/2022 159 (H)  70 - 110 mg/dL Final    BUN 06/10/2022 9  8 - 23 mg/dL Final    Creatinine 06/10/2022 0.7  0.5 - 1.4 mg/dL Final    Calcium 06/10/2022 10.1  8.7 - 10.5 mg/dL Final    Total Protein 06/10/2022 7.4  6.0 - 8.4 g/dL Final    Albumin 06/10/2022 3.7  3.5 - 5.2 g/dL Final    Total Bilirubin 06/10/2022 1.7 (H)  0.1 - 1.0 mg/dL Final    Comment: For infants and newborns, interpretation of results should be based  on gestational age, weight and in agreement with clinical  observations.    Premature Infant recommended reference ranges:  Up to 24 hours.............<8.0 mg/dL  Up to 48 hours............<12.0 mg/dL  3-5 days..................<15.0 mg/dL  6-29 days.................<15.0 mg/dL      Alkaline Phosphatase 06/10/2022 60  55 - 135 U/L Final    AST 06/10/2022 21  10 - 40 U/L Final    ALT 06/10/2022 13  10 - 44 U/L Final    Anion Gap 06/10/2022 13  8 - 16 mmol/L Final    eGFR if African American 06/10/2022 >60  >60 mL/min/1.73 m^2 Final    eGFR if non African American 06/10/2022 >60  >60 mL/min/1.73 m^2 Final    Comment: Calculation used to obtain the estimated glomerular filtration  rate (eGFR) is the CKD-EPI equation.       Chromogranin A 06/10/2022 1614 (H)  <93 ng/mL Final    Comment: Impaired renal or hepatic function or treatment with proton  pump inhibitors may result in artifactual elevations of   Chromogranin A.    -------------------ADDITIONAL INFORMATION-------------------  This test was developed and its  performance characteristics  determined by [facility] in a manner consistent with CLIA  requirements. This test has not been cleared or approved by   the U.S. Food and Drug Administration.    In some immunoassays, the presence of unusually high   concentrations of analyte may result in a high-dose   &quot;hook&quot; effect. This may result in a lower or even normal   measured analyte concentration. If the reported result   is inconsistent with the clinical presentation, the   laboratory should be alerted for troubleshooting. For   diagnostic purposes, these immunoassay results should   always be assessed in conjunction with the patients medical   history, clinical examination and other findings.    The testing method is a homogeneous time-resolved   immunof                           luorescent assay manufactured by [employer]   and performed on the [employer] Kryptor Compact Plus.    Values obtained with different assay methods or kits may be   different and cannot be used interchangeably.    Test results cannot be interpreted as absolute evidence for   the presence or absence of malignant disease.    Test Performed by:  [facility]  [address] [place] [address]  : Yogesh Roque M.D. Ph.D.; CLIA# [id number]     Lab Visit on 05/16/2022   Component Date Value Ref Range Status    WBC 05/16/2022 7.18  3.90 - 12.70 K/uL Final    RBC 05/16/2022 4.45  4.00 - 5.40 M/uL Final    Hemoglobin 05/16/2022 12.9  12.0 - 16.0 g/dL Final    Hematocrit 05/16/2022 40.6  37.0 - 48.5 % Final    MCV 05/16/2022 91  82 - 98 fL Final    MCH 05/16/2022 29.0  27.0 - 31.0 pg Final    MCHC 05/16/2022 31.8 (L)  32.0 - 36.0 g/dL Final    RDW 05/16/2022 13.2  11.5 - 14.5 % Final    Platelets 05/16/2022 326  150 - 450 K/uL Final    MPV 05/16/2022 10.4  9.2 - 12.9 fL Final    Immature Granulocytes 05/16/2022 0.3  0.0 - 0.5 % Final    Gran # (ANC) 05/16/2022 4.7  1.8 - 7.7 K/uL  Final    Immature Grans (Abs) 05/16/2022 0.02  0.00 - 0.04 K/uL Final    Comment: Mild elevation in immature granulocytes is non specific and   can be seen in a variety of conditions including stress response,   acute inflammation, trauma and pregnancy. Correlation with other   laboratory and clinical findings is essential.      Lymph # 05/16/2022 1.8  1.0 - 4.8 K/uL Final    Mono # 05/16/2022 0.6  0.3 - 1.0 K/uL Final    Eos # 05/16/2022 0.1  0.0 - 0.5 K/uL Final    Baso # 05/16/2022 0.02  0.00 - 0.20 K/uL Final    nRBC 05/16/2022 0  0 /100 WBC Final    Gran % 05/16/2022 64.7  38.0 - 73.0 % Final    Lymph % 05/16/2022 25.5  18.0 - 48.0 % Final    Mono % 05/16/2022 8.4  4.0 - 15.0 % Final    Eosinophil % 05/16/2022 0.8  0.0 - 8.0 % Final    Basophil % 05/16/2022 0.3  0.0 - 1.9 % Final    Differential Method 05/16/2022 Automated   Final    Sodium 05/16/2022 144  136 - 145 mmol/L Final    Potassium 05/16/2022 4.5  3.5 - 5.1 mmol/L Final    Chloride 05/16/2022 104  95 - 110 mmol/L Final    CO2 05/16/2022 28  23 - 29 mmol/L Final    Glucose 05/16/2022 163 (H)  70 - 110 mg/dL Final    BUN 05/16/2022 14  8 - 23 mg/dL Final    Creatinine 05/16/2022 0.8  0.5 - 1.4 mg/dL Final    Calcium 05/16/2022 10.3  8.7 - 10.5 mg/dL Final    Total Protein 05/16/2022 7.8  6.0 - 8.4 g/dL Final    Albumin 05/16/2022 3.9  3.5 - 5.2 g/dL Final    Total Bilirubin 05/16/2022 1.7 (H)  0.1 - 1.0 mg/dL Final    Comment: For infants and newborns, interpretation of results should be based  on gestational age, weight and in agreement with clinical  observations.    Premature Infant recommended reference ranges:  Up to 24 hours.............<8.0 mg/dL  Up to 48 hours............<12.0 mg/dL  3-5 days..................<15.0 mg/dL  6-29 days.................<15.0 mg/dL      Alkaline Phosphatase 05/16/2022 64  55 - 135 U/L Final    AST 05/16/2022 20  10 - 40 U/L Final    ALT 05/16/2022 10  10 - 44 U/L Final    Anion Gap 05/16/2022 12  8 - 16 mmol/L Final     eGFR if African American 05/16/2022 >60  >60 mL/min/1.73 m^2 Final    eGFR if non African American 05/16/2022 >60  >60 mL/min/1.73 m^2 Final    Comment: Calculation used to obtain the estimated glomerular filtration  rate (eGFR) is the CKD-EPI equation.       Chromogranin A 05/16/2022 1342 (H)  <93 ng/mL Final    Comment: Impaired renal or hepatic function or treatment with proton  pump inhibitors may result in artifactual elevations of   Chromogranin A.    -------------------ADDITIONAL INFORMATION-------------------  This test was developed and its performance characteristics  determined by [facility] in a manner consistent with CLIA  requirements. This test has not been cleared or approved by   the U.S. Food and Drug Administration.    In some immunoassays, the presence of unusually high   concentrations of analyte may result in a high-dose   &quot;hook&quot; effect. This may result in a lower or even normal   measured analyte concentration. If the reported result   is inconsistent with the clinical presentation, the   laboratory should be alerted for troubleshooting. For   diagnostic purposes, these immunoassay results should   always be assessed in conjunction with the patients medical   history, clinical examination and other findings.    The testing method is a homogeneous time-resolved   immunof                           luorescent assay manufactured by [id number]   and performed on the [employer] Kryptor Compact Plus.    Values obtained with different assay methods or kits may be   different and cannot be used interchangeably.    Test results cannot be interpreted as absolute evidence for   the presence or absence of malignant disease.    Test Performed by:  [facility]  [address] [place] [address]  : Yogesh Roque M.D. Ph.D.; CLIA# [id number]         Imaging  No results found.    Assessment  1. Chronic systolic heart  failure  Compensated.  Would not pursue ischemic workup it is unlikely to change her long-term morbidity or mortality.  Continue with medical therapy.    2. Primary hypertension  Looks good today but probably needs overall better control.      Plan and Discussion    Increase carvedilol 12.5 mg twice daily.  Continue other guideline directed medical therapy.    The ASCVD Risk score (Waleska DK, et al., 2019) failed to calculate for the following reasons:    The 2019 ASCVD risk score is only valid for ages 40 to 79     Follow Up  Follow up in about 3 months (around 1/25/2023).      Ralph Bergeron MD

## 2022-10-26 ENCOUNTER — IMMUNIZATION (OUTPATIENT)
Dept: PHARMACY | Facility: CLINIC | Age: 82
End: 2022-10-26
Payer: MEDICARE

## 2022-11-01 ENCOUNTER — HOSPITAL ENCOUNTER (OUTPATIENT)
Dept: RADIOLOGY | Facility: HOSPITAL | Age: 82
Discharge: HOME | End: 2022-11-01
Attending: INTERNAL MEDICINE
Payer: MEDICARE

## 2022-11-01 ENCOUNTER — LAB VISIT (OUTPATIENT)
Dept: LAB | Facility: OTHER | Age: 82
End: 2022-11-01
Attending: INTERNAL MEDICINE
Payer: MEDICARE

## 2022-11-01 DIAGNOSIS — C7A.012 MALIGNANT CARCINOID TUMOR OF ILEUM: ICD-10-CM

## 2022-11-01 DIAGNOSIS — C7B.8 NEUROENDOCRINE CARCINOMA METASTATIC TO BONE: ICD-10-CM

## 2022-11-01 DIAGNOSIS — C7B.8 METASTATIC MALIGNANT NEUROENDOCRINE TUMOR TO LIVER: ICD-10-CM

## 2022-11-01 DIAGNOSIS — C7A.8 NEUROENDOCRINE CARCINOMA METASTATIC TO BONE: ICD-10-CM

## 2022-11-01 DIAGNOSIS — C7B.8 SECONDARY NEUROENDOCRINE TUMOR OF BONE: ICD-10-CM

## 2022-11-01 LAB
ALBUMIN SERPL BCP-MCNC: 3.7 G/DL (ref 3.5–5.2)
ALP SERPL-CCNC: 59 U/L (ref 55–135)
ALT SERPL W/O P-5'-P-CCNC: 34 U/L (ref 10–44)
ANION GAP SERPL CALC-SCNC: 11 MMOL/L (ref 8–16)
AST SERPL-CCNC: 27 U/L (ref 10–40)
BASOPHILS # BLD AUTO: 0.02 K/UL (ref 0–0.2)
BASOPHILS NFR BLD: 0.2 % (ref 0–1.9)
BILIRUB SERPL-MCNC: 0.5 MG/DL (ref 0.1–1)
BUN SERPL-MCNC: 8 MG/DL (ref 8–23)
CALCIUM SERPL-MCNC: 9.7 MG/DL (ref 8.7–10.5)
CHLORIDE SERPL-SCNC: 108 MMOL/L (ref 95–110)
CO2 SERPL-SCNC: 24 MMOL/L (ref 23–29)
CREAT SERPL-MCNC: 0.7 MG/DL (ref 0.5–1.4)
DIFFERENTIAL METHOD: NORMAL
EOSINOPHIL # BLD AUTO: 0 K/UL (ref 0–0.5)
EOSINOPHIL NFR BLD: 0.4 % (ref 0–8)
ERYTHROCYTE [DISTWIDTH] IN BLOOD BY AUTOMATED COUNT: 12.7 % (ref 11.5–14.5)
EST. GFR  (NO RACE VARIABLE): >60 ML/MIN/1.73 M^2
GLUCOSE SERPL-MCNC: 148 MG/DL (ref 70–110)
HCT VFR BLD AUTO: 42.9 % (ref 37–48.5)
HGB BLD-MCNC: 14.1 G/DL (ref 12–16)
IMM GRANULOCYTES # BLD AUTO: 0.03 K/UL (ref 0–0.04)
IMM GRANULOCYTES NFR BLD AUTO: 0.4 % (ref 0–0.5)
LYMPHOCYTES # BLD AUTO: 1.8 K/UL (ref 1–4.8)
LYMPHOCYTES NFR BLD: 22.3 % (ref 18–48)
MCH RBC QN AUTO: 30.1 PG (ref 27–31)
MCHC RBC AUTO-ENTMCNC: 32.9 G/DL (ref 32–36)
MCV RBC AUTO: 92 FL (ref 82–98)
MONOCYTES # BLD AUTO: 0.5 K/UL (ref 0.3–1)
MONOCYTES NFR BLD: 6.3 % (ref 4–15)
NEUTROPHILS # BLD AUTO: 5.7 K/UL (ref 1.8–7.7)
NEUTROPHILS NFR BLD: 70.4 % (ref 38–73)
NRBC BLD-RTO: 0 /100 WBC
PLATELET # BLD AUTO: 247 K/UL (ref 150–450)
PMV BLD AUTO: 10.5 FL (ref 9.2–12.9)
POTASSIUM SERPL-SCNC: 4.4 MMOL/L (ref 3.5–5.1)
PROT SERPL-MCNC: 7.1 G/DL (ref 6–8.4)
RBC # BLD AUTO: 4.69 M/UL (ref 4–5.4)
SODIUM SERPL-SCNC: 143 MMOL/L (ref 136–145)
WBC # BLD AUTO: 8.08 K/UL (ref 3.9–12.7)

## 2022-11-01 PROCEDURE — 85025 COMPLETE CBC W/AUTO DIFF WBC: CPT | Performed by: INTERNAL MEDICINE

## 2022-11-01 PROCEDURE — 78815 PET IMAGE W/CT SKULL-THIGH: CPT | Mod: TC

## 2022-11-01 PROCEDURE — 84260 ASSAY OF SEROTONIN: CPT | Performed by: INTERNAL MEDICINE

## 2022-11-01 PROCEDURE — 36415 COLL VENOUS BLD VENIPUNCTURE: CPT | Performed by: INTERNAL MEDICINE

## 2022-11-01 PROCEDURE — 25500020 PHARM REV CODE 255: Performed by: INTERNAL MEDICINE

## 2022-11-01 PROCEDURE — A9585 GADOBUTROL INJECTION: HCPCS | Performed by: INTERNAL MEDICINE

## 2022-11-01 PROCEDURE — 74183 MRI ABD W/O CNTR FLWD CNTR: CPT | Mod: TC

## 2022-11-01 PROCEDURE — 78815 NM PET 68GA DOTATATE, VERTEX TO THIGH: ICD-10-PCS | Mod: 26,PS,, | Performed by: RADIOLOGY

## 2022-11-01 PROCEDURE — 74183 MRI ABDOMEN W WO CONTRAST: ICD-10-PCS | Mod: 26,,, | Performed by: STUDENT IN AN ORGANIZED HEALTH CARE EDUCATION/TRAINING PROGRAM

## 2022-11-01 PROCEDURE — 86316 IMMUNOASSAY TUMOR OTHER: CPT | Performed by: INTERNAL MEDICINE

## 2022-11-01 PROCEDURE — 80053 COMPREHEN METABOLIC PANEL: CPT | Performed by: INTERNAL MEDICINE

## 2022-11-01 PROCEDURE — 74183 MRI ABD W/O CNTR FLWD CNTR: CPT | Mod: 26,,, | Performed by: STUDENT IN AN ORGANIZED HEALTH CARE EDUCATION/TRAINING PROGRAM

## 2022-11-01 PROCEDURE — 78815 PET IMAGE W/CT SKULL-THIGH: CPT | Mod: 26,PS,, | Performed by: RADIOLOGY

## 2022-11-01 RX ORDER — GADOBUTROL 604.72 MG/ML
10 INJECTION INTRAVENOUS
Status: COMPLETED | OUTPATIENT
Start: 2022-11-01 | End: 2022-11-01

## 2022-11-01 RX ADMIN — GADOBUTROL 10 ML: 604.72 INJECTION INTRAVENOUS at 01:11

## 2022-11-03 ENCOUNTER — TUMOR BOARD CONFERENCE (OUTPATIENT)
Dept: NEUROLOGY | Facility: CLINIC | Age: 82
End: 2022-11-03
Payer: MEDICARE

## 2022-11-03 LAB — CGA SERPL-MCNC: 2565 NG/ML

## 2022-11-03 NOTE — PROGRESS NOTES
[facility]      NEUROENDOCRINE TUMOR MULTIDISCIPLINARY TUMOR BOARD  _____________________________________________________________________    PRESENTER:   Sebastian Granados MD    REASON FOR PRESENTATION:  Treatment Plan, Scan Review, and Evaluate for PRRT    ATTENDEES:   Surgery:              MD JOSÉ MIGUEL Brand MD  Interventional Radiology - Rich Baptiste MD  Nuclear Medicine - Erwin Sepulveda MD  Pathology - Darling Ledesma MD & Iwona Neal DO  Oncology - Moses Beckman MD; Sebastian Granados MD  Gastroenterology - Not Present   Palliative Care - Not Present  Nutrition - Tracie Weil-Cavalier, RDN  Research- Not Present    PATIENT STATUS:  Established Patient    PATIENT SUMMARY:  Past Medical History:   Diagnosis Date    Anemia 7/11/2018    B12 deficiency     Depression     per pcp note 7/2017 and given prozac and diazepam     Hyperlipidemia     Weight loss     reviewed prior pcp Dr. Russo notes 2017 - labs reviewed: cmp wnl x tbili 1.5, tsh wnl, A1c 5.0, cbc wnl,D 36, hiv neg, hep c neg, hep b surg ag neg        Past Surgical History:   Procedure Laterality Date    EMBOLIZATION N/A 2/14/2019    Procedure: EMBOLIZATION, BLOOD VESSEL;  Surgeon: [facility] Diagnostic Provider;  Location: [facility] OR [address] [place] [address];  Service: Radiology;  Laterality: N/A;  Room 189/[place]    EMBOLIZATION N/A 3/21/2019    Procedure: EMBOLIZATION, BLOOD VESSEL;  Surgeon: [facility] Diagnostic Provider;  Location: [facility] OR [address] [place] [address];  Service: Radiology;  Laterality: N/A;  Room 189/[place]    ESOPHAGOGASTRODUODENOSCOPY  05/04/2018    esophageal ring, grade 1 esophagitis, gastritis     EYE SURGERY Bilateral     cataract removal    HYSTERECTOMY      fibroids     ________________________________________________________________    DISCUSSION:  82 y.o. 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 Started Lanreotide q 4 weeks on 6/22/2018. Seen by   [facility] on 6/11/18 for palliative radiation to the area of the L3 metastasis 6/18/18-6/29/18. S/p TACE to the right hepatic lobe on 2/14/19 and 4/22/22.  TACE to the left hepatic lobe on 3/21/19. Started on Xermelo 250 mg tid which improved diarrhea.     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 Liver mets appears slightly enlarged and a new lesion in the skull c/f progression.    BOARD RECOMMENDATIONS:   Consider medication for Bone mets if becomes unstable   PRRT  Lanreotide dosing after PRRT

## 2022-11-04 LAB — SEROTONIN: 3470 NG/ML

## 2022-11-08 ENCOUNTER — INFUSION (OUTPATIENT)
Dept: INFUSION THERAPY | Facility: HOSPITAL | Age: 82
End: 2022-11-08
Attending: INTERNAL MEDICINE
Payer: MEDICARE

## 2022-11-08 ENCOUNTER — OFFICE VISIT (OUTPATIENT)
Dept: HEMATOLOGY/ONCOLOGY | Facility: CLINIC | Age: 82
End: 2022-11-08
Payer: MEDICARE

## 2022-11-08 VITALS
TEMPERATURE: 98 F | RESPIRATION RATE: 18 BRPM | BODY MASS INDEX: 22.05 KG/M2 | DIASTOLIC BLOOD PRESSURE: 68 MMHG | SYSTOLIC BLOOD PRESSURE: 126 MMHG | HEART RATE: 60 BPM | HEIGHT: 63 IN | WEIGHT: 124.44 LBS | OXYGEN SATURATION: 98 %

## 2022-11-08 DIAGNOSIS — R80.9 CONTROLLED TYPE 2 DIABETES MELLITUS WITH MICROALBUMINURIA, WITHOUT LONG-TERM CURRENT USE OF INSULIN: ICD-10-CM

## 2022-11-08 DIAGNOSIS — C7A.012 MALIGNANT CARCINOID TUMOR OF ILEUM: Primary | ICD-10-CM

## 2022-11-08 DIAGNOSIS — C7B.8 METASTATIC MALIGNANT NEUROENDOCRINE TUMOR TO LIVER: ICD-10-CM

## 2022-11-08 DIAGNOSIS — E34.0 CARCINOID HEART DISEASE: ICD-10-CM

## 2022-11-08 DIAGNOSIS — D49.9 IMMUNODEFICIENCY SECONDARY TO NEOPLASM: ICD-10-CM

## 2022-11-08 DIAGNOSIS — C7B.8 SECONDARY NEUROENDOCRINE TUMOR OF BONE: ICD-10-CM

## 2022-11-08 DIAGNOSIS — I10 ESSENTIAL HYPERTENSION: ICD-10-CM

## 2022-11-08 DIAGNOSIS — C7A.8 NEUROENDOCRINE CARCINOMA METASTATIC TO BONE: ICD-10-CM

## 2022-11-08 DIAGNOSIS — K29.00 ACUTE GASTRITIS WITHOUT HEMORRHAGE, UNSPECIFIED GASTRITIS TYPE: ICD-10-CM

## 2022-11-08 DIAGNOSIS — E11.29 CONTROLLED TYPE 2 DIABETES MELLITUS WITH MICROALBUMINURIA, WITHOUT LONG-TERM CURRENT USE OF INSULIN: ICD-10-CM

## 2022-11-08 DIAGNOSIS — E34.0 CARCINOID SYNDROME: ICD-10-CM

## 2022-11-08 DIAGNOSIS — D84.81 IMMUNODEFICIENCY SECONDARY TO NEOPLASM: ICD-10-CM

## 2022-11-08 DIAGNOSIS — C7B.8 NEUROENDOCRINE CARCINOMA METASTATIC TO BONE: ICD-10-CM

## 2022-11-08 DIAGNOSIS — I51.89 CARCINOID HEART DISEASE: ICD-10-CM

## 2022-11-08 DIAGNOSIS — G89.3 CANCER-RELATED PAIN: ICD-10-CM

## 2022-11-08 DIAGNOSIS — C7B.8 METASTATIC MALIGNANT NEUROENDOCRINE TUMOR TO LIVER: Primary | ICD-10-CM

## 2022-11-08 PROCEDURE — 1159F PR MEDICATION LIST DOCUMENTED IN MEDICAL RECORD: ICD-10-PCS | Mod: CPTII,S$GLB,, | Performed by: INTERNAL MEDICINE

## 2022-11-08 PROCEDURE — 1125F PR PAIN SEVERITY QUANTIFIED, PAIN PRESENT: ICD-10-PCS | Mod: CPTII,S$GLB,, | Performed by: INTERNAL MEDICINE

## 2022-11-08 PROCEDURE — 3078F PR MOST RECENT DIASTOLIC BLOOD PRESSURE < 80 MM HG: ICD-10-PCS | Mod: CPTII,S$GLB,, | Performed by: INTERNAL MEDICINE

## 2022-11-08 PROCEDURE — 3074F PR MOST RECENT SYSTOLIC BLOOD PRESSURE < 130 MM HG: ICD-10-PCS | Mod: CPTII,S$GLB,, | Performed by: INTERNAL MEDICINE

## 2022-11-08 PROCEDURE — 99499 UNLISTED E&M SERVICE: CPT | Mod: HCNC,S$GLB,, | Performed by: INTERNAL MEDICINE

## 2022-11-08 PROCEDURE — 1125F AMNT PAIN NOTED PAIN PRSNT: CPT | Mod: CPTII,S$GLB,, | Performed by: INTERNAL MEDICINE

## 2022-11-08 PROCEDURE — 96372 THER/PROPH/DIAG INJ SC/IM: CPT

## 2022-11-08 PROCEDURE — 3288F PR FALLS RISK ASSESSMENT DOCUMENTED: ICD-10-PCS | Mod: CPTII,S$GLB,, | Performed by: INTERNAL MEDICINE

## 2022-11-08 PROCEDURE — 99499 RISK ADDL DX/OHS AUDIT: ICD-10-PCS | Mod: HCNC,S$GLB,, | Performed by: INTERNAL MEDICINE

## 2022-11-08 PROCEDURE — 3288F FALL RISK ASSESSMENT DOCD: CPT | Mod: CPTII,S$GLB,, | Performed by: INTERNAL MEDICINE

## 2022-11-08 PROCEDURE — 99215 PR OFFICE/OUTPT VISIT, EST, LEVL V, 40-54 MIN: ICD-10-PCS | Mod: S$GLB,,, | Performed by: INTERNAL MEDICINE

## 2022-11-08 PROCEDURE — 1101F PR PT FALLS ASSESS DOC 0-1 FALLS W/OUT INJ PAST YR: ICD-10-PCS | Mod: CPTII,S$GLB,, | Performed by: INTERNAL MEDICINE

## 2022-11-08 PROCEDURE — 3078F DIAST BP <80 MM HG: CPT | Mod: CPTII,S$GLB,, | Performed by: INTERNAL MEDICINE

## 2022-11-08 PROCEDURE — 99999 PR PBB SHADOW E&M-EST. PATIENT-LVL V: ICD-10-PCS | Mod: PBBFAC,,, | Performed by: INTERNAL MEDICINE

## 2022-11-08 PROCEDURE — 1159F MED LIST DOCD IN RCRD: CPT | Mod: CPTII,S$GLB,, | Performed by: INTERNAL MEDICINE

## 2022-11-08 PROCEDURE — 1101F PT FALLS ASSESS-DOCD LE1/YR: CPT | Mod: CPTII,S$GLB,, | Performed by: INTERNAL MEDICINE

## 2022-11-08 PROCEDURE — 63600175 PHARM REV CODE 636 W HCPCS: Mod: JG | Performed by: INTERNAL MEDICINE

## 2022-11-08 PROCEDURE — 3074F SYST BP LT 130 MM HG: CPT | Mod: CPTII,S$GLB,, | Performed by: INTERNAL MEDICINE

## 2022-11-08 PROCEDURE — 99215 OFFICE O/P EST HI 40 MIN: CPT | Mod: S$GLB,,, | Performed by: INTERNAL MEDICINE

## 2022-11-08 PROCEDURE — 99999 PR PBB SHADOW E&M-EST. PATIENT-LVL V: CPT | Mod: PBBFAC,,, | Performed by: INTERNAL MEDICINE

## 2022-11-08 RX ORDER — SODIUM CHLORIDE 9 MG/ML
INJECTION, SOLUTION INTRAVENOUS ONCE
Status: CANCELLED | OUTPATIENT
Start: 2022-12-08

## 2022-11-08 RX ORDER — ACETAMINOPHEN 325 MG/1
650 TABLET ORAL
Status: CANCELLED | OUTPATIENT
Start: 2022-12-08

## 2022-11-08 RX ORDER — ARGININE 100 %
1000 CRYSTALS MISCELLANEOUS
Status: CANCELLED | OUTPATIENT
Start: 2022-12-08

## 2022-11-08 RX ORDER — OXYCODONE HYDROCHLORIDE 5 MG/1
5 TABLET ORAL EVERY 6 HOURS PRN
Qty: 30 TABLET | Refills: 0 | Status: SHIPPED | OUTPATIENT
Start: 2022-11-08 | End: 2023-03-14

## 2022-11-08 RX ORDER — SODIUM CHLORIDE 0.9 % (FLUSH) 0.9 %
10 SYRINGE (ML) INJECTION
Status: CANCELLED | OUTPATIENT
Start: 2022-12-08

## 2022-11-08 RX ORDER — LANREOTIDE ACETATE 120 MG/.5ML
120 INJECTION SUBCUTANEOUS
Status: CANCELLED | OUTPATIENT
Start: 2022-11-08

## 2022-11-08 RX ORDER — DIPHENHYDRAMINE HYDROCHLORIDE 50 MG/ML
50 INJECTION INTRAMUSCULAR; INTRAVENOUS
Status: CANCELLED | OUTPATIENT
Start: 2022-12-08

## 2022-11-08 RX ORDER — LANREOTIDE ACETATE 120 MG/.5ML
120 INJECTION SUBCUTANEOUS
Status: DISCONTINUED | OUTPATIENT
Start: 2022-11-08 | End: 2022-11-08 | Stop reason: HOSPADM

## 2022-11-08 RX ORDER — OMEPRAZOLE 20 MG/1
20 TABLET, DELAYED RELEASE ORAL DAILY
Qty: 28 TABLET | Refills: 1 | Status: SHIPPED | OUTPATIENT
Start: 2022-11-08 | End: 2022-12-05

## 2022-11-08 RX ADMIN — LANREOTIDE ACETATE 120 MG: 120 INJECTION SUBCUTANEOUS at 02:11

## 2022-11-08 NOTE — PROGRESS NOTES
"Subjective:       Patient ID: Shahram Hills is an 82 y.o. female.     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 Ms. Hills is an 79 yo woman who initially saw me on 6/7/18 for further evaluation of neuroendocrine tumor. She initially presented with diarrhea, abdominal discomfort, weight loss. She was evaluated at [facility] and underwent workup below:  US abdomen on 3/14/18 showed numerous bilobar mixed echogenicity and mildly vascular hepatic lesions. Cholelithiasis without e/o acute cholecystitis.   MRI abdomen on 3/30/2018 showed innumerable hepatic metastases. L3 vertebral body metastasis with soft tissue component along the right margin of the L3 and upper L4 vertebral bodies, filling the right L3/4 neural foramen, and extending into the anterior aspect of the spinal canal on the right at the L3 and upper T4 levels. Associated with mild spinal canal narrowing.  CT chest on 4/6/2018 showed one lucent nodule measuring 7 mm in the T7 vertebral body, most likely representing metastatic disease.    EGD on 5/4/18 showed normal duodenum. Schatzki's ring in the lower third of the esophagus. Grade 1 esophagitis in the GE junction c/w mild distal esophagitis. Congestion and erythema in the antrum, pre-pyloric region and stomach body c/w gastritis. Biopsy of the stomach antrum showed mild chronic gastritis, neg for H. pylori.   Labs on 5/9/2018: WBC 6.9, H/H 11.6/34.3, MCV 87.5, plt count 279. On 3/9/18: Vit B12 level 173, folic acid 6.6  She was started on oral vit B12 and underwent a liver biopsy on 5/18/18. Pathology showed "metastatic well differentiated neuroendocrine tumor. Ki67 3%"     She saw me on 6/7/18 and complained of weight loss of 26 lbs over the past 3-4 months, also has diarrhea. No flushing, wheezing, palpitations. Has been having pain in right flank radiating down " "the right leg. No tingling/numbness, weakness. She can hold her bladder but when she urinates her diarrhea would come out as well. Started on dex 4 mg q6h the evening of 18.      2. MRI spine on 18 showed "Marrow replacing metastatic lesion of the L3 vertebral body with associated extra osseous expansion and complete effacement of the right L3-L4 neural foramina and additional effacement of the right lateral recess.  There is additional lateral extension and abutment of the right psoas muscle.  Superimposed degenerative change at this level results in mild spinal canal stenosis." I sent the patient to ED on 18 where she was evaluated by neurosurgery. Neurosurgery did not feel she was a candidate for surgical intervention.      3. Seen by Dr. Adames on 18. palliative radiation to the area of the L3 metastasis 18-18   4. 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  There is an adjacent metastatic lymph node, diffuse liver metastases, and multiple bone metastases. Index primary neoplasm SUV max 33.13. Adjacent lymph node SUV max 23. L3 bone metastasis SUV max 36.86. Left lobe SUV max 46.13   5. Lanreotide every 4 weeks started on 18  6. TACE to the right hepatic lobe on 19. TACE to the left hepatic lobe on 3/21/19.   7. TACE to the right hepatic lobe on 22. S/p conventional chemoembolization performed of the segment 2, 3, 4 branch of the left hepatic artery and segment 8 branch of the left hepatic artery on 22.     History of Present Illness:   Ms. Hills returns for follow up. has been having epigastric pain since gallium study last week. Diarrhea better after xermelo.     ECO     ROS:   See HPI. Otherwise negative.      Physical Examination:   Vital signs reviewed.   Gen: well hydrated, well developed, in no acute distress.  HEENT: normocephalic, anicteric, PERRLA, EOMI, oropharynx clear  Neck: supple, no JVD, thyromegaly, " cervical or supraclavicular LAD  Lungs: CTAB, no wheezes or rales  Heart: regular rate, frequent PVCs, regular pulse, no M/R/G  Abdomen: soft, no tenderness, non-distended, liver edge 3 cm below the right costal margin, no splenomegaly, no mass, or hernia.   Ext:: no edema  Neuro: alert and oriented x 4, no focal neuro deficit  Skin: no rash, open wounds or ulcers  Psych: pleasant and appropriate mood and affect     Objective:      Laboratory Data:  Labs reviewed. CBC, CMP normal. Chromogranin 2565, serotonin 3470     Imaging Data:  CT chest 5/16/22:  Interval decrease in size of the solid nodule at the left lung base when compared to the prior examination with the nodule now measuring 0.3 cm compared to 0.5 cm on the prior examination.     Stable additional subcentimeter pulmonary nodules noted bilaterally.     Interval chemoembolization with development of dense material within some of the low-density liver lesions.  The liver findings are incompletely imaged/characterized on this noncontrast examination and correlation with patient's MRI of the abdomen and pelvis is recommended.     Bilateral adrenal thickening.     Stable sclerotic focus within the manubrium and lucent lesion within the T6 vertebral body.       MRI abdomen/pelvis 5/16/22:  Decrease in size and enhancement in numerous liver masses throughout bilateral hepatic lobes following chemoembolization.     Grossly stable appearance of the ileal mass and osseous metastasis     MRI abdomen 11/1/22:  1. History of neuroendocrine malignancy.  Numerous hepatic lesions, some remaining stable while others have mildly enlarged consistent with disease progression.  Stable osseous lesion in the L3 vertebral body.  2. Cholelithiasis and stable mild dilatation of the common bile duct and central intrahepatic biliary duct dilatation.  Of note, there is mass effect on the midportion of the common bile duct by dominant left hepatic lobe lesion.  3. Additional findings  detailed above.  RECIST SUMMARY:     Date of prior examination for comparison: 05/16/2022     Lesion 1: Left hepatic lobe.  7.1 cm.  Series 9 image 52.  Prior measurement 6.3 cm.     Lesion 2: Right hepatic lobe lateral.  3.1 cm.  Series 9 image 30.  Prior measurement 3.2 cm.     Lesion 3: Right hepatic dome.  5.1 cm.  Series 9 image 19.  Prior measurement 4.4 cm.    Gallium PET 11/1/22:     Interval development of somatostatin tracer avid lesions in the skull, concerning for new metastatic lesions.     Numerous tracer avid hypoattenuating masses throughout the liver with increased SUV max compared to prior exam.     Tracer avid lesions in the C7 and L3 vertebral bodies, sternum, distal ileum and mesenteric node are similar to prior exam.     Judged by tracer avidity of the patient's tumor burden, PRRT would be a consideration if clinically indicated.        Assessment and Plan:      1. Malignant carcinoid tumor of ileum    2. 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 Secondary neuroendocrine tumor of bone    4. 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 Immunodeficiency secondary to neoplasm    6. Acute gastritis without hemorrhage, unspecified gastritis type    7. Cancer-related pain    8. Carcinoid syndrome    9. Essential hypertension    10. 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 Started lanreotide every 4 weeks on 6/22/2018. S/p TACE to the right hepatic lobe on 2/14/19. TACE to the left hepatic lobe on 3/21/19.   - CT/MRI 1/12/22 showed mild progression in the liver. S/p TACE on 2/11/22 and 4/22/22   - octreotide not really helping with her diarrhea. Tried xermelo 250 mg tid which improves symptoms  - reviewed scan results. Discussed  slow progression over the past 4 years. Discussed PRRT. Reviewed the schedule and side effects in detail. Reviewed side effects of nausea/vomiting, hair loss, damage to body orgen, cytopenias and rare george effects of blood cancer. Patient understands and agrees with the plan. Consent signed  - lanreotide today. Hopefully we can get PRRT on 12/8. Was notified by nurse that we have shortage in amino acid. Will f/u on that  - see me on 12/7  - she needs lanreotide every 4 weeks. Will time it with PRRT     6.  - try prilosec    7.  - oxycodone prn    8.  - octreotide not really helping with her diarrhea. Tried xermelo 250 mg tid which improves symptoms  - c/w xermelo  - need lanreotide every 4 weeks    9.  - BP controlled  - c/w current medication    10.  - BS controlled  - c/w current medication    11.  - f/u with Dr Bergeron    I spent 45 minutes reviewing the chart, interpreting laboratory result and imaging data, coordinating patient's care, with at least 50% of the time on face-to-face counseling.       Sebastian Granados MD  Hematology and Medical Oncology  [facility]     Route Chart for Scheduling    Med Onc Chart Routing      Follow up with physician . CBC, CMP at [facility] on 11/28. see me at [facility] on 12/7 with CBC, CMP, chromogranin A, serotonin, get lanreotide on 12/9   Follow up with JUNAID    Infusion scheduling note    Injection scheduling note lanreotide precisely on the date I mentioned   Labs CBC and CMP   Lab interval:  chromograin, serotonin   Imaging    Pharmacy appointment    Other referrals          Supportive Plan Information  OP ZOLEDRONIC ACID (ZOMETA) Q4W   Sebastian Granados MD   Upcoming Treatment Dates - OP ZOLEDRONIC ACID (ZOMETA) Q4W    12/9/2022       Medications       zoledronic acid (ZOMETA) 4 mg in sodium chloride 0.9% 100 mL IVPB  3/9/2023       Medications       zoledronic acid (ZOMETA) 4 mg in sodium chloride 0.9% 100 mL IVPB  6/7/2023       Medications       zoledronic acid (ZOMETA) 4  mg in sodium chloride 0.9% 100 mL IVPB  9/5/2023       Medications       zoledronic acid (ZOMETA) 4 mg in sodium chloride 0.9% 100 mL IVPB    Therapy Plan Information  LANREOTIDE  5. Medications  lanreotide injection 120 mg  120 mg, Subcutaneous, Every visit    LUTATHERA SUPPORT  IV Fluids  0.9%  NaCl infusion  Intravenous, Every 8 weeks  Pre-Medications  ondansetron-dexAMETHasone 16-12 mg in sodium chloride 0.9% 50 mL IVPB 16 mg  16 mg, Intravenous, Every 8 weeks  amino acid (25 g L-LYSINE, 25 g L-ARGININE) in sodium chloride 0.9% 1000 mL infusion  1,000 mL, Intravenous, Every 8 weeks  promethazine (PHENERGAN) 12.5 mg in dextrose 5 % 50 mL IVPB  12.5 mg, Intravenous, Every 8 weeks  acetaminophen tablet 650 mg  650 mg, Oral, Every 8 weeks  Flushes  sodium chloride 0.9% flush 10 mL  10 mL, Intravenous, Every 8 weeks  PRN Medications  diphenhydrAMINE injection 50 mg  50 mg, Intravenous, Every 8 weeks  hydrocortisone sodium succinate injection 100 mg  100 mg, Intravenous, Every 8 weeks

## 2022-11-09 ENCOUNTER — SPECIALTY PHARMACY (OUTPATIENT)
Dept: PHARMACY | Facility: CLINIC | Age: 82
End: 2022-11-09
Payer: MEDICARE

## 2022-11-09 NOTE — TELEPHONE ENCOUNTER
Specialty Pharmacy - Refill Coordination    Specialty Medication Orders Linked to Encounter      Flowsheet Row Most Recent Value   Medication #1 telotristat ethyl 250 mg Tab (Order#[id number], Rx#[id number])            Refill Questions - Documented Responses      Flowsheet Row Most Recent Value   Patient Availability and HIPAA Verification    Does patient want to proceed with activity? Yes   HIPAA/medical authority confirmed? Yes   Relationship to patient of person spoken to? Family Member   Refill Screening Questions    Changes to allergies? No   Changes to medications? No   New conditions since last clinic visit? No   Unplanned office visit, urgent care, ED, or hospital admission in the last 4 weeks? No   How does patient/caregiver feel medication is working? Good   Financial problems or insurance changes? No   How many doses of your specialty medications were missed in the last 4 weeks? 0   Would patient like to speak to a pharmacist? No   When does the patient need to receive the medication? 11/15/22   Refill Delivery Questions    How will the patient receive the medication? MEDRx   When does the patient need to receive the medication? 11/15/22   Shipping Address Home   Address in [facility] confirmed and updated if neccessary? Yes   Expected Copay ($) 0   Is the patient able to afford the medication copay? Yes   Payment Method zero copay   Days supply of Refill 30   Supplies needed? No supplies needed   Refill activity completed? Yes   Refill activity plan Refill scheduled   Shipment/Pickup Date: 11/10/22            Current Outpatient Medications   Medication Sig    blood sugar diagnostic Strp To check BG 2 times daily, to use with insurance preferred meter    blood-glucose meter kit To check BG 2 times daily, to use with insurance preferred meter    carvediloL (COREG) 12.5 MG tablet Take 1 tablet (12.5 mg total) by mouth 2 (two) times daily with meals.    ciclopirox (PENLAC) 8 % Soln Apply topically  "nightly. (Patient not taking: No sig reported)    cyanocobalamin (VITAMIN B-12) 1000 MCG tablet Take 1 tablet (1,000 mcg total) by mouth once daily.    ezetimibe (ZETIA) 10 mg tablet Take 1 tablet (10 mg total) by mouth once daily.    ferrous sulfate 325 (65 FE) MG EC tablet Take 325 mg by mouth 3 (three) times daily with meals.    lancets Misc To check BG 2 times daily, to use with insurance preferred meter    latanoprost 0.005 % ophthalmic solution Place 1 drop into both eyes nightly.    losartan (COZAAR) 50 MG tablet Take 1 tablet (50 mg total) by mouth once daily. (Patient taking differently: Take 50 mg by mouth every morning.)    metFORMIN (GLUCOPHAGE-XR) 500 MG ER 24hr tablet Take 2 tablets (1,000 mg total) by mouth daily with breakfast.    needle, disp, 22 G 22 gauge x 1" Ndle 1 application by Misc.(Non-Drug; Combo Route) route 3 (three) times daily as needed (diarrhea).    octreotide acetate (SANDOSTATIN) 100 mcg/mL (1 mL) Syrg Inject 1 mL (0.1 mg total) into the skin 3 (three) times daily as needed (diarrhea). (Patient not taking: No sig reported)    omeprazole (PRILOSEC OTC) 20 MG tablet Take 1 tablet (20 mg total) by mouth once daily.    ondansetron (ZOFRAN-ODT) 4 MG TbDL Take 1 tablet (4 mg total) by mouth every 6 (six) hours as needed (nausea, vomting). (Patient not taking: No sig reported)    oxyCODONE (ROXICODONE) 5 MG immediate release tablet Take 1 tablet (5 mg total) by mouth every 6 (six) hours as needed for Pain.    syringe, disposable, 1 mL Syrg 1 application by Misc.(Non-Drug; Combo Route) route 3 (three) times daily as needed (diarrhea).    telotristat ethyl 250 mg Tab Take 1 tablet (250 mg) by mouth 3 (three) times daily.    TRUEPLUS LANCETS 33 gauge Misc USE TO CHECK BLOOD SUGAR TWICE DAILY    vitamin D (VITAMIN D3) 1000 units Tab Take 400 Units by mouth once daily.    XIIDRA 5 % Dpet INSTILL ONE DROP INTO OU BID   Last reviewed on 11/8/2022  3:26 PM by Sebastian Granados MD    Review of patient's " allergies indicates:   Allergen Reactions    Epinephrine      carcinoid    Last reviewed on  11/8/2022 3:26 PM by Sebastian Granados      Tasks added this encounter   12/8/2022 - Refill Call (Auto Added)   Tasks due within next 3 months   2/5/2023 - Clinical - Follow Up Assesement (180 day)     [facility]  [address]  [facility] [address]  Phone: [phone]  Fax: [phone]

## 2022-11-28 ENCOUNTER — LAB VISIT (OUTPATIENT)
Dept: LAB | Facility: OTHER | Age: 82
End: 2022-11-28
Attending: INTERNAL MEDICINE
Payer: MEDICARE

## 2022-11-28 DIAGNOSIS — C7A.012 MALIGNANT CARCINOID TUMOR OF ILEUM: ICD-10-CM

## 2022-11-28 LAB
ALBUMIN SERPL BCP-MCNC: 3.2 G/DL (ref 3.5–5.2)
ALP SERPL-CCNC: 76 U/L (ref 55–135)
ALT SERPL W/O P-5'-P-CCNC: 19 U/L (ref 10–44)
ANION GAP SERPL CALC-SCNC: 10 MMOL/L (ref 8–16)
AST SERPL-CCNC: 15 U/L (ref 10–40)
BASOPHILS # BLD AUTO: 0.03 K/UL (ref 0–0.2)
BASOPHILS NFR BLD: 0.4 % (ref 0–1.9)
BILIRUB SERPL-MCNC: 0.6 MG/DL (ref 0.1–1)
BUN SERPL-MCNC: 9 MG/DL (ref 8–23)
CALCIUM SERPL-MCNC: 9.4 MG/DL (ref 8.7–10.5)
CHLORIDE SERPL-SCNC: 103 MMOL/L (ref 95–110)
CO2 SERPL-SCNC: 28 MMOL/L (ref 23–29)
CREAT SERPL-MCNC: 0.7 MG/DL (ref 0.5–1.4)
DIFFERENTIAL METHOD: NORMAL
EOSINOPHIL # BLD AUTO: 0 K/UL (ref 0–0.5)
EOSINOPHIL NFR BLD: 0.4 % (ref 0–8)
ERYTHROCYTE [DISTWIDTH] IN BLOOD BY AUTOMATED COUNT: 13.9 % (ref 11.5–14.5)
EST. GFR  (NO RACE VARIABLE): >60 ML/MIN/1.73 M^2
GLUCOSE SERPL-MCNC: 252 MG/DL (ref 70–110)
HCT VFR BLD AUTO: 38.7 % (ref 37–48.5)
HGB BLD-MCNC: 12.4 G/DL (ref 12–16)
IMM GRANULOCYTES # BLD AUTO: 0.03 K/UL (ref 0–0.04)
IMM GRANULOCYTES NFR BLD AUTO: 0.4 % (ref 0–0.5)
LYMPHOCYTES # BLD AUTO: 1.6 K/UL (ref 1–4.8)
LYMPHOCYTES NFR BLD: 18.5 % (ref 18–48)
MCH RBC QN AUTO: 29.7 PG (ref 27–31)
MCHC RBC AUTO-ENTMCNC: 32 G/DL (ref 32–36)
MCV RBC AUTO: 93 FL (ref 82–98)
MONOCYTES # BLD AUTO: 0.8 K/UL (ref 0.3–1)
MONOCYTES NFR BLD: 9.6 % (ref 4–15)
NEUTROPHILS # BLD AUTO: 6 K/UL (ref 1.8–7.7)
NEUTROPHILS NFR BLD: 70.7 % (ref 38–73)
NRBC BLD-RTO: 0 /100 WBC
PLATELET # BLD AUTO: 301 K/UL (ref 150–450)
PMV BLD AUTO: 10 FL (ref 9.2–12.9)
POTASSIUM SERPL-SCNC: 3.5 MMOL/L (ref 3.5–5.1)
PROT SERPL-MCNC: 7 G/DL (ref 6–8.4)
RBC # BLD AUTO: 4.18 M/UL (ref 4–5.4)
SODIUM SERPL-SCNC: 141 MMOL/L (ref 136–145)
WBC # BLD AUTO: 8.5 K/UL (ref 3.9–12.7)

## 2022-11-28 PROCEDURE — 85025 COMPLETE CBC W/AUTO DIFF WBC: CPT | Performed by: INTERNAL MEDICINE

## 2022-11-28 PROCEDURE — 80053 COMPREHEN METABOLIC PANEL: CPT | Performed by: INTERNAL MEDICINE

## 2022-11-28 PROCEDURE — 36415 COLL VENOUS BLD VENIPUNCTURE: CPT | Performed by: INTERNAL MEDICINE

## 2022-11-30 ENCOUNTER — TELEPHONE (OUTPATIENT)
Dept: INFUSION THERAPY | Facility: HOSPITAL | Age: 82
End: 2022-11-30
Payer: MEDICARE

## 2022-11-30 NOTE — TELEPHONE ENCOUNTER
Confirmed upcoming appointments with the patient. Pt states she does not have transportation for injection on 12/15. Requesting injection appt to scheduled on 12/16. Spoke with Dorothy . rescheduled to 12/16 at 11a. Confirmed injection appt with the patient's sister       12/6 at [facility]  12/7 at 4p Dr. Granados  12/14 at 9a in [place] for PRRT

## 2022-12-05 NOTE — TELEPHONE ENCOUNTER
Care Due:                  Date            Visit Type   Department     Provider  --------------------------------------------------------------------------------                                EP -                              PRIMARY      [facility] INTERNAL  Last Visit: 06-      CARE ([facility])   MEDICINE       Trixie Xie                              EP -                              PRIMARY      [facility] INTERNAL  Next Visit: 12-      CARE ([facility])   MEDICINE       Trixie Xie                                                            Last  Test          Frequency    Reason                     Performed    Due Date  --------------------------------------------------------------------------------    HBA1C.......  6 months...  metFORMIN................  07- 01-    [facility] Embedded Care Gaps. Reference number: [id number]. 12/05/2022   1:28:35 PM CST

## 2022-12-06 ENCOUNTER — TELEPHONE (OUTPATIENT)
Dept: INFUSION THERAPY | Facility: HOSPITAL | Age: 82
End: 2022-12-06
Payer: MEDICARE

## 2022-12-06 ENCOUNTER — LAB VISIT (OUTPATIENT)
Dept: LAB | Facility: OTHER | Age: 82
End: 2022-12-06
Attending: INTERNAL MEDICINE
Payer: MEDICARE

## 2022-12-06 DIAGNOSIS — C7A.012 MALIGNANT CARCINOID TUMOR OF ILEUM: ICD-10-CM

## 2022-12-06 DIAGNOSIS — C7B.8 METASTATIC MALIGNANT NEUROENDOCRINE TUMOR TO LIVER: Primary | ICD-10-CM

## 2022-12-06 LAB
ALBUMIN SERPL BCP-MCNC: 3.4 G/DL (ref 3.5–5.2)
ALP SERPL-CCNC: 73 U/L (ref 55–135)
ALT SERPL W/O P-5'-P-CCNC: 17 U/L (ref 10–44)
ANION GAP SERPL CALC-SCNC: 6 MMOL/L (ref 8–16)
AST SERPL-CCNC: 18 U/L (ref 10–40)
BILIRUB SERPL-MCNC: 0.6 MG/DL (ref 0.1–1)
BUN SERPL-MCNC: 6 MG/DL (ref 8–23)
CALCIUM SERPL-MCNC: 9.6 MG/DL (ref 8.7–10.5)
CHLORIDE SERPL-SCNC: 101 MMOL/L (ref 95–110)
CO2 SERPL-SCNC: 32 MMOL/L (ref 23–29)
CREAT SERPL-MCNC: 0.7 MG/DL (ref 0.5–1.4)
ERYTHROCYTE [DISTWIDTH] IN BLOOD BY AUTOMATED COUNT: 13.7 % (ref 11.5–14.5)
EST. GFR  (NO RACE VARIABLE): >60 ML/MIN/1.73 M^2
GLUCOSE SERPL-MCNC: 226 MG/DL (ref 70–110)
HCT VFR BLD AUTO: 39.2 % (ref 37–48.5)
HGB BLD-MCNC: 12.7 G/DL (ref 12–16)
IMM GRANULOCYTES # BLD AUTO: 0.02 K/UL (ref 0–0.04)
MCH RBC QN AUTO: 30 PG (ref 27–31)
MCHC RBC AUTO-ENTMCNC: 32.4 G/DL (ref 32–36)
MCV RBC AUTO: 93 FL (ref 82–98)
NEUTROPHILS # BLD AUTO: 4.8 K/UL (ref 1.8–7.7)
PLATELET # BLD AUTO: 253 K/UL (ref 150–450)
PMV BLD AUTO: 10.2 FL (ref 9.2–12.9)
POTASSIUM SERPL-SCNC: 4 MMOL/L (ref 3.5–5.1)
PROT SERPL-MCNC: 7.2 G/DL (ref 6–8.4)
RBC # BLD AUTO: 4.23 M/UL (ref 4–5.4)
SODIUM SERPL-SCNC: 139 MMOL/L (ref 136–145)
WBC # BLD AUTO: 6.76 K/UL (ref 3.9–12.7)

## 2022-12-06 PROCEDURE — 80053 COMPREHEN METABOLIC PANEL: CPT | Performed by: PHYSICIAN ASSISTANT

## 2022-12-06 PROCEDURE — 84260 ASSAY OF SEROTONIN: CPT | Performed by: PHYSICIAN ASSISTANT

## 2022-12-06 PROCEDURE — 86316 IMMUNOASSAY TUMOR OTHER: CPT | Performed by: PHYSICIAN ASSISTANT

## 2022-12-06 PROCEDURE — 36415 COLL VENOUS BLD VENIPUNCTURE: CPT | Performed by: PHYSICIAN ASSISTANT

## 2022-12-06 PROCEDURE — 85027 COMPLETE CBC AUTOMATED: CPT | Performed by: PHYSICIAN ASSISTANT

## 2022-12-06 RX ORDER — CALCIUM CITRATE/VITAMIN D3 200MG-6.25
TABLET ORAL
Qty: 100 STRIP | Refills: 11 | Status: SHIPPED | OUTPATIENT
Start: 2022-12-06

## 2022-12-06 NOTE — TELEPHONE ENCOUNTER
Refill Routing Note   Medication(s) are not appropriate for processing by [facility] for the following reason(s):      - Patient displays non-adherence to medications (has run out of medication supply over 6 months ago)    [facility] action(s):  Defer Medication-related problems identified: Requires labs              Appointments  past 12m or future 3m with PCP    Date Provider   Last Visit   6/21/2022 Trixie Xie MD   Next Visit   12/13/2022 Trixie Xie MD   ED visits in past 90 days: 0        Note composed:12:42 PM 12/06/2022

## 2022-12-07 ENCOUNTER — OFFICE VISIT (OUTPATIENT)
Dept: HEMATOLOGY/ONCOLOGY | Facility: CLINIC | Age: 82
End: 2022-12-07
Payer: MEDICARE

## 2022-12-07 VITALS
OXYGEN SATURATION: 98 % | RESPIRATION RATE: 18 BRPM | HEIGHT: 63 IN | TEMPERATURE: 98 F | DIASTOLIC BLOOD PRESSURE: 94 MMHG | SYSTOLIC BLOOD PRESSURE: 177 MMHG | BODY MASS INDEX: 20.79 KG/M2 | WEIGHT: 117.31 LBS | HEART RATE: 57 BPM

## 2022-12-07 DIAGNOSIS — C7A.012 MALIGNANT CARCINOID TUMOR OF ILEUM: Primary | ICD-10-CM

## 2022-12-07 DIAGNOSIS — R80.9 CONTROLLED TYPE 2 DIABETES MELLITUS WITH MICROALBUMINURIA, WITHOUT LONG-TERM CURRENT USE OF INSULIN: ICD-10-CM

## 2022-12-07 DIAGNOSIS — D84.81 IMMUNODEFICIENCY SECONDARY TO NEOPLASM: ICD-10-CM

## 2022-12-07 DIAGNOSIS — E34.0 CARCINOID SYNDROME: ICD-10-CM

## 2022-12-07 DIAGNOSIS — I10 ESSENTIAL HYPERTENSION: ICD-10-CM

## 2022-12-07 DIAGNOSIS — E11.29 CONTROLLED TYPE 2 DIABETES MELLITUS WITH MICROALBUMINURIA, WITHOUT LONG-TERM CURRENT USE OF INSULIN: ICD-10-CM

## 2022-12-07 DIAGNOSIS — C79.51 SPINE METASTASIS: ICD-10-CM

## 2022-12-07 DIAGNOSIS — C7A.8 NEUROENDOCRINE CARCINOMA METASTATIC TO BONE: ICD-10-CM

## 2022-12-07 DIAGNOSIS — D49.9 IMMUNODEFICIENCY SECONDARY TO NEOPLASM: ICD-10-CM

## 2022-12-07 DIAGNOSIS — C7B.8 METASTATIC MALIGNANT NEUROENDOCRINE TUMOR TO LIVER: ICD-10-CM

## 2022-12-07 DIAGNOSIS — C7B.8 NEUROENDOCRINE CARCINOMA METASTATIC TO BONE: ICD-10-CM

## 2022-12-07 LAB — CGA SERPL-MCNC: 1448 NG/ML

## 2022-12-07 PROCEDURE — 99215 PR OFFICE/OUTPT VISIT, EST, LEVL V, 40-54 MIN: ICD-10-PCS | Mod: HCNC,S$GLB,, | Performed by: INTERNAL MEDICINE

## 2022-12-07 PROCEDURE — 3288F FALL RISK ASSESSMENT DOCD: CPT | Mod: HCNC,CPTII,S$GLB, | Performed by: INTERNAL MEDICINE

## 2022-12-07 PROCEDURE — 1126F AMNT PAIN NOTED NONE PRSNT: CPT | Mod: HCNC,CPTII,S$GLB, | Performed by: INTERNAL MEDICINE

## 2022-12-07 PROCEDURE — 99215 OFFICE O/P EST HI 40 MIN: CPT | Mod: HCNC,S$GLB,, | Performed by: INTERNAL MEDICINE

## 2022-12-07 PROCEDURE — 1159F PR MEDICATION LIST DOCUMENTED IN MEDICAL RECORD: ICD-10-PCS | Mod: HCNC,CPTII,S$GLB, | Performed by: INTERNAL MEDICINE

## 2022-12-07 PROCEDURE — 1160F PR REVIEW ALL MEDS BY PRESCRIBER/CLIN PHARMACIST DOCUMENTED: ICD-10-PCS | Mod: HCNC,CPTII,S$GLB, | Performed by: INTERNAL MEDICINE

## 2022-12-07 PROCEDURE — 1101F PR PT FALLS ASSESS DOC 0-1 FALLS W/OUT INJ PAST YR: ICD-10-PCS | Mod: HCNC,CPTII,S$GLB, | Performed by: INTERNAL MEDICINE

## 2022-12-07 PROCEDURE — 1160F RVW MEDS BY RX/DR IN RCRD: CPT | Mod: HCNC,CPTII,S$GLB, | Performed by: INTERNAL MEDICINE

## 2022-12-07 PROCEDURE — 1101F PT FALLS ASSESS-DOCD LE1/YR: CPT | Mod: HCNC,CPTII,S$GLB, | Performed by: INTERNAL MEDICINE

## 2022-12-07 PROCEDURE — 1126F PR PAIN SEVERITY QUANTIFIED, NO PAIN PRESENT: ICD-10-PCS | Mod: HCNC,CPTII,S$GLB, | Performed by: INTERNAL MEDICINE

## 2022-12-07 PROCEDURE — 3077F PR MOST RECENT SYSTOLIC BLOOD PRESSURE >= 140 MM HG: ICD-10-PCS | Mod: HCNC,CPTII,S$GLB, | Performed by: INTERNAL MEDICINE

## 2022-12-07 PROCEDURE — 3077F SYST BP >= 140 MM HG: CPT | Mod: HCNC,CPTII,S$GLB, | Performed by: INTERNAL MEDICINE

## 2022-12-07 PROCEDURE — 1159F MED LIST DOCD IN RCRD: CPT | Mod: HCNC,CPTII,S$GLB, | Performed by: INTERNAL MEDICINE

## 2022-12-07 PROCEDURE — 3288F PR FALLS RISK ASSESSMENT DOCUMENTED: ICD-10-PCS | Mod: HCNC,CPTII,S$GLB, | Performed by: INTERNAL MEDICINE

## 2022-12-07 PROCEDURE — 99999 PR PBB SHADOW E&M-EST. PATIENT-LVL IV: ICD-10-PCS | Mod: PBBFAC,,, | Performed by: INTERNAL MEDICINE

## 2022-12-07 PROCEDURE — 99999 PR PBB SHADOW E&M-EST. PATIENT-LVL IV: CPT | Mod: PBBFAC,,, | Performed by: INTERNAL MEDICINE

## 2022-12-07 PROCEDURE — 3080F PR MOST RECENT DIASTOLIC BLOOD PRESSURE >= 90 MM HG: ICD-10-PCS | Mod: HCNC,CPTII,S$GLB, | Performed by: INTERNAL MEDICINE

## 2022-12-07 PROCEDURE — 3080F DIAST BP >= 90 MM HG: CPT | Mod: HCNC,CPTII,S$GLB, | Performed by: INTERNAL MEDICINE

## 2022-12-07 RX ORDER — DIPHENOXYLATE HYDROCHLORIDE AND ATROPINE SULFATE 2.5; .025 MG/1; MG/1
1 TABLET ORAL 4 TIMES DAILY PRN
Qty: 90 TABLET | Refills: 2 | Status: SHIPPED | OUTPATIENT
Start: 2022-12-07

## 2022-12-07 NOTE — PROGRESS NOTES
"Subjective:       Patient ID: Shahram Hills is an 82 y.o. female.     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 Ms. Hills is an 77 yo woman who initially saw me on 6/7/18 for further evaluation of neuroendocrine tumor. She initially presented with diarrhea, abdominal discomfort, weight loss. She was evaluated at [facility] and underwent workup below:  US abdomen on 3/14/18 showed numerous bilobar mixed echogenicity and mildly vascular hepatic lesions. Cholelithiasis without e/o acute cholecystitis.   MRI abdomen on 3/30/2018 showed innumerable hepatic metastases. L3 vertebral body metastasis with soft tissue component along the right margin of the L3 and upper L4 vertebral bodies, filling the right L3/4 neural foramen, and extending into the anterior aspect of the spinal canal on the right at the L3 and upper T4 levels. Associated with mild spinal canal narrowing.  CT chest on 4/6/2018 showed one lucent nodule measuring 7 mm in the T7 vertebral body, most likely representing metastatic disease.    EGD on 5/4/18 showed normal duodenum. Schatzki's ring in the lower third of the esophagus. Grade 1 esophagitis in the GE junction c/w mild distal esophagitis. Congestion and erythema in the antrum, pre-pyloric region and stomach body c/w gastritis. Biopsy of the stomach antrum showed mild chronic gastritis, neg for H. pylori.   Labs on 5/9/2018: WBC 6.9, H/H 11.6/34.3, MCV 87.5, plt count 279. On 3/9/18: Vit B12 level 173, folic acid 6.6  She was started on oral vit B12 and underwent a liver biopsy on 5/18/18. Pathology showed "metastatic well differentiated neuroendocrine tumor. Ki67 3%"     She saw me on 6/7/18 and complained of weight loss of 26 lbs over the past 3-4 months, also has diarrhea. No flushing, wheezing, palpitations. Has been having pain in right flank radiating down " "the right leg. No tingling/numbness, weakness. She can hold her bladder but when she urinates her diarrhea would come out as well. Started on dex 4 mg q6h the evening of 18.      2. MRI spine on 18 showed "Marrow replacing metastatic lesion of the L3 vertebral body with associated extra osseous expansion and complete effacement of the right L3-L4 neural foramina and additional effacement of the right lateral recess.  There is additional lateral extension and abutment of the right psoas muscle.  Superimposed degenerative change at this level results in mild spinal canal stenosis." I sent the patient to ED on 18 where she was evaluated by neurosurgery. Neurosurgery did not feel she was a candidate for surgical intervention.      3. Seen by Dr. Adames on 18. palliative radiation to the area of the L3 metastasis 18-18   4. 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  There is an adjacent metastatic lymph node, diffuse liver metastases, and multiple bone metastases. Index primary neoplasm SUV max 33.13. Adjacent lymph node SUV max 23. L3 bone metastasis SUV max 36.86. Left lobe SUV max 46.13   5. Lanreotide every 4 weeks started on 18  6. TACE to the right hepatic lobe on 19. TACE to the left hepatic lobe on 3/21/19.   7. TACE to the right hepatic lobe on 22. S/p conventional chemoembolization performed of the segment 2, 3, 4 branch of the left hepatic artery and segment 8 branch of the left hepatic artery on 22.  8. Gallium PET 22 showed progression. Discussed PRRT. To be started on 22     History of Present Illness:   Ms. Hills returns for follow up. intermittent diarrhea. Taking xermelo. No orthopnea.     ECO     ROS:   See HPI. Otherwise negative.      Physical Examination:   Vital signs reviewed.   Gen: well hydrated, well developed, in no acute distress.  HEENT: normocephalic, anicteric, PERRLA, EOMI, oropharynx " clear  Neck: supple, no JVD, thyromegaly, cervical or supraclavicular LAD  Lungs: CTAB, no wheezes or rales  Heart: regular rate, frequent PVCs, regular pulse, no M/R/G  Abdomen: soft, no tenderness, non-distended, liver edge 3 cm below the right costal margin, no splenomegaly, no mass, or hernia.   Ext: b/l LE 1+ pedal edema  Neuro: alert and oriented x 4, no focal neuro deficit  Skin: no rash, open wounds or ulcers  Psych: pleasant and appropriate mood and affect     Objective:      Laboratory Data:  Labs reviewed. CBC normal. Glucose 226. Chromogranin A came down from 2565 to 1448     Imaging Data:     MRI abdomen 11/1/22:  1. History of neuroendocrine malignancy.  Numerous hepatic lesions, some remaining stable while others have mildly enlarged consistent with disease progression.  Stable osseous lesion in the L3 vertebral body.  2. Cholelithiasis and stable mild dilatation of the common bile duct and central intrahepatic biliary duct dilatation.  Of note, there is mass effect on the midportion of the common bile duct by dominant left hepatic lobe lesion.  3. Additional findings detailed above.  RECIST SUMMARY:     Date of prior examination for comparison: 05/16/2022     Lesion 1: Left hepatic lobe.  7.1 cm.  Series 9 image 52.  Prior measurement 6.3 cm.     Lesion 2: Right hepatic lobe lateral.  3.1 cm.  Series 9 image 30.  Prior measurement 3.2 cm.     Lesion 3: Right hepatic dome.  5.1 cm.  Series 9 image 19.  Prior measurement 4.4 cm.    Gallium PET 11/1/22:     Interval development of somatostatin tracer avid lesions in the skull, concerning for new metastatic lesions.     Numerous tracer avid hypoattenuating masses throughout the liver with increased SUV max compared to prior exam.     Tracer avid lesions in the C7 and L3 vertebral bodies, sternum, distal ileum and mesenteric node are similar to prior exam.     Judged by tracer avidity of the patient's tumor burden, PRRT would be a consideration if  clinically indicated.        Assessment and Plan:      1. Malignant carcinoid tumor of ileum    2. 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    4. Spine metastasis    5. Immunodeficiency secondary to neoplasm    6. Essential hypertension    7. Carcinoid syndrome    8. Controlled type 2 diabetes mellitus with microalbuminuria, without long-term current use of insulin      1-5  - Ms Hills is an 83 yo woman with well differentiated low-grade neuroendocrine tumor of the ileum with mets to liver and bones, diagnosed on 5/18/2018. Started lanreotide every 4 weeks on 6/22/2018. S/p TACE to the right hepatic lobe on 2/14/19. TACE to the left hepatic lobe on 3/21/19.   - CT/MRI 1/12/22 showed mild progression in the liver. S/p TACE on 2/11/22 and 4/22/22   - octreotide not really helping with her diarrhea. Tried xermelo 250 mg tid which improves symptoms  - here to start PRRT next week. Today we reviewed side effects in detail again. Discussed with patient re the schedule, regimen, and side effects of PRRT. Side effects include but are not limited to bone marrow suppression and cytopenias that may increase the risk of infection, bleeding and symptomatic anemia, hair loss, damage to any body organs including lungs, kidney, liver, heart, carcinoid crisis, small bowel obstruction, and blood cancer such as leukemia or myelodysplastic syndrome. Consent was signed previously  - will reschedule lanreotide from 12/16/to 12/15.   - see JUNAID in 3 weeks for toxicity check and get zometa  - see me on 1/10 with labs then lanreotide  - next PRRT on 2/8. Lanreotide on 2/9  - had dental clearance. Will start zometa in 3 weeks when she returns to see JUNAID    6.  - BP elevated  - f/u with PCP    7.  - See above. C/w lanreotide and xermelo    8.  - BS elevated  - f/u with PCP    I spent 45 minutes reviewing the chart, interpreting laboratory result and imaging data, coordinating  patient's care, with at least 50% of the time on face-to-face counseling.       Sebastian Granados MD  Hematology and Medical Oncology  [facility]     Route Chart for Scheduling    Med Onc Chart Routing      Follow up with physician . See Nayana on 12/27 with CBC, CMP, chromogranin A, serotonin, then get zometa. see me on 1/10 with CBC, CMP, chromogranin, serotonin, get lanreotide. cancel labs on 1/13. see me on 2/7 with labs, get lanreotide on 2/8   Follow up with JUNAID    Infusion scheduling note zometa every 3 months, to be started on 12/27   Injection scheduling note lanreotide, see dates above   Labs CBC and CMP   Lab interval:  serotonin, chromogranin A   Imaging    Pharmacy appointment    Other referrals            Supportive Plan Information  OP ZOLEDRONIC ACID (ZOMETA) Q4W   Sebastian Granados MD   Upcoming Treatment Dates - OP ZOLEDRONIC ACID (ZOMETA) Q4W    12/9/2022       Medications       zoledronic acid (ZOMETA) 4 mg in sodium chloride 0.9% 100 mL IVPB  3/9/2023       Medications       zoledronic acid (ZOMETA) 4 mg in sodium chloride 0.9% 100 mL IVPB  6/7/2023       Medications       zoledronic acid (ZOMETA) 4 mg in sodium chloride 0.9% 100 mL IVPB  9/5/2023       Medications       zoledronic acid (ZOMETA) 4 mg in sodium chloride 0.9% 100 mL IVPB    Therapy Plan Information  LANREOTIDE  5. Medications  lanreotide injection 120 mg  120 mg, Subcutaneous, Every visit    LUTATHERA SUPPORT  IV Fluids  0.9%  NaCl infusion  Intravenous, Every 8 weeks  Pre-Medications  ondansetron-dexAMETHasone 16-12 mg in sodium chloride 0.9% 50 mL IVPB 16 mg  16 mg, Intravenous, Every 8 weeks  amino acid (25 g L-LYSINE, 25 g L-ARGININE) in sodium chloride 0.9% 1000 mL infusion  1,000 mL, Intravenous, Every 8 weeks  promethazine (PHENERGAN) 12.5 mg in dextrose 5 % 50 mL IVPB  12.5 mg, Intravenous, Every 8 weeks  acetaminophen tablet 650 mg  650 mg, Oral, Every 8 weeks  Flushes  sodium chloride 0.9% flush 10 mL  10 mL,  Intravenous, Every 8 weeks  PRN Medications  diphenhydrAMINE injection 50 mg  50 mg, Intravenous, Every 8 weeks  hydrocortisone sodium succinate injection 100 mg  100 mg, Intravenous, Every 8 weeks

## 2022-12-08 ENCOUNTER — PATIENT MESSAGE (OUTPATIENT)
Dept: PHARMACY | Facility: CLINIC | Age: 82
End: 2022-12-08
Payer: MEDICARE

## 2022-12-09 ENCOUNTER — TELEPHONE (OUTPATIENT)
Dept: HEMATOLOGY/ONCOLOGY | Facility: CLINIC | Age: 82
End: 2022-12-09
Payer: MEDICARE

## 2022-12-09 ENCOUNTER — SPECIALTY PHARMACY (OUTPATIENT)
Dept: PHARMACY | Facility: CLINIC | Age: 82
End: 2022-12-09
Payer: MEDICARE

## 2022-12-09 DIAGNOSIS — E34.0 CARCINOID SYNDROME: ICD-10-CM

## 2022-12-09 DIAGNOSIS — C7A.012 MALIGNANT CARCINOID TUMOR OF ILEUM: ICD-10-CM

## 2022-12-09 DIAGNOSIS — R19.7 DIARRHEA, UNSPECIFIED TYPE: ICD-10-CM

## 2022-12-09 RX ORDER — TELOTRISTAT ETHYL 250 MG/1
250 TABLET ORAL 3 TIMES DAILY
Qty: 90 TABLET | Refills: 3 | Status: SHIPPED | OUTPATIENT
Start: 2022-12-09 | End: 2023-05-01 | Stop reason: SDUPTHER

## 2022-12-09 NOTE — TELEPHONE ENCOUNTER
----- Message from Timoteo Rodriguez sent at 12/9/2022 12:48 PM CST -----  Type:  Patient Returning Call    Who Called: Shahram  Who Left Message for Patient:   Does the patient know what this is regarding?: rescheduling her injection  Would the patient rather a call back or a response via [facility]?  Call back   Best Call Back Number: [phone]  Additional Information:  no

## 2022-12-09 NOTE — TELEPHONE ENCOUNTER
Specialty Pharmacy - Refill Coordination    Specialty Medication Orders Linked to Encounter      Flowsheet Row Most Recent Value   Medication #1 telotristat ethyl 250 mg Tab (Order#[id number], Rx#[id number])            Refill Questions - Documented Responses      Flowsheet Row Most Recent Value   Patient Availability and HIPAA Verification    Does patient want to proceed with activity? Yes   HIPAA/medical authority confirmed? Yes   Relationship to patient of person spoken to? Self   Refill Screening Questions    Changes to allergies? No   Changes to medications? No   New conditions since last clinic visit? No   Unplanned office visit, urgent care, ED, or hospital admission in the last 4 weeks? No   How does patient/caregiver feel medication is working? Good   Financial problems or insurance changes? No   How many doses of your specialty medications were missed in the last 4 weeks? 0   Would patient like to speak to a pharmacist? No   When does the patient need to receive the medication? 12/16/22   Refill Delivery Questions    How will the patient receive the medication? MEDRx   When does the patient need to receive the medication? 12/16/22   Shipping Address Home   Address in [facility] confirmed and updated if neccessary? Yes   Expected Copay ($) 0   Is the patient able to afford the medication copay? Yes   Payment Method zero copay   Days supply of Refill 30   Supplies needed? No supplies needed   Refill activity completed? Yes   Refill activity plan Refill scheduled   Shipment/Pickup Date: 12/12/22            Current Outpatient Medications   Medication Sig    blood sugar diagnostic (TRUE METRIX GLUCOSE TEST STRIP) Strp USE TO CHECK BLOOD SUGAR TWICE DAILY    blood-glucose meter kit To check BG 2 times daily, to use with insurance preferred meter    carvediloL (COREG) 12.5 MG tablet Take 1 tablet (12.5 mg total) by mouth 2 (two) times daily with meals.    ciclopirox (PENLAC) 8 % Soln Apply topically  "nightly. (Patient not taking: No sig reported)    cyanocobalamin (VITAMIN B-12) 1000 MCG tablet Take 1 tablet (1,000 mcg total) by mouth once daily.    diphenoxylate-atropine 2.5-0.025 mg (LOMOTIL) 2.5-0.025 mg per tablet Take 1 tablet by mouth 4 (four) times daily as needed for Diarrhea.    ezetimibe (ZETIA) 10 mg tablet Take 1 tablet (10 mg total) by mouth once daily.    ferrous sulfate 325 (65 FE) MG EC tablet Take 325 mg by mouth 3 (three) times daily with meals.    lancets Mis To check BG 2 times daily, to use with insurance preferred meter    latanoprost 0.005 % ophthalmic solution Place 1 drop into both eyes nightly.    losartan (COZAAR) 50 MG tablet Take 1 tablet (50 mg total) by mouth once daily. (Patient taking differently: Take 50 mg by mouth every morning.)    metFORMIN (GLUCOPHAGE-XR) 500 MG ER 24hr tablet Take 2 tablets (1,000 mg total) by mouth daily with breakfast.    needle, disp, 22 G 22 gauge x 1" Ndle 1 application by Misc.(Non-Drug; Combo Route) route 3 (three) times daily as needed (diarrhea).    octreotide acetate (SANDOSTATIN) 100 mcg/mL (1 mL) Syrg Inject 1 mL (0.1 mg total) into the skin 3 (three) times daily as needed (diarrhea). (Patient not taking: No sig reported)    omeprazole (PRILOSEC) 20 MG capsule TAKE ONE CAPSULE BY MOUTH ONCE DAILY.    ondansetron (ZOFRAN-ODT) 4 MG TbDL Take 1 tablet (4 mg total) by mouth every 6 (six) hours as needed (nausea, vomting). (Patient not taking: No sig reported)    oxyCODONE (ROXICODONE) 5 MG immediate release tablet Take 1 tablet (5 mg total) by mouth every 6 (six) hours as needed for Pain.    syringe, disposable, 1 mL Syrg 1 application by Misc.(Non-Drug; Combo Route) route 3 (three) times daily as needed (diarrhea).    telotristat ethyl 250 mg Tab Take 1 tablet (250 mg) by mouth 3 (three) times daily.    TRUEPLUS LANCETS 33 gauge Misc USE TO CHECK BLOOD SUGAR TWICE DAILY    vitamin D (VITAMIN D3) 1000 units Tab Take 400 Units by mouth once daily. "    XIIDRA 5 % Dpet INSTILL ONE DROP INTO OU BID   Last reviewed on 12/7/2022  4:36 PM by Sebastian Granados MD    Review of patient's allergies indicates:   Allergen Reactions    Epinephrine      carcinoid    Last reviewed on  12/7/2022 4:36 PM by Sebastian Granados      Tasks added this encounter   No tasks added.   Tasks due within next 3 months   2/5/2023 - Clinical - Follow Up Assesement (180 day)  12/8/2022 - Refill Call (Auto Added)     Alicia Bennett, PharmD  [facility]  [address] [place] [id number]  Phone: [phone]  Fax: [phone]

## 2022-12-09 NOTE — TELEPHONE ENCOUNTER
"----- Message from Gilles Desai sent at 12/9/2022  9:08 AM CST -----  Reschedule Existing Appointment        Appt Date: 12/16    Type of appt: Injection    Physician: Du    Reason for rescheduling? Requesting to r/s for 12/15; Stating pt needs to have Injection done the day after her test (which is on 12/14)    Caller: Self    Contact Preference: [phone] (Mobile)                Additional Information:  "Thank you for all that you do for our patients"     "

## 2022-12-09 NOTE — TELEPHONE ENCOUNTER
Spoke to patient's daughter, injection appointment 12/16 needs to be moved to 12/15. Will reach out about getting this changed.

## 2022-12-09 NOTE — TELEPHONE ENCOUNTER
Pt aware [facility] out of stock and we will order. Has enough to last her until 12/16/22. Additionally, only 24 tabs remaining on rx and send [facility] refill request. Pt aware if any issues, [facility] will contact back if delivery date changes.

## 2022-12-12 LAB — SEROTONIN: 3210 NG/ML

## 2022-12-13 ENCOUNTER — TELEPHONE (OUTPATIENT)
Dept: INFUSION THERAPY | Facility: HOSPITAL | Age: 82
End: 2022-12-13
Payer: MEDICARE

## 2022-12-13 ENCOUNTER — OFFICE VISIT (OUTPATIENT)
Dept: INTERNAL MEDICINE | Facility: CLINIC | Age: 82
End: 2022-12-13
Attending: INTERNAL MEDICINE
Payer: MEDICARE

## 2022-12-13 VITALS
DIASTOLIC BLOOD PRESSURE: 74 MMHG | SYSTOLIC BLOOD PRESSURE: 124 MMHG | WEIGHT: 120.56 LBS | HEART RATE: 45 BPM | OXYGEN SATURATION: 98 % | HEIGHT: 63 IN | BODY MASS INDEX: 21.36 KG/M2

## 2022-12-13 DIAGNOSIS — E11.29 CONTROLLED TYPE 2 DIABETES MELLITUS WITH MICROALBUMINURIA, WITHOUT LONG-TERM CURRENT USE OF INSULIN: Primary | ICD-10-CM

## 2022-12-13 DIAGNOSIS — C7B.8 METASTATIC MALIGNANT NEUROENDOCRINE TUMOR TO LIVER: ICD-10-CM

## 2022-12-13 DIAGNOSIS — E78.5 HYPERLIPIDEMIA, UNSPECIFIED HYPERLIPIDEMIA TYPE: ICD-10-CM

## 2022-12-13 DIAGNOSIS — R80.9 CONTROLLED TYPE 2 DIABETES MELLITUS WITH MICROALBUMINURIA, WITHOUT LONG-TERM CURRENT USE OF INSULIN: Primary | ICD-10-CM

## 2022-12-13 DIAGNOSIS — E11.29 MICROALBUMINURIA DUE TO TYPE 2 DIABETES MELLITUS: ICD-10-CM

## 2022-12-13 DIAGNOSIS — I10 ESSENTIAL HYPERTENSION: ICD-10-CM

## 2022-12-13 DIAGNOSIS — E53.8 B12 DEFICIENCY: ICD-10-CM

## 2022-12-13 DIAGNOSIS — R80.9 MICROALBUMINURIA DUE TO TYPE 2 DIABETES MELLITUS: ICD-10-CM

## 2022-12-13 PROCEDURE — 1160F PR REVIEW ALL MEDS BY PRESCRIBER/CLIN PHARMACIST DOCUMENTED: ICD-10-PCS | Mod: HCNC,CPTII,S$GLB, | Performed by: INTERNAL MEDICINE

## 2022-12-13 PROCEDURE — 1159F MED LIST DOCD IN RCRD: CPT | Mod: HCNC,CPTII,S$GLB, | Performed by: INTERNAL MEDICINE

## 2022-12-13 PROCEDURE — 99999 PR PBB SHADOW E&M-EST. PATIENT-LVL V: ICD-10-PCS | Mod: PBBFAC,HCNC,, | Performed by: INTERNAL MEDICINE

## 2022-12-13 PROCEDURE — 1126F AMNT PAIN NOTED NONE PRSNT: CPT | Mod: HCNC,CPTII,S$GLB, | Performed by: INTERNAL MEDICINE

## 2022-12-13 PROCEDURE — 1159F PR MEDICATION LIST DOCUMENTED IN MEDICAL RECORD: ICD-10-PCS | Mod: HCNC,CPTII,S$GLB, | Performed by: INTERNAL MEDICINE

## 2022-12-13 PROCEDURE — 1101F PT FALLS ASSESS-DOCD LE1/YR: CPT | Mod: HCNC,CPTII,S$GLB, | Performed by: INTERNAL MEDICINE

## 2022-12-13 PROCEDURE — 3074F SYST BP LT 130 MM HG: CPT | Mod: HCNC,CPTII,S$GLB, | Performed by: INTERNAL MEDICINE

## 2022-12-13 PROCEDURE — 3288F PR FALLS RISK ASSESSMENT DOCUMENTED: ICD-10-PCS | Mod: HCNC,CPTII,S$GLB, | Performed by: INTERNAL MEDICINE

## 2022-12-13 PROCEDURE — 1101F PR PT FALLS ASSESS DOC 0-1 FALLS W/OUT INJ PAST YR: ICD-10-PCS | Mod: HCNC,CPTII,S$GLB, | Performed by: INTERNAL MEDICINE

## 2022-12-13 PROCEDURE — 3078F PR MOST RECENT DIASTOLIC BLOOD PRESSURE < 80 MM HG: ICD-10-PCS | Mod: HCNC,CPTII,S$GLB, | Performed by: INTERNAL MEDICINE

## 2022-12-13 PROCEDURE — 1160F RVW MEDS BY RX/DR IN RCRD: CPT | Mod: HCNC,CPTII,S$GLB, | Performed by: INTERNAL MEDICINE

## 2022-12-13 PROCEDURE — 3074F PR MOST RECENT SYSTOLIC BLOOD PRESSURE < 130 MM HG: ICD-10-PCS | Mod: HCNC,CPTII,S$GLB, | Performed by: INTERNAL MEDICINE

## 2022-12-13 PROCEDURE — 99214 OFFICE O/P EST MOD 30 MIN: CPT | Mod: HCNC,S$GLB,, | Performed by: INTERNAL MEDICINE

## 2022-12-13 PROCEDURE — 3078F DIAST BP <80 MM HG: CPT | Mod: HCNC,CPTII,S$GLB, | Performed by: INTERNAL MEDICINE

## 2022-12-13 PROCEDURE — 99999 PR PBB SHADOW E&M-EST. PATIENT-LVL V: CPT | Mod: PBBFAC,HCNC,, | Performed by: INTERNAL MEDICINE

## 2022-12-13 PROCEDURE — 1126F PR PAIN SEVERITY QUANTIFIED, NO PAIN PRESENT: ICD-10-PCS | Mod: HCNC,CPTII,S$GLB, | Performed by: INTERNAL MEDICINE

## 2022-12-13 PROCEDURE — 99214 PR OFFICE/OUTPT VISIT, EST, LEVL IV, 30-39 MIN: ICD-10-PCS | Mod: HCNC,S$GLB,, | Performed by: INTERNAL MEDICINE

## 2022-12-13 PROCEDURE — 3288F FALL RISK ASSESSMENT DOCD: CPT | Mod: HCNC,CPTII,S$GLB, | Performed by: INTERNAL MEDICINE

## 2022-12-13 NOTE — TELEPHONE ENCOUNTER
I spoke with the patient and the patient's sister. Reviewed arrival time, location, and pre PRRT instructions. Both verbalized understanding. Allowed time for questions.    Patient Instructions for PRRT:     You will see your physician and have lab work within 1-2 weeks of the procedure  Arrive at scheduled time on the 2nd floor[address] for check in (address: [address] [place] [id number])  This is an Outpatient procedure. Plan to be here for at least 6 hours  Your doctor will discuss procedure, answer questions and sign consents prior to procedure  Hydrate 3 days prior to and 3 days after procedure. This helps get rid of radiation  No visitors are allowed in room during procedure   We will start 2 IV's for the treatment, one in each arm  You will receive pre-medications, then an Amino Acid solution, then the Keyla 177 infusion. The Amino Acid solution is to protect your kidneys, it could cause nausea. If you have nausea, we will slow down the Amino Acid solution and give you additional anti-nausea medication  You will need to have lab work done at 4 weeks and 7 weeks post treatment        10. You will be instructed on Radiation Safety precautions     Please do not hesitate to call with any questions or concerns. You can reach me at [phone] or send a message through the portal

## 2022-12-13 NOTE — PROGRESS NOTES
"Subjective:   Patient ID: Shahram Hills is a 82 y.o. female  Chief complaint:   Chief Complaint   Patient presents with    Hypertension     F/u       HPI  Here for 6 month diabetes follow up and HTN follow up  Accompanied by her sister today     HTN:  Prev switched to coreg from ccb   Cont losartan 50   Bp higher at home over past 2 months however not resting 5-10min prior to checknig bp at home   Reviewed bp readings   Prev:  Since [facility] started taking amlodipine 5mg bid as received an old rx with those instructions   henry losartan 50 and bp well controlled today   No LH or dizziness   Home log reviewd and dayami are < 130/80       PVCs:  As above   - henry BB   Cards: Elyssa    Diabetes:   a1c 5.9 <-6.2  <- 6.1 <- 6.2  - well controlled and henry metformin 1000 daily  Fasting bg 130-150s   Taking metformin daily   Foot exam at [facility] 6/2022  Utd on eye exam 8/2022    B12 def:  - is taking supplement daily    Metastatic neuroendocrine tumor to liver:  Followed by h/o and s/p TACE    - 2/25/2022 for hospital discharge had TACE right hep lobe and found to have frequent PVCs  - we resumed losartan at that time   - seen by cards 3/14/2022 and completed holter - at this time no tx indicated    Review of Systems    Objective:  Vitals:    12/13/22 0943 12/13/22 1026   BP: (!) 140/80 124/74   BP Location: Left arm    Patient Position: Sitting    Pulse: (!) 45    SpO2: 98%    Weight: 54.7 kg (120 lb 9.5 oz)    Height: 5' 3" (1.6 m)      Body mass index is 21.36 kg/m².    Physical Exam  Vitals reviewed.   Constitutional:       Appearance: Normal appearance. She is well-developed.   HENT:      Head: Normocephalic and atraumatic.      Right Ear: Tympanic membrane, ear canal and external ear normal.      Left Ear: Tympanic membrane, ear canal and external ear normal.      Nose: Nose normal. No congestion.      Mouth/Throat:      Mouth: Mucous membranes are moist.      Pharynx: Oropharynx is clear. No oropharyngeal exudate.   Eyes:      " Extraocular Movements: Extraocular movements intact.      Conjunctiva/sclera: Conjunctivae normal.   Cardiovascular:      Rate and Rhythm: Normal rate and regular rhythm.      Pulses: Normal pulses.      Heart sounds: Normal heart sounds.   Pulmonary:      Effort: Pulmonary effort is normal.      Breath sounds: Normal breath sounds.   Abdominal:      General: Bowel sounds are normal.      Palpations: Abdomen is soft. There is mass (ruq mass).   Musculoskeletal:         General: No swelling or tenderness. Normal range of motion.      Cervical back: Normal range of motion and neck supple.   Lymphadenopathy:      Cervical: No cervical adenopathy.   Skin:     General: Skin is warm and dry.      Capillary Refill: Capillary refill takes less than 2 seconds.      Nails: There is no clubbing.   Neurological:      General: No focal deficit present.      Mental Status: She is alert and oriented to person, place, and time. Mental status is at baseline.      Gait: Gait normal.   Psychiatric:         Mood and Affect: Mood normal.         Speech: Speech normal.         Behavior: Behavior normal.         Thought Content: Thought content normal.         Judgment: Judgment normal.       Assessment:  1. Controlled type 2 diabetes mellitus with microalbuminuria, without long-term current use of insulin    2. B12 deficiency    3. Essential hypertension    4. 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 Microalbuminuria due to type 2 diabetes mellitus    6. Hyperlipidemia, unspecified hyperlipidemia type        Plan:  Shahram was seen today for hypertension.    Diagnoses and all orders for this visit:    Controlled type 2 diabetes mellitus with microalbuminuria, without long-term current use of insulin  -     Hemoglobin A1C; Future  Stable   Cont metformin   Check a1c     B12 deficiency  Stable   Cont b12 suppl     Essential hypertension  Rest 5-10 min prior to checking readings   Cont meds   Low salt diet nv in 2 weeks      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  Followed by h/o   Stable     Microalbuminuria due to type 2 diabetes mellitus  Stable   Cont arb     Hyperlipidemia, unspecified hyperlipidemia type  Stable   Cont zetia   I recommend the Mediterranean diet, a graded exercise program and maintaining a healthy weight to optimize cholesterol levels.  Deferred statin due to mets neuroendo tumor     Dexa: wnl 5/2021    Health Maintenance   Topic Date Due    Lipid Panel  03/11/2023    Hemoglobin A1c  06/15/2023    Eye Exam  08/01/2023    DEXA Scan  05/31/2024    TETANUS VACCINE  12/10/2029

## 2022-12-14 ENCOUNTER — INFUSION (OUTPATIENT)
Dept: INFUSION THERAPY | Facility: HOSPITAL | Age: 82
End: 2022-12-14
Attending: INTERNAL MEDICINE
Payer: MEDICARE

## 2022-12-14 ENCOUNTER — HOSPITAL ENCOUNTER (OUTPATIENT)
Dept: RADIOLOGY | Facility: HOSPITAL | Age: 82
Discharge: HOME | End: 2022-12-14
Attending: INTERNAL MEDICINE
Payer: MEDICARE

## 2022-12-14 VITALS
WEIGHT: 120.56 LBS | HEIGHT: 63 IN | SYSTOLIC BLOOD PRESSURE: 159 MMHG | BODY MASS INDEX: 21.36 KG/M2 | RESPIRATION RATE: 18 BRPM | HEART RATE: 93 BPM | DIASTOLIC BLOOD PRESSURE: 69 MMHG | TEMPERATURE: 98 F | OXYGEN SATURATION: 98 %

## 2022-12-14 DIAGNOSIS — C7B.8 NEUROENDOCRINE CARCINOMA METASTATIC TO BONE: ICD-10-CM

## 2022-12-14 DIAGNOSIS — C7B.8 METASTATIC MALIGNANT NEUROENDOCRINE TUMOR TO LIVER: ICD-10-CM

## 2022-12-14 DIAGNOSIS — C7A.012 MALIGNANT CARCINOID TUMOR OF ILEUM: ICD-10-CM

## 2022-12-14 DIAGNOSIS — C7A.012 MALIGNANT CARCINOID TUMOR OF ILEUM: Primary | ICD-10-CM

## 2022-12-14 DIAGNOSIS — C7A.8 NEUROENDOCRINE CARCINOMA METASTATIC TO BONE: ICD-10-CM

## 2022-12-14 PROCEDURE — 63600175 PHARM REV CODE 636 W HCPCS: Mod: HCNC | Performed by: INTERNAL MEDICINE

## 2022-12-14 PROCEDURE — 96365 THER/PROPH/DIAG IV INF INIT: CPT | Mod: 59,HCNC

## 2022-12-14 PROCEDURE — 79101 NUCLEAR RX IV ADMIN: CPT | Mod: 26,HCNC,, | Performed by: RADIOLOGY

## 2022-12-14 PROCEDURE — 96366 THER/PROPH/DIAG IV INF ADDON: CPT | Mod: HCNC

## 2022-12-14 PROCEDURE — 25000003 PHARM REV CODE 250: Mod: HCNC | Performed by: INTERNAL MEDICINE

## 2022-12-14 PROCEDURE — 79101 NM THERAPY BY IV ADMINISTRATION LU177: ICD-10-PCS | Mod: 26,HCNC,, | Performed by: RADIOLOGY

## 2022-12-14 PROCEDURE — 96367 TX/PROPH/DG ADDL SEQ IV INF: CPT | Mod: HCNC

## 2022-12-14 PROCEDURE — 79101 NUCLEAR RX IV ADMIN: CPT | Mod: TC,HCNC

## 2022-12-14 RX ORDER — ACETAMINOPHEN 325 MG/1
650 TABLET ORAL
Status: CANCELLED | OUTPATIENT
Start: 2023-02-08

## 2022-12-14 RX ORDER — DIPHENHYDRAMINE HYDROCHLORIDE 50 MG/ML
50 INJECTION INTRAMUSCULAR; INTRAVENOUS
Status: DISCONTINUED | OUTPATIENT
Start: 2022-12-14 | End: 2022-12-14 | Stop reason: HOSPADM

## 2022-12-14 RX ORDER — SODIUM CHLORIDE 0.9 % (FLUSH) 0.9 %
10 SYRINGE (ML) INJECTION
Status: DISCONTINUED | OUTPATIENT
Start: 2022-12-14 | End: 2022-12-14 | Stop reason: HOSPADM

## 2022-12-14 RX ORDER — SODIUM CHLORIDE 9 MG/ML
INJECTION, SOLUTION INTRAVENOUS ONCE
Status: CANCELLED | OUTPATIENT
Start: 2023-02-08

## 2022-12-14 RX ORDER — ACETAMINOPHEN 325 MG/1
650 TABLET ORAL
Status: DISCONTINUED | OUTPATIENT
Start: 2022-12-14 | End: 2022-12-14 | Stop reason: HOSPADM

## 2022-12-14 RX ORDER — SODIUM CHLORIDE 0.9 % (FLUSH) 0.9 %
10 SYRINGE (ML) INJECTION
Status: CANCELLED | OUTPATIENT
Start: 2023-02-08

## 2022-12-14 RX ORDER — ARGININE/LYSINE/0.9 % SOD CHL 25-25MG/ML
1000 PLASTIC BAG, INJECTION (ML) INTRAVENOUS
Status: COMPLETED | OUTPATIENT
Start: 2022-12-14 | End: 2022-12-14

## 2022-12-14 RX ORDER — SODIUM CHLORIDE 9 MG/ML
INJECTION, SOLUTION INTRAVENOUS ONCE
Status: COMPLETED | OUTPATIENT
Start: 2022-12-14 | End: 2022-12-14

## 2022-12-14 RX ORDER — DIPHENHYDRAMINE HYDROCHLORIDE 50 MG/ML
50 INJECTION INTRAMUSCULAR; INTRAVENOUS
Status: CANCELLED | OUTPATIENT
Start: 2023-02-08

## 2022-12-14 RX ORDER — ARGININE/LYSINE/0.9 % SOD CHL 25-25MG/ML
1000 PLASTIC BAG, INJECTION (ML) INTRAVENOUS
Status: CANCELLED | OUTPATIENT
Start: 2023-02-08

## 2022-12-14 RX ADMIN — DEXAMETHASONE SODIUM PHOSPHATE 16 MG: 10 INJECTION, SOLUTION INTRAMUSCULAR; INTRAVENOUS at 08:12

## 2022-12-14 RX ADMIN — SODIUM CHLORIDE: 0.9 INJECTION, SOLUTION INTRAVENOUS at 08:12

## 2022-12-14 RX ADMIN — Medication 1000 ML: at 09:12

## 2022-12-14 NOTE — NURSING
1045 LELAND-177 completed per [facility], VSS, no complaints at this time, PIV x2 intact with no irritation, amino acids continue to infusion.   1052, pt scanned per [facility], cleared for D/C

## 2022-12-14 NOTE — NURSING
1323 Pt tolerated LELAND-177 infusion 1 of 4 complete,  tolerated infusion well.  No adverse reaction noted.   PIV x2 flushed with NS and D/C per protocol.  Patient left clinic in no acute distress.. PRRT eduction handout reviewed. Verbalized understanding.

## 2022-12-14 NOTE — NURSING
1003 start of LELAND per [employer] tech. VSS. No complaints of pain at this time, pt ambulated to restroom, with assistance  prior to start. PIV x2 remains patient no irritations, amino acids remain infusing per orders

## 2022-12-15 ENCOUNTER — TELEPHONE (OUTPATIENT)
Dept: INTERNAL MEDICINE | Facility: CLINIC | Age: 82
End: 2022-12-15

## 2022-12-15 ENCOUNTER — CLINICAL SUPPORT (OUTPATIENT)
Dept: INTERNAL MEDICINE | Facility: CLINIC | Age: 82
End: 2022-12-15
Payer: MEDICARE

## 2022-12-15 ENCOUNTER — INFUSION (OUTPATIENT)
Dept: INFUSION THERAPY | Facility: OTHER | Age: 82
End: 2022-12-15
Attending: STUDENT IN AN ORGANIZED HEALTH CARE EDUCATION/TRAINING PROGRAM
Payer: MEDICARE

## 2022-12-15 VITALS — SYSTOLIC BLOOD PRESSURE: 132 MMHG | HEART RATE: 67 BPM | OXYGEN SATURATION: 100 % | DIASTOLIC BLOOD PRESSURE: 66 MMHG

## 2022-12-15 VITALS
DIASTOLIC BLOOD PRESSURE: 77 MMHG | BODY MASS INDEX: 21.68 KG/M2 | HEART RATE: 86 BPM | SYSTOLIC BLOOD PRESSURE: 145 MMHG | HEIGHT: 63 IN | WEIGHT: 122.38 LBS | RESPIRATION RATE: 17 BRPM | TEMPERATURE: 97 F | OXYGEN SATURATION: 98 %

## 2022-12-15 DIAGNOSIS — C7B.8 METASTATIC MALIGNANT NEUROENDOCRINE TUMOR TO LIVER: Primary | ICD-10-CM

## 2022-12-15 PROCEDURE — 63600175 PHARM REV CODE 636 W HCPCS: Mod: JG,HCNC | Performed by: INTERNAL MEDICINE

## 2022-12-15 PROCEDURE — 99999 PR PBB SHADOW E&M-EST. PATIENT-LVL II: CPT | Mod: PBBFAC,HCNC,,

## 2022-12-15 PROCEDURE — 99999 PR PBB SHADOW E&M-EST. PATIENT-LVL II: ICD-10-PCS | Mod: PBBFAC,HCNC,,

## 2022-12-15 PROCEDURE — 96372 THER/PROPH/DIAG INJ SC/IM: CPT | Mod: HCNC

## 2022-12-15 RX ORDER — LANREOTIDE ACETATE 120 MG/.5ML
120 INJECTION SUBCUTANEOUS
Status: DISCONTINUED | OUTPATIENT
Start: 2022-12-15 | End: 2022-12-15 | Stop reason: HOSPADM

## 2022-12-15 RX ADMIN — LANREOTIDE ACETATE 120 MG: 120 INJECTION SUBCUTANEOUS at 02:12

## 2022-12-15 NOTE — PROGRESS NOTES
Shahram Chaoaux 82 y.o. female is here for Blood Pressure check. in person    Manual Blood pressure reading was  132/66, Pulse 67. (Checked at the end of the visit)    If high, was it repeated after 15 minutes? no    Pt's Home blood pressure machine read in office 133/62, Pulse 79.     Diagnosed with Hypertension yes.    Patient took blood pressure medication today yes.  Last dose of blood pressure medication was taken at 0830. Patient took Carvedilol and Losartan.     All Medications and OTC medication updated yes    Does patient have record of home blood pressure readings / Blood Pressure Log yes. 137-151/70    Does the pt have any complaints today in regards to their blood pressure medication? no. Patient is asymptomatic.     Were you sitting still for 5-10 minutes prior to taking your Blood pressure? no     Has your blood pressure monitor ever been checked? yes When was last time we checked your blood pressure monitor? Today    Updated vitals yes    Follow up date is 3/14/2023.     Dr. Xie notified.     Creatinine   Date Value Ref Range Status   12/06/2022 0.7 0.5 - 1.4 mg/dL Final     Sodium   Date Value Ref Range Status   12/06/2022 139 136 - 145 mmol/L Final     Potassium   Date Value Ref Range Status   12/06/2022 4.0 3.5 - 5.1 mmol/L Final

## 2022-12-15 NOTE — PLAN OF CARE
Lanreotide injection administered, no reaction. Patient tolerated well. Patient accompanied by sister for discharge home. No apparent distress noted. Discharge instructions given to patient. Patient understands instructions. Follow up appointment scheduled.

## 2022-12-26 PROBLEM — E87.5 HYPERKALEMIA: Status: RESOLVED | Noted: 2021-06-25 | Resolved: 2022-12-26

## 2022-12-27 ENCOUNTER — OFFICE VISIT (OUTPATIENT)
Dept: HEMATOLOGY/ONCOLOGY | Facility: CLINIC | Age: 82
End: 2022-12-27
Payer: MEDICARE

## 2022-12-27 ENCOUNTER — INFUSION (OUTPATIENT)
Dept: INFUSION THERAPY | Facility: HOSPITAL | Age: 82
End: 2022-12-27
Payer: MEDICARE

## 2022-12-27 VITALS
RESPIRATION RATE: 18 BRPM | DIASTOLIC BLOOD PRESSURE: 68 MMHG | HEART RATE: 83 BPM | SYSTOLIC BLOOD PRESSURE: 132 MMHG | BODY MASS INDEX: 21.56 KG/M2 | WEIGHT: 121.69 LBS | HEIGHT: 63 IN | OXYGEN SATURATION: 98 % | TEMPERATURE: 98 F

## 2022-12-27 VITALS
TEMPERATURE: 98 F | RESPIRATION RATE: 18 BRPM | SYSTOLIC BLOOD PRESSURE: 149 MMHG | DIASTOLIC BLOOD PRESSURE: 69 MMHG | HEART RATE: 87 BPM

## 2022-12-27 DIAGNOSIS — I10 ESSENTIAL HYPERTENSION: ICD-10-CM

## 2022-12-27 DIAGNOSIS — C7A.8 NEUROENDOCRINE CARCINOMA METASTATIC TO BONE: Primary | ICD-10-CM

## 2022-12-27 DIAGNOSIS — C7B.8 NEUROENDOCRINE CARCINOMA METASTATIC TO BONE: ICD-10-CM

## 2022-12-27 DIAGNOSIS — C7A.012 MALIGNANT CARCINOID TUMOR OF ILEUM: Primary | ICD-10-CM

## 2022-12-27 DIAGNOSIS — C7B.8 NEUROENDOCRINE CARCINOMA METASTATIC TO BONE: Primary | ICD-10-CM

## 2022-12-27 DIAGNOSIS — C7B.8 METASTATIC MALIGNANT NEUROENDOCRINE TUMOR TO LIVER: ICD-10-CM

## 2022-12-27 DIAGNOSIS — D84.81 IMMUNODEFICIENCY SECONDARY TO NEOPLASM: ICD-10-CM

## 2022-12-27 DIAGNOSIS — D49.9 IMMUNODEFICIENCY SECONDARY TO NEOPLASM: ICD-10-CM

## 2022-12-27 DIAGNOSIS — E11.29 CONTROLLED TYPE 2 DIABETES MELLITUS WITH MICROALBUMINURIA, WITHOUT LONG-TERM CURRENT USE OF INSULIN: ICD-10-CM

## 2022-12-27 DIAGNOSIS — R80.9 CONTROLLED TYPE 2 DIABETES MELLITUS WITH MICROALBUMINURIA, WITHOUT LONG-TERM CURRENT USE OF INSULIN: ICD-10-CM

## 2022-12-27 DIAGNOSIS — E34.0 CARCINOID SYNDROME: ICD-10-CM

## 2022-12-27 DIAGNOSIS — C7A.8 NEUROENDOCRINE CARCINOMA METASTATIC TO BONE: ICD-10-CM

## 2022-12-27 DIAGNOSIS — C79.51 SPINE METASTASIS: ICD-10-CM

## 2022-12-27 PROCEDURE — 3075F PR MOST RECENT SYSTOLIC BLOOD PRESS GE 130-139MM HG: ICD-10-PCS | Mod: HCNC,CPTII,S$GLB, | Performed by: REGISTERED NURSE

## 2022-12-27 PROCEDURE — 25000003 PHARM REV CODE 250: Mod: HCNC | Performed by: STUDENT IN AN ORGANIZED HEALTH CARE EDUCATION/TRAINING PROGRAM

## 2022-12-27 PROCEDURE — 3075F SYST BP GE 130 - 139MM HG: CPT | Mod: HCNC,CPTII,S$GLB, | Performed by: REGISTERED NURSE

## 2022-12-27 PROCEDURE — 1160F PR REVIEW ALL MEDS BY PRESCRIBER/CLIN PHARMACIST DOCUMENTED: ICD-10-PCS | Mod: HCNC,CPTII,S$GLB, | Performed by: REGISTERED NURSE

## 2022-12-27 PROCEDURE — 1160F RVW MEDS BY RX/DR IN RCRD: CPT | Mod: HCNC,CPTII,S$GLB, | Performed by: REGISTERED NURSE

## 2022-12-27 PROCEDURE — 1101F PR PT FALLS ASSESS DOC 0-1 FALLS W/OUT INJ PAST YR: ICD-10-PCS | Mod: HCNC,CPTII,S$GLB, | Performed by: REGISTERED NURSE

## 2022-12-27 PROCEDURE — 96365 THER/PROPH/DIAG IV INF INIT: CPT | Mod: HCNC

## 2022-12-27 PROCEDURE — 1159F MED LIST DOCD IN RCRD: CPT | Mod: HCNC,CPTII,S$GLB, | Performed by: REGISTERED NURSE

## 2022-12-27 PROCEDURE — 3078F PR MOST RECENT DIASTOLIC BLOOD PRESSURE < 80 MM HG: ICD-10-PCS | Mod: HCNC,CPTII,S$GLB, | Performed by: REGISTERED NURSE

## 2022-12-27 PROCEDURE — 63600175 PHARM REV CODE 636 W HCPCS: Mod: HCNC | Performed by: STUDENT IN AN ORGANIZED HEALTH CARE EDUCATION/TRAINING PROGRAM

## 2022-12-27 PROCEDURE — 3288F FALL RISK ASSESSMENT DOCD: CPT | Mod: HCNC,CPTII,S$GLB, | Performed by: REGISTERED NURSE

## 2022-12-27 PROCEDURE — 3078F DIAST BP <80 MM HG: CPT | Mod: HCNC,CPTII,S$GLB, | Performed by: REGISTERED NURSE

## 2022-12-27 PROCEDURE — 99999 PR PBB SHADOW E&M-EST. PATIENT-LVL V: ICD-10-PCS | Mod: PBBFAC,HCNC,, | Performed by: REGISTERED NURSE

## 2022-12-27 PROCEDURE — 3288F PR FALLS RISK ASSESSMENT DOCUMENTED: ICD-10-PCS | Mod: HCNC,CPTII,S$GLB, | Performed by: REGISTERED NURSE

## 2022-12-27 PROCEDURE — 1101F PT FALLS ASSESS-DOCD LE1/YR: CPT | Mod: HCNC,CPTII,S$GLB, | Performed by: REGISTERED NURSE

## 2022-12-27 PROCEDURE — 1126F PR PAIN SEVERITY QUANTIFIED, NO PAIN PRESENT: ICD-10-PCS | Mod: HCNC,CPTII,S$GLB, | Performed by: REGISTERED NURSE

## 2022-12-27 PROCEDURE — 1126F AMNT PAIN NOTED NONE PRSNT: CPT | Mod: HCNC,CPTII,S$GLB, | Performed by: REGISTERED NURSE

## 2022-12-27 PROCEDURE — 1159F PR MEDICATION LIST DOCUMENTED IN MEDICAL RECORD: ICD-10-PCS | Mod: HCNC,CPTII,S$GLB, | Performed by: REGISTERED NURSE

## 2022-12-27 PROCEDURE — 99999 PR PBB SHADOW E&M-EST. PATIENT-LVL V: CPT | Mod: PBBFAC,HCNC,, | Performed by: REGISTERED NURSE

## 2022-12-27 PROCEDURE — 99215 PR OFFICE/OUTPT VISIT, EST, LEVL V, 40-54 MIN: ICD-10-PCS | Mod: HCNC,S$GLB,, | Performed by: REGISTERED NURSE

## 2022-12-27 PROCEDURE — 99215 OFFICE O/P EST HI 40 MIN: CPT | Mod: HCNC,S$GLB,, | Performed by: REGISTERED NURSE

## 2022-12-27 RX ORDER — SODIUM CHLORIDE 0.9 % (FLUSH) 0.9 %
10 SYRINGE (ML) INJECTION
Status: DISCONTINUED | OUTPATIENT
Start: 2022-12-27 | End: 2022-12-27 | Stop reason: HOSPADM

## 2022-12-27 RX ORDER — HEPARIN 100 UNIT/ML
500 SYRINGE INTRAVENOUS
Status: DISCONTINUED | OUTPATIENT
Start: 2022-12-27 | End: 2022-12-27 | Stop reason: HOSPADM

## 2022-12-27 RX ADMIN — SODIUM CHLORIDE: 9 INJECTION, SOLUTION INTRAVENOUS at 04:12

## 2022-12-27 RX ADMIN — ZOLEDRONIC ACID 3.3 MG: 4 INJECTION INTRAVENOUS at 04:12

## 2022-12-27 NOTE — PLAN OF CARE
1709-Patient tolerated treatment well. PIV removed and site dressed.  Patient verbalized she takes vitamin D, denies taking calcium supplement.  Clinic contacted regarding PO calcium,waiting on answer from provider- Zometa education including calcium supplementation instructions given to patient. Discharged without complaints or S/S of adverse event. AVS given.  Instructed to call provider for any questions or concerns.

## 2022-12-27 NOTE — PROGRESS NOTES
"  Subjective:       Patient ID: Shahram Hills is an 82 y.o. female.     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 Ms. Hills is an 79 yo woman who initially saw me on 6/7/18 for further evaluation of neuroendocrine tumor. She initially presented with diarrhea, abdominal discomfort, weight loss. She was evaluated at [facility] and underwent workup below:  US abdomen on 3/14/18 showed numerous bilobar mixed echogenicity and mildly vascular hepatic lesions. Cholelithiasis without e/o acute cholecystitis.   MRI abdomen on 3/30/2018 showed innumerable hepatic metastases. L3 vertebral body metastasis with soft tissue component along the right margin of the L3 and upper L4 vertebral bodies, filling the right L3/4 neural foramen, and extending into the anterior aspect of the spinal canal on the right at the L3 and upper T4 levels. Associated with mild spinal canal narrowing.  CT chest on 4/6/2018 showed one lucent nodule measuring 7 mm in the T7 vertebral body, most likely representing metastatic disease.    EGD on 5/4/18 showed normal duodenum. Schatzki's ring in the lower third of the esophagus. Grade 1 esophagitis in the GE junction c/w mild distal esophagitis. Congestion and erythema in the antrum, pre-pyloric region and stomach body c/w gastritis. Biopsy of the stomach antrum showed mild chronic gastritis, neg for H. pylori.   Labs on 5/9/2018: WBC 6.9, H/H 11.6/34.3, MCV 87.5, plt count 279. On 3/9/18: Vit B12 level 173, folic acid 6.6  She was started on oral vit B12 and underwent a liver biopsy on 5/18/18. Pathology showed "metastatic well differentiated neuroendocrine tumor. Ki67 3%"     She saw me on 6/7/18 and complained of weight loss of 26 lbs over the past 3-4 months, also has diarrhea. No flushing, wheezing, palpitations. Has been having pain in right flank radiating down " "the right leg. No tingling/numbness, weakness. She can hold her bladder but when she urinates her diarrhea would come out as well. Started on dex 4 mg q6h the evening of 18.      2. MRI spine on 18 showed "Marrow replacing metastatic lesion of the L3 vertebral body with associated extra osseous expansion and complete effacement of the right L3-L4 neural foramina and additional effacement of the right lateral recess.  There is additional lateral extension and abutment of the right psoas muscle.  Superimposed degenerative change at this level results in mild spinal canal stenosis." I sent the patient to ED on 18 where she was evaluated by neurosurgery. Neurosurgery did not feel she was a candidate for surgical intervention.      3. Seen by Dr. Adames on 18. palliative radiation to the area of the L3 metastasis 18-18   4. 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  There is an adjacent metastatic lymph node, diffuse liver metastases, and multiple bone metastases. Index primary neoplasm SUV max 33.13. Adjacent lymph node SUV max 23. L3 bone metastasis SUV max 36.86. Left lobe SUV max 46.13   5. Lanreotide every 4 weeks started on 18  6. TACE to the right hepatic lobe on 19. TACE to the left hepatic lobe on 3/21/19.   7. TACE to the right hepatic lobe on 22. S/p conventional chemoembolization performed of the segment 2, 3, 4 branch of the left hepatic artery and segment 8 branch of the left hepatic artery on 22.  8. Gallium PET 22 showed progression. Discussed PRRT. To be started on 22     History of Present Illness:   Ms. Hills returns for follow up and to start zometa. Doing well overall. Continues with intermittent diarrhea and taking xermelo TID. No orthopnea. Denies fever/chills, SOB, CP, palpitations, N/V/C, pain, blood in urine/stool, paresthesias.     ECO. Presents with her sister.      ROS:   See HPI. " Otherwise negative.      Physical Examination:   Vital signs reviewed.   Gen: well hydrated, well developed, in no acute distress.  HEENT: normocephalic, anicteric, PERRLA, EOMI, oropharynx clear  Neck: supple, no JVD, thyromegaly, cervical or supraclavicular LAD  Lungs: CTAB, no wheezes or rales  Heart: regular rate, frequent PVCs, regular pulse, no M/R/G  Abdomen: soft, no tenderness, non-distended, liver edge 3 cm below the right costal margin, no splenomegaly, no mass, or hernia.   Ext: b/l LE 1+ pedal edema  Neuro: alert and oriented x 4, no focal neuro deficit  Skin: no rash, open wounds or ulcers  Psych: pleasant and appropriate mood and affect     Objective:      Laboratory Data:  Labs reviewed. CBC normal. Glucose 199. Chromogranin A increased from 1448 to 2001.     Imaging Data:     MRI abdomen 11/1/22:  1. History of neuroendocrine malignancy.  Numerous hepatic lesions, some remaining stable while others have mildly enlarged consistent with disease progression.  Stable osseous lesion in the L3 vertebral body.  2. Cholelithiasis and stable mild dilatation of the common bile duct and central intrahepatic biliary duct dilatation.  Of note, there is mass effect on the midportion of the common bile duct by dominant left hepatic lobe lesion.  3. Additional findings detailed above.  RECIST SUMMARY:     Date of prior examination for comparison: 05/16/2022     Lesion 1: Left hepatic lobe.  7.1 cm.  Series 9 image 52.  Prior measurement 6.3 cm.     Lesion 2: Right hepatic lobe lateral.  3.1 cm.  Series 9 image 30.  Prior measurement 3.2 cm.     Lesion 3: Right hepatic dome.  5.1 cm.  Series 9 image 19.  Prior measurement 4.4 cm.    Gallium PET 11/1/22:     Interval development of somatostatin tracer avid lesions in the skull, concerning for new metastatic lesions.     Numerous tracer avid hypoattenuating masses throughout the liver with increased SUV max compared to prior exam.     Tracer avid lesions in the C7 and  L3 vertebral bodies, sternum, distal ileum and mesenteric node are similar to prior exam.     Judged by tracer avidity of the patient's tumor burden, PRRT would be a consideration if clinically indicated.        Assessment and Plan:      1. Malignant carcinoid tumor of ileum    2. 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    4. Spine metastasis    5. Immunodeficiency secondary to neoplasm    6. Essential hypertension    7. Carcinoid syndrome    8. Controlled type 2 diabetes mellitus with microalbuminuria, without long-term current use of insulin      1-5  - Ms Hills is an 83 yo woman with well differentiated low-grade neuroendocrine tumor of the ileum with mets to liver and bones, diagnosed on 5/18/2018. Started lanreotide every 4 weeks on 6/22/2018. S/p TACE to the right hepatic lobe on 2/14/19. TACE to the left hepatic lobe on 3/21/19.   - CT/MRI 1/12/22 showed mild progression in the liver. S/p TACE on 2/11/22 and 4/22/22   - octreotide not really helping with her diarrhea. Tried xermelo 250 mg tid which improves symptoms  - Previously reviewed side effects in detail of PRRT. Discussed with patient re the schedule, regimen, and side effects of PRRT. Side effects include but are not limited to bone marrow suppression and cytopenias that may increase the risk of infection, bleeding and symptomatic anemia, hair loss, damage to any body organs including lungs, kidney, liver, heart, carcinoid crisis, small bowel obstruction, and blood cancer such as leukemia or myelodysplastic syndrome. Consent was signed previously  - started PRRT on 12/14/2022.   - Last received lanreotide on 12/15/2022   - next PRRT on 2/8 with lanreotide on 2/9.   - scheduled to start zometa today as received dental clearance. Labs adequate for treatment.   - scheduled to see Dr. Granados on 1/10 with labs for next dose lanreotide    6.  - BP better controlled in clinic today  - continue  medical management  - f/u with PCP    7.  - See above. C/w lanreotide and xermelo    8.  - BS elevated  - f/u with PCP    Patient is in agreement with the proposed treatment plan. All questions were answered to the patient's satisfaction. Pt knows to call clinic if anything is needed before the next clinic visit.    At least 40 minutes were spent today on this encounter including face to face time with the patient, data gathering/interpretation and documentation.       Nayana Mendosa, MSN, APRN, [facility]  Hematology and Medical Oncology  Clinical Nurse Specialist to Dr. Woods, Dr. Mckoy & Dr. Granados      Route Chart for Scheduling    Med Onc Chart Routing      Follow up with physician Other. keep current appointments as scheduled   Follow up with JUNAID    Infusion scheduling note    Injection scheduling note    Labs    Imaging    Pharmacy appointment    Other referrals          Supportive Plan Information  OP ZOLEDRONIC ACID (ZOMETA) Q4W   Sebastian Granados MD   Upcoming Treatment Dates - OP ZOLEDRONIC ACID (ZOMETA) Q4W    12/9/2022       Medications       zoledronic acid (ZOMETA) 4 mg in sodium chloride 0.9% 100 mL IVPB  3/9/2023       Medications       zoledronic acid (ZOMETA) 4 mg in sodium chloride 0.9% 100 mL IVPB  6/7/2023       Medications       zoledronic acid (ZOMETA) 4 mg in sodium chloride 0.9% 100 mL IVPB  9/5/2023       Medications       zoledronic acid (ZOMETA) 4 mg in sodium chloride 0.9% 100 mL IVPB    Therapy Plan Information  LANREOTIDE  5. Medications  lanreotide injection 120 mg  120 mg, Subcutaneous, Every visit    LUTATHERA SUPPORT  IV Fluids  0.9%  NaCl infusion  Intravenous, Every 8 weeks  Pre-Medications  ondansetron-dexAMETHasone 16-12 mg in sodium chloride 0.9% 50 mL IVPB 16 mg  16 mg, Intravenous, Every 8 weeks  amino acid (25 g L-LYSINE, 25 g L-ARGININE) 25-25 mg/mL 1000 mL infusion 1,000 mL  1,000 mL, Intravenous, Every 8 weeks  promethazine (PHENERGAN) 12.5 mg in dextrose 5 % 50 mL IVPB  12.5  mg, Intravenous, Every 8 weeks  acetaminophen tablet 650 mg  650 mg, Oral, Every 8 weeks  Flushes  sodium chloride 0.9% flush 10 mL  10 mL, Intravenous, Every 8 weeks  PRN Medications  diphenhydrAMINE injection 50 mg  50 mg, Intravenous, Every 8 weeks  hydrocortisone sodium succinate injection 100 mg  100 mg, Intravenous, Every 8 weeks

## 2022-12-27 NOTE — PLAN OF CARE
1620-Labs , hx, and medications reviewed, patient was seen by NP prior to arrival. Assessment completed. Discussed plan of care with patient. Patient in agreement. Chair reclined and warm blanket and snack offered.

## 2023-01-09 ENCOUNTER — PATIENT MESSAGE (OUTPATIENT)
Dept: PHARMACY | Facility: CLINIC | Age: 83
End: 2023-01-09
Payer: MEDICARE

## 2023-01-09 ENCOUNTER — SPECIALTY PHARMACY (OUTPATIENT)
Dept: PHARMACY | Facility: CLINIC | Age: 83
End: 2023-01-09
Payer: MEDICARE

## 2023-01-09 DIAGNOSIS — E78.5 HYPERLIPIDEMIA, UNSPECIFIED HYPERLIPIDEMIA TYPE: ICD-10-CM

## 2023-01-09 RX ORDER — EZETIMIBE 10 MG/1
10 TABLET ORAL DAILY
Qty: 90 TABLET | Refills: 0 | Status: SHIPPED | OUTPATIENT
Start: 2023-01-09 | End: 2023-03-14 | Stop reason: SDUPTHER

## 2023-01-09 NOTE — TELEPHONE ENCOUNTER
Refill Decision Note   Shahram Hills  is requesting a refill authorization.  Brief Assessment and Rationale for Refill:  Approve    -Medication-Related Problems Identified: Requires labs  Medication Therapy Plan:  Labs: Lipid panel NTBL to [facility] on 1/10/23.    Medication Reconciliation Completed: No   Comments:     Provider Staff:     Action is required for this patient.   Please see care gap opportunities below in Care Due Message.     Thanks!  [facility]     Appointments      Date Provider   Last Visit   12/13/2022 Trixie Xie MD   Next Visit   3/14/2023 Trixie Xie MD     Note composed:2:06 PM 01/09/2023           Note composed:2:06 PM 01/09/2023

## 2023-01-09 NOTE — TELEPHONE ENCOUNTER
Care Due:                  Date            Visit Type   Department     Provider  --------------------------------------------------------------------------------                                EP -                              PRIMARY      [facility] INTERNAL  Last Visit: 12-      CARE ([facility])   MEDICINE       Trixie Xie                              EP -                              PRIMARY      [facility] INTERNAL  Next Visit: 03-      CARE ([facility])   MEDICINE       Trixie Xie                                                            Last  Test          Frequency    Reason                     Performed    Due Date  --------------------------------------------------------------------------------    Lipid Panel.  12 months..  ezetimibe................  03- 03-    [facility] Embedded Care Gaps. Reference number: [id number]. 1/09/2023   10:38:32 AM CST

## 2023-01-09 NOTE — TELEPHONE ENCOUNTER
Specialty Pharmacy - Refill Coordination    Specialty Medication Orders Linked to Encounter      Flowsheet Row Most Recent Value   Medication #1 telotristat ethyl (XERMELO) 250 mg Tab (Order#[id number], Rx#[id number])            Refill Questions - Documented Responses      Flowsheet Row Most Recent Value   Patient Availability and HIPAA Verification    Does patient want to proceed with activity? Yes   HIPAA/medical authority confirmed? Yes   Relationship to patient of person spoken to? Self   Refill Screening Questions    Changes to allergies? No   Changes to medications? No   New conditions since last clinic visit? No   Unplanned office visit, urgent care, ED, or hospital admission in the last 4 weeks? No   How does patient/caregiver feel medication is working? Very good   Financial problems or insurance changes? No   How many doses of your specialty medications were missed in the last 4 weeks? 0   Would patient like to speak to a pharmacist? No   When does the patient need to receive the medication? 01/14/23   Refill Delivery Questions    How will the patient receive the medication? MEDRx   When does the patient need to receive the medication? 01/14/23   Shipping Address Home   Address in [facility] confirmed and updated if neccessary? Yes   Expected Copay ($) 0   Is the patient able to afford the medication copay? Yes   Payment Method zero copay   Days supply of Refill 28   Supplies needed? No supplies needed   Refill activity completed? Yes   Refill activity plan Refill scheduled   Shipment/Pickup Date: 01/11/23            Current Outpatient Medications   Medication Sig    blood sugar diagnostic (TRUE METRIX GLUCOSE TEST STRIP) Strp USE TO CHECK BLOOD SUGAR TWICE DAILY    blood-glucose meter kit To check BG 2 times daily, to use with insurance preferred meter    carvediloL (COREG) 12.5 MG tablet Take 1 tablet (12.5 mg total) by mouth 2 (two) times daily with meals.    cyanocobalamin (VITAMIN B-12) 1000  "MCG tablet Take 1 tablet (1,000 mcg total) by mouth once daily.    diphenoxylate-atropine 2.5-0.025 mg (LOMOTIL) 2.5-0.025 mg per tablet Take 1 tablet by mouth 4 (four) times daily as needed for Diarrhea.    ezetimibe (ZETIA) 10 mg tablet Take 1 tablet (10 mg total) by mouth once daily.    ferrous sulfate 325 (65 FE) MG EC tablet Take 325 mg by mouth 3 (three) times daily with meals.    lancets Misc To check BG 2 times daily, to use with insurance preferred meter    latanoprost 0.005 % ophthalmic solution Place 1 drop into both eyes nightly.    losartan (COZAAR) 50 MG tablet Take 1 tablet (50 mg total) by mouth once daily. (Patient taking differently: Take 50 mg by mouth every morning.)    metFORMIN (GLUCOPHAGE-XR) 500 MG ER 24hr tablet Take 2 tablets (1,000 mg total) by mouth daily with breakfast.    needle, disp, 22 G 22 gauge x 1" Ndle 1 application by Misc.(Non-Drug; Combo Route) route 3 (three) times daily as needed (diarrhea).    octreotide acetate (SANDOSTATIN) 100 mcg/mL (1 mL) Syrg Inject 1 mL (0.1 mg total) into the skin 3 (three) times daily as needed (diarrhea).    omeprazole (PRILOSEC) 20 MG capsule TAKE ONE CAPSULE BY MOUTH ONCE DAILY.    ondansetron (ZOFRAN-ODT) 4 MG TbDL Take 1 tablet (4 mg total) by mouth every 6 (six) hours as needed (nausea, vomting). (Patient not taking: No sig reported)    oxyCODONE (ROXICODONE) 5 MG immediate release tablet Take 1 tablet (5 mg total) by mouth every 6 (six) hours as needed for Pain.    syringe, disposable, 1 mL Syrg 1 application by Misc.(Non-Drug; Combo Route) route 3 (three) times daily as needed (diarrhea).    telotristat ethyl (XERMELO) 250 mg Tab Take 1 tablet (250 mg) by mouth 3 (three) times daily.    TRUEPLUS LANCETS 33 gauge Misc USE TO CHECK BLOOD SUGAR TWICE DAILY    vitamin D (VITAMIN D3) 1000 units Tab Take 400 Units by mouth once daily.    XIIDRA 5 % Dpet INSTILL ONE DROP INTO OU BID   Last reviewed on 12/28/2022  3:35 PM by Nayana Mendosa, " CNS    Review of patient's allergies indicates:   Allergen Reactions    Epinephrine      carcinoid    Last reviewed on  1/9/2023 10:37 AM by Nanette Jiménez      Tasks added this encounter   2/4/2023 - Refill Call (Auto Added)   Tasks due within next 3 months   2/5/2023 - Clinical - Follow Up Assesement (180 day)     Leslie Toussaint, PharmD  [facility]  [address]  [facility] [address]  Phone: [phone]  Fax: [phone]

## 2023-01-10 ENCOUNTER — INFUSION (OUTPATIENT)
Dept: INFUSION THERAPY | Facility: HOSPITAL | Age: 83
End: 2023-01-10
Payer: MEDICARE

## 2023-01-10 ENCOUNTER — LAB VISIT (OUTPATIENT)
Dept: LAB | Facility: HOSPITAL | Age: 83
End: 2023-01-10
Attending: INTERNAL MEDICINE
Payer: MEDICARE

## 2023-01-10 ENCOUNTER — OFFICE VISIT (OUTPATIENT)
Dept: HEMATOLOGY/ONCOLOGY | Facility: CLINIC | Age: 83
End: 2023-01-10
Payer: MEDICARE

## 2023-01-10 VITALS
SYSTOLIC BLOOD PRESSURE: 142 MMHG | OXYGEN SATURATION: 97 % | TEMPERATURE: 99 F | RESPIRATION RATE: 18 BRPM | BODY MASS INDEX: 21.03 KG/M2 | WEIGHT: 118.69 LBS | HEIGHT: 63 IN | DIASTOLIC BLOOD PRESSURE: 80 MMHG | HEART RATE: 76 BPM

## 2023-01-10 DIAGNOSIS — C7A.012 MALIGNANT CARCINOID TUMOR OF ILEUM: Primary | ICD-10-CM

## 2023-01-10 DIAGNOSIS — E34.0 CARCINOID SYNDROME: ICD-10-CM

## 2023-01-10 DIAGNOSIS — Z79.899 IMMUNODEFICIENCY SECONDARY TO CHEMOTHERAPY: ICD-10-CM

## 2023-01-10 DIAGNOSIS — I10 ESSENTIAL HYPERTENSION: ICD-10-CM

## 2023-01-10 DIAGNOSIS — C7B.8 METASTATIC MALIGNANT NEUROENDOCRINE TUMOR TO LIVER: ICD-10-CM

## 2023-01-10 DIAGNOSIS — I51.89 CARCINOID HEART DISEASE: ICD-10-CM

## 2023-01-10 DIAGNOSIS — C7B.8 SECONDARY NEUROENDOCRINE TUMOR OF BONE: ICD-10-CM

## 2023-01-10 DIAGNOSIS — T45.1X5A IMMUNODEFICIENCY SECONDARY TO CHEMOTHERAPY: ICD-10-CM

## 2023-01-10 DIAGNOSIS — C7B.8 METASTATIC MALIGNANT NEUROENDOCRINE TUMOR TO LIVER: Primary | ICD-10-CM

## 2023-01-10 DIAGNOSIS — D84.81 IMMUNODEFICIENCY SECONDARY TO NEOPLASM: ICD-10-CM

## 2023-01-10 DIAGNOSIS — D84.821 IMMUNODEFICIENCY SECONDARY TO CHEMOTHERAPY: ICD-10-CM

## 2023-01-10 DIAGNOSIS — R80.9 CONTROLLED TYPE 2 DIABETES MELLITUS WITH MICROALBUMINURIA, WITHOUT LONG-TERM CURRENT USE OF INSULIN: ICD-10-CM

## 2023-01-10 DIAGNOSIS — D49.9 IMMUNODEFICIENCY SECONDARY TO NEOPLASM: ICD-10-CM

## 2023-01-10 DIAGNOSIS — E11.29 CONTROLLED TYPE 2 DIABETES MELLITUS WITH MICROALBUMINURIA, WITHOUT LONG-TERM CURRENT USE OF INSULIN: ICD-10-CM

## 2023-01-10 DIAGNOSIS — E34.0 CARCINOID HEART DISEASE: ICD-10-CM

## 2023-01-10 DIAGNOSIS — C7A.012 MALIGNANT CARCINOID TUMOR OF ILEUM: ICD-10-CM

## 2023-01-10 DIAGNOSIS — C79.51 SPINE METASTASIS: ICD-10-CM

## 2023-01-10 LAB
ALBUMIN SERPL BCP-MCNC: 3.5 G/DL (ref 3.5–5.2)
ALP SERPL-CCNC: 64 U/L (ref 55–135)
ALT SERPL W/O P-5'-P-CCNC: 14 U/L (ref 10–44)
ANION GAP SERPL CALC-SCNC: 11 MMOL/L (ref 8–16)
AST SERPL-CCNC: 16 U/L (ref 10–40)
BASOPHILS # BLD AUTO: 0.02 K/UL (ref 0–0.2)
BASOPHILS NFR BLD: 0.3 % (ref 0–1.9)
BILIRUB SERPL-MCNC: 0.4 MG/DL (ref 0.1–1)
BUN SERPL-MCNC: 6 MG/DL (ref 8–23)
CALCIUM SERPL-MCNC: 9.3 MG/DL (ref 8.7–10.5)
CHLORIDE SERPL-SCNC: 104 MMOL/L (ref 95–110)
CO2 SERPL-SCNC: 27 MMOL/L (ref 23–29)
CREAT SERPL-MCNC: 0.7 MG/DL (ref 0.5–1.4)
DIFFERENTIAL METHOD: ABNORMAL
EOSINOPHIL # BLD AUTO: 0.1 K/UL (ref 0–0.5)
EOSINOPHIL NFR BLD: 0.7 % (ref 0–8)
ERYTHROCYTE [DISTWIDTH] IN BLOOD BY AUTOMATED COUNT: 13.1 % (ref 11.5–14.5)
EST. GFR  (NO RACE VARIABLE): >60 ML/MIN/1.73 M^2
GLUCOSE SERPL-MCNC: 151 MG/DL (ref 70–110)
HCT VFR BLD AUTO: 40 % (ref 37–48.5)
HGB BLD-MCNC: 12.5 G/DL (ref 12–16)
IMM GRANULOCYTES # BLD AUTO: 0.02 K/UL (ref 0–0.04)
IMM GRANULOCYTES NFR BLD AUTO: 0.3 % (ref 0–0.5)
LYMPHOCYTES # BLD AUTO: 1.3 K/UL (ref 1–4.8)
LYMPHOCYTES NFR BLD: 18.3 % (ref 18–48)
MCH RBC QN AUTO: 29.1 PG (ref 27–31)
MCHC RBC AUTO-ENTMCNC: 31.3 G/DL (ref 32–36)
MCV RBC AUTO: 93 FL (ref 82–98)
MONOCYTES # BLD AUTO: 0.7 K/UL (ref 0.3–1)
MONOCYTES NFR BLD: 10 % (ref 4–15)
NEUTROPHILS # BLD AUTO: 4.8 K/UL (ref 1.8–7.7)
NEUTROPHILS NFR BLD: 70.4 % (ref 38–73)
NRBC BLD-RTO: 0 /100 WBC
PLATELET # BLD AUTO: 242 K/UL (ref 150–450)
PMV BLD AUTO: 10.6 FL (ref 9.2–12.9)
POTASSIUM SERPL-SCNC: 3.4 MMOL/L (ref 3.5–5.1)
PROT SERPL-MCNC: 7.2 G/DL (ref 6–8.4)
RBC # BLD AUTO: 4.29 M/UL (ref 4–5.4)
SODIUM SERPL-SCNC: 142 MMOL/L (ref 136–145)
WBC # BLD AUTO: 6.87 K/UL (ref 3.9–12.7)

## 2023-01-10 PROCEDURE — 1159F PR MEDICATION LIST DOCUMENTED IN MEDICAL RECORD: ICD-10-PCS | Mod: HCNC,CPTII,S$GLB, | Performed by: INTERNAL MEDICINE

## 2023-01-10 PROCEDURE — 1160F RVW MEDS BY RX/DR IN RCRD: CPT | Mod: HCNC,CPTII,S$GLB, | Performed by: INTERNAL MEDICINE

## 2023-01-10 PROCEDURE — 3077F SYST BP >= 140 MM HG: CPT | Mod: HCNC,CPTII,S$GLB, | Performed by: INTERNAL MEDICINE

## 2023-01-10 PROCEDURE — 3079F PR MOST RECENT DIASTOLIC BLOOD PRESSURE 80-89 MM HG: ICD-10-PCS | Mod: HCNC,CPTII,S$GLB, | Performed by: INTERNAL MEDICINE

## 2023-01-10 PROCEDURE — 1101F PR PT FALLS ASSESS DOC 0-1 FALLS W/OUT INJ PAST YR: ICD-10-PCS | Mod: HCNC,CPTII,S$GLB, | Performed by: INTERNAL MEDICINE

## 2023-01-10 PROCEDURE — 3288F PR FALLS RISK ASSESSMENT DOCUMENTED: ICD-10-PCS | Mod: HCNC,CPTII,S$GLB, | Performed by: INTERNAL MEDICINE

## 2023-01-10 PROCEDURE — 86316 IMMUNOASSAY TUMOR OTHER: CPT | Mod: HCNC | Performed by: INTERNAL MEDICINE

## 2023-01-10 PROCEDURE — 1101F PT FALLS ASSESS-DOCD LE1/YR: CPT | Mod: HCNC,CPTII,S$GLB, | Performed by: INTERNAL MEDICINE

## 2023-01-10 PROCEDURE — 3079F DIAST BP 80-89 MM HG: CPT | Mod: HCNC,CPTII,S$GLB, | Performed by: INTERNAL MEDICINE

## 2023-01-10 PROCEDURE — 1160F PR REVIEW ALL MEDS BY PRESCRIBER/CLIN PHARMACIST DOCUMENTED: ICD-10-PCS | Mod: HCNC,CPTII,S$GLB, | Performed by: INTERNAL MEDICINE

## 2023-01-10 PROCEDURE — 99215 OFFICE O/P EST HI 40 MIN: CPT | Mod: HCNC,S$GLB,, | Performed by: INTERNAL MEDICINE

## 2023-01-10 PROCEDURE — 99999 PR PBB SHADOW E&M-EST. PATIENT-LVL IV: CPT | Mod: PBBFAC,HCNC,, | Performed by: INTERNAL MEDICINE

## 2023-01-10 PROCEDURE — 96372 THER/PROPH/DIAG INJ SC/IM: CPT | Mod: HCNC

## 2023-01-10 PROCEDURE — 80053 COMPREHEN METABOLIC PANEL: CPT | Mod: HCNC | Performed by: INTERNAL MEDICINE

## 2023-01-10 PROCEDURE — 36415 COLL VENOUS BLD VENIPUNCTURE: CPT | Mod: HCNC | Performed by: INTERNAL MEDICINE

## 2023-01-10 PROCEDURE — 84260 ASSAY OF SEROTONIN: CPT | Mod: HCNC | Performed by: INTERNAL MEDICINE

## 2023-01-10 PROCEDURE — 3077F PR MOST RECENT SYSTOLIC BLOOD PRESSURE >= 140 MM HG: ICD-10-PCS | Mod: HCNC,CPTII,S$GLB, | Performed by: INTERNAL MEDICINE

## 2023-01-10 PROCEDURE — 63600175 PHARM REV CODE 636 W HCPCS: Mod: JG,HCNC | Performed by: INTERNAL MEDICINE

## 2023-01-10 PROCEDURE — 3288F FALL RISK ASSESSMENT DOCD: CPT | Mod: HCNC,CPTII,S$GLB, | Performed by: INTERNAL MEDICINE

## 2023-01-10 PROCEDURE — 99215 PR OFFICE/OUTPT VISIT, EST, LEVL V, 40-54 MIN: ICD-10-PCS | Mod: HCNC,S$GLB,, | Performed by: INTERNAL MEDICINE

## 2023-01-10 PROCEDURE — 1159F MED LIST DOCD IN RCRD: CPT | Mod: HCNC,CPTII,S$GLB, | Performed by: INTERNAL MEDICINE

## 2023-01-10 PROCEDURE — 85025 COMPLETE CBC W/AUTO DIFF WBC: CPT | Mod: HCNC | Performed by: INTERNAL MEDICINE

## 2023-01-10 PROCEDURE — 99999 PR PBB SHADOW E&M-EST. PATIENT-LVL IV: ICD-10-PCS | Mod: PBBFAC,HCNC,, | Performed by: INTERNAL MEDICINE

## 2023-01-10 RX ORDER — LANREOTIDE ACETATE 120 MG/.5ML
120 INJECTION SUBCUTANEOUS
Status: CANCELLED | OUTPATIENT
Start: 2023-01-10

## 2023-01-10 RX ORDER — LANREOTIDE ACETATE 120 MG/.5ML
120 INJECTION SUBCUTANEOUS
Status: DISCONTINUED | OUTPATIENT
Start: 2023-01-10 | End: 2023-01-10 | Stop reason: HOSPADM

## 2023-01-10 RX ADMIN — LANREOTIDE ACETATE 120 MG: 120 INJECTION SUBCUTANEOUS at 02:01

## 2023-01-11 LAB — CGA SERPL-MCNC: 1814 NG/ML

## 2023-01-11 NOTE — PROGRESS NOTES
"Subjective:       Patient ID: Shahram Hills is an 82 y.o. female.     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 Ms. Hills is an 77 yo woman who initially saw me on 6/7/18 for further evaluation of neuroendocrine tumor. She initially presented with diarrhea, abdominal discomfort, weight loss. She was evaluated at [facility] and underwent workup below:  US abdomen on 3/14/18 showed numerous bilobar mixed echogenicity and mildly vascular hepatic lesions. Cholelithiasis without e/o acute cholecystitis.   MRI abdomen on 3/30/2018 showed innumerable hepatic metastases. L3 vertebral body metastasis with soft tissue component along the right margin of the L3 and upper L4 vertebral bodies, filling the right L3/4 neural foramen, and extending into the anterior aspect of the spinal canal on the right at the L3 and upper T4 levels. Associated with mild spinal canal narrowing.  CT chest on 4/6/2018 showed one lucent nodule measuring 7 mm in the T7 vertebral body, most likely representing metastatic disease.    EGD on 5/4/18 showed normal duodenum. Schatzki's ring in the lower third of the esophagus. Grade 1 esophagitis in the GE junction c/w mild distal esophagitis. Congestion and erythema in the antrum, pre-pyloric region and stomach body c/w gastritis. Biopsy of the stomach antrum showed mild chronic gastritis, neg for H. pylori.   Labs on 5/9/2018: WBC 6.9, H/H 11.6/34.3, MCV 87.5, plt count 279. On 3/9/18: Vit B12 level 173, folic acid 6.6  She was started on oral vit B12 and underwent a liver biopsy on 5/18/18. Pathology showed "metastatic well differentiated neuroendocrine tumor. Ki67 3%"     She saw me on 6/7/18 and complained of weight loss of 26 lbs over the past 3-4 months, also has diarrhea. No flushing, wheezing, palpitations. Has been having pain in right flank radiating down " "the right leg. No tingling/numbness, weakness. She can hold her bladder but when she urinates her diarrhea would come out as well. Started on dex 4 mg q6h the evening of 18.      2. MRI spine on 18 showed "Marrow replacing metastatic lesion of the L3 vertebral body with associated extra osseous expansion and complete effacement of the right L3-L4 neural foramina and additional effacement of the right lateral recess.  There is additional lateral extension and abutment of the right psoas muscle.  Superimposed degenerative change at this level results in mild spinal canal stenosis." I sent the patient to ED on 18 where she was evaluated by neurosurgery. Neurosurgery did not feel she was a candidate for surgical intervention.      3. Seen by Dr. Adames on 18. palliative radiation to the area of the L3 metastasis 18-18   4. 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  There is an adjacent metastatic lymph node, diffuse liver metastases, and multiple bone metastases. Index primary neoplasm SUV max 33.13. Adjacent lymph node SUV max 23. L3 bone metastasis SUV max 36.86. Left lobe SUV max 46.13   5. Lanreotide every 4 weeks started on 18  6. TACE to the right hepatic lobe on 19. TACE to the left hepatic lobe on 3/21/19.   7. TACE to the right hepatic lobe on 22. S/p conventional chemoembolization performed of the segment 2, 3, 4 branch of the left hepatic artery and segment 8 branch of the left hepatic artery on 22.  8. Gallium PET 22 showed progression. Discussed PRRT. PRRT #1 on 2022. Zometa every 3 months started 22.     History of Present Illness:   Ms. Hills returns for follow up. taking xermelo. Diarrhea improved. Not having diarrhea every day. Feels well.     ECO     ROS:   See HPI. Otherwise negative.      Physical Examination:   Vital signs reviewed.   Gen: well hydrated, well developed, in no acute " distress.  HEENT: normocephalic, anicteric, PERRLA, EOMI, oropharynx clear  Neck: supple, no JVD, thyromegaly, cervical or supraclavicular LAD  Lungs: CTAB, no wheezes or rales  Heart: regular rate, frequent PVCs, regular pulse, no M/R/G  Abdomen: soft, no tenderness, non-distended, liver edge 3 cm below the right costal margin, no splenomegaly, no mass, or hernia.   Ext: b/l LE no edema  Neuro: alert and oriented x 4, no focal neuro deficit  Skin: no rash, open wounds or ulcers  Psych: pleasant and appropriate mood and affect     Objective:      Laboratory Data:  Labs reviewed. Serotonin decreased     Imaging Data:     MRI abdomen 11/1/22:  1. History of neuroendocrine malignancy.  Numerous hepatic lesions, some remaining stable while others have mildly enlarged consistent with disease progression.  Stable osseous lesion in the L3 vertebral body.  2. Cholelithiasis and stable mild dilatation of the common bile duct and central intrahepatic biliary duct dilatation.  Of note, there is mass effect on the midportion of the common bile duct by dominant left hepatic lobe lesion.  3. Additional findings detailed above.  RECIST SUMMARY:     Date of prior examination for comparison: 05/16/2022     Lesion 1: Left hepatic lobe.  7.1 cm.  Series 9 image 52.  Prior measurement 6.3 cm.     Lesion 2: Right hepatic lobe lateral.  3.1 cm.  Series 9 image 30.  Prior measurement 3.2 cm.     Lesion 3: Right hepatic dome.  5.1 cm.  Series 9 image 19.  Prior measurement 4.4 cm.    Gallium PET 11/1/22:     Interval development of somatostatin tracer avid lesions in the skull, concerning for new metastatic lesions.     Numerous tracer avid hypoattenuating masses throughout the liver with increased SUV max compared to prior exam.     Tracer avid lesions in the C7 and L3 vertebral bodies, sternum, distal ileum and mesenteric node are similar to prior exam.     Judged by tracer avidity of the patient's tumor burden, PRRT would be a  consideration if clinically indicated.        Assessment and Plan:      1. Malignant carcinoid tumor of ileum    2. Secondary neuroendocrine tumor of bone    3. Spine metastasis    4. 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 Carcinoid syndrome    8. Carcinoid heart disease    9. Essential hypertension    10. Controlled type 2 diabetes mellitus with microalbuminuria, without long-term current use of insulin      1-6  - Ms Hills is an 83 yo woman with well differentiated low-grade neuroendocrine tumor of the ileum with mets to liver and bones, diagnosed on 5/18/2018. Started lanreotide every 4 weeks on 6/22/2018. S/p TACE to the right hepatic lobe on 2/14/19. TACE to the left hepatic lobe on 3/21/19.   - CT/MRI 1/12/22 showed mild progression in the liver. S/p TACE on 2/11/22 and 4/22/22   - Gallium PET 11/1/22 showed progression. Discussed PRRT. PRRT #1 on 12/14/2022. Zometa every 3 months started 12/27/22.  - doing well. Lanreotide today which is 29 days out of next PRRT  - see me in 4 weeks prior to #2 PRRT    7.  - better after xermelo and PRRT  - c/w lanreotide every 4 weeks. No lanreotide within 28 days of PRRT. Will do PRRT every 9 weeks  - c/w xermelo    8.  - stable  - f/u with cardiology    9.  - BP controlled  - c/w current medication    10.  - BS controlled  - c/w current medication      I spent 45 minutes reviewing the chart, interpreting laboratory result and imaging data, coordinating patient's care, with at least 50% of the time on face-to-face counseling.       Sebastian Granados MD  Hematology and Medical Oncology  [facility]     Route Chart for Scheduling    Med Onc Chart Routing      Follow up with physician 2 months. keep scheduled appointments. see me on 3/8 with CBC, CMP, serotonin, pancreastatin, 5-HIAA, chromogranin A, see me then get lanreotide and zometa   Follow up with  JUNAID    Infusion scheduling note zometa on 3/8   Injection scheduling note lanreotide on 2/9 then on 3/8   Labs CBC and CMP   Lab interval:  serotonin, pancreastatin, 5-HIAA, chromogranin A   Imaging    Pharmacy appointment    Other referrals          Supportive Plan Information  OP ZOLEDRONIC ACID (ZOMETA) Q4W   Sebastian Granados MD   Upcoming Treatment Dates - OP ZOLEDRONIC ACID (ZOMETA) Q4W    3/27/2023       Medications       zoledronic acid (ZOMETA) 3.3 mg in sodium chloride 0.9% 100 mL IVPB  6/25/2023       Medications       zoledronic acid (ZOMETA) 3.3 mg in sodium chloride 0.9% 100 mL IVPB  9/23/2023       Medications       zoledronic acid (ZOMETA) 3.3 mg in sodium chloride 0.9% 100 mL IVPB  12/22/2023       Medications       zoledronic acid (ZOMETA) 3.3 mg in sodium chloride 0.9% 100 mL IVPB    Therapy Plan Information  LANREOTIDE  5. Medications  lanreotide injection 120 mg  120 mg, Subcutaneous, Every visit    LUTATHERA SUPPORT  IV Fluids  0.9%  NaCl infusion  Intravenous, Every 8 weeks  Pre-Medications  ondansetron-dexAMETHasone 16-12 mg in sodium chloride 0.9% 50 mL IVPB 16 mg  16 mg, Intravenous, Every 8 weeks  amino acid (25 g L-LYSINE, 25 g L-ARGININE) 25-25 mg/mL 1000 mL infusion 1,000 mL  1,000 mL, Intravenous, Every 8 weeks  promethazine (PHENERGAN) 12.5 mg in dextrose 5 % 50 mL IVPB  12.5 mg, Intravenous, Every 8 weeks  acetaminophen tablet 650 mg  650 mg, Oral, Every 8 weeks  Flushes  sodium chloride 0.9% flush 10 mL  10 mL, Intravenous, Every 8 weeks  PRN Medications  diphenhydrAMINE injection 50 mg  50 mg, Intravenous, Every 8 weeks  hydrocortisone sodium succinate injection 100 mg  100 mg, Intravenous, Every 8 weeks

## 2023-01-16 LAB — SEROTONIN: 2170 NG/ML

## 2023-01-23 ENCOUNTER — OFFICE VISIT (OUTPATIENT)
Dept: CARDIOLOGY | Facility: CLINIC | Age: 83
End: 2023-01-23
Payer: MEDICARE

## 2023-01-23 VITALS
BODY MASS INDEX: 20.96 KG/M2 | HEART RATE: 85 BPM | OXYGEN SATURATION: 99 % | WEIGHT: 118.31 LBS | DIASTOLIC BLOOD PRESSURE: 82 MMHG | SYSTOLIC BLOOD PRESSURE: 128 MMHG

## 2023-01-23 DIAGNOSIS — I50.22 CHRONIC SYSTOLIC HEART FAILURE: Primary | ICD-10-CM

## 2023-01-23 DIAGNOSIS — I70.0 ATHEROSCLEROSIS OF AORTA: ICD-10-CM

## 2023-01-23 DIAGNOSIS — I10 PRIMARY HYPERTENSION: ICD-10-CM

## 2023-01-23 PROCEDURE — 99999 PR PBB SHADOW E&M-EST. PATIENT-LVL IV: CPT | Mod: PBBFAC,HCNC,, | Performed by: INTERNAL MEDICINE

## 2023-01-23 PROCEDURE — 3074F PR MOST RECENT SYSTOLIC BLOOD PRESSURE < 130 MM HG: ICD-10-PCS | Mod: HCNC,CPTII,S$GLB, | Performed by: INTERNAL MEDICINE

## 2023-01-23 PROCEDURE — 1159F MED LIST DOCD IN RCRD: CPT | Mod: HCNC,CPTII,S$GLB, | Performed by: INTERNAL MEDICINE

## 2023-01-23 PROCEDURE — 3079F DIAST BP 80-89 MM HG: CPT | Mod: HCNC,CPTII,S$GLB, | Performed by: INTERNAL MEDICINE

## 2023-01-23 PROCEDURE — 1159F PR MEDICATION LIST DOCUMENTED IN MEDICAL RECORD: ICD-10-PCS | Mod: HCNC,CPTII,S$GLB, | Performed by: INTERNAL MEDICINE

## 2023-01-23 PROCEDURE — 99214 OFFICE O/P EST MOD 30 MIN: CPT | Mod: HCNC,S$GLB,, | Performed by: INTERNAL MEDICINE

## 2023-01-23 PROCEDURE — 3288F FALL RISK ASSESSMENT DOCD: CPT | Mod: HCNC,CPTII,S$GLB, | Performed by: INTERNAL MEDICINE

## 2023-01-23 PROCEDURE — 1160F PR REVIEW ALL MEDS BY PRESCRIBER/CLIN PHARMACIST DOCUMENTED: ICD-10-PCS | Mod: HCNC,CPTII,S$GLB, | Performed by: INTERNAL MEDICINE

## 2023-01-23 PROCEDURE — 3074F SYST BP LT 130 MM HG: CPT | Mod: HCNC,CPTII,S$GLB, | Performed by: INTERNAL MEDICINE

## 2023-01-23 PROCEDURE — 99214 PR OFFICE/OUTPT VISIT, EST, LEVL IV, 30-39 MIN: ICD-10-PCS | Mod: HCNC,S$GLB,, | Performed by: INTERNAL MEDICINE

## 2023-01-23 PROCEDURE — 1101F PT FALLS ASSESS-DOCD LE1/YR: CPT | Mod: HCNC,CPTII,S$GLB, | Performed by: INTERNAL MEDICINE

## 2023-01-23 PROCEDURE — 99999 PR PBB SHADOW E&M-EST. PATIENT-LVL IV: ICD-10-PCS | Mod: PBBFAC,HCNC,, | Performed by: INTERNAL MEDICINE

## 2023-01-23 PROCEDURE — 3079F PR MOST RECENT DIASTOLIC BLOOD PRESSURE 80-89 MM HG: ICD-10-PCS | Mod: HCNC,CPTII,S$GLB, | Performed by: INTERNAL MEDICINE

## 2023-01-23 PROCEDURE — 1126F AMNT PAIN NOTED NONE PRSNT: CPT | Mod: HCNC,CPTII,S$GLB, | Performed by: INTERNAL MEDICINE

## 2023-01-23 PROCEDURE — 1126F PR PAIN SEVERITY QUANTIFIED, NO PAIN PRESENT: ICD-10-PCS | Mod: HCNC,CPTII,S$GLB, | Performed by: INTERNAL MEDICINE

## 2023-01-23 PROCEDURE — 3288F PR FALLS RISK ASSESSMENT DOCUMENTED: ICD-10-PCS | Mod: HCNC,CPTII,S$GLB, | Performed by: INTERNAL MEDICINE

## 2023-01-23 PROCEDURE — 1160F RVW MEDS BY RX/DR IN RCRD: CPT | Mod: HCNC,CPTII,S$GLB, | Performed by: INTERNAL MEDICINE

## 2023-01-23 PROCEDURE — 1101F PR PT FALLS ASSESS DOC 0-1 FALLS W/OUT INJ PAST YR: ICD-10-PCS | Mod: HCNC,CPTII,S$GLB, | Performed by: INTERNAL MEDICINE

## 2023-01-23 NOTE — PROGRESS NOTES
"[facility]    Chief Complaint  Chief Complaint   Patient presents with    Congestive Heart Failure       HPI:    She continues to do well.  No complaints.  Able to do her activities without limitations.    Medications  Current Outpatient Medications   Medication Sig Dispense Refill    blood sugar diagnostic (TRUE METRIX GLUCOSE TEST STRIP) Str USE TO CHECK BLOOD SUGAR TWICE DAILY 100 strip 11    carvediloL (COREG) 12.5 MG tablet Take 1 tablet (12.5 mg total) by mouth 2 (two) times daily with meals. 180 tablet 3    cyanocobalamin (VITAMIN B-12) 1000 MCG tablet Take 1 tablet (1,000 mcg total) by mouth once daily.      diphenoxylate-atropine 2.5-0.025 mg (LOMOTIL) 2.5-0.025 mg per tablet Take 1 tablet by mouth 4 (four) times daily as needed for Diarrhea. 90 tablet 2    ezetimibe (ZETIA) 10 mg tablet Take 1 tablet (10 mg total) by mouth once daily. 90 tablet 0    ferrous sulfate 325 (65 FE) MG EC tablet Take 325 mg by mouth 3 (three) times daily with meals.      lancets Misc To check BG 2 times daily, to use with insurance preferred meter 100 each 11    latanoprost 0.005 % ophthalmic solution Place 1 drop into both eyes nightly.      losartan (COZAAR) 50 MG tablet Take 1 tablet (50 mg total) by mouth once daily. (Patient taking differently: Take 50 mg by mouth every morning.) 90 tablet 3    metFORMIN (GLUCOPHAGE-XR) 500 MG ER 24hr tablet Take 2 tablets (1,000 mg total) by mouth daily with breakfast. 180 tablet 3    needle, disp, 22 G 22 gauge x 1" Ndle 1 application by Misc.(Non-Drug; Combo Route) route 3 (three) times daily as needed (diarrhea). 30 each 2    octreotide acetate (SANDOSTATIN) 100 mcg/mL (1 mL) Syrg Inject 1 mL (0.1 mg total) into the skin 3 (three) times daily as needed (diarrhea). 30 mL 2    omeprazole (PRILOSEC) 20 MG capsule TAKE ONE CAPSULE BY MOUTH ONCE DAILY. 90 capsule 6    ondansetron (ZOFRAN-ODT) 4 MG TbDL Take 1 tablet (4 mg total) by mouth every 6 (six) hours as needed " (nausea, vomting). 20 tablet 0    oxyCODONE (ROXICODONE) 5 MG immediate release tablet Take 1 tablet (5 mg total) by mouth every 6 (six) hours as needed for Pain. 30 tablet 0    syringe, disposable, 1 mL Syrg 1 application by Misc.(Non-Drug; Combo Route) route 3 (three) times daily as needed (diarrhea). 30 each 2    telotristat ethyl (XERMELO) 250 mg Tab Take 1 tablet (250 mg) by mouth 3 (three) times daily. 90 tablet 3    TRUEPLUS LANCETS 33 gauge Misc USE TO CHECK BLOOD SUGAR TWICE DAILY      vitamin D (VITAMIN D3) 1000 units Tab Take 400 Units by mouth once daily.      XIIDRA 5 % Dpet INSTILL ONE DROP INTO OU BID  3    blood-glucose meter kit To check BG 2 times daily, to use with insurance preferred meter 1 each 0     No current facility-administered medications for this visit.        History  Past Medical History:   Diagnosis Date    Anemia 7/11/2018    B12 deficiency     Depression     per pcp note 7/2017 and given prozac and diazepam     Hyperlipidemia     Weight loss     reviewed prior pcp Dr. Russo notes 2017 - labs reviewed: cmp wnl x tbili 1.5, tsh wnl, A1c 5.0, cbc wnl,D 36, hiv neg, hep c neg, hep b surg ag neg      Past Surgical History:   Procedure Laterality Date    EMBOLIZATION N/A 2/14/2019    Procedure: EMBOLIZATION, BLOOD VESSEL;  Surgeon: [facility] Diagnostic Provider;  Location: [address];  Service: Radiology;  Laterality: N/A;  Room 189/[place]    EMBOLIZATION N/A 3/21/2019    Procedure: EMBOLIZATION, BLOOD VESSEL;  Surgeon: [facility] Diagnostic Provider;  Location: [facility] [address] [place] [address];  Service: Radiology;  Laterality: N/A;  Room 189/[place]    ESOPHAGOGASTRODUODENOSCOPY  05/04/2018    esophageal ring, grade 1 esophagitis, gastritis     EYE SURGERY Bilateral     cataract removal    HYSTERECTOMY      fibroids     Social History     Socioeconomic History    Marital status:    Occupational History    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   Tobacco Use    Smoking status: Never     Smokeless tobacco: Never   Substance and Sexual Activity    Alcohol use: No     Comment: stopped in 2007 - hx of social drinking    Drug use: Never    Sexual activity: Not Currently     Partners: Male   Social History Narrative    Living in [facility]    Not getting reg exericse     Social Determinants of Health     Financial Resource Strain: Medium Risk    Difficulty of Paying Living Expenses: Somewhat hard   Food Insecurity: No Food Insecurity    Worried About Running Out of Food in the Last Year: Never true    Ran Out of Food in the Last Year: Never true   Transportation Needs: No Transportation Needs    Lack of Transportation (Medical): No    Lack of Transportation (Non-Medical): No   Physical Activity: Inactive    Days of Exercise per Week: 0 days    Minutes of Exercise per Session: 0 min   Stress: No Stress Concern Present    Feeling of Stress : Only a little   Social Connections: Moderately Isolated    Frequency of Communication with Friends and Family: More than three times a week    Frequency of Social Gatherings with Friends and Family: Never    Attends [employer] Services: More than 4 times per year    Active Member of Clubs or Organizations: No    Attends Club or Organization Meetings: Never    Marital Status:    Housing Stability: Unknown    Unable to Pay for Housing in the Last Year: No    Unstable Housing in the Last Year: No     Family History   Problem Relation Age of Onset    Glaucoma Mother     Hypertension Mother     Heart attack Father     Cancer Father     Diabetes Sister     Cancer Brother         lung cancer, + tobacco    Diabetes Brother     Hypertension Brother     Stroke Brother     Emphysema Brother     COPD Brother     Asthma Sister     No Known Problems Sister     Hyperlipidemia Sister     Heart attack Sister         Allergies  Review of patient's allergies indicates:   Allergen Reactions    Epinephrine      carcinoid       Review of Systems   Review of Systems   Constitutional:  Negative for malaise/fatigue, weight gain and weight loss.   Eyes:  Negative for visual disturbance.   Cardiovascular:  Negative for chest pain, claudication, cyanosis, dyspnea on exertion, irregular heartbeat, leg swelling, near-syncope, orthopnea, palpitations, paroxysmal nocturnal dyspnea and syncope.   Respiratory:  Negative for cough, hemoptysis, shortness of breath, sleep disturbances due to breathing and wheezing.    Hematologic/Lymphatic: Negative for bleeding problem. Does not bruise/bleed easily.   Skin:  Negative for poor wound healing.   Musculoskeletal:  Negative for muscle cramps and myalgias.   Gastrointestinal:  Negative for abdominal pain, anorexia, diarrhea, heartburn, hematemesis, hematochezia, melena, nausea and vomiting.   Genitourinary:  Negative for hematuria and nocturia.   Neurological:  Negative for excessive daytime sleepiness, dizziness, focal weakness, light-headedness and weakness.     Physical Exam  Vitals:    01/23/23 1035   BP: 128/82   Pulse: 85     Wt Readings from Last 1 Encounters:   01/23/23 53.7 kg (118 lb 4.8 oz)     Physical Exam  Vitals and nursing note reviewed.   Constitutional:       General: She is not in acute distress.     Appearance: She is not toxic-appearing or diaphoretic.   HENT:      Head: Normocephalic and atraumatic.      Mouth/Throat:      Lips: Pink.      Mouth: Mucous membranes are moist.   Eyes:      General: No scleral icterus.     Conjunctiva/sclera: Conjunctivae normal.   Neck:      Thyroid: No thyromegaly.      Vascular: No carotid bruit, hepatojugular reflux or JVD.      Trachea: Trachea normal.   Cardiovascular:      Rate and Rhythm: Normal rate and regular rhythm. FrequentExtrasystoles are present.     Chest Wall: PMI is not displaced.      Pulses:           Carotid pulses are 2+ on the right side and 2+ on the left side.       Radial pulses are 2+ on the right side and 2+ on the left side.      Heart sounds: S1 normal and S2 normal. No murmur  heard.    No friction rub. No S3 or S4 sounds.   Pulmonary:      Effort: Pulmonary effort is normal. No accessory muscle usage or respiratory distress.      Breath sounds: Normal breath sounds and air entry. No decreased breath sounds, wheezing, rhonchi or rales.   Abdominal:      General: Bowel sounds are normal. There is no distension or abdominal bruit.      Palpations: Abdomen is soft. There is no hepatomegaly, splenomegaly or pulsatile mass.      Tenderness: There is no abdominal tenderness.   Musculoskeletal:         General: No tenderness or deformity.      Right lower leg: No edema.      Left lower leg: No edema.   Skin:     General: Skin is warm and dry.      Capillary Refill: Capillary refill takes less than 2 seconds.      Coloration: Skin is not cyanotic or pale.      Nails: There is no clubbing.   Neurological:      General: No focal deficit present.      Mental Status: She is alert and oriented to person, place, and time.   Psychiatric:         Attention and Perception: Attention normal.         Mood and Affect: Mood normal.         Speech: Speech normal.         Behavior: Behavior normal. Behavior is cooperative.       Labs  Lab Visit on 01/10/2023   Component Date Value Ref Range Status    WBC 01/10/2023 6.87  3.90 - 12.70 K/uL Final    RBC 01/10/2023 4.29  4.00 - 5.40 M/uL Final    Hemoglobin 01/10/2023 12.5  12.0 - 16.0 g/dL Final    Hematocrit 01/10/2023 40.0  37.0 - 48.5 % Final    MCV 01/10/2023 93  82 - 98 fL Final    MCH 01/10/2023 29.1  27.0 - 31.0 pg Final    MCHC 01/10/2023 31.3 (L)  32.0 - 36.0 g/dL Final    RDW 01/10/2023 13.1  11.5 - 14.5 % Final    Platelets 01/10/2023 242  150 - 450 K/uL Final    MPV 01/10/2023 10.6  9.2 - 12.9 fL Final    Immature Granulocytes 01/10/2023 0.3  0.0 - 0.5 % Final    Gran # (ANC) 01/10/2023 4.8  1.8 - 7.7 K/uL Final    Immature Grans (Abs) 01/10/2023 0.02  0.00 - 0.04 K/uL Final    Comment: Mild elevation in immature granulocytes is non specific and   can  be seen in a variety of conditions including stress response,   acute inflammation, trauma and pregnancy. Correlation with other   laboratory and clinical findings is essential.      Lymph # 01/10/2023 1.3  1.0 - 4.8 K/uL Final    Mono # 01/10/2023 0.7  0.3 - 1.0 K/uL Final    Eos # 01/10/2023 0.1  0.0 - 0.5 K/uL Final    Baso # 01/10/2023 0.02  0.00 - 0.20 K/uL Final    nRBC 01/10/2023 0  0 /100 WBC Final    Gran % 01/10/2023 70.4  38.0 - 73.0 % Final    Lymph % 01/10/2023 18.3  18.0 - 48.0 % Final    Mono % 01/10/2023 10.0  4.0 - 15.0 % Final    Eosinophil % 01/10/2023 0.7  0.0 - 8.0 % Final    Basophil % 01/10/2023 0.3  0.0 - 1.9 % Final    Differential Method 01/10/2023 Automated   Final    Sodium 01/10/2023 142  136 - 145 mmol/L Final    Potassium 01/10/2023 3.4 (L)  3.5 - 5.1 mmol/L Final    Chloride 01/10/2023 104  95 - 110 mmol/L Final    CO2 01/10/2023 27  23 - 29 mmol/L Final    Glucose 01/10/2023 151 (H)  70 - 110 mg/dL Final    BUN 01/10/2023 6 (L)  8 - 23 mg/dL Final    Creatinine 01/10/2023 0.7  0.5 - 1.4 mg/dL Final    Calcium 01/10/2023 9.3  8.7 - 10.5 mg/dL Final    Total Protein 01/10/2023 7.2  6.0 - 8.4 g/dL Final    Albumin 01/10/2023 3.5  3.5 - 5.2 g/dL Final    Total Bilirubin 01/10/2023 0.4  0.1 - 1.0 mg/dL Final    Comment: For infants and newborns, interpretation of results should be based  on gestational age, weight and in agreement with clinical  observations.    Premature Infant recommended reference ranges:  Up to 24 hours.............<8.0 mg/dL  Up to 48 hours............<12.0 mg/dL  3-5 days..................<15.0 mg/dL  6-29 days.................<15.0 mg/dL      Alkaline Phosphatase 01/10/2023 64  55 - 135 U/L Final    AST 01/10/2023 16  10 - 40 U/L Final    ALT 01/10/2023 14  10 - 44 U/L Final    Anion Gap 01/10/2023 11  8 - 16 mmol/L Final    eGFR 01/10/2023 >60.0  >60 mL/min/1.73 m^2 Final    Serotonin 01/10/2023 2170 (H)  <=230 ng/mL Final    Comment: -------------------ADDITIONAL  INFORMATION-------------------  This test was developed and its performance characteristics   determined by [facility] in a manner consistent with CLIA   requirements. This test has not been cleared or approved by   the U.S. Food and Drug Administration.    Test Performed by:  [facility]  [address] [place] [address]  : Yogesh Roque M.D. Ph.D.; CLIA# [id number]      Chromogranin A 01/10/2023 1814 (H)  <93 ng/mL Final    Comment: Impaired renal or hepatic function or treatment with proton  pump inhibitors may result in artifactual elevations of   Chromogranin A.    -------------------ADDITIONAL INFORMATION-------------------  This test was developed and its performance characteristics  determined by [facility] in a manner consistent with CLIA  requirements. This test has not been cleared or approved by   the U.S. Food and Drug Administration.    In some immunoassays, the presence of unusually high   concentrations of analyte may result in a high-dose   &quot;hook&quot; effect. This may result in a lower or even normal   measured analyte concentration. If the reported result   is inconsistent with the clinical presentation, the   laboratory should be alerted for troubleshooting. For   diagnostic purposes, these immunoassay results should   always be assessed in conjunction with the patients medical   history, clinical examination and other findings.    The testing method is a homogeneous time-resolved   immunof                           luorescent assay manufactured by [employer]   and performed on the [employer] Compact Plus.    Values obtained with different assay methods or kits may be   different and cannot be used interchangeably.    Test results cannot be interpreted as absolute evidence for   the presence or absence of malignant disease.    Test Performed by:  [facility]  [address] [place] [address]  : Yogesh Roque M.D. Ph.D.; CLIA# [id number]     Lab Visit on 12/27/2022   Component Date Value Ref Range Status    WBC 12/27/2022 7.39  3.90 - 12.70 K/uL Final    RBC 12/27/2022 4.38  4.00 - 5.40 M/uL Final    Hemoglobin 12/27/2022 12.7  12.0 - 16.0 g/dL Final    Hematocrit 12/27/2022 41.2  37.0 - 48.5 % Final    MCV 12/27/2022 94  82 - 98 fL Final    MCH 12/27/2022 29.0  27.0 - 31.0 pg Final    MCHC 12/27/2022 30.8 (L)  32.0 - 36.0 g/dL Final    RDW 12/27/2022 13.1  11.5 - 14.5 % Final    Platelets 12/27/2022 252  150 - 450 K/uL Final    MPV 12/27/2022 11.5  9.2 - 12.9 fL Final    Immature Granulocytes 12/27/2022 0.4  0.0 - 0.5 % Final    Gran # (ANC) 12/27/2022 5.6  1.8 - 7.7 K/uL Final    Immature Grans (Abs) 12/27/2022 0.03  0.00 - 0.04 K/uL Final    Comment: Mild elevation in immature granulocytes is non specific and   can be seen in a variety of conditions including stress response,   acute inflammation, trauma and pregnancy. Correlation with other   laboratory and clinical findings is essential.      Lymph # 12/27/2022 1.1  1.0 - 4.8 K/uL Final    Mono # 12/27/2022 0.7  0.3 - 1.0 K/uL Final    Eos # 12/27/2022 0.0  0.0 - 0.5 K/uL Final    Baso # 12/27/2022 0.02  0.00 - 0.20 K/uL Final    nRBC 12/27/2022 0  0 /100 WBC Final    Gran % 12/27/2022 75.4 (H)  38.0 - 73.0 % Final    Lymph % 12/27/2022 14.7 (L)  18.0 - 48.0 % Final    Mono % 12/27/2022 8.8  4.0 - 15.0 % Final    Eosinophil % 12/27/2022 0.4  0.0 - 8.0 % Final    Basophil % 12/27/2022 0.3  0.0 - 1.9 % Final    Differential Method 12/27/2022 Automated   Final    Sodium 12/27/2022 143  136 - 145 mmol/L Final    Potassium 12/27/2022 4.7  3.5 - 5.1 mmol/L Final    Chloride 12/27/2022 103  95 - 110 mmol/L Final    CO2 12/27/2022 32 (H)  23 - 29 mmol/L Final    Glucose 12/27/2022 199 (H)  70 - 110 mg/dL Final    BUN 12/27/2022 10  8 - 23 mg/dL Final    Creatinine 12/27/2022 0.8  0.5 - 1.4 mg/dL Final    Calcium  12/27/2022 9.8  8.7 - 10.5 mg/dL Final    Total Protein 12/27/2022 7.0  6.0 - 8.4 g/dL Final    Albumin 12/27/2022 3.6  3.5 - 5.2 g/dL Final    Total Bilirubin 12/27/2022 0.4  0.1 - 1.0 mg/dL Final    Comment: For infants and newborns, interpretation of results should be based  on gestational age, weight and in agreement with clinical  observations.    Premature Infant recommended reference ranges:  Up to 24 hours.............<8.0 mg/dL  Up to 48 hours............<12.0 mg/dL  3-5 days..................<15.0 mg/dL  6-29 days.................<15.0 mg/dL      Alkaline Phosphatase 12/27/2022 77  55 - 135 U/L Final    AST 12/27/2022 28  10 - 40 U/L Final    ALT 12/27/2022 22  10 - 44 U/L Final    Anion Gap 12/27/2022 8  8 - 16 mmol/L Final    eGFR 12/27/2022 >60.0  >60 mL/min/1.73 m^2 Final    Serotonin 12/27/2022 2690 (H)  <=230 ng/mL Final    Comment: -------------------ADDITIONAL INFORMATION-------------------  This test was developed and its performance characteristics   determined by [facility] in a manner consistent with CLIA   requirements. This test has not been cleared or approved by   the U.S. Food and Drug Administration.    Test Performed by:  [facility]  [address] [place] [address]  : Yogesh Roque M.D. Ph.D.; CLIA# [id number]      Chromogranin A 12/27/2022 2001 (H)  <93 ng/mL Final    Comment: Impaired renal or hepatic function or treatment with proton  pump inhibitors may result in artifactual elevations of   Chromogranin A.    -------------------ADDITIONAL INFORMATION-------------------  This test was developed and its performance characteristics  determined by [facility] in a manner consistent with CLIA  requirements. This test has not been cleared or approved by   the U.S. Food and Drug Administration.    In some immunoassays, the presence of unusually high   concentrations of analyte may result in a high-dose   &quot;hook&quot;  effect. This may result in a lower or even normal   measured analyte concentration. If the reported result   is inconsistent with the clinical presentation, the   laboratory should be alerted for troubleshooting. For   diagnostic purposes, these immunoassay results should   always be assessed in conjunction with the patients medical   history, clinical examination and other findings.    The testing method is a homogeneous time-resolved   immunof                           luorescent assay manufactured by [employer]   and performed on the [employer] Compact Plus.    Values obtained with different assay methods or kits may be   different and cannot be used interchangeably.    Test results cannot be interpreted as absolute evidence for   the presence or absence of malignant disease.    Test Performed by:  [facility]  [address] [place] [address]  : Yogesh Roque M.D. Ph.D.; IA# [id number]     Lab Visit on 12/15/2022   Component Date Value Ref Range Status    Hemoglobin A1C 12/15/2022 6.4 (H)  4.0 - 5.6 % Final    Comment: ADA Screening Guidelines:  5.7-6.4%  Consistent with prediabetes  >or=6.5%  Consistent with diabetes    High levels of fetal hemoglobin interfere with the HbA1C  assay. Heterozygous hemoglobin variants (HbS, HgC, etc)do  not significantly interfere with this assay.   However, presence of multiple variants may affect accuracy.      Estimated Avg Glucose 12/15/2022 137 (H)  68 - 131 mg/dL Final   Lab Visit on 12/06/2022   Component Date Value Ref Range Status    WBC 12/06/2022 6.76  3.90 - 12.70 K/uL Final    RBC 12/06/2022 4.23  4.00 - 5.40 M/uL Final    Hemoglobin 12/06/2022 12.7  12.0 - 16.0 g/dL Final    Hematocrit 12/06/2022 39.2  37.0 - 48.5 % Final    MCV 12/06/2022 93  82 - 98 fL Final    MCH 12/06/2022 30.0  27.0 - 31.0 pg Final    MCHC 12/06/2022 32.4  32.0 - 36.0 g/dL Final    RDW 12/06/2022 13.7  11.5 - 14.5  % Final    Platelets 12/06/2022 253  150 - 450 K/uL Final    MPV 12/06/2022 10.2  9.2 - 12.9 fL Final    Gran # (ANC) 12/06/2022 4.8  1.8 - 7.7 K/uL Final    Comment: The ANC is based on a white cell differential from an   automated cell counter. It has not been microscopically   reviewed for the presence of abnormal cells. Clinical   correlation is required.      Immature Grans (Abs) 12/06/2022 0.02  0.00 - 0.04 K/uL Final    Comment: Mild elevation in immature granulocytes is non specific and   can be seen in a variety of conditions including stress response,   acute inflammation, trauma and pregnancy. Correlation with other   laboratory and clinical findings is essential.      Sodium 12/06/2022 139  136 - 145 mmol/L Final    Potassium 12/06/2022 4.0  3.5 - 5.1 mmol/L Final    Chloride 12/06/2022 101  95 - 110 mmol/L Final    CO2 12/06/2022 32 (H)  23 - 29 mmol/L Final    Glucose 12/06/2022 226 (H)  70 - 110 mg/dL Final    BUN 12/06/2022 6 (L)  8 - 23 mg/dL Final    Creatinine 12/06/2022 0.7  0.5 - 1.4 mg/dL Final    Calcium 12/06/2022 9.6  8.7 - 10.5 mg/dL Final    Total Protein 12/06/2022 7.2  6.0 - 8.4 g/dL Final    Albumin 12/06/2022 3.4 (L)  3.5 - 5.2 g/dL Final    Total Bilirubin 12/06/2022 0.6  0.1 - 1.0 mg/dL Final    Comment: For infants and newborns, interpretation of results should be based  on gestational age, weight and in agreement with clinical  observations.    Premature Infant recommended reference ranges:  Up to 24 hours.............<8.0 mg/dL  Up to 48 hours............<12.0 mg/dL  3-5 days..................<15.0 mg/dL  6-29 days.................<15.0 mg/dL      Alkaline Phosphatase 12/06/2022 73  55 - 135 U/L Final    AST 12/06/2022 18  10 - 40 U/L Final    ALT 12/06/2022 17  10 - 44 U/L Final    Anion Gap 12/06/2022 6 (L)  8 - 16 mmol/L Final    eGFR 12/06/2022 >60  >60 mL/min/1.73 m^2 Final    Chromogranin A 12/06/2022 1448 (H)  <93 ng/mL Final    Comment: Impaired renal or hepatic function  or treatment with proton  pump inhibitors may result in artifactual elevations of   Chromogranin A.    -------------------ADDITIONAL INFORMATION-------------------  This test was developed and its performance characteristics  determined by [facility] in a manner consistent with CLIA  requirements. This test has not been cleared or approved by   the U.S. Food and Drug Administration.    In some immunoassays, the presence of unusually high   concentrations of analyte may result in a high-dose   &quot;hook&quot; effect. This may result in a lower or even normal   measured analyte concentration. If the reported result   is inconsistent with the clinical presentation, the   laboratory should be alerted for troubleshooting. For   diagnostic purposes, these immunoassay results should   always be assessed in conjunction with the patients medical   history, clinical examination and other findings.    The testing method is a homogeneous time-resolved   immunof                           luorescent assay manufactured by [employer]   and performed on the [employer] Compact Plus.    Values obtained with different assay methods or kits may be   different and cannot be used interchangeably.    Test results cannot be interpreted as absolute evidence for   the presence or absence of malignant disease.    Test Performed by:  [facility] [employer]  [facility] [place] [address]  : Yogesh Roque M.D. Ph.D.; CLIA# [id number]      Serotonin 12/06/2022 3210 (H)  <=230 ng/mL Final    Comment: -------------------ADDITIONAL INFORMATION-------------------  This test was developed and its performance characteristics   determined by [facility] in a manner consistent with CLIA   requirements. This test has not been cleared or approved by   the U.S. Food and Drug Administration.    Test Performed by:  [facility] [place] [address]  : Yogesh Roque M.D. Ph.D.; CLIA# [id number]     Lab Visit on 11/28/2022   Component Date Value Ref Range Status    WBC 11/28/2022 8.50  3.90 - 12.70 K/uL Final    RBC 11/28/2022 4.18  4.00 - 5.40 M/uL Final    Hemoglobin 11/28/2022 12.4  12.0 - 16.0 g/dL Final    Hematocrit 11/28/2022 38.7  37.0 - 48.5 % Final    MCV 11/28/2022 93  82 - 98 fL Final    MCH 11/28/2022 29.7  27.0 - 31.0 pg Final    MCHC 11/28/2022 32.0  32.0 - 36.0 g/dL Final    RDW 11/28/2022 13.9  11.5 - 14.5 % Final    Platelets 11/28/2022 301  150 - 450 K/uL Final    MPV 11/28/2022 10.0  9.2 - 12.9 fL Final    Immature Granulocytes 11/28/2022 0.4  0.0 - 0.5 % Final    Gran # (ANC) 11/28/2022 6.0  1.8 - 7.7 K/uL Final    Immature Grans (Abs) 11/28/2022 0.03  0.00 - 0.04 K/uL Final    Comment: Mild elevation in immature granulocytes is non specific and   can be seen in a variety of conditions including stress response,   acute inflammation, trauma and pregnancy. Correlation with other   laboratory and clinical findings is essential.      Lymph # 11/28/2022 1.6  1.0 - 4.8 K/uL Final    Mono # 11/28/2022 0.8  0.3 - 1.0 K/uL Final    Eos # 11/28/2022 0.0  0.0 - 0.5 K/uL Final    Baso # 11/28/2022 0.03  0.00 - 0.20 K/uL Final    nRBC 11/28/2022 0  0 /100 WBC Final    Gran % 11/28/2022 70.7  38.0 - 73.0 % Final    Lymph % 11/28/2022 18.5  18.0 - 48.0 % Final    Mono % 11/28/2022 9.6  4.0 - 15.0 % Final    Eosinophil % 11/28/2022 0.4  0.0 - 8.0 % Final    Basophil % 11/28/2022 0.4  0.0 - 1.9 % Final    Differential Method 11/28/2022 Automated   Final    Sodium 11/28/2022 141  136 - 145 mmol/L Final    Potassium 11/28/2022 3.5  3.5 - 5.1 mmol/L Final    Chloride 11/28/2022 103  95 - 110 mmol/L Final    CO2 11/28/2022 28  23 - 29 mmol/L Final    Glucose 11/28/2022 252 (H)  70 - 110 mg/dL Final    BUN 11/28/2022 9  8 - 23 mg/dL Final    Creatinine 11/28/2022 0.7  0.5 - 1.4 mg/dL Final    Calcium 11/28/2022 9.4  8.7 -  10.5 mg/dL Final    Total Protein 11/28/2022 7.0  6.0 - 8.4 g/dL Final    Albumin 11/28/2022 3.2 (L)  3.5 - 5.2 g/dL Final    Total Bilirubin 11/28/2022 0.6  0.1 - 1.0 mg/dL Final    Comment: For infants and newborns, interpretation of results should be based  on gestational age, weight and in agreement with clinical  observations.    Premature Infant recommended reference ranges:  Up to 24 hours.............<8.0 mg/dL  Up to 48 hours............<12.0 mg/dL  3-5 days..................<15.0 mg/dL  6-29 days.................<15.0 mg/dL      Alkaline Phosphatase 11/28/2022 76  55 - 135 U/L Final    AST 11/28/2022 15  10 - 40 U/L Final    ALT 11/28/2022 19  10 - 44 U/L Final    Anion Gap 11/28/2022 10  8 - 16 mmol/L Final    eGFR 11/28/2022 >60  >60 mL/min/1.73 m^2 Final   Lab Visit on 11/01/2022   Component Date Value Ref Range Status    WBC 11/01/2022 8.08  3.90 - 12.70 K/uL Final    RBC 11/01/2022 4.69  4.00 - 5.40 M/uL Final    Hemoglobin 11/01/2022 14.1  12.0 - 16.0 g/dL Final    Hematocrit 11/01/2022 42.9  37.0 - 48.5 % Final    MCV 11/01/2022 92  82 - 98 fL Final    MCH 11/01/2022 30.1  27.0 - 31.0 pg Final    MCHC 11/01/2022 32.9  32.0 - 36.0 g/dL Final    RDW 11/01/2022 12.7  11.5 - 14.5 % Final    Platelets 11/01/2022 247  150 - 450 K/uL Final    MPV 11/01/2022 10.5  9.2 - 12.9 fL Final    Immature Granulocytes 11/01/2022 0.4  0.0 - 0.5 % Final    Gran # (ANC) 11/01/2022 5.7  1.8 - 7.7 K/uL Final    Immature Grans (Abs) 11/01/2022 0.03  0.00 - 0.04 K/uL Final    Comment: Mild elevation in immature granulocytes is non specific and   can be seen in a variety of conditions including stress response,   acute inflammation, trauma and pregnancy. Correlation with other   laboratory and clinical findings is essential.      Lymph # 11/01/2022 1.8  1.0 - 4.8 K/uL Final    Mono # 11/01/2022 0.5  0.3 - 1.0 K/uL Final    Eos # 11/01/2022 0.0  0.0 - 0.5 K/uL Final    Baso # 11/01/2022 0.02  0.00 - 0.20 K/uL Final    nRBC  11/01/2022 0  0 /100 WBC Final    Gran % 11/01/2022 70.4  38.0 - 73.0 % Final    Lymph % 11/01/2022 22.3  18.0 - 48.0 % Final    Mono % 11/01/2022 6.3  4.0 - 15.0 % Final    Eosinophil % 11/01/2022 0.4  0.0 - 8.0 % Final    Basophil % 11/01/2022 0.2  0.0 - 1.9 % Final    Differential Method 11/01/2022 Automated   Final    Sodium 11/01/2022 143  136 - 145 mmol/L Final    Potassium 11/01/2022 4.4  3.5 - 5.1 mmol/L Final    Chloride 11/01/2022 108  95 - 110 mmol/L Final    CO2 11/01/2022 24  23 - 29 mmol/L Final    Glucose 11/01/2022 148 (H)  70 - 110 mg/dL Final    BUN 11/01/2022 8  8 - 23 mg/dL Final    Creatinine 11/01/2022 0.7  0.5 - 1.4 mg/dL Final    Calcium 11/01/2022 9.7  8.7 - 10.5 mg/dL Final    Total Protein 11/01/2022 7.1  6.0 - 8.4 g/dL Final    Albumin 11/01/2022 3.7  3.5 - 5.2 g/dL Final    Total Bilirubin 11/01/2022 0.5  0.1 - 1.0 mg/dL Final    Comment: For infants and newborns, interpretation of results should be based  on gestational age, weight and in agreement with clinical  observations.    Premature Infant recommended reference ranges:  Up to 24 hours.............<8.0 mg/dL  Up to 48 hours............<12.0 mg/dL  3-5 days..................<15.0 mg/dL  6-29 days.................<15.0 mg/dL      Alkaline Phosphatase 11/01/2022 59  55 - 135 U/L Final    AST 11/01/2022 27  10 - 40 U/L Final    ALT 11/01/2022 34  10 - 44 U/L Final    Anion Gap 11/01/2022 11  8 - 16 mmol/L Final    eGFR 11/01/2022 >60  >60 mL/min/1.73 m^2 Final    Chromogranin A 11/01/2022 2565 (H)  <93 ng/mL Final    Comment: Impaired renal or hepatic function or treatment with proton  pump inhibitors may result in artifactual elevations of   Chromogranin A.    -------------------ADDITIONAL INFORMATION-------------------  This test was developed and its performance characteristics  determined by [facility] in a manner consistent with CLIA  requirements. This test has not been cleared or approved by   the U.S. Food and Drug  Administration.    In some immunoassays, the presence of unusually high   concentrations of analyte may result in a high-dose   &quot;hook&quot; effect. This may result in a lower or even normal   measured analyte concentration. If the reported result   is inconsistent with the clinical presentation, the   laboratory should be alerted for troubleshooting. For   diagnostic purposes, these immunoassay results should   always be assessed in conjunction with the patients medical   history, clinical examination and other findings.    The testing method is a homogeneous time-resolved   immunof                           luorescent assay manufactured by [employer]   and performed on the [employer] Analyticor Compact Plus.    Values obtained with different assay methods or kits may be   different and cannot be used interchangeably.    Test results cannot be interpreted as absolute evidence for   the presence or absence of malignant disease.    Test Performed by:  [facility] [place] [address]  : Yogesh Roque M.D. Ph.D.; CLIA# [id number]      Serotonin 11/01/2022 3470 (H)  <=230 ng/mL Final    Comment: -------------------ADDITIONAL INFORMATION-------------------  This test was developed and its performance characteristics   determined by [facility] in a manner consistent with CLIA   requirements. This test has not been cleared or approved by   the U.S. Food and Drug Administration.    Test Performed by:  [facility] [place] [address]  : Yogesh Roque M.D. Ph.D.; CLIA# [id number]     Lab Visit on 10/10/2022   Component Date Value Ref Range Status    WBC 10/10/2022 7.16  3.90 - 12.70 K/uL Final    RBC 10/10/2022 4.48  4.00 - 5.40 M/uL Final    Hemoglobin 10/10/2022 13.2  12.0 - 16.0 g/dL Final    Hematocrit 10/10/2022 40.8  37.0 - 48.5 % Final    MCV 10/10/2022  91  82 - 98 fL Final    MCH 10/10/2022 29.5  27.0 - 31.0 pg Final    MCHC 10/10/2022 32.4  32.0 - 36.0 g/dL Final    RDW 10/10/2022 12.6  11.5 - 14.5 % Final    Platelets 10/10/2022 267  150 - 450 K/uL Final    MPV 10/10/2022 10.9  9.2 - 12.9 fL Final    Immature Granulocytes 10/10/2022 0.3  0.0 - 0.5 % Final    Gran # (ANC) 10/10/2022 4.6  1.8 - 7.7 K/uL Final    Immature Grans (Abs) 10/10/2022 0.02  0.00 - 0.04 K/uL Final    Comment: Mild elevation in immature granulocytes is non specific and   can be seen in a variety of conditions including stress response,   acute inflammation, trauma and pregnancy. Correlation with other   laboratory and clinical findings is essential.      Lymph # 10/10/2022 1.8  1.0 - 4.8 K/uL Final    Mono # 10/10/2022 0.6  0.3 - 1.0 K/uL Final    Eos # 10/10/2022 0.1  0.0 - 0.5 K/uL Final    Baso # 10/10/2022 0.02  0.00 - 0.20 K/uL Final    nRBC 10/10/2022 0  0 /100 WBC Final    Gran % 10/10/2022 64.8  38.0 - 73.0 % Final    Lymph % 10/10/2022 25.4  18.0 - 48.0 % Final    Mono % 10/10/2022 8.4  4.0 - 15.0 % Final    Eosinophil % 10/10/2022 0.8  0.0 - 8.0 % Final    Basophil % 10/10/2022 0.3  0.0 - 1.9 % Final    Differential Method 10/10/2022 Automated   Final    Sodium 10/10/2022 140  136 - 145 mmol/L Final    Potassium 10/10/2022 3.7  3.5 - 5.1 mmol/L Final    Chloride 10/10/2022 105  95 - 110 mmol/L Final    CO2 10/10/2022 27  23 - 29 mmol/L Final    Glucose 10/10/2022 164 (H)  70 - 110 mg/dL Final    BUN 10/10/2022 10  8 - 23 mg/dL Final    Creatinine 10/10/2022 0.7  0.5 - 1.4 mg/dL Final    Calcium 10/10/2022 9.2  8.7 - 10.5 mg/dL Final    Total Protein 10/10/2022 7.0  6.0 - 8.4 g/dL Final    Albumin 10/10/2022 3.6  3.5 - 5.2 g/dL Final    Total Bilirubin 10/10/2022 0.6  0.1 - 1.0 mg/dL Final    Comment: For infants and newborns, interpretation of results should be based  on gestational age, weight and in agreement with clinical  observations.    Premature Infant recommended reference  ranges:  Up to 24 hours.............<8.0 mg/dL  Up to 48 hours............<12.0 mg/dL  3-5 days..................<15.0 mg/dL  6-29 days.................<15.0 mg/dL      Alkaline Phosphatase 10/10/2022 62  55 - 135 U/L Final    AST 10/10/2022 20  10 - 40 U/L Final    ALT 10/10/2022 19  10 - 44 U/L Final    Anion Gap 10/10/2022 8  8 - 16 mmol/L Final    eGFR 10/10/2022 >60  >60 mL/min/1.73 m^2 Final    Chromogranin A 10/10/2022 2807 (H)  <93 ng/mL Final    Comment: Impaired renal or hepatic function or treatment with proton  pump inhibitors may result in artifactual elevations of   Chromogranin A.    -------------------ADDITIONAL INFORMATION-------------------  This test was developed and its performance characteristics  determined by [facility] in a manner consistent with CLIA  requirements. This test has not been cleared or approved by   the U.S. Food and Drug Administration.    In some immunoassays, the presence of unusually high   concentrations of analyte may result in a high-dose   &quot;hook&quot; effect. This may result in a lower or even normal   measured analyte concentration. If the reported result   is inconsistent with the clinical presentation, the   laboratory should be alerted for troubleshooting. For   diagnostic purposes, these immunoassay results should   always be assessed in conjunction with the patients medical   history, clinical examination and other findings.    The testing method is a homogeneous time-resolved   immunof                           luorescent assay manufactured by [employer]   and performed on the [employer] KrBusyEventor Compact Plus.    Values obtained with different assay methods or kits may be   different and cannot be used interchangeably.    Test results cannot be interpreted as absolute evidence for   the presence or absence of malignant disease.    Test Performed by:  [facility]  [address], [place] [address]  Director: Yogesh Roque M.D. Ph.D.; CLIA# [id number]      Serotonin 10/10/2022 3410 (H)  <=230 ng/mL Final    Comment: -------------------ADDITIONAL INFORMATION-------------------  This test was developed and its performance characteristics   determined by [facility] in a manner consistent with CLIA   requirements. This test has not been cleared or approved by   the U.S. Food and Drug Administration.    Test Performed by:  [facility]  [address] [id number]  : Yogesh Roque M.D. Ph.D.; CLIA# [id number]     Hospital Outpatient Visit on 09/21/2022   Component Date Value Ref Range Status    BSA 09/21/2022 1.58  m2 Final    TDI SEPTAL 09/21/2022 0.03  m/s Final    LV LATERAL E/E' RATIO 09/21/2022 12.80  m/s Final    LV SEPTAL E/E' RATIO 09/21/2022 21.33  m/s Final    LA WIDTH 09/21/2022 4.34  cm Final    TDI LATERAL 09/21/2022 0.05  m/s Final    LVIDd 09/21/2022 4.26  3.5 - 6.0 cm Final    IVS 09/21/2022 0.72  0.6 - 1.1 cm Final    Posterior Wall 09/21/2022 0.66  0.6 - 1.1 cm Final    LVIDs 09/21/2022 3.33  2.1 - 4.0 cm Final    FS 09/21/2022 22  28 - 44 % Final    LA volume 09/21/2022 61.31  cm3 Final    Sinus 09/21/2022 3.02  cm Final    STJ 09/21/2022 2.46  cm Final    Ascending aorta 09/21/2022 2.69  cm Final    LV mass 09/21/2022 85.55  g Final    LA size 09/21/2022 3.61  cm Final    RVDD 09/21/2022 3.29  cm Final    TAPSE 09/21/2022 1.81  cm Final    RV S' 09/21/2022 8.90  cm/s Final    Left Ventricle Relative Wall Thick* 09/21/2022 0.31  cm Final    AV mean gradient 09/21/2022 2  mmHg Final    AV valve area 09/21/2022 2.52  cm2 Final    AV Velocity Ratio 09/21/2022 0.66   Final    AV index (prosthetic) 09/21/2022 0.69   Final    MV valve area p 1/2 method 09/21/2022 4.78  cm2 Final    E/A ratio 09/21/2022 0.64   Final    Mean e' 09/21/2022 0.04  m/s Final    E wave deceleration time 09/21/2022 158.76  msec Final    IVRT 09/21/2022  "119.89  msec Final    MV "A" wave duration 09/21/2022 11.13  msec Final    Pulm vein S/D ratio 09/21/2022 1.68   Final    LVOT diameter 09/21/2022 2.15  cm Final    LVOT area 09/21/2022 3.6  cm2 Final    LVOT peak anthony 09/21/2022 0.67  m/s Final    LVOT peak VTI 09/21/2022 13.70  cm Final    Ao peak anthony 09/21/2022 1.01  m/s Final    Ao VTI 09/21/2022 19.73  cm Final    LVOT stroke volume 09/21/2022 49.71  cm3 Final    AV peak gradient 09/21/2022 4  mmHg Final    E/E' ratio 09/21/2022 16.00  m/s Final    MV Peak E Anthony 09/21/2022 0.64  m/s Final    MV stenosis pressure 1/2 time 09/21/2022 46.04  ms Final    MV Peak A Anthony 09/21/2022 1.00  m/s Final    PV Peak S Anthony 09/21/2022 0.42  m/s Final    PV Peak D Anthony 09/21/2022 0.25  m/s Final    LV Systolic Volume 09/21/2022 45.15  mL Final    LV Systolic Volume Index 09/21/2022 28.6  mL/m2 Final    LV Diastolic Volume 09/21/2022 81.32  mL Final    LV Diastolic Volume Index 09/21/2022 51.47  mL/m2 Final    LA Volume Index 09/21/2022 38.8  mL/m2 Final    LV Mass Index 09/21/2022 54  g/m2 Final    RA Major Axis 09/21/2022 4.74  cm Final    Left Atrium Minor Axis 09/21/2022 4.68  cm Final    Left Atrium Major Axis 09/21/2022 4.53  cm Final    LA Volume Index (Mod) 09/21/2022 42.6  mL/m2 Final    LA volume (mod) 09/21/2022 67.25  cm3 Final    RA Width 09/21/2022 3.76  cm Final    Right Atrial Pressure (from IVC) 09/21/2022 3  mmHg Final    EF 09/21/2022 40  % Final   Lab Visit on 09/12/2022   Component Date Value Ref Range Status    Serotonin 09/12/2022 3180 (H)  <=230 ng/mL Final    Comment: -------------------ADDITIONAL INFORMATION-------------------  This test was developed and its performance characteristics   determined by [facility] in a manner consistent with CLIA   requirements. This test has not been cleared or approved by   the U.S. Food and Drug Administration.    Test Performed by:  [facility] - [address] [place] [address] " [place] [address]  : Yogesh Roque M.D. Ph.D.; CLIA# [id number]      WBC 09/12/2022 8.27  3.90 - 12.70 K/uL Final    RBC 09/12/2022 4.60  4.00 - 5.40 M/uL Final    Hemoglobin 09/12/2022 13.6  12.0 - 16.0 g/dL Final    Hematocrit 09/12/2022 42.1  37.0 - 48.5 % Final    MCV 09/12/2022 92  82 - 98 fL Final    MCH 09/12/2022 29.6  27.0 - 31.0 pg Final    MCHC 09/12/2022 32.3  32.0 - 36.0 g/dL Final    RDW 09/12/2022 13.0  11.5 - 14.5 % Final    Platelets 09/12/2022 280  150 - 450 K/uL Final    MPV 09/12/2022 10.8  9.2 - 12.9 fL Final    Immature Granulocytes 09/12/2022 0.4  0.0 - 0.5 % Final    Gran # (ANC) 09/12/2022 5.1  1.8 - 7.7 K/uL Final    Immature Grans (Abs) 09/12/2022 0.03  0.00 - 0.04 K/uL Final    Comment: Mild elevation in immature granulocytes is non specific and   can be seen in a variety of conditions including stress response,   acute inflammation, trauma and pregnancy. Correlation with other   laboratory and clinical findings is essential.      Lymph # 09/12/2022 2.4  1.0 - 4.8 K/uL Final    Mono # 09/12/2022 0.7  0.3 - 1.0 K/uL Final    Eos # 09/12/2022 0.1  0.0 - 0.5 K/uL Final    Baso # 09/12/2022 0.02  0.00 - 0.20 K/uL Final    nRBC 09/12/2022 0  0 /100 WBC Final    Gran % 09/12/2022 61.2  38.0 - 73.0 % Final    Lymph % 09/12/2022 29.0  18.0 - 48.0 % Final    Mono % 09/12/2022 8.5  4.0 - 15.0 % Final    Eosinophil % 09/12/2022 0.7  0.0 - 8.0 % Final    Basophil % 09/12/2022 0.2  0.0 - 1.9 % Final    Differential Method 09/12/2022 Automated   Final    Sodium 09/12/2022 141  136 - 145 mmol/L Final    Potassium 09/12/2022 4.4  3.5 - 5.1 mmol/L Final    Chloride 09/12/2022 104  95 - 110 mmol/L Final    CO2 09/12/2022 28  23 - 29 mmol/L Final    Glucose 09/12/2022 159 (H)  70 - 110 mg/dL Final    BUN 09/12/2022 11  8 - 23 mg/dL Final    Creatinine 09/12/2022 0.7  0.5 - 1.4 mg/dL Final    Calcium 09/12/2022 10.1  8.7 - 10.5 mg/dL Final    Total Protein 09/12/2022 7.3  6.0 - 8.4 g/dL Final     Albumin 09/12/2022 3.9  3.5 - 5.2 g/dL Final    Total Bilirubin 09/12/2022 0.6  0.1 - 1.0 mg/dL Final    Comment: For infants and newborns, interpretation of results should be based  on gestational age, weight and in agreement with clinical  observations.    Premature Infant recommended reference ranges:  Up to 24 hours.............<8.0 mg/dL  Up to 48 hours............<12.0 mg/dL  3-5 days..................<15.0 mg/dL  6-29 days.................<15.0 mg/dL      Alkaline Phosphatase 09/12/2022 64  55 - 135 U/L Final    AST 09/12/2022 25  10 - 40 U/L Final    ALT 09/12/2022 23  10 - 44 U/L Final    Anion Gap 09/12/2022 9  8 - 16 mmol/L Final    eGFR 09/12/2022 >60  >60 mL/min/1.73 m^2 Final    Chromogranin A 09/12/2022 2634 (H)  <93 ng/mL Final    Comment: Impaired renal or hepatic function or treatment with proton  pump inhibitors may result in artifactual elevations of   Chromogranin A.    -------------------ADDITIONAL INFORMATION-------------------  This test was developed and its performance characteristics  determined by [facility] in a manner consistent with CLIA  requirements. This test has not been cleared or approved by   the U.S. Food and Drug Administration.    In some immunoassays, the presence of unusually high   concentrations of analyte may result in a high-dose   &quot;hook&quot; effect. This may result in a lower or even normal   measured analyte concentration. If the reported result   is inconsistent with the clinical presentation, the   laboratory should be alerted for troubleshooting. For   diagnostic purposes, these immunoassay results should   always be assessed in conjunction with the patients medical   history, clinical examination and other findings.    The testing method is a homogeneous time-resolved   immunof                           luorescent assay manufactured by [employer]   and performed on the [employer] Kryptor Compact Plus.    Values obtained with different assay methods or  kits may be   different and cannot be used interchangeably.    Test results cannot be interpreted as absolute evidence for   the presence or absence of malignant disease.    Test Performed by:  [facility] - [address] [address]  : Yogesh Roque M.D. Ph.D.; CLIA# [id number]     Lab Visit on 08/09/2022   Component Date Value Ref Range Status    Serotonin 08/09/2022 4250 (H)  <=230 ng/mL Final    Comment: -------------------ADDITIONAL INFORMATION-------------------  This test was developed and its performance characteristics   determined by [facility] in a manner consistent with CLIA   requirements. This test has not been cleared or approved by   the U.S. Food and Drug Administration.    Test Performed by:  [facility] - [address] [address]  : Yogesh Roque M.D. Ph.D.; CLIA# [id number]      WBC 08/09/2022 7.37  3.90 - 12.70 K/uL Final    RBC 08/09/2022 4.72  4.00 - 5.40 M/uL Final    Hemoglobin 08/09/2022 13.7  12.0 - 16.0 g/dL Final    Hematocrit 08/09/2022 43.3  37.0 - 48.5 % Final    MCV 08/09/2022 92  82 - 98 fL Final    MCH 08/09/2022 29.0  27.0 - 31.0 pg Final    MCHC 08/09/2022 31.6 (L)  32.0 - 36.0 g/dL Final    RDW 08/09/2022 12.6  11.5 - 14.5 % Final    Platelets 08/09/2022 253  150 - 450 K/uL Final    MPV 08/09/2022 10.2  9.2 - 12.9 fL Final    Immature Granulocytes 08/09/2022 0.4  0.0 - 0.5 % Final    Gran # (ANC) 08/09/2022 4.6  1.8 - 7.7 K/uL Final    Immature Grans (Abs) 08/09/2022 0.03  0.00 - 0.04 K/uL Final    Comment: Mild elevation in immature granulocytes is non specific and   can be seen in a variety of conditions including stress response,   acute inflammation, trauma and pregnancy. Correlation with other   laboratory and clinical findings is essential.      Lymph # 08/09/2022 2.1  1.0 - 4.8 K/uL Final    Mono # 08/09/2022 0.6  0.3 - 1.0  K/uL Final    Eos # 08/09/2022 0.0  0.0 - 0.5 K/uL Final    Baso # 08/09/2022 0.02  0.00 - 0.20 K/uL Final    nRBC 08/09/2022 0  0 /100 WBC Final    Gran % 08/09/2022 62.3  38.0 - 73.0 % Final    Lymph % 08/09/2022 28.8  18.0 - 48.0 % Final    Mono % 08/09/2022 7.7  4.0 - 15.0 % Final    Eosinophil % 08/09/2022 0.5  0.0 - 8.0 % Final    Basophil % 08/09/2022 0.3  0.0 - 1.9 % Final    Differential Method 08/09/2022 Automated   Final    Sodium 08/09/2022 143  136 - 145 mmol/L Final    Potassium 08/09/2022 4.8  3.5 - 5.1 mmol/L Final    Chloride 08/09/2022 103  95 - 110 mmol/L Final    CO2 08/09/2022 30 (H)  23 - 29 mmol/L Final    Glucose 08/09/2022 164 (H)  70 - 110 mg/dL Final    BUN 08/09/2022 10  8 - 23 mg/dL Final    Creatinine 08/09/2022 0.8  0.5 - 1.4 mg/dL Final    Calcium 08/09/2022 10.0  8.7 - 10.5 mg/dL Final    Total Protein 08/09/2022 7.0  6.0 - 8.4 g/dL Final    Albumin 08/09/2022 3.8  3.5 - 5.2 g/dL Final    Total Bilirubin 08/09/2022 1.8 (H)  0.1 - 1.0 mg/dL Final    Comment: For infants and newborns, interpretation of results should be based  on gestational age, weight and in agreement with clinical  observations.    Premature Infant recommended reference ranges:  Up to 24 hours.............<8.0 mg/dL  Up to 48 hours............<12.0 mg/dL  3-5 days..................<15.0 mg/dL  6-29 days.................<15.0 mg/dL      Alkaline Phosphatase 08/09/2022 56  55 - 135 U/L Final    AST 08/09/2022 20  10 - 40 U/L Final    ALT 08/09/2022 14  10 - 44 U/L Final    Anion Gap 08/09/2022 10  8 - 16 mmol/L Final    eGFR 08/09/2022 >60  >60 mL/min/1.73 m^2 Final    Chromogranin A 08/09/2022 2090 (H)  <93 ng/mL Final    Comment: Impaired renal or hepatic function or treatment with proton  pump inhibitors may result in artifactual elevations of   Chromogranin A.    -------------------ADDITIONAL INFORMATION-------------------  This test was developed and its performance characteristics  determined by [facility] in a  manner consistent with CLIA  requirements. This test has not been cleared or approved by   the U.S. Food and Drug Administration.    In some immunoassays, the presence of unusually high   concentrations of analyte may result in a high-dose   &quot;hook&quot; effect. This may result in a lower or even normal   measured analyte concentration. If the reported result   is inconsistent with the clinical presentation, the   laboratory should be alerted for troubleshooting. For   diagnostic purposes, these immunoassay results should   always be assessed in conjunction with the patients medical   history, clinical examination and other findings.    The testing method is a homogeneous time-resolved   immunof                           luorescent assay manufactured by [employer]   and performed on the [employer] Compact Plus.    Values obtained with different assay methods or kits may be   different and cannot be used interchangeably.    Test results cannot be interpreted as absolute evidence for   the presence or absence of malignant disease.    Test Performed by:  [facility]  [address] [place] [address]  : Yogesh Roque M.D. Ph.D.; CLIA# [id number]     There may be more visits with results that are not included.       Imaging  No results found.    Assessment  1. Chronic systolic heart failure  Mild.  Compensated.    2. Atherosclerosis of aorta  Continue current risk factor modification efforts    3. Primary hypertension  Controlled      Plan and Discussion    Continue current, conservative, guideline directed medical therapy.    The ASCVD Risk score (Waleska DK, et al., 2019) failed to calculate for the following reasons:    The 2019 ASCVD risk score is only valid for ages 40 to 79     Follow Up  Follow up in about 6 months (around 7/23/2023).      Ralph Bergeron MD

## 2023-01-27 ENCOUNTER — PES CALL (OUTPATIENT)
Dept: ADMINISTRATIVE | Facility: CLINIC | Age: 83
End: 2023-01-27
Payer: MEDICARE

## 2023-02-06 ENCOUNTER — SPECIALTY PHARMACY (OUTPATIENT)
Dept: PHARMACY | Facility: CLINIC | Age: 83
End: 2023-02-06
Payer: MEDICARE

## 2023-02-06 NOTE — TELEPHONE ENCOUNTER
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 female, who is followed by the specialty pharmacy service for management and education of her Xermelo.  She has been on therapy with Xermelo sine 8/2022.  I have reviewed her electronic medical record and current medication list and determined that specialty medication adjustment Is not needed at this time.    Patient has not experienced adverse events.  She Is adherent reporting 0 missed doses since last review.  Adherence has been encouraged with the following mechanism(s): routine.  She is meeting goals of therapy and will continue treatment.        1/9/2023 12/9/2022 11/9/2022 10/12/2022 9/12/2022 8/16/2022 7/21/2022   Follow Up Review   # of missed doses 0 0 0 0 0     New Medications? No No No Yes No     New Conditions? No No No No No     New Allergies? No No No No No     Med Effective? Very good Good Good Very good Good     Missed activities?      No No   Urgent Care? No No No No No     Requested Pharmacist? No No No No No         Multiple values from one day are sorted in reverse-chronological order         Therapy is appropriate to continue.    Therapy is effective: Yes  On scale of 1 to 10, how does patient rank quality of life? (10 - Best): Unable to Assess  Recommendations: none at this time.  Review Method: Chart Review    1/10 chart notes and 1/31 Labs reviewed, no adjustments needed. Per note, pt should continue Xermelo and Lanreotide Q28 day.     Tasks added this encounter   No tasks added.   Tasks due within next 3 months   2/5/2023 - Clinical - Follow Up  Janae (180 day)  2/4/2023 - Refill Call (Auto Added)     BELEM SIMS, PharmD  [facility]  [address]  [facility] [address]  Phone: [phone]  Fax: [phone]

## 2023-02-06 NOTE — TELEPHONE ENCOUNTER
Specialty Pharmacy - Refill Coordination    Specialty Medication Orders Linked to Encounter      Flowsheet Row Most Recent Value   Medication #1 telotristat ethyl (XERMELO) 250 mg Tab (Order#[id number], Rx#[id number])            Refill Questions - Documented Responses      Flowsheet Row Most Recent Value   Patient Availability and HIPAA Verification    Does patient want to proceed with activity? Yes   HIPAA/medical authority confirmed? Yes   Relationship to patient of person spoken to? Self   Refill Screening Questions    Changes to allergies? No   Changes to medications? No   New conditions since last clinic visit? No   Unplanned office visit, urgent care, ED, or hospital admission in the last 4 weeks? No   How does patient/caregiver feel medication is working? Very good   How many doses of your specialty medications were missed in the last 4 weeks? 0   Would patient like to speak to a pharmacist? No   When does the patient need to receive the medication? 02/11/23   Refill Delivery Questions    How will the patient receive the medication? MED[facility]   When does the patient need to receive the medication? 02/11/23   Shipping Address Home   Address in [facility] confirmed and updated if neccessary? Yes   Expected Copay ($) 0   Is the patient able to afford the medication copay? Yes   Payment Method zero copay   Days supply of Refill 28   Supplies needed? No supplies needed   Refill activity completed? Yes   Refill activity plan Refill scheduled   Shipment/Pickup Date: 02/09/23            Current Outpatient Medications   Medication Sig    blood sugar diagnostic (TRUE METRIX GLUCOSE TEST STRIP) Strp USE TO CHECK BLOOD SUGAR TWICE DAILY    blood-glucose meter kit To check BG 2 times daily, to use with insurance preferred meter    carvediloL (COREG) 12.5 MG tablet Take 1 tablet (12.5 mg total) by mouth 2 (two) times daily with meals.    cyanocobalamin (VITAMIN B-12) 1000 MCG tablet Take 1 tablet (1,000 mcg total) by  "mouth once daily.    diphenoxylate-atropine 2.5-0.025 mg (LOMOTIL) 2.5-0.025 mg per tablet Take 1 tablet by mouth 4 (four) times daily as needed for Diarrhea.    ezetimibe (ZETIA) 10 mg tablet Take 1 tablet (10 mg total) by mouth once daily.    ferrous sulfate 325 (65 FE) MG EC tablet Take 325 mg by mouth 3 (three) times daily with meals.    lancets Mis To check BG 2 times daily, to use with insurance preferred meter    latanoprost 0.005 % ophthalmic solution Place 1 drop into both eyes nightly.    losartan (COZAAR) 50 MG tablet Take 1 tablet (50 mg total) by mouth once daily. (Patient taking differently: Take 50 mg by mouth every morning.)    metFORMIN (GLUCOPHAGE-XR) 500 MG ER 24hr tablet Take 2 tablets (1,000 mg total) by mouth daily with breakfast.    needle, disp, 22 G 22 gauge x 1" Ndle 1 application by Misc.(Non-Drug; Combo Route) route 3 (three) times daily as needed (diarrhea).    octreotide acetate (SANDOSTATIN) 100 mcg/mL (1 mL) Syrg Inject 1 mL (0.1 mg total) into the skin 3 (three) times daily as needed (diarrhea).    omeprazole (PRILOSEC) 20 MG capsule TAKE ONE CAPSULE BY MOUTH ONCE DAILY.    ondansetron (ZOFRAN-ODT) 4 MG TbDL Take 1 tablet (4 mg total) by mouth every 6 (six) hours as needed (nausea, vomting).    oxyCODONE (ROXICODONE) 5 MG immediate release tablet Take 1 tablet (5 mg total) by mouth every 6 (six) hours as needed for Pain.    syringe, disposable, 1 mL Syrg 1 application by Misc.(Non-Drug; Combo Route) route 3 (three) times daily as needed (diarrhea).    telotristat ethyl (XERMELO) 250 mg Tab Take 1 tablet (250 mg) by mouth 3 (three) times daily.    TRUEPLUS LANCETS 33 gauge Misc USE TO CHECK BLOOD SUGAR TWICE DAILY    vitamin D (VITAMIN D3) 1000 units Tab Take 400 Units by mouth once daily.    XIIDRA 5 % Dpet INSTILL ONE DROP INTO OU BID   Last reviewed on 1/23/2023 10:48 AM by Ralph Bergeron MD    Review of patient's allergies indicates:   Allergen Reactions    Epinephrine      " carcinoid    Last reviewed on  1/23/2023 10:48 AM by Ralph Bergeron      Tasks added this encounter   3/4/2023 - Refill Call (Auto Added)   Tasks due within next 3 months   No tasks due.     Luís Arnold, PharmD  [facility]  [address] [facility] [address]  [facility] [address]  Phone: [phone]  Fax: [phone]

## 2023-02-07 ENCOUNTER — LAB VISIT (OUTPATIENT)
Dept: LAB | Facility: HOSPITAL | Age: 83
End: 2023-02-07
Attending: INTERNAL MEDICINE
Payer: MEDICARE

## 2023-02-07 ENCOUNTER — OFFICE VISIT (OUTPATIENT)
Dept: HEMATOLOGY/ONCOLOGY | Facility: CLINIC | Age: 83
End: 2023-02-07
Payer: MEDICARE

## 2023-02-07 VITALS
WEIGHT: 116.06 LBS | DIASTOLIC BLOOD PRESSURE: 75 MMHG | HEART RATE: 50 BPM | OXYGEN SATURATION: 98 % | HEIGHT: 63 IN | RESPIRATION RATE: 18 BRPM | TEMPERATURE: 98 F | BODY MASS INDEX: 20.56 KG/M2 | SYSTOLIC BLOOD PRESSURE: 165 MMHG

## 2023-02-07 DIAGNOSIS — C79.51 SPINE METASTASIS: ICD-10-CM

## 2023-02-07 DIAGNOSIS — D49.9 IMMUNODEFICIENCY SECONDARY TO NEOPLASM: ICD-10-CM

## 2023-02-07 DIAGNOSIS — E34.0 CARCINOID HEART DISEASE: ICD-10-CM

## 2023-02-07 DIAGNOSIS — C7A.012 MALIGNANT CARCINOID TUMOR OF ILEUM: ICD-10-CM

## 2023-02-07 DIAGNOSIS — I50.22 CHRONIC SYSTOLIC HEART FAILURE: ICD-10-CM

## 2023-02-07 DIAGNOSIS — C7B.8 METASTATIC MALIGNANT NEUROENDOCRINE TUMOR TO LIVER: ICD-10-CM

## 2023-02-07 DIAGNOSIS — C7A.012 MALIGNANT CARCINOID TUMOR OF ILEUM: Primary | ICD-10-CM

## 2023-02-07 DIAGNOSIS — R80.9 CONTROLLED TYPE 2 DIABETES MELLITUS WITH MICROALBUMINURIA, WITHOUT LONG-TERM CURRENT USE OF INSULIN: ICD-10-CM

## 2023-02-07 DIAGNOSIS — E34.0 CARCINOID SYNDROME: ICD-10-CM

## 2023-02-07 DIAGNOSIS — Z00.00 ENCOUNTER FOR MEDICARE ANNUAL WELLNESS EXAM: ICD-10-CM

## 2023-02-07 DIAGNOSIS — D84.81 IMMUNODEFICIENCY SECONDARY TO NEOPLASM: ICD-10-CM

## 2023-02-07 DIAGNOSIS — I10 ESSENTIAL HYPERTENSION: ICD-10-CM

## 2023-02-07 DIAGNOSIS — I51.89 CARCINOID HEART DISEASE: ICD-10-CM

## 2023-02-07 DIAGNOSIS — C7B.8 SECONDARY NEUROENDOCRINE TUMOR OF BONE: ICD-10-CM

## 2023-02-07 DIAGNOSIS — E11.29 CONTROLLED TYPE 2 DIABETES MELLITUS WITH MICROALBUMINURIA, WITHOUT LONG-TERM CURRENT USE OF INSULIN: ICD-10-CM

## 2023-02-07 LAB
ALBUMIN SERPL BCP-MCNC: 3.5 G/DL (ref 3.5–5.2)
ALP SERPL-CCNC: 57 U/L (ref 55–135)
ALT SERPL W/O P-5'-P-CCNC: 20 U/L (ref 10–44)
ANION GAP SERPL CALC-SCNC: 12 MMOL/L (ref 8–16)
AST SERPL-CCNC: 20 U/L (ref 10–40)
BASOPHILS # BLD AUTO: 0.02 K/UL (ref 0–0.2)
BASOPHILS NFR BLD: 0.3 % (ref 0–1.9)
BILIRUB SERPL-MCNC: 0.3 MG/DL (ref 0.1–1)
BUN SERPL-MCNC: 10 MG/DL (ref 8–23)
CALCIUM SERPL-MCNC: 10 MG/DL (ref 8.7–10.5)
CHLORIDE SERPL-SCNC: 103 MMOL/L (ref 95–110)
CO2 SERPL-SCNC: 28 MMOL/L (ref 23–29)
CREAT SERPL-MCNC: 0.7 MG/DL (ref 0.5–1.4)
DIFFERENTIAL METHOD: ABNORMAL
EOSINOPHIL # BLD AUTO: 0.1 K/UL (ref 0–0.5)
EOSINOPHIL NFR BLD: 0.9 % (ref 0–8)
ERYTHROCYTE [DISTWIDTH] IN BLOOD BY AUTOMATED COUNT: 13 % (ref 11.5–14.5)
EST. GFR  (NO RACE VARIABLE): >60 ML/MIN/1.73 M^2
GLUCOSE SERPL-MCNC: 168 MG/DL (ref 70–110)
HCT VFR BLD AUTO: 39.8 % (ref 37–48.5)
HGB BLD-MCNC: 12.6 G/DL (ref 12–16)
IMM GRANULOCYTES # BLD AUTO: 0.02 K/UL (ref 0–0.04)
IMM GRANULOCYTES NFR BLD AUTO: 0.3 % (ref 0–0.5)
LYMPHOCYTES # BLD AUTO: 1.2 K/UL (ref 1–4.8)
LYMPHOCYTES NFR BLD: 18 % (ref 18–48)
MCH RBC QN AUTO: 29.3 PG (ref 27–31)
MCHC RBC AUTO-ENTMCNC: 31.7 G/DL (ref 32–36)
MCV RBC AUTO: 93 FL (ref 82–98)
MONOCYTES # BLD AUTO: 0.8 K/UL (ref 0.3–1)
MONOCYTES NFR BLD: 11.4 % (ref 4–15)
NEUTROPHILS # BLD AUTO: 4.6 K/UL (ref 1.8–7.7)
NEUTROPHILS NFR BLD: 69.1 % (ref 38–73)
NRBC BLD-RTO: 0 /100 WBC
PLATELET # BLD AUTO: 225 K/UL (ref 150–450)
PMV BLD AUTO: 10.7 FL (ref 9.2–12.9)
POTASSIUM SERPL-SCNC: 4.2 MMOL/L (ref 3.5–5.1)
PROT SERPL-MCNC: 6.8 G/DL (ref 6–8.4)
RBC # BLD AUTO: 4.3 M/UL (ref 4–5.4)
SODIUM SERPL-SCNC: 143 MMOL/L (ref 136–145)
WBC # BLD AUTO: 6.67 K/UL (ref 3.9–12.7)

## 2023-02-07 PROCEDURE — 1101F PR PT FALLS ASSESS DOC 0-1 FALLS W/OUT INJ PAST YR: ICD-10-PCS | Mod: HCNC,CPTII,S$GLB, | Performed by: INTERNAL MEDICINE

## 2023-02-07 PROCEDURE — 86316 IMMUNOASSAY TUMOR OTHER: CPT | Mod: HCNC | Performed by: INTERNAL MEDICINE

## 2023-02-07 PROCEDURE — 80053 COMPREHEN METABOLIC PANEL: CPT | Mod: HCNC | Performed by: INTERNAL MEDICINE

## 2023-02-07 PROCEDURE — 99999 PR PBB SHADOW E&M-EST. PATIENT-LVL IV: ICD-10-PCS | Mod: PBBFAC,HCNC,, | Performed by: INTERNAL MEDICINE

## 2023-02-07 PROCEDURE — 99215 OFFICE O/P EST HI 40 MIN: CPT | Mod: HCNC,S$GLB,, | Performed by: INTERNAL MEDICINE

## 2023-02-07 PROCEDURE — 1159F PR MEDICATION LIST DOCUMENTED IN MEDICAL RECORD: ICD-10-PCS | Mod: HCNC,CPTII,S$GLB, | Performed by: INTERNAL MEDICINE

## 2023-02-07 PROCEDURE — 1160F RVW MEDS BY RX/DR IN RCRD: CPT | Mod: HCNC,CPTII,S$GLB, | Performed by: INTERNAL MEDICINE

## 2023-02-07 PROCEDURE — 1126F PR PAIN SEVERITY QUANTIFIED, NO PAIN PRESENT: ICD-10-PCS | Mod: HCNC,CPTII,S$GLB, | Performed by: INTERNAL MEDICINE

## 2023-02-07 PROCEDURE — 99999 PR PBB SHADOW E&M-EST. PATIENT-LVL IV: CPT | Mod: PBBFAC,HCNC,, | Performed by: INTERNAL MEDICINE

## 2023-02-07 PROCEDURE — 1160F PR REVIEW ALL MEDS BY PRESCRIBER/CLIN PHARMACIST DOCUMENTED: ICD-10-PCS | Mod: HCNC,CPTII,S$GLB, | Performed by: INTERNAL MEDICINE

## 2023-02-07 PROCEDURE — 84260 ASSAY OF SEROTONIN: CPT | Mod: HCNC | Performed by: INTERNAL MEDICINE

## 2023-02-07 PROCEDURE — 99215 PR OFFICE/OUTPT VISIT, EST, LEVL V, 40-54 MIN: ICD-10-PCS | Mod: HCNC,S$GLB,, | Performed by: INTERNAL MEDICINE

## 2023-02-07 PROCEDURE — 3077F SYST BP >= 140 MM HG: CPT | Mod: HCNC,CPTII,S$GLB, | Performed by: INTERNAL MEDICINE

## 2023-02-07 PROCEDURE — 1101F PT FALLS ASSESS-DOCD LE1/YR: CPT | Mod: HCNC,CPTII,S$GLB, | Performed by: INTERNAL MEDICINE

## 2023-02-07 PROCEDURE — 3288F PR FALLS RISK ASSESSMENT DOCUMENTED: ICD-10-PCS | Mod: HCNC,CPTII,S$GLB, | Performed by: INTERNAL MEDICINE

## 2023-02-07 PROCEDURE — 3077F PR MOST RECENT SYSTOLIC BLOOD PRESSURE >= 140 MM HG: ICD-10-PCS | Mod: HCNC,CPTII,S$GLB, | Performed by: INTERNAL MEDICINE

## 2023-02-07 PROCEDURE — 3078F PR MOST RECENT DIASTOLIC BLOOD PRESSURE < 80 MM HG: ICD-10-PCS | Mod: HCNC,CPTII,S$GLB, | Performed by: INTERNAL MEDICINE

## 2023-02-07 PROCEDURE — 1126F AMNT PAIN NOTED NONE PRSNT: CPT | Mod: HCNC,CPTII,S$GLB, | Performed by: INTERNAL MEDICINE

## 2023-02-07 PROCEDURE — 3078F DIAST BP <80 MM HG: CPT | Mod: HCNC,CPTII,S$GLB, | Performed by: INTERNAL MEDICINE

## 2023-02-07 PROCEDURE — 85025 COMPLETE CBC W/AUTO DIFF WBC: CPT | Mod: HCNC | Performed by: INTERNAL MEDICINE

## 2023-02-07 PROCEDURE — 1159F MED LIST DOCD IN RCRD: CPT | Mod: HCNC,CPTII,S$GLB, | Performed by: INTERNAL MEDICINE

## 2023-02-07 PROCEDURE — 3288F FALL RISK ASSESSMENT DOCD: CPT | Mod: HCNC,CPTII,S$GLB, | Performed by: INTERNAL MEDICINE

## 2023-02-07 NOTE — PROGRESS NOTES
"Subjective:       Patient ID: Shahram Hills is an 82 y.o. female.     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 Ms. Hills is an 77 yo woman who initially saw me on 6/7/18 for further evaluation of neuroendocrine tumor. She initially presented with diarrhea, abdominal discomfort, weight loss. She was evaluated at [facility] and underwent workup below:  US abdomen on 3/14/18 showed numerous bilobar mixed echogenicity and mildly vascular hepatic lesions. Cholelithiasis without e/o acute cholecystitis.   MRI abdomen on 3/30/2018 showed innumerable hepatic metastases. L3 vertebral body metastasis with soft tissue component along the right margin of the L3 and upper L4 vertebral bodies, filling the right L3/4 neural foramen, and extending into the anterior aspect of the spinal canal on the right at the L3 and upper T4 levels. Associated with mild spinal canal narrowing.  CT chest on 4/6/2018 showed one lucent nodule measuring 7 mm in the T7 vertebral body, most likely representing metastatic disease.    EGD on 5/4/18 showed normal duodenum. Schatzki's ring in the lower third of the esophagus. Grade 1 esophagitis in the GE junction c/w mild distal esophagitis. Congestion and erythema in the antrum, pre-pyloric region and stomach body c/w gastritis. Biopsy of the stomach antrum showed mild chronic gastritis, neg for H. pylori.   Labs on 5/9/2018: WBC 6.9, H/H 11.6/34.3, MCV 87.5, plt count 279. On 3/9/18: Vit B12 level 173, folic acid 6.6  She was started on oral vit B12 and underwent a liver biopsy on 5/18/18. Pathology showed "metastatic well differentiated neuroendocrine tumor. Ki67 3%"     She saw me on 6/7/18 and complained of weight loss of 26 lbs over the past 3-4 months, also has diarrhea. No flushing, wheezing, palpitations. Has been having pain in right flank radiating down " "the right leg. No tingling/numbness, weakness. She can hold her bladder but when she urinates her diarrhea would come out as well. Started on dex 4 mg q6h the evening of 18.      2. MRI spine on 18 showed "Marrow replacing metastatic lesion of the L3 vertebral body with associated extra osseous expansion and complete effacement of the right L3-L4 neural foramina and additional effacement of the right lateral recess.  There is additional lateral extension and abutment of the right psoas muscle.  Superimposed degenerative change at this level results in mild spinal canal stenosis." I sent the patient to ED on 18 where she was evaluated by neurosurgery. Neurosurgery did not feel she was a candidate for surgical intervention.      3. Seen by Dr. Adames on 18. palliative radiation to the area of the L3 metastasis 18-18   4. 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  There is an adjacent metastatic lymph node, diffuse liver metastases, and multiple bone metastases. Index primary neoplasm SUV max 33.13. Adjacent lymph node SUV max 23. L3 bone metastasis SUV max 36.86. Left lobe SUV max 46.13   5. Lanreotide every 4 weeks started on 18  6. TACE to the right hepatic lobe on 19. TACE to the left hepatic lobe on 3/21/19.   7. TACE to the right hepatic lobe on 22. S/p conventional chemoembolization performed of the segment 2, 3, 4 branch of the left hepatic artery and segment 8 branch of the left hepatic artery on 22.  8. Gallium PET 22 showed progression. Discussed PRRT. PRRT #1 on 2022. Zometa every 3 months started 22.     History of Present Illness:   Ms. Hills returns for follow up. taking xermelo. Diarrhea is better. Feels well.     ECO     ROS:   See HPI. Otherwise negative.      Physical Examination:   Vital signs reviewed.   Gen: well hydrated, well developed, in no acute distress.  HEENT: normocephalic, " anicteric, PERRLA, EOMI, oropharynx clear  Neck: supple, no JVD, thyromegaly, cervical or supraclavicular LAD  Lungs: CTAB, no wheezes or rales  Heart: regular rate, frequent PVCs, regular pulse, no M/R/G  Abdomen: soft, no tenderness, non-distended, liver edge 3 cm below the right costal margin, no splenomegaly, no mass, or hernia.   Ext: b/l LE no edema  Neuro: alert and oriented x 4, no focal neuro deficit  Skin: no rash, open wounds or ulcers  Psych: pleasant and appropriate mood and affect     Objective:      Laboratory Data:  Labs reviewed. Adequate for PRRT     Imaging Data:     MRI abdomen 11/1/22:  1. History of neuroendocrine malignancy.  Numerous hepatic lesions, some remaining stable while others have mildly enlarged consistent with disease progression.  Stable osseous lesion in the L3 vertebral body.  2. Cholelithiasis and stable mild dilatation of the common bile duct and central intrahepatic biliary duct dilatation.  Of note, there is mass effect on the midportion of the common bile duct by dominant left hepatic lobe lesion.  3. Additional findings detailed above.  RECIST SUMMARY:     Date of prior examination for comparison: 05/16/2022     Lesion 1: Left hepatic lobe.  7.1 cm.  Series 9 image 52.  Prior measurement 6.3 cm.     Lesion 2: Right hepatic lobe lateral.  3.1 cm.  Series 9 image 30.  Prior measurement 3.2 cm.     Lesion 3: Right hepatic dome.  5.1 cm.  Series 9 image 19.  Prior measurement 4.4 cm.    Gallium PET 11/1/22:     Interval development of somatostatin tracer avid lesions in the skull, concerning for new metastatic lesions.     Numerous tracer avid hypoattenuating masses throughout the liver with increased SUV max compared to prior exam.     Tracer avid lesions in the C7 and L3 vertebral bodies, sternum, distal ileum and mesenteric node are similar to prior exam.     Judged by tracer avidity of the patient's tumor burden, PRRT would be a consideration if clinically indicated.         Assessment and Plan:      1. Malignant carcinoid tumor of ileum    2. Spine metastasis    3. Secondary neuroendocrine tumor of bone    4. 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 Immunodeficiency secondary to neoplasm    6. Carcinoid syndrome    7. Carcinoid heart disease    8. Essential hypertension    9. Controlled type 2 diabetes mellitus with microalbuminuria, without long-term current use of insulin    10. Chronic systolic heart failure      1-5  - Ms Hills is an 81 yo woman with well differentiated low-grade neuroendocrine tumor of the ileum with mets to liver and bones, diagnosed on 5/18/2018. Started lanreotide every 4 weeks on 6/22/2018. S/p TACE to the right hepatic lobe on 2/14/19. TACE to the left hepatic lobe on 3/21/19.   - CT/MRI 1/12/22 showed mild progression in the liver. S/p TACE on 2/11/22 and 4/22/22   - Gallium PET 11/1/22 showed progression. Discussed PRRT. PRRT #1 on 12/14/2022. Zometa every 3 months started 12/27/22.  - doing well.   - PRRT #2 tomorrow. Lanreotide the day after tomorrow  - see me in 4 weeks for zometa. Need lanreotide on 3/9  - PRRT #3 in 9 weeks.   - gallium PET after 4 doses of PRRT    6.  - better after xermelo and PRRT  - c/w lanreotide every 4 weeks. No lanreotide within 28 days of PRRT. PRRT every 9 weeks  - c/w xermelo    7.  - stable  - f/u with cardiology    8.  - BP controlled  - c/w current medication    9.  - BS controlled  - c/w current medication    Sebastian Granados MD  Hematology and Medical Oncology  [facility]     Route Chart for Scheduling    Med Onc Chart Routing      Follow up with physician 4 weeks. please schedule patient for lanreotide injection on 3/9 and 4/13   Follow up with JUNAID    Infusion scheduling note    Injection scheduling note lanreotide injection on 3/9 and 4/13   Labs CBC and CMP   Lab interval:  chromogranin A, serotonin, pancreastatin, 5-HIAA   Imaging    Pharmacy appointment    Other referrals           Supportive Plan Information  OP ZOLEDRONIC ACID (ZOMETA) Q4W   Sebastian Granados MD   Upcoming Treatment Dates - OP ZOLEDRONIC ACID (ZOMETA) Q4W    3/27/2023       Medications       zoledronic acid (ZOMETA) 3.3 mg in sodium chloride 0.9% 100 mL IVPB  6/25/2023       Medications       zoledronic acid (ZOMETA) 3.3 mg in sodium chloride 0.9% 100 mL IVPB  9/23/2023       Medications       zoledronic acid (ZOMETA) 3.3 mg in sodium chloride 0.9% 100 mL IVPB  12/22/2023       Medications       zoledronic acid (ZOMETA) 3.3 mg in sodium chloride 0.9% 100 mL IVPB    Therapy Plan Information  LANREOTIDE  5. Medications  lanreotide injection 120 mg  120 mg, Subcutaneous, Every visit    LUTATHERA SUPPORT  IV Fluids  0.9%  NaCl infusion  Intravenous, Every 8 weeks  Pre-Medications  ondansetron-dexAMETHasone 16-12 mg in sodium chloride 0.9% 50 mL IVPB 16 mg  16 mg, Intravenous, Every 8 weeks  amino acid (25 g L-LYSINE, 25 g L-ARGININE) 25-25 mg/mL 1000 mL infusion 1,000 mL  1,000 mL, Intravenous, Every 8 weeks  promethazine (PHENERGAN) 12.5 mg in dextrose 5 % 50 mL IVPB  12.5 mg, Intravenous, Every 8 weeks  acetaminophen tablet 650 mg  650 mg, Oral, Every 8 weeks  Flushes  sodium chloride 0.9% flush 10 mL  10 mL, Intravenous, Every 8 weeks  PRN Medications  diphenhydrAMINE injection 50 mg  50 mg, Intravenous, Every 8 weeks  hydrocortisone sodium succinate injection 100 mg  100 mg, Intravenous, Every 8 weeks

## 2023-02-08 ENCOUNTER — INFUSION (OUTPATIENT)
Dept: INFUSION THERAPY | Facility: HOSPITAL | Age: 83
End: 2023-02-08
Attending: INTERNAL MEDICINE
Payer: MEDICARE

## 2023-02-08 ENCOUNTER — HOSPITAL ENCOUNTER (OUTPATIENT)
Dept: RADIOLOGY | Facility: HOSPITAL | Age: 83
Discharge: HOME | End: 2023-02-08
Attending: INTERNAL MEDICINE
Payer: MEDICARE

## 2023-02-08 VITALS
DIASTOLIC BLOOD PRESSURE: 69 MMHG | RESPIRATION RATE: 18 BRPM | WEIGHT: 116.06 LBS | TEMPERATURE: 98 F | BODY MASS INDEX: 20.56 KG/M2 | HEART RATE: 50 BPM | OXYGEN SATURATION: 98 % | HEIGHT: 63 IN | SYSTOLIC BLOOD PRESSURE: 138 MMHG

## 2023-02-08 DIAGNOSIS — C7A.012 MALIGNANT CARCINOID TUMOR OF ILEUM: Primary | ICD-10-CM

## 2023-02-08 DIAGNOSIS — C7A.8 NEUROENDOCRINE CARCINOMA METASTATIC TO BONE: ICD-10-CM

## 2023-02-08 DIAGNOSIS — C7B.8 NEUROENDOCRINE CARCINOMA METASTATIC TO BONE: ICD-10-CM

## 2023-02-08 DIAGNOSIS — C7B.8 METASTATIC MALIGNANT NEUROENDOCRINE TUMOR TO LIVER: ICD-10-CM

## 2023-02-08 DIAGNOSIS — C7A.012 MALIGNANT CARCINOID TUMOR OF ILEUM: ICD-10-CM

## 2023-02-08 LAB — CGA SERPL-MCNC: 2151 NG/ML

## 2023-02-08 PROCEDURE — A4216 STERILE WATER/SALINE, 10 ML: HCPCS | Mod: HCNC | Performed by: INTERNAL MEDICINE

## 2023-02-08 PROCEDURE — A9513 LUTETIUM LU 177 DOTATAT THER: HCPCS | Mod: JG,HCNC

## 2023-02-08 PROCEDURE — 96366 THER/PROPH/DIAG IV INF ADDON: CPT | Mod: HCNC

## 2023-02-08 PROCEDURE — 79101 NM THERAPY BY IV ADMINISTRATION LU177: ICD-10-PCS | Mod: 26,HCNC,, | Performed by: RADIOLOGY

## 2023-02-08 PROCEDURE — 25000003 PHARM REV CODE 250: Mod: HCNC | Performed by: INTERNAL MEDICINE

## 2023-02-08 PROCEDURE — 96367 TX/PROPH/DG ADDL SEQ IV INF: CPT | Mod: HCNC

## 2023-02-08 PROCEDURE — 79101 NUCLEAR RX IV ADMIN: CPT | Mod: 26,HCNC,, | Performed by: RADIOLOGY

## 2023-02-08 PROCEDURE — 63600175 PHARM REV CODE 636 W HCPCS: Mod: HCNC | Performed by: INTERNAL MEDICINE

## 2023-02-08 PROCEDURE — 96365 THER/PROPH/DIAG IV INF INIT: CPT | Mod: 59,HCNC

## 2023-02-08 RX ORDER — SODIUM CHLORIDE 9 MG/ML
INJECTION, SOLUTION INTRAVENOUS ONCE
Status: COMPLETED | OUTPATIENT
Start: 2023-02-08 | End: 2023-02-08

## 2023-02-08 RX ORDER — ACETAMINOPHEN 325 MG/1
650 TABLET ORAL
Status: CANCELLED | OUTPATIENT
Start: 2023-04-05

## 2023-02-08 RX ORDER — SODIUM CHLORIDE 9 MG/ML
INJECTION, SOLUTION INTRAVENOUS ONCE
Status: CANCELLED | OUTPATIENT
Start: 2023-04-05

## 2023-02-08 RX ORDER — SODIUM CHLORIDE 0.9 % (FLUSH) 0.9 %
10 SYRINGE (ML) INJECTION
Status: DISCONTINUED | OUTPATIENT
Start: 2023-02-08 | End: 2023-02-08 | Stop reason: HOSPADM

## 2023-02-08 RX ORDER — DIPHENHYDRAMINE HYDROCHLORIDE 50 MG/ML
50 INJECTION INTRAMUSCULAR; INTRAVENOUS
Status: CANCELLED | OUTPATIENT
Start: 2023-04-05

## 2023-02-08 RX ORDER — DIPHENHYDRAMINE HYDROCHLORIDE 50 MG/ML
50 INJECTION INTRAMUSCULAR; INTRAVENOUS
Status: DISCONTINUED | OUTPATIENT
Start: 2023-02-08 | End: 2023-02-08 | Stop reason: HOSPADM

## 2023-02-08 RX ORDER — SODIUM CHLORIDE 0.9 % (FLUSH) 0.9 %
10 SYRINGE (ML) INJECTION
Status: CANCELLED | OUTPATIENT
Start: 2023-04-05

## 2023-02-08 RX ORDER — ACETAMINOPHEN 325 MG/1
650 TABLET ORAL
Status: DISCONTINUED | OUTPATIENT
Start: 2023-02-08 | End: 2023-02-08 | Stop reason: HOSPADM

## 2023-02-08 RX ADMIN — Medication 10 ML: at 01:02

## 2023-02-08 RX ADMIN — SODIUM CHLORIDE: 9 INJECTION, SOLUTION INTRAVENOUS at 08:02

## 2023-02-08 RX ADMIN — Medication 1000 ML: at 09:02

## 2023-02-08 RX ADMIN — DEXAMETHASONE SODIUM PHOSPHATE 16 MG: 10 INJECTION, SOLUTION INTRAMUSCULAR; INTRAVENOUS at 08:02

## 2023-02-08 NOTE — PLAN OF CARE
0940 Keyla-177 infusion started per NM tech. VSS, denies pain or discomfort. IVx2 c/d/i patent, no irritation. Amino acids remain infusing per orders.

## 2023-02-08 NOTE — PLAN OF CARE
1020- Keyla-177 completed per NM tech. VSS. No complaints at this time. PIV x2 c/d/i patent. Amino acids remain infusing per orders.

## 2023-02-08 NOTE — PLAN OF CARE
(1312) Pt completed and tolerated PRRT # 2/4. VSS. No complaints at this time. PIV x2 removed per policy, catheter intact. Discharge instructions and precautions reviewed. Scanned by NM. Cleared for D/C. AVS printed and handout given. No questions at this time. Pt ambulated out clinic with no difficulty.

## 2023-02-09 ENCOUNTER — INFUSION (OUTPATIENT)
Dept: INFUSION THERAPY | Facility: HOSPITAL | Age: 83
End: 2023-02-09
Payer: MEDICARE

## 2023-02-09 DIAGNOSIS — Z00.00 ENCOUNTER FOR MEDICARE ANNUAL WELLNESS EXAM: ICD-10-CM

## 2023-02-09 DIAGNOSIS — C7B.8 METASTATIC MALIGNANT NEUROENDOCRINE TUMOR TO LIVER: Primary | ICD-10-CM

## 2023-02-09 PROCEDURE — 63600175 PHARM REV CODE 636 W HCPCS: Mod: JG,HCNC | Performed by: INTERNAL MEDICINE

## 2023-02-09 PROCEDURE — 96372 THER/PROPH/DIAG INJ SC/IM: CPT | Mod: HCNC

## 2023-02-09 RX ORDER — LANREOTIDE ACETATE 120 MG/.5ML
120 INJECTION SUBCUTANEOUS
Status: DISCONTINUED | OUTPATIENT
Start: 2023-02-09 | End: 2023-02-09 | Stop reason: HOSPADM

## 2023-02-09 RX ORDER — LANREOTIDE ACETATE 120 MG/.5ML
120 INJECTION SUBCUTANEOUS
Status: CANCELLED | OUTPATIENT
Start: 2023-02-09

## 2023-02-09 RX ADMIN — LANREOTIDE ACETATE 120 MG: 120 INJECTION SUBCUTANEOUS at 09:02

## 2023-02-14 LAB — SEROTONIN: 2710 NG/ML

## 2023-02-15 ENCOUNTER — TELEPHONE (OUTPATIENT)
Dept: INTERNAL MEDICINE | Facility: CLINIC | Age: 83
End: 2023-02-15
Payer: MEDICARE

## 2023-02-15 NOTE — TELEPHONE ENCOUNTER
Correct she should be taking coreg and losartan for blood pressure     Please call [facility] and cancel Rx for amlodipine to prevent further dispensing of amlodipine

## 2023-02-15 NOTE — TELEPHONE ENCOUNTER
Returned pt's call.  Pt states Dr. Bergeron wanted her to stop Amlodipine and start Coreg in Septmeber. She did so, but [facility] just sent her a new bottle of Amlodipine. She wanted to verify what she needs to do.    Informed pt that she should not take Amlodipine and refuse any further refills unless there is a new medication change. Discussed that she can get rid of Amlodipine.     Pt aware that we will send this to PCP and if there are any additional instructions we will call her back.

## 2023-02-15 NOTE — TELEPHONE ENCOUNTER
----- Message from Maile Miles sent at 2/14/2023  4:17 PM CST -----  Regarding: CALL BACK  .Type: Patient Call Back    Who called: LEIGHANN FANG [[id number]]    What is the request in detail: PT stated that she keeps getting an prescription for her blood pressure from the office, but her cardiologist stated to not take the medications. The patient needs advice.Please contact to further discuss and advise.     Can the clinic reply by [facility]? NO    Would the patient rather a call back or a response via [facility]? CALL BACK    Best call back number: [phone] or [phone]    Additional Information:N/A

## 2023-03-06 ENCOUNTER — PATIENT MESSAGE (OUTPATIENT)
Dept: PHARMACY | Facility: CLINIC | Age: 83
End: 2023-03-06
Payer: MEDICARE

## 2023-03-06 ENCOUNTER — SPECIALTY PHARMACY (OUTPATIENT)
Dept: PHARMACY | Facility: CLINIC | Age: 83
End: 2023-03-06
Payer: MEDICARE

## 2023-03-06 DIAGNOSIS — E34.0 CARCINOID SYNDROME: ICD-10-CM

## 2023-03-06 DIAGNOSIS — C7B.8 METASTATIC MALIGNANT NEUROENDOCRINE TUMOR TO LIVER: ICD-10-CM

## 2023-03-06 NOTE — TELEPHONE ENCOUNTER
Incoming call from patient's sister, who reports 1 full box of Xermelo on hand. She denies any missed doses and states [facility] sent another box when they already had some on hand. She agreed for a call back in 3 weeks to schedule.

## 2023-03-06 NOTE — TELEPHONE ENCOUNTER
No new care gaps identified.  [facility] Embedded Care Gaps. Reference number: [id number]. 3/06/2023   9:56:00 AM CST

## 2023-03-06 NOTE — TELEPHONE ENCOUNTER
----- Message from Belen Palacios sent at 3/6/2023  8:54 AM CST -----  Regarding: rx for needles  Name of Who is Calling: Shayla/ Sister           What is the request in detail: Shayla is asking if you could call in a rx for needles for pt's glucose to :    [id number] [place] [address] [facility] [address]   Phone:  [phone]  Fax:  [phone]              Can the clinic reply by [facility]:          What Number to Call Back if not in [facility]:[phone]

## 2023-03-07 ENCOUNTER — TELEPHONE (OUTPATIENT)
Dept: HEMATOLOGY/ONCOLOGY | Facility: CLINIC | Age: 83
End: 2023-03-07
Payer: MEDICARE

## 2023-03-08 ENCOUNTER — LAB VISIT (OUTPATIENT)
Dept: LAB | Facility: HOSPITAL | Age: 83
End: 2023-03-08
Attending: INTERNAL MEDICINE
Payer: MEDICARE

## 2023-03-08 ENCOUNTER — INFUSION (OUTPATIENT)
Dept: INFUSION THERAPY | Facility: HOSPITAL | Age: 83
End: 2023-03-08
Payer: MEDICARE

## 2023-03-08 ENCOUNTER — OFFICE VISIT (OUTPATIENT)
Dept: HEMATOLOGY/ONCOLOGY | Facility: CLINIC | Age: 83
End: 2023-03-08
Payer: MEDICARE

## 2023-03-08 VITALS
TEMPERATURE: 98 F | RESPIRATION RATE: 18 BRPM | HEIGHT: 64 IN | DIASTOLIC BLOOD PRESSURE: 87 MMHG | WEIGHT: 115.94 LBS | SYSTOLIC BLOOD PRESSURE: 155 MMHG | OXYGEN SATURATION: 97 % | HEART RATE: 50 BPM | BODY MASS INDEX: 19.79 KG/M2

## 2023-03-08 VITALS
HEART RATE: 89 BPM | RESPIRATION RATE: 18 BRPM | DIASTOLIC BLOOD PRESSURE: 94 MMHG | TEMPERATURE: 98 F | SYSTOLIC BLOOD PRESSURE: 133 MMHG

## 2023-03-08 DIAGNOSIS — I10 ESSENTIAL HYPERTENSION: ICD-10-CM

## 2023-03-08 DIAGNOSIS — C7B.8 NEUROENDOCRINE CARCINOMA METASTATIC TO BONE: Primary | ICD-10-CM

## 2023-03-08 DIAGNOSIS — E34.0 CARCINOID HEART DISEASE: ICD-10-CM

## 2023-03-08 DIAGNOSIS — C7A.8 NEUROENDOCRINE CARCINOMA METASTATIC TO BONE: Primary | ICD-10-CM

## 2023-03-08 DIAGNOSIS — E11.29 CONTROLLED TYPE 2 DIABETES MELLITUS WITH MICROALBUMINURIA, WITHOUT LONG-TERM CURRENT USE OF INSULIN: ICD-10-CM

## 2023-03-08 DIAGNOSIS — C7A.012 MALIGNANT CARCINOID TUMOR OF ILEUM: ICD-10-CM

## 2023-03-08 DIAGNOSIS — I51.89 CARCINOID HEART DISEASE: ICD-10-CM

## 2023-03-08 DIAGNOSIS — C7B.8 METASTATIC MALIGNANT NEUROENDOCRINE TUMOR TO LIVER: ICD-10-CM

## 2023-03-08 DIAGNOSIS — C79.51 SPINE METASTASIS: ICD-10-CM

## 2023-03-08 DIAGNOSIS — R80.9 CONTROLLED TYPE 2 DIABETES MELLITUS WITH MICROALBUMINURIA, WITHOUT LONG-TERM CURRENT USE OF INSULIN: ICD-10-CM

## 2023-03-08 DIAGNOSIS — C7B.8 SECONDARY NEUROENDOCRINE TUMOR OF BONE: ICD-10-CM

## 2023-03-08 DIAGNOSIS — D84.81 IMMUNODEFICIENCY SECONDARY TO NEOPLASM: ICD-10-CM

## 2023-03-08 DIAGNOSIS — E34.0 CARCINOID SYNDROME: ICD-10-CM

## 2023-03-08 DIAGNOSIS — C7A.012 MALIGNANT CARCINOID TUMOR OF ILEUM: Primary | ICD-10-CM

## 2023-03-08 DIAGNOSIS — D49.9 IMMUNODEFICIENCY SECONDARY TO NEOPLASM: ICD-10-CM

## 2023-03-08 LAB
ALBUMIN SERPL BCP-MCNC: 3.7 G/DL (ref 3.5–5.2)
ALP SERPL-CCNC: 57 U/L (ref 55–135)
ALT SERPL W/O P-5'-P-CCNC: 24 U/L (ref 10–44)
ANION GAP SERPL CALC-SCNC: 5 MMOL/L (ref 8–16)
AST SERPL-CCNC: 24 U/L (ref 10–40)
BASOPHILS # BLD AUTO: 0.02 K/UL (ref 0–0.2)
BASOPHILS NFR BLD: 0.3 % (ref 0–1.9)
BILIRUB SERPL-MCNC: 0.6 MG/DL (ref 0.1–1)
BUN SERPL-MCNC: 9 MG/DL (ref 8–23)
CALCIUM SERPL-MCNC: 9.9 MG/DL (ref 8.7–10.5)
CHLORIDE SERPL-SCNC: 103 MMOL/L (ref 95–110)
CO2 SERPL-SCNC: 35 MMOL/L (ref 23–29)
CREAT SERPL-MCNC: 0.7 MG/DL (ref 0.5–1.4)
DIFFERENTIAL METHOD: ABNORMAL
EOSINOPHIL # BLD AUTO: 0.1 K/UL (ref 0–0.5)
EOSINOPHIL NFR BLD: 1.1 % (ref 0–8)
ERYTHROCYTE [DISTWIDTH] IN BLOOD BY AUTOMATED COUNT: 12.9 % (ref 11.5–14.5)
EST. GFR  (NO RACE VARIABLE): >60 ML/MIN/1.73 M^2
GLUCOSE SERPL-MCNC: 174 MG/DL (ref 70–110)
HCT VFR BLD AUTO: 40 % (ref 37–48.5)
HGB BLD-MCNC: 12.8 G/DL (ref 12–16)
IMM GRANULOCYTES # BLD AUTO: 0.03 K/UL (ref 0–0.04)
IMM GRANULOCYTES NFR BLD AUTO: 0.5 % (ref 0–0.5)
LYMPHOCYTES # BLD AUTO: 0.7 K/UL (ref 1–4.8)
LYMPHOCYTES NFR BLD: 11.4 % (ref 18–48)
MCH RBC QN AUTO: 29.4 PG (ref 27–31)
MCHC RBC AUTO-ENTMCNC: 32 G/DL (ref 32–36)
MCV RBC AUTO: 92 FL (ref 82–98)
MONOCYTES # BLD AUTO: 0.6 K/UL (ref 0.3–1)
MONOCYTES NFR BLD: 9.5 % (ref 4–15)
NEUTROPHILS # BLD AUTO: 4.7 K/UL (ref 1.8–7.7)
NEUTROPHILS NFR BLD: 77.2 % (ref 38–73)
NRBC BLD-RTO: 0 /100 WBC
PLATELET # BLD AUTO: 156 K/UL (ref 150–450)
PMV BLD AUTO: 10.5 FL (ref 9.2–12.9)
POTASSIUM SERPL-SCNC: 4.6 MMOL/L (ref 3.5–5.1)
PROT SERPL-MCNC: 7.2 G/DL (ref 6–8.4)
RBC # BLD AUTO: 4.35 M/UL (ref 4–5.4)
SODIUM SERPL-SCNC: 143 MMOL/L (ref 136–145)
WBC # BLD AUTO: 6.13 K/UL (ref 3.9–12.7)

## 2023-03-08 PROCEDURE — 99999 PR PBB SHADOW E&M-EST. PATIENT-LVL III: CPT | Mod: PBBFAC,HCNC,, | Performed by: INTERNAL MEDICINE

## 2023-03-08 PROCEDURE — 3077F SYST BP >= 140 MM HG: CPT | Mod: HCNC,CPTII,S$GLB, | Performed by: INTERNAL MEDICINE

## 2023-03-08 PROCEDURE — 1126F PR PAIN SEVERITY QUANTIFIED, NO PAIN PRESENT: ICD-10-PCS | Mod: HCNC,CPTII,S$GLB, | Performed by: INTERNAL MEDICINE

## 2023-03-08 PROCEDURE — 84260 ASSAY OF SEROTONIN: CPT | Mod: HCNC | Performed by: INTERNAL MEDICINE

## 2023-03-08 PROCEDURE — 1160F PR REVIEW ALL MEDS BY PRESCRIBER/CLIN PHARMACIST DOCUMENTED: ICD-10-PCS | Mod: HCNC,CPTII,S$GLB, | Performed by: INTERNAL MEDICINE

## 2023-03-08 PROCEDURE — 85025 COMPLETE CBC W/AUTO DIFF WBC: CPT | Mod: HCNC | Performed by: INTERNAL MEDICINE

## 2023-03-08 PROCEDURE — 99999 PR PBB SHADOW E&M-EST. PATIENT-LVL III: ICD-10-PCS | Mod: PBBFAC,HCNC,, | Performed by: INTERNAL MEDICINE

## 2023-03-08 PROCEDURE — 83519 RIA NONANTIBODY: CPT | Mod: HCNC | Performed by: INTERNAL MEDICINE

## 2023-03-08 PROCEDURE — 99215 OFFICE O/P EST HI 40 MIN: CPT | Mod: HCNC,S$GLB,, | Performed by: INTERNAL MEDICINE

## 2023-03-08 PROCEDURE — 1160F RVW MEDS BY RX/DR IN RCRD: CPT | Mod: HCNC,CPTII,S$GLB, | Performed by: INTERNAL MEDICINE

## 2023-03-08 PROCEDURE — 96374 THER/PROPH/DIAG INJ IV PUSH: CPT | Mod: HCNC

## 2023-03-08 PROCEDURE — 1101F PR PT FALLS ASSESS DOC 0-1 FALLS W/OUT INJ PAST YR: ICD-10-PCS | Mod: HCNC,CPTII,S$GLB, | Performed by: INTERNAL MEDICINE

## 2023-03-08 PROCEDURE — 1159F PR MEDICATION LIST DOCUMENTED IN MEDICAL RECORD: ICD-10-PCS | Mod: HCNC,CPTII,S$GLB, | Performed by: INTERNAL MEDICINE

## 2023-03-08 PROCEDURE — 1126F AMNT PAIN NOTED NONE PRSNT: CPT | Mod: HCNC,CPTII,S$GLB, | Performed by: INTERNAL MEDICINE

## 2023-03-08 PROCEDURE — 86316 IMMUNOASSAY TUMOR OTHER: CPT | Mod: HCNC | Performed by: INTERNAL MEDICINE

## 2023-03-08 PROCEDURE — 1159F MED LIST DOCD IN RCRD: CPT | Mod: HCNC,CPTII,S$GLB, | Performed by: INTERNAL MEDICINE

## 2023-03-08 PROCEDURE — 25000003 PHARM REV CODE 250: Mod: HCNC | Performed by: INTERNAL MEDICINE

## 2023-03-08 PROCEDURE — 3079F DIAST BP 80-89 MM HG: CPT | Mod: HCNC,CPTII,S$GLB, | Performed by: INTERNAL MEDICINE

## 2023-03-08 PROCEDURE — 82542 COL CHROMOTOGRAPHY QUAL/QUAN: CPT | Mod: HCNC | Performed by: INTERNAL MEDICINE

## 2023-03-08 PROCEDURE — 1101F PT FALLS ASSESS-DOCD LE1/YR: CPT | Mod: HCNC,CPTII,S$GLB, | Performed by: INTERNAL MEDICINE

## 2023-03-08 PROCEDURE — 3288F PR FALLS RISK ASSESSMENT DOCUMENTED: ICD-10-PCS | Mod: HCNC,CPTII,S$GLB, | Performed by: INTERNAL MEDICINE

## 2023-03-08 PROCEDURE — 80053 COMPREHEN METABOLIC PANEL: CPT | Mod: HCNC | Performed by: INTERNAL MEDICINE

## 2023-03-08 PROCEDURE — 3077F PR MOST RECENT SYSTOLIC BLOOD PRESSURE >= 140 MM HG: ICD-10-PCS | Mod: HCNC,CPTII,S$GLB, | Performed by: INTERNAL MEDICINE

## 2023-03-08 PROCEDURE — 63600175 PHARM REV CODE 636 W HCPCS: Mod: HCNC | Performed by: INTERNAL MEDICINE

## 2023-03-08 PROCEDURE — 3079F PR MOST RECENT DIASTOLIC BLOOD PRESSURE 80-89 MM HG: ICD-10-PCS | Mod: HCNC,CPTII,S$GLB, | Performed by: INTERNAL MEDICINE

## 2023-03-08 PROCEDURE — 3288F FALL RISK ASSESSMENT DOCD: CPT | Mod: HCNC,CPTII,S$GLB, | Performed by: INTERNAL MEDICINE

## 2023-03-08 PROCEDURE — 99215 PR OFFICE/OUTPT VISIT, EST, LEVL V, 40-54 MIN: ICD-10-PCS | Mod: HCNC,S$GLB,, | Performed by: INTERNAL MEDICINE

## 2023-03-08 RX ORDER — HEPARIN 100 UNIT/ML
500 SYRINGE INTRAVENOUS
Status: DISCONTINUED | OUTPATIENT
Start: 2023-03-08 | End: 2023-03-08 | Stop reason: HOSPADM

## 2023-03-08 RX ORDER — SODIUM CHLORIDE 0.9 % (FLUSH) 0.9 %
10 SYRINGE (ML) INJECTION
Status: DISCONTINUED | OUTPATIENT
Start: 2023-03-08 | End: 2023-03-08 | Stop reason: HOSPADM

## 2023-03-08 RX ORDER — HEPARIN 100 UNIT/ML
500 SYRINGE INTRAVENOUS
Status: CANCELLED | OUTPATIENT
Start: 2023-03-27

## 2023-03-08 RX ORDER — SODIUM CHLORIDE 0.9 % (FLUSH) 0.9 %
10 SYRINGE (ML) INJECTION
Status: CANCELLED | OUTPATIENT
Start: 2023-03-27

## 2023-03-08 RX ADMIN — SODIUM CHLORIDE: 9 INJECTION, SOLUTION INTRAVENOUS at 09:03

## 2023-03-08 RX ADMIN — ZOLEDRONIC ACID 3.3 MG: 4 INJECTION INTRAVENOUS at 10:03

## 2023-03-08 NOTE — PLAN OF CARE
1059 pt tolerated zometa, pt voiced no new complaints or concerns at this time and reports taking supplements. NAD noted. Pt d/c home.

## 2023-03-08 NOTE — PROGRESS NOTES
"  Subjective:       Patient ID: Shahram Hills is an 82 y.o. female.     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 Ms. Hills is an 77 yo woman who initially saw me on 6/7/18 for further evaluation of neuroendocrine tumor. She initially presented with diarrhea, abdominal discomfort, weight loss. She was evaluated at [facility] and underwent workup below:  US abdomen on 3/14/18 showed numerous bilobar mixed echogenicity and mildly vascular hepatic lesions. Cholelithiasis without e/o acute cholecystitis.   MRI abdomen on 3/30/2018 showed innumerable hepatic metastases. L3 vertebral body metastasis with soft tissue component along the right margin of the L3 and upper L4 vertebral bodies, filling the right L3/4 neural foramen, and extending into the anterior aspect of the spinal canal on the right at the L3 and upper T4 levels. Associated with mild spinal canal narrowing.  CT chest on 4/6/2018 showed one lucent nodule measuring 7 mm in the T7 vertebral body, most likely representing metastatic disease.    EGD on 5/4/18 showed normal duodenum. Schatzki's ring in the lower third of the esophagus. Grade 1 esophagitis in the GE junction c/w mild distal esophagitis. Congestion and erythema in the antrum, pre-pyloric region and stomach body c/w gastritis. Biopsy of the stomach antrum showed mild chronic gastritis, neg for H. pylori.   Labs on 5/9/2018: WBC 6.9, H/H 11.6/34.3, MCV 87.5, plt count 279. On 3/9/18: Vit B12 level 173, folic acid 6.6  She was started on oral vit B12 and underwent a liver biopsy on 5/18/18. Pathology showed "metastatic well differentiated neuroendocrine tumor. Ki67 3%"     She saw me on 6/7/18 and complained of weight loss of 26 lbs over the past 3-4 months, also has diarrhea. No flushing, wheezing, palpitations. Has been having pain in right flank radiating down " "the right leg. No tingling/numbness, weakness. She can hold her bladder but when she urinates her diarrhea would come out as well. Started on dex 4 mg q6h the evening of 18.      2. MRI spine on 18 showed "Marrow replacing metastatic lesion of the L3 vertebral body with associated extra osseous expansion and complete effacement of the right L3-L4 neural foramina and additional effacement of the right lateral recess.  There is additional lateral extension and abutment of the right psoas muscle.  Superimposed degenerative change at this level results in mild spinal canal stenosis." I sent the patient to ED on 18 where she was evaluated by neurosurgery. Neurosurgery did not feel she was a candidate for surgical intervention.      3. Seen by Dr. Adames on 18. palliative radiation to the area of the L3 metastasis 18-18   4. 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  There is an adjacent metastatic lymph node, diffuse liver metastases, and multiple bone metastases. Index primary neoplasm SUV max 33.13. Adjacent lymph node SUV max 23. L3 bone metastasis SUV max 36.86. Left lobe SUV max 46.13   5. Lanreotide every 4 weeks started on 18  6. TACE to the right hepatic lobe on 19. TACE to the left hepatic lobe on 3/21/19.   7. TACE to the right hepatic lobe on 22. S/p conventional chemoembolization performed of the segment 2, 3, 4 branch of the left hepatic artery and segment 8 branch of the left hepatic artery on 22.  8. Gallium PET 22 showed progression. Discussed PRRT. PRRT #1 on 2022. Zometa every 3 months started 22. PRRT #2 on .      History of Present Illness:   Ms. Hills returns for follow up. She is taking xermelo and diarrhea is controlled. Feels well. No pain or fever after last PRRT    ECO     ROS:   See HPI. Otherwise negative.      Physical Examination:   Vital signs reviewed.   Gen: well hydrated, " well developed, in no acute distress.  HEENT: normocephalic, anicteric, PERRLA, EOMI, oropharynx clear  Neck: supple, no JVD, thyromegaly, cervical or supraclavicular LAD  Lungs: CTAB, no wheezes or rales  Heart: regular rate, frequent PVCs, regular pulse, no M/R/G  Abdomen: soft, no tenderness, non-distended, liver edge 3 cm below the right costal margin, no splenomegaly, no mass, or hernia.   Ext: b/l LE no edema  Neuro: alert and oriented x 4, no focal neuro deficit  Skin: no rash, open wounds or ulcers  Psych: pleasant and appropriate mood and affect     Objective:      Laboratory Data:  Labs reviewed. CBC, CMP unremarkable.      Imaging Data:     MRI abdomen 11/1/22:  1. History of neuroendocrine malignancy.  Numerous hepatic lesions, some remaining stable while others have mildly enlarged consistent with disease progression.  Stable osseous lesion in the L3 vertebral body.  2. Cholelithiasis and stable mild dilatation of the common bile duct and central intrahepatic biliary duct dilatation.  Of note, there is mass effect on the midportion of the common bile duct by dominant left hepatic lobe lesion.  3. Additional findings detailed above.  RECIST SUMMARY:     Date of prior examination for comparison: 05/16/2022     Lesion 1: Left hepatic lobe.  7.1 cm.  Series 9 image 52.  Prior measurement 6.3 cm.     Lesion 2: Right hepatic lobe lateral.  3.1 cm.  Series 9 image 30.  Prior measurement 3.2 cm.     Lesion 3: Right hepatic dome.  5.1 cm.  Series 9 image 19.  Prior measurement 4.4 cm.    Gallium PET 11/1/22:     Interval development of somatostatin tracer avid lesions in the skull, concerning for new metastatic lesions.     Numerous tracer avid hypoattenuating masses throughout the liver with increased SUV max compared to prior exam.     Tracer avid lesions in the C7 and L3 vertebral bodies, sternum, distal ileum and mesenteric node are similar to prior exam.     Judged by tracer avidity of the patient's tumor  burden, PRRT would be a consideration if clinically indicated.        Assessment and Plan:      1. Malignant carcinoid tumor of ileum    2. Spine metastasis    3. Secondary neuroendocrine tumor of bone    4. 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 Immunodeficiency secondary to neoplasm    6. Carcinoid syndrome    7. Carcinoid heart disease    8. Essential hypertension    9. Controlled type 2 diabetes mellitus with microalbuminuria, without long-term current use of insulin        1-5  - Ms Hills is an 81 yo woman with well differentiated low-grade neuroendocrine tumor of the ileum with mets to liver and bones, diagnosed on 5/18/2018. Started lanreotide every 4 weeks on 6/22/2018. S/p TACE to the right hepatic lobe on 2/14/19. TACE to the left hepatic lobe on 3/21/19.   - CT/MRI 1/12/22 showed mild progression in the liver. S/p TACE on 2/11/22 and 4/22/22   - Gallium PET 11/1/22 showed progression. Discussed PRRT. PRRT #1 on 12/14/2022. Zometa every 3 months started 12/27/22.  - doing well.   - PRRT #2 done 2/8  - Getting zometa today, lanreotide tomorrow  - PRRT #3 due on 4/12  - gallium PET after 4 doses of PRRT    6.  - better after xermelo and PRRT  - c/w lanreotide every 4 weeks. No lanreotide within 28 days of PRRT. PRRT every 9 weeks  - c/w xermelo    7.  - stable  - f/u with cardiology    8.  - BP controlled  - c/w current medication    9.  - BS controlled  - c/w current medication    Discussed with Dr. Mares, DO  PGY-V  Hematology/Oncology Fellow    ATTESTATION:    I have personally seen, examined and talked to the patient. I have reviewed Dr Chauhan's note and agreed with the findings, assessment and plan.     Ms Hills is feeling well after two doses of PRRT. Her bowel movements are formed now. C/w treatment. Zometa every 3 months, due today. Lanreotide tomorrow. See me in one month prior to #3 PRRT.     Sebastian Granados MD  Hematology and Medical Oncology  [facility]      Route Chart for Scheduling    Med Onc Chart Routing      Follow up with physician . Add visit with me on 5/10 after her lab appt. get lanreotide on 5/11   Follow up with JUNAID    Infusion scheduling note    Injection scheduling note add lanreotide on 5/11   Labs    Imaging    Pharmacy appointment    Other referrals          Supportive Plan Information  OP ZOLEDRONIC ACID (ZOMETA) Q4W   Sebastian Granados MD   Upcoming Treatment Dates - OP ZOLEDRONIC ACID (ZOMETA) Q4W    3/27/2023       Medications       zoledronic acid (ZOMETA) 3.3 mg in sodium chloride 0.9% 100 mL IVPB  6/25/2023       Medications       zoledronic acid (ZOMETA) 3.3 mg in sodium chloride 0.9% 100 mL IVPB  9/23/2023       Medications       zoledronic acid (ZOMETA) 3.3 mg in sodium chloride 0.9% 100 mL IVPB  12/22/2023       Medications       zoledronic acid (ZOMETA) 3.3 mg in sodium chloride 0.9% 100 mL IVPB    Therapy Plan Information  LANREOTIDE  5. Medications  lanreotide injection 120 mg  120 mg, Subcutaneous, Every visit    LUTATHERA SUPPORT  IV Fluids  0.9%  NaCl infusion  Intravenous, Every 8 weeks  Pre-Medications  ondansetron-dexAMETHasone 16-12 mg in sodium chloride 0.9% 50 mL IVPB 16 mg  16 mg, Intravenous, Every 8 weeks  arginine-lysine-sterile water (AminoProtect) 25-25 mg/mL intravenous solution 1,000 mL  1,000 mL, Intravenous, Every 8 weeks  promethazine (PHENERGAN) 12.5 mg in dextrose 5 % (D5W) 50 mL IVPB  12.5 mg, Intravenous, Every 8 weeks  acetaminophen tablet 650 mg  650 mg, Oral, Every 8 weeks  Flushes  sodium chloride 0.9% flush 10 mL  10 mL, Intravenous, Every 8 weeks  PRN Medications  diphenhydrAMINE injection 50 mg  50 mg, Intravenous, Every 8 weeks  hydrocortisone sodium succinate injection 100 mg  100 mg, Intravenous, Every 8 weeks

## 2023-03-09 ENCOUNTER — INFUSION (OUTPATIENT)
Dept: INFUSION THERAPY | Facility: HOSPITAL | Age: 83
End: 2023-03-09
Payer: MEDICARE

## 2023-03-09 DIAGNOSIS — C7B.8 METASTATIC MALIGNANT NEUROENDOCRINE TUMOR TO LIVER: Primary | ICD-10-CM

## 2023-03-09 LAB — CGA SERPL-MCNC: 1538 NG/ML

## 2023-03-09 PROCEDURE — 63600175 PHARM REV CODE 636 W HCPCS: Mod: JZ,JG,HCNC | Performed by: INTERNAL MEDICINE

## 2023-03-09 PROCEDURE — 96372 THER/PROPH/DIAG INJ SC/IM: CPT | Mod: HCNC

## 2023-03-09 RX ORDER — LANREOTIDE ACETATE 120 MG/.5ML
120 INJECTION SUBCUTANEOUS
Status: DISCONTINUED | OUTPATIENT
Start: 2023-03-09 | End: 2023-03-09 | Stop reason: HOSPADM

## 2023-03-09 RX ORDER — LANREOTIDE ACETATE 120 MG/.5ML
120 INJECTION SUBCUTANEOUS
Status: CANCELLED | OUTPATIENT
Start: 2023-03-09

## 2023-03-09 RX ADMIN — LANREOTIDE ACETATE 120 MG: 120 INJECTION SUBCUTANEOUS at 11:03

## 2023-03-13 LAB — SEROTONIN: 1430 NG/ML

## 2023-03-14 ENCOUNTER — OFFICE VISIT (OUTPATIENT)
Dept: INTERNAL MEDICINE | Facility: CLINIC | Age: 83
End: 2023-03-14
Attending: INTERNAL MEDICINE
Payer: MEDICARE

## 2023-03-14 VITALS
WEIGHT: 116.63 LBS | HEART RATE: 85 BPM | DIASTOLIC BLOOD PRESSURE: 74 MMHG | SYSTOLIC BLOOD PRESSURE: 129 MMHG | HEIGHT: 64 IN | OXYGEN SATURATION: 97 % | BODY MASS INDEX: 19.91 KG/M2

## 2023-03-14 DIAGNOSIS — E78.5 HYPERLIPIDEMIA, UNSPECIFIED HYPERLIPIDEMIA TYPE: ICD-10-CM

## 2023-03-14 DIAGNOSIS — E11.29 CONTROLLED TYPE 2 DIABETES MELLITUS WITH MICROALBUMINURIA, WITHOUT LONG-TERM CURRENT USE OF INSULIN: Primary | ICD-10-CM

## 2023-03-14 DIAGNOSIS — E11.29 MICROALBUMINURIA DUE TO TYPE 2 DIABETES MELLITUS: ICD-10-CM

## 2023-03-14 DIAGNOSIS — R80.9 MICROALBUMINURIA DUE TO TYPE 2 DIABETES MELLITUS: ICD-10-CM

## 2023-03-14 DIAGNOSIS — I10 HYPERTENSION, BENIGN: ICD-10-CM

## 2023-03-14 DIAGNOSIS — I50.42 CHRONIC COMBINED SYSTOLIC AND DIASTOLIC CHF (CONGESTIVE HEART FAILURE): ICD-10-CM

## 2023-03-14 DIAGNOSIS — R80.9 CONTROLLED TYPE 2 DIABETES MELLITUS WITH MICROALBUMINURIA, WITHOUT LONG-TERM CURRENT USE OF INSULIN: Primary | ICD-10-CM

## 2023-03-14 PROCEDURE — 1159F PR MEDICATION LIST DOCUMENTED IN MEDICAL RECORD: ICD-10-PCS | Mod: HCNC,CPTII,S$GLB, | Performed by: INTERNAL MEDICINE

## 2023-03-14 PROCEDURE — 3288F FALL RISK ASSESSMENT DOCD: CPT | Mod: HCNC,CPTII,S$GLB, | Performed by: INTERNAL MEDICINE

## 2023-03-14 PROCEDURE — 3074F PR MOST RECENT SYSTOLIC BLOOD PRESSURE < 130 MM HG: ICD-10-PCS | Mod: HCNC,CPTII,S$GLB, | Performed by: INTERNAL MEDICINE

## 2023-03-14 PROCEDURE — 99215 PR OFFICE/OUTPT VISIT, EST, LEVL V, 40-54 MIN: ICD-10-PCS | Mod: HCNC,S$GLB,, | Performed by: INTERNAL MEDICINE

## 2023-03-14 PROCEDURE — 99215 OFFICE O/P EST HI 40 MIN: CPT | Mod: HCNC,S$GLB,, | Performed by: INTERNAL MEDICINE

## 2023-03-14 PROCEDURE — 1159F MED LIST DOCD IN RCRD: CPT | Mod: HCNC,CPTII,S$GLB, | Performed by: INTERNAL MEDICINE

## 2023-03-14 PROCEDURE — 3078F DIAST BP <80 MM HG: CPT | Mod: HCNC,CPTII,S$GLB, | Performed by: INTERNAL MEDICINE

## 2023-03-14 PROCEDURE — 99999 PR PBB SHADOW E&M-EST. PATIENT-LVL IV: CPT | Mod: PBBFAC,HCNC,, | Performed by: INTERNAL MEDICINE

## 2023-03-14 PROCEDURE — 1101F PT FALLS ASSESS-DOCD LE1/YR: CPT | Mod: HCNC,CPTII,S$GLB, | Performed by: INTERNAL MEDICINE

## 2023-03-14 PROCEDURE — 1101F PR PT FALLS ASSESS DOC 0-1 FALLS W/OUT INJ PAST YR: ICD-10-PCS | Mod: HCNC,CPTII,S$GLB, | Performed by: INTERNAL MEDICINE

## 2023-03-14 PROCEDURE — 1160F RVW MEDS BY RX/DR IN RCRD: CPT | Mod: HCNC,CPTII,S$GLB, | Performed by: INTERNAL MEDICINE

## 2023-03-14 PROCEDURE — 3074F SYST BP LT 130 MM HG: CPT | Mod: HCNC,CPTII,S$GLB, | Performed by: INTERNAL MEDICINE

## 2023-03-14 PROCEDURE — 3288F PR FALLS RISK ASSESSMENT DOCUMENTED: ICD-10-PCS | Mod: HCNC,CPTII,S$GLB, | Performed by: INTERNAL MEDICINE

## 2023-03-14 PROCEDURE — 3078F PR MOST RECENT DIASTOLIC BLOOD PRESSURE < 80 MM HG: ICD-10-PCS | Mod: HCNC,CPTII,S$GLB, | Performed by: INTERNAL MEDICINE

## 2023-03-14 PROCEDURE — 1160F PR REVIEW ALL MEDS BY PRESCRIBER/CLIN PHARMACIST DOCUMENTED: ICD-10-PCS | Mod: HCNC,CPTII,S$GLB, | Performed by: INTERNAL MEDICINE

## 2023-03-14 PROCEDURE — 99999 PR PBB SHADOW E&M-EST. PATIENT-LVL IV: ICD-10-PCS | Mod: PBBFAC,HCNC,, | Performed by: INTERNAL MEDICINE

## 2023-03-14 RX ORDER — EZETIMIBE 10 MG/1
10 TABLET ORAL DAILY
Qty: 90 TABLET | Refills: 3 | Status: SHIPPED | OUTPATIENT
Start: 2023-03-14 | End: 2023-10-10 | Stop reason: SDUPTHER

## 2023-03-14 RX ORDER — LOSARTAN POTASSIUM 50 MG/1
50 TABLET ORAL DAILY
Qty: 90 TABLET | Refills: 3 | Status: SHIPPED | OUTPATIENT
Start: 2023-03-14 | End: 2023-10-10 | Stop reason: SDUPTHER

## 2023-03-14 RX ORDER — METFORMIN HYDROCHLORIDE 500 MG/1
1000 TABLET, EXTENDED RELEASE ORAL
Qty: 180 TABLET | Refills: 3 | Status: SHIPPED | OUTPATIENT
Start: 2023-03-14 | End: 2024-03-08

## 2023-03-14 NOTE — PROGRESS NOTES
Subjective:   Patient ID: Shahram Hills is a 82 y.o. female  Chief complaint:   Chief Complaint   Patient presents with    Diabetes     F/u       HPI  Here for 3 month diabetes follow up and HTN follow up  Accompanied by her sister today     HTN:  Today: 140/80 - repeat blood pressure improved at end of appointment  Home readings: 122-154/61-84, HR 46-54, 73-81  Previously:   Prev switched to coreg from amlodipine  Cont losartan 50   At last clinic visit, Bp higher at home over past 2 months however not resting 5-10min prior to checknig bp at home - has been resting prior to checking blood pressure  Prev:  Since [facility] started taking amlodipine 5mg bid as received an old rx with those instructions   henry losartan 50 and bp well controlled today   No LH or dizziness   Home log reviewd and dayami are < 130/80     PVCs, Chronic systolic and diastolic HF (EF 40% on echo 9/2022):  As above   - henry BB   Cards: Elyssa  Previously:    - 2/25/2022 for hospital discharge had TACE right hep lobe and found to have frequent PVCs  - we resumed losartan at that time   - seen by cards 3/14/2022 and completed holter - at this time no tx indicated    Diabetes:   A1c 6.4<- 5.9 <-6.2  <- 6.1 <- 6.2  - well controlled and henry metformin 1000 daily  Fasting bg 130, some 150s, one at 160   Foot exam up-to-date at [facility] 6/2022  Utd on eye exam 8/2022    B12 def:  - is taking supplement daily    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 Zometa every 3 months started 12/27/22. PRRT #2 on 2/8  Followed by h/o - H last clinic visit March 8, 2023  Previously:   - 2/25/2022 for hospital discharge had TACE right hep lobe and found to have frequent PVCs  - we resumed losartan at that time   - seen by cards 3/14/2022 and completed holter - at  "this time no tx indicated    H/o: Darwin  Cards: Elyssa  Pod: Charles    Review of Systems    Objective:  Vitals:    03/14/23 0941 03/14/23 1013   BP: (!) 140/80 129/74   BP Location: Left arm    Patient Position: Sitting    Pulse: 85    SpO2: 97%    Weight: 52.9 kg (116 lb 10 oz)    Height: 5' 4" (1.626 m)      Body mass index is 20.02 kg/m².    Physical Exam  Vitals reviewed.   Constitutional:       Appearance: Normal appearance. She is well-developed.   HENT:      Head: Normocephalic and atraumatic.      Right Ear: Tympanic membrane, ear canal and external ear normal.      Left Ear: Tympanic membrane, ear canal and external ear normal.      Nose: Nose normal. No congestion.      Mouth/Throat:      Mouth: Mucous membranes are moist.      Pharynx: Oropharynx is clear. No oropharyngeal exudate.   Eyes:      Extraocular Movements: Extraocular movements intact.      Conjunctiva/sclera: Conjunctivae normal.   Cardiovascular:      Rate and Rhythm: Normal rate and regular rhythm.      Pulses: Normal pulses.      Heart sounds: Normal heart sounds.   Pulmonary:      Effort: Pulmonary effort is normal.      Breath sounds: Normal breath sounds.   Abdominal:      General: Bowel sounds are normal.      Palpations: Abdomen is soft. There is mass (ruq mass).   Musculoskeletal:         General: No swelling or tenderness. Normal range of motion.      Cervical back: Normal range of motion and neck supple.   Lymphadenopathy:      Cervical: No cervical adenopathy.   Skin:     General: Skin is warm and dry.      Capillary Refill: Capillary refill takes less than 2 seconds.      Nails: There is no clubbing.   Neurological:      General: No focal deficit present.      Mental Status: She is alert and oriented to person, place, and time. Mental status is at baseline.      Gait: Gait normal.   Psychiatric:         Mood and Affect: Mood normal.         Speech: Speech normal.         Behavior: Behavior normal.         Thought Content: Thought " content normal.         Judgment: Judgment normal.       Assessment:  1. Controlled type 2 diabetes mellitus with microalbuminuria, without long-term current use of insulin    2. Hypertension, benign    3. Hyperlipidemia, unspecified hyperlipidemia type    4. Microalbuminuria due to type 2 diabetes mellitus    5. Chronic combined systolic and diastolic CHF (congestive heart failure)          Plan:  Shahram was seen today for diabetes.    Diagnoses and all orders for this visit:    Controlled type 2 diabetes mellitus with microalbuminuria, without long-term current use of insulin  -     metFORMIN (GLUCOPHAGE-XR) 500 MG ER 24hr tablet; Take 2 tablets (1,000 mg total) by mouth daily with breakfast.  -     Hemoglobin A1C; Future  -     Microalbumin/Creatinine Ratio, Urine; Future  Stable, continue current medication, check a1c  Diabetic diet    Hypertension, benign  -     losartan (COZAAR) 50 MG tablet; Take 1 tablet (50 mg total) by mouth once daily.  Stable, continue current medication - improved upon repeat at end of appointment    Hyperlipidemia, unspecified hyperlipidemia type  -     ezetimibe (ZETIA) 10 mg tablet; Take 1 tablet (10 mg total) by mouth once daily.  -     Lipid Panel; Future   Stable, continue current medication and monitor labs    Microalbuminuria due to type 2 diabetes mellitus  Stable, continue Arb  As good diabetes control  Can consider Jardiance in future for diabetes and heart failure pending clinical course     Chronic combined systolic and diastolic CHF (congestive heart failure)  Euvolemic on exam  Continue current medication  Follow-up with cardiology as planned    Dexa: wnl 5/2021 - repeat due 2024    40 min spent in care of patient including chart review, history, orders, physical, orders and coordination of care    Health Maintenance   Topic Date Due    Lipid Panel  03/11/2023    Hemoglobin A1c  06/15/2023    Eye Exam  08/01/2023    DEXA Scan  05/31/2024    TETANUS VACCINE  12/10/2029

## 2023-03-17 LAB — PANCREASTATIN SERPL-MCNC: 7264 PG/ML (ref 10–135)

## 2023-03-24 LAB — 5OH-INDOLEACETATE SERPL-MCNC: 67 NG/ML

## 2023-03-27 ENCOUNTER — PATIENT MESSAGE (OUTPATIENT)
Dept: PHARMACY | Facility: CLINIC | Age: 83
End: 2023-03-27
Payer: MEDICARE

## 2023-03-28 NOTE — TELEPHONE ENCOUNTER
Specialty Pharmacy - Refill Coordination    Specialty Medication Orders Linked to Encounter      Flowsheet Row Most Recent Value   Medication #1 telotristat ethyl (XERMELO) 250 mg Tab (Order#[id number], Rx#[id number])            Refill Questions - Documented Responses      Flowsheet Row Most Recent Value   Patient Availability and HIPAA Verification    Does patient want to proceed with activity? Yes   HIPAA/medical authority confirmed? Yes   Relationship to patient of person spoken to? Self   Refill Screening Questions    Changes to allergies? No   Changes to medications? No   New conditions since last clinic visit? No   Unplanned office visit, urgent care, ED, or hospital admission in the last 4 weeks? No   How does patient/caregiver feel medication is working? Good   Financial problems or insurance changes? No   How many doses of your specialty medications were missed in the last 4 weeks? 0   Would patient like to speak to a pharmacist? No   When does the patient need to receive the medication? 04/06/23   Refill Delivery Questions    How will the patient receive the medication? [facility]   When does the patient need to receive the medication? 04/06/23   Shipping Address Home   Address in [facility] confirmed and updated if neccessary? Yes   Expected Copay ($) 0   Is the patient able to afford the medication copay? Yes   Payment Method zero copay   Days supply of Refill 28   Supplies needed? No supplies needed   Refill activity completed? Yes   Refill activity plan Refill scheduled   Shipment/Pickup Date: 03/29/23            Current Outpatient Medications   Medication Sig    blood sugar diagnostic (TRUE METRIX GLUCOSE TEST STRIP) Strp USE TO CHECK BLOOD SUGAR TWICE DAILY    blood-glucose meter kit To check BG 2 times daily, to use with insurance preferred meter    carvediloL (COREG) 12.5 MG tablet Take 1 tablet (12.5 mg total) by mouth 2 (two) times daily with meals.    cyanocobalamin (VITAMIN B-12) 1000 MCG  "tablet Take 1 tablet (1,000 mcg total) by mouth once daily.    diphenoxylate-atropine 2.5-0.025 mg (LOMOTIL) 2.5-0.025 mg per tablet Take 1 tablet by mouth 4 (four) times daily as needed for Diarrhea.    ezetimibe (ZETIA) 10 mg tablet Take 1 tablet (10 mg total) by mouth once daily.    ferrous sulfate 325 (65 FE) MG EC tablet Take 325 mg by mouth 3 (three) times daily with meals.    lancets Misc To check BG 2 times daily, to use with insurance preferred meter    latanoprost 0.005 % ophthalmic solution Place 1 drop into both eyes nightly.    losartan (COZAAR) 50 MG tablet Take 1 tablet (50 mg total) by mouth once daily.    metFORMIN (GLUCOPHAGE-XR) 500 MG ER 24hr tablet Take 2 tablets (1,000 mg total) by mouth daily with breakfast.    needle, disp, 22 G 22 gauge x 1" Ndle 1 application by Misc.(Non-Drug; Combo Route) route 3 (three) times daily as needed.    octreotide acetate (SANDOSTATIN) 100 mcg/mL (1 mL) Syrg Inject 1 mL (0.1 mg total) into the skin 3 (three) times daily as needed (diarrhea). (Patient not taking: Reported on 3/14/2023)    syringe, disposable, 1 mL Syrg 1 application by Misc.(Non-Drug; Combo Route) route 3 (three) times daily as needed (diarrhea).    telotristat ethyl (XERMELO) 250 mg Tab Take 1 tablet (250 mg) by mouth 3 (three) times daily.    TRUEPLUS LANCETS 33 gauge Misc USE TO CHECK BLOOD SUGAR TWICE DAILY    vitamin D (VITAMIN D3) 1000 units Tab Take 400 Units by mouth once daily.    XIIDRA 5 % Dpet INSTILL ONE DROP INTO OU BID   Last reviewed on 3/14/2023  5:14 PM by Trixie Xie MD    Review of patient's allergies indicates:   Allergen Reactions    Epinephrine      carcinoid    Last reviewed on  3/14/2023 5:14 PM by Trixie Xie      Tasks added this encounter   3/27/2023 - Refill Call (Auto Added)   Tasks due within next 3 months   No tasks due.     Josephine Beasley, PharmD  [facility]  [address]  [facility] [address]  Phone: " [phone]  Fax: [phone]

## 2023-04-04 ENCOUNTER — LAB VISIT (OUTPATIENT)
Dept: LAB | Facility: HOSPITAL | Age: 83
End: 2023-04-04
Attending: INTERNAL MEDICINE
Payer: MEDICARE

## 2023-04-04 ENCOUNTER — OFFICE VISIT (OUTPATIENT)
Dept: HEMATOLOGY/ONCOLOGY | Facility: CLINIC | Age: 83
End: 2023-04-04
Payer: MEDICARE

## 2023-04-04 VITALS
RESPIRATION RATE: 18 BRPM | HEART RATE: 60 BPM | SYSTOLIC BLOOD PRESSURE: 192 MMHG | OXYGEN SATURATION: 97 % | DIASTOLIC BLOOD PRESSURE: 85 MMHG | WEIGHT: 115 LBS | TEMPERATURE: 98 F | BODY MASS INDEX: 19.63 KG/M2 | HEIGHT: 64 IN

## 2023-04-04 DIAGNOSIS — C79.51 METASTASIS TO SPINAL COLUMN: ICD-10-CM

## 2023-04-04 DIAGNOSIS — R80.9 CONTROLLED TYPE 2 DIABETES MELLITUS WITH MICROALBUMINURIA, WITHOUT LONG-TERM CURRENT USE OF INSULIN: ICD-10-CM

## 2023-04-04 DIAGNOSIS — E34.0 CARCINOID SYNDROME: ICD-10-CM

## 2023-04-04 DIAGNOSIS — T45.1X5A IMMUNODEFICIENCY SECONDARY TO CHEMOTHERAPY: ICD-10-CM

## 2023-04-04 DIAGNOSIS — I10 ESSENTIAL HYPERTENSION: ICD-10-CM

## 2023-04-04 DIAGNOSIS — D84.821 IMMUNODEFICIENCY SECONDARY TO CHEMOTHERAPY: ICD-10-CM

## 2023-04-04 DIAGNOSIS — C7A.012 MALIGNANT CARCINOID TUMOR OF ILEUM: ICD-10-CM

## 2023-04-04 DIAGNOSIS — E11.29 CONTROLLED TYPE 2 DIABETES MELLITUS WITH MICROALBUMINURIA, WITHOUT LONG-TERM CURRENT USE OF INSULIN: ICD-10-CM

## 2023-04-04 DIAGNOSIS — C7A.012 MALIGNANT CARCINOID TUMOR OF ILEUM: Primary | ICD-10-CM

## 2023-04-04 DIAGNOSIS — C7B.8 SECONDARY NEUROENDOCRINE TUMOR OF BONE: ICD-10-CM

## 2023-04-04 DIAGNOSIS — E78.5 HYPERLIPIDEMIA, UNSPECIFIED HYPERLIPIDEMIA TYPE: ICD-10-CM

## 2023-04-04 DIAGNOSIS — D84.81 IMMUNODEFICIENCY SECONDARY TO NEOPLASM: ICD-10-CM

## 2023-04-04 DIAGNOSIS — Z79.899 IMMUNODEFICIENCY SECONDARY TO CHEMOTHERAPY: ICD-10-CM

## 2023-04-04 DIAGNOSIS — C7B.8 METASTATIC MALIGNANT NEUROENDOCRINE TUMOR TO LIVER: ICD-10-CM

## 2023-04-04 DIAGNOSIS — D49.9 IMMUNODEFICIENCY SECONDARY TO NEOPLASM: ICD-10-CM

## 2023-04-04 LAB
ALBUMIN SERPL BCP-MCNC: 3.6 G/DL (ref 3.5–5.2)
ALP SERPL-CCNC: 63 U/L (ref 55–135)
ALT SERPL W/O P-5'-P-CCNC: 27 U/L (ref 10–44)
ANION GAP SERPL CALC-SCNC: 11 MMOL/L (ref 8–16)
AST SERPL-CCNC: 30 U/L (ref 10–40)
BASOPHILS # BLD AUTO: 0.02 K/UL (ref 0–0.2)
BASOPHILS NFR BLD: 0.4 % (ref 0–1.9)
BILIRUB SERPL-MCNC: 0.6 MG/DL (ref 0.1–1)
BUN SERPL-MCNC: 8 MG/DL (ref 8–23)
CALCIUM SERPL-MCNC: 9.7 MG/DL (ref 8.7–10.5)
CHLORIDE SERPL-SCNC: 102 MMOL/L (ref 95–110)
CHOLEST SERPL-MCNC: 221 MG/DL (ref 120–199)
CHOLEST/HDLC SERPL: 2.5 {RATIO} (ref 2–5)
CO2 SERPL-SCNC: 30 MMOL/L (ref 23–29)
CREAT SERPL-MCNC: 0.7 MG/DL (ref 0.5–1.4)
DIFFERENTIAL METHOD: ABNORMAL
EOSINOPHIL # BLD AUTO: 0 K/UL (ref 0–0.5)
EOSINOPHIL NFR BLD: 0.7 % (ref 0–8)
ERYTHROCYTE [DISTWIDTH] IN BLOOD BY AUTOMATED COUNT: 13.2 % (ref 11.5–14.5)
EST. GFR  (NO RACE VARIABLE): >60 ML/MIN/1.73 M^2
ESTIMATED AVG GLUCOSE: 143 MG/DL (ref 68–131)
GLUCOSE SERPL-MCNC: 161 MG/DL (ref 70–110)
HBA1C MFR BLD: 6.6 % (ref 4–5.6)
HCT VFR BLD AUTO: 40.4 % (ref 37–48.5)
HDLC SERPL-MCNC: 90 MG/DL (ref 40–75)
HDLC SERPL: 40.7 % (ref 20–50)
HGB BLD-MCNC: 12.8 G/DL (ref 12–16)
IMM GRANULOCYTES # BLD AUTO: 0.01 K/UL (ref 0–0.04)
IMM GRANULOCYTES NFR BLD AUTO: 0.2 % (ref 0–0.5)
LDLC SERPL CALC-MCNC: 116.4 MG/DL (ref 63–159)
LYMPHOCYTES # BLD AUTO: 1 K/UL (ref 1–4.8)
LYMPHOCYTES NFR BLD: 17.6 % (ref 18–48)
MCH RBC QN AUTO: 29.5 PG (ref 27–31)
MCHC RBC AUTO-ENTMCNC: 31.7 G/DL (ref 32–36)
MCV RBC AUTO: 93 FL (ref 82–98)
MONOCYTES # BLD AUTO: 0.5 K/UL (ref 0.3–1)
MONOCYTES NFR BLD: 8.9 % (ref 4–15)
NEUTROPHILS # BLD AUTO: 3.9 K/UL (ref 1.8–7.7)
NEUTROPHILS NFR BLD: 72.2 % (ref 38–73)
NONHDLC SERPL-MCNC: 131 MG/DL
NRBC BLD-RTO: 0 /100 WBC
PLATELET # BLD AUTO: 170 K/UL (ref 150–450)
PMV BLD AUTO: 11 FL (ref 9.2–12.9)
POTASSIUM SERPL-SCNC: 4 MMOL/L (ref 3.5–5.1)
PROT SERPL-MCNC: 7 G/DL (ref 6–8.4)
RBC # BLD AUTO: 4.34 M/UL (ref 4–5.4)
SODIUM SERPL-SCNC: 143 MMOL/L (ref 136–145)
TRIGL SERPL-MCNC: 73 MG/DL (ref 30–150)
WBC # BLD AUTO: 5.4 K/UL (ref 3.9–12.7)

## 2023-04-04 PROCEDURE — 99999 PR PBB SHADOW E&M-EST. PATIENT-LVL IV: ICD-10-PCS | Mod: PBBFAC,HCNC,, | Performed by: INTERNAL MEDICINE

## 2023-04-04 PROCEDURE — 1101F PR PT FALLS ASSESS DOC 0-1 FALLS W/OUT INJ PAST YR: ICD-10-PCS | Mod: HCNC,CPTII,S$GLB, | Performed by: INTERNAL MEDICINE

## 2023-04-04 PROCEDURE — 80061 LIPID PANEL: CPT | Mod: HCNC | Performed by: INTERNAL MEDICINE

## 2023-04-04 PROCEDURE — 3079F DIAST BP 80-89 MM HG: CPT | Mod: HCNC,CPTII,S$GLB, | Performed by: INTERNAL MEDICINE

## 2023-04-04 PROCEDURE — 83519 RIA NONANTIBODY: CPT | Mod: HCNC | Performed by: INTERNAL MEDICINE

## 2023-04-04 PROCEDURE — 80053 COMPREHEN METABOLIC PANEL: CPT | Mod: HCNC | Performed by: INTERNAL MEDICINE

## 2023-04-04 PROCEDURE — 84260 ASSAY OF SEROTONIN: CPT | Mod: HCNC | Performed by: INTERNAL MEDICINE

## 2023-04-04 PROCEDURE — 3079F PR MOST RECENT DIASTOLIC BLOOD PRESSURE 80-89 MM HG: ICD-10-PCS | Mod: HCNC,CPTII,S$GLB, | Performed by: INTERNAL MEDICINE

## 2023-04-04 PROCEDURE — 3077F SYST BP >= 140 MM HG: CPT | Mod: HCNC,CPTII,S$GLB, | Performed by: INTERNAL MEDICINE

## 2023-04-04 PROCEDURE — 3288F FALL RISK ASSESSMENT DOCD: CPT | Mod: HCNC,CPTII,S$GLB, | Performed by: INTERNAL MEDICINE

## 2023-04-04 PROCEDURE — 1159F MED LIST DOCD IN RCRD: CPT | Mod: HCNC,CPTII,S$GLB, | Performed by: INTERNAL MEDICINE

## 2023-04-04 PROCEDURE — 3288F PR FALLS RISK ASSESSMENT DOCUMENTED: ICD-10-PCS | Mod: HCNC,CPTII,S$GLB, | Performed by: INTERNAL MEDICINE

## 2023-04-04 PROCEDURE — 1160F RVW MEDS BY RX/DR IN RCRD: CPT | Mod: HCNC,CPTII,S$GLB, | Performed by: INTERNAL MEDICINE

## 2023-04-04 PROCEDURE — 1126F AMNT PAIN NOTED NONE PRSNT: CPT | Mod: HCNC,CPTII,S$GLB, | Performed by: INTERNAL MEDICINE

## 2023-04-04 PROCEDURE — 1101F PT FALLS ASSESS-DOCD LE1/YR: CPT | Mod: HCNC,CPTII,S$GLB, | Performed by: INTERNAL MEDICINE

## 2023-04-04 PROCEDURE — 1126F PR PAIN SEVERITY QUANTIFIED, NO PAIN PRESENT: ICD-10-PCS | Mod: HCNC,CPTII,S$GLB, | Performed by: INTERNAL MEDICINE

## 2023-04-04 PROCEDURE — 1159F PR MEDICATION LIST DOCUMENTED IN MEDICAL RECORD: ICD-10-PCS | Mod: HCNC,CPTII,S$GLB, | Performed by: INTERNAL MEDICINE

## 2023-04-04 PROCEDURE — 82542 COL CHROMOTOGRAPHY QUAL/QUAN: CPT | Mod: HCNC | Performed by: INTERNAL MEDICINE

## 2023-04-04 PROCEDURE — 3077F PR MOST RECENT SYSTOLIC BLOOD PRESSURE >= 140 MM HG: ICD-10-PCS | Mod: HCNC,CPTII,S$GLB, | Performed by: INTERNAL MEDICINE

## 2023-04-04 PROCEDURE — 86316 IMMUNOASSAY TUMOR OTHER: CPT | Mod: HCNC | Performed by: INTERNAL MEDICINE

## 2023-04-04 PROCEDURE — 85025 COMPLETE CBC W/AUTO DIFF WBC: CPT | Mod: HCNC | Performed by: INTERNAL MEDICINE

## 2023-04-04 PROCEDURE — 83036 HEMOGLOBIN GLYCOSYLATED A1C: CPT | Mod: HCNC | Performed by: INTERNAL MEDICINE

## 2023-04-04 PROCEDURE — 99215 OFFICE O/P EST HI 40 MIN: CPT | Mod: HCNC,S$GLB,, | Performed by: INTERNAL MEDICINE

## 2023-04-04 PROCEDURE — 99999 PR PBB SHADOW E&M-EST. PATIENT-LVL IV: CPT | Mod: PBBFAC,HCNC,, | Performed by: INTERNAL MEDICINE

## 2023-04-04 PROCEDURE — 1160F PR REVIEW ALL MEDS BY PRESCRIBER/CLIN PHARMACIST DOCUMENTED: ICD-10-PCS | Mod: HCNC,CPTII,S$GLB, | Performed by: INTERNAL MEDICINE

## 2023-04-04 PROCEDURE — 99215 PR OFFICE/OUTPT VISIT, EST, LEVL V, 40-54 MIN: ICD-10-PCS | Mod: HCNC,S$GLB,, | Performed by: INTERNAL MEDICINE

## 2023-04-04 NOTE — PROGRESS NOTES
"  Subjective:       Patient ID: Shahram Hills is an 82 y.o. female.     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 Ms. Hills is an 79 yo woman who initially saw me on 6/7/18 for further evaluation of neuroendocrine tumor. She initially presented with diarrhea, abdominal discomfort, weight loss. She was evaluated at [facility] and underwent workup below:  US abdomen on 3/14/18 showed numerous bilobar mixed echogenicity and mildly vascular hepatic lesions. Cholelithiasis without e/o acute cholecystitis.   MRI abdomen on 3/30/2018 showed innumerable hepatic metastases. L3 vertebral body metastasis with soft tissue component along the right margin of the L3 and upper L4 vertebral bodies, filling the right L3/4 neural foramen, and extending into the anterior aspect of the spinal canal on the right at the L3 and upper T4 levels. Associated with mild spinal canal narrowing.  CT chest on 4/6/2018 showed one lucent nodule measuring 7 mm in the T7 vertebral body, most likely representing metastatic disease.    EGD on 5/4/18 showed normal duodenum. Schatzki's ring in the lower third of the esophagus. Grade 1 esophagitis in the GE junction c/w mild distal esophagitis. Congestion and erythema in the antrum, pre-pyloric region and stomach body c/w gastritis. Biopsy of the stomach antrum showed mild chronic gastritis, neg for H. pylori.   Labs on 5/9/2018: WBC 6.9, H/H 11.6/34.3, MCV 87.5, plt count 279. On 3/9/18: Vit B12 level 173, folic acid 6.6  She was started on oral vit B12 and underwent a liver biopsy on 5/18/18. Pathology showed "metastatic well differentiated neuroendocrine tumor. Ki67 3%"     She saw me on 6/7/18 and complained of weight loss of 26 lbs over the past 3-4 months, also has diarrhea. No flushing, wheezing, palpitations. Has been having pain in right flank radiating down " "the right leg. No tingling/numbness, weakness. She can hold her bladder but when she urinates her diarrhea would come out as well. Started on dex 4 mg q6h the evening of 18.      2. MRI spine on 18 showed "Marrow replacing metastatic lesion of the L3 vertebral body with associated extra osseous expansion and complete effacement of the right L3-L4 neural foramina and additional effacement of the right lateral recess.  There is additional lateral extension and abutment of the right psoas muscle.  Superimposed degenerative change at this level results in mild spinal canal stenosis." I sent the patient to ED on 18 where she was evaluated by neurosurgery. Neurosurgery did not feel she was a candidate for surgical intervention.      3. Seen by Dr. Adames on 18. palliative radiation to the area of the L3 metastasis 18-18   4. 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  There is an adjacent metastatic lymph node, diffuse liver metastases, and multiple bone metastases. Index primary neoplasm SUV max 33.13. Adjacent lymph node SUV max 23. L3 bone metastasis SUV max 36.86. Left lobe SUV max 46.13   5. Lanreotide every 4 weeks started on 18  6. TACE to the right hepatic lobe on 19. TACE to the left hepatic lobe on 3/21/19.   7. TACE to the right hepatic lobe on 22. S/p conventional chemoembolization performed of the segment 2, 3, 4 branch of the left hepatic artery and segment 8 branch of the left hepatic artery on 22.  8. Gallium PET 22 showed progression. Discussed PRRT. PRRT #1 on 2022. Zometa every 3 months started 22. PRRT #2 on .      History of Present Illness:   Ms. Hills returns for follow up. She is taking xermelo and diarrhea is controlled. Feels well. Sometimes she does not have diarrhea, sometimes she may have two. Did not take BP meds this morning as she had fasting labs.     ECO     ROS:   See HPI. " Otherwise negative.      Physical Examination:   Vital signs reviewed.   Gen: well hydrated, well developed, in no acute distress.  HEENT: normocephalic, anicteric, PERRLA, EOMI, oropharynx clear  Neck: supple, no JVD, thyromegaly, cervical or supraclavicular LAD  Lungs: CTAB, no wheezes or rales  Heart: regular rate, frequent PVCs, regular pulse, no M/R/G  Abdomen: soft, no tenderness, non-distended, no hepatomegaly, no splenomegaly, no mass, or hernia.   Ext: b/l LE trace edema  Neuro: alert and oriented x 4, no focal neuro deficit  Skin: no rash, open wounds or ulcers  Psych: pleasant and appropriate mood and affect     Objective:      Laboratory Data:  Labs from today pending     Imaging Data:     MRI abdomen 11/1/22:  1. History of neuroendocrine malignancy.  Numerous hepatic lesions, some remaining stable while others have mildly enlarged consistent with disease progression.  Stable osseous lesion in the L3 vertebral body.  2. Cholelithiasis and stable mild dilatation of the common bile duct and central intrahepatic biliary duct dilatation.  Of note, there is mass effect on the midportion of the common bile duct by dominant left hepatic lobe lesion.  3. Additional findings detailed above.  RECIST SUMMARY:     Date of prior examination for comparison: 05/16/2022     Lesion 1: Left hepatic lobe.  7.1 cm.  Series 9 image 52.  Prior measurement 6.3 cm.     Lesion 2: Right hepatic lobe lateral.  3.1 cm.  Series 9 image 30.  Prior measurement 3.2 cm.     Lesion 3: Right hepatic dome.  5.1 cm.  Series 9 image 19.  Prior measurement 4.4 cm.    Gallium PET 11/1/22:     Interval development of somatostatin tracer avid lesions in the skull, concerning for new metastatic lesions.     Numerous tracer avid hypoattenuating masses throughout the liver with increased SUV max compared to prior exam.     Tracer avid lesions in the C7 and L3 vertebral bodies, sternum, distal ileum and mesenteric node are similar to prior exam.      Judged by tracer avidity of the patient's tumor burden, PRRT would be a consideration if clinically indicated.        Assessment and Plan:      1. Malignant carcinoid tumor of ileum    2. 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 Secondary neuroendocrine tumor of bone    4. Spine metastasis    5. 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 Carcinoid syndrome    8. Controlled type 2 diabetes mellitus with microalbuminuria, without long-term current use of insulin    9. Essential hypertension        1-6  - Ms Hills is an 83 yo woman with well differentiated low-grade neuroendocrine tumor of the ileum with mets to liver and bones, diagnosed on 5/18/2018. Started lanreotide every 4 weeks on 6/22/2018. S/p TACE to the right hepatic lobe on 2/14/19. TACE to the left hepatic lobe on 3/21/19.   - CT/MRI 1/12/22 showed mild progression in the liver. S/p TACE on 2/11/22 and 4/22/22   - Gallium PET 11/1/22 showed progression. Discussed PRRT. PRRT #1 on 12/14/2022. Zometa every 3 months started 12/27/22. Last received it on 3/8/23, next due in June 2023  - doing well.   - PRRT #3 due on 4/12, lanreotide 4/13  - see me in 5 weeks for lanreotide  - gallium PET after 4 doses of PRRT    7.  - better after xermelo and PRRT  - c/w lanreotide every 4 weeks. No lanreotide within 28 days of PRRT. PRRT every 9 weeks  - c/w xermelo    8.  - f/u on BS    9.  - BP elevated. Did not take BP meds this morning  - asked patient to resume BP meds after she eats    Sebastian Granados MD  Hematology and Medical Oncology  [facility]      Route Chart for Scheduling    Med Onc Chart Routing      Follow up with physician 4 weeks. please schedule patient for lanreotide on 5/11 (Thu) and 6/15 (Thu). keep other appointments   Follow up with JUNAID    Infusion scheduling note    Injection scheduling note lanreotide on 5/11 and 6/15   Labs CBC and CMP   Scheduling:  Preferred  lab:  Lab interval: every 4 weeks  serotonin, pancreastatin, 5-HIAA, chromogranin A   Imaging    Pharmacy appointment    Other referrals           Supportive Plan Information  OP ZOLEDRONIC ACID (ZOMETA) Q4W   Sebastian Granados MD   Upcoming Treatment Dates - OP ZOLEDRONIC ACID (ZOMETA) Q4W    6/25/2023       Medications       zoledronic acid (ZOMETA) 4 mg in sodium chloride 0.9% 100 mL IVPB  9/23/2023       Medications       zoledronic acid (ZOMETA) 4 mg in sodium chloride 0.9% 100 mL IVPB  12/22/2023       Medications       zoledronic acid (ZOMETA) 4 mg in sodium chloride 0.9% 100 mL IVPB  3/21/2024       Medications       zoledronic acid (ZOMETA) 4 mg in sodium chloride 0.9% 100 mL IVPB    Therapy Plan Information  LANREOTIDE  5. Medications  lanreotide injection 120 mg  120 mg, Subcutaneous, Every visit    LUTATHERA SUPPORT  IV Fluids  0.9%  NaCl infusion  Intravenous, Every 8 weeks  Pre-Medications  ondansetron-dexAMETHasone 16-12 mg in sodium chloride 0.9% 50 mL IVPB 16 mg  16 mg, Intravenous, Every 8 weeks  arginine-lysine-sterile water (AminoProtect) 25-25 mg/mL intravenous solution 1,000 mL  1,000 mL, Intravenous, Every 8 weeks  promethazine (PHENERGAN) 12.5 mg in dextrose 5 % (D5W) 50 mL IVPB  12.5 mg, Intravenous, Every 8 weeks  acetaminophen tablet 650 mg  650 mg, Oral, Every 8 weeks  Flushes  sodium chloride 0.9% flush 10 mL  10 mL, Intravenous, Every 8 weeks  PRN Medications  diphenhydrAMINE injection 50 mg  50 mg, Intravenous, Every 8 weeks  hydrocortisone sodium succinate injection 100 mg  100 mg, Intravenous, Every 8 weeks

## 2023-04-05 LAB — CGA SERPL-MCNC: 1630 NG/ML

## 2023-04-10 LAB — SEROTONIN: 1360 NG/ML

## 2023-04-12 ENCOUNTER — HOSPITAL ENCOUNTER (OUTPATIENT)
Dept: RADIOLOGY | Facility: HOSPITAL | Age: 83
Discharge: HOME | End: 2023-04-12
Attending: INTERNAL MEDICINE
Payer: MEDICARE

## 2023-04-12 ENCOUNTER — INFUSION (OUTPATIENT)
Dept: INFUSION THERAPY | Facility: HOSPITAL | Age: 83
End: 2023-04-12
Attending: INTERNAL MEDICINE
Payer: MEDICARE

## 2023-04-12 VITALS
HEART RATE: 96 BPM | OXYGEN SATURATION: 96 % | DIASTOLIC BLOOD PRESSURE: 74 MMHG | SYSTOLIC BLOOD PRESSURE: 133 MMHG | RESPIRATION RATE: 17 BRPM | BODY MASS INDEX: 19.63 KG/M2 | HEIGHT: 64 IN | WEIGHT: 115 LBS | TEMPERATURE: 99 F

## 2023-04-12 DIAGNOSIS — C7A.012 MALIGNANT CARCINOID TUMOR OF ILEUM: Primary | ICD-10-CM

## 2023-04-12 DIAGNOSIS — C7B.8 METASTATIC MALIGNANT NEUROENDOCRINE TUMOR TO LIVER: ICD-10-CM

## 2023-04-12 DIAGNOSIS — C7A.8 NEUROENDOCRINE CARCINOMA METASTATIC TO BONE: ICD-10-CM

## 2023-04-12 DIAGNOSIS — C7B.8 NEUROENDOCRINE CARCINOMA METASTATIC TO BONE: ICD-10-CM

## 2023-04-12 PROCEDURE — 79101 NM THERAPY BY IV ADMINISTRATION LU177: ICD-10-PCS | Mod: 26,HCNC,, | Performed by: RADIOLOGY

## 2023-04-12 PROCEDURE — 79101 NUCLEAR RX IV ADMIN: CPT | Mod: 26,HCNC,, | Performed by: RADIOLOGY

## 2023-04-12 PROCEDURE — 63600175 PHARM REV CODE 636 W HCPCS: Mod: HCNC | Performed by: INTERNAL MEDICINE

## 2023-04-12 PROCEDURE — 96366 THER/PROPH/DIAG IV INF ADDON: CPT | Mod: HCNC

## 2023-04-12 PROCEDURE — 25000003 PHARM REV CODE 250: Mod: HCNC | Performed by: INTERNAL MEDICINE

## 2023-04-12 PROCEDURE — A9513 LUTETIUM LU 177 DOTATAT THER: HCPCS | Mod: JG,HCNC

## 2023-04-12 PROCEDURE — 96367 TX/PROPH/DG ADDL SEQ IV INF: CPT | Mod: HCNC

## 2023-04-12 PROCEDURE — 96365 THER/PROPH/DIAG IV INF INIT: CPT | Mod: 59,HCNC

## 2023-04-12 RX ORDER — ACETAMINOPHEN 325 MG/1
650 TABLET ORAL
Status: CANCELLED | OUTPATIENT
Start: 2023-05-31

## 2023-04-12 RX ORDER — DIPHENHYDRAMINE HYDROCHLORIDE 50 MG/ML
50 INJECTION INTRAMUSCULAR; INTRAVENOUS
Status: DISCONTINUED | OUTPATIENT
Start: 2023-04-12 | End: 2023-04-12 | Stop reason: HOSPADM

## 2023-04-12 RX ORDER — ACETAMINOPHEN 325 MG/1
650 TABLET ORAL
Status: DISCONTINUED | OUTPATIENT
Start: 2023-04-12 | End: 2023-04-12 | Stop reason: HOSPADM

## 2023-04-12 RX ORDER — SODIUM CHLORIDE 0.9 % (FLUSH) 0.9 %
10 SYRINGE (ML) INJECTION
Status: DISCONTINUED | OUTPATIENT
Start: 2023-04-12 | End: 2023-04-12 | Stop reason: HOSPADM

## 2023-04-12 RX ORDER — SODIUM CHLORIDE 9 MG/ML
INJECTION, SOLUTION INTRAVENOUS ONCE
Status: CANCELLED | OUTPATIENT
Start: 2023-05-31

## 2023-04-12 RX ORDER — SODIUM CHLORIDE 9 MG/ML
INJECTION, SOLUTION INTRAVENOUS ONCE
Status: COMPLETED | OUTPATIENT
Start: 2023-04-12 | End: 2023-04-12

## 2023-04-12 RX ORDER — DIPHENHYDRAMINE HYDROCHLORIDE 50 MG/ML
50 INJECTION INTRAMUSCULAR; INTRAVENOUS
Status: CANCELLED | OUTPATIENT
Start: 2023-05-31

## 2023-04-12 RX ORDER — SODIUM CHLORIDE 0.9 % (FLUSH) 0.9 %
10 SYRINGE (ML) INJECTION
Status: CANCELLED | OUTPATIENT
Start: 2023-05-31

## 2023-04-12 RX ADMIN — DEXAMETHASONE SODIUM PHOSPHATE 16 MG: 10 INJECTION, SOLUTION INTRAMUSCULAR; INTRAVENOUS at 08:04

## 2023-04-12 RX ADMIN — SODIUM CHLORIDE: 9 INJECTION, SOLUTION INTRAVENOUS at 08:04

## 2023-04-12 RX ADMIN — Medication 1000 ML: at 09:04

## 2023-04-12 NOTE — NURSING
13:13 Pt completed and tolerated PRRT. VSS. No complaints at this time. PIV x 2 removed per policy, catheter intact. Discharge instructions and precautions reviewed. AVS printed and handout given. No questions at this time. Pt ambulated out clinic with no difficulty.

## 2023-04-12 NOTE — NURSING
10:17  Keyla-177 infusion started per NM tech. VSS, denies pain or discomfort. IVx2 c/d/i patent, no irritation. Amino acids remain infusing per orders.

## 2023-04-12 NOTE — NURSING
10:58 Keyla-177 completed per NM tech. VSS. No complaints at this time. PIV x2 c/d/I patent. Amino acids remain infusing per orders.

## 2023-04-13 ENCOUNTER — INFUSION (OUTPATIENT)
Dept: INFUSION THERAPY | Facility: HOSPITAL | Age: 83
End: 2023-04-13
Payer: MEDICARE

## 2023-04-13 DIAGNOSIS — C7B.8 METASTATIC MALIGNANT NEUROENDOCRINE TUMOR TO LIVER: Primary | ICD-10-CM

## 2023-04-13 PROCEDURE — 63600175 PHARM REV CODE 636 W HCPCS: Mod: JZ,JG,HCNC | Performed by: INTERNAL MEDICINE

## 2023-04-13 PROCEDURE — 96372 THER/PROPH/DIAG INJ SC/IM: CPT | Mod: HCNC

## 2023-04-13 RX ORDER — LANREOTIDE ACETATE 120 MG/.5ML
120 INJECTION SUBCUTANEOUS
Status: DISCONTINUED | OUTPATIENT
Start: 2023-04-13 | End: 2023-04-13 | Stop reason: HOSPADM

## 2023-04-13 RX ORDER — LANREOTIDE ACETATE 120 MG/.5ML
120 INJECTION SUBCUTANEOUS
Status: CANCELLED | OUTPATIENT
Start: 2023-04-13

## 2023-04-13 RX ADMIN — LANREOTIDE ACETATE 120 MG: 120 INJECTION SUBCUTANEOUS at 11:04

## 2023-04-14 LAB — PANCREASTATIN SERPL-MCNC: 5668 PG/ML (ref 10–135)

## 2023-04-18 LAB — 5OH-INDOLEACETATE SERPL-MCNC: 64 NG/ML

## 2023-04-27 ENCOUNTER — PES CALL (OUTPATIENT)
Dept: ADMINISTRATIVE | Facility: CLINIC | Age: 83
End: 2023-04-27
Payer: MEDICARE

## 2023-04-27 ENCOUNTER — PATIENT MESSAGE (OUTPATIENT)
Dept: PHARMACY | Facility: CLINIC | Age: 83
End: 2023-04-27
Payer: MEDICARE

## 2023-05-01 ENCOUNTER — SPECIALTY PHARMACY (OUTPATIENT)
Dept: PHARMACY | Facility: CLINIC | Age: 83
End: 2023-05-01
Payer: MEDICARE

## 2023-05-01 DIAGNOSIS — C7A.012 MALIGNANT CARCINOID TUMOR OF ILEUM: ICD-10-CM

## 2023-05-01 DIAGNOSIS — R19.7 DIARRHEA, UNSPECIFIED TYPE: ICD-10-CM

## 2023-05-01 DIAGNOSIS — E34.0 CARCINOID SYNDROME: ICD-10-CM

## 2023-05-01 RX ORDER — TELOTRISTAT ETHYL 250 MG/1
250 TABLET ORAL 3 TIMES DAILY
Qty: 90 TABLET | Refills: 3 | Status: ACTIVE | OUTPATIENT
Start: 2023-05-01 | End: 2023-08-28 | Stop reason: SDUPTHER

## 2023-05-01 RX ORDER — TELOTRISTAT ETHYL 250 MG/1
250 TABLET ORAL 3 TIMES DAILY
Qty: 90 TABLET | Refills: 3 | Status: CANCELLED | OUTPATIENT
Start: 2023-05-01

## 2023-05-01 NOTE — TELEPHONE ENCOUNTER
Specialty Pharmacy - Refill Coordination    Specialty Medication Orders Linked to Encounter      Flowsheet Row Most Recent Value   Medication #1 telotristat ethyl (XERMELO) 250 mg Tab (Order#[id number], Rx#[id number])            Refill Questions - Documented Responses      Flowsheet Row Most Recent Value   Refill Screening Questions    Changes to allergies? No   Changes to medications? No   New conditions since last clinic visit? No   Unplanned office visit, urgent care, ED, or hospital admission in the last 4 weeks? No   How does patient/caregiver feel medication is working? Good   Financial problems or insurance changes? No   How many doses of your specialty medications were missed in the last 4 weeks? 0   Would patient like to speak to a pharmacist? No   When does the patient need to receive the medication? 05/04/23   Refill Delivery Questions    How will the patient receive the medication? [facility]   When does the patient need to receive the medication? 05/04/23   Shipping Address Home   Address in [facility] confirmed and updated if neccessary? Yes   Expected Copay ($) 0   Is the patient able to afford the medication copay? Yes   Payment Method zero copay   Days supply of Refill 28   Supplies needed? No supplies needed   Refill activity completed? Yes   Refill activity plan Refill scheduled   Shipment/Pickup Date: 05/03/23            Current Outpatient Medications   Medication Sig    blood sugar diagnostic (TRUE METRIX GLUCOSE TEST STRIP) Strp USE TO CHECK BLOOD SUGAR TWICE DAILY    blood-glucose meter kit To check BG 2 times daily, to use with insurance preferred meter    carvediloL (COREG) 12.5 MG tablet Take 1 tablet (12.5 mg total) by mouth 2 (two) times daily with meals.    cyanocobalamin (VITAMIN B-12) 1000 MCG tablet Take 1 tablet (1,000 mcg total) by mouth once daily.    diphenoxylate-atropine 2.5-0.025 mg (LOMOTIL) 2.5-0.025 mg per tablet Take 1 tablet by mouth 4 (four) times daily as needed for  "Diarrhea.    ezetimibe (ZETIA) 10 mg tablet Take 1 tablet (10 mg total) by mouth once daily.    ferrous sulfate 325 (65 FE) MG EC tablet Take 325 mg by mouth 3 (three) times daily with meals.    lancets Misc To check BG 2 times daily, to use with insurance preferred meter    latanoprost 0.005 % ophthalmic solution Place 1 drop into both eyes nightly.    losartan (COZAAR) 50 MG tablet Take 1 tablet (50 mg total) by mouth once daily.    metFORMIN (GLUCOPHAGE-XR) 500 MG ER 24hr tablet Take 2 tablets (1,000 mg total) by mouth daily with breakfast.    needle, disp, 22 G 22 gauge x 1" Ndle 1 application by Misc.(Non-Drug; Combo Route) route 3 (three) times daily as needed.    octreotide acetate (SANDOSTATIN) 100 mcg/mL (1 mL) Syrg Inject 1 mL (0.1 mg total) into the skin 3 (three) times daily as needed (diarrhea). (Patient not taking: Reported on 3/14/2023)    syringe, disposable, 1 mL Syrg 1 application by Misc.(Non-Drug; Combo Route) route 3 (three) times daily as needed (diarrhea).    telotristat ethyl (XERMELO) 250 mg Tab Take 1 tablet (250 mg) by mouth 3 (three) times daily.    TRUEPLUS LANCETS 33 gauge Misc USE TO CHECK BLOOD SUGAR TWICE DAILY    vitamin D (VITAMIN D3) 1000 units Tab Take 400 Units by mouth once daily.    XIIDRA 5 % Dpet INSTILL ONE DROP INTO OU BID   Last reviewed on 4/12/2023  8:27 AM by Nancy Diez RN    Review of patient's allergies indicates:   Allergen Reactions    Epinephrine      carcinoid    Last reviewed on  5/1/2023 11:31 AM by Sebastian Granados      Tasks added this encounter   No tasks added.   Tasks due within next 3 months   7/29/2023 - Clinical Assessment (6 month recurrence)  5/2/2023 - Refill Coordination Outreach (1 time occurrence)     Lidia Love, PharmD  [facility]  [address] [place] [id number]  Phone: [phone]  Fax: [phone]        "

## 2023-05-01 NOTE — TELEPHONE ENCOUNTER
Incoming call from pts sister returning call to [facility]. Stated that pt has two packs left on hand, which is about 2 days. Rx needed refills. Request sent to [facility]. Informed pts sister that once new rx is received, will reach out for refill. Voiced understanding.

## 2023-05-09 ENCOUNTER — TELEPHONE (OUTPATIENT)
Dept: HEMATOLOGY/ONCOLOGY | Facility: CLINIC | Age: 83
End: 2023-05-09
Payer: MEDICARE

## 2023-05-09 NOTE — PROGRESS NOTES
"  Subjective:       Patient ID: Shahram Hills is an 83 y.o. female.     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 Ms. Hills is an 77 yo woman who initially saw me on 6/7/18 for further evaluation of neuroendocrine tumor. She initially presented with diarrhea, abdominal discomfort, weight loss. She was evaluated at [facility] and underwent workup below:  US abdomen on 3/14/18 showed numerous bilobar mixed echogenicity and mildly vascular hepatic lesions. Cholelithiasis without e/o acute cholecystitis.   MRI abdomen on 3/30/2018 showed innumerable hepatic metastases. L3 vertebral body metastasis with soft tissue component along the right margin of the L3 and upper L4 vertebral bodies, filling the right L3/4 neural foramen, and extending into the anterior aspect of the spinal canal on the right at the L3 and upper T4 levels. Associated with mild spinal canal narrowing.  CT chest on 4/6/2018 showed one lucent nodule measuring 7 mm in the T7 vertebral body, most likely representing metastatic disease.    EGD on 5/4/18 showed normal duodenum. Schatzki's ring in the lower third of the esophagus. Grade 1 esophagitis in the GE junction c/w mild distal esophagitis. Congestion and erythema in the antrum, pre-pyloric region and stomach body c/w gastritis. Biopsy of the stomach antrum showed mild chronic gastritis, neg for H. pylori.   Labs on 5/9/2018: WBC 6.9, H/H 11.6/34.3, MCV 87.5, plt count 279. On 3/9/18: Vit B12 level 173, folic acid 6.6  She was started on oral vit B12 and underwent a liver biopsy on 5/18/18. Pathology showed "metastatic well differentiated neuroendocrine tumor. Ki67 3%"     She saw me on 6/7/18 and complained of weight loss of 26 lbs over the past 3-4 months, also has diarrhea. No flushing, wheezing, palpitations. Has been having pain in right flank radiating down " "the right leg. No tingling/numbness, weakness. She can hold her bladder but when she urinates her diarrhea would come out as well. Started on dex 4 mg q6h the evening of 6/7/18.      2. MRI spine on 6/8/18 showed "Marrow replacing metastatic lesion of the L3 vertebral body with associated extra osseous expansion and complete effacement of the right L3-L4 neural foramina and additional effacement of the right lateral recess.  There is additional lateral extension and abutment of the right psoas muscle.  Superimposed degenerative change at this level results in mild spinal canal stenosis." I sent the patient to ED on 6/9/18 where she was evaluated by neurosurgery. Neurosurgery did not feel she was a candidate for surgical intervention.      3. Seen by Dr. Adames on 6/11/18. palliative radiation to the area of the L3 metastasis 6/18/18-6/29/18   4. Gallium study on 6/13/18: Distal ileal primary neoplasm consistent with a carcinoid.  There is an adjacent metastatic lymph node, diffuse liver metastases, and multiple bone metastases. Index primary neoplasm SUV max 33.13. Adjacent lymph node SUV max 23. L3 bone metastasis SUV max 36.86. Left lobe SUV max 46.13   5. Lanreotide every 4 weeks started on 6/22/18  6. TACE to the right hepatic lobe on 2/14/19. TACE to the left hepatic lobe on 3/21/19.   7. TACE to the right hepatic lobe on 2/11/22. S/p conventional chemoembolization performed of the segment 2, 3, 4 branch of the left hepatic artery and segment 8 branch of the left hepatic artery on 4/22/22.  8. Gallium PET 11/1/22 showed progression. Discussed PRRT. PRRT #1 on 12/14/2022. Zometa every 3 months started 12/27/22. PRRT #2 on 2/8/23. #3 on 4/12/23.      History of Present Illness:   Ms. Hills returns for follow up prior to her next treatment of lanreotide. She feels generally well with good performance status. She continues to take xermelo. She notes 1-2 bowel movements a day, usually formed or soft, seldom loose. " No other systemic symptoms. Blood pressure remains elevated at time of office visit. She notes systolic BP of 135-140 at home.     ECO    Needs gallium PET, follow up      ROS:   See HPI. Otherwise negative.      Physical Examination:   Vital signs reviewed.   Gen: well hydrated, well developed, in no acute distress.  HEENT: normocephalic, anicteric, PERRLA, EOMI, oropharynx clear  Neck: supple, no JVD, thyromegaly, cervical or supraclavicular LAD  Lungs: CTAB, no wheezes or rales  Heart: regular rate, frequent PVCs, regular pulse, no M/R/G  Abdomen: soft, no tenderness, non-distended, no hepatomegaly, no splenomegaly, no mass, or hernia.   Ext: b/l LE trace edema  Neuro: alert and oriented x 4, no focal neuro deficit  Skin: no rash, open wounds or ulcers  Psych: pleasant and appropriate mood and affect     Objective:      Laboratory Data:  Labs from today pending     Imaging Data:     MRI abdomen 22:  1. History of neuroendocrine malignancy.  Numerous hepatic lesions, some remaining stable while others have mildly enlarged consistent with disease progression.  Stable osseous lesion in the L3 vertebral body.  2. Cholelithiasis and stable mild dilatation of the common bile duct and central intrahepatic biliary duct dilatation.  Of note, there is mass effect on the midportion of the common bile duct by dominant left hepatic lobe lesion.  3. Additional findings detailed above.  RECIST SUMMARY:     Date of prior examination for comparison: 2022     Lesion 1: Left hepatic lobe.  7.1 cm.  Series 9 image 52.  Prior measurement 6.3 cm.     Lesion 2: Right hepatic lobe lateral.  3.1 cm.  Series 9 image 30.  Prior measurement 3.2 cm.     Lesion 3: Right hepatic dome.  5.1 cm.  Series 9 image 19.  Prior measurement 4.4 cm.    Gallium PET 22:     Interval development of somatostatin tracer avid lesions in the skull, concerning for new metastatic lesions.     Numerous tracer avid hypoattenuating masses  throughout the liver with increased SUV max compared to prior exam.     Tracer avid lesions in the C7 and L3 vertebral bodies, sternum, distal ileum and mesenteric node are similar to prior exam.     Judged by tracer avidity of the patient's tumor burden, PRRT would be a consideration if clinically indicated.        Assessment and Plan:      1. Malignant carcinoid tumor of ileum    2. 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 Secondary neuroendocrine tumor of bone    4. Spine metastasis    5. Carcinoid syndrome    6. Immunodeficiency secondary to neoplasm    7. Immunodeficiency due to drug therapy    8. Diarrhea, unspecified type    9. Controlled type 2 diabetes mellitus with microalbuminuria, without long-term current use of insulin    10. Essential hypertension      1-7  - Ms Hills is an 83 yo woman with well differentiated low-grade neuroendocrine tumor of the ileum with mets to liver and bones, diagnosed on 5/18/2018. Started lanreotide every 4 weeks on 6/22/2018. S/p TACE to the right hepatic lobe on 2/14/19. TACE to the left hepatic lobe on 3/21/19.   - CT/MRI 1/12/22 showed mild progression in the liver. S/p TACE on 2/11/22 and 4/22/22   - Gallium PET 11/1/22 showed progression. Discussed PRRT. PRRT #1 on 12/14/2022. Zometa every 3 months started 12/27/22.   - Next PRRT scheduled for 6/14  - Next lantreotide 6/15  - Next zometa 6/25  - Follow up in 4 weeks prior to PRRT and lantreotide  - gallium PET for mid July with follow up in clinic    8.  - better after xermelo and PRRT  - c/w lanreotide every 4 weeks. No lanreotide within 28 days of PRRT. PRRT every 9 weeks  - c/w xermelo    9.  - f/u on BS    10.  - BP elevated despite taking BP meds this morning  - Patient reports home blood pressures are more controlled; systolic BP of 135-140  - Continue to monitor     Presley Centeno MD, PGYIV    ATTESTATION:    I have personally seen, examined and talked to the patient. I have reviewed   Jacobo's note and agreed with the findings, assessment and plan.   Doing well. Diarrhea has significant improved. BP always high in the clinic but patient checked it at home every day and it has been good at home. Return in one month for the last dose of PRRT then restaging gallium PET in mid July 2023.     Sebastian Granados MD  Hematology and Medical Oncology  [facility]        Route Chart for Scheduling    Med Onc Chart Routing      Follow up with physician 4 weeks and 2 months. keep scheduled appointments. also add labs and gallium PET on 7/12, see me on 7/14 then get lanreotide   Follow up with JUNAID    Infusion scheduling note    Injection scheduling note Lantreotide 5/11,  6/15, 7/14, move zometa to 6/15   Labs CBC and CMP   Scheduling:  Preferred lab:  Lab interval:  chromogranin, Serotonin, pancreastatin, 5-HIAA   Imaging PET scan   on 7/12   Pharmacy appointment    Other referrals           Supportive Plan Information  OP ZOLEDRONIC ACID (ZOMETA) Q4W   Sebastian Granados MD   Upcoming Treatment Dates - OP ZOLEDRONIC ACID (ZOMETA) Q4W    6/25/2023       Medications       zoledronic acid (ZOMETA) 4 mg in sodium chloride 0.9% 100 mL IVPB  9/23/2023       Medications       zoledronic acid (ZOMETA) 4 mg in sodium chloride 0.9% 100 mL IVPB  12/22/2023       Medications       zoledronic acid (ZOMETA) 4 mg in sodium chloride 0.9% 100 mL IVPB  3/21/2024       Medications       zoledronic acid (ZOMETA) 4 mg in sodium chloride 0.9% 100 mL IVPB    Therapy Plan Information  LANREOTIDE  5. Medications  lanreotide injection 120 mg  120 mg, Subcutaneous, Every visit    LUTATHERA SUPPORT  IV Fluids  0.9%  NaCl infusion  Intravenous, Every 8 weeks  Pre-Medications  ondansetron-dexAMETHasone 16-12 mg in sodium chloride 0.9% 50 mL IVPB 16 mg  16 mg, Intravenous, Every 8 weeks  arginine-lysine-sterile water (AminoProtect) 25-25 mg/mL intravenous solution 1,000 mL  1,000 mL, Intravenous, Every 8 weeks  promethazine (PHENERGAN)  12.5 mg in dextrose 5 % (D5W) 50 mL IVPB  12.5 mg, Intravenous, Every 8 weeks  acetaminophen tablet 650 mg  650 mg, Oral, Every 8 weeks  Flushes  sodium chloride 0.9% flush 10 mL  10 mL, Intravenous, Every 8 weeks  PRN Medications  diphenhydrAMINE injection 50 mg  50 mg, Intravenous, Every 8 weeks  hydrocortisone sodium succinate injection 100 mg  100 mg, Intravenous, Every 8 weeks

## 2023-05-10 ENCOUNTER — OFFICE VISIT (OUTPATIENT)
Dept: HEMATOLOGY/ONCOLOGY | Facility: CLINIC | Age: 83
End: 2023-05-10
Payer: MEDICARE

## 2023-05-10 ENCOUNTER — LAB VISIT (OUTPATIENT)
Dept: LAB | Facility: HOSPITAL | Age: 83
End: 2023-05-10
Attending: INTERNAL MEDICINE
Payer: MEDICARE

## 2023-05-10 VITALS
BODY MASS INDEX: 19.86 KG/M2 | TEMPERATURE: 98 F | WEIGHT: 116.31 LBS | HEART RATE: 52 BPM | DIASTOLIC BLOOD PRESSURE: 79 MMHG | HEIGHT: 64 IN | RESPIRATION RATE: 18 BRPM | OXYGEN SATURATION: 98 % | SYSTOLIC BLOOD PRESSURE: 178 MMHG

## 2023-05-10 DIAGNOSIS — C7A.012 MALIGNANT CARCINOID TUMOR OF ILEUM: Primary | ICD-10-CM

## 2023-05-10 DIAGNOSIS — R80.9 CONTROLLED TYPE 2 DIABETES MELLITUS WITH MICROALBUMINURIA, WITHOUT LONG-TERM CURRENT USE OF INSULIN: ICD-10-CM

## 2023-05-10 DIAGNOSIS — R19.7 DIARRHEA, UNSPECIFIED TYPE: ICD-10-CM

## 2023-05-10 DIAGNOSIS — D84.81 IMMUNODEFICIENCY SECONDARY TO NEOPLASM: ICD-10-CM

## 2023-05-10 DIAGNOSIS — Z79.899 IMMUNODEFICIENCY DUE TO DRUG THERAPY: ICD-10-CM

## 2023-05-10 DIAGNOSIS — C7B.8 SECONDARY NEUROENDOCRINE TUMOR OF BONE: ICD-10-CM

## 2023-05-10 DIAGNOSIS — D49.9 IMMUNODEFICIENCY SECONDARY TO NEOPLASM: ICD-10-CM

## 2023-05-10 DIAGNOSIS — E34.0 CARCINOID SYNDROME: ICD-10-CM

## 2023-05-10 DIAGNOSIS — C7A.012 MALIGNANT CARCINOID TUMOR OF ILEUM: ICD-10-CM

## 2023-05-10 DIAGNOSIS — E11.29 CONTROLLED TYPE 2 DIABETES MELLITUS WITH MICROALBUMINURIA, WITHOUT LONG-TERM CURRENT USE OF INSULIN: ICD-10-CM

## 2023-05-10 DIAGNOSIS — C79.51 METASTASIS TO SPINAL COLUMN: ICD-10-CM

## 2023-05-10 DIAGNOSIS — D84.821 IMMUNODEFICIENCY DUE TO DRUG THERAPY: ICD-10-CM

## 2023-05-10 DIAGNOSIS — C7B.8 METASTATIC MALIGNANT NEUROENDOCRINE TUMOR TO LIVER: ICD-10-CM

## 2023-05-10 DIAGNOSIS — I10 ESSENTIAL HYPERTENSION: ICD-10-CM

## 2023-05-10 LAB
ALBUMIN SERPL BCP-MCNC: 3.4 G/DL (ref 3.5–5.2)
ALP SERPL-CCNC: 62 U/L (ref 55–135)
ALT SERPL W/O P-5'-P-CCNC: 30 U/L (ref 10–44)
ANION GAP SERPL CALC-SCNC: 8 MMOL/L (ref 8–16)
AST SERPL-CCNC: 32 U/L (ref 10–40)
BASOPHILS # BLD AUTO: 0.02 K/UL (ref 0–0.2)
BASOPHILS NFR BLD: 0.3 % (ref 0–1.9)
BILIRUB SERPL-MCNC: 0.6 MG/DL (ref 0.1–1)
BUN SERPL-MCNC: 10 MG/DL (ref 8–23)
CALCIUM SERPL-MCNC: 9.7 MG/DL (ref 8.7–10.5)
CHLORIDE SERPL-SCNC: 102 MMOL/L (ref 95–110)
CO2 SERPL-SCNC: 31 MMOL/L (ref 23–29)
CREAT SERPL-MCNC: 0.8 MG/DL (ref 0.5–1.4)
DIFFERENTIAL METHOD: ABNORMAL
EOSINOPHIL # BLD AUTO: 0 K/UL (ref 0–0.5)
EOSINOPHIL NFR BLD: 0.4 % (ref 0–8)
ERYTHROCYTE [DISTWIDTH] IN BLOOD BY AUTOMATED COUNT: 13.4 % (ref 11.5–14.5)
EST. GFR  (NO RACE VARIABLE): >60 ML/MIN/1.73 M^2
GLUCOSE SERPL-MCNC: 258 MG/DL (ref 70–110)
HCT VFR BLD AUTO: 41.2 % (ref 37–48.5)
HGB BLD-MCNC: 13 G/DL (ref 12–16)
IMM GRANULOCYTES # BLD AUTO: 0.02 K/UL (ref 0–0.04)
IMM GRANULOCYTES NFR BLD AUTO: 0.3 % (ref 0–0.5)
LYMPHOCYTES # BLD AUTO: 0.6 K/UL (ref 1–4.8)
LYMPHOCYTES NFR BLD: 9.5 % (ref 18–48)
MCH RBC QN AUTO: 30.4 PG (ref 27–31)
MCHC RBC AUTO-ENTMCNC: 31.6 G/DL (ref 32–36)
MCV RBC AUTO: 96 FL (ref 82–98)
MONOCYTES # BLD AUTO: 0.7 K/UL (ref 0.3–1)
MONOCYTES NFR BLD: 9.8 % (ref 4–15)
NEUTROPHILS # BLD AUTO: 5.4 K/UL (ref 1.8–7.7)
NEUTROPHILS NFR BLD: 79.7 % (ref 38–73)
NRBC BLD-RTO: 0 /100 WBC
PLATELET # BLD AUTO: 158 K/UL (ref 150–450)
PMV BLD AUTO: 10.7 FL (ref 9.2–12.9)
POTASSIUM SERPL-SCNC: 4.1 MMOL/L (ref 3.5–5.1)
PROT SERPL-MCNC: 6.7 G/DL (ref 6–8.4)
RBC # BLD AUTO: 4.28 M/UL (ref 4–5.4)
SODIUM SERPL-SCNC: 141 MMOL/L (ref 136–145)
WBC # BLD AUTO: 6.75 K/UL (ref 3.9–12.7)

## 2023-05-10 PROCEDURE — 99215 OFFICE O/P EST HI 40 MIN: CPT | Mod: HCNC,S$GLB,, | Performed by: INTERNAL MEDICINE

## 2023-05-10 PROCEDURE — 3077F PR MOST RECENT SYSTOLIC BLOOD PRESSURE >= 140 MM HG: ICD-10-PCS | Mod: HCNC,CPTII,S$GLB, | Performed by: INTERNAL MEDICINE

## 2023-05-10 PROCEDURE — 80053 COMPREHEN METABOLIC PANEL: CPT | Mod: HCNC | Performed by: INTERNAL MEDICINE

## 2023-05-10 PROCEDURE — 99215 PR OFFICE/OUTPT VISIT, EST, LEVL V, 40-54 MIN: ICD-10-PCS | Mod: HCNC,S$GLB,, | Performed by: INTERNAL MEDICINE

## 2023-05-10 PROCEDURE — 1160F PR REVIEW ALL MEDS BY PRESCRIBER/CLIN PHARMACIST DOCUMENTED: ICD-10-PCS | Mod: HCNC,CPTII,S$GLB, | Performed by: INTERNAL MEDICINE

## 2023-05-10 PROCEDURE — 1126F PR PAIN SEVERITY QUANTIFIED, NO PAIN PRESENT: ICD-10-PCS | Mod: HCNC,CPTII,S$GLB, | Performed by: INTERNAL MEDICINE

## 2023-05-10 PROCEDURE — 99999 PR PBB SHADOW E&M-EST. PATIENT-LVL IV: CPT | Mod: PBBFAC,HCNC,, | Performed by: INTERNAL MEDICINE

## 2023-05-10 PROCEDURE — 3078F PR MOST RECENT DIASTOLIC BLOOD PRESSURE < 80 MM HG: ICD-10-PCS | Mod: HCNC,CPTII,S$GLB, | Performed by: INTERNAL MEDICINE

## 2023-05-10 PROCEDURE — 83497 ASSAY OF 5-HIAA: CPT | Mod: HCNC | Performed by: INTERNAL MEDICINE

## 2023-05-10 PROCEDURE — 99499 UNLISTED E&M SERVICE: CPT | Mod: HCNC,S$GLB,, | Performed by: INTERNAL MEDICINE

## 2023-05-10 PROCEDURE — 85025 COMPLETE CBC W/AUTO DIFF WBC: CPT | Mod: HCNC | Performed by: INTERNAL MEDICINE

## 2023-05-10 PROCEDURE — 86316 IMMUNOASSAY TUMOR OTHER: CPT | Mod: HCNC | Performed by: INTERNAL MEDICINE

## 2023-05-10 PROCEDURE — 99499 RISK ADDL DX/OHS AUDIT: ICD-10-PCS | Mod: HCNC,S$GLB,, | Performed by: INTERNAL MEDICINE

## 2023-05-10 PROCEDURE — 1101F PR PT FALLS ASSESS DOC 0-1 FALLS W/OUT INJ PAST YR: ICD-10-PCS | Mod: HCNC,CPTII,S$GLB, | Performed by: INTERNAL MEDICINE

## 2023-05-10 PROCEDURE — 3288F FALL RISK ASSESSMENT DOCD: CPT | Mod: HCNC,CPTII,S$GLB, | Performed by: INTERNAL MEDICINE

## 2023-05-10 PROCEDURE — 3077F SYST BP >= 140 MM HG: CPT | Mod: HCNC,CPTII,S$GLB, | Performed by: INTERNAL MEDICINE

## 2023-05-10 PROCEDURE — 1160F RVW MEDS BY RX/DR IN RCRD: CPT | Mod: HCNC,CPTII,S$GLB, | Performed by: INTERNAL MEDICINE

## 2023-05-10 PROCEDURE — 3288F PR FALLS RISK ASSESSMENT DOCUMENTED: ICD-10-PCS | Mod: HCNC,CPTII,S$GLB, | Performed by: INTERNAL MEDICINE

## 2023-05-10 PROCEDURE — 1159F PR MEDICATION LIST DOCUMENTED IN MEDICAL RECORD: ICD-10-PCS | Mod: HCNC,CPTII,S$GLB, | Performed by: INTERNAL MEDICINE

## 2023-05-10 PROCEDURE — 1159F MED LIST DOCD IN RCRD: CPT | Mod: HCNC,CPTII,S$GLB, | Performed by: INTERNAL MEDICINE

## 2023-05-10 PROCEDURE — 1101F PT FALLS ASSESS-DOCD LE1/YR: CPT | Mod: HCNC,CPTII,S$GLB, | Performed by: INTERNAL MEDICINE

## 2023-05-10 PROCEDURE — 99999 PR PBB SHADOW E&M-EST. PATIENT-LVL IV: ICD-10-PCS | Mod: PBBFAC,HCNC,, | Performed by: INTERNAL MEDICINE

## 2023-05-10 PROCEDURE — 3078F DIAST BP <80 MM HG: CPT | Mod: HCNC,CPTII,S$GLB, | Performed by: INTERNAL MEDICINE

## 2023-05-10 PROCEDURE — 83519 RIA NONANTIBODY: CPT | Mod: HCNC | Performed by: INTERNAL MEDICINE

## 2023-05-10 PROCEDURE — 36415 COLL VENOUS BLD VENIPUNCTURE: CPT | Mod: HCNC | Performed by: INTERNAL MEDICINE

## 2023-05-10 PROCEDURE — 84260 ASSAY OF SEROTONIN: CPT | Mod: HCNC | Performed by: INTERNAL MEDICINE

## 2023-05-10 PROCEDURE — 1126F AMNT PAIN NOTED NONE PRSNT: CPT | Mod: HCNC,CPTII,S$GLB, | Performed by: INTERNAL MEDICINE

## 2023-05-11 ENCOUNTER — INFUSION (OUTPATIENT)
Dept: INFUSION THERAPY | Facility: HOSPITAL | Age: 83
End: 2023-05-11
Payer: MEDICARE

## 2023-05-11 DIAGNOSIS — C7B.8 METASTATIC MALIGNANT NEUROENDOCRINE TUMOR TO LIVER: Primary | ICD-10-CM

## 2023-05-11 PROCEDURE — 96372 THER/PROPH/DIAG INJ SC/IM: CPT | Mod: HCNC

## 2023-05-11 PROCEDURE — 63600175 PHARM REV CODE 636 W HCPCS: Mod: JZ,JG,HCNC | Performed by: INTERNAL MEDICINE

## 2023-05-11 RX ORDER — LANREOTIDE ACETATE 120 MG/.5ML
120 INJECTION SUBCUTANEOUS
Status: CANCELLED | OUTPATIENT
Start: 2023-05-11

## 2023-05-11 RX ORDER — LANREOTIDE ACETATE 120 MG/.5ML
120 INJECTION SUBCUTANEOUS
Status: DISCONTINUED | OUTPATIENT
Start: 2023-05-11 | End: 2023-05-11 | Stop reason: HOSPADM

## 2023-05-11 RX ADMIN — LANREOTIDE ACETATE 120 MG: 120 INJECTION SUBCUTANEOUS at 11:05

## 2023-05-11 NOTE — NURSING
Presents to clinic for Lanreotide w/o acute complaints. Medication administered per orders. Discharged in NAD

## 2023-05-12 DIAGNOSIS — I10 HYPERTENSION, BENIGN: ICD-10-CM

## 2023-05-12 LAB — CGA SERPL-MCNC: 1500 NG/ML

## 2023-05-12 RX ORDER — AMLODIPINE BESYLATE 5 MG/1
TABLET ORAL
Qty: 90 TABLET | Refills: 3 | OUTPATIENT
Start: 2023-05-12

## 2023-05-12 NOTE — TELEPHONE ENCOUNTER
No care due was identified.  [facility] Embedded Care Due Messages. Reference number: [id number].   5/12/2023 5:24:31 AM CDT

## 2023-05-12 NOTE — TELEPHONE ENCOUNTER
Refill Decision Note   Shahram Hills  is requesting a refill authorization.  Brief Assessment and Rationale for Refill:  Quick Discontinue     Medication Therapy Plan:  discontinued on 9/27/2022 by Ralph Bergeron MD      Comments:     Note composed:10:08 AM 05/12/2023

## 2023-05-15 LAB — SEROTONIN: 1070 NG/ML

## 2023-05-18 ENCOUNTER — TELEPHONE (OUTPATIENT)
Dept: INTERNAL MEDICINE | Facility: CLINIC | Age: 83
End: 2023-05-18
Payer: MEDICARE

## 2023-05-18 NOTE — TELEPHONE ENCOUNTER
----- Message from Lana Bejarano sent at 5/17/2023 11:37 AM CDT -----  Regarding: update  Contact: SHAHRAM FANG [[id number]]  Name of Who is Calling:   Shahram Fang          What is the request in detail: She states she brought a letter in and hasn't heard anything back from staff in regards. She is requesting like a call for an update on if it was read and/or the response  Please advise           Can the clinic reply by [facility]:          What Number to Call Back if not in [facility]: Xormis          [phone]

## 2023-05-24 ENCOUNTER — PATIENT MESSAGE (OUTPATIENT)
Dept: PHARMACY | Facility: CLINIC | Age: 83
End: 2023-05-24
Payer: MEDICARE

## 2023-05-24 LAB — 5OH-INDOLEACETATE SERPL-MCNC: 74 NG/ML

## 2023-05-26 ENCOUNTER — SPECIALTY PHARMACY (OUTPATIENT)
Dept: PHARMACY | Facility: CLINIC | Age: 83
End: 2023-05-26
Payer: MEDICARE

## 2023-05-26 LAB — PANCREASTATIN SERPL-MCNC: 6481 PG/ML (ref 10–135)

## 2023-05-26 NOTE — TELEPHONE ENCOUNTER
Specialty Pharmacy - Refill Coordination    Specialty Medication Orders Linked to Encounter      Flowsheet Row Most Recent Value   Medication #1 telotristat ethyl (XERMELO) 250 mg Tab (Order#[id number], Rx#[id number])            Refill Questions - Documented Responses      Flowsheet Row Most Recent Value   Patient Availability and HIPAA Verification    Does patient want to proceed with activity? Yes   HIPAA/medical authority confirmed? Yes   Relationship to patient of person spoken to? Self   Refill Screening Questions    Changes to allergies? No   Changes to medications? No   New conditions since last clinic visit? No   Unplanned office visit, urgent care, ED, or hospital admission in the last 4 weeks? No   How does patient/caregiver feel medication is working? Good   Financial problems or insurance changes? No   How many doses of your specialty medications were missed in the last 4 weeks? 0   Would patient like to speak to a pharmacist? No   When does the patient need to receive the medication? 06/02/23   Refill Delivery Questions    How will the patient receive the medication? MEDRx   When does the patient need to receive the medication? 06/02/23   Shipping Address Home   Address in [facility] confirmed and updated if neccessary? No   Expected Copay ($) 0   Is the patient able to afford the medication copay? Yes   Payment Method zero copay   Days supply of Refill 28   Supplies needed? No supplies needed   Refill activity completed? Yes   Refill activity plan Refill scheduled   Shipment/Pickup Date: 05/31/23            Current Outpatient Medications   Medication Sig    blood sugar diagnostic (TRUE METRIX GLUCOSE TEST STRIP) Strp USE TO CHECK BLOOD SUGAR TWICE DAILY    blood-glucose meter kit To check BG 2 times daily, to use with insurance preferred meter    carvediloL (COREG) 12.5 MG tablet Take 1 tablet (12.5 mg total) by mouth 2 (two) times daily with meals.    cyanocobalamin (VITAMIN B-12) 1000 MCG  "tablet Take 1 tablet (1,000 mcg total) by mouth once daily.    diphenoxylate-atropine 2.5-0.025 mg (LOMOTIL) 2.5-0.025 mg per tablet Take 1 tablet by mouth 4 (four) times daily as needed for Diarrhea.    ezetimibe (ZETIA) 10 mg tablet Take 1 tablet (10 mg total) by mouth once daily.    ferrous sulfate 325 (65 FE) MG EC tablet Take 325 mg by mouth 3 (three) times daily with meals.    lancets Misc To check BG 2 times daily, to use with insurance preferred meter    latanoprost 0.005 % ophthalmic solution Place 1 drop into both eyes nightly.    losartan (COZAAR) 50 MG tablet Take 1 tablet (50 mg total) by mouth once daily.    metFORMIN (GLUCOPHAGE-XR) 500 MG ER 24hr tablet Take 2 tablets (1,000 mg total) by mouth daily with breakfast.    needle, disp, 22 G 22 gauge x 1" Ndle 1 application by Misc.(Non-Drug; Combo Route) route 3 (three) times daily as needed.    octreotide acetate (SANDOSTATIN) 100 mcg/mL (1 mL) Syrg Inject 1 mL (0.1 mg total) into the skin 3 (three) times daily as needed (diarrhea). (Patient not taking: Reported on 3/14/2023)    syringe, disposable, 1 mL Syrg 1 application by Misc.(Non-Drug; Combo Route) route 3 (three) times daily as needed (diarrhea).    telotristat ethyl (XERMELO) 250 mg Tab Take 1 tablet (250 mg) by mouth 3 (three) times daily.    TRUEPLUS LANCETS 33 gauge Misc USE TO CHECK BLOOD SUGAR TWICE DAILY    vitamin D (VITAMIN D3) 1000 units Tab Take 400 Units by mouth once daily.    XIIDRA 5 % Dpet INSTILL ONE DROP INTO OU BID   Last reviewed on 5/10/2023  9:37 AM by Sebastian Granados MD    Review of patient's allergies indicates:   Allergen Reactions    Epinephrine      carcinoid    Last reviewed on  5/11/2023 11:14 AM by Paulina Laird      Tasks added this encounter   No tasks added.   Tasks due within next 3 months   7/29/2023 - Clinical Assessment (6 month recurrence)     Omega Davila, PharmD  [facility]  [address] [place] [id number]  Phone: " [phone]  Fax: [phone]

## 2023-06-05 ENCOUNTER — TELEPHONE (OUTPATIENT)
Dept: INFUSION THERAPY | Facility: HOSPITAL | Age: 83
End: 2023-06-05
Payer: MEDICARE

## 2023-06-05 NOTE — TELEPHONE ENCOUNTER
Spoke with the patient. Explained that labs were needed before 6/8 to submit to Tumor Board. Pt verbalized understanding .Confirmed rescheduled lab appt to 6/6.

## 2023-06-06 ENCOUNTER — LAB VISIT (OUTPATIENT)
Dept: LAB | Facility: HOSPITAL | Age: 83
End: 2023-06-06
Attending: INTERNAL MEDICINE
Payer: MEDICARE

## 2023-06-06 DIAGNOSIS — C7A.012 MALIGNANT CARCINOID TUMOR OF ILEUM: ICD-10-CM

## 2023-06-06 LAB
ALBUMIN SERPL BCP-MCNC: 3.6 G/DL (ref 3.5–5.2)
ALP SERPL-CCNC: 68 U/L (ref 55–135)
ALT SERPL W/O P-5'-P-CCNC: 35 U/L (ref 10–44)
ANION GAP SERPL CALC-SCNC: 9 MMOL/L (ref 8–16)
AST SERPL-CCNC: 32 U/L (ref 10–40)
BASOPHILS # BLD AUTO: 0.02 K/UL (ref 0–0.2)
BASOPHILS NFR BLD: 0.4 % (ref 0–1.9)
BILIRUB SERPL-MCNC: 0.6 MG/DL (ref 0.1–1)
BUN SERPL-MCNC: 7 MG/DL (ref 8–23)
CALCIUM SERPL-MCNC: 9.6 MG/DL (ref 8.7–10.5)
CHLORIDE SERPL-SCNC: 104 MMOL/L (ref 95–110)
CO2 SERPL-SCNC: 31 MMOL/L (ref 23–29)
CREAT SERPL-MCNC: 0.7 MG/DL (ref 0.5–1.4)
DIFFERENTIAL METHOD: ABNORMAL
EOSINOPHIL # BLD AUTO: 0 K/UL (ref 0–0.5)
EOSINOPHIL NFR BLD: 0.7 % (ref 0–8)
ERYTHROCYTE [DISTWIDTH] IN BLOOD BY AUTOMATED COUNT: 13.4 % (ref 11.5–14.5)
EST. GFR  (NO RACE VARIABLE): >60 ML/MIN/1.73 M^2
GLUCOSE SERPL-MCNC: 157 MG/DL (ref 70–110)
HCT VFR BLD AUTO: 41.1 % (ref 37–48.5)
HGB BLD-MCNC: 13.1 G/DL (ref 12–16)
IMM GRANULOCYTES # BLD AUTO: 0.01 K/UL (ref 0–0.04)
IMM GRANULOCYTES NFR BLD AUTO: 0.2 % (ref 0–0.5)
LYMPHOCYTES # BLD AUTO: 1 K/UL (ref 1–4.8)
LYMPHOCYTES NFR BLD: 22.4 % (ref 18–48)
MCH RBC QN AUTO: 30.3 PG (ref 27–31)
MCHC RBC AUTO-ENTMCNC: 31.9 G/DL (ref 32–36)
MCV RBC AUTO: 95 FL (ref 82–98)
MONOCYTES # BLD AUTO: 0.5 K/UL (ref 0.3–1)
MONOCYTES NFR BLD: 10.5 % (ref 4–15)
NEUTROPHILS # BLD AUTO: 2.9 K/UL (ref 1.8–7.7)
NEUTROPHILS NFR BLD: 65.8 % (ref 38–73)
NRBC BLD-RTO: 0 /100 WBC
PLATELET # BLD AUTO: 172 K/UL (ref 150–450)
PMV BLD AUTO: 10.1 FL (ref 9.2–12.9)
POTASSIUM SERPL-SCNC: 3.9 MMOL/L (ref 3.5–5.1)
PROT SERPL-MCNC: 7.1 G/DL (ref 6–8.4)
RBC # BLD AUTO: 4.33 M/UL (ref 4–5.4)
SODIUM SERPL-SCNC: 144 MMOL/L (ref 136–145)
WBC # BLD AUTO: 4.47 K/UL (ref 3.9–12.7)

## 2023-06-06 PROCEDURE — 84260 ASSAY OF SEROTONIN: CPT | Performed by: INTERNAL MEDICINE

## 2023-06-06 PROCEDURE — 83519 RIA NONANTIBODY: CPT | Performed by: INTERNAL MEDICINE

## 2023-06-06 PROCEDURE — 83497 ASSAY OF 5-HIAA: CPT | Performed by: INTERNAL MEDICINE

## 2023-06-06 PROCEDURE — 36415 COLL VENOUS BLD VENIPUNCTURE: CPT | Performed by: INTERNAL MEDICINE

## 2023-06-06 PROCEDURE — 80053 COMPREHEN METABOLIC PANEL: CPT | Performed by: INTERNAL MEDICINE

## 2023-06-06 PROCEDURE — 86316 IMMUNOASSAY TUMOR OTHER: CPT | Performed by: INTERNAL MEDICINE

## 2023-06-06 PROCEDURE — 85025 COMPLETE CBC W/AUTO DIFF WBC: CPT | Performed by: INTERNAL MEDICINE

## 2023-06-07 LAB
5OH-INDOLEACETATE SERPL-MCNC: 89 NG/ML
CGA SERPL-MCNC: 1568 NG/ML

## 2023-06-09 ENCOUNTER — OFFICE VISIT (OUTPATIENT)
Dept: HEMATOLOGY/ONCOLOGY | Facility: CLINIC | Age: 83
End: 2023-06-09
Payer: MEDICARE

## 2023-06-09 VITALS
DIASTOLIC BLOOD PRESSURE: 84 MMHG | BODY MASS INDEX: 19.39 KG/M2 | HEART RATE: 50 BPM | SYSTOLIC BLOOD PRESSURE: 194 MMHG | WEIGHT: 113.56 LBS | HEIGHT: 64 IN | OXYGEN SATURATION: 98 % | RESPIRATION RATE: 18 BRPM

## 2023-06-09 DIAGNOSIS — I50.22 CHRONIC SYSTOLIC HEART FAILURE: ICD-10-CM

## 2023-06-09 DIAGNOSIS — C7B.8 SECONDARY NEUROENDOCRINE TUMOR OF BONE: ICD-10-CM

## 2023-06-09 DIAGNOSIS — C79.51 METASTASIS TO SPINAL COLUMN: ICD-10-CM

## 2023-06-09 DIAGNOSIS — D84.81 IMMUNODEFICIENCY SECONDARY TO NEOPLASM: ICD-10-CM

## 2023-06-09 DIAGNOSIS — D84.821 IMMUNODEFICIENCY DUE TO DRUGS: ICD-10-CM

## 2023-06-09 DIAGNOSIS — D49.9 IMMUNODEFICIENCY SECONDARY TO NEOPLASM: ICD-10-CM

## 2023-06-09 DIAGNOSIS — I10 ESSENTIAL HYPERTENSION: ICD-10-CM

## 2023-06-09 DIAGNOSIS — C7B.8 METASTATIC MALIGNANT NEUROENDOCRINE TUMOR TO LIVER: ICD-10-CM

## 2023-06-09 DIAGNOSIS — E11.29 CONTROLLED TYPE 2 DIABETES MELLITUS WITH MICROALBUMINURIA, WITHOUT LONG-TERM CURRENT USE OF INSULIN: ICD-10-CM

## 2023-06-09 DIAGNOSIS — Z79.899 IMMUNODEFICIENCY DUE TO DRUGS: ICD-10-CM

## 2023-06-09 DIAGNOSIS — R80.9 CONTROLLED TYPE 2 DIABETES MELLITUS WITH MICROALBUMINURIA, WITHOUT LONG-TERM CURRENT USE OF INSULIN: ICD-10-CM

## 2023-06-09 DIAGNOSIS — C7A.012 MALIGNANT CARCINOID TUMOR OF ILEUM: Primary | ICD-10-CM

## 2023-06-09 DIAGNOSIS — E34.0 CARCINOID HEART DISEASE: ICD-10-CM

## 2023-06-09 DIAGNOSIS — I51.89 CARCINOID HEART DISEASE: ICD-10-CM

## 2023-06-09 DIAGNOSIS — E34.0 CARCINOID SYNDROME: ICD-10-CM

## 2023-06-09 PROCEDURE — 1126F PR PAIN SEVERITY QUANTIFIED, NO PAIN PRESENT: ICD-10-PCS | Mod: CPTII,S$GLB,, | Performed by: INTERNAL MEDICINE

## 2023-06-09 PROCEDURE — 99999 PR PBB SHADOW E&M-EST. PATIENT-LVL IV: CPT | Mod: PBBFAC,,, | Performed by: INTERNAL MEDICINE

## 2023-06-09 PROCEDURE — 99999 PR PBB SHADOW E&M-EST. PATIENT-LVL IV: ICD-10-PCS | Mod: PBBFAC,,, | Performed by: INTERNAL MEDICINE

## 2023-06-09 PROCEDURE — 1101F PT FALLS ASSESS-DOCD LE1/YR: CPT | Mod: CPTII,S$GLB,, | Performed by: INTERNAL MEDICINE

## 2023-06-09 PROCEDURE — 99215 OFFICE O/P EST HI 40 MIN: CPT | Mod: S$GLB,,, | Performed by: INTERNAL MEDICINE

## 2023-06-09 PROCEDURE — 1101F PR PT FALLS ASSESS DOC 0-1 FALLS W/OUT INJ PAST YR: ICD-10-PCS | Mod: CPTII,S$GLB,, | Performed by: INTERNAL MEDICINE

## 2023-06-09 PROCEDURE — 1160F PR REVIEW ALL MEDS BY PRESCRIBER/CLIN PHARMACIST DOCUMENTED: ICD-10-PCS | Mod: CPTII,S$GLB,, | Performed by: INTERNAL MEDICINE

## 2023-06-09 PROCEDURE — 1160F RVW MEDS BY RX/DR IN RCRD: CPT | Mod: CPTII,S$GLB,, | Performed by: INTERNAL MEDICINE

## 2023-06-09 PROCEDURE — 3079F DIAST BP 80-89 MM HG: CPT | Mod: CPTII,S$GLB,, | Performed by: INTERNAL MEDICINE

## 2023-06-09 PROCEDURE — 1159F MED LIST DOCD IN RCRD: CPT | Mod: CPTII,S$GLB,, | Performed by: INTERNAL MEDICINE

## 2023-06-09 PROCEDURE — 3079F PR MOST RECENT DIASTOLIC BLOOD PRESSURE 80-89 MM HG: ICD-10-PCS | Mod: CPTII,S$GLB,, | Performed by: INTERNAL MEDICINE

## 2023-06-09 PROCEDURE — 1126F AMNT PAIN NOTED NONE PRSNT: CPT | Mod: CPTII,S$GLB,, | Performed by: INTERNAL MEDICINE

## 2023-06-09 PROCEDURE — 1159F PR MEDICATION LIST DOCUMENTED IN MEDICAL RECORD: ICD-10-PCS | Mod: CPTII,S$GLB,, | Performed by: INTERNAL MEDICINE

## 2023-06-09 PROCEDURE — 3077F PR MOST RECENT SYSTOLIC BLOOD PRESSURE >= 140 MM HG: ICD-10-PCS | Mod: CPTII,S$GLB,, | Performed by: INTERNAL MEDICINE

## 2023-06-09 PROCEDURE — 3288F FALL RISK ASSESSMENT DOCD: CPT | Mod: CPTII,S$GLB,, | Performed by: INTERNAL MEDICINE

## 2023-06-09 PROCEDURE — 3288F PR FALLS RISK ASSESSMENT DOCUMENTED: ICD-10-PCS | Mod: CPTII,S$GLB,, | Performed by: INTERNAL MEDICINE

## 2023-06-09 PROCEDURE — 99215 PR OFFICE/OUTPT VISIT, EST, LEVL V, 40-54 MIN: ICD-10-PCS | Mod: S$GLB,,, | Performed by: INTERNAL MEDICINE

## 2023-06-09 PROCEDURE — 3077F SYST BP >= 140 MM HG: CPT | Mod: CPTII,S$GLB,, | Performed by: INTERNAL MEDICINE

## 2023-06-09 NOTE — PROGRESS NOTES
"  Subjective:       Patient ID: Shahram Hills is an 83 y.o. female.     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 Ms. Hills is an 77 yo woman who initially saw me on 6/7/18 for further evaluation of neuroendocrine tumor. She initially presented with diarrhea, abdominal discomfort, weight loss. She was evaluated at [facility] and underwent workup below:  US abdomen on 3/14/18 showed numerous bilobar mixed echogenicity and mildly vascular hepatic lesions. Cholelithiasis without e/o acute cholecystitis.   MRI abdomen on 3/30/2018 showed innumerable hepatic metastases. L3 vertebral body metastasis with soft tissue component along the right margin of the L3 and upper L4 vertebral bodies, filling the right L3/4 neural foramen, and extending into the anterior aspect of the spinal canal on the right at the L3 and upper T4 levels. Associated with mild spinal canal narrowing.  CT chest on 4/6/2018 showed one lucent nodule measuring 7 mm in the T7 vertebral body, most likely representing metastatic disease.    EGD on 5/4/18 showed normal duodenum. Schatzki's ring in the lower third of the esophagus. Grade 1 esophagitis in the GE junction c/w mild distal esophagitis. Congestion and erythema in the antrum, pre-pyloric region and stomach body c/w gastritis. Biopsy of the stomach antrum showed mild chronic gastritis, neg for H. pylori.   Labs on 5/9/2018: WBC 6.9, H/H 11.6/34.3, MCV 87.5, plt count 279. On 3/9/18: Vit B12 level 173, folic acid 6.6  She was started on oral vit B12 and underwent a liver biopsy on 5/18/18. Pathology showed "metastatic well differentiated neuroendocrine tumor. Ki67 3%"     She saw me on 6/7/18 and complained of weight loss of 26 lbs over the past 3-4 months, also has diarrhea. No flushing, wheezing, palpitations. Has been having pain in right flank radiating down " "the right leg. No tingling/numbness, weakness. She can hold her bladder but when she urinates her diarrhea would come out as well. Started on dex 4 mg q6h the evening of 18.      2. MRI spine on 18 showed "Marrow replacing metastatic lesion of the L3 vertebral body with associated extra osseous expansion and complete effacement of the right L3-L4 neural foramina and additional effacement of the right lateral recess.  There is additional lateral extension and abutment of the right psoas muscle.  Superimposed degenerative change at this level results in mild spinal canal stenosis." I sent the patient to ED on 18 where she was evaluated by neurosurgery. Neurosurgery did not feel she was a candidate for surgical intervention.      3. Seen by Dr. Adames on 18. palliative radiation to the area of the L3 metastasis 18-18   4. 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  There is an adjacent metastatic lymph node, diffuse liver metastases, and multiple bone metastases. Index primary neoplasm SUV max 33.13. Adjacent lymph node SUV max 23. L3 bone metastasis SUV max 36.86. Left lobe SUV max 46.13   5. Lanreotide every 4 weeks started on 18  6. TACE to the right hepatic lobe on 19. TACE to the left hepatic lobe on 3/21/19.   7. TACE to the right hepatic lobe on 22. S/p conventional chemoembolization performed of the segment 2, 3, 4 branch of the left hepatic artery and segment 8 branch of the left hepatic artery on 22.  8. Gallium PET 22 showed progression. Discussed PRRT. PRRT #1 on 2022. Zometa every 3 months started 22. PRRT #2 on 23. #3 on 23. #4 on 23.      History of Present Illness:   Ms. Hills returns for follow up prior to her #4 PRRT. Feeling well. Diarrhea controlled. -140s at home. Did not take BP meds this morning.     ECO     ROS:   See HPI. Otherwise negative.      Physical " Examination:   Vital signs reviewed.   Gen: well hydrated, well developed, in no acute distress.  HEENT: normocephalic, anicteric, PERRLA, EOMI, oropharynx clear  Neck: supple, no JVD, thyromegaly, cervical or supraclavicular LAD  Lungs: CTAB, no wheezes or rales  Heart: regular rate, frequent PVCs, regular pulse, no M/R/G  Abdomen: soft, no tenderness, non-distended, no hepatomegaly, no splenomegaly, no mass, or hernia.   Ext: b/l LE 1+ edema  Neuro: alert and oriented x 4, no focal neuro deficit  Skin: no rash, open wounds or ulcers  Psych: pleasant and appropriate mood and affect     Objective:      Laboratory Data:  Labs reviewed. Adequate for treatment    Imaging Data:     MRI abdomen 11/1/22:  1. History of neuroendocrine malignancy.  Numerous hepatic lesions, some remaining stable while others have mildly enlarged consistent with disease progression.  Stable osseous lesion in the L3 vertebral body.  2. Cholelithiasis and stable mild dilatation of the common bile duct and central intrahepatic biliary duct dilatation.  Of note, there is mass effect on the midportion of the common bile duct by dominant left hepatic lobe lesion.  3. Additional findings detailed above.  RECIST SUMMARY:     Date of prior examination for comparison: 05/16/2022     Lesion 1: Left hepatic lobe.  7.1 cm.  Series 9 image 52.  Prior measurement 6.3 cm.     Lesion 2: Right hepatic lobe lateral.  3.1 cm.  Series 9 image 30.  Prior measurement 3.2 cm.     Lesion 3: Right hepatic dome.  5.1 cm.  Series 9 image 19.  Prior measurement 4.4 cm.    Gallium PET 11/1/22:     Interval development of somatostatin tracer avid lesions in the skull, concerning for new metastatic lesions.     Numerous tracer avid hypoattenuating masses throughout the liver with increased SUV max compared to prior exam.     Tracer avid lesions in the C7 and L3 vertebral bodies, sternum, distal ileum and mesenteric node are similar to prior exam.     Judged by tracer  avidity of the patient's tumor burden, PRRT would be a consideration if clinically indicated.        Assessment and Plan:      1. Malignant carcinoid tumor of ileum    2. Secondary neuroendocrine tumor of bone    3. 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    4. Spine metastasis    5. 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 Carcinoid syndrome    8. Controlled type 2 diabetes mellitus with microalbuminuria, without long-term current use of insulin    9. 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 Chronic systolic heart failure      1-7  - Ms Hills is an 84 yo woman with well differentiated low-grade neuroendocrine tumor of the ileum with mets to liver and bones, diagnosed on 5/18/2018. Started lanreotide every 4 weeks on 6/22/2018. S/p TACE to the right hepatic lobe on 2/14/19. TACE to the left hepatic lobe on 3/21/19.   - CT/MRI 1/12/22 showed mild progression in the liver. S/p TACE on 2/11/22 and 4/22/22   - Gallium PET 11/1/22 showed progression. Discussed PRRT. PRRT #1 on 12/14/2022. Zometa every 3 months started 12/27/22.   - Next PRRT scheduled for 6/14. Okay to proceed  - Next lantreotide 6/15  - Next zometa 6/25  - Follow up in one month with gallium PET    7.  - better after xermelo and PRRT  - c/w lanreotide every 4 weeks. No lanreotide within 28 days of PRRT. PRRT every 9 weeks  - c/w xermelo    8.  - BS controlled  - c/w current medication    9.  - asked patient to resume BP meds and monitor BP     10.11  - asked patient to f/u with cardiology      Sebastian Granados MD  Hematology and Medical Oncology  [facility]        Route Chart for Scheduling    Med Onc Chart Routing      Follow up with physician 2 months. keep scheduled appts. see me on 8/11 with CBC, CMP, serotonin, pancreastatin, 5-HIAA, see me then take lanreotide   Follow up with JUNAID    Infusion scheduling note    Injection scheduling note lanreotide every 4 weeks,  zometa every 3 months   Labs CBC and CMP   Scheduling: Labs same day as infusion  Preferred lab:  Lab interval: every 4 weeks  serotonin, pancreastatin, 5-HIAA   Imaging    Pharmacy appointment    Other referrals          Supportive Plan Information  OP ZOLEDRONIC ACID (ZOMETA) Q4W   Sebastian Granados MD   Upcoming Treatment Dates - OP ZOLEDRONIC ACID (ZOMETA) Q4W    6/25/2023       Medications       zoledronic acid (ZOMETA) 4 mg in sodium chloride 0.9% 100 mL IVPB  9/23/2023       Medications       zoledronic acid (ZOMETA) 4 mg in sodium chloride 0.9% 100 mL IVPB  12/22/2023       Medications       zoledronic acid (ZOMETA) 4 mg in sodium chloride 0.9% 100 mL IVPB  3/21/2024       Medications       zoledronic acid (ZOMETA) 4 mg in sodium chloride 0.9% 100 mL IVPB    Therapy Plan Information  LANREOTIDE  5. Medications  lanreotide injection 120 mg  120 mg, Subcutaneous, Every visit    LUTATHERA SUPPORT  IV Fluids  0.9%  NaCl infusion  Intravenous, Every 8 weeks  Pre-Medications  ondansetron-dexAMETHasone 16-12 mg in sodium chloride 0.9% 50 mL IVPB 16 mg  16 mg, Intravenous, Every 8 weeks  arginine-lysine-sterile water (AminoProtect) 25-25 mg/mL intravenous solution 1,000 mL  1,000 mL, Intravenous, Every 8 weeks  promethazine (PHENERGAN) 12.5 mg in dextrose 5 % (D5W) 50 mL IVPB  12.5 mg, Intravenous, Every 8 weeks  acetaminophen tablet 650 mg  650 mg, Oral, Every 8 weeks  Flushes  sodium chloride 0.9% flush 10 mL  10 mL, Intravenous, Every 8 weeks  PRN Medications  diphenhydrAMINE injection 50 mg  50 mg, Intravenous, Every 8 weeks  hydrocortisone sodium succinate injection 100 mg  100 mg, Intravenous, Every 8 weeks

## 2023-06-13 ENCOUNTER — PES CALL (OUTPATIENT)
Dept: ADMINISTRATIVE | Facility: CLINIC | Age: 83
End: 2023-06-13
Payer: MEDICARE

## 2023-06-13 LAB — SEROTONIN: 1370 NG/ML

## 2023-06-14 ENCOUNTER — INFUSION (OUTPATIENT)
Dept: INFUSION THERAPY | Facility: HOSPITAL | Age: 83
End: 2023-06-14
Attending: INTERNAL MEDICINE
Payer: MEDICARE

## 2023-06-14 ENCOUNTER — HOSPITAL ENCOUNTER (OUTPATIENT)
Dept: RADIOLOGY | Facility: HOSPITAL | Age: 83
Discharge: HOME | End: 2023-06-14
Attending: INTERNAL MEDICINE
Payer: MEDICARE

## 2023-06-14 VITALS
DIASTOLIC BLOOD PRESSURE: 80 MMHG | WEIGHT: 113.56 LBS | HEIGHT: 64 IN | BODY MASS INDEX: 19.39 KG/M2 | SYSTOLIC BLOOD PRESSURE: 158 MMHG | TEMPERATURE: 98 F | RESPIRATION RATE: 18 BRPM | OXYGEN SATURATION: 98 % | HEART RATE: 92 BPM

## 2023-06-14 DIAGNOSIS — C7B.8 METASTATIC MALIGNANT NEUROENDOCRINE TUMOR TO LIVER: ICD-10-CM

## 2023-06-14 DIAGNOSIS — C7A.8 NEUROENDOCRINE CARCINOMA METASTATIC TO BONE: ICD-10-CM

## 2023-06-14 DIAGNOSIS — C7A.012 MALIGNANT CARCINOID TUMOR OF ILEUM: Primary | ICD-10-CM

## 2023-06-14 DIAGNOSIS — C7B.8 NEUROENDOCRINE CARCINOMA METASTATIC TO BONE: ICD-10-CM

## 2023-06-14 PROCEDURE — 96365 THER/PROPH/DIAG IV INF INIT: CPT | Mod: 59

## 2023-06-14 PROCEDURE — 25000003 PHARM REV CODE 250: Performed by: INTERNAL MEDICINE

## 2023-06-14 PROCEDURE — 96367 TX/PROPH/DG ADDL SEQ IV INF: CPT

## 2023-06-14 PROCEDURE — 79101 NUCLEAR RX IV ADMIN: CPT | Mod: 26,HCNC,, | Performed by: STUDENT IN AN ORGANIZED HEALTH CARE EDUCATION/TRAINING PROGRAM

## 2023-06-14 PROCEDURE — A9513 LUTETIUM LU 177 DOTATAT THER: HCPCS | Mod: JG

## 2023-06-14 PROCEDURE — 96366 THER/PROPH/DIAG IV INF ADDON: CPT

## 2023-06-14 PROCEDURE — 63600175 PHARM REV CODE 636 W HCPCS: Performed by: INTERNAL MEDICINE

## 2023-06-14 PROCEDURE — 79101 NM THERAPY BY IV ADMINISTRATION LU177: ICD-10-PCS | Mod: 26,HCNC,, | Performed by: STUDENT IN AN ORGANIZED HEALTH CARE EDUCATION/TRAINING PROGRAM

## 2023-06-14 RX ORDER — HEPARIN 100 UNIT/ML
500 SYRINGE INTRAVENOUS
Status: CANCELLED | OUTPATIENT
Start: 2023-06-25

## 2023-06-14 RX ORDER — ZOLEDRONIC ACID 0.04 MG/ML
4 INJECTION, SOLUTION INTRAVENOUS
Status: CANCELLED | OUTPATIENT
Start: 2023-06-25

## 2023-06-14 RX ORDER — ACETAMINOPHEN 325 MG/1
650 TABLET ORAL
Status: DISCONTINUED | OUTPATIENT
Start: 2023-06-14 | End: 2023-06-14 | Stop reason: HOSPADM

## 2023-06-14 RX ORDER — ACETAMINOPHEN 325 MG/1
650 TABLET ORAL
Status: CANCELLED | OUTPATIENT
Start: 2023-08-02

## 2023-06-14 RX ORDER — SODIUM CHLORIDE 9 MG/ML
INJECTION, SOLUTION INTRAVENOUS ONCE
Status: COMPLETED | OUTPATIENT
Start: 2023-06-14 | End: 2023-06-14

## 2023-06-14 RX ORDER — DIPHENHYDRAMINE HYDROCHLORIDE 50 MG/ML
50 INJECTION INTRAMUSCULAR; INTRAVENOUS
Status: DISCONTINUED | OUTPATIENT
Start: 2023-06-14 | End: 2023-06-14 | Stop reason: HOSPADM

## 2023-06-14 RX ORDER — SODIUM CHLORIDE 0.9 % (FLUSH) 0.9 %
10 SYRINGE (ML) INJECTION
Status: CANCELLED | OUTPATIENT
Start: 2023-06-25

## 2023-06-14 RX ORDER — SODIUM CHLORIDE 0.9 % (FLUSH) 0.9 %
10 SYRINGE (ML) INJECTION
Status: CANCELLED | OUTPATIENT
Start: 2023-08-02

## 2023-06-14 RX ORDER — SODIUM CHLORIDE 0.9 % (FLUSH) 0.9 %
10 SYRINGE (ML) INJECTION
Status: DISCONTINUED | OUTPATIENT
Start: 2023-06-14 | End: 2023-06-14 | Stop reason: HOSPADM

## 2023-06-14 RX ORDER — SODIUM CHLORIDE 9 MG/ML
INJECTION, SOLUTION INTRAVENOUS ONCE
Status: CANCELLED | OUTPATIENT
Start: 2023-08-02

## 2023-06-14 RX ORDER — DIPHENHYDRAMINE HYDROCHLORIDE 50 MG/ML
50 INJECTION INTRAMUSCULAR; INTRAVENOUS
Status: CANCELLED | OUTPATIENT
Start: 2023-08-02

## 2023-06-14 RX ADMIN — Medication 1000 ML: at 09:06

## 2023-06-14 RX ADMIN — DEXAMETHASONE SODIUM PHOSPHATE 16 MG: 10 INJECTION, SOLUTION INTRAMUSCULAR; INTRAVENOUS at 08:06

## 2023-06-14 RX ADMIN — SODIUM CHLORIDE: 9 INJECTION, SOLUTION INTRAVENOUS at 08:06

## 2023-06-14 NOTE — NURSING
11:07 [id number] completed. PIV flushed per NM tech. No complaints at this time. Amino acids remain infusing per MD orders.

## 2023-06-14 NOTE — NURSING
10:30 [id number] started per NM tech. VSS. No complaints at this time. Amino acids remain infusing per MD orders.

## 2023-06-14 NOTE — NURSING
1307- Pt completed and tolerated PRRT 4 of 4 . VSS. No complaints at this time. PIV x 2 removed per policy, catheter intact. Discharge instructions and precautions reviewed. AVS printed and handout given. No questions at this time. Pt ambulated out clinic with no difficulty.

## 2023-06-15 ENCOUNTER — INFUSION (OUTPATIENT)
Dept: INFUSION THERAPY | Facility: HOSPITAL | Age: 83
End: 2023-06-15
Payer: MEDICARE

## 2023-06-15 VITALS
DIASTOLIC BLOOD PRESSURE: 76 MMHG | SYSTOLIC BLOOD PRESSURE: 153 MMHG | WEIGHT: 113.56 LBS | BODY MASS INDEX: 19.39 KG/M2 | RESPIRATION RATE: 18 BRPM | HEIGHT: 64 IN | HEART RATE: 97 BPM | TEMPERATURE: 98 F

## 2023-06-15 DIAGNOSIS — C7B.8 METASTATIC MALIGNANT NEUROENDOCRINE TUMOR TO LIVER: Primary | ICD-10-CM

## 2023-06-15 DIAGNOSIS — C7B.8 NEUROENDOCRINE CARCINOMA METASTATIC TO BONE: ICD-10-CM

## 2023-06-15 DIAGNOSIS — C7A.8 NEUROENDOCRINE CARCINOMA METASTATIC TO BONE: ICD-10-CM

## 2023-06-15 PROCEDURE — 25000003 PHARM REV CODE 250: Performed by: INTERNAL MEDICINE

## 2023-06-15 PROCEDURE — 96375 TX/PRO/DX INJ NEW DRUG ADDON: CPT

## 2023-06-15 PROCEDURE — 63600175 PHARM REV CODE 636 W HCPCS: Mod: JZ,JG | Performed by: INTERNAL MEDICINE

## 2023-06-15 PROCEDURE — 96374 THER/PROPH/DIAG INJ IV PUSH: CPT

## 2023-06-15 RX ORDER — ZOLEDRONIC ACID 0.04 MG/ML
4 INJECTION, SOLUTION INTRAVENOUS
Status: COMPLETED | OUTPATIENT
Start: 2023-06-15 | End: 2023-06-15

## 2023-06-15 RX ORDER — HEPARIN 100 UNIT/ML
500 SYRINGE INTRAVENOUS
Status: DISCONTINUED | OUTPATIENT
Start: 2023-06-15 | End: 2023-06-15 | Stop reason: HOSPADM

## 2023-06-15 RX ORDER — SODIUM CHLORIDE 0.9 % (FLUSH) 0.9 %
10 SYRINGE (ML) INJECTION
Status: DISCONTINUED | OUTPATIENT
Start: 2023-06-15 | End: 2023-06-15 | Stop reason: HOSPADM

## 2023-06-15 RX ORDER — LANREOTIDE ACETATE 120 MG/.5ML
120 INJECTION SUBCUTANEOUS
Status: CANCELLED | OUTPATIENT
Start: 2023-06-15

## 2023-06-15 RX ORDER — LANREOTIDE ACETATE 120 MG/.5ML
120 INJECTION SUBCUTANEOUS
Status: DISCONTINUED | OUTPATIENT
Start: 2023-06-15 | End: 2023-06-15 | Stop reason: HOSPADM

## 2023-06-15 RX ADMIN — ZOLEDRONIC ACID 4 MG: 0.04 INJECTION, SOLUTION INTRAVENOUS at 08:06

## 2023-06-15 RX ADMIN — SODIUM CHLORIDE: 9 INJECTION, SOLUTION INTRAVENOUS at 08:06

## 2023-06-15 RX ADMIN — LANREOTIDE ACETATE 120 MG: 120 INJECTION SUBCUTANEOUS at 09:06

## 2023-06-15 NOTE — NURSING
I reviewed the H&P, I examined the patient, and there are no changes in the patient's condition.     Pt here for lanreotide injection which was given in left buttock. Tolerated well.

## 2023-06-21 ENCOUNTER — SPECIALTY PHARMACY (OUTPATIENT)
Dept: PHARMACY | Facility: CLINIC | Age: 83
End: 2023-06-21
Payer: MEDICARE

## 2023-06-21 ENCOUNTER — PATIENT MESSAGE (OUTPATIENT)
Dept: PHARMACY | Facility: CLINIC | Age: 83
End: 2023-06-21
Payer: MEDICARE

## 2023-06-21 NOTE — TELEPHONE ENCOUNTER
Incoming call from caregiver, reports 15 tablets on on hand with two additional packs on hand. Will follow back up with caregiver next week in regards to refill. Informed caregiver if patient is running low before next refill call to please call [facility] to coordinate next refill. Pending to 6/26.

## 2023-06-23 LAB — PANCREASTATIN SERPL-MCNC: 4236 PG/ML (ref 10–135)

## 2023-06-26 ENCOUNTER — PATIENT MESSAGE (OUTPATIENT)
Dept: PHARMACY | Facility: CLINIC | Age: 83
End: 2023-06-26
Payer: MEDICARE

## 2023-06-26 NOTE — TELEPHONE ENCOUNTER
Incoming call from patient. Reports 2 full blisters of Xermelo (21 tablets each) enough medication for 14 days. Confirms still taking 1 tab PO TID. Ok for call  to when next refill due.

## 2023-06-30 NOTE — TELEPHONE ENCOUNTER
Specialty Pharmacy - Refill Coordination    Specialty Medication Orders Linked to Encounter      Flowsheet Row Most Recent Value   Medication #1 telotristat ethyl (XERMELO) 250 mg Tab (Order#[id number], Rx#[id number])            Refill Questions - Documented Responses      Flowsheet Row Most Recent Value   Patient Availability and HIPAA Verification    Does patient want to proceed with activity? Yes   HIPAA/medical authority confirmed? Yes   Relationship to patient of person spoken to? Family Member   Refill Screening Questions    Changes to allergies? No   Changes to medications? No   New conditions since last clinic visit? No   Unplanned office visit, urgent care, ED, or hospital admission in the last 4 weeks? No   How does patient/caregiver feel medication is working? Good   Financial problems or insurance changes? No   How many doses of your specialty medications were missed in the last 4 weeks? 0   Would patient like to speak to a pharmacist? No   When does the patient need to receive the medication? 07/10/23   Refill Delivery Questions    How will the patient receive the medication? [facility]   When does the patient need to receive the medication? 07/10/23   Shipping Address Home   Address in [facility] confirmed and updated if neccessary? Yes   Expected Copay ($) 0   Is the patient able to afford the medication copay? Yes   Payment Method zero copay   Days supply of Refill 28   Supplies needed? No supplies needed   Refill activity completed? Yes   Refill activity plan Refill scheduled   Shipment/Pickup Date: 07/03/23            Current Outpatient Medications   Medication Sig    blood sugar diagnostic (TRUE METRIX GLUCOSE TEST STRIP) Strp USE TO CHECK BLOOD SUGAR TWICE DAILY    blood-glucose meter kit To check BG 2 times daily, to use with insurance preferred meter    carvediloL (COREG) 12.5 MG tablet Take 1 tablet (12.5 mg total) by mouth 2 (two) times daily with meals.    cyanocobalamin (VITAMIN B-12)  "1000 MCG tablet Take 1 tablet (1,000 mcg total) by mouth once daily.    diphenoxylate-atropine 2.5-0.025 mg (LOMOTIL) 2.5-0.025 mg per tablet Take 1 tablet by mouth 4 (four) times daily as needed for Diarrhea.    ezetimibe (ZETIA) 10 mg tablet Take 1 tablet (10 mg total) by mouth once daily.    ferrous sulfate 325 (65 FE) MG EC tablet Take 325 mg by mouth 3 (three) times daily with meals.    lancets Mis To check BG 2 times daily, to use with insurance preferred meter    latanoprost 0.005 % ophthalmic solution Place 1 drop into both eyes nightly.    losartan (COZAAR) 50 MG tablet Take 1 tablet (50 mg total) by mouth once daily.    metFORMIN (GLUCOPHAGE-XR) 500 MG ER 24hr tablet Take 2 tablets (1,000 mg total) by mouth daily with breakfast.    needle, disp, 22 G 22 gauge x 1" Ndle 1 application by Misc.(Non-Drug; Combo Route) route 3 (three) times daily as needed.    octreotide acetate (SANDOSTATIN) 100 mcg/mL (1 mL) Syrg Inject 1 mL (0.1 mg total) into the skin 3 (three) times daily as needed (diarrhea). (Patient not taking: Reported on 3/14/2023)    syringe, disposable, 1 mL Syrg 1 application by Misc.(Non-Drug; Combo Route) route 3 (three) times daily as needed (diarrhea).    telotristat ethyl (XERMELO) 250 mg Tab Take 1 tablet (250 mg) by mouth 3 (three) times daily.    TRUEPLUS LANCETS 33 gauge Misc USE TO CHECK BLOOD SUGAR TWICE DAILY    vitamin D (VITAMIN D3) 1000 units Tab Take 400 Units by mouth once daily.    XIIDRA 5 % Dpet INSTILL ONE DROP INTO OU BID   Last reviewed on 6/14/2023  8:10 AM by Nancy Diez, RN    Review of patient's allergies indicates:   Allergen Reactions    Epinephrine      carcinoid    Last reviewed on  6/15/2023 9:03 AM by Rossi Harper      Tasks added this encounter   No tasks added.   Tasks due within next 3 months   7/29/2023 - Clinical Assessment (6 month recurrence)  7/2/2023 - Refill Coordination Outreach (1 time occurrence)     Lidia Love, PharmD  [facility]  [address] [facility] [address]  Phone: [phone]  Fax: [phone]

## 2023-07-07 ENCOUNTER — PES CALL (OUTPATIENT)
Dept: ADMINISTRATIVE | Facility: CLINIC | Age: 83
End: 2023-07-07
Payer: MEDICARE

## 2023-07-11 ENCOUNTER — OFFICE VISIT (OUTPATIENT)
Dept: INTERNAL MEDICINE | Facility: CLINIC | Age: 83
End: 2023-07-11
Attending: INTERNAL MEDICINE
Payer: MEDICARE

## 2023-07-11 VITALS
HEART RATE: 83 BPM | OXYGEN SATURATION: 98 % | DIASTOLIC BLOOD PRESSURE: 87 MMHG | HEIGHT: 64 IN | SYSTOLIC BLOOD PRESSURE: 128 MMHG | BODY MASS INDEX: 19.39 KG/M2 | WEIGHT: 113.56 LBS

## 2023-07-11 DIAGNOSIS — R60.9 EDEMA, UNSPECIFIED TYPE: ICD-10-CM

## 2023-07-11 DIAGNOSIS — M25.471 ANKLE EDEMA, BILATERAL: ICD-10-CM

## 2023-07-11 DIAGNOSIS — I70.0 ATHEROSCLEROSIS OF AORTA: ICD-10-CM

## 2023-07-11 DIAGNOSIS — M25.472 ANKLE EDEMA, BILATERAL: ICD-10-CM

## 2023-07-11 DIAGNOSIS — E11.29 CONTROLLED TYPE 2 DIABETES MELLITUS WITH MICROALBUMINURIA, WITHOUT LONG-TERM CURRENT USE OF INSULIN: ICD-10-CM

## 2023-07-11 DIAGNOSIS — I10 ESSENTIAL HYPERTENSION: ICD-10-CM

## 2023-07-11 DIAGNOSIS — E53.8 B12 DEFICIENCY: ICD-10-CM

## 2023-07-11 DIAGNOSIS — E78.5 HYPERLIPIDEMIA, UNSPECIFIED HYPERLIPIDEMIA TYPE: Primary | ICD-10-CM

## 2023-07-11 DIAGNOSIS — E55.9 VITAMIN D DEFICIENCY: ICD-10-CM

## 2023-07-11 DIAGNOSIS — R80.9 CONTROLLED TYPE 2 DIABETES MELLITUS WITH MICROALBUMINURIA, WITHOUT LONG-TERM CURRENT USE OF INSULIN: ICD-10-CM

## 2023-07-11 PROCEDURE — 3074F PR MOST RECENT SYSTOLIC BLOOD PRESSURE < 130 MM HG: ICD-10-PCS | Mod: HCNC,CPTII,S$GLB, | Performed by: INTERNAL MEDICINE

## 2023-07-11 PROCEDURE — 99214 PR OFFICE/OUTPT VISIT, EST, LEVL IV, 30-39 MIN: ICD-10-PCS | Mod: HCNC,S$GLB,, | Performed by: INTERNAL MEDICINE

## 2023-07-11 PROCEDURE — 1159F MED LIST DOCD IN RCRD: CPT | Mod: HCNC,CPTII,S$GLB, | Performed by: INTERNAL MEDICINE

## 2023-07-11 PROCEDURE — 99999 PR PBB SHADOW E&M-EST. PATIENT-LVL IV: ICD-10-PCS | Mod: PBBFAC,HCNC,, | Performed by: INTERNAL MEDICINE

## 2023-07-11 PROCEDURE — 1126F AMNT PAIN NOTED NONE PRSNT: CPT | Mod: HCNC,CPTII,S$GLB, | Performed by: INTERNAL MEDICINE

## 2023-07-11 PROCEDURE — 1126F PR PAIN SEVERITY QUANTIFIED, NO PAIN PRESENT: ICD-10-PCS | Mod: HCNC,CPTII,S$GLB, | Performed by: INTERNAL MEDICINE

## 2023-07-11 PROCEDURE — 1101F PR PT FALLS ASSESS DOC 0-1 FALLS W/OUT INJ PAST YR: ICD-10-PCS | Mod: HCNC,CPTII,S$GLB, | Performed by: INTERNAL MEDICINE

## 2023-07-11 PROCEDURE — 3074F SYST BP LT 130 MM HG: CPT | Mod: HCNC,CPTII,S$GLB, | Performed by: INTERNAL MEDICINE

## 2023-07-11 PROCEDURE — 3079F DIAST BP 80-89 MM HG: CPT | Mod: HCNC,CPTII,S$GLB, | Performed by: INTERNAL MEDICINE

## 2023-07-11 PROCEDURE — 1160F RVW MEDS BY RX/DR IN RCRD: CPT | Mod: HCNC,CPTII,S$GLB, | Performed by: INTERNAL MEDICINE

## 2023-07-11 PROCEDURE — 3288F FALL RISK ASSESSMENT DOCD: CPT | Mod: HCNC,CPTII,S$GLB, | Performed by: INTERNAL MEDICINE

## 2023-07-11 PROCEDURE — 1159F PR MEDICATION LIST DOCUMENTED IN MEDICAL RECORD: ICD-10-PCS | Mod: HCNC,CPTII,S$GLB, | Performed by: INTERNAL MEDICINE

## 2023-07-11 PROCEDURE — 3079F PR MOST RECENT DIASTOLIC BLOOD PRESSURE 80-89 MM HG: ICD-10-PCS | Mod: HCNC,CPTII,S$GLB, | Performed by: INTERNAL MEDICINE

## 2023-07-11 PROCEDURE — 1160F PR REVIEW ALL MEDS BY PRESCRIBER/CLIN PHARMACIST DOCUMENTED: ICD-10-PCS | Mod: HCNC,CPTII,S$GLB, | Performed by: INTERNAL MEDICINE

## 2023-07-11 PROCEDURE — 3288F PR FALLS RISK ASSESSMENT DOCUMENTED: ICD-10-PCS | Mod: HCNC,CPTII,S$GLB, | Performed by: INTERNAL MEDICINE

## 2023-07-11 PROCEDURE — 1101F PT FALLS ASSESS-DOCD LE1/YR: CPT | Mod: HCNC,CPTII,S$GLB, | Performed by: INTERNAL MEDICINE

## 2023-07-11 PROCEDURE — 99999 PR PBB SHADOW E&M-EST. PATIENT-LVL IV: CPT | Mod: PBBFAC,HCNC,, | Performed by: INTERNAL MEDICINE

## 2023-07-11 PROCEDURE — 99214 OFFICE O/P EST MOD 30 MIN: CPT | Mod: HCNC,S$GLB,, | Performed by: INTERNAL MEDICINE

## 2023-07-11 RX ORDER — CARVEDILOL 12.5 MG/1
12.5 TABLET ORAL 2 TIMES DAILY WITH MEALS
Qty: 180 TABLET | Refills: 3 | Status: SHIPPED | OUTPATIENT
Start: 2023-07-11 | End: 2023-10-10 | Stop reason: SDUPTHER

## 2023-07-11 NOTE — PROGRESS NOTES
Subjective:   Patient ID: Shahram Hills is a 83 y.o. female  Chief complaint:   Chief Complaint   Patient presents with    Diabetes       HPI  Here for 3 month diabetes follow up and HTN follow up  Accompanied by her sister today     HTN:  Today well controlled   Reviewed home readings and 110-140s/68-80s with dayami< 130/80  HR 54-94  - c/w meds on med card  Previously:   Prev switched to coreg from amlodipine  Cont losartan 50   At last clinic visit, Bp higher at home over past 2 months however not resting 5-10min prior to checknig bp at home - has been resting prior to checking blood pressure  Prev:  Since [facility] started taking amlodipine 5mg bid as received an old rx with those instructions   henry losartan 50 and bp well controlled today   No LH or dizziness     Diabetes:   A1c 6.6<-6.4<- 5.9 <-6.2  <- 6.1 <- 6.2  - well controlled and henry metformin 1000 daily  Fasting bg 160s more recnetly   Foot exam up-to-date at [facility] 6/2022 - due today   Utd on eye exam 8/2022    PVCs, Chronic systolic and diastolic HF (EF 40% on echo 9/2022):  As above   - henry BB   - noticed ankle swelling - at baseline gets ankle swelling worse in afternoon - better in am   - no sob or cp   Cards: Elyssa  Previously:    - 2/25/2022 for hospital discharge had TACE right hep lobe and found to have frequent PVCs  - we resumed losartan at that time   - seen by cards 3/14/2022 and completed holter - at this time no tx indicated    ### not addressed today ####    B12 def:  - is taking supplement daily    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 Zometa every 3 months started 12/27/22. PRRT #2 on 2/8  Followed by h/o - H last clinic visit March 8, 2023  Previously:   - 2/25/2022 for hospital discharge had TACE right hep lobe and found to  "have frequent PVCs  - we resumed losartan at that time   - seen by cards 3/14/2022 and completed holter - at this time no tx indicated    #######    H/o: Du  Cards: Elyssa  Pod: Charles    HM:  Foot exam  Covid booster     Review of Systems    Objective:  Vitals:    07/11/23 1031   BP: 128/87   BP Location: Left arm   Patient Position: Sitting   BP Method: Small (Manual)   Pulse: 83   SpO2: 98%   Weight: 51.5 kg (113 lb 8.6 oz)   Height: 5' 4" (1.626 m)     Body mass index is 19.49 kg/m².    Physical Exam  Vitals reviewed.   Constitutional:       Appearance: Normal appearance. She is well-developed.   HENT:      Head: Normocephalic and atraumatic.   Eyes:      Extraocular Movements: Extraocular movements intact.      Conjunctiva/sclera: Conjunctivae normal.   Cardiovascular:      Rate and Rhythm: Normal rate and regular rhythm.      Pulses: Normal pulses.           Dorsalis pedis pulses are 2+ on the right side and 2+ on the left side.        Posterior tibial pulses are 2+ on the right side and 2+ on the left side.      Heart sounds: Normal heart sounds.   Pulmonary:      Effort: Pulmonary effort is normal.      Breath sounds: Normal breath sounds.   Abdominal:      General: Bowel sounds are normal.      Palpations: Abdomen is soft. There is mass (ruq mass).   Musculoskeletal:         General: No swelling or tenderness. Normal range of motion.      Cervical back: Normal range of motion and neck supple.      Right foot: Normal range of motion. No deformity.      Left foot: Normal range of motion. No deformity.      Comments: Mild ankle swelling B   No redness, ttp, inc warmth or redness    Feet:      Right foot:      Protective Sensation: 4 sites tested.  4 sites sensed.      Skin integrity: No ulcer, blister, skin breakdown, erythema, warmth, callus or dry skin.      Left foot:      Protective Sensation: 4 sites tested.  4 sites sensed.      Skin integrity: No ulcer, blister, skin breakdown, erythema, warmth, callus or " dry skin.   Lymphadenopathy:      Cervical: No cervical adenopathy.   Skin:     General: Skin is warm and dry.      Capillary Refill: Capillary refill takes less than 2 seconds.      Nails: There is no clubbing.   Neurological:      General: No focal deficit present.      Mental Status: She is alert and oriented to person, place, and time. Mental status is at baseline.      Gait: Gait normal.   Psychiatric:         Mood and Affect: Mood normal.         Speech: Speech normal.         Behavior: Behavior normal.         Thought Content: Thought content normal.         Judgment: Judgment normal.       Assessment:  1. Hyperlipidemia, unspecified hyperlipidemia type    2. Essential hypertension    3. Controlled type 2 diabetes mellitus with microalbuminuria, without long-term current use of insulin    4. B12 deficiency    5. Atherosclerosis of aorta    6. Vitamin D deficiency    7. Ankle edema, bilateral    8. Edema, unspecified type          Plan:  Shahram was seen today for diabetes.    Diagnoses and all orders for this visit:    Hyperlipidemia, unspecified hyperlipidemia type    Essential hypertension  -     carvediloL (COREG) 12.5 MG tablet; Take 1 tablet (12.5 mg total) by mouth 2 (two) times daily with meals.    Controlled type 2 diabetes mellitus with microalbuminuria, without long-term current use of insulin  -     Hemoglobin A1C; Future  -     Hemoglobin A1C; Future    B12 deficiency  -     Vitamin B12; Future    Atherosclerosis of aorta  -     Lipid Panel; Future    Vitamin D deficiency  -     Vitamin D; Future    Ankle edema, bilateral  -     B-TYPE NATRIURETIC PEPTIDE; Future  -     US Lower Extremity Veins Bilateral Insufficiency; Future    Edema, unspecified type  -     US Lower Extremity Veins Bilateral Insufficiency; Future    Cont meds   Check approp studies   Cont home bp and bg monitoring   Foot exam   Dexa: wnl 5/2021 - repeat due 2024    Health Maintenance   Topic Date Due    Hemoglobin A1c  01/12/2024     Eye Exam  01/24/2024    DEXA Scan  05/31/2024    Lipid Panel  07/12/2024    TETANUS VACCINE  12/10/2029

## 2023-07-12 ENCOUNTER — HOSPITAL ENCOUNTER (OUTPATIENT)
Dept: RADIOLOGY | Facility: HOSPITAL | Age: 83
Discharge: HOME | End: 2023-07-12
Attending: INTERNAL MEDICINE
Payer: MEDICARE

## 2023-07-12 ENCOUNTER — LAB VISIT (OUTPATIENT)
Dept: LAB | Facility: HOSPITAL | Age: 83
End: 2023-07-12
Attending: INTERNAL MEDICINE
Payer: MEDICARE

## 2023-07-12 DIAGNOSIS — R80.9 CONTROLLED TYPE 2 DIABETES MELLITUS WITH MICROALBUMINURIA, WITHOUT LONG-TERM CURRENT USE OF INSULIN: ICD-10-CM

## 2023-07-12 DIAGNOSIS — M25.471 ANKLE EDEMA, BILATERAL: ICD-10-CM

## 2023-07-12 DIAGNOSIS — E55.9 VITAMIN D DEFICIENCY: ICD-10-CM

## 2023-07-12 DIAGNOSIS — E53.8 B12 DEFICIENCY: ICD-10-CM

## 2023-07-12 DIAGNOSIS — C7B.8 METASTATIC MALIGNANT NEUROENDOCRINE TUMOR TO LIVER: ICD-10-CM

## 2023-07-12 DIAGNOSIS — I50.21 ACUTE SYSTOLIC HEART FAILURE: Primary | ICD-10-CM

## 2023-07-12 DIAGNOSIS — C7A.012 MALIGNANT CARCINOID TUMOR OF ILEUM: ICD-10-CM

## 2023-07-12 DIAGNOSIS — C7B.8 SECONDARY NEUROENDOCRINE TUMOR OF BONE: ICD-10-CM

## 2023-07-12 DIAGNOSIS — M25.472 ANKLE EDEMA, BILATERAL: ICD-10-CM

## 2023-07-12 DIAGNOSIS — I70.0 ATHEROSCLEROSIS OF AORTA: ICD-10-CM

## 2023-07-12 DIAGNOSIS — E11.29 CONTROLLED TYPE 2 DIABETES MELLITUS WITH MICROALBUMINURIA, WITHOUT LONG-TERM CURRENT USE OF INSULIN: ICD-10-CM

## 2023-07-12 LAB
25(OH)D3+25(OH)D2 SERPL-MCNC: 38 NG/ML (ref 30–96)
ALBUMIN SERPL BCP-MCNC: 3.9 G/DL (ref 3.5–5.2)
ALP SERPL-CCNC: 73 U/L (ref 55–135)
ALT SERPL W/O P-5'-P-CCNC: 30 U/L (ref 10–44)
ANION GAP SERPL CALC-SCNC: 11 MMOL/L (ref 8–16)
AST SERPL-CCNC: 33 U/L (ref 10–40)
BASOPHILS # BLD AUTO: 0.01 K/UL (ref 0–0.2)
BASOPHILS NFR BLD: 0.2 % (ref 0–1.9)
BILIRUB SERPL-MCNC: 0.6 MG/DL (ref 0.1–1)
BNP SERPL-MCNC: 1114 PG/ML (ref 0–99)
BUN SERPL-MCNC: 7 MG/DL (ref 8–23)
CALCIUM SERPL-MCNC: 10.1 MG/DL (ref 8.7–10.5)
CHLORIDE SERPL-SCNC: 100 MMOL/L (ref 95–110)
CHOLEST SERPL-MCNC: 267 MG/DL (ref 120–199)
CHOLEST/HDLC SERPL: 2.7 {RATIO} (ref 2–5)
CO2 SERPL-SCNC: 28 MMOL/L (ref 23–29)
CREAT SERPL-MCNC: 0.7 MG/DL (ref 0.5–1.4)
DIFFERENTIAL METHOD: ABNORMAL
EOSINOPHIL # BLD AUTO: 0 K/UL (ref 0–0.5)
EOSINOPHIL NFR BLD: 0.4 % (ref 0–8)
ERYTHROCYTE [DISTWIDTH] IN BLOOD BY AUTOMATED COUNT: 12.9 % (ref 11.5–14.5)
EST. GFR  (NO RACE VARIABLE): >60 ML/MIN/1.73 M^2
ESTIMATED AVG GLUCOSE: 134 MG/DL (ref 68–131)
GLUCOSE SERPL-MCNC: 142 MG/DL (ref 70–110)
HBA1C MFR BLD: 6.3 % (ref 4–5.6)
HCT VFR BLD AUTO: 41.9 % (ref 37–48.5)
HDLC SERPL-MCNC: 99 MG/DL (ref 40–75)
HDLC SERPL: 37.1 % (ref 20–50)
HGB BLD-MCNC: 13.5 G/DL (ref 12–16)
IMM GRANULOCYTES # BLD AUTO: 0.01 K/UL (ref 0–0.04)
IMM GRANULOCYTES NFR BLD AUTO: 0.2 % (ref 0–0.5)
LDLC SERPL CALC-MCNC: 150.4 MG/DL (ref 63–159)
LYMPHOCYTES # BLD AUTO: 0.8 K/UL (ref 1–4.8)
LYMPHOCYTES NFR BLD: 15.8 % (ref 18–48)
MCH RBC QN AUTO: 30.7 PG (ref 27–31)
MCHC RBC AUTO-ENTMCNC: 32.2 G/DL (ref 32–36)
MCV RBC AUTO: 95 FL (ref 82–98)
MONOCYTES # BLD AUTO: 0.4 K/UL (ref 0.3–1)
MONOCYTES NFR BLD: 7.3 % (ref 4–15)
NEUTROPHILS # BLD AUTO: 3.7 K/UL (ref 1.8–7.7)
NEUTROPHILS NFR BLD: 76.1 % (ref 38–73)
NONHDLC SERPL-MCNC: 168 MG/DL
NRBC BLD-RTO: 0 /100 WBC
PLATELET # BLD AUTO: 149 K/UL (ref 150–450)
PMV BLD AUTO: 9.9 FL (ref 9.2–12.9)
POTASSIUM SERPL-SCNC: 3.5 MMOL/L (ref 3.5–5.1)
PROT SERPL-MCNC: 7.8 G/DL (ref 6–8.4)
RBC # BLD AUTO: 4.4 M/UL (ref 4–5.4)
SODIUM SERPL-SCNC: 139 MMOL/L (ref 136–145)
TRIGL SERPL-MCNC: 88 MG/DL (ref 30–150)
VIT B12 SERPL-MCNC: >2000 PG/ML (ref 210–950)
WBC # BLD AUTO: 4.8 K/UL (ref 3.9–12.7)

## 2023-07-12 PROCEDURE — 85025 COMPLETE CBC W/AUTO DIFF WBC: CPT | Mod: HCNC | Performed by: INTERNAL MEDICINE

## 2023-07-12 PROCEDURE — A9587 GALLIUM GA-68: HCPCS | Mod: HCNC

## 2023-07-12 PROCEDURE — 84260 ASSAY OF SEROTONIN: CPT | Mod: HCNC | Performed by: INTERNAL MEDICINE

## 2023-07-12 PROCEDURE — 80061 LIPID PANEL: CPT | Mod: HCNC | Performed by: INTERNAL MEDICINE

## 2023-07-12 PROCEDURE — 83497 ASSAY OF 5-HIAA: CPT | Mod: HCNC | Performed by: INTERNAL MEDICINE

## 2023-07-12 PROCEDURE — 82306 VITAMIN D 25 HYDROXY: CPT | Mod: HCNC | Performed by: INTERNAL MEDICINE

## 2023-07-12 PROCEDURE — 83880 ASSAY OF NATRIURETIC PEPTIDE: CPT | Mod: HCNC | Performed by: INTERNAL MEDICINE

## 2023-07-12 PROCEDURE — 36415 COLL VENOUS BLD VENIPUNCTURE: CPT | Mod: HCNC | Performed by: INTERNAL MEDICINE

## 2023-07-12 PROCEDURE — 86316 IMMUNOASSAY TUMOR OTHER: CPT | Mod: HCNC | Performed by: INTERNAL MEDICINE

## 2023-07-12 PROCEDURE — 78815 PET IMAGE W/CT SKULL-THIGH: CPT | Mod: 26,PS,HCNC, | Performed by: STUDENT IN AN ORGANIZED HEALTH CARE EDUCATION/TRAINING PROGRAM

## 2023-07-12 PROCEDURE — 83036 HEMOGLOBIN GLYCOSYLATED A1C: CPT | Mod: HCNC | Performed by: INTERNAL MEDICINE

## 2023-07-12 PROCEDURE — 78815 NM PET 68GA DOTATATE, VERTEX TO THIGH: ICD-10-PCS | Mod: 26,PS,HCNC, | Performed by: STUDENT IN AN ORGANIZED HEALTH CARE EDUCATION/TRAINING PROGRAM

## 2023-07-12 PROCEDURE — 83519 RIA NONANTIBODY: CPT | Mod: HCNC | Performed by: INTERNAL MEDICINE

## 2023-07-12 PROCEDURE — 80053 COMPREHEN METABOLIC PANEL: CPT | Mod: HCNC | Performed by: INTERNAL MEDICINE

## 2023-07-12 PROCEDURE — 82607 VITAMIN B-12: CPT | Mod: HCNC | Performed by: INTERNAL MEDICINE

## 2023-07-12 RX ORDER — POTASSIUM CHLORIDE 600 MG/1
8 TABLET, FILM COATED, EXTENDED RELEASE ORAL DAILY
Qty: 90 TABLET | Refills: 1 | Status: SHIPPED | OUTPATIENT
Start: 2023-07-12 | End: 2023-10-10 | Stop reason: SDUPTHER

## 2023-07-12 RX ORDER — FUROSEMIDE 20 MG/1
TABLET ORAL
Qty: 94 TABLET | Refills: 1 | Status: SHIPPED | OUTPATIENT
Start: 2023-07-12 | End: 2023-10-10

## 2023-07-13 ENCOUNTER — TELEPHONE (OUTPATIENT)
Dept: INTERNAL MEDICINE | Facility: CLINIC | Age: 83
End: 2023-07-13
Payer: MEDICARE

## 2023-07-13 LAB — CGA SERPL-MCNC: 1568 NG/ML

## 2023-07-13 NOTE — TELEPHONE ENCOUNTER
----- Message from Sherri Talbert sent at 7/13/2023  7:21 AM CDT -----  Name of Who is Calling:  Shayla Rosenthal on behalf of LEIGHANN FANG [[id number]]           What is the request in detail: Patient's sister is requesting a call back to discuss blood work.  Please assist.           Can the clinic reply by [facility]: No           What Number to Call Back if not in [facility]: [phone]

## 2023-07-13 NOTE — TELEPHONE ENCOUNTER
Patient caregiver called and asked if I can send the message from Dr. Xie as a message so she can read exactly what she wrote.

## 2023-07-13 NOTE — TELEPHONE ENCOUNTER
Spoke with pt caregiver about test results and new medications, also schedule pt to come in on next Friday to have labs repeated per Dr. Xei.     Thanks, Ty

## 2023-07-13 NOTE — TELEPHONE ENCOUNTER
----- Message from Trixie Xie MD sent at 7/12/2023  6:37 PM CDT -----  Please CALL patient or her caregiver or Shayla Rosenthal as they do not check portal - that labs returned  - her BNP level is quite elevated which can be seen in heart failure when there is too much fluid in the body   - I recommend taking a medication called furosemide to remove the fluid from her body - take this first in the morning to allow urination during the day   - I am also sending in a prescription for oral potassium to take along with the frusemide     - B12 is not low   - Cholesterol increased - recommend continue zetia and take daily and follow Mediterranean diet   - diabetes is well controlled   - vit d is in good range    - please schedule bmp and magnesium level in 1 week - nonfasting

## 2023-07-13 NOTE — PROGRESS NOTES
Spoke with pt caregiver about test results and new medications, also schedule pt to come in on next Friday to have labs repeated per Dr. Xie.    Thanks, Ty

## 2023-07-14 ENCOUNTER — OFFICE VISIT (OUTPATIENT)
Dept: HEMATOLOGY/ONCOLOGY | Facility: CLINIC | Age: 83
End: 2023-07-14
Payer: MEDICARE

## 2023-07-14 ENCOUNTER — INFUSION (OUTPATIENT)
Dept: INFUSION THERAPY | Facility: HOSPITAL | Age: 83
End: 2023-07-14
Payer: MEDICARE

## 2023-07-14 VITALS
DIASTOLIC BLOOD PRESSURE: 83 MMHG | WEIGHT: 113.19 LBS | BODY MASS INDEX: 19.32 KG/M2 | RESPIRATION RATE: 18 BRPM | SYSTOLIC BLOOD PRESSURE: 174 MMHG | HEART RATE: 91 BPM | OXYGEN SATURATION: 98 % | HEIGHT: 64 IN

## 2023-07-14 DIAGNOSIS — C7B.8 METASTATIC MALIGNANT NEUROENDOCRINE TUMOR TO LIVER: ICD-10-CM

## 2023-07-14 DIAGNOSIS — C7B.8 SECONDARY NEUROENDOCRINE TUMOR OF BONE: ICD-10-CM

## 2023-07-14 DIAGNOSIS — D84.81 IMMUNODEFICIENCY SECONDARY TO NEOPLASM: ICD-10-CM

## 2023-07-14 DIAGNOSIS — E34.0 CARCINOID SYNDROME: ICD-10-CM

## 2023-07-14 DIAGNOSIS — I50.9 CHRONIC CONGESTIVE HEART FAILURE, UNSPECIFIED HEART FAILURE TYPE: ICD-10-CM

## 2023-07-14 DIAGNOSIS — E11.29 CONTROLLED TYPE 2 DIABETES MELLITUS WITH MICROALBUMINURIA, WITHOUT LONG-TERM CURRENT USE OF INSULIN: ICD-10-CM

## 2023-07-14 DIAGNOSIS — C7B.8 METASTATIC MALIGNANT NEUROENDOCRINE TUMOR TO LIVER: Primary | ICD-10-CM

## 2023-07-14 DIAGNOSIS — D84.821 IMMUNODEFICIENCY DUE TO DRUGS: ICD-10-CM

## 2023-07-14 DIAGNOSIS — R80.9 CONTROLLED TYPE 2 DIABETES MELLITUS WITH MICROALBUMINURIA, WITHOUT LONG-TERM CURRENT USE OF INSULIN: ICD-10-CM

## 2023-07-14 DIAGNOSIS — Z79.899 IMMUNODEFICIENCY DUE TO DRUGS: ICD-10-CM

## 2023-07-14 DIAGNOSIS — C7A.012 MALIGNANT CARCINOID TUMOR OF ILEUM: Primary | ICD-10-CM

## 2023-07-14 DIAGNOSIS — D49.9 IMMUNODEFICIENCY SECONDARY TO NEOPLASM: ICD-10-CM

## 2023-07-14 DIAGNOSIS — I10 ESSENTIAL HYPERTENSION: ICD-10-CM

## 2023-07-14 DIAGNOSIS — C79.51 METASTASIS TO SPINAL COLUMN: ICD-10-CM

## 2023-07-14 LAB — 5OH-INDOLEACETATE SERPL-MCNC: 52 NG/ML

## 2023-07-14 PROCEDURE — 3077F SYST BP >= 140 MM HG: CPT | Mod: HCNC,CPTII,S$GLB, | Performed by: INTERNAL MEDICINE

## 2023-07-14 PROCEDURE — 3079F DIAST BP 80-89 MM HG: CPT | Mod: HCNC,CPTII,S$GLB, | Performed by: INTERNAL MEDICINE

## 2023-07-14 PROCEDURE — 3288F FALL RISK ASSESSMENT DOCD: CPT | Mod: HCNC,CPTII,S$GLB, | Performed by: INTERNAL MEDICINE

## 2023-07-14 PROCEDURE — 3077F PR MOST RECENT SYSTOLIC BLOOD PRESSURE >= 140 MM HG: ICD-10-PCS | Mod: HCNC,CPTII,S$GLB, | Performed by: INTERNAL MEDICINE

## 2023-07-14 PROCEDURE — 1101F PR PT FALLS ASSESS DOC 0-1 FALLS W/OUT INJ PAST YR: ICD-10-PCS | Mod: HCNC,CPTII,S$GLB, | Performed by: INTERNAL MEDICINE

## 2023-07-14 PROCEDURE — 99215 PR OFFICE/OUTPT VISIT, EST, LEVL V, 40-54 MIN: ICD-10-PCS | Mod: HCNC,S$GLB,, | Performed by: INTERNAL MEDICINE

## 2023-07-14 PROCEDURE — 63600175 PHARM REV CODE 636 W HCPCS: Mod: JZ,JG,HCNC | Performed by: INTERNAL MEDICINE

## 2023-07-14 PROCEDURE — 99999 PR PBB SHADOW E&M-EST. PATIENT-LVL IV: CPT | Mod: PBBFAC,HCNC,, | Performed by: INTERNAL MEDICINE

## 2023-07-14 PROCEDURE — 99215 OFFICE O/P EST HI 40 MIN: CPT | Mod: HCNC,S$GLB,, | Performed by: INTERNAL MEDICINE

## 2023-07-14 PROCEDURE — 3079F PR MOST RECENT DIASTOLIC BLOOD PRESSURE 80-89 MM HG: ICD-10-PCS | Mod: HCNC,CPTII,S$GLB, | Performed by: INTERNAL MEDICINE

## 2023-07-14 PROCEDURE — 3288F PR FALLS RISK ASSESSMENT DOCUMENTED: ICD-10-PCS | Mod: HCNC,CPTII,S$GLB, | Performed by: INTERNAL MEDICINE

## 2023-07-14 PROCEDURE — 1126F AMNT PAIN NOTED NONE PRSNT: CPT | Mod: HCNC,CPTII,S$GLB, | Performed by: INTERNAL MEDICINE

## 2023-07-14 PROCEDURE — 1101F PT FALLS ASSESS-DOCD LE1/YR: CPT | Mod: HCNC,CPTII,S$GLB, | Performed by: INTERNAL MEDICINE

## 2023-07-14 PROCEDURE — 1159F PR MEDICATION LIST DOCUMENTED IN MEDICAL RECORD: ICD-10-PCS | Mod: HCNC,CPTII,S$GLB, | Performed by: INTERNAL MEDICINE

## 2023-07-14 PROCEDURE — 1159F MED LIST DOCD IN RCRD: CPT | Mod: HCNC,CPTII,S$GLB, | Performed by: INTERNAL MEDICINE

## 2023-07-14 PROCEDURE — 1160F RVW MEDS BY RX/DR IN RCRD: CPT | Mod: HCNC,CPTII,S$GLB, | Performed by: INTERNAL MEDICINE

## 2023-07-14 PROCEDURE — 1160F PR REVIEW ALL MEDS BY PRESCRIBER/CLIN PHARMACIST DOCUMENTED: ICD-10-PCS | Mod: HCNC,CPTII,S$GLB, | Performed by: INTERNAL MEDICINE

## 2023-07-14 PROCEDURE — 99999 PR PBB SHADOW E&M-EST. PATIENT-LVL IV: ICD-10-PCS | Mod: PBBFAC,HCNC,, | Performed by: INTERNAL MEDICINE

## 2023-07-14 PROCEDURE — 96372 THER/PROPH/DIAG INJ SC/IM: CPT | Mod: HCNC

## 2023-07-14 PROCEDURE — 1126F PR PAIN SEVERITY QUANTIFIED, NO PAIN PRESENT: ICD-10-PCS | Mod: HCNC,CPTII,S$GLB, | Performed by: INTERNAL MEDICINE

## 2023-07-14 RX ORDER — LANREOTIDE ACETATE 120 MG/.5ML
120 INJECTION SUBCUTANEOUS
Status: CANCELLED | OUTPATIENT
Start: 2023-07-14

## 2023-07-14 RX ORDER — LANREOTIDE ACETATE 120 MG/.5ML
120 INJECTION SUBCUTANEOUS
Status: DISCONTINUED | OUTPATIENT
Start: 2023-07-14 | End: 2023-07-14 | Stop reason: HOSPADM

## 2023-07-14 RX ADMIN — LANREOTIDE ACETATE 120 MG: 120 INJECTION SUBCUTANEOUS at 10:07

## 2023-07-14 NOTE — PROGRESS NOTES
"  Subjective:       Patient ID: Shahram Hills is an 83 y.o. female.     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 Ms. Hills is an 79 yo woman who initially saw me on 6/7/18 for further evaluation of neuroendocrine tumor. She initially presented with diarrhea, abdominal discomfort, weight loss. She was evaluated at [facility] and underwent workup below:  US abdomen on 3/14/18 showed numerous bilobar mixed echogenicity and mildly vascular hepatic lesions. Cholelithiasis without e/o acute cholecystitis.   MRI abdomen on 3/30/2018 showed innumerable hepatic metastases. L3 vertebral body metastasis with soft tissue component along the right margin of the L3 and upper L4 vertebral bodies, filling the right L3/4 neural foramen, and extending into the anterior aspect of the spinal canal on the right at the L3 and upper T4 levels. Associated with mild spinal canal narrowing.  CT chest on 4/6/2018 showed one lucent nodule measuring 7 mm in the T7 vertebral body, most likely representing metastatic disease.    EGD on 5/4/18 showed normal duodenum. Schatzki's ring in the lower third of the esophagus. Grade 1 esophagitis in the GE junction c/w mild distal esophagitis. Congestion and erythema in the antrum, pre-pyloric region and stomach body c/w gastritis. Biopsy of the stomach antrum showed mild chronic gastritis, neg for H. pylori.   Labs on 5/9/2018: WBC 6.9, H/H 11.6/34.3, MCV 87.5, plt count 279. On 3/9/18: Vit B12 level 173, folic acid 6.6  She was started on oral vit B12 and underwent a liver biopsy on 5/18/18. Pathology showed "metastatic well differentiated neuroendocrine tumor. Ki67 3%"     She saw me on 6/7/18 and complained of weight loss of 26 lbs over the past 3-4 months, also has diarrhea. No flushing, wheezing, palpitations. Has been having pain in right flank radiating down " "the right leg. No tingling/numbness, weakness. She can hold her bladder but when she urinates her diarrhea would come out as well. Started on dex 4 mg q6h the evening of 18.      2. MRI spine on 18 showed "Marrow replacing metastatic lesion of the L3 vertebral body with associated extra osseous expansion and complete effacement of the right L3-L4 neural foramina and additional effacement of the right lateral recess.  There is additional lateral extension and abutment of the right psoas muscle.  Superimposed degenerative change at this level results in mild spinal canal stenosis." I sent the patient to ED on 18 where she was evaluated by neurosurgery. Neurosurgery did not feel she was a candidate for surgical intervention.      3. Seen by Dr. Adames on 18. palliative radiation to the area of the L3 metastasis 18-18   4. 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  There is an adjacent metastatic lymph node, diffuse liver metastases, and multiple bone metastases. Index primary neoplasm SUV max 33.13. Adjacent lymph node SUV max 23. L3 bone metastasis SUV max 36.86. Left lobe SUV max 46.13   5. Lanreotide every 4 weeks started on 18  6. TACE to the right hepatic lobe on 19. TACE to the left hepatic lobe on 3/21/19.   7. TACE to the right hepatic lobe on 22. S/p conventional chemoembolization performed of the segment 2, 3, 4 branch of the left hepatic artery and segment 8 branch of the left hepatic artery on 22.  8. Gallium PET 22 showed progression. Discussed PRRT. PRRT #1 on 2022. Zometa every 3 months started 22. PRRT #2 on 23. #3 on 23. #4 on 23.      History of Present Illness:   Ms. Hills returns for follow up. Feeling well. Diarrhea controlled.     ECO     ROS:   See HPI. Otherwise negative.      Physical Examination:   Vital signs reviewed.   Gen: well hydrated, well developed, in no acute " distress.  HEENT: normocephalic, anicteric, PERRLA, EOMI, oropharynx clear  Neck: supple, no JVD, thyromegaly, cervical or supraclavicular LAD  Lungs: CTAB, no wheezes or rales  Heart: regular rate, frequent PVCs, regular pulse, no M/R/G  Abdomen: soft, no tenderness, non-distended, no hepatomegaly, no splenomegaly, no mass, or hernia.   Ext: b/l LE 1+ edema  Neuro: alert and oriented x 4, no focal neuro deficit  Skin: no rash, open wounds or ulcers  Psych: pleasant and appropriate mood and affect     Objective:      Laboratory Data:  Labs reviewed. Adequate for treatment    Imaging Data:     MRI abdomen 11/1/22:  1. History of neuroendocrine malignancy.  Numerous hepatic lesions, some remaining stable while others have mildly enlarged consistent with disease progression.  Stable osseous lesion in the L3 vertebral body.  2. Cholelithiasis and stable mild dilatation of the common bile duct and central intrahepatic biliary duct dilatation.  Of note, there is mass effect on the midportion of the common bile duct by dominant left hepatic lobe lesion.  3. Additional findings detailed above.  RECIST SUMMARY:     Date of prior examination for comparison: 05/16/2022     Lesion 1: Left hepatic lobe.  7.1 cm.  Series 9 image 52.  Prior measurement 6.3 cm.     Lesion 2: Right hepatic lobe lateral.  3.1 cm.  Series 9 image 30.  Prior measurement 3.2 cm.     Lesion 3: Right hepatic dome.  5.1 cm.  Series 9 image 19.  Prior measurement 4.4 cm.    Gallium PET 11/1/22:     Interval development of somatostatin tracer avid lesions in the skull, concerning for new metastatic lesions.     Numerous tracer avid hypoattenuating masses throughout the liver with increased SUV max compared to prior exam.     Tracer avid lesions in the C7 and L3 vertebral bodies, sternum, distal ileum and mesenteric node are similar to prior exam.     Judged by tracer avidity of the patient's tumor burden, PRRT would be a consideration if clinically  indicated.      Gallium PET 7/12/23:  Impression:     In this patient with carcinoid tumor of the ileum, there is overall similar distribution of tracer avid disease involving distal ileum, liver, mesenteric lymph node, and bones.  Index lesions as above.  No definite new tracer avid lesions.    Assessment and Plan:      1. Malignant carcinoid tumor of ileum    2. 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 Secondary neuroendocrine tumor of bone    4. Spine metastasis    5. 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 Carcinoid syndrome    8. Controlled type 2 diabetes mellitus with microalbuminuria, without long-term current use of insulin    9. Essential hypertension    10. Chronic congestive heart failure, unspecified heart failure type      1-7  - Ms Hills is an 84 yo woman with well differentiated low-grade neuroendocrine tumor of the ileum with mets to liver and bones, diagnosed on 5/18/2018. Started lanreotide every 4 weeks on 6/22/2018. S/p TACE to the right hepatic lobe on 2/14/19. TACE to the left hepatic lobe on 3/21/19.   - CT/MRI 1/12/22 showed mild progression in the liver. S/p TACE on 2/11/22 and 4/22/22   - Gallium PET 11/1/22 showed progression. Discussed PRRT. PRRT #1 on 12/14/2022. Completed 4 cycles on 6/14/23. Zometa every 3 months started 12/27/22. Last zometa 6/15/23.   - doing well.   - I have personally reviewed her PET scan with Dr Sagastume at tumor board. Stable disease  - c/w lanreotide every 4 weeks and zometa every 3 months. Next zometa in Sep 2023  - RTC in 4 weeks. If biomarkers and symptoms stable, will do PET and MRI in 6 months    7.  - better after xermelo and PRRT  - c/w lanreotide every 4 weeks.   - c/w xermelo    8.  - BS controlled  - c/w current medication    9.  - f/u with PCP    10.  - last echo in 2022 showed LVEF 40%  - f/u with cardiology this month      Sebastian Granados MD  Hematology and Medical Oncology  [facility]        Route Chart for Scheduling    Med Onc Chart Routing      Follow up with physician 2 months. keep scheduled appts. see me on 9/8 with labs then get lanreotide and zometa. see me on 10/6 then get lanreotide   Follow up with JUNAID    Infusion scheduling note   zometa every 3 months, next due on 9/8   Injection scheduling note lanreotide every 4 weeks   Labs CBC and CMP   Scheduling:  Preferred lab:  Lab interval:  chromogranin A, serotonin, pancreastatin, 5-HIAA   Imaging    Pharmacy appointment    Other referrals        Supportive Plan Information  OP ZOLEDRONIC ACID (ZOMETA) Q4W   Sebastian Granados MD   Upcoming Treatment Dates - OP ZOLEDRONIC ACID (ZOMETA) Q4W    9/23/2023       Medications       zoledronic 4 mg/100 mL infusion 4 mg  12/22/2023       Medications       zoledronic 4 mg/100 mL infusion 4 mg  3/21/2024       Medications       zoledronic 4 mg/100 mL infusion 4 mg  6/19/2024       Medications       zoledronic 4 mg/100 mL infusion 4 mg    Therapy Plan Information  LANREOTIDE  5. Medications  lanreotide injection 120 mg  120 mg, Subcutaneous, Every visit

## 2023-07-14 NOTE — NURSING
1030 pt here for lanreotide injection tolerated well to right hip area, no distress noted upon d/c to home

## 2023-07-18 LAB — SEROTONIN: 1180 NG/ML

## 2023-07-21 ENCOUNTER — LAB VISIT (OUTPATIENT)
Dept: LAB | Facility: OTHER | Age: 83
End: 2023-07-21
Attending: INTERNAL MEDICINE
Payer: MEDICARE

## 2023-07-21 DIAGNOSIS — I50.21 ACUTE SYSTOLIC HEART FAILURE: ICD-10-CM

## 2023-07-21 LAB
ANION GAP SERPL CALC-SCNC: 11 MMOL/L (ref 8–16)
BUN SERPL-MCNC: 9 MG/DL (ref 8–23)
CALCIUM SERPL-MCNC: 10 MG/DL (ref 8.7–10.5)
CHLORIDE SERPL-SCNC: 103 MMOL/L (ref 95–110)
CO2 SERPL-SCNC: 29 MMOL/L (ref 23–29)
CREAT SERPL-MCNC: 0.7 MG/DL (ref 0.5–1.4)
EST. GFR  (NO RACE VARIABLE): >60 ML/MIN/1.73 M^2
GLUCOSE SERPL-MCNC: 162 MG/DL (ref 70–110)
MAGNESIUM SERPL-MCNC: 1.9 MG/DL (ref 1.6–2.6)
POTASSIUM SERPL-SCNC: 4.6 MMOL/L (ref 3.5–5.1)
SODIUM SERPL-SCNC: 143 MMOL/L (ref 136–145)

## 2023-07-21 PROCEDURE — 36415 COLL VENOUS BLD VENIPUNCTURE: CPT | Mod: HCNC | Performed by: INTERNAL MEDICINE

## 2023-07-21 PROCEDURE — 80048 BASIC METABOLIC PNL TOTAL CA: CPT | Mod: HCNC | Performed by: INTERNAL MEDICINE

## 2023-07-21 PROCEDURE — 83735 ASSAY OF MAGNESIUM: CPT | Mod: HCNC | Performed by: INTERNAL MEDICINE

## 2023-07-24 ENCOUNTER — SPECIALTY PHARMACY (OUTPATIENT)
Dept: PHARMACY | Facility: CLINIC | Age: 83
End: 2023-07-24
Payer: MEDICARE

## 2023-07-24 ENCOUNTER — HOSPITAL ENCOUNTER (OUTPATIENT)
Dept: RADIOLOGY | Facility: OTHER | Age: 83
Discharge: HOME | End: 2023-07-24
Attending: INTERNAL MEDICINE
Payer: MEDICARE

## 2023-07-24 ENCOUNTER — OFFICE VISIT (OUTPATIENT)
Dept: CARDIOLOGY | Facility: CLINIC | Age: 83
End: 2023-07-24
Payer: MEDICARE

## 2023-07-24 ENCOUNTER — PATIENT MESSAGE (OUTPATIENT)
Dept: PHARMACY | Facility: CLINIC | Age: 83
End: 2023-07-24
Payer: MEDICARE

## 2023-07-24 VITALS
BODY MASS INDEX: 18.4 KG/M2 | DIASTOLIC BLOOD PRESSURE: 86 MMHG | HEART RATE: 81 BPM | OXYGEN SATURATION: 99 % | WEIGHT: 107.19 LBS | SYSTOLIC BLOOD PRESSURE: 124 MMHG

## 2023-07-24 DIAGNOSIS — I10 PRIMARY HYPERTENSION: ICD-10-CM

## 2023-07-24 DIAGNOSIS — M25.471 ANKLE EDEMA, BILATERAL: ICD-10-CM

## 2023-07-24 DIAGNOSIS — I50.22 CHRONIC SYSTOLIC HEART FAILURE: Primary | ICD-10-CM

## 2023-07-24 DIAGNOSIS — I70.0 ATHEROSCLEROSIS OF AORTA: ICD-10-CM

## 2023-07-24 DIAGNOSIS — R60.9 EDEMA, UNSPECIFIED TYPE: ICD-10-CM

## 2023-07-24 DIAGNOSIS — M25.472 ANKLE EDEMA, BILATERAL: ICD-10-CM

## 2023-07-24 PROCEDURE — 1126F PR PAIN SEVERITY QUANTIFIED, NO PAIN PRESENT: ICD-10-PCS | Mod: HCNC,CPTII,S$GLB, | Performed by: INTERNAL MEDICINE

## 2023-07-24 PROCEDURE — 93970 EXTREMITY STUDY: CPT | Mod: 26,HCNC,, | Performed by: RADIOLOGY

## 2023-07-24 PROCEDURE — 99999 PR PBB SHADOW E&M-EST. PATIENT-LVL IV: ICD-10-PCS | Mod: PBBFAC,HCNC,, | Performed by: INTERNAL MEDICINE

## 2023-07-24 PROCEDURE — 99214 OFFICE O/P EST MOD 30 MIN: CPT | Mod: HCNC,S$GLB,, | Performed by: INTERNAL MEDICINE

## 2023-07-24 PROCEDURE — 93970 US LOWER EXTREMITY VEINS BILATERAL INSUFFICIENCY: ICD-10-PCS | Mod: 26,HCNC,, | Performed by: RADIOLOGY

## 2023-07-24 PROCEDURE — 99499 UNLISTED E&M SERVICE: CPT | Mod: HCNC,S$GLB,, | Performed by: INTERNAL MEDICINE

## 2023-07-24 PROCEDURE — 3288F PR FALLS RISK ASSESSMENT DOCUMENTED: ICD-10-PCS | Mod: HCNC,CPTII,S$GLB, | Performed by: INTERNAL MEDICINE

## 2023-07-24 PROCEDURE — 3288F FALL RISK ASSESSMENT DOCD: CPT | Mod: HCNC,CPTII,S$GLB, | Performed by: INTERNAL MEDICINE

## 2023-07-24 PROCEDURE — 1101F PR PT FALLS ASSESS DOC 0-1 FALLS W/OUT INJ PAST YR: ICD-10-PCS | Mod: HCNC,CPTII,S$GLB, | Performed by: INTERNAL MEDICINE

## 2023-07-24 PROCEDURE — 1159F MED LIST DOCD IN RCRD: CPT | Mod: HCNC,CPTII,S$GLB, | Performed by: INTERNAL MEDICINE

## 2023-07-24 PROCEDURE — 3079F PR MOST RECENT DIASTOLIC BLOOD PRESSURE 80-89 MM HG: ICD-10-PCS | Mod: HCNC,CPTII,S$GLB, | Performed by: INTERNAL MEDICINE

## 2023-07-24 PROCEDURE — 1126F AMNT PAIN NOTED NONE PRSNT: CPT | Mod: HCNC,CPTII,S$GLB, | Performed by: INTERNAL MEDICINE

## 2023-07-24 PROCEDURE — 93970 EXTREMITY STUDY: CPT | Mod: TC,HCNC

## 2023-07-24 PROCEDURE — 99999 PR PBB SHADOW E&M-EST. PATIENT-LVL IV: CPT | Mod: PBBFAC,HCNC,, | Performed by: INTERNAL MEDICINE

## 2023-07-24 PROCEDURE — 3074F SYST BP LT 130 MM HG: CPT | Mod: HCNC,CPTII,S$GLB, | Performed by: INTERNAL MEDICINE

## 2023-07-24 PROCEDURE — 3079F DIAST BP 80-89 MM HG: CPT | Mod: HCNC,CPTII,S$GLB, | Performed by: INTERNAL MEDICINE

## 2023-07-24 PROCEDURE — 3074F PR MOST RECENT SYSTOLIC BLOOD PRESSURE < 130 MM HG: ICD-10-PCS | Mod: HCNC,CPTII,S$GLB, | Performed by: INTERNAL MEDICINE

## 2023-07-24 PROCEDURE — 1101F PT FALLS ASSESS-DOCD LE1/YR: CPT | Mod: HCNC,CPTII,S$GLB, | Performed by: INTERNAL MEDICINE

## 2023-07-24 PROCEDURE — 1159F PR MEDICATION LIST DOCUMENTED IN MEDICAL RECORD: ICD-10-PCS | Mod: HCNC,CPTII,S$GLB, | Performed by: INTERNAL MEDICINE

## 2023-07-24 PROCEDURE — 99214 PR OFFICE/OUTPT VISIT, EST, LEVL IV, 30-39 MIN: ICD-10-PCS | Mod: HCNC,S$GLB,, | Performed by: INTERNAL MEDICINE

## 2023-07-24 NOTE — PROGRESS NOTES
"[facility]    Chief Complaint  Chief Complaint   Patient presents with    Congestive Heart Failure       HPI:    She has been doing well.  No complaints.  Earlier this month, was noting some ankle edema.  Would progressed through the day and resolve with overnight elevation.  Started on Lasix.  Reports good urine output.  Edema has resolved.  Had no paroxysmal nocturnal dyspnea or orthopnea.  No exertional dyspnea.  No problems with orthostasis.    Medications  Current Outpatient Medications   Medication Sig Dispense Refill    blood sugar diagnostic (TRUE METRIX GLUCOSE TEST STRIP) Str USE TO CHECK BLOOD SUGAR TWICE DAILY 100 strip 11    carvediloL (COREG) 12.5 MG tablet Take 1 tablet (12.5 mg total) by mouth 2 (two) times daily with meals. 180 tablet 3    cyanocobalamin (VITAMIN B-12) 1000 MCG tablet Take 1 tablet (1,000 mcg total) by mouth once daily.      diphenoxylate-atropine 2.5-0.025 mg (LOMOTIL) 2.5-0.025 mg per tablet Take 1 tablet by mouth 4 (four) times daily as needed for Diarrhea. 90 tablet 2    ferrous sulfate 325 (65 FE) MG EC tablet Take 325 mg by mouth 3 (three) times daily with meals.      furosemide (LASIX) 20 MG tablet Take 2 tabs by mouth daily for 4 days and then take 1 tab daily 94 tablet 1    lancets Misc To check BG 2 times daily, to use with insurance preferred meter 100 each 11    latanoprost 0.005 % ophthalmic solution Place 1 drop into both eyes nightly.      losartan (COZAAR) 50 MG tablet Take 1 tablet (50 mg total) by mouth once daily. 90 tablet 3    metFORMIN (GLUCOPHAGE-XR) 500 MG ER 24hr tablet Take 2 tablets (1,000 mg total) by mouth daily with breakfast. 180 tablet 3    needle, disp, 22 G 22 gauge x 1" Ndle 1 application by Misc.(Non-Drug; Combo Route) route 3 (three) times daily as needed. 30 each 2    octreotide acetate (SANDOSTATIN) 100 mcg/mL (1 mL) Syrg Inject 1 mL (0.1 mg total) into the skin 3 (three) times daily as needed (diarrhea). 30 mL 2    potassium " chloride (KLOR-CON) 8 MEQ TbSR Take 1 tablet (8 mEq total) by mouth once daily. 90 tablet 1    syringe, disposable, 1 mL Syrg 1 application by Misc.(Non-Drug; Combo Route) route 3 (three) times daily as needed (diarrhea). 30 each 2    telotristat ethyl (XERMELO) 250 mg Tab Take 1 tablet (250 mg) by mouth 3 (three) times daily. 90 tablet 3    TRUEPLUS LANCETS 33 gauge Misc USE TO CHECK BLOOD SUGAR TWICE DAILY      vitamin D (VITAMIN D3) 1000 units Tab Take 400 Units by mouth once daily.      XIIDRA 5 % Dpet INSTILL ONE DROP INTO OU BID  3    blood-glucose meter kit To check BG 2 times daily, to use with insurance preferred meter (Patient not taking: Reported on 7/11/2023) 1 each 0    ezetimibe (ZETIA) 10 mg tablet Take 1 tablet (10 mg total) by mouth once daily. 90 tablet 3     No current facility-administered medications for this visit.        History  Past Medical History:   Diagnosis Date    Anemia 07/11/2018    B12 deficiency     Depression     per pcp note 7/2017 and given prozac and diazepam     Hyperlipidemia     Systolic heart failure     Weight loss     reviewed prior pcp Dr. Russo notes 2017 - labs reviewed: cmp wnl x tbili 1.5, tsh wnl, A1c 5.0, cbc wnl,D 36, hiv neg, hep c neg, hep b surg ag neg      Past Surgical History:   Procedure Laterality Date    EMBOLIZATION N/A 2/14/2019    Procedure: EMBOLIZATION, BLOOD VESSEL;  Surgeon: [facility];  Location: [facility] OR [address] [place] [address];  Service: Radiology;  Laterality: N/A;  Room 189/[place]    EMBOLIZATION N/A 3/21/2019    Procedure: EMBOLIZATION, BLOOD VESSEL;  Surgeon: [facility];  [facility];  Service: Radiology;  Laterality: N/A;  Room 189[facility]    ESOPHAGOGASTRODUODENOSCOPY  05/04/2018    esophageal ring, grade 1 esophagitis, gastritis     EYE SURGERY Bilateral     cataract removal    HYSTERECTOMY      fibroids     Social History     Socioeconomic History    Marital status:    Occupational History     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   Tobacco Use    Smoking status: Never    Smokeless tobacco: Never   Substance and Sexual Activity    Alcohol use: No     Comment: stopped in 2007 - hx of social drinking    Drug use: Never    Sexual activity: Not Currently     Partners: Male   Social History Narrative    Living in [facility] [place]    Not getting reg exericse     Social Determinants of Health     Financial Resource Strain: Medium Risk    Difficulty of Paying Living Expenses: Somewhat hard   Food Insecurity: No Food Insecurity    Worried About Running Out of Food in the Last Year: Never true    Ran Out of Food in the Last Year: Never true   Transportation Needs: No Transportation Needs    Lack of Transportation (Medical): No    Lack of Transportation (Non-Medical): No   Physical Activity: Inactive    Days of Exercise per Week: 0 days    Minutes of Exercise per Session: 0 min   Stress: No Stress Concern Present    Feeling of Stress : Only a little   Social Connections: Moderately Isolated    Frequency of Communication with Friends and Family: More than three times a week    Frequency of Social Gatherings with Friends and Family: Never    Attends Judaism Services: More than 4 times per year    Active Member of Clubs or Organizations: No    Attends Club or Organization Meetings: Never    Marital Status:    Housing Stability: Unknown    Unable to Pay for Housing in the Last Year: No    Unstable Housing in the Last Year: No     Family History   Problem Relation Age of Onset    Glaucoma Mother     Hypertension Mother     Heart attack Father     Cancer Father     Diabetes Sister     Cancer Brother         lung cancer, + tobacco    Diabetes Brother     Hypertension Brother     Stroke Brother     Emphysema Brother     COPD Brother     Asthma Sister     No Known Problems Sister     Hyperlipidemia Sister     Heart attack Sister         Allergies  Review of patient's allergies indicates:   Allergen Reactions     Epinephrine      carcinoid       Review of Systems   Review of Systems   Constitutional: Negative for malaise/fatigue, weight gain and weight loss.   Eyes:  Negative for visual disturbance.   Cardiovascular:  Negative for chest pain, claudication, cyanosis, dyspnea on exertion, irregular heartbeat, leg swelling, near-syncope, orthopnea, palpitations, paroxysmal nocturnal dyspnea and syncope.   Respiratory:  Negative for cough, hemoptysis, shortness of breath, sleep disturbances due to breathing and wheezing.    Hematologic/Lymphatic: Negative for bleeding problem. Does not bruise/bleed easily.   Skin:  Negative for poor wound healing.   Musculoskeletal:  Negative for muscle cramps and myalgias.   Gastrointestinal:  Negative for abdominal pain, anorexia, diarrhea, heartburn, hematemesis, hematochezia, melena, nausea and vomiting.   Genitourinary:  Negative for hematuria and nocturia.   Neurological:  Negative for excessive daytime sleepiness, dizziness, focal weakness, light-headedness and weakness.     Physical Exam  Vitals:    07/24/23 1006   BP: 124/86   Pulse: 81     Wt Readings from Last 1 Encounters:   07/24/23 48.6 kg (107 lb 3.2 oz)     Physical Exam  Vitals and nursing note reviewed.   Constitutional:       General: She is not in acute distress.     Appearance: She is not toxic-appearing or diaphoretic.   HENT:      Head: Normocephalic and atraumatic.      Mouth/Throat:      Lips: Pink.      Mouth: Mucous membranes are moist.   Eyes:      General: No scleral icterus.     Conjunctiva/sclera: Conjunctivae normal.   Neck:      Thyroid: No thyromegaly.      Vascular: No carotid bruit, hepatojugular reflux or JVD.      Trachea: Trachea normal.   Cardiovascular:      Rate and Rhythm: Normal rate and regular rhythm. No extrasystoles are present.     Chest Wall: PMI is not displaced.      Pulses:           Carotid pulses are 2+ on the right side and 2+ on the left side.       Radial pulses are 2+ on the right side  and 2+ on the left side.      Heart sounds: S1 normal and S2 normal. No murmur heard.    No friction rub. No S3 or S4 sounds.   Pulmonary:      Effort: Pulmonary effort is normal. No accessory muscle usage or respiratory distress.      Breath sounds: Normal breath sounds and air entry. No decreased breath sounds, wheezing, rhonchi or rales.   Abdominal:      General: Bowel sounds are normal. There is no distension or abdominal bruit.      Palpations: Abdomen is soft. There is no hepatomegaly, splenomegaly or pulsatile mass.      Tenderness: There is no abdominal tenderness.   Musculoskeletal:         General: No tenderness or deformity.      Right lower leg: No edema.      Left lower leg: No edema.   Skin:     General: Skin is warm and dry.      Capillary Refill: Capillary refill takes less than 2 seconds.      Coloration: Skin is not cyanotic or pale.      Nails: There is no clubbing.   Neurological:      General: No focal deficit present.      Mental Status: She is alert and oriented to person, place, and time.   Psychiatric:         Attention and Perception: Attention normal.         Mood and Affect: Mood normal.         Speech: Speech normal.         Behavior: Behavior normal. Behavior is cooperative.       Labs  Lab Visit on 07/21/2023   Component Date Value Ref Range Status    Magnesium 07/21/2023 1.9  1.6 - 2.6 mg/dL Final    Sodium 07/21/2023 143  136 - 145 mmol/L Final    Potassium 07/21/2023 4.6  3.5 - 5.1 mmol/L Final    Chloride 07/21/2023 103  95 - 110 mmol/L Final    CO2 07/21/2023 29  23 - 29 mmol/L Final    Glucose 07/21/2023 162 (H)  70 - 110 mg/dL Final    BUN 07/21/2023 9  8 - 23 mg/dL Final    Creatinine 07/21/2023 0.7  0.5 - 1.4 mg/dL Final    Calcium 07/21/2023 10.0  8.7 - 10.5 mg/dL Final    Anion Gap 07/21/2023 11  8 - 16 mmol/L Final    eGFR 07/21/2023 >60  >60 mL/min/1.73 m^2 Final   Lab Visit on 07/12/2023   Component Date Value Ref Range Status    WBC 07/12/2023 4.80  3.90 - 12.70 K/uL  Final    RBC 07/12/2023 4.40  4.00 - 5.40 M/uL Final    Hemoglobin 07/12/2023 13.5  12.0 - 16.0 g/dL Final    Hematocrit 07/12/2023 41.9  37.0 - 48.5 % Final    MCV 07/12/2023 95  82 - 98 fL Final    MCH 07/12/2023 30.7  27.0 - 31.0 pg Final    MCHC 07/12/2023 32.2  32.0 - 36.0 g/dL Final    RDW 07/12/2023 12.9  11.5 - 14.5 % Final    Platelets 07/12/2023 149 (L)  150 - 450 K/uL Final    MPV 07/12/2023 9.9  9.2 - 12.9 fL Final    Immature Granulocytes 07/12/2023 0.2  0.0 - 0.5 % Final    Gran # (ANC) 07/12/2023 3.7  1.8 - 7.7 K/uL Final    Immature Grans (Abs) 07/12/2023 0.01  0.00 - 0.04 K/uL Final    Comment: Mild elevation in immature granulocytes is non specific and   can be seen in a variety of conditions including stress response,   acute inflammation, trauma and pregnancy. Correlation with other   laboratory and clinical findings is essential.      Lymph # 07/12/2023 0.8 (L)  1.0 - 4.8 K/uL Final    Mono # 07/12/2023 0.4  0.3 - 1.0 K/uL Final    Eos # 07/12/2023 0.0  0.0 - 0.5 K/uL Final    Baso # 07/12/2023 0.01  0.00 - 0.20 K/uL Final    nRBC 07/12/2023 0  0 /100 WBC Final    Gran % 07/12/2023 76.1 (H)  38.0 - 73.0 % Final    Lymph % 07/12/2023 15.8 (L)  18.0 - 48.0 % Final    Mono % 07/12/2023 7.3  4.0 - 15.0 % Final    Eosinophil % 07/12/2023 0.4  0.0 - 8.0 % Final    Basophil % 07/12/2023 0.2  0.0 - 1.9 % Final    Differential Method 07/12/2023 Automated   Final    Sodium 07/12/2023 139  136 - 145 mmol/L Final    Potassium 07/12/2023 3.5  3.5 - 5.1 mmol/L Final    Chloride 07/12/2023 100  95 - 110 mmol/L Final    CO2 07/12/2023 28  23 - 29 mmol/L Final    Glucose 07/12/2023 142 (H)  70 - 110 mg/dL Final    BUN 07/12/2023 7 (L)  8 - 23 mg/dL Final    Creatinine 07/12/2023 0.7  0.5 - 1.4 mg/dL Final    Calcium 07/12/2023 10.1  8.7 - 10.5 mg/dL Final    Total Protein 07/12/2023 7.8  6.0 - 8.4 g/dL Final    Albumin 07/12/2023 3.9  3.5 - 5.2 g/dL Final    Total Bilirubin 07/12/2023 0.6  0.1 - 1.0 mg/dL Final     Comment: For infants and newborns, interpretation of results should be based  on gestational age, weight and in agreement with clinical  observations.    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    Please note that consuming serotonin containing foods and   some medications within 24 hours of sample collection may   result in a temporary elevation of 5HIAA.    -------------------ADDITIONAL INFORMATION-------------------  Liquid Chromatography-Tandem Mass Spectrometry (LC-MS/MS).  Values obtained from different assay methods or kits may be   different and cannot be used interchangeably. The results   cannot be interpreted as absolute evidence for the presence   or absence of malignant disease.  This test was developed and its performance characteristics   determined by [facility] in a manner consistent with CLIA   requirements. This test has not been cleared or approved by   the U.S. Food and Drug Administration.    Test Performed by:  [facility] [address] [address]  : Yogesh Roque M.D. Ph                           .D.; CLIA# [id number]      Serotonin 07/12/2023 1180 (H)  <=230 ng/mL Final    Comment: -------------------ADDITIONAL INFORMATION-------------------  This test was developed and its performance characteristics   determined by [facility] in a  manner consistent with CLIA   requirements. This test has not been cleared or approved by   the U.S. Food and Drug Administration.    Test Performed by:  [facility] [place] [address]  : Yogesh Roque M.D. Ph.D.; CLIA# [id number]      Chromogranin A 07/12/2023 1568 (H)  <93 ng/mL Final    Comment: Impaired renal or hepatic function or treatment with proton  pump inhibitors may result in artifactual elevations of   Chromogranin A.    -------------------ADDITIONAL INFORMATION-------------------  This test was developed and its performance characteristics  determined by [facility] in a manner consistent with CLIA  requirements. This test has not been cleared or approved by   the U.S. Food and Drug Administration.    In some immunoassays, the presence of unusually high   concentrations of analyte may result in a high-dose   &quot;hook&quot; effect. This may result in a lower or even normal   measured analyte concentration. If the reported result   is inconsistent with the clinical presentation, the   laboratory should be alerted for troubleshooting. For   diagnostic purposes, these immunoassay results should   always be assessed in conjunction with the patients medical   history, clinical examination and other findings.    The testing method is a homogeneous time-resolved   immunof                           luorescent assay manufactured by [employer]   and performed on the [employer] Kryptor Compact Plus.    Values obtained with different assay methods or kits may be   different and cannot be used interchangeably.    Test results cannot be interpreted as absolute evidence for   the presence or absence of malignant disease.    Test Performed by:  [facility] [place] [id number]  : Yogesh Roque M.D. Ph.D.; CLIA# [id number]      Hemoglobin A1C 07/12/2023  6.3 (H)  4.0 - 5.6 % Final    Comment: ADA Screening Guidelines:  5.7-6.4%  Consistent with prediabetes  >or=6.5%  Consistent with diabetes    High levels of fetal hemoglobin interfere with the HbA1C  assay. Heterozygous hemoglobin variants (HbS, HgC, etc)do  not significantly interfere with this assay.   However, presence of multiple variants may affect accuracy.      Estimated Avg Glucose 07/12/2023 134 (H)  68 - 131 mg/dL Final    Vitamin B-12 07/12/2023 >2000 (H)  210 - 950 pg/mL Final    Cholesterol 07/12/2023 267 (H)  120 - 199 mg/dL Final    Comment: The National Cholesterol Education Program (NCEP) has set the  following guidelines (reference ranges) for Cholesterol:  Optimal.....................<200 mg/dL  Borderline High.............200-239 mg/dL  High........................> or = 240 mg/dL      Triglycerides 07/12/2023 88  30 - 150 mg/dL Final    Comment: The National Cholesterol Education Program (NCEP) has set the  following guidelines (reference values) for triglycerides:  Normal......................<150 mg/dL  Borderline High.............150-199 mg/dL  High........................200-499 mg/dL      HDL 07/12/2023 99 (H)  40 - 75 mg/dL Final    Comment: The National Cholesterol Education Program (NCEP) has set the  following guidelines (reference values) for HDL Cholesterol:  Low...............<40 mg/dL  Optimal...........>60 mg/dL      LDL Cholesterol 07/12/2023 150.4  63.0 - 159.0 mg/dL Final    Comment: The National Cholesterol Education Program (NCEP) has set the  following guidelines (reference values) for LDL Cholesterol:  Optimal.......................<130 mg/dL  Borderline High...............130-159 mg/dL  High..........................160-189 mg/dL  Very High.....................>190 mg/dL      HDL/Cholesterol Ratio 07/12/2023 37.1  20.0 - 50.0 % Final    Total Cholesterol/HDL Ratio 07/12/2023 2.7  2.0 - 5.0 Final    Non-HDL Cholesterol 07/12/2023 168  mg/dL Final    Comment: Risk category  and Non-HDL cholesterol goals:  Coronary heart disease (CHD)or equivalent (10-year risk of CHD >20%):  Non-HDL cholesterol goal     <130 mg/dL  Two or more CHD risk factors and 10-year risk of CHD <= 20%:  Non-HDL cholesterol goal     <160 mg/dL  0 to 1 CHD risk factor:  Non-HDL cholesterol goal     <190 mg/dL      Vit D, 25-Hydroxy 07/12/2023 38  30 - 96 ng/mL Final    Comment: Vitamin D deficiency.........<10 ng/mL                              Vitamin D insufficiency......10-29 ng/mL       Vitamin D sufficiency........> or equal to 30 ng/mL  Vitamin D toxicity............>100 ng/mL      BNP 07/12/2023 1,114 (H)  0 - 99 pg/mL Final    Values of less than 100 pg/ml are consistent with non-CHF populations.   Lab Visit on 06/06/2023   Component Date Value Ref Range Status    WBC 06/06/2023 4.47  3.90 - 12.70 K/uL Final    RBC 06/06/2023 4.33  4.00 - 5.40 M/uL Final    Hemoglobin 06/06/2023 13.1  12.0 - 16.0 g/dL Final    Hematocrit 06/06/2023 41.1  37.0 - 48.5 % Final    MCV 06/06/2023 95  82 - 98 fL Final    MCH 06/06/2023 30.3  27.0 - 31.0 pg Final    MCHC 06/06/2023 31.9 (L)  32.0 - 36.0 g/dL Final    RDW 06/06/2023 13.4  11.5 - 14.5 % Final    Platelets 06/06/2023 172  150 - 450 K/uL Final    MPV 06/06/2023 10.1  9.2 - 12.9 fL Final    Immature Granulocytes 06/06/2023 0.2  0.0 - 0.5 % Final    Gran # (ANC) 06/06/2023 2.9  1.8 - 7.7 K/uL Final    Immature Grans (Abs) 06/06/2023 0.01  0.00 - 0.04 K/uL Final    Comment: Mild elevation in immature granulocytes is non specific and   can be seen in a variety of conditions including stress response,   acute inflammation, trauma and pregnancy. Correlation with other   laboratory and clinical findings is essential.      Lymph # 06/06/2023 1.0  1.0 - 4.8 K/uL Final    Mono # 06/06/2023 0.5  0.3 - 1.0 K/uL Final    Eos # 06/06/2023 0.0  0.0 - 0.5 K/uL Final    Baso # 06/06/2023 0.02  0.00 - 0.20 K/uL Final    nRBC 06/06/2023 0  0 /100 WBC Final    Gran % 06/06/2023 65.8   38.0 - 73.0 % Final    Lymph % 06/06/2023 22.4  18.0 - 48.0 % Final    Mono % 06/06/2023 10.5  4.0 - 15.0 % Final    Eosinophil % 06/06/2023 0.7  0.0 - 8.0 % Final    Basophil % 06/06/2023 0.4  0.0 - 1.9 % Final    Differential Method 06/06/2023 Automated   Final    Sodium 06/06/2023 144  136 - 145 mmol/L Final    Potassium 06/06/2023 3.9  3.5 - 5.1 mmol/L Final    Chloride 06/06/2023 104  95 - 110 mmol/L Final    CO2 06/06/2023 31 (H)  23 - 29 mmol/L Final    Glucose 06/06/2023 157 (H)  70 - 110 mg/dL Final    BUN 06/06/2023 7 (L)  8 - 23 mg/dL Final    Creatinine 06/06/2023 0.7  0.5 - 1.4 mg/dL Final    Calcium 06/06/2023 9.6  8.7 - 10.5 mg/dL Final    Total Protein 06/06/2023 7.1  6.0 - 8.4 g/dL Final    Albumin 06/06/2023 3.6  3.5 - 5.2 g/dL Final    Total Bilirubin 06/06/2023 0.6  0.1 - 1.0 mg/dL Final    Comment: For infants and newborns, interpretation of results should be based  on gestational age, weight and in agreement with clinical  observations.    Premature Infant recommended reference ranges:  Up to 24 hours.............<8.0 mg/dL  Up to 48 hours............<12.0 mg/dL  3-5 days..................<15.0 mg/dL  6-29 days.................<15.0 mg/dL      Alkaline Phosphatase 06/06/2023 68  55 - 135 U/L Final    AST 06/06/2023 32  10 - 40 U/L Final    ALT 06/06/2023 35  10 - 44 U/L Final    Anion Gap 06/06/2023 9  8 - 16 mmol/L Final    eGFR 06/06/2023 >60.0  >60 mL/min/1.73 m^2 Final    5-HIAA, Plasma (Neuroend) 06/06/2023 89 (H)  <=30 ng/mL Final    Comment: In this sample, the concentration of 5HIAA was markedly   elevated indicating the presence of a serotonin-producing   tumor.  Please note that consuming serotonin containing   foods and some medications within 24 hours of sample   collection may result in a temporary elevation of 5HIAA.    -------------------ADDITIONAL INFORMATION-------------------  Liquid Chromatography-Tandem Mass Spectrometry (LC-MS/MS).  Values obtained from different assay  methods or kits may be   different and cannot be used interchangeably. The results   cannot be interpreted as absolute evidence for the presence   or absence of malignant disease.  This test was developed and its performance characteristics   determined by [facility] in a manner consistent with CLIA   requirements. This test has not been cleared or approved by   the U.S. Food and Drug Administration.    Test Performed by:  [facility] - [place] [id number]  : Yogesh Iqbal Ph.D.; CLIA# [id number]      Pancreastatin 06/06/2023 4236  10 - 135 pg/mL Final    Comment: *Result verified by repeat analysis.    -------------------ADDITIONAL INFORMATION-------------------  This radioimmunoassay was developed and its  performance characteristics determined by  [facility]. It has not been  cleared or approved by the FDA and such  approval or clearance is not required at this  time. Values obtained with different methods,  different laboratories or with kits cannot  be used interchangeably with the results on  this report. The results cannot be interpreted  as absolute evidence of the presence or absence  of malignant disease.    Test Performed by:  [facility]  [address] [place] [address]      Serotonin 06/06/2023 1370 (H)  <=230 ng/mL Final    Comment: -------------------ADDITIONAL INFORMATION-------------------  This test was developed and its performance characteristics   determined by [facility] in a manner consistent with CLIA   requirements. This test has not been cleared or approved by   the U.S. Food and Drug Administration.    Test Performed by:  [facility]  [address] [place] [address]  : Yogesh Roque M.D. Ph.D.; CLIA# [id number]      Chromogranin A 06/06/2023 1568 (H)  <93 ng/mL Final    Comment:  Impaired renal or hepatic function or treatment with proton  pump inhibitors may result in artifactual elevations of   Chromogranin A.    -------------------ADDITIONAL INFORMATION-------------------  This test was developed and its performance characteristics  determined by [facility] in a manner consistent with CLIA  requirements. This test has not been cleared or approved by   the U.S. Food and Drug Administration.    In some immunoassays, the presence of unusually high   concentrations of analyte may result in a high-dose   &quot;hook&quot; effect. This may result in a lower or even normal   measured analyte concentration. If the reported result   is inconsistent with the clinical presentation, the   laboratory should be alerted for troubleshooting. For   diagnostic purposes, these immunoassay results should   always be assessed in conjunction with the patients medical   history, clinical examination and other findings.    The testing method is a homogeneous time-resolved   immunof                           luorescent assay manufactured by [employer]   and performed on the [employer] Kryptor Compact Plus.    Values obtained with different assay methods or kits may be   different and cannot be used interchangeably.    Test results cannot be interpreted as absolute evidence for   the presence or absence of malignant disease.    Test Performed by:  [place] [address]  : Yogesh Roque M.D. Ph.D.; CLIA# [id number]     Lab Visit on 05/10/2023   Component Date Value Ref Range Status    WBC 05/10/2023 6.75  3.90 - 12.70 K/uL Final    RBC 05/10/2023 4.28  4.00 - 5.40 M/uL Final    Hemoglobin 05/10/2023 13.0  12.0 - 16.0 g/dL Final    Hematocrit 05/10/2023 41.2  37.0 - 48.5 % Final    MCV 05/10/2023 96  82 - 98 fL Final    MCH 05/10/2023 30.4  27.0 - 31.0 pg Final    MCHC 05/10/2023 31.6 (L)  32.0 - 36.0 g/dL Final    RDW  05/10/2023 13.4  11.5 - 14.5 % Final    Platelets 05/10/2023 158  150 - 450 K/uL Final    MPV 05/10/2023 10.7  9.2 - 12.9 fL Final    Immature Granulocytes 05/10/2023 0.3  0.0 - 0.5 % Final    Gran # (ANC) 05/10/2023 5.4  1.8 - 7.7 K/uL Final    Immature Grans (Abs) 05/10/2023 0.02  0.00 - 0.04 K/uL Final    Comment: Mild elevation in immature granulocytes is non specific and   can be seen in a variety of conditions including stress response,   acute inflammation, trauma and pregnancy. Correlation with other   laboratory and clinical findings is essential.      Lymph # 05/10/2023 0.6 (L)  1.0 - 4.8 K/uL Final    Mono # 05/10/2023 0.7  0.3 - 1.0 K/uL Final    Eos # 05/10/2023 0.0  0.0 - 0.5 K/uL Final    Baso # 05/10/2023 0.02  0.00 - 0.20 K/uL Final    nRBC 05/10/2023 0  0 /100 WBC Final    Gran % 05/10/2023 79.7 (H)  38.0 - 73.0 % Final    Lymph % 05/10/2023 9.5 (L)  18.0 - 48.0 % Final    Mono % 05/10/2023 9.8  4.0 - 15.0 % Final    Eosinophil % 05/10/2023 0.4  0.0 - 8.0 % Final    Basophil % 05/10/2023 0.3  0.0 - 1.9 % Final    Differential Method 05/10/2023 Automated   Final    Sodium 05/10/2023 141  136 - 145 mmol/L Final    Potassium 05/10/2023 4.1  3.5 - 5.1 mmol/L Final    Chloride 05/10/2023 102  95 - 110 mmol/L Final    CO2 05/10/2023 31 (H)  23 - 29 mmol/L Final    Glucose 05/10/2023 258 (H)  70 - 110 mg/dL Final    BUN 05/10/2023 10  8 - 23 mg/dL Final    Creatinine 05/10/2023 0.8  0.5 - 1.4 mg/dL Final    Calcium 05/10/2023 9.7  8.7 - 10.5 mg/dL Final    Total Protein 05/10/2023 6.7  6.0 - 8.4 g/dL Final    Albumin 05/10/2023 3.4 (L)  3.5 - 5.2 g/dL Final    Total Bilirubin 05/10/2023 0.6  0.1 - 1.0 mg/dL Final    Comment: For infants and newborns, interpretation of results should be based  on gestational age, weight and in agreement with clinical  observations.    Premature Infant recommended reference ranges:  Up to 24 hours.............<8.0 mg/dL  Up to 48 hours............<12.0 mg/dL  3-5  days..................<15.0 mg/dL  6-29 days.................<15.0 mg/dL      Alkaline Phosphatase 05/10/2023 62  55 - 135 U/L Final    AST 05/10/2023 32  10 - 40 U/L Final    ALT 05/10/2023 30  10 - 44 U/L Final    Anion Gap 05/10/2023 8  8 - 16 mmol/L Final    eGFR 05/10/2023 >60.0  >60 mL/min/1.73 m^2 Final    5-HIAA, Plasma (Neuroend) 05/10/2023 74  Up to 22 ng/mL Final    Comment: -------------------ADDITIONAL INFORMATION-------------------  This GCMS assay was developed and its performance   characteristics determined by [employer].   It has not been cleared or approved by the [facility] and such   approval or clearance is not required at this time. Values   obtained with different methods, different laboratories or   with kits cannot be used interchangeably with the results   on this report. The results cannot be interpreted as   absolute evidence of the presence or absence of malignant   disease.    Test Performed by:  [address] [address]      Pancreastatin 05/10/2023 6481  10 - 135 pg/mL Final    Comment: Result verified by repeat analysis.    -------------------ADDITIONAL INFORMATION-------------------  This radioimmunoassay was developed and its  performance characteristics determined by  [facility] [employer]. It has not been  cleared or approved by the FDA and such  approval or clearance is not required at this  time. Values obtained with different methods,  different laboratories or with kits cannot  be used interchangeably with the results on  this report. The results cannot be interpreted  as absolute evidence of the presence or absence  of malignant disease.    Test Performed by:  [facility]  [address] [place] [id number]      Serotonin 05/10/2023 1070 (H)  <=230 ng/mL Final    Comment: -------------------ADDITIONAL INFORMATION-------------------  This test was developed and its performance characteristics   determined by  [facility] in a manner consistent with CLIA   requirements. This test has not been cleared or approved by   the U.S. Food and Drug Administration.    Test Performed by:  [facility] [place] [address]  : Yogesh Roque M.D. Ph.D.; CLIA# [id number]      Chromogranin A 05/10/2023 1500 (H)  <93 ng/mL Final    Comment: Impaired renal or hepatic function or treatment with proton  pump inhibitors may result in artifactual elevations of   Chromogranin A.    -------------------ADDITIONAL INFORMATION-------------------  This test was developed and its performance characteristics  determined by [facility] in a manner consistent with CLIA  requirements. This test has not been cleared or approved by   the U.S. Food and Drug Administration.    In some immunoassays, the presence of unusually high   concentrations of analyte may result in a high-dose   &quot;hook&quot; effect. This may result in a lower or even normal   measured analyte concentration. If the reported result   is inconsistent with the clinical presentation, the   laboratory should be alerted for troubleshooting. For   diagnostic purposes, these immunoassay results should   always be assessed in conjunction with the patients medical   history, clinical examination and other findings.    The testing method is a homogeneous time-resolved   immunof                           luorescent assay manufactured by [employer]   and performed on the [employer] Kryptor Compact Plus.    Values obtained with different assay methods or kits may be   different and cannot be used interchangeably.    Test results cannot be interpreted as absolute evidence for   the presence or absence of malignant disease.    Test Performed by:  [facility] [employer] [facility] [place] [id number]  : Yogesh Roque M.D. Ph.D.; CLIA# [id number]     Lab Visit on  04/04/2023   Component Date Value Ref Range Status    Microalbumin, Urine 04/04/2023 61.0  ug/mL Final    Creatinine, Urine 04/04/2023 57.0  15.0 - 325.0 mg/dL Final    Microalb/Creat Ratio 04/04/2023 107.0 (H)  0.0 - 30.0 ug/mg Final   Lab Visit on 04/04/2023   Component Date Value Ref Range Status    WBC 04/04/2023 5.40  3.90 - 12.70 K/uL Final    RBC 04/04/2023 4.34  4.00 - 5.40 M/uL Final    Hemoglobin 04/04/2023 12.8  12.0 - 16.0 g/dL Final    Hematocrit 04/04/2023 40.4  37.0 - 48.5 % Final    MCV 04/04/2023 93  82 - 98 fL Final    MCH 04/04/2023 29.5  27.0 - 31.0 pg Final    MCHC 04/04/2023 31.7 (L)  32.0 - 36.0 g/dL Final    RDW 04/04/2023 13.2  11.5 - 14.5 % Final    Platelets 04/04/2023 170  150 - 450 K/uL Final    MPV 04/04/2023 11.0  9.2 - 12.9 fL Final    Immature Granulocytes 04/04/2023 0.2  0.0 - 0.5 % Final    Gran # (ANC) 04/04/2023 3.9  1.8 - 7.7 K/uL Final    Immature Grans (Abs) 04/04/2023 0.01  0.00 - 0.04 K/uL Final    Comment: Mild elevation in immature granulocytes is non specific and   can be seen in a variety of conditions including stress response,   acute inflammation, trauma and pregnancy. Correlation with other   laboratory and clinical findings is essential.      Lymph # 04/04/2023 1.0  1.0 - 4.8 K/uL Final    Mono # 04/04/2023 0.5  0.3 - 1.0 K/uL Final    Eos # 04/04/2023 0.0  0.0 - 0.5 K/uL Final    Baso # 04/04/2023 0.02  0.00 - 0.20 K/uL Final    nRBC 04/04/2023 0  0 /100 WBC Final    Gran % 04/04/2023 72.2  38.0 - 73.0 % Final    Lymph % 04/04/2023 17.6 (L)  18.0 - 48.0 % Final    Mono % 04/04/2023 8.9  4.0 - 15.0 % Final    Eosinophil % 04/04/2023 0.7  0.0 - 8.0 % Final    Basophil % 04/04/2023 0.4  0.0 - 1.9 % Final    Differential Method 04/04/2023 Automated   Final    Sodium 04/04/2023 143  136 - 145 mmol/L Final    Potassium 04/04/2023 4.0  3.5 - 5.1 mmol/L Final    Chloride 04/04/2023 102  95 - 110 mmol/L Final    CO2 04/04/2023 30 (H)  23 - 29 mmol/L Final    Glucose  04/04/2023 161 (H)  70 - 110 mg/dL Final    BUN 04/04/2023 8  8 - 23 mg/dL Final    Creatinine 04/04/2023 0.7  0.5 - 1.4 mg/dL Final    Calcium 04/04/2023 9.7  8.7 - 10.5 mg/dL Final    Total Protein 04/04/2023 7.0  6.0 - 8.4 g/dL Final    Albumin 04/04/2023 3.6  3.5 - 5.2 g/dL Final    Total Bilirubin 04/04/2023 0.6  0.1 - 1.0 mg/dL Final    Comment: For infants and newborns, interpretation of results should be based  on gestational age, weight and in agreement with clinical  observations.    Premature Infant recommended reference ranges:  Up to 24 hours.............<8.0 mg/dL  Up to 48 hours............<12.0 mg/dL  3-5 days..................<15.0 mg/dL  6-29 days.................<15.0 mg/dL      Alkaline Phosphatase 04/04/2023 63  55 - 135 U/L Final    AST 04/04/2023 30  10 - 40 U/L Final    ALT 04/04/2023 27  10 - 44 U/L Final    Anion Gap 04/04/2023 11  8 - 16 mmol/L Final    eGFR 04/04/2023 >60.0  >60 mL/min/1.73 m^2 Final    5-HIAA, Plasma (Neuroend) 04/04/2023 64  Up to 22 ng/mL Final    Comment: -------------------ADDITIONAL INFORMATION-------------------  This GCMS assay was developed and its performance   characteristics determined by [facility] [facility].   It has not been cleared or approved by the FDA and such   approval or clearance is not required at this time. Values   obtained with different methods, different laboratories or   with kits cannot be used interchangeably with the results   on this report. The results cannot be interpreted as   absolute evidence of the presence or absence of malignant   disease.    Test Performed by:  [employer]  [address] [place] [address]      Pancreastatin 04/04/2023 5668  10 - 135 pg/mL Final    Comment: Result verified by repeat analysis.    -------------------ADDITIONAL INFORMATION-------------------  This radioimmunoassay was developed and its  performance characteristics determined by  [facility]. It has not been  cleared  or approved by the FDA and such  approval or clearance is not required at this  time. Values obtained with different methods,  different laboratories or with kits cannot  be used interchangeably with the results on  this report. The results cannot be interpreted  as absolute evidence of the presence or absence  of malignant disease.    Test Performed by:  [facility]  [address] [place] [address]      Serotonin 04/04/2023 1360 (H)  <=230 ng/mL Final    Comment: -------------------ADDITIONAL INFORMATION-------------------  This test was developed and its performance characteristics   determined by [facility] in a manner consistent with CLIA   requirements. This test has not been cleared or approved by   the U.S. Food and Drug Administration.    Test Performed by:  [facility]  [address] [place] [address]  : Yogesh Roque M.D. Ph.D.; CLIA# [id number]      Chromogranin A 04/04/2023 1630 (H)  <93 ng/mL Final    Comment: Impaired renal or hepatic function or treatment with proton  pump inhibitors may result in artifactual elevations of   Chromogranin A.    -------------------ADDITIONAL INFORMATION-------------------  This test was developed and its performance characteristics  determined by [facility] in a manner consistent with CLIA  requirements. This test has not been cleared or approved by   the U.S. Food and Drug Administration.    In some immunoassays, the presence of unusually high   concentrations of analyte may result in a high-dose   &quot;hook&quot; effect. This may result in a lower or even normal   measured analyte concentration. If the reported result   is inconsistent with the clinical presentation, the   laboratory should be alerted for troubleshooting. For   diagnostic purposes, these immunoassay results should   always be assessed in conjunction with the patients medical   history, clinical examination and  other findings.    The testing method is a homogeneous time-resolved   immunof                           luorescent assay manufactured by [employer]   and performed on the [employer] Kryptor Compact Plus.    Values obtained with different assay methods or kits may be   different and cannot be used interchangeably.    Test results cannot be interpreted as absolute evidence for   the presence or absence of malignant disease.    Test Performed by:  [facility]  [address] [place] [address]  : Yogesh Roque M.D. Ph.D.; IA# [id number]      Hemoglobin A1C 04/04/2023 6.6 (H)  4.0 - 5.6 % Final    Comment: ADA Screening Guidelines:  5.7-6.4%  Consistent with prediabetes  >or=6.5%  Consistent with diabetes    High levels of fetal hemoglobin interfere with the HbA1C  assay. Heterozygous hemoglobin variants (HbS, HgC, etc)do  not significantly interfere with this assay.   However, presence of multiple variants may affect accuracy.      Estimated Avg Glucose 04/04/2023 143 (H)  68 - 131 mg/dL Final    Cholesterol 04/04/2023 221 (H)  120 - 199 mg/dL Final    Comment: The National Cholesterol Education Program (NCEP) has set the  following guidelines (reference ranges) for Cholesterol:  Optimal.....................<200 mg/dL  Borderline High.............200-239 mg/dL  High........................> or = 240 mg/dL      Triglycerides 04/04/2023 73  30 - 150 mg/dL Final    Comment: The National Cholesterol Education Program (NCEP) has set the  following guidelines (reference values) for triglycerides:  Normal......................<150 mg/dL  Borderline High.............150-199 mg/dL  High........................200-499 mg/dL      HDL 04/04/2023 90 (H)  40 - 75 mg/dL Final    Comment: The National Cholesterol Education Program (NCEP) has set the  following guidelines (reference values) for HDL Cholesterol:  Low...............<40 mg/dL  Optimal...........>60  mg/dL      LDL Cholesterol 04/04/2023 116.4  63.0 - 159.0 mg/dL Final    Comment: The National Cholesterol Education Program (NCEP) has set the  following guidelines (reference values) for LDL Cholesterol:  Optimal.......................<130 mg/dL  Borderline High...............130-159 mg/dL  High..........................160-189 mg/dL  Very High.....................>190 mg/dL      HDL/Cholesterol Ratio 04/04/2023 40.7  20.0 - 50.0 % Final    Total Cholesterol/HDL Ratio 04/04/2023 2.5  2.0 - 5.0 Final    Non-HDL Cholesterol 04/04/2023 131  mg/dL Final    Comment: Risk category and Non-HDL cholesterol goals:  Coronary heart disease (CHD)or equivalent (10-year risk of CHD >20%):  Non-HDL cholesterol goal     <130 mg/dL  Two or more CHD risk factors and 10-year risk of CHD <= 20%:  Non-HDL cholesterol goal     <160 mg/dL  0 to 1 CHD risk factor:  Non-HDL cholesterol goal     <190 mg/dL     Lab Visit on 03/08/2023   Component Date Value Ref Range Status    WBC 03/08/2023 6.13  3.90 - 12.70 K/uL Final    RBC 03/08/2023 4.35  4.00 - 5.40 M/uL Final    Hemoglobin 03/08/2023 12.8  12.0 - 16.0 g/dL Final    Hematocrit 03/08/2023 40.0  37.0 - 48.5 % Final    MCV 03/08/2023 92  82 - 98 fL Final    MCH 03/08/2023 29.4  27.0 - 31.0 pg Final    MCHC 03/08/2023 32.0  32.0 - 36.0 g/dL Final    RDW 03/08/2023 12.9  11.5 - 14.5 % Final    Platelets 03/08/2023 156  150 - 450 K/uL Final    MPV 03/08/2023 10.5  9.2 - 12.9 fL Final    Immature Granulocytes 03/08/2023 0.5  0.0 - 0.5 % Final    Gran # (ANC) 03/08/2023 4.7  1.8 - 7.7 K/uL Final    Immature Grans (Abs) 03/08/2023 0.03  0.00 - 0.04 K/uL Final    Comment: Mild elevation in immature granulocytes is non specific and   can be seen in a variety of conditions including stress response,   acute inflammation, trauma and pregnancy. Correlation with other   laboratory and clinical findings is essential.      Lymph # 03/08/2023 0.7 (L)  1.0 - 4.8 K/uL Final    Mono # 03/08/2023 0.6  0.3  - 1.0 K/uL Final    Eos # 03/08/2023 0.1  0.0 - 0.5 K/uL Final    Baso # 03/08/2023 0.02  0.00 - 0.20 K/uL Final    nRBC 03/08/2023 0  0 /100 WBC Final    Gran % 03/08/2023 77.2 (H)  38.0 - 73.0 % Final    Lymph % 03/08/2023 11.4 (L)  18.0 - 48.0 % Final    Mono % 03/08/2023 9.5  4.0 - 15.0 % Final    Eosinophil % 03/08/2023 1.1  0.0 - 8.0 % Final    Basophil % 03/08/2023 0.3  0.0 - 1.9 % Final    Differential Method 03/08/2023 Automated   Final    Sodium 03/08/2023 143  136 - 145 mmol/L Final    Potassium 03/08/2023 4.6  3.5 - 5.1 mmol/L Final    Chloride 03/08/2023 103  95 - 110 mmol/L Final    CO2 03/08/2023 35 (H)  23 - 29 mmol/L Final    Glucose 03/08/2023 174 (H)  70 - 110 mg/dL Final    BUN 03/08/2023 9  8 - 23 mg/dL Final    Creatinine 03/08/2023 0.7  0.5 - 1.4 mg/dL Final    Calcium 03/08/2023 9.9  8.7 - 10.5 mg/dL Final    Total Protein 03/08/2023 7.2  6.0 - 8.4 g/dL Final    Albumin 03/08/2023 3.7  3.5 - 5.2 g/dL Final    Total Bilirubin 03/08/2023 0.6  0.1 - 1.0 mg/dL Final    Comment: For infants and newborns, interpretation of results should be based  on gestational age, weight and in agreement with clinical  observations.    Premature Infant recommended reference ranges:  Up to 24 hours.............<8.0 mg/dL  Up to 48 hours............<12.0 mg/dL  3-5 days..................<15.0 mg/dL  6-29 days.................<15.0 mg/dL      Alkaline Phosphatase 03/08/2023 57  55 - 135 U/L Final    AST 03/08/2023 24  10 - 40 U/L Final    ALT 03/08/2023 24  10 - 44 U/L Final    Anion Gap 03/08/2023 5 (L)  8 - 16 mmol/L Final    eGFR 03/08/2023 >60.0  >60 mL/min/1.73 m^2 Final    5-HIAA, Plasma (Neuroend) 03/08/2023 67  Up to 22 ng/mL Final    Comment: -------------------ADDITIONAL INFORMATION-------------------  This GCMS assay was developed and its performance   characteristics determined by [employer]   It has not been cleared or approved by the FDA and such   approval or clearance is not required  at this time. Values   obtained with different methods, different laboratories or   with kits cannot be used interchangeably with the results   on this report. The results cannot be interpreted as   absolute evidence of the presence or absence of malignant   disease.    Test Performed by:  [address] [address]      Pancreastatin 03/08/2023 7264  10 - 135 pg/mL Final    Comment: *Result verified by repeat analysis.    -------------------ADDITIONAL INFORMATION-------------------  This radioimmunoassay was developed and its  performance characteristics determined by  [facility]. It has not been  cleared or approved by the FDA and such  approval or clearance is not required at this  time. Values obtained with different methods,  different laboratories or with kits cannot  be used interchangeably with the results on  this report. The results cannot be interpreted  as absolute evidence of the presence or absence  of malignant disease.    Test Performed by:  [address] [address]      Serotonin 03/08/2023 1430 (H)  <=230 ng/mL Final    Comment: -------------------ADDITIONAL INFORMATION-------------------  This test was developed and its performance characteristics   determined by [facility] in a manner consistent with CLIA   requirements. This test has not been cleared or approved by   the U.S. Food and Drug Administration.    Test Performed by:  [facility] [place] [id number]  : Yogesh Roque M.D. Ph.D.; CLIA# [id number]      Chromogranin A 03/08/2023 1538 (H)  <93 ng/mL Final    Comment: Impaired renal or hepatic function or treatment with proton  pump inhibitors may result in artifactual elevations of   Chromogranin A.    -------------------ADDITIONAL INFORMATION-------------------  This test was developed and its performance  characteristics  determined by [facility] in a manner consistent with CLIA  requirements. This test has not been cleared or approved by   the U.S. Food and Drug Administration.    In some immunoassays, the presence of unusually high   concentrations of analyte may result in a high-dose   &quot;hook&quot; effect. This may result in a lower or even normal   measured analyte concentration. If the reported result   is inconsistent with the clinical presentation, the   laboratory should be alerted for troubleshooting. For   diagnostic purposes, these immunoassay results should   always be assessed in conjunction with the patients medical   history, clinical examination and other findings.    The testing method is a homogeneous time-resolved   immunof                           luorescent assay manufactured by [employer]   and performed on the [employer] Kryptor Compact Plus.    Values obtained with different assay methods or kits may be   different and cannot be used interchangeably.    Test results cannot be interpreted as absolute evidence for   the presence or absence of malignant disease.    Test Performed by:  [place] [address]  : Yogesh Roque M.D. Ph.D.; CLIA# [id number]     Lab Visit on 02/07/2023   Component Date Value Ref Range Status    WBC 02/07/2023 6.67  3.90 - 12.70 K/uL Final    RBC 02/07/2023 4.30  4.00 - 5.40 M/uL Final    Hemoglobin 02/07/2023 12.6  12.0 - 16.0 g/dL Final    Hematocrit 02/07/2023 39.8  37.0 - 48.5 % Final    MCV 02/07/2023 93  82 - 98 fL Final    MCH 02/07/2023 29.3  27.0 - 31.0 pg Final    MCHC 02/07/2023 31.7 (L)  32.0 - 36.0 g/dL Final    RDW 02/07/2023 13.0  11.5 - 14.5 % Final    Platelets 02/07/2023 225  150 - 450 K/uL Final    MPV 02/07/2023 10.7  9.2 - 12.9 fL Final    Immature Granulocytes 02/07/2023 0.3  0.0 - 0.5 % Final    Gran # (ANC) 02/07/2023 4.6  1.8 - 7.7 K/uL Final     Immature Grans (Abs) 02/07/2023 0.02  0.00 - 0.04 K/uL Final    Comment: Mild elevation in immature granulocytes is non specific and   can be seen in a variety of conditions including stress response,   acute inflammation, trauma and pregnancy. Correlation with other   laboratory and clinical findings is essential.      Lymph # 02/07/2023 1.2  1.0 - 4.8 K/uL Final    Mono # 02/07/2023 0.8  0.3 - 1.0 K/uL Final    Eos # 02/07/2023 0.1  0.0 - 0.5 K/uL Final    Baso # 02/07/2023 0.02  0.00 - 0.20 K/uL Final    nRBC 02/07/2023 0  0 /100 WBC Final    Gran % 02/07/2023 69.1  38.0 - 73.0 % Final    Lymph % 02/07/2023 18.0  18.0 - 48.0 % Final    Mono % 02/07/2023 11.4  4.0 - 15.0 % Final    Eosinophil % 02/07/2023 0.9  0.0 - 8.0 % Final    Basophil % 02/07/2023 0.3  0.0 - 1.9 % Final    Differential Method 02/07/2023 Automated   Final    Sodium 02/07/2023 143  136 - 145 mmol/L Final    Potassium 02/07/2023 4.2  3.5 - 5.1 mmol/L Final    Chloride 02/07/2023 103  95 - 110 mmol/L Final    CO2 02/07/2023 28  23 - 29 mmol/L Final    Glucose 02/07/2023 168 (H)  70 - 110 mg/dL Final    BUN 02/07/2023 10  8 - 23 mg/dL Final    Creatinine 02/07/2023 0.7  0.5 - 1.4 mg/dL Final    Calcium 02/07/2023 10.0  8.7 - 10.5 mg/dL Final    Total Protein 02/07/2023 6.8  6.0 - 8.4 g/dL Final    Albumin 02/07/2023 3.5  3.5 - 5.2 g/dL Final    Total Bilirubin 02/07/2023 0.3  0.1 - 1.0 mg/dL Final    Comment: For infants and newborns, interpretation of results should be based  on gestational age, weight and in agreement with clinical  observations.    Premature Infant recommended reference ranges:  Up to 24 hours.............<8.0 mg/dL  Up to 48 hours............<12.0 mg/dL  3-5 days..................<15.0 mg/dL  6-29 days.................<15.0 mg/dL      Alkaline Phosphatase 02/07/2023 57  55 - 135 U/L Final    AST 02/07/2023 20  10 - 40 U/L Final    ALT 02/07/2023 20  10 - 44 U/L Final    Anion Gap 02/07/2023 12  8 - 16 mmol/L Final    eGFR  02/07/2023 >60.0  >60 mL/min/1.73 m^2 Final    Serotonin 02/07/2023 2710 (H)  <=230 ng/mL Final    Comment: -------------------ADDITIONAL INFORMATION-------------------  This test was developed and its performance characteristics   determined by [facility] in a manner consistent with CLIA   requirements. This test has not been cleared or approved by   the U.S. Food and Drug Administration.    Test Performed by:  [facility]  [address] [id number]  : Yogesh Roque M.D. Ph.D.; CLIA# [id number]      Chromogranin A 02/07/2023 2151 (H)  <93 ng/mL Final    Comment: Impaired renal or hepatic function or treatment with proton  pump inhibitors may result in artifactual elevations of   Chromogranin A.    -------------------ADDITIONAL INFORMATION-------------------  This test was developed and its performance characteristics  determined by [facility] in a manner consistent with CLIA  requirements. This test has not been cleared or approved by   the U.S. Food and Drug Administration.    In some immunoassays, the presence of unusually high   concentrations of analyte may result in a high-dose   &quot;hook&quot; effect. This may result in a lower or even normal   measured analyte concentration. If the reported result   is inconsistent with the clinical presentation, the   laboratory should be alerted for troubleshooting. For   diagnostic purposes, these immunoassay results should   always be assessed in conjunction with the patients medical   history, clinical examination and other findings.    The testing method is a homogeneous time-resolved   immunof                           luorescent assay manufactured by [employer]   and performed on the [employer] Compact Plus.    Values obtained with different assay methods or kits may be   different and cannot be used interchangeably.    Test results cannot be interpreted as absolute evidence for    the presence or absence of malignant disease.    Test Performed by:  [facility]  [address], [place] [address]  : Yogesh Roque M.D. Ph.D.; CLIA# [id number]     Lab Visit on 01/31/2023   Component Date Value Ref Range Status    WBC 01/31/2023 5.88  3.90 - 12.70 K/uL Final    RBC 01/31/2023 4.43  4.00 - 5.40 M/uL Final    Hemoglobin 01/31/2023 13.1  12.0 - 16.0 g/dL Final    Hematocrit 01/31/2023 41.1  37.0 - 48.5 % Final    MCV 01/31/2023 93  82 - 98 fL Final    MCH 01/31/2023 29.6  27.0 - 31.0 pg Final    MCHC 01/31/2023 31.9 (L)  32.0 - 36.0 g/dL Final    RDW 01/31/2023 13.0  11.5 - 14.5 % Final    Platelets 01/31/2023 231  150 - 450 K/uL Final    MPV 01/31/2023 10.0  9.2 - 12.9 fL Final    Immature Granulocytes 01/31/2023 0.2  0.0 - 0.5 % Final    Gran # (ANC) 01/31/2023 4.2  1.8 - 7.7 K/uL Final    Immature Grans (Abs) 01/31/2023 0.01  0.00 - 0.04 K/uL Final    Comment: Mild elevation in immature granulocytes is non specific and   can be seen in a variety of conditions including stress response,   acute inflammation, trauma and pregnancy. Correlation with other   laboratory and clinical findings is essential.      Lymph # 01/31/2023 1.1  1.0 - 4.8 K/uL Final    Mono # 01/31/2023 0.5  0.3 - 1.0 K/uL Final    Eos # 01/31/2023 0.1  0.0 - 0.5 K/uL Final    Baso # 01/31/2023 0.01  0.00 - 0.20 K/uL Final    nRBC 01/31/2023 0  0 /100 WBC Final    Gran % 01/31/2023 71.2  38.0 - 73.0 % Final    Lymph % 01/31/2023 18.7  18.0 - 48.0 % Final    Mono % 01/31/2023 8.8  4.0 - 15.0 % Final    Eosinophil % 01/31/2023 0.9  0.0 - 8.0 % Final    Basophil % 01/31/2023 0.2  0.0 - 1.9 % Final    Differential Method 01/31/2023 Automated   Final    Sodium 01/31/2023 138  136 - 145 mmol/L Final    Potassium 01/31/2023 4.0  3.5 - 5.1 mmol/L Final    Chloride 01/31/2023 102  95 - 110 mmol/L Final    CO2 01/31/2023 31 (H)  23 - 29 mmol/L Final    Glucose 01/31/2023 303  (H)  70 - 110 mg/dL Final    BUN 01/31/2023 9  8 - 23 mg/dL Final    Creatinine 01/31/2023 0.8  0.5 - 1.4 mg/dL Final    Calcium 01/31/2023 9.5  8.7 - 10.5 mg/dL Final    Total Protein 01/31/2023 7.1  6.0 - 8.4 g/dL Final    Albumin 01/31/2023 3.6  3.5 - 5.2 g/dL Final    Total Bilirubin 01/31/2023 0.4  0.1 - 1.0 mg/dL Final    Comment: For infants and newborns, interpretation of results should be based  on gestational age, weight and in agreement with clinical  observations.    Premature Infant recommended reference ranges:  Up to 24 hours.............<8.0 mg/dL  Up to 48 hours............<12.0 mg/dL  3-5 days..................<15.0 mg/dL  6-29 days.................<15.0 mg/dL      Alkaline Phosphatase 01/31/2023 54 (L)  55 - 135 U/L Final    AST 01/31/2023 20  10 - 40 U/L Final    ALT 01/31/2023 20  10 - 44 U/L Final    Anion Gap 01/31/2023 5 (L)  8 - 16 mmol/L Final    eGFR 01/31/2023 >60  >60 mL/min/1.73 m^2 Final    5-HIAA, Plasma (Neuroend) 01/31/2023 120  Up to 22 ng/mL Final    Comment: -------------------ADDITIONAL INFORMATION-------------------  This GCMS assay was developed and its performance   characteristics determined by [facility].   It has not been cleared or approved by the FDA and such   approval or clearance is not required at this time. Values   obtained with different methods, different laboratories or   with kits cannot be used interchangeably with the results   on this report. The results cannot be interpreted as   absolute evidence of the presence or absence of malignant   disease.    Test Performed by:  [facility]  [address] [place] [address]      Pancreastatin 01/31/2023 7831  10 - 135 pg/mL Final    Comment: *Result verified by repeat analysis.    -------------------ADDITIONAL INFORMATION-------------------  This radioimmunoassay was developed and its  performance characteristics determined by  [facility]. It has not been  cleared or  approved by the FDA and such  approval or clearance is not required at this  time. Values obtained with different methods,  different laboratories or with kits cannot  be used interchangeably with the results on  this report. The results cannot be interpreted  as absolute evidence of the presence or absence  of malignant disease.    Test Performed by:  [facility]  [address] [place] [address]      Serotonin 01/31/2023 2260 (H)  <=230 ng/mL Final    Comment: -------------------ADDITIONAL INFORMATION-------------------  This test was developed and its performance characteristics   determined by [facility] in a manner consistent with CLIA   requirements. This test has not been cleared or approved by   the U.S. Food and Drug Administration.    Test Performed by:  [facility]  [address] [place] [id number]  : Yogesh Roque M.D. Ph.D.; CLIA# [id number]      Chromogranin A 01/31/2023 1567 (H)  <93 ng/mL Final    Comment: Impaired renal or hepatic function or treatment with proton  pump inhibitors may result in artifactual elevations of   Chromogranin A.    -------------------ADDITIONAL INFORMATION-------------------  This test was developed and its performance characteristics  determined by [facility] in a manner consistent with CLIA  requirements. This test has not been cleared or approved by   the U.S. Food and Drug Administration.    In some immunoassays, the presence of unusually high   concentrations of analyte may result in a high-dose   &quot;hook&quot; effect. This may result in a lower or even normal   measured analyte concentration. If the reported result   is inconsistent with the clinical presentation, the   laboratory should be alerted for troubleshooting. For   diagnostic purposes, these immunoassay results should   always be assessed in conjunction with the patients medical   history, clinical examination and  other findings.    The testing method is a homogeneous time-resolved   immunof                           luorescent assay manufactured by [employer]   and performed on the [employer] Compact Plus.    Values obtained with different assay methods or kits may be   different and cannot be used interchangeably.    Test results cannot be interpreted as absolute evidence for   the presence or absence of malignant disease.    Test Performed by:  [facility]  [address] [place] [address]  : Yogesh Roque M.D. 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 After an approximately 60 min distribution time, PET/CT images were acquired from the skull vertex to the mid thigh. Transmission images were acquired to correct for attenuation using a whole body low-dose CT scan without IV contrast with the arms positioned above the head. COMPARISON: Gallium 68 DOTATATE PET-CT 11/01/2022 FINDINGS: Quality of the study: Adequate. In the head and neck, there is physiologic distribution in the pituitary, salivary, and thyroid glands, and there are no tracer avid lesions suspicious for malignancy. In the chest, there are no tracer avid lesions suspicious for malignancy. In the abdomen and pelvis, there is physiologic uptake in the pancreatic uncinate process and body, liver, spleen, adrenal glands and  tract. There are numerous tracer avid hypodense lesions throughout the liver, grossly similar in distribution compared to prior.  The index lesion involving the majority of left hepatic lobe demonstrates SUV max 43 (fused image 185), previously 45. There is redemonstration focal uptake within the distal ileum  with SUV max 17 (fused image 230), previously 16. Tracer avid mesenteric lymph node measuring 1.5 x 1.5 cm with SUV max 22 (fused image 218), previously 1.7 x 1.2 cm with SUV max 17. In the bones, there are several tracer avid lesions, as detailed below: -Lesions in the superior aspect of the skull with SUV max 19 (fused image 7), previously 5.1, and SUV max 2.6 (fused image 14), previously 1.8 -Lesion in the C7 vertebral body with SUV max 9.1 (fused image 74), previously 7.3. -Lesion in the left scapula with SUV max 6.4 (fused image 79), previously 1.6. -Lesion in the sternum with SUV max 15 (fused image 105), previously 11. -Lesion in the anterior left 2nd rib with SUV max 4.5 (fused image 111), previously 1.4. -Lesion in the L3 vertebral body with SUV max 6.9 (fused image 192), previously 15. Additional CT findings: There are a few stable pulmonary nodules.  Mild atherosclerosis coronary arteries.  Cholelithiasis.  Nodular thickening of left adrenal gland, similar to prior.  Colonic diverticulosis.  Fat containing left inguinal hernia.     In this patient with carcinoid tumor of the ileum, there is overall similar distribution of tracer avid disease involving distal ileum, liver, mesenteric lymph node, and bones.  Index lesions as above.  No definite new tracer avid lesions. Electronically signed by resident: Tarah Teague Date:    07/12/2023 Time:    08:26 Electronically signed by: Godfrey Sagastume Date:    07/12/2023 Time:    12:51      Assessment  1. Chronic systolic heart failure  Well compensated    2. Primary hypertension  Controlled    3. Atherosclerosis of aorta  Continues with risk factor modification efforts      Plan and Discussion    Continue current guideline directed medical therapy.    The ASCVD Risk score ([place] DK, et al., 2019) failed to calculate for the following reasons:    The 2019 ASCVD risk score is only valid for ages 40 to 79     Follow Up  Follow up in about 6 months (around  1/24/2024).      Ralph Bergeron MD

## 2023-07-24 NOTE — TELEPHONE ENCOUNTER
Incoming call from pt's sister. The pt has 2 weeks and 2 days left on hand. Pt agreed to a f/u call in 1 week.

## 2023-07-29 LAB — PANCREASTATIN SERPL-MCNC: 3120 PG/ML (ref 10–135)

## 2023-07-31 NOTE — TELEPHONE ENCOUNTER
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 female, who is followed by the specialty pharmacy service for management and education of her Xermelo.  She has been on therapy with Xermelo for 12 months.  I have reviewed her electronic medical record and current medication list and determined that specialty medication adjustment Is not needed at this time.    Patient has not experienced adverse events.  She Is adherent reporting 0 missed doses since last review.  Adherence has been encouraged with the following mechanism(s): Monthly refill calls, family helps with medication management.  She is meeting goals of therapy and will continue treatment.        6/30/2023 5/26/2023 5/1/2023 3/28/2023 2/6/2023 1/9/2023 12/9/2022   Follow Up Review   # of missed doses 0 0 0 0 0 0 0   New Medications? No No No No No No No   New Conditions? No No No No No No No   New Allergies? No No No No No No No   Med Effective? Good Good Good Good Very good Very good Good   Urgent Care? No No No No No No No   Requested Pharmacist? No No No No No No No         Therapy is appropriate to continue.    Therapy is effective: Yes - most recent PET showing stable disease   On scale of 1 to 10, how does patient rank quality of life? (10 - Best): Unable to Assess  Recommendations: none at this time.  Review Method: Chart Review    Tasks added this encounter   No tasks added.   Tasks due within next 3 months   7/29/2023 - Clinical Assessment (6 month recurrence)  7/31/2023 - Refill Coordination Outreach (1 time  occurrence)     Helene Reynoso, PharmD  [facility]  [address]  [facility] [address]  Phone: [phone]  Fax: [phone]

## 2023-08-01 ENCOUNTER — SPECIALTY PHARMACY (OUTPATIENT)
Dept: PHARMACY | Facility: CLINIC | Age: 83
End: 2023-08-01
Payer: MEDICARE

## 2023-08-01 NOTE — TELEPHONE ENCOUNTER
Specialty Pharmacy - Refill Coordination    Specialty Medication Orders Linked to Encounter      Flowsheet Row Most Recent Value   Medication #1 telotristat ethyl (XERMELO) 250 mg Tab (Order#[id number], Rx#[id number])            Refill Questions - Documented Responses      Flowsheet Row Most Recent Value   Patient Availability and HIPAA Verification    Does patient want to proceed with activity? Yes   HIPAA/medical authority confirmed? Yes   Relationship to patient of person spoken to? Family Member   Refill Screening Questions    Changes to allergies? No   Changes to medications? No   New conditions since last clinic visit? No   Unplanned office visit, urgent care, ED, or hospital admission in the last 4 weeks? No   How does patient/caregiver feel medication is working? Good   Financial problems or insurance changes? No   How many doses of your specialty medications were missed in the last 4 weeks? 0   Would patient like to speak to a pharmacist? No   When does the patient need to receive the medication? 08/09/23   Refill Delivery Questions    How will the patient receive the medication? [facility]   When does the patient need to receive the medication? 08/09/23   Shipping Address Home   Address in [facility] confirmed and updated if neccessary? Yes   Expected Copay ($) 0   Is the patient able to afford the medication copay? Yes   Payment Method zero copay   Days supply of Refill 28   Supplies needed? No supplies needed   Refill activity completed? Yes   Refill activity plan Refill scheduled   Shipment/Pickup Date: 08/07/23            Current Outpatient Medications   Medication Sig    blood sugar diagnostic (TRUE METRIX GLUCOSE TEST STRIP) Strp USE TO CHECK BLOOD SUGAR TWICE DAILY    blood-glucose meter kit To check BG 2 times daily, to use with insurance preferred meter (Patient not taking: Reported on 7/11/2023)    carvediloL (COREG) 12.5 MG tablet Take 1 tablet (12.5 mg total) by mouth 2 (two) times daily with  "meals.    cyanocobalamin (VITAMIN B-12) 1000 MCG tablet Take 1 tablet (1,000 mcg total) by mouth once daily.    diphenoxylate-atropine 2.5-0.025 mg (LOMOTIL) 2.5-0.025 mg per tablet Take 1 tablet by mouth 4 (four) times daily as needed for Diarrhea.    ezetimibe (ZETIA) 10 mg tablet Take 1 tablet (10 mg total) by mouth once daily.    ferrous sulfate 325 (65 FE) MG EC tablet Take 325 mg by mouth 3 (three) times daily with meals.    furosemide (LASIX) 20 MG tablet Take 2 tabs by mouth daily for 4 days and then take 1 tab daily    lancets Misc To check BG 2 times daily, to use with insurance preferred meter    latanoprost 0.005 % ophthalmic solution Place 1 drop into both eyes nightly.    losartan (COZAAR) 50 MG tablet Take 1 tablet (50 mg total) by mouth once daily.    metFORMIN (GLUCOPHAGE-XR) 500 MG ER 24hr tablet Take 2 tablets (1,000 mg total) by mouth daily with breakfast.    needle, disp, 22 G 22 gauge x 1" Ndle 1 application by Misc.(Non-Drug; Combo Route) route 3 (three) times daily as needed.    octreotide acetate (SANDOSTATIN) 100 mcg/mL (1 mL) Syrg Inject 1 mL (0.1 mg total) into the skin 3 (three) times daily as needed (diarrhea).    potassium chloride (KLOR-CON) 8 MEQ TbSR Take 1 tablet (8 mEq total) by mouth once daily.    syringe, disposable, 1 mL Syrg 1 application by Misc.(Non-Drug; Combo Route) route 3 (three) times daily as needed (diarrhea).    telotristat ethyl (XERMELO) 250 mg Tab Take 1 tablet (250 mg) by mouth 3 (three) times daily.    TRUEPLUS LANCETS 33 gauge Misc USE TO CHECK BLOOD SUGAR TWICE DAILY    vitamin D (VITAMIN D3) 1000 units Tab Take 400 Units by mouth once daily.    XIIDRA 5 % Dpet INSTILL ONE DROP INTO OU BID   Last reviewed on 7/24/2023 10:05 AM by Tiera Rico RN    Review of patient's allergies indicates:   Allergen Reactions    Epinephrine      carcinoid    Last reviewed on  7/24/2023 10:01 AM by Ralph Bergeron      Tasks added this encounter   No tasks added. "   Tasks due within next 3 months   8/3/2023 - Refill Coordination Outreach (1 time occurrence)     Tea Hughes, PharmD  [facility]  [address]  [facility] [address]  Phone: [phone]  Fax: [phone]

## 2023-08-11 ENCOUNTER — INFUSION (OUTPATIENT)
Dept: INFUSION THERAPY | Facility: HOSPITAL | Age: 83
End: 2023-08-11
Payer: MEDICARE

## 2023-08-11 ENCOUNTER — OFFICE VISIT (OUTPATIENT)
Dept: HEMATOLOGY/ONCOLOGY | Facility: CLINIC | Age: 83
End: 2023-08-11
Payer: MEDICARE

## 2023-08-11 ENCOUNTER — LAB VISIT (OUTPATIENT)
Dept: LAB | Facility: HOSPITAL | Age: 83
End: 2023-08-11
Attending: INTERNAL MEDICINE
Payer: MEDICARE

## 2023-08-11 VITALS
RESPIRATION RATE: 18 BRPM | OXYGEN SATURATION: 99 % | SYSTOLIC BLOOD PRESSURE: 134 MMHG | BODY MASS INDEX: 18.88 KG/M2 | WEIGHT: 110.56 LBS | HEIGHT: 64 IN | TEMPERATURE: 98 F | HEART RATE: 61 BPM | DIASTOLIC BLOOD PRESSURE: 79 MMHG

## 2023-08-11 DIAGNOSIS — Z79.899 IMMUNODEFICIENCY DUE TO DRUGS: ICD-10-CM

## 2023-08-11 DIAGNOSIS — E34.0 CARCINOID SYNDROME: ICD-10-CM

## 2023-08-11 DIAGNOSIS — C7B.8 METASTATIC MALIGNANT NEUROENDOCRINE TUMOR TO LIVER: ICD-10-CM

## 2023-08-11 DIAGNOSIS — I10 ESSENTIAL HYPERTENSION: ICD-10-CM

## 2023-08-11 DIAGNOSIS — E11.29 CONTROLLED TYPE 2 DIABETES MELLITUS WITH MICROALBUMINURIA, WITHOUT LONG-TERM CURRENT USE OF INSULIN: ICD-10-CM

## 2023-08-11 DIAGNOSIS — R80.9 CONTROLLED TYPE 2 DIABETES MELLITUS WITH MICROALBUMINURIA, WITHOUT LONG-TERM CURRENT USE OF INSULIN: ICD-10-CM

## 2023-08-11 DIAGNOSIS — C7B.8 SECONDARY NEUROENDOCRINE TUMOR OF BONE: ICD-10-CM

## 2023-08-11 DIAGNOSIS — I50.9 CHRONIC CONGESTIVE HEART FAILURE, UNSPECIFIED HEART FAILURE TYPE: ICD-10-CM

## 2023-08-11 DIAGNOSIS — E34.0 CARCINOID HEART DISEASE: ICD-10-CM

## 2023-08-11 DIAGNOSIS — I51.89 CARCINOID HEART DISEASE: ICD-10-CM

## 2023-08-11 DIAGNOSIS — D49.9 IMMUNODEFICIENCY SECONDARY TO NEOPLASM: ICD-10-CM

## 2023-08-11 DIAGNOSIS — D84.821 IMMUNODEFICIENCY DUE TO DRUGS: ICD-10-CM

## 2023-08-11 DIAGNOSIS — D84.81 IMMUNODEFICIENCY SECONDARY TO NEOPLASM: ICD-10-CM

## 2023-08-11 DIAGNOSIS — C7A.012 MALIGNANT CARCINOID TUMOR OF ILEUM: ICD-10-CM

## 2023-08-11 DIAGNOSIS — C79.51 METASTASIS TO SPINAL COLUMN: ICD-10-CM

## 2023-08-11 DIAGNOSIS — C7A.012 MALIGNANT CARCINOID TUMOR OF ILEUM: Primary | ICD-10-CM

## 2023-08-11 DIAGNOSIS — C7B.8 METASTATIC MALIGNANT NEUROENDOCRINE TUMOR TO LIVER: Primary | ICD-10-CM

## 2023-08-11 LAB
ALBUMIN SERPL BCP-MCNC: 3.4 G/DL (ref 3.5–5.2)
ALP SERPL-CCNC: 60 U/L (ref 55–135)
ALT SERPL W/O P-5'-P-CCNC: 27 U/L (ref 10–44)
ANION GAP SERPL CALC-SCNC: 10 MMOL/L (ref 8–16)
AST SERPL-CCNC: 26 U/L (ref 10–40)
BASOPHILS # BLD AUTO: 0.01 K/UL (ref 0–0.2)
BASOPHILS NFR BLD: 0.2 % (ref 0–1.9)
BILIRUB SERPL-MCNC: 0.4 MG/DL (ref 0.1–1)
BUN SERPL-MCNC: 10 MG/DL (ref 8–23)
CALCIUM SERPL-MCNC: 9.5 MG/DL (ref 8.7–10.5)
CHLORIDE SERPL-SCNC: 106 MMOL/L (ref 95–110)
CO2 SERPL-SCNC: 28 MMOL/L (ref 23–29)
CREAT SERPL-MCNC: 0.7 MG/DL (ref 0.5–1.4)
DIFFERENTIAL METHOD: ABNORMAL
EOSINOPHIL # BLD AUTO: 0 K/UL (ref 0–0.5)
EOSINOPHIL NFR BLD: 0.5 % (ref 0–8)
ERYTHROCYTE [DISTWIDTH] IN BLOOD BY AUTOMATED COUNT: 13.1 % (ref 11.5–14.5)
EST. GFR  (NO RACE VARIABLE): >60 ML/MIN/1.73 M^2
GLUCOSE SERPL-MCNC: 200 MG/DL (ref 70–110)
HCT VFR BLD AUTO: 35.1 % (ref 37–48.5)
HGB BLD-MCNC: 11.6 G/DL (ref 12–16)
IMM GRANULOCYTES # BLD AUTO: 0.01 K/UL (ref 0–0.04)
IMM GRANULOCYTES NFR BLD AUTO: 0.2 % (ref 0–0.5)
LYMPHOCYTES # BLD AUTO: 0.8 K/UL (ref 1–4.8)
LYMPHOCYTES NFR BLD: 18.7 % (ref 18–48)
MCH RBC QN AUTO: 31.7 PG (ref 27–31)
MCHC RBC AUTO-ENTMCNC: 33 G/DL (ref 32–36)
MCV RBC AUTO: 96 FL (ref 82–98)
MONOCYTES # BLD AUTO: 0.5 K/UL (ref 0.3–1)
MONOCYTES NFR BLD: 11.4 % (ref 4–15)
NEUTROPHILS # BLD AUTO: 2.8 K/UL (ref 1.8–7.7)
NEUTROPHILS NFR BLD: 69 % (ref 38–73)
NRBC BLD-RTO: 0 /100 WBC
PLATELET # BLD AUTO: 143 K/UL (ref 150–450)
PMV BLD AUTO: 10.2 FL (ref 9.2–12.9)
POTASSIUM SERPL-SCNC: 4.2 MMOL/L (ref 3.5–5.1)
PROT SERPL-MCNC: 6.6 G/DL (ref 6–8.4)
RBC # BLD AUTO: 3.66 M/UL (ref 4–5.4)
SODIUM SERPL-SCNC: 144 MMOL/L (ref 136–145)
WBC # BLD AUTO: 4.02 K/UL (ref 3.9–12.7)

## 2023-08-11 PROCEDURE — 99215 OFFICE O/P EST HI 40 MIN: CPT | Mod: HCNC,S$GLB,, | Performed by: INTERNAL MEDICINE

## 2023-08-11 PROCEDURE — 3075F PR MOST RECENT SYSTOLIC BLOOD PRESS GE 130-139MM HG: ICD-10-PCS | Mod: HCNC,CPTII,S$GLB, | Performed by: INTERNAL MEDICINE

## 2023-08-11 PROCEDURE — 86316 IMMUNOASSAY TUMOR OTHER: CPT | Mod: HCNC | Performed by: INTERNAL MEDICINE

## 2023-08-11 PROCEDURE — 1159F PR MEDICATION LIST DOCUMENTED IN MEDICAL RECORD: ICD-10-PCS | Mod: HCNC,CPTII,S$GLB, | Performed by: INTERNAL MEDICINE

## 2023-08-11 PROCEDURE — 83519 RIA NONANTIBODY: CPT | Mod: HCNC | Performed by: INTERNAL MEDICINE

## 2023-08-11 PROCEDURE — 63600175 PHARM REV CODE 636 W HCPCS: Mod: JZ,JG,HCNC | Performed by: INTERNAL MEDICINE

## 2023-08-11 PROCEDURE — 3078F DIAST BP <80 MM HG: CPT | Mod: HCNC,CPTII,S$GLB, | Performed by: INTERNAL MEDICINE

## 2023-08-11 PROCEDURE — 3078F PR MOST RECENT DIASTOLIC BLOOD PRESSURE < 80 MM HG: ICD-10-PCS | Mod: HCNC,CPTII,S$GLB, | Performed by: INTERNAL MEDICINE

## 2023-08-11 PROCEDURE — 1126F PR PAIN SEVERITY QUANTIFIED, NO PAIN PRESENT: ICD-10-PCS | Mod: HCNC,CPTII,S$GLB, | Performed by: INTERNAL MEDICINE

## 2023-08-11 PROCEDURE — 36415 COLL VENOUS BLD VENIPUNCTURE: CPT | Mod: HCNC | Performed by: INTERNAL MEDICINE

## 2023-08-11 PROCEDURE — 1126F AMNT PAIN NOTED NONE PRSNT: CPT | Mod: HCNC,CPTII,S$GLB, | Performed by: INTERNAL MEDICINE

## 2023-08-11 PROCEDURE — 85025 COMPLETE CBC W/AUTO DIFF WBC: CPT | Mod: HCNC | Performed by: INTERNAL MEDICINE

## 2023-08-11 PROCEDURE — 3075F SYST BP GE 130 - 139MM HG: CPT | Mod: HCNC,CPTII,S$GLB, | Performed by: INTERNAL MEDICINE

## 2023-08-11 PROCEDURE — 99215 PR OFFICE/OUTPT VISIT, EST, LEVL V, 40-54 MIN: ICD-10-PCS | Mod: HCNC,S$GLB,, | Performed by: INTERNAL MEDICINE

## 2023-08-11 PROCEDURE — 96402 CHEMO HORMON ANTINEOPL SQ/IM: CPT | Mod: HCNC

## 2023-08-11 PROCEDURE — 3288F FALL RISK ASSESSMENT DOCD: CPT | Mod: HCNC,CPTII,S$GLB, | Performed by: INTERNAL MEDICINE

## 2023-08-11 PROCEDURE — 1160F PR REVIEW ALL MEDS BY PRESCRIBER/CLIN PHARMACIST DOCUMENTED: ICD-10-PCS | Mod: HCNC,CPTII,S$GLB, | Performed by: INTERNAL MEDICINE

## 2023-08-11 PROCEDURE — 99999 PR PBB SHADOW E&M-EST. PATIENT-LVL IV: CPT | Mod: PBBFAC,HCNC,, | Performed by: INTERNAL MEDICINE

## 2023-08-11 PROCEDURE — 3288F PR FALLS RISK ASSESSMENT DOCUMENTED: ICD-10-PCS | Mod: HCNC,CPTII,S$GLB, | Performed by: INTERNAL MEDICINE

## 2023-08-11 PROCEDURE — 83497 ASSAY OF 5-HIAA: CPT | Mod: HCNC | Performed by: INTERNAL MEDICINE

## 2023-08-11 PROCEDURE — 1101F PR PT FALLS ASSESS DOC 0-1 FALLS W/OUT INJ PAST YR: ICD-10-PCS | Mod: HCNC,CPTII,S$GLB, | Performed by: INTERNAL MEDICINE

## 2023-08-11 PROCEDURE — 1159F MED LIST DOCD IN RCRD: CPT | Mod: HCNC,CPTII,S$GLB, | Performed by: INTERNAL MEDICINE

## 2023-08-11 PROCEDURE — 80053 COMPREHEN METABOLIC PANEL: CPT | Mod: HCNC | Performed by: INTERNAL MEDICINE

## 2023-08-11 PROCEDURE — 1101F PT FALLS ASSESS-DOCD LE1/YR: CPT | Mod: HCNC,CPTII,S$GLB, | Performed by: INTERNAL MEDICINE

## 2023-08-11 PROCEDURE — 84260 ASSAY OF SEROTONIN: CPT | Mod: HCNC | Performed by: INTERNAL MEDICINE

## 2023-08-11 PROCEDURE — 1160F RVW MEDS BY RX/DR IN RCRD: CPT | Mod: HCNC,CPTII,S$GLB, | Performed by: INTERNAL MEDICINE

## 2023-08-11 PROCEDURE — 99999 PR PBB SHADOW E&M-EST. PATIENT-LVL IV: ICD-10-PCS | Mod: PBBFAC,HCNC,, | Performed by: INTERNAL MEDICINE

## 2023-08-11 RX ORDER — LANREOTIDE ACETATE 120 MG/.5ML
120 INJECTION SUBCUTANEOUS
Status: DISCONTINUED | OUTPATIENT
Start: 2023-08-11 | End: 2023-08-11 | Stop reason: HOSPADM

## 2023-08-11 RX ORDER — LANREOTIDE ACETATE 120 MG/.5ML
120 INJECTION SUBCUTANEOUS
Status: CANCELLED | OUTPATIENT
Start: 2023-08-11

## 2023-08-11 RX ADMIN — LANREOTIDE ACETATE 120 MG: 120 INJECTION SUBCUTANEOUS at 11:08

## 2023-08-11 NOTE — PROGRESS NOTES
"  Subjective:       Patient ID: Shahram Hills is an 83 y.o. female.     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 Ms. Hills is an 79 yo woman who initially saw me on 6/7/18 for further evaluation of neuroendocrine tumor. She initially presented with diarrhea, abdominal discomfort, weight loss. She was evaluated at [facility] and underwent workup below:  US abdomen on 3/14/18 showed numerous bilobar mixed echogenicity and mildly vascular hepatic lesions. Cholelithiasis without e/o acute cholecystitis.   MRI abdomen on 3/30/2018 showed innumerable hepatic metastases. L3 vertebral body metastasis with soft tissue component along the right margin of the L3 and upper L4 vertebral bodies, filling the right L3/4 neural foramen, and extending into the anterior aspect of the spinal canal on the right at the L3 and upper T4 levels. Associated with mild spinal canal narrowing.  CT chest on 4/6/2018 showed one lucent nodule measuring 7 mm in the T7 vertebral body, most likely representing metastatic disease.    EGD on 5/4/18 showed normal duodenum. Schatzki's ring in the lower third of the esophagus. Grade 1 esophagitis in the GE junction c/w mild distal esophagitis. Congestion and erythema in the antrum, pre-pyloric region and stomach body c/w gastritis. Biopsy of the stomach antrum showed mild chronic gastritis, neg for H. pylori.   Labs on 5/9/2018: WBC 6.9, H/H 11.6/34.3, MCV 87.5, plt count 279. On 3/9/18: Vit B12 level 173, folic acid 6.6  She was started on oral vit B12 and underwent a liver biopsy on 5/18/18. Pathology showed "metastatic well differentiated neuroendocrine tumor. Ki67 3%"     She saw me on 6/7/18 and complained of weight loss of 26 lbs over the past 3-4 months, also has diarrhea. No flushing, wheezing, palpitations. Has been having pain in right flank radiating down " "the right leg. No tingling/numbness, weakness. She can hold her bladder but when she urinates her diarrhea would come out as well. Started on dex 4 mg q6h the evening of 18.      2. MRI spine on 18 showed "Marrow replacing metastatic lesion of the L3 vertebral body with associated extra osseous expansion and complete effacement of the right L3-L4 neural foramina and additional effacement of the right lateral recess.  There is additional lateral extension and abutment of the right psoas muscle.  Superimposed degenerative change at this level results in mild spinal canal stenosis." I sent the patient to ED on 18 where she was evaluated by neurosurgery. Neurosurgery did not feel she was a candidate for surgical intervention.      3. Seen by Dr. Adames on 18. palliative radiation to the area of the L3 metastasis 18-18   4. 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  There is an adjacent metastatic lymph node, diffuse liver metastases, and multiple bone metastases. Index primary neoplasm SUV max 33.13. Adjacent lymph node SUV max 23. L3 bone metastasis SUV max 36.86. Left lobe SUV max 46.13   5. Lanreotide every 4 weeks started on 18  6. TACE to the right hepatic lobe on 19. TACE to the left hepatic lobe on 3/21/19.   7. TACE to the right hepatic lobe on 22. S/p conventional chemoembolization performed of the segment 2, 3, 4 branch of the left hepatic artery and segment 8 branch of the left hepatic artery on 22.  8. Gallium PET 22 showed progression. Discussed PRRT. PRRT #1 on 2022. Zometa every 3 months started 22. PRRT #2 on 23. #3 on 23. #4 on 23.      History of Present Illness:   Ms. Hills returns for follow up. Feeling well. Diarrhea controlled. On xermelo tid. Takes imodium once every 1-2 weeks.     ECO     ROS:   See HPI. Otherwise negative.      Physical Examination:   Vital signs " reviewed.   Gen: well hydrated, well developed, in no acute distress.  HEENT: normocephalic, anicteric, PERRLA, EOMI  Neck: supple, no JVD, thyromegaly, cervical or supraclavicular LAD  Lungs: CTAB, no wheezes or rales  Heart: regular rate, frequent PVCs, regular pulse, no M/R/G  Abdomen: soft, no tenderness, non-distended, no hepatomegaly, no splenomegaly, no mass, or hernia.   Ext: b/l LE 1+ edema  Neuro: alert and oriented x 4, no focal neuro deficit  Skin: no rash, open wounds or ulcers  Psych: pleasant and appropriate mood and affect     Objective:      Laboratory Data:  Labs reviewed.     Imaging Data:     MRI abdomen 11/1/22:  1. History of neuroendocrine malignancy.  Numerous hepatic lesions, some remaining stable while others have mildly enlarged consistent with disease progression.  Stable osseous lesion in the L3 vertebral body.  2. Cholelithiasis and stable mild dilatation of the common bile duct and central intrahepatic biliary duct dilatation.  Of note, there is mass effect on the midportion of the common bile duct by dominant left hepatic lobe lesion.  3. Additional findings detailed above.  RECIST SUMMARY:     Date of prior examination for comparison: 05/16/2022     Lesion 1: Left hepatic lobe.  7.1 cm.  Series 9 image 52.  Prior measurement 6.3 cm.     Lesion 2: Right hepatic lobe lateral.  3.1 cm.  Series 9 image 30.  Prior measurement 3.2 cm.     Lesion 3: Right hepatic dome.  5.1 cm.  Series 9 image 19.  Prior measurement 4.4 cm.    Gallium PET 11/1/22:     Interval development of somatostatin tracer avid lesions in the skull, concerning for new metastatic lesions.     Numerous tracer avid hypoattenuating masses throughout the liver with increased SUV max compared to prior exam.     Tracer avid lesions in the C7 and L3 vertebral bodies, sternum, distal ileum and mesenteric node are similar to prior exam.     Judged by tracer avidity of the patient's tumor burden, PRRT would be a consideration if  clinically indicated.      Gallium PET 7/12/23:  Impression:     In this patient with carcinoid tumor of the ileum, there is overall similar distribution of tracer avid disease involving distal ileum, liver, mesenteric lymph node, and bones.  Index lesions as above.  No definite new tracer avid lesions.    Assessment and Plan:      1. Malignant carcinoid tumor of ileum    2. 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 Secondary neuroendocrine tumor of bone    4. Spine metastasis    5. 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 Carcinoid syndrome    8. Controlled type 2 diabetes mellitus with microalbuminuria, without long-term current use of insulin    9. Essential hypertension    10. 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 Started lanreotide every 4 weeks on 6/22/2018. S/p TACE to the right hepatic lobe on 2/14/19. TACE to the left hepatic lobe on 3/21/19.   - CT/MRI 1/12/22 showed mild progression in the liver. S/p TACE on 2/11/22 and 4/22/22   - Gallium PET 11/1/22 showed progression. Discussed PRRT. PRRT #1 on 12/14/2022. Completed 4 cycles on 6/14/23. Zometa every 3 months started 12/27/22. Last zometa 6/15/23.   - doing well. PET in July 2023 showed stable disease  - symptoms well controlled  - c/w lanreotide every 4 weeks and zometa every 3 months. Will give lanreotide today. Next zometa in Sep 2023  - RTC in 4 weeks. If biomarkers and symptoms stable, will do PET and MRI in 6 months    7.  - better after xermelo and PRRT  - c/w lanreotide every 4 weeks.   - c/w xermelo tid    8.  - BS controlled  - c/w current medication    9.  - BP controlled  - c/w current medication    10.11  - last echo in 2022 showed LVEF 40%  - saw Dr Bergeron in July  2023. Felt to be well compensated  - f/u with cardiology      Sebastian Granados MD  Hematology and Medical Oncology  [facility]        Route Chart for Scheduling    Med Onc Chart Routing      Follow up with physician 3 months. keep scheduled appts. see me on 11/3 with labs then lanreotide   Follow up with JUNAID    Infusion scheduling note   zometa every 4 months, next due in Dec (after scheduled Sep infusion)   Injection scheduling note lanreotide every 4 weeks   Labs CBC and CMP   Scheduling: Labs same day as infusion  Preferred lab:  Lab interval: every 4 weeks  serotonin, pancreastatin, 5-HIAA   Imaging    Pharmacy appointment    Other referrals        Supportive Plan Information  OP ZOLEDRONIC ACID (ZOMETA) Q4W   Sebastian Granados MD   Upcoming Treatment Dates - OP ZOLEDRONIC ACID (ZOMETA) Q4W    9/23/2023       Medications       zoledronic 4 mg/100 mL infusion 4 mg  12/22/2023       Medications       zoledronic 4 mg/100 mL infusion 4 mg  3/21/2024       Medications       zoledronic 4 mg/100 mL infusion 4 mg  6/19/2024       Medications       zoledronic 4 mg/100 mL infusion 4 mg    Therapy Plan Information  LANREOTIDE  5. Medications  lanreotide injection 120 mg  120 mg, Subcutaneous, Every visit

## 2023-08-11 NOTE — NURSING
Patient tolerated Lanreotide injection well today. NAD noted upon discharge. Discharged home, Ambulated independently.

## 2023-08-14 LAB
5OH-INDOLEACETATE SERPL-MCNC: 111 NG/ML
CGA SERPL-MCNC: 1330 NG/ML

## 2023-08-15 LAB — SEROTONIN: 1090 NG/ML

## 2023-08-25 LAB — PANCREASTATIN SERPL-MCNC: 6356 PG/ML (ref 10–135)

## 2023-08-28 DIAGNOSIS — R19.7 DIARRHEA, UNSPECIFIED TYPE: ICD-10-CM

## 2023-08-28 DIAGNOSIS — C7A.012 MALIGNANT CARCINOID TUMOR OF ILEUM: ICD-10-CM

## 2023-08-28 DIAGNOSIS — E34.0 CARCINOID SYNDROME: ICD-10-CM

## 2023-09-01 RX ORDER — TELOTRISTAT ETHYL 250 MG/1
250 TABLET ORAL 3 TIMES DAILY
Qty: 90 TABLET | Refills: 3 | Status: ACTIVE | OUTPATIENT
Start: 2023-09-01 | End: 2023-12-13 | Stop reason: SDUPTHER

## 2023-09-08 ENCOUNTER — INFUSION (OUTPATIENT)
Dept: INFUSION THERAPY | Facility: HOSPITAL | Age: 83
End: 2023-09-08
Payer: MEDICARE

## 2023-09-08 ENCOUNTER — LAB VISIT (OUTPATIENT)
Dept: LAB | Facility: HOSPITAL | Age: 83
End: 2023-09-08
Attending: INTERNAL MEDICINE
Payer: MEDICARE

## 2023-09-08 ENCOUNTER — OFFICE VISIT (OUTPATIENT)
Dept: HEMATOLOGY/ONCOLOGY | Facility: CLINIC | Age: 83
End: 2023-09-08
Payer: MEDICARE

## 2023-09-08 VITALS
TEMPERATURE: 98 F | BODY MASS INDEX: 18.39 KG/M2 | RESPIRATION RATE: 18 BRPM | DIASTOLIC BLOOD PRESSURE: 81 MMHG | HEIGHT: 64 IN | WEIGHT: 107.69 LBS | HEART RATE: 89 BPM | SYSTOLIC BLOOD PRESSURE: 144 MMHG

## 2023-09-08 VITALS
HEART RATE: 92 BPM | DIASTOLIC BLOOD PRESSURE: 74 MMHG | WEIGHT: 107.69 LBS | RESPIRATION RATE: 18 BRPM | HEIGHT: 64 IN | SYSTOLIC BLOOD PRESSURE: 159 MMHG | BODY MASS INDEX: 18.39 KG/M2 | TEMPERATURE: 98 F | OXYGEN SATURATION: 99 %

## 2023-09-08 DIAGNOSIS — C7A.8 NEUROENDOCRINE CARCINOMA METASTATIC TO BONE: ICD-10-CM

## 2023-09-08 DIAGNOSIS — C7B.8 SECONDARY NEUROENDOCRINE TUMOR OF BONE: ICD-10-CM

## 2023-09-08 DIAGNOSIS — E34.0 CARCINOID SYNDROME: ICD-10-CM

## 2023-09-08 DIAGNOSIS — C7B.8 METASTATIC MALIGNANT NEUROENDOCRINE TUMOR TO LIVER: ICD-10-CM

## 2023-09-08 DIAGNOSIS — D84.81 IMMUNODEFICIENCY SECONDARY TO NEOPLASM: ICD-10-CM

## 2023-09-08 DIAGNOSIS — D49.9 IMMUNODEFICIENCY SECONDARY TO NEOPLASM: ICD-10-CM

## 2023-09-08 DIAGNOSIS — C7A.012 MALIGNANT CARCINOID TUMOR OF ILEUM: Primary | ICD-10-CM

## 2023-09-08 DIAGNOSIS — R80.9 CONTROLLED TYPE 2 DIABETES MELLITUS WITH MICROALBUMINURIA, WITHOUT LONG-TERM CURRENT USE OF INSULIN: ICD-10-CM

## 2023-09-08 DIAGNOSIS — C7B.8 METASTATIC MALIGNANT NEUROENDOCRINE TUMOR TO LIVER: Primary | ICD-10-CM

## 2023-09-08 DIAGNOSIS — Z79.899 IMMUNODEFICIENCY DUE TO DRUGS: ICD-10-CM

## 2023-09-08 DIAGNOSIS — C79.51 METASTASIS TO SPINAL COLUMN: ICD-10-CM

## 2023-09-08 DIAGNOSIS — E34.0 CARCINOID HEART DISEASE: ICD-10-CM

## 2023-09-08 DIAGNOSIS — C7B.8 NEUROENDOCRINE CARCINOMA METASTATIC TO BONE: ICD-10-CM

## 2023-09-08 DIAGNOSIS — E11.29 CONTROLLED TYPE 2 DIABETES MELLITUS WITH MICROALBUMINURIA, WITHOUT LONG-TERM CURRENT USE OF INSULIN: ICD-10-CM

## 2023-09-08 DIAGNOSIS — I51.89 CARCINOID HEART DISEASE: ICD-10-CM

## 2023-09-08 DIAGNOSIS — D84.821 IMMUNODEFICIENCY DUE TO DRUGS: ICD-10-CM

## 2023-09-08 DIAGNOSIS — I10 ESSENTIAL HYPERTENSION: ICD-10-CM

## 2023-09-08 DIAGNOSIS — C7A.012 MALIGNANT CARCINOID TUMOR OF ILEUM: ICD-10-CM

## 2023-09-08 LAB
ALBUMIN SERPL BCP-MCNC: 4.1 G/DL (ref 3.5–5.2)
ALP SERPL-CCNC: 66 U/L (ref 55–135)
ALT SERPL W/O P-5'-P-CCNC: 25 U/L (ref 10–44)
ANION GAP SERPL CALC-SCNC: 13 MMOL/L (ref 8–16)
AST SERPL-CCNC: 33 U/L (ref 10–40)
BASOPHILS # BLD AUTO: 0.01 K/UL (ref 0–0.2)
BASOPHILS NFR BLD: 0.2 % (ref 0–1.9)
BILIRUB SERPL-MCNC: 0.4 MG/DL (ref 0.1–1)
BUN SERPL-MCNC: 10 MG/DL (ref 8–23)
CALCIUM SERPL-MCNC: 10.2 MG/DL (ref 8.7–10.5)
CHLORIDE SERPL-SCNC: 102 MMOL/L (ref 95–110)
CO2 SERPL-SCNC: 29 MMOL/L (ref 23–29)
CREAT SERPL-MCNC: 0.7 MG/DL (ref 0.5–1.4)
DIFFERENTIAL METHOD: ABNORMAL
EOSINOPHIL # BLD AUTO: 0 K/UL (ref 0–0.5)
EOSINOPHIL NFR BLD: 0.4 % (ref 0–8)
ERYTHROCYTE [DISTWIDTH] IN BLOOD BY AUTOMATED COUNT: 12.9 % (ref 11.5–14.5)
EST. GFR  (NO RACE VARIABLE): >60 ML/MIN/1.73 M^2
GLUCOSE SERPL-MCNC: 99 MG/DL (ref 70–110)
HCT VFR BLD AUTO: 40.1 % (ref 37–48.5)
HGB BLD-MCNC: 13.3 G/DL (ref 12–16)
IMM GRANULOCYTES # BLD AUTO: 0.01 K/UL (ref 0–0.04)
IMM GRANULOCYTES NFR BLD AUTO: 0.2 % (ref 0–0.5)
LYMPHOCYTES # BLD AUTO: 1.1 K/UL (ref 1–4.8)
LYMPHOCYTES NFR BLD: 24.6 % (ref 18–48)
MCH RBC QN AUTO: 32 PG (ref 27–31)
MCHC RBC AUTO-ENTMCNC: 33.2 G/DL (ref 32–36)
MCV RBC AUTO: 97 FL (ref 82–98)
MONOCYTES # BLD AUTO: 0.5 K/UL (ref 0.3–1)
MONOCYTES NFR BLD: 11.4 % (ref 4–15)
NEUTROPHILS # BLD AUTO: 2.8 K/UL (ref 1.8–7.7)
NEUTROPHILS NFR BLD: 63.2 % (ref 38–73)
NRBC BLD-RTO: 0 /100 WBC
PLATELET # BLD AUTO: 176 K/UL (ref 150–450)
PMV BLD AUTO: 9.8 FL (ref 9.2–12.9)
POTASSIUM SERPL-SCNC: 4.3 MMOL/L (ref 3.5–5.1)
PROT SERPL-MCNC: 8.1 G/DL (ref 6–8.4)
RBC # BLD AUTO: 4.15 M/UL (ref 4–5.4)
SODIUM SERPL-SCNC: 144 MMOL/L (ref 136–145)
WBC # BLD AUTO: 4.48 K/UL (ref 3.9–12.7)

## 2023-09-08 PROCEDURE — 99215 PR OFFICE/OUTPT VISIT, EST, LEVL V, 40-54 MIN: ICD-10-PCS | Mod: HCNC,S$GLB,, | Performed by: INTERNAL MEDICINE

## 2023-09-08 PROCEDURE — 3077F SYST BP >= 140 MM HG: CPT | Mod: HCNC,CPTII,S$GLB, | Performed by: INTERNAL MEDICINE

## 2023-09-08 PROCEDURE — 84260 ASSAY OF SEROTONIN: CPT | Mod: HCNC | Performed by: INTERNAL MEDICINE

## 2023-09-08 PROCEDURE — 1159F MED LIST DOCD IN RCRD: CPT | Mod: HCNC,CPTII,S$GLB, | Performed by: INTERNAL MEDICINE

## 2023-09-08 PROCEDURE — 3288F FALL RISK ASSESSMENT DOCD: CPT | Mod: HCNC,CPTII,S$GLB, | Performed by: INTERNAL MEDICINE

## 2023-09-08 PROCEDURE — 1101F PT FALLS ASSESS-DOCD LE1/YR: CPT | Mod: HCNC,CPTII,S$GLB, | Performed by: INTERNAL MEDICINE

## 2023-09-08 PROCEDURE — 96372 THER/PROPH/DIAG INJ SC/IM: CPT | Mod: HCNC,59

## 2023-09-08 PROCEDURE — 63600175 PHARM REV CODE 636 W HCPCS: Mod: HCNC | Performed by: INTERNAL MEDICINE

## 2023-09-08 PROCEDURE — 3077F PR MOST RECENT SYSTOLIC BLOOD PRESSURE >= 140 MM HG: ICD-10-PCS | Mod: HCNC,CPTII,S$GLB, | Performed by: INTERNAL MEDICINE

## 2023-09-08 PROCEDURE — 1126F PR PAIN SEVERITY QUANTIFIED, NO PAIN PRESENT: ICD-10-PCS | Mod: HCNC,CPTII,S$GLB, | Performed by: INTERNAL MEDICINE

## 2023-09-08 PROCEDURE — 36415 COLL VENOUS BLD VENIPUNCTURE: CPT | Mod: HCNC | Performed by: INTERNAL MEDICINE

## 2023-09-08 PROCEDURE — 83497 ASSAY OF 5-HIAA: CPT | Mod: HCNC | Performed by: INTERNAL MEDICINE

## 2023-09-08 PROCEDURE — 80053 COMPREHEN METABOLIC PANEL: CPT | Mod: HCNC | Performed by: INTERNAL MEDICINE

## 2023-09-08 PROCEDURE — 3078F PR MOST RECENT DIASTOLIC BLOOD PRESSURE < 80 MM HG: ICD-10-PCS | Mod: HCNC,CPTII,S$GLB, | Performed by: INTERNAL MEDICINE

## 2023-09-08 PROCEDURE — 96365 THER/PROPH/DIAG IV INF INIT: CPT | Mod: HCNC

## 2023-09-08 PROCEDURE — 85025 COMPLETE CBC W/AUTO DIFF WBC: CPT | Mod: HCNC | Performed by: INTERNAL MEDICINE

## 2023-09-08 PROCEDURE — 1159F PR MEDICATION LIST DOCUMENTED IN MEDICAL RECORD: ICD-10-PCS | Mod: HCNC,CPTII,S$GLB, | Performed by: INTERNAL MEDICINE

## 2023-09-08 PROCEDURE — 25000003 PHARM REV CODE 250: Mod: HCNC | Performed by: INTERNAL MEDICINE

## 2023-09-08 PROCEDURE — 1160F PR REVIEW ALL MEDS BY PRESCRIBER/CLIN PHARMACIST DOCUMENTED: ICD-10-PCS | Mod: HCNC,CPTII,S$GLB, | Performed by: INTERNAL MEDICINE

## 2023-09-08 PROCEDURE — 86316 IMMUNOASSAY TUMOR OTHER: CPT | Mod: HCNC | Performed by: INTERNAL MEDICINE

## 2023-09-08 PROCEDURE — 99215 OFFICE O/P EST HI 40 MIN: CPT | Mod: HCNC,S$GLB,, | Performed by: INTERNAL MEDICINE

## 2023-09-08 PROCEDURE — 1126F AMNT PAIN NOTED NONE PRSNT: CPT | Mod: HCNC,CPTII,S$GLB, | Performed by: INTERNAL MEDICINE

## 2023-09-08 PROCEDURE — 3288F PR FALLS RISK ASSESSMENT DOCUMENTED: ICD-10-PCS | Mod: HCNC,CPTII,S$GLB, | Performed by: INTERNAL MEDICINE

## 2023-09-08 PROCEDURE — 1101F PR PT FALLS ASSESS DOC 0-1 FALLS W/OUT INJ PAST YR: ICD-10-PCS | Mod: HCNC,CPTII,S$GLB, | Performed by: INTERNAL MEDICINE

## 2023-09-08 PROCEDURE — 99999 PR PBB SHADOW E&M-EST. PATIENT-LVL IV: ICD-10-PCS | Mod: PBBFAC,HCNC,, | Performed by: INTERNAL MEDICINE

## 2023-09-08 PROCEDURE — 3078F DIAST BP <80 MM HG: CPT | Mod: HCNC,CPTII,S$GLB, | Performed by: INTERNAL MEDICINE

## 2023-09-08 PROCEDURE — 1160F RVW MEDS BY RX/DR IN RCRD: CPT | Mod: HCNC,CPTII,S$GLB, | Performed by: INTERNAL MEDICINE

## 2023-09-08 PROCEDURE — 99999 PR PBB SHADOW E&M-EST. PATIENT-LVL IV: CPT | Mod: PBBFAC,HCNC,, | Performed by: INTERNAL MEDICINE

## 2023-09-08 PROCEDURE — 83519 RIA NONANTIBODY: CPT | Mod: HCNC | Performed by: INTERNAL MEDICINE

## 2023-09-08 RX ORDER — LANREOTIDE ACETATE 120 MG/.5ML
120 INJECTION SUBCUTANEOUS
Status: DISCONTINUED | OUTPATIENT
Start: 2023-09-08 | End: 2023-09-08 | Stop reason: HOSPADM

## 2023-09-08 RX ORDER — LANREOTIDE ACETATE 120 MG/.5ML
120 INJECTION SUBCUTANEOUS
Status: CANCELLED | OUTPATIENT
Start: 2023-09-08

## 2023-09-08 RX ORDER — SODIUM CHLORIDE 0.9 % (FLUSH) 0.9 %
10 SYRINGE (ML) INJECTION
Status: DISCONTINUED | OUTPATIENT
Start: 2023-09-08 | End: 2023-09-08 | Stop reason: HOSPADM

## 2023-09-08 RX ORDER — HEPARIN 100 UNIT/ML
500 SYRINGE INTRAVENOUS
Status: CANCELLED | OUTPATIENT
Start: 2023-09-23

## 2023-09-08 RX ORDER — SODIUM CHLORIDE 0.9 % (FLUSH) 0.9 %
10 SYRINGE (ML) INJECTION
Status: CANCELLED | OUTPATIENT
Start: 2023-09-23

## 2023-09-08 RX ADMIN — SODIUM CHLORIDE: 9 INJECTION, SOLUTION INTRAVENOUS at 03:09

## 2023-09-08 RX ADMIN — LANREOTIDE ACETATE 120 MG: 120 INJECTION SUBCUTANEOUS at 03:09

## 2023-09-08 RX ADMIN — ZOLEDRONIC ACID 3.5 MG: 4 INJECTION, SOLUTION, CONCENTRATE INTRAVENOUS at 03:09

## 2023-09-08 NOTE — PLAN OF CARE
1553 Infusion completed, injection administered, pt tolerated all; pt instructed to increase water hydration r/t Zometa infusion; discussed when to contact MD, when to report to ED; pt verbalized understanding of all discussed and when to report next

## 2023-09-08 NOTE — NURSING
1425  Pt here for Zometa infusion, Lanreotide SQ, no new complaints or concerns and reports tolerating treatment; discussed treatment plan for today, all questions answered and pt agrees to proceed

## 2023-09-09 LAB — CGA SERPL-MCNC: 1627 NG/ML

## 2023-09-12 LAB
5OH-INDOLEACETATE SERPL-MCNC: 113 NG/ML
SEROTONIN: 1280 NG/ML

## 2023-09-22 LAB — PANCREASTATIN SERPL-MCNC: 4201 PG/ML (ref 10–135)

## 2023-10-03 ENCOUNTER — LAB VISIT (OUTPATIENT)
Dept: LAB | Facility: HOSPITAL | Age: 83
End: 2023-10-03
Attending: INTERNAL MEDICINE
Payer: MEDICARE

## 2023-10-03 ENCOUNTER — OFFICE VISIT (OUTPATIENT)
Dept: HEMATOLOGY/ONCOLOGY | Facility: CLINIC | Age: 83
End: 2023-10-03
Payer: MEDICARE

## 2023-10-03 VITALS
WEIGHT: 106.56 LBS | DIASTOLIC BLOOD PRESSURE: 83 MMHG | RESPIRATION RATE: 18 BRPM | SYSTOLIC BLOOD PRESSURE: 141 MMHG | HEIGHT: 64 IN | OXYGEN SATURATION: 99 % | HEART RATE: 95 BPM | BODY MASS INDEX: 18.19 KG/M2

## 2023-10-03 DIAGNOSIS — C7A.012 MALIGNANT CARCINOID TUMOR OF ILEUM: ICD-10-CM

## 2023-10-03 DIAGNOSIS — D84.81 IMMUNODEFICIENCY SECONDARY TO NEOPLASM: ICD-10-CM

## 2023-10-03 DIAGNOSIS — I10 ESSENTIAL HYPERTENSION: ICD-10-CM

## 2023-10-03 DIAGNOSIS — D84.821 IMMUNODEFICIENCY DUE TO DRUGS: ICD-10-CM

## 2023-10-03 DIAGNOSIS — C79.51 METASTASIS TO SPINAL COLUMN: ICD-10-CM

## 2023-10-03 DIAGNOSIS — Z79.899 IMMUNODEFICIENCY DUE TO DRUGS: ICD-10-CM

## 2023-10-03 DIAGNOSIS — C7B.8 SECONDARY NEUROENDOCRINE TUMOR OF BONE: ICD-10-CM

## 2023-10-03 DIAGNOSIS — C7A.012 MALIGNANT CARCINOID TUMOR OF ILEUM: Primary | ICD-10-CM

## 2023-10-03 DIAGNOSIS — R80.9 CONTROLLED TYPE 2 DIABETES MELLITUS WITH MICROALBUMINURIA, WITHOUT LONG-TERM CURRENT USE OF INSULIN: ICD-10-CM

## 2023-10-03 DIAGNOSIS — C7B.8 METASTATIC MALIGNANT NEUROENDOCRINE TUMOR TO LIVER: ICD-10-CM

## 2023-10-03 DIAGNOSIS — E34.0 CARCINOID SYNDROME: ICD-10-CM

## 2023-10-03 DIAGNOSIS — I51.89 CARCINOID HEART DISEASE: ICD-10-CM

## 2023-10-03 DIAGNOSIS — D49.9 IMMUNODEFICIENCY SECONDARY TO NEOPLASM: ICD-10-CM

## 2023-10-03 DIAGNOSIS — E11.29 CONTROLLED TYPE 2 DIABETES MELLITUS WITH MICROALBUMINURIA, WITHOUT LONG-TERM CURRENT USE OF INSULIN: ICD-10-CM

## 2023-10-03 DIAGNOSIS — E34.0 CARCINOID HEART DISEASE: ICD-10-CM

## 2023-10-03 LAB
ALBUMIN SERPL BCP-MCNC: 3.7 G/DL (ref 3.5–5.2)
ALP SERPL-CCNC: 56 U/L (ref 55–135)
ALT SERPL W/O P-5'-P-CCNC: 29 U/L (ref 10–44)
ANION GAP SERPL CALC-SCNC: 9 MMOL/L (ref 8–16)
AST SERPL-CCNC: 29 U/L (ref 10–40)
BASOPHILS # BLD AUTO: 0.01 K/UL (ref 0–0.2)
BASOPHILS NFR BLD: 0.2 % (ref 0–1.9)
BILIRUB SERPL-MCNC: 0.3 MG/DL (ref 0.1–1)
BUN SERPL-MCNC: 12 MG/DL (ref 8–23)
CALCIUM SERPL-MCNC: 9.6 MG/DL (ref 8.7–10.5)
CHLORIDE SERPL-SCNC: 103 MMOL/L (ref 95–110)
CO2 SERPL-SCNC: 29 MMOL/L (ref 23–29)
CREAT SERPL-MCNC: 0.7 MG/DL (ref 0.5–1.4)
DIFFERENTIAL METHOD: ABNORMAL
EOSINOPHIL # BLD AUTO: 0 K/UL (ref 0–0.5)
EOSINOPHIL NFR BLD: 0.5 % (ref 0–8)
ERYTHROCYTE [DISTWIDTH] IN BLOOD BY AUTOMATED COUNT: 12.7 % (ref 11.5–14.5)
EST. GFR  (NO RACE VARIABLE): >60 ML/MIN/1.73 M^2
GLUCOSE SERPL-MCNC: 136 MG/DL (ref 70–110)
HCT VFR BLD AUTO: 37 % (ref 37–48.5)
HGB BLD-MCNC: 12.2 G/DL (ref 12–16)
IMM GRANULOCYTES # BLD AUTO: 0.01 K/UL (ref 0–0.04)
IMM GRANULOCYTES NFR BLD AUTO: 0.2 % (ref 0–0.5)
LYMPHOCYTES # BLD AUTO: 1.1 K/UL (ref 1–4.8)
LYMPHOCYTES NFR BLD: 25.1 % (ref 18–48)
MCH RBC QN AUTO: 31.2 PG (ref 27–31)
MCHC RBC AUTO-ENTMCNC: 33 G/DL (ref 32–36)
MCV RBC AUTO: 95 FL (ref 82–98)
MONOCYTES # BLD AUTO: 0.5 K/UL (ref 0.3–1)
MONOCYTES NFR BLD: 12 % (ref 4–15)
NEUTROPHILS # BLD AUTO: 2.7 K/UL (ref 1.8–7.7)
NEUTROPHILS NFR BLD: 62 % (ref 38–73)
NRBC BLD-RTO: 1 /100 WBC
PLATELET # BLD AUTO: 165 K/UL (ref 150–450)
PMV BLD AUTO: 9.3 FL (ref 9.2–12.9)
POTASSIUM SERPL-SCNC: 4.2 MMOL/L (ref 3.5–5.1)
PROT SERPL-MCNC: 7.2 G/DL (ref 6–8.4)
RBC # BLD AUTO: 3.91 M/UL (ref 4–5.4)
SODIUM SERPL-SCNC: 141 MMOL/L (ref 136–145)
WBC # BLD AUTO: 4.35 K/UL (ref 3.9–12.7)

## 2023-10-03 PROCEDURE — 1160F RVW MEDS BY RX/DR IN RCRD: CPT | Mod: HCNC,CPTII,S$GLB, | Performed by: INTERNAL MEDICINE

## 2023-10-03 PROCEDURE — 1159F PR MEDICATION LIST DOCUMENTED IN MEDICAL RECORD: ICD-10-PCS | Mod: HCNC,CPTII,S$GLB, | Performed by: INTERNAL MEDICINE

## 2023-10-03 PROCEDURE — 1126F AMNT PAIN NOTED NONE PRSNT: CPT | Mod: HCNC,CPTII,S$GLB, | Performed by: INTERNAL MEDICINE

## 2023-10-03 PROCEDURE — 1159F MED LIST DOCD IN RCRD: CPT | Mod: HCNC,CPTII,S$GLB, | Performed by: INTERNAL MEDICINE

## 2023-10-03 PROCEDURE — 99999 PR PBB SHADOW E&M-EST. PATIENT-LVL IV: CPT | Mod: PBBFAC,HCNC,, | Performed by: INTERNAL MEDICINE

## 2023-10-03 PROCEDURE — 85025 COMPLETE CBC W/AUTO DIFF WBC: CPT | Mod: HCNC | Performed by: INTERNAL MEDICINE

## 2023-10-03 PROCEDURE — 80053 COMPREHEN METABOLIC PANEL: CPT | Mod: HCNC | Performed by: INTERNAL MEDICINE

## 2023-10-03 PROCEDURE — 83497 ASSAY OF 5-HIAA: CPT | Mod: HCNC | Performed by: INTERNAL MEDICINE

## 2023-10-03 PROCEDURE — 99215 PR OFFICE/OUTPT VISIT, EST, LEVL V, 40-54 MIN: ICD-10-PCS | Mod: HCNC,S$GLB,, | Performed by: INTERNAL MEDICINE

## 2023-10-03 PROCEDURE — 3288F FALL RISK ASSESSMENT DOCD: CPT | Mod: HCNC,CPTII,S$GLB, | Performed by: INTERNAL MEDICINE

## 2023-10-03 PROCEDURE — 3077F SYST BP >= 140 MM HG: CPT | Mod: HCNC,CPTII,S$GLB, | Performed by: INTERNAL MEDICINE

## 2023-10-03 PROCEDURE — 1101F PR PT FALLS ASSESS DOC 0-1 FALLS W/OUT INJ PAST YR: ICD-10-PCS | Mod: HCNC,CPTII,S$GLB, | Performed by: INTERNAL MEDICINE

## 2023-10-03 PROCEDURE — 36415 COLL VENOUS BLD VENIPUNCTURE: CPT | Mod: HCNC | Performed by: INTERNAL MEDICINE

## 2023-10-03 PROCEDURE — 1101F PT FALLS ASSESS-DOCD LE1/YR: CPT | Mod: HCNC,CPTII,S$GLB, | Performed by: INTERNAL MEDICINE

## 2023-10-03 PROCEDURE — 3079F PR MOST RECENT DIASTOLIC BLOOD PRESSURE 80-89 MM HG: ICD-10-PCS | Mod: HCNC,CPTII,S$GLB, | Performed by: INTERNAL MEDICINE

## 2023-10-03 PROCEDURE — 3079F DIAST BP 80-89 MM HG: CPT | Mod: HCNC,CPTII,S$GLB, | Performed by: INTERNAL MEDICINE

## 2023-10-03 PROCEDURE — 3288F PR FALLS RISK ASSESSMENT DOCUMENTED: ICD-10-PCS | Mod: HCNC,CPTII,S$GLB, | Performed by: INTERNAL MEDICINE

## 2023-10-03 PROCEDURE — 1160F PR REVIEW ALL MEDS BY PRESCRIBER/CLIN PHARMACIST DOCUMENTED: ICD-10-PCS | Mod: HCNC,CPTII,S$GLB, | Performed by: INTERNAL MEDICINE

## 2023-10-03 PROCEDURE — 84260 ASSAY OF SEROTONIN: CPT | Mod: HCNC | Performed by: INTERNAL MEDICINE

## 2023-10-03 PROCEDURE — 3077F PR MOST RECENT SYSTOLIC BLOOD PRESSURE >= 140 MM HG: ICD-10-PCS | Mod: HCNC,CPTII,S$GLB, | Performed by: INTERNAL MEDICINE

## 2023-10-03 PROCEDURE — 99215 OFFICE O/P EST HI 40 MIN: CPT | Mod: HCNC,S$GLB,, | Performed by: INTERNAL MEDICINE

## 2023-10-03 PROCEDURE — 83519 RIA NONANTIBODY: CPT | Mod: HCNC | Performed by: INTERNAL MEDICINE

## 2023-10-03 PROCEDURE — 99999 PR PBB SHADOW E&M-EST. PATIENT-LVL IV: ICD-10-PCS | Mod: PBBFAC,HCNC,, | Performed by: INTERNAL MEDICINE

## 2023-10-03 PROCEDURE — 1126F PR PAIN SEVERITY QUANTIFIED, NO PAIN PRESENT: ICD-10-PCS | Mod: HCNC,CPTII,S$GLB, | Performed by: INTERNAL MEDICINE

## 2023-10-03 PROCEDURE — 86316 IMMUNOASSAY TUMOR OTHER: CPT | Mod: HCNC | Performed by: INTERNAL MEDICINE

## 2023-10-03 NOTE — PROGRESS NOTES
"  Subjective:       Patient ID: Shahram Hills is an 83 y.o. female.     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 Ms. Hills is an 79 yo woman who initially saw me on 6/7/18 for further evaluation of neuroendocrine tumor. She initially presented with diarrhea, abdominal discomfort, weight loss. She was evaluated at [facility] and underwent workup below:  US abdomen on 3/14/18 showed numerous bilobar mixed echogenicity and mildly vascular hepatic lesions. Cholelithiasis without e/o acute cholecystitis.   MRI abdomen on 3/30/2018 showed innumerable hepatic metastases. L3 vertebral body metastasis with soft tissue component along the right margin of the L3 and upper L4 vertebral bodies, filling the right L3/4 neural foramen, and extending into the anterior aspect of the spinal canal on the right at the L3 and upper T4 levels. Associated with mild spinal canal narrowing.  CT chest on 4/6/2018 showed one lucent nodule measuring 7 mm in the T7 vertebral body, most likely representing metastatic disease.    EGD on 5/4/18 showed normal duodenum. Schatzki's ring in the lower third of the esophagus. Grade 1 esophagitis in the GE junction c/w mild distal esophagitis. Congestion and erythema in the antrum, pre-pyloric region and stomach body c/w gastritis. Biopsy of the stomach antrum showed mild chronic gastritis, neg for H. pylori.   Labs on 5/9/2018: WBC 6.9, H/H 11.6/34.3, MCV 87.5, plt count 279. On 3/9/18: Vit B12 level 173, folic acid 6.6  She was started on oral vit B12 and underwent a liver biopsy on 5/18/18. Pathology showed "metastatic well differentiated neuroendocrine tumor. Ki67 3%"     She saw me on 6/7/18 and complained of weight loss of 26 lbs over the past 3-4 months, also has diarrhea. No flushing, wheezing, palpitations. Has been having pain in right flank radiating down " "the right leg. No tingling/numbness, weakness. She can hold her bladder but when she urinates her diarrhea would come out as well. Started on dex 4 mg q6h the evening of 18.      2. MRI spine on 18 showed "Marrow replacing metastatic lesion of the L3 vertebral body with associated extra osseous expansion and complete effacement of the right L3-L4 neural foramina and additional effacement of the right lateral recess.  There is additional lateral extension and abutment of the right psoas muscle.  Superimposed degenerative change at this level results in mild spinal canal stenosis." I sent the patient to ED on 18 where she was evaluated by neurosurgery. Neurosurgery did not feel she was a candidate for surgical intervention.      3. Seen by Dr. Adames on 18. palliative radiation to the area of the L3 metastasis 18-18   4. 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  There is an adjacent metastatic lymph node, diffuse liver metastases, and multiple bone metastases. Index primary neoplasm SUV max 33.13. Adjacent lymph node SUV max 23. L3 bone metastasis SUV max 36.86. Left lobe SUV max 46.13   5. Lanreotide every 4 weeks started on 18  6. TACE to the right hepatic lobe on 19. TACE to the left hepatic lobe on 3/21/19.   7. TACE to the right hepatic lobe on 22. S/p conventional chemoembolization performed of the segment 2, 3, 4 branch of the left hepatic artery and segment 8 branch of the left hepatic artery on 22.  8. Gallium PET 22 showed progression. Discussed PRRT. PRRT #1 on 2022. Zometa every 3 months started 22. PRRT #2 on 23. #3 on 23. #4 on 23.      History of Present Illness:   Ms. Hills returns for follow up. Feeling well. Diarrhea controlled. On xermelo tid. On lasix 20 mg daily for leg edema which helps.    ECO     ROS:   See HPI. Otherwise negative.      Physical Examination:   Vital " signs reviewed.   Gen: well hydrated, well developed, in no acute distress.  HEENT: normocephalic, anicteric, PERRLA, EOMI  Neck: supple, no JVD, thyromegaly, cervical or supraclavicular LAD  Lungs: CTAB, no wheezes or rales  Heart: regular rate, regular pulse, no M/R/G  Abdomen: soft, no tenderness, non-distended, no hepatomegaly, no splenomegaly, no mass, or hernia.   Ext: b/l LE  no edema  Neuro: alert and oriented x 4, no focal neuro deficit  Skin: no rash, open wounds or ulcers  Psych: pleasant and appropriate mood and affect     Objective:      Laboratory Data:  Labs reviewed.     Imaging Data:     MRI abdomen 11/1/22:  1. History of neuroendocrine malignancy.  Numerous hepatic lesions, some remaining stable while others have mildly enlarged consistent with disease progression.  Stable osseous lesion in the L3 vertebral body.  2. Cholelithiasis and stable mild dilatation of the common bile duct and central intrahepatic biliary duct dilatation.  Of note, there is mass effect on the midportion of the common bile duct by dominant left hepatic lobe lesion.  3. Additional findings detailed above.  RECIST SUMMARY:     Date of prior examination for comparison: 05/16/2022     Lesion 1: Left hepatic lobe.  7.1 cm.  Series 9 image 52.  Prior measurement 6.3 cm.     Lesion 2: Right hepatic lobe lateral.  3.1 cm.  Series 9 image 30.  Prior measurement 3.2 cm.     Lesion 3: Right hepatic dome.  5.1 cm.  Series 9 image 19.  Prior measurement 4.4 cm.    Gallium PET 11/1/22:     Interval development of somatostatin tracer avid lesions in the skull, concerning for new metastatic lesions.     Numerous tracer avid hypoattenuating masses throughout the liver with increased SUV max compared to prior exam.     Tracer avid lesions in the C7 and L3 vertebral bodies, sternum, distal ileum and mesenteric node are similar to prior exam.     Judged by tracer avidity of the patient's tumor burden, PRRT would be a consideration if  clinically indicated.      Gallium PET 7/12/23:  Impression:     In this patient with carcinoid tumor of the ileum, there is overall similar distribution of tracer avid disease involving distal ileum, liver, mesenteric lymph node, and bones.  Index lesions as above.  No definite new tracer avid lesions.    Assessment and Plan:      1. Malignant carcinoid tumor of ileum    2. 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 Secondary neuroendocrine tumor of bone    4. Spine metastasis    5. 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 Carcinoid syndrome    8. Controlled type 2 diabetes mellitus with microalbuminuria, without long-term current use of insulin    9. 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 Started lanreotide every 4 weeks on 6/22/2018. S/p TACE to the right hepatic lobe on 2/14/19. TACE to the left hepatic lobe on 3/21/19.   - CT/MRI 1/12/22 showed mild progression in the liver. S/p TACE on 2/11/22 and 4/22/22   - Gallium PET 11/1/22 showed progression. Discussed PRRT. PRRT #1 on 12/14/2022. Completed 4 cycles on 6/14/23. Zometa every 3 months started 12/27/22. Last zometa 6/15/23.   - doing well. PET in July 2023 showed stable disease  - symptoms well controlled  - c/w lanreotide every 4 weeks and zometa every 3 months. Last got zometa in Sep. Next due in Dec 2024.  - RTC in 4 weeks. If biomarkers and symptoms stable, will do PET and MRI in Jan 2024    7.  - better after xermelo and PRRT  - c/w lanreotide every 4 weeks.   - c/w xermelo tid  - could not tolerate octreotide    8.  - BS controlled  - c/w current medication    9.  - BP controlled  - c/w current medication    10.  - last echo in 2022 showed LVEF 40%  - saw Dr Bergeron in July 2023. Felt to be well compensated  - f/u with  cardiology      Sebastian Granados MD  Hematology and Medical Oncology  [facility]        Route Chart for Scheduling    Med Onc Chart Routing      Follow up with physician 2 months. keep scheduled appts. see me on 12/1 with labs then zometa and lanreotide   Follow up with JUNAID    Infusion scheduling note   zometa every 3 months, next due on 12/1/24   Injection scheduling note lanreotide every 4 weeks   Labs CBC and CMP   Scheduling:  Preferred lab:  Lab interval: every 4 weeks  serotonin, pancreastatin, 5-HIAA   Imaging    Pharmacy appointment    Other referrals            Supportive Plan Information  OP ZOLEDRONIC ACID (ZOMETA) Q4W   Sebastian Granados MD   Upcoming Treatment Dates - OP ZOLEDRONIC ACID (ZOMETA) Q4W    12/22/2023       Medications       zoledronic acid (ZOMETA) 4 mg in sodium chloride 0.9% 100 mL IVPB  3/21/2024       Medications       zoledronic acid (ZOMETA) 4 mg in sodium chloride 0.9% 100 mL IVPB  6/19/2024       Medications       zoledronic acid (ZOMETA) 4 mg in sodium chloride 0.9% 100 mL IVPB  9/17/2024       Medications       zoledronic acid (ZOMETA) 4 mg in sodium chloride 0.9% 100 mL IVPB    Therapy Plan Information  LANREOTIDE  5. Medications  lanreotide injection 120 mg  120 mg, Subcutaneous, Every visit

## 2023-10-04 LAB — CGA SERPL-MCNC: 1587 NG/ML

## 2023-10-06 ENCOUNTER — INFUSION (OUTPATIENT)
Dept: INFUSION THERAPY | Facility: HOSPITAL | Age: 83
End: 2023-10-06
Payer: MEDICARE

## 2023-10-06 DIAGNOSIS — C7B.8 METASTATIC MALIGNANT NEUROENDOCRINE TUMOR TO LIVER: Primary | ICD-10-CM

## 2023-10-06 LAB — 5OH-INDOLEACETATE SERPL-MCNC: 69 NG/ML

## 2023-10-06 PROCEDURE — 63600175 PHARM REV CODE 636 W HCPCS: Mod: JZ,JG,HCNC | Performed by: INTERNAL MEDICINE

## 2023-10-06 PROCEDURE — 96372 THER/PROPH/DIAG INJ SC/IM: CPT | Mod: HCNC

## 2023-10-06 RX ORDER — LANREOTIDE ACETATE 120 MG/.5ML
120 INJECTION SUBCUTANEOUS
Status: DISCONTINUED | OUTPATIENT
Start: 2023-10-06 | End: 2023-10-06 | Stop reason: HOSPADM

## 2023-10-06 RX ORDER — LANREOTIDE ACETATE 120 MG/.5ML
120 INJECTION SUBCUTANEOUS
Status: CANCELLED | OUTPATIENT
Start: 2023-10-06

## 2023-10-06 RX ADMIN — LANREOTIDE ACETATE 120 MG: 120 INJECTION SUBCUTANEOUS at 02:10

## 2023-10-06 NOTE — NURSING
Pt here for Lanreotide injection. Administered injection in left outer gluteal tissue. No questions or concerns. Pt ambulated out of unit unassisted.

## 2023-10-10 ENCOUNTER — OFFICE VISIT (OUTPATIENT)
Dept: INTERNAL MEDICINE | Facility: CLINIC | Age: 83
End: 2023-10-10
Attending: INTERNAL MEDICINE
Payer: MEDICARE

## 2023-10-10 ENCOUNTER — LAB VISIT (OUTPATIENT)
Dept: LAB | Facility: OTHER | Age: 83
End: 2023-10-10
Attending: INTERNAL MEDICINE
Payer: MEDICARE

## 2023-10-10 VITALS
BODY MASS INDEX: 18.71 KG/M2 | WEIGHT: 109.56 LBS | HEART RATE: 88 BPM | HEIGHT: 64 IN | SYSTOLIC BLOOD PRESSURE: 121 MMHG | RESPIRATION RATE: 17 BRPM | DIASTOLIC BLOOD PRESSURE: 64 MMHG

## 2023-10-10 DIAGNOSIS — R80.9 CONTROLLED TYPE 2 DIABETES MELLITUS WITH MICROALBUMINURIA, WITHOUT LONG-TERM CURRENT USE OF INSULIN: ICD-10-CM

## 2023-10-10 DIAGNOSIS — E53.8 B12 DEFICIENCY: ICD-10-CM

## 2023-10-10 DIAGNOSIS — C7A.012 MALIGNANT CARCINOID TUMOR OF ILEUM: ICD-10-CM

## 2023-10-10 DIAGNOSIS — E78.5 HYPERLIPIDEMIA, UNSPECIFIED HYPERLIPIDEMIA TYPE: ICD-10-CM

## 2023-10-10 DIAGNOSIS — I10 ESSENTIAL HYPERTENSION: Primary | ICD-10-CM

## 2023-10-10 DIAGNOSIS — E11.29 CONTROLLED TYPE 2 DIABETES MELLITUS WITH MICROALBUMINURIA, WITHOUT LONG-TERM CURRENT USE OF INSULIN: ICD-10-CM

## 2023-10-10 DIAGNOSIS — I50.42 CHRONIC COMBINED SYSTOLIC AND DIASTOLIC HEART FAILURE: ICD-10-CM

## 2023-10-10 LAB
ESTIMATED AVG GLUCOSE: 128 MG/DL (ref 68–131)
HBA1C MFR BLD: 6.1 % (ref 4–5.6)
SEROTONIN: 1360 NG/ML

## 2023-10-10 PROCEDURE — 1126F PR PAIN SEVERITY QUANTIFIED, NO PAIN PRESENT: ICD-10-PCS | Mod: HCNC,CPTII,S$GLB, | Performed by: INTERNAL MEDICINE

## 2023-10-10 PROCEDURE — 3288F PR FALLS RISK ASSESSMENT DOCUMENTED: ICD-10-PCS | Mod: HCNC,CPTII,S$GLB, | Performed by: INTERNAL MEDICINE

## 2023-10-10 PROCEDURE — 3074F PR MOST RECENT SYSTOLIC BLOOD PRESSURE < 130 MM HG: ICD-10-PCS | Mod: HCNC,CPTII,S$GLB, | Performed by: INTERNAL MEDICINE

## 2023-10-10 PROCEDURE — 99214 PR OFFICE/OUTPT VISIT, EST, LEVL IV, 30-39 MIN: ICD-10-PCS | Mod: HCNC,S$GLB,, | Performed by: INTERNAL MEDICINE

## 2023-10-10 PROCEDURE — 99214 OFFICE O/P EST MOD 30 MIN: CPT | Mod: HCNC,S$GLB,, | Performed by: INTERNAL MEDICINE

## 2023-10-10 PROCEDURE — 1159F MED LIST DOCD IN RCRD: CPT | Mod: HCNC,CPTII,S$GLB, | Performed by: INTERNAL MEDICINE

## 2023-10-10 PROCEDURE — 1101F PR PT FALLS ASSESS DOC 0-1 FALLS W/OUT INJ PAST YR: ICD-10-PCS | Mod: HCNC,CPTII,S$GLB, | Performed by: INTERNAL MEDICINE

## 2023-10-10 PROCEDURE — 36415 COLL VENOUS BLD VENIPUNCTURE: CPT | Mod: HCNC | Performed by: INTERNAL MEDICINE

## 2023-10-10 PROCEDURE — 3074F SYST BP LT 130 MM HG: CPT | Mod: HCNC,CPTII,S$GLB, | Performed by: INTERNAL MEDICINE

## 2023-10-10 PROCEDURE — 1159F PR MEDICATION LIST DOCUMENTED IN MEDICAL RECORD: ICD-10-PCS | Mod: HCNC,CPTII,S$GLB, | Performed by: INTERNAL MEDICINE

## 2023-10-10 PROCEDURE — 83036 HEMOGLOBIN GLYCOSYLATED A1C: CPT | Mod: HCNC | Performed by: INTERNAL MEDICINE

## 2023-10-10 PROCEDURE — 1160F PR REVIEW ALL MEDS BY PRESCRIBER/CLIN PHARMACIST DOCUMENTED: ICD-10-PCS | Mod: HCNC,CPTII,S$GLB, | Performed by: INTERNAL MEDICINE

## 2023-10-10 PROCEDURE — 3288F FALL RISK ASSESSMENT DOCD: CPT | Mod: HCNC,CPTII,S$GLB, | Performed by: INTERNAL MEDICINE

## 2023-10-10 PROCEDURE — 3078F DIAST BP <80 MM HG: CPT | Mod: HCNC,CPTII,S$GLB, | Performed by: INTERNAL MEDICINE

## 2023-10-10 PROCEDURE — 1126F AMNT PAIN NOTED NONE PRSNT: CPT | Mod: HCNC,CPTII,S$GLB, | Performed by: INTERNAL MEDICINE

## 2023-10-10 PROCEDURE — 1101F PT FALLS ASSESS-DOCD LE1/YR: CPT | Mod: HCNC,CPTII,S$GLB, | Performed by: INTERNAL MEDICINE

## 2023-10-10 PROCEDURE — 99999 PR PBB SHADOW E&M-EST. PATIENT-LVL IV: CPT | Mod: PBBFAC,HCNC,, | Performed by: INTERNAL MEDICINE

## 2023-10-10 PROCEDURE — 99999 PR PBB SHADOW E&M-EST. PATIENT-LVL IV: ICD-10-PCS | Mod: PBBFAC,HCNC,, | Performed by: INTERNAL MEDICINE

## 2023-10-10 PROCEDURE — 3078F PR MOST RECENT DIASTOLIC BLOOD PRESSURE < 80 MM HG: ICD-10-PCS | Mod: HCNC,CPTII,S$GLB, | Performed by: INTERNAL MEDICINE

## 2023-10-10 PROCEDURE — 1160F RVW MEDS BY RX/DR IN RCRD: CPT | Mod: HCNC,CPTII,S$GLB, | Performed by: INTERNAL MEDICINE

## 2023-10-10 RX ORDER — FUROSEMIDE 20 MG/1
TABLET ORAL
Qty: 120 TABLET | Refills: 3 | Status: SHIPPED | OUTPATIENT
Start: 2023-10-10

## 2023-10-10 RX ORDER — EZETIMIBE 10 MG/1
10 TABLET ORAL DAILY
Qty: 90 TABLET | Refills: 3 | Status: SHIPPED | OUTPATIENT
Start: 2023-10-10 | End: 2024-02-14 | Stop reason: SDUPTHER

## 2023-10-10 RX ORDER — CARVEDILOL 12.5 MG/1
12.5 TABLET ORAL 2 TIMES DAILY WITH MEALS
Qty: 180 TABLET | Refills: 3 | Status: SHIPPED | OUTPATIENT
Start: 2023-10-10 | End: 2024-10-09

## 2023-10-10 RX ORDER — LOSARTAN POTASSIUM 50 MG/1
50 TABLET ORAL DAILY
Qty: 90 TABLET | Refills: 3 | Status: SHIPPED | OUTPATIENT
Start: 2023-10-10

## 2023-10-10 RX ORDER — POTASSIUM CHLORIDE 600 MG/1
8 TABLET, FILM COATED, EXTENDED RELEASE ORAL DAILY
Qty: 90 TABLET | Refills: 3 | Status: SHIPPED | OUTPATIENT
Start: 2023-10-10

## 2023-10-10 NOTE — PROGRESS NOTES
Subjective:   Patient ID: Shahram Hills is a 83 y.o. female  Chief complaint:   Chief Complaint   Patient presents with    Follow-up       HPI  Here for 3 month diabetes follow up and HTN follow up  Accompanied by her sister today     HTN:  Today well controlled at home today 118/74  Reviewed home readings and 110-140s/68-80s with dayami< 130/80  HR 70-90  - c/w meds on med card  - taking lasix daily, coreg, losartan  Previously:   Prev switched to coreg from amlodipine    Diabetes:   A1c 6.3<- 6.6<-6.4<- 5.9 <-6.2  <- 6.1 <- 6.2  - well controlled and sam metformin 1000 daily  Fasting bg 140-160s but eating sugary snack late at night    Foot exam utd at [facility]    Utd on eye exam 1/2023  No lows     PVCs, Chronic systolic and diastolic HF (EF 40% on echo 9/2022):  No further ankle swelling since taking lasix daily   Sam current regimen  No aranda or sob  - started lasix at [facility] due to acute HF exacerbation - sam daily lasix - no further ankle swelling since then   Previously:   - noticed ankle swelling - at baseline gets ankle swelling worse in afternoon - better in am   Cards: Elyssa  Previously:    - 2/25/2022 for hospital discharge had TACE right hep lobe and found to have frequent PVCs  - we resumed losartan at that time   - seen by cards 3/14/2022 and completed holter - at this time no tx indicated    B12 def:  - is taking supplement daily    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 Zometa every 3 months started 12/27/22. PRRT #2 on 2/8  Followed by h/o - H last clinic visit March 8, 2023  Previously:   - 2/25/2022 for hospital discharge had TACE right hep lobe and found to have frequent PVCs  - we resumed losartan at that time   - seen by cards 3/14/2022 and completed holter - at this time no tx  "indicated    H/o: Du  Cards: Elyssa  Pod: Charles    HM:  Covid booster   Flu   Rsv    Review of Systems    Objective:  Vitals:    10/10/23 0912 10/10/23 1005   BP: (!) 142/75 121/64   Pulse: 88    Resp: 17    Weight: 49.7 kg (109 lb 9.1 oz)    Height: 5' 4" (1.626 m)      Body mass index is 18.81 kg/m².    Physical Exam  Vitals reviewed.   Constitutional:       Appearance: Normal appearance. She is well-developed.   HENT:      Head: Normocephalic and atraumatic.   Eyes:      Extraocular Movements: Extraocular movements intact.      Conjunctiva/sclera: Conjunctivae normal.   Cardiovascular:      Rate and Rhythm: Normal rate and regular rhythm.      Pulses: Normal pulses.      Heart sounds: Normal heart sounds.   Pulmonary:      Effort: Pulmonary effort is normal.      Breath sounds: Normal breath sounds.   Abdominal:      General: Bowel sounds are normal.      Palpations: Abdomen is soft. There is mass (ruq mass).   Musculoskeletal:         General: No swelling or tenderness. Normal range of motion.      Cervical back: Normal range of motion and neck supple.   Lymphadenopathy:      Cervical: No cervical adenopathy.   Skin:     General: Skin is warm and dry.      Capillary Refill: Capillary refill takes less than 2 seconds.      Nails: There is no clubbing.   Neurological:      General: No focal deficit present.      Mental Status: She is alert and oriented to person, place, and time. Mental status is at baseline.      Gait: Gait normal.   Psychiatric:         Mood and Affect: Mood normal.         Speech: Speech normal.         Behavior: Behavior normal.         Thought Content: Thought content normal.         Judgment: Judgment normal.       Assessment:  1. Essential hypertension    2. Hyperlipidemia, unspecified hyperlipidemia type    3. Controlled type 2 diabetes mellitus with microalbuminuria, without long-term current use of insulin    4. Chronic combined systolic and diastolic heart failure    5. B12 deficiency  "   6. Malignant carcinoid tumor of ileum      Plan:  Shahram was seen today for follow-up.    Diagnoses and all orders for this visit:    Essential hypertension  -     furosemide (LASIX) 20 MG tablet; take 1 tab by mouth daily. If gain 2-3 pounds in 2-3 days then take 2 tabs daily for 3 days and then resume 1 dose daily  -     carvediloL (COREG) 12.5 MG tablet; Take 1 tablet (12.5 mg total) by mouth 2 (two) times daily with meals.  -     losartan (COZAAR) 50 MG tablet; Take 1 tablet (50 mg total) by mouth once daily.  Stable   Cont med     Hyperlipidemia, unspecified hyperlipidemia type  -     ezetimibe (ZETIA) 10 mg tablet; Take 1 tablet (10 mg total) by mouth once daily.  Stable   Cont med     Controlled type 2 diabetes mellitus with microalbuminuria, without long-term current use of insulin  Stable   Cont med a1c q6m    Chronic combined systolic and diastolic heart failure  -     furosemide (LASIX) 20 MG tablet; take 1 tab by mouth daily. If gain 2-3 pounds in 2-3 days then take 2 tabs daily for 3 days and then resume 1 dose daily  -     potassium chloride (KLOR-CON) 8 MEQ TbSR; Take 1 tablet (8 mEq total) by mouth once daily.  -     B-TYPE NATRIURETIC PEPTIDE; Future  Euvolemic today   Cont current meds   Update labs iw next lab draw    B12 deficiency  Stabl e  Cont suppl     Malignant carcinoid tumor of ileum  Stable - f/u with h/o     Cont meds   Cont home bp and bg monitoring   Dexa: wnl 5/2021 - repeat due 2024  A1c pending   Add bnp to next labs in Nov to eval for improvement with adding lasix daily after lcv - euvolemic today     Health Maintenance   Topic Date Due    Eye Exam  01/24/2024    Hemoglobin A1c  04/10/2024    DEXA Scan  05/31/2024    Lipid Panel  07/12/2024    TETANUS VACCINE  12/10/2029    Shingles Vaccine  Completed

## 2023-10-20 LAB — PANCREASTATIN SERPL-MCNC: 4023 PG/ML (ref 10–135)

## 2023-11-03 ENCOUNTER — LAB VISIT (OUTPATIENT)
Dept: LAB | Facility: HOSPITAL | Age: 83
End: 2023-11-03
Attending: INTERNAL MEDICINE
Payer: MEDICARE

## 2023-11-03 ENCOUNTER — OFFICE VISIT (OUTPATIENT)
Dept: HEMATOLOGY/ONCOLOGY | Facility: CLINIC | Age: 83
End: 2023-11-03
Payer: MEDICARE

## 2023-11-03 ENCOUNTER — INFUSION (OUTPATIENT)
Dept: INFUSION THERAPY | Facility: HOSPITAL | Age: 83
End: 2023-11-03
Payer: MEDICARE

## 2023-11-03 VITALS
OXYGEN SATURATION: 98 % | RESPIRATION RATE: 18 BRPM | BODY MASS INDEX: 18.56 KG/M2 | HEIGHT: 64 IN | SYSTOLIC BLOOD PRESSURE: 144 MMHG | HEART RATE: 83 BPM | WEIGHT: 108.69 LBS | TEMPERATURE: 98 F | DIASTOLIC BLOOD PRESSURE: 75 MMHG

## 2023-11-03 DIAGNOSIS — D84.821 IMMUNODEFICIENCY DUE TO DRUGS: ICD-10-CM

## 2023-11-03 DIAGNOSIS — E34.0 CARCINOID HEART DISEASE: ICD-10-CM

## 2023-11-03 DIAGNOSIS — I10 ESSENTIAL HYPERTENSION: ICD-10-CM

## 2023-11-03 DIAGNOSIS — C79.51 METASTASIS TO SPINAL COLUMN: ICD-10-CM

## 2023-11-03 DIAGNOSIS — C7A.012 MALIGNANT CARCINOID TUMOR OF ILEUM: ICD-10-CM

## 2023-11-03 DIAGNOSIS — E34.0 CARCINOID SYNDROME: ICD-10-CM

## 2023-11-03 DIAGNOSIS — C7B.8 METASTATIC MALIGNANT NEUROENDOCRINE TUMOR TO LIVER: ICD-10-CM

## 2023-11-03 DIAGNOSIS — C7A.8 NEUROENDOCRINE CARCINOMA METASTATIC TO BONE: ICD-10-CM

## 2023-11-03 DIAGNOSIS — Z79.899 IMMUNODEFICIENCY DUE TO DRUGS: ICD-10-CM

## 2023-11-03 DIAGNOSIS — C7A.012 MALIGNANT CARCINOID TUMOR OF ILEUM: Primary | ICD-10-CM

## 2023-11-03 DIAGNOSIS — C7B.8 NEUROENDOCRINE CARCINOMA METASTATIC TO BONE: ICD-10-CM

## 2023-11-03 DIAGNOSIS — R80.9 CONTROLLED TYPE 2 DIABETES MELLITUS WITH MICROALBUMINURIA, WITHOUT LONG-TERM CURRENT USE OF INSULIN: ICD-10-CM

## 2023-11-03 DIAGNOSIS — I51.89 CARCINOID HEART DISEASE: ICD-10-CM

## 2023-11-03 DIAGNOSIS — C7B.8 METASTATIC MALIGNANT NEUROENDOCRINE TUMOR TO LIVER: Primary | ICD-10-CM

## 2023-11-03 DIAGNOSIS — I50.42 CHRONIC COMBINED SYSTOLIC AND DIASTOLIC HEART FAILURE: ICD-10-CM

## 2023-11-03 DIAGNOSIS — E11.29 CONTROLLED TYPE 2 DIABETES MELLITUS WITH MICROALBUMINURIA, WITHOUT LONG-TERM CURRENT USE OF INSULIN: ICD-10-CM

## 2023-11-03 DIAGNOSIS — D49.9 IMMUNODEFICIENCY SECONDARY TO NEOPLASM: ICD-10-CM

## 2023-11-03 DIAGNOSIS — C7B.8 SECONDARY NEUROENDOCRINE TUMOR OF BONE: ICD-10-CM

## 2023-11-03 DIAGNOSIS — D84.81 IMMUNODEFICIENCY SECONDARY TO NEOPLASM: ICD-10-CM

## 2023-11-03 LAB
ALBUMIN SERPL BCP-MCNC: 3.6 G/DL (ref 3.5–5.2)
ALP SERPL-CCNC: 52 U/L (ref 55–135)
ALT SERPL W/O P-5'-P-CCNC: 27 U/L (ref 10–44)
ANION GAP SERPL CALC-SCNC: 9 MMOL/L (ref 8–16)
AST SERPL-CCNC: 28 U/L (ref 10–40)
BASOPHILS # BLD AUTO: 0.01 K/UL (ref 0–0.2)
BASOPHILS NFR BLD: 0.2 % (ref 0–1.9)
BILIRUB SERPL-MCNC: 0.4 MG/DL (ref 0.1–1)
BNP SERPL-MCNC: 535 PG/ML (ref 0–99)
BUN SERPL-MCNC: 11 MG/DL (ref 8–23)
CALCIUM SERPL-MCNC: 9.5 MG/DL (ref 8.7–10.5)
CHLORIDE SERPL-SCNC: 103 MMOL/L (ref 95–110)
CO2 SERPL-SCNC: 26 MMOL/L (ref 23–29)
CREAT SERPL-MCNC: 0.7 MG/DL (ref 0.5–1.4)
DIFFERENTIAL METHOD: ABNORMAL
EOSINOPHIL # BLD AUTO: 0 K/UL (ref 0–0.5)
EOSINOPHIL NFR BLD: 0.5 % (ref 0–8)
ERYTHROCYTE [DISTWIDTH] IN BLOOD BY AUTOMATED COUNT: 12.5 % (ref 11.5–14.5)
EST. GFR  (NO RACE VARIABLE): >60 ML/MIN/1.73 M^2
GLUCOSE SERPL-MCNC: 190 MG/DL (ref 70–110)
HCT VFR BLD AUTO: 36.1 % (ref 37–48.5)
HGB BLD-MCNC: 12.1 G/DL (ref 12–16)
IMM GRANULOCYTES # BLD AUTO: 0.01 K/UL (ref 0–0.04)
IMM GRANULOCYTES NFR BLD AUTO: 0.2 % (ref 0–0.5)
LYMPHOCYTES # BLD AUTO: 1 K/UL (ref 1–4.8)
LYMPHOCYTES NFR BLD: 23.8 % (ref 18–48)
MCH RBC QN AUTO: 32 PG (ref 27–31)
MCHC RBC AUTO-ENTMCNC: 33.5 G/DL (ref 32–36)
MCV RBC AUTO: 96 FL (ref 82–98)
MONOCYTES # BLD AUTO: 0.5 K/UL (ref 0.3–1)
MONOCYTES NFR BLD: 12.2 % (ref 4–15)
NEUTROPHILS # BLD AUTO: 2.6 K/UL (ref 1.8–7.7)
NEUTROPHILS NFR BLD: 63.1 % (ref 38–73)
NRBC BLD-RTO: 0 /100 WBC
PLATELET # BLD AUTO: 177 K/UL (ref 150–450)
PMV BLD AUTO: 9.7 FL (ref 9.2–12.9)
POTASSIUM SERPL-SCNC: 3.8 MMOL/L (ref 3.5–5.1)
PROT SERPL-MCNC: 6.9 G/DL (ref 6–8.4)
RBC # BLD AUTO: 3.78 M/UL (ref 4–5.4)
SODIUM SERPL-SCNC: 138 MMOL/L (ref 136–145)
WBC # BLD AUTO: 4.11 K/UL (ref 3.9–12.7)

## 2023-11-03 PROCEDURE — 86316 IMMUNOASSAY TUMOR OTHER: CPT | Mod: HCNC | Performed by: INTERNAL MEDICINE

## 2023-11-03 PROCEDURE — 84260 ASSAY OF SEROTONIN: CPT | Mod: HCNC | Performed by: INTERNAL MEDICINE

## 2023-11-03 PROCEDURE — 1159F MED LIST DOCD IN RCRD: CPT | Mod: HCNC,CPTII,S$GLB, | Performed by: INTERNAL MEDICINE

## 2023-11-03 PROCEDURE — 99215 OFFICE O/P EST HI 40 MIN: CPT | Mod: HCNC,S$GLB,, | Performed by: INTERNAL MEDICINE

## 2023-11-03 PROCEDURE — 1101F PT FALLS ASSESS-DOCD LE1/YR: CPT | Mod: HCNC,CPTII,S$GLB, | Performed by: INTERNAL MEDICINE

## 2023-11-03 PROCEDURE — 3288F FALL RISK ASSESSMENT DOCD: CPT | Mod: HCNC,CPTII,S$GLB, | Performed by: INTERNAL MEDICINE

## 2023-11-03 PROCEDURE — 99999 PR PBB SHADOW E&M-EST. PATIENT-LVL V: ICD-10-PCS | Mod: PBBFAC,HCNC,, | Performed by: INTERNAL MEDICINE

## 2023-11-03 PROCEDURE — 85025 COMPLETE CBC W/AUTO DIFF WBC: CPT | Mod: HCNC | Performed by: INTERNAL MEDICINE

## 2023-11-03 PROCEDURE — 96372 THER/PROPH/DIAG INJ SC/IM: CPT | Mod: HCNC

## 2023-11-03 PROCEDURE — 1160F PR REVIEW ALL MEDS BY PRESCRIBER/CLIN PHARMACIST DOCUMENTED: ICD-10-PCS | Mod: HCNC,CPTII,S$GLB, | Performed by: INTERNAL MEDICINE

## 2023-11-03 PROCEDURE — 99215 PR OFFICE/OUTPT VISIT, EST, LEVL V, 40-54 MIN: ICD-10-PCS | Mod: HCNC,S$GLB,, | Performed by: INTERNAL MEDICINE

## 2023-11-03 PROCEDURE — 83497 ASSAY OF 5-HIAA: CPT | Mod: HCNC | Performed by: INTERNAL MEDICINE

## 2023-11-03 PROCEDURE — 80053 COMPREHEN METABOLIC PANEL: CPT | Mod: HCNC | Performed by: INTERNAL MEDICINE

## 2023-11-03 PROCEDURE — 3077F PR MOST RECENT SYSTOLIC BLOOD PRESSURE >= 140 MM HG: ICD-10-PCS | Mod: HCNC,CPTII,S$GLB, | Performed by: INTERNAL MEDICINE

## 2023-11-03 PROCEDURE — 3078F PR MOST RECENT DIASTOLIC BLOOD PRESSURE < 80 MM HG: ICD-10-PCS | Mod: HCNC,CPTII,S$GLB, | Performed by: INTERNAL MEDICINE

## 2023-11-03 PROCEDURE — 63600175 PHARM REV CODE 636 W HCPCS: Mod: JZ,JG,HCNC | Performed by: INTERNAL MEDICINE

## 2023-11-03 PROCEDURE — 1160F RVW MEDS BY RX/DR IN RCRD: CPT | Mod: HCNC,CPTII,S$GLB, | Performed by: INTERNAL MEDICINE

## 2023-11-03 PROCEDURE — 3078F DIAST BP <80 MM HG: CPT | Mod: HCNC,CPTII,S$GLB, | Performed by: INTERNAL MEDICINE

## 2023-11-03 PROCEDURE — 1101F PR PT FALLS ASSESS DOC 0-1 FALLS W/OUT INJ PAST YR: ICD-10-PCS | Mod: HCNC,CPTII,S$GLB, | Performed by: INTERNAL MEDICINE

## 2023-11-03 PROCEDURE — 1159F PR MEDICATION LIST DOCUMENTED IN MEDICAL RECORD: ICD-10-PCS | Mod: HCNC,CPTII,S$GLB, | Performed by: INTERNAL MEDICINE

## 2023-11-03 PROCEDURE — 1126F PR PAIN SEVERITY QUANTIFIED, NO PAIN PRESENT: ICD-10-PCS | Mod: HCNC,CPTII,S$GLB, | Performed by: INTERNAL MEDICINE

## 2023-11-03 PROCEDURE — 3077F SYST BP >= 140 MM HG: CPT | Mod: HCNC,CPTII,S$GLB, | Performed by: INTERNAL MEDICINE

## 2023-11-03 PROCEDURE — 99999 PR PBB SHADOW E&M-EST. PATIENT-LVL V: CPT | Mod: PBBFAC,HCNC,, | Performed by: INTERNAL MEDICINE

## 2023-11-03 PROCEDURE — 83880 ASSAY OF NATRIURETIC PEPTIDE: CPT | Mod: HCNC | Performed by: INTERNAL MEDICINE

## 2023-11-03 PROCEDURE — 36415 COLL VENOUS BLD VENIPUNCTURE: CPT | Mod: HCNC | Performed by: INTERNAL MEDICINE

## 2023-11-03 PROCEDURE — 83519 RIA NONANTIBODY: CPT | Mod: HCNC | Performed by: INTERNAL MEDICINE

## 2023-11-03 PROCEDURE — 3288F PR FALLS RISK ASSESSMENT DOCUMENTED: ICD-10-PCS | Mod: HCNC,CPTII,S$GLB, | Performed by: INTERNAL MEDICINE

## 2023-11-03 PROCEDURE — 1126F AMNT PAIN NOTED NONE PRSNT: CPT | Mod: HCNC,CPTII,S$GLB, | Performed by: INTERNAL MEDICINE

## 2023-11-03 RX ORDER — LANREOTIDE ACETATE 120 MG/.5ML
120 INJECTION SUBCUTANEOUS
Status: DISCONTINUED | OUTPATIENT
Start: 2023-11-03 | End: 2023-11-03 | Stop reason: HOSPADM

## 2023-11-03 RX ORDER — LANREOTIDE ACETATE 120 MG/.5ML
120 INJECTION SUBCUTANEOUS
Status: CANCELLED | OUTPATIENT
Start: 2023-11-03

## 2023-11-03 RX ADMIN — LANREOTIDE ACETATE 120 MG: 120 INJECTION SUBCUTANEOUS at 02:11

## 2023-11-03 NOTE — NURSING
Pt here for lanreotide injection.  Administered to left upper outer buttocks.  Pt tolerated.  Ambulated out unassisted by self.

## 2023-11-03 NOTE — PROGRESS NOTES
"  Subjective:       Patient ID: Shahram Hills is an 83 y.o. female.     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 Ms. Hills is an 79 yo woman who initially saw me on 6/7/18 for further evaluation of neuroendocrine tumor. She initially presented with diarrhea, abdominal discomfort, weight loss. She was evaluated at [facility] and underwent workup below:  US abdomen on 3/14/18 showed numerous bilobar mixed echogenicity and mildly vascular hepatic lesions. Cholelithiasis without e/o acute cholecystitis.   MRI abdomen on 3/30/2018 showed innumerable hepatic metastases. L3 vertebral body metastasis with soft tissue component along the right margin of the L3 and upper L4 vertebral bodies, filling the right L3/4 neural foramen, and extending into the anterior aspect of the spinal canal on the right at the L3 and upper T4 levels. Associated with mild spinal canal narrowing.  CT chest on 4/6/2018 showed one lucent nodule measuring 7 mm in the T7 vertebral body, most likely representing metastatic disease.    EGD on 5/4/18 showed normal duodenum. Schatzki's ring in the lower third of the esophagus. Grade 1 esophagitis in the GE junction c/w mild distal esophagitis. Congestion and erythema in the antrum, pre-pyloric region and stomach body c/w gastritis. Biopsy of the stomach antrum showed mild chronic gastritis, neg for H. pylori.   Labs on 5/9/2018: WBC 6.9, H/H 11.6/34.3, MCV 87.5, plt count 279. On 3/9/18: Vit B12 level 173, folic acid 6.6  She was started on oral vit B12 and underwent a liver biopsy on 5/18/18. Pathology showed "metastatic well differentiated neuroendocrine tumor. Ki67 3%"     She saw me on 6/7/18 and complained of weight loss of 26 lbs over the past 3-4 months, also has diarrhea. No flushing, wheezing, palpitations. Has been having pain in right flank radiating down " "the right leg. No tingling/numbness, weakness. She can hold her bladder but when she urinates her diarrhea would come out as well. Started on dex 4 mg q6h the evening of 18.      2. MRI spine on 18 showed "Marrow replacing metastatic lesion of the L3 vertebral body with associated extra osseous expansion and complete effacement of the right L3-L4 neural foramina and additional effacement of the right lateral recess.  There is additional lateral extension and abutment of the right psoas muscle.  Superimposed degenerative change at this level results in mild spinal canal stenosis." I sent the patient to ED on 18 where she was evaluated by neurosurgery. Neurosurgery did not feel she was a candidate for surgical intervention.      3. Seen by Dr. Adames on 18. palliative radiation to the area of the L3 metastasis 18-18   4. 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  There is an adjacent metastatic lymph node, diffuse liver metastases, and multiple bone metastases. Index primary neoplasm SUV max 33.13. Adjacent lymph node SUV max 23. L3 bone metastasis SUV max 36.86. Left lobe SUV max 46.13   5. Lanreotide every 4 weeks started on 18  6. TACE to the right hepatic lobe on 19. TACE to the left hepatic lobe on 3/21/19.   7. TACE to the right hepatic lobe on 22. S/p conventional chemoembolization performed of the segment 2, 3, 4 branch of the left hepatic artery and segment 8 branch of the left hepatic artery on 22.  8. Gallium PET 22 showed progression. Discussed PRRT. PRRT #1 on 2022. Zometa every 3 months started 22. PRRT #2 on 23. #3 on 23. #4 on 23.      History of Present Illness:   Ms. Hills returns for follow up. Feeling well. Diarrhea controlled. On xermelo tid.     ECO     ROS:   See HPI. Otherwise negative.      Physical Examination:   Vital signs reviewed.   Gen: well hydrated, well " developed, in no acute distress.  HEENT: normocephalic, anicteric, PERRLA, EOMI  Neck: supple, no JVD, thyromegaly, cervical or supraclavicular LAD  Lungs: CTAB, no wheezes or rales  Heart: regular rate, regular pulse, no M/R/G  Abdomen: soft, no tenderness, non-distended, no hepatomegaly, no splenomegaly, no mass, or hernia.   Ext: b/l LE  no edema  Neuro: alert and oriented x 4, no focal neuro deficit  Skin: no rash, open wounds or ulcers  Psych: pleasant and appropriate mood and affect     Objective:      Laboratory Data:  Labs reviewed. Serotonin, pancreastatin stable. 5-HIAA came down    Imaging Data:     MRI abdomen 11/1/22:  1. History of neuroendocrine malignancy.  Numerous hepatic lesions, some remaining stable while others have mildly enlarged consistent with disease progression.  Stable osseous lesion in the L3 vertebral body.  2. Cholelithiasis and stable mild dilatation of the common bile duct and central intrahepatic biliary duct dilatation.  Of note, there is mass effect on the midportion of the common bile duct by dominant left hepatic lobe lesion.  3. Additional findings detailed above.  RECIST SUMMARY:     Date of prior examination for comparison: 05/16/2022     Lesion 1: Left hepatic lobe.  7.1 cm.  Series 9 image 52.  Prior measurement 6.3 cm.     Lesion 2: Right hepatic lobe lateral.  3.1 cm.  Series 9 image 30.  Prior measurement 3.2 cm.     Lesion 3: Right hepatic dome.  5.1 cm.  Series 9 image 19.  Prior measurement 4.4 cm.    Gallium PET 11/1/22:     Interval development of somatostatin tracer avid lesions in the skull, concerning for new metastatic lesions.     Numerous tracer avid hypoattenuating masses throughout the liver with increased SUV max compared to prior exam.     Tracer avid lesions in the C7 and L3 vertebral bodies, sternum, distal ileum and mesenteric node are similar to prior exam.     Judged by tracer avidity of the patient's tumor burden, PRRT would be a consideration if  clinically indicated.      Gallium PET 7/12/23:  Impression:     In this patient with carcinoid tumor of the ileum, there is overall similar distribution of tracer avid disease involving distal ileum, liver, mesenteric lymph node, and bones.  Index lesions as above.  No definite new tracer avid lesions.    Assessment and Plan:      1. Malignant carcinoid tumor of ileum    2. 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 Secondary neuroendocrine tumor of bone    4. Spine metastasis    5. 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 Immunodeficiency secondary to neoplasm    7. Immunodeficiency due to drugs    8. Carcinoid syndrome    9. Controlled type 2 diabetes mellitus with microalbuminuria, without long-term current use of insulin    10. 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 Started lanreotide every 4 weeks on 6/22/2018. S/p TACE to the right hepatic lobe on 2/14/19. TACE to the left hepatic lobe on 3/21/19.   - CT/MRI 1/12/22 showed mild progression in the liver. S/p TACE on 2/11/22 and 4/22/22   - Gallium PET 11/1/22 showed progression. Discussed PRRT. PRRT #1 on 12/14/2022. Completed 4 cycles on 6/14/23. Zometa every 3 months started 12/27/22. Last zometa 9/8/2023  - doing well. PET in July 2023 showed stable disease  - symptoms well controlled  - c/w lanreotide every 4 weeks and zometa every 3 months. Last got zometa in Sep. Next due in Dec 2024.  - RTC in 4 weeks. If biomarkers and symptoms stable, will do PET and MRI in Jan 2024    8.  - better after xermelo and PRRT  - c/w lanreotide every 4 weeks.   - c/w xermelo tid  - could not tolerate octreotide    9.  - BS controlled  - c/w current medication    10.  - BP controlled  - c/w current medication    11.  - last echo in 2022 showed LVEF 40%  - saw Dr Bergeron in  July 2023. Felt to be well compensated  - f/u with cardiology      Sebastian Granados MD  Hematology and Medical Oncology  [facility]        Route Chart for Scheduling    Med Onc Chart Routing      Follow up with physician 4 weeks and 2 months. keep scheduled appts on 12/1. labs, MRI, copper PET on 12/29. see me on 1/2 then lanreotide   Follow up with JUNAID    Infusion scheduling note    Injection scheduling note lanreotide every 4 weeks   Labs CBC and CMP   Scheduling:  Preferred lab:  Lab interval: every 4 weeks  serotonin, pancreastatin, 5-HIAA   Imaging MRI and PET scan   copper PET, MRI on 12/29   Pharmacy appointment    Other referrals            Supportive Plan Information  OP ZOLEDRONIC ACID (ZOMETA) Q4W   Sebastian Granados MD   Upcoming Treatment Dates - OP ZOLEDRONIC ACID (ZOMETA) Q4W    12/22/2023       Medications       zoledronic acid (ZOMETA) 4 mg in sodium chloride 0.9% 100 mL IVPB  3/21/2024       Medications       zoledronic acid (ZOMETA) 4 mg in sodium chloride 0.9% 100 mL IVPB  6/19/2024       Medications       zoledronic acid (ZOMETA) 4 mg in sodium chloride 0.9% 100 mL IVPB  9/17/2024       Medications       zoledronic acid (ZOMETA) 4 mg in sodium chloride 0.9% 100 mL IVPB    Therapy Plan Information  LANREOTIDE  5. Medications  lanreotide injection 120 mg  120 mg, Subcutaneous, Every visit

## 2023-11-06 LAB
5OH-INDOLEACETATE SERPL-MCNC: 67 NG/ML
CGA SERPL-MCNC: 1374 NG/ML

## 2023-11-07 LAB — SEROTONIN: 1030 NG/ML

## 2023-11-10 ENCOUNTER — OFFICE VISIT (OUTPATIENT)
Dept: INTERNAL MEDICINE | Facility: CLINIC | Age: 83
End: 2023-11-10
Attending: INTERNAL MEDICINE
Payer: MEDICARE

## 2023-11-10 VITALS
SYSTOLIC BLOOD PRESSURE: 127 MMHG | DIASTOLIC BLOOD PRESSURE: 80 MMHG | HEART RATE: 80 BPM | HEIGHT: 64 IN | WEIGHT: 107.38 LBS | OXYGEN SATURATION: 99 % | BODY MASS INDEX: 18.33 KG/M2

## 2023-11-10 DIAGNOSIS — D64.9 ANEMIA, UNSPECIFIED TYPE: ICD-10-CM

## 2023-11-10 DIAGNOSIS — C7B.8 SECONDARY NEUROENDOCRINE TUMOR OF BONE: ICD-10-CM

## 2023-11-10 DIAGNOSIS — R80.9 CONTROLLED TYPE 2 DIABETES MELLITUS WITH MICROALBUMINURIA, WITHOUT LONG-TERM CURRENT USE OF INSULIN: ICD-10-CM

## 2023-11-10 DIAGNOSIS — Z00.00 ENCOUNTER FOR PREVENTIVE HEALTH EXAMINATION: Primary | ICD-10-CM

## 2023-11-10 DIAGNOSIS — D49.9 IMMUNODEFICIENCY SECONDARY TO NEOPLASM: ICD-10-CM

## 2023-11-10 DIAGNOSIS — R41.3 MEMORY DEFICITS: ICD-10-CM

## 2023-11-10 DIAGNOSIS — K80.20 CALCULUS OF GALLBLADDER WITHOUT CHOLECYSTITIS WITHOUT OBSTRUCTION: ICD-10-CM

## 2023-11-10 DIAGNOSIS — C79.51 METASTASIS TO SPINAL COLUMN: ICD-10-CM

## 2023-11-10 DIAGNOSIS — E78.5 HYPERLIPIDEMIA, UNSPECIFIED HYPERLIPIDEMIA TYPE: ICD-10-CM

## 2023-11-10 DIAGNOSIS — I49.3 PVC'S (PREMATURE VENTRICULAR CONTRACTIONS): ICD-10-CM

## 2023-11-10 DIAGNOSIS — E11.29 MICROALBUMINURIA DUE TO TYPE 2 DIABETES MELLITUS: ICD-10-CM

## 2023-11-10 DIAGNOSIS — C7A.8 NEUROENDOCRINE CARCINOMA METASTATIC TO BONE: ICD-10-CM

## 2023-11-10 DIAGNOSIS — Z66 DNR (DO NOT RESUSCITATE): ICD-10-CM

## 2023-11-10 DIAGNOSIS — R80.9 MICROALBUMINURIA DUE TO TYPE 2 DIABETES MELLITUS: ICD-10-CM

## 2023-11-10 DIAGNOSIS — I70.0 ATHEROSCLEROSIS OF AORTA: ICD-10-CM

## 2023-11-10 DIAGNOSIS — D84.81 IMMUNODEFICIENCY SECONDARY TO NEOPLASM: ICD-10-CM

## 2023-11-10 DIAGNOSIS — Z00.00 ENCOUNTER FOR MEDICARE ANNUAL WELLNESS EXAM: ICD-10-CM

## 2023-11-10 DIAGNOSIS — R35.0 URINARY FREQUENCY: ICD-10-CM

## 2023-11-10 DIAGNOSIS — G89.29 CHRONIC RUQ PAIN: ICD-10-CM

## 2023-11-10 DIAGNOSIS — C22.0 HCC (HEPATOCELLULAR CARCINOMA): ICD-10-CM

## 2023-11-10 DIAGNOSIS — I10 ESSENTIAL HYPERTENSION: ICD-10-CM

## 2023-11-10 DIAGNOSIS — I77.1 TORTUOUS AORTA: ICD-10-CM

## 2023-11-10 DIAGNOSIS — E11.29 CONTROLLED TYPE 2 DIABETES MELLITUS WITH MICROALBUMINURIA, WITHOUT LONG-TERM CURRENT USE OF INSULIN: ICD-10-CM

## 2023-11-10 DIAGNOSIS — C7A.012 MALIGNANT CARCINOID TUMOR OF ILEUM: ICD-10-CM

## 2023-11-10 DIAGNOSIS — C7B.8 NEUROENDOCRINE CARCINOMA METASTATIC TO BONE: ICD-10-CM

## 2023-11-10 DIAGNOSIS — C7B.8 METASTATIC MALIGNANT NEUROENDOCRINE TUMOR TO LIVER: ICD-10-CM

## 2023-11-10 DIAGNOSIS — E53.8 B12 DEFICIENCY: ICD-10-CM

## 2023-11-10 DIAGNOSIS — R63.6 UNDERWEIGHT: ICD-10-CM

## 2023-11-10 DIAGNOSIS — R10.11 CHRONIC RUQ PAIN: ICD-10-CM

## 2023-11-10 DIAGNOSIS — I50.42 CHRONIC COMBINED SYSTOLIC AND DIASTOLIC CHF (CONGESTIVE HEART FAILURE): ICD-10-CM

## 2023-11-10 DIAGNOSIS — M54.16 LUMBAR RADICULOPATHY: ICD-10-CM

## 2023-11-10 PROCEDURE — 99999 PR PBB SHADOW E&M-EST. PATIENT-LVL V: ICD-10-PCS | Mod: PBBFAC,HCNC,, | Performed by: NURSE PRACTITIONER

## 2023-11-10 PROCEDURE — 3074F PR MOST RECENT SYSTOLIC BLOOD PRESSURE < 130 MM HG: ICD-10-PCS | Mod: HCNC,CPTII,S$GLB, | Performed by: NURSE PRACTITIONER

## 2023-11-10 PROCEDURE — 3288F PR FALLS RISK ASSESSMENT DOCUMENTED: ICD-10-PCS | Mod: HCNC,CPTII,S$GLB, | Performed by: NURSE PRACTITIONER

## 2023-11-10 PROCEDURE — 3288F FALL RISK ASSESSMENT DOCD: CPT | Mod: HCNC,CPTII,S$GLB, | Performed by: NURSE PRACTITIONER

## 2023-11-10 PROCEDURE — 3074F SYST BP LT 130 MM HG: CPT | Mod: HCNC,CPTII,S$GLB, | Performed by: NURSE PRACTITIONER

## 2023-11-10 PROCEDURE — 1159F PR MEDICATION LIST DOCUMENTED IN MEDICAL RECORD: ICD-10-PCS | Mod: HCNC,CPTII,S$GLB, | Performed by: NURSE PRACTITIONER

## 2023-11-10 PROCEDURE — 3079F PR MOST RECENT DIASTOLIC BLOOD PRESSURE 80-89 MM HG: ICD-10-PCS | Mod: HCNC,CPTII,S$GLB, | Performed by: NURSE PRACTITIONER

## 2023-11-10 PROCEDURE — 1159F MED LIST DOCD IN RCRD: CPT | Mod: HCNC,CPTII,S$GLB, | Performed by: NURSE PRACTITIONER

## 2023-11-10 PROCEDURE — 3079F DIAST BP 80-89 MM HG: CPT | Mod: HCNC,CPTII,S$GLB, | Performed by: NURSE PRACTITIONER

## 2023-11-10 PROCEDURE — 1170F PR FUNCTIONAL STATUS ASSESSED: ICD-10-PCS | Mod: HCNC,CPTII,S$GLB, | Performed by: NURSE PRACTITIONER

## 2023-11-10 PROCEDURE — 1101F PR PT FALLS ASSESS DOC 0-1 FALLS W/OUT INJ PAST YR: ICD-10-PCS | Mod: HCNC,CPTII,S$GLB, | Performed by: NURSE PRACTITIONER

## 2023-11-10 PROCEDURE — G0439 PPPS, SUBSEQ VISIT: HCPCS | Mod: HCNC,S$GLB,, | Performed by: NURSE PRACTITIONER

## 2023-11-10 PROCEDURE — 1101F PT FALLS ASSESS-DOCD LE1/YR: CPT | Mod: HCNC,CPTII,S$GLB, | Performed by: NURSE PRACTITIONER

## 2023-11-10 PROCEDURE — G0439 PR MEDICARE ANNUAL WELLNESS SUBSEQUENT VISIT: ICD-10-PCS | Mod: HCNC,S$GLB,, | Performed by: NURSE PRACTITIONER

## 2023-11-10 PROCEDURE — 99999 PR PBB SHADOW E&M-EST. PATIENT-LVL V: CPT | Mod: PBBFAC,HCNC,, | Performed by: NURSE PRACTITIONER

## 2023-11-10 PROCEDURE — 1170F FXNL STATUS ASSESSED: CPT | Mod: HCNC,CPTII,S$GLB, | Performed by: NURSE PRACTITIONER

## 2023-11-10 NOTE — PROGRESS NOTES
"  Shahram Hills presented for a  Medicare AWV and comprehensive Health Risk Assessment today. The following components were reviewed and updated:    Medical history  Family History  Social history  Allergies and Current Medications  Health Risk Assessment  Health Maintenance  Care Team         ** See Completed Assessments for Annual Wellness Visit within the encounter summary.**         The following assessments were completed:  Living Situation  CAGE  Depression Screening  Timed Get Up and Go  Whisper Test  Cognitive Function Screening  Nutrition Screening  ADL Screening  PAQ Screening          Vitals:    11/10/23 0944   BP: 127/80   BP Location: Right arm   Patient Position: Sitting   BP Method: Large (Manual)   Pulse: 80   SpO2: 99%   Weight: 48.7 kg (107 lb 5.8 oz)   Height: 5' 4" (1.626 m)     Body mass index is 18.43 kg/m².    Physical Exam  Vitals reviewed.   Constitutional:       Appearance: She is well-developed.   HENT:      Head: Normocephalic and atraumatic. Not macrocephalic and not microcephalic. No raccoon eyes, Wagner's sign, abrasion, contusion, right periorbital erythema, left periorbital erythema or laceration. Hair is normal.      Right Ear: No decreased hearing noted. No laceration, drainage, swelling or tenderness. No middle ear effusion. No foreign body. No mastoid tenderness. No hemotympanum. Tympanic membrane is not injected, scarred, perforated, erythematous, retracted or bulging. Tympanic membrane has normal mobility.      Left Ear: No decreased hearing noted. No laceration, drainage, swelling or tenderness.  No middle ear effusion. No foreign body. No mastoid tenderness. No hemotympanum. Tympanic membrane is not injected, scarred, perforated, erythematous, retracted or bulging. Tympanic membrane has normal mobility.      Nose: Nose normal. No nasal deformity, laceration or mucosal edema.      Mouth/Throat:      Pharynx: Uvula midline.   Eyes:      General: Lids are normal.      " Conjunctiva/sclera: Conjunctivae normal.   Neck:      Thyroid: No thyroid mass or thyromegaly.      Trachea: Trachea normal.   Pulmonary:      Effort: Pulmonary effort is normal.      Breath sounds: Normal breath sounds.   Abdominal:      Palpations: Abdomen is soft.   Musculoskeletal:         General: Normal range of motion.      Cervical back: Normal range of motion and neck supple. No edema or erythema. No spinous process tenderness or muscular tenderness. Normal range of motion.   Lymphadenopathy:      Head:      Right side of head: No submental, submandibular, tonsillar, preauricular or posterior auricular adenopathy.      Left side of head: No submental, submandibular, tonsillar, preauricular, posterior auricular or occipital adenopathy.   Skin:     General: Skin is warm and dry.   Neurological:      Mental Status: She is alert and oriented to person, place, and time.      Cranial Nerves: No cranial nerve deficit.      Sensory: No sensory deficit.   Psychiatric:         Behavior: Behavior normal.         Thought Content: Thought content normal.         Judgment: Judgment normal.             Diagnoses and health risks identified today and associated recommendations/orders:    1. Encounter for preventive health examination  Annual Health Risk Assessment (HRA) visit today.  Counseling and referral of health maintenance and preventative health measures performed.  Patient given annual wellness paperwork to take home.  Encouraged to return in 1 year for subsequent HRA visit.     2. Encounter for Medicare annual wellness exam  Annual Health Risk Assessment (HRA) visit today.  Counseling and referral of health maintenance and preventative health measures performed.  Patient given annual wellness paperwork to take home.  Encouraged to return in 1 year for subsequent HRA visit.   - Ambulatory Referral/Consult to Enhanced Annual Wellness Visit (eAWV)    3. Underweight  Chronic. Stable. Continue current treatment plan as  previously prescribed by PCP.    4. Chronic combined systolic and diastolic CHF (congestive heart failure)  Chronic. Stable. Continue current treatment plan as previously prescribed by PCP.    5. Essential hypertension  Chronic. Stable. Controlled. Encouraged to increase exercise as tolerated (moderate-intensity aerobic activity and muscle-strengthening activities) improve diet to heart healthy, low sodium diet.  Continue current treatment plan as previously prescribed by PCP.    6. Hyperlipidemia, unspecified hyperlipidemia type  Chronic. Stable. Continue current treatment plan as previously prescribed by PCP.    7. PVC's (premature ventricular contractions)  Chronic. Stable. Continue current treatment plan as previously prescribed by PCP.    8. Tortuous aorta  Chronic. Stable. Continue current treatment plan as previously prescribed by PCP.    9. Lumbar radiculopathy  Chronic. Stable. Continue current treatment plan as previously prescribed by PCP.    10. Memory deficits  Chronic. Stable. Continue current treatment plan as previously prescribed by PCP.    11. Urinary frequency  Chronic. Stable. Continue current treatment plan as previously prescribed by PCP.    12. Atherosclerosis of aorta  Chronic. Stable. Continue current treatment plan as previously prescribed by PCP.    13. Spine metastasis  Chronic. Stable. Continue current treatment plan as previously prescribed by PCP.    14. Secondary neuroendocrine tumor of bone  Chronic. Stable. Continue current treatment plan as previously prescribed by PCP.    15. 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 Stable. Continue current treatment plan as previously prescribed by PCP.    16. 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 Stable. Continue current treatment plan as previously prescribed by PCP.    17. Malignant carcinoid tumor of ileum  Chronic. Stable. Continue current treatment plan as previously prescribed by PCP.    18.  Immunodeficiency secondary to neoplasm  Chronic. Stable. Continue current treatment plan as previously prescribed by PCP.    19. HCC (hepatocellular carcinoma)  Chronic. Stable. Continue current treatment plan as previously prescribed by PCP.    20. Anemia, unspecified type  Chronic. Stable. Continue current treatment plan as previously prescribed by PCP.    21. Microalbuminuria due to type 2 diabetes mellitus  Chronic. Stable. Continue current treatment plan as previously prescribed by PCP.    22. Controlled type 2 diabetes mellitus with microalbuminuria, without long-term current use of insulin  Chronic. Stable. Last Hgb A1c=6.1 from 10/10/23. Continue current treatment plan as previously prescribed by PCP.    23. B12 deficiency  Chronic. Stable. Continue current treatment plan as previously prescribed by PCP.    24. Chronic RUQ pain  Chronic. Stable. Continue current treatment plan as previously prescribed by PCP.    25. Calculus of gallbladder without cholecystitis without obstruction  Chronic. Stable. Continue current treatment plan as previously prescribed by PCP.    26. DNR (do not resuscitate)  Chronic. Stable. Continue current treatment plan as previously prescribed by PCP.      Provided Shahram with a 5-10 year written screening schedule and personal prevention plan. Recommendations were developed using the USPSTF age appropriate recommendations. Education, counseling, and referrals were provided as needed. After Visit Summary printed and given to patient which includes a list of additional screenings\tests needed.      I offered to discuss end of life issues, including information on how to make advance directives that the patient could use to name someone who would make medical decisions on their behalf if they became too ill to make themselves.    ___Patient declined  _X_Patient is interested, I provided paper work and offered to discuss.    No follow-ups on file.    OG Quick offered to  discuss advanced care planning, including how to pick a person who would make decisions for you if you were unable to make them for yourself, called a health care power of , and what kind of decisions you might make such as use of life sustaining treatments such as ventilators and tube feeding when faced with a life limiting illness recorded on a living will that they will need to know. (How you want to be cared for as you near the end of your natural life)     X Patient is interested in learning more about how to make advanced directives.  I provided them paperwork and offered to discuss this with them.

## 2023-11-10 NOTE — PATIENT INSTRUCTIONS
Counseling and Referral of Other Preventative  (Italic type indicates deductible and co-insurance are waived)    Patient Name: Shahram Hills  Today's Date: 11/10/2023    Health Maintenance       Date Due Completion Date    RSV Vaccine (Age 60+) (1 - 1-dose 60+ series) Never done ---    Influenza Vaccine (1) 09/01/2023 10/25/2022    COVID-19 Vaccine (4 - 2023-24 season) 09/01/2023 12/7/2021    Eye Exam 01/24/2024 1/24/2023    Diabetes Urine Screening 04/04/2024 4/4/2023    Hemoglobin A1c 04/10/2024 10/10/2023    DEXA Scan 05/31/2024 5/31/2021    Override on 6/11/2018: Not Clinically Appropriate    Lipid Panel 07/12/2024 7/12/2023    Override on 11/27/2017: Done    TETANUS VACCINE 12/10/2029 12/10/2019    Override on 3/23/2018: Declined        No orders of the defined types were placed in this encounter.    The following information is provided to all patients.  This information is to help you find resources for any of the problems found today that may be affecting your health:                Living healthy guide: www.[facility].[place].gov      Understanding Diabetes: www.diabetes.org      Eating healthy: www.cdc.gov/healthyweight      CDC home safety checklist: www.cdc.gov/steadi/patient.html      Agency on Aging: www.goea.[place].[facility]      Alcoholics anonymous (AA): www.aa.org      Physical Activity: www.leonard.nih.gov/rx6dcec      Tobacco use: www.quitwithusla.org      Detail Level: Detailed Size Of Lesion: 0.4cmx0.6cm

## 2023-11-14 ENCOUNTER — PATIENT MESSAGE (OUTPATIENT)
Dept: ADMINISTRATIVE | Facility: HOSPITAL | Age: 83
End: 2023-11-14
Payer: MEDICARE

## 2023-11-17 ENCOUNTER — PATIENT MESSAGE (OUTPATIENT)
Dept: ADMINISTRATIVE | Facility: OTHER | Age: 83
End: 2023-11-17
Payer: MEDICARE

## 2023-11-18 LAB — PANCREASTATIN SERPL-MCNC: 3328 PG/ML (ref 10–135)

## 2023-12-01 ENCOUNTER — OFFICE VISIT (OUTPATIENT)
Dept: HEMATOLOGY/ONCOLOGY | Facility: CLINIC | Age: 83
End: 2023-12-01
Payer: MEDICARE

## 2023-12-01 ENCOUNTER — INFUSION (OUTPATIENT)
Dept: INFUSION THERAPY | Facility: HOSPITAL | Age: 83
End: 2023-12-01
Payer: MEDICARE

## 2023-12-01 VITALS
TEMPERATURE: 97 F | RESPIRATION RATE: 18 BRPM | OXYGEN SATURATION: 98 % | SYSTOLIC BLOOD PRESSURE: 163 MMHG | DIASTOLIC BLOOD PRESSURE: 82 MMHG | HEART RATE: 90 BPM

## 2023-12-01 VITALS
BODY MASS INDEX: 18.85 KG/M2 | DIASTOLIC BLOOD PRESSURE: 81 MMHG | RESPIRATION RATE: 18 BRPM | HEART RATE: 81 BPM | OXYGEN SATURATION: 98 % | SYSTOLIC BLOOD PRESSURE: 143 MMHG | HEIGHT: 64 IN | WEIGHT: 110.44 LBS

## 2023-12-01 DIAGNOSIS — E34.0 CARCINOID HEART DISEASE: ICD-10-CM

## 2023-12-01 DIAGNOSIS — E34.0 CARCINOID SYNDROME: ICD-10-CM

## 2023-12-01 DIAGNOSIS — I10 ESSENTIAL HYPERTENSION: ICD-10-CM

## 2023-12-01 DIAGNOSIS — D84.821 IMMUNODEFICIENCY DUE TO DRUGS: ICD-10-CM

## 2023-12-01 DIAGNOSIS — C7A.012 MALIGNANT CARCINOID TUMOR OF ILEUM: Primary | ICD-10-CM

## 2023-12-01 DIAGNOSIS — I51.89 CARCINOID HEART DISEASE: ICD-10-CM

## 2023-12-01 DIAGNOSIS — C7B.8 METASTATIC MALIGNANT NEUROENDOCRINE TUMOR TO LIVER: ICD-10-CM

## 2023-12-01 DIAGNOSIS — C7B.8 NEUROENDOCRINE CARCINOMA METASTATIC TO BONE: Primary | ICD-10-CM

## 2023-12-01 DIAGNOSIS — D49.9 IMMUNODEFICIENCY SECONDARY TO NEOPLASM: ICD-10-CM

## 2023-12-01 DIAGNOSIS — R80.9 CONTROLLED TYPE 2 DIABETES MELLITUS WITH MICROALBUMINURIA, WITHOUT LONG-TERM CURRENT USE OF INSULIN: ICD-10-CM

## 2023-12-01 DIAGNOSIS — C7A.8 NEUROENDOCRINE CARCINOMA METASTATIC TO BONE: Primary | ICD-10-CM

## 2023-12-01 DIAGNOSIS — E11.29 CONTROLLED TYPE 2 DIABETES MELLITUS WITH MICROALBUMINURIA, WITHOUT LONG-TERM CURRENT USE OF INSULIN: ICD-10-CM

## 2023-12-01 DIAGNOSIS — C7B.8 SECONDARY NEUROENDOCRINE TUMOR OF BONE: ICD-10-CM

## 2023-12-01 DIAGNOSIS — C79.51 METASTASIS TO SPINAL COLUMN: ICD-10-CM

## 2023-12-01 DIAGNOSIS — D84.81 IMMUNODEFICIENCY SECONDARY TO NEOPLASM: ICD-10-CM

## 2023-12-01 DIAGNOSIS — Z79.899 IMMUNODEFICIENCY DUE TO DRUGS: ICD-10-CM

## 2023-12-01 PROCEDURE — 3288F FALL RISK ASSESSMENT DOCD: CPT | Mod: HCNC,CPTII,S$GLB, | Performed by: INTERNAL MEDICINE

## 2023-12-01 PROCEDURE — 3077F PR MOST RECENT SYSTOLIC BLOOD PRESSURE >= 140 MM HG: ICD-10-PCS | Mod: HCNC,CPTII,S$GLB, | Performed by: INTERNAL MEDICINE

## 2023-12-01 PROCEDURE — 3288F PR FALLS RISK ASSESSMENT DOCUMENTED: ICD-10-PCS | Mod: HCNC,CPTII,S$GLB, | Performed by: INTERNAL MEDICINE

## 2023-12-01 PROCEDURE — 25000003 PHARM REV CODE 250: Mod: HCNC | Performed by: INTERNAL MEDICINE

## 2023-12-01 PROCEDURE — 1157F ADVNC CARE PLAN IN RCRD: CPT | Mod: HCNC,CPTII,S$GLB, | Performed by: INTERNAL MEDICINE

## 2023-12-01 PROCEDURE — 1101F PT FALLS ASSESS-DOCD LE1/YR: CPT | Mod: HCNC,CPTII,S$GLB, | Performed by: INTERNAL MEDICINE

## 2023-12-01 PROCEDURE — 99999 PR PBB SHADOW E&M-EST. PATIENT-LVL IV: ICD-10-PCS | Mod: PBBFAC,HCNC,, | Performed by: INTERNAL MEDICINE

## 2023-12-01 PROCEDURE — 96372 THER/PROPH/DIAG INJ SC/IM: CPT | Mod: HCNC,59

## 2023-12-01 PROCEDURE — 1160F RVW MEDS BY RX/DR IN RCRD: CPT | Mod: HCNC,CPTII,S$GLB, | Performed by: INTERNAL MEDICINE

## 2023-12-01 PROCEDURE — 1160F PR REVIEW ALL MEDS BY PRESCRIBER/CLIN PHARMACIST DOCUMENTED: ICD-10-PCS | Mod: HCNC,CPTII,S$GLB, | Performed by: INTERNAL MEDICINE

## 2023-12-01 PROCEDURE — 96402 CHEMO HORMON ANTINEOPL SQ/IM: CPT | Mod: HCNC

## 2023-12-01 PROCEDURE — 1159F MED LIST DOCD IN RCRD: CPT | Mod: HCNC,CPTII,S$GLB, | Performed by: INTERNAL MEDICINE

## 2023-12-01 PROCEDURE — 3079F DIAST BP 80-89 MM HG: CPT | Mod: HCNC,CPTII,S$GLB, | Performed by: INTERNAL MEDICINE

## 2023-12-01 PROCEDURE — 3079F PR MOST RECENT DIASTOLIC BLOOD PRESSURE 80-89 MM HG: ICD-10-PCS | Mod: HCNC,CPTII,S$GLB, | Performed by: INTERNAL MEDICINE

## 2023-12-01 PROCEDURE — 1126F PR PAIN SEVERITY QUANTIFIED, NO PAIN PRESENT: ICD-10-PCS | Mod: HCNC,CPTII,S$GLB, | Performed by: INTERNAL MEDICINE

## 2023-12-01 PROCEDURE — 1126F AMNT PAIN NOTED NONE PRSNT: CPT | Mod: HCNC,CPTII,S$GLB, | Performed by: INTERNAL MEDICINE

## 2023-12-01 PROCEDURE — 63600175 PHARM REV CODE 636 W HCPCS: Mod: HCNC | Performed by: INTERNAL MEDICINE

## 2023-12-01 PROCEDURE — 1101F PR PT FALLS ASSESS DOC 0-1 FALLS W/OUT INJ PAST YR: ICD-10-PCS | Mod: HCNC,CPTII,S$GLB, | Performed by: INTERNAL MEDICINE

## 2023-12-01 PROCEDURE — 96365 THER/PROPH/DIAG IV INF INIT: CPT

## 2023-12-01 PROCEDURE — 99999 PR PBB SHADOW E&M-EST. PATIENT-LVL IV: CPT | Mod: PBBFAC,HCNC,, | Performed by: INTERNAL MEDICINE

## 2023-12-01 PROCEDURE — 99215 PR OFFICE/OUTPT VISIT, EST, LEVL V, 40-54 MIN: ICD-10-PCS | Mod: HCNC,S$GLB,, | Performed by: INTERNAL MEDICINE

## 2023-12-01 PROCEDURE — 99215 OFFICE O/P EST HI 40 MIN: CPT | Mod: HCNC,S$GLB,, | Performed by: INTERNAL MEDICINE

## 2023-12-01 PROCEDURE — 1159F PR MEDICATION LIST DOCUMENTED IN MEDICAL RECORD: ICD-10-PCS | Mod: HCNC,CPTII,S$GLB, | Performed by: INTERNAL MEDICINE

## 2023-12-01 PROCEDURE — 1157F PR ADVANCE CARE PLAN OR EQUIV PRESENT IN MEDICAL RECORD: ICD-10-PCS | Mod: HCNC,CPTII,S$GLB, | Performed by: INTERNAL MEDICINE

## 2023-12-01 PROCEDURE — 3077F SYST BP >= 140 MM HG: CPT | Mod: HCNC,CPTII,S$GLB, | Performed by: INTERNAL MEDICINE

## 2023-12-01 PROCEDURE — 96374 THER/PROPH/DIAG INJ IV PUSH: CPT

## 2023-12-01 PROCEDURE — 96413 CHEMO IV INFUSION 1 HR: CPT | Mod: HCNC

## 2023-12-01 RX ORDER — HEPARIN 100 UNIT/ML
500 SYRINGE INTRAVENOUS
Status: CANCELLED | OUTPATIENT
Start: 2023-12-22

## 2023-12-01 RX ORDER — SODIUM CHLORIDE 0.9 % (FLUSH) 0.9 %
10 SYRINGE (ML) INJECTION
Status: CANCELLED | OUTPATIENT
Start: 2023-12-22

## 2023-12-01 RX ORDER — ZOLEDRONIC ACID 0.04 MG/ML
4 INJECTION, SOLUTION INTRAVENOUS
Status: COMPLETED | OUTPATIENT
Start: 2023-12-01 | End: 2023-12-01

## 2023-12-01 RX ORDER — LANREOTIDE ACETATE 120 MG/.5ML
120 INJECTION SUBCUTANEOUS
Status: DISCONTINUED | OUTPATIENT
Start: 2023-12-01 | End: 2023-12-01 | Stop reason: HOSPADM

## 2023-12-01 RX ORDER — HEPARIN 100 UNIT/ML
500 SYRINGE INTRAVENOUS
Status: DISCONTINUED | OUTPATIENT
Start: 2023-12-01 | End: 2023-12-01 | Stop reason: HOSPADM

## 2023-12-01 RX ORDER — SODIUM CHLORIDE 0.9 % (FLUSH) 0.9 %
10 SYRINGE (ML) INJECTION
Status: DISCONTINUED | OUTPATIENT
Start: 2023-12-01 | End: 2023-12-01 | Stop reason: HOSPADM

## 2023-12-01 RX ORDER — LANREOTIDE ACETATE 120 MG/.5ML
120 INJECTION SUBCUTANEOUS
Status: CANCELLED | OUTPATIENT
Start: 2023-12-01

## 2023-12-01 RX ADMIN — ZOLEDRONIC ACID 4 MG: 0.04 INJECTION, SOLUTION INTRAVENOUS at 01:12

## 2023-12-01 RX ADMIN — SODIUM CHLORIDE: 9 INJECTION, SOLUTION INTRAVENOUS at 01:12

## 2023-12-01 RX ADMIN — LANREOTIDE ACETATE 120 MG: 120 INJECTION SUBCUTANEOUS at 01:12

## 2023-12-01 NOTE — PLAN OF CARE
Problem: Pain Acute  Goal: Acceptable Pain Control and Functional Ability  Outcome: Ongoing, Not Progressing     Problem: Anemia (Chemotherapy Effects)  Goal: Anemia Symptom Improvement  Outcome: Ongoing, Not Progressing     Problem: Urinary Bleeding Risk or Actual (Chemotherapy Effects)  Goal: Absence of Hematuria  Outcome: Ongoing, Not Progressing     Problem: Nausea and Vomiting (Chemotherapy Effects)  Goal: Fluid and Electrolyte Balance  Outcome: Ongoing, Not Progressing     Problem: Neurotoxicity (Chemotherapy Effects)  Goal: Neurotoxicity Symptom Control  Outcome: Ongoing, Not Progressing     Problem: Neutropenia (Chemotherapy Effects)  Goal: Absence of Infection  Outcome: Ongoing, Not Progressing     Problem: Oral Mucositis (Chemotherapy Effects)  Goal: Improved Oral Mucous Membrane Integrity  Outcome: Ongoing, Not Progressing     Problem: Thrombocytopenia Bleeding Risk (Chemotherapy Effects)  Goal: Absence of Bleeding  Outcome: Ongoing, Not Progressing     Ambulatory to clinic.  No C/O of adverse effects or S/S of infection.  PIV inserted, flushed, blood return noted and infusing with no problems. Tolerated Zometa and Lanreotide therapies with no problems.  Ambulated home with NAD noted.

## 2023-12-01 NOTE — PROGRESS NOTES
"  Subjective:       Patient ID: Shahram Hills is an 83 y.o. female.     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 Ms. Hills is an 79 yo woman who initially saw me on 6/7/18 for further evaluation of neuroendocrine tumor. She initially presented with diarrhea, abdominal discomfort, weight loss. She was evaluated at [facility] and underwent workup below:  US abdomen on 3/14/18 showed numerous bilobar mixed echogenicity and mildly vascular hepatic lesions. Cholelithiasis without e/o acute cholecystitis.   MRI abdomen on 3/30/2018 showed innumerable hepatic metastases. L3 vertebral body metastasis with soft tissue component along the right margin of the L3 and upper L4 vertebral bodies, filling the right L3/4 neural foramen, and extending into the anterior aspect of the spinal canal on the right at the L3 and upper T4 levels. Associated with mild spinal canal narrowing.  CT chest on 4/6/2018 showed one lucent nodule measuring 7 mm in the T7 vertebral body, most likely representing metastatic disease.    EGD on 5/4/18 showed normal duodenum. Schatzki's ring in the lower third of the esophagus. Grade 1 esophagitis in the GE junction c/w mild distal esophagitis. Congestion and erythema in the antrum, pre-pyloric region and stomach body c/w gastritis. Biopsy of the stomach antrum showed mild chronic gastritis, neg for H. pylori.   Labs on 5/9/2018: WBC 6.9, H/H 11.6/34.3, MCV 87.5, plt count 279. On 3/9/18: Vit B12 level 173, folic acid 6.6  She was started on oral vit B12 and underwent a liver biopsy on 5/18/18. Pathology showed "metastatic well differentiated neuroendocrine tumor. Ki67 3%"     She saw me on 6/7/18 and complained of weight loss of 26 lbs over the past 3-4 months, also has diarrhea. No flushing, wheezing, palpitations. Has been having pain in right flank radiating down " "the right leg. No tingling/numbness, weakness. She can hold her bladder but when she urinates her diarrhea would come out as well. Started on dex 4 mg q6h the evening of 18.      2. MRI spine on 18 showed "Marrow replacing metastatic lesion of the L3 vertebral body with associated extra osseous expansion and complete effacement of the right L3-L4 neural foramina and additional effacement of the right lateral recess.  There is additional lateral extension and abutment of the right psoas muscle.  Superimposed degenerative change at this level results in mild spinal canal stenosis." I sent the patient to ED on 18 where she was evaluated by neurosurgery. Neurosurgery did not feel she was a candidate for surgical intervention.      3. Seen by Dr. Adames on 18. palliative radiation to the area of the L3 metastasis 18-18   4. 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  There is an adjacent metastatic lymph node, diffuse liver metastases, and multiple bone metastases. Index primary neoplasm SUV max 33.13. Adjacent lymph node SUV max 23. L3 bone metastasis SUV max 36.86. Left lobe SUV max 46.13   5. Lanreotide every 4 weeks started on 18  6. TACE to the right hepatic lobe on 19. TACE to the left hepatic lobe on 3/21/19.   7. TACE to the right hepatic lobe on 22. S/p conventional chemoembolization performed of the segment 2, 3, 4 branch of the left hepatic artery and segment 8 branch of the left hepatic artery on 22.  8. Gallium PET 22 showed progression. Discussed PRRT. PRRT #1 on 2022. Zometa every 3 months started 22. PRRT #2 on 23. #3 on 23. #4 on 23.      History of Present Illness:   Ms. Hills returns for follow up. Feeling okay. Diarrhea controlled. On xermelo tid.     ECO     ROS:   See HPI. Otherwise negative.      Physical Examination:   Vital signs reviewed.   Gen: well hydrated, well " developed, in no acute distress.  HEENT: normocephalic, anicteric, PERRLA, EOMI  Neck: supple, no JVD, thyromegaly, cervical or supraclavicular LAD  Lungs: CTAB, no wheezes or rales  Heart: regular rate, regular pulse, no M/R/G  Abdomen: soft, no tenderness, non-distended, no hepatomegaly, no splenomegaly, no mass, or hernia.   Ext: b/l LE  no edema  Neuro: alert and oriented x 4, no focal neuro deficit  Skin: no rash, open wounds or ulcers  Psych: pleasant and appropriate mood and affect     Objective:      Laboratory Data:  Labs reviewed. Last month's serotonin, pancreastatin, 5-HIAA levels were coming down    Imaging Data:     MRI abdomen 11/1/22:  1. History of neuroendocrine malignancy.  Numerous hepatic lesions, some remaining stable while others have mildly enlarged consistent with disease progression.  Stable osseous lesion in the L3 vertebral body.  2. Cholelithiasis and stable mild dilatation of the common bile duct and central intrahepatic biliary duct dilatation.  Of note, there is mass effect on the midportion of the common bile duct by dominant left hepatic lobe lesion.  3. Additional findings detailed above.  RECIST SUMMARY:     Date of prior examination for comparison: 05/16/2022     Lesion 1: Left hepatic lobe.  7.1 cm.  Series 9 image 52.  Prior measurement 6.3 cm.     Lesion 2: Right hepatic lobe lateral.  3.1 cm.  Series 9 image 30.  Prior measurement 3.2 cm.     Lesion 3: Right hepatic dome.  5.1 cm.  Series 9 image 19.  Prior measurement 4.4 cm.    Gallium PET 11/1/22:     Interval development of somatostatin tracer avid lesions in the skull, concerning for new metastatic lesions.     Numerous tracer avid hypoattenuating masses throughout the liver with increased SUV max compared to prior exam.     Tracer avid lesions in the C7 and L3 vertebral bodies, sternum, distal ileum and mesenteric node are similar to prior exam.     Judged by tracer avidity of the patient's tumor burden, PRRT would be a  consideration if clinically indicated.      Gallium PET 7/12/23:  Impression:     In this patient with carcinoid tumor of the ileum, there is overall similar distribution of tracer avid disease involving distal ileum, liver, mesenteric lymph node, and bones.  Index lesions as above.  No definite new tracer avid lesions.    Assessment and Plan:      1. Malignant carcinoid tumor of ileum    2. 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 Secondary neuroendocrine tumor of bone    4. Spine metastasis    5. 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 Carcinoid syndrome    8. Controlled type 2 diabetes mellitus with microalbuminuria, without long-term current use of insulin    9. 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 Started lanreotide every 4 weeks on 6/22/2018. S/p TACE to the right hepatic lobe on 2/14/19. TACE to the left hepatic lobe on 3/21/19.   - CT/MRI 1/12/22 showed mild progression in the liver. S/p TACE on 2/11/22 and 4/22/22   - Gallium PET 11/1/22 showed progression. Discussed PRRT. PRRT #1 on 12/14/2022. Completed 4 cycles on 6/14/23. Zometa every 3 months started 12/27/22. Last zometa 9/8/2023  - doing well. PET in July 2023 showed stable disease  - symptoms well controlled  - c/w lanreotide every 4 weeks and zometa every 3 months. Last got zometa in Sep. Next due in Dec 2024. Getting it today  - RTC in 4 weeks with PET and MRI    7.  - better after xermelo and PRRT  - c/w lanreotide every 4 weeks.   - c/w xermelo tid  - could not tolerate octreotide    8.  - BS slightly elevated  - f/u with PCP    9.  - BP controlled  - c/w current medication    11.  - last echo in 2022 showed LVEF 40%  - saw Dr Bergeron in July 2023. Felt to be well compensated  - f/u with  cardiology      Sebastian Granados MD  Hematology and Medical Oncology  [facility]        Route Chart for Scheduling    Med Onc Chart Routing  Urgent    Follow up with physician 1 month. keep scheduled appts. see me on 1/2 then get lanreotide. see JUNAID on 1/30 with labs then get lanreotide. see me on 2/27 with labs then lanreotide   Follow up with JUNAID    Infusion scheduling note   next zometa due in March 2024   Injection scheduling note lanreotide every 4 weeks.   Labs CBC and CMP   Scheduling:  Preferred lab:  Lab interval:  serotonin, pancreastatin, 5-HIAA   Imaging MRI and PET scan   scheduled   Pharmacy appointment    Other referrals            Supportive Plan Information  OP ZOLEDRONIC ACID (ZOMETA) Q4W   Sebastian Granados MD   Upcoming Treatment Dates - OP ZOLEDRONIC ACID (ZOMETA) Q4W    12/22/2023       Medications       zoledronic acid (ZOMETA) 4 mg in sodium chloride 0.9% 100 mL IVPB  3/21/2024       Medications       zoledronic acid (ZOMETA) 4 mg in sodium chloride 0.9% 100 mL IVPB  6/19/2024       Medications       zoledronic acid (ZOMETA) 4 mg in sodium chloride 0.9% 100 mL IVPB  9/17/2024       Medications       zoledronic acid (ZOMETA) 4 mg in sodium chloride 0.9% 100 mL IVPB    Therapy Plan Information  LANREOTIDE  5. Medications  lanreotide injection 120 mg  120 mg, Subcutaneous, Every visit

## 2023-12-13 DIAGNOSIS — C7A.012 MALIGNANT CARCINOID TUMOR OF ILEUM: ICD-10-CM

## 2023-12-13 DIAGNOSIS — R19.7 DIARRHEA, UNSPECIFIED TYPE: ICD-10-CM

## 2023-12-13 DIAGNOSIS — E34.0 CARCINOID SYNDROME: ICD-10-CM

## 2023-12-15 RX ORDER — TELOTRISTAT ETHYL 250 MG/1
250 TABLET ORAL 3 TIMES DAILY
Qty: 90 TABLET | Refills: 3 | Status: ACTIVE | OUTPATIENT
Start: 2023-12-15

## 2023-12-29 ENCOUNTER — HOSPITAL ENCOUNTER (OUTPATIENT)
Dept: RADIOLOGY | Facility: HOSPITAL | Age: 83
Discharge: HOME | End: 2023-12-29
Attending: INTERNAL MEDICINE
Payer: MEDICARE

## 2023-12-29 DIAGNOSIS — C7A.012 MALIGNANT CARCINOID TUMOR OF ILEUM: ICD-10-CM

## 2023-12-29 DIAGNOSIS — C7B.8 METASTATIC MALIGNANT NEUROENDOCRINE TUMOR TO LIVER: ICD-10-CM

## 2023-12-29 DIAGNOSIS — C7B.8 SECONDARY NEUROENDOCRINE TUMOR OF BONE: ICD-10-CM

## 2023-12-29 PROCEDURE — 78815 NM PET CT CU64 DOTATATE, SKULL BASE TO MID THIGH: ICD-10-PCS | Mod: 26,PS,HCNC, | Performed by: STUDENT IN AN ORGANIZED HEALTH CARE EDUCATION/TRAINING PROGRAM

## 2023-12-29 PROCEDURE — 74183 MRI ABDOMEN W WO CONTRAST: ICD-10-PCS | Mod: 26,HCNC,, | Performed by: RADIOLOGY

## 2023-12-29 PROCEDURE — A9592 NM PET CT CU64 DOTATATE, SKULL BASE TO MID THIGH: HCPCS | Mod: TB,HCNC

## 2023-12-29 PROCEDURE — 78815 PET IMAGE W/CT SKULL-THIGH: CPT | Mod: TC,HCNC

## 2023-12-29 PROCEDURE — A9585 GADOBUTROL INJECTION: HCPCS | Mod: HCNC | Performed by: INTERNAL MEDICINE

## 2023-12-29 PROCEDURE — 74183 MRI ABD W/O CNTR FLWD CNTR: CPT | Mod: 26,HCNC,, | Performed by: RADIOLOGY

## 2023-12-29 PROCEDURE — 78815 PET IMAGE W/CT SKULL-THIGH: CPT | Mod: 26,PS,HCNC, | Performed by: STUDENT IN AN ORGANIZED HEALTH CARE EDUCATION/TRAINING PROGRAM

## 2023-12-29 PROCEDURE — 25500020 PHARM REV CODE 255: Mod: HCNC | Performed by: INTERNAL MEDICINE

## 2023-12-29 PROCEDURE — 74183 MRI ABD W/O CNTR FLWD CNTR: CPT | Mod: TC,HCNC

## 2023-12-29 RX ORDER — GADOBUTROL 604.72 MG/ML
10 INJECTION INTRAVENOUS
Status: COMPLETED | OUTPATIENT
Start: 2023-12-29 | End: 2023-12-29

## 2023-12-29 RX ADMIN — GADOBUTROL 10 ML: 604.72 INJECTION INTRAVENOUS at 10:12

## 2024-01-03 ENCOUNTER — OFFICE VISIT (OUTPATIENT)
Dept: HEMATOLOGY/ONCOLOGY | Facility: CLINIC | Age: 84
End: 2024-01-03
Payer: MEDICARE

## 2024-01-03 VITALS
SYSTOLIC BLOOD PRESSURE: 151 MMHG | HEIGHT: 64 IN | HEART RATE: 92 BPM | BODY MASS INDEX: 18.72 KG/M2 | RESPIRATION RATE: 18 BRPM | WEIGHT: 109.69 LBS | OXYGEN SATURATION: 98 % | DIASTOLIC BLOOD PRESSURE: 72 MMHG

## 2024-01-03 DIAGNOSIS — C7B.8 METASTATIC MALIGNANT NEUROENDOCRINE TUMOR TO LIVER: ICD-10-CM

## 2024-01-03 DIAGNOSIS — C7B.8 SECONDARY NEUROENDOCRINE TUMOR OF BONE: ICD-10-CM

## 2024-01-03 DIAGNOSIS — C7A.012 MALIGNANT CARCINOID TUMOR OF ILEUM: Primary | ICD-10-CM

## 2024-01-03 DIAGNOSIS — E11.29 CONTROLLED TYPE 2 DIABETES MELLITUS WITH MICROALBUMINURIA, WITHOUT LONG-TERM CURRENT USE OF INSULIN: ICD-10-CM

## 2024-01-03 DIAGNOSIS — E34.0 CARCINOID SYNDROME: ICD-10-CM

## 2024-01-03 DIAGNOSIS — I10 ESSENTIAL HYPERTENSION: ICD-10-CM

## 2024-01-03 DIAGNOSIS — R80.9 CONTROLLED TYPE 2 DIABETES MELLITUS WITH MICROALBUMINURIA, WITHOUT LONG-TERM CURRENT USE OF INSULIN: ICD-10-CM

## 2024-01-03 DIAGNOSIS — C79.51 METASTASIS TO SPINAL COLUMN: ICD-10-CM

## 2024-01-03 DIAGNOSIS — E34.0 CARCINOID HEART DISEASE: ICD-10-CM

## 2024-01-03 DIAGNOSIS — I51.89 CARCINOID HEART DISEASE: ICD-10-CM

## 2024-01-03 DIAGNOSIS — D84.81 IMMUNODEFICIENCY SECONDARY TO NEOPLASM: ICD-10-CM

## 2024-01-03 DIAGNOSIS — Z79.899 IMMUNODEFICIENCY DUE TO DRUGS: ICD-10-CM

## 2024-01-03 DIAGNOSIS — D84.821 IMMUNODEFICIENCY DUE TO DRUGS: ICD-10-CM

## 2024-01-03 DIAGNOSIS — D49.9 IMMUNODEFICIENCY SECONDARY TO NEOPLASM: ICD-10-CM

## 2024-01-03 PROCEDURE — 3077F SYST BP >= 140 MM HG: CPT | Mod: HCNC,CPTII,S$GLB, | Performed by: INTERNAL MEDICINE

## 2024-01-03 PROCEDURE — 1101F PT FALLS ASSESS-DOCD LE1/YR: CPT | Mod: HCNC,CPTII,S$GLB, | Performed by: INTERNAL MEDICINE

## 2024-01-03 PROCEDURE — 1160F RVW MEDS BY RX/DR IN RCRD: CPT | Mod: HCNC,CPTII,S$GLB, | Performed by: INTERNAL MEDICINE

## 2024-01-03 PROCEDURE — 99215 OFFICE O/P EST HI 40 MIN: CPT | Mod: HCNC,S$GLB,, | Performed by: INTERNAL MEDICINE

## 2024-01-03 PROCEDURE — 1159F MED LIST DOCD IN RCRD: CPT | Mod: HCNC,CPTII,S$GLB, | Performed by: INTERNAL MEDICINE

## 2024-01-03 PROCEDURE — 3288F FALL RISK ASSESSMENT DOCD: CPT | Mod: HCNC,CPTII,S$GLB, | Performed by: INTERNAL MEDICINE

## 2024-01-03 PROCEDURE — 3078F DIAST BP <80 MM HG: CPT | Mod: HCNC,CPTII,S$GLB, | Performed by: INTERNAL MEDICINE

## 2024-01-03 PROCEDURE — 1157F ADVNC CARE PLAN IN RCRD: CPT | Mod: HCNC,CPTII,S$GLB, | Performed by: INTERNAL MEDICINE

## 2024-01-03 PROCEDURE — 1126F AMNT PAIN NOTED NONE PRSNT: CPT | Mod: HCNC,CPTII,S$GLB, | Performed by: INTERNAL MEDICINE

## 2024-01-03 PROCEDURE — 99999 PR PBB SHADOW E&M-EST. PATIENT-LVL IV: CPT | Mod: PBBFAC,HCNC,, | Performed by: INTERNAL MEDICINE

## 2024-01-03 NOTE — PROGRESS NOTES
"    Subjective:       Patient ID: Shahram Hills is an 83 y.o. female.     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 Ms. Hills is an 77 yo woman who initially saw me on 6/7/18 for further evaluation of neuroendocrine tumor. She initially presented with diarrhea, abdominal discomfort, weight loss. She was evaluated at [facility] and underwent workup below:  US abdomen on 3/14/18 showed numerous bilobar mixed echogenicity and mildly vascular hepatic lesions. Cholelithiasis without e/o acute cholecystitis.   MRI abdomen on 3/30/2018 showed innumerable hepatic metastases. L3 vertebral body metastasis with soft tissue component along the right margin of the L3 and upper L4 vertebral bodies, filling the right L3/4 neural foramen, and extending into the anterior aspect of the spinal canal on the right at the L3 and upper T4 levels. Associated with mild spinal canal narrowing.  CT chest on 4/6/2018 showed one lucent nodule measuring 7 mm in the T7 vertebral body, most likely representing metastatic disease.    EGD on 5/4/18 showed normal duodenum. Schatzki's ring in the lower third of the esophagus. Grade 1 esophagitis in the GE junction c/w mild distal esophagitis. Congestion and erythema in the antrum, pre-pyloric region and stomach body c/w gastritis. Biopsy of the stomach antrum showed mild chronic gastritis, neg for H. pylori.   Labs on 5/9/2018: WBC 6.9, H/H 11.6/34.3, MCV 87.5, plt count 279. On 3/9/18: Vit B12 level 173, folic acid 6.6  She was started on oral vit B12 and underwent a liver biopsy on 5/18/18. Pathology showed "metastatic well differentiated neuroendocrine tumor. Ki67 3%"     She saw me on 6/7/18 and complained of weight loss of 26 lbs over the past 3-4 months, also has diarrhea. No flushing, wheezing, palpitations. Has been having pain in right flank radiating " "down the right leg. No tingling/numbness, weakness. She can hold her bladder but when she urinates her diarrhea would come out as well. Started on dex 4 mg q6h the evening of 18.      2. MRI spine on 18 showed "Marrow replacing metastatic lesion of the L3 vertebral body with associated extra osseous expansion and complete effacement of the right L3-L4 neural foramina and additional effacement of the right lateral recess.  There is additional lateral extension and abutment of the right psoas muscle.  Superimposed degenerative change at this level results in mild spinal canal stenosis." I sent the patient to ED on 18 where she was evaluated by neurosurgery. Neurosurgery did not feel she was a candidate for surgical intervention.      3. Seen by Dr. Adames on 18. palliative radiation to the area of the L3 metastasis 18-18   4. 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  There is an adjacent metastatic lymph node, diffuse liver metastases, and multiple bone metastases. Index primary neoplasm SUV max 33.13. Adjacent lymph node SUV max 23. L3 bone metastasis SUV max 36.86. Left lobe SUV max 46.13   5. Lanreotide every 4 weeks started on 18  6. TACE to the right hepatic lobe on 19. TACE to the left hepatic lobe on 3/21/19.   7. TACE to the right hepatic lobe on 22. S/p conventional chemoembolization performed of the segment 2, 3, 4 branch of the left hepatic artery and segment 8 branch of the left hepatic artery on 22.  8. Gallium PET 22 showed progression. Discussed PRRT. PRRT #1 on 2022. Zometa every 3 months started 22. PRRT #2 on 23. #3 on 23. #4 on 23.      History of Present Illness:   Ms. Hills returns for follow up. Feeling okay. Diarrhea controlled, still on xermelo tid.     ECO     ROS:   See HPI. Otherwise negative.      Physical Examination:   Vital signs reviewed.   Gen: well hydrated, " well developed, in no acute distress.  HEENT: normocephalic, anicteric, PERRLA, EOMI  Neck: supple, no JVD, thyromegaly, cervical or supraclavicular LAD  Lungs: CTAB, no wheezes or rales  Heart: regular rate, regular pulse, no M/R/G  Abdomen: soft, no tenderness, non-distended, no hepatomegaly, no splenomegaly, no mass, or hernia.   Ext: b/l LE  no edema  Neuro: alert and oriented x 4, no focal neuro deficit  Skin: no rash, open wounds or ulcers  Psych: pleasant and appropriate mood and affect     Objective:      Laboratory Data:  Labs reviewed. 5-HIAA downtrending to 107. Serotonin, pancreastatin pending.    Imaging Data:     MRI abdomen 11/1/22:  1. History of neuroendocrine malignancy.  Numerous hepatic lesions, some remaining stable while others have mildly enlarged consistent with disease progression.  Stable osseous lesion in the L3 vertebral body.  2. Cholelithiasis and stable mild dilatation of the common bile duct and central intrahepatic biliary duct dilatation.  Of note, there is mass effect on the midportion of the common bile duct by dominant left hepatic lobe lesion.  3. Additional findings detailed above.  RECIST SUMMARY:     Date of prior examination for comparison: 05/16/2022     Lesion 1: Left hepatic lobe.  7.1 cm.  Series 9 image 52.  Prior measurement 6.3 cm.     Lesion 2: Right hepatic lobe lateral.  3.1 cm.  Series 9 image 30.  Prior measurement 3.2 cm.     Lesion 3: Right hepatic dome.  5.1 cm.  Series 9 image 19.  Prior measurement 4.4 cm.    Gallium PET 11/1/22:     Interval development of somatostatin tracer avid lesions in the skull, concerning for new metastatic lesions.     Numerous tracer avid hypoattenuating masses throughout the liver with increased SUV max compared to prior exam.     Tracer avid lesions in the C7 and L3 vertebral bodies, sternum, distal ileum and mesenteric node are similar to prior exam.     Judged by tracer avidity of the patient's tumor burden, PRRT would be a  consideration if clinically indicated.      Gallium PET 7/12/23:  Impression:     In this patient with carcinoid tumor of the ileum, there is overall similar distribution of tracer avid disease involving distal ileum, liver, mesenteric lymph node, and bones.  Index lesions as above.  No definite new tracer avid lesions.    Copper PET 12/29/23:  FINDINGS:  Quality of the study: Adequate.     SUV measurements may not be directly comparable with those made on Ga-68 DOTATATE PET/CT.     In the head and neck, there is physiologic distribution in the pituitary, salivary, and thyroid glands, and there are no tracer avid lesions suspicious for malignancy.     In the chest, there are no tracer avid lesions suspicious for malignancy.  Stable right upper lobe subcentimeter pulmonary nodule, below the size threshold for PET.     In the abdomen and pelvis, there is physiologic uptake in the pancreatic uncinate process, spleen, adrenal glands and  tract.     There are numerous hypodense tracer avid lesions throughout the liver, similar to prior exam.  Index left hepatic lobe lesion measures 5.1 x 5.0 cm (previously 5.1 x 5.0 cm) with maximum SUV of 58 (previously 43).     Focal uptake within the distal ileum with maximum SUV of 22 (previously 17).  Image 239.     Tracer avid mesenteric lymph node measuring 1.5 x 1.4 cm (previously 1.5 cm) with maximum SUV of 21 (previously 22).  Image 222.     In the bones, there is stable distribution of numerous scattered tracer avid lesions.     *Right calvarial lesion near the vertex with SUV max 5 (fused image 12).  *Right C6 vertebral body with SUV max 5.5 (fused image 74).  *Left scapular lesion with SUV max 2.7 (fused image 88).  *Right sternal lesion with SUV max 8.6 (fused image 103).  *Left anterior rib lesion with SUV max 2.1 (fused image 109).  *L3 vertebral body lesion with SUV max 9.2 (fused image 192).     Impression:     Similar distribution of tracer avid disease involving the  distal ileum, liver, mesenteric lymph nodes, and bones.  No definite new tracer avid lesions.       MRI 12/29/23  Impression:     1. In this patient with metastatic neuroendocrine tumor there is interval decreased size of index left hepatic lobe lesion and stable size of multiple additional hepatic lesions.  2. Stable metastatic lesion of the L3 vertebral body and central mesentery.  3. Cholelithiasis without acute cholecystitis.  4. Stable mild intrahepatic and extrahepatic ductal dilation, probably related to mass effect on the midportion of the common bile duct by left hepatic lobe lesion.  5. Additional findings, as above.    Assessment and Plan:      1. Malignant carcinoid tumor of ileum    2. 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 Secondary neuroendocrine tumor of bone    4. Spine metastasis    5. 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 Carcinoid syndrome    8. Controlled type 2 diabetes mellitus with microalbuminuria, without long-term current use of insulin    9. 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 Started lanreotide every 4 weeks on 6/22/2018. S/p TACE to the right hepatic lobe on 2/14/19. TACE to the left hepatic lobe on 3/21/19.   - CT/MRI 1/12/22 showed mild progression in the liver. S/p TACE on 2/11/22 and 4/22/22   - Gallium PET 11/1/22 showed progression. Discussed PRRT. PRRT #1 on 12/14/2022. Completed 4 cycles on 6/14/23. Zometa every 3 months started 12/27/22. Last zometa 9/8/2023  - doing well. PET in July 2023 showed stable disease  - symptoms well controlled  - c/w lanreotide every 4 weeks and zometa every 3 months. Last got zometa in Dec 2024. Next due in March 2024  - RTC in 4 weeks     7.  - better after xermelo and PRRT  - c/w lanreotide every 4  weeks.   - c/w xermelo tid  - could not tolerate octreotide    8.  - BS slightly elevated  - f/u with PCP    9.  - BP controlled  - c/w current medication    10.  - last echo in 2022 showed LVEF 40%  - saw Dr Bergeron in July 2023. Felt to be well compensated  - f/u with cardiology     Discussed with Dr. Mares, DO  PGY-VI  Hematology/Oncology Fellow    ATTESTATION:    I have personally seen, examined and talked to the patient. I have reviewed Dr Chauhan's note and agreed with the findings, assessment and plan.     Sebastian Granados MD  Hematology and Medical Oncology  [facility]      Route Chart for Scheduling    Med Onc Chart Routing      Follow up with physician 3 months. keep scheduled appts. see me on 4/23 with labs then lanreotide   Follow up with JUNAID    Infusion scheduling note    Injection scheduling note lanreotide every 4 weeks   Labs CBC and CMP   Scheduling:  Preferred lab:  Lab interval:  serotonin, pancreastatin, 5-HIAA   Imaging    Pharmacy appointment    Other referrals          Supportive Plan Information  OP ZOLEDRONIC ACID (ZOMETA) Q4W   Sebastian Granados MD   Upcoming Treatment Dates - OP ZOLEDRONIC ACID (ZOMETA) Q4W    3/21/2024       Medications       zoledronic acid (ZOMETA) 4 mg in sodium chloride 0.9% 100 mL IVPB  6/19/2024       Medications       zoledronic acid (ZOMETA) 4 mg in sodium chloride 0.9% 100 mL IVPB  9/17/2024       Medications       zoledronic acid (ZOMETA) 4 mg in sodium chloride 0.9% 100 mL IVPB    Therapy Plan Information  LANREOTIDE  5. Medications  lanreotide injection 120 mg  120 mg, Subcutaneous, Every visit

## 2024-01-04 ENCOUNTER — INFUSION (OUTPATIENT)
Dept: INFUSION THERAPY | Facility: HOSPITAL | Age: 84
End: 2024-01-04
Payer: MEDICARE

## 2024-01-04 DIAGNOSIS — C7B.8 METASTATIC MALIGNANT NEUROENDOCRINE TUMOR TO LIVER: Primary | ICD-10-CM

## 2024-01-04 PROCEDURE — 96372 THER/PROPH/DIAG INJ SC/IM: CPT | Mod: HCNC

## 2024-01-04 PROCEDURE — 63600175 PHARM REV CODE 636 W HCPCS: Mod: JZ,JG,HCNC | Performed by: INTERNAL MEDICINE

## 2024-01-04 RX ORDER — LANREOTIDE ACETATE 120 MG/.5ML
120 INJECTION SUBCUTANEOUS
Status: CANCELLED | OUTPATIENT
Start: 2024-01-04

## 2024-01-04 RX ORDER — LANREOTIDE ACETATE 120 MG/.5ML
120 INJECTION SUBCUTANEOUS
Status: DISCONTINUED | OUTPATIENT
Start: 2024-01-04 | End: 2024-01-04 | Stop reason: HOSPADM

## 2024-01-04 RX ADMIN — LANREOTIDE ACETATE 120 MG: 120 INJECTION SUBCUTANEOUS at 09:01

## 2024-01-04 NOTE — NURSING
Pt here for Lanreotide injection. Administered injection in left gluteal tissue. No questions or concerns. Pt ambulated out of unit unassisted.

## 2024-01-23 ENCOUNTER — OFFICE VISIT (OUTPATIENT)
Dept: CARDIOLOGY | Facility: CLINIC | Age: 84
End: 2024-01-23
Payer: MEDICARE

## 2024-01-23 VITALS
BODY MASS INDEX: 18.88 KG/M2 | HEART RATE: 84 BPM | DIASTOLIC BLOOD PRESSURE: 66 MMHG | WEIGHT: 110 LBS | SYSTOLIC BLOOD PRESSURE: 108 MMHG | OXYGEN SATURATION: 97 %

## 2024-01-23 DIAGNOSIS — I11.0 HYPERTENSIVE HEART DISEASE WITH CHRONIC COMBINED SYSTOLIC AND DIASTOLIC CONGESTIVE HEART FAILURE: Primary | ICD-10-CM

## 2024-01-23 DIAGNOSIS — I70.0 ATHEROSCLEROSIS OF AORTA: ICD-10-CM

## 2024-01-23 DIAGNOSIS — I50.42 HYPERTENSIVE HEART DISEASE WITH CHRONIC COMBINED SYSTOLIC AND DIASTOLIC CONGESTIVE HEART FAILURE: Primary | ICD-10-CM

## 2024-01-23 DIAGNOSIS — I10 PRIMARY HYPERTENSION: ICD-10-CM

## 2024-01-23 PROCEDURE — 1159F MED LIST DOCD IN RCRD: CPT | Mod: HCNC,CPTII,S$GLB, | Performed by: INTERNAL MEDICINE

## 2024-01-23 PROCEDURE — 1126F AMNT PAIN NOTED NONE PRSNT: CPT | Mod: HCNC,CPTII,S$GLB, | Performed by: INTERNAL MEDICINE

## 2024-01-23 PROCEDURE — 1157F ADVNC CARE PLAN IN RCRD: CPT | Mod: HCNC,CPTII,S$GLB, | Performed by: INTERNAL MEDICINE

## 2024-01-23 PROCEDURE — 1160F RVW MEDS BY RX/DR IN RCRD: CPT | Mod: HCNC,CPTII,S$GLB, | Performed by: INTERNAL MEDICINE

## 2024-01-23 PROCEDURE — 1101F PT FALLS ASSESS-DOCD LE1/YR: CPT | Mod: HCNC,CPTII,S$GLB, | Performed by: INTERNAL MEDICINE

## 2024-01-23 PROCEDURE — 3078F DIAST BP <80 MM HG: CPT | Mod: HCNC,CPTII,S$GLB, | Performed by: INTERNAL MEDICINE

## 2024-01-23 PROCEDURE — 3074F SYST BP LT 130 MM HG: CPT | Mod: HCNC,CPTII,S$GLB, | Performed by: INTERNAL MEDICINE

## 2024-01-23 PROCEDURE — 3288F FALL RISK ASSESSMENT DOCD: CPT | Mod: HCNC,CPTII,S$GLB, | Performed by: INTERNAL MEDICINE

## 2024-01-23 PROCEDURE — 99214 OFFICE O/P EST MOD 30 MIN: CPT | Mod: HCNC,S$GLB,, | Performed by: INTERNAL MEDICINE

## 2024-01-23 PROCEDURE — 99999 PR PBB SHADOW E&M-EST. PATIENT-LVL III: CPT | Mod: PBBFAC,HCNC,, | Performed by: INTERNAL MEDICINE

## 2024-01-23 NOTE — PROGRESS NOTES
"[facility]    Chief Complaint  Chief Complaint   Patient presents with    Congestive Heart Failure       HPI:    She has been doing well.  No complaints.  No orthostasis.  No exertional dyspnea.  No paroxysmal nocturnal dyspnea or orthopnea.  No syncope or presyncope.    Medications  Current Outpatient Medications   Medication Sig Dispense Refill    blood sugar diagnostic (TRUE METRIX GLUCOSE TEST STRIP) Strp USE TO CHECK BLOOD SUGAR TWICE DAILY 100 strip 11    blood-glucose meter kit To check BG 2 times daily, to use with insurance preferred meter (Patient not taking: Reported on 7/11/2023) 1 each 0    carvediloL (COREG) 12.5 MG tablet Take 1 tablet (12.5 mg total) by mouth 2 (two) times daily with meals. 180 tablet 3    cyanocobalamin (VITAMIN B-12) 1000 MCG tablet Take 1 tablet (1,000 mcg total) by mouth once daily.      diphenoxylate-atropine 2.5-0.025 mg (LOMOTIL) 2.5-0.025 mg per tablet Take 1 tablet by mouth 4 (four) times daily as needed for Diarrhea. 90 tablet 2    ezetimibe (ZETIA) 10 mg tablet Take 1 tablet (10 mg total) by mouth once daily. 90 tablet 3    ferrous sulfate 325 (65 FE) MG EC tablet Take 325 mg by mouth 3 (three) times daily with meals.      furosemide (LASIX) 20 MG tablet take 1 tab by mouth daily. If gain 2-3 pounds in 2-3 days then take 2 tabs daily for 3 days and then resume 1 dose daily 120 tablet 3    lancets Misc To check BG 2 times daily, to use with insurance preferred meter 100 each 11    latanoprost 0.005 % ophthalmic solution Place 1 drop into both eyes nightly.      losartan (COZAAR) 50 MG tablet Take 1 tablet (50 mg total) by mouth once daily. 90 tablet 3    metFORMIN (GLUCOPHAGE-XR) 500 MG ER 24hr tablet Take 2 tablets (1,000 mg total) by mouth daily with breakfast. 180 tablet 3    needle, disp, 22 G 22 gauge x 1" Ndle 1 application by Misc.(Non-Drug; Combo Route) route 3 (three) times daily as needed. 30 each 2    potassium chloride (KLOR-CON) 8 MEQ TbSR Take " 1 tablet (8 mEq total) by mouth once daily. 90 tablet 3    syringe, disposable, 1 mL Syrg 1 application by Misc.(Non-Drug; Combo Route) route 3 (three) times daily as needed (diarrhea). 30 each 2    telotristat ethyl (XERMELO) 250 mg Tab Take 1 tablet (250 mg) by mouth 3 (three) times daily. 90 tablet 3    TRUEPLUS LANCETS 33 gauge Misc USE TO CHECK BLOOD SUGAR TWICE DAILY      vitamin D (VITAMIN D3) 1000 units Tab Take 400 Units by mouth once daily.      XIIDRA 5 % Dpet INSTILL ONE DROP INTO OU BID  3     No current facility-administered medications for this visit.        History  Past Medical History:   Diagnosis Date    Anemia 07/11/2018    B12 deficiency     Depression     per pcp note 7/2017 and given prozac and diazepam     Hyperlipidemia     Systolic heart failure     Weight loss     reviewed prior pcp Dr. Russo notes 2017 - labs reviewed: cmp wnl x tbili 1.5, tsh wnl, A1c 5.0, cbc wnl,D 36, hiv neg, hep c neg, hep b surg ag neg      Past Surgical History:   Procedure Laterality Date    EMBOLIZATION N/A 02/14/2019    Procedure: EMBOLIZATION, BLOOD VESSEL;  Surgeon: [facility];  Location: [facility] OR [address] [place] [address];  Service: Radiology;  Laterality: N/A;  Room 189/[facility]    EMBOLIZATION N/A 03/21/2019    Procedure: EMBOLIZATION, BLOOD VESSEL;  Surgeon: [facility];  Location: [facility] OR [address] [place] [address];  Service: Radiology;  Laterality: N/A;  Room 189/[facility]    ESOPHAGOGASTRODUODENOSCOPY  05/04/2018    esophageal ring, grade 1 esophagitis, gastritis     EYE SURGERY Bilateral     cataract removal    HYSTERECTOMY      fibroids     Social History     Socioeconomic History    Marital status:    Occupational History    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   Tobacco Use    Smoking status: Never    Smokeless tobacco: Never   Substance and Sexual Activity    Alcohol use: No     Comment: stopped in 2007 - hx of social drinking    Drug use: Never    Sexual activity: Not Currently     Partners:  Male   Social History Narrative    Living in [place]    Not getting reg exericse     Social Determinants of Health     Financial Resource Strain: Medium Risk (11/10/2023)    Overall Financial Resource Strain (CARDIA)     Difficulty of Paying Living Expenses: Somewhat hard   Food Insecurity: No Food Insecurity (11/10/2023)    Hunger Vital Sign     Worried About Running Out of Food in the Last Year: Never true     Ran Out of Food in the Last Year: Never true   Transportation Needs: No Transportation Needs (11/10/2023)    PRAPARE - Transportation     Lack of Transportation (Medical): No     Lack of Transportation (Non-Medical): No   Physical Activity: Inactive (11/10/2023)    Exercise Vital Sign     Days of Exercise per Week: 0 days     Minutes of Exercise per Session: 0 min   Stress: No Stress Concern Present (11/10/2023)    [place] [place] [employer] - Occupational Stress Questionnaire     Feeling of Stress : Only a little   Social Connections: Moderately Isolated (11/10/2023)    Social Connection and Isolation Panel [NHANES]     Frequency of Communication with Friends and Family: More than three times a week     Frequency of Social Gatherings with Friends and Family: Never     Attends Muslim Services: More than 4 times per year     Active Member of Clubs or Organizations: No     Attends Club or Organization Meetings: Never     Marital Status:    Housing Stability: Low Risk  (11/10/2023)    Housing Stability Vital Sign     Unable to Pay for Housing in the Last Year: No     Number of Places Lived in the Last Year: 1     Unstable Housing in the Last Year: No     Family History   Problem Relation Age of Onset    Glaucoma Mother     Hypertension Mother     Heart attack Father     Cancer Father     Diabetes Sister     Asthma Sister     No Known Problems Sister     Hyperlipidemia Sister     Heart attack Sister     Cancer Brother         lung cancer, + tobacco    Diabetes Brother     Hypertension  Brother     Stroke Brother     Emphysema Brother     COPD Brother         Allergies  Review of patient's allergies indicates:   Allergen Reactions    Epinephrine      carcinoid       Review of Systems   Review of Systems   Constitutional: Negative for malaise/fatigue, weight gain and weight loss.   Eyes:  Negative for visual disturbance.   Cardiovascular:  Negative for chest pain, claudication, cyanosis, dyspnea on exertion, irregular heartbeat, leg swelling, near-syncope, orthopnea, palpitations, paroxysmal nocturnal dyspnea and syncope.   Respiratory:  Negative for cough, hemoptysis, shortness of breath, sleep disturbances due to breathing and wheezing.    Hematologic/Lymphatic: Negative for bleeding problem. Does not bruise/bleed easily.   Skin:  Negative for poor wound healing.   Musculoskeletal:  Negative for muscle cramps and myalgias.   Gastrointestinal:  Negative for abdominal pain, anorexia, diarrhea, heartburn, hematemesis, hematochezia, melena, nausea and vomiting.   Genitourinary:  Negative for hematuria and nocturia.   Neurological:  Negative for excessive daytime sleepiness, dizziness, focal weakness, light-headedness and weakness.       Physical Exam  Vitals:    01/23/24 1009   BP: 108/66   Pulse: 84     Wt Readings from Last 1 Encounters:   01/23/24 49.9 kg (110 lb)     Physical Exam  Vitals and nursing note reviewed.   Constitutional:       General: She is not in acute distress.     Appearance: She is not toxic-appearing or diaphoretic.   HENT:      Head: Normocephalic and atraumatic.      Mouth/Throat:      Lips: Pink.      Mouth: Mucous membranes are moist.   Eyes:      General: No scleral icterus.     Conjunctiva/sclera: Conjunctivae normal.   Neck:      Thyroid: No thyromegaly.      Vascular: No carotid bruit, hepatojugular reflux or JVD.      Trachea: Trachea normal.   Cardiovascular:      Rate and Rhythm: Normal rate and regular rhythm. Occasional Extrasystoles are present.     Chest Wall: PMI  is not displaced.      Pulses:           Carotid pulses are 2+ on the right side and 2+ on the left side.       Radial pulses are 2+ on the right side and 2+ on the left side.      Heart sounds: S1 normal and S2 normal. No murmur heard.     No friction rub. Gallop present. S4 sounds present. No S3 sounds.   Pulmonary:      Effort: Pulmonary effort is normal. No accessory muscle usage or respiratory distress.      Breath sounds: Normal breath sounds and air entry. No decreased breath sounds, wheezing, rhonchi or rales.   Abdominal:      General: Bowel sounds are normal. There is no distension or abdominal bruit.      Palpations: Abdomen is soft. There is no hepatomegaly, splenomegaly or pulsatile mass.      Tenderness: There is no abdominal tenderness.   Musculoskeletal:         General: No tenderness or deformity.      Right lower leg: No edema.      Left lower leg: No edema.   Skin:     General: Skin is warm and dry.      Capillary Refill: Capillary refill takes less than 2 seconds.      Coloration: Skin is not cyanotic or pale.      Nails: There is no clubbing.   Neurological:      General: No focal deficit present.      Mental Status: She is alert and oriented to person, place, and time.   Psychiatric:         Attention and Perception: Attention normal.         Mood and Affect: Mood normal.         Speech: Speech normal.         Behavior: Behavior normal. Behavior is cooperative.         Labs  Lab Visit on 12/29/2023   Component Date Value Ref Range Status    WBC 12/29/2023 5.56  3.90 - 12.70 K/uL Final    RBC 12/29/2023 4.20  4.00 - 5.40 M/uL Final    Hemoglobin 12/29/2023 13.0  12.0 - 16.0 g/dL Final    Hematocrit 12/29/2023 39.1  37.0 - 48.5 % Final    MCV 12/29/2023 93  82 - 98 fL Final    MCH 12/29/2023 31.0  27.0 - 31.0 pg Final    MCHC 12/29/2023 33.2  32.0 - 36.0 g/dL Final    RDW 12/29/2023 12.4  11.5 - 14.5 % Final    Platelets 12/29/2023 213  150 - 450 K/uL Final    MPV 12/29/2023 9.5  9.2 - 12.9 fL  Final    Immature Granulocytes 12/29/2023 0.2  0.0 - 0.5 % Final    Gran # (ANC) 12/29/2023 4.3  1.8 - 7.7 K/uL Final    Immature Grans (Abs) 12/29/2023 0.01  0.00 - 0.04 K/uL Final    Comment: Mild elevation in immature granulocytes is non specific and   can be seen in a variety of conditions including stress response,   acute inflammation, trauma and pregnancy. Correlation with other   laboratory and clinical findings is essential.      Lymph # 12/29/2023 0.9 (L)  1.0 - 4.8 K/uL Final    Mono # 12/29/2023 0.4  0.3 - 1.0 K/uL Final    Eos # 12/29/2023 0.0  0.0 - 0.5 K/uL Final    Baso # 12/29/2023 0.02  0.00 - 0.20 K/uL Final    nRBC 12/29/2023 0  0 /100 WBC Final    Gran % 12/29/2023 76.5 (H)  38.0 - 73.0 % Final    Lymph % 12/29/2023 15.5 (L)  18.0 - 48.0 % Final    Mono % 12/29/2023 7.0  4.0 - 15.0 % Final    Eosinophil % 12/29/2023 0.4  0.0 - 8.0 % Final    Basophil % 12/29/2023 0.4  0.0 - 1.9 % Final    Differential Method 12/29/2023 Automated   Final    Sodium 12/29/2023 142  136 - 145 mmol/L Final    Potassium 12/29/2023 4.7  3.5 - 5.1 mmol/L Final    Chloride 12/29/2023 104  95 - 110 mmol/L Final    CO2 12/29/2023 30 (H)  23 - 29 mmol/L Final    Glucose 12/29/2023 310 (H)  70 - 110 mg/dL Final    BUN 12/29/2023 8  8 - 23 mg/dL Final    Creatinine 12/29/2023 0.8  0.5 - 1.4 mg/dL Final    Calcium 12/29/2023 9.8  8.7 - 10.5 mg/dL Final    Total Protein 12/29/2023 7.4  6.0 - 8.4 g/dL Final    Albumin 12/29/2023 3.6  3.5 - 5.2 g/dL Final    Total Bilirubin 12/29/2023 0.4  0.1 - 1.0 mg/dL Final    Comment: For infants and newborns, interpretation of results should be based  on gestational age, weight and in agreement with clinical  observations.    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  Please note that consuming serotonin containing   foods and some medications within 24 hours of sample   collection may result in a temporary elevation of 5HIAA.    -------------------ADDITIONAL INFORMATION-------------------  Liquid Chromatography-Tandem Mass Spectrometry (LC-MS/MS).  Values obtained from different assay methods or kits may be   different and cannot be used interchangeably. The results   cannot be interpreted as absolute evidence for the presence   or absence of malignant disease.  This test was developed and its performance characteristics   determined by [facility] in a manner consistent with CLIA   requirements. This test has not been cleared or approved by   the U.S. Food and Drug Administration.    Test Performed by:  [facility] [place] [address]  : Yogesh Iqbal Ph.D.; CLIA# [id number]      Pancreastatin 12/29/2023 7325  10 - 135 pg/mL Final    Comment: *Result verified by repeat analysis.    -------------------ADDITIONAL INFORMATION-------------------  This radioimmunoassay was developed and its  performance characteristics determined by  [facility]. It has not been  cleared or approved by the FDA and such  approval or clearance is not required at this  time. Values obtained with different methods,  different laboratories or with kits cannot  be used interchangeably with the results on  this report. The results cannot be interpreted  as absolute evidence of the presence or absence  of malignant  disease.    Test Performed by:  [facility]  [address] [place] [address]      Serotonin 12/29/2023 1320 (H)  <=230 ng/mL Final    Comment: -------------------ADDITIONAL INFORMATION-------------------  This test was developed and its performance characteristics   determined by [facility] in a manner consistent with CLIA   requirements. This test has not been cleared or approved by   the U.S. Food and Drug Administration.    Test Performed by:  [facility]  [address] [place] [address]  : Yogesh Roque M.D. 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  Please note that consuming serotonin containing   foods and some medications within 24 hours of sample   collection may result in a temporary elevation of 5HIAA.    -------------------ADDITIONAL INFORMATION-------------------  Liquid Chromatography-Tandem Mass Spectrometry (LC-MS/MS).  Values obtained from different assay methods or kits may be   different and cannot be used interchangeably. The results   cannot be interpreted as absolute evidence for the presence   or absence of malignant disease.  This test was developed and its performance characteristics   determined by [facility] in a manner consistent with CLIA   requirements. This test has not been cleared or approved by   the U.S. Food and Drug Administration.    Test Performed by:  [facility]  [address] [place] [address]  : Yogesh Allen. Ph.D.; CLIA# [id number]      Pancreastatin 12/01/2023 3748  10 - 135 pg/mL Final    Comment: Result verified by repeat  analysis.    -------------------ADDITIONAL INFORMATION-------------------  This radioimmunoassay was developed and its  performance characteristics determined by  [facility]. It has not been  cleared or approved by the FDA and such  approval or clearance is not required at this  time. Values obtained with different methods,  different laboratories or with kits cannot  be used interchangeably with the results on  this report. The results cannot be interpreted  as absolute evidence of the presence or absence  of malignant disease.    Test Performed by:  [facility]  [address] [place] [address]      Serotonin 12/01/2023 1440 (H)  <=230 ng/mL Final    Comment: -------------------ADDITIONAL INFORMATION-------------------  This test was developed and its performance characteristics   determined by [facility] in a manner consistent with CLIA   requirements. This test has not been cleared or approved by   the U.S. Food and Drug Administration.    Test Performed by:  [facility]  [address] [place] [address]  : Yogesh Roque M.D. Ph.D.; CLIA# [id number]      WBC 12/01/2023 4.34  3.90 - 12.70 K/uL Final    RBC 12/01/2023 3.80 (L)  4.00 - 5.40 M/uL Final    Hemoglobin 12/01/2023 12.2  12.0 - 16.0 g/dL Final    Hematocrit 12/01/2023 36.7 (L)  37.0 - 48.5 % Final    MCV 12/01/2023 97  82 - 98 fL Final    MCH 12/01/2023 32.1 (H)  27.0 - 31.0 pg Final    MCHC 12/01/2023 33.2  32.0 - 36.0 g/dL Final    RDW 12/01/2023 12.5  11.5 - 14.5 % Final    Platelets 12/01/2023 156  150 - 450 K/uL Final    MPV 12/01/2023 9.9  9.2 - 12.9 fL Final    Immature Granulocytes 12/01/2023 0.2  0.0 - 0.5 % Final    Gran # (ANC) 12/01/2023 2.9  1.8 - 7.7 K/uL Final    Immature Grans (Abs) 12/01/2023 0.01  0.00 - 0.04 K/uL Final    Comment: Mild elevation in immature granulocytes is non specific and   can be seen in a variety of conditions  including stress response,   acute inflammation, trauma and pregnancy. Correlation with other   laboratory and clinical findings is essential.      Lymph # 12/01/2023 0.9 (L)  1.0 - 4.8 K/uL Final    Mono # 12/01/2023 0.5  0.3 - 1.0 K/uL Final    Eos # 12/01/2023 0.0  0.0 - 0.5 K/uL Final    Baso # 12/01/2023 0.01  0.00 - 0.20 K/uL Final    nRBC 12/01/2023 0  0 /100 WBC Final    Gran % 12/01/2023 66.9  38.0 - 73.0 % Final    Lymph % 12/01/2023 21.4  18.0 - 48.0 % Final    Mono % 12/01/2023 10.6  4.0 - 15.0 % Final    Eosinophil % 12/01/2023 0.7  0.0 - 8.0 % Final    Basophil % 12/01/2023 0.2  0.0 - 1.9 % Final    Differential Method 12/01/2023 Automated   Final    Sodium 12/01/2023 141  136 - 145 mmol/L Final    Potassium 12/01/2023 4.1  3.5 - 5.1 mmol/L Final    Chloride 12/01/2023 103  95 - 110 mmol/L Final    CO2 12/01/2023 30 (H)  23 - 29 mmol/L Final    Glucose 12/01/2023 249 (H)  70 - 110 mg/dL Final    BUN 12/01/2023 10  8 - 23 mg/dL Final    Creatinine 12/01/2023 0.8  0.5 - 1.4 mg/dL Final    Calcium 12/01/2023 9.5  8.7 - 10.5 mg/dL Final    Total Protein 12/01/2023 6.9  6.0 - 8.4 g/dL Final    Albumin 12/01/2023 3.6  3.5 - 5.2 g/dL Final    Total Bilirubin 12/01/2023 0.4  0.1 - 1.0 mg/dL Final    Comment: For infants and newborns, interpretation of results should be based  on gestational age, weight and in agreement with clinical  observations.    Premature Infant recommended reference ranges:  Up to 24 hours.............<8.0 mg/dL  Up to 48 hours............<12.0 mg/dL  3-5 days..................<15.0 mg/dL  6-29 days.................<15.0 mg/dL      Alkaline Phosphatase 12/01/2023 58  55 - 135 U/L Final    AST 12/01/2023 21  10 - 40 U/L Final    ALT 12/01/2023 19  10 - 44 U/L Final    eGFR 12/01/2023 >60.0  >60 mL/min/1.73 m^2 Final    Anion Gap 12/01/2023 8  8 - 16 mmol/L Final   Lab Visit on 11/03/2023   Component Date Value Ref Range Status    WBC 11/03/2023 4.11  3.90 - 12.70 K/uL Final    RBC  11/03/2023 3.78 (L)  4.00 - 5.40 M/uL Final    Hemoglobin 11/03/2023 12.1  12.0 - 16.0 g/dL Final    Hematocrit 11/03/2023 36.1 (L)  37.0 - 48.5 % Final    MCV 11/03/2023 96  82 - 98 fL Final    MCH 11/03/2023 32.0 (H)  27.0 - 31.0 pg Final    MCHC 11/03/2023 33.5  32.0 - 36.0 g/dL Final    RDW 11/03/2023 12.5  11.5 - 14.5 % Final    Platelets 11/03/2023 177  150 - 450 K/uL Final    MPV 11/03/2023 9.7  9.2 - 12.9 fL Final    Immature Granulocytes 11/03/2023 0.2  0.0 - 0.5 % Final    Gran # (ANC) 11/03/2023 2.6  1.8 - 7.7 K/uL Final    Immature Grans (Abs) 11/03/2023 0.01  0.00 - 0.04 K/uL Final    Comment: Mild elevation in immature granulocytes is non specific and   can be seen in a variety of conditions including stress response,   acute inflammation, trauma and pregnancy. Correlation with other   laboratory and clinical findings is essential.      Lymph # 11/03/2023 1.0  1.0 - 4.8 K/uL Final    Mono # 11/03/2023 0.5  0.3 - 1.0 K/uL Final    Eos # 11/03/2023 0.0  0.0 - 0.5 K/uL Final    Baso # 11/03/2023 0.01  0.00 - 0.20 K/uL Final    nRBC 11/03/2023 0  0 /100 WBC Final    Gran % 11/03/2023 63.1  38.0 - 73.0 % Final    Lymph % 11/03/2023 23.8  18.0 - 48.0 % Final    Mono % 11/03/2023 12.2  4.0 - 15.0 % Final    Eosinophil % 11/03/2023 0.5  0.0 - 8.0 % Final    Basophil % 11/03/2023 0.2  0.0 - 1.9 % Final    Differential Method 11/03/2023 Automated   Final    Sodium 11/03/2023 138  136 - 145 mmol/L Final    Potassium 11/03/2023 3.8  3.5 - 5.1 mmol/L Final    Chloride 11/03/2023 103  95 - 110 mmol/L Final    CO2 11/03/2023 26  23 - 29 mmol/L Final    Glucose 11/03/2023 190 (H)  70 - 110 mg/dL Final    BUN 11/03/2023 11  8 - 23 mg/dL Final    Creatinine 11/03/2023 0.7  0.5 - 1.4 mg/dL Final    Calcium 11/03/2023 9.5  8.7 - 10.5 mg/dL Final    Total Protein 11/03/2023 6.9  6.0 - 8.4 g/dL Final    Albumin 11/03/2023 3.6  3.5 - 5.2 g/dL Final    Total Bilirubin 11/03/2023 0.4  0.1 - 1.0 mg/dL Final    Comment: For  infants and newborns, interpretation of results should be based  on gestational age, weight and in agreement with clinical  observations.    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  Please note that consuming serotonin containing   foods and some medications within 24 hours of sample   collection may result in a temporary elevation of 5HIAA.    -------------------ADDITIONAL INFORMATION-------------------  Liquid Chromatography-Tandem Mass Spectrometry (LC-MS/MS).  Values obtained from different assay methods or kits may be   different and cannot be used interchangeably. The results   cannot be interpreted as absolute evidence for the presence   or absence of malignant disease.  This test was developed and its performance characteristics   determined by [facility] in a manner consistent with CLIA   requirements. This test has not been cleared or approved by   the U.S. Food and Drug Administration.    Test Performed by:  [facility] [address] [address]  : Yogesh Iqbal Ph.D.; CLIA# [id number]      Chromogranin A 11/03/2023 1374 (H)  <93 ng/mL Final    Comment: Impaired renal or hepatic function or treatment with proton  pump inhibitors may result in artifactual elevations of   Chromogranin  A.    -------------------ADDITIONAL INFORMATION-------------------  This test was developed and its performance characteristics  determined by [facility] in a manner consistent with CLIA  requirements. This test has not been cleared or approved by   the U.S. Food and Drug Administration.    In some immunoassays, the presence of unusually high   concentrations of analyte may result in a high-dose   &quot;hook&quot; effect. This may result in a lower or even normal   measured analyte concentration. If the reported result   is inconsistent with the clinical presentation, the   laboratory should be alerted for troubleshooting. For   diagnostic purposes, these immunoassay results should   always be assessed in conjunction with the patients medical   history, clinical examination and other findings.    The testing method is a homogeneous time-resolved   immunof                           luorescent assay manufactured by [employer]   and performed on the [employer] Kr[employer]or Compact Plus.    Values obtained with different assay methods or kits may be   different and cannot be used interchangeably.    Test results cannot be interpreted as absolute evidence for   the presence or absence of malignant disease.    Test Performed by:  [facility]  [address] [place] [address]  : Yogesh Roque M.D. Ph.D.; CLIA# [id number]      Pancreastatin 11/03/2023 3328  10 - 135 pg/mL Final    Comment: *Result verified by repeat analysis.     -------------------ADDITIONAL INFORMATION-------------------  This radioimmunoassay was developed and its  performance characteristics determined by  [employer]. It has not been  cleared or approved by the FDA and such  approval or clearance is not required at this  time. Values obtained with different methods,  different laboratories or with kits cannot  be used interchangeably with the results on  this report. The  results cannot be interpreted  as absolute evidence of the presence or absence  of malignant disease.    Test Performed by:  [facility]  [address] [place] [address]      Serotonin 11/03/2023 1030 (H)  <=230 ng/mL Final    Comment: -------------------ADDITIONAL INFORMATION-------------------  This test was developed and its performance characteristics   determined by [facility] in a manner consistent with CLIA   requirements. This test has not been cleared or approved by   the U.S. Food and Drug Administration.    Test Performed by:  [facility]  [address] [place] [address]  : Yogesh Roque M.D. Ph.D.; CLIA# [id number]      BNP 11/03/2023 535 (H)  0 - 99 pg/mL Final    Values of less than 100 pg/ml are consistent with non-CHF populations.   Lab Visit on 10/10/2023   Component Date Value Ref Range Status    Hemoglobin A1C 10/10/2023 6.1 (H)  4.0 - 5.6 % Final    Comment: ADA Screening Guidelines:  5.7-6.4%  Consistent with prediabetes  >or=6.5%  Consistent with diabetes    High levels of fetal hemoglobin interfere with the HbA1C  assay. Heterozygous hemoglobin variants (HbS, HgC, etc)do  not significantly interfere with this assay.   However, presence of multiple variants may affect accuracy.      Estimated Avg Glucose 10/10/2023 128  68 - 131 mg/dL Final   Lab Visit on 10/03/2023   Component Date Value Ref Range Status    WBC 10/03/2023 4.35  3.90 - 12.70 K/uL Final    RBC 10/03/2023 3.91 (L)  4.00 - 5.40 M/uL Final    Hemoglobin 10/03/2023 12.2  12.0 - 16.0 g/dL Final    Hematocrit 10/03/2023 37.0  37.0 - 48.5 % Final    MCV 10/03/2023 95  82 - 98 fL Final    MCH 10/03/2023 31.2 (H)  27.0 - 31.0 pg Final    MCHC 10/03/2023 33.0  32.0 - 36.0 g/dL Final    RDW 10/03/2023 12.7  11.5 - 14.5 % Final    Platelets 10/03/2023 165  150 - 450 K/uL Final    MPV 10/03/2023 9.3  9.2 - 12.9 fL Final    Immature Granulocytes  10/03/2023 0.2  0.0 - 0.5 % Final    Gran # (ANC) 10/03/2023 2.7  1.8 - 7.7 K/uL Final    Immature Grans (Abs) 10/03/2023 0.01  0.00 - 0.04 K/uL Final    Comment: Mild elevation in immature granulocytes is non specific and   can be seen in a variety of conditions including stress response,   acute inflammation, trauma and pregnancy. Correlation with other   laboratory and clinical findings is essential.      Lymph # 10/03/2023 1.1  1.0 - 4.8 K/uL Final    Mono # 10/03/2023 0.5  0.3 - 1.0 K/uL Final    Eos # 10/03/2023 0.0  0.0 - 0.5 K/uL Final    Baso # 10/03/2023 0.01  0.00 - 0.20 K/uL Final    nRBC 10/03/2023 1 (A)  0 /100 WBC Final    Gran % 10/03/2023 62.0  38.0 - 73.0 % Final    Lymph % 10/03/2023 25.1  18.0 - 48.0 % Final    Mono % 10/03/2023 12.0  4.0 - 15.0 % Final    Eosinophil % 10/03/2023 0.5  0.0 - 8.0 % Final    Basophil % 10/03/2023 0.2  0.0 - 1.9 % Final    Differential Method 10/03/2023 Automated   Final    Sodium 10/03/2023 141  136 - 145 mmol/L Final    Potassium 10/03/2023 4.2  3.5 - 5.1 mmol/L Final    Chloride 10/03/2023 103  95 - 110 mmol/L Final    CO2 10/03/2023 29  23 - 29 mmol/L Final    Glucose 10/03/2023 136 (H)  70 - 110 mg/dL Final    BUN 10/03/2023 12  8 - 23 mg/dL Final    Creatinine 10/03/2023 0.7  0.5 - 1.4 mg/dL Final    Calcium 10/03/2023 9.6  8.7 - 10.5 mg/dL Final    Total Protein 10/03/2023 7.2  6.0 - 8.4 g/dL Final    Albumin 10/03/2023 3.7  3.5 - 5.2 g/dL Final    Total Bilirubin 10/03/2023 0.3  0.1 - 1.0 mg/dL Final    Comment: For infants and newborns, interpretation of results should be based  on gestational age, weight and in agreement with clinical  observations.    Premature Infant recommended reference ranges:  Up to 24 hours.............<8.0 mg/dL  Up to 48 hours............<12.0 mg/dL  3-5 days..................<15.0 mg/dL  6-29 days.................<15.0 mg/dL      Alkaline Phosphatase 10/03/2023 56  55 - 135 U/L Final    AST 10/03/2023 29  10 - 40 U/L Final    ALT  10/03/2023 29  10 - 44 U/L Final    eGFR 10/03/2023 >60.0  >60 mL/min/1.73 m^2 Final    Anion Gap 10/03/2023 9  8 - 16 mmol/L Final    5-HIAA, Plasma (Neuroend) 10/03/2023 69 (H)  <=30 ng/mL Final    Comment: In this sample, the concentration of 5HIAA was markedly   elevated indicating the presence of a serotonin-producing   tumor.  Please note that consuming serotonin containing   foods and some medications within 24 hours of sample   collection may result in a temporary elevation of 5HIAA.    -------------------ADDITIONAL INFORMATION-------------------  Liquid Chromatography-Tandem Mass Spectrometry (LC-MS/MS).  Values obtained from different assay methods or kits may be   different and cannot be used interchangeably. The results   cannot be interpreted as absolute evidence for the presence   or absence of malignant disease.  This test was developed and its performance characteristics   determined by [facility] in a manner consistent with CLIA   requirements. This test has not been cleared or approved by   the U.S. Food and Drug Administration.    Test Performed by:  [facility] [place] [address]  : Yogesh Iqbal Ph.D.; CLIA# [id number]      Chromogranin A 10/03/2023 1587 (H)  <93 ng/mL Final    Comment: Impaired renal or hepatic function or treatment with proton  pump inhibitors may result in artifactual elevations of   Chromogranin A.    -------------------ADDITIONAL INFORMATION-------------------  This test was developed and its performance characteristics  determined by [facility] in a manner consistent with CLIA  requirements. This test has not been cleared or approved by   the U.S. Food and Drug Administration.    In some immunoassays, the presence of unusually high   concentrations of analyte may result in a high-dose   &quot;hook&quot; effect. This may result in a lower or even normal   measured  analyte concentration. If the reported result   is inconsistent with the clinical presentation, the   laboratory should be alerted for troubleshooting. For   diagnostic purposes, these immunoassay results should   always be assessed in conjunction with the patients medical   history, clinical examination and other findings.    The testing method is a homogeneous time-resolved   immunof                           luorescent assay manufactured by [employer]   and performed on the [employer] Compact Plus.    Values obtained with different assay methods or kits may be   different and cannot be used interchangeably.    Test results cannot be interpreted as absolute evidence for   the presence or absence of malignant disease.    Test Performed by:  [facility]  [address] [place] [address]  : Yogesh Roque M.D. Ph.D.; CLIA# [id number]      Pancreastatin 10/03/2023 4023  10 - 135 pg/mL Final    Comment: Result verified by repeat analysis.    -------------------ADDITIONAL INFORMATION-------------------  This radioimmunoassay was developed and its  performance characteristics determined by  [employer]. It has not been  cleared or approved by the FDA and such  approval or clearance is not required at this  time. Values obtained with different methods,  different laboratories or with kits cannot  be used interchangeably with the results on  this report. The results cannot be interpreted  as absolute evidence of the presence or absence  of malignant disease.    Test Performed by:  [employer]  [address] [place] [address]      Serotonin 10/03/2023 1360 (H)  <=230 ng/mL Final    Comment: -------------------ADDITIONAL INFORMATION-------------------  This test was developed and its performance characteristics   determined by [facility] in a manner consistent with CLIA   requirements. This test has not been cleared  or approved by   the U.S. Food and Drug Administration.    Test Performed by:  [facility]  [address] [place] [address]  : Yogesh Roque M.D. Ph.D.; CLIA# [id number]     Lab Visit on 09/08/2023   Component Date Value Ref Range Status    WBC 09/08/2023 4.48  3.90 - 12.70 K/uL Final    RBC 09/08/2023 4.15  4.00 - 5.40 M/uL Final    Hemoglobin 09/08/2023 13.3  12.0 - 16.0 g/dL Final    Hematocrit 09/08/2023 40.1  37.0 - 48.5 % Final    MCV 09/08/2023 97  82 - 98 fL Final    MCH 09/08/2023 32.0 (H)  27.0 - 31.0 pg Final    MCHC 09/08/2023 33.2  32.0 - 36.0 g/dL Final    RDW 09/08/2023 12.9  11.5 - 14.5 % Final    Platelets 09/08/2023 176  150 - 450 K/uL Final    MPV 09/08/2023 9.8  9.2 - 12.9 fL Final    Immature Granulocytes 09/08/2023 0.2  0.0 - 0.5 % Final    Gran # (ANC) 09/08/2023 2.8  1.8 - 7.7 K/uL Final    Immature Grans (Abs) 09/08/2023 0.01  0.00 - 0.04 K/uL Final    Comment: Mild elevation in immature granulocytes is non specific and   can be seen in a variety of conditions including stress response,   acute inflammation, trauma and pregnancy. Correlation with other   laboratory and clinical findings is essential.      Lymph # 09/08/2023 1.1  1.0 - 4.8 K/uL Final    Mono # 09/08/2023 0.5  0.3 - 1.0 K/uL Final    Eos # 09/08/2023 0.0  0.0 - 0.5 K/uL Final    Baso # 09/08/2023 0.01  0.00 - 0.20 K/uL Final    nRBC 09/08/2023 0  0 /100 WBC Final    Gran % 09/08/2023 63.2  38.0 - 73.0 % Final    Lymph % 09/08/2023 24.6  18.0 - 48.0 % Final    Mono % 09/08/2023 11.4  4.0 - 15.0 % Final    Eosinophil % 09/08/2023 0.4  0.0 - 8.0 % Final    Basophil % 09/08/2023 0.2  0.0 - 1.9 % Final    Differential Method 09/08/2023 Automated   Final    Sodium 09/08/2023 144  136 - 145 mmol/L Final    Potassium 09/08/2023 4.3  3.5 - 5.1 mmol/L Final    Chloride 09/08/2023 102  95 - 110 mmol/L Final    CO2 09/08/2023 29  23 - 29 mmol/L Final    Glucose 09/08/2023  99  70 - 110 mg/dL Final    BUN 09/08/2023 10  8 - 23 mg/dL Final    Creatinine 09/08/2023 0.7  0.5 - 1.4 mg/dL Final    Calcium 09/08/2023 10.2  8.7 - 10.5 mg/dL Final    Total Protein 09/08/2023 8.1  6.0 - 8.4 g/dL Final    Albumin 09/08/2023 4.1  3.5 - 5.2 g/dL Final    Total Bilirubin 09/08/2023 0.4  0.1 - 1.0 mg/dL Final    Comment: For infants and newborns, interpretation of results should be based  on gestational age, weight and in agreement with clinical  observations.    Premature Infant recommended reference ranges:  Up to 24 hours.............<8.0 mg/dL  Up to 48 hours............<12.0 mg/dL  3-5 days..................<15.0 mg/dL  6-29 days.................<15.0 mg/dL      Alkaline Phosphatase 09/08/2023 66  55 - 135 U/L Final    AST 09/08/2023 33  10 - 40 U/L Final    ALT 09/08/2023 25  10 - 44 U/L Final    eGFR 09/08/2023 >60.0  >60 mL/min/1.73 m^2 Final    Anion Gap 09/08/2023 13  8 - 16 mmol/L Final    5-HIAA, Plasma (Neuroend) 09/08/2023 113 (H)  <=30 ng/mL Final    Comment: In this sample, the concentration of 5HIAA was markedly   elevated indicating the presence of a serotonin-producing   tumor.  Please note that consuming serotonin containing   foods and some medications within 24 hours of sample   collection may result in a temporary elevation of 5HIAA.    -------------------ADDITIONAL INFORMATION-------------------  Liquid Chromatography-Tandem Mass Spectrometry (LC-MS/MS).  Values obtained from different assay methods or kits may be   different and cannot be used interchangeably. The results   cannot be interpreted as absolute evidence for the presence   or absence of malignant disease.  This test was developed and its performance characteristics   determined by [facility] in a manner consistent with CLIA   requirements. This test has not been cleared or approved by   the U.S. Food and Drug Administration.    Test Performed by:  [facility]  [address]  [place] [id number]  : Yogesh Iqbal Ph.D.; CLIA# [id number]      Chromogranin A 09/08/2023 1627 (H)  <93 ng/mL Final    Comment: Impaired renal or hepatic function or treatment with proton  pump inhibitors may result in artifactual elevations of   Chromogranin A.    -------------------ADDITIONAL INFORMATION-------------------  This test was developed and its performance characteristics  determined by [facility] in a manner consistent with CLIA  requirements. This test has not been cleared or approved by   the U.S. Food and Drug Administration.    In some immunoassays, the presence of unusually high   concentrations of analyte may result in a high-dose   &quot;hook&quot; effect. This may result in a lower or even normal   measured analyte concentration. If the reported result   is inconsistent with the clinical presentation, the   laboratory should be alerted for troubleshooting. For   diagnostic purposes, these immunoassay results should   always be assessed in conjunction with the patients medical   history, clinical examination and other findings.    The testing method is a homogeneous time-resolved   immunof                           luorescent assay manufactured by [employer]   and performed on the [employer] Compact Plus.    Values obtained with different assay methods or kits may be   different and cannot be used interchangeably.    Test results cannot be interpreted as absolute evidence for   the presence or absence of malignant disease.    Test Performed by:  [facility] [place] [id number]  : Yogesh Roque M.D. Ph.D.; CLIA# [id number]      Pancreastatin 09/08/2023 4201  10 - 135 pg/mL Final    Comment: Result verified by repeat analysis.    -------------------ADDITIONAL INFORMATION-------------------  This radioimmunoassay was developed and its  performance  characteristics determined by  [employer]. It has not been  cleared or approved by the FDA and such  approval or clearance is not required at this  time. Values obtained with different methods,  different laboratories or with kits cannot  be used interchangeably with the results on  this report. The results cannot be interpreted  as absolute evidence of the presence or absence  of malignant disease.    Test Performed by:  [employer]  [address] [place] [address]      Serotonin 09/08/2023 1280 (H)  <=230 ng/mL Final    Comment: -------------------ADDITIONAL INFORMATION-------------------  This test was developed and its performance characteristics   determined by [facility] in a manner consistent with CLIA   requirements. This test has not been cleared or approved by   the U.S. Food and Drug Administration.    Test Performed by:  [facility]  [address] [place] [address]  : Yogesh Roque M.D. Ph.D.; CLIA# [id number]     Lab Visit on 08/11/2023   Component Date Value Ref Range Status    WBC 08/11/2023 4.02  3.90 - 12.70 K/uL Final    RBC 08/11/2023 3.66 (L)  4.00 - 5.40 M/uL Final    Hemoglobin 08/11/2023 11.6 (L)  12.0 - 16.0 g/dL Final    Hematocrit 08/11/2023 35.1 (L)  37.0 - 48.5 % Final    MCV 08/11/2023 96  82 - 98 fL Final    MCH 08/11/2023 31.7 (H)  27.0 - 31.0 pg Final    MCHC 08/11/2023 33.0  32.0 - 36.0 g/dL Final    RDW 08/11/2023 13.1  11.5 - 14.5 % Final    Platelets 08/11/2023 143 (L)  150 - 450 K/uL Final    MPV 08/11/2023 10.2  9.2 - 12.9 fL Final    Immature Granulocytes 08/11/2023 0.2  0.0 - 0.5 % Final    Gran # (ANC) 08/11/2023 2.8  1.8 - 7.7 K/uL Final    Immature Grans (Abs) 08/11/2023 0.01  0.00 - 0.04 K/uL Final    Comment: Mild elevation in immature granulocytes is non specific and   can be seen in a variety of conditions including stress response,   acute inflammation, trauma and  pregnancy. Correlation with other   laboratory and clinical findings is essential.      Lymph # 08/11/2023 0.8 (L)  1.0 - 4.8 K/uL Final    Mono # 08/11/2023 0.5  0.3 - 1.0 K/uL Final    Eos # 08/11/2023 0.0  0.0 - 0.5 K/uL Final    Baso # 08/11/2023 0.01  0.00 - 0.20 K/uL Final    nRBC 08/11/2023 0  0 /100 WBC Final    Gran % 08/11/2023 69.0  38.0 - 73.0 % Final    Lymph % 08/11/2023 18.7  18.0 - 48.0 % Final    Mono % 08/11/2023 11.4  4.0 - 15.0 % Final    Eosinophil % 08/11/2023 0.5  0.0 - 8.0 % Final    Basophil % 08/11/2023 0.2  0.0 - 1.9 % Final    Differential Method 08/11/2023 Automated   Final    Sodium 08/11/2023 144  136 - 145 mmol/L Final    Potassium 08/11/2023 4.2  3.5 - 5.1 mmol/L Final    Chloride 08/11/2023 106  95 - 110 mmol/L Final    CO2 08/11/2023 28  23 - 29 mmol/L Final    Glucose 08/11/2023 200 (H)  70 - 110 mg/dL Final    BUN 08/11/2023 10  8 - 23 mg/dL Final    Creatinine 08/11/2023 0.7  0.5 - 1.4 mg/dL Final    Calcium 08/11/2023 9.5  8.7 - 10.5 mg/dL Final    Total Protein 08/11/2023 6.6  6.0 - 8.4 g/dL Final    Albumin 08/11/2023 3.4 (L)  3.5 - 5.2 g/dL Final    Total Bilirubin 08/11/2023 0.4  0.1 - 1.0 mg/dL Final    Comment: For infants and newborns, interpretation of results should be based  on gestational age, weight and in agreement with clinical  observations.    Premature Infant recommended reference ranges:  Up to 24 hours.............<8.0 mg/dL  Up to 48 hours............<12.0 mg/dL  3-5 days..................<15.0 mg/dL  6-29 days.................<15.0 mg/dL      Alkaline Phosphatase 08/11/2023 60  55 - 135 U/L Final    AST 08/11/2023 26  10 - 40 U/L Final    ALT 08/11/2023 27  10 - 44 U/L Final    eGFR 08/11/2023 >60.0  >60 mL/min/1.73 m^2 Final    Anion Gap 08/11/2023 10  8 - 16 mmol/L Final    5-HIAA, Plasma (Neuroend) 08/11/2023 111 (H)  <=30 ng/mL Final    Comment: In this sample, the concentration of 5HIAA was markedly   elevated indicating the presence of a  serotonin-producing   tumor.  Please note that consuming serotonin containing   foods and some medications within 24 hours of sample   collection may result in a temporary elevation of 5HIAA.    -------------------ADDITIONAL INFORMATION-------------------  Liquid Chromatography-Tandem Mass Spectrometry (LC-MS/MS).  Values obtained from different assay methods or kits may be   different and cannot be used interchangeably. The results   cannot be interpreted as absolute evidence for the presence   or absence of malignant disease.  This test was developed and its performance characteristics   determined by [facility] in a manner consistent with CLIA   requirements. This test has not been cleared or approved by   the U.S. Food and Drug Administration.    Test Performed by:  [place] [address]  : Yogesh Iqbal Ph.D.; CLIA# [id number]      Pancreastatin 08/11/2023 6356  10 - 135 pg/mL Final    Comment: Result verified by repeat analysis.    -------------------ADDITIONAL INFORMATION-------------------  This radioimmunoassay was developed and its  performance characteristics determined by  [employer]. It has not been  cleared or approved by the FDA and such  approval or clearance is not required at this  time. Values obtained with different methods,  different laboratories or with kits cannot  be used interchangeably with the results on  this report. The results cannot be interpreted  as absolute evidence of the presence or absence  of malignant disease.    Test Performed by:  [employer]  [address] [place] [address]      Serotonin 08/11/2023 1090 (H)  <=230 ng/mL Final    Comment: -------------------ADDITIONAL INFORMATION-------------------  This test was developed and its performance characteristics   determined by [facility] in a manner consistent with CLIA    requirements. This test has not been cleared or approved by   the U.S. Food and Drug Administration.    Test Performed by:  [facility] [address]  : Yogesh Roque M.D. Ph.D.; CLIA# [id number]      Chromogranin A 08/11/2023 1330 (H)  <93 ng/mL Final    Comment: Impaired renal or hepatic function or treatment with proton  pump inhibitors may result in artifactual elevations of   Chromogranin A.    -------------------ADDITIONAL INFORMATION-------------------  This test was developed and its performance characteristics  determined by [facility] in a manner consistent with CLIA  requirements. This test has not been cleared or approved by   the U.S. Food and Drug Administration.    In some immunoassays, the presence of unusually high   concentrations of analyte may result in a high-dose   &quot;hook&quot; effect. This may result in a lower or even normal   measured analyte concentration. If the reported result   is inconsistent with the clinical presentation, the   laboratory should be alerted for troubleshooting. For   diagnostic purposes, these immunoassay results should   always be assessed in conjunction with the patients medical   history, clinical examination and other findings.    The testing method is a homogeneous time-resolved   immunof                           luorescent assay manufactured by [employer]   and performed on the [employer] Kr[employer]or Compact Plus.    Values obtained with different assay methods or kits may be   different and cannot be used interchangeably.    Test results cannot be interpreted as absolute evidence for   the presence or absence of malignant disease.    Test Performed by:  [facility] - [address] [address]  : Yogesh Roque M.D. Ph.D.; CLIA# [id number]         Imaging  MRI Abdomen W WO Contrast    Result Date:  12/30/2023  EXAMINATION: MRI ABDOMEN W WO CONTRAST CLINICAL HISTORY: Metastatic disease evaluation;  Other secondary neuroendocrine tumors TECHNIQUE: Multiplanar multisequence images of the abdomen before and after administration of 10 mL Gadavist intravenous contrast. COMPARISON: FDG PET-CT 07/12/2023 and MRI abdomen 11/01/2022. FINDINGS: Inferior Thorax: Normal. Liver: Numerous enhancing lesions with restricted diffusion throughout the hepatic parenchyma, several which demonstrate central necrosis and are consistent with known metastatic neuroendocrine tumor.  Interval decreased size of index lesion in the left hepatic lobe measuring 5.1 cm (axial series 9, image 47), previously 7.1 cm.  Similar size of index right hepatic lobe lesion measuring 3.0 cm (axial series 9, image 27), previously 3.1 cm.  Stable size of index lesion in the right hepatic dome measuring 5.1 cm (axial series 9, image 18), previously 5.1 cm.  No definite new or enlarging lesions. Gallbladder/bile ducts: Numerous gallstones without gallbladder wall thickening or pericholecystic fluid to suggest acute cholecystitis..  Mild intrahepatic ductal dilation and common bile duct mild dilation with common bile duct measuring 1.0 cm in diameter, similar to prior.  Findings probably relate to compression by dominant left hepatic lobe lesion. Pancreas: No mass. No peripancreatic fat stranding.  Stable prominence of the pancreatic duct measuring up to 4 mm in the pancreatic body, similar to prior. Spleen: Normal. Adrenals: Normal. Kidneys/Ureters: Normal enhancement.  No mass or hydroureteronephrosis.  Subcentimeter left kidney T2 hyperintense lesion, likely representing renal cysts. GI Tract/Mesentery (imaged portion): No evidence of bowel obstruction or inflammation. Peritoneal Space: Mass with restricted diffusion noted in the central mesentery, corresponding to known tumor deposit. Retroperitoneum: No pathologically enlarged nodes. Abdominal wall:  Normal. Vasculature: Few soft tissue lesions within the subcutaneous fat overlying the partially visualized gluteal muscles, likely representing injection sites. Bones: Stable appearance of L3 vertebral body enhancing lesion.     1. In this patient with metastatic neuroendocrine tumor there is interval decreased size of index left hepatic lobe lesion and stable size of multiple additional hepatic lesions. 2. Stable metastatic lesion of the L3 vertebral body and central mesentery. 3. Cholelithiasis without acute cholecystitis. 4. Stable mild intrahepatic and extrahepatic ductal dilation, probably related to mass effect on the midportion of the common bile duct by left hepatic lobe lesion. 5. Additional findings, as above. Electronically signed by resident: Jessica Salvador Date:    12/30/2023 Time:    08:44 Electronically signed by: Maninder Watters MD Date:    12/30/2023 Time:    14:47    NM PET CT CU64 DOTATATE, SKULL BASE TO MID THIGH    Result Date: 12/29/2023  EXAMINATION: NM PET CT CU64 DOTATATE, SKULL BASE TO MID THIGH CLINICAL HISTORY: Other secondary neuroendocrine tumors TECHNIQUE: 4.35 mCi of Cu-64 DOTA-ÁLVAREZ was administered intravenously in the right antecubital fossa. After an approximately 60 min distribution time, PET/CT images were acquired from the skull vertex to the mid thigh. Transmission images were acquired to correct for attenuation using a whole body low-dose CT scan without oral contrast and without IV contrast with the arms positioned above the head. COMPARISON: NM PET-CT 68 gallium DOTATATE FINDINGS: Quality of the study: Adequate. SUV measurements may not be directly comparable with those made on Ga-68 DOTATATE PET/CT. In the head and neck, there is physiologic distribution in the pituitary, salivary, and thyroid glands, and there are no tracer avid lesions suspicious for malignancy. In the chest, there are no tracer avid lesions suspicious for malignancy.  Stable right upper lobe subcentimeter  pulmonary nodule, below the size threshold for PET. In the abdomen and pelvis, there is physiologic uptake in the pancreatic uncinate process, spleen, adrenal glands and  tract. There are numerous hypodense tracer avid lesions throughout the liver, similar to prior exam.  Index left hepatic lobe lesion measures 5.1 x 5.0 cm (previously 5.1 x 5.0 cm) with maximum SUV of 58 (previously 43). Focal uptake within the distal ileum with maximum SUV of 22 (previously 17).  Image 239. Tracer avid mesenteric lymph node measuring 1.5 x 1.4 cm (previously 1.5 cm) with maximum SUV of 21 (previously 22).  Image 222. In the bones, there is stable distribution of numerous scattered tracer avid lesions. *Right calvarial lesion near the vertex with SUV max 5 (fused image 12). *Right C6 vertebral body with SUV max 5.5 (fused image 74). *Left scapular lesion with SUV max 2.7 (fused image 88). *Right sternal lesion with SUV max 8.6 (fused image 103). *Left anterior rib lesion with SUV max 2.1 (fused image 109). *L3 vertebral body lesion with SUV max 9.2 (fused image 192).     Similar distribution of tracer avid disease involving the distal ileum, liver, mesenteric lymph nodes, and bones.  No definite new tracer avid lesions. Electronically signed by resident: Mohan Raphael Date:    12/29/2023 Time:    14:28 Electronically signed by: Rich Garcia Date:    12/29/2023 Time:    17:02      Assessment  1. 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 Primary hypertension  Controlled    3. Atherosclerosis of aorta  Continue with risk factor management      Plan and Discussion    No change to current guideline directed medical therapy.    The ASCVD Risk score ([place] DK, et al., 2019) failed to calculate for the following reasons:    The 2019 ASCVD risk score is only valid for ages 40 to 79     Follow Up  Follow up in about 6 months (around  7/23/2024).      Ralph Bergeron MD

## 2024-01-30 ENCOUNTER — INFUSION (OUTPATIENT)
Dept: INFUSION THERAPY | Facility: HOSPITAL | Age: 84
End: 2024-01-30
Payer: MEDICARE

## 2024-01-30 ENCOUNTER — OFFICE VISIT (OUTPATIENT)
Dept: HEMATOLOGY/ONCOLOGY | Facility: CLINIC | Age: 84
End: 2024-01-30
Payer: MEDICARE

## 2024-01-30 VITALS
WEIGHT: 108.13 LBS | RESPIRATION RATE: 18 BRPM | DIASTOLIC BLOOD PRESSURE: 78 MMHG | BODY MASS INDEX: 18.46 KG/M2 | OXYGEN SATURATION: 97 % | SYSTOLIC BLOOD PRESSURE: 129 MMHG | HEART RATE: 88 BPM | HEIGHT: 64 IN

## 2024-01-30 DIAGNOSIS — E11.29 CONTROLLED TYPE 2 DIABETES MELLITUS WITH MICROALBUMINURIA, WITHOUT LONG-TERM CURRENT USE OF INSULIN: ICD-10-CM

## 2024-01-30 DIAGNOSIS — C7A.012 MALIGNANT CARCINOID TUMOR OF ILEUM: Primary | ICD-10-CM

## 2024-01-30 DIAGNOSIS — I51.89 CARCINOID HEART DISEASE: ICD-10-CM

## 2024-01-30 DIAGNOSIS — C7B.8 SECONDARY NEUROENDOCRINE TUMOR OF BONE: ICD-10-CM

## 2024-01-30 DIAGNOSIS — D84.821 IMMUNODEFICIENCY DUE TO DRUGS: ICD-10-CM

## 2024-01-30 DIAGNOSIS — E34.0 CARCINOID HEART DISEASE: ICD-10-CM

## 2024-01-30 DIAGNOSIS — R80.9 CONTROLLED TYPE 2 DIABETES MELLITUS WITH MICROALBUMINURIA, WITHOUT LONG-TERM CURRENT USE OF INSULIN: ICD-10-CM

## 2024-01-30 DIAGNOSIS — D49.9 IMMUNODEFICIENCY SECONDARY TO NEOPLASM: ICD-10-CM

## 2024-01-30 DIAGNOSIS — C7B.8 METASTATIC MALIGNANT NEUROENDOCRINE TUMOR TO LIVER: Primary | ICD-10-CM

## 2024-01-30 DIAGNOSIS — Z79.899 IMMUNODEFICIENCY DUE TO DRUGS: ICD-10-CM

## 2024-01-30 DIAGNOSIS — E34.0 CARCINOID SYNDROME: ICD-10-CM

## 2024-01-30 DIAGNOSIS — C7B.8 METASTATIC MALIGNANT NEUROENDOCRINE TUMOR TO LIVER: ICD-10-CM

## 2024-01-30 DIAGNOSIS — C79.51 METASTASIS TO SPINAL COLUMN: ICD-10-CM

## 2024-01-30 DIAGNOSIS — I10 ESSENTIAL HYPERTENSION: ICD-10-CM

## 2024-01-30 DIAGNOSIS — D84.81 IMMUNODEFICIENCY SECONDARY TO NEOPLASM: ICD-10-CM

## 2024-01-30 PROCEDURE — 99215 OFFICE O/P EST HI 40 MIN: CPT | Mod: HCNC,S$GLB,, | Performed by: PHYSICIAN ASSISTANT

## 2024-01-30 PROCEDURE — 3288F FALL RISK ASSESSMENT DOCD: CPT | Mod: HCNC,CPTII,S$GLB, | Performed by: PHYSICIAN ASSISTANT

## 2024-01-30 PROCEDURE — 1101F PT FALLS ASSESS-DOCD LE1/YR: CPT | Mod: HCNC,CPTII,S$GLB, | Performed by: PHYSICIAN ASSISTANT

## 2024-01-30 PROCEDURE — 63600175 PHARM REV CODE 636 W HCPCS: Mod: JZ,JG,HCNC | Performed by: INTERNAL MEDICINE

## 2024-01-30 PROCEDURE — 1159F MED LIST DOCD IN RCRD: CPT | Mod: HCNC,CPTII,S$GLB, | Performed by: PHYSICIAN ASSISTANT

## 2024-01-30 PROCEDURE — 3078F DIAST BP <80 MM HG: CPT | Mod: HCNC,CPTII,S$GLB, | Performed by: PHYSICIAN ASSISTANT

## 2024-01-30 PROCEDURE — 99999 PR PBB SHADOW E&M-EST. PATIENT-LVL IV: CPT | Mod: PBBFAC,HCNC,, | Performed by: PHYSICIAN ASSISTANT

## 2024-01-30 PROCEDURE — 1157F ADVNC CARE PLAN IN RCRD: CPT | Mod: HCNC,CPTII,S$GLB, | Performed by: PHYSICIAN ASSISTANT

## 2024-01-30 PROCEDURE — 1126F AMNT PAIN NOTED NONE PRSNT: CPT | Mod: HCNC,CPTII,S$GLB, | Performed by: PHYSICIAN ASSISTANT

## 2024-01-30 PROCEDURE — 3074F SYST BP LT 130 MM HG: CPT | Mod: HCNC,CPTII,S$GLB, | Performed by: PHYSICIAN ASSISTANT

## 2024-01-30 PROCEDURE — 96372 THER/PROPH/DIAG INJ SC/IM: CPT | Mod: HCNC

## 2024-01-30 PROCEDURE — 1160F RVW MEDS BY RX/DR IN RCRD: CPT | Mod: HCNC,CPTII,S$GLB, | Performed by: PHYSICIAN ASSISTANT

## 2024-01-30 RX ORDER — LANREOTIDE ACETATE 120 MG/.5ML
120 INJECTION SUBCUTANEOUS
Status: CANCELLED | OUTPATIENT
Start: 2024-01-30

## 2024-01-30 RX ORDER — LANREOTIDE ACETATE 120 MG/.5ML
120 INJECTION SUBCUTANEOUS
Status: DISCONTINUED | OUTPATIENT
Start: 2024-01-30 | End: 2024-01-30 | Stop reason: HOSPADM

## 2024-01-30 RX ADMIN — LANREOTIDE ACETATE 120 MG: 120 INJECTION SUBCUTANEOUS at 11:01

## 2024-01-30 NOTE — NURSING
Patient tolerated Lanreotide injection to left hip well today. NAD noted upon discharge. Discharged home, ambulated independently.

## 2024-01-30 NOTE — PROGRESS NOTES
"    Subjective:       Patient ID: Shahram Hills is an 83 y.o. female.     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 Ms. Hills is an 79 yo woman who initially saw me on 6/7/18 for further evaluation of neuroendocrine tumor. She initially presented with diarrhea, abdominal discomfort, weight loss. She was evaluated at [facility] and underwent workup below:  US abdomen on 3/14/18 showed numerous bilobar mixed echogenicity and mildly vascular hepatic lesions. Cholelithiasis without e/o acute cholecystitis.   MRI abdomen on 3/30/2018 showed innumerable hepatic metastases. L3 vertebral body metastasis with soft tissue component along the right margin of the L3 and upper L4 vertebral bodies, filling the right L3/4 neural foramen, and extending into the anterior aspect of the spinal canal on the right at the L3 and upper T4 levels. Associated with mild spinal canal narrowing.  CT chest on 4/6/2018 showed one lucent nodule measuring 7 mm in the T7 vertebral body, most likely representing metastatic disease.    EGD on 5/4/18 showed normal duodenum. Schatzki's ring in the lower third of the esophagus. Grade 1 esophagitis in the GE junction c/w mild distal esophagitis. Congestion and erythema in the antrum, pre-pyloric region and stomach body c/w gastritis. Biopsy of the stomach antrum showed mild chronic gastritis, neg for H. pylori.   Labs on 5/9/2018: WBC 6.9, H/H 11.6/34.3, MCV 87.5, plt count 279. On 3/9/18: Vit B12 level 173, folic acid 6.6  She was started on oral vit B12 and underwent a liver biopsy on 5/18/18. Pathology showed "metastatic well differentiated neuroendocrine tumor. Ki67 3%"     She saw me on 6/7/18 and complained of weight loss of 26 lbs over the past 3-4 months, also has diarrhea. No flushing, wheezing, palpitations. Has been having pain " "in right flank radiating down the right leg. No tingling/numbness, weakness. She can hold her bladder but when she urinates her diarrhea would come out as well. Started on dex 4 mg q6h the evening of 6/7/18.      2. MRI spine on 6/8/18 showed "Marrow replacing metastatic lesion of the L3 vertebral body with associated extra osseous expansion and complete effacement of the right L3-L4 neural foramina and additional effacement of the right lateral recess.  There is additional lateral extension and abutment of the right psoas muscle.  Superimposed degenerative change at this level results in mild spinal canal stenosis." I sent the patient to ED on 6/9/18 where she was evaluated by neurosurgery. Neurosurgery did not feel she was a candidate for surgical intervention.      3. Seen by Dr. Adames on 6/11/18. palliative radiation to the area of the L3 metastasis 6/18/18-6/29/18   4. Gallium study on 6/13/18: Distal ileal primary neoplasm consistent with a carcinoid.  There is an adjacent metastatic lymph node, diffuse liver metastases, and multiple bone metastases. Index primary neoplasm SUV max 33.13. Adjacent lymph node SUV max 23. L3 bone metastasis SUV max 36.86. Left lobe SUV max 46.13   5. Lanreotide every 4 weeks started on 6/22/18  6. TACE to the right hepatic lobe on 2/14/19. TACE to the left hepatic lobe on 3/21/19.   7. TACE to the right hepatic lobe on 2/11/22. S/p conventional chemoembolization performed of the segment 2, 3, 4 branch of the left hepatic artery and segment 8 branch of the left hepatic artery on 4/22/22.  8. Gallium PET 11/1/22 showed progression. Discussed PRRT. PRRT #1 on 12/14/2022. Zometa every 3 months started 12/27/22. PRRT #2 on 2/8/23. #3 on 4/12/23. #4 on 6/14/23.      History of Present Illness:   Ms. Hills returns for follow up. Feeling pretty well. She is eating but not gaining weight. She is eating very healthy. Diarrhea controlled, still on xermelo tid. Remains physically active. " No other concerns or complaints today.     ECO. Presents with her sister today     ROS:   See HPI. Otherwise negative.      Physical Examination:   Vital signs reviewed.   Gen: well hydrated, well developed, in no acute distress.  HEENT: normocephalic, anicteric, EOMI  Neck: supple, no JVD  Lungs: CTAB, no wheezes or rales  Heart: regular rate, regular pulse, no M/R/G  Abdomen: soft, no tenderness, non-distended.   Ext: b/l LE  no edema  Neuro: alert and oriented x 4, no focal neuro deficit  Skin: no rash, open wounds or ulcers  Psych: pleasant and appropriate mood and affect     Objective:      Laboratory Data:  Labs reviewed. Biomarkers pending.     Imaging Data:     MRI abdomen 22:  1. History of neuroendocrine malignancy.  Numerous hepatic lesions, some remaining stable while others have mildly enlarged consistent with disease progression.  Stable osseous lesion in the L3 vertebral body.  2. Cholelithiasis and stable mild dilatation of the common bile duct and central intrahepatic biliary duct dilatation.  Of note, there is mass effect on the midportion of the common bile duct by dominant left hepatic lobe lesion.  3. Additional findings detailed above.  RECIST SUMMARY:     Date of prior examination for comparison: 2022     Lesion 1: Left hepatic lobe.  7.1 cm.  Series 9 image 52.  Prior measurement 6.3 cm.     Lesion 2: Right hepatic lobe lateral.  3.1 cm.  Series 9 image 30.  Prior measurement 3.2 cm.     Lesion 3: Right hepatic dome.  5.1 cm.  Series 9 image 19.  Prior measurement 4.4 cm.    Gallium PET 22:     Interval development of somatostatin tracer avid lesions in the skull, concerning for new metastatic lesions.     Numerous tracer avid hypoattenuating masses throughout the liver with increased SUV max compared to prior exam.     Tracer avid lesions in the C7 and L3 vertebral bodies, sternum, distal ileum and mesenteric node are similar to prior exam.     Judged by tracer avidity of  the patient's tumor burden, PRRT would be a consideration if clinically indicated.      Gallium PET 7/12/23:  Impression:     In this patient with carcinoid tumor of the ileum, there is overall similar distribution of tracer avid disease involving distal ileum, liver, mesenteric lymph node, and bones.  Index lesions as above.  No definite new tracer avid lesions.    Copper PET 12/29/23:  FINDINGS:  Quality of the study: Adequate.     SUV measurements may not be directly comparable with those made on Ga-68 DOTATATE PET/CT.     In the head and neck, there is physiologic distribution in the pituitary, salivary, and thyroid glands, and there are no tracer avid lesions suspicious for malignancy.     In the chest, there are no tracer avid lesions suspicious for malignancy.  Stable right upper lobe subcentimeter pulmonary nodule, below the size threshold for PET.     In the abdomen and pelvis, there is physiologic uptake in the pancreatic uncinate process, spleen, adrenal glands and  tract.     There are numerous hypodense tracer avid lesions throughout the liver, similar to prior exam.  Index left hepatic lobe lesion measures 5.1 x 5.0 cm (previously 5.1 x 5.0 cm) with maximum SUV of 58 (previously 43).     Focal uptake within the distal ileum with maximum SUV of 22 (previously 17).  Image 239.     Tracer avid mesenteric lymph node measuring 1.5 x 1.4 cm (previously 1.5 cm) with maximum SUV of 21 (previously 22).  Image 222.     In the bones, there is stable distribution of numerous scattered tracer avid lesions.     *Right calvarial lesion near the vertex with SUV max 5 (fused image 12).  *Right C6 vertebral body with SUV max 5.5 (fused image 74).  *Left scapular lesion with SUV max 2.7 (fused image 88).  *Right sternal lesion with SUV max 8.6 (fused image 103).  *Left anterior rib lesion with SUV max 2.1 (fused image 109).  *L3 vertebral body lesion with SUV max 9.2 (fused image 192).     Impression:     Similar  distribution of tracer avid disease involving the distal ileum, liver, mesenteric lymph nodes, and bones.  No definite new tracer avid lesions.       MRI 12/29/23  Impression:     1. In this patient with metastatic neuroendocrine tumor there is interval decreased size of index left hepatic lobe lesion and stable size of multiple additional hepatic lesions.  2. Stable metastatic lesion of the L3 vertebral body and central mesentery.  3. Cholelithiasis without acute cholecystitis.  4. Stable mild intrahepatic and extrahepatic ductal dilation, probably related to mass effect on the midportion of the common bile duct by left hepatic lobe lesion.  5. Additional findings, as above.    Assessment and Plan:      1. Malignant carcinoid tumor of ileum    2. 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 Secondary neuroendocrine tumor of bone    4. Metastasis to spinal column    5. 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 Carcinoid syndrome    8. Controlled type 2 diabetes mellitus with microalbuminuria, without long-term current use of insulin    9. 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 Started lanreotide every 4 weeks on 6/22/2018. S/p TACE to the right hepatic lobe on 2/14/19. TACE to the left hepatic lobe on 3/21/19.   - CT/MRI 1/12/22 showed mild progression in the liver. S/p TACE on 2/11/22 and 4/22/22   - Gallium PET 11/1/22 showed progression. Discussed PRRT. PRRT #1 on 12/14/2022. Completed 4 cycles on 6/14/23. Zometa every 3 months started 12/27/22. Last zometa 9/8/2023    - doing well. PET in July 2023 showed stable disease  - symptoms well controlled. Lab work reviewed and stable. Bio-markers are pending.   - c/w lanreotide every 4 weeks and zometa every 3 months. Last got zometa  in Dec 2024. Next due in March 2024    - RTC in 4 weeks     7.  - better after xermelo and PRRT  - c/w lanreotide every 4 weeks.   - c/w xermelo tid  - could not tolerate octreotide    8.  - BS slightly elevated  - f/u with PCP    9.  - BP well controlled  - c/w current medication    10.  - last echo in 2022 showed LVEF 40%  - saw Dr Bergeron in July 2023. Felt to be well compensated  - f/u with cardiology    RTC in 4 weeks.        Route Chart for Scheduling    Med Onc Chart Routing      Follow up with physician 1 month and 3 months. See Dr Granados as scheduled in 1, 3 and 4 mos with lab work and Lanreotide   Follow up with JUNAID 2 months. See JUNAID in 2 and 6 mos with lab work and lanreotide   Infusion scheduling note    Injection scheduling note    Labs CBC and CMP   Scheduling:  Preferred lab:  Lab interval: every 4 weeks  pancreastatin, Chromogranin A, serotonin   Imaging    Pharmacy appointment    Other referrals                Supportive Plan Information  OP ZOLEDRONIC ACID (ZOMETA) Q4W   Sebastian Granados MD   Upcoming Treatment Dates - OP ZOLEDRONIC ACID (ZOMETA) Q4W    3/21/2024       Medications       zoledronic acid (ZOMETA) 4 mg in sodium chloride 0.9% 100 mL IVPB  6/19/2024       Medications       zoledronic acid (ZOMETA) 4 mg in sodium chloride 0.9% 100 mL IVPB  9/17/2024       Medications       zoledronic acid (ZOMETA) 4 mg in sodium chloride 0.9% 100 mL IVPB    Therapy Plan Information  LANREOTIDE  5. Medications  lanreotide injection 120 mg  120 mg, Subcutaneous, Every visit

## 2024-02-01 ENCOUNTER — TELEPHONE (OUTPATIENT)
Dept: HEMATOLOGY/ONCOLOGY | Facility: CLINIC | Age: 84
End: 2024-02-01
Payer: MEDICARE

## 2024-02-01 NOTE — TELEPHONE ENCOUNTER
----- Message from Lulu Trivedi sent at 2/1/2024  9:49 AM CST -----  Regarding: Call back  Type: Patient Call Back    Who called: sister Mcguire     What is the request in detail:  p/t sister is calling b/c her rx telotristat ethyl (XERMELO) 250 mg Ta was approved by her insurance but the next refill will not be covered. She states you need to call her insurance [employer] to clarify, she could not provide a phone number. Please call to assist.     Best call back number: [phone]     Additional Information:

## 2024-02-09 ENCOUNTER — OFFICE VISIT (OUTPATIENT)
Dept: INTERNAL MEDICINE | Facility: CLINIC | Age: 84
End: 2024-02-09
Attending: INTERNAL MEDICINE
Payer: MEDICARE

## 2024-02-09 ENCOUNTER — TELEPHONE (OUTPATIENT)
Dept: HEMATOLOGY/ONCOLOGY | Facility: CLINIC | Age: 84
End: 2024-02-09
Payer: MEDICARE

## 2024-02-09 ENCOUNTER — LAB VISIT (OUTPATIENT)
Dept: LAB | Facility: OTHER | Age: 84
End: 2024-02-09
Attending: INTERNAL MEDICINE
Payer: MEDICARE

## 2024-02-09 VITALS
HEIGHT: 64 IN | BODY MASS INDEX: 18.78 KG/M2 | HEART RATE: 84 BPM | OXYGEN SATURATION: 97 % | DIASTOLIC BLOOD PRESSURE: 74 MMHG | WEIGHT: 110 LBS | SYSTOLIC BLOOD PRESSURE: 122 MMHG

## 2024-02-09 DIAGNOSIS — E11.29 CONTROLLED TYPE 2 DIABETES MELLITUS WITH MICROALBUMINURIA, WITHOUT LONG-TERM CURRENT USE OF INSULIN: ICD-10-CM

## 2024-02-09 DIAGNOSIS — R80.9 CONTROLLED TYPE 2 DIABETES MELLITUS WITH MICROALBUMINURIA, WITHOUT LONG-TERM CURRENT USE OF INSULIN: ICD-10-CM

## 2024-02-09 DIAGNOSIS — I10 ESSENTIAL HYPERTENSION: Primary | ICD-10-CM

## 2024-02-09 DIAGNOSIS — E78.5 HYPERLIPIDEMIA, UNSPECIFIED HYPERLIPIDEMIA TYPE: ICD-10-CM

## 2024-02-09 DIAGNOSIS — N95.9 MENOPAUSAL PROBLEM: ICD-10-CM

## 2024-02-09 LAB
ESTIMATED AVG GLUCOSE: 128 MG/DL (ref 68–131)
HBA1C MFR BLD: 6.1 % (ref 4–5.6)

## 2024-02-09 PROCEDURE — 3078F DIAST BP <80 MM HG: CPT | Mod: HCNC,CPTII,S$GLB, | Performed by: INTERNAL MEDICINE

## 2024-02-09 PROCEDURE — 99999 PR PBB SHADOW E&M-EST. PATIENT-LVL V: CPT | Mod: PBBFAC,HCNC,, | Performed by: INTERNAL MEDICINE

## 2024-02-09 PROCEDURE — 1101F PT FALLS ASSESS-DOCD LE1/YR: CPT | Mod: HCNC,CPTII,S$GLB, | Performed by: INTERNAL MEDICINE

## 2024-02-09 PROCEDURE — 1160F RVW MEDS BY RX/DR IN RCRD: CPT | Mod: HCNC,CPTII,S$GLB, | Performed by: INTERNAL MEDICINE

## 2024-02-09 PROCEDURE — 36415 COLL VENOUS BLD VENIPUNCTURE: CPT | Mod: HCNC | Performed by: INTERNAL MEDICINE

## 2024-02-09 PROCEDURE — 1126F AMNT PAIN NOTED NONE PRSNT: CPT | Mod: HCNC,CPTII,S$GLB, | Performed by: INTERNAL MEDICINE

## 2024-02-09 PROCEDURE — 83036 HEMOGLOBIN GLYCOSYLATED A1C: CPT | Mod: HCNC | Performed by: INTERNAL MEDICINE

## 2024-02-09 PROCEDURE — 99214 OFFICE O/P EST MOD 30 MIN: CPT | Mod: HCNC,S$GLB,, | Performed by: INTERNAL MEDICINE

## 2024-02-09 PROCEDURE — 1157F ADVNC CARE PLAN IN RCRD: CPT | Mod: HCNC,CPTII,S$GLB, | Performed by: INTERNAL MEDICINE

## 2024-02-09 PROCEDURE — 3074F SYST BP LT 130 MM HG: CPT | Mod: HCNC,CPTII,S$GLB, | Performed by: INTERNAL MEDICINE

## 2024-02-09 PROCEDURE — 3288F FALL RISK ASSESSMENT DOCD: CPT | Mod: HCNC,CPTII,S$GLB, | Performed by: INTERNAL MEDICINE

## 2024-02-09 PROCEDURE — 1159F MED LIST DOCD IN RCRD: CPT | Mod: HCNC,CPTII,S$GLB, | Performed by: INTERNAL MEDICINE

## 2024-02-09 NOTE — PROGRESS NOTES
Subjective:   Patient ID: Shahram Hills is a 83 y.o. female  Chief complaint:   Chief Complaint   Patient presents with    Hypertension       HPI  Here for 3 month diabetes follow up and HTN follow up  Accompanied by her sister today     Medication xermelo not approved by insurance and they called h/o office to inquire about next steps     HTN:  Today well controlled upon repeat  Reviewed home readings and 110-140s/68-80s with dayami< 130/80  HR 70-90  - c/w meds on med card  - taking lasix daily, coreg, losartan  Previously:   Prev switched to coreg from amlodipine    Diabetes:   A1c 6.3<- 6.6<-6.4<- 5.9 <-6.2  <- 6.1 <- 6.2  - eating right before recent labs   - well controlled and sam metformin 1000 daily  Fasting bg 140-160s but eating sugary snack late at night - ice cream, pie or cookies   - eats breakfast around 9-10am  Foot exam utd at [facility]    Utd on eye exam 1/2023  No lows     #### not addressed today ####    PVCs, Chronic systolic and diastolic HF (EF 40% on echo 9/2022):  No further ankle swelling since taking lasix daily   Asm current regimen  No aranda or sob  - started lasix at [facility] due to acute HF exacerbation - sam daily lasix - no further ankle swelling since then   Previously:   - noticed ankle swelling - at baseline gets ankle swelling worse in afternoon - better in am   Cards: Elyssa  Previously:    - 2/25/2022 for hospital discharge had TACE right hep lobe and found to have frequent PVCs  - we resumed losartan at that time   - seen by cards 3/14/2022 and completed holter - at this time no tx indicated    B12 def:  - is taking supplement daily    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 Zometa every 3 months started 12/27/22. PRRT #2 on 2/8  Followed by h/o - H last clinic visit March 8,  "2023  Previously:   - 2/25/2022 for hospital discharge had TACE right hep lobe and found to have frequent PVCs  - we resumed losartan at that time   - seen by cards 3/14/2022 and completed holter - at this time no tx indicated    ##########    H/o: Darwin  Cards: Elyssa  Pod: Luper    HM:  Covid booster   Rsv    Review of Systems    Objective:  Vitals:    02/09/24 0934 02/09/24 1019   BP: (!) 140/82 122/74   BP Location: Left arm    Patient Position: Sitting    Pulse: 84    SpO2: 97%    Weight: 49.9 kg (110 lb 0.2 oz)    Height: 5' 4" (1.626 m)      Body mass index is 18.88 kg/m².    Physical Exam  Vitals reviewed.   Constitutional:       Appearance: Normal appearance. She is well-developed.   HENT:      Head: Normocephalic and atraumatic.   Eyes:      Extraocular Movements: Extraocular movements intact.      Conjunctiva/sclera: Conjunctivae normal.   Cardiovascular:      Rate and Rhythm: Normal rate and regular rhythm.      Pulses: Normal pulses.      Heart sounds: Normal heart sounds.   Pulmonary:      Effort: Pulmonary effort is normal.      Breath sounds: Normal breath sounds.   Abdominal:      General: Bowel sounds are normal.      Palpations: Abdomen is soft. There is mass (ruq mass).   Musculoskeletal:         General: No swelling or tenderness. Normal range of motion.      Cervical back: Normal range of motion and neck supple.   Lymphadenopathy:      Cervical: No cervical adenopathy.   Skin:     General: Skin is warm and dry.      Capillary Refill: Capillary refill takes less than 2 seconds.      Nails: There is no clubbing.   Neurological:      General: No focal deficit present.      Mental Status: She is alert and oriented to person, place, and time. Mental status is at baseline.      Gait: Gait normal.   Psychiatric:         Mood and Affect: Mood normal.         Speech: Speech normal.         Behavior: Behavior normal.         Thought Content: Thought content normal.         Judgment: Judgment normal. "       Assessment:  1. Essential hypertension    2. Controlled type 2 diabetes mellitus with microalbuminuria, without long-term current use of insulin    3. Menopausal problem      Plan:     Shahram was seen today for hypertension.    Diagnoses and all orders for this visit:    Essential hypertension  Stable cont med regimen and low salt diet     Controlled type 2 diabetes mellitus with microalbuminuria, without long-term current use of insulin  -     Hemoglobin A1C; Future  -     Ambulatory referral/consult to Diabetes Education; Future  Stable  Update lab and refer to diab education for refresh     Menopausal problem  -     DXA Bone Density Axial Skeleton 1 or more sites; Future    Cont meds   Cont home bp and bg monitoring   Dexa: wnl 5/2021 - repeat due 5/2024    Health Maintenance   Topic Date Due    DEXA Scan  05/31/2024    Lipid Panel  07/12/2024    Hemoglobin A1c  08/09/2024    Eye Exam  11/04/2024    TETANUS VACCINE  12/10/2029    Shingles Vaccine  Completed

## 2024-02-09 NOTE — TELEPHONE ENCOUNTER
"----- Message from Cayla Ellsworth sent at 2/9/2024 12:29 PM CST -----  Regarding: Pt advice  Contact: Pt's sister     Pt's sister requesting call back in regards to message received on her phone  Please call and adv @       Confirmed contact below:   Contact Name:Shayla Rosenthal (Sister)  [phone] (Mobile)                    Additional Notes:  "Thank you for all that you do for our patients"                                           "

## 2024-02-14 RX ORDER — EZETIMIBE 10 MG/1
10 TABLET ORAL DAILY
Qty: 90 TABLET | Refills: 3 | Status: SHIPPED | OUTPATIENT
Start: 2024-02-14 | End: 2024-05-14

## 2024-02-17 ENCOUNTER — PATIENT MESSAGE (OUTPATIENT)
Dept: ADMINISTRATIVE | Facility: OTHER | Age: 84
End: 2024-02-17
Payer: MEDICARE

## 2024-02-27 ENCOUNTER — OFFICE VISIT (OUTPATIENT)
Dept: HEMATOLOGY/ONCOLOGY | Facility: CLINIC | Age: 84
End: 2024-02-27
Payer: MEDICARE

## 2024-02-27 ENCOUNTER — INFUSION (OUTPATIENT)
Dept: INFUSION THERAPY | Facility: HOSPITAL | Age: 84
End: 2024-02-27
Payer: MEDICARE

## 2024-02-27 VITALS
HEART RATE: 82 BPM | HEIGHT: 64 IN | DIASTOLIC BLOOD PRESSURE: 95 MMHG | SYSTOLIC BLOOD PRESSURE: 149 MMHG | BODY MASS INDEX: 18.51 KG/M2 | RESPIRATION RATE: 18 BRPM | WEIGHT: 108.44 LBS | OXYGEN SATURATION: 99 % | TEMPERATURE: 98 F

## 2024-02-27 DIAGNOSIS — C7B.8 SECONDARY NEUROENDOCRINE TUMOR OF BONE: ICD-10-CM

## 2024-02-27 DIAGNOSIS — R80.9 CONTROLLED TYPE 2 DIABETES MELLITUS WITH MICROALBUMINURIA, WITHOUT LONG-TERM CURRENT USE OF INSULIN: ICD-10-CM

## 2024-02-27 DIAGNOSIS — C7A.012 MALIGNANT CARCINOID TUMOR OF ILEUM: Primary | ICD-10-CM

## 2024-02-27 DIAGNOSIS — I51.89 CARCINOID HEART DISEASE: ICD-10-CM

## 2024-02-27 DIAGNOSIS — D84.821 IMMUNODEFICIENCY DUE TO DRUGS: ICD-10-CM

## 2024-02-27 DIAGNOSIS — D49.9 IMMUNODEFICIENCY SECONDARY TO NEOPLASM: ICD-10-CM

## 2024-02-27 DIAGNOSIS — D84.81 IMMUNODEFICIENCY SECONDARY TO NEOPLASM: ICD-10-CM

## 2024-02-27 DIAGNOSIS — E11.29 CONTROLLED TYPE 2 DIABETES MELLITUS WITH MICROALBUMINURIA, WITHOUT LONG-TERM CURRENT USE OF INSULIN: ICD-10-CM

## 2024-02-27 DIAGNOSIS — E34.0 CARCINOID HEART DISEASE: ICD-10-CM

## 2024-02-27 DIAGNOSIS — C79.51 METASTASIS TO SPINAL COLUMN: ICD-10-CM

## 2024-02-27 DIAGNOSIS — C7B.8 METASTATIC MALIGNANT NEUROENDOCRINE TUMOR TO LIVER: ICD-10-CM

## 2024-02-27 DIAGNOSIS — Z79.899 IMMUNODEFICIENCY DUE TO DRUGS: ICD-10-CM

## 2024-02-27 DIAGNOSIS — C7B.8 METASTATIC MALIGNANT NEUROENDOCRINE TUMOR TO LIVER: Primary | ICD-10-CM

## 2024-02-27 DIAGNOSIS — I10 ESSENTIAL HYPERTENSION: ICD-10-CM

## 2024-02-27 DIAGNOSIS — E34.0 CARCINOID SYNDROME: ICD-10-CM

## 2024-02-27 PROCEDURE — 3080F DIAST BP >= 90 MM HG: CPT | Mod: HCNC,CPTII,S$GLB, | Performed by: INTERNAL MEDICINE

## 2024-02-27 PROCEDURE — 99999 PR PBB SHADOW E&M-EST. PATIENT-LVL V: CPT | Mod: PBBFAC,HCNC,, | Performed by: INTERNAL MEDICINE

## 2024-02-27 PROCEDURE — 3288F FALL RISK ASSESSMENT DOCD: CPT | Mod: HCNC,CPTII,S$GLB, | Performed by: INTERNAL MEDICINE

## 2024-02-27 PROCEDURE — 1159F MED LIST DOCD IN RCRD: CPT | Mod: HCNC,CPTII,S$GLB, | Performed by: INTERNAL MEDICINE

## 2024-02-27 PROCEDURE — 3077F SYST BP >= 140 MM HG: CPT | Mod: HCNC,CPTII,S$GLB, | Performed by: INTERNAL MEDICINE

## 2024-02-27 PROCEDURE — 1101F PT FALLS ASSESS-DOCD LE1/YR: CPT | Mod: HCNC,CPTII,S$GLB, | Performed by: INTERNAL MEDICINE

## 2024-02-27 PROCEDURE — 99215 OFFICE O/P EST HI 40 MIN: CPT | Mod: HCNC,S$GLB,, | Performed by: INTERNAL MEDICINE

## 2024-02-27 PROCEDURE — 63600175 PHARM REV CODE 636 W HCPCS: Mod: JZ,JG,HCNC | Performed by: INTERNAL MEDICINE

## 2024-02-27 PROCEDURE — 1126F AMNT PAIN NOTED NONE PRSNT: CPT | Mod: HCNC,CPTII,S$GLB, | Performed by: INTERNAL MEDICINE

## 2024-02-27 PROCEDURE — 96372 THER/PROPH/DIAG INJ SC/IM: CPT | Mod: HCNC

## 2024-02-27 PROCEDURE — G2211 COMPLEX E/M VISIT ADD ON: HCPCS | Mod: HCNC,S$GLB,, | Performed by: INTERNAL MEDICINE

## 2024-02-27 PROCEDURE — 1160F RVW MEDS BY RX/DR IN RCRD: CPT | Mod: HCNC,CPTII,S$GLB, | Performed by: INTERNAL MEDICINE

## 2024-02-27 PROCEDURE — 1157F ADVNC CARE PLAN IN RCRD: CPT | Mod: HCNC,CPTII,S$GLB, | Performed by: INTERNAL MEDICINE

## 2024-02-27 RX ORDER — LANREOTIDE ACETATE 120 MG/.5ML
120 INJECTION SUBCUTANEOUS
Status: CANCELLED | OUTPATIENT
Start: 2024-02-27

## 2024-02-27 RX ORDER — LANREOTIDE ACETATE 120 MG/.5ML
120 INJECTION SUBCUTANEOUS
Status: DISCONTINUED | OUTPATIENT
Start: 2024-02-27 | End: 2024-02-27 | Stop reason: HOSPADM

## 2024-02-27 RX ADMIN — LANREOTIDE ACETATE 120 MG: 120 INJECTION SUBCUTANEOUS at 12:02

## 2024-02-27 NOTE — PROGRESS NOTES
"    Subjective:       Patient ID: Shahram Hills is an 83 y.o. female.     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 Ms. Hills is an 79 yo woman who initially saw me on 6/7/18 for further evaluation of neuroendocrine tumor. She initially presented with diarrhea, abdominal discomfort, weight loss. She was evaluated at [facility] and underwent workup below:  US abdomen on 3/14/18 showed numerous bilobar mixed echogenicity and mildly vascular hepatic lesions. Cholelithiasis without e/o acute cholecystitis.   MRI abdomen on 3/30/2018 showed innumerable hepatic metastases. L3 vertebral body metastasis with soft tissue component along the right margin of the L3 and upper L4 vertebral bodies, filling the right L3/4 neural foramen, and extending into the anterior aspect of the spinal canal on the right at the L3 and upper T4 levels. Associated with mild spinal canal narrowing.  CT chest on 4/6/2018 showed one lucent nodule measuring 7 mm in the T7 vertebral body, most likely representing metastatic disease.    EGD on 5/4/18 showed normal duodenum. Schatzki's ring in the lower third of the esophagus. Grade 1 esophagitis in the GE junction c/w mild distal esophagitis. Congestion and erythema in the antrum, pre-pyloric region and stomach body c/w gastritis. Biopsy of the stomach antrum showed mild chronic gastritis, neg for H. pylori.   Labs on 5/9/2018: WBC 6.9, H/H 11.6/34.3, MCV 87.5, plt count 279. On 3/9/18: Vit B12 level 173, folic acid 6.6  She was started on oral vit B12 and underwent a liver biopsy on 5/18/18. Pathology showed "metastatic well differentiated neuroendocrine tumor. Ki67 3%"     She saw me on 6/7/18 and complained of weight loss of 26 lbs over the past 3-4 months, also has diarrhea. No flushing, wheezing, palpitations. Has been having pain in right flank radiating " "down the right leg. No tingling/numbness, weakness. She can hold her bladder but when she urinates her diarrhea would come out as well. Started on dex 4 mg q6h the evening of 18.      2. MRI spine on 18 showed "Marrow replacing metastatic lesion of the L3 vertebral body with associated extra osseous expansion and complete effacement of the right L3-L4 neural foramina and additional effacement of the right lateral recess.  There is additional lateral extension and abutment of the right psoas muscle.  Superimposed degenerative change at this level results in mild spinal canal stenosis." I sent the patient to ED on 18 where she was evaluated by neurosurgery. Neurosurgery did not feel she was a candidate for surgical intervention.      3. Seen by Dr. Adames on 18. palliative radiation to the area of the L3 metastasis 18-18   4. 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  There is an adjacent metastatic lymph node, diffuse liver metastases, and multiple bone metastases. Index primary neoplasm SUV max 33.13. Adjacent lymph node SUV max 23. L3 bone metastasis SUV max 36.86. Left lobe SUV max 46.13   5. Lanreotide every 4 weeks started on 18  6. TACE to the right hepatic lobe on 19. TACE to the left hepatic lobe on 3/21/19.   7. TACE to the right hepatic lobe on 22. S/p conventional chemoembolization performed of the segment 2, 3, 4 branch of the left hepatic artery and segment 8 branch of the left hepatic artery on 22.  8. Gallium PET 22 showed progression. Discussed PRRT. PRRT #1 on 2022. Zometa every 3 months started 22. PRRT #2 on 23. #3 on 23. #4 on 23.      History of Present Illness:   Ms. Hills returns for follow up. Feeling okay. Diarrhea controlled, still on xermelo tid.     ECO     ROS:   See HPI. Otherwise negative.      Physical Examination:   Vital signs reviewed.   Gen: well hydrated, " well developed, in no acute distress.  HEENT: normocephalic, anicteric, PERRLA, EOMI  Neck: supple, no JVD, thyromegaly, cervical or supraclavicular LAD  Lungs: CTAB, no wheezes or rales  Heart: regular rate, regular pulse, no M/R/G  Abdomen: soft, no tenderness, non-distended, no hepatomegaly, no splenomegaly, no mass, or hernia.   Ext: b/l LE  no edema  Neuro: alert and oriented x 4, no focal neuro deficit  Skin: no rash, open wounds or ulcers  Psych: pleasant and appropriate mood and affect     Objective:      Laboratory Data:  Labs reviewed. Last 5-HIAA stable at 86     Imaging Data:     MRI abdomen 11/1/22:  1. History of neuroendocrine malignancy.  Numerous hepatic lesions, some remaining stable while others have mildly enlarged consistent with disease progression.  Stable osseous lesion in the L3 vertebral body.  2. Cholelithiasis and stable mild dilatation of the common bile duct and central intrahepatic biliary duct dilatation.  Of note, there is mass effect on the midportion of the common bile duct by dominant left hepatic lobe lesion.  3. Additional findings detailed above.  RECIST SUMMARY:     Date of prior examination for comparison: 05/16/2022     Lesion 1: Left hepatic lobe.  7.1 cm.  Series 9 image 52.  Prior measurement 6.3 cm.     Lesion 2: Right hepatic lobe lateral.  3.1 cm.  Series 9 image 30.  Prior measurement 3.2 cm.     Lesion 3: Right hepatic dome.  5.1 cm.  Series 9 image 19.  Prior measurement 4.4 cm.    Gallium PET 11/1/22:     Interval development of somatostatin tracer avid lesions in the skull, concerning for new metastatic lesions.     Numerous tracer avid hypoattenuating masses throughout the liver with increased SUV max compared to prior exam.     Tracer avid lesions in the C7 and L3 vertebral bodies, sternum, distal ileum and mesenteric node are similar to prior exam.     Judged by tracer avidity of the patient's tumor burden, PRRT would be a consideration if clinically  indicated.      Gallium PET 7/12/23:  Impression:     In this patient with carcinoid tumor of the ileum, there is overall similar distribution of tracer avid disease involving distal ileum, liver, mesenteric lymph node, and bones.  Index lesions as above.  No definite new tracer avid lesions.    Copper PET 12/29/23:  FINDINGS:  Quality of the study: Adequate.     SUV measurements may not be directly comparable with those made on Ga-68 DOTATATE PET/CT.     In the head and neck, there is physiologic distribution in the pituitary, salivary, and thyroid glands, and there are no tracer avid lesions suspicious for malignancy.     In the chest, there are no tracer avid lesions suspicious for malignancy.  Stable right upper lobe subcentimeter pulmonary nodule, below the size threshold for PET.     In the abdomen and pelvis, there is physiologic uptake in the pancreatic uncinate process, spleen, adrenal glands and  tract.     There are numerous hypodense tracer avid lesions throughout the liver, similar to prior exam.  Index left hepatic lobe lesion measures 5.1 x 5.0 cm (previously 5.1 x 5.0 cm) with maximum SUV of 58 (previously 43).     Focal uptake within the distal ileum with maximum SUV of 22 (previously 17).  Image 239.     Tracer avid mesenteric lymph node measuring 1.5 x 1.4 cm (previously 1.5 cm) with maximum SUV of 21 (previously 22).  Image 222.     In the bones, there is stable distribution of numerous scattered tracer avid lesions.     *Right calvarial lesion near the vertex with SUV max 5 (fused image 12).  *Right C6 vertebral body with SUV max 5.5 (fused image 74).  *Left scapular lesion with SUV max 2.7 (fused image 88).  *Right sternal lesion with SUV max 8.6 (fused image 103).  *Left anterior rib lesion with SUV max 2.1 (fused image 109).  *L3 vertebral body lesion with SUV max 9.2 (fused image 192).     Impression:     Similar distribution of tracer avid disease involving the distal ileum, liver,  mesenteric lymph nodes, and bones.  No definite new tracer avid lesions.       MRI 12/29/23  Impression:     1. In this patient with metastatic neuroendocrine tumor there is interval decreased size of index left hepatic lobe lesion and stable size of multiple additional hepatic lesions.  2. Stable metastatic lesion of the L3 vertebral body and central mesentery.  3. Cholelithiasis without acute cholecystitis.  4. Stable mild intrahepatic and extrahepatic ductal dilation, probably related to mass effect on the midportion of the common bile duct by left hepatic lobe lesion.  5. Additional findings, as above.    Assessment and Plan:      1. Malignant carcinoid tumor of ileum    2. 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 Secondary neuroendocrine tumor of bone    4. Metastasis to spinal column    5. 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 Carcinoid syndrome    8. Controlled type 2 diabetes mellitus with microalbuminuria, without long-term current use of insulin    9. 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 Started lanreotide every 4 weeks on 6/22/2018. S/p TACE to the right hepatic lobe on 2/14/19. TACE to the left hepatic lobe on 3/21/19.   - CT/MRI 1/12/22 showed mild progression in the liver. S/p TACE on 2/11/22 and 4/22/22   - Gallium PET 11/1/22 showed progression. Discussed PRRT. PRRT #1 on 12/14/2022. Completed 4 cycles on 6/14/23. Zometa every 3 months started 12/27/22. Last zometa 9/8/2023  - doing well. PET in Dec 2023 showed stable disease  - symptoms well controlled  - c/w lanreotide every 4 weeks and zometa every 3 months. Last got zometa in Dec 2024. Next due in March 2024. scheduled  - RTC in 4 weeks     7.  - better after xermelo and PRRT  - c/w lanreotide every 4  weeks.   - c/w xermelo tid  - could not tolerate octreotide    8.  - BS controlled  - f/u with PCP    9.  - BP controlled  - c/w current medication    10.  - last echo in 2022 showed LVEF 40%  - f/u with cardiology    Sebastian Granados MD  Hematology and Medical Oncology  [facility]      Route Chart for Scheduling    Med Onc Chart Routing      Follow up with physician . Keep scheduled appts. get labs, copper PET, MRI abdomen on 6/13. see me on 6/18 then get lanreotide and zometa   Follow up with JUNAID    Infusion scheduling note   zometa every 3 months, next in March scheduled. due in June   Injection scheduling note lanreotide every 4 weeks   Labs CBC and CMP   Scheduling:  Preferred lab:  Lab interval:  serotonin, pancreastatin, 5-HIAA   Imaging PET scan and MRI   in June   Pharmacy appointment    Other referrals          Supportive Plan Information  OP ZOLEDRONIC ACID (ZOMETA) Q4W   Sebastian Granados MD   Upcoming Treatment Dates - OP ZOLEDRONIC ACID (ZOMETA) Q4W    3/21/2024       Medications       zoledronic acid (ZOMETA) 4 mg in sodium chloride 0.9% 100 mL IVPB  6/19/2024       Medications       zoledronic acid (ZOMETA) 4 mg in sodium chloride 0.9% 100 mL IVPB  9/17/2024       Medications       zoledronic acid (ZOMETA) 4 mg in sodium chloride 0.9% 100 mL IVPB    Therapy Plan Information  LANREOTIDE  5. Medications  lanreotide injection 120 mg  120 mg, Subcutaneous, Every visit

## 2024-03-08 DIAGNOSIS — E11.29 CONTROLLED TYPE 2 DIABETES MELLITUS WITH MICROALBUMINURIA, WITHOUT LONG-TERM CURRENT USE OF INSULIN: ICD-10-CM

## 2024-03-08 DIAGNOSIS — R80.9 CONTROLLED TYPE 2 DIABETES MELLITUS WITH MICROALBUMINURIA, WITHOUT LONG-TERM CURRENT USE OF INSULIN: ICD-10-CM

## 2024-03-08 RX ORDER — METFORMIN HYDROCHLORIDE 500 MG/1
1000 TABLET, EXTENDED RELEASE ORAL
Qty: 180 TABLET | Refills: 1 | Status: SHIPPED | OUTPATIENT
Start: 2024-03-08 | End: 2024-06-04

## 2024-03-08 NOTE — TELEPHONE ENCOUNTER
No care due was identified.  [facility] Embedded Care Due Messages. Reference number: [id number].   3/08/2024 5:18:09 AM CST

## 2024-03-08 NOTE — TELEPHONE ENCOUNTER
Shahram Hills  is requesting a refill authorization.  Brief Assessment and Rationale for Refill:  Approve     Medication Therapy Plan:         Comments:     Note composed:6:08 AM 03/08/2024

## 2024-03-11 ENCOUNTER — CLINICAL SUPPORT (OUTPATIENT)
Dept: DIABETES | Facility: CLINIC | Age: 84
End: 2024-03-11
Attending: INTERNAL MEDICINE
Payer: MEDICARE

## 2024-03-11 VITALS — BODY MASS INDEX: 18.94 KG/M2 | WEIGHT: 110.31 LBS

## 2024-03-11 DIAGNOSIS — E11.29 CONTROLLED TYPE 2 DIABETES MELLITUS WITH MICROALBUMINURIA, WITHOUT LONG-TERM CURRENT USE OF INSULIN: ICD-10-CM

## 2024-03-11 DIAGNOSIS — R80.9 CONTROLLED TYPE 2 DIABETES MELLITUS WITH MICROALBUMINURIA, WITHOUT LONG-TERM CURRENT USE OF INSULIN: ICD-10-CM

## 2024-03-11 PROCEDURE — G0108 DIAB MANAGE TRN  PER INDIV: HCPCS | Mod: HCNC,S$GLB,, | Performed by: DIETITIAN, REGISTERED

## 2024-03-11 PROCEDURE — 99999 PR PBB SHADOW E&M-EST. PATIENT-LVL I: CPT | Mod: PBBFAC,HCNC,, | Performed by: DIETITIAN, REGISTERED

## 2024-03-11 NOTE — PROGRESS NOTES
Diabetes Care Specialist Progress Note  Author: Mee Rojas RD  Date: 3/11/2024    Program Intake  Reason for Diabetes Program Visit:: Initial Diabetes Assessment  Current diabetes risk level:: moderate  In the last 12 months, have you:: none  Continuous Glucose Monitoring  Patient has CGM: No    Lab Results   Component Value Date    HGBA1C 6.1 (H) 02/09/2024       Clinical    Weight: 50 kg (110 lb 5.4 oz)       Body mass index is 18.94 kg/m².         Problem Review  Reviewed health maintenance: no (see comments)    Clinical Assessment  Current Diabetes Treatment: Oral Medication  Have you ever experienced hypoglycemia (low blood sugar)?: no  Have you ever experienced hyperglycemia (high blood sugar)?: no    Medication Information  How do you obtain your medications?: Family picks up, Pharmacy delivery  How many days a week do you miss your medications?: Never  Medication adherence impacting ability to self-manage diabetes?: No    Labs  Do you have regular lab work to monitor your medications?: Yes  Type of Regular Lab Work: BMP, CBC, Cholesterol, A1c  Where do you get your labs drawn?: [facility]  Lab Compliance Barriers: No    Nutritional Status  Diet: Low sodium  Meal Plan 24 Hour Recall: Breakfast, Lunch, Dinner, Snack (beverages: water, coke zero, some juice. Loves sweets. Eat ice cream or cake in the evening)  Meal Plan 24 Hour Recall - Breakfast: oatmeal, grits, cornflakes and milk  Meal Plan 24 Hour Recall - Lunch: ham sandwich or drinks an Ensure with chips Coke zero and water  Meal Plan 24 Hour Recall - Dinner: rice, fried chicken, stuffed bellpepper, green beans with Coke Zero  Meal Plan 24 Hour Recall - Snack: apple  Change in appetite?: Yes  Dentation:: Wears Dentures  Recent Changes in Weight: Weight Gain  Current nutritional status an area of need that is impacting patient's ability to self-manage diabetes?: Yes (underweight due to underlying health issues. States eats evenwhen she is not hungry.  Has gained 2lbs since her last appointment.)    Additional Social History    Support  Does anyone support you with your diabetes care?: yes  Who supports you?: family member (Sister who is here with her today, states her grandson is very supportive)  Who takes you to your medical appointments?: family member  Does the current support meet the patient's needs?: Yes  Is Support an area impacting ability to self-manage diabetes?: No    Access to Mass Media & Technology  Does the patient have access to any of the following devices or technologies?: Internet Access (grandson has internet access)  Media or technology needs impacting ability to self-manage diabetes?: No    Cognitive/Behavioral Health  Alert and Oriented: Yes  Difficulty Thinking: No  Requires Prompting: No  Requires assistance for routine expression?: No  Cognitive or behavioral barriers impacting ability to self-manage diabetes?: No    Culture/Jainism  Culture or Mandaen beliefs that may impact ability to access healthcare: No    Communication  Language preference: English  Hearing Problems: Yes  Hearing Assistance: Hearing aid  Vision Problems: Yes  Vision problem type:: Decreased Vision  Vision Assistance: Glasses  Communication needs impacting ability to self-manage diabetes?: No    Health Literacy  Preferred Learning Method: Face to Face  How often do you need to have someone help you read instructions, pamphlets, or written material from your doctor or pharmacy?: Sometimes  Health literacy needs impacting ability to self-manage diabetes?: Yes      Diabetes Self-Management Skills Assessment    Diabetes Disease Process/Treatment Options  Patient/caregiver able to state what happens when someone has diabetes.: somewhat  Patient/caregiver knows what type of diabetes they have.: no  Patient/caregiver able to identify at least three signs and symptoms of diabetes.: no  Patient able to identify at least three risk factors for diabetes.: no  Diabetes  "Disease Process/Treatment Options: Skills Assessment Completed: Yes  Assessment indicates:: Knowledge deficit, Instruction Needed  Area of need?: Yes    Nutrition/Healthy Eating  Method of carbohydrate measurement:: no method  Patient can identify foods that impact blood sugar.: yes  Patient-identified foods:: sweets, soda  Nutrition/Healthy Eating Skills Assessment Completed:: Yes  Assessment indicates:: Knowledge deficit, Instruction Needed  Area of need?: Yes    Physical Activity/Exercise  Patient's daily activity level:: lightly active  Patient formally exercises outside of work.: no  Reasons for not exercising:: other (see comments) (underlying healthy issues)  Patient can identify forms of physical activity.: yes  Stated forms of physical activity:: any movement performed by muscles that uses energy, housework  Patient can identify reasons why exercise/physical activity is important in diabetes management.: no  Physical Activity/Exercise Skills Assessment Completed: : Yes  Assessment indicates:: Knowledge deficit, Instruction Needed  Area of need?: Deferred    Medications  Patient is able to describe current diabetes management routine.: yes  Diabetes management routine:: oral medications  Patient is able to identify current diabetes medications, dosages, and appropriate timing of medications.: yes  Patient understands the purpose of the medications taken for diabetes.: no  Medication Skills Assessment Completed:: Yes  Assessment indicates:: Adequate understanding  Area of need?: No    Home Blood Glucose Monitoring  Patient states that blood sugar is checked at home daily.: yes  Monitoring Method:: home glucometer  How often do you check your blood sugar?: Occasionally  When do you check your blood sugar?: Before breakfast  When you check what is your typical blood sugar range? : fasting:"never 200"  Blood glucose logs:: no, encouraged to keep logs, encouraged to bring logs to provider visits  Blood glucose " logs reviewed today?: no  Home Blood Glucose Monitoring Skills Assessment Completed: : Yes  Assessment indicates:: Knowledge deficit, Instruction Needed  Area of need?: Yes    Acute Complications  Patient is able to identify types of acute complications: No  Acute Complications Skills Assessment Completed: : Yes  Assessment indicates:: Knowledge deficit, Instruction Needed  Area of need?: Yes    Chronic Complications  Patient can identify major chronic complications of diabetes.: no  Patient can identify ways to prevent or delay diabetes complications.: yes  Stated ways to prevent complications:: controlling blood sugar  Chronic Complications Skills Assessment Completed: : Yes  Assessment indicates:: Knowledge deficit  Area of need?: Yes    Psychosocial/Coping  Patient can identify ways of coping with chronic disease.: yes  Patient-stated ways of coping with chronic disease:: spiritual/Pentecostalism practices, support from loved ones (Reads Bible every mornig, very supportive sister)  Psychosocial/Coping Skills Assessment Completed: : Yes  Assessment indicates:: Adequate understanding  Area of need?: No      Assessment Summary and Plan    Based on today's diabetes care assessment, the following areas of need were identified:          3/11/2024    12:01 AM   Social   Support No   Access to Mass Media/Tech No   Cognitive/Behavioral Health No   Culture/Taoist No   Communication No   Health Literacy Yes            3/11/2024    12:01 AM   Clinical   Medication Adherence No   Lab Compliance No   Nutritional Status Yes            3/11/2024    12:01 AM   Diabetes Self-Management Skills   Diabetes Disease Process/Treatment Options Yes   Nutrition/Healthy Eating Yes-see care plan   Physical Activity/Exercise Deferred   Medication No   Home Blood Glucose Monitoring Yes-see care plan   Acute Complications Yes-ed on causes, s/s and treatment for hypo and hyperglycemia   Chronic Complications Yes   Psychosocial/Coping No           Today's interventions were provided through individual discussion, instruction, and written materials were provided.      Patient verbalized understanding of instruction and written materials.  Pt was able to return back demonstration of instructions today. Patient understood key points, needs reinforcement and further instruction.     Diabetes Self-Management Care Plan:    Today's Diabetes Self-Management Care Plan was developed with Shahram's input. Shahram has agreed to work toward the following goal(s) to improve his/her overall diabetes control.      Care Plan: Diabetes Management   Updates made since 2/10/2024 12:00 AM        Problem: Healthy Eating         Long-Range Goal: Pt will replace cake and ice cream evening snack with maribeth crackers or eat her sweet snack duirng the day vs before bed.    Start Date: 3/11/2024   Priority: Medium   Barriers: No Barriers Identified   Note:    3/11/24-Pt agrees to limit sweet snacks prior to bed or eat them earlier during the day when she is more active. States her appetite is good and she has gained 2lbs since her last appointment. She remain underweight at 88% IBW.       Task: Reviewed the sources and role of Carbohydrate, Protein, and Fat and how each nutrient impacts blood sugar.         Task: Provided visual examples using dry measuring cups, food models, and other familiar objects such as computer mouse, deck or cards, tennis ball etc. to help with visualization of portions.         Task: Explained how to count carbohydrates using the food label and the use of dry measuring cups for accurate carb counting.         Task: Discussed strategies for choosing healthier menu options when dining out.         Task: Recommended replacing beverages containing high sugar content with noncaloric/sugar free options and/or water. Completed 3/11/2024        Task: Review the importance of balancing carbohydrates with each meal using portion control techniques to count servings  of carbohydrate and label reading to identify serving size and amount of total carbs per serving.         Task: Provided Sample plate method and reviewed the use of the plate to estimate amounts of carbohydrate per meal. Completed 3/11/2024        Problem: Blood Glucose Self-Monitoring         Long-Range Goal: Patient agrees to check and record blood sugars1 times per day and bring logs to all clinic visits    Start Date: 3/11/2024   Priority: High   Barriers: No Barriers Identified   Note:    3/1/24-Pt states she SMBG 2-3 times a week. Can't state her BG log. Logs provided with written instructions. Ed on when to notify provider with BG logs. She agrees to bring logs to all clinic visits. Ed on target BG ranges.       Task: Provided patient with a meter today and sent Rx request to provider to send to patients pharmacy.         Task: Reviewed the importance of self-monitoring blood glucose and keeping logs. Completed 3/11/2024        Task: Instructed on how to self-monitor blood glucose using a home glucometer, how to properly dispose of used strips and lancets after use, and how to appropriately store meter and supplies.         Task: Provided patient with blood glucose logs, reviewed appropriate timing and frequency to SMBG, education on parameters on when to notify provider and advised patient to bring logs to all appts with PCP/Endocrinologist/Diabetes Care Specialist.         Task: Discussed ways to minimize pain when monitoring blood glucose.           Follow Up Plan     No follow-ups on file.    Today's care plan and follow up schedule was discussed with patient.  Shahram verbalized understanding of the care plan, goals, and agrees to follow up plan.        The patient was encouraged to communicate with his/her health care provider/physician and care team regarding his/her condition(s) and treatment.  I provided the patient with my contact information today and encouraged to contact me via phone or [facility]  Patient Portal as needed.     Length of Visit   Total Time: 30 Minutes

## 2024-03-14 ENCOUNTER — PATIENT MESSAGE (OUTPATIENT)
Dept: ADMINISTRATIVE | Facility: OTHER | Age: 84
End: 2024-03-14
Payer: MEDICARE

## 2024-03-26 ENCOUNTER — INFUSION (OUTPATIENT)
Dept: INFUSION THERAPY | Facility: HOSPITAL | Age: 84
End: 2024-03-26
Payer: MEDICARE

## 2024-03-26 ENCOUNTER — OFFICE VISIT (OUTPATIENT)
Dept: HEMATOLOGY/ONCOLOGY | Facility: CLINIC | Age: 84
End: 2024-03-26
Payer: MEDICARE

## 2024-03-26 VITALS
HEIGHT: 64 IN | SYSTOLIC BLOOD PRESSURE: 151 MMHG | OXYGEN SATURATION: 99 % | BODY MASS INDEX: 18.83 KG/M2 | DIASTOLIC BLOOD PRESSURE: 77 MMHG | WEIGHT: 110.31 LBS | HEART RATE: 86 BPM | TEMPERATURE: 98 F | RESPIRATION RATE: 16 BRPM

## 2024-03-26 DIAGNOSIS — C7B.8 METASTATIC MALIGNANT NEUROENDOCRINE TUMOR TO LIVER: Primary | ICD-10-CM

## 2024-03-26 DIAGNOSIS — D49.9 IMMUNODEFICIENCY SECONDARY TO NEOPLASM: ICD-10-CM

## 2024-03-26 DIAGNOSIS — D84.81 IMMUNODEFICIENCY SECONDARY TO NEOPLASM: ICD-10-CM

## 2024-03-26 DIAGNOSIS — C7A.8 NEUROENDOCRINE CARCINOMA METASTATIC TO BONE: ICD-10-CM

## 2024-03-26 DIAGNOSIS — C7B.8 METASTATIC MALIGNANT NEUROENDOCRINE TUMOR TO LIVER: ICD-10-CM

## 2024-03-26 DIAGNOSIS — E34.0 CARCINOID HEART DISEASE: ICD-10-CM

## 2024-03-26 DIAGNOSIS — D84.821 IMMUNODEFICIENCY DUE TO DRUGS: ICD-10-CM

## 2024-03-26 DIAGNOSIS — I10 ESSENTIAL HYPERTENSION: ICD-10-CM

## 2024-03-26 DIAGNOSIS — E34.0 CARCINOID SYNDROME: ICD-10-CM

## 2024-03-26 DIAGNOSIS — R80.9 CONTROLLED TYPE 2 DIABETES MELLITUS WITH MICROALBUMINURIA, WITHOUT LONG-TERM CURRENT USE OF INSULIN: ICD-10-CM

## 2024-03-26 DIAGNOSIS — C7B.8 NEUROENDOCRINE CARCINOMA METASTATIC TO BONE: ICD-10-CM

## 2024-03-26 DIAGNOSIS — C7B.8 SECONDARY NEUROENDOCRINE TUMOR OF BONE: ICD-10-CM

## 2024-03-26 DIAGNOSIS — C7A.012 MALIGNANT CARCINOID TUMOR OF ILEUM: Primary | ICD-10-CM

## 2024-03-26 DIAGNOSIS — E11.29 CONTROLLED TYPE 2 DIABETES MELLITUS WITH MICROALBUMINURIA, WITHOUT LONG-TERM CURRENT USE OF INSULIN: ICD-10-CM

## 2024-03-26 DIAGNOSIS — I51.89 CARCINOID HEART DISEASE: ICD-10-CM

## 2024-03-26 DIAGNOSIS — Z79.899 IMMUNODEFICIENCY DUE TO DRUGS: ICD-10-CM

## 2024-03-26 DIAGNOSIS — C79.51 METASTASIS TO SPINAL COLUMN: ICD-10-CM

## 2024-03-26 PROCEDURE — 96402 CHEMO HORMON ANTINEOPL SQ/IM: CPT | Mod: HCNC

## 2024-03-26 PROCEDURE — 25000003 PHARM REV CODE 250: Mod: HCNC | Performed by: REGISTERED NURSE

## 2024-03-26 PROCEDURE — 1157F ADVNC CARE PLAN IN RCRD: CPT | Mod: HCNC,CPTII,S$GLB, | Performed by: REGISTERED NURSE

## 2024-03-26 PROCEDURE — 99999 PR PBB SHADOW E&M-EST. PATIENT-LVL IV: CPT | Mod: PBBFAC,HCNC,, | Performed by: REGISTERED NURSE

## 2024-03-26 PROCEDURE — 3078F DIAST BP <80 MM HG: CPT | Mod: HCNC,CPTII,S$GLB, | Performed by: REGISTERED NURSE

## 2024-03-26 PROCEDURE — 96365 THER/PROPH/DIAG IV INF INIT: CPT

## 2024-03-26 PROCEDURE — 3288F FALL RISK ASSESSMENT DOCD: CPT | Mod: HCNC,CPTII,S$GLB, | Performed by: REGISTERED NURSE

## 2024-03-26 PROCEDURE — 63600175 PHARM REV CODE 636 W HCPCS: Mod: HCNC | Performed by: REGISTERED NURSE

## 2024-03-26 PROCEDURE — 1159F MED LIST DOCD IN RCRD: CPT | Mod: HCNC,CPTII,S$GLB, | Performed by: REGISTERED NURSE

## 2024-03-26 PROCEDURE — 96367 TX/PROPH/DG ADDL SEQ IV INF: CPT | Mod: HCNC

## 2024-03-26 PROCEDURE — 63600175 PHARM REV CODE 636 W HCPCS: Mod: JZ,JG,HCNC | Performed by: INTERNAL MEDICINE

## 2024-03-26 PROCEDURE — 1160F RVW MEDS BY RX/DR IN RCRD: CPT | Mod: HCNC,CPTII,S$GLB, | Performed by: REGISTERED NURSE

## 2024-03-26 PROCEDURE — 1126F AMNT PAIN NOTED NONE PRSNT: CPT | Mod: HCNC,CPTII,S$GLB, | Performed by: REGISTERED NURSE

## 2024-03-26 PROCEDURE — 3077F SYST BP >= 140 MM HG: CPT | Mod: HCNC,CPTII,S$GLB, | Performed by: REGISTERED NURSE

## 2024-03-26 PROCEDURE — 1101F PT FALLS ASSESS-DOCD LE1/YR: CPT | Mod: HCNC,CPTII,S$GLB, | Performed by: REGISTERED NURSE

## 2024-03-26 PROCEDURE — 99214 OFFICE O/P EST MOD 30 MIN: CPT | Mod: HCNC,S$GLB,, | Performed by: REGISTERED NURSE

## 2024-03-26 PROCEDURE — 96372 THER/PROPH/DIAG INJ SC/IM: CPT

## 2024-03-26 RX ORDER — LANREOTIDE ACETATE 120 MG/.5ML
120 INJECTION SUBCUTANEOUS
Status: CANCELLED | OUTPATIENT
Start: 2024-03-26

## 2024-03-26 RX ORDER — HEPARIN 100 UNIT/ML
500 SYRINGE INTRAVENOUS
Status: DISCONTINUED | OUTPATIENT
Start: 2024-03-26 | End: 2024-03-26 | Stop reason: HOSPADM

## 2024-03-26 RX ORDER — SODIUM CHLORIDE 0.9 % (FLUSH) 0.9 %
10 SYRINGE (ML) INJECTION
Status: DISCONTINUED | OUTPATIENT
Start: 2024-03-26 | End: 2024-03-26 | Stop reason: HOSPADM

## 2024-03-26 RX ORDER — HEPARIN 100 UNIT/ML
500 SYRINGE INTRAVENOUS
Status: CANCELLED | OUTPATIENT
Start: 2024-03-26

## 2024-03-26 RX ORDER — LANREOTIDE ACETATE 120 MG/.5ML
120 INJECTION SUBCUTANEOUS
Status: DISCONTINUED | OUTPATIENT
Start: 2024-03-26 | End: 2024-03-26 | Stop reason: HOSPADM

## 2024-03-26 RX ORDER — SODIUM CHLORIDE 0.9 % (FLUSH) 0.9 %
10 SYRINGE (ML) INJECTION
Status: CANCELLED | OUTPATIENT
Start: 2024-03-26

## 2024-03-26 RX ADMIN — ZOLEDRONIC ACID 3.3 MG: 4 INJECTION, SOLUTION, CONCENTRATE INTRAVENOUS at 11:03

## 2024-03-26 RX ADMIN — LANREOTIDE ACETATE 120 MG: 120 INJECTION SUBCUTANEOUS at 12:03

## 2024-03-26 RX ADMIN — SODIUM CHLORIDE: 9 INJECTION, SOLUTION INTRAVENOUS at 11:03

## 2024-03-26 NOTE — PROGRESS NOTES
"Subjective:      Patient ID: Shahram Hills is an 83 y.o. female.     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 Ms. Hills is an 77 yo woman who initially saw me on 6/7/18 for further evaluation of neuroendocrine tumor. She initially presented with diarrhea, abdominal discomfort, weight loss. She was evaluated at [facility] and underwent workup below:  US abdomen on 3/14/18 showed numerous bilobar mixed echogenicity and mildly vascular hepatic lesions. Cholelithiasis without e/o acute cholecystitis.   MRI abdomen on 3/30/2018 showed innumerable hepatic metastases. L3 vertebral body metastasis with soft tissue component along the right margin of the L3 and upper L4 vertebral bodies, filling the right L3/4 neural foramen, and extending into the anterior aspect of the spinal canal on the right at the L3 and upper T4 levels. Associated with mild spinal canal narrowing.  CT chest on 4/6/2018 showed one lucent nodule measuring 7 mm in the T7 vertebral body, most likely representing metastatic disease.    EGD on 5/4/18 showed normal duodenum. Schatzki's ring in the lower third of the esophagus. Grade 1 esophagitis in the GE junction c/w mild distal esophagitis. Congestion and erythema in the antrum, pre-pyloric region and stomach body c/w gastritis. Biopsy of the stomach antrum showed mild chronic gastritis, neg for H. pylori.   Labs on 5/9/2018: WBC 6.9, H/H 11.6/34.3, MCV 87.5, plt count 279. On 3/9/18: Vit B12 level 173, folic acid 6.6  She was started on oral vit B12 and underwent a liver biopsy on 5/18/18. Pathology showed "metastatic well differentiated neuroendocrine tumor. Ki67 3%"     She saw me on 6/7/18 and complained of weight loss of 26 lbs over the past 3-4 months, also has diarrhea. No flushing, wheezing, palpitations. Has been having pain in right flank radiating down " "the right leg. No tingling/numbness, weakness. She can hold her bladder but when she urinates her diarrhea would come out as well. Started on dex 4 mg q6h the evening of 6/7/18.      2. MRI spine on 6/8/18 showed "Marrow replacing metastatic lesion of the L3 vertebral body with associated extra osseous expansion and complete effacement of the right L3-L4 neural foramina and additional effacement of the right lateral recess.  There is additional lateral extension and abutment of the right psoas muscle.  Superimposed degenerative change at this level results in mild spinal canal stenosis." I sent the patient to ED on 6/9/18 where she was evaluated by neurosurgery. Neurosurgery did not feel she was a candidate for surgical intervention.      3. Seen by Dr. Adames on 6/11/18. palliative radiation to the area of the L3 metastasis 6/18/18-6/29/18   4. Gallium study on 6/13/18: Distal ileal primary neoplasm consistent with a carcinoid.  There is an adjacent metastatic lymph node, diffuse liver metastases, and multiple bone metastases. Index primary neoplasm SUV max 33.13. Adjacent lymph node SUV max 23. L3 bone metastasis SUV max 36.86. Left lobe SUV max 46.13   5. Lanreotide every 4 weeks started on 6/22/18  6. TACE to the right hepatic lobe on 2/14/19. TACE to the left hepatic lobe on 3/21/19.   7. TACE to the right hepatic lobe on 2/11/22. S/p conventional chemoembolization performed of the segment 2, 3, 4 branch of the left hepatic artery and segment 8 branch of the left hepatic artery on 4/22/22.  8. Gallium PET 11/1/22 showed progression. Discussed PRRT. PRRT #1 on 12/14/2022. Zometa every 3 months started 12/27/22. PRRT #2 on 2/8/23. #3 on 4/12/23. #4 on 6/14/23.      History of Present Illness:   Ms. Hills returns for follow up. Feeling well overall. Occasionally feels bad in the morning when waking up but improves after moving around and eating. Appetite is stable. Diarrhea controlled, using Xermelo TID. No " other concerns.     ECO     ROS:   See HPI. Otherwise negative.      Physical Examination:   Vital signs reviewed.   Gen: well hydrated, well developed, in no acute distress.  HEENT: normocephalic, anicteric, PERRLA, EOMI  Neck: supple, no JVD, thyromegaly, cervical or supraclavicular LAD  Lungs: CTAB, no wheezes or rales  Heart: regular rate, regular pulse, no M/R/G  Abdomen: soft, no tenderness, non-distended, no hepatomegaly, no splenomegaly, no mass, or hernia.   Ext: b/l LE  no edema  Neuro: alert and oriented x 4, no focal neuro deficit  Skin: no rash, open wounds or ulcers  Psych: pleasant and appropriate mood and affect     Objective:      Laboratory Data:  Labs reviewed. Last 5-HIAA stable at 115.     Imaging Data:     MRI abdomen 22:  1. History of neuroendocrine malignancy.  Numerous hepatic lesions, some remaining stable while others have mildly enlarged consistent with disease progression.  Stable osseous lesion in the L3 vertebral body.  2. Cholelithiasis and stable mild dilatation of the common bile duct and central intrahepatic biliary duct dilatation.  Of note, there is mass effect on the midportion of the common bile duct by dominant left hepatic lobe lesion.  3. Additional findings detailed above.  RECIST SUMMARY:     Date of prior examination for comparison: 2022     Lesion 1: Left hepatic lobe.  7.1 cm.  Series 9 image 52.  Prior measurement 6.3 cm.     Lesion 2: Right hepatic lobe lateral.  3.1 cm.  Series 9 image 30.  Prior measurement 3.2 cm.     Lesion 3: Right hepatic dome.  5.1 cm.  Series 9 image 19.  Prior measurement 4.4 cm.    Gallium PET 22:     Interval development of somatostatin tracer avid lesions in the skull, concerning for new metastatic lesions.     Numerous tracer avid hypoattenuating masses throughout the liver with increased SUV max compared to prior exam.     Tracer avid lesions in the C7 and L3 vertebral bodies, sternum, distal ileum and mesenteric  node are similar to prior exam.     Judged by tracer avidity of the patient's tumor burden, PRRT would be a consideration if clinically indicated.     Gallium PET 7/12/23:  Impression:     In this patient with carcinoid tumor of the ileum, there is overall similar distribution of tracer avid disease involving distal ileum, liver, mesenteric lymph node, and bones.  Index lesions as above.  No definite new tracer avid lesions.    Copper PET 12/29/23:  FINDINGS:  Quality of the study: Adequate.     SUV measurements may not be directly comparable with those made on Ga-68 DOTATATE PET/CT.     In the head and neck, there is physiologic distribution in the pituitary, salivary, and thyroid glands, and there are no tracer avid lesions suspicious for malignancy.     In the chest, there are no tracer avid lesions suspicious for malignancy.  Stable right upper lobe subcentimeter pulmonary nodule, below the size threshold for PET.     In the abdomen and pelvis, there is physiologic uptake in the pancreatic uncinate process, spleen, adrenal glands and  tract.     There are numerous hypodense tracer avid lesions throughout the liver, similar to prior exam.  Index left hepatic lobe lesion measures 5.1 x 5.0 cm (previously 5.1 x 5.0 cm) with maximum SUV of 58 (previously 43).     Focal uptake within the distal ileum with maximum SUV of 22 (previously 17).  Image 239.     Tracer avid mesenteric lymph node measuring 1.5 x 1.4 cm (previously 1.5 cm) with maximum SUV of 21 (previously 22).  Image 222.     In the bones, there is stable distribution of numerous scattered tracer avid lesions.     *Right calvarial lesion near the vertex with SUV max 5 (fused image 12).  *Right C6 vertebral body with SUV max 5.5 (fused image 74).  *Left scapular lesion with SUV max 2.7 (fused image 88).  *Right sternal lesion with SUV max 8.6 (fused image 103).  *Left anterior rib lesion with SUV max 2.1 (fused image 109).  *L3 vertebral body lesion with  SUV max 9.2 (fused image 192).     Impression:     Similar distribution of tracer avid disease involving the distal ileum, liver, mesenteric lymph nodes, and bones.  No definite new tracer avid lesions.     MRI 12/29/23  Impression:     1. In this patient with metastatic neuroendocrine tumor there is interval decreased size of index left hepatic lobe lesion and stable size of multiple additional hepatic lesions.  2. Stable metastatic lesion of the L3 vertebral body and central mesentery.  3. Cholelithiasis without acute cholecystitis.  4. Stable mild intrahepatic and extrahepatic ductal dilation, probably related to mass effect on the midportion of the common bile duct by left hepatic lobe lesion.  5. Additional findings, as above.    Assessment and Plan:      1. Malignant carcinoid tumor of ileum    2. 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 Secondary neuroendocrine tumor of bone    4. Metastasis to spinal column    5. 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 Carcinoid syndrome    8. Controlled type 2 diabetes mellitus with microalbuminuria, without long-term current use of insulin    9. 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 Started lanreotide every 4 weeks on 6/22/2018. S/p TACE to the right hepatic lobe on 2/14/19. TACE to the left hepatic lobe on 3/21/19.   - CT/MRI 1/12/22 showed mild progression in the liver. S/p TACE on 2/11/22 and 4/22/22   - Gallium PET 11/1/22 showed progression. Discussed PRRT. PRRT #1 on 12/14/2022. Completed 4 cycles on 6/14/23. Zometa every 3 months started 12/27/22. Last zometa 9/8/2023  - doing well. PET in Dec 2023 showed stable disease  - symptoms well controlled. Continue to monitor.   - c/w lanreotide every 4 weeks and zometa every 3 months.  Due today. Next dose due in June 2024.   - RTC in 4 weeks     7.  - better after xermelo and PRRT  - c/w lanreotide every 4 weeks. Due today.   - c/w xermelo tid  - could not tolerate octreotide    8.  - BS controlled  - f/u with PCP    9.  - BP mildly elevated in clinic today. Asymptomatic.   - c/w current medication    10.  - last echo in 2022 showed LVEF 40%  - f/u with cardiology    Patient is in agreement with the proposed treatment plan. All questions were answered to the patient's satisfaction. Pt knows to call clinic if anything is needed before the next clinic visit.    Patient discussed with collaborating physician, Dr. Granados.    At least 30 minutes were spent today on this encounter including face to face time with the patient, data gathering/interpretation and documentation.       Nayana Mendosa, MSN, APRN, ACCNS-  Hematology and Medical Oncology  Clinical Nurse Specialist to Dr. Woods, Dr. Mckoy & Dr. Granados    Route Chart for Scheduling    Med Onc Chart Routing      Follow up with physician . Keep as scheduled   Follow up with JUNA[facility]    Infusion scheduling note    Injection scheduling note lanreotide every 4 weeks, zometa every 3 months   Labs    Imaging    Pharmacy appointment    Other referrals                Supportive Plan Information  OP ZOLEDRONIC ACID (ZOMETA) Q4W   Sebastian Granados MD   Upcoming Treatment Dates - OP ZOLEDRONIC ACID (ZOMETA) Q4W    3/21/2024       Medications       zoledronic acid (ZOMETA) 4 mg in sodium chloride 0.9% 100 mL IVPB  6/19/2024       Medications       zoledronic acid (ZOMETA) 4 mg in sodium chloride 0.9% 100 mL IVPB  9/17/2024       Medications       zoledronic acid (ZOMETA) 4 mg in sodium chloride 0.9% 100 mL IVPB    Therapy Plan Information  LANREOTIDE  5. Medications  lanreotide injection 120 mg  120 mg, Subcutaneous, Every visit

## 2024-04-08 DIAGNOSIS — E34.0 CARCINOID SYNDROME: ICD-10-CM

## 2024-04-08 DIAGNOSIS — C7A.012 MALIGNANT CARCINOID TUMOR OF ILEUM: ICD-10-CM

## 2024-04-08 DIAGNOSIS — R19.7 DIARRHEA, UNSPECIFIED TYPE: ICD-10-CM

## 2024-04-09 ENCOUNTER — TELEPHONE (OUTPATIENT)
Dept: INTERNAL MEDICINE | Facility: CLINIC | Age: 84
End: 2024-04-09
Payer: MEDICARE

## 2024-04-09 RX ORDER — TELOTRISTAT ETHYL 250 MG/1
250 TABLET ORAL 3 TIMES DAILY
Qty: 90 TABLET | Refills: 3 | Status: ACTIVE | OUTPATIENT
Start: 2024-04-09

## 2024-04-09 NOTE — TELEPHONE ENCOUNTER
Spoke with pt's sister, pt has a cold. Has tried OTC robitussin, and cough is getting loose. Nose is a bit runny, but not too bad. Just wondering if anything else she should be doing. Feels fine otherwise.

## 2024-04-09 NOTE — TELEPHONE ENCOUNTER
----- Message from Faby Narvaez sent at 4/8/2024 10:00 AM CDT -----  Regarding: Patient Advice                  Name of Who is Calling:  Patient's Sister    Who Left The Message:   Patient's Sister         What is the request in detail:       Patient's sister called requesting a call regarding what her sister is presently experiencing. Patient has a cold and would like to have medication called into her local pharmacy.   Please further advise.    Thank you      Reply by MY KASSISNER:  NO      Preferred Call Back :  [phone] (M) - [phone] (V)

## 2024-04-09 NOTE — TELEPHONE ENCOUNTER
Recommend over the counter robitussin DM to help with cough and congestion  She can also try coricedin HBP over the counter if needed     If symptoms are worsening then recommend appt to appropriately assess her symptoms

## 2024-04-23 ENCOUNTER — INFUSION (OUTPATIENT)
Dept: INFUSION THERAPY | Facility: HOSPITAL | Age: 84
End: 2024-04-23
Payer: MEDICARE

## 2024-04-23 ENCOUNTER — OFFICE VISIT (OUTPATIENT)
Dept: HEMATOLOGY/ONCOLOGY | Facility: CLINIC | Age: 84
End: 2024-04-23
Payer: MEDICARE

## 2024-04-23 VITALS
TEMPERATURE: 98 F | SYSTOLIC BLOOD PRESSURE: 146 MMHG | BODY MASS INDEX: 18.93 KG/M2 | OXYGEN SATURATION: 98 % | HEIGHT: 64 IN | HEART RATE: 80 BPM | DIASTOLIC BLOOD PRESSURE: 70 MMHG | WEIGHT: 110.88 LBS

## 2024-04-23 DIAGNOSIS — C79.51 METASTASIS TO SPINAL COLUMN: ICD-10-CM

## 2024-04-23 DIAGNOSIS — I51.89 CARCINOID HEART DISEASE: ICD-10-CM

## 2024-04-23 DIAGNOSIS — C7B.8 SECONDARY NEUROENDOCRINE TUMOR OF BONE: ICD-10-CM

## 2024-04-23 DIAGNOSIS — Z79.899 IMMUNODEFICIENCY DUE TO DRUGS: ICD-10-CM

## 2024-04-23 DIAGNOSIS — D84.821 IMMUNODEFICIENCY DUE TO DRUGS: ICD-10-CM

## 2024-04-23 DIAGNOSIS — C7B.8 METASTATIC MALIGNANT NEUROENDOCRINE TUMOR TO LIVER: ICD-10-CM

## 2024-04-23 DIAGNOSIS — D49.9 IMMUNODEFICIENCY SECONDARY TO NEOPLASM: ICD-10-CM

## 2024-04-23 DIAGNOSIS — D84.81 IMMUNODEFICIENCY SECONDARY TO NEOPLASM: ICD-10-CM

## 2024-04-23 DIAGNOSIS — E34.0 CARCINOID HEART DISEASE: ICD-10-CM

## 2024-04-23 DIAGNOSIS — E34.0 CARCINOID SYNDROME: ICD-10-CM

## 2024-04-23 DIAGNOSIS — E11.29 CONTROLLED TYPE 2 DIABETES MELLITUS WITH MICROALBUMINURIA, WITHOUT LONG-TERM CURRENT USE OF INSULIN: ICD-10-CM

## 2024-04-23 DIAGNOSIS — I10 ESSENTIAL HYPERTENSION: ICD-10-CM

## 2024-04-23 DIAGNOSIS — C7A.012 MALIGNANT CARCINOID TUMOR OF ILEUM: Primary | ICD-10-CM

## 2024-04-23 DIAGNOSIS — R80.9 CONTROLLED TYPE 2 DIABETES MELLITUS WITH MICROALBUMINURIA, WITHOUT LONG-TERM CURRENT USE OF INSULIN: ICD-10-CM

## 2024-04-23 DIAGNOSIS — C7B.8 METASTATIC MALIGNANT NEUROENDOCRINE TUMOR TO LIVER: Primary | ICD-10-CM

## 2024-04-23 PROCEDURE — 3078F DIAST BP <80 MM HG: CPT | Mod: CPTII,S$GLB,, | Performed by: PHYSICIAN ASSISTANT

## 2024-04-23 PROCEDURE — 1159F MED LIST DOCD IN RCRD: CPT | Mod: CPTII,S$GLB,, | Performed by: PHYSICIAN ASSISTANT

## 2024-04-23 PROCEDURE — 1157F ADVNC CARE PLAN IN RCRD: CPT | Mod: CPTII,S$GLB,, | Performed by: PHYSICIAN ASSISTANT

## 2024-04-23 PROCEDURE — 1160F RVW MEDS BY RX/DR IN RCRD: CPT | Mod: CPTII,S$GLB,, | Performed by: PHYSICIAN ASSISTANT

## 2024-04-23 PROCEDURE — 63600175 PHARM REV CODE 636 W HCPCS: Mod: JZ,JG | Performed by: INTERNAL MEDICINE

## 2024-04-23 PROCEDURE — 1126F AMNT PAIN NOTED NONE PRSNT: CPT | Mod: CPTII,S$GLB,, | Performed by: PHYSICIAN ASSISTANT

## 2024-04-23 PROCEDURE — G2211 COMPLEX E/M VISIT ADD ON: HCPCS | Mod: S$GLB,,, | Performed by: PHYSICIAN ASSISTANT

## 2024-04-23 PROCEDURE — 99999 PR PBB SHADOW E&M-EST. PATIENT-LVL IV: CPT | Mod: PBBFAC,,, | Performed by: PHYSICIAN ASSISTANT

## 2024-04-23 PROCEDURE — 1101F PT FALLS ASSESS-DOCD LE1/YR: CPT | Mod: CPTII,S$GLB,, | Performed by: PHYSICIAN ASSISTANT

## 2024-04-23 PROCEDURE — 3288F FALL RISK ASSESSMENT DOCD: CPT | Mod: CPTII,S$GLB,, | Performed by: PHYSICIAN ASSISTANT

## 2024-04-23 PROCEDURE — 96372 THER/PROPH/DIAG INJ SC/IM: CPT

## 2024-04-23 PROCEDURE — 99215 OFFICE O/P EST HI 40 MIN: CPT | Mod: S$GLB,,, | Performed by: PHYSICIAN ASSISTANT

## 2024-04-23 PROCEDURE — 3077F SYST BP >= 140 MM HG: CPT | Mod: CPTII,S$GLB,, | Performed by: PHYSICIAN ASSISTANT

## 2024-04-23 RX ORDER — LANREOTIDE ACETATE 120 MG/.5ML
120 INJECTION SUBCUTANEOUS
Status: CANCELLED | OUTPATIENT
Start: 2024-04-23

## 2024-04-23 RX ORDER — LANREOTIDE ACETATE 120 MG/.5ML
120 INJECTION SUBCUTANEOUS
Status: DISCONTINUED | OUTPATIENT
Start: 2024-04-23 | End: 2024-04-23 | Stop reason: HOSPADM

## 2024-04-23 RX ADMIN — LANREOTIDE ACETATE 120 MG: 120 INJECTION SUBCUTANEOUS at 01:04

## 2024-04-23 NOTE — PROGRESS NOTES
"Subjective:      Patient ID: Shahram Hills is an 83 y.o. female.     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 Ms. Hills is an 79 yo woman who initially saw me on 6/7/18 for further evaluation of neuroendocrine tumor. She initially presented with diarrhea, abdominal discomfort, weight loss. She was evaluated at [facility] and underwent workup below:  US abdomen on 3/14/18 showed numerous bilobar mixed echogenicity and mildly vascular hepatic lesions. Cholelithiasis without e/o acute cholecystitis.   MRI abdomen on 3/30/2018 showed innumerable hepatic metastases. L3 vertebral body metastasis with soft tissue component along the right margin of the L3 and upper L4 vertebral bodies, filling the right L3/4 neural foramen, and extending into the anterior aspect of the spinal canal on the right at the L3 and upper T4 levels. Associated with mild spinal canal narrowing.  CT chest on 4/6/2018 showed one lucent nodule measuring 7 mm in the T7 vertebral body, most likely representing metastatic disease.    EGD on 5/4/18 showed normal duodenum. Schatzki's ring in the lower third of the esophagus. Grade 1 esophagitis in the GE junction c/w mild distal esophagitis. Congestion and erythema in the antrum, pre-pyloric region and stomach body c/w gastritis. Biopsy of the stomach antrum showed mild chronic gastritis, neg for H. pylori.   Labs on 5/9/2018: WBC 6.9, H/H 11.6/34.3, MCV 87.5, plt count 279. On 3/9/18: Vit B12 level 173, folic acid 6.6  She was started on oral vit B12 and underwent a liver biopsy on 5/18/18. Pathology showed "metastatic well differentiated neuroendocrine tumor. Ki67 3%"     She saw me on 6/7/18 and complained of weight loss of 26 lbs over the past 3-4 months, also has diarrhea. No flushing, wheezing, palpitations. Has been having pain in " "right flank radiating down the right leg. No tingling/numbness, weakness. She can hold her bladder but when she urinates her diarrhea would come out as well. Started on dex 4 mg q6h the evening of 6/7/18.      2. MRI spine on 6/8/18 showed "Marrow replacing metastatic lesion of the L3 vertebral body with associated extra osseous expansion and complete effacement of the right L3-L4 neural foramina and additional effacement of the right lateral recess.  There is additional lateral extension and abutment of the right psoas muscle.  Superimposed degenerative change at this level results in mild spinal canal stenosis." I sent the patient to ED on 6/9/18 where she was evaluated by neurosurgery. Neurosurgery did not feel she was a candidate for surgical intervention.      3. Seen by Dr. Adames on 6/11/18. palliative radiation to the area of the L3 metastasis 6/18/18-6/29/18   4. Gallium study on 6/13/18: Distal ileal primary neoplasm consistent with a carcinoid.  There is an adjacent metastatic lymph node, diffuse liver metastases, and multiple bone metastases. Index primary neoplasm SUV max 33.13. Adjacent lymph node SUV max 23. L3 bone metastasis SUV max 36.86. Left lobe SUV max 46.13   5. Lanreotide every 4 weeks started on 6/22/18  6. TACE to the right hepatic lobe on 2/14/19. TACE to the left hepatic lobe on 3/21/19.   7. TACE to the right hepatic lobe on 2/11/22. S/p conventional chemoembolization performed of the segment 2, 3, 4 branch of the left hepatic artery and segment 8 branch of the left hepatic artery on 4/22/22.  8. Gallium PET 11/1/22 showed progression. Discussed PRRT. PRRT #1 on 12/14/2022. Zometa every 3 months started 12/27/22. PRRT #2 on 2/8/23. #3 on 4/12/23. #4 on 6/14/23.      History of Present Illness:   Ms. Hills returns for follow up. Feeling well overall. Occasionally feels bad in the morning when waking up but improves after moving around and eating. Appetite is stable. Diarrhea controlled, " using Xermelo TID. No other concerns or complaints today. Going to [employer].     [employer]. Presents with her sister today     ROS:   See HPI. Otherwise negative.      Physical Examination:   Vital signs reviewed.   Gen: well hydrated, well developed, in no acute distress.  HEENT: normocephalic, anicteric, EOMI  Neck: supple, no JVD  Lungs: CTAB, no wheezes or rales  Heart: regular rate, regular pulse, no M/R/G  Abdomen: soft, no tenderness, non-distended.   Ext: b/l LE  no edema  Neuro: alert and oriented x 4, no focal neuro deficit  Skin: no rash, open wounds or ulcers  Psych: pleasant and appropriate mood and affect     Objective:      Laboratory Data:  Labs reviewed. Last 5-HIAA stable at 115 during previous draw. Bio-markers pending from today.     Imaging Data:     MRI abdomen 22:  1. History of neuroendocrine malignancy.  Numerous hepatic lesions, some remaining stable while others have mildly enlarged consistent with disease progression.  Stable osseous lesion in the L3 vertebral body.  2. Cholelithiasis and stable mild dilatation of the common bile duct and central intrahepatic biliary duct dilatation.  Of note, there is mass effect on the midportion of the common bile duct by dominant left hepatic lobe lesion.  3. Additional findings detailed above.  RECIST SUMMARY:     Date of prior examination for comparison: 2022     Lesion 1: Left hepatic lobe.  7.1 cm.  Series 9 image 52.  Prior measurement 6.3 cm.     Lesion 2: Right hepatic lobe lateral.  3.1 cm.  Series 9 image 30.  Prior measurement 3.2 cm.     Lesion 3: Right hepatic dome.  5.1 cm.  Series 9 image 19.  Prior measurement 4.4 cm.    Gallium PET 22:     Interval development of somatostatin tracer avid lesions in the skull, concerning for new metastatic lesions.     Numerous tracer avid hypoattenuating masses throughout the liver with increased SUV max compared to prior exam.     Tracer avid lesions in the C7 and L3 vertebral bodies,  sternum, distal ileum and mesenteric node are similar to prior exam.     Judged by tracer avidity of the patient's tumor burden, PRRT would be a consideration if clinically indicated.     Gallium PET 7/12/23:  Impression:     In this patient with carcinoid tumor of the ileum, there is overall similar distribution of tracer avid disease involving distal ileum, liver, mesenteric lymph node, and bones.  Index lesions as above.  No definite new tracer avid lesions.    Copper PET 12/29/23:  FINDINGS:  Quality of the study: Adequate.     SUV measurements may not be directly comparable with those made on Ga-68 DOTATATE PET/CT.     In the head and neck, there is physiologic distribution in the pituitary, salivary, and thyroid glands, and there are no tracer avid lesions suspicious for malignancy.     In the chest, there are no tracer avid lesions suspicious for malignancy.  Stable right upper lobe subcentimeter pulmonary nodule, below the size threshold for PET.     In the abdomen and pelvis, there is physiologic uptake in the pancreatic uncinate process, spleen, adrenal glands and  tract.     There are numerous hypodense tracer avid lesions throughout the liver, similar to prior exam.  Index left hepatic lobe lesion measures 5.1 x 5.0 cm (previously 5.1 x 5.0 cm) with maximum SUV of 58 (previously 43).     Focal uptake within the distal ileum with maximum SUV of 22 (previously 17).  Image 239.     Tracer avid mesenteric lymph node measuring 1.5 x 1.4 cm (previously 1.5 cm) with maximum SUV of 21 (previously 22).  Image 222.     In the bones, there is stable distribution of numerous scattered tracer avid lesions.     *Right calvarial lesion near the vertex with SUV max 5 (fused image 12).  *Right C6 vertebral body with SUV max 5.5 (fused image 74).  *Left scapular lesion with SUV max 2.7 (fused image 88).  *Right sternal lesion with SUV max 8.6 (fused image 103).  *Left anterior rib lesion with SUV max 2.1 (fused image  109).  *L3 vertebral body lesion with SUV max 9.2 (fused image 192).     Impression:     Similar distribution of tracer avid disease involving the distal ileum, liver, mesenteric lymph nodes, and bones.  No definite new tracer avid lesions.     MRI 12/29/23  Impression:     1. In this patient with metastatic neuroendocrine tumor there is interval decreased size of index left hepatic lobe lesion and stable size of multiple additional hepatic lesions.  2. Stable metastatic lesion of the L3 vertebral body and central mesentery.  3. Cholelithiasis without acute cholecystitis.  4. Stable mild intrahepatic and extrahepatic ductal dilation, probably related to mass effect on the midportion of the common bile duct by left hepatic lobe lesion.  5. Additional findings, as above.    Assessment and Plan:      1. Malignant carcinoid tumor of ileum    2. 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 Secondary neuroendocrine tumor of bone    4. Metastasis to spinal column    5. 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 Carcinoid syndrome    8. Controlled type 2 diabetes mellitus with microalbuminuria, without long-term current use of insulin    9. 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 Started lanreotide every 4 weeks on 6/22/2018. S/p TACE to the right hepatic lobe on 2/14/19. TACE to the left hepatic lobe on 3/21/19.   - CT/MRI 1/12/22 showed mild progression in the liver. S/p TACE on 2/11/22 and 4/22/22   - Gallium PET 11/1/22 showed progression. Discussed PRRT. PRRT #1 on 12/14/2022. Completed 4 cycles on 6/14/23. Zometa every 3 months started 12/27/22. Last zometa 9/8/2023    - doing well. PET in Dec 2023 showed stable disease  - symptoms well controlled. Continue to monitor.   - c/w lanreotide  every 4 weeks and zometa every 3 months. Due today. Next dose due in June 2024.   - RTC in 4 weeks     7.  - better after xermelo and PRRT  - c/w lanreotide every 4 weeks. Due today.   - c/w xermelo tid  - could not tolerate octreotide    8.  - BS controlled  - f/u with PCP    9.  - BP mildly elevated in clinic today. Asymptomatic.   - c/w current medication    10.  - last echo in 2022 showed LVEF 40%  - f/u with cardiology    Patient is in agreement with the proposed treatment plan. All questions were answered to the patient's satisfaction. Pt knows to call clinic if anything is needed before the next clinic visit.    Route Chart for Scheduling    Med Onc Chart Routing      Follow up with physician 2 months and 3 months. See Dr Granados monthly with lab work and lanreotide.   Follow up with JUNAID 1 month and 4 months. See JUNAID as scheduled with Lanreotide. See JUNAID in 4 mos with Lanreotide   Infusion scheduling note    Injection scheduling note    Labs CBC and CMP   Scheduling:  Preferred lab:  Lab interval: every 4 weeks  Serotonin, pancreastatin, 5-HIAA   Imaging PET scan   Scheduled   Pharmacy appointment    Other referrals                Supportive Plan Information  OP ZOLEDRONIC ACID (ZOMETA) Q4W   Sebastian Granados MD   Upcoming Treatment Dates - OP ZOLEDRONIC ACID (ZOMETA) Q4W    6/19/2024       Medications       zoledronic acid (ZOMETA) 4 mg in sodium chloride 0.9% 100 mL IVPB  9/17/2024       Medications       zoledronic acid (ZOMETA) 4 mg in sodium chloride 0.9% 100 mL IVPB    Therapy Plan Information  LANREOTIDE  5. Medications  lanreotide injection 120 mg  120 mg, Subcutaneous, Every visit

## 2024-05-10 ENCOUNTER — OFFICE VISIT (OUTPATIENT)
Dept: INTERNAL MEDICINE | Facility: CLINIC | Age: 84
End: 2024-05-10
Attending: INTERNAL MEDICINE
Payer: MEDICARE

## 2024-05-10 ENCOUNTER — HOSPITAL ENCOUNTER (OUTPATIENT)
Dept: RADIOLOGY | Facility: OTHER | Age: 84
Discharge: HOME | End: 2024-05-10
Attending: INTERNAL MEDICINE
Payer: MEDICARE

## 2024-05-10 VITALS
HEIGHT: 64 IN | HEART RATE: 88 BPM | SYSTOLIC BLOOD PRESSURE: 134 MMHG | DIASTOLIC BLOOD PRESSURE: 84 MMHG | WEIGHT: 111.31 LBS | OXYGEN SATURATION: 97 % | BODY MASS INDEX: 19 KG/M2

## 2024-05-10 DIAGNOSIS — R80.9 MICROALBUMINURIA DUE TO TYPE 2 DIABETES MELLITUS: ICD-10-CM

## 2024-05-10 DIAGNOSIS — I50.42 CHRONIC COMBINED SYSTOLIC AND DIASTOLIC CHF (CONGESTIVE HEART FAILURE): ICD-10-CM

## 2024-05-10 DIAGNOSIS — I10 ESSENTIAL HYPERTENSION: Primary | ICD-10-CM

## 2024-05-10 DIAGNOSIS — E11.29 MICROALBUMINURIA DUE TO TYPE 2 DIABETES MELLITUS: ICD-10-CM

## 2024-05-10 DIAGNOSIS — E55.9 VITAMIN D DEFICIENCY: ICD-10-CM

## 2024-05-10 DIAGNOSIS — R80.9 CONTROLLED TYPE 2 DIABETES MELLITUS WITH MICROALBUMINURIA, WITHOUT LONG-TERM CURRENT USE OF INSULIN: ICD-10-CM

## 2024-05-10 DIAGNOSIS — E11.29 CONTROLLED TYPE 2 DIABETES MELLITUS WITH MICROALBUMINURIA, WITHOUT LONG-TERM CURRENT USE OF INSULIN: ICD-10-CM

## 2024-05-10 DIAGNOSIS — I70.0 ATHEROSCLEROSIS OF AORTA: ICD-10-CM

## 2024-05-10 DIAGNOSIS — E53.8 B12 DEFICIENCY: ICD-10-CM

## 2024-05-10 DIAGNOSIS — C22.0 HCC (HEPATOCELLULAR CARCINOMA): ICD-10-CM

## 2024-05-10 DIAGNOSIS — E78.5 HYPERLIPIDEMIA, UNSPECIFIED HYPERLIPIDEMIA TYPE: ICD-10-CM

## 2024-05-10 DIAGNOSIS — N95.9 MENOPAUSAL PROBLEM: ICD-10-CM

## 2024-05-10 PROCEDURE — 99999 PR PBB SHADOW E&M-EST. PATIENT-LVL V: CPT | Mod: PBBFAC,HCNC,, | Performed by: INTERNAL MEDICINE

## 2024-05-10 PROCEDURE — 77080 DXA BONE DENSITY AXIAL: CPT | Mod: 26,,, | Performed by: RADIOLOGY

## 2024-05-10 PROCEDURE — 1157F ADVNC CARE PLAN IN RCRD: CPT | Mod: HCNC,CPTII,S$GLB, | Performed by: INTERNAL MEDICINE

## 2024-05-10 PROCEDURE — 1126F AMNT PAIN NOTED NONE PRSNT: CPT | Mod: HCNC,CPTII,S$GLB, | Performed by: INTERNAL MEDICINE

## 2024-05-10 PROCEDURE — 1160F RVW MEDS BY RX/DR IN RCRD: CPT | Mod: HCNC,CPTII,S$GLB, | Performed by: INTERNAL MEDICINE

## 2024-05-10 PROCEDURE — 1101F PT FALLS ASSESS-DOCD LE1/YR: CPT | Mod: HCNC,CPTII,S$GLB, | Performed by: INTERNAL MEDICINE

## 2024-05-10 PROCEDURE — 99214 OFFICE O/P EST MOD 30 MIN: CPT | Mod: HCNC,S$GLB,, | Performed by: INTERNAL MEDICINE

## 2024-05-10 PROCEDURE — 77080 DXA BONE DENSITY AXIAL: CPT | Mod: TC

## 2024-05-10 PROCEDURE — 1159F MED LIST DOCD IN RCRD: CPT | Mod: HCNC,CPTII,S$GLB, | Performed by: INTERNAL MEDICINE

## 2024-05-10 PROCEDURE — 3288F FALL RISK ASSESSMENT DOCD: CPT | Mod: HCNC,CPTII,S$GLB, | Performed by: INTERNAL MEDICINE

## 2024-05-10 NOTE — PROGRESS NOTES
Subjective:   Patient ID: Shahram Hills is a 84 y.o. female  Chief complaint:   Chief Complaint   Patient presents with    Hypertension     F/u       HPI  Here for 3 month diabetes follow up and HTN follow up  Accompanied by her sister today     HTN:  Today well controlled upon repeat  Reviewed home readings and 110-130s/60-80s with dayami< 130/80  HR 70-90  - c/w meds on med card  - taking lasix daily, coreg, losartan  Previously:   Prev switched to coreg from amlodipine    Diabetes:   A1c 6.1<- 6.1 <-6.3<- 6.6<-6.4<- 5.9 <-6.2  <- 6.1 <- 6.2  - typically eating right before recent labs   - well controlled and sam metformin 1000 daily  Fasting bg 140-160s but eating sugary snack late at night - ice cream, pie or cookies   - eats breakfast around 9-10am  Foot exam utd 7/2023  Utd on eye exam 11/2023  Urine screening due   No lows     PVCs, Chronic systolic and diastolic HF (EF 40% on echo 9/2022):  No further ankle swelling since taking lasix daily   Sam current regimen  No aranda or sob  - started lasix at v due to acute HF exacerbation - sam daily lasix - no further ankle swelling since then   Previously:   - noticed ankle swelling - at baseline gets ankle swelling worse in afternoon - better in am   Cards: Elyssa  Previously:    - 2/25/2022 for hospital discharge had TACE right hep lobe and found to have frequent PVCs  - we resumed losartan at that time   - seen by cards 3/14/2022 and completed holter - at this time no tx indicated    B12 def:  - is taking supplement daily    Metastatic neuroendocrine tumor to liver s/p TACE to R hep lobe 2/2019 and L hep loab 3/2019, TACE to R 2/2022 and 4/2022, palliative radiation to the area of the L3 metastasis 6/18/18-6/29/18:  Saw h/o 4/23/2024 - on Xermelo TID and lanreotide q4 weeks  12/2023 imaging showed stable disease  Prev:  - pET 11/2022 showed progression   - PRRT #1 on 12/14/2022. Zometa every 3 months started 12/27/22. PRRT #2 on 2/8  Followed by h/o - H last  "clinic visit March 8, 2023  Previously:   - 2/25/2022 for hospital discharge had TACE right hep lobe and found to have frequent PVCs  - we resumed losartan at that time   - seen by cards 3/14/2022 and completed holter - at this time no tx indicated    H/o: Du  Cards: Elyssa  Pod: Luper    HM:  Covid booster   Rsv  Urine screen  Dexa today     Review of Systems    Objective:  Vitals:    05/10/24 0923   BP: 134/84   BP Location: Left arm   Patient Position: Sitting   Pulse: 88   SpO2: 97%   Weight: 50.5 kg (111 lb 5.3 oz)   Height: 5' 4" (1.626 m)       Body mass index is 19.11 kg/m².    Physical Exam  Vitals reviewed.   Constitutional:       Appearance: Normal appearance. She is well-developed.   HENT:      Head: Normocephalic and atraumatic.   Eyes:      Extraocular Movements: Extraocular movements intact.      Conjunctiva/sclera: Conjunctivae normal.   Cardiovascular:      Rate and Rhythm: Normal rate and regular rhythm.      Pulses: Normal pulses.      Heart sounds: Normal heart sounds.   Pulmonary:      Effort: Pulmonary effort is normal.      Breath sounds: Normal breath sounds.   Abdominal:      General: Bowel sounds are normal.      Palpations: Abdomen is soft. There is mass (ruq mass).   Musculoskeletal:         General: No swelling or tenderness. Normal range of motion.      Cervical back: Normal range of motion and neck supple.   Lymphadenopathy:      Cervical: No cervical adenopathy.   Skin:     General: Skin is warm and dry.      Capillary Refill: Capillary refill takes less than 2 seconds.      Nails: There is no clubbing.   Neurological:      General: No focal deficit present.      Mental Status: She is alert and oriented to person, place, and time. Mental status is at baseline.      Gait: Gait normal.   Psychiatric:         Mood and Affect: Mood normal.         Speech: Speech normal.         Behavior: Behavior normal.         Thought Content: Thought content normal.         Judgment: Judgment normal. "       Assessment:  1. Essential hypertension    2. Atherosclerosis of aorta    3. B12 deficiency    4. Controlled type 2 diabetes mellitus with microalbuminuria, without long-term current use of insulin    5. HCC (hepatocellular carcinoma)    6. Hyperlipidemia, unspecified hyperlipidemia type    7. Microalbuminuria due to type 2 diabetes mellitus    8. Chronic combined systolic and diastolic CHF (congestive heart failure)    9. Vitamin D deficiency        Plan:     Essential hypertension  Stable   Cont med    Atherosclerosis of aorta  Stable   Cont zetia    B12 deficiency  -     Vitamin B12; Future; Expected date: 05/10/2024  Stable   Con tsuppl     Controlled type 2 diabetes mellitus with microalbuminuria, without long-term current use of insulin  -     Microalbumin/Creatinine Ratio, Urine; Future; Expected date: 08/10/2024  -     Hemoglobin A1C; Future; Expected date: 08/10/2024  Stable   Cont regimen   Heck approp labs   Annual foot and eye exams  Foot exam due 7/2023    HCC (hepatocellular carcinoma)  Stable   F/u with h/o    Hyperlipidemia, unspecified hyperlipidemia type  -     Lipid Panel; Future; Expected date: 08/10/2024  -     TSH; Future; Expected date: 08/10/2024    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

## 2024-05-20 ENCOUNTER — PATIENT OUTREACH (OUTPATIENT)
Dept: ADMINISTRATIVE | Facility: HOSPITAL | Age: 84
End: 2024-05-20
Payer: MEDICARE

## 2024-05-20 NOTE — PROGRESS NOTES
Health Maintenance Due   Topic Date Due    RSV Vaccine (Age 60+ and Pregnant patients) (1 - 1-dose 60+ series) Never done    COVID-19 Vaccine (4 - 2023-24 season) 09/01/2023    Diabetes Urine Screening  04/04/2024    Health Maintenance reviewed, updated and links triggered. DM Urine due portal message sent for scheduling.   ([facility]ford) 5/20/24

## 2024-05-21 ENCOUNTER — INFUSION (OUTPATIENT)
Dept: INFUSION THERAPY | Facility: HOSPITAL | Age: 84
End: 2024-05-21
Payer: MEDICARE

## 2024-05-21 ENCOUNTER — OFFICE VISIT (OUTPATIENT)
Dept: HEMATOLOGY/ONCOLOGY | Facility: CLINIC | Age: 84
End: 2024-05-21
Payer: MEDICARE

## 2024-05-21 VITALS
WEIGHT: 111.69 LBS | SYSTOLIC BLOOD PRESSURE: 126 MMHG | TEMPERATURE: 98 F | HEART RATE: 72 BPM | DIASTOLIC BLOOD PRESSURE: 75 MMHG | RESPIRATION RATE: 18 BRPM | BODY MASS INDEX: 19.07 KG/M2 | OXYGEN SATURATION: 96 % | HEIGHT: 64 IN

## 2024-05-21 DIAGNOSIS — E34.0 CARCINOID SYNDROME: ICD-10-CM

## 2024-05-21 DIAGNOSIS — Z79.899 IMMUNODEFICIENCY DUE TO DRUGS: ICD-10-CM

## 2024-05-21 DIAGNOSIS — C79.51 METASTASIS TO SPINAL COLUMN: ICD-10-CM

## 2024-05-21 DIAGNOSIS — D84.81 IMMUNODEFICIENCY SECONDARY TO NEOPLASM: ICD-10-CM

## 2024-05-21 DIAGNOSIS — C7B.8 METASTATIC MALIGNANT NEUROENDOCRINE TUMOR TO LIVER: Primary | ICD-10-CM

## 2024-05-21 DIAGNOSIS — D84.821 IMMUNODEFICIENCY DUE TO DRUGS: ICD-10-CM

## 2024-05-21 DIAGNOSIS — C7B.8 SECONDARY NEUROENDOCRINE TUMOR OF BONE: ICD-10-CM

## 2024-05-21 DIAGNOSIS — I10 ESSENTIAL HYPERTENSION: ICD-10-CM

## 2024-05-21 DIAGNOSIS — C7B.8 METASTATIC MALIGNANT NEUROENDOCRINE TUMOR TO LIVER: ICD-10-CM

## 2024-05-21 DIAGNOSIS — D49.9 IMMUNODEFICIENCY SECONDARY TO NEOPLASM: ICD-10-CM

## 2024-05-21 DIAGNOSIS — R80.9 CONTROLLED TYPE 2 DIABETES MELLITUS WITH MICROALBUMINURIA, WITHOUT LONG-TERM CURRENT USE OF INSULIN: ICD-10-CM

## 2024-05-21 DIAGNOSIS — E11.29 CONTROLLED TYPE 2 DIABETES MELLITUS WITH MICROALBUMINURIA, WITHOUT LONG-TERM CURRENT USE OF INSULIN: ICD-10-CM

## 2024-05-21 DIAGNOSIS — I50.9 CHRONIC CONGESTIVE HEART FAILURE, UNSPECIFIED HEART FAILURE TYPE: ICD-10-CM

## 2024-05-21 DIAGNOSIS — C7A.012 MALIGNANT CARCINOID TUMOR OF ILEUM: Primary | ICD-10-CM

## 2024-05-21 PROCEDURE — 1101F PT FALLS ASSESS-DOCD LE1/YR: CPT | Mod: HCNC,CPTII,S$GLB, | Performed by: PHYSICIAN ASSISTANT

## 2024-05-21 PROCEDURE — 99215 OFFICE O/P EST HI 40 MIN: CPT | Mod: HCNC,S$GLB,, | Performed by: PHYSICIAN ASSISTANT

## 2024-05-21 PROCEDURE — 1160F RVW MEDS BY RX/DR IN RCRD: CPT | Mod: HCNC,CPTII,S$GLB, | Performed by: PHYSICIAN ASSISTANT

## 2024-05-21 PROCEDURE — 3288F FALL RISK ASSESSMENT DOCD: CPT | Mod: HCNC,CPTII,S$GLB, | Performed by: PHYSICIAN ASSISTANT

## 2024-05-21 PROCEDURE — 1159F MED LIST DOCD IN RCRD: CPT | Mod: HCNC,CPTII,S$GLB, | Performed by: PHYSICIAN ASSISTANT

## 2024-05-21 PROCEDURE — 1126F AMNT PAIN NOTED NONE PRSNT: CPT | Mod: HCNC,CPTII,S$GLB, | Performed by: PHYSICIAN ASSISTANT

## 2024-05-21 PROCEDURE — 1157F ADVNC CARE PLAN IN RCRD: CPT | Mod: HCNC,CPTII,S$GLB, | Performed by: PHYSICIAN ASSISTANT

## 2024-05-21 PROCEDURE — G2211 COMPLEX E/M VISIT ADD ON: HCPCS | Mod: HCNC,S$GLB,, | Performed by: PHYSICIAN ASSISTANT

## 2024-05-21 PROCEDURE — 63600175 PHARM REV CODE 636 W HCPCS: Mod: JZ,JG,HCNC | Performed by: PHYSICIAN ASSISTANT

## 2024-05-21 PROCEDURE — 96372 THER/PROPH/DIAG INJ SC/IM: CPT | Mod: HCNC

## 2024-05-21 PROCEDURE — 99999 PR PBB SHADOW E&M-EST. PATIENT-LVL IV: CPT | Mod: PBBFAC,HCNC,, | Performed by: PHYSICIAN ASSISTANT

## 2024-05-21 RX ORDER — LANREOTIDE ACETATE 120 MG/.5ML
120 INJECTION SUBCUTANEOUS
Status: DISCONTINUED | OUTPATIENT
Start: 2024-05-21 | End: 2024-05-21 | Stop reason: HOSPADM

## 2024-05-21 RX ADMIN — LANREOTIDE ACETATE 120 MG: 120 INJECTION SUBCUTANEOUS at 10:05

## 2024-05-21 NOTE — PROGRESS NOTES
"Subjective:      Patient ID: Shahram Hills is an 83 y.o. female.     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 Ms. Hills is an 77 yo woman who initially saw me on 6/7/18 for further evaluation of neuroendocrine tumor. She initially presented with diarrhea, abdominal discomfort, weight loss. She was evaluated at [facility] and underwent workup below:  US abdomen on 3/14/18 showed numerous bilobar mixed echogenicity and mildly vascular hepatic lesions. Cholelithiasis without e/o acute cholecystitis.   MRI abdomen on 3/30/2018 showed innumerable hepatic metastases. L3 vertebral body metastasis with soft tissue component along the right margin of the L3 and upper L4 vertebral bodies, filling the right L3/4 neural foramen, and extending into the anterior aspect of the spinal canal on the right at the L3 and upper T4 levels. Associated with mild spinal canal narrowing.  CT chest on 4/6/2018 showed one lucent nodule measuring 7 mm in the T7 vertebral body, most likely representing metastatic disease.    EGD on 5/4/18 showed normal duodenum. Schatzki's ring in the lower third of the esophagus. Grade 1 esophagitis in the GE junction c/w mild distal esophagitis. Congestion and erythema in the antrum, pre-pyloric region and stomach body c/w gastritis. Biopsy of the stomach antrum showed mild chronic gastritis, neg for H. pylori.   Labs on 5/9/2018: WBC 6.9, H/H 11.6/34.3, MCV 87.5, plt count 279. On 3/9/18: Vit B12 level 173, folic acid 6.6  She was started on oral vit B12 and underwent a liver biopsy on 5/18/18. Pathology showed "metastatic well differentiated neuroendocrine tumor. Ki67 3%"     She saw me on 6/7/18 and complained of weight loss of 26 lbs over the past 3-4 months, also has diarrhea. No flushing, wheezing, palpitations. Has been having pain in " "right flank radiating down the right leg. No tingling/numbness, weakness. She can hold her bladder but when she urinates her diarrhea would come out as well. Started on dex 4 mg q6h the evening of 6/7/18.      2. MRI spine on 6/8/18 showed "Marrow replacing metastatic lesion of the L3 vertebral body with associated extra osseous expansion and complete effacement of the right L3-L4 neural foramina and additional effacement of the right lateral recess.  There is additional lateral extension and abutment of the right psoas muscle.  Superimposed degenerative change at this level results in mild spinal canal stenosis." I sent the patient to ED on 6/9/18 where she was evaluated by neurosurgery. Neurosurgery did not feel she was a candidate for surgical intervention.      3. Seen by Dr. Adames on 6/11/18. palliative radiation to the area of the L3 metastasis 6/18/18-6/29/18   4. Gallium study on 6/13/18: Distal ileal primary neoplasm consistent with a carcinoid.  There is an adjacent metastatic lymph node, diffuse liver metastases, and multiple bone metastases. Index primary neoplasm SUV max 33.13. Adjacent lymph node SUV max 23. L3 bone metastasis SUV max 36.86. Left lobe SUV max 46.13   5. Lanreotide every 4 weeks started on 6/22/18  6. TACE to the right hepatic lobe on 2/14/19. TACE to the left hepatic lobe on 3/21/19.   7. TACE to the right hepatic lobe on 2/11/22. S/p conventional chemoembolization performed of the segment 2, 3, 4 branch of the left hepatic artery and segment 8 branch of the left hepatic artery on 4/22/22.  8. Gallium PET 11/1/22 showed progression. Discussed PRRT. PRRT #1 on 12/14/2022. Zometa every 3 months started 12/27/22. PRRT #2 on 2/8/23. #3 on 4/12/23. #4 on 6/14/23.      History of Present Illness:   Ms. Hills returns for follow up. Feeling well overall. Occasionally feels bad in the morning when waking up but improves after moving around and eating. Appetite is stable. She has actually " gained a pound for this visit. Diarrhea controlled, using Xermelo TID. No other concerns or complaints today. Going to [facility].     ECO. Presents with her sister today     ROS:   See HPI. Otherwise negative.      Physical Examination:   Vital signs reviewed.   Gen: well hydrated, well developed, in no acute distress.  HEENT: normocephalic, anicteric, EOMI  Neck: supple, no JVD  Lungs: CTAB, no wheezes or rales  Heart: regular rate, regular pulse, no M/R/G  Abdomen: soft, no tenderness, non-distended.   Ext: b/l LE  no edema  Neuro: alert and oriented x 4, no focal neuro deficit  Skin: no rash, open wounds or ulcers  Psych: pleasant and appropriate mood and affect     Objective:      Laboratory Data:  Labs reviewed. Last 5-HIAA stable at 115 during previous draw. Bio-markers pending from today.     Imaging Data:     MRI abdomen 22:  1. History of neuroendocrine malignancy.  Numerous hepatic lesions, some remaining stable while others have mildly enlarged consistent with disease progression.  Stable osseous lesion in the L3 vertebral body.  2. Cholelithiasis and stable mild dilatation of the common bile duct and central intrahepatic biliary duct dilatation.  Of note, there is mass effect on the midportion of the common bile duct by dominant left hepatic lobe lesion.  3. Additional findings detailed above.  RECIST SUMMARY:     Date of prior examination for comparison: 2022     Lesion 1: Left hepatic lobe.  7.1 cm.  Series 9 image 52.  Prior measurement 6.3 cm.     Lesion 2: Right hepatic lobe lateral.  3.1 cm.  Series 9 image 30.  Prior measurement 3.2 cm.     Lesion 3: Right hepatic dome.  5.1 cm.  Series 9 image 19.  Prior measurement 4.4 cm.    Gallium PET 22:     Interval development of somatostatin tracer avid lesions in the skull, concerning for new metastatic lesions.     Numerous tracer avid hypoattenuating masses throughout the liver with increased SUV max compared to prior exam.     Tracer  avid lesions in the C7 and L3 vertebral bodies, sternum, distal ileum and mesenteric node are similar to prior exam.     Judged by tracer avidity of the patient's tumor burden, PRRT would be a consideration if clinically indicated.     Gallium PET 7/12/23:  Impression:     In this patient with carcinoid tumor of the ileum, there is overall similar distribution of tracer avid disease involving distal ileum, liver, mesenteric lymph node, and bones.  Index lesions as above.  No definite new tracer avid lesions.    Copper PET 12/29/23:  FINDINGS:  Quality of the study: Adequate.     SUV measurements may not be directly comparable with those made on Ga-68 DOTATATE PET/CT.     In the head and neck, there is physiologic distribution in the pituitary, salivary, and thyroid glands, and there are no tracer avid lesions suspicious for malignancy.     In the chest, there are no tracer avid lesions suspicious for malignancy.  Stable right upper lobe subcentimeter pulmonary nodule, below the size threshold for PET.     In the abdomen and pelvis, there is physiologic uptake in the pancreatic uncinate process, spleen, adrenal glands and  tract.     There are numerous hypodense tracer avid lesions throughout the liver, similar to prior exam.  Index left hepatic lobe lesion measures 5.1 x 5.0 cm (previously 5.1 x 5.0 cm) with maximum SUV of 58 (previously 43).     Focal uptake within the distal ileum with maximum SUV of 22 (previously 17).  Image 239.     Tracer avid mesenteric lymph node measuring 1.5 x 1.4 cm (previously 1.5 cm) with maximum SUV of 21 (previously 22).  Image 222.     In the bones, there is stable distribution of numerous scattered tracer avid lesions.     *Right calvarial lesion near the vertex with SUV max 5 (fused image 12).  *Right C6 vertebral body with SUV max 5.5 (fused image 74).  *Left scapular lesion with SUV max 2.7 (fused image 88).  *Right sternal lesion with SUV max 8.6 (fused image 103).  *Left  anterior rib lesion with SUV max 2.1 (fused image 109).  *L3 vertebral body lesion with SUV max 9.2 (fused image 192).     Impression:     Similar distribution of tracer avid disease involving the distal ileum, liver, mesenteric lymph nodes, and bones.  No definite new tracer avid lesions.     MRI 12/29/23  Impression:     1. In this patient with metastatic neuroendocrine tumor there is interval decreased size of index left hepatic lobe lesion and stable size of multiple additional hepatic lesions.  2. Stable metastatic lesion of the L3 vertebral body and central mesentery.  3. Cholelithiasis without acute cholecystitis.  4. Stable mild intrahepatic and extrahepatic ductal dilation, probably related to mass effect on the midportion of the common bile duct by left hepatic lobe lesion.  5. Additional findings, as above.    Assessment and Plan:      1. Malignant carcinoid tumor of ileum    2. 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 Secondary neuroendocrine tumor of bone    4. Metastasis to spinal column    5. 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 Carcinoid syndrome    8. Controlled type 2 diabetes mellitus with microalbuminuria, without long-term current use of insulin    9. Essential hypertension            1-6  - Ms Jeana is an 84 yo woman with well differentiated low-grade neuroendocrine tumor of the ileum with mets to liver and bones, diagnosed on 5/18/2018. Started lanreotide every 4 weeks on 6/22/2018. S/p TACE to the right hepatic lobe on 2/14/19. TACE to the left hepatic lobe on 3/21/19.   - CT/MRI 1/12/22 showed mild progression in the liver. S/p TACE on 2/11/22 and 4/22/22   - Gallium PET 11/1/22 showed progression. Discussed PRRT. PRRT #1 on 12/14/2022. Completed 4 cycles on 6/14/23. Zometa every 3 months started 12/27/22. Last zometa 9/8/2023    - doing well. PET in Dec 2023 showed stable disease  - symptoms well controlled. Continue to  monitor.   - bone density scan was reviewed and good.   - c/w lanreotide every 4 weeks and zometa every 3 months. Next dose due in June 2024.     - RTC in 4 weeks with lab work, scans and treatment discussion    7.  - better after xermelo and PRRT  - c/w lanreotide every 4 weeks. Due today.   - c/w xermelo tid  - could not tolerate octreotide    8.  - BS controlled  - f/u with PCP    9.  - BP mildly elevated in clinic today. Asymptomatic.   - c/w current medication    10.  - last echo in 2022 showed LVEF 40%  - f/u with cardiology    Patient is in agreement with the proposed treatment plan. All questions were answered to the patient's satisfaction. Pt knows to call clinic if anything is needed before the next clinic visit.    Route Chart for Scheduling    Med Onc Chart Routing      Follow up with physician . Keep appointments as scheduled.   Follow up with JUNAID    Infusion scheduling note    Injection scheduling note    Labs CBC and CMP   Scheduling:  Preferred lab:  Lab interval:     Imaging    Pharmacy appointment    Other referrals                Supportive Plan Information  OP ZOLEDRONIC ACID (ZOMETA) Q4W   Sebastian Granados MD   Upcoming Treatment Dates - OP ZOLEDRONIC ACID (ZOMETA) Q4W    6/19/2024       Medications       zoledronic acid (ZOMETA) 4 mg in sodium chloride 0.9% 100 mL IVPB  9/17/2024       Medications       zoledronic acid (ZOMETA) 4 mg in sodium chloride 0.9% 100 mL IVPB    Therapy Plan Information  LANREOTIDE  5. Medications  lanreotide injection 120 mg  120 mg, Subcutaneous, Every visit

## 2024-06-04 DIAGNOSIS — E11.29 CONTROLLED TYPE 2 DIABETES MELLITUS WITH MICROALBUMINURIA, WITHOUT LONG-TERM CURRENT USE OF INSULIN: ICD-10-CM

## 2024-06-04 DIAGNOSIS — R80.9 CONTROLLED TYPE 2 DIABETES MELLITUS WITH MICROALBUMINURIA, WITHOUT LONG-TERM CURRENT USE OF INSULIN: ICD-10-CM

## 2024-06-04 RX ORDER — METFORMIN HYDROCHLORIDE 500 MG/1
TABLET, EXTENDED RELEASE ORAL
Qty: 180 TABLET | Refills: 0 | Status: SHIPPED | OUTPATIENT
Start: 2024-06-04

## 2024-06-04 NOTE — TELEPHONE ENCOUNTER
Refill Routing Note   Medication(s) are not appropriate for processing by [facility] for the following reason(s):        Drug-disease interaction    [facility] action(s):  Defer        Medication Therapy Plan: FLOS; metFORMIN and HCC (hepatocellular carcinoma)    Pharmacist review requested: Yes     Appointments  past 12m or future 3m with PCP    Date Provider   Last Visit   5/10/2024 Trixie Xie MD   Next Visit   8/20/2024 Trixie Xie MD   ED visits in past 90 days: 0        Note composed:4:39 PM 06/04/2024

## 2024-06-04 NOTE — TELEPHONE ENCOUNTER
Care Due:                  Date            Visit Type   Department     Provider  --------------------------------------------------------------------------------                                EP -                              PRIMARY      [facility] INTERNAL  Last Visit: 05-      CARE ([facility])   MEDICINE       Trixie Xie                              EP -                              PRIMARY      [facility] INTERNAL  Next Visit: 08-      CARE ([facility])   MEDICINE       Trixie Xie                                                            Last  Test          Frequency    Reason                     Performed    Due Date  --------------------------------------------------------------------------------    HBA1C.......  6 months...  metFORMIN................  02- 08-    Lipid Panel.  12 months..  ezetimibe................  07- 07-    Health [facility] Embedded Care Due Messages. Reference number: [id number].   6/04/2024 4:07:32 PM CDT

## 2024-06-05 NOTE — TELEPHONE ENCOUNTER
Refill Decision Note   Shahram Andersoneneaux  is requesting a refill authorization.  Brief Assessment and Rationale for Refill:  Approve     Medication Therapy Plan:  FLOS      Pharmacist review requested: Yes   Extended chart review required: Yes   Comments:     Note composed:7:24 PM 06/04/2024

## 2024-06-10 ENCOUNTER — TELEPHONE (OUTPATIENT)
Dept: HEMATOLOGY/ONCOLOGY | Facility: CLINIC | Age: 84
End: 2024-06-10
Payer: MEDICARE

## 2024-06-10 NOTE — TELEPHONE ENCOUNTER
----- Message from Karen Gomez sent at 6/10/2024 11:00 AM CDT -----  Regarding: Consult/Advisory  Contact: Socrates Miller @ [employer]  Consult/Advisory    Name Of Caller: Socrates Miller @ [employer]      Contact Preference: [phone]    Nature of call: Socrates Miller @ [employer] calling to verify if a fax was received from first 5/28/2024 and then 5/30/2024 and have not gotten a response yet for home infusion request for this patient. Requesting a call back.

## 2024-06-13 ENCOUNTER — HOSPITAL ENCOUNTER (OUTPATIENT)
Dept: RADIOLOGY | Facility: HOSPITAL | Age: 84
Discharge: HOME | End: 2024-06-13
Attending: INTERNAL MEDICINE
Payer: MEDICARE

## 2024-06-13 DIAGNOSIS — C7A.012 MALIGNANT CARCINOID TUMOR OF ILEUM: ICD-10-CM

## 2024-06-13 PROCEDURE — 25500020 PHARM REV CODE 255: Mod: HCNC | Performed by: INTERNAL MEDICINE

## 2024-06-13 PROCEDURE — A9592 HC COPPER CU-64, DOTATE, DX, PER 1 MCI: HCPCS | Mod: HCNC | Performed by: INTERNAL MEDICINE

## 2024-06-13 PROCEDURE — 78815 PET IMAGE W/CT SKULL-THIGH: CPT | Mod: TC,HCNC

## 2024-06-13 PROCEDURE — 78815 PET IMAGE W/CT SKULL-THIGH: CPT | Mod: 26,PS,HCNC, | Performed by: STUDENT IN AN ORGANIZED HEALTH CARE EDUCATION/TRAINING PROGRAM

## 2024-06-13 PROCEDURE — 74183 MRI ABD W/O CNTR FLWD CNTR: CPT | Mod: 26,HCNC,, | Performed by: INTERNAL MEDICINE

## 2024-06-13 PROCEDURE — A9585 GADOBUTROL INJECTION: HCPCS | Mod: HCNC | Performed by: INTERNAL MEDICINE

## 2024-06-13 PROCEDURE — 74183 MRI ABD W/O CNTR FLWD CNTR: CPT | Mod: TC,HCNC

## 2024-06-13 RX ORDER — GADOBUTROL 604.72 MG/ML
10 INJECTION INTRAVENOUS
Status: COMPLETED | OUTPATIENT
Start: 2024-06-13 | End: 2024-06-13

## 2024-06-13 RX ADMIN — GADOBUTROL 10 ML: 604.72 INJECTION INTRAVENOUS at 10:06

## 2024-06-13 RX ADMIN — COPPER CU 64 DOTATATE 4.1 MILLICURIE: 1 INJECTION, SOLUTION INTRAVENOUS at 12:06

## 2024-06-17 ENCOUNTER — TUMOR BOARD CONFERENCE (OUTPATIENT)
Dept: HEMATOLOGY/ONCOLOGY | Facility: CLINIC | Age: 84
End: 2024-06-17
Payer: MEDICARE

## 2024-06-17 NOTE — PROGRESS NOTES
[facility]      NEUROENDOCRINE MULTIDISCIPLINARY TUMOR BOARD  _____________________________________________________________________    PRESENTER:   Sebastian Granados MD    REASON FOR PRESENTATION:  Scan Review    ATTENDEES:   Gastroenterology - Not Present  Interventional Radiology - TEENA Tyler  Nuclear Medicine - Deb Sahu MD  Nursing - NET TB Nursing: Elva Dunn, RN, Arlin Gan, RN, Nikki Kay, RN, and Scott Borges, RN  Oncology - Sebastian Granados MD and Moses Beckman MD  Palliative Medicine - Not Present  Pathology -  Darling Ledesma MD and Yogesh Sage MD  Research - Not Present  Surgery - MD Charels Mcelroy MD Russell Brown, MD    PATIENT STATUS:  Established Patient    PATIENT SUMMARY:  Past Medical History:   Diagnosis Date    Anemia 07/11/2018    B12 deficiency     Depression     per pcp note 7/2017 and given prozac and diazepam     Hyperlipidemia     Systolic heart failure     Weight loss     reviewed prior pcp Dr. Russo notes 2017 - labs reviewed: cmp wnl x tbili 1.5, tsh wnl, A1c 5.0, cbc wnl,D 36, hiv neg, hep c neg, hep b surg ag neg        Past Surgical History:   Procedure Laterality Date    EMBOLIZATION N/A 02/14/2019    Procedure: EMBOLIZATION, BLOOD VESSEL;  Surgeon: [facility] Diagnostic Provider;  Location: [facility] OR [address] [place] [address];  Service: Radiology;  Laterality: N/A;  Room 189/[place]    EMBOLIZATION N/A 03/21/2019    Procedure: EMBOLIZATION, BLOOD VESSEL;  Surgeon: [facility] Diagnostic Provider;  [facility];  Service: Radiology;  Laterality: N/A;  Room 189/[place]    ESOPHAGOGASTRODUODENOSCOPY  05/04/2018    esophageal ring, grade 1 esophagitis, gastritis     EYE SURGERY Bilateral     cataract removal    HYSTERECTOMY      fibroids       TUMOR MARKERS:  5-HIAA, Plasma Ref Range: up to 22 ng/mL  Recent Labs   Lab 12/01/23  1021 12/29/23  0842 01/30/24  0919 02/27/24  1054 03/26/24  0850 04/23/24  1105   5-HIAA, Plasma (Neuroend) 147 H 107 H 86 H  115 H 77 H 81 H     Chromogranin A Ref Range: <93 ng/mL  Recent Labs   Lab 08/11/23  0953 09/08/23  1240 10/03/23  1200 11/03/23  1238 04/23/24  1105 05/21/24  0853   Chromogranin A 1330 H 1627 H 1587 H 1374 H 1055 H 1109 H     Gastrin Ref Range: <100 pg/mL      Neurokinin A Ref Range: 0 - 40 pg/mL      Pancreastatin Ref Range: 10 - 135 pg/mL  Recent Labs   Lab 12/29/23  0842 01/30/24  0919 02/27/24  1054 03/26/24  0850 04/23/24  1105 05/21/24  0853   Pancreastatin 7325 5371* 3064* 4692 3055 3908     Pancreatic Polypeptide Ref Range: >314 pg/mL      Serotonin Ref Range: <=230 ng/mL  Recent Labs   Lab 12/29/23  0842 01/30/24  0919 02/27/24  1054 03/26/24  0850 04/23/24  1105 05/21/24  0853   Serotonin 1320 H 1320 H 1210 H 1030 H 1140 H 1290 H           Latest Ref Rng & Units 6/13/2024    11:42 AM 5/21/2024    10:19 AM 5/21/2024     8:53 AM   Neuroendocrine Labs   SEROTONIN <=230 ng/mL   1290        -------------------ADDITIONAL INFORMATION-------------------  This test was developed and its performance characteristics   determined by [facility] in a manner consistent with CLIA   requirements. This test has not been cleared or approved by   the U.S. Food and Drug Administration.    Test Performed by:  [facility]  [address] [place] [address]  : Yogesh Roque M.D. Ph.D.; CLIA# [id number]     PANCREASTATIN 10 - 135 pg/mL   3908        *Result verified by repeat analysis.    -------------------ADDITIONAL INFORMATION-------------------  This radioimmunoassay was developed and its  performance characteristics determined by  [employer]. It has not been  cleared or approved by the FDA and such  approval or clearance is not required at this  time. Values obtained with different methods,  different laboratories or with kits cannot  be used interchangeably with the results on  this report. The results cannot be interpreted  as absolute  evidence of the presence or absence  of malignant disease.    Test Performed by:  [facility] [place] [facility]  [address] [place] [address]     WBC 3.90 - 12.70 K/uL 4.46   5.03    RBC 4.00 - 5.40 M/uL 3.86   3.71    HGB 12.0 - 16.0 g/dL 12.3   12.2    HCT 37.0 - 48.5 % 38.3   36.8    MCV 82 - 98 fL 99   99    MCH 27.0 - 31.0 pg 31.9   32.9    MCHC 32.0 - 36.0 g/dL 32.1   33.2    RDW 11.5 - 14.5 % 12.7   12.3    PLATLETS 150 - 450 K/uL 171   156    MPV 9.2 - 12.9 fL 9.9   9.7    GRAN # 1.8 - 7.7 K/uL 2.8   3.5    LYMPH # 1.0 - 4.8 K/uL 1.2   1.0    MONO # 0.3 - 1.0 K/uL 0.4   0.4    EOS # 0.0 - 0.5 K/uL 0.0   0.0    BASO # 0.00 - 0.20 K/uL 0.01   0.02    GRAN % 38.0 - 73.0 % 63.8   69.9    LYMPH % 18.0 - 48.0 % 25.8   20.5    MONO % 4.0 - 15.0 % 9.6   8.2    EOS % 0.0 - 8.0 % 0.2   0.6    BASO % 0.0 - 1.9 % 0.2   0.4    DIFF METHOD  Automated   Automated    GLUCOSE 70 - 110 mg/dL 97   265    BUN 8 - 23 mg/dL 12   15    CREATININE 0.5 - 1.4 mg/dL 0.7   0.7     - 145 mmol/L 141   139    K 3.5 - 5.1 mmol/L 4.1   4.1    CHLORIDE 95 - 110 mmol/L 104   104    CO2 23 - 29 mmol/L 26   26    CALCIUM 8.7 - 10.5 mg/dL 9.7   9.3    PROTEIN, TOTAL 6.0 - 8.4 g/dL 6.8   6.5    ALBUMIN 3.5 - 5.2 g/dL 3.6   3.4    TOTAL BILIRUBIN 0.1 - 1.0 mg/dL 0.4        For infants and newborns, interpretation of results should be based  on gestational age, weight and in agreement with clinical  observations.    Premature Infant recommended reference ranges:  Up to 24 hours.............<8.0 mg/dL  Up to 48 hours............<12.0 mg/dL  3-5 days..................<15.0 mg/dL  6-29 days.................<15.0 mg/dL    0.3        For infants and newborns, interpretation of results should be based  on gestational age, weight and in agreement with clinical  observations.    Premature Infant recommended reference ranges:  Up to 24 hours.............<8.0 mg/dL  Up to 48 hours............<12.0 mg/dL  3-5 days..................<15.0 mg/dL  6-29  days.................<15.0 mg/dL     ALK PHOSPHATASE 55 - 135 U/L 50   50    SGOT (AST) 10 - 40 U/L 27   21    SGPT (ALT) 10 - 44 U/L 23   19    Weight   111 lbs 11 oz      ____________________________________________________________________    DISCUSSION: 85 yo woman with well diff grade 1 NET with liver and bone Mets diagnosed in 2018. On lanreotide. Had TACE in the past. Completed PRRT in June 2023. Review MRI and PET.    INT RAD: Known disease live & none, similar distribution.    NUC MED: Stable tracer avid disease in liver, bowel, and bones.    BOARD RECOMMENDATIONS:  Continue lanreotide, repeat MRI abdomen 6 months.

## 2024-06-18 ENCOUNTER — OFFICE VISIT (OUTPATIENT)
Dept: HEMATOLOGY/ONCOLOGY | Facility: CLINIC | Age: 84
End: 2024-06-18
Payer: MEDICARE

## 2024-06-18 ENCOUNTER — INFUSION (OUTPATIENT)
Dept: INFUSION THERAPY | Facility: HOSPITAL | Age: 84
End: 2024-06-18
Payer: MEDICARE

## 2024-06-18 VITALS
SYSTOLIC BLOOD PRESSURE: 111 MMHG | WEIGHT: 112.31 LBS | TEMPERATURE: 98 F | DIASTOLIC BLOOD PRESSURE: 73 MMHG | RESPIRATION RATE: 14 BRPM | OXYGEN SATURATION: 98 % | HEART RATE: 60 BPM | BODY MASS INDEX: 19.28 KG/M2

## 2024-06-18 VITALS
TEMPERATURE: 98 F | RESPIRATION RATE: 18 BRPM | HEART RATE: 66 BPM | SYSTOLIC BLOOD PRESSURE: 134 MMHG | BODY MASS INDEX: 19.28 KG/M2 | WEIGHT: 112.31 LBS | DIASTOLIC BLOOD PRESSURE: 80 MMHG

## 2024-06-18 DIAGNOSIS — C7B.8 METASTATIC MALIGNANT NEUROENDOCRINE TUMOR TO LIVER: Primary | ICD-10-CM

## 2024-06-18 DIAGNOSIS — D84.821 IMMUNODEFICIENCY DUE TO DRUGS: ICD-10-CM

## 2024-06-18 DIAGNOSIS — Z79.899 IMMUNODEFICIENCY DUE TO DRUGS: ICD-10-CM

## 2024-06-18 DIAGNOSIS — C79.51 METASTASIS TO SPINAL COLUMN: ICD-10-CM

## 2024-06-18 DIAGNOSIS — E34.0 CARCINOID SYNDROME: ICD-10-CM

## 2024-06-18 DIAGNOSIS — D49.9 IMMUNODEFICIENCY SECONDARY TO NEOPLASM: ICD-10-CM

## 2024-06-18 DIAGNOSIS — I50.9 CHRONIC CONGESTIVE HEART FAILURE, UNSPECIFIED HEART FAILURE TYPE: ICD-10-CM

## 2024-06-18 DIAGNOSIS — C7A.012 MALIGNANT CARCINOID TUMOR OF ILEUM: ICD-10-CM

## 2024-06-18 DIAGNOSIS — R80.9 CONTROLLED TYPE 2 DIABETES MELLITUS WITH MICROALBUMINURIA, WITHOUT LONG-TERM CURRENT USE OF INSULIN: ICD-10-CM

## 2024-06-18 DIAGNOSIS — C7B.8 METASTATIC MALIGNANT NEUROENDOCRINE TUMOR TO LIVER: ICD-10-CM

## 2024-06-18 DIAGNOSIS — I10 ESSENTIAL HYPERTENSION: ICD-10-CM

## 2024-06-18 DIAGNOSIS — D84.81 IMMUNODEFICIENCY SECONDARY TO NEOPLASM: ICD-10-CM

## 2024-06-18 DIAGNOSIS — E11.29 CONTROLLED TYPE 2 DIABETES MELLITUS WITH MICROALBUMINURIA, WITHOUT LONG-TERM CURRENT USE OF INSULIN: ICD-10-CM

## 2024-06-18 DIAGNOSIS — C7A.8 NEUROENDOCRINE CARCINOMA METASTATIC TO BONE: Primary | ICD-10-CM

## 2024-06-18 DIAGNOSIS — C7B.8 NEUROENDOCRINE CARCINOMA METASTATIC TO BONE: Primary | ICD-10-CM

## 2024-06-18 DIAGNOSIS — C7B.8 SECONDARY NEUROENDOCRINE TUMOR OF BONE: ICD-10-CM

## 2024-06-18 PROCEDURE — 96374 THER/PROPH/DIAG INJ IV PUSH: CPT

## 2024-06-18 PROCEDURE — 96372 THER/PROPH/DIAG INJ SC/IM: CPT | Mod: 59

## 2024-06-18 PROCEDURE — 99215 OFFICE O/P EST HI 40 MIN: CPT | Mod: S$GLB,,, | Performed by: INTERNAL MEDICINE

## 2024-06-18 PROCEDURE — G2211 COMPLEX E/M VISIT ADD ON: HCPCS | Mod: S$GLB,,, | Performed by: INTERNAL MEDICINE

## 2024-06-18 PROCEDURE — 25000003 PHARM REV CODE 250: Performed by: INTERNAL MEDICINE

## 2024-06-18 PROCEDURE — 3288F FALL RISK ASSESSMENT DOCD: CPT | Mod: CPTII,S$GLB,, | Performed by: INTERNAL MEDICINE

## 2024-06-18 PROCEDURE — 1126F AMNT PAIN NOTED NONE PRSNT: CPT | Mod: CPTII,S$GLB,, | Performed by: INTERNAL MEDICINE

## 2024-06-18 PROCEDURE — 1101F PT FALLS ASSESS-DOCD LE1/YR: CPT | Mod: CPTII,S$GLB,, | Performed by: INTERNAL MEDICINE

## 2024-06-18 PROCEDURE — 1160F RVW MEDS BY RX/DR IN RCRD: CPT | Mod: CPTII,S$GLB,, | Performed by: INTERNAL MEDICINE

## 2024-06-18 PROCEDURE — 99999 PR PBB SHADOW E&M-EST. PATIENT-LVL IV: CPT | Mod: PBBFAC,,, | Performed by: INTERNAL MEDICINE

## 2024-06-18 PROCEDURE — 1157F ADVNC CARE PLAN IN RCRD: CPT | Mod: CPTII,S$GLB,, | Performed by: INTERNAL MEDICINE

## 2024-06-18 PROCEDURE — 1159F MED LIST DOCD IN RCRD: CPT | Mod: CPTII,S$GLB,, | Performed by: INTERNAL MEDICINE

## 2024-06-18 PROCEDURE — 63600175 PHARM REV CODE 636 W HCPCS: Mod: JZ,JG | Performed by: INTERNAL MEDICINE

## 2024-06-18 RX ORDER — SODIUM CHLORIDE 0.9 % (FLUSH) 0.9 %
10 SYRINGE (ML) INJECTION
Status: DISCONTINUED | OUTPATIENT
Start: 2024-06-18 | End: 2024-06-18 | Stop reason: HOSPADM

## 2024-06-18 RX ORDER — LANREOTIDE ACETATE 120 MG/.5ML
120 INJECTION SUBCUTANEOUS
Status: DISCONTINUED | OUTPATIENT
Start: 2024-06-18 | End: 2024-06-18 | Stop reason: HOSPADM

## 2024-06-18 RX ORDER — HEPARIN 100 UNIT/ML
500 SYRINGE INTRAVENOUS
Status: DISCONTINUED | OUTPATIENT
Start: 2024-06-18 | End: 2024-06-18 | Stop reason: HOSPADM

## 2024-06-18 RX ADMIN — SODIUM CHLORIDE: 0.9 INJECTION, SOLUTION INTRAVENOUS at 02:06

## 2024-06-18 RX ADMIN — ZOLEDRONIC ACID 3.3 MG: 4 INJECTION, SOLUTION, CONCENTRATE INTRAVENOUS at 02:06

## 2024-06-18 RX ADMIN — LANREOTIDE ACETATE 120 MG: 120 INJECTION SUBCUTANEOUS at 02:06

## 2024-06-18 NOTE — PLAN OF CARE
1435 pt tolerated lanreotide injection to right gluteal area and zometa infusion, pt to rtc 7/15/24, no distress noted upon d/c to home

## 2024-06-18 NOTE — PLAN OF CARE
1325 pt here for Lanreotide injection and Zometa infusion, labs, hx, meds, allergies reviewed, pt with no new complaints at this time, reclined in chair, continue to monitor

## 2024-06-18 NOTE — PROGRESS NOTES
"    Subjective:       Patient ID: Shahram Hills is an 84 y.o. female.     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 Ms. Hills is an 77 yo woman who initially saw me on 6/7/18 for further evaluation of neuroendocrine tumor. She initially presented with diarrhea, abdominal discomfort, weight loss. She was evaluated at [facility] and underwent workup below:  US abdomen on 3/14/18 showed numerous bilobar mixed echogenicity and mildly vascular hepatic lesions. Cholelithiasis without e/o acute cholecystitis.   MRI abdomen on 3/30/2018 showed innumerable hepatic metastases. L3 vertebral body metastasis with soft tissue component along the right margin of the L3 and upper L4 vertebral bodies, filling the right L3/4 neural foramen, and extending into the anterior aspect of the spinal canal on the right at the L3 and upper T4 levels. Associated with mild spinal canal narrowing.  CT chest on 4/6/2018 showed one lucent nodule measuring 7 mm in the T7 vertebral body, most likely representing metastatic disease.    EGD on 5/4/18 showed normal duodenum. Schatzki's ring in the lower third of the esophagus. Grade 1 esophagitis in the GE junction c/w mild distal esophagitis. Congestion and erythema in the antrum, pre-pyloric region and stomach body c/w gastritis. Biopsy of the stomach antrum showed mild chronic gastritis, neg for H. pylori.   Labs on 5/9/2018: WBC 6.9, H/H 11.6/34.3, MCV 87.5, plt count 279. On 3/9/18: Vit B12 level 173, folic acid 6.6  She was started on oral vit B12 and underwent a liver biopsy on 5/18/18. Pathology showed "metastatic well differentiated neuroendocrine tumor. Ki67 3%"     She saw me on 6/7/18 and complained of weight loss of 26 lbs over the past 3-4 months, also has diarrhea. No flushing, wheezing, palpitations. Has been having pain in right flank radiating " "down the right leg. No tingling/numbness, weakness. She can hold her bladder but when she urinates her diarrhea would come out as well. Started on dex 4 mg q6h the evening of 18.      2. MRI spine on 18 showed "Marrow replacing metastatic lesion of the L3 vertebral body with associated extra osseous expansion and complete effacement of the right L3-L4 neural foramina and additional effacement of the right lateral recess.  There is additional lateral extension and abutment of the right psoas muscle.  Superimposed degenerative change at this level results in mild spinal canal stenosis." I sent the patient to ED on 18 where she was evaluated by neurosurgery. Neurosurgery did not feel she was a candidate for surgical intervention.      3. Seen by Dr. dAames on 18. palliative radiation to the area of the L3 metastasis 18-18   4. 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  There is an adjacent metastatic lymph node, diffuse liver metastases, and multiple bone metastases. Index primary neoplasm SUV max 33.13. Adjacent lymph node SUV max 23. L3 bone metastasis SUV max 36.86. Left lobe SUV max 46.13   5. Lanreotide every 4 weeks started on 18  6. TACE to the right hepatic lobe on 19. TACE to the left hepatic lobe on 3/21/19.   7. TACE to the right hepatic lobe on 22. S/p conventional chemoembolization performed of the segment 2, 3, 4 branch of the left hepatic artery and segment 8 branch of the left hepatic artery on 22.  8. Gallium PET 22 showed progression. Discussed PRRT. PRRT #1 on 2022. Zometa every 3 months started 22. PRRT #2 on 23. #3 on 23. #4 on 23.      History of Present Illness:   Ms. Hills returns for follow up. Feeling well.    ECO     ROS:   See HPI. Otherwise negative.      Physical Examination:   Vital signs reviewed.   Gen: well hydrated, well developed, in no acute " distress.  HEENT: normocephalic, anicteric, PERRLA, EOMI  Neck: supple, no JVD, thyromegaly, cervical or supraclavicular LAD  Lungs: CTAB, no wheezes or rales  Heart: regular rate, regular pulse, no M/R/G  Abdomen: soft, no tenderness, non-distended, no hepatomegaly, no splenomegaly, no mass, or hernia.   Ext: b/l LE  no edema  Neuro: alert and oriented x 4, no focal neuro deficit  Skin: no rash, open wounds or ulcers  Psych: pleasant and appropriate mood and affect     Objective:      Laboratory Data:  Labs reviewed. Last 5-HIAA stable at 79    Imaging Data:     MRI abdomen 11/1/22:  1. History of neuroendocrine malignancy.  Numerous hepatic lesions, some remaining stable while others have mildly enlarged consistent with disease progression.  Stable osseous lesion in the L3 vertebral body.  2. Cholelithiasis and stable mild dilatation of the common bile duct and central intrahepatic biliary duct dilatation.  Of note, there is mass effect on the midportion of the common bile duct by dominant left hepatic lobe lesion.  3. Additional findings detailed above.  RECIST SUMMARY:     Date of prior examination for comparison: 05/16/2022     Lesion 1: Left hepatic lobe.  7.1 cm.  Series 9 image 52.  Prior measurement 6.3 cm.     Lesion 2: Right hepatic lobe lateral.  3.1 cm.  Series 9 image 30.  Prior measurement 3.2 cm.     Lesion 3: Right hepatic dome.  5.1 cm.  Series 9 image 19.  Prior measurement 4.4 cm.    Gallium PET 11/1/22:     Interval development of somatostatin tracer avid lesions in the skull, concerning for new metastatic lesions.     Numerous tracer avid hypoattenuating masses throughout the liver with increased SUV max compared to prior exam.     Tracer avid lesions in the C7 and L3 vertebral bodies, sternum, distal ileum and mesenteric node are similar to prior exam.     Judged by tracer avidity of the patient's tumor burden, PRRT would be a consideration if clinically indicated.      Gallium PET  7/12/23:  Impression:     In this patient with carcinoid tumor of the ileum, there is overall similar distribution of tracer avid disease involving distal ileum, liver, mesenteric lymph node, and bones.  Index lesions as above.  No definite new tracer avid lesions.    Copper PET 12/29/23:  FINDINGS:  Quality of the study: Adequate.     SUV measurements may not be directly comparable with those made on Ga-68 DOTATATE PET/CT.     In the head and neck, there is physiologic distribution in the pituitary, salivary, and thyroid glands, and there are no tracer avid lesions suspicious for malignancy.     In the chest, there are no tracer avid lesions suspicious for malignancy.  Stable right upper lobe subcentimeter pulmonary nodule, below the size threshold for PET.     In the abdomen and pelvis, there is physiologic uptake in the pancreatic uncinate process, spleen, adrenal glands and  tract.     There are numerous hypodense tracer avid lesions throughout the liver, similar to prior exam.  Index left hepatic lobe lesion measures 5.1 x 5.0 cm (previously 5.1 x 5.0 cm) with maximum SUV of 58 (previously 43).     Focal uptake within the distal ileum with maximum SUV of 22 (previously 17).  Image 239.     Tracer avid mesenteric lymph node measuring 1.5 x 1.4 cm (previously 1.5 cm) with maximum SUV of 21 (previously 22).  Image 222.     In the bones, there is stable distribution of numerous scattered tracer avid lesions.     *Right calvarial lesion near the vertex with SUV max 5 (fused image 12).  *Right C6 vertebral body with SUV max 5.5 (fused image 74).  *Left scapular lesion with SUV max 2.7 (fused image 88).  *Right sternal lesion with SUV max 8.6 (fused image 103).  *Left anterior rib lesion with SUV max 2.1 (fused image 109).  *L3 vertebral body lesion with SUV max 9.2 (fused image 192).     Impression:     Similar distribution of tracer avid disease involving the distal ileum, liver, mesenteric lymph nodes, and bones.   No definite new tracer avid lesions.       MRI 12/29/23  Impression:     1. In this patient with metastatic neuroendocrine tumor there is interval decreased size of index left hepatic lobe lesion and stable size of multiple additional hepatic lesions.  2. Stable metastatic lesion of the L3 vertebral body and central mesentery.  3. Cholelithiasis without acute cholecystitis.  4. Stable mild intrahepatic and extrahepatic ductal dilation, probably related to mass effect on the midportion of the common bile duct by left hepatic lobe lesion.  5. Additional findings, as above.    Copper PET 6/13/24:  Impression:     Similar distribution and uptake of tracer avid disease involving the distal ileum, liver, and bones. Index lesions as above.  No definite new tracer avid lesion.     MRI 6/13/24:  Impression:     History of metastatic small bowel carcinoid.     Thickened loop of small bowel in the midline pelvis in the location of the known primary tumor, but incompletely characterized on this exam.     Partially imaged mesenteric montserrat conglomerate in the left lower quadrant.     Numerous liver metastases.  Some have decreased in size and demonstrate increased central hypoenhancement, compatible with treatment response.  Others are new from 12/29/2023.     Unchanged osseous metastasis in the L3 vertebral body.     Other findings as described.    Assessment and Plan:      1. 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 Malignant carcinoid tumor of ileum    3. Secondary neuroendocrine tumor of bone    4. Metastasis to spinal column    5. 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 Carcinoid syndrome    8. Controlled type 2 diabetes mellitus with microalbuminuria, without long-term current use of insulin    9. Essential hypertension    10. Chronic congestive heart failure, unspecified heart failure type      1-6  - Ms Jeana is an 83 yo woman with well differentiated low-grade  neuroendocrine tumor of the ileum with mets to liver and bones, diagnosed on 5/18/2018. Started lanreotide every 4 weeks on 6/22/2018. S/p TACE to the right hepatic lobe on 2/14/19. TACE to the left hepatic lobe on 3/21/19.   - CT/MRI 1/12/22 showed mild progression in the liver. S/p TACE on 2/11/22 and 4/22/22   - Gallium PET 11/1/22 showed progression. Discussed PRRT. PRRT #1 on 12/14/2022. Completed 4 cycles on 6/14/23. Zometa every 3 months started 12/27/22. Last zometa 9/8/2023  - I have personally reviewed her case at tumor board. PET showed stable disease. MRI showed large lesions continue to improve compared to 6 months ago. There are a few new small lesions in the liver. Recc MRI in 3 months. Discussed with patient and family. They understand and agree with the plan   - c/w lanreotide every 4 weeks and zometa every 3 months. Scheduled for zometa today. Next due in Sep 2024  - RTC in 4 weeks     7.  - better after xermelo and PRRT  - c/w lanreotide every 4 weeks.   - c/w xermelo tid  - could not tolerate octreotide    8.  - BS controlled  - f/u with PCP    9.  - BP controlled  - c/w current medication    10.  - f/u with cardiology. Scheduled for echo in July    Sebastian Granados MD  Hematology and Medical Oncology  [facility]      Route Chart for Scheduling    Med Onc Chart Routing      Follow up with physician . See JUNAID in 4 weeks with labs then lanreotide. see me in 8 weeks with labs then lanreotide. see me in 12 weeks with labs and MRI 1 week prior to return. see me then lanreotide and zometa   Follow up with JUNAID    Infusion scheduling note   zometa every 3 months. next due in Sep 2024   Injection scheduling note lanreotide every 4 weeks   Labs CBC and CMP   Scheduling:  Preferred lab:  Lab interval:  serotonin, pancreastatin, 5-HIAA   Imaging MRI   in 3 months   Pharmacy appointment    Other referrals            Supportive Plan Information  OP ZOLEDRONIC ACID (ZOMETA) Q4W   Sebastian Granados MD    Upcoming Treatment Dates - OP ZOLEDRONIC ACID (ZOMETA) Q4W    6/19/2024       Medications       zoledronic acid (ZOMETA) 4 mg in sodium chloride 0.9% 100 mL IVPB  9/17/2024       Medications       zoledronic acid (ZOMETA) 4 mg in sodium chloride 0.9% 100 mL IVPB    Therapy Plan Information  LANREOTIDE  5. Medications  lanreotide injection 120 mg  120 mg, Subcutaneous, Every visit

## 2024-06-24 DIAGNOSIS — R80.9 CONTROLLED TYPE 2 DIABETES MELLITUS WITH MICROALBUMINURIA, WITHOUT LONG-TERM CURRENT USE OF INSULIN: Primary | ICD-10-CM

## 2024-06-24 DIAGNOSIS — E11.29 CONTROLLED TYPE 2 DIABETES MELLITUS WITH MICROALBUMINURIA, WITHOUT LONG-TERM CURRENT USE OF INSULIN: Primary | ICD-10-CM

## 2024-06-24 RX ORDER — LANCETS 33 GAUGE
EACH MISCELLANEOUS
Qty: 100 EACH | Refills: 11 | Status: SHIPPED | OUTPATIENT
Start: 2024-06-24

## 2024-06-24 NOTE — TELEPHONE ENCOUNTER
No care due was identified.  [facility] Embedded Care Due Messages. Reference number: [id number].   6/24/2024 1:51:40 PM CDT

## 2024-06-24 NOTE — TELEPHONE ENCOUNTER
"----- Message from Lulu Farooq sent at 6/24/2024  1:34 PM CDT -----  Regarding: rx refill  Type: RX Refill Request    Who Called:     Pharmacy Name:   [id number] - [place], [place] - [address] [facility] [address]  [address]  [facility] [address]  Phone: [phone] Fax: [phone]    Refill or New Rx:    RX Name and Strength:TRUEPLUS LANCETS 33 gauge Misc, needle, disp, 22 G 22 gauge x 1" Ndle    How is the patient currently taking it? (ex. 1XDay):    Is this a 30 day or 90 day RX:    Additional Notes: please call to assist b/c I'm unsure of p/t gave me the right rxn she seem confused,  [phone]  "

## 2024-06-29 ENCOUNTER — PATIENT MESSAGE (OUTPATIENT)
Dept: HEMATOLOGY/ONCOLOGY | Facility: CLINIC | Age: 84
End: 2024-06-29
Payer: MEDICARE

## 2024-06-29 ENCOUNTER — PATIENT MESSAGE (OUTPATIENT)
Dept: CARDIOLOGY | Facility: CLINIC | Age: 84
End: 2024-06-29
Payer: MEDICARE

## 2024-06-29 ENCOUNTER — PATIENT MESSAGE (OUTPATIENT)
Dept: INTERNAL MEDICINE | Facility: CLINIC | Age: 84
End: 2024-06-29
Payer: MEDICARE

## 2024-07-01 ENCOUNTER — TELEPHONE (OUTPATIENT)
Dept: CARDIOLOGY | Facility: CLINIC | Age: 84
End: 2024-07-01
Payer: MEDICARE

## 2024-07-01 ENCOUNTER — TELEPHONE (OUTPATIENT)
Dept: HEMATOLOGY/ONCOLOGY | Facility: CLINIC | Age: 84
End: 2024-07-01
Payer: MEDICARE

## 2024-07-01 NOTE — TELEPHONE ENCOUNTER
----- Message from Eloise Sagastume sent at 7/1/2024 11:36 AM CDT -----  Contact: Courtney  Type:  Patient Call          Who Called: Patient jennifer Valdez         Does the patient know what this is regarding?: Requesting a call back pt said that her aunt is in the hospital in [facility] admitted on 6/29 ; Pt niece  7 the hospital have been trying to reach someone about the pt condition ; Pt is in room # [phone] phone [phone] ; please advise             Best Call Back Number:[phone] Courtney             Additional Information:

## 2024-07-01 NOTE — TELEPHONE ENCOUNTER
----- Message from Sebastian Granados MD sent at 7/1/2024  9:33 AM CDT -----  Regarding: RE: Administer shot  Contact: Willie  yes  ----- Message -----  From: Korin Astudillo, RN  Sent: 6/28/2024   4:55 PM CDT  To: Sebastian Granados MD; Marian Rainey PA-C  Subject: FW: Administer shot                              [employer] asking if pt can get Lanreotide shots at home.  They will set it up with their own resources.    - Korin  ----- Message -----  From: Elena House  Sent: 6/28/2024   3:21 PM CDT  To: [facility] Staff  Subject: Administer shot                                  Consult/Advisory     Name Of Caller:Willie        Contact Preference:[phone]     Nature of call:Willie ( Pharmacist) from [employer] calling to see if pt would be a good candidate to administer her shots at home. Requesting a call back.         Jacques

## 2024-07-09 ENCOUNTER — LAB VISIT (OUTPATIENT)
Dept: LAB | Facility: OTHER | Age: 84
End: 2024-07-09
Attending: INTERNAL MEDICINE
Payer: MEDICARE

## 2024-07-09 ENCOUNTER — OFFICE VISIT (OUTPATIENT)
Dept: CARDIOLOGY | Facility: CLINIC | Age: 84
End: 2024-07-09
Payer: MEDICARE

## 2024-07-09 VITALS — WEIGHT: 110.88 LBS | OXYGEN SATURATION: 99 % | HEART RATE: 45 BPM | BODY MASS INDEX: 19.04 KG/M2

## 2024-07-09 DIAGNOSIS — I50.43 HYPERTENSIVE HEART DISEASE WITH ACUTE ON CHRONIC COMBINED SYSTOLIC AND DIASTOLIC CONGESTIVE HEART FAILURE: ICD-10-CM

## 2024-07-09 DIAGNOSIS — I50.43 HYPERTENSIVE HEART DISEASE WITH ACUTE ON CHRONIC COMBINED SYSTOLIC AND DIASTOLIC CONGESTIVE HEART FAILURE: Primary | ICD-10-CM

## 2024-07-09 DIAGNOSIS — I11.0 HYPERTENSIVE HEART DISEASE WITH ACUTE ON CHRONIC COMBINED SYSTOLIC AND DIASTOLIC CONGESTIVE HEART FAILURE: ICD-10-CM

## 2024-07-09 DIAGNOSIS — I11.0 HYPERTENSIVE HEART DISEASE WITH ACUTE ON CHRONIC COMBINED SYSTOLIC AND DIASTOLIC CONGESTIVE HEART FAILURE: Primary | ICD-10-CM

## 2024-07-09 LAB
ANION GAP SERPL CALC-SCNC: 10 MMOL/L (ref 8–16)
BUN SERPL-MCNC: 10 MG/DL (ref 8–23)
CALCIUM SERPL-MCNC: 10 MG/DL (ref 8.7–10.5)
CHLORIDE SERPL-SCNC: 103 MMOL/L (ref 95–110)
CO2 SERPL-SCNC: 27 MMOL/L (ref 23–29)
CREAT SERPL-MCNC: 0.9 MG/DL (ref 0.5–1.4)
EST. GFR  (NO RACE VARIABLE): >60 ML/MIN/1.73 M^2
GLUCOSE SERPL-MCNC: 189 MG/DL (ref 70–110)
MAGNESIUM SERPL-MCNC: 1.9 MG/DL (ref 1.6–2.6)
POTASSIUM SERPL-SCNC: 5 MMOL/L (ref 3.5–5.1)
SODIUM SERPL-SCNC: 140 MMOL/L (ref 136–145)

## 2024-07-09 PROCEDURE — 3288F FALL RISK ASSESSMENT DOCD: CPT | Mod: HCNC,CPTII,S$GLB, | Performed by: INTERNAL MEDICINE

## 2024-07-09 PROCEDURE — 83735 ASSAY OF MAGNESIUM: CPT | Mod: HCNC | Performed by: INTERNAL MEDICINE

## 2024-07-09 PROCEDURE — 1126F AMNT PAIN NOTED NONE PRSNT: CPT | Mod: HCNC,CPTII,S$GLB, | Performed by: INTERNAL MEDICINE

## 2024-07-09 PROCEDURE — 1159F MED LIST DOCD IN RCRD: CPT | Mod: HCNC,CPTII,S$GLB, | Performed by: INTERNAL MEDICINE

## 2024-07-09 PROCEDURE — 1101F PT FALLS ASSESS-DOCD LE1/YR: CPT | Mod: HCNC,CPTII,S$GLB, | Performed by: INTERNAL MEDICINE

## 2024-07-09 PROCEDURE — 1157F ADVNC CARE PLAN IN RCRD: CPT | Mod: HCNC,CPTII,S$GLB, | Performed by: INTERNAL MEDICINE

## 2024-07-09 PROCEDURE — 93010 ELECTROCARDIOGRAM REPORT: CPT | Mod: HCNC,S$GLB,, | Performed by: INTERNAL MEDICINE

## 2024-07-09 PROCEDURE — 1160F RVW MEDS BY RX/DR IN RCRD: CPT | Mod: HCNC,CPTII,S$GLB, | Performed by: INTERNAL MEDICINE

## 2024-07-09 PROCEDURE — 36415 COLL VENOUS BLD VENIPUNCTURE: CPT | Mod: HCNC | Performed by: INTERNAL MEDICINE

## 2024-07-09 PROCEDURE — 80048 BASIC METABOLIC PNL TOTAL CA: CPT | Mod: HCNC | Performed by: INTERNAL MEDICINE

## 2024-07-09 PROCEDURE — 99215 OFFICE O/P EST HI 40 MIN: CPT | Mod: HCNC,25,S$GLB, | Performed by: INTERNAL MEDICINE

## 2024-07-09 PROCEDURE — 93005 ELECTROCARDIOGRAM TRACING: CPT | Mod: HCNC

## 2024-07-09 PROCEDURE — 99999 PR PBB SHADOW E&M-EST. PATIENT-LVL IV: CPT | Mod: PBBFAC,HCNC,, | Performed by: INTERNAL MEDICINE

## 2024-07-09 RX ORDER — SACUBITRIL AND VALSARTAN 24; 26 MG/1; MG/1
1 TABLET, FILM COATED ORAL 2 TIMES DAILY
Qty: 60 TABLET | Refills: 2 | Status: SHIPPED | OUTPATIENT
Start: 2024-07-09

## 2024-07-09 RX ORDER — SPIRONOLACTONE 25 MG/1
25 TABLET ORAL DAILY
Qty: 90 TABLET | Refills: 3 | Status: SHIPPED | OUTPATIENT
Start: 2024-07-09 | End: 2025-07-09

## 2024-07-09 NOTE — PROGRESS NOTES
[facility]    Chief Complaint  Chief Complaint   Patient presents with    Congestive Heart Failure       HPI:    About 10 days ago, she was at awakened at night suddenly short of breath.  Unable lie flat.  Went to the emergency department.  No records for review.  History is obtained from patient and her family.  Blood pressure was markedly elevated.  Seems like she was treated with BiPAP.  Quickly improved.  Sent home after a couple of days of diuresis.  Echocardiogram was performed.  I have reviewed that study.  Ejection fraction is 20-25%.  Mild aortic insufficiency.  She has felt well since discharge.  No medication changes upon discharge.  She reports that she has been adherent with her medical regimen and has not run out of any of her cardiac medications.  Does report what sounds like high sodium intake prior to this event.    Medications  Current Outpatient Medications   Medication Sig Dispense Refill    blood sugar diagnostic (TRUE METRIX GLUCOSE TEST STRIP) Strp USE TO CHECK BLOOD SUGAR TWICE DAILY 100 strip 11    carvediloL (COREG) 12.5 MG tablet Take 1 tablet (12.5 mg total) by mouth 2 (two) times daily with meals. 180 tablet 3    cyanocobalamin (VITAMIN B-12) 1000 MCG tablet Take 1 tablet (1,000 mcg total) by mouth once daily.      diphenoxylate-atropine 2.5-0.025 mg (LOMOTIL) 2.5-0.025 mg per tablet Take 1 tablet by mouth 4 (four) times daily as needed for Diarrhea. 90 tablet 2    ferrous sulfate 325 (65 FE) MG EC tablet Take 325 mg by mouth 3 (three) times daily with meals.      furosemide (LASIX) 20 MG tablet take 1 tab by mouth daily. If gain 2-3 pounds in 2-3 days then take 2 tabs daily for 3 days and then resume 1 dose daily 120 tablet 3    lancets Misc To check BG 2 times daily, to use with insurance preferred meter 100 each 11    latanoprost 0.005 % ophthalmic solution Place 1 drop into both eyes nightly.      metFORMIN (GLUCOPHAGE-XR) 500 MG ER 24hr tablet TAKE 2 TABLETS(1000 MG  "TOTAL) BY MOUTH BEFORE BREAKFAST 180 tablet 0    needle, disp, 22 G 22 gauge x 1" Ndle 1 application by Misc.(Non-Drug; Combo Route) route 3 (three) times daily as needed. 30 each 2    syringe, disposable, 1 mL Syrg 1 application by Misc.(Non-Drug; Combo Route) route 3 (three) times daily as needed (diarrhea). 30 each 2    telotristat ethyl (XERMELO) 250 mg Tab Take 1 tablet (250 mg) by mouth 3 (three) times daily. 90 tablet 3    TRUEPLUS LANCETS 33 gauge Misc USE TO CHECK BLOOD SUGAR TWICE DAILY 100 each 11    vitamin D (VITAMIN D3) 1000 units Tab Take 400 Units by mouth once daily.      XIIDRA 5 % Dpet INSTILL ONE DROP INTO OU BID  3    blood-glucose meter kit To check BG 2 times daily, to use with insurance preferred meter 1 each 0    ezetimibe (ZETIA) 10 mg tablet Take 1 tablet (10 mg total) by mouth once daily. 90 tablet 3    sacubitriL-valsartan (ENTRESTO) 24-26 mg per tablet Take 1 tablet by mouth 2 (two) times daily. 60 tablet 2    spironolactone (ALDACTONE) 25 MG tablet Take 1 tablet (25 mg total) by mouth once daily. 90 tablet 3     No current facility-administered medications for this visit.        History  Past Medical History:   Diagnosis Date    Anemia 07/11/2018    B12 deficiency     Depression     per pcp note 7/2017 and given prozac and diazepam     Hyperlipidemia     Systolic heart failure     Weight loss     reviewed prior pcp Dr. Russo notes 2017 - labs reviewed: cmp wnl x tbili 1.5, tsh wnl, A1c 5.0, cbc wnl,D 36, hiv neg, hep c neg, hep b surg ag neg      Past Surgical History:   Procedure Laterality Date    EMBOLIZATION N/A 02/14/2019    Procedure: EMBOLIZATION, BLOOD VESSEL;  Surgeon: [facility];  [facility];  Service: Radiology;  Laterality: N/A;  Room 189/[place]    EMBOLIZATION N/A 03/21/2019    Procedure: EMBOLIZATION, BLOOD VESSEL;  Surgeon: [facility] Provider;  Location: [facility] OR [facility]ND [facility]LR;  Service: Radiology;  Laterality: N/A;  Room 189/[place]    " ESOPHAGOGASTRODUODENOSCOPY  05/04/2018    esophageal ring, grade 1 esophagitis, gastritis     EYE SURGERY Bilateral     cataract removal    HYSTERECTOMY      fibroids     Social History     Socioeconomic History    Marital status:    Occupational History    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   Tobacco Use    Smoking status: Never    Smokeless tobacco: Never   Substance and Sexual Activity    Alcohol use: No     Comment: stopped in 2007 - hx of social drinking    Drug use: Never    Sexual activity: Not Currently     Partners: Male   Social History Narrative    Living in [facility]    Not getting reg exericse     Social Determinants of Health     Financial Resource Strain: Medium Risk (11/10/2023)    Overall Financial Resource Strain (CARDIA)     Difficulty of Paying Living Expenses: Somewhat hard   Food Insecurity: No Food Insecurity (11/10/2023)    Hunger Vital Sign     Worried About Running Out of Food in the Last Year: Never true     Ran Out of Food in the Last Year: Never true   Transportation Needs: No Transportation Needs (11/10/2023)    PRAPARE - Transportation     Lack of Transportation (Medical): No     Lack of Transportation (Non-Medical): No   Physical Activity: Inactive (11/10/2023)    Exercise Vital Sign     Days of Exercise per Week: 0 days     Minutes of Exercise per Session: 0 min   Stress: No Stress Concern Present (11/10/2023)    [employer] - Occupational Stress Questionnaire     Feeling of Stress : Only a little   Housing Stability: Low Risk  (11/10/2023)    Housing Stability Vital Sign     Unable to Pay for Housing in the Last Year: No     Number of Places Lived in the Last Year: 1     Unstable Housing in the Last Year: No     Family History   Problem Relation Name Age of Onset    Glaucoma Mother      Hypertension Mother      Heart attack Father      Cancer Father      Diabetes Sister Marilyn     Asthma Sister Nick Luke     No Known Problems Sister Stephanie      Hyperlipidemia Sister      Heart attack Sister      Cancer Brother          lung cancer, + tobacco    Diabetes Brother      Hypertension Brother      Stroke Brother      Emphysema Brother      COPD Brother          Allergies  Review of patient's allergies indicates:   Allergen Reactions    Epinephrine      carcinoid       Review of Systems   Review of Systems   Constitutional: Negative for malaise/fatigue, weight gain and weight loss.   Eyes:  Negative for visual disturbance.   Cardiovascular:  Negative for chest pain, claudication, cyanosis, dyspnea on exertion, irregular heartbeat, leg swelling, near-syncope, orthopnea, palpitations, paroxysmal nocturnal dyspnea and syncope.   Respiratory:  Negative for cough, hemoptysis, shortness of breath, sleep disturbances due to breathing and wheezing.    Hematologic/Lymphatic: Negative for bleeding problem. Does not bruise/bleed easily.   Skin:  Negative for poor wound healing.   Musculoskeletal:  Negative for muscle cramps and myalgias.   Gastrointestinal:  Negative for abdominal pain, anorexia, diarrhea, heartburn, hematemesis, hematochezia, melena, nausea and vomiting.   Genitourinary:  Negative for hematuria and nocturia.   Neurological:  Negative for excessive daytime sleepiness, dizziness, focal weakness, light-headedness and weakness.       Physical Exam  Vitals:    07/09/24 1054   BP: (P) 128/66   Pulse: (!) 45     Wt Readings from Last 1 Encounters:   07/09/24 50.3 kg (110 lb 14.4 oz)     Physical Exam  Vitals and nursing note reviewed.   Constitutional:       General: She is not in acute distress.     Appearance: She is not toxic-appearing or diaphoretic.   HENT:      Head: Normocephalic and atraumatic.      Mouth/Throat:      Lips: Pink.      Mouth: Mucous membranes are moist.   Eyes:      General: No scleral icterus.     Conjunctiva/sclera: Conjunctivae normal.   Neck:      Thyroid: No thyromegaly.      Vascular: No carotid bruit, hepatojugular reflux or  JVD.      Trachea: Trachea normal.   Cardiovascular:      Rate and Rhythm: Normal rate and regular rhythm. Occasional Extrasystoles are present.     Chest Wall: PMI is not displaced.      Pulses:           Carotid pulses are 2+ on the right side and 2+ on the left side.       Radial pulses are 2+ on the right side and 2+ on the left side.      Heart sounds: S1 normal and S2 normal. No murmur heard.     No friction rub. Gallop present. S4 sounds present. No S3 sounds.   Pulmonary:      Effort: Pulmonary effort is normal. No accessory muscle usage or respiratory distress.      Breath sounds: Normal breath sounds and air entry. No decreased breath sounds, wheezing, rhonchi or rales.   Abdominal:      General: Bowel sounds are normal. There is no distension or abdominal bruit.      Palpations: Abdomen is soft. There is hepatomegaly. There is no splenomegaly or pulsatile mass.      Tenderness: There is no abdominal tenderness.   Musculoskeletal:         General: No tenderness or deformity.      Right lower leg: No edema.      Left lower leg: No edema.   Skin:     General: Skin is warm and dry.      Capillary Refill: Capillary refill takes less than 2 seconds.      Coloration: Skin is not cyanotic or pale.      Nails: There is no clubbing.   Neurological:      General: No focal deficit present.      Mental Status: She is alert and oriented to person, place, and time.   Psychiatric:         Attention and Perception: Attention normal.         Mood and Affect: Mood normal.         Speech: Speech normal.         Behavior: Behavior normal. Behavior is cooperative.         Labs  Lab Visit on 06/13/2024   Component Date Value Ref Range Status    WBC 06/13/2024 4.46  3.90 - 12.70 K/uL Final    RBC 06/13/2024 3.86 (L)  4.00 - 5.40 M/uL Final    Hemoglobin 06/13/2024 12.3  12.0 - 16.0 g/dL Final    Hematocrit 06/13/2024 38.3  37.0 - 48.5 % Final    MCV 06/13/2024 99 (H)  82 - 98 fL Final    MCH 06/13/2024 31.9 (H)  27.0 - 31.0 pg  Final    MCHC 06/13/2024 32.1  32.0 - 36.0 g/dL Final    RDW 06/13/2024 12.7  11.5 - 14.5 % Final    Platelets 06/13/2024 171  150 - 450 K/uL Final    MPV 06/13/2024 9.9  9.2 - 12.9 fL Final    Immature Granulocytes 06/13/2024 0.4  0.0 - 0.5 % Final    Gran # (ANC) 06/13/2024 2.8  1.8 - 7.7 K/uL Final    Immature Grans (Abs) 06/13/2024 0.02  0.00 - 0.04 K/uL Final    Comment: Mild elevation in immature granulocytes is non specific and   can be seen in a variety of conditions including stress response,   acute inflammation, trauma and pregnancy. Correlation with other   laboratory and clinical findings is essential.      Lymph # 06/13/2024 1.2  1.0 - 4.8 K/uL Final    Mono # 06/13/2024 0.4  0.3 - 1.0 K/uL Final    Eos # 06/13/2024 0.0  0.0 - 0.5 K/uL Final    Baso # 06/13/2024 0.01  0.00 - 0.20 K/uL Final    nRBC 06/13/2024 0  0 /100 WBC Final    Gran % 06/13/2024 63.8  38.0 - 73.0 % Final    Lymph % 06/13/2024 25.8  18.0 - 48.0 % Final    Mono % 06/13/2024 9.6  4.0 - 15.0 % Final    Eosinophil % 06/13/2024 0.2  0.0 - 8.0 % Final    Basophil % 06/13/2024 0.2  0.0 - 1.9 % Final    Differential Method 06/13/2024 Automated   Final    Sodium 06/13/2024 141  136 - 145 mmol/L Final    Potassium 06/13/2024 4.1  3.5 - 5.1 mmol/L Final    Chloride 06/13/2024 104  95 - 110 mmol/L Final    CO2 06/13/2024 26  23 - 29 mmol/L Final    Glucose 06/13/2024 97  70 - 110 mg/dL Final    BUN 06/13/2024 12  8 - 23 mg/dL Final    Creatinine 06/13/2024 0.7  0.5 - 1.4 mg/dL Final    Calcium 06/13/2024 9.7  8.7 - 10.5 mg/dL Final    Total Protein 06/13/2024 6.8  6.0 - 8.4 g/dL Final    Albumin 06/13/2024 3.6  3.5 - 5.2 g/dL Final    Total Bilirubin 06/13/2024 0.4  0.1 - 1.0 mg/dL Final    Comment: For infants and newborns, interpretation of results should be based  on gestational age, weight and in agreement with clinical  observations.    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  Please note that consuming serotonin containing   foods and some medications within 24 hours of sample   collection may result in a temporary elevation of 5HIAA.    -------------------ADDITIONAL INFORMATION-------------------  Liquid Chromatography-Tandem Mass Spectrometry (LC-MS/MS).  Values obtained from different assay methods or kits may be   different and cannot be used interchangeably. The results   cannot be interpreted as absolute evidence for the presence   or absence of malignant disease.  This test was developed and its performance characteristics   determined by [facility] in a manner consistent with CLIA   requirements. This test has not been cleared or approved by   the U.S. Food and Drug Administration.    Test Performed by:  [facility] - [address] [address]  : Soni silver Ph.D.; CLIA# [id number]      Pancreastatin 06/13/2024 2102  10 - 135 pg/mL Final    Comment: Result verified by repeat analysis.    -------------------ADDITIONAL INFORMATION-------------------  This radioimmunoassay was developed and its  performance characteristics determined by  [facility]. It has not been  cleared or approved by the FDA and such  approval or clearance is not required at this  time. Values obtained with different methods,  different laboratories or with  kits cannot  be used interchangeably with the results on  this report. The results cannot be interpreted  as absolute evidence of the presence or absence  of malignant disease.    Test Performed by:  [facility]  [address] [place] [address]      Serotonin 06/13/2024 1230 (H)  <=230 ng/mL Final    Comment: -------------------ADDITIONAL INFORMATION-------------------  This test was developed and its performance characteristics   determined by [facility] in a manner consistent with CLIA   requirements. This test has not been cleared or approved by   the U.S. Food and Drug Administration.    Test Performed by:  [facility]  [address] [place] [address]  : Soni Owens Ph.D.; CLIA# [id number]     Lab Visit on 05/21/2024   Component Date Value Ref Range Status    Chromogranin A 05/21/2024 1109 (H)  <93 ng/mL Final    Comment: Impaired renal or hepatic function or treatment with proton  pump inhibitors may result in artifactual elevations of   Chromogranin A.    -------------------ADDITIONAL INFORMATION-------------------  The testing method is a homogeneous time-resolved   immunofluorescent assay manufactured by [employer]   and performed on the [employer] K[employer]or Compact Plus.    Values obtained with different assay methods or kits may be   different and cannot be used interchangeably.    Test results cannot be interpreted as absolute evidence for   the presence or absence of malignant disease.    In some immunoassays, the presence of unusually high   concentrations of analyte may result in a high-dose   &quot;hook&quot; effect. This may result in a lower or even normal   measured analyte concentration. If the reported result   is inconsistent with the clinical presentation, the   laboratory should be alerted for troubleshooting. For   diagnostic purposes, these immunoassay results should   always be as                            sessed in conjunction with the patients medical   history, clinical examination and other findings.    Test Performed by:  [facility] [address]  : Yogesh Roque M.D. Ph.D.; CLIA# [id number]      Pancreastatin 05/21/2024 3908  10 - 135 pg/mL Final    Comment: *Result verified by repeat analysis.    -------------------ADDITIONAL INFORMATION-------------------  This radioimmunoassay was developed and its  performance characteristics determined by  [facility]. It has not been  cleared or approved by the FDA and such  approval or clearance is not required at this  time. Values obtained with different methods,  different laboratories or with kits cannot  be used interchangeably with the results on  this report. The results cannot be interpreted  as absolute evidence of the presence or absence  of malignant disease.    Test Performed by:  [facility]  [address] [place] [address]      Serotonin 05/21/2024 1290 (H)  <=230 ng/mL Final    Comment: -------------------ADDITIONAL INFORMATION-------------------  This test was developed and its performance characteristics   determined by [facility] in a manner consistent with CLIA   requirements. This test has not been cleared or approved by   the U.S. Food and Drug Administration.    Test Performed by:  [facility] [place] [id number]  : Yogesh Roque M.D. Ph.D.; CLIA# [id number]      WBC 05/21/2024 5.03  3.90 - 12.70 K/uL Final    RBC 05/21/2024 3.71 (L)  4.00 - 5.40 M/uL Final    Hemoglobin 05/21/2024 12.2  12.0 - 16.0 g/dL Final    Hematocrit 05/21/2024 36.8 (L)  37.0 - 48.5 % Final    MCV 05/21/2024 99 (H)  82 - 98 fL Final    MCH 05/21/2024 32.9 (H)  27.0 - 31.0 pg Final    MCHC 05/21/2024 33.2  32.0 - 36.0 g/dL Final    RDW 05/21/2024 12.3  11.5 - 14.5 % Final     Platelets 05/21/2024 156  150 - 450 K/uL Final    MPV 05/21/2024 9.7  9.2 - 12.9 fL Final    Immature Granulocytes 05/21/2024 0.4  0.0 - 0.5 % Final    Gran # (ANC) 05/21/2024 3.5  1.8 - 7.7 K/uL Final    Immature Grans (Abs) 05/21/2024 0.02  0.00 - 0.04 K/uL Final    Comment: Mild elevation in immature granulocytes is non specific and   can be seen in a variety of conditions including stress response,   acute inflammation, trauma and pregnancy. Correlation with other   laboratory and clinical findings is essential.      Lymph # 05/21/2024 1.0  1.0 - 4.8 K/uL Final    Mono # 05/21/2024 0.4  0.3 - 1.0 K/uL Final    Eos # 05/21/2024 0.0  0.0 - 0.5 K/uL Final    Baso # 05/21/2024 0.02  0.00 - 0.20 K/uL Final    nRBC 05/21/2024 0  0 /100 WBC Final    Gran % 05/21/2024 69.9  38.0 - 73.0 % Final    Lymph % 05/21/2024 20.5  18.0 - 48.0 % Final    Mono % 05/21/2024 8.2  4.0 - 15.0 % Final    Eosinophil % 05/21/2024 0.6  0.0 - 8.0 % Final    Basophil % 05/21/2024 0.4  0.0 - 1.9 % Final    Differential Method 05/21/2024 Automated   Final    Sodium 05/21/2024 139  136 - 145 mmol/L Final    Potassium 05/21/2024 4.1  3.5 - 5.1 mmol/L Final    Chloride 05/21/2024 104  95 - 110 mmol/L Final    CO2 05/21/2024 26  23 - 29 mmol/L Final    Glucose 05/21/2024 265 (H)  70 - 110 mg/dL Final    BUN 05/21/2024 15  8 - 23 mg/dL Final    Creatinine 05/21/2024 0.7  0.5 - 1.4 mg/dL Final    Calcium 05/21/2024 9.3  8.7 - 10.5 mg/dL Final    Total Protein 05/21/2024 6.5  6.0 - 8.4 g/dL Final    Albumin 05/21/2024 3.4 (L)  3.5 - 5.2 g/dL Final    Total Bilirubin 05/21/2024 0.3  0.1 - 1.0 mg/dL Final    Comment: For infants and newborns, interpretation of results should be based  on gestational age, weight and in agreement with clinical  observations.    Premature Infant recommended reference ranges:  Up to 24 hours.............<8.0 mg/dL  Up to 48 hours............<12.0 mg/dL  3-5 days..................<15.0 mg/dL  6-29  days.................<15.0 mg/dL      Alkaline Phosphatase 05/21/2024 50 (L)  55 - 135 U/L Final    AST 05/21/2024 21  10 - 40 U/L Final    ALT 05/21/2024 19  10 - 44 U/L Final    eGFR 05/21/2024 >60.0  >60 mL/min/1.73 m^2 Final    Anion Gap 05/21/2024 9  8 - 16 mmol/L Final   Lab Visit on 04/23/2024   Component Date Value Ref Range Status    WBC 04/23/2024 5.03  3.90 - 12.70 K/uL Final    RBC 04/23/2024 3.96 (L)  4.00 - 5.40 M/uL Final    Hemoglobin 04/23/2024 12.5  12.0 - 16.0 g/dL Final    Hematocrit 04/23/2024 39.3  37.0 - 48.5 % Final    MCV 04/23/2024 99 (H)  82 - 98 fL Final    MCH 04/23/2024 31.6 (H)  27.0 - 31.0 pg Final    MCHC 04/23/2024 31.8 (L)  32.0 - 36.0 g/dL Final    RDW 04/23/2024 12.3  11.5 - 14.5 % Final    Platelets 04/23/2024 189  150 - 450 K/uL Final    MPV 04/23/2024 9.7  9.2 - 12.9 fL Final    Immature Granulocytes 04/23/2024 0.2  0.0 - 0.5 % Final    Gran # (ANC) 04/23/2024 3.1  1.8 - 7.7 K/uL Final    Immature Grans (Abs) 04/23/2024 0.01  0.00 - 0.04 K/uL Final    Comment: Mild elevation in immature granulocytes is non specific and   can be seen in a variety of conditions including stress response,   acute inflammation, trauma and pregnancy. Correlation with other   laboratory and clinical findings is essential.      Lymph # 04/23/2024 1.4  1.0 - 4.8 K/uL Final    Mono # 04/23/2024 0.5  0.3 - 1.0 K/uL Final    Eos # 04/23/2024 0.1  0.0 - 0.5 K/uL Final    Baso # 04/23/2024 0.02  0.00 - 0.20 K/uL Final    nRBC 04/23/2024 0  0 /100 WBC Final    Gran % 04/23/2024 61.1  38.0 - 73.0 % Final    Lymph % 04/23/2024 27.0  18.0 - 48.0 % Final    Mono % 04/23/2024 10.3  4.0 - 15.0 % Final    Eosinophil % 04/23/2024 1.0  0.0 - 8.0 % Final    Basophil % 04/23/2024 0.4  0.0 - 1.9 % Final    Differential Method 04/23/2024 Automated   Final    Sodium 04/23/2024 141  136 - 145 mmol/L Final    Potassium 04/23/2024 4.7  3.5 - 5.1 mmol/L Final    Chloride 04/23/2024 104  95 - 110 mmol/L Final    CO2  04/23/2024 30 (H)  23 - 29 mmol/L Final    Glucose 04/23/2024 148 (H)  70 - 110 mg/dL Final    BUN 04/23/2024 9  8 - 23 mg/dL Final    Creatinine 04/23/2024 0.7  0.5 - 1.4 mg/dL Final    Calcium 04/23/2024 9.9  8.7 - 10.5 mg/dL Final    Total Protein 04/23/2024 7.0  6.0 - 8.4 g/dL Final    Albumin 04/23/2024 3.5  3.5 - 5.2 g/dL Final    Total Bilirubin 04/23/2024 0.3  0.1 - 1.0 mg/dL Final    Comment: For infants and newborns, interpretation of results should be based  on gestational age, weight and in agreement with clinical  observations.    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  Please note that consuming serotonin containing   foods and some medications within 24 hours of sample   collection may result in a temporary elevation of 5HIAA.    -------------------ADDITIONAL INFORMATION-------------------  Liquid Chromatography-Tandem Mass Spectrometry (LC-MS/MS).  Values obtained from different assay methods or kits may be   different and cannot be used interchangeably. The results   cannot be interpreted as absolute evidence for the presence   or absence of malignant disease.  This test was developed and its performance characteristics   determined by [facility] in a manner consistent with CLIA   requirements. This test has not been cleared or approved by   the U.S. Food and Drug Administration.    Test Performed  by:  [facility]  [address] [place] [address]  : Yogesh Allen. Ph.D.; CLIA# [id number]      Chromogranin A 04/23/2024 1055 (H)  <93 ng/mL Final    Comment: Impaired renal or hepatic function or treatment with proton  pump inhibitors may result in artifactual elevations of   Chromogranin A.    -------------------ADDITIONAL INFORMATION-------------------  The testing method is a homogeneous time-resolved   immunofluorescent assay manufactured by [employer]   and performed on the [employer] Comp[employer].    Values obtained with different assay methods or kits may be   different and cannot be used interchangeably.    Test results cannot be interpreted as absolute evidence for   the presence or absence of malignant disease.    In some immunoassays, the presence of unusually high   concentrations of analyte may result in a high-dose   &quot;hook&quot; effect. This may result in a lower or even normal   measured analyte concentration. If the reported result   is inconsistent with the clinical presentation, the   laboratory should be alerted for troubleshooting. For   diagnostic purposes, these immunoassay results should   always be as                           sessed in conjunction with the patients medical   history, clinical examination and other findings.    Test Performed by:  [facility]  [address] [id number]  : Yogesh Roque M.D. Ph.D.; CLIA# [id number]      Pancreastatin 04/23/2024 3055  10 - 135 pg/mL Final    Comment: Result verified by repeat analysis.    -------------------ADDITIONAL INFORMATION-------------------  This radioimmunoassay was developed and its  performance characteristics determined by  [employer]. It has not been  cleared or approved by the FDA and such  approval or clearance is not required at  this  time. Values obtained with different methods,  different laboratories or with kits cannot  be used interchangeably with the results on  this report. The results cannot be interpreted  as absolute evidence of the presence or absence  of malignant disease.    Test Performed by:  [facility]  [address] [place] [address]      Serotonin 04/23/2024 1140 (H)  <=230 ng/mL Final    Comment: -------------------ADDITIONAL INFORMATION-------------------  This test was developed and its performance characteristics   determined by [facility] in a manner consistent with CLIA   requirements. This test has not been cleared or approved by   the U.S. Food and Drug Administration.    Test Performed by:  [facility]  [address] [place] [address]  : Yogesh Roque M.D. Ph.D.; CLIA# [id number]     Lab Visit on 03/26/2024   Component Date Value Ref Range Status    WBC 03/26/2024 4.46  3.90 - 12.70 K/uL Final    RBC 03/26/2024 3.95 (L)  4.00 - 5.40 M/uL Final    Hemoglobin 03/26/2024 12.5  12.0 - 16.0 g/dL Final    Hematocrit 03/26/2024 38.3  37.0 - 48.5 % Final    MCV 03/26/2024 97  82 - 98 fL Final    MCH 03/26/2024 31.6 (H)  27.0 - 31.0 pg Final    MCHC 03/26/2024 32.6  32.0 - 36.0 g/dL Final    RDW 03/26/2024 12.3  11.5 - 14.5 % Final    Platelets 03/26/2024 167  150 - 450 K/uL Final    MPV 03/26/2024 9.9  9.2 - 12.9 fL Final    Immature Granulocytes 03/26/2024 0.2  0.0 - 0.5 % Final    Gran # (ANC) 03/26/2024 2.8  1.8 - 7.7 K/uL Final    Immature Grans (Abs) 03/26/2024 0.01  0.00 - 0.04 K/uL Final    Comment: Mild elevation in immature granulocytes is non specific and   can be seen in a variety of conditions including stress response,   acute inflammation, trauma and pregnancy. Correlation with other   laboratory and clinical findings is essential.      Lymph # 03/26/2024 1.1  1.0 - 4.8 K/uL Final    Mono # 03/26/2024 0.5  0.3 - 1.0  K/uL Final    Eos # 03/26/2024 0.0  0.0 - 0.5 K/uL Final    Baso # 03/26/2024 0.02  0.00 - 0.20 K/uL Final    nRBC 03/26/2024 0  0 /100 WBC Final    Gran % 03/26/2024 63.0  38.0 - 73.0 % Final    Lymph % 03/26/2024 24.9  18.0 - 48.0 % Final    Mono % 03/26/2024 10.8  4.0 - 15.0 % Final    Eosinophil % 03/26/2024 0.7  0.0 - 8.0 % Final    Basophil % 03/26/2024 0.4  0.0 - 1.9 % Final    Differential Method 03/26/2024 Automated   Final    Sodium 03/26/2024 138  136 - 145 mmol/L Final    Potassium 03/26/2024 4.2  3.5 - 5.1 mmol/L Final    Chloride 03/26/2024 102  95 - 110 mmol/L Final    CO2 03/26/2024 30 (H)  23 - 29 mmol/L Final    Glucose 03/26/2024 226 (H)  70 - 110 mg/dL Final    BUN 03/26/2024 11  8 - 23 mg/dL Final    Creatinine 03/26/2024 0.8  0.5 - 1.4 mg/dL Final    Calcium 03/26/2024 9.7  8.7 - 10.5 mg/dL Final    Total Protein 03/26/2024 6.8  6.0 - 8.4 g/dL Final    Albumin 03/26/2024 3.7  3.5 - 5.2 g/dL Final    Total Bilirubin 03/26/2024 0.4  0.1 - 1.0 mg/dL Final    Comment: For infants and newborns, interpretation of results should be based  on gestational age, weight and in agreement with clinical  observations.    Premature Infant recommended reference ranges:  Up to 24 hours.............<8.0 mg/dL  Up to 48 hours............<12.0 mg/dL  3-5 days..................<15.0 mg/dL  6-29 days.................<15.0 mg/dL      Alkaline Phosphatase 03/26/2024 48 (L)  55 - 135 U/L Final    AST 03/26/2024 23  10 - 40 U/L Final    ALT 03/26/2024 20  10 - 44 U/L Final    eGFR 03/26/2024 >60.0  >60 mL/min/1.73 m^2 Final    Anion Gap 03/26/2024 6 (L)  8 - 16 mmol/L Final    5-HIAA, Plasma (Neuroend) 03/26/2024 77 (H)  <=30 ng/mL Final    Comment: In this sample, the concentration of 5HIAA was markedly   elevated indicating the presence of a serotonin-producing   tumor.  Please note that consuming serotonin containing   foods and some medications within 24 hours of sample   collection may result in a temporary  elevation of 5HIAA.    -------------------ADDITIONAL INFORMATION-------------------  Liquid Chromatography-Tandem Mass Spectrometry (LC-MS/MS).  Values obtained from different assay methods or kits may be   different and cannot be used interchangeably. The results   cannot be interpreted as absolute evidence for the presence   or absence of malignant disease.  This test was developed and its performance characteristics   determined by [facility] in a manner consistent with CLIA   requirements. This test has not been cleared or approved by   the U.S. Food and Drug Administration.    Test Performed by:  [place] [address]  : Yogesh Iqbal Ph.D.; CLIA# [id number]      Pancreastatin 03/26/2024 4692  10 - 135 pg/mL Final    Comment: *Result verified by repeat analysis.    -------------------ADDITIONAL INFORMATION-------------------  This radioimmunoassay was developed and its  performance characteristics determined by  [employer]. It has not been  cleared or approved by the FDA and such  approval or clearance is not required at this  time. Values obtained with different methods,  different laboratories or with kits cannot  be used interchangeably with the results on  this report. The results cannot be interpreted  as absolute evidence of the presence or absence  of malignant disease.    Test Performed by:  [facility]  [address] [place] [address]      Serotonin 03/26/2024 1030 (H)  <=230 ng/mL Final    Comment: -------------------ADDITIONAL INFORMATION-------------------  This test was developed and its performance characteristics   determined by [facility] in a manner consistent with CLIA   requirements. This test has not been cleared or approved by   the U.S. Food and Drug Administration.    Test Performed by:  [facility]  [address] [place] [address]  : Yogesh Roque M.D. Ph.D.; CLIA# [id number]     Lab Visit on 02/27/2024   Component Date Value Ref Range Status    WBC 02/27/2024 4.81  3.90 - 12.70 K/uL Final    RBC 02/27/2024 4.05  4.00 - 5.40 M/uL Final    Hemoglobin 02/27/2024 12.5  12.0 - 16.0 g/dL Final    Hematocrit 02/27/2024 39.2  37.0 - 48.5 % Final    MCV 02/27/2024 97  82 - 98 fL Final    MCH 02/27/2024 30.9  27.0 - 31.0 pg Final    MCHC 02/27/2024 31.9 (L)  32.0 - 36.0 g/dL Final    RDW 02/27/2024 12.6  11.5 - 14.5 % Final    Platelets 02/27/2024 179  150 - 450 K/uL Final    MPV 02/27/2024 9.2  9.2 - 12.9 fL Final    Immature Granulocytes 02/27/2024 0.2  0.0 - 0.5 % Final    Gran # (ANC) 02/27/2024 2.8  1.8 - 7.7 K/uL Final    Immature Grans (Abs) 02/27/2024 0.01  0.00 - 0.04 K/uL Final    Comment: Mild elevation in immature granulocytes is non specific and   can be seen in a variety of conditions including stress response,   acute inflammation, trauma and pregnancy. Correlation with other   laboratory and clinical findings is essential.      Lymph # 02/27/2024 1.4  1.0 - 4.8 K/uL Final    Mono # 02/27/2024 0.5  0.3 - 1.0 K/uL Final    Eos # 02/27/2024 0.0  0.0 - 0.5 K/uL Final    Baso # 02/27/2024 0.02  0.00 - 0.20 K/uL Final    nRBC 02/27/2024 0  0 /100 WBC Final    Gran % 02/27/2024 58.9  38.0 - 73.0 % Final    Lymph % 02/27/2024 29.1  18.0 - 48.0 % Final    Mono % 02/27/2024 10.6  4.0 - 15.0 % Final    Eosinophil % 02/27/2024 0.8  0.0 - 8.0 % Final    Basophil % 02/27/2024 0.4  0.0 - 1.9 % Final    Differential Method 02/27/2024 Automated   Final    Sodium 02/27/2024 142  136 - 145 mmol/L Final    Potassium 02/27/2024 4.7  3.5 - 5.1 mmol/L Final    Chloride 02/27/2024 104  95 - 110 mmol/L Final    CO2 02/27/2024 29  23 - 29 mmol/L Final    Glucose 02/27/2024 166 (H)  70 - 110 mg/dL Final    BUN 02/27/2024 14  8 - 23 mg/dL Final    Creatinine 02/27/2024 0.7  0.5 - 1.4 mg/dL Final     Calcium 02/27/2024 9.8  8.7 - 10.5 mg/dL Final    Total Protein 02/27/2024 6.8  6.0 - 8.4 g/dL Final    Albumin 02/27/2024 3.5  3.5 - 5.2 g/dL Final    Total Bilirubin 02/27/2024 0.3  0.1 - 1.0 mg/dL Final    Comment: For infants and newborns, interpretation of results should be based  on gestational age, weight and in agreement with clinical  observations.    Premature Infant recommended reference ranges:  Up to 24 hours.............<8.0 mg/dL  Up to 48 hours............<12.0 mg/dL  3-5 days..................<15.0 mg/dL  6-29 days.................<15.0 mg/dL      Alkaline Phosphatase 02/27/2024 49 (L)  55 - 135 U/L Final    AST 02/27/2024 22  10 - 40 U/L Final    ALT 02/27/2024 23  10 - 44 U/L Final    eGFR 02/27/2024 >60.0  >60 mL/min/1.73 m^2 Final    Anion Gap 02/27/2024 9  8 - 16 mmol/L Final    5-HIAA, Plasma (Neuroend) 02/27/2024 115 (H)  <=30 ng/mL Final    Comment: In this sample, the concentration of 5HIAA was markedly   elevated indicating the presence of a serotonin-producing   tumor.  Please note that consuming serotonin containing   foods and some medications within 24 hours of sample   collection may result in a temporary elevation of 5HIAA.    -------------------ADDITIONAL INFORMATION-------------------  Liquid Chromatography-Tandem Mass Spectrometry (LC-MS/MS).  Values obtained from different assay methods or kits may be   different and cannot be used interchangeably. The results   cannot be interpreted as absolute evidence for the presence   or absence of malignant disease.  This test was developed and its performance characteristics   determined by [facility] in a manner consistent with CLIA   requirements. This test has not been cleared or approved by   the U.S. Food and Drug Administration.    Test Performed by:  [place] [address]  : Yogesh Iqbal Ph.D.; CLIA# [id number]       Pancreastatin 02/27/2024 3064*  10 - 135 pg/mL Final    Comment: *Result verified by repeat analysis.    -------------------ADDITIONAL INFORMATION-------------------  This radioimmunoassay was developed and its  performance characteristics determined by  [employer]. It has not been  cleared or approved by the FDA and such  approval or clearance is not required at this  time. Values obtained with different methods,  different laboratories or with kits cannot  be used interchangeably with the results on  this report. The results cannot be interpreted  as absolute evidence of the presence or absence  of malignant disease.    Test Performed by:  [facility]  [address] [place] [address]      Serotonin 02/27/2024 1210 (H)  <=230 ng/mL Final    Comment: -------------------ADDITIONAL INFORMATION-------------------  This test was developed and its performance characteristics   determined by [facility] in a manner consistent with CLIA   requirements. This test has not been cleared or approved by   the U.S. Food and Drug Administration.    Test Performed by:  [facility]  [address] [place] [address]  : Yogesh Roque M.D. Ph.D.; CLIA# [id number]     Lab Visit on 02/09/2024   Component Date Value Ref Range Status    Hemoglobin A1C 02/09/2024 6.1 (H)  4.0 - 5.6 % Final    Comment: ADA Screening Guidelines:  5.7-6.4%  Consistent with prediabetes  >or=6.5%  Consistent with diabetes    High levels of fetal hemoglobin interfere with the HbA1C  assay. Heterozygous hemoglobin variants (HbS, HgC, etc)do  not significantly interfere with this assay.   However, presence of multiple variants may affect accuracy.      Estimated Avg Glucose 02/09/2024 128  68 - 131 mg/dL Final   Lab Visit on 01/30/2024   Component Date Value Ref Range Status    WBC 01/30/2024 5.21  3.90 - 12.70 K/uL Final    RBC 01/30/2024 4.04  4.00 - 5.40 M/uL  Final    Hemoglobin 01/30/2024 12.8  12.0 - 16.0 g/dL Final    Hematocrit 01/30/2024 39.3  37.0 - 48.5 % Final    MCV 01/30/2024 97  82 - 98 fL Final    MCH 01/30/2024 31.7 (H)  27.0 - 31.0 pg Final    MCHC 01/30/2024 32.6  32.0 - 36.0 g/dL Final    RDW 01/30/2024 12.2  11.5 - 14.5 % Final    Platelets 01/30/2024 166  150 - 450 K/uL Final    MPV 01/30/2024 9.8  9.2 - 12.9 fL Final    Immature Granulocytes 01/30/2024 0.4  0.0 - 0.5 % Final    Gran # (ANC) 01/30/2024 3.8  1.8 - 7.7 K/uL Final    Immature Grans (Abs) 01/30/2024 0.02  0.00 - 0.04 K/uL Final    Comment: Mild elevation in immature granulocytes is non specific and   can be seen in a variety of conditions including stress response,   acute inflammation, trauma and pregnancy. Correlation with other   laboratory and clinical findings is essential.      Lymph # 01/30/2024 0.9 (L)  1.0 - 4.8 K/uL Final    Mono # 01/30/2024 0.5  0.3 - 1.0 K/uL Final    Eos # 01/30/2024 0.0  0.0 - 0.5 K/uL Final    Baso # 01/30/2024 0.02  0.00 - 0.20 K/uL Final    nRBC 01/30/2024 0  0 /100 WBC Final    Gran % 01/30/2024 72.9  38.0 - 73.0 % Final    Lymph % 01/30/2024 17.3 (L)  18.0 - 48.0 % Final    Mono % 01/30/2024 8.6  4.0 - 15.0 % Final    Eosinophil % 01/30/2024 0.4  0.0 - 8.0 % Final    Basophil % 01/30/2024 0.4  0.0 - 1.9 % Final    Differential Method 01/30/2024 Automated   Final    Sodium 01/30/2024 140  136 - 145 mmol/L Final    Potassium 01/30/2024 4.1  3.5 - 5.1 mmol/L Final    Chloride 01/30/2024 105  95 - 110 mmol/L Final    CO2 01/30/2024 28  23 - 29 mmol/L Final    Glucose 01/30/2024 244 (H)  70 - 110 mg/dL Final    BUN 01/30/2024 10  8 - 23 mg/dL Final    Creatinine 01/30/2024 0.7  0.5 - 1.4 mg/dL Final    Calcium 01/30/2024 9.5  8.7 - 10.5 mg/dL Final    Total Protein 01/30/2024 6.9  6.0 - 8.4 g/dL Final    Albumin 01/30/2024 3.5  3.5 - 5.2 g/dL Final    Total Bilirubin 01/30/2024 0.4  0.1 - 1.0 mg/dL Final    Comment: For infants and newborns, interpretation of  results should be based  on gestational age, weight and in agreement with clinical  observations.    Premature Infant recommended reference ranges:  Up to 24 hours.............<8.0 mg/dL  Up to 48 hours............<12.0 mg/dL  3-5 days..................<15.0 mg/dL  6-29 days.................<15.0 mg/dL      Alkaline Phosphatase 01/30/2024 47 (L)  55 - 135 U/L Final    AST 01/30/2024 26  10 - 40 U/L Final    ALT 01/30/2024 26  10 - 44 U/L Final    eGFR 01/30/2024 >60.0  >60 mL/min/1.73 m^2 Final    Anion Gap 01/30/2024 7 (L)  8 - 16 mmol/L Final    5-HIAA, Plasma (Neuroend) 01/30/2024 86 (H)  <=30 ng/mL Final    Comment: In this sample, the concentration of 5HIAA was markedly   elevated indicating the presence of a serotonin-producing   tumor.  Please note that consuming serotonin containing   foods and some medications within 24 hours of sample   collection may result in a temporary elevation of 5HIAA.    -------------------ADDITIONAL INFORMATION-------------------  Liquid Chromatography-Tandem Mass Spectrometry (LC-MS/MS).  Values obtained from different assay methods or kits may be   different and cannot be used interchangeably. The results   cannot be interpreted as absolute evidence for the presence   or absence of malignant disease.  This test was developed and its performance characteristics   determined by [facility] in a manner consistent with CLIA   requirements. This test has not been cleared or approved by   the U.S. Food and Drug Administration.    Test Performed by:  [facility] [address] [address]  : Yogesh Iqbal Ph.D.; CLIA# [id number]      Pancreastatin 01/30/2024 5371*  10 - 135 pg/mL Final    Comment: *Result verified by repeat analysis.    -------------------ADDITIONAL INFORMATION-------------------  This radioimmunoassay was developed and its  performance characteristics determined  by  [employer]. It has not been  cleared or approved by the FDA and such  approval or clearance is not required at this  time. Values obtained with different methods,  different laboratories or with kits cannot  be used interchangeably with the results on  this report. The results cannot be interpreted  as absolute evidence of the presence or absence  of malignant disease.    Test Performed by:  [facility]  [address] [place] [address]      Serotonin 01/30/2024 1320 (H)  <=230 ng/mL Final    Comment: -------------------ADDITIONAL INFORMATION-------------------  This test was developed and its performance characteristics   determined by [facility] in a manner consistent with CLIA   requirements. This test has not been cleared or approved by   the U.S. Food and Drug Administration.    Test Performed by:  [facility]  [address] [id number]  : Yogesh Roque M.D. 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 After an approximately 60 min distribution time, PET/CT images were acquired from the skull vertex to the mid thigh. Transmission images were acquired to correct for attenuation using a whole body low-dose CT scan without oral contrast and without IV contrast with the arms positioned along the side of the torso. COMPARISON: [facility] PET-CT 02/29/2023. FINDINGS: Quality of the study: Adequate. In the head and neck, there is physiologic distribution in the pituitary, salivary, and thyroid glands, and there are no tracer avid lesions suspicious for malignancy. In the chest, there are no tracer avid lesions suspicious for malignancy.   Stable right upper lobe subcentimeter pulmonary nodule, below size threshold for detection by PET. In the abdomen and pelvis, there is physiologic uptake in the pancreatic uncinate process and body, liver, spleen, adrenal glands and  tract. There are numerous hypodense radiotracer avid lesions throughout the hepatic parenchyma.  There is similar size and uptake of the index left hepatic lobe mass measuring 5.0 x 5.0 cm with SUV max of 54 (image 193), previously 5.1 x 5.0 cm with SUV max of 58. Similar uptake in a loop of small bowel in the mid pelvis, presumed distal ileum with SUV max of 25 (image 237), previously SUV max of 22. Interval decreased size and uptake of mesenteric lymph node measuring 1.5 x 1.1 cm with SUV max of 12 (image 221), previously 1.5 x 1.4 cm with SUV max of 21. In the bones, with multiple radiotracer avid lesions with index lesions as follows:. *Similar radiotracer uptake of the right calvarial lesion with SUV max of 4.5 (image 12), previously SUV max of 5. *Similar radiotracer uptake of the right C6 vertebral body with SUV max of 5.1 (image 78), previously SUV max of 5.5. *Similar radiotracer uptake of the left scapular lesion with SUV max of 3.9 (image 92), previously SUV max of 2.7. *Similar radiotracer uptake of the right sternal lesion with SUV max of 8.1 (image 110), previously SUV max of 8.6. *Similar radiotracer uptake of the left anterior rib lesion with SUV max of 2.5 (image 116), previously SUV max of 2.1. *Similar radiotracer uptake of the L3 vertebral body lesion with SUV max of 9.3 (image 192), previously SUV max of 9.2. Incidental finding: Multi-vessel calcific atherosclerosis of the coronary arteries.  Aortic valve calcification.  Moderate calcific atherosclerosis of the abdominal aorta and iliac branch vessels.     Similar distribution and uptake of tracer avid disease involving the distal ileum, liver, and bones. Index lesions as above.  No definite new tracer avid  lesion. Electronically signed by resident: Jessica Salvador Date:    06/13/2024 Time:    15:27 Electronically signed by: Godfrey Sagastume Date:    06/13/2024 Time:    16:16    MRI Abdomen W WO Contrast    Result Date: 6/13/2024  EXAMINATION: MRI ABDOMEN W WO CONTRAST CLINICAL HISTORY: Metastatic disease evaluation;  Malignant carcinoid tumor of the ileum TECHNIQUE: MRI abdomen before and after intravenous administration of 10 mL Gadavist. COMPARISON: MRI abdomen 12/29/2023; NM PET 12/29/2023 FINDINGS: LOWER THORAX: Unremarkable. LIVER: No global hepatic signal abnormality. There are numerous liver lesions scattered throughout both lobes.  The largest is located in segment 4 and measures 4.6 x 4.3 cm.  Some lesions have decreased in size and demonstrate increased central hypoenhancement, for example the 4.8 x 4.0 cm lesion in segment 8 on 13:15 previously measured 5.1 x 4.3 cm.  Some lesions are new, for example the arterial enhancing lesions in segments 8 and 4 on images 23 and 46 of series 11.  Some are unchanged. BILIARY: The gallbladder is packed with large gallstones.  No gallbladder distension, wall thickening, or adjacent inflammatory changes.  Mild intrahepatic ductal dilatation.  The common duct is dilated up to 13 mm, similar to prior. SPLEEN: No splenomegaly. PANCREAS: Parenchymal atrophy.  No focal masses or ductal dilatation. ADRENALS: No adrenal nodules. KIDNEYS/URETERS: No hydronephrosis or solid mass lesions. PERITONEUM / RETROPERITONEUM: No upper abdominal free fluid. LYMPH NODES: No upper abdominal lymphadenopathy.  Partially imaged mesenteric montserrat conglomerate in the left lower quadrant. VESSELS: Unremarkable. GI TRACT: There is a thickened loop of small bowel in the midline pelvis, which is partially imaged but corresponds to the location of the known primary tumor.  No bowel obstruction. BONES AND SOFT TISSUES: Unchanged marrow replacing lesion in the L3 vertebral body.     History of metastatic  small bowel carcinoid. Thickened loop of small bowel in the midline pelvis in the location of the known primary tumor, but incompletely characterized on this exam. Partially imaged mesenteric montserrat conglomerate in the left lower quadrant. Numerous liver metastases.  Some have decreased in size and demonstrate increased central hypoenhancement, compatible with treatment response.  Others are new from 12/29/2023. Unchanged osseous metastasis in the L3 vertebral body. Other findings as described. Electronically signed by: Douglas Jefferson Date:    06/13/2024 Time:    14:41      Assessment  1. 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  - EKG 12-lead  - sacubitriL-valsartan (ENTRESTO) 24-26 mg per tablet; Take 1 tablet by mouth 2 (two) times daily.  Dispense: 60 tablet; Refill: 2  - spironolactone (ALDACTONE) 25 MG tablet; Take 1 tablet (25 mg total) by mouth once daily.  Dispense: 90 tablet; Refill: 3  - Basic Metabolic Panel; Future  - Magnesium; Future      Plan and Discussion    Unclear what led to her acute exacerbation.  Could have been dietary sodium.  Discussed the importance of restricting dietary sodium intake.  Will adjust her medical regimen.  Stop losartan and substitute Entresto.  Stop potassium and substitute Aldactone.  Close follow-up.  Check labs today.    The ASCVD Risk score ([place] DK, et al., 2019) failed to calculate for the following reasons:    The 2019 ASCVD risk score is only valid for ages 40 to 79     Follow Up  Follow up in about 3 weeks (around 7/30/2024).      Ralph Bergeron MD

## 2024-07-10 LAB
OHS QRS DURATION: 84 MS
OHS QTC CALCULATION: 479 MS

## 2024-07-15 ENCOUNTER — INFUSION (OUTPATIENT)
Dept: INFUSION THERAPY | Facility: HOSPITAL | Age: 84
End: 2024-07-15
Payer: MEDICARE

## 2024-07-15 ENCOUNTER — OFFICE VISIT (OUTPATIENT)
Dept: HEMATOLOGY/ONCOLOGY | Facility: CLINIC | Age: 84
End: 2024-07-15
Payer: MEDICARE

## 2024-07-15 VITALS
RESPIRATION RATE: 18 BRPM | OXYGEN SATURATION: 98 % | DIASTOLIC BLOOD PRESSURE: 64 MMHG | HEIGHT: 64 IN | BODY MASS INDEX: 18.93 KG/M2 | SYSTOLIC BLOOD PRESSURE: 120 MMHG | HEART RATE: 77 BPM | WEIGHT: 110.88 LBS

## 2024-07-15 DIAGNOSIS — E11.29 CONTROLLED TYPE 2 DIABETES MELLITUS WITH MICROALBUMINURIA, WITHOUT LONG-TERM CURRENT USE OF INSULIN: ICD-10-CM

## 2024-07-15 DIAGNOSIS — C7A.012 MALIGNANT CARCINOID TUMOR OF ILEUM: ICD-10-CM

## 2024-07-15 DIAGNOSIS — C7B.8 METASTATIC MALIGNANT NEUROENDOCRINE TUMOR TO LIVER: Primary | ICD-10-CM

## 2024-07-15 DIAGNOSIS — C79.51 METASTASIS TO SPINAL COLUMN: ICD-10-CM

## 2024-07-15 DIAGNOSIS — I50.9 CHRONIC CONGESTIVE HEART FAILURE, UNSPECIFIED HEART FAILURE TYPE: ICD-10-CM

## 2024-07-15 DIAGNOSIS — R80.9 CONTROLLED TYPE 2 DIABETES MELLITUS WITH MICROALBUMINURIA, WITHOUT LONG-TERM CURRENT USE OF INSULIN: ICD-10-CM

## 2024-07-15 DIAGNOSIS — C7B.8 SECONDARY NEUROENDOCRINE TUMOR OF BONE: ICD-10-CM

## 2024-07-15 DIAGNOSIS — D49.9 IMMUNODEFICIENCY SECONDARY TO NEOPLASM: ICD-10-CM

## 2024-07-15 DIAGNOSIS — D84.81 IMMUNODEFICIENCY SECONDARY TO NEOPLASM: ICD-10-CM

## 2024-07-15 DIAGNOSIS — E34.0 CARCINOID SYNDROME: ICD-10-CM

## 2024-07-15 DIAGNOSIS — Z79.899 IMMUNODEFICIENCY DUE TO DRUGS: ICD-10-CM

## 2024-07-15 DIAGNOSIS — I10 ESSENTIAL HYPERTENSION: ICD-10-CM

## 2024-07-15 DIAGNOSIS — D84.821 IMMUNODEFICIENCY DUE TO DRUGS: ICD-10-CM

## 2024-07-15 PROCEDURE — 99215 OFFICE O/P EST HI 40 MIN: CPT | Mod: HCNC,S$GLB,, | Performed by: PHYSICIAN ASSISTANT

## 2024-07-15 PROCEDURE — 3288F FALL RISK ASSESSMENT DOCD: CPT | Mod: HCNC,CPTII,S$GLB, | Performed by: PHYSICIAN ASSISTANT

## 2024-07-15 PROCEDURE — 99999 PR PBB SHADOW E&M-EST. PATIENT-LVL III: CPT | Mod: PBBFAC,HCNC,, | Performed by: PHYSICIAN ASSISTANT

## 2024-07-15 PROCEDURE — 1126F AMNT PAIN NOTED NONE PRSNT: CPT | Mod: HCNC,CPTII,S$GLB, | Performed by: PHYSICIAN ASSISTANT

## 2024-07-15 PROCEDURE — 63600175 PHARM REV CODE 636 W HCPCS: Mod: JZ,JG,HCNC | Performed by: PHYSICIAN ASSISTANT

## 2024-07-15 PROCEDURE — 1101F PT FALLS ASSESS-DOCD LE1/YR: CPT | Mod: HCNC,CPTII,S$GLB, | Performed by: PHYSICIAN ASSISTANT

## 2024-07-15 PROCEDURE — G2211 COMPLEX E/M VISIT ADD ON: HCPCS | Mod: HCNC,S$GLB,, | Performed by: PHYSICIAN ASSISTANT

## 2024-07-15 PROCEDURE — 1157F ADVNC CARE PLAN IN RCRD: CPT | Mod: HCNC,CPTII,S$GLB, | Performed by: PHYSICIAN ASSISTANT

## 2024-07-15 PROCEDURE — 1159F MED LIST DOCD IN RCRD: CPT | Mod: HCNC,CPTII,S$GLB, | Performed by: PHYSICIAN ASSISTANT

## 2024-07-15 PROCEDURE — 96372 THER/PROPH/DIAG INJ SC/IM: CPT | Mod: HCNC

## 2024-07-15 RX ORDER — LANREOTIDE ACETATE 120 MG/.5ML
120 INJECTION SUBCUTANEOUS
Status: DISCONTINUED | OUTPATIENT
Start: 2024-07-15 | End: 2024-07-15 | Stop reason: HOSPADM

## 2024-07-15 RX ORDER — LANREOTIDE ACETATE 120 MG/.5ML
120 INJECTION SUBCUTANEOUS
Status: CANCELLED | OUTPATIENT
Start: 2024-07-15

## 2024-07-15 RX ORDER — LANREOTIDE ACETATE 120 MG/.5ML
120 INJECTION SUBCUTANEOUS
OUTPATIENT
Start: 2024-07-15

## 2024-07-15 RX ADMIN — LANREOTIDE ACETATE 120 MG: 120 INJECTION SUBCUTANEOUS at 10:07

## 2024-07-15 NOTE — PROGRESS NOTES
"    Subjective:       Patient ID: Shahram Hills is an 84 y.o. female.     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 Ms. Hills is an 79 yo woman who initially saw me on 6/7/18 for further evaluation of neuroendocrine tumor. She initially presented with diarrhea, abdominal discomfort, weight loss. She was evaluated at [facility] and underwent workup below:  US abdomen on 3/14/18 showed numerous bilobar mixed echogenicity and mildly vascular hepatic lesions. Cholelithiasis without e/o acute cholecystitis.   MRI abdomen on 3/30/2018 showed innumerable hepatic metastases. L3 vertebral body metastasis with soft tissue component along the right margin of the L3 and upper L4 vertebral bodies, filling the right L3/4 neural foramen, and extending into the anterior aspect of the spinal canal on the right at the L3 and upper T4 levels. Associated with mild spinal canal narrowing.  CT chest on 4/6/2018 showed one lucent nodule measuring 7 mm in the T7 vertebral body, most likely representing metastatic disease.    EGD on 5/4/18 showed normal duodenum. Schatzki's ring in the lower third of the esophagus. Grade 1 esophagitis in the GE junction c/w mild distal esophagitis. Congestion and erythema in the antrum, pre-pyloric region and stomach body c/w gastritis. Biopsy of the stomach antrum showed mild chronic gastritis, neg for H. pylori.   Labs on 5/9/2018: WBC 6.9, H/H 11.6/34.3, MCV 87.5, plt count 279. On 3/9/18: Vit B12 level 173, folic acid 6.6  She was started on oral vit B12 and underwent a liver biopsy on 5/18/18. Pathology showed "metastatic well differentiated neuroendocrine tumor. Ki67 3%"     She saw me on 6/7/18 and complained of weight loss of 26 lbs over the past 3-4 months, also has diarrhea. No flushing, wheezing, palpitations. Has been having pain " "in right flank radiating down the right leg. No tingling/numbness, weakness. She can hold her bladder but when she urinates her diarrhea would come out as well. Started on dex 4 mg q6h the evening of 6/7/18.      2. MRI spine on 6/8/18 showed "Marrow replacing metastatic lesion of the L3 vertebral body with associated extra osseous expansion and complete effacement of the right L3-L4 neural foramina and additional effacement of the right lateral recess.  There is additional lateral extension and abutment of the right psoas muscle.  Superimposed degenerative change at this level results in mild spinal canal stenosis." I sent the patient to ED on 6/9/18 where she was evaluated by neurosurgery. Neurosurgery did not feel she was a candidate for surgical intervention.      3. Seen by Dr. Adames on 6/11/18. palliative radiation to the area of the L3 metastasis 6/18/18-6/29/18   4. Gallium study on 6/13/18: Distal ileal primary neoplasm consistent with a carcinoid.  There is an adjacent metastatic lymph node, diffuse liver metastases, and multiple bone metastases. Index primary neoplasm SUV max 33.13. Adjacent lymph node SUV max 23. L3 bone metastasis SUV max 36.86. Left lobe SUV max 46.13   5. Lanreotide every 4 weeks started on 6/22/18  6. TACE to the right hepatic lobe on 2/14/19. TACE to the left hepatic lobe on 3/21/19.   7. TACE to the right hepatic lobe on 2/11/22. S/p conventional chemoembolization performed of the segment 2, 3, 4 branch of the left hepatic artery and segment 8 branch of the left hepatic artery on 4/22/22.  8. Gallium PET 11/1/22 showed progression. Discussed PRRT. PRRT #1 on 12/14/2022. Zometa every 3 months started 12/27/22. PRRT #2 on 2/8/23. #3 on 4/12/23. #4 on 6/14/23.      History of Present Illness:   Ms. Hills returns for follow up. Feeling much better today. She was recently admitted to the hospital for exacerbation of her heart failure. She was seen in the ER and admitted for about 3 " days. She followed with her Cardiologist after her admission, and he placed her on different heart medications. She has been tolerating this fairly well, aside from increased urination. She denies any worsening shortness of breath, chest pain or cough. Overall, she is doing fairly well. Doesn't feel as well in the morning but this improves as the day goes on.    ECO. Presents with her caregiver today     ROS:   See HPI. Otherwise negative.      Physical Examination:   Vital signs reviewed.   Gen: well hydrated, well developed, in no acute distress.  HEENT: normocephalic, anicteric, EOMI  Neck: supple, no JVD  Lungs: CTAB, no wheezes or rales  Heart: regular rate, regular pulse, no M/R/G  Abdomen: soft, no tenderness, non-distended.   Ext: b/l LE  no edema  Neuro: alert and oriented x 4, no focal neuro deficit  Skin: no rash, open wounds or ulcers  Psych: pleasant and appropriate mood and affect     Objective:      Laboratory Data:  Labs reviewed. Last 5-HIAA stable at 79, pending from today    Imaging Data:     MRI abdomen 22:  1. History of neuroendocrine malignancy.  Numerous hepatic lesions, some remaining stable while others have mildly enlarged consistent with disease progression.  Stable osseous lesion in the L3 vertebral body.  2. Cholelithiasis and stable mild dilatation of the common bile duct and central intrahepatic biliary duct dilatation.  Of note, there is mass effect on the midportion of the common bile duct by dominant left hepatic lobe lesion.  3. Additional findings detailed above.  RECIST SUMMARY:     Date of prior examination for comparison: 2022     Lesion 1: Left hepatic lobe.  7.1 cm.  Series 9 image 52.  Prior measurement 6.3 cm.     Lesion 2: Right hepatic lobe lateral.  3.1 cm.  Series 9 image 30.  Prior measurement 3.2 cm.     Lesion 3: Right hepatic dome.  5.1 cm.  Series 9 image 19.  Prior measurement 4.4 cm.    Gallium PET 22:     Interval development of somatostatin  tracer avid lesions in the skull, concerning for new metastatic lesions.     Numerous tracer avid hypoattenuating masses throughout the liver with increased SUV max compared to prior exam.     Tracer avid lesions in the C7 and L3 vertebral bodies, sternum, distal ileum and mesenteric node are similar to prior exam.     Judged by tracer avidity of the patient's tumor burden, PRRT would be a consideration if clinically indicated.      Gallium PET 7/12/23:  Impression:     In this patient with carcinoid tumor of the ileum, there is overall similar distribution of tracer avid disease involving distal ileum, liver, mesenteric lymph node, and bones.  Index lesions as above.  No definite new tracer avid lesions.    Copper PET 12/29/23:  FINDINGS:  Quality of the study: Adequate.     SUV measurements may not be directly comparable with those made on Ga-68 DOTATATE PET/CT.     In the head and neck, there is physiologic distribution in the pituitary, salivary, and thyroid glands, and there are no tracer avid lesions suspicious for malignancy.     In the chest, there are no tracer avid lesions suspicious for malignancy.  Stable right upper lobe subcentimeter pulmonary nodule, below the size threshold for PET.     In the abdomen and pelvis, there is physiologic uptake in the pancreatic uncinate process, spleen, adrenal glands and  tract.     There are numerous hypodense tracer avid lesions throughout the liver, similar to prior exam.  Index left hepatic lobe lesion measures 5.1 x 5.0 cm (previously 5.1 x 5.0 cm) with maximum SUV of 58 (previously 43).     Focal uptake within the distal ileum with maximum SUV of 22 (previously 17).  Image 239.     Tracer avid mesenteric lymph node measuring 1.5 x 1.4 cm (previously 1.5 cm) with maximum SUV of 21 (previously 22).  Image 222.     In the bones, there is stable distribution of numerous scattered tracer avid lesions.     *Right calvarial lesion near the vertex with SUV max 5 (fused  image 12).  *Right C6 vertebral body with SUV max 5.5 (fused image 74).  *Left scapular lesion with SUV max 2.7 (fused image 88).  *Right sternal lesion with SUV max 8.6 (fused image 103).  *Left anterior rib lesion with SUV max 2.1 (fused image 109).  *L3 vertebral body lesion with SUV max 9.2 (fused image 192).     Impression:     Similar distribution of tracer avid disease involving the distal ileum, liver, mesenteric lymph nodes, and bones.  No definite new tracer avid lesions.       MRI 12/29/23  Impression:     1. In this patient with metastatic neuroendocrine tumor there is interval decreased size of index left hepatic lobe lesion and stable size of multiple additional hepatic lesions.  2. Stable metastatic lesion of the L3 vertebral body and central mesentery.  3. Cholelithiasis without acute cholecystitis.  4. Stable mild intrahepatic and extrahepatic ductal dilation, probably related to mass effect on the midportion of the common bile duct by left hepatic lobe lesion.  5. Additional findings, as above.    Copper PET 6/13/24:  Impression:     Similar distribution and uptake of tracer avid disease involving the distal ileum, liver, and bones. Index lesions as above.  No definite new tracer avid lesion.     MRI 6/13/24:  Impression:     History of metastatic small bowel carcinoid.     Thickened loop of small bowel in the midline pelvis in the location of the known primary tumor, but incompletely characterized on this exam.     Partially imaged mesenteric montserrat conglomerate in the left lower quadrant.     Numerous liver metastases.  Some have decreased in size and demonstrate increased central hypoenhancement, compatible with treatment response.  Others are new from 12/29/2023.     Unchanged osseous metastasis in the L3 vertebral body.     Other findings as described.    Assessment and Plan:      1. 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 Malignant carcinoid tumor of ileum    3. Secondary  neuroendocrine tumor of bone    4. Metastasis to spinal column    5. 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 Carcinoid syndrome    8. Controlled type 2 diabetes mellitus with microalbuminuria, without long-term current use of insulin    9. Essential hypertension    10. Chronic congestive heart failure, unspecified heart failure type        1-6  - Ms Hills is an 83 yo woman with well differentiated low-grade neuroendocrine tumor of the ileum with mets to liver and bones, diagnosed on 5/18/2018. Started lanreotide every 4 weeks on 6/22/2018. S/p TACE to the right hepatic lobe on 2/14/19. TACE to the left hepatic lobe on 3/21/19.   - CT/MRI 1/12/22 showed mild progression in the liver. S/p TACE on 2/11/22 and 4/22/22   - Gallium PET 11/1/22 showed progression. Discussed PRRT. PRRT #1 on 12/14/2022. Completed 4 cycles on 6/14/23. Zometa every 3 months started 12/27/22. Last zometa 9/8/2023  - We have personally reviewed her case at tumor board. PET showed stable disease. MRI showed large lesions continue to improve compared to 6 months ago. There are a few new small lesions in the liver. Recc MRI in 3 months. Discussed with patient and family. They understand and agree with the plan   - doing well.   - Lab work reviewed. Bio-markers are pending  - c/w lanreotide every 4 weeks and zometa every 3 months. Scheduled for zometa today. Next due in Sep 2024  - RTC in 4 weeks     7.  - better after xermelo and PRRT  - c/w lanreotide every 4 weeks.   - c/w xermelo tid  - could not tolerate octreotide    8.  - BS controlled  - f/u with PCP    9.  - BP typically well controlled  - c/w current medication    10.  - f/u with cardiology. Had medications changes placed by her Cardiologist due recent hospital admission for heart failure exacerbation.   - Feeling better today and breathing easier. Could not gain BP in clinic but typically well controlled.     Pte and family members displayed  understanding of the above encounter and treatment plan. All thoughtful questions were answered to their satisfaction. Pte was advised to notify the care team or proceed to the ER if signs and symptoms worsen.       JARRETT Crum, PA-C [facility]  Dept of Hematology/Oncology  WANDA to GI Oncology team         Route Chart for Scheduling    Med Onc Chart Routing      Follow up with physician . Keep appointments as scheduled   Follow up with JUNAID    Infusion scheduling note    Injection scheduling note    Labs CBC and CMP   Scheduling:  Preferred lab:  Lab interval: every 4 weeks  5-HIAA, Serotonin, Pancreastatin   Imaging    Pharmacy appointment    Other referrals                Supportive Plan Information  OP ZOLEDRONIC ACID (ZOMETA) Q4W   Sebastian Granados MD   Upcoming Treatment Dates - OP ZOLEDRONIC ACID (ZOMETA) Q4W    9/17/2024       Medications       zoledronic 4 mg/100 mL infusion 4 mg    Therapy Plan Information  LANREOTIDE  5. Medications  lanreotide injection 120 mg  120 mg, Subcutaneous, Every visit

## 2024-07-16 ENCOUNTER — OFFICE VISIT (OUTPATIENT)
Dept: INTERNAL MEDICINE | Facility: CLINIC | Age: 84
End: 2024-07-16
Attending: INTERNAL MEDICINE
Payer: MEDICARE

## 2024-07-16 VITALS
DIASTOLIC BLOOD PRESSURE: 60 MMHG | BODY MASS INDEX: 19.19 KG/M2 | WEIGHT: 111.75 LBS | SYSTOLIC BLOOD PRESSURE: 118 MMHG | HEART RATE: 42 BPM | OXYGEN SATURATION: 99 %

## 2024-07-16 DIAGNOSIS — E11.29 CONTROLLED TYPE 2 DIABETES MELLITUS WITH MICROALBUMINURIA, WITHOUT LONG-TERM CURRENT USE OF INSULIN: ICD-10-CM

## 2024-07-16 DIAGNOSIS — I50.42 CHRONIC COMBINED SYSTOLIC AND DIASTOLIC CHF (CONGESTIVE HEART FAILURE): ICD-10-CM

## 2024-07-16 DIAGNOSIS — Z09 HOSPITAL DISCHARGE FOLLOW-UP: Primary | ICD-10-CM

## 2024-07-16 DIAGNOSIS — R80.9 CONTROLLED TYPE 2 DIABETES MELLITUS WITH MICROALBUMINURIA, WITHOUT LONG-TERM CURRENT USE OF INSULIN: ICD-10-CM

## 2024-07-16 PROCEDURE — 1160F RVW MEDS BY RX/DR IN RCRD: CPT | Mod: HCNC,CPTII,S$GLB, | Performed by: INTERNAL MEDICINE

## 2024-07-16 PROCEDURE — 99214 OFFICE O/P EST MOD 30 MIN: CPT | Mod: HCNC,S$GLB,, | Performed by: INTERNAL MEDICINE

## 2024-07-16 PROCEDURE — 1159F MED LIST DOCD IN RCRD: CPT | Mod: HCNC,CPTII,S$GLB, | Performed by: INTERNAL MEDICINE

## 2024-07-16 PROCEDURE — 1126F AMNT PAIN NOTED NONE PRSNT: CPT | Mod: HCNC,CPTII,S$GLB, | Performed by: INTERNAL MEDICINE

## 2024-07-16 PROCEDURE — 1101F PT FALLS ASSESS-DOCD LE1/YR: CPT | Mod: HCNC,CPTII,S$GLB, | Performed by: INTERNAL MEDICINE

## 2024-07-16 PROCEDURE — 1157F ADVNC CARE PLAN IN RCRD: CPT | Mod: HCNC,CPTII,S$GLB, | Performed by: INTERNAL MEDICINE

## 2024-07-16 PROCEDURE — 3288F FALL RISK ASSESSMENT DOCD: CPT | Mod: HCNC,CPTII,S$GLB, | Performed by: INTERNAL MEDICINE

## 2024-07-16 PROCEDURE — 99999 PR PBB SHADOW E&M-EST. PATIENT-LVL III: CPT | Mod: PBBFAC,HCNC,, | Performed by: INTERNAL MEDICINE

## 2024-07-16 NOTE — PROGRESS NOTES
Subjective:   Patient ID: Shahram Hills is a 84 y.o. female  Chief complaint:   Chief Complaint   Patient presents with    Hospital Follow Up       HPI  Here for hospital discharge follow up for HF   Accompanied by her sister today     Seen by cards since discharge - note reviewed   Admitted to OSH   Sounds like inc salt intake prior to this   Medications adjusted   No ankle swelling per them with this episode     HTN:  Today well controlled  Reviewed home readings and 110-130s/60-80s with dayami< 130/80  HR 70-90  - c/w meds on med card  - taking lasix daily, coreg, entresto, aldactone  Previously:   Prev switched to coreg from amlodipine    Diabetes:   A1c 6.1<- 6.1 <-6.3<- 6.6<-6.4<- 5.9 <-6.2  <- 6.1 <- 6.2  - typically eating right before recent labs   - well controlled and sam metformin 1000 daily  Bg 159-193  Fasting bg 140-160s but eating sugary snack late at night - ice cream, pie or cookies   - eats breakfast around 9-10am  Foot exam utd 7/2023  Utd on eye exam 11/2023  Urine screening due   No lows     PVCs, Chronic systolic and diastolic HF (EF 40% on echo 9/2022 and worse on echo from recent hospitalization per cards note):  No further ankle swelling since taking lasix daily   Sam current regimen as above   No aranad or sob  Prev:  - started lasix at v due to acute HF exacerbation - sam daily lasix - no further ankle swelling since then   Previously:   - noticed ankle swelling - at baseline gets ankle swelling worse in afternoon - better in am   Cards: Elyssa  Previously:    - 2/25/2022 for hospital discharge had TACE right hep lobe and found to have frequent PVCs  - we resumed losartan at that time   - seen by cards 3/14/2022 and completed holter - at this time no tx indicated    ### not addressed today #####    B12 def:  - is taking supplement daily    Metastatic neuroendocrine tumor to liver s/p TACE to R hep lobe 2/2019 and L hep loab 3/2019, TACE to R 2/2022 and 4/2022, palliative radiation to the  area of the L3 metastasis 6/18/18-6/29/18:  Saw h/o 4/23/2024 - on Xermelo TID and lanreotide q4 weeks  12/2023 imaging showed stable disease  Prev:  - pET 11/2022 showed progression   - PRRT #1 on 12/14/2022. Zometa every 3 months started 12/27/22. PRRT #2 on 2/8  Followed by h/o - H last clinic visit March 8, 2023  Previously:   - 2/25/2022 for hospital discharge had TACE right hep lobe and found to have frequent PVCs  - we resumed losartan at that time   - seen by cards 3/14/2022 and completed holter - at this time no tx indicated    ###########    H/o: Du  Cards: Elyssa  Pod: Luper    HM:  Covid booster   Rsv  Urine screen    Review of Systems    Objective:  Vitals:    07/16/24 1130   BP: 118/60   BP Location: Left arm   Patient Position: Sitting   Pulse: (!) 42   SpO2: 99%   Weight: 50.7 kg (111 lb 12.4 oz)         Body mass index is 19.19 kg/m².    Physical Exam  Vitals reviewed.   Constitutional:       Appearance: Normal appearance. She is well-developed.   HENT:      Head: Normocephalic and atraumatic.   Eyes:      Extraocular Movements: Extraocular movements intact.      Conjunctiva/sclera: Conjunctivae normal.   Cardiovascular:      Rate and Rhythm: Normal rate and regular rhythm.      Pulses: Normal pulses.      Heart sounds: Normal heart sounds.   Pulmonary:      Effort: Pulmonary effort is normal.      Breath sounds: Normal breath sounds.   Abdominal:      General: Bowel sounds are normal.      Palpations: Abdomen is soft. There is mass (ruq mass).   Musculoskeletal:         General: No swelling or tenderness. Normal range of motion.      Cervical back: Normal range of motion and neck supple.   Lymphadenopathy:      Cervical: No cervical adenopathy.   Skin:     General: Skin is warm and dry.      Capillary Refill: Capillary refill takes less than 2 seconds.      Nails: There is no clubbing.   Neurological:      General: No focal deficit present.      Mental Status: She is alert and oriented to person,  place, and time. Mental status is at baseline.      Gait: Gait normal.   Psychiatric:         Mood and Affect: Mood normal.         Speech: Speech normal.         Behavior: Behavior normal.         Thought Content: Thought content normal.         Judgment: Judgment normal.       Assessment:  1. Hospital discharge follow-up    2. Controlled type 2 diabetes mellitus with microalbuminuria, without long-term current use of insulin    3. Chronic combined systolic and diastolic CHF (congestive heart failure)      Plan:     Hospital discharge follow-up  Euvolemic today   Cont reigmen   F/u with cards as planned   Will reach out to inquire about timing of f/u echo     Controlled type 2 diabetes mellitus with microalbuminuria, without long-term current use of insulin  -     Hemoglobin A1C; Future; Expected date: 07/16/2024  Stable   Cont reigmen   Update A1c   Annual eye and foot exams     Chronic combined systolic and diastolic CHF (congestive heart failure)  As per prob #1    Health Maintenance   Topic Date Due    Lipid Panel  07/12/2024    Hemoglobin A1c  08/09/2024    Eye Exam  11/04/2024    TETANUS VACCINE  12/10/2029    Shingles Vaccine  Completed

## 2024-07-17 ENCOUNTER — TELEPHONE (OUTPATIENT)
Dept: INTERNAL MEDICINE | Facility: CLINIC | Age: 84
End: 2024-07-17
Payer: MEDICARE

## 2024-07-17 DIAGNOSIS — I50.42 CHRONIC COMBINED SYSTOLIC AND DIASTOLIC CHF (CONGESTIVE HEART FAILURE): Primary | ICD-10-CM

## 2024-07-17 NOTE — TELEPHONE ENCOUNTER
Can't schedule appt bc it's past the date of expiration on referral: 9/21/24.     Sending back to PCP to extend referral date. Once date adjusted MA will call primary caregiver Shayla back to schedule for October 29, 2024 at 9 am if still available, as that is pt requested date and time.

## 2024-07-17 NOTE — TELEPHONE ENCOUNTER
Please let pt and her primary caregiver Shayla know that I received feedback from her cardiologist that echo scheduled for later this month should be rescheduled for 3-4 months out from now since an echo was completed during her recent hospitalization  - please help reschedule echo for 3-4 months from now - thanks!

## 2024-07-29 ENCOUNTER — OFFICE VISIT (OUTPATIENT)
Dept: CARDIOLOGY | Facility: CLINIC | Age: 84
End: 2024-07-29
Payer: MEDICARE

## 2024-07-29 DIAGNOSIS — I10 PRIMARY HYPERTENSION: ICD-10-CM

## 2024-07-29 DIAGNOSIS — I11.0 HYPERTENSIVE HEART DISEASE WITH CHRONIC COMBINED SYSTOLIC AND DIASTOLIC CONGESTIVE HEART FAILURE: Primary | ICD-10-CM

## 2024-07-29 DIAGNOSIS — I50.42 HYPERTENSIVE HEART DISEASE WITH CHRONIC COMBINED SYSTOLIC AND DIASTOLIC CONGESTIVE HEART FAILURE: Primary | ICD-10-CM

## 2024-07-29 PROCEDURE — 3288F FALL RISK ASSESSMENT DOCD: CPT | Mod: HCNC,CPTII,S$GLB, | Performed by: INTERNAL MEDICINE

## 2024-07-29 PROCEDURE — 99214 OFFICE O/P EST MOD 30 MIN: CPT | Mod: HCNC,S$GLB,, | Performed by: INTERNAL MEDICINE

## 2024-07-29 PROCEDURE — 1157F ADVNC CARE PLAN IN RCRD: CPT | Mod: HCNC,CPTII,S$GLB, | Performed by: INTERNAL MEDICINE

## 2024-07-29 PROCEDURE — 1101F PT FALLS ASSESS-DOCD LE1/YR: CPT | Mod: HCNC,CPTII,S$GLB, | Performed by: INTERNAL MEDICINE

## 2024-07-29 PROCEDURE — 99999 PR PBB SHADOW E&M-EST. PATIENT-LVL IV: CPT | Mod: PBBFAC,HCNC,, | Performed by: INTERNAL MEDICINE

## 2024-07-29 PROCEDURE — 1160F RVW MEDS BY RX/DR IN RCRD: CPT | Mod: HCNC,CPTII,S$GLB, | Performed by: INTERNAL MEDICINE

## 2024-07-29 PROCEDURE — 1159F MED LIST DOCD IN RCRD: CPT | Mod: HCNC,CPTII,S$GLB, | Performed by: INTERNAL MEDICINE

## 2024-07-29 NOTE — PROGRESS NOTES
"[facility]    Chief Complaint  Chief Complaint   Patient presents with    Congestive Heart Failure       HPI:    Much better.  No problems with medications changes.  No syncope or presyncope.    Medications  Current Outpatient Medications   Medication Sig Dispense Refill    blood sugar diagnostic (TRUE METRIX GLUCOSE TEST STRIP) Str USE TO CHECK BLOOD SUGAR TWICE DAILY 100 strip 11    blood-glucose meter kit To check BG 2 times daily, to use with insurance preferred meter 1 each 0    carvediloL (COREG) 12.5 MG tablet Take 1 tablet (12.5 mg total) by mouth 2 (two) times daily with meals. 180 tablet 3    cyanocobalamin (VITAMIN B-12) 1000 MCG tablet Take 1 tablet (1,000 mcg total) by mouth once daily.      diphenoxylate-atropine 2.5-0.025 mg (LOMOTIL) 2.5-0.025 mg per tablet Take 1 tablet by mouth 4 (four) times daily as needed for Diarrhea. 90 tablet 2    ezetimibe (ZETIA) 10 mg tablet Take 1 tablet (10 mg total) by mouth once daily. 90 tablet 3    ferrous sulfate 325 (65 FE) MG EC tablet Take 325 mg by mouth 3 (three) times daily with meals.      furosemide (LASIX) 20 MG tablet take 1 tab by mouth daily. If gain 2-3 pounds in 2-3 days then take 2 tabs daily for 3 days and then resume 1 dose daily 120 tablet 3    lancets Misc To check BG 2 times daily, to use with insurance preferred meter 100 each 11    latanoprost 0.005 % ophthalmic solution Place 1 drop into both eyes nightly.      metFORMIN (GLUCOPHAGE-XR) 500 MG ER 24hr tablet TAKE 2 TABLETS(1000 MG TOTAL) BY MOUTH BEFORE BREAKFAST 180 tablet 0    needle, disp, 22 G 22 gauge x 1" Ndle 1 application by Misc.(Non-Drug; Combo Route) route 3 (three) times daily as needed. 30 each 2    sacubitriL-valsartan (ENTRESTO) 24-26 mg per tablet Take 1 tablet by mouth 2 (two) times daily. 60 tablet 2    spironolactone (ALDACTONE) 25 MG tablet Take 1 tablet (25 mg total) by mouth once daily. 90 tablet 3    syringe, disposable, 1 mL Syrg 1 application by " Misc.(Non-Drug; Combo Route) route 3 (three) times daily as needed (diarrhea). 30 each 2    telotristat ethyl (XERMELO) 250 mg Tab Take 1 tablet (250 mg) by mouth 3 (three) times daily. 90 tablet 3    TRUEPLUS LANCETS 33 gauge Misc USE TO CHECK BLOOD SUGAR TWICE DAILY 100 each 11    vitamin D (VITAMIN D3) 1000 units Tab Take 400 Units by mouth once daily.      XIIDRA 5 % Dpet INSTILL ONE DROP INTO OU BID  3     No current facility-administered medications for this visit.        History  Past Medical History:   Diagnosis Date    Anemia 07/11/2018    B12 deficiency     Depression     per pcp note 7/2017 and given prozac and diazepam     Hyperlipidemia     Systolic heart failure     Weight loss     reviewed prior pcp Dr. Russo notes 2017 - labs reviewed: cmp wnl x tbili 1.5, tsh wnl, A1c 5.0, cbc wnl,D 36, hiv neg, hep c neg, hep b surg ag neg      Past Surgical History:   Procedure Laterality Date    EMBOLIZATION N/A 02/14/2019    Procedure: EMBOLIZATION, BLOOD VESSEL;  Surgeon: [facility];  Location: [facility] OR [address] [place] [address];  Service: Radiology;  Laterality: N/A;  Room 189/[facility]    EMBOLIZATION N/A 03/21/2019    Procedure: EMBOLIZATION, BLOOD VESSEL;  Surgeon: [facility];  Location: [facility] OR [address] [place] [address];  Service: Radiology;  Laterality: N/A;  [facility]    ESOPHAGOGASTRODUODENOSCOPY  05/04/2018    esophageal ring, grade 1 esophagitis, gastritis     EYE SURGERY Bilateral     cataract removal    HYSTERECTOMY      fibroids     Social History     Socioeconomic History    Marital status:    Occupational History    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   Tobacco Use    Smoking status: Never    Smokeless tobacco: Never   Substance and Sexual Activity    Alcohol use: No     Comment: stopped in 2007 - hx of social drinking    Drug use: Never    Sexual activity: Not Currently     Partners: Male   Social History Narrative    Living in [facility]    Not getting reg exericse     Social  Determinants of Health     Financial Resource Strain: Medium Risk (11/10/2023)    Overall Financial Resource Strain (CARDIA)     Difficulty of Paying Living Expenses: Somewhat hard   Food Insecurity: No Food Insecurity (11/10/2023)    Hunger Vital Sign     Worried About Running Out of Food in the Last Year: Never true     Ran Out of Food in the Last Year: Never true   Transportation Needs: No Transportation Needs (11/10/2023)    PRAPARE - Transportation     Lack of Transportation (Medical): No     Lack of Transportation (Non-Medical): No   Physical Activity: Inactive (11/10/2023)    Exercise Vital Sign     Days of Exercise per Week: 0 days     Minutes of Exercise per Session: 0 min   Stress: No Stress Concern Present (11/10/2023)    [place] [employer] - Occupational Stress Questionnaire     Feeling of Stress : Only a little   Housing Stability: Low Risk  (11/10/2023)    Housing Stability Vital Sign     Unable to Pay for Housing in the Last Year: No     Number of Places Lived in the Last Year: 1     Unstable Housing in the Last Year: No     Family History   Problem Relation Name Age of Onset    Glaucoma Mother      Hypertension Mother      Heart attack Father      Cancer Father      Diabetes Sister Marilyn     Asthma Sister Nick Luke     No Known Problems Sister Stephanie     Hyperlipidemia Sister      Heart attack Sister      Cancer Brother          lung cancer, + tobacco    Diabetes Brother      Hypertension Brother      Stroke Brother      Emphysema Brother      COPD Brother          Allergies  Review of patient's allergies indicates:   Allergen Reactions    Epinephrine      carcinoid       Review of Systems   Review of Systems   Constitutional: Negative for malaise/fatigue, weight gain and weight loss.   Eyes:  Negative for visual disturbance.   Cardiovascular:  Negative for chest pain, claudication, cyanosis, dyspnea on exertion, irregular heartbeat, leg swelling, near-syncope, orthopnea,  palpitations, paroxysmal nocturnal dyspnea and syncope.   Respiratory:  Negative for cough, hemoptysis, shortness of breath, sleep disturbances due to breathing and wheezing.    Hematologic/Lymphatic: Negative for bleeding problem. Does not bruise/bleed easily.   Skin:  Negative for poor wound healing.   Musculoskeletal:  Negative for muscle cramps and myalgias.   Gastrointestinal:  Negative for abdominal pain, anorexia, diarrhea, heartburn, hematemesis, hematochezia, melena, nausea and vomiting.   Genitourinary:  Negative for hematuria and nocturia.   Neurological:  Negative for excessive daytime sleepiness, dizziness, focal weakness, light-headedness and weakness.       Physical Exam  Vitals:    07/29/24 1016   BP: (P) 124/62   Pulse: (!) (P) 40     Wt Readings from Last 1 Encounters:   07/29/24 (P) 49.9 kg (110 lb 1.6 oz)     Physical Exam  Vitals and nursing note reviewed.   Constitutional:       General: She is not in acute distress.     Appearance: She is not toxic-appearing or diaphoretic.   HENT:      Head: Normocephalic and atraumatic.      Mouth/Throat:      Lips: Pink.      Mouth: Mucous membranes are moist.   Eyes:      General: No scleral icterus.     Conjunctiva/sclera: Conjunctivae normal.   Neck:      Thyroid: No thyromegaly.      Vascular: No carotid bruit, hepatojugular reflux or JVD.      Trachea: Trachea normal.   Cardiovascular:      Rate and Rhythm: Normal rate and regular rhythm. Occasional Extrasystoles are present.     Chest Wall: PMI is not displaced.      Pulses:           Carotid pulses are 2+ on the right side and 2+ on the left side.       Radial pulses are 2+ on the right side and 2+ on the left side.      Heart sounds: S1 normal and S2 normal. No murmur heard.     No friction rub. Gallop present. S4 sounds present. No S3 sounds.   Pulmonary:      Effort: Pulmonary effort is normal. No accessory muscle usage or respiratory distress.      Breath sounds: Normal breath sounds and air  entry. No decreased breath sounds, wheezing, rhonchi or rales.   Abdominal:      General: Bowel sounds are normal. There is no distension or abdominal bruit.      Palpations: Abdomen is soft. There is hepatomegaly. There is no splenomegaly or pulsatile mass.      Tenderness: There is no abdominal tenderness.   Musculoskeletal:         General: No tenderness or deformity.      Right lower leg: No edema.      Left lower leg: No edema.   Skin:     General: Skin is warm and dry.      Capillary Refill: Capillary refill takes less than 2 seconds.      Coloration: Skin is not cyanotic or pale.      Nails: There is no clubbing.   Neurological:      General: No focal deficit present.      Mental Status: She is alert and oriented to person, place, and time.   Psychiatric:         Attention and Perception: Attention normal.         Mood and Affect: Mood normal.         Speech: Speech normal.         Behavior: Behavior normal. Behavior is cooperative.         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  Please note that consuming serotonin containing   foods and some medications within 24 hours of sample   collection may result in a temporary elevation of 5HIAA.    -------------------ADDITIONAL INFORMATION-------------------  Liquid Chromatography-Tandem Mass Spectrometry (LC-MS/MS).  Values obtained from different assay methods or kits may be   different and cannot be used interchangeably. The results   cannot be interpreted as absolute evidence for the presence   or absence of malignant disease.  This test was developed and its performance characteristics   determined by [facility] in a manner consistent with CLIA   requirements. This test has not been cleared or approved by   the U.S. Food and Drug  Administration.    Test Performed by:  [facility]  [address] [place] [address]  : Soni silver Ph.D.; CLIA# [id number]      Pancreastatin 07/15/2024 8463   H  10 - 135 pg/mL Final    Comment: -------------------ADDITIONAL INFORMATION-------------------  This radioimmunoassay was developed and its  performance characteristics determined by  [employer]. It has not been  cleared or approved by the FDA and such  approval or clearance is not required at this  time. Values obtained with different methods,  different laboratories or with kits cannot  be used interchangeably with the results on  this report. The results cannot be interpreted  as absolute evidence of the presence or absence  of malignant disease.    Test Performed by:  [facility]  [address] [place] [address]      Serotonin 07/15/2024 1790 (H)  <=230 ng/mL Final    Comment: -------------------ADDITIONAL INFORMATION-------------------  This test was developed and its performance characteristics   determined by [facility] in a manner consistent with CLIA   requirements. This test has not been cleared or approved by   the U.S. Food and Drug Administration.    Test Performed by:  [facility]  [address] [place] [address]  : Soni Owens Ph.D.; CLIA# [id number]      WBC 07/15/2024 5.70  3.90 - 12.70 K/uL Final    RBC 07/15/2024 4.34  4.00 - 5.40 M/uL Final    Hemoglobin 07/15/2024 13.8  12.0 - 16.0 g/dL Final    Hematocrit 07/15/2024 42.6  37.0 - 48.5 % Final    MCV 07/15/2024 98  82 - 98 fL Final    MCH 07/15/2024 31.8 (H)  27.0 - 31.0 pg Final    MCHC 07/15/2024 32.4  32.0 - 36.0 g/dL Final    RDW 07/15/2024 12.5  11.5 - 14.5 % Final    Platelets 07/15/2024 222  150 - 450 K/uL Final    MPV 07/15/2024 9.9  9.2 - 12.9 fL Final    Immature Granulocytes 07/15/2024  0.4  0.0 - 0.5 % Final    Gran # (ANC) 07/15/2024 3.9  1.8 - 7.7 K/uL Final    Immature Grans (Abs) 07/15/2024 0.02  0.00 - 0.04 K/uL Final    Comment: Mild elevation in immature granulocytes is non specific and   can be seen in a variety of conditions including stress response,   acute inflammation, trauma and pregnancy. Correlation with other   laboratory and clinical findings is essential.      Lymph # 07/15/2024 1.3  1.0 - 4.8 K/uL Final    Mono # 07/15/2024 0.5  0.3 - 1.0 K/uL Final    Eos # 07/15/2024 0.0  0.0 - 0.5 K/uL Final    Baso # 07/15/2024 0.01  0.00 - 0.20 K/uL Final    nRBC 07/15/2024 0  0 /100 WBC Final    Gran % 07/15/2024 69.1  38.0 - 73.0 % Final    Lymph % 07/15/2024 21.9  18.0 - 48.0 % Final    Mono % 07/15/2024 7.9  4.0 - 15.0 % Final    Eosinophil % 07/15/2024 0.5  0.0 - 8.0 % Final    Basophil % 07/15/2024 0.2  0.0 - 1.9 % Final    Differential Method 07/15/2024 Automated   Final    Sodium 07/15/2024 141  136 - 145 mmol/L Final    Potassium 07/15/2024 4.4  3.5 - 5.1 mmol/L Final    Chloride 07/15/2024 104  95 - 110 mmol/L Final    CO2 07/15/2024 27  23 - 29 mmol/L Final    Glucose 07/15/2024 164 (H)  70 - 110 mg/dL Final    BUN 07/15/2024 14  8 - 23 mg/dL Final    Creatinine 07/15/2024 0.9  0.5 - 1.4 mg/dL Final    Calcium 07/15/2024 9.6  8.7 - 10.5 mg/dL Final    Total Protein 07/15/2024 7.2  6.0 - 8.4 g/dL Final    Albumin 07/15/2024 3.6  3.5 - 5.2 g/dL Final    Total Bilirubin 07/15/2024 0.4  0.1 - 1.0 mg/dL Final    Comment: For infants and newborns, interpretation of results should be based  on gestational age, weight and in agreement with clinical  observations.    Premature Infant recommended reference ranges:  Up to 24 hours.............<8.0 mg/dL  Up to 48 hours............<12.0 mg/dL  3-5 days..................<15.0 mg/dL  6-29 days.................<15.0 mg/dL      Alkaline Phosphatase 07/15/2024 62  55 - 135 U/L Final    AST 07/15/2024 25  10 - 40 U/L Final    ALT 07/15/2024 25   10 - 44 U/L Final    eGFR 07/15/2024 >60.0  >60 mL/min/1.73 m^2 Final    Anion Gap 07/15/2024 10  8 - 16 mmol/L Final   Lab Visit on 07/09/2024   Component Date Value Ref Range Status    Sodium 07/09/2024 140  136 - 145 mmol/L Final    Potassium 07/09/2024 5.0  3.5 - 5.1 mmol/L Final    Chloride 07/09/2024 103  95 - 110 mmol/L Final    CO2 07/09/2024 27  23 - 29 mmol/L Final    Glucose 07/09/2024 189 (H)  70 - 110 mg/dL Final    BUN 07/09/2024 10  8 - 23 mg/dL Final    Creatinine 07/09/2024 0.9  0.5 - 1.4 mg/dL Final    Calcium 07/09/2024 10.0  8.7 - 10.5 mg/dL Final    Anion Gap 07/09/2024 10  8 - 16 mmol/L Final    eGFR 07/09/2024 >60  >60 mL/min/1.73 m^2 Final    Magnesium 07/09/2024 1.9  1.6 - 2.6 mg/dL Final   Office Visit on 07/09/2024   Component Date Value Ref Range Status    QRS Duration 07/09/2024 84  ms Final    [facility] QTC Calculation 07/09/2024 479  ms Final   Lab Visit on 06/13/2024   Component Date Value Ref Range Status    WBC 06/13/2024 4.46  3.90 - 12.70 K/uL Final    RBC 06/13/2024 3.86 (L)  4.00 - 5.40 M/uL Final    Hemoglobin 06/13/2024 12.3  12.0 - 16.0 g/dL Final    Hematocrit 06/13/2024 38.3  37.0 - 48.5 % Final    MCV 06/13/2024 99 (H)  82 - 98 fL Final    MCH 06/13/2024 31.9 (H)  27.0 - 31.0 pg Final    MCHC 06/13/2024 32.1  32.0 - 36.0 g/dL Final    RDW 06/13/2024 12.7  11.5 - 14.5 % Final    Platelets 06/13/2024 171  150 - 450 K/uL Final    MPV 06/13/2024 9.9  9.2 - 12.9 fL Final    Immature Granulocytes 06/13/2024 0.4  0.0 - 0.5 % Final    Gran # (ANC) 06/13/2024 2.8  1.8 - 7.7 K/uL Final    Immature Grans (Abs) 06/13/2024 0.02  0.00 - 0.04 K/uL Final    Comment: Mild elevation in immature granulocytes is non specific and   can be seen in a variety of conditions including stress response,   acute inflammation, trauma and pregnancy. Correlation with other   laboratory and clinical findings is essential.      Lymph # 06/13/2024 1.2  1.0 - 4.8 K/uL Final    Mono # 06/13/2024 0.4  0.3 - 1.0 K/uL  Final    Eos # 06/13/2024 0.0  0.0 - 0.5 K/uL Final    Baso # 06/13/2024 0.01  0.00 - 0.20 K/uL Final    nRBC 06/13/2024 0  0 /100 WBC Final    Gran % 06/13/2024 63.8  38.0 - 73.0 % Final    Lymph % 06/13/2024 25.8  18.0 - 48.0 % Final    Mono % 06/13/2024 9.6  4.0 - 15.0 % Final    Eosinophil % 06/13/2024 0.2  0.0 - 8.0 % Final    Basophil % 06/13/2024 0.2  0.0 - 1.9 % Final    Differential Method 06/13/2024 Automated   Final    Sodium 06/13/2024 141  136 - 145 mmol/L Final    Potassium 06/13/2024 4.1  3.5 - 5.1 mmol/L Final    Chloride 06/13/2024 104  95 - 110 mmol/L Final    CO2 06/13/2024 26  23 - 29 mmol/L Final    Glucose 06/13/2024 97  70 - 110 mg/dL Final    BUN 06/13/2024 12  8 - 23 mg/dL Final    Creatinine 06/13/2024 0.7  0.5 - 1.4 mg/dL Final    Calcium 06/13/2024 9.7  8.7 - 10.5 mg/dL Final    Total Protein 06/13/2024 6.8  6.0 - 8.4 g/dL Final    Albumin 06/13/2024 3.6  3.5 - 5.2 g/dL Final    Total Bilirubin 06/13/2024 0.4  0.1 - 1.0 mg/dL Final    Comment: For infants and newborns, interpretation of results should be based  on gestational age, weight and in agreement with clinical  observations.    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  Please note that consuming serotonin containing   foods and some medications within 24 hours of sample   collection may result in a temporary elevation of  5HIAA.    -------------------ADDITIONAL INFORMATION-------------------  Liquid Chromatography-Tandem Mass Spectrometry (LC-MS/MS).  Values obtained from different assay methods or kits may be   different and cannot be used interchangeably. The results   cannot be interpreted as absolute evidence for the presence   or absence of malignant disease.  This test was developed and its performance characteristics   determined by [facility] in a manner consistent with CLIA   requirements. This test has not been cleared or approved by   the U.S. Food and Drug Administration.    Test Performed by:  [facility] [place] [address]  : Soni silver Ph.D.; CLIA# [id number]      Pancreastatin 06/13/2024 2102  10 - 135 pg/mL Final    Comment: Result verified by repeat analysis.    -------------------ADDITIONAL INFORMATION-------------------  This radioimmunoassay was developed and its  performance characteristics determined by  [employer]. It has not been  cleared or approved by the FDA and such  approval or clearance is not required at this  time. Values obtained with different methods,  different laboratories or with kits cannot  be used interchangeably with the results on  this report. The results cannot be interpreted  as absolute evidence of the presence or absence  of malignant disease.    Test Performed by:  [facility]  [address] [place] [address]      Serotonin 06/13/2024 1230 (H)  <=230 ng/mL Final    Comment: -------------------ADDITIONAL INFORMATION-------------------  This test was developed and its performance characteristics   determined by [facility] in a manner consistent with CLIA   requirements. This test has not been cleared or approved by   the U.S. Food and Drug Administration.    Test Performed by:  [facility] [address]  [address]  [place] [id number]  : Soni Owens Ph.D.; CLIA# [id number]     Lab Visit on 05/21/2024   Component Date Value Ref Range Status    Chromogranin A 05/21/2024 1109 (H)  <93 ng/mL Final    Comment: Impaired renal or hepatic function or treatment with proton  pump inhibitors may result in artifactual elevations of   Chromogranin A.    -------------------ADDITIONAL INFORMATION-------------------  The testing method is a homogeneous time-resolved   immunofluorescent assay manufactured by [employer]   and performed on the [employer]or [employer].    Values obtained with different assay methods or kits may be   different and cannot be used interchangeably.    Test results cannot be interpreted as absolute evidence for   the presence or absence of malignant disease.    In some immunoassays, the presence of unusually high   concentrations of analyte may result in a high-dose   &quot;hook&quot; effect. This may result in a lower or even normal   measured analyte concentration. If the reported result   is inconsistent with the clinical presentation, the   laboratory should be alerted for troubleshooting. For   diagnostic purposes, these immunoassay results should   always be as                           sessed in conjunction with the patients medical   history, clinical examination and other findings.    Test Performed by:  [facility]  [address] [place] [id number]  : Yogesh Roque M.D. Ph.D.; CLIA# [id number]      Pancreastatin 05/21/2024 3908  10 - 135 pg/mL Final    Comment: *Result verified by repeat analysis.    -------------------ADDITIONAL INFORMATION-------------------  This radioimmunoassay was developed and its  performance characteristics determined by  [employer]. It has not been  cleared or approved by the FDA and such  approval or clearance is not required at this  time. Values obtained with  different methods,  different laboratories or with kits cannot  be used interchangeably with the results on  this report. The results cannot be interpreted  as absolute evidence of the presence or absence  of malignant disease.    Test Performed by:  [facility]  [address] [place] [address]      Serotonin 05/21/2024 1290 (H)  <=230 ng/mL Final    Comment: -------------------ADDITIONAL INFORMATION-------------------  This test was developed and its performance characteristics   determined by [facility] in a manner consistent with CLIA   requirements. This test has not been cleared or approved by   the U.S. Food and Drug Administration.    Test Performed by:  [facility]  [address] [place] [address]  : Yogesh Roque M.D. Ph.D.; CLIA# [id number]      WBC 05/21/2024 5.03  3.90 - 12.70 K/uL Final    RBC 05/21/2024 3.71 (L)  4.00 - 5.40 M/uL Final    Hemoglobin 05/21/2024 12.2  12.0 - 16.0 g/dL Final    Hematocrit 05/21/2024 36.8 (L)  37.0 - 48.5 % Final    MCV 05/21/2024 99 (H)  82 - 98 fL Final    MCH 05/21/2024 32.9 (H)  27.0 - 31.0 pg Final    MCHC 05/21/2024 33.2  32.0 - 36.0 g/dL Final    RDW 05/21/2024 12.3  11.5 - 14.5 % Final    Platelets 05/21/2024 156  150 - 450 K/uL Final    MPV 05/21/2024 9.7  9.2 - 12.9 fL Final    Immature Granulocytes 05/21/2024 0.4  0.0 - 0.5 % Final    Gran # (ANC) 05/21/2024 3.5  1.8 - 7.7 K/uL Final    Immature Grans (Abs) 05/21/2024 0.02  0.00 - 0.04 K/uL Final    Comment: Mild elevation in immature granulocytes is non specific and   can be seen in a variety of conditions including stress response,   acute inflammation, trauma and pregnancy. Correlation with other   laboratory and clinical findings is essential.      Lymph # 05/21/2024 1.0  1.0 - 4.8 K/uL Final    Mono # 05/21/2024 0.4  0.3 - 1.0 K/uL Final    Eos # 05/21/2024 0.0  0.0 - 0.5 K/uL Final    Baso # 05/21/2024 0.02  0.00 -  0.20 K/uL Final    nRBC 05/21/2024 0  0 /100 WBC Final    Gran % 05/21/2024 69.9  38.0 - 73.0 % Final    Lymph % 05/21/2024 20.5  18.0 - 48.0 % Final    Mono % 05/21/2024 8.2  4.0 - 15.0 % Final    Eosinophil % 05/21/2024 0.6  0.0 - 8.0 % Final    Basophil % 05/21/2024 0.4  0.0 - 1.9 % Final    Differential Method 05/21/2024 Automated   Final    Sodium 05/21/2024 139  136 - 145 mmol/L Final    Potassium 05/21/2024 4.1  3.5 - 5.1 mmol/L Final    Chloride 05/21/2024 104  95 - 110 mmol/L Final    CO2 05/21/2024 26  23 - 29 mmol/L Final    Glucose 05/21/2024 265 (H)  70 - 110 mg/dL Final    BUN 05/21/2024 15  8 - 23 mg/dL Final    Creatinine 05/21/2024 0.7  0.5 - 1.4 mg/dL Final    Calcium 05/21/2024 9.3  8.7 - 10.5 mg/dL Final    Total Protein 05/21/2024 6.5  6.0 - 8.4 g/dL Final    Albumin 05/21/2024 3.4 (L)  3.5 - 5.2 g/dL Final    Total Bilirubin 05/21/2024 0.3  0.1 - 1.0 mg/dL Final    Comment: For infants and newborns, interpretation of results should be based  on gestational age, weight and in agreement with clinical  observations.    Premature Infant recommended reference ranges:  Up to 24 hours.............<8.0 mg/dL  Up to 48 hours............<12.0 mg/dL  3-5 days..................<15.0 mg/dL  6-29 days.................<15.0 mg/dL      Alkaline Phosphatase 05/21/2024 50 (L)  55 - 135 U/L Final    AST 05/21/2024 21  10 - 40 U/L Final    ALT 05/21/2024 19  10 - 44 U/L Final    eGFR 05/21/2024 >60.0  >60 mL/min/1.73 m^2 Final    Anion Gap 05/21/2024 9  8 - 16 mmol/L Final   Lab Visit on 04/23/2024   Component Date Value Ref Range Status    WBC 04/23/2024 5.03  3.90 - 12.70 K/uL Final    RBC 04/23/2024 3.96 (L)  4.00 - 5.40 M/uL Final    Hemoglobin 04/23/2024 12.5  12.0 - 16.0 g/dL Final    Hematocrit 04/23/2024 39.3  37.0 - 48.5 % Final    MCV 04/23/2024 99 (H)  82 - 98 fL Final    MCH 04/23/2024 31.6 (H)  27.0 - 31.0 pg Final    MCHC 04/23/2024 31.8 (L)  32.0 - 36.0 g/dL Final    RDW 04/23/2024 12.3  11.5 - 14.5  % Final    Platelets 04/23/2024 189  150 - 450 K/uL Final    MPV 04/23/2024 9.7  9.2 - 12.9 fL Final    Immature Granulocytes 04/23/2024 0.2  0.0 - 0.5 % Final    Gran # (ANC) 04/23/2024 3.1  1.8 - 7.7 K/uL Final    Immature Grans (Abs) 04/23/2024 0.01  0.00 - 0.04 K/uL Final    Comment: Mild elevation in immature granulocytes is non specific and   can be seen in a variety of conditions including stress response,   acute inflammation, trauma and pregnancy. Correlation with other   laboratory and clinical findings is essential.      Lymph # 04/23/2024 1.4  1.0 - 4.8 K/uL Final    Mono # 04/23/2024 0.5  0.3 - 1.0 K/uL Final    Eos # 04/23/2024 0.1  0.0 - 0.5 K/uL Final    Baso # 04/23/2024 0.02  0.00 - 0.20 K/uL Final    nRBC 04/23/2024 0  0 /100 WBC Final    Gran % 04/23/2024 61.1  38.0 - 73.0 % Final    Lymph % 04/23/2024 27.0  18.0 - 48.0 % Final    Mono % 04/23/2024 10.3  4.0 - 15.0 % Final    Eosinophil % 04/23/2024 1.0  0.0 - 8.0 % Final    Basophil % 04/23/2024 0.4  0.0 - 1.9 % Final    Differential Method 04/23/2024 Automated   Final    Sodium 04/23/2024 141  136 - 145 mmol/L Final    Potassium 04/23/2024 4.7  3.5 - 5.1 mmol/L Final    Chloride 04/23/2024 104  95 - 110 mmol/L Final    CO2 04/23/2024 30 (H)  23 - 29 mmol/L Final    Glucose 04/23/2024 148 (H)  70 - 110 mg/dL Final    BUN 04/23/2024 9  8 - 23 mg/dL Final    Creatinine 04/23/2024 0.7  0.5 - 1.4 mg/dL Final    Calcium 04/23/2024 9.9  8.7 - 10.5 mg/dL Final    Total Protein 04/23/2024 7.0  6.0 - 8.4 g/dL Final    Albumin 04/23/2024 3.5  3.5 - 5.2 g/dL Final    Total Bilirubin 04/23/2024 0.3  0.1 - 1.0 mg/dL Final    Comment: For infants and newborns, interpretation of results should be based  on gestational age, weight and in agreement with clinical  observations.    Premature Infant recommended reference ranges:  Up to 24 hours.............<8.0 mg/dL  Up to 48 hours............<12.0 mg/dL  3-5 days..................<15.0 mg/dL  6-29  days.................<15.0 mg/dL      Alkaline Phosphatase 04/23/2024 52 (L)  55 - 135 U/L Final    AST 04/23/2024 23  10 - 40 U/L Final    ALT 04/23/2024 23  10 - 44 U/L Final    eGFR 04/23/2024 >60.0  >60 mL/min/1.73 m^2 Final    Anion Gap 04/23/2024 7 (L)  8 - 16 mmol/L Final    5-HIAA, Plasma (Neuroend) 04/23/2024 81 (H)  <=30 ng/mL Final    Comment: In this sample, the concentration of 5HIAA was markedly   elevated indicating the presence of a serotonin-producing   tumor.  Please note that consuming serotonin containing   foods and some medications within 24 hours of sample   collection may result in a temporary elevation of 5HIAA.    -------------------ADDITIONAL INFORMATION-------------------  Liquid Chromatography-Tandem Mass Spectrometry (LC-MS/MS).  Values obtained from different assay methods or kits may be   different and cannot be used interchangeably. The results   cannot be interpreted as absolute evidence for the presence   or absence of malignant disease.  This test was developed and its performance characteristics   determined by [facility] in a manner consistent with CLIA   requirements. This test has not been cleared or approved by   the U.S. Food and Drug Administration.    Test Performed by:  [place] [address]  : Yogesh Iqbal Ph.D.; CLIA# [id number]      Chromogranin A 04/23/2024 1055 (H)  <93 ng/mL Final    Comment: Impaired renal or hepatic function or treatment with proton  pump inhibitors may result in artifactual elevations of   Chromogranin A.    -------------------ADDITIONAL INFORMATION-------------------  The testing method is a homogeneous time-resolved   immunofluorescent assay manufactured by [employer]   and performed on the [employer] Kr[employer]che[employer] Compact Plus.    Values obtained with different assay methods or kits may be   different and cannot be used  interchangeably.    Test results cannot be interpreted as absolute evidence for   the presence or absence of malignant disease.    In some immunoassays, the presence of unusually high   concentrations of analyte may result in a high-dose   &quot;hook&quot; effect. This may result in a lower or even normal   measured analyte concentration. If the reported result   is inconsistent with the clinical presentation, the   laboratory should be alerted for troubleshooting. For   diagnostic purposes, these immunoassay results should   always be as                           sessed in conjunction with the patients medical   history, clinical examination and other findings.    Test Performed by:  [facility]  [address] [place] [address]  : Yogesh Roque M.D. Ph.D.; CLIA# [id number]      Pancreastatin 04/23/2024 3055  10 - 135 pg/mL Final    Comment: Result verified by repeat analysis.    -------------------ADDITIONAL INFORMATION-------------------  This radioimmunoassay was developed and its  performance characteristics determined by  [facility]. It has not been  cleared or approved by the FDA and such  approval or clearance is not required at this  time. Values obtained with different methods,  different laboratories or with kits cannot  be used interchangeably with the results on  this report. The results cannot be interpreted  as absolute evidence of the presence or absence  of malignant disease.    Test Performed by:  [facility]  [address] [place] [address]      Serotonin 04/23/2024 1140 (H)  <=230 ng/mL Final    Comment: -------------------ADDITIONAL INFORMATION-------------------  This test was developed and its performance characteristics   determined by [facility] in a manner consistent with CLIA   requirements. This test has not been cleared or approved by   the U.S. Food and Drug Administration.    Test  Performed by:  [facility]  [address] [place] [address]  : Yogesh Roque M.D. Ph.D.; IA# [id number]     Lab Visit on 03/26/2024   Component Date Value Ref Range Status    WBC 03/26/2024 4.46  3.90 - 12.70 K/uL Final    RBC 03/26/2024 3.95 (L)  4.00 - 5.40 M/uL Final    Hemoglobin 03/26/2024 12.5  12.0 - 16.0 g/dL Final    Hematocrit 03/26/2024 38.3  37.0 - 48.5 % Final    MCV 03/26/2024 97  82 - 98 fL Final    MCH 03/26/2024 31.6 (H)  27.0 - 31.0 pg Final    MCHC 03/26/2024 32.6  32.0 - 36.0 g/dL Final    RDW 03/26/2024 12.3  11.5 - 14.5 % Final    Platelets 03/26/2024 167  150 - 450 K/uL Final    MPV 03/26/2024 9.9  9.2 - 12.9 fL Final    Immature Granulocytes 03/26/2024 0.2  0.0 - 0.5 % Final    Gran # (ANC) 03/26/2024 2.8  1.8 - 7.7 K/uL Final    Immature Grans (Abs) 03/26/2024 0.01  0.00 - 0.04 K/uL Final    Comment: Mild elevation in immature granulocytes is non specific and   can be seen in a variety of conditions including stress response,   acute inflammation, trauma and pregnancy. Correlation with other   laboratory and clinical findings is essential.      Lymph # 03/26/2024 1.1  1.0 - 4.8 K/uL Final    Mono # 03/26/2024 0.5  0.3 - 1.0 K/uL Final    Eos # 03/26/2024 0.0  0.0 - 0.5 K/uL Final    Baso # 03/26/2024 0.02  0.00 - 0.20 K/uL Final    nRBC 03/26/2024 0  0 /100 WBC Final    Gran % 03/26/2024 63.0  38.0 - 73.0 % Final    Lymph % 03/26/2024 24.9  18.0 - 48.0 % Final    Mono % 03/26/2024 10.8  4.0 - 15.0 % Final    Eosinophil % 03/26/2024 0.7  0.0 - 8.0 % Final    Basophil % 03/26/2024 0.4  0.0 - 1.9 % Final    Differential Method 03/26/2024 Automated   Final    Sodium 03/26/2024 138  136 - 145 mmol/L Final    Potassium 03/26/2024 4.2  3.5 - 5.1 mmol/L Final    Chloride 03/26/2024 102  95 - 110 mmol/L Final    CO2 03/26/2024 30 (H)  23 - 29 mmol/L Final    Glucose 03/26/2024 226 (H)  70 - 110 mg/dL Final    BUN 03/26/2024 11  8 -  23 mg/dL Final    Creatinine 03/26/2024 0.8  0.5 - 1.4 mg/dL Final    Calcium 03/26/2024 9.7  8.7 - 10.5 mg/dL Final    Total Protein 03/26/2024 6.8  6.0 - 8.4 g/dL Final    Albumin 03/26/2024 3.7  3.5 - 5.2 g/dL Final    Total Bilirubin 03/26/2024 0.4  0.1 - 1.0 mg/dL Final    Comment: For infants and newborns, interpretation of results should be based  on gestational age, weight and in agreement with clinical  observations.    Premature Infant recommended reference ranges:  Up to 24 hours.............<8.0 mg/dL  Up to 48 hours............<12.0 mg/dL  3-5 days..................<15.0 mg/dL  6-29 days.................<15.0 mg/dL      Alkaline Phosphatase 03/26/2024 48 (L)  55 - 135 U/L Final    AST 03/26/2024 23  10 - 40 U/L Final    ALT 03/26/2024 20  10 - 44 U/L Final    eGFR 03/26/2024 >60.0  >60 mL/min/1.73 m^2 Final    Anion Gap 03/26/2024 6 (L)  8 - 16 mmol/L Final    5-HIAA, Plasma (Neuroend) 03/26/2024 77 (H)  <=30 ng/mL Final    Comment: In this sample, the concentration of 5HIAA was markedly   elevated indicating the presence of a serotonin-producing   tumor.  Please note that consuming serotonin containing   foods and some medications within 24 hours of sample   collection may result in a temporary elevation of 5HIAA.    -------------------ADDITIONAL INFORMATION-------------------  Liquid Chromatography-Tandem Mass Spectrometry (LC-MS/MS).  Values obtained from different assay methods or kits may be   different and cannot be used interchangeably. The results   cannot be interpreted as absolute evidence for the presence   or absence of malignant disease.  This test was developed and its performance characteristics   determined by [facility] in a manner consistent with CLIA   requirements. This test has not been cleared or approved by   the U.S. Food and Drug Administration.    Test Performed by:  [address] [address]  :  Yogesh Ibqal Ph.D.; CLIA# [id number]      Pancreastatin 03/26/2024 4692  10 - 135 pg/mL Final    Comment: *Result verified by repeat analysis.    -------------------ADDITIONAL INFORMATION-------------------  This radioimmunoassay was developed and its  performance characteristics determined by  [facility]. It has not been  cleared or approved by the FDA and such  approval or clearance is not required at this  time. Values obtained with different methods,  different laboratories or with kits cannot  be used interchangeably with the results on  this report. The results cannot be interpreted  as absolute evidence of the presence or absence  of malignant disease.    Test Performed by:  [facility]  [address] [place] [address]      Serotonin 03/26/2024 1030 (H)  <=230 ng/mL Final    Comment: -------------------ADDITIONAL INFORMATION-------------------  This test was developed and its performance characteristics   determined by [facility] in a manner consistent with CLIA   requirements. This test has not been cleared or approved by   the U.S. Food and Drug Administration.    Test Performed by:  [facility] - [address] [address]  : Yogesh Roque M.D. Ph.D.; CLIA# [id number]     Lab Visit on 02/27/2024   Component Date Value Ref Range Status    WBC 02/27/2024 4.81  3.90 - 12.70 K/uL Final    RBC 02/27/2024 4.05  4.00 - 5.40 M/uL Final    Hemoglobin 02/27/2024 12.5  12.0 - 16.0 g/dL Final    Hematocrit 02/27/2024 39.2  37.0 - 48.5 % Final    MCV 02/27/2024 97  82 - 98 fL Final    MCH 02/27/2024 30.9  27.0 - 31.0 pg Final    MCHC 02/27/2024 31.9 (L)  32.0 - 36.0 g/dL Final    RDW 02/27/2024 12.6  11.5 - 14.5 % Final    Platelets 02/27/2024 179  150 - 450 K/uL Final    MPV 02/27/2024 9.2  9.2 - 12.9 fL Final    Immature Granulocytes 02/27/2024 0.2  0.0 - 0.5 % Final    Gran #  (ANC) 02/27/2024 2.8  1.8 - 7.7 K/uL Final    Immature Grans (Abs) 02/27/2024 0.01  0.00 - 0.04 K/uL Final    Comment: Mild elevation in immature granulocytes is non specific and   can be seen in a variety of conditions including stress response,   acute inflammation, trauma and pregnancy. Correlation with other   laboratory and clinical findings is essential.      Lymph # 02/27/2024 1.4  1.0 - 4.8 K/uL Final    Mono # 02/27/2024 0.5  0.3 - 1.0 K/uL Final    Eos # 02/27/2024 0.0  0.0 - 0.5 K/uL Final    Baso # 02/27/2024 0.02  0.00 - 0.20 K/uL Final    nRBC 02/27/2024 0  0 /100 WBC Final    Gran % 02/27/2024 58.9  38.0 - 73.0 % Final    Lymph % 02/27/2024 29.1  18.0 - 48.0 % Final    Mono % 02/27/2024 10.6  4.0 - 15.0 % Final    Eosinophil % 02/27/2024 0.8  0.0 - 8.0 % Final    Basophil % 02/27/2024 0.4  0.0 - 1.9 % Final    Differential Method 02/27/2024 Automated   Final    Sodium 02/27/2024 142  136 - 145 mmol/L Final    Potassium 02/27/2024 4.7  3.5 - 5.1 mmol/L Final    Chloride 02/27/2024 104  95 - 110 mmol/L Final    CO2 02/27/2024 29  23 - 29 mmol/L Final    Glucose 02/27/2024 166 (H)  70 - 110 mg/dL Final    BUN 02/27/2024 14  8 - 23 mg/dL Final    Creatinine 02/27/2024 0.7  0.5 - 1.4 mg/dL Final    Calcium 02/27/2024 9.8  8.7 - 10.5 mg/dL Final    Total Protein 02/27/2024 6.8  6.0 - 8.4 g/dL Final    Albumin 02/27/2024 3.5  3.5 - 5.2 g/dL Final    Total Bilirubin 02/27/2024 0.3  0.1 - 1.0 mg/dL Final    Comment: For infants and newborns, interpretation of results should be based  on gestational age, weight and in agreement with clinical  observations.    Premature Infant recommended reference ranges:  Up to 24 hours.............<8.0 mg/dL  Up to 48 hours............<12.0 mg/dL  3-5 days..................<15.0 mg/dL  6-29 days.................<15.0 mg/dL      Alkaline Phosphatase 02/27/2024 49 (L)  55 - 135 U/L Final    AST 02/27/2024 22  10 - 40 U/L Final    ALT 02/27/2024 23  10 - 44 U/L Final    eGFR  02/27/2024 >60.0  >60 mL/min/1.73 m^2 Final    Anion Gap 02/27/2024 9  8 - 16 mmol/L Final    5-HIAA, Plasma (Neuroend) 02/27/2024 115 (H)  <=30 ng/mL Final    Comment: In this sample, the concentration of 5HIAA was markedly   elevated indicating the presence of a serotonin-producing   tumor.  Please note that consuming serotonin containing   foods and some medications within 24 hours of sample   collection may result in a temporary elevation of 5HIAA.    -------------------ADDITIONAL INFORMATION-------------------  Liquid Chromatography-Tandem Mass Spectrometry (LC-MS/MS).  Values obtained from different assay methods or kits may be   different and cannot be used interchangeably. The results   cannot be interpreted as absolute evidence for the presence   or absence of malignant disease.  This test was developed and its performance characteristics   determined by [facility] in a manner consistent with CLIA   requirements. This test has not been cleared or approved by   the U.S. Food and Drug Administration.    Test Performed by:  [place] [address]  : Yogesh Allen. Ph.D.; CLIA# [id number]      Pancreastatin 02/27/2024 3064*  10 - 135 pg/mL Final    Comment: *Result verified by repeat analysis.    -------------------ADDITIONAL INFORMATION-------------------  This radioimmunoassay was developed and its  performance characteristics determined by  [facility]. It has not been  cleared or approved by the FDA and such  approval or clearance is not required at this  time. Values obtained with different methods,  different laboratories or with kits cannot  be used interchangeably with the results on  this report. The results cannot be interpreted  as absolute evidence of the presence or absence  of malignant disease.    Test Performed by:  [facility]  [address] [place] [address]  [place] [address]      Serotonin 02/27/2024 1210 (H)  <=230 ng/mL Final    Comment: -------------------ADDITIONAL INFORMATION-------------------  This test was developed and its performance characteristics   determined by [facility] in a manner consistent with CLIA   requirements. This test has not been cleared or approved by   the U.S. Food and Drug Administration.    Test Performed by:  [facility]  [address] [place] [address]  : Yogesh Roque M.D. Ph.D.; CLIA# [id number]     Lab Visit on 02/09/2024   Component Date Value Ref Range Status    Hemoglobin A1C 02/09/2024 6.1 (H)  4.0 - 5.6 % Final    Comment: ADA Screening Guidelines:  5.7-6.4%  Consistent with prediabetes  >or=6.5%  Consistent with diabetes    High levels of fetal hemoglobin interfere with the HbA1C  assay. Heterozygous hemoglobin variants (HbS, HgC, etc)do  not significantly interfere with this assay.   However, presence of multiple variants may affect accuracy.      Estimated Avg Glucose 02/09/2024 128  68 - 131 mg/dL Final   Lab Visit on 01/30/2024   Component Date Value Ref Range Status    WBC 01/30/2024 5.21  3.90 - 12.70 K/uL Final    RBC 01/30/2024 4.04  4.00 - 5.40 M/uL Final    Hemoglobin 01/30/2024 12.8  12.0 - 16.0 g/dL Final    Hematocrit 01/30/2024 39.3  37.0 - 48.5 % Final    MCV 01/30/2024 97  82 - 98 fL Final    MCH 01/30/2024 31.7 (H)  27.0 - 31.0 pg Final    MCHC 01/30/2024 32.6  32.0 - 36.0 g/dL Final    RDW 01/30/2024 12.2  11.5 - 14.5 % Final    Platelets 01/30/2024 166  150 - 450 K/uL Final    MPV 01/30/2024 9.8  9.2 - 12.9 fL Final    Immature Granulocytes 01/30/2024 0.4  0.0 - 0.5 % Final    Gran # (ANC) 01/30/2024 3.8  1.8 - 7.7 K/uL Final    Immature Grans (Abs) 01/30/2024 0.02  0.00 - 0.04 K/uL Final    Comment: Mild elevation in immature granulocytes is non specific and   can be seen in a variety of conditions including stress response,    acute inflammation, trauma and pregnancy. Correlation with other   laboratory and clinical findings is essential.      Lymph # 01/30/2024 0.9 (L)  1.0 - 4.8 K/uL Final    Mono # 01/30/2024 0.5  0.3 - 1.0 K/uL Final    Eos # 01/30/2024 0.0  0.0 - 0.5 K/uL Final    Baso # 01/30/2024 0.02  0.00 - 0.20 K/uL Final    nRBC 01/30/2024 0  0 /100 WBC Final    Gran % 01/30/2024 72.9  38.0 - 73.0 % Final    Lymph % 01/30/2024 17.3 (L)  18.0 - 48.0 % Final    Mono % 01/30/2024 8.6  4.0 - 15.0 % Final    Eosinophil % 01/30/2024 0.4  0.0 - 8.0 % Final    Basophil % 01/30/2024 0.4  0.0 - 1.9 % Final    Differential Method 01/30/2024 Automated   Final    Sodium 01/30/2024 140  136 - 145 mmol/L Final    Potassium 01/30/2024 4.1  3.5 - 5.1 mmol/L Final    Chloride 01/30/2024 105  95 - 110 mmol/L Final    CO2 01/30/2024 28  23 - 29 mmol/L Final    Glucose 01/30/2024 244 (H)  70 - 110 mg/dL Final    BUN 01/30/2024 10  8 - 23 mg/dL Final    Creatinine 01/30/2024 0.7  0.5 - 1.4 mg/dL Final    Calcium 01/30/2024 9.5  8.7 - 10.5 mg/dL Final    Total Protein 01/30/2024 6.9  6.0 - 8.4 g/dL Final    Albumin 01/30/2024 3.5  3.5 - 5.2 g/dL Final    Total Bilirubin 01/30/2024 0.4  0.1 - 1.0 mg/dL Final    Comment: For infants and newborns, interpretation of results should be based  on gestational age, weight and in agreement with clinical  observations.    Premature Infant recommended reference ranges:  Up to 24 hours.............<8.0 mg/dL  Up to 48 hours............<12.0 mg/dL  3-5 days..................<15.0 mg/dL  6-29 days.................<15.0 mg/dL      Alkaline Phosphatase 01/30/2024 47 (L)  55 - 135 U/L Final    AST 01/30/2024 26  10 - 40 U/L Final    ALT 01/30/2024 26  10 - 44 U/L Final    eGFR 01/30/2024 >60.0  >60 mL/min/1.73 m^2 Final    Anion Gap 01/30/2024 7 (L)  8 - 16 mmol/L Final    5-HIAA, Plasma (Neuroend) 01/30/2024 86 (H)  <=30 ng/mL Final    Comment: In this sample, the concentration of 5HIAA was markedly   elevated  indicating the presence of a serotonin-producing   tumor.  Please note that consuming serotonin containing   foods and some medications within 24 hours of sample   collection may result in a temporary elevation of 5HIAA.    -------------------ADDITIONAL INFORMATION-------------------  Liquid Chromatography-Tandem Mass Spectrometry (LC-MS/MS).  Values obtained from different assay methods or kits may be   different and cannot be used interchangeably. The results   cannot be interpreted as absolute evidence for the presence   or absence of malignant disease.  This test was developed and its performance characteristics   determined by [facility] in a manner consistent with CLIA   requirements. This test has not been cleared or approved by   the U.S. Food and Drug Administration.    Test Performed by:  [place] [id number]  : Yogesh Iqbal Ph.D.; CLIA# [id number]      Pancreastatin 01/30/2024 5371*  10 - 135 pg/mL Final    Comment: *Result verified by repeat analysis.    -------------------ADDITIONAL INFORMATION-------------------  This radioimmunoassay was developed and its  performance characteristics determined by  [facility]. It has not been  cleared or approved by the FDA and such  approval or clearance is not required at this  time. Values obtained with different methods,  different laboratories or with kits cannot  be used interchangeably with the results on  this report. The results cannot be interpreted  as absolute evidence of the presence or absence  of malignant disease.    Test Performed by:  [facility]  [address] [place] [id number]      Serotonin 01/30/2024 1320 (H)  <=230 ng/mL Final    Comment: -------------------ADDITIONAL INFORMATION-------------------  This test was developed and its performance characteristics   determined by [facility] in a  manner consistent with CLIA   requirements. This test has not been cleared or approved by   the U.S. Food and Drug Administration.    Test Performed by:  [facility]  [address] [place] [address]  : Yogesh Roque M.D. Ph.D.; CLIA# [id number]     There may be more visits with results that are not included.       Imaging  No results found.    Assessment  1. 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 Primary hypertension  Controlled      Plan and Discussion    Continue with present management    The ASCVD Risk score (Waleska DK, et al., 2019) failed to calculate for the following reasons:    The 2019 ASCVD risk score is only valid for ages 40 to 79     Follow Up  Follow up in about 3 months (around 10/29/2024).      Ralph Bergeron MD

## 2024-08-02 DIAGNOSIS — E34.0 CARCINOID SYNDROME: ICD-10-CM

## 2024-08-02 DIAGNOSIS — C7A.012 MALIGNANT CARCINOID TUMOR OF ILEUM: ICD-10-CM

## 2024-08-02 DIAGNOSIS — R19.7 DIARRHEA, UNSPECIFIED TYPE: ICD-10-CM

## 2024-08-02 RX ORDER — TELOTRISTAT ETHYL 250 MG/1
250 TABLET ORAL 3 TIMES DAILY
Qty: 90 TABLET | Refills: 3 | Status: ACTIVE | OUTPATIENT
Start: 2024-08-02

## 2024-08-09 ENCOUNTER — TELEPHONE (OUTPATIENT)
Dept: HEMATOLOGY/ONCOLOGY | Facility: CLINIC | Age: 84
End: 2024-08-09
Payer: MEDICARE

## 2024-08-13 ENCOUNTER — OFFICE VISIT (OUTPATIENT)
Dept: HEMATOLOGY/ONCOLOGY | Facility: CLINIC | Age: 84
End: 2024-08-13
Payer: MEDICARE

## 2024-08-13 ENCOUNTER — LAB VISIT (OUTPATIENT)
Dept: LAB | Facility: HOSPITAL | Age: 84
End: 2024-08-13
Attending: INTERNAL MEDICINE
Payer: MEDICARE

## 2024-08-13 ENCOUNTER — INFUSION (OUTPATIENT)
Dept: INFUSION THERAPY | Facility: HOSPITAL | Age: 84
End: 2024-08-13
Payer: MEDICARE

## 2024-08-13 VITALS
HEIGHT: 64 IN | TEMPERATURE: 97 F | RESPIRATION RATE: 16 BRPM | DIASTOLIC BLOOD PRESSURE: 69 MMHG | WEIGHT: 106.94 LBS | SYSTOLIC BLOOD PRESSURE: 123 MMHG | BODY MASS INDEX: 18.26 KG/M2

## 2024-08-13 DIAGNOSIS — C7B.8 METASTATIC MALIGNANT NEUROENDOCRINE TUMOR TO LIVER: ICD-10-CM

## 2024-08-13 DIAGNOSIS — R80.9 CONTROLLED TYPE 2 DIABETES MELLITUS WITH MICROALBUMINURIA, WITHOUT LONG-TERM CURRENT USE OF INSULIN: ICD-10-CM

## 2024-08-13 DIAGNOSIS — E53.8 B12 DEFICIENCY: ICD-10-CM

## 2024-08-13 DIAGNOSIS — E11.29 CONTROLLED TYPE 2 DIABETES MELLITUS WITH MICROALBUMINURIA, WITHOUT LONG-TERM CURRENT USE OF INSULIN: ICD-10-CM

## 2024-08-13 DIAGNOSIS — D49.9 IMMUNODEFICIENCY SECONDARY TO NEOPLASM: ICD-10-CM

## 2024-08-13 DIAGNOSIS — C7A.012 MALIGNANT CARCINOID TUMOR OF ILEUM: Primary | ICD-10-CM

## 2024-08-13 DIAGNOSIS — C7B.8 SECONDARY NEUROENDOCRINE TUMOR OF BONE: ICD-10-CM

## 2024-08-13 DIAGNOSIS — C79.51 METASTASIS TO SPINAL COLUMN: ICD-10-CM

## 2024-08-13 DIAGNOSIS — E55.9 VITAMIN D DEFICIENCY: ICD-10-CM

## 2024-08-13 DIAGNOSIS — I50.9 CHRONIC CONGESTIVE HEART FAILURE, UNSPECIFIED HEART FAILURE TYPE: ICD-10-CM

## 2024-08-13 DIAGNOSIS — D84.81 IMMUNODEFICIENCY SECONDARY TO NEOPLASM: ICD-10-CM

## 2024-08-13 DIAGNOSIS — Z79.899 IMMUNODEFICIENCY DUE TO DRUGS: ICD-10-CM

## 2024-08-13 DIAGNOSIS — R19.7 DIARRHEA, UNSPECIFIED TYPE: ICD-10-CM

## 2024-08-13 DIAGNOSIS — I10 ESSENTIAL HYPERTENSION: ICD-10-CM

## 2024-08-13 DIAGNOSIS — C7B.8 METASTATIC MALIGNANT NEUROENDOCRINE TUMOR TO LIVER: Primary | ICD-10-CM

## 2024-08-13 DIAGNOSIS — D84.821 IMMUNODEFICIENCY DUE TO DRUGS: ICD-10-CM

## 2024-08-13 DIAGNOSIS — C7A.012 MALIGNANT CARCINOID TUMOR OF ILEUM: ICD-10-CM

## 2024-08-13 DIAGNOSIS — E78.5 HYPERLIPIDEMIA, UNSPECIFIED HYPERLIPIDEMIA TYPE: ICD-10-CM

## 2024-08-13 DIAGNOSIS — E34.0 CARCINOID SYNDROME: ICD-10-CM

## 2024-08-13 LAB
25(OH)D3+25(OH)D2 SERPL-MCNC: 34 NG/ML (ref 30–96)
ALBUMIN SERPL BCP-MCNC: 3.6 G/DL (ref 3.5–5.2)
ALP SERPL-CCNC: 45 U/L (ref 55–135)
ALT SERPL W/O P-5'-P-CCNC: 18 U/L (ref 10–44)
ANION GAP SERPL CALC-SCNC: 7 MMOL/L (ref 8–16)
AST SERPL-CCNC: 20 U/L (ref 10–40)
BASOPHILS # BLD AUTO: 0.01 K/UL (ref 0–0.2)
BASOPHILS NFR BLD: 0.2 % (ref 0–1.9)
BILIRUB SERPL-MCNC: 0.4 MG/DL (ref 0.1–1)
BUN SERPL-MCNC: 15 MG/DL (ref 8–23)
CALCIUM SERPL-MCNC: 9.7 MG/DL (ref 8.7–10.5)
CHLORIDE SERPL-SCNC: 104 MMOL/L (ref 95–110)
CHOLEST SERPL-MCNC: 222 MG/DL (ref 120–199)
CHOLEST/HDLC SERPL: 2.4 {RATIO} (ref 2–5)
CO2 SERPL-SCNC: 31 MMOL/L (ref 23–29)
CREAT SERPL-MCNC: 0.8 MG/DL (ref 0.5–1.4)
DIFFERENTIAL METHOD BLD: ABNORMAL
EOSINOPHIL # BLD AUTO: 0 K/UL (ref 0–0.5)
EOSINOPHIL NFR BLD: 0.9 % (ref 0–8)
ERYTHROCYTE [DISTWIDTH] IN BLOOD BY AUTOMATED COUNT: 12.3 % (ref 11.5–14.5)
EST. GFR  (NO RACE VARIABLE): >60 ML/MIN/1.73 M^2
ESTIMATED AVG GLUCOSE: 143 MG/DL (ref 68–131)
ESTIMATED AVG GLUCOSE: 143 MG/DL (ref 68–131)
GLUCOSE SERPL-MCNC: 159 MG/DL (ref 70–110)
HBA1C MFR BLD: 6.6 % (ref 4–5.6)
HBA1C MFR BLD: 6.6 % (ref 4–5.6)
HCT VFR BLD AUTO: 38.5 % (ref 37–48.5)
HDLC SERPL-MCNC: 91 MG/DL (ref 40–75)
HDLC SERPL: 41 % (ref 20–50)
HGB BLD-MCNC: 12 G/DL (ref 12–16)
IMM GRANULOCYTES # BLD AUTO: 0.03 K/UL (ref 0–0.04)
IMM GRANULOCYTES NFR BLD AUTO: 0.6 % (ref 0–0.5)
LDLC SERPL CALC-MCNC: 115.8 MG/DL (ref 63–159)
LYMPHOCYTES # BLD AUTO: 1 K/UL (ref 1–4.8)
LYMPHOCYTES NFR BLD: 22 % (ref 18–48)
MCH RBC QN AUTO: 30.8 PG (ref 27–31)
MCHC RBC AUTO-ENTMCNC: 31.2 G/DL (ref 32–36)
MCV RBC AUTO: 99 FL (ref 82–98)
MONOCYTES # BLD AUTO: 0.4 K/UL (ref 0.3–1)
MONOCYTES NFR BLD: 9.2 % (ref 4–15)
NEUTROPHILS # BLD AUTO: 3.1 K/UL (ref 1.8–7.7)
NEUTROPHILS NFR BLD: 67.1 % (ref 38–73)
NONHDLC SERPL-MCNC: 131 MG/DL
NRBC BLD-RTO: 0 /100 WBC
PLATELET # BLD AUTO: 186 K/UL (ref 150–450)
PMV BLD AUTO: 9.9 FL (ref 9.2–12.9)
POTASSIUM SERPL-SCNC: 4.7 MMOL/L (ref 3.5–5.1)
PROT SERPL-MCNC: 6.8 G/DL (ref 6–8.4)
RBC # BLD AUTO: 3.89 M/UL (ref 4–5.4)
SODIUM SERPL-SCNC: 142 MMOL/L (ref 136–145)
TRIGL SERPL-MCNC: 76 MG/DL (ref 30–150)
TSH SERPL DL<=0.005 MIU/L-ACNC: 0.99 UIU/ML (ref 0.4–4)
VIT B12 SERPL-MCNC: >2000 PG/ML (ref 210–950)
WBC # BLD AUTO: 4.68 K/UL (ref 3.9–12.7)

## 2024-08-13 PROCEDURE — 99215 OFFICE O/P EST HI 40 MIN: CPT | Mod: HCNC,S$GLB,, | Performed by: INTERNAL MEDICINE

## 2024-08-13 PROCEDURE — 1159F MED LIST DOCD IN RCRD: CPT | Mod: HCNC,CPTII,S$GLB, | Performed by: INTERNAL MEDICINE

## 2024-08-13 PROCEDURE — G2211 COMPLEX E/M VISIT ADD ON: HCPCS | Mod: HCNC,S$GLB,, | Performed by: INTERNAL MEDICINE

## 2024-08-13 PROCEDURE — 1101F PT FALLS ASSESS-DOCD LE1/YR: CPT | Mod: HCNC,CPTII,S$GLB, | Performed by: INTERNAL MEDICINE

## 2024-08-13 PROCEDURE — 99999 PR PBB SHADOW E&M-EST. PATIENT-LVL IV: CPT | Mod: PBBFAC,HCNC,, | Performed by: INTERNAL MEDICINE

## 2024-08-13 PROCEDURE — 83497 ASSAY OF 5-HIAA: CPT | Mod: HCNC | Performed by: INTERNAL MEDICINE

## 2024-08-13 PROCEDURE — 82607 VITAMIN B-12: CPT | Mod: HCNC | Performed by: INTERNAL MEDICINE

## 2024-08-13 PROCEDURE — 82306 VITAMIN D 25 HYDROXY: CPT | Mod: HCNC | Performed by: INTERNAL MEDICINE

## 2024-08-13 PROCEDURE — 1160F RVW MEDS BY RX/DR IN RCRD: CPT | Mod: HCNC,CPTII,S$GLB, | Performed by: INTERNAL MEDICINE

## 2024-08-13 PROCEDURE — 3288F FALL RISK ASSESSMENT DOCD: CPT | Mod: HCNC,CPTII,S$GLB, | Performed by: INTERNAL MEDICINE

## 2024-08-13 PROCEDURE — 96372 THER/PROPH/DIAG INJ SC/IM: CPT | Mod: HCNC

## 2024-08-13 PROCEDURE — 80061 LIPID PANEL: CPT | Mod: HCNC | Performed by: INTERNAL MEDICINE

## 2024-08-13 PROCEDURE — 1157F ADVNC CARE PLAN IN RCRD: CPT | Mod: HCNC,CPTII,S$GLB, | Performed by: INTERNAL MEDICINE

## 2024-08-13 PROCEDURE — 63600175 PHARM REV CODE 636 W HCPCS: Mod: JZ,JG,HCNC | Performed by: PHYSICIAN ASSISTANT

## 2024-08-13 PROCEDURE — 84443 ASSAY THYROID STIM HORMONE: CPT | Mod: HCNC | Performed by: INTERNAL MEDICINE

## 2024-08-13 PROCEDURE — 80053 COMPREHEN METABOLIC PANEL: CPT | Mod: HCNC | Performed by: INTERNAL MEDICINE

## 2024-08-13 PROCEDURE — 84260 ASSAY OF SEROTONIN: CPT | Mod: HCNC | Performed by: INTERNAL MEDICINE

## 2024-08-13 PROCEDURE — 83036 HEMOGLOBIN GLYCOSYLATED A1C: CPT | Mod: HCNC | Performed by: INTERNAL MEDICINE

## 2024-08-13 PROCEDURE — 83519 RIA NONANTIBODY: CPT | Mod: HCNC | Performed by: INTERNAL MEDICINE

## 2024-08-13 PROCEDURE — 1126F AMNT PAIN NOTED NONE PRSNT: CPT | Mod: HCNC,CPTII,S$GLB, | Performed by: INTERNAL MEDICINE

## 2024-08-13 PROCEDURE — 85025 COMPLETE CBC W/AUTO DIFF WBC: CPT | Mod: HCNC | Performed by: INTERNAL MEDICINE

## 2024-08-13 RX ORDER — LANREOTIDE ACETATE 120 MG/.5ML
120 INJECTION SUBCUTANEOUS
Status: DISCONTINUED | OUTPATIENT
Start: 2024-08-13 | End: 2024-08-13 | Stop reason: HOSPADM

## 2024-08-13 RX ORDER — LANREOTIDE ACETATE 120 MG/.5ML
120 INJECTION SUBCUTANEOUS
OUTPATIENT
Start: 2024-08-13

## 2024-08-13 RX ADMIN — LANREOTIDE ACETATE 120 MG: 120 INJECTION SUBCUTANEOUS at 11:08

## 2024-08-13 NOTE — PROGRESS NOTES
"    Subjective:       Patient ID: Shahram Hills is an 84 y.o. female.     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 Ms. Hills is an 79 yo woman who initially saw me on 6/7/18 for further evaluation of neuroendocrine tumor. She initially presented with diarrhea, abdominal discomfort, weight loss. She was evaluated at [facility] and underwent workup below:  US abdomen on 3/14/18 showed numerous bilobar mixed echogenicity and mildly vascular hepatic lesions. Cholelithiasis without e/o acute cholecystitis.   MRI abdomen on 3/30/2018 showed innumerable hepatic metastases. L3 vertebral body metastasis with soft tissue component along the right margin of the L3 and upper L4 vertebral bodies, filling the right L3/4 neural foramen, and extending into the anterior aspect of the spinal canal on the right at the L3 and upper T4 levels. Associated with mild spinal canal narrowing.  CT chest on 4/6/2018 showed one lucent nodule measuring 7 mm in the T7 vertebral body, most likely representing metastatic disease.    EGD on 5/4/18 showed normal duodenum. Schatzki's ring in the lower third of the esophagus. Grade 1 esophagitis in the GE junction c/w mild distal esophagitis. Congestion and erythema in the antrum, pre-pyloric region and stomach body c/w gastritis. Biopsy of the stomach antrum showed mild chronic gastritis, neg for H. pylori.   Labs on 5/9/2018: WBC 6.9, H/H 11.6/34.3, MCV 87.5, plt count 279. On 3/9/18: Vit B12 level 173, folic acid 6.6  She was started on oral vit B12 and underwent a liver biopsy on 5/18/18. Pathology showed "metastatic well differentiated neuroendocrine tumor. Ki67 3%"     She saw me on 6/7/18 and complained of weight loss of 26 lbs over the past 3-4 months, also has diarrhea. No flushing, wheezing, palpitations. Has been having pain in right flank radiating " "down the right leg. No tingling/numbness, weakness. She can hold her bladder but when she urinates her diarrhea would come out as well. Started on dex 4 mg q6h the evening of 6/7/18.      2. MRI spine on 6/8/18 showed "Marrow replacing metastatic lesion of the L3 vertebral body with associated extra osseous expansion and complete effacement of the right L3-L4 neural foramina and additional effacement of the right lateral recess.  There is additional lateral extension and abutment of the right psoas muscle.  Superimposed degenerative change at this level results in mild spinal canal stenosis." I sent the patient to ED on 6/9/18 where she was evaluated by neurosurgery. Neurosurgery did not feel she was a candidate for surgical intervention.      3. Seen by Dr. Adames on 6/11/18. palliative radiation to the area of the L3 metastasis 6/18/18-6/29/18   4. Gallium study on 6/13/18: Distal ileal primary neoplasm consistent with a carcinoid.  There is an adjacent metastatic lymph node, diffuse liver metastases, and multiple bone metastases. Index primary neoplasm SUV max 33.13. Adjacent lymph node SUV max 23. L3 bone metastasis SUV max 36.86. Left lobe SUV max 46.13   5. Lanreotide every 4 weeks started on 6/22/18  6. TACE to the right hepatic lobe on 2/14/19. TACE to the left hepatic lobe on 3/21/19.   7. TACE to the right hepatic lobe on 2/11/22. S/p conventional chemoembolization performed of the segment 2, 3, 4 branch of the left hepatic artery and segment 8 branch of the left hepatic artery on 4/22/22.  8. Gallium PET 11/1/22 showed progression. Discussed PRRT. PRRT #1 on 12/14/2022. Zometa every 3 months started 12/27/22. PRRT #2 on 2/8/23. #3 on 4/12/23. #4 on 6/14/23.      History of Present Illness:   Ms. Hills returns for follow up. Feeling better now. She was hospitalized at [facility] in July for CHF after she developed SOB. She saw Dr Bergeron two weeks ago. Was started on aldactone and entresto. "     ECO     ROS:   See HPI. Otherwise negative.      Physical Examination:   Vital signs reviewed.   Gen: well hydrated, well developed, in no acute distress.  HEENT: normocephalic, anicteric, PERRLA, EOMI  Neck: supple, no JVD, thyromegaly, cervical or supraclavicular LAD  Lungs: CTAB, no wheezes or rales  Heart: regular rate, regular pulse, no M/R/G  Abdomen: soft, no tenderness, non-distended, no hepatomegaly, no splenomegaly, no mass, or hernia.   Ext: b/l LE  no edema  Neuro: alert and oriented x 4, no focal neuro deficit  Skin: no rash, open wounds or ulcers  Psych: pleasant and appropriate mood and affect     Objective:      Laboratory Data:  Labs reviewed. 5-HIAA pending    Imaging Data:     MRI abdomen 22:  1. History of neuroendocrine malignancy.  Numerous hepatic lesions, some remaining stable while others have mildly enlarged consistent with disease progression.  Stable osseous lesion in the L3 vertebral body.  2. Cholelithiasis and stable mild dilatation of the common bile duct and central intrahepatic biliary duct dilatation.  Of note, there is mass effect on the midportion of the common bile duct by dominant left hepatic lobe lesion.  3. Additional findings detailed above.  RECIST SUMMARY:     Date of prior examination for comparison: 2022     Lesion 1: Left hepatic lobe.  7.1 cm.  Series 9 image 52.  Prior measurement 6.3 cm.     Lesion 2: Right hepatic lobe lateral.  3.1 cm.  Series 9 image 30.  Prior measurement 3.2 cm.     Lesion 3: Right hepatic dome.  5.1 cm.  Series 9 image 19.  Prior measurement 4.4 cm.    Gallium PET 22:     Interval development of somatostatin tracer avid lesions in the skull, concerning for new metastatic lesions.     Numerous tracer avid hypoattenuating masses throughout the liver with increased SUV max compared to prior exam.     Tracer avid lesions in the C7 and L3 vertebral bodies, sternum, distal ileum and mesenteric node are similar to prior  exam.     Judged by tracer avidity of the patient's tumor burden, PRRT would be a consideration if clinically indicated.      Gallium PET 7/12/23:  Impression:     In this patient with carcinoid tumor of the ileum, there is overall similar distribution of tracer avid disease involving distal ileum, liver, mesenteric lymph node, and bones.  Index lesions as above.  No definite new tracer avid lesions.    Copper PET 12/29/23:  FINDINGS:  Quality of the study: Adequate.     SUV measurements may not be directly comparable with those made on Ga-68 DOTATATE PET/CT.     In the head and neck, there is physiologic distribution in the pituitary, salivary, and thyroid glands, and there are no tracer avid lesions suspicious for malignancy.     In the chest, there are no tracer avid lesions suspicious for malignancy.  Stable right upper lobe subcentimeter pulmonary nodule, below the size threshold for PET.     In the abdomen and pelvis, there is physiologic uptake in the pancreatic uncinate process, spleen, adrenal glands and  tract.     There are numerous hypodense tracer avid lesions throughout the liver, similar to prior exam.  Index left hepatic lobe lesion measures 5.1 x 5.0 cm (previously 5.1 x 5.0 cm) with maximum SUV of 58 (previously 43).     Focal uptake within the distal ileum with maximum SUV of 22 (previously 17).  Image 239.     Tracer avid mesenteric lymph node measuring 1.5 x 1.4 cm (previously 1.5 cm) with maximum SUV of 21 (previously 22).  Image 222.     In the bones, there is stable distribution of numerous scattered tracer avid lesions.     *Right calvarial lesion near the vertex with SUV max 5 (fused image 12).  *Right C6 vertebral body with SUV max 5.5 (fused image 74).  *Left scapular lesion with SUV max 2.7 (fused image 88).  *Right sternal lesion with SUV max 8.6 (fused image 103).  *Left anterior rib lesion with SUV max 2.1 (fused image 109).  *L3 vertebral body lesion with SUV max 9.2 (fused image  192).     Impression:     Similar distribution of tracer avid disease involving the distal ileum, liver, mesenteric lymph nodes, and bones.  No definite new tracer avid lesions.       MRI 12/29/23  Impression:     1. In this patient with metastatic neuroendocrine tumor there is interval decreased size of index left hepatic lobe lesion and stable size of multiple additional hepatic lesions.  2. Stable metastatic lesion of the L3 vertebral body and central mesentery.  3. Cholelithiasis without acute cholecystitis.  4. Stable mild intrahepatic and extrahepatic ductal dilation, probably related to mass effect on the midportion of the common bile duct by left hepatic lobe lesion.  5. Additional findings, as above.    Copper PET 6/13/24:  Impression:     Similar distribution and uptake of tracer avid disease involving the distal ileum, liver, and bones. Index lesions as above.  No definite new tracer avid lesion.     MRI 6/13/24:  Impression:     History of metastatic small bowel carcinoid.     Thickened loop of small bowel in the midline pelvis in the location of the known primary tumor, but incompletely characterized on this exam.     Partially imaged mesenteric montserrat conglomerate in the left lower quadrant.     Numerous liver metastases.  Some have decreased in size and demonstrate increased central hypoenhancement, compatible with treatment response.  Others are new from 12/29/2023.     Unchanged osseous metastasis in the L3 vertebral body.     Other findings as described.    Assessment and Plan:      1. Malignant carcinoid tumor of ileum    2. 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 Secondary neuroendocrine tumor of bone    4. Metastasis to spinal column    5. 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 Carcinoid syndrome    8. Diarrhea, unspecified type    9. Controlled type 2 diabetes mellitus with microalbuminuria, without long-term current use of insulin     10. Essential hypertension    11. Chronic congestive heart failure, unspecified heart failure type      1-6  - Ms Hills is an 85 yo woman with well differentiated low-grade neuroendocrine tumor of the ileum with mets to liver and bones, diagnosed on 5/18/2018. Started lanreotide every 4 weeks on 6/22/2018. S/p TACE to the right hepatic lobe on 2/14/19. TACE to the left hepatic lobe on 3/21/19.   - CT/MRI 1/12/22 showed mild progression in the liver. S/p TACE on 2/11/22 and 4/22/22   - Gallium PET 11/1/22 showed progression. Discussed PRRT. PRRT #1 on 12/14/2022. Completed 4 cycles on 6/14/23. Zometa every 3 months started 12/27/22.  - last PET and MRI reviewed at tumor board. PET showed stable disease. MRI showed large lesions continue to improve compared to 6 months ago. There are a few new small lesions in the liver. Recc MRI in 3 months. Discussed with patient and family. They understand and agree with the plan   - c/w lanreotide every 4 weeks and zometa every 3 months. Got zometa in June 2024. Next due in Sep 2024  - RTC in 4 weeks with restaging MRI    7.8  - better after xermelo and PRRT  - c/w lanreotide every 4 weeks.   - c/w xermelo tid  - could not tolerate octreotide    9.  - BS controlled  - f/u with PCP    10.  - BP controlled  - c/w current medication    11.  - will get echo records from [facility]. Could be carcinoid heart  - f/u with Dr Elyssa Granados MD  Hematology and Medical Oncology  [facility]      Route Chart for Scheduling    Med Onc Chart Routing      Follow up with physician . Keep scheduled appts. see JUNAID in 8 weeks with labs then lanreotide   Follow up with JUNAID    Infusion scheduling note   zometa due in Sep   Injection scheduling note lanreotide every 4 weeks   Labs CBC and CMP   Scheduling:  Preferred lab:  Lab interval:  serotonin, pancreastatin, 5-HIAA   Imaging    Pharmacy appointment    Other referrals          Supportive Plan Information  OP  ZOLEDRONIC ACID (ZOMETA) Q4W   Sebastian Granados MD   Upcoming Treatment Dates - OP ZOLEDRONIC ACID (ZOMETA) Q4W    9/17/2024       Medications       zoledronic 4 mg/100 mL infusion 4 mg    Therapy Plan Information  LANREOTIDE  5. Medications  lanreotide injection 120 mg  120 mg, Subcutaneous, Every visit

## 2024-08-16 LAB — 5OH-INDOLEACETATE SERPL-MCNC: 102 NG/ML

## 2024-08-20 ENCOUNTER — OFFICE VISIT (OUTPATIENT)
Dept: INTERNAL MEDICINE | Facility: CLINIC | Age: 84
End: 2024-08-20
Attending: INTERNAL MEDICINE
Payer: MEDICARE

## 2024-08-20 VITALS
HEIGHT: 64 IN | OXYGEN SATURATION: 98 % | HEART RATE: 86 BPM | WEIGHT: 108 LBS | BODY MASS INDEX: 18.44 KG/M2 | SYSTOLIC BLOOD PRESSURE: 132 MMHG | DIASTOLIC BLOOD PRESSURE: 78 MMHG

## 2024-08-20 DIAGNOSIS — E53.8 B12 DEFICIENCY: ICD-10-CM

## 2024-08-20 DIAGNOSIS — R80.9 CONTROLLED TYPE 2 DIABETES MELLITUS WITH MICROALBUMINURIA, WITHOUT LONG-TERM CURRENT USE OF INSULIN: ICD-10-CM

## 2024-08-20 DIAGNOSIS — E11.29 MICROALBUMINURIA DUE TO TYPE 2 DIABETES MELLITUS: Primary | ICD-10-CM

## 2024-08-20 DIAGNOSIS — I10 ESSENTIAL HYPERTENSION: ICD-10-CM

## 2024-08-20 DIAGNOSIS — I50.42 CHRONIC COMBINED SYSTOLIC AND DIASTOLIC CHF (CONGESTIVE HEART FAILURE): ICD-10-CM

## 2024-08-20 DIAGNOSIS — C22.0 HCC (HEPATOCELLULAR CARCINOMA): ICD-10-CM

## 2024-08-20 DIAGNOSIS — E11.29 CONTROLLED TYPE 2 DIABETES MELLITUS WITH MICROALBUMINURIA, WITHOUT LONG-TERM CURRENT USE OF INSULIN: ICD-10-CM

## 2024-08-20 DIAGNOSIS — R80.9 MICROALBUMINURIA DUE TO TYPE 2 DIABETES MELLITUS: Primary | ICD-10-CM

## 2024-08-20 DIAGNOSIS — E78.5 HYPERLIPIDEMIA, UNSPECIFIED HYPERLIPIDEMIA TYPE: ICD-10-CM

## 2024-08-20 PROCEDURE — 1157F ADVNC CARE PLAN IN RCRD: CPT | Mod: HCNC,CPTII,S$GLB, | Performed by: INTERNAL MEDICINE

## 2024-08-20 PROCEDURE — 1159F MED LIST DOCD IN RCRD: CPT | Mod: HCNC,CPTII,S$GLB, | Performed by: INTERNAL MEDICINE

## 2024-08-20 PROCEDURE — 99999 PR PBB SHADOW E&M-EST. PATIENT-LVL V: CPT | Mod: PBBFAC,HCNC,, | Performed by: INTERNAL MEDICINE

## 2024-08-20 PROCEDURE — 99214 OFFICE O/P EST MOD 30 MIN: CPT | Mod: HCNC,S$GLB,, | Performed by: INTERNAL MEDICINE

## 2024-08-20 PROCEDURE — 3288F FALL RISK ASSESSMENT DOCD: CPT | Mod: HCNC,CPTII,S$GLB, | Performed by: INTERNAL MEDICINE

## 2024-08-20 PROCEDURE — 1126F AMNT PAIN NOTED NONE PRSNT: CPT | Mod: HCNC,CPTII,S$GLB, | Performed by: INTERNAL MEDICINE

## 2024-08-20 PROCEDURE — 1160F RVW MEDS BY RX/DR IN RCRD: CPT | Mod: HCNC,CPTII,S$GLB, | Performed by: INTERNAL MEDICINE

## 2024-08-20 PROCEDURE — 1101F PT FALLS ASSESS-DOCD LE1/YR: CPT | Mod: HCNC,CPTII,S$GLB, | Performed by: INTERNAL MEDICINE

## 2024-08-20 NOTE — PROGRESS NOTES
Subjective:   Patient ID: Shahram Hills is a 84 y.o. female  Chief complaint:   Chief Complaint   Patient presents with    Hypertension     F/u       HPI  Here for 4 week follow up of diabetes   Accompanied by her sister today   Reviewed recent labs today     Saw h/o 8/13/2024 - to cont regimen and rtc in 4 weeks with restaging mri and cards 7/29/2024  Last clinic visit 7/2024:  hospital discharge follow up for HF   Seen by cards since discharge - note reviewed   Admitted to OSH   Sounds like inc salt intake prior to this   Medications adjusted   No ankle swelling per them with this episode     HTN:  Today well controlled on home readings - most recent 1100-120s/49-72  HR 49-98  - c/w meds on med card  - taking lasix daily, coreg, entresto, aldactone  Previously:   Prev switched to coreg from amlodipine    Diabetes:   A1c 6.6 <- 6.6 <- 6.1<- 6.1 <-6.3<- 6.6  - typically eating right before recent labs   - well controlled and henry metformin 1000 daily  Bg improved 140-170s - checing in am and occ in afternoon and 140s - run higher in am and lower in afternoon   - no lows   At lcv   morning bg 140-160s but eating sugary snack late at night - ice cream, pie or cookies   - eats breakfast around 9-10am  Foot exam due  Utd on eye exam 11/2023  Urine screening due   No lows     PVCs, compensated chronic systolic and diastolic HF (EF 40% on echo 9/2022 and worse on echo from recent hospitalization per cards note):  No further ankle swelling since taking lasix daily   Henry current regimen as above   No aranda or sob  Prev:  - started lasix at lcv due to acute HF exacerbation - henry daily lasix - no further ankle swelling since then   Previously:   - noticed ankle swelling - at baseline gets ankle swelling worse in afternoon - better in am   Cards: Elyssa  Previously:    - 2/25/2022 for hospital discharge had TACE right hep lobe and found to have frequent PVCs  - we resumed losartan at that time   - seen by cards 3/14/2022 and  "completed holter - at this time no tx indicated    B12 def:  - is taking supplement daily - discussed dec to qod    Metastatic neuroendocrine tumor to liver dx 5/2018 s/p TACE to R hep lobe 2/2019 and L hep loab 3/2019, TACE to R 2/2022 and 4/2022, palliative radiation to the area of the L3 metastasis 6/18/18-6/29/18:  Saw h/o 4/23/2024 - on Xermelo TID and lanreotide q4 weeks  12/2023 imaging showed stable disease  Prev:  - pET 11/2022 showed progression   - PRRT #1 on 12/14/2022. Zometa every 3 months started 12/27/22. PRRT #2 on 2/8  Followed by h/o - H last clinic visit March 8, 2023  Previously:   - 2/25/2022 for hospital discharge had TACE right hep lobe and found to have frequent PVCs  - we resumed losartan at that time   - seen by cards 3/14/2022 and completed holter - at this time no tx indicated    H/o: Darwin  Cards: Elyssa  Pod: Luper    HM:  Covid booster   Rsv  flu  Urine screen  Foot exam  Eye exam utd     Review of Systems    Objective:  Vitals:    08/20/24 0756   BP: 132/78   BP Location: Left arm   Patient Position: Sitting   Pulse: 86   SpO2: 98%   Weight: 49 kg (108 lb 0.4 oz)   Height: 5' 4" (1.626 m)       Body mass index is 18.54 kg/m².    Physical Exam  Vitals reviewed.   Constitutional:       Appearance: Normal appearance. She is well-developed.   HENT:      Head: Normocephalic and atraumatic.      Right Ear: Tympanic membrane, ear canal and external ear normal.      Left Ear: Tympanic membrane, ear canal and external ear normal.      Nose: Nose normal. No congestion.      Mouth/Throat:      Mouth: Mucous membranes are moist.      Pharynx: Oropharynx is clear. No oropharyngeal exudate.   Eyes:      Extraocular Movements: Extraocular movements intact.      Conjunctiva/sclera: Conjunctivae normal.   Neck:      Thyroid: No thyromegaly.   Cardiovascular:      Rate and Rhythm: Normal rate and regular rhythm.      Pulses: Normal pulses.           Dorsalis pedis pulses are 2+ on the right side and 2+ on " the left side.        Posterior tibial pulses are 2+ on the right side and 2+ on the left side.      Heart sounds: Normal heart sounds.   Pulmonary:      Effort: Pulmonary effort is normal.      Breath sounds: Normal breath sounds.   Abdominal:      General: Bowel sounds are normal.      Palpations: Abdomen is soft. There is mass (ruq mass).   Musculoskeletal:         General: No swelling or tenderness. Normal range of motion.      Cervical back: Normal range of motion and neck supple.      Right foot: Normal range of motion. No deformity.      Left foot: Normal range of motion. No deformity.   Feet:      Right foot:      Protective Sensation: 4 sites tested.  4 sites sensed.      Skin integrity: No ulcer, blister, skin breakdown, erythema, warmth, callus or dry skin.      Left foot:      Protective Sensation: 4 sites tested.  4 sites sensed.      Skin integrity: No ulcer, blister, skin breakdown, erythema, warmth, callus or dry skin.   Lymphadenopathy:      Cervical: No cervical adenopathy.   Skin:     General: Skin is warm and dry.      Capillary Refill: Capillary refill takes less than 2 seconds.      Nails: There is no clubbing.   Neurological:      General: No focal deficit present.      Mental Status: She is alert and oriented to person, place, and time. Mental status is at baseline.      Gait: Gait normal.   Psychiatric:         Mood and Affect: Mood normal.         Speech: Speech normal.         Behavior: Behavior normal.         Thought Content: Thought content normal.         Judgment: Judgment normal.       Assessment:  1. 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 Hyperlipidemia, unspecified hyperlipidemia type    3. HCC (hepatocellular carcinoma)    4. Controlled type 2 diabetes mellitus with microalbuminuria, without long-term current use of insulin    5. B12 deficiency    6. Chronic combined systolic and diastolic CHF (congestive heart failure)    7. Essential hypertension        Plan:    "Shahram"Shahram Farmer" was seen today for hypertension.    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 mellitus  Stable   Cont entresto     Hyperlipidemia, unspecified hyperlipidemia type  Stable   Cont med     HCC (hepatocellular carcinoma)  Stable   F/u with h/o     Controlled type 2 diabetes mellitus with microalbuminuria, without long-term current use of insulin  Stable   Cont regimen     B12 deficiency  Stable   Dec suppl to qod    Chronic combined systolic and diastolic CHF (congestive heart failure)  Euvolemic   Stable   Cont meds and complete echo per cards rec as scheduled     Essential hypertension  Controlled   Cont regimen     Health Maintenance   Topic Date Due    Eye Exam  11/04/2024    Hemoglobin A1c  02/13/2025    Lipid Panel  08/13/2025    TETANUS VACCINE  12/10/2029    Shingles Vaccine  Completed         "

## 2024-09-03 ENCOUNTER — HOSPITAL ENCOUNTER (OUTPATIENT)
Dept: RADIOLOGY | Facility: HOSPITAL | Age: 84
Discharge: HOME | End: 2024-09-03
Attending: INTERNAL MEDICINE
Payer: MEDICARE

## 2024-09-03 DIAGNOSIS — C7A.012 MALIGNANT CARCINOID TUMOR OF ILEUM: ICD-10-CM

## 2024-09-03 DIAGNOSIS — C7B.8 METASTATIC MALIGNANT NEUROENDOCRINE TUMOR TO LIVER: ICD-10-CM

## 2024-09-03 PROCEDURE — A9585 GADOBUTROL INJECTION: HCPCS | Mod: HCNC | Performed by: INTERNAL MEDICINE

## 2024-09-03 PROCEDURE — 74183 MRI ABD W/O CNTR FLWD CNTR: CPT | Mod: 26,HCNC,, | Performed by: STUDENT IN AN ORGANIZED HEALTH CARE EDUCATION/TRAINING PROGRAM

## 2024-09-03 PROCEDURE — 74183 MRI ABD W/O CNTR FLWD CNTR: CPT | Mod: TC,HCNC

## 2024-09-03 PROCEDURE — 25500020 PHARM REV CODE 255: Mod: HCNC | Performed by: INTERNAL MEDICINE

## 2024-09-03 RX ORDER — GADOBUTROL 604.72 MG/ML
10 INJECTION INTRAVENOUS
Status: COMPLETED | OUTPATIENT
Start: 2024-09-03 | End: 2024-09-03

## 2024-09-03 RX ADMIN — GADOBUTROL 10 ML: 604.72 INJECTION INTRAVENOUS at 10:09

## 2024-09-04 ENCOUNTER — TELEPHONE (OUTPATIENT)
Dept: HEMATOLOGY/ONCOLOGY | Facility: CLINIC | Age: 84
End: 2024-09-04
Payer: MEDICARE

## 2024-09-04 ENCOUNTER — PATIENT MESSAGE (OUTPATIENT)
Dept: HEMATOLOGY/ONCOLOGY | Facility: CLINIC | Age: 84
End: 2024-09-04
Payer: MEDICARE

## 2024-09-04 DIAGNOSIS — I11.0 HYPERTENSIVE HEART DISEASE WITH ACUTE ON CHRONIC COMBINED SYSTOLIC AND DIASTOLIC CONGESTIVE HEART FAILURE: ICD-10-CM

## 2024-09-04 DIAGNOSIS — E87.5 HYPERKALEMIA: Primary | ICD-10-CM

## 2024-09-04 DIAGNOSIS — I50.43 HYPERTENSIVE HEART DISEASE WITH ACUTE ON CHRONIC COMBINED SYSTOLIC AND DIASTOLIC CONGESTIVE HEART FAILURE: ICD-10-CM

## 2024-09-04 RX ORDER — SACUBITRIL AND VALSARTAN 24; 26 MG/1; MG/1
1 TABLET, FILM COATED ORAL 2 TIMES DAILY
Qty: 180 TABLET | Refills: 3 | Status: SHIPPED | OUTPATIENT
Start: 2024-09-04

## 2024-09-04 NOTE — TELEPHONE ENCOUNTER
Received secure chat from Dr. Granados that patient's potassium is 5.8.  MD called in [facility] to patient's local pharmacy.  Left voicemail for patient asking for return call and to let her know about medication.

## 2024-09-06 ENCOUNTER — TELEPHONE (OUTPATIENT)
Dept: HEMATOLOGY/ONCOLOGY | Facility: CLINIC | Age: 84
End: 2024-09-06
Payer: MEDICARE

## 2024-09-06 NOTE — TELEPHONE ENCOUNTER
Returned call to patient's sister, Shayla.  Shayla questioning why patient needs prescription.  Informed Shayla Granados called in prescription for patient's elevated potassium, which would be excreted in the patient's BM.  Sister verbalized understanding.

## 2024-09-06 NOTE — TELEPHONE ENCOUNTER
----- Message from Eloise Sagastume sent at 9/6/2024 12:11 PM CDT -----  Contact: Shayla  Type:  Patient Call          Who Called:Patient sister - Shayla         Does the patient know what this is regarding?: Requesting a call back about Rx Sp ssuspension ( liquid ) she would like to know what the script is for ; please advise           Would the patient rather a call back or a response via [employer]?call           Best Call Back Number: [phone]             Additional Information:  [id number] - [place] - [address] AT [facility] [address]  [address]  [facility] [place] [address]  Phone: [phone] Fax: [phone]

## 2024-09-10 ENCOUNTER — INFUSION (OUTPATIENT)
Dept: INFUSION THERAPY | Facility: HOSPITAL | Age: 84
End: 2024-09-10
Payer: MEDICARE

## 2024-09-10 ENCOUNTER — LAB VISIT (OUTPATIENT)
Dept: LAB | Facility: HOSPITAL | Age: 84
End: 2024-09-10
Attending: INTERNAL MEDICINE
Payer: MEDICARE

## 2024-09-10 ENCOUNTER — OFFICE VISIT (OUTPATIENT)
Dept: HEMATOLOGY/ONCOLOGY | Facility: CLINIC | Age: 84
End: 2024-09-10
Payer: MEDICARE

## 2024-09-10 VITALS
DIASTOLIC BLOOD PRESSURE: 67 MMHG | SYSTOLIC BLOOD PRESSURE: 151 MMHG | RESPIRATION RATE: 17 BRPM | WEIGHT: 109.13 LBS | OXYGEN SATURATION: 96 % | BODY MASS INDEX: 18.63 KG/M2 | TEMPERATURE: 98 F | HEART RATE: 99 BPM | HEIGHT: 64 IN

## 2024-09-10 VITALS
TEMPERATURE: 98 F | RESPIRATION RATE: 18 BRPM | SYSTOLIC BLOOD PRESSURE: 139 MMHG | BODY MASS INDEX: 18.73 KG/M2 | HEART RATE: 80 BPM | DIASTOLIC BLOOD PRESSURE: 66 MMHG | WEIGHT: 109.13 LBS

## 2024-09-10 DIAGNOSIS — C7A.012 MALIGNANT CARCINOID TUMOR OF ILEUM: Primary | ICD-10-CM

## 2024-09-10 DIAGNOSIS — I50.9 CHRONIC CONGESTIVE HEART FAILURE, UNSPECIFIED HEART FAILURE TYPE: ICD-10-CM

## 2024-09-10 DIAGNOSIS — C7B.8 METASTATIC MALIGNANT NEUROENDOCRINE TUMOR TO LIVER: ICD-10-CM

## 2024-09-10 DIAGNOSIS — C7A.012 MALIGNANT CARCINOID TUMOR OF ILEUM: ICD-10-CM

## 2024-09-10 DIAGNOSIS — I10 ESSENTIAL HYPERTENSION: ICD-10-CM

## 2024-09-10 DIAGNOSIS — C7B.8 NEUROENDOCRINE CARCINOMA METASTATIC TO BONE: Primary | ICD-10-CM

## 2024-09-10 DIAGNOSIS — C7B.8 SECONDARY NEUROENDOCRINE TUMOR OF BONE: ICD-10-CM

## 2024-09-10 DIAGNOSIS — D49.9 IMMUNODEFICIENCY SECONDARY TO NEOPLASM: ICD-10-CM

## 2024-09-10 DIAGNOSIS — D84.81 IMMUNODEFICIENCY SECONDARY TO NEOPLASM: ICD-10-CM

## 2024-09-10 DIAGNOSIS — C7A.8 NEUROENDOCRINE CARCINOMA METASTATIC TO BONE: Primary | ICD-10-CM

## 2024-09-10 DIAGNOSIS — E34.0 CARCINOID SYNDROME: ICD-10-CM

## 2024-09-10 DIAGNOSIS — E11.29 CONTROLLED TYPE 2 DIABETES MELLITUS WITH MICROALBUMINURIA, WITHOUT LONG-TERM CURRENT USE OF INSULIN: ICD-10-CM

## 2024-09-10 DIAGNOSIS — D84.821 IMMUNODEFICIENCY DUE TO DRUGS: ICD-10-CM

## 2024-09-10 DIAGNOSIS — Z79.899 IMMUNODEFICIENCY DUE TO DRUGS: ICD-10-CM

## 2024-09-10 DIAGNOSIS — C79.51 METASTASIS TO SPINAL COLUMN: ICD-10-CM

## 2024-09-10 DIAGNOSIS — R80.9 CONTROLLED TYPE 2 DIABETES MELLITUS WITH MICROALBUMINURIA, WITHOUT LONG-TERM CURRENT USE OF INSULIN: ICD-10-CM

## 2024-09-10 LAB
ALBUMIN SERPL BCP-MCNC: 3.7 G/DL (ref 3.5–5.2)
ALP SERPL-CCNC: 51 U/L (ref 55–135)
ALT SERPL W/O P-5'-P-CCNC: 19 U/L (ref 10–44)
ANION GAP SERPL CALC-SCNC: 8 MMOL/L (ref 8–16)
AST SERPL-CCNC: 23 U/L (ref 10–40)
BILIRUB SERPL-MCNC: 0.4 MG/DL (ref 0.1–1)
BUN SERPL-MCNC: 11 MG/DL (ref 8–23)
CALCIUM SERPL-MCNC: 10.3 MG/DL (ref 8.7–10.5)
CHLORIDE SERPL-SCNC: 104 MMOL/L (ref 95–110)
CO2 SERPL-SCNC: 29 MMOL/L (ref 23–29)
CREAT SERPL-MCNC: 0.7 MG/DL (ref 0.5–1.4)
EST. GFR  (NO RACE VARIABLE): >60 ML/MIN/1.73 M^2
GLUCOSE SERPL-MCNC: 103 MG/DL (ref 70–110)
POTASSIUM SERPL-SCNC: 5 MMOL/L (ref 3.5–5.1)
PROT SERPL-MCNC: 7.1 G/DL (ref 6–8.4)
SODIUM SERPL-SCNC: 141 MMOL/L (ref 136–145)

## 2024-09-10 PROCEDURE — 99999 PR PBB SHADOW E&M-EST. PATIENT-LVL IV: CPT | Mod: PBBFAC,HCNC,, | Performed by: INTERNAL MEDICINE

## 2024-09-10 PROCEDURE — 96365 THER/PROPH/DIAG IV INF INIT: CPT | Mod: HCNC

## 2024-09-10 PROCEDURE — 1160F RVW MEDS BY RX/DR IN RCRD: CPT | Mod: HCNC,CPTII,S$GLB, | Performed by: INTERNAL MEDICINE

## 2024-09-10 PROCEDURE — 25000003 PHARM REV CODE 250: Mod: HCNC | Performed by: INTERNAL MEDICINE

## 2024-09-10 PROCEDURE — 36415 COLL VENOUS BLD VENIPUNCTURE: CPT | Mod: HCNC | Performed by: INTERNAL MEDICINE

## 2024-09-10 PROCEDURE — 3288F FALL RISK ASSESSMENT DOCD: CPT | Mod: HCNC,CPTII,S$GLB, | Performed by: INTERNAL MEDICINE

## 2024-09-10 PROCEDURE — 1159F MED LIST DOCD IN RCRD: CPT | Mod: HCNC,CPTII,S$GLB, | Performed by: INTERNAL MEDICINE

## 2024-09-10 PROCEDURE — 99215 OFFICE O/P EST HI 40 MIN: CPT | Mod: HCNC,S$GLB,, | Performed by: INTERNAL MEDICINE

## 2024-09-10 PROCEDURE — 1126F AMNT PAIN NOTED NONE PRSNT: CPT | Mod: HCNC,CPTII,S$GLB, | Performed by: INTERNAL MEDICINE

## 2024-09-10 PROCEDURE — 80053 COMPREHEN METABOLIC PANEL: CPT | Mod: HCNC | Performed by: INTERNAL MEDICINE

## 2024-09-10 PROCEDURE — 1157F ADVNC CARE PLAN IN RCRD: CPT | Mod: HCNC,CPTII,S$GLB, | Performed by: INTERNAL MEDICINE

## 2024-09-10 PROCEDURE — 1101F PT FALLS ASSESS-DOCD LE1/YR: CPT | Mod: HCNC,CPTII,S$GLB, | Performed by: INTERNAL MEDICINE

## 2024-09-10 PROCEDURE — G2211 COMPLEX E/M VISIT ADD ON: HCPCS | Mod: HCNC,S$GLB,, | Performed by: INTERNAL MEDICINE

## 2024-09-10 PROCEDURE — 63600175 PHARM REV CODE 636 W HCPCS: Mod: JZ,JG,HCNC | Performed by: INTERNAL MEDICINE

## 2024-09-10 PROCEDURE — 96372 THER/PROPH/DIAG INJ SC/IM: CPT | Mod: HCNC,59

## 2024-09-10 RX ORDER — LANREOTIDE ACETATE 120 MG/.5ML
120 INJECTION SUBCUTANEOUS
Status: DISCONTINUED | OUTPATIENT
Start: 2024-09-10 | End: 2024-09-10 | Stop reason: HOSPADM

## 2024-09-10 RX ORDER — LANREOTIDE ACETATE 120 MG/.5ML
120 INJECTION SUBCUTANEOUS
OUTPATIENT
Start: 2024-09-10

## 2024-09-10 RX ORDER — SODIUM CHLORIDE 0.9 % (FLUSH) 0.9 %
10 SYRINGE (ML) INJECTION
Status: DISCONTINUED | OUTPATIENT
Start: 2024-09-10 | End: 2024-09-10 | Stop reason: HOSPADM

## 2024-09-10 RX ORDER — SODIUM CHLORIDE 0.9 % (FLUSH) 0.9 %
10 SYRINGE (ML) INJECTION
Status: CANCELLED | OUTPATIENT
Start: 2024-09-17

## 2024-09-10 RX ORDER — HEPARIN 100 UNIT/ML
500 SYRINGE INTRAVENOUS
Status: DISCONTINUED | OUTPATIENT
Start: 2024-09-10 | End: 2024-09-10 | Stop reason: HOSPADM

## 2024-09-10 RX ORDER — HEPARIN 100 UNIT/ML
500 SYRINGE INTRAVENOUS
Status: CANCELLED | OUTPATIENT
Start: 2024-09-17

## 2024-09-10 RX ADMIN — ZOLEDRONIC ACID 3 MG: 4 INJECTION, SOLUTION, CONCENTRATE INTRAVENOUS at 10:09

## 2024-09-10 RX ADMIN — SODIUM CHLORIDE: 9 INJECTION, SOLUTION INTRAVENOUS at 09:09

## 2024-09-10 RX ADMIN — LANREOTIDE ACETATE 120 MG: 120 INJECTION SUBCUTANEOUS at 11:09

## 2024-09-10 NOTE — PROGRESS NOTES
"    Subjective:       Patient ID: Shahram Hills is an 84 y.o. female.     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 Ms. Hills is an 77 yo woman who initially saw me on 6/7/18 for further evaluation of neuroendocrine tumor. She initially presented with diarrhea, abdominal discomfort, weight loss. She was evaluated at [facility] and underwent workup below:  US abdomen on 3/14/18 showed numerous bilobar mixed echogenicity and mildly vascular hepatic lesions. Cholelithiasis without e/o acute cholecystitis.   MRI abdomen on 3/30/2018 showed innumerable hepatic metastases. L3 vertebral body metastasis with soft tissue component along the right margin of the L3 and upper L4 vertebral bodies, filling the right L3/4 neural foramen, and extending into the anterior aspect of the spinal canal on the right at the L3 and upper T4 levels. Associated with mild spinal canal narrowing.  CT chest on 4/6/2018 showed one lucent nodule measuring 7 mm in the T7 vertebral body, most likely representing metastatic disease.    EGD on 5/4/18 showed normal duodenum. Schatzki's ring in the lower third of the esophagus. Grade 1 esophagitis in the GE junction c/w mild distal esophagitis. Congestion and erythema in the antrum, pre-pyloric region and stomach body c/w gastritis. Biopsy of the stomach antrum showed mild chronic gastritis, neg for H. pylori.   Labs on 5/9/2018: WBC 6.9, H/H 11.6/34.3, MCV 87.5, plt count 279. On 3/9/18: Vit B12 level 173, folic acid 6.6  She was started on oral vit B12 and underwent a liver biopsy on 5/18/18. Pathology showed "metastatic well differentiated neuroendocrine tumor. Ki67 3%"     She saw me on 6/7/18 and complained of weight loss of 26 lbs over the past 3-4 months, also has diarrhea. No flushing, wheezing, palpitations. Has been having pain in right flank radiating " "down the right leg. No tingling/numbness, weakness. She can hold her bladder but when she urinates her diarrhea would come out as well. Started on dex 4 mg q6h the evening of 18.      2. MRI spine on 18 showed "Marrow replacing metastatic lesion of the L3 vertebral body with associated extra osseous expansion and complete effacement of the right L3-L4 neural foramina and additional effacement of the right lateral recess.  There is additional lateral extension and abutment of the right psoas muscle.  Superimposed degenerative change at this level results in mild spinal canal stenosis." I sent the patient to ED on 18 where she was evaluated by neurosurgery. Neurosurgery did not feel she was a candidate for surgical intervention.      3. Seen by Dr. Adames on 18. palliative radiation to the area of the L3 metastasis 18-18   4. 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  There is an adjacent metastatic lymph node, diffuse liver metastases, and multiple bone metastases. Index primary neoplasm SUV max 33.13. Adjacent lymph node SUV max 23. L3 bone metastasis SUV max 36.86. Left lobe SUV max 46.13   5. Lanreotide every 4 weeks started on 18  6. TACE to the right hepatic lobe on 19. TACE to the left hepatic lobe on 3/21/19.   7. TACE to the right hepatic lobe on 22. S/p conventional chemoembolization performed of the segment 2, 3, 4 branch of the left hepatic artery and segment 8 branch of the left hepatic artery on 22.  8. Gallium PET 22 showed progression. Discussed PRRT. PRRT #1 on 2022. Zometa every 3 months started 22. PRRT #2 on 23. #3 on 23. #4 on 23.      History of Present Illness:   Ms. Hills returns for follow up. Feeling well.     ECO     ROS:   See HPI. Otherwise negative.      Physical Examination:   Vital signs reviewed.   Gen: well hydrated, well developed, in no acute " distress.  HEENT: normocephalic, anicteric, PERRLA, EOMI  Neck: supple, no JVD, thyromegaly, cervical or supraclavicular LAD  Lungs: CTAB, no wheezes or rales  Heart: regular rate, regular pulse, no M/R/G  Abdomen: soft, no tenderness, non-distended, no hepatomegaly, no splenomegaly, no mass, or hernia.   Ext: b/l LE  no edema  Neuro: alert and oriented x 4, no focal neuro deficit  Skin: no rash, open wounds or ulcers  Psych: pleasant and appropriate mood and affect     Objective:      Laboratory Data:  Labs reviewed. 5-HIAA stable at 85. CBC, CMP adequate for treatment    Imaging Data:     MRI abdomen 11/1/22:  1. History of neuroendocrine malignancy.  Numerous hepatic lesions, some remaining stable while others have mildly enlarged consistent with disease progression.  Stable osseous lesion in the L3 vertebral body.  2. Cholelithiasis and stable mild dilatation of the common bile duct and central intrahepatic biliary duct dilatation.  Of note, there is mass effect on the midportion of the common bile duct by dominant left hepatic lobe lesion.  3. Additional findings detailed above.  RECIST SUMMARY:     Date of prior examination for comparison: 05/16/2022     Lesion 1: Left hepatic lobe.  7.1 cm.  Series 9 image 52.  Prior measurement 6.3 cm.     Lesion 2: Right hepatic lobe lateral.  3.1 cm.  Series 9 image 30.  Prior measurement 3.2 cm.     Lesion 3: Right hepatic dome.  5.1 cm.  Series 9 image 19.  Prior measurement 4.4 cm.    Gallium PET 11/1/22:     Interval development of somatostatin tracer avid lesions in the skull, concerning for new metastatic lesions.     Numerous tracer avid hypoattenuating masses throughout the liver with increased SUV max compared to prior exam.     Tracer avid lesions in the C7 and L3 vertebral bodies, sternum, distal ileum and mesenteric node are similar to prior exam.     Judged by tracer avidity of the patient's tumor burden, PRRT would be a consideration if clinically  indicated.      Gallium PET 7/12/23:  Impression:     In this patient with carcinoid tumor of the ileum, there is overall similar distribution of tracer avid disease involving distal ileum, liver, mesenteric lymph node, and bones.  Index lesions as above.  No definite new tracer avid lesions.    Copper PET 12/29/23:  FINDINGS:  Quality of the study: Adequate.     SUV measurements may not be directly comparable with those made on Ga-68 DOTATATE PET/CT.     In the head and neck, there is physiologic distribution in the pituitary, salivary, and thyroid glands, and there are no tracer avid lesions suspicious for malignancy.     In the chest, there are no tracer avid lesions suspicious for malignancy.  Stable right upper lobe subcentimeter pulmonary nodule, below the size threshold for PET.     In the abdomen and pelvis, there is physiologic uptake in the pancreatic uncinate process, spleen, adrenal glands and  tract.     There are numerous hypodense tracer avid lesions throughout the liver, similar to prior exam.  Index left hepatic lobe lesion measures 5.1 x 5.0 cm (previously 5.1 x 5.0 cm) with maximum SUV of 58 (previously 43).     Focal uptake within the distal ileum with maximum SUV of 22 (previously 17).  Image 239.     Tracer avid mesenteric lymph node measuring 1.5 x 1.4 cm (previously 1.5 cm) with maximum SUV of 21 (previously 22).  Image 222.     In the bones, there is stable distribution of numerous scattered tracer avid lesions.     *Right calvarial lesion near the vertex with SUV max 5 (fused image 12).  *Right C6 vertebral body with SUV max 5.5 (fused image 74).  *Left scapular lesion with SUV max 2.7 (fused image 88).  *Right sternal lesion with SUV max 8.6 (fused image 103).  *Left anterior rib lesion with SUV max 2.1 (fused image 109).  *L3 vertebral body lesion with SUV max 9.2 (fused image 192).     Impression:     Similar distribution of tracer avid disease involving the distal ileum, liver,  mesenteric lymph nodes, and bones.  No definite new tracer avid lesions.       MRI 12/29/23  Impression:     1. In this patient with metastatic neuroendocrine tumor there is interval decreased size of index left hepatic lobe lesion and stable size of multiple additional hepatic lesions.  2. Stable metastatic lesion of the L3 vertebral body and central mesentery.  3. Cholelithiasis without acute cholecystitis.  4. Stable mild intrahepatic and extrahepatic ductal dilation, probably related to mass effect on the midportion of the common bile duct by left hepatic lobe lesion.  5. Additional findings, as above.    Copper PET 6/13/24:  Impression:     Similar distribution and uptake of tracer avid disease involving the distal ileum, liver, and bones. Index lesions as above.  No definite new tracer avid lesion.     MRI 6/13/24:  Impression:     History of metastatic small bowel carcinoid.     Thickened loop of small bowel in the midline pelvis in the location of the known primary tumor, but incompletely characterized on this exam.     Partially imaged mesenteric montserrat conglomerate in the left lower quadrant.     Numerous liver metastases.  Some have decreased in size and demonstrate increased central hypoenhancement, compatible with treatment response.  Others are new from 12/29/2023.     Unchanged osseous metastasis in the L3 vertebral body.     Other findings as described.    MRI abdomen 9/3/24:  Impression:     History of metastatic small bowel carcinoid.  Thickened loop of small bowel in the midline pelvis in the location of the known primary tumor, incompletely characterized on current exam.     Numerous hepatic metastases, many are stable, some new and some are enlarged, as detailed above.     Unchanged osseous metastasis in the L3 vertebral body.       Assessment and Plan:      1. Malignant carcinoid tumor of ileum    2. 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 Secondary neuroendocrine tumor of bone     4. Metastasis to spinal column    5. 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 Carcinoid syndrome    8. Controlled type 2 diabetes mellitus with microalbuminuria, without long-term current use of insulin    9. Essential hypertension    10. Chronic congestive heart failure, unspecified heart failure type      1-6  - Ms Hills is an 85 yo woman with well differentiated low-grade neuroendocrine tumor of the ileum with mets to liver and bones, diagnosed on 5/18/2018. Started lanreotide every 4 weeks on 6/22/2018. S/p TACE to the right hepatic lobe on 2/14/19. TACE to the left hepatic lobe on 3/21/19.   - CT/MRI 1/12/22 showed mild progression in the liver. S/p TACE on 2/11/22 and 4/22/22   - Gallium PET 11/1/22 showed progression. Discussed PRRT. PRRT #1 on 12/14/2022. Completed 4 cycles on 6/14/23. Zometa every 3 months started 12/27/22.  - reviewed MRI abdomen results with patient and family. Majority of disease is stable. But there are new and enlarging liver mets. Will review at tumor board to see if she is a candidate for bland embo. Was hospitalized at OSH for CHF in July 2024. I have not gotten the echo result. Messaged Dr Bergeron to see if he has a copy. If not, we will do echo ourselves.  - c/w lanreotide every 4 weeks and zometa every 3 months. Got zometa in June 2024. Will give today  - RTC in 4 weeks for continued lanreotide    7.  - better after xermelo and PRRT  - c/w lanreotide every 4 weeks.   - c/w xermelo tid  - could not tolerate octreotide    8.  - BS controlled  - f/u with PCP    9.  - BP controlled  - c/w current medication    10.  - has not received echo records from [facility]. Messaged Dr Bergeron to see if he has it. If not, we will repeat echo  - f/u with Dr Elyssa Granados MD  Hematology and Medical Oncology  [facility]      Route Chart for Scheduling    Med Onc Chart Routing      Follow up with physician . See JUNAID in4  weeks  with labs then lanreotide. 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 Medications  lanreotide injection 120 mg  120 mg, Subcutaneous, Every visit    LUTATHERA SUPPORT for 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, noted on 6/7/2018  LUTATHERA SUPPORT for Neuroendocrine carcinoma metastatic to bone, noted on 7/11/2018  LUTATHERA SUPPORT for Malignant carcinoid tumor of ileum, noted on 8/17/2018  None      No therapy plan of the specified type found.

## 2024-09-10 NOTE — PLAN OF CARE
Problem: Chemotherapy Effects  Goal: Anemia Symptom Improvement  Outcome: Progressing  Goal: Safety Maintained  Outcome: Progressing  Goal: Absence of Hematuria  Outcome: Progressing  Goal: Nausea and Vomiting Relief  Outcome: Progressing  Goal: Neurotoxicity Symptom Control  Outcome: Progressing  Goal: Absence of Infection  Outcome: Progressing  Goal: Absence of Bleeding  Outcome: Progressing     Problem: Fatigue  Goal: Improved Activity Tolerance  Outcome: Progressing     Problem: Anemia  Goal: Anemia Symptom Improvement  Outcome: Progressing     Problem: Infection  Goal: Absence of Infection Signs and Symptoms  Outcome: Progressing   Pt completed zometa via PIV and lanreotide via SQ without adverse effects VS WNL, discharged AAO and ambulatory

## 2024-09-13 DIAGNOSIS — C7B.8 METASTATIC MALIGNANT NEUROENDOCRINE TUMOR TO LIVER: Primary | ICD-10-CM

## 2024-09-16 ENCOUNTER — TUMOR BOARD CONFERENCE (OUTPATIENT)
Dept: HEMATOLOGY/ONCOLOGY | Facility: CLINIC | Age: 84
End: 2024-09-16
Payer: MEDICARE

## 2024-09-17 DIAGNOSIS — C7B.8 METASTATIC MALIGNANT NEUROENDOCRINE TUMOR TO LIVER: Primary | ICD-10-CM

## 2024-09-17 NOTE — PROGRESS NOTES
[facility]      NEUROENDOCRINE MULTIDISCIPLINARY TUMOR BOARD  _____________________________________________________________________    PRESENTER:   Sebastian Granados MD    REASON FOR PRESENTATION:  Scan Review    ATTENDEES:   Gastroenterology - Not Present  Interventional Radiology - TEENA Tyler  Nuclear Medicine - Godfrey Sagastume MD  Nursing - [facility] Nursing: Elva Dunn, RN, Arlin Gan, RN, Nikki Kay, RN, Bushra Ramirez, RN, and Scott Borges, RN  Oncology - Sebastian Granados MD and Moses Beckman MD  Palliative Medicine - Not Present  Pathology -  Yogesh Sage MD  Research - Not Present  Surgery - MD Charles Mcelroy MD Nathan Bolton, MD Russell Brown, MD    PATIENT STATUS:  Established Patient    PATIENT SUMMARY:  Past Medical History:   Diagnosis Date    Anemia 07/11/2018    B12 deficiency     Depression     per pcp note 7/2017 and given prozac and diazepam     Hyperlipidemia     Systolic heart failure     Weight loss     reviewed prior pcp Dr. Russo notes 2017 - labs reviewed: cmp wnl x tbili 1.5, tsh wnl, A1c 5.0, cbc wnl,D 36, hiv neg, hep c neg, hep b surg ag neg        Past Surgical History:   Procedure Laterality Date    EMBOLIZATION N/A 02/14/2019    Procedure: EMBOLIZATION, BLOOD VESSEL;  Surgeon: [facility] Diagnostic Provider;  Location: [facility] OR [address] [place] [address];  Service: Radiology;  Laterality: N/A;  Room 189/[place]    EMBOLIZATION N/A 03/21/2019    Procedure: EMBOLIZATION, BLOOD VESSEL;  Surgeon: [facility] Diagnostic Provider;  Location: [facility] OR 2ND FLR;  Service: Radiology;  Laterality: N/A;  Room 189/[place]    ESOPHAGOGASTRODUODENOSCOPY  05/04/2018    esophageal ring, grade 1 esophagitis, gastritis     EYE SURGERY Bilateral     cataract removal    HYSTERECTOMY      fibroids       TUMOR MARKERS:  5-HIAA, Plasma Ref Range: up to 22 ng/mL  Recent Labs   Lab 03/26/24  0850 04/23/24  1105 06/13/24  1142 07/15/24  0912 08/13/24  0847 09/03/24  0831   5-HIAA, Plasma (Neuroend) 77  H 81 H 79 H 115 H 102 H 85 H     Chromogranin A Ref Range: <93 ng/mL  Recent Labs   Lab 09/08/23  1240 10/03/23  1200 11/03/23  1238 04/23/24  1105 05/21/24  0853 09/03/24  0831   Chromogranin A 1627 H 1587 H 1374 H 1055 H 1109 H 1512 H     Gastrin Ref Range: <100 pg/mL      Neurokinin A Ref Range: 0 - 40 pg/mL      Pancreastatin Ref Range: 10 - 135 pg/mL  Recent Labs   Lab 04/23/24  1105 05/21/24  0853 06/13/24  1142 07/15/24  0912 08/13/24  0847 09/03/24  0831   Pancreastatin 3055 3908 [id number]   H 5774 H 3989 H     Pancreatic Polypeptide Ref Range: >314 pg/mL      Serotonin Ref Range: <=230 ng/mL  Recent Labs   Lab 04/23/24  1105 05/21/24  0853 06/13/24  1142 07/15/24  0912 08/13/24  0847 09/03/24  0831   Serotonin 1140 H 1290 H 1230 H 1790 H 1440 H 1300 H           Latest Ref Rng & Units 9/10/2024    11:59 AM 9/10/2024     9:36 AM 9/10/2024     8:47 AM   Neuroendocrine Labs   GLUCOSE 70 - 110 mg/dL 103      BUN 8 - 23 mg/dL 11      CREATININE 0.5 - 1.4 mg/dL 0.7       - 145 mmol/L 141      K 3.5 - 5.1 mmol/L 5.0      CHLORIDE 95 - 110 mmol/L 104      CO2 23 - 29 mmol/L 29      CALCIUM 8.7 - 10.5 mg/dL 10.3      PROTEIN, TOTAL 6.0 - 8.4 g/dL 7.1      ALBUMIN 3.5 - 5.2 g/dL 3.7      TOTAL BILIRUBIN 0.1 - 1.0 mg/dL 0.4        For infants and newborns, interpretation of results should be based  on gestational age, weight and in agreement with clinical  observations.    Premature Infant recommended reference ranges:  Up to 24 hours.............<8.0 mg/dL  Up to 48 hours............<12.0 mg/dL  3-5 days..................<15.0 mg/dL  6-29 days.................<15.0 mg/dL       ALK PHOSPHATASE 55 - 135 U/L 51      SGOT (AST) 10 - 40 U/L 23      SGPT (ALT) 10 - 44 U/L 19      Weight   109 lbs 2 oz 109 lbs 2 oz     ____________________________________________________________________    DISCUSSION:85 yo woman with well differentiated low-grade neuroendocrine tumor of the ileum with mets to liver and bones, diagnosed  on 5/18/2018. Started lanreotide every 4 weeks on 6/22/2018. S/p TACE to the right hepatic lobe on 2/14/19. TACE to the left hepatic lobe on 3/21/19. PRRT #1 on 12/14/2022. Completed 4 cycles on 6/14/23. Zometa every 3 months started 12/27/22. Review MRI. Had CHF in July. Now doing well. [place] embo?    INT RAD:MRI with bilobar disease progression. Index lesions in right lobe measuring 1.9 x 1.8cm (11;25 from 1.0 x 0.8cm) and segment 4b measuring 2.8 x 2.1cm (11;45 from 1.8 x 1.3cm). Agree with bland embo.    NUC MED: Per Int Rad.    BOARD RECOMMENDATIONS: [place] embo. with anesthesia.

## 2024-09-18 ENCOUNTER — TELEPHONE (OUTPATIENT)
Dept: INTERVENTIONAL RADIOLOGY/VASCULAR | Facility: CLINIC | Age: 84
End: 2024-09-18
Payer: MEDICARE

## 2024-09-24 ENCOUNTER — OFFICE VISIT (OUTPATIENT)
Dept: INTERVENTIONAL RADIOLOGY/VASCULAR | Facility: CLINIC | Age: 84
End: 2024-09-24
Payer: MEDICARE

## 2024-09-24 VITALS
HEART RATE: 45 BPM | WEIGHT: 108 LBS | DIASTOLIC BLOOD PRESSURE: 67 MMHG | SYSTOLIC BLOOD PRESSURE: 163 MMHG | HEIGHT: 64 IN | BODY MASS INDEX: 18.44 KG/M2

## 2024-09-24 DIAGNOSIS — C7B.8 METASTATIC MALIGNANT NEUROENDOCRINE TUMOR TO LIVER: Primary | ICD-10-CM

## 2024-09-24 PROCEDURE — 99214 OFFICE O/P EST MOD 30 MIN: CPT | Mod: HCNC,S$GLB,, | Performed by: PHYSICIAN ASSISTANT

## 2024-09-24 PROCEDURE — 99999 PR PBB SHADOW E&M-EST. PATIENT-LVL IV: CPT | Mod: PBBFAC,HCNC,, | Performed by: PHYSICIAN ASSISTANT

## 2024-09-24 PROCEDURE — 1160F RVW MEDS BY RX/DR IN RCRD: CPT | Mod: HCNC,CPTII,S$GLB, | Performed by: PHYSICIAN ASSISTANT

## 2024-09-24 PROCEDURE — 1126F AMNT PAIN NOTED NONE PRSNT: CPT | Mod: HCNC,CPTII,S$GLB, | Performed by: PHYSICIAN ASSISTANT

## 2024-09-24 PROCEDURE — 1157F ADVNC CARE PLAN IN RCRD: CPT | Mod: HCNC,CPTII,S$GLB, | Performed by: PHYSICIAN ASSISTANT

## 2024-09-24 PROCEDURE — 1159F MED LIST DOCD IN RCRD: CPT | Mod: HCNC,CPTII,S$GLB, | Performed by: PHYSICIAN ASSISTANT

## 2024-09-24 NOTE — PROGRESS NOTES
"Subjective     Patient ID: Shahram Hills is a 84 y.o. female.    Chief Complaint: Consult    Patient referred to Interventional Radiology by Dr. Granados for bland embolization.  Pt here with her sister and niece.  She has completed TACE x 2 with moderate sedation in 2019.  TACE 02/2022 "During the procedure, the patient developed a partial heart block which spontaneously resolved and became transiently hypotensive at the end of the procedure.  Per nursing request, anesthesia assistance is requested for future treatment."  Pt now with CHF.    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 Started lanreotide every 4 weeks on 6/22/2018. S/p TACE to the right hepatic lobe on 2/14/19. TACE to the left hepatic lobe on 3/21/19. PRRT #1 on 12/14/2022. Completed 4 cycles on 6/14/23. Zometa every 3 months started 12/27/22. Review MRI. Had CHF in July. Now doing well. [place] embo?     INT RAD:MRI with bilobar disease progression. Index lesions in right lobe measuring 1.9 x 1.8cm (11;25 from 1.0 x 0.8cm) and segment 4b measuring 2.8 x 2.1cm (11;45 from 1.8 x 1.3cm). Agree with bland embo.     NUC MED: Per Int Rad.     BOARD RECOMMENDATIONS: [place] embo. with anesthesia.     Patient denies AC, antiplatelet use, or other medications containing asa.  Pt denies issues laying flat. Pt denies CPAP use or O2 use at home.  Allergies reviewed, pt denies allergy to contrast.  Oncology notes reviewed.         Review of Systems   Constitutional:  Negative for activity change, appetite change, chills, fatigue, fever and unexpected weight change.   Respiratory:  Negative for cough and shortness of breath.    Cardiovascular:  Negative for chest pain and leg swelling.   Gastrointestinal:  Negative for abdominal distention, abdominal pain, constipation, diarrhea, nausea and vomiting.   Genitourinary:  Negative for difficulty urinating " and flank pain.   Musculoskeletal:  Negative for back pain and gait problem.   Neurological:  Negative for weakness and memory loss.   Psychiatric/Behavioral:  Negative for confusion and sleep disturbance.           Objective     Physical Exam  Constitutional:       General: She is not in acute distress.     Appearance: She is well-developed. She is not diaphoretic.   HENT:      Head: Normocephalic and atraumatic.   Pulmonary:      Effort: Pulmonary effort is normal. No respiratory distress.   Neurological:      Mental Status: She is alert and oriented to person, place, and time.      Comments: Head tremor.  No upper or lower extremity tremors   Psychiatric:         Behavior: Behavior normal.         Thought Content: Thought content normal.         Judgment: Judgment normal.     EXAMINATION:  MRI ABDOMEN W WO CONTRAST     CLINICAL HISTORY:  Metastatic disease evaluation;  Other secondary neuroendocrine tumors     TECHNIQUE:  Multisequence, multiplanar MRI of the abdomen performed before and after administration of 10 mL gadobutrol intravenous contrast.     COMPARISON:  MRI 06/13/2024, 12/29/2023, 11/01/2022     FINDINGS:  LOWER THORAX: Unremarkable.     LIVER: No global hepatic signal abnormality. Numerous lesions scattered throughout both lobes of the liver.  The largest lesion is stable in size measuring 4.6 x 4.3 cm in segment 4.  Many lesions are stable, several are new, and some enlarged, for example a now 2.1 x 2.8 cm enhancing lesion in segment 4 (axial series 11, image 25).  Scattered additional new lesions, for example in the right hepatic lobe measuring approximately 1.3 cm (axial series 11, image 29), as well as more conspicuous scattered subcentimeter foci of enhancement throughout both hepatic lobes (for example axial series 11, image 29).  Left hepatic lobe lesion again demonstrates intrinsic T1 hyperintense signal and layering T2 hypointense fluid, likely representing intralesional blood products or  proteinaceous material (series 7, image 22).     BILIARY: Similar to prior, the gallbladder is packed with large gallstones.  No gallbladder wall thickening, distension or adjacent inflammatory change.  Mild intrahepatic biliary duct dilatation, unchanged.  The common bile duct is slightly more dilated than prior now measuring 15 mm (coronal series 3, image 19), previously 13 mm.     SPLEEN: No splenomegaly.     PANCREAS: Parenchymal atrophy, similar to prior.  No focal masses.  Pancreatic duct is prominent measuring up to 4 mm.     ADRENALS: No adrenal nodules.     KIDNEYS/URETERS: No hydronephrosis or solid mass lesions.     PERITONEUM / RETROPERITONEUM: Trace fluid about the right hepatic lobe.     LYMPH NODES: No upper abdominal lymphadenopathy.     VESSELS: Portal and hepatic veins are patent.     GI TRACT: Similar-appearing thickened loop of small bowel in the midline pelvis that is partially imaged in corresponds to the location of the known primary tumor.  No bowel obstruction.     BONES AND SOFT TISSUES: Unchanged marrow replacing lesion in the L3 vertebral body.  Multiple injection granulomas above the gluteal soft tissues.     Impression:     History of metastatic small bowel carcinoid.  Thickened loop of small bowel in the midline pelvis in the location of the known primary tumor, incompletely characterized on current exam.     Numerous hepatic metastases, many are stable, some new and some are enlarged, as detailed above.     Unchanged osseous metastasis in the L3 vertebral body.     Electronically signed by resident: Prashanth Golden  Date:                                            09/03/2024  Time:                                           10:57     Electronically signed by:Vishal Juarez  Date:                                            09/03/2024  Time:                                           11:46                     Assessment and Plan     1. 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  -    "  Comprehensive Metabolic Panel; Future; Expected date: 09/24/2024  -     Bilirubin, Direct; Future; Expected date: 09/24/2024  -     Protime-INR; Future; Expected date: 09/24/2024  -     CBC Auto Differential; Future; Expected date: 09/24/2024  -     Cancel: IR Embolization for Tumor_Organ Ischemia; Future; Expected date: 09/24/2024  -     IR Embolization for Tumor_Organ Ischemia; Future; Expected date: 09/24/2024      84 y.o. 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  Hepatic metastatic disease is know  of mortality.  Case and above imaging reviewed with Dr. Denis and Dr. Granados who recommends serial bilobar cTACE in an effort to decrease tumor bulk and prolong life.      The procedure, rationale for and against, goals of care (palliation, local tumor control), expectations and risks (including but not limited to, pain, bleeding, infection, damage to nearby structures, damage to liver or kidneys, treatment failure/recurrence, need for additional procedures, death), potential benefits, and alternatives were discussed with the patient.  All questions were answered to the best of my abilities.  The patient wishes to proceed.   We discussed option for anesthesia pre-op clearance.  TACE in 2019 with moderate sedation.  TACE in 2022 with anesthesia noting "During the procedure, the patient developed a partial heart block which spontaneously resolved and became transiently hypotensive at the end of the procedure.  Per nursing request, anesthesia assistance is requested for future treatment."  Pt now with CHF.      [place] embolization with anesthesia to be scheduled at [facility].  Will arrange anesthesia preop clearance appointment.  Pre-op labs to be scheduled within 30 days of the procedure.  Reviewed allergies.  Pt does not have allergy to contrast.  Based on stage of disease epinephrine " allergy was added to the chart.  Discussed observation stay with the patient, anticipate 1 night stay.    We discussed IR will follow up on patient discharge from the hospital and scheduling for additional procedures.  Pt demonstrated understanding.  Clinic phone number provided.  Pt to continue to see their oncologist.            No follow-ups on file.

## 2024-09-25 ENCOUNTER — PATIENT MESSAGE (OUTPATIENT)
Dept: HEMATOLOGY/ONCOLOGY | Facility: CLINIC | Age: 84
End: 2024-09-25
Payer: MEDICARE

## 2024-09-25 ENCOUNTER — TELEPHONE (OUTPATIENT)
Dept: INTERVENTIONAL RADIOLOGY/VASCULAR | Facility: CLINIC | Age: 84
End: 2024-09-25
Payer: MEDICARE

## 2024-09-27 NOTE — TELEPHONE ENCOUNTER
Called and spoke with Ms Cochran. Explained the disease course, recent progression in the liver and the procedure of bland embolization. Discussed alternative will be systemic chemotherapy. Ms Hills does need to see anesthesia to assess risk given heart failure in July 2024. Ms Dimas understands and agrees with the plan.

## 2024-10-01 ENCOUNTER — TELEPHONE (OUTPATIENT)
Dept: INTERNAL MEDICINE | Facility: CLINIC | Age: 84
End: 2024-10-01
Payer: MEDICARE

## 2024-10-01 NOTE — TELEPHONE ENCOUNTER
She can take diabetic tussin to help with cough   If sx worsen then please offer her an appointment to be assessed

## 2024-10-01 NOTE — TELEPHONE ENCOUNTER
----- Message from Eloise sent at 10/1/2024 11:23 AM CDT -----  Contact: Shayla  Type:  Patient Call          Who Called:Patient sister Mo Mcguire         Does the patient know what this is regarding?: Requesting a call back about her sister having a dry cough ;she said that she forgot the name of the cough syrup that she was told to purchase ; please advise           Would the patient rather a call back or a response via [facility]? Call           Best Call Back Number: [phone]             Additional Information:   [id number] - [place] - [address] [facility] [address]  [address]  [place] [id number]

## 2024-10-01 NOTE — TELEPHONE ENCOUNTER
Patient sister Shayla states that patient has a dry cough since last Friday.  No congestion, or fever.

## 2024-10-02 ENCOUNTER — TELEPHONE (OUTPATIENT)
Dept: INTERVENTIONAL RADIOLOGY/VASCULAR | Facility: CLINIC | Age: 84
End: 2024-10-02
Payer: MEDICARE

## 2024-10-02 NOTE — TELEPHONE ENCOUNTER
----- Message from Med Assistant Brito sent at 10/2/2024  9:54 AM CDT -----  Name of Who is Calling: Shayla sister of LEIGHANN FANG [[id number]]                What is the request in detail: Pt sister states she called on yesterday regarding a cough and some medication for the pt and no one gave her a call back. Please assist.                Can the clinic reply by [facility]: No                What Number to Call Back [facility] not [facility]: [phone]

## 2024-10-02 NOTE — TELEPHONE ENCOUNTER
"Spoke with patient's sister, Shayla but she is currently at work. Asked to send a picture of Diabetic Tussin in portal message. Patient expressed understanding and gratitude for phone call.  Call ended.      [email or web address] message sent.     "  Patient Calls  (Newest Message First)  View All Conversations on this Encounter  Nanette Jiménez MA1 minute ago (10:36 AM)     NARCISO  Spoke with pt's sister, Shayla, she had a verbal understanding.            Note     You2 minutes ago (10:35 AM)     GS  Spoke with patient's sister, Shayla but she is currently at work. Asked to send a picture of Diabetic Tussin in portal message. Patient expressed understanding and gratitude for phone call.  Call ended.        Incomplete   Note     Nanette Jiménez MA  Shayla Rosenthal [phone] minutes ago (10:35 AM)     Nanette Jiménez MA3 minutes ago (10:35 AM)     NARCISO  ----- Message from Med Assistant Brito sent at 10/2/2024  9:54 AM CDT -----  Name of Who is Calling: Shayla sister of LEIGHANN FNAG [[id number]]                    What is the request in detail: Pt sister states she called on yesterday regarding a cough and some medication for the pt and no one gave her a call back. Please assist.                    Can the clinic reply by [facility]: No                    What Number [facility]: [phone]            Note     Trixie Xie MD routed conversation to Anne-Marie Brown17 hours ago (5:13 PM)     Trixie Xie MD routed conversation to Trixie Xie MD17 hours ago (5:13 PM)     Trixie Xie MD17 hours ago (5:13 PM)       She can take diabetic tussin to help with cough   If sx worsen then please offer her an appointment to be assessed          Note     Mirlande Joseph LPN routed conversation to Trixie Xie MD22 hours ago (11:49 AM)     Mirlande Joseph LPN22 hours ago (11:49 AM)     SK  Patient sister Shayla states that patient has a dry cough since last Friday.  No " "congestion, or fever.          Note     Mirlande Joseph, LPN22 hours ago (11:44 AM)     SK  ----- Message from Eloise sent at 10/1/2024 11:23 AM CDT -----  Contact: Shayla  Type:  Patient Call              Who Called:Patient sister Mo Mcguire            Does the patient know what this is regarding?: Requesting a call back about her sister having a dry cough ;she said that she forgot the name of the cough syrup that she was told to purchase ; please advise               Would the patient rather a call back or a response via [facility]? Call               Best Call Back Number: [phone]                  Additional Information:   [id number] - [place], [place] - [address] AT [facility] [address]  [address]  [facility] [id number]            Note   "  "

## 2024-10-03 ENCOUNTER — TELEPHONE (OUTPATIENT)
Dept: INTERVENTIONAL RADIOLOGY/VASCULAR | Facility: HOSPITAL | Age: 84
End: 2024-10-03
Payer: MEDICARE

## 2024-10-03 ENCOUNTER — TELEPHONE (OUTPATIENT)
Dept: PREADMISSION TESTING | Facility: HOSPITAL | Age: 84
End: 2024-10-03

## 2024-10-03 DIAGNOSIS — C7B.8 SECONDARY NEUROENDOCRINE TUMOR OF BONE: Primary | ICD-10-CM

## 2024-10-03 DIAGNOSIS — C7B.8 METASTATIC MALIGNANT NEUROENDOCRINE TUMOR TO LIVER: ICD-10-CM

## 2024-10-03 DIAGNOSIS — C22.0 HCC (HEPATOCELLULAR CARCINOMA): ICD-10-CM

## 2024-10-03 RX ORDER — DIPHENHYDRAMINE HYDROCHLORIDE 50 MG/ML
50 INJECTION INTRAMUSCULAR; INTRAVENOUS ONCE
OUTPATIENT
Start: 2024-10-03 | End: 2024-10-03

## 2024-10-03 RX ORDER — MAGNESIUM SULFATE HEPTAHYDRATE 40 MG/ML
2 INJECTION, SOLUTION INTRAVENOUS ONCE
OUTPATIENT
Start: 2024-10-03 | End: 2024-10-03

## 2024-10-03 RX ORDER — SODIUM CHLORIDE 9 MG/ML
INJECTION, SOLUTION INTRAVENOUS CONTINUOUS
OUTPATIENT
Start: 2024-10-03

## 2024-10-03 RX ORDER — SCOLOPAMINE TRANSDERMAL SYSTEM 1 MG/1
1 PATCH, EXTENDED RELEASE TRANSDERMAL
OUTPATIENT
Start: 2024-10-03

## 2024-10-03 RX ORDER — DEXTROSE MONOHYDRATE, SODIUM CHLORIDE, AND POTASSIUM CHLORIDE 50; 1.49; 4.5 G/1000ML; G/1000ML; G/1000ML
INJECTION, SOLUTION INTRAVENOUS CONTINUOUS
OUTPATIENT
Start: 2024-10-03

## 2024-10-03 NOTE — NURSING
Patient's daughter Kamila Rosenthal called IR clinic.  She has questions concerning Anes. and cardiac clearance to have procedure scheduled for her mother.  Sent message to Alexandra Hollins.  Pt's daughter voiced understanding.

## 2024-10-03 NOTE — TELEPHONE ENCOUNTER
----- Message from JC Perez sent at 10/1/2024  5:15 PM CDT -----  NO DATE-IR PROCEDURE. PLEASE SCHEDULE GREGORIA/POC (STAFF ONLY)    SURGEON: LOIDA AVILA

## 2024-10-07 DIAGNOSIS — I10 ESSENTIAL HYPERTENSION: ICD-10-CM

## 2024-10-07 DIAGNOSIS — R80.9 CONTROLLED TYPE 2 DIABETES MELLITUS WITH MICROALBUMINURIA, WITHOUT LONG-TERM CURRENT USE OF INSULIN: ICD-10-CM

## 2024-10-07 DIAGNOSIS — E11.29 CONTROLLED TYPE 2 DIABETES MELLITUS WITH MICROALBUMINURIA, WITHOUT LONG-TERM CURRENT USE OF INSULIN: ICD-10-CM

## 2024-10-07 RX ORDER — METFORMIN HYDROCHLORIDE 500 MG/1
500 TABLET, EXTENDED RELEASE ORAL DAILY
Qty: 180 TABLET | Refills: 0 | Status: SHIPPED | OUTPATIENT
Start: 2024-10-07

## 2024-10-07 NOTE — TELEPHONE ENCOUNTER
No care due was identified.  [facility] Embedded Care Due Messages. Reference number: [id number].   10/07/2024 1:45:27 PM CDT

## 2024-10-07 NOTE — TELEPHONE ENCOUNTER
"----- Message from Patti sent at 10/7/2024  1:34 PM CDT -----  Regarding: Medication refill  "Type:  Patient Call Back    Who Called:Shayla(Sister)    What is the reqeust in detail:Requesting call back in regards to pt metFORMIN (GLUCOPHAGE-XR) 500 MG ER 24hr tablet pharmacy denied refill. Please advise    Can the clinic reply by [facility]?no     Best Call Back Number:[phone]       Additional Information:  "

## 2024-10-07 NOTE — TELEPHONE ENCOUNTER
No care due was identified.  [facility] Embedded Care Due Messages. Reference number: [id number].   10/07/2024 3:27:08 PM CDT

## 2024-10-08 RX ORDER — CARVEDILOL 12.5 MG/1
12.5 TABLET ORAL
Qty: 180 TABLET | Refills: 3 | Status: SHIPPED | OUTPATIENT
Start: 2024-10-08

## 2024-10-08 NOTE — TELEPHONE ENCOUNTER
Refill Routing Note   Medication(s) are not appropriate for processing by [facility] for the following reason(s):        Required vitals abnormal    [facility] action(s):  Defer             Appointments  past 12m or future 3m with PCP    Date Provider   Last Visit   8/20/2024 Trixie Xie MD   Next Visit   11/19/2024 Trixie Xie MD   ED visits in past 90 days: 0        Note composed:11:20 PM 10/07/2024               deteriorated

## 2024-10-09 NOTE — PROGRESS NOTES
"    Subjective:       Patient ID: Shahram Hills is an 84 y.o. female.     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 Ms. Hills is an 77 yo woman who initially saw me on 6/7/18 for further evaluation of neuroendocrine tumor. She initially presented with diarrhea, abdominal discomfort, weight loss. She was evaluated at [facility] and underwent workup below:  US abdomen on 3/14/18 showed numerous bilobar mixed echogenicity and mildly vascular hepatic lesions. Cholelithiasis without e/o acute cholecystitis.   MRI abdomen on 3/30/2018 showed innumerable hepatic metastases. L3 vertebral body metastasis with soft tissue component along the right margin of the L3 and upper L4 vertebral bodies, filling the right L3/4 neural foramen, and extending into the anterior aspect of the spinal canal on the right at the L3 and upper T4 levels. Associated with mild spinal canal narrowing.  CT chest on 4/6/2018 showed one lucent nodule measuring 7 mm in the T7 vertebral body, most likely representing metastatic disease.    EGD on 5/4/18 showed normal duodenum. Schatzki's ring in the lower third of the esophagus. Grade 1 esophagitis in the GE junction c/w mild distal esophagitis. Congestion and erythema in the antrum, pre-pyloric region and stomach body c/w gastritis. Biopsy of the stomach antrum showed mild chronic gastritis, neg for H. pylori.   Labs on 5/9/2018: WBC 6.9, H/H 11.6/34.3, MCV 87.5, plt count 279. On 3/9/18: Vit B12 level 173, folic acid 6.6  She was started on oral vit B12 and underwent a liver biopsy on 5/18/18. Pathology showed "metastatic well differentiated neuroendocrine tumor. Ki67 3%"     She saw me on 6/7/18 and complained of weight loss of 26 lbs over the past 3-4 months, also has diarrhea. No flushing, wheezing, palpitations. Has been having pain " "in right flank radiating down the right leg. No tingling/numbness, weakness. She can hold her bladder but when she urinates her diarrhea would come out as well. Started on dex 4 mg q6h the evening of 6/7/18.      2. MRI spine on 6/8/18 showed "Marrow replacing metastatic lesion of the L3 vertebral body with associated extra osseous expansion and complete effacement of the right L3-L4 neural foramina and additional effacement of the right lateral recess.  There is additional lateral extension and abutment of the right psoas muscle.  Superimposed degenerative change at this level results in mild spinal canal stenosis." I sent the patient to ED on 6/9/18 where she was evaluated by neurosurgery. Neurosurgery did not feel she was a candidate for surgical intervention.      3. Seen by Dr. Adames on 6/11/18. palliative radiation to the area of the L3 metastasis 6/18/18-6/29/18   4. Gallium study on 6/13/18: Distal ileal primary neoplasm consistent with a carcinoid.  There is an adjacent metastatic lymph node, diffuse liver metastases, and multiple bone metastases. Index primary neoplasm SUV max 33.13. Adjacent lymph node SUV max 23. L3 bone metastasis SUV max 36.86. Left lobe SUV max 46.13   5. Lanreotide every 4 weeks started on 6/22/18  6. TACE to the right hepatic lobe on 2/14/19. TACE to the left hepatic lobe on 3/21/19.   7. TACE to the right hepatic lobe on 2/11/22. S/p conventional chemoembolization performed of the segment 2, 3, 4 branch of the left hepatic artery and segment 8 branch of the left hepatic artery on 4/22/22.  8. Gallium PET 11/1/22 showed progression. Discussed PRRT. PRRT #1 on 12/14/2022. Zometa every 3 months started 12/27/22. PRRT #2 on 2/8/23. #3 on 4/12/23. #4 on 6/14/23.      History of Present Illness:   Ms. Hills returns for follow up. Feeling a little off today, not feeling great. She is eating and has a fair appetite. No pain, fever, chills, nausea or vomiting but just doesn't feel good. " Has increased diarrhea after taking the Metformin. BS elevated but she is doing ok. No falls. Continues to tolerate the Lanreotide well. Scheduled for liver ablation with IR on 10/18/2024.     ECO. Presents with her caregiver today     ROS:   See HPI. Otherwise negative.      Physical Examination:   Vital signs reviewed.   Gen: well hydrated, well developed, in no acute distress.  HEENT: normocephalic, anicteric, EOMI  Neck: supple, no JVD  Lungs: CTAB, no wheezes or rales  Heart: regular rate, regular pulse, no M/R/G  Abdomen: soft, no tenderness, non-distended.   Ext: b/l LE  no edema  Neuro: alert and oriented x 4, no focal neuro deficit  Skin: no rash, open wounds or ulcers  Psych: pleasant and appropriate mood and affect     Objective:      Laboratory Data:  Labs reviewed. 5-HIAA pending today. CBC, CMP adequate for treatment    Imaging Data:     MRI abdomen 22:  1. History of neuroendocrine malignancy.  Numerous hepatic lesions, some remaining stable while others have mildly enlarged consistent with disease progression.  Stable osseous lesion in the L3 vertebral body.  2. Cholelithiasis and stable mild dilatation of the common bile duct and central intrahepatic biliary duct dilatation.  Of note, there is mass effect on the midportion of the common bile duct by dominant left hepatic lobe lesion.  3. Additional findings detailed above.  RECIST SUMMARY:     Date of prior examination for comparison: 2022     Lesion 1: Left hepatic lobe.  7.1 cm.  Series 9 image 52.  Prior measurement 6.3 cm.     Lesion 2: Right hepatic lobe lateral.  3.1 cm.  Series 9 image 30.  Prior measurement 3.2 cm.     Lesion 3: Right hepatic dome.  5.1 cm.  Series 9 image 19.  Prior measurement 4.4 cm.    Gallium PET 22:     Interval development of somatostatin tracer avid lesions in the skull, concerning for new metastatic lesions.     Numerous tracer avid hypoattenuating masses throughout the liver with increased SUV  max compared to prior exam.     Tracer avid lesions in the C7 and L3 vertebral bodies, sternum, distal ileum and mesenteric node are similar to prior exam.     Judged by tracer avidity of the patient's tumor burden, PRRT would be a consideration if clinically indicated.      Gallium PET 7/12/23:  Impression:     In this patient with carcinoid tumor of the ileum, there is overall similar distribution of tracer avid disease involving distal ileum, liver, mesenteric lymph node, and bones.  Index lesions as above.  No definite new tracer avid lesions.    Copper PET 12/29/23:  FINDINGS:  Quality of the study: Adequate.     SUV measurements may not be directly comparable with those made on Ga-68 DOTATATE PET/CT.     In the head and neck, there is physiologic distribution in the pituitary, salivary, and thyroid glands, and there are no tracer avid lesions suspicious for malignancy.     In the chest, there are no tracer avid lesions suspicious for malignancy.  Stable right upper lobe subcentimeter pulmonary nodule, below the size threshold for PET.     In the abdomen and pelvis, there is physiologic uptake in the pancreatic uncinate process, spleen, adrenal glands and  tract.     There are numerous hypodense tracer avid lesions throughout the liver, similar to prior exam.  Index left hepatic lobe lesion measures 5.1 x 5.0 cm (previously 5.1 x 5.0 cm) with maximum SUV of 58 (previously 43).     Focal uptake within the distal ileum with maximum SUV of 22 (previously 17).  Image 239.     Tracer avid mesenteric lymph node measuring 1.5 x 1.4 cm (previously 1.5 cm) with maximum SUV of 21 (previously 22).  Image 222.     In the bones, there is stable distribution of numerous scattered tracer avid lesions.     *Right calvarial lesion near the vertex with SUV max 5 (fused image 12).  *Right C6 vertebral body with SUV max 5.5 (fused image 74).  *Left scapular lesion with SUV max 2.7 (fused image 88).  *Right sternal lesion with  SUV max 8.6 (fused image 103).  *Left anterior rib lesion with SUV max 2.1 (fused image 109).  *L3 vertebral body lesion with SUV max 9.2 (fused image 192).     Impression:     Similar distribution of tracer avid disease involving the distal ileum, liver, mesenteric lymph nodes, and bones.  No definite new tracer avid lesions.       MRI 12/29/23  Impression:     1. In this patient with metastatic neuroendocrine tumor there is interval decreased size of index left hepatic lobe lesion and stable size of multiple additional hepatic lesions.  2. Stable metastatic lesion of the L3 vertebral body and central mesentery.  3. Cholelithiasis without acute cholecystitis.  4. Stable mild intrahepatic and extrahepatic ductal dilation, probably related to mass effect on the midportion of the common bile duct by left hepatic lobe lesion.  5. Additional findings, as above.    Copper PET 6/13/24:  Impression:     Similar distribution and uptake of tracer avid disease involving the distal ileum, liver, and bones. Index lesions as above.  No definite new tracer avid lesion.     MRI 6/13/24:  Impression:     History of metastatic small bowel carcinoid.     Thickened loop of small bowel in the midline pelvis in the location of the known primary tumor, but incompletely characterized on this exam.     Partially imaged mesenteric montserrat conglomerate in the left lower quadrant.     Numerous liver metastases.  Some have decreased in size and demonstrate increased central hypoenhancement, compatible with treatment response.  Others are new from 12/29/2023.     Unchanged osseous metastasis in the L3 vertebral body.     Other findings as described.    MRI abdomen 9/3/24:  Impression:     History of metastatic small bowel carcinoid.  Thickened loop of small bowel in the midline pelvis in the location of the known primary tumor, incompletely characterized on current exam.     Numerous hepatic metastases, many are stable, some new and some are  enlarged, as detailed above.     Unchanged osseous metastasis in the L3 vertebral body.       Assessment and Plan:      1. 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 Malignant carcinoid tumor of ileum    3. Secondary neuroendocrine tumor of bone    4. Metastasis to spinal column    5. 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 Carcinoid syndrome    8. Controlled type 2 diabetes mellitus with microalbuminuria, without long-term current use of insulin    9. Essential hypertension    10. Chronic congestive heart failure, unspecified heart failure type        1-6  - Ms Hills is an 83 yo woman with well differentiated low-grade neuroendocrine tumor of the ileum with mets to liver and bones, diagnosed on 5/18/2018. Started lanreotide every 4 weeks on 6/22/2018. S/p TACE to the right hepatic lobe on 2/14/19. TACE to the left hepatic lobe on 3/21/19.   - CT/MRI 1/12/22 showed mild progression in the liver. S/p TACE on 2/11/22 and 4/22/22   - Gallium PET 11/1/22 showed progression. Discussed PRRT. PRRT #1 on 12/14/2022. Completed 4 cycles on 6/14/23. Zometa every 3 months started 12/27/22.  - reviewed MRI abdomen results with patient and family. Majority of disease is stable. But there are new and enlarging liver mets. Will review at tumor board to see if she is a candidate for bland embo. Was hospitalized at OSH for CHF in July 2024. We have not gotten the echo result. Messaged Dr Bergeron to see if he has a copy. If not, we will do echo ourselves.  - Patient case was presented to the TB on 9/16/2024. Recc and discussion is listed below:     DISCUSSION:83 yo woman with well differentiated low-grade neuroendocrine tumor of the ileum with mets to liver and bones, diagnosed on 5/18/2018. Started lanreotide every 4 weeks on 6/22/2018. S/p TACE to the right hepatic lobe on 2/14/19. TACE to the left hepatic lobe on 3/21/19. PRRT #1 on 12/14/2022. Completed 4 cycles on  6/14/23. Zometa every 3 months started 12/27/22. Review MRI. Had CHF in July. Now doing well. Glenn embo?     INT RAD:MRI with bilobar disease progression. Index lesions in right lobe measuring 1.9 x 1.8cm (11;25 from 1.0 x 0.8cm) and segment 4b measuring 2.8 x 2.1cm (11;45 from 1.8 x 1.3cm). Agree with bland embo.     NUC MED: Per Int Rad.     BOARD RECOMMENDATIONS: Glenn embo. with anesthesia.     - Doing only ok today. Eating well but appetite has decreased. I feel that this is due to the progression of the cancer. She still functions very well. No pain.   - Scheduled to have consult with IR on 10/18/2024 for bland embolization.   - I have reviewed the CBC and CMP, adequate for treatment. Biomarkers are pending  - c/w lanreotide every 4 weeks and zometa every 3 months. Got zometa in Sept 2024. Will give next dose in Dec    - RTC in 4 weeks for continued lanreotide    7.  - better after xermelo and PRRT  - c/w lanreotide every 4 weeks.   - c/w xermelo tid  - could not tolerate octreotide    8.  - BS controlled  - f/u with PCP    9.  - BP controlled  - c/w current medication    10.  - has not received echo records from [facility]. Messaged Dr Bergeron to see if he has it. If not, we will repeat echo  - f/u with Dr Bergeron    Pte and family members displayed understanding of the above encounter and treatment plan. All thoughtful questions were answered to their satisfaction. Pte was advised to notify the care team or proceed to the ER if signs and symptoms worsen.     Visit today included increased complexity associated with the care of the episodic problem chemotherapy  addressed and managing the longitudinal care of the patient due to the serious and/or complex managed problem(s) GI malignancies/cancer      JARRETT Crum, PA-C [facility]  Dept of Hematology/Oncology  WANDA to GI Oncology team         Route Chart for Scheduling    Med Onc Chart Routing      Follow up with physician 1 month and  3 months. Lanreotide   Follow up with JUNAID 2 months and 4 months. 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    Therapy Plan Information  LANREOTIDE for 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, noted on 6/7/2018  5. Medications  lanreotide injection 120 mg  120 mg, Subcutaneous, Every visit    LUTATHERA SUPPORT for 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, noted on 6/7/2018  LUTATHERA SUPPORT for Neuroendocrine carcinoma metastatic to bone, noted on 7/11/2018  LUTATHERA SUPPORT for Malignant carcinoid tumor of ileum, noted on 8/17/2018  None      No therapy plan of the specified type found.

## 2024-10-10 ENCOUNTER — OFFICE VISIT (OUTPATIENT)
Dept: HEMATOLOGY/ONCOLOGY | Facility: CLINIC | Age: 84
End: 2024-10-10
Payer: MEDICARE

## 2024-10-10 ENCOUNTER — INFUSION (OUTPATIENT)
Dept: INFUSION THERAPY | Facility: HOSPITAL | Age: 84
End: 2024-10-10
Payer: MEDICARE

## 2024-10-10 VITALS
DIASTOLIC BLOOD PRESSURE: 69 MMHG | TEMPERATURE: 98 F | SYSTOLIC BLOOD PRESSURE: 157 MMHG | BODY MASS INDEX: 18.24 KG/M2 | OXYGEN SATURATION: 100 % | HEIGHT: 64 IN | WEIGHT: 106.81 LBS | HEART RATE: 53 BPM

## 2024-10-10 DIAGNOSIS — I10 ESSENTIAL HYPERTENSION: ICD-10-CM

## 2024-10-10 DIAGNOSIS — D49.9 IMMUNODEFICIENCY SECONDARY TO NEOPLASM: ICD-10-CM

## 2024-10-10 DIAGNOSIS — C7B.8 SECONDARY NEUROENDOCRINE TUMOR OF BONE: ICD-10-CM

## 2024-10-10 DIAGNOSIS — R80.9 CONTROLLED TYPE 2 DIABETES MELLITUS WITH MICROALBUMINURIA, WITHOUT LONG-TERM CURRENT USE OF INSULIN: ICD-10-CM

## 2024-10-10 DIAGNOSIS — E34.00 CARCINOID SYNDROME: ICD-10-CM

## 2024-10-10 DIAGNOSIS — Z79.899 IMMUNODEFICIENCY DUE TO DRUGS: ICD-10-CM

## 2024-10-10 DIAGNOSIS — D84.821 IMMUNODEFICIENCY DUE TO DRUGS: ICD-10-CM

## 2024-10-10 DIAGNOSIS — C7B.8 METASTATIC MALIGNANT NEUROENDOCRINE TUMOR TO LIVER: Primary | ICD-10-CM

## 2024-10-10 DIAGNOSIS — C79.51 METASTASIS TO SPINAL COLUMN: ICD-10-CM

## 2024-10-10 DIAGNOSIS — I50.9 CHRONIC CONGESTIVE HEART FAILURE, UNSPECIFIED HEART FAILURE TYPE: ICD-10-CM

## 2024-10-10 DIAGNOSIS — E11.29 CONTROLLED TYPE 2 DIABETES MELLITUS WITH MICROALBUMINURIA, WITHOUT LONG-TERM CURRENT USE OF INSULIN: ICD-10-CM

## 2024-10-10 DIAGNOSIS — C7A.012 MALIGNANT CARCINOID TUMOR OF ILEUM: ICD-10-CM

## 2024-10-10 DIAGNOSIS — D84.81 IMMUNODEFICIENCY SECONDARY TO NEOPLASM: ICD-10-CM

## 2024-10-10 PROCEDURE — G2211 COMPLEX E/M VISIT ADD ON: HCPCS | Mod: HCNC,S$GLB,, | Performed by: PHYSICIAN ASSISTANT

## 2024-10-10 PROCEDURE — 1101F PT FALLS ASSESS-DOCD LE1/YR: CPT | Mod: HCNC,CPTII,S$GLB, | Performed by: PHYSICIAN ASSISTANT

## 2024-10-10 PROCEDURE — 1160F RVW MEDS BY RX/DR IN RCRD: CPT | Mod: HCNC,CPTII,S$GLB, | Performed by: PHYSICIAN ASSISTANT

## 2024-10-10 PROCEDURE — 99215 OFFICE O/P EST HI 40 MIN: CPT | Mod: HCNC,S$GLB,, | Performed by: PHYSICIAN ASSISTANT

## 2024-10-10 PROCEDURE — 99999 PR PBB SHADOW E&M-EST. PATIENT-LVL IV: CPT | Mod: PBBFAC,HCNC,, | Performed by: PHYSICIAN ASSISTANT

## 2024-10-10 PROCEDURE — 1126F AMNT PAIN NOTED NONE PRSNT: CPT | Mod: HCNC,CPTII,S$GLB, | Performed by: PHYSICIAN ASSISTANT

## 2024-10-10 PROCEDURE — 96372 THER/PROPH/DIAG INJ SC/IM: CPT | Mod: HCNC

## 2024-10-10 PROCEDURE — 1159F MED LIST DOCD IN RCRD: CPT | Mod: HCNC,CPTII,S$GLB, | Performed by: PHYSICIAN ASSISTANT

## 2024-10-10 PROCEDURE — 3288F FALL RISK ASSESSMENT DOCD: CPT | Mod: HCNC,CPTII,S$GLB, | Performed by: PHYSICIAN ASSISTANT

## 2024-10-10 PROCEDURE — 1157F ADVNC CARE PLAN IN RCRD: CPT | Mod: HCNC,CPTII,S$GLB, | Performed by: PHYSICIAN ASSISTANT

## 2024-10-10 RX ORDER — LANREOTIDE ACETATE 120 MG/.5ML
120 INJECTION SUBCUTANEOUS
OUTPATIENT
Start: 2024-10-10

## 2024-10-10 RX ORDER — LANREOTIDE ACETATE 120 MG/.5ML
120 INJECTION SUBCUTANEOUS
Status: DISCONTINUED | OUTPATIENT
Start: 2024-10-10 | End: 2024-10-10 | Stop reason: HOSPADM

## 2024-10-10 RX ADMIN — LANREOTIDE ACETATE 120 MG: 120 INJECTION SUBCUTANEOUS at 12:10

## 2024-10-18 ENCOUNTER — OFFICE VISIT (OUTPATIENT)
Dept: INTERNAL MEDICINE | Facility: CLINIC | Age: 84
End: 2024-10-18
Payer: MEDICARE

## 2024-10-18 ENCOUNTER — LAB VISIT (OUTPATIENT)
Dept: LAB | Facility: HOSPITAL | Age: 84
End: 2024-10-18
Payer: MEDICARE

## 2024-10-18 VITALS
HEART RATE: 72 BPM | BODY MASS INDEX: 18.44 KG/M2 | WEIGHT: 108 LBS | TEMPERATURE: 98 F | DIASTOLIC BLOOD PRESSURE: 60 MMHG | OXYGEN SATURATION: 98 % | RESPIRATION RATE: 16 BRPM | SYSTOLIC BLOOD PRESSURE: 157 MMHG | HEIGHT: 64 IN

## 2024-10-18 DIAGNOSIS — R41.3 MEMORY DEFICITS: Primary | ICD-10-CM

## 2024-10-18 DIAGNOSIS — C7B.8 METASTATIC MALIGNANT NEUROENDOCRINE TUMOR TO LIVER: ICD-10-CM

## 2024-10-18 DIAGNOSIS — E78.5 HYPERLIPIDEMIA, UNSPECIFIED HYPERLIPIDEMIA TYPE: ICD-10-CM

## 2024-10-18 DIAGNOSIS — Z90.710 H/O: HYSTERECTOMY: ICD-10-CM

## 2024-10-18 DIAGNOSIS — H40.9 GLAUCOMA, UNSPECIFIED GLAUCOMA TYPE, UNSPECIFIED LATERALITY: ICD-10-CM

## 2024-10-18 DIAGNOSIS — I10 ESSENTIAL HYPERTENSION: ICD-10-CM

## 2024-10-18 DIAGNOSIS — I70.0 ATHEROSCLEROSIS OF AORTA: ICD-10-CM

## 2024-10-18 DIAGNOSIS — R80.9 CONTROLLED TYPE 2 DIABETES MELLITUS WITH MICROALBUMINURIA, WITHOUT LONG-TERM CURRENT USE OF INSULIN: ICD-10-CM

## 2024-10-18 DIAGNOSIS — E53.8 B12 DEFICIENCY: ICD-10-CM

## 2024-10-18 DIAGNOSIS — R35.0 URINARY FREQUENCY: ICD-10-CM

## 2024-10-18 DIAGNOSIS — M54.16 LUMBAR RADICULOPATHY: ICD-10-CM

## 2024-10-18 DIAGNOSIS — I50.42 CHRONIC COMBINED SYSTOLIC AND DIASTOLIC CHF (CONGESTIVE HEART FAILURE): ICD-10-CM

## 2024-10-18 DIAGNOSIS — E11.29 CONTROLLED TYPE 2 DIABETES MELLITUS WITH MICROALBUMINURIA, WITHOUT LONG-TERM CURRENT USE OF INSULIN: ICD-10-CM

## 2024-10-18 DIAGNOSIS — C7A.012 MALIGNANT CARCINOID TUMOR OF ILEUM: ICD-10-CM

## 2024-10-18 DIAGNOSIS — I49.3 PVC'S (PREMATURE VENTRICULAR CONTRACTIONS): ICD-10-CM

## 2024-10-18 PROBLEM — R10.11 CHRONIC RUQ PAIN: Status: RESOLVED | Noted: 2018-03-23 | Resolved: 2024-10-18

## 2024-10-18 PROBLEM — D64.9 ANEMIA: Status: RESOLVED | Noted: 2018-07-11 | Resolved: 2024-10-18

## 2024-10-18 PROBLEM — G89.29 CHRONIC RUQ PAIN: Status: RESOLVED | Noted: 2018-03-23 | Resolved: 2024-10-18

## 2024-10-18 LAB
ALBUMIN SERPL BCP-MCNC: 3.9 G/DL (ref 3.5–5.2)
ALP SERPL-CCNC: 63 U/L (ref 40–150)
ALT SERPL W/O P-5'-P-CCNC: 24 U/L (ref 10–44)
ANION GAP SERPL CALC-SCNC: 11 MMOL/L (ref 8–16)
AST SERPL-CCNC: 28 U/L (ref 10–40)
BASOPHILS # BLD AUTO: 0.01 K/UL (ref 0–0.2)
BASOPHILS NFR BLD: 0.2 % (ref 0–1.9)
BILIRUB DIRECT SERPL-MCNC: 0.2 MG/DL (ref 0.1–0.3)
BILIRUB SERPL-MCNC: 0.5 MG/DL (ref 0.1–1)
BUN SERPL-MCNC: 16 MG/DL (ref 8–23)
CALCIUM SERPL-MCNC: 10.1 MG/DL (ref 8.7–10.5)
CHLORIDE SERPL-SCNC: 101 MMOL/L (ref 95–110)
CO2 SERPL-SCNC: 28 MMOL/L (ref 23–29)
CREAT SERPL-MCNC: 0.8 MG/DL (ref 0.5–1.4)
DIFFERENTIAL METHOD BLD: ABNORMAL
EOSINOPHIL # BLD AUTO: 0 K/UL (ref 0–0.5)
EOSINOPHIL NFR BLD: 0.7 % (ref 0–8)
ERYTHROCYTE [DISTWIDTH] IN BLOOD BY AUTOMATED COUNT: 12.5 % (ref 11.5–14.5)
EST. GFR  (NO RACE VARIABLE): >60 ML/MIN/1.73 M^2
GLUCOSE SERPL-MCNC: 158 MG/DL (ref 70–110)
HCT VFR BLD AUTO: 40.4 % (ref 37–48.5)
HGB BLD-MCNC: 13 G/DL (ref 12–16)
IMM GRANULOCYTES # BLD AUTO: 0.02 K/UL (ref 0–0.04)
IMM GRANULOCYTES NFR BLD AUTO: 0.4 % (ref 0–0.5)
INR PPP: 1 (ref 0.8–1.2)
LYMPHOCYTES # BLD AUTO: 1.4 K/UL (ref 1–4.8)
LYMPHOCYTES NFR BLD: 26.6 % (ref 18–48)
MCH RBC QN AUTO: 31.5 PG (ref 27–31)
MCHC RBC AUTO-ENTMCNC: 32.2 G/DL (ref 32–36)
MCV RBC AUTO: 98 FL (ref 82–98)
MONOCYTES # BLD AUTO: 0.4 K/UL (ref 0.3–1)
MONOCYTES NFR BLD: 7.4 % (ref 4–15)
NEUTROPHILS # BLD AUTO: 3.5 K/UL (ref 1.8–7.7)
NEUTROPHILS NFR BLD: 64.7 % (ref 38–73)
NRBC BLD-RTO: 0 /100 WBC
PLATELET # BLD AUTO: 194 K/UL (ref 150–450)
PMV BLD AUTO: 10.3 FL (ref 9.2–12.9)
POTASSIUM SERPL-SCNC: 4.3 MMOL/L (ref 3.5–5.1)
PROT SERPL-MCNC: 7.6 G/DL (ref 6–8.4)
PROTHROMBIN TIME: 11.4 SEC (ref 9–12.5)
RBC # BLD AUTO: 4.13 M/UL (ref 4–5.4)
SODIUM SERPL-SCNC: 140 MMOL/L (ref 136–145)
WBC # BLD AUTO: 5.42 K/UL (ref 3.9–12.7)

## 2024-10-18 PROCEDURE — 80053 COMPREHEN METABOLIC PANEL: CPT | Mod: HCNC | Performed by: PHYSICIAN ASSISTANT

## 2024-10-18 PROCEDURE — 82248 BILIRUBIN DIRECT: CPT | Mod: HCNC | Performed by: PHYSICIAN ASSISTANT

## 2024-10-18 PROCEDURE — 85610 PROTHROMBIN TIME: CPT | Mod: HCNC | Performed by: PHYSICIAN ASSISTANT

## 2024-10-18 PROCEDURE — 36415 COLL VENOUS BLD VENIPUNCTURE: CPT | Mod: HCNC | Performed by: PHYSICIAN ASSISTANT

## 2024-10-18 PROCEDURE — 99999 PR PBB SHADOW E&M-EST. PATIENT-LVL IV: CPT | Mod: PBBFAC,HCNC,, | Performed by: HOSPITALIST

## 2024-10-18 PROCEDURE — 85025 COMPLETE CBC W/AUTO DIFF WBC: CPT | Mod: HCNC | Performed by: PHYSICIAN ASSISTANT

## 2024-10-18 NOTE — ASSESSMENT & PLAN NOTE
Type 2 Diabetes Mellitus  On treatment with oral agent Metformin    Hemoglobin A1c- 6.6  Capillary glucose check-  Pre breakfast -130-140      Diabetes Complications     Microvascular     Not known to have   Diabetes affecting the eyes  No tingling numbness of hands and feet  No reported open areas on the feet   Feet care suggested     Macrovascular     No stroke/ TIA  Not known to have CAD  No suggestion of  lower extremity claudication      Diabetes Mellitus-I suggest monitoring the glucose in the perioperative period ( Before meals and bed time,if the patient is on oral feeds or every 6 hourly ,if the patient is NPO )  Blood glucose target in hospitalized patients is 140-180. Oral Hypoglycemic agents are generally avoided during the hospital stay . If glucose is consistently elevated ,I suggest using basal ,prandial Insulin regimen to control the glucose , as elevated glucose can be associated with adverse surgical out comes. Please consider involving Hospital Medicine or Endocrinology ,if any help is needed with Glucose control. Patient will be instructed based on the pre op clinic guidelines  about adjustment of diabetic treatment (If applicable )  considering the NPO status for Surgery      I had educated that uncontrolled DM can cause post op complications,risk of infection, wound healing problem,increased length of stay in hospital and its associated complications.I suggest exercise as much as possible and follow diabetic diet

## 2024-10-18 NOTE — ASSESSMENT & PLAN NOTE
"She was visiting her family in July of 2024 when she had problems with her heart  She ate a hot dog the night before and she woke up to go bathroom and from that point she has been short of breath  She had to be transported to hospital on an ambulance and he was hospitalized for about 3 days and was diagnosed with congestive heart failure  She did not have any heart failure diagnosis prior to that    Echo 2022  The left ventricle is normal in size with mildly decreased systolic function. The estimated ejection fraction is 40%.  Normal right ventricular size with normal right ventricular systolic function.  Grade I left ventricular diastolic dysfunction.  Mild left atrial enlargement.  Mild aortic regurgitation.  Low/Normal central venous pressure (3 mmHg).  There was frequent atrial and ventricular ectopy during the examination.    She has no suggestions of coronary artery disease and has no chest pain  No history of AFib    Currently she is not having any fluid problem  She has not trouble with breathing problem  She is able to lay down flat on her back    She is currently maintained on beta-blocker carvedilol, Entresto, Lasix, spironolactone  She has been losing weight despite eating good-there may be a role of the diuretics in this    She seems to be doing good from a heart standpoint from a congestive heart failure   She has completed TACE x 2 with moderate sedation in 2019.  TACE 02/2022 "During the procedure, the patient developed a partial heart block which spontaneously resolved and became transiently hypotensive at the end of the procedure.      She does not have any history of passing out or heart rhythm trouble that I see  2022    Holter    Sinus rhythm 94 () bpm. Normal intervals. Frequent APC's - 11/hr. One 7 sec run of SVT at 190 bpm. Very frequent VPC's - mostly monomorphic - 1,314/hr - 23.7% load. 1,114 V couplets. 38 V triplets. No reported symptoms.     Based on what I see, I feel that she is " doing good from a heart standpoint for the procedure but she may need to follow-up with the cardiologist's about possibly down titrating her diuretics

## 2024-10-18 NOTE — ASSESSMENT & PLAN NOTE
No stomach surgery , bariatric surgery, small bowel surgery  No Crohn's disease, No pancreatic insufficiency  No celiac disease , Bacterial over growth   No pernicious anemia or bacterial overgrowth  No vitiligo, Type 1 Diabetes  Not on Vegan or strict Vegetarian diet      NoSymmetric paresthesias or numbness and gait problems     The likely reasons for low B12 may be metformin use and ileal carcinoid

## 2024-10-18 NOTE — ASSESSMENT & PLAN NOTE
She is not known to have dementia but gets forgetful on occasions which the family feels he is not out of ordinary

## 2024-10-18 NOTE — ASSESSMENT & PLAN NOTE
She is on carvedilol twice a day, Entresto twice a day and a blood pressure fluctuates   She is watching her salt intake    Hypertension-  Blood pressure is acceptable . I suggest continuation of ---Carvedilol----- during the entire perioperative period. I suggest holding ----Entresto---- on the morning of the surgery and can continue that  post operatively under blood pressure, electrolyte and renal function monitoring as long as they are acceptable.I suggest addressing pain control as uncontrolled pain can increased blood pressure
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
## 2024-10-18 NOTE — ASSESSMENT & PLAN NOTE
She had a hysterectomy long time ago for fibroids and heavy cycles  It is unclear if her ovaries were removed at that time on not  Not known to have osteoporosis or osteopenia
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
## 2024-10-18 NOTE — OUTPATIENT SUBJECTIVE & OBJECTIVE
Outpatient Subjective & Objective     Chief complaint-Preoperative evaluation, Perioperative Medical management, complication reduction plan     Active cardiac conditions- none    Revised cardiac risk index predictors- history of heart failure    Functional capacity -Examples of physical activity  - Walks in the house , walked from the parking lot to the clinic today.  She walks without any assistive device.  She did not have any breathing problems this morning can take a flight of stairs holding on to the railing----- She can undertake all the above activities without  chest pain,chest tightness, Shortness of breath ,dizziness,lightheadedness making her exercise tolerance more,  than 4 Mets.       Review of Systems   Constitutional:  Negative for chills and fever.        Her usual weight is about 120 lb  She is eating well  He does not feel frail and she is able to walk in the house   HENT:          NADIAG score  / 8      Elevated BP  Age over 50        Eyes:         No new visual changes   Respiratory:          No cough , phlegm    No Hemoptysis   Cardiovascular:         As noted   Gastrointestinal:         No overt GI/ blood losses  Bowel movements- Regular  She finished with her menstrual cycles in her 40s   Endocrine:        Prednisone use > 20 mg daily for 3 weeks- none   Genitourinary:  Negative for dysuria.        No urinary hesitancy    Musculoskeletal:           No unusual, muscle, joint pains   Skin:  Negative for rash.   Neurological:  Negative for syncope.        No unilateral weakness   Hematological:         Current use of Anticoagulants  None   Psychiatric/Behavioral:          No Depression,Anxiety         No past medical history pertinent negatives.        No anesthesia, bleeding, cardiac problems, PONV with previous surgeries/procedures.  Medications and Allergies reviewed in epic.    FH- No anesthesia,bleeding / venous thrombosis , in family       Physical Exam  Blood pressure (!) 157/60,  "pulse 72, temperature 98.3 °F (36.8 °C), temperature source Oral, resp. rate 16, height 5' 4" (1.626 m), weight 49 kg (108 lb), SpO2 98%.      Physical Exam  Constitutional- Vitals - Body mass index is 18.54 kg/m².,   Vitals:    10/18/24 0902   BP: (!) 157/60   Pulse: 72   Resp: 16   Temp: 98.3 °F (36.8 °C)     General appearance-Conscious,Coherent  Eyes- No conjunctival icterus,pupils  round , reactive to light , and  bilateral intra ocular lenses  ENT-Oral cavity- moist , lower denture , and  upper partial denture    , Hearing grossly normal   Neck- No thyromegaly ,Trachea -central, No jugular venous distension,   No Carotid Bruit   Cardiovascular -Heart Sounds- Normal ,  no murmur , and  occasional irregularity suggestive of ectopy   , No gallop rhythm   Respiratory - Normal Respiratory Effort, Normal breath sounds,  no wheeze , and  no forced expiratory wheeze    Peripheral pitting pedal edema-- none , no calf pain   Gastrointestinal -Soft abdomen, No palpable masses, Non Tender,Liver,Spleen not palpable. No-- free fluid and shifting dullness  Musculoskeletal- No finger Clubbing. Strength grossly normal   Lymphatic-No Palpable cervical, axillary,Inguinal lymphadenopathy   Psychiatric - normal effect,Orientation  Rt Dorsalis pedis pulses-palpable    Lt Dorsalis pedis pulses- palpable   Rt Posterior tibial pulses -palpable   Left posterior tibial pulses -palpable   Miscellaneous -  no renal bruit       Investigations  Lab and Imaging have been reviewed in epic.      Review of old records- Was done and information gathered regards to events leading to surgery and health conditions of significance in the perioperative period.    Outpatient Subjective & Objective   "

## 2024-10-18 NOTE — ASSESSMENT & PLAN NOTE
Getting monthly lanreotide  Will work with the oncologist if she needs any intraoperative octreotide  She has been getting diarrhea for which she is taking Lomotil

## 2024-10-21 ENCOUNTER — HOSPITAL ENCOUNTER (OUTPATIENT)
Dept: CARDIOLOGY | Facility: HOSPITAL | Age: 84
Discharge: HOME | End: 2024-10-21
Attending: HOSPITALIST
Payer: MEDICARE

## 2024-10-21 VITALS
BODY MASS INDEX: 18.44 KG/M2 | WEIGHT: 108 LBS | HEART RATE: 72 BPM | SYSTOLIC BLOOD PRESSURE: 157 MMHG | DIASTOLIC BLOOD PRESSURE: 60 MMHG | HEIGHT: 64 IN

## 2024-10-21 DIAGNOSIS — I50.42 CHRONIC COMBINED SYSTOLIC AND DIASTOLIC CHF (CONGESTIVE HEART FAILURE): ICD-10-CM

## 2024-10-21 DIAGNOSIS — I49.3 PVC'S (PREMATURE VENTRICULAR CONTRACTIONS): ICD-10-CM

## 2024-10-21 LAB
ASCENDING AORTA: 2.92 CM
AV AREA BY CONTINUOUS VTI: 1.4 CM2
AV INDEX (PROSTH): 0.43
AV LVOT MEAN GRADIENT: 1 MMHG
AV LVOT PEAK GRADIENT: 2 MMHG
AV MEAN GRADIENT: 4.6 MMHG
AV PEAK GRADIENT: 9 MMHG
AV VALVE AREA BY VELOCITY RATIO: 1.3 CM²
AV VALVE AREA: 1.3 CM2
AV VELOCITY RATIO: 0.4
BSA FOR ECHO PROCEDURE: 1.48 M2
CV ECHO LV RWT: 0.4 CM
DOP CALC AO PEAK VEL: 1.5 M/S
DOP CALC AO VTI: 30.4 CM
DOP CALC LVOT AREA: 3.1 CM2
DOP CALC LVOT DIAMETER: 2 CM
DOP CALC LVOT PEAK VEL: 0.6 M/S
DOP CALC LVOT STROKE VOLUME: 40.8 CM3
DOP CALCLVOT PEAK VEL VTI: 13 CM
ECHO EF ESTIMATED: 32 %
ECHO LV POSTERIOR WALL: 1 CM (ref 0.6–1.1)
FRACTIONAL SHORTENING: 14 % (ref 28–44)
INTERVENTRICULAR SEPTUM: 0.7 CM (ref 0.6–1.1)
IVC DIAMETER: 0.92 CM
LA MAJOR: 4.13 CM
LA MINOR: 4.7 CM
LA WIDTH: 4.04 CM
LEFT ATRIUM SIZE: 3.62 CM
LEFT ATRIUM VOLUME INDEX MOD: 35.7 ML/M2
LEFT ATRIUM VOLUME INDEX: 36.4 ML/M2
LEFT ATRIUM VOLUME MOD: 53.52 ML
LEFT ATRIUM VOLUME: 54.65 CM3
LEFT INTERNAL DIMENSION IN SYSTOLE: 4.3 CM (ref 2.1–4)
LEFT VENTRICLE DIASTOLIC VOLUME INDEX: 79.52 ML/M2
LEFT VENTRICLE DIASTOLIC VOLUME: 119.28 ML
LEFT VENTRICLE MASS INDEX: 97.9 G/M2
LEFT VENTRICLE SYSTOLIC VOLUME INDEX: 54 ML/M2
LEFT VENTRICLE SYSTOLIC VOLUME: 80.93 ML
LEFT VENTRICULAR INTERNAL DIMENSION IN DIASTOLE: 5 CM (ref 3.5–6)
LEFT VENTRICULAR MASS: 146.8 G
LV SEPTAL E/E' RATIO: 64.5 M/S
MV PEAK E VEL: 1.29 M/S
OHS CV RV/LV RATIO: 0.52 CM
PISA TR MAX VEL: 3.04 M/S
RA MAJOR: 4.78 CM
RA PRESSURE ESTIMATED: 3 MMHG
RA WIDTH: 2.98 CM
RIGHT ATRIAL AREA: 11.7 CM2
RIGHT VENTRICLE DIASTOLIC BASEL DIMENSION: 2.6 CM
RV TB RVSP: 6 MMHG
RV TISSUE DOPPLER FREE WALL SYSTOLIC VELOCITY 1 (APICAL 4 CHAMBER VIEW): 9.98 CM/S
SINUS: 2.99 CM
STJ: 2.68 CM
TDI SEPTAL: 0.02 M/S
TR MAX PG: 37 MMHG
TRICUSPID ANNULAR PLANE SYSTOLIC EXCURSION: 1.68 CM
TV PEAK GRADIENT: 37 MMHG
TV REST PULMONARY ARTERY PRESSURE: 40 MMHG
Z-SCORE OF LEFT VENTRICULAR DIMENSION IN END DIASTOLE: 1.21
Z-SCORE OF LEFT VENTRICULAR DIMENSION IN END SYSTOLE: 3.53

## 2024-10-21 PROCEDURE — 93306 TTE W/DOPPLER COMPLETE: CPT | Mod: 26,HCNC,, | Performed by: INTERNAL MEDICINE

## 2024-10-21 PROCEDURE — 93306 TTE W/DOPPLER COMPLETE: CPT | Mod: HCNC

## 2024-10-24 ENCOUNTER — TELEPHONE (OUTPATIENT)
Dept: INTERVENTIONAL RADIOLOGY/VASCULAR | Facility: HOSPITAL | Age: 84
End: 2024-10-24
Payer: MEDICARE

## 2024-10-25 ENCOUNTER — HOSPITAL ENCOUNTER (OUTPATIENT)
Dept: INTERVENTIONAL RADIOLOGY/VASCULAR | Facility: HOSPITAL | Age: 84
Discharge: HOME | End: 2024-10-28
Attending: STUDENT IN AN ORGANIZED HEALTH CARE EDUCATION/TRAINING PROGRAM | Admitting: STUDENT IN AN ORGANIZED HEALTH CARE EDUCATION/TRAINING PROGRAM
Payer: MEDICARE

## 2024-10-25 ENCOUNTER — ANESTHESIA (OUTPATIENT)
Dept: INTERVENTIONAL RADIOLOGY/VASCULAR | Facility: HOSPITAL | Age: 84
End: 2024-10-25
Payer: MEDICARE

## 2024-10-25 DIAGNOSIS — C22.0 HCC (HEPATOCELLULAR CARCINOMA): ICD-10-CM

## 2024-10-25 DIAGNOSIS — R00.0 TACHYCARDIA: ICD-10-CM

## 2024-10-25 DIAGNOSIS — R07.9 CHEST PAIN: ICD-10-CM

## 2024-10-25 DIAGNOSIS — R00.1 BRADYCARDIA: ICD-10-CM

## 2024-10-25 DIAGNOSIS — C7B.8 METASTATIC MALIGNANT NEUROENDOCRINE TUMOR TO LIVER: ICD-10-CM

## 2024-10-25 LAB
BASOPHILS # BLD AUTO: 0 K/UL (ref 0–0.2)
BASOPHILS NFR BLD: 0 % (ref 0–1.9)
DIFFERENTIAL METHOD BLD: ABNORMAL
EOSINOPHIL # BLD AUTO: 0 K/UL (ref 0–0.5)
EOSINOPHIL NFR BLD: 0 % (ref 0–8)
ERYTHROCYTE [DISTWIDTH] IN BLOOD BY AUTOMATED COUNT: 12.7 % (ref 11.5–14.5)
HCT VFR BLD AUTO: 40.3 % (ref 37–48.5)
HGB BLD-MCNC: 13.1 G/DL (ref 12–16)
IMM GRANULOCYTES # BLD AUTO: 0.01 K/UL (ref 0–0.04)
IMM GRANULOCYTES NFR BLD AUTO: 0.2 % (ref 0–0.5)
LYMPHOCYTES # BLD AUTO: 0.7 K/UL (ref 1–4.8)
LYMPHOCYTES NFR BLD: 13.8 % (ref 18–48)
MCH RBC QN AUTO: 32.2 PG (ref 27–31)
MCHC RBC AUTO-ENTMCNC: 32.5 G/DL (ref 32–36)
MCV RBC AUTO: 99 FL (ref 82–98)
MONOCYTES # BLD AUTO: 0.4 K/UL (ref 0.3–1)
MONOCYTES NFR BLD: 7.3 % (ref 4–15)
NEUTROPHILS # BLD AUTO: 3.9 K/UL (ref 1.8–7.7)
NEUTROPHILS NFR BLD: 78.7 % (ref 38–73)
NRBC BLD-RTO: 0 /100 WBC
PLATELET # BLD AUTO: 166 K/UL (ref 150–450)
PMV BLD AUTO: 9.6 FL (ref 9.2–12.9)
POCT GLUCOSE: 150 MG/DL (ref 70–110)
RBC # BLD AUTO: 4.07 M/UL (ref 4–5.4)
WBC # BLD AUTO: 4.94 K/UL (ref 3.9–12.7)

## 2024-10-25 PROCEDURE — 75887 VEIN X-RAY LIVER W/O HEMODYN: CPT | Mod: TC,HCNC | Performed by: RADIOLOGY

## 2024-10-25 PROCEDURE — 25000003 PHARM REV CODE 250: Mod: HCNC | Performed by: NURSE ANESTHETIST, CERTIFIED REGISTERED

## 2024-10-25 PROCEDURE — 37000009 HC ANESTHESIA EA ADD 15 MINS: Mod: HCNC

## 2024-10-25 PROCEDURE — 76377 3D RENDER W/INTRP POSTPROCES: CPT | Mod: 26,HCNC,, | Performed by: FAMILY MEDICINE

## 2024-10-25 PROCEDURE — 37243 VASC EMBOLIZE/OCCLUDE ORGAN: CPT | Mod: HCNC,,, | Performed by: FAMILY MEDICINE

## 2024-10-25 PROCEDURE — 63600175 PHARM REV CODE 636 W HCPCS: Mod: HCNC | Performed by: NURSE ANESTHETIST, CERTIFIED REGISTERED

## 2024-10-25 PROCEDURE — 36247 INS CATH ABD/L-EXT ART 3RD: CPT | Mod: 50,HCNC | Performed by: RADIOLOGY

## 2024-10-25 PROCEDURE — 63600175 PHARM REV CODE 636 W HCPCS: Mod: HCNC | Performed by: PHYSICIAN ASSISTANT

## 2024-10-25 PROCEDURE — C1889 IMPLANT/INSERT DEVICE, NOC: HCPCS | Mod: HCNC

## 2024-10-25 PROCEDURE — 75774 ARTERY X-RAY EACH VESSEL: CPT | Mod: 26,59,HCNC, | Performed by: FAMILY MEDICINE

## 2024-10-25 PROCEDURE — 76380 CAT SCAN FOLLOW-UP STUDY: CPT | Mod: TC,59,HCNC | Performed by: RADIOLOGY

## 2024-10-25 PROCEDURE — 85025 COMPLETE CBC W/AUTO DIFF WBC: CPT | Mod: HCNC | Performed by: STUDENT IN AN ORGANIZED HEALTH CARE EDUCATION/TRAINING PROGRAM

## 2024-10-25 PROCEDURE — 76377 3D RENDER W/INTRP POSTPROCES: CPT | Mod: TC,HCNC | Performed by: RADIOLOGY

## 2024-10-25 PROCEDURE — 75726 ARTERY X-RAYS ABDOMEN: CPT | Mod: TC,59,HCNC | Performed by: RADIOLOGY

## 2024-10-25 PROCEDURE — 37000008 HC ANESTHESIA 1ST 15 MINUTES: Mod: HCNC

## 2024-10-25 PROCEDURE — C1760 CLOSURE DEV, VASC: HCPCS | Mod: HCNC

## 2024-10-25 PROCEDURE — 36415 COLL VENOUS BLD VENIPUNCTURE: CPT | Mod: HCNC | Performed by: STUDENT IN AN ORGANIZED HEALTH CARE EDUCATION/TRAINING PROGRAM

## 2024-10-25 PROCEDURE — 76380 CAT SCAN FOLLOW-UP STUDY: CPT | Mod: 26,59,HCNC, | Performed by: FAMILY MEDICINE

## 2024-10-25 PROCEDURE — 36247 INS CATH ABD/L-EXT ART 3RD: CPT | Mod: 50,51,HCNC, | Performed by: FAMILY MEDICINE

## 2024-10-25 PROCEDURE — 63600175 PHARM REV CODE 636 W HCPCS: Mod: HCNC | Performed by: RADIOLOGY

## 2024-10-25 PROCEDURE — C1769 GUIDE WIRE: HCPCS | Mod: HCNC

## 2024-10-25 PROCEDURE — 36248 INS CATH ABD/L-EXT ART ADDL: CPT | Mod: HCNC,,, | Performed by: FAMILY MEDICINE

## 2024-10-25 PROCEDURE — 37243 VASC EMBOLIZE/OCCLUDE ORGAN: CPT | Mod: HCNC | Performed by: RADIOLOGY

## 2024-10-25 PROCEDURE — 75726 ARTERY X-RAYS ABDOMEN: CPT | Mod: 26,59,HCNC, | Performed by: FAMILY MEDICINE

## 2024-10-25 PROCEDURE — 75774 ARTERY X-RAY EACH VESSEL: CPT | Mod: TC,59,HCNC | Performed by: RADIOLOGY

## 2024-10-25 PROCEDURE — 25000003 PHARM REV CODE 250: Mod: HCNC | Performed by: RADIOLOGY

## 2024-10-25 PROCEDURE — 76937 US GUIDE VASCULAR ACCESS: CPT | Mod: 26,HCNC,, | Performed by: FAMILY MEDICINE

## 2024-10-25 PROCEDURE — 25000003 PHARM REV CODE 250: Mod: HCNC | Performed by: STUDENT IN AN ORGANIZED HEALTH CARE EDUCATION/TRAINING PROGRAM

## 2024-10-25 PROCEDURE — 75887 VEIN X-RAY LIVER W/O HEMODYN: CPT | Mod: 26,HCNC,, | Performed by: FAMILY MEDICINE

## 2024-10-25 PROCEDURE — 94761 N-INVAS EAR/PLS OXIMETRY MLT: CPT | Mod: HCNC

## 2024-10-25 PROCEDURE — 25000003 PHARM REV CODE 250: Mod: HCNC | Performed by: PHYSICIAN ASSISTANT

## 2024-10-25 PROCEDURE — 76937 US GUIDE VASCULAR ACCESS: CPT | Mod: TC,HCNC | Performed by: RADIOLOGY

## 2024-10-25 PROCEDURE — 25500020 PHARM REV CODE 255: Mod: HCNC | Performed by: RADIOLOGY

## 2024-10-25 PROCEDURE — G0269 OCCLUSIVE DEVICE IN VEIN ART: HCPCS | Mod: HCNC | Performed by: RADIOLOGY

## 2024-10-25 PROCEDURE — 36248 INS CATH ABD/L-EXT ART ADDL: CPT | Mod: HCNC | Performed by: RADIOLOGY

## 2024-10-25 RX ORDER — SODIUM CHLORIDE 0.9 % (FLUSH) 0.9 %
10 SYRINGE (ML) INJECTION EVERY 12 HOURS PRN
Status: DISCONTINUED | OUTPATIENT
Start: 2024-10-25 | End: 2024-10-28 | Stop reason: HOSPADM

## 2024-10-25 RX ORDER — SODIUM CHLORIDE 9 MG/ML
INJECTION, SOLUTION INTRAVENOUS CONTINUOUS
Status: DISCONTINUED | OUTPATIENT
Start: 2024-10-25 | End: 2024-10-25

## 2024-10-25 RX ORDER — METOCLOPRAMIDE HYDROCHLORIDE 5 MG/ML
5 INJECTION INTRAMUSCULAR; INTRAVENOUS EVERY 6 HOURS PRN
Status: DISCONTINUED | OUTPATIENT
Start: 2024-10-25 | End: 2024-10-28 | Stop reason: HOSPADM

## 2024-10-25 RX ORDER — CARVEDILOL 12.5 MG/1
12.5 TABLET ORAL 2 TIMES DAILY
Status: DISCONTINUED | OUTPATIENT
Start: 2024-10-25 | End: 2024-10-25

## 2024-10-25 RX ORDER — SCOLOPAMINE TRANSDERMAL SYSTEM 1 MG/1
1 PATCH, EXTENDED RELEASE TRANSDERMAL
Status: DISCONTINUED | OUTPATIENT
Start: 2024-10-25 | End: 2024-10-28 | Stop reason: HOSPADM

## 2024-10-25 RX ORDER — ETOMIDATE 2 MG/ML
INJECTION INTRAVENOUS
Status: DISCONTINUED | OUTPATIENT
Start: 2024-10-25 | End: 2024-10-25

## 2024-10-25 RX ORDER — MAGNESIUM SULFATE HEPTAHYDRATE 40 MG/ML
2 INJECTION, SOLUTION INTRAVENOUS ONCE
Status: COMPLETED | OUTPATIENT
Start: 2024-10-25 | End: 2024-10-25

## 2024-10-25 RX ORDER — ROCURONIUM BROMIDE 10 MG/ML
INJECTION, SOLUTION INTRAVENOUS
Status: DISCONTINUED | OUTPATIENT
Start: 2024-10-25 | End: 2024-10-25

## 2024-10-25 RX ORDER — PHENYLEPHRINE HYDROCHLORIDE 10 MG/ML
INJECTION INTRAVENOUS
Status: DISCONTINUED | OUTPATIENT
Start: 2024-10-25 | End: 2024-10-25

## 2024-10-25 RX ORDER — ONDANSETRON HYDROCHLORIDE 2 MG/ML
INJECTION, SOLUTION INTRAVENOUS
Status: DISCONTINUED | OUTPATIENT
Start: 2024-10-25 | End: 2024-10-25

## 2024-10-25 RX ORDER — DEXTROSE MONOHYDRATE, SODIUM CHLORIDE, AND POTASSIUM CHLORIDE 50; 1.49; 4.5 G/1000ML; G/1000ML; G/1000ML
INJECTION, SOLUTION INTRAVENOUS CONTINUOUS
Status: DISCONTINUED | OUTPATIENT
Start: 2024-10-25 | End: 2024-10-25

## 2024-10-25 RX ORDER — OXYCODONE HYDROCHLORIDE 5 MG/1
5 TABLET ORAL EVERY 4 HOURS PRN
Status: DISCONTINUED | OUTPATIENT
Start: 2024-10-25 | End: 2024-10-28 | Stop reason: HOSPADM

## 2024-10-25 RX ORDER — GLUCAGON 1 MG
1 KIT INJECTION
Status: DISCONTINUED | OUTPATIENT
Start: 2024-10-25 | End: 2024-10-28 | Stop reason: HOSPADM

## 2024-10-25 RX ORDER — PROCHLORPERAZINE EDISYLATE 5 MG/ML
2.5 INJECTION INTRAMUSCULAR; INTRAVENOUS EVERY 6 HOURS PRN
Status: DISCONTINUED | OUTPATIENT
Start: 2024-10-25 | End: 2024-10-28 | Stop reason: HOSPADM

## 2024-10-25 RX ORDER — LIDOCAINE HYDROCHLORIDE 20 MG/ML
INJECTION, SOLUTION EPIDURAL; INFILTRATION; INTRACAUDAL; PERINEURAL
Status: DISCONTINUED | OUTPATIENT
Start: 2024-10-25 | End: 2024-10-25

## 2024-10-25 RX ORDER — SPIRONOLACTONE 25 MG/1
25 TABLET ORAL DAILY
Status: DISCONTINUED | OUTPATIENT
Start: 2024-10-25 | End: 2024-10-28 | Stop reason: HOSPADM

## 2024-10-25 RX ORDER — IBUPROFEN 200 MG
16 TABLET ORAL
Status: DISCONTINUED | OUTPATIENT
Start: 2024-10-25 | End: 2024-10-28 | Stop reason: HOSPADM

## 2024-10-25 RX ORDER — SPIRONOLACTONE 25 MG/1
25 TABLET ORAL DAILY
Status: DISCONTINUED | OUTPATIENT
Start: 2024-10-26 | End: 2024-10-25

## 2024-10-25 RX ORDER — DEXAMETHASONE SODIUM PHOSPHATE 4 MG/ML
INJECTION, SOLUTION INTRA-ARTICULAR; INTRALESIONAL; INTRAMUSCULAR; INTRAVENOUS; SOFT TISSUE
Status: DISCONTINUED | OUTPATIENT
Start: 2024-10-25 | End: 2024-10-25

## 2024-10-25 RX ORDER — NALOXONE HCL 0.4 MG/ML
0.02 VIAL (ML) INJECTION
Status: DISCONTINUED | OUTPATIENT
Start: 2024-10-25 | End: 2024-10-28 | Stop reason: HOSPADM

## 2024-10-25 RX ORDER — IBUPROFEN 200 MG
24 TABLET ORAL
Status: DISCONTINUED | OUTPATIENT
Start: 2024-10-25 | End: 2024-10-28 | Stop reason: HOSPADM

## 2024-10-25 RX ORDER — PROCHLORPERAZINE EDISYLATE 5 MG/ML
5 INJECTION INTRAMUSCULAR; INTRAVENOUS ONCE
Status: COMPLETED | OUTPATIENT
Start: 2024-10-25 | End: 2024-10-25

## 2024-10-25 RX ORDER — ACETAMINOPHEN 325 MG/1
650 TABLET ORAL EVERY 6 HOURS PRN
Status: DISCONTINUED | OUTPATIENT
Start: 2024-10-25 | End: 2024-10-28 | Stop reason: HOSPADM

## 2024-10-25 RX ORDER — DIPHENHYDRAMINE HYDROCHLORIDE 50 MG/ML
50 INJECTION INTRAMUSCULAR; INTRAVENOUS ONCE
Status: COMPLETED | OUTPATIENT
Start: 2024-10-25 | End: 2024-10-25

## 2024-10-25 RX ORDER — EZETIMIBE 10 MG/1
10 TABLET ORAL DAILY
Status: DISCONTINUED | OUTPATIENT
Start: 2024-10-26 | End: 2024-10-28 | Stop reason: HOSPADM

## 2024-10-25 RX ORDER — FENTANYL CITRATE 50 UG/ML
INJECTION, SOLUTION INTRAMUSCULAR; INTRAVENOUS
Status: DISCONTINUED | OUTPATIENT
Start: 2024-10-25 | End: 2024-10-25

## 2024-10-25 RX ADMIN — PHENYLEPHRINE HYDROCHLORIDE 100 MCG: 10 INJECTION INTRAVENOUS at 09:10

## 2024-10-25 RX ADMIN — ETOMIDATE 4 MG: 2 INJECTION INTRAVENOUS at 08:10

## 2024-10-25 RX ADMIN — DEXAMETHASONE SODIUM PHOSPHATE 4 MG: 4 INJECTION, SOLUTION INTRA-ARTICULAR; INTRALESIONAL; INTRAMUSCULAR; INTRAVENOUS; SOFT TISSUE at 08:10

## 2024-10-25 RX ADMIN — PROCHLORPERAZINE EDISYLATE 5 MG: 5 INJECTION INTRAMUSCULAR; INTRAVENOUS at 03:10

## 2024-10-25 RX ADMIN — SODIUM CHLORIDE 250 MCG/HR: 9 INJECTION, SOLUTION INTRAVENOUS at 08:10

## 2024-10-25 RX ADMIN — ONDANSETRON 16 MG: 2 INJECTION INTRAMUSCULAR; INTRAVENOUS at 08:10

## 2024-10-25 RX ADMIN — NITROGLYCERIN 200 MCG: 20 INJECTION INTRAVENOUS at 09:10

## 2024-10-25 RX ADMIN — IOHEXOL 180 ML: 300 INJECTION, SOLUTION INTRAVENOUS at 11:10

## 2024-10-25 RX ADMIN — DIPHENHYDRAMINE HYDROCHLORIDE 50 MG: 50 INJECTION INTRAMUSCULAR; INTRAVENOUS at 08:10

## 2024-10-25 RX ADMIN — SUGAMMADEX 200 MG: 100 INJECTION, SOLUTION INTRAVENOUS at 10:10

## 2024-10-25 RX ADMIN — SACUBITRIL AND VALSARTAN 1 TABLET: 24; 26 TABLET, FILM COATED ORAL at 08:10

## 2024-10-25 RX ADMIN — ROCURONIUM BROMIDE 50 MG: 10 INJECTION, SOLUTION INTRAVENOUS at 08:10

## 2024-10-25 RX ADMIN — SCOPOLAMINE 1 PATCH: 1.5 PATCH, EXTENDED RELEASE TRANSDERMAL at 08:10

## 2024-10-25 RX ADMIN — FENTANYL CITRATE 100 MCG: 50 INJECTION INTRAMUSCULAR; INTRAVENOUS at 08:10

## 2024-10-25 RX ADMIN — ONDANSETRON 4 MG: 2 INJECTION, SOLUTION INTRAMUSCULAR; INTRAVENOUS at 08:10

## 2024-10-25 RX ADMIN — ETOMIDATE 8 MG: 2 INJECTION INTRAVENOUS at 08:10

## 2024-10-25 RX ADMIN — HYPROMELLOSE 2910 2 DROP: 5 SOLUTION OPHTHALMIC at 08:10

## 2024-10-25 RX ADMIN — LIDOCAINE HYDROCHLORIDE 60 MG: 20 INJECTION, SOLUTION EPIDURAL; INFILTRATION; INTRACAUDAL; PERINEURAL at 08:10

## 2024-10-25 RX ADMIN — SPIRONOLACTONE 25 MG: 25 TABLET ORAL at 05:10

## 2024-10-25 RX ADMIN — MAGNESIUM SULFATE HEPTAHYDRATE 2 G: 40 INJECTION, SOLUTION INTRAVENOUS at 08:10

## 2024-10-25 RX ADMIN — DEXTROSE, SODIUM CHLORIDE, AND POTASSIUM CHLORIDE: 5; .45; .15 INJECTION INTRAVENOUS at 08:10

## 2024-10-25 RX ADMIN — OCTREOTIDE ACETATE 250 MCG: 500 INJECTION, SOLUTION INTRAVENOUS; SUBCUTANEOUS at 08:10

## 2024-10-25 RX ADMIN — PROMETHAZINE HYDROCHLORIDE 12.5 MG: 25 INJECTION INTRAMUSCULAR; INTRAVENOUS at 12:10

## 2024-10-25 RX ADMIN — AMPICILLIN SODIUM AND SULBACTAM SODIUM 3 G: 2; 1 INJECTION, POWDER, FOR SOLUTION INTRAMUSCULAR; INTRAVENOUS at 08:10

## 2024-10-25 RX ADMIN — PROMETHAZINE HYDROCHLORIDE 12.5 MG: 25 INJECTION INTRAMUSCULAR; INTRAVENOUS at 01:10

## 2024-10-25 NOTE — ANESTHESIA POSTPROCEDURE EVALUATION
Anesthesia Post Evaluation    Patient: Shahram Hills    Procedure(s) Performed: * No procedures listed *    Final Anesthesia Type: general      Patient location during evaluation: PACU  Patient participation: Yes- Able to Participate  Level of consciousness: awake and alert  Post-procedure vital signs: reviewed and stable  Pain management: adequate  Airway patency: patent    PONV status at discharge: No PONV  Anesthetic complications: no      Cardiovascular status: blood pressure returned to baseline  Respiratory status: unassisted  Hydration status: euvolemic  Follow-up not needed.          Vitals Value Taken Time   /74 10/25/24 1052   Temp 36.3 °C (97.3 °F) 10/25/24 1025   Pulse 86 10/25/24 1055   Resp 22 10/25/24 1055   SpO2 96 % 10/25/24 1055   Vitals shown include unfiled device data.      No case tracking events are documented in the log.      Pain/Juan Score: No data recorded

## 2024-10-25 NOTE — TRANSFER OF CARE
"Anesthesia Transfer of Care Note    Patient: Shahram Hills    Procedure(s) Performed: * No procedures listed *    Patient location: PACU    Anesthesia Type: general    Transport from OR: Transported from OR on 6-10 L/min O2 by face mask with adequate spontaneous ventilation    Post pain: adequate analgesia    Post assessment: no apparent anesthetic complications    Post vital signs: stable    Level of consciousness: awake and alert    Nausea/Vomiting: no nausea/vomiting    Complications: none    Transfer of care protocol was followed      Last vitals: Visit Vitals  /78   Pulse 92   Temp 36.7 °C (98 °F) (Temporal)   Resp 17   Ht 5' 4" (1.626 m)   Wt 49 kg (108 lb)   LMP  (LMP Unknown)   SpO2 100%   Breastfeeding No   BMI 18.54 kg/m²     "

## 2024-10-25 NOTE — ANESTHESIA PROCEDURE NOTES
Intubation    Date/Time: 10/25/2024 8:37 AM    Performed by: Stephanie Duffy CRNA  Authorized by: Stephanie Duffy CRNA    Intubation:     Induction:  Intravenous    Intubated:  Postinduction    Mask Ventilation:  Easy mask    Attempted By:  CRNA    Method of Intubation:  Video laryngoscopy    Blade:  [place] 3    Laryngeal View Grade: Grade I - full view of cords      Difficult Airway Encountered?: No      Complications:  None    Airway Device:  Oral endotracheal tube    Airway Device Size:  7.0    Style/Cuff Inflation:  Cuffed    Inflation Amount (mL):  5    Tube secured:  21    Secured at:  The lips    Placement Verified By:  Capnometry    Complicating Factors:  None    Findings Post-Intubation:  BS equal bilateral

## 2024-10-25 NOTE — ANESTHESIA PREPROCEDURE EVALUATION
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 Russo notes 2017 - labs reviewed: cmp wnl x tbili 1.5, tsh wnl, A1c 5.0, cbc wnl,D 36, hiv neg, hep c neg, hep b surg ag neg      Past Surgical History:   Procedure Laterality Date    EMBOLIZATION N/A 02/14/2019    Procedure: EMBOLIZATION, BLOOD VESSEL;  Surgeon: [facility] Diagnostic Provider;  Location: [facility] OR [facility] FLR;  Service: Radiology;  Laterality: N/A;  Room 189/[facility]    EMBOLIZATION N/A 03/21/2019    Procedure: EMBOLIZATION, BLOOD VESSEL;  Surgeon: [facility] Diagnostic Provider;  Location: [facility] OR [facility] FLR;  Service: Radiology;  Laterality: N/A;  Room 189/[facility]    ESOPHAGOGASTRODUODENOSCOPY  05/04/2018    esophageal ring, grade 1 esophagitis, gastritis     EYE SURGERY Bilateral     cataract removal    HYSTERECTOMY      fibroids     ECHO 10/21/24    Left Ventricle: The left ventricle is normal in size. Ventricular mass is normal. Normal wall thickness. Moderate global hypokinesis present. There is moderately reduced systolic function with a visually estimated ejection fraction of 30 - 35%. Unable to assess diastolic function due to E-A fusion. compared to prior study LV function appears reduced.    Right Ventricle: Normal right ventricular cavity size. Wall thickness is normal. Right ventricle wall motion  is normal. Systolic function is normal.    Left Atrium: Left atrium is mildly dilated.    Right Atrium: Normal right atrial size.    Aortic Valve: The aortic valve is a trileaflet  valve. There is mild aortic valve sclerosis. There is mild annular calcification present. There is at least moderate aortic regurgitation with significant beat to beat variability that incrases to severe every 3rd of 4 th beat partly PVC related. Consider PVC suppression and re evaluating aortic regurgitation. Aortic regurigtation appears reduced. Wide pulse pressure is noted.    Mitral Valve: There is moderate bileaflet sclerosis. Moderately calcified leaflets. There is mild to moderate regurgitation with a posterolateral eccentriccally directed jet.    Aorta: Aortic root is normal in size measuring 2.99 cm. Ascending aorta is normal measuring 2.92 cm.    Pulmonary Artery: There is mild to moderate pulmonary hypertension. The estimated pulmonary artery systolic pressure is 40 mmHg.    IVC/SVC: Normal venous pressure at 3 mmHg.     Pre-op Assessment       I have reviewed the Medications.     Review of Systems  Anesthesia Hx:             Denies Family Hx of Anesthesia complications.     Social:  No Alcohol Use, Non-Smoker       Cardiovascular:     Hypertension       CHF                                   Renal/:  Chronic Renal Disease                Hepatic/GI:      Liver Disease,               Neurological:    Neuromuscular Disease,                                   Endocrine:  Diabetes           Psych:  Psychiatric History                  Physical Exam  General: Well nourished    Airway:  Mallampati: II   TM Distance: Normal  Neck ROM: Normal ROM    Dental:  Intact    Chest/Lungs:  Clear to auscultation    Heart:  Rate: Normal  Rhythm: Regular Rhythm        Anesthesia Plan  Type of Anesthesia, risks & benefits discussed:    Anesthesia Type: Gen ETT  Intra-op Monitoring Plan: Standard ASA Monitors  Post Op Pain Control Plan: multimodal analgesia  Induction:  IV  Informed Consent: Informed consent signed with the Patient and all parties understand the risks and agree with anesthesia plan.  All questions answered.    ASA Score: 3    Ready For Surgery From Anesthesia Perspective.     .

## 2024-10-26 LAB
ANION GAP SERPL CALC-SCNC: 10 MMOL/L (ref 8–16)
BASOPHILS # BLD AUTO: 0.01 K/UL (ref 0–0.2)
BASOPHILS # BLD AUTO: 0.02 K/UL (ref 0–0.2)
BASOPHILS NFR BLD: 0.1 % (ref 0–1.9)
BASOPHILS NFR BLD: 0.3 % (ref 0–1.9)
BUN SERPL-MCNC: 7 MG/DL (ref 8–23)
CALCIUM SERPL-MCNC: 8.9 MG/DL (ref 8.7–10.5)
CHLORIDE SERPL-SCNC: 107 MMOL/L (ref 95–110)
CO2 SERPL-SCNC: 23 MMOL/L (ref 23–29)
CREAT SERPL-MCNC: 0.7 MG/DL (ref 0.5–1.4)
DIFFERENTIAL METHOD BLD: ABNORMAL
DIFFERENTIAL METHOD BLD: ABNORMAL
EOSINOPHIL # BLD AUTO: 0 K/UL (ref 0–0.5)
EOSINOPHIL # BLD AUTO: 0 K/UL (ref 0–0.5)
EOSINOPHIL NFR BLD: 0 % (ref 0–8)
EOSINOPHIL NFR BLD: 0.1 % (ref 0–8)
ERYTHROCYTE [DISTWIDTH] IN BLOOD BY AUTOMATED COUNT: 12.5 % (ref 11.5–14.5)
ERYTHROCYTE [DISTWIDTH] IN BLOOD BY AUTOMATED COUNT: 12.6 % (ref 11.5–14.5)
EST. GFR  (NO RACE VARIABLE): >60 ML/MIN/1.73 M^2
GLUCOSE SERPL-MCNC: 147 MG/DL (ref 70–110)
HCT VFR BLD AUTO: 32.5 % (ref 37–48.5)
HCT VFR BLD AUTO: 33.8 % (ref 37–48.5)
HGB BLD-MCNC: 10.9 G/DL (ref 12–16)
HGB BLD-MCNC: 11.3 G/DL (ref 12–16)
IMM GRANULOCYTES # BLD AUTO: 0.01 K/UL (ref 0–0.04)
IMM GRANULOCYTES # BLD AUTO: 0.02 K/UL (ref 0–0.04)
IMM GRANULOCYTES NFR BLD AUTO: 0.1 % (ref 0–0.5)
IMM GRANULOCYTES NFR BLD AUTO: 0.3 % (ref 0–0.5)
LYMPHOCYTES # BLD AUTO: 1 K/UL (ref 1–4.8)
LYMPHOCYTES # BLD AUTO: 1.3 K/UL (ref 1–4.8)
LYMPHOCYTES NFR BLD: 14.3 % (ref 18–48)
LYMPHOCYTES NFR BLD: 18.9 % (ref 18–48)
MAGNESIUM SERPL-MCNC: 2.1 MG/DL (ref 1.6–2.6)
MCH RBC QN AUTO: 32.1 PG (ref 27–31)
MCH RBC QN AUTO: 32.2 PG (ref 27–31)
MCHC RBC AUTO-ENTMCNC: 33.4 G/DL (ref 32–36)
MCHC RBC AUTO-ENTMCNC: 33.5 G/DL (ref 32–36)
MCV RBC AUTO: 96 FL (ref 82–98)
MCV RBC AUTO: 96 FL (ref 82–98)
MONOCYTES # BLD AUTO: 0.8 K/UL (ref 0.3–1)
MONOCYTES # BLD AUTO: 0.8 K/UL (ref 0.3–1)
MONOCYTES NFR BLD: 10.8 % (ref 4–15)
MONOCYTES NFR BLD: 11.3 % (ref 4–15)
NEUTROPHILS # BLD AUTO: 4.9 K/UL (ref 1.8–7.7)
NEUTROPHILS # BLD AUTO: 5.3 K/UL (ref 1.8–7.7)
NEUTROPHILS NFR BLD: 69.6 % (ref 38–73)
NEUTROPHILS NFR BLD: 74.2 % (ref 38–73)
NRBC BLD-RTO: 0 /100 WBC
NRBC BLD-RTO: 0 /100 WBC
PLATELET # BLD AUTO: 153 K/UL (ref 150–450)
PLATELET # BLD AUTO: 161 K/UL (ref 150–450)
PMV BLD AUTO: 10 FL (ref 9.2–12.9)
PMV BLD AUTO: 9.7 FL (ref 9.2–12.9)
POTASSIUM SERPL-SCNC: 3.9 MMOL/L (ref 3.5–5.1)
RBC # BLD AUTO: 3.38 M/UL (ref 4–5.4)
RBC # BLD AUTO: 3.52 M/UL (ref 4–5.4)
SODIUM SERPL-SCNC: 140 MMOL/L (ref 136–145)
WBC # BLD AUTO: 7.02 K/UL (ref 3.9–12.7)
WBC # BLD AUTO: 7.19 K/UL (ref 3.9–12.7)

## 2024-10-26 PROCEDURE — 93005 ELECTROCARDIOGRAM TRACING: CPT | Mod: HCNC

## 2024-10-26 PROCEDURE — 36415 COLL VENOUS BLD VENIPUNCTURE: CPT | Mod: HCNC | Performed by: NURSE ANESTHETIST, CERTIFIED REGISTERED

## 2024-10-26 PROCEDURE — 93010 ELECTROCARDIOGRAM REPORT: CPT | Mod: HCNC,,, | Performed by: INTERNAL MEDICINE

## 2024-10-26 PROCEDURE — 99900035 HC TECH TIME PER 15 MIN (STAT): Mod: HCNC

## 2024-10-26 PROCEDURE — 83735 ASSAY OF MAGNESIUM: CPT | Mod: HCNC | Performed by: STUDENT IN AN ORGANIZED HEALTH CARE EDUCATION/TRAINING PROGRAM

## 2024-10-26 PROCEDURE — 25000003 PHARM REV CODE 250: Mod: HCNC | Performed by: FAMILY MEDICINE

## 2024-10-26 PROCEDURE — 25000003 PHARM REV CODE 250: Mod: HCNC | Performed by: STUDENT IN AN ORGANIZED HEALTH CARE EDUCATION/TRAINING PROGRAM

## 2024-10-26 PROCEDURE — 80048 BASIC METABOLIC PNL TOTAL CA: CPT | Mod: HCNC | Performed by: STUDENT IN AN ORGANIZED HEALTH CARE EDUCATION/TRAINING PROGRAM

## 2024-10-26 PROCEDURE — 93010 ELECTROCARDIOGRAM REPORT: CPT | Mod: HCNC,76,, | Performed by: INTERNAL MEDICINE

## 2024-10-26 PROCEDURE — 85025 COMPLETE CBC W/AUTO DIFF WBC: CPT | Mod: 91,HCNC | Performed by: NURSE ANESTHETIST, CERTIFIED REGISTERED

## 2024-10-26 PROCEDURE — 36415 COLL VENOUS BLD VENIPUNCTURE: CPT | Mod: HCNC,XB | Performed by: STUDENT IN AN ORGANIZED HEALTH CARE EDUCATION/TRAINING PROGRAM

## 2024-10-26 PROCEDURE — 25000003 PHARM REV CODE 250: Mod: HCNC | Performed by: RADIOLOGY

## 2024-10-26 PROCEDURE — 85025 COMPLETE CBC W/AUTO DIFF WBC: CPT | Mod: HCNC | Performed by: STUDENT IN AN ORGANIZED HEALTH CARE EDUCATION/TRAINING PROGRAM

## 2024-10-26 RX ORDER — METOPROLOL TARTRATE 25 MG/1
25 TABLET, FILM COATED ORAL ONCE
Status: COMPLETED | OUTPATIENT
Start: 2024-10-26 | End: 2024-10-26

## 2024-10-26 RX ADMIN — METOPROLOL TARTRATE 25 MG: 25 TABLET, FILM COATED ORAL at 10:10

## 2024-10-26 RX ADMIN — SACUBITRIL AND VALSARTAN 1 TABLET: 24; 26 TABLET, FILM COATED ORAL at 08:10

## 2024-10-26 RX ADMIN — OXYCODONE 5 MG: 5 TABLET ORAL at 02:10

## 2024-10-26 RX ADMIN — SPIRONOLACTONE 25 MG: 25 TABLET ORAL at 08:10

## 2024-10-26 RX ADMIN — EZETIMIBE 10 MG: 10 TABLET ORAL at 08:10

## 2024-10-27 LAB
ANION GAP SERPL CALC-SCNC: 6 MMOL/L (ref 8–16)
BASOPHILS # BLD AUTO: 0.01 K/UL (ref 0–0.2)
BASOPHILS NFR BLD: 0.1 % (ref 0–1.9)
BUN SERPL-MCNC: 7 MG/DL (ref 8–23)
CALCIUM SERPL-MCNC: 9.3 MG/DL (ref 8.7–10.5)
CHLORIDE SERPL-SCNC: 99 MMOL/L (ref 95–110)
CO2 SERPL-SCNC: 30 MMOL/L (ref 23–29)
CREAT SERPL-MCNC: 0.7 MG/DL (ref 0.5–1.4)
DIFFERENTIAL METHOD BLD: ABNORMAL
EOSINOPHIL # BLD AUTO: 0 K/UL (ref 0–0.5)
EOSINOPHIL NFR BLD: 0.1 % (ref 0–8)
ERYTHROCYTE [DISTWIDTH] IN BLOOD BY AUTOMATED COUNT: 12.1 % (ref 11.5–14.5)
EST. GFR  (NO RACE VARIABLE): >60 ML/MIN/1.73 M^2
GLUCOSE SERPL-MCNC: 166 MG/DL (ref 70–110)
HCT VFR BLD AUTO: 34.3 % (ref 37–48.5)
HGB BLD-MCNC: 11.6 G/DL (ref 12–16)
IMM GRANULOCYTES # BLD AUTO: 0.04 K/UL (ref 0–0.04)
IMM GRANULOCYTES NFR BLD AUTO: 0.5 % (ref 0–0.5)
LYMPHOCYTES # BLD AUTO: 1 K/UL (ref 1–4.8)
LYMPHOCYTES NFR BLD: 12.1 % (ref 18–48)
MAGNESIUM SERPL-MCNC: 1.9 MG/DL (ref 1.6–2.6)
MCH RBC QN AUTO: 32.1 PG (ref 27–31)
MCHC RBC AUTO-ENTMCNC: 33.8 G/DL (ref 32–36)
MCV RBC AUTO: 95 FL (ref 82–98)
MONOCYTES # BLD AUTO: 1.1 K/UL (ref 0.3–1)
MONOCYTES NFR BLD: 13.1 % (ref 4–15)
NEUTROPHILS # BLD AUTO: 6.4 K/UL (ref 1.8–7.7)
NEUTROPHILS NFR BLD: 74.1 % (ref 38–73)
NRBC BLD-RTO: 0 /100 WBC
PLATELET # BLD AUTO: 149 K/UL (ref 150–450)
PMV BLD AUTO: 10 FL (ref 9.2–12.9)
POTASSIUM SERPL-SCNC: 3.6 MMOL/L (ref 3.5–5.1)
RBC # BLD AUTO: 3.61 M/UL (ref 4–5.4)
SODIUM SERPL-SCNC: 135 MMOL/L (ref 136–145)
WBC # BLD AUTO: 8.62 K/UL (ref 3.9–12.7)

## 2024-10-27 PROCEDURE — 80048 BASIC METABOLIC PNL TOTAL CA: CPT | Mod: HCNC | Performed by: STUDENT IN AN ORGANIZED HEALTH CARE EDUCATION/TRAINING PROGRAM

## 2024-10-27 PROCEDURE — 36415 COLL VENOUS BLD VENIPUNCTURE: CPT | Mod: HCNC | Performed by: STUDENT IN AN ORGANIZED HEALTH CARE EDUCATION/TRAINING PROGRAM

## 2024-10-27 PROCEDURE — 25000003 PHARM REV CODE 250: Mod: HCNC | Performed by: STUDENT IN AN ORGANIZED HEALTH CARE EDUCATION/TRAINING PROGRAM

## 2024-10-27 PROCEDURE — 85025 COMPLETE CBC W/AUTO DIFF WBC: CPT | Mod: HCNC | Performed by: STUDENT IN AN ORGANIZED HEALTH CARE EDUCATION/TRAINING PROGRAM

## 2024-10-27 PROCEDURE — 83735 ASSAY OF MAGNESIUM: CPT | Mod: HCNC | Performed by: STUDENT IN AN ORGANIZED HEALTH CARE EDUCATION/TRAINING PROGRAM

## 2024-10-27 RX ORDER — POLYETHYLENE GLYCOL 3350 17 G/17G
17 POWDER, FOR SOLUTION ORAL DAILY
Status: DISCONTINUED | OUTPATIENT
Start: 2024-10-27 | End: 2024-10-28 | Stop reason: HOSPADM

## 2024-10-27 RX ORDER — CARVEDILOL 12.5 MG/1
12.5 TABLET ORAL 2 TIMES DAILY
Status: DISCONTINUED | OUTPATIENT
Start: 2024-10-27 | End: 2024-10-28 | Stop reason: HOSPADM

## 2024-10-27 RX ADMIN — SPIRONOLACTONE 25 MG: 25 TABLET ORAL at 08:10

## 2024-10-27 RX ADMIN — POLYETHYLENE GLYCOL 3350 17 G: 17 POWDER, FOR SOLUTION ORAL at 11:10

## 2024-10-27 RX ADMIN — SACUBITRIL AND VALSARTAN 1 TABLET: 24; 26 TABLET, FILM COATED ORAL at 08:10

## 2024-10-27 RX ADMIN — CARVEDILOL 12.5 MG: 12.5 TABLET, FILM COATED ORAL at 01:10

## 2024-10-27 RX ADMIN — CARVEDILOL 12.5 MG: 12.5 TABLET, FILM COATED ORAL at 08:10

## 2024-10-27 RX ADMIN — EZETIMIBE 10 MG: 10 TABLET ORAL at 08:10

## 2024-10-28 ENCOUNTER — TELEPHONE (OUTPATIENT)
Dept: CARDIOLOGY | Facility: CLINIC | Age: 84
End: 2024-10-28
Payer: MEDICARE

## 2024-10-28 ENCOUNTER — TELEPHONE (OUTPATIENT)
Dept: INTERNAL MEDICINE | Facility: CLINIC | Age: 84
End: 2024-10-28
Payer: MEDICARE

## 2024-10-28 VITALS
TEMPERATURE: 99 F | RESPIRATION RATE: 16 BRPM | WEIGHT: 117.94 LBS | BODY MASS INDEX: 20.14 KG/M2 | HEIGHT: 64 IN | SYSTOLIC BLOOD PRESSURE: 131 MMHG | HEART RATE: 85 BPM | DIASTOLIC BLOOD PRESSURE: 65 MMHG | OXYGEN SATURATION: 97 %

## 2024-10-28 LAB
ANION GAP SERPL CALC-SCNC: 10 MMOL/L (ref 8–16)
BASOPHILS # BLD AUTO: 0.01 K/UL (ref 0–0.2)
BASOPHILS NFR BLD: 0.1 % (ref 0–1.9)
BUN SERPL-MCNC: 11 MG/DL (ref 8–23)
CALCIUM SERPL-MCNC: 9 MG/DL (ref 8.7–10.5)
CHLORIDE SERPL-SCNC: 98 MMOL/L (ref 95–110)
CO2 SERPL-SCNC: 28 MMOL/L (ref 23–29)
CREAT SERPL-MCNC: 0.7 MG/DL (ref 0.5–1.4)
DIFFERENTIAL METHOD BLD: ABNORMAL
EOSINOPHIL # BLD AUTO: 0 K/UL (ref 0–0.5)
EOSINOPHIL NFR BLD: 0.1 % (ref 0–8)
ERYTHROCYTE [DISTWIDTH] IN BLOOD BY AUTOMATED COUNT: 12 % (ref 11.5–14.5)
EST. GFR  (NO RACE VARIABLE): >60 ML/MIN/1.73 M^2
GLUCOSE SERPL-MCNC: 187 MG/DL (ref 70–110)
HCT VFR BLD AUTO: 34.7 % (ref 37–48.5)
HGB BLD-MCNC: 11.8 G/DL (ref 12–16)
IMM GRANULOCYTES # BLD AUTO: 0.06 K/UL (ref 0–0.04)
IMM GRANULOCYTES NFR BLD AUTO: 0.7 % (ref 0–0.5)
LYMPHOCYTES # BLD AUTO: 1 K/UL (ref 1–4.8)
LYMPHOCYTES NFR BLD: 11.7 % (ref 18–48)
MAGNESIUM SERPL-MCNC: 1.9 MG/DL (ref 1.6–2.6)
MCH RBC QN AUTO: 31.9 PG (ref 27–31)
MCHC RBC AUTO-ENTMCNC: 34 G/DL (ref 32–36)
MCV RBC AUTO: 94 FL (ref 82–98)
MONOCYTES # BLD AUTO: 1.1 K/UL (ref 0.3–1)
MONOCYTES NFR BLD: 12 % (ref 4–15)
NEUTROPHILS # BLD AUTO: 6.7 K/UL (ref 1.8–7.7)
NEUTROPHILS NFR BLD: 75.4 % (ref 38–73)
NRBC BLD-RTO: 0 /100 WBC
PLATELET # BLD AUTO: 150 K/UL (ref 150–450)
PMV BLD AUTO: 10.3 FL (ref 9.2–12.9)
POTASSIUM SERPL-SCNC: 3.3 MMOL/L (ref 3.5–5.1)
RBC # BLD AUTO: 3.7 M/UL (ref 4–5.4)
SODIUM SERPL-SCNC: 136 MMOL/L (ref 136–145)
WBC # BLD AUTO: 8.91 K/UL (ref 3.9–12.7)

## 2024-10-28 PROCEDURE — 85025 COMPLETE CBC W/AUTO DIFF WBC: CPT | Mod: HCNC | Performed by: STUDENT IN AN ORGANIZED HEALTH CARE EDUCATION/TRAINING PROGRAM

## 2024-10-28 PROCEDURE — 25000003 PHARM REV CODE 250: Mod: HCNC | Performed by: STUDENT IN AN ORGANIZED HEALTH CARE EDUCATION/TRAINING PROGRAM

## 2024-10-28 PROCEDURE — 83735 ASSAY OF MAGNESIUM: CPT | Mod: HCNC | Performed by: STUDENT IN AN ORGANIZED HEALTH CARE EDUCATION/TRAINING PROGRAM

## 2024-10-28 PROCEDURE — C1982 CATH, PRESSURE,VALVE-OCCLU: HCPCS | Mod: HCNC

## 2024-10-28 PROCEDURE — 25000003 PHARM REV CODE 250: Mod: HCNC | Performed by: PHYSICIAN ASSISTANT

## 2024-10-28 PROCEDURE — 36415 COLL VENOUS BLD VENIPUNCTURE: CPT | Mod: HCNC | Performed by: STUDENT IN AN ORGANIZED HEALTH CARE EDUCATION/TRAINING PROGRAM

## 2024-10-28 PROCEDURE — 80048 BASIC METABOLIC PNL TOTAL CA: CPT | Mod: HCNC | Performed by: STUDENT IN AN ORGANIZED HEALTH CARE EDUCATION/TRAINING PROGRAM

## 2024-10-28 RX ORDER — POTASSIUM CHLORIDE 20 MEQ/1
40 TABLET, EXTENDED RELEASE ORAL ONCE
Status: COMPLETED | OUTPATIENT
Start: 2024-10-28 | End: 2024-10-28

## 2024-10-28 RX ORDER — LANOLIN ALCOHOL/MO/W.PET/CERES
400 CREAM (GRAM) TOPICAL ONCE
Status: COMPLETED | OUTPATIENT
Start: 2024-10-28 | End: 2024-10-28

## 2024-10-28 RX ADMIN — POTASSIUM CHLORIDE 40 MEQ: 1500 TABLET, EXTENDED RELEASE ORAL at 11:10

## 2024-10-28 RX ADMIN — SACUBITRIL AND VALSARTAN 1 TABLET: 24; 26 TABLET, FILM COATED ORAL at 08:10

## 2024-10-28 RX ADMIN — MAGNESIUM OXIDE TAB 400 MG (241.3 MG ELEMENTAL MG) 400 MG: 400 (241.3 MG) TAB at 11:10

## 2024-10-28 RX ADMIN — EZETIMIBE 10 MG: 10 TABLET ORAL at 08:10

## 2024-10-28 RX ADMIN — POLYETHYLENE GLYCOL 3350 17 G: 17 POWDER, FOR SOLUTION ORAL at 08:10

## 2024-10-28 RX ADMIN — SCOPOLAMINE 1 PATCH: 1.5 PATCH, EXTENDED RELEASE TRANSDERMAL at 08:10

## 2024-10-28 RX ADMIN — SPIRONOLACTONE 25 MG: 25 TABLET ORAL at 08:10

## 2024-10-28 RX ADMIN — CARVEDILOL 12.5 MG: 12.5 TABLET, FILM COATED ORAL at 08:10

## 2024-10-29 DIAGNOSIS — I50.42 CHRONIC COMBINED SYSTOLIC AND DIASTOLIC HEART FAILURE: ICD-10-CM

## 2024-10-29 DIAGNOSIS — I10 ESSENTIAL HYPERTENSION: ICD-10-CM

## 2024-10-29 LAB
OHS QRS DURATION: 84 MS
OHS QRS DURATION: 84 MS
OHS QTC CALCULATION: 484 MS
OHS QTC CALCULATION: 510 MS

## 2024-10-29 RX ORDER — FUROSEMIDE 20 MG/1
TABLET ORAL
Qty: 120 TABLET | Refills: 3 | Status: SHIPPED | OUTPATIENT
Start: 2024-10-29

## 2024-10-30 ENCOUNTER — OFFICE VISIT (OUTPATIENT)
Dept: INTERNAL MEDICINE | Facility: CLINIC | Age: 84
End: 2024-10-30
Attending: INTERNAL MEDICINE
Payer: MEDICARE

## 2024-10-30 VITALS
WEIGHT: 111.56 LBS | HEIGHT: 64 IN | BODY MASS INDEX: 19.04 KG/M2 | SYSTOLIC BLOOD PRESSURE: 106 MMHG | DIASTOLIC BLOOD PRESSURE: 58 MMHG | OXYGEN SATURATION: 97 % | HEART RATE: 68 BPM

## 2024-10-30 DIAGNOSIS — E11.29 CONTROLLED TYPE 2 DIABETES MELLITUS WITH MICROALBUMINURIA, WITHOUT LONG-TERM CURRENT USE OF INSULIN: Primary | ICD-10-CM

## 2024-10-30 DIAGNOSIS — I10 ESSENTIAL HYPERTENSION: ICD-10-CM

## 2024-10-30 DIAGNOSIS — I50.42 CHRONIC COMBINED SYSTOLIC AND DIASTOLIC CHF (CONGESTIVE HEART FAILURE): ICD-10-CM

## 2024-10-30 DIAGNOSIS — Z09 HOSPITAL DISCHARGE FOLLOW-UP: ICD-10-CM

## 2024-10-30 DIAGNOSIS — R80.9 CONTROLLED TYPE 2 DIABETES MELLITUS WITH MICROALBUMINURIA, WITHOUT LONG-TERM CURRENT USE OF INSULIN: Primary | ICD-10-CM

## 2024-10-30 DIAGNOSIS — C7A.012 MALIGNANT CARCINOID TUMOR OF ILEUM: ICD-10-CM

## 2024-10-30 DIAGNOSIS — E53.8 B12 DEFICIENCY: ICD-10-CM

## 2024-10-30 PROCEDURE — 99215 OFFICE O/P EST HI 40 MIN: CPT | Mod: HCNC,S$GLB,, | Performed by: INTERNAL MEDICINE

## 2024-10-30 PROCEDURE — G2211 COMPLEX E/M VISIT ADD ON: HCPCS | Mod: HCNC,S$GLB,, | Performed by: INTERNAL MEDICINE

## 2024-10-30 PROCEDURE — 3288F FALL RISK ASSESSMENT DOCD: CPT | Mod: HCNC,CPTII,S$GLB, | Performed by: INTERNAL MEDICINE

## 2024-10-30 PROCEDURE — 1159F MED LIST DOCD IN RCRD: CPT | Mod: HCNC,CPTII,S$GLB, | Performed by: INTERNAL MEDICINE

## 2024-10-30 PROCEDURE — 99999 PR PBB SHADOW E&M-EST. PATIENT-LVL IV: CPT | Mod: PBBFAC,HCNC,, | Performed by: INTERNAL MEDICINE

## 2024-10-30 PROCEDURE — 1101F PT FALLS ASSESS-DOCD LE1/YR: CPT | Mod: HCNC,CPTII,S$GLB, | Performed by: INTERNAL MEDICINE

## 2024-10-30 PROCEDURE — 1160F RVW MEDS BY RX/DR IN RCRD: CPT | Mod: HCNC,CPTII,S$GLB, | Performed by: INTERNAL MEDICINE

## 2024-10-30 PROCEDURE — 1157F ADVNC CARE PLAN IN RCRD: CPT | Mod: HCNC,CPTII,S$GLB, | Performed by: INTERNAL MEDICINE

## 2024-10-30 PROCEDURE — 1126F AMNT PAIN NOTED NONE PRSNT: CPT | Mod: HCNC,CPTII,S$GLB, | Performed by: INTERNAL MEDICINE

## 2024-10-30 NOTE — PROGRESS NOTES
Subjective:   Patient ID: Shahram Hills is a 84 y.o. female  Chief complaint:   Chief Complaint   Patient presents with    Hospital Follow Up     HPI  Here for hospital follow up   Accomp by sister today     Admitted 10/25 - 10/28 admitted for embolization of liver mets   Mild anemia   Bp stable   To f/u with cards and h/o     Saw h/o 8/13/2024 - to cont regimen and rtc in 4 weeks with restaging mri and cards 7/29/2024  Last clinic visit 7/2024:  hospital discharge follow up for HF   Seen by cards since discharge - note reviewed   Admitted to OSH   Sounds like inc salt intake prior to this   Medications adjusted   No ankle swelling per them with this episode     HTN:  Today well controlled on home readings   Bp 106/58 today - less po intake over past week or so as above - monitoring  - c/w meds on med card  - taking lasix daily, coreg, entresto, aldactone  Previously:   Prev switched to coreg from amlodipine    Diabetes:   A1c 6.6 <- 6.6 <- 6.1<- 6.1 <-6.3<- 6.6  Bg 150s, occ 201 or 180  - typically eating right before recent labs   - well controlled and henry metformin 500 daily  Bg improved 140-170s - checing in am and occ in afternoon and 140s - run higher in am and lower in afternoon   - no lows   At lcv   morning bg 140-160s but eating sugary snack late at night - ice cream, pie or cookies   - eats breakfast around 9-10am  Foot exam utd 8/2024  Utd on eye exam 11/2023  Urine screening due   No lows     PVCs, compensated chronic systolic and diastolic HF (EF 40% on echo 9/2022 and worse on echo from recent hospitalization per cards note):  No further ankle swelling since taking lasix daily   Henry current regimen as above   No aranda or sob  Prev:  - started lasix at [facility] due to acute HF exacerbation - henry daily lasix - no further ankle swelling since then   Previously:   - noticed ankle swelling - at baseline gets ankle swelling worse in afternoon - better in am   Cards: Elyssa  Previously:    - 2/25/2022 for  "hospital discharge had TACE right hep lobe and found to have frequent PVCs  - we resumed losartan at that time   - seen by cards 3/14/2022 and completed holter - at this time no tx indicated    B12 def:  - is taking supplement daily - discussed dec to qod    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 Zometa every 3 months started 12/27/22. PRRT #2 on 2/8  Followed by h/o - H last clinic visit March 8, 2023  Previously:   - 2/25/2022 for hospital discharge had TACE right hep lobe and found to have frequent PVCs  - we resumed losartan at that time   - seen by cards 3/14/2022 and completed holter - at this time no tx indicated    H/o: Du  Cards: Elyssa  Pod: Luper    HM:  Covid booster   Rsv  flu  Urine screen  Foot exam utd  Eye exam utd     Review of Systems    Objective:  Vitals:    10/30/24 1008   BP: (!) 106/58   BP Location: Left arm   Patient Position: Sitting   Pulse: 68   SpO2: 97%   Weight: 50.6 kg (111 lb 8.8 oz)   Height: 5' 4" (1.626 m)     Body mass index is 19.15 kg/m².    Physical Exam  Vitals reviewed.   Constitutional:       Appearance: Normal appearance. She is well-developed.   HENT:      Head: Normocephalic and atraumatic.      Right Ear: Tympanic membrane, ear canal and external ear normal.      Left Ear: Tympanic membrane, ear canal and external ear normal.      Nose: Nose normal. No congestion.      Mouth/Throat:      Mouth: Mucous membranes are moist.      Pharynx: Oropharynx is clear. No oropharyngeal exudate.   Eyes:      Extraocular " Movements: Extraocular movements intact.      Conjunctiva/sclera: Conjunctivae normal.   Neck:      Thyroid: No thyromegaly.   Cardiovascular:      Rate and Rhythm: Normal rate and regular rhythm.      Pulses: Normal pulses.      Heart sounds: Normal heart sounds.   Pulmonary:      Effort: Pulmonary effort is normal.      Breath sounds: Normal breath sounds.   Abdominal:      General: Bowel sounds are normal.      Palpations: Abdomen is soft. There is mass (ruq mass).   Musculoskeletal:         General: No swelling or tenderness. Normal range of motion.      Cervical back: Normal range of motion and neck supple.   Lymphadenopathy:      Cervical: No cervical adenopathy.   Skin:     General: Skin is warm and dry.      Capillary Refill: Capillary refill takes less than 2 seconds.      Nails: There is no clubbing.   Neurological:      General: No focal deficit present.      Mental Status: She is alert and oriented to person, place, and time. Mental status is at baseline.      Gait: Gait normal.   Psychiatric:         Mood and Affect: Mood normal.         Speech: Speech normal.         Behavior: Behavior normal.         Thought Content: Thought content normal.         Judgment: Judgment normal.       Assessment:  1. Controlled type 2 diabetes mellitus with microalbuminuria, without long-term current use of insulin    2. Hospital discharge follow-up    3. B12 deficiency    4. Essential hypertension    5. Chronic combined systolic and diastolic CHF (congestive heart failure)    6. Malignant carcinoid tumor of ileum      Plan:  Controlled type 2 diabetes mellitus with microalbuminuria, without long-term current use of insulin  -     Microalbumin/Creatinine Ratio, Urine; Future; Expected date: 11/05/2024  -     Hemoglobin A1C; Future; Expected date: 11/13/2024  Stable   Cont regimen   Monitor labs     Hospital discharge follow-up  F/u iwht specialists as planned   Stable     B12 deficiency  Stable   Cont suppl     Essential  hypertension  Stabl;e   Cont regimen     Chronic combined systolic and diastolic CHF (congestive heart failure)  Euvolemic   Cont meds   F/u with cards     Malignant carcinoid tumor of ileum  F/u with h/o   monitor    40 min spent in care of patient including chart review, history, orders, physical, orders and coordination of care  Visit today included increased complexity associated with the care of the episodic problem htn, b12 def addressed and managing the longitudinal care of the patient due to the serious and/or complex managed problem(s) diabetes, heart failure.    Health Maintenance   Topic Date Due    Eye Exam  11/04/2024    Hemoglobin A1c  02/13/2025    Lipid Panel  08/13/2025    TETANUS VACCINE  12/10/2029    Shingles Vaccine  Completed

## 2024-11-01 ENCOUNTER — HOSPITAL ENCOUNTER (OUTPATIENT)
Dept: CARDIOLOGY | Facility: OTHER | Age: 84
Discharge: HOME | End: 2024-11-01
Attending: INTERNAL MEDICINE
Payer: MEDICARE

## 2024-11-01 VITALS
HEART RATE: 68 BPM | BODY MASS INDEX: 18.95 KG/M2 | DIASTOLIC BLOOD PRESSURE: 58 MMHG | SYSTOLIC BLOOD PRESSURE: 106 MMHG | HEIGHT: 64 IN | WEIGHT: 111 LBS

## 2024-11-01 DIAGNOSIS — C7A.012 MALIGNANT CARCINOID TUMOR OF ILEUM: ICD-10-CM

## 2024-11-01 LAB
ASCENDING AORTA: 3.11 CM
AV INDEX (PROSTH): 1.15
AV MEAN GRADIENT: 3.3 MMHG
AV PEAK GRADIENT: 5.8 MMHG
AV REGURGITATION PRESSURE HALF TIME: 388 MS
AV VALVE AREA BY VELOCITY RATIO: 2.3 CM²
AV VALVE AREA: 2.9 CM²
AV VELOCITY RATIO: 0.92
BSA FOR ECHO PROCEDURE: 1.51 M2
CV ECHO LV RWT: 0.33 CM
DOP CALC AO PEAK VEL: 1.2 M/S
DOP CALC AO VTI: 22.3 CM
DOP CALC LVOT AREA: 2.5 CM2
DOP CALC LVOT DIAMETER: 1.8 CM
DOP CALC LVOT PEAK VEL: 1.1 M/S
DOP CALC LVOT STROKE VOLUME: 65.4 CM3
DOP CALC RVOT PEAK VEL: 0.8 M/S
DOP CALC RVOT VTI: 18.3 CM
DOP CALCLVOT PEAK VEL VTI: 25.7 CM
E WAVE DECELERATION TIME: 140.95 MSEC
E/A RATIO: 0.46
E/E' RATIO: 12.75 M/S
ECHO LV POSTERIOR WALL: 0.8 CM (ref 0.6–1.1)
FRACTIONAL SHORTENING: 18.8 % (ref 28–44)
INTERVENTRICULAR SEPTUM: 0.8 CM (ref 0.6–1.1)
IVC DIAMETER: 1.34 CM
IVRT: 144.62 MSEC
LA MAJOR: 4.76 CM
LA MINOR: 4.45 CM
LA WIDTH: 4.8 CM
LEFT ATRIUM AREA SYSTOLIC (APICAL 2 CHAMBER): 17.14 CM2
LEFT ATRIUM AREA SYSTOLIC (APICAL 4 CHAMBER): 20.83 CM2
LEFT ATRIUM SIZE: 3.16 CM
LEFT ATRIUM VOLUME INDEX MOD: 42.3 ML/M2
LEFT ATRIUM VOLUME INDEX: 39 ML/M2
LEFT ATRIUM VOLUME MOD: 64.37 ML
LEFT ATRIUM VOLUME: 59.3 CM3
LEFT INTERNAL DIMENSION IN SYSTOLE: 3.9 CM (ref 2.1–4)
LEFT VENTRICLE DIASTOLIC VOLUME INDEX: 69.71 ML/M2
LEFT VENTRICLE DIASTOLIC VOLUME: 105.96 ML
LEFT VENTRICLE END SYSTOLIC VOLUME APICAL 2 CHAMBER: 53.41 ML
LEFT VENTRICLE END SYSTOLIC VOLUME APICAL 4 CHAMBER: 71.72 ML
LEFT VENTRICLE MASS INDEX: 83.3 G/M2
LEFT VENTRICLE SYSTOLIC VOLUME INDEX: 42.1 ML/M2
LEFT VENTRICLE SYSTOLIC VOLUME: 63.97 ML
LEFT VENTRICULAR INTERNAL DIMENSION IN DIASTOLE: 4.8 CM (ref 3.5–6)
LEFT VENTRICULAR MASS: 126.7 G
LV LATERAL E/E' RATIO: 10.2 M/S
LV SEPTAL E/E' RATIO: 17 M/S
LVED V (TEICH): 105.96 ML
LVES V (TEICH): 63.97 ML
LVOT MG: 3.06 MMHG
LVOT MV: 0.85 CM/S
MV PEAK A VEL: 1.11 M/S
MV PEAK E VEL: 0.51 M/S
MV STENOSIS PRESSURE HALF TIME: 40.88 MS
MV VALVE AREA P 1/2 METHOD: 5.38 CM2
OHS CV RV/LV RATIO: 0.71 CM
PISA AR MAX VEL: 4.5 M/S
PISA MRMAX VEL: 4.18 M/S
PISA TR MAX VEL: 2.44 M/S
PULM VEIN S/D RATIO: 1.78
PV MEAN GRADIENT: 1 MMHG
PV PEAK D VEL: 0.27 M/S
PV PEAK GRADIENT: 3 MMHG
PV PEAK S VEL: 0.48 M/S
PV PEAK VELOCITY: 0.81 M/S
RA MAJOR: 4.19 CM
RA PRESSURE ESTIMATED: 3 MMHG
RA WIDTH: 3.4 CM
RIGHT VENTRICLE DIASTOLIC BASEL DIMENSION: 3.4 CM
RV TB RVSP: 5 MMHG
RV TISSUE DOPPLER FREE WALL SYSTOLIC VELOCITY 1 (APICAL 4 CHAMBER VIEW): 13.05 CM/S
SINUS: 2.8 CM
STJ: 3.03 CM
TDI LATERAL: 0.05 M/S
TDI SEPTAL: 0.03 M/S
TDI: 0.04 M/S
TR MAX PG: 24 MMHG
TRICUSPID ANNULAR PLANE SYSTOLIC EXCURSION: 3.1 CM
TV REST PULMONARY ARTERY PRESSURE: 27 MMHG
Z-SCORE OF LEFT VENTRICULAR DIMENSION IN END DIASTOLE: 0.75
Z-SCORE OF LEFT VENTRICULAR DIMENSION IN END SYSTOLE: 2.72

## 2024-11-01 PROCEDURE — 93306 TTE W/DOPPLER COMPLETE: CPT | Mod: 26,HCNC,, | Performed by: INTERNAL MEDICINE

## 2024-11-01 PROCEDURE — 93306 TTE W/DOPPLER COMPLETE: CPT | Mod: HCNC

## 2024-11-03 ENCOUNTER — HOSPITAL ENCOUNTER (OUTPATIENT)
Facility: HOSPITAL | Age: 84
Discharge: HOME | End: 2024-11-04
Attending: EMERGENCY MEDICINE | Admitting: EMERGENCY MEDICINE
Payer: MEDICARE

## 2024-11-03 DIAGNOSIS — Z85.9 HISTORY OF MALIGNANT NEUROENDOCRINE TUMOR: ICD-10-CM

## 2024-11-03 DIAGNOSIS — R41.82 AMS (ALTERED MENTAL STATUS): ICD-10-CM

## 2024-11-03 DIAGNOSIS — I50.9 ACUTE ON CHRONIC CONGESTIVE HEART FAILURE, UNSPECIFIED HEART FAILURE TYPE: ICD-10-CM

## 2024-11-03 DIAGNOSIS — R11.10 EMESIS: ICD-10-CM

## 2024-11-03 DIAGNOSIS — R07.9 CHEST PAIN: ICD-10-CM

## 2024-11-03 DIAGNOSIS — R55 SYNCOPE, UNSPECIFIED SYNCOPE TYPE: Primary | ICD-10-CM

## 2024-11-03 LAB
ALBUMIN SERPL BCP-MCNC: 2.8 G/DL (ref 3.5–5.2)
ALP SERPL-CCNC: 78 U/L (ref 40–150)
ALT SERPL W/O P-5'-P-CCNC: 19 U/L (ref 10–44)
ANION GAP SERPL CALC-SCNC: 11 MMOL/L (ref 8–16)
AST SERPL-CCNC: 20 U/L (ref 10–40)
B-OH-BUTYR BLD STRIP-SCNC: 0.2 MMOL/L (ref 0–0.5)
BASOPHILS # BLD AUTO: 0.02 K/UL (ref 0–0.2)
BASOPHILS NFR BLD: 0.2 % (ref 0–1.9)
BILIRUB SERPL-MCNC: 0.4 MG/DL (ref 0.1–1)
BILIRUB UR QL STRIP: NEGATIVE
BNP SERPL-MCNC: 1065 PG/ML (ref 0–99)
BUN SERPL-MCNC: 9 MG/DL (ref 8–23)
CALCIUM SERPL-MCNC: 9.7 MG/DL (ref 8.7–10.5)
CHLORIDE SERPL-SCNC: 96 MMOL/L (ref 95–110)
CLARITY UR REFRACT.AUTO: CLEAR
CO2 SERPL-SCNC: 28 MMOL/L (ref 23–29)
COLOR UR AUTO: COLORLESS
CREAT SERPL-MCNC: 0.8 MG/DL (ref 0.5–1.4)
DIFFERENTIAL METHOD BLD: ABNORMAL
EOSINOPHIL # BLD AUTO: 0 K/UL (ref 0–0.5)
EOSINOPHIL NFR BLD: 0.5 % (ref 0–8)
ERYTHROCYTE [DISTWIDTH] IN BLOOD BY AUTOMATED COUNT: 11.9 % (ref 11.5–14.5)
EST. GFR  (NO RACE VARIABLE): >60 ML/MIN/1.73 M^2
GLUCOSE SERPL-MCNC: 179 MG/DL (ref 70–110)
GLUCOSE UR QL STRIP: NEGATIVE
HCT VFR BLD AUTO: 32.2 % (ref 37–48.5)
HCV AB SERPL QL IA: NORMAL
HGB BLD-MCNC: 10.5 G/DL (ref 12–16)
HGB UR QL STRIP: NEGATIVE
HIV 1+2 AB+HIV1 P24 AG SERPL QL IA: NORMAL
IMM GRANULOCYTES # BLD AUTO: 0.08 K/UL (ref 0–0.04)
IMM GRANULOCYTES NFR BLD AUTO: 0.9 % (ref 0–0.5)
KETONES UR QL STRIP: NEGATIVE
LEUKOCYTE ESTERASE UR QL STRIP: NEGATIVE
LIPASE SERPL-CCNC: 18 U/L (ref 4–60)
LYMPHOCYTES # BLD AUTO: 0.9 K/UL (ref 1–4.8)
LYMPHOCYTES NFR BLD: 11 % (ref 18–48)
MCH RBC QN AUTO: 30.8 PG (ref 27–31)
MCHC RBC AUTO-ENTMCNC: 32.6 G/DL (ref 32–36)
MCV RBC AUTO: 94 FL (ref 82–98)
MONOCYTES # BLD AUTO: 0.9 K/UL (ref 0.3–1)
MONOCYTES NFR BLD: 10.6 % (ref 4–15)
NEUTROPHILS # BLD AUTO: 6.5 K/UL (ref 1.8–7.7)
NEUTROPHILS NFR BLD: 76.8 % (ref 38–73)
NITRITE UR QL STRIP: NEGATIVE
NRBC BLD-RTO: 0 /100 WBC
PH UR STRIP: 5 [PH] (ref 5–8)
PLATELET # BLD AUTO: 280 K/UL (ref 150–450)
PMV BLD AUTO: 10.1 FL (ref 9.2–12.9)
POCT GLUCOSE: 275 MG/DL (ref 70–110)
POTASSIUM SERPL-SCNC: 5.1 MMOL/L (ref 3.5–5.1)
PROT SERPL-MCNC: 7.1 G/DL (ref 6–8.4)
PROT UR QL STRIP: NEGATIVE
RBC # BLD AUTO: 3.41 M/UL (ref 4–5.4)
SODIUM SERPL-SCNC: 135 MMOL/L (ref 136–145)
SP GR UR STRIP: 1 (ref 1–1.03)
TROPONIN I SERPL DL<=0.01 NG/ML-MCNC: 0.01 NG/ML (ref 0–0.03)
TROPONIN I SERPL DL<=0.01 NG/ML-MCNC: <0.006 NG/ML (ref 0–0.03)
URN SPEC COLLECT METH UR: ABNORMAL
WBC # BLD AUTO: 8.43 K/UL (ref 3.9–12.7)

## 2024-11-03 PROCEDURE — 83690 ASSAY OF LIPASE: CPT | Mod: HCNC | Performed by: PHYSICIAN ASSISTANT

## 2024-11-03 PROCEDURE — 93010 ELECTROCARDIOGRAM REPORT: CPT | Mod: HCNC,,, | Performed by: INTERNAL MEDICINE

## 2024-11-03 PROCEDURE — 63600175 PHARM REV CODE 636 W HCPCS: Mod: HCNC | Performed by: PHYSICIAN ASSISTANT

## 2024-11-03 PROCEDURE — 94761 N-INVAS EAR/PLS OXIMETRY MLT: CPT | Mod: HCNC

## 2024-11-03 PROCEDURE — 83880 ASSAY OF NATRIURETIC PEPTIDE: CPT | Mod: HCNC | Performed by: PHYSICIAN ASSISTANT

## 2024-11-03 PROCEDURE — 87389 HIV-1 AG W/HIV-1&-2 AB AG IA: CPT | Mod: HCNC | Performed by: PHYSICIAN ASSISTANT

## 2024-11-03 PROCEDURE — 82010 KETONE BODYS QUAN: CPT | Mod: HCNC | Performed by: PHYSICIAN ASSISTANT

## 2024-11-03 PROCEDURE — 81003 URINALYSIS AUTO W/O SCOPE: CPT | Mod: HCNC | Performed by: PHYSICIAN ASSISTANT

## 2024-11-03 PROCEDURE — 86803 HEPATITIS C AB TEST: CPT | Mod: HCNC | Performed by: PHYSICIAN ASSISTANT

## 2024-11-03 PROCEDURE — 99285 EMERGENCY DEPT VISIT HI MDM: CPT | Mod: 25,HCNC

## 2024-11-03 PROCEDURE — 84484 ASSAY OF TROPONIN QUANT: CPT | Mod: HCNC | Performed by: PHYSICIAN ASSISTANT

## 2024-11-03 PROCEDURE — 93005 ELECTROCARDIOGRAM TRACING: CPT | Mod: HCNC

## 2024-11-03 PROCEDURE — 85025 COMPLETE CBC W/AUTO DIFF WBC: CPT | Mod: HCNC | Performed by: PHYSICIAN ASSISTANT

## 2024-11-03 PROCEDURE — G0378 HOSPITAL OBSERVATION PER HR: HCPCS | Mod: HCNC

## 2024-11-03 PROCEDURE — 84484 ASSAY OF TROPONIN QUANT: CPT | Mod: 91,HCNC | Performed by: PHYSICIAN ASSISTANT

## 2024-11-03 PROCEDURE — 80053 COMPREHEN METABOLIC PANEL: CPT | Mod: HCNC | Performed by: PHYSICIAN ASSISTANT

## 2024-11-03 PROCEDURE — 25000003 PHARM REV CODE 250: Mod: HCNC | Performed by: PHYSICIAN ASSISTANT

## 2024-11-03 PROCEDURE — 96372 THER/PROPH/DIAG INJ SC/IM: CPT | Performed by: PHYSICIAN ASSISTANT

## 2024-11-03 RX ORDER — TALC
6 POWDER (GRAM) TOPICAL NIGHTLY PRN
Status: DISCONTINUED | OUTPATIENT
Start: 2024-11-03 | End: 2024-11-04 | Stop reason: HOSPADM

## 2024-11-03 RX ORDER — INSULIN ASPART 100 [IU]/ML
0-5 INJECTION, SOLUTION INTRAVENOUS; SUBCUTANEOUS
Status: DISCONTINUED | OUTPATIENT
Start: 2024-11-03 | End: 2024-11-04 | Stop reason: HOSPADM

## 2024-11-03 RX ORDER — IBUPROFEN 200 MG
16 TABLET ORAL
Status: DISCONTINUED | OUTPATIENT
Start: 2024-11-03 | End: 2024-11-04 | Stop reason: HOSPADM

## 2024-11-03 RX ORDER — IBUPROFEN 200 MG
24 TABLET ORAL
Status: DISCONTINUED | OUTPATIENT
Start: 2024-11-03 | End: 2024-11-04 | Stop reason: HOSPADM

## 2024-11-03 RX ORDER — CARVEDILOL 12.5 MG/1
12.5 TABLET ORAL 2 TIMES DAILY
Status: DISCONTINUED | OUTPATIENT
Start: 2024-11-03 | End: 2024-11-04 | Stop reason: HOSPADM

## 2024-11-03 RX ORDER — FUROSEMIDE 20 MG/1
20 TABLET ORAL DAILY
Status: DISCONTINUED | OUTPATIENT
Start: 2024-11-04 | End: 2024-11-04 | Stop reason: HOSPADM

## 2024-11-03 RX ORDER — GLUCAGON 1 MG
1 KIT INJECTION
Status: DISCONTINUED | OUTPATIENT
Start: 2024-11-03 | End: 2024-11-04 | Stop reason: HOSPADM

## 2024-11-03 RX ORDER — ONDANSETRON HYDROCHLORIDE 2 MG/ML
4 INJECTION, SOLUTION INTRAVENOUS EVERY 8 HOURS PRN
Status: DISCONTINUED | OUTPATIENT
Start: 2024-11-03 | End: 2024-11-04 | Stop reason: HOSPADM

## 2024-11-03 RX ORDER — EZETIMIBE 10 MG/1
10 TABLET ORAL DAILY
Status: DISCONTINUED | OUTPATIENT
Start: 2024-11-04 | End: 2024-11-04 | Stop reason: HOSPADM

## 2024-11-03 RX ORDER — ACETAMINOPHEN 500 MG
1000 TABLET ORAL EVERY 8 HOURS PRN
Status: DISCONTINUED | OUTPATIENT
Start: 2024-11-03 | End: 2024-11-04 | Stop reason: HOSPADM

## 2024-11-03 RX ORDER — OXYCODONE HYDROCHLORIDE 5 MG/1
5 TABLET ORAL EVERY 6 HOURS PRN
Status: DISCONTINUED | OUTPATIENT
Start: 2024-11-03 | End: 2024-11-04 | Stop reason: HOSPADM

## 2024-11-03 RX ORDER — SPIRONOLACTONE 25 MG/1
25 TABLET ORAL DAILY
Status: DISCONTINUED | OUTPATIENT
Start: 2024-11-04 | End: 2024-11-04 | Stop reason: HOSPADM

## 2024-11-03 RX ORDER — SODIUM CHLORIDE 0.9 % (FLUSH) 0.9 %
10 SYRINGE (ML) INJECTION EVERY 6 HOURS PRN
Status: DISCONTINUED | OUTPATIENT
Start: 2024-11-03 | End: 2024-11-04 | Stop reason: HOSPADM

## 2024-11-03 RX ADMIN — SACUBITRIL AND VALSARTAN 1 TABLET: 24; 26 TABLET, FILM COATED ORAL at 09:11

## 2024-11-03 RX ADMIN — CARVEDILOL 12.5 MG: 12.5 TABLET, FILM COATED ORAL at 09:11

## 2024-11-03 RX ADMIN — INSULIN ASPART 1 UNITS: 100 INJECTION, SOLUTION INTRAVENOUS; SUBCUTANEOUS at 09:11

## 2024-11-03 NOTE — H&P
ED Observation Unit  History and Physical      I assumed care of this patient from the Main ED at onset of observation time, 1624 on 11/03/2024.       History of Present Illness:  Patient is an 84 year old female with hypertension, diabetes, hyperlipidemia, combined diastolic and systolic heart failure, and neuroendocrine tumor with metastasis to liver and bone (with recent liver met embolization on 10/25) who presented to [facility] on 11/03/2024 with an episode of decreased responsiveness.  Patient states that she has felt well since her procedure and doing well at home.  She woke up this morning, had her breakfast and was preparing to watch the football game and was cooking lunch around 11:00 a.m. She states that she then vomited approximately 4 times.  After this, she does not remember much until she was in the ambulance and returned to her baseline upon arrival to the emergency department.  Other information was provided by her nephew who lives next door and checked on her who called the ambulance.  Her sister Shayla (who is her power of  and lives with/cares for her) is at bedside and provided information as well -she arrived back home when the ambulance arrived.  She states that she had a blank stare and was not responding.  Upon arrival to the ED, patient was back to baseline and was completely altered.  She denies abdominal pain, loose or bloody stools.  She currently denies headache, dyspnea,chest pain, palpitations, numbness, weakness. She took all her morning medications.     ED workup: VSS  EKG: Sinus rhythm, 72 bpm. No STEMI. Occasional PVCs. Left axis deviation. QTc 464 ms. No acute changes from previous EKG from 10/26/24  Urinalysis: unremarkable   Troponin: WNL  BNP: 1065  CMP: mild hyperglycemia 179, corrected Na normal (137)  CBC: stable anemia   CXR and CT head without acute abnormality     PMHx   Past Medical History:   Diagnosis Date    Anemia 07/11/2018    B12 deficiency     Depression     per  pcp note 7/2017 and given prozac and diazepam     Hyperlipidemia     Neuroendocrine tumor     Systolic heart failure     Weight loss     reviewed prior pcp Dr. Russo notes 2017 - labs reviewed: cmp wnl x tbili 1.5, tsh wnl, A1c 5.0, cbc wnl,D 36, hiv neg, hep c neg, hep b surg ag neg       Past Surgical History:   Procedure Laterality Date    EMBOLIZATION N/A 02/14/2019    Procedure: EMBOLIZATION, BLOOD VESSEL;  Surgeon: [facility] Diagnostic Provider;  Location: [facility] OR 2ND FLR;  Service: Radiology;  Laterality: N/A;  Room 189/[facility]    EMBOLIZATION N/A 03/21/2019    Procedure: EMBOLIZATION, BLOOD VESSEL;  Surgeon: [facility] Diagnostic Provider;  Location: [facility] OR 2ND FLR;  Service: Radiology;  Laterality: N/A;  Room 189/[facility]    ESOPHAGOGASTRODUODENOSCOPY  05/04/2018    esophageal ring, grade 1 esophagitis, gastritis     EYE SURGERY Bilateral     cataract removal    HYSTERECTOMY      fibroids        Family Hx   Family History   Problem Relation Name Age of Onset    Glaucoma Mother      Hypertension Mother      Heart attack Father      Cancer Father      Diabetes Sister Marilyn     Asthma Sister Nick Luke     No Known Problems Sister Stephanie     Hyperlipidemia Sister      Heart attack Sister      Cancer Brother          lung cancer, + tobacco    Diabetes Brother      Hypertension Brother      Stroke Brother      Emphysema Brother      COPD Brother          Social Hx   Social History     Socioeconomic History    Marital status:    Occupational History    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   Tobacco Use    Smoking status: Never    Smokeless tobacco: Never   Substance and Sexual Activity    Alcohol use: No     Comment: stopped in 2007 - hx of social drinking    Drug use: Never    Sexual activity: Not Currently     Partners: Male   Social History Narrative    Living in [facility]    Not getting reg exericse     Social Drivers of Health     Financial Resource Strain: Low Risk  (10/25/2024)    Overall Financial  "Resource Strain (CARDIA)     Difficulty of Paying Living Expenses: Not very hard   Food Insecurity: No Food Insecurity (10/25/2024)    Hunger Vital Sign     Worried About Running Out of Food in the Last Year: Never true     Ran Out of Food in the Last Year: Never true   Transportation Needs: No Transportation Needs (10/25/2024)    TRANSPORTATION NEEDS     Transportation : No   Physical Activity: Inactive (11/10/2023)    Exercise Vital Sign     Days of Exercise per Week: 0 days     Minutes of Exercise per Session: 0 min   Stress: No Stress Concern Present (10/25/2024)    Citizen of [place] of Occupational Health - Occupational Stress Questionnaire     Feeling of Stress : Only a little   Housing Stability: Low Risk  (10/25/2024)    Housing Stability Vital Sign     Unable to Pay for Housing in the Last Year: No     Homeless in the Last Year: No        Vital Signs   Vitals:    11/03/24 1321 11/03/24 1433 11/03/24 1543   BP: 135/70 (!) 153/70    BP Location:  Right arm    Patient Position:  Lying    Pulse: 76 72    Resp: 16 19    Temp:   98 °F (36.7 °C)   TempSrc:   Oral   SpO2: 100% 97%    Weight: 50.3 kg (111 lb)     Height: 5' 4" (1.626 m)          Review of Systems  As per HPI     Physical Exam  Nursing note and vital signs reviewed.    Appearance: No acute distress. Non toxic appearing.   Eyes: No conjunctival injection.  HENT: Oropharynx clear.    Chest/ Respiratory: Clear to auscultation bilaterally.  Good air movement.  No wheezes.  No rhonchi. No rales. No accessory muscle use.  Cardiovascular: Regular rate and rhythm.  No murmurs. No gallops. No rubs. No lower extremity edema. No JVD. Occasional PVCs on monitor   Abdomen: Soft.  Not distended.  Nontender.  No guarding.  No rebound. Non-peritoneal.  Musculoskeletal: Good range of motion all joints.  No deformities.  Neck supple.  No meningismus.  Skin: No rashes seen.  Good turgor.  No abrasions.  No ecchymoses.  Neurologic: Motor intact.  Sensation intact.  " "Cerebellar intact.  Cranial nerves intact.  Mental Status:  Alert and oriented x 3.  Appropriate, conversant.     Medications:   No current facility-administered medications on file prior to encounter.     Current Outpatient Medications on File Prior to Encounter   Medication Sig Dispense Refill    blood sugar diagnostic (TRUE METRIX GLUCOSE TEST STRIP) Strp USE TO CHECK BLOOD SUGAR TWICE DAILY 100 strip 11    blood-glucose meter kit To check BG 2 times daily, to use with insurance preferred meter 1 each 0    carvediloL (COREG) 12.5 MG tablet TAKE 1 TABLET(12.5 MG) BY MOUTH TWICE DAILY WITH MEALS 180 tablet 3    cyanocobalamin (VITAMIN B-12) 1000 MCG tablet Take 1 tablet (1,000 mcg total) by mouth once daily.      diphenoxylate-atropine 2.5-0.025 mg (LOMOTIL) 2.5-0.025 mg per tablet Take 1 tablet by mouth 4 (four) times daily as needed for Diarrhea. 90 tablet 2    ezetimibe (ZETIA) 10 mg tablet Take 1 tablet (10 mg total) by mouth once daily. 90 tablet 3    ferrous sulfate 325 (65 FE) MG EC tablet Take 325 mg by mouth once daily.      furosemide (LASIX) 20 MG tablet take 1 tab by mouth daily. If gain 2-3 pounds in 2-3 days then take 2 tabs daily for 3 days and then resume 1 dose daily 120 tablet 3    lancets Misc To check BG 2 times daily, to use with insurance preferred meter 100 each 11    latanoprost 0.005 % ophthalmic solution Place 1 drop into both eyes nightly.      metFORMIN (GLUCOPHAGE-XR) 500 MG ER 24hr tablet Take 1 tablet (500 mg total) by mouth once daily. 180 tablet 0    needle, disp, 22 G 22 gauge x 1" Ndle 1 application by Misc.(Non-Drug; Combo Route) route 3 (three) times daily as needed. 30 each 2    sacubitriL-valsartan (ENTRESTO) 24-26 mg per tablet TAKE 1 TABLET BY MOUTH TWICE DAILY 180 tablet 3    spironolactone (ALDACTONE) 25 MG tablet Take 1 tablet (25 mg total) by mouth once daily. 90 tablet 3    syringe, disposable, 1 mL Syrg 1 application by Misc.(Non-Drug; Combo Route) route 3 (three) " times daily as needed (diarrhea). 30 each 2    telotristat ethyl (XERMELO) 250 mg Tab Take 1 tablet (250 mg) by mouth 3 (three) times daily. 90 tablet 3    TRUEPLUS LANCETS 33 gauge Misc USE TO CHECK BLOOD SUGAR TWICE DAILY 100 each 11    vitamin D (VITAMIN D3) 1000 units Tab Take 400 Units by mouth once daily.      XIIDRA 5 % Dpet INSTILL ONE DROP INTO OU BID  3          Assessment/Plan:    Near syncope/ altered alertness    High suspicion for vasovagal event induced after episodes of emesis   Back to baseline    Neuro checks q4h while awake   If symptoms return, consider Neuro consult/ MRI brain/ EEG    Occasional PVCs   Seen on EKG and monitor, patient is asymptomatic    EKG if chest pain occurs or palpitations  Low suspicion for cardiogenic syncope  Trend troponin      Chronic combined systolic and diastolic CHF (congestive heart failure)  Patient is euvolemic on exam  Plan: continue home oral meds      Echo  Interpretation Summary    Left Ventricle: The left ventricle is normal in size. Ventricular mass is normal. Normal wall thickness. Moderate global hypokinesis present. There is moderately reduced systolic function with a visually estimated ejection fraction of 30 - 35%. Unable to assess diastolic function due to E-A fusion. compared to prior study LV function appears reduced.    Right Ventricle: Normal right ventricular cavity size. Wall thickness is normal. Right ventricle wall motion  is normal. Systolic function is normal.    Left Atrium: Left atrium is mildly dilated.    Right Atrium: Normal right atrial size.    Aortic Valve: The aortic valve is a trileaflet valve. There is mild aortic valve sclerosis. There is mild annular calcification present. There is at least moderate aortic regurgitation with significant beat to beat variability that incrases to severe every 3rd of 4 th beat partly PVC related. Consider PVC suppression and re evaluating aortic regurgitation. Aortic regurigtation appears reduced.  Wide pulse pressure is noted.    Mitral Valve: There is moderate bileaflet sclerosis. Moderately calcified leaflets. There is mild to moderate regurgitation with a posterolateral eccentriccally directed jet.    Aorta: Aortic root is normal in size measuring 2.99 cm. Ascending aorta is normal measuring 2.92 cm.    Pulmonary Artery: There is mild to moderate pulmonary hypertension. The estimated pulmonary artery systolic pressure is 40 mmHg.    IVC/SVC: Normal venous pressure at 3 mmHg.        Essential hypertension  Continue home meds       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  Patient had liver mets embolization by IR 10/25.   Continue home meds      Anemia  Anemia is stable likely due to  possible blood loss as she had hepatic met embolization on 10/25/24     Discharge planning:   Lives with sister (POA)   They are interested in [facility] home services    Cards outpatient f/u 11/4 at 10:20 am and Heme Onc on 11/5     Case was discussed with the ED provider, TEENA Schrader

## 2024-11-03 NOTE — DISCHARGE INSTRUCTIONS
Please follow-up with cardiology today if you are able to make it.  If you are unable to make your appointment today please reschedule a close follow-up.  Acute care at home was consulted and you will have a follow-up at your home.  You may return to ED sooner if you develop any new or worsening symptoms.

## 2024-11-03 NOTE — ED NOTES
Patient comes into the emergency department by POV with family complaining of pt spacing out and being unresponsive. Family states that the episode lasted about 20 minutes. Patient denies remembering any of this.    LOC: The patient is awake, alert and aware of environment with an appropriate affect, the patient is oriented x 3 and speaking appropriately.   APPEARANCE: Patient appears comfortable and in no acute distress, patient is clean and well groomed.  SKIN: The skin is warm and dry, color consistent with ethnicity, patient has normal skin turgor and moist mucus membranes, skin intact, no breakdown or bruising noted.   MUSCULOSKELETAL: Patient moving all extremities spontaneously, no swelling noted.  RESPIRATORY: Airway is open and patent, respirations are spontaneous, patient has a normal effort and rate, no accessory muscle.  CARDIAC: Pt placed on cardiac monitor. Patient has a normal rate and regular rhythm, no edema noted, capillary refill < 3 seconds.   GASTRO: Soft and non tender to palpation, no distention noted. Pt states she had one n/v spell yesterday.  : Pt denies any pain or frequency with urination.  NEURO: Pt opens eyes spontaneously, behavior appropriate to situation, follows commands, facial expression symmetrical, bilateral hand grasp equal and even, purposeful motor response noted, normal sensation in all extremities when touched with a finger.

## 2024-11-03 NOTE — ED PROVIDER NOTES
"Encounter Date: 11/3/2024       History     Chief Complaint   Patient presents with    Emesis     Patient states that she does not feel well. Vomited 4 times today. Sister at bedside states that grandson visited patient and patient was not herself. He noted that patient was just staring off and not responding to questions.      The patient is an 84 year old female. 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 She was just discharged from [facility] on Monday following admission embolization of liver mets by IR. She lives with her daughter who provides the history. She states that the patient has been doing relatively well all week since coming home from the hospital. She states that she was in her normal state of health this morning, had coffee and cereal for breakfast. She was up and cooking in the kitchen around 11 am when the daughter left her to go to [employer]. She states that around 11:30 am she received a phone call from her son who was with her at home, reporting that she vomited suddenly and then was sitting there with a blank stare and not responding. He called 911 and activated EMS. The daughter made it back home at the same time the paramedics were taking her and states that the patient "looked woozy and wasn't talking". She states that upon arrival to the ER, she now seems to be back to normal. The patient is alert and answering questions now, reports that she remembers becoming nauseated and vomiting once, but does not recall anything afterwards until arriving here at the hospital. She states that she "feels fine now" and denies any complaints at this time. She denies headache, dizziness, lightheadedness, chest pain, SOB, vision change, trouble speaking, numbness, or weakness. She denies any similar episodes in the past.       Review of patient's allergies indicates:   Allergen Reactions    Epinephrine      carcinoid     Past Medical History: "   Diagnosis Date    Anemia 07/11/2018    B12 deficiency     Depression     per pcp note 7/2017 and given prozac and diazepam     Hyperlipidemia     Neuroendocrine tumor     Systolic heart failure     Weight loss     reviewed prior pcp Dr. Russo notes 2017 - labs reviewed: cmp wnl x tbili 1.5, tsh wnl, A1c 5.0, cbc wnl,D 36, hiv neg, hep c neg, hep b surg ag neg      Past Surgical History:   Procedure Laterality Date    EMBOLIZATION N/A 02/14/2019    Procedure: EMBOLIZATION, BLOOD VESSEL;  Surgeon: [facility] Diagnostic Provider;  Location: [facility] OR 2ND FLR;  Service: Radiology;  Laterality: N/A;  Room 189/[facility]    EMBOLIZATION N/A 03/21/2019    Procedure: EMBOLIZATION, BLOOD VESSEL;  Surgeon: [facility] Diagnostic Provider;  Location: [facility] OR 2ND FLR;  Service: Radiology;  Laterality: N/A;  Room 189/[facility]    ESOPHAGOGASTRODUODENOSCOPY  05/04/2018    esophageal ring, grade 1 esophagitis, gastritis     EYE SURGERY Bilateral     cataract removal    HYSTERECTOMY      fibroids     Family History   Problem Relation Name Age of Onset    Glaucoma Mother      Hypertension Mother      Heart attack Father      Cancer Father      Diabetes Sister Marilyn     Asthma Sister Nick Luke     No Known Problems Sister Stephanie     Hyperlipidemia Sister      Heart attack Sister      Cancer Brother          lung cancer, + tobacco    Diabetes Brother      Hypertension Brother      Stroke Brother      Emphysema Brother      COPD Brother       Social History     Tobacco Use    Smoking status: Never    Smokeless tobacco: Never   Substance Use Topics    Alcohol use: No     Comment: stopped in 2007 - hx of social drinking    Drug use: Never     Review of Systems   Constitutional:  Positive for activity change. Negative for chills, diaphoresis and fever.        (+) Transient episode of decreased LOC    HENT:  Negative for congestion, rhinorrhea, sore throat and trouble swallowing.    Eyes:  Negative for pain and visual disturbance.   Respiratory:   Negative for cough and shortness of breath.    Cardiovascular:  Negative for chest pain, palpitations and leg swelling.   Gastrointestinal:  Positive for nausea and vomiting. Negative for abdominal distention, abdominal pain, blood in stool, constipation and diarrhea.   Genitourinary:  Negative for decreased urine volume, difficulty urinating, dysuria, flank pain and frequency.   Musculoskeletal:  Negative for back pain, gait problem and neck pain.   Skin:  Negative for color change, rash and wound.   Neurological:  Negative for dizziness, tremors, facial asymmetry, weakness, light-headedness, numbness and headaches.   Psychiatric/Behavioral:  Positive for confusion.        Physical Exam     Initial Vitals   BP Pulse Resp Temp SpO2   11/03/24 1321 11/03/24 1321 11/03/24 1321 11/03/24 1543 11/03/24 1321   135/70 76 16 98 °F (36.7 °C) 100 %      MAP       --                Physical Exam    Nursing note and vitals reviewed.  Constitutional: She appears well-developed and well-nourished. She is not diaphoretic. No distress.   Alert and interactive. Pleasant, talkative, no obvious distress noted. Accompanied by daughter.    HENT:   Head: Normocephalic and atraumatic. Mouth/Throat: Oropharynx is clear and moist.   Eyes: Conjunctivae are normal.   Neck: Neck supple.   Cardiovascular:  Normal rate.           Pulmonary/Chest: No respiratory distress.   No cough or wheeze. No increased work of breathing or conversational dyspnea.    Abdominal: She exhibits no distension. There is no abdominal tenderness. There is no rebound and no guarding.   Musculoskeletal:         General: No tenderness or edema. Normal range of motion.      Cervical back: Neck supple.     Neurological: She is alert and oriented to person, place, and time. She has normal strength. No sensory deficit.   Oriented appropriately. No facial droop. No dysarthric speech. Strength/extremities intact x 4. Gait not tested.    Skin: Skin is warm and dry. No rash  noted.   Psychiatric: She has a normal mood and affect.         ED Course   Procedures  Labs Reviewed   CBC W/ AUTO DIFFERENTIAL - Abnormal       Result Value    WBC 8.43      RBC 3.41 (*)     Hemoglobin 10.5 (*)     Hematocrit 32.2 (*)     MCV 94      MCH 30.8      MCHC 32.6      RDW 11.9      Platelets 280      MPV 10.1      Immature Granulocytes 0.9 (*)     Gran # (ANC) 6.5      Immature Grans (Abs) 0.08 (*)     Lymph # 0.9 (*)     Mono # 0.9      Eos # 0.0      Baso # 0.02      nRBC 0      Gran % 76.8 (*)     Lymph % 11.0 (*)     Mono % 10.6      Eosinophil % 0.5      Basophil % 0.2      Differential Method Automated     COMPREHENSIVE METABOLIC PANEL - Abnormal    Sodium 135 (*)     Potassium 5.1      Chloride 96      CO2 28      Glucose 179 (*)     BUN 9      Creatinine 0.8      Calcium 9.7      Total Protein 7.1      Albumin 2.8 (*)     Total Bilirubin 0.4      Alkaline Phosphatase 78      AST 20      ALT 19      eGFR >60.0      Anion Gap 11     B-TYPE NATRIURETIC PEPTIDE - Abnormal    BNP 1,065 (*)    URINALYSIS, REFLEX TO URINE CULTURE - Abnormal    Specimen UA Urine, Clean Catch      Color, UA Colorless (*)     Appearance, UA Clear      pH, UA 5.0      Specific Gravity, UA 1.005      Protein, UA Negative      Glucose, UA Negative      Ketones, UA Negative      Bilirubin (UA) Negative      Occult Blood UA Negative      Nitrite, UA Negative      Leukocytes, UA Negative      Narrative:     Specimen Source->Urine   HIV 1 / 2 ANTIBODY    HIV 1/2 Ag/Ab Non-reactive      Narrative:     Release to patient->Immediate   HEPATITIS C ANTIBODY    Hepatitis C Ab Non-reactive      Narrative:     Release to patient->Immediate   LIPASE    Lipase 18     TROPONIN I    Troponin I <0.006     BETA - HYDROXYBUTYRATE, SERUM    Beta-Hydroxybutyrate 0.2       EKG Readings: (Independently Interpreted)   Initial Reading: No STEMI. Rhythm: Normal Sinus Rhythm. Heart Rate: 72. ST Segments: Normal ST Segments. T Waves: Normal.   ER  attending physician reviewed and signed - no STEMI or acute ischemic changes identified       Imaging Results              CT Head Without Contrast (Final result)  Result time 11/03/24 15:03:57      Final result by Sameer Pan DO (11/03/24 15:03:57)                   Impression:      No acute intracranial findings as detailed above specifically without evidence for acute intracranial hemorrhage or sulcal effacement to suggest large territory recent infarction    Clinical correlation and further evaluation as warranted.      Electronically signed by: Sameer Pan DO  Date:    11/03/2024  Time:    15:03               Narrative:    EXAMINATION:  CT HEAD WITHOUT CONTRAST    CLINICAL HISTORY:  Mental status change, unknown cause;    TECHNIQUE:  Multiple sequential 5 mm axial images of the head without contrast.  Coronal and sagittal reformatted imaging from the axial acquisition.    COMPARISON:  08/14/2020 MRI brain    FINDINGS:  Prominence of the extra-axial space overlying the frontal parietal lobes at the vertex likely developmental variant.  Patchy and confluent decreased attenuation supratentorial white matter while nonspecific corresponds to T2 flair signal abnormality seen on MRI may be advanced chronic ischemic change.  There is no evidence for acute intracranial hemorrhage or sulcal effacement to suggest large territory recent infarction.  No significant mass effect or midline shift.  Visualized paranasal sinuses and mastoid air cells are clear.  Small probable osteoma extends from the outer table of the left parasagittal frontal calvarium.                                       X-Ray Chest AP Portable (Final result)  Result time 11/03/24 15:55:13      Final result by Reynaldo Quiñones MD (11/03/24 15:55:13)                   Impression:      No acute abnormality.      Electronically signed by: Reynaldo Quiñones MD  Date:    11/03/2024  Time:    15:55               Narrative:    EXAMINATION:  XR CHEST AP  "PORTABLE    CLINICAL HISTORY:  Altered mental status, unspecified    TECHNIQUE:  Single frontal view of the chest was performed.    COMPARISON:  July 7, 2018    FINDINGS:  Lungs are clear.  No focal consolidation.    No effusion or pneumothorax.    No acute bone abnormality.                                       Medications - No data to display  Medical Decision Making    Vitals:    11/03/24 1321 11/03/24 1433 11/03/24 1543   BP: 135/70 (!) 153/70    BP Location:  Right arm    Patient Position:  Lying    Pulse: 76 72    Resp: 16 19    Temp:   98 °F (36.7 °C)   TempSrc:   Oral   SpO2: 100% 97%    Weight: 50.3 kg (111 lb)     Height: 5' 4" (1.626 m)         Additional MDM:     EKG: I have independently interpreted EKG(s) - see notes., EKG - Independent Interpretation - Normal sinus rhythm, normal rate, no acute changes.     X-Rays: I have independently interpreted X-Ray(s) - see notes., Chest X-Ray - Independent Interpretation - Heart size normal, Lungs clear, No acute abnormality.                      Medical Decision Making:   History:   I obtained history from: someone other than patient.       <> Summary of History: History obtained from patient, EMS, and patient's daughter   Old Medical Records: I decided to obtain old medical records.  Initial Assessment:   85 yo female, arrived via EMS due to an acute episode of decreased responsiveness. 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 Doing well this week until this morning, vomited suddenly then had blank stare and was unresponsive per family, currently back at baseline w/o complaints.   Differential Diagnosis:   Seizure, syncope, TIA, CVA, brain met, hypoglycemia, infection, AMS, medication effect, dysrhythmia, etc   Clinical Tests:   Lab Tests: Ordered and Reviewed  Radiological Study: Ordered and Reviewed  Medical Tests: Ordered " and Reviewed  ED Management:  Vital signs reviewed, benign   Records reviewed   EKG completed, no acute ischemia     Labs completed, elevated BNP level noted, has hx of CHF on Entresto, denies SOB/LINARES/CP, no tachycardia/hypoxia; Chest x ray completed, w/o evidence of hypervolemia/pulmonary edema; no significant LE edema on exam    CT brain completed, no acute findings     I discussed the case with the ER attending physician, recommends admission/observation for syncope vs possible seizure     I discussed the case with hospital medicine, recommends admission to EDOU     I discussed the case with the EDOU provider, agreeable to admission   Other:   I have discussed this case with another health care provider.             Clinical Impression:  Final diagnoses:  [R11.10] Emesis  [R41.82] AMS (altered mental status)  [R55] Syncope, unspecified syncope type (Primary)  [Z85.9] History of malignant neuroendocrine tumor  [I50.9] Acute on chronic congestive heart failure, unspecified heart failure type          ED Disposition Condition    Observation Stable                Desmond Macias, PA-C  11/03/24 8791

## 2024-11-04 VITALS
RESPIRATION RATE: 16 BRPM | SYSTOLIC BLOOD PRESSURE: 155 MMHG | BODY MASS INDEX: 18.95 KG/M2 | OXYGEN SATURATION: 97 % | HEIGHT: 64 IN | DIASTOLIC BLOOD PRESSURE: 61 MMHG | HEART RATE: 45 BPM | TEMPERATURE: 98 F | WEIGHT: 111 LBS

## 2024-11-04 PROBLEM — R55 SYNCOPE: Status: ACTIVE | Noted: 2024-11-04

## 2024-11-04 LAB
ANION GAP SERPL CALC-SCNC: 7 MMOL/L (ref 8–16)
BASOPHILS # BLD AUTO: 0.02 K/UL (ref 0–0.2)
BASOPHILS NFR BLD: 0.3 % (ref 0–1.9)
BUN SERPL-MCNC: 9 MG/DL (ref 8–23)
CALCIUM SERPL-MCNC: 8.1 MG/DL (ref 8.7–10.5)
CHLORIDE SERPL-SCNC: 102 MMOL/L (ref 95–110)
CO2 SERPL-SCNC: 24 MMOL/L (ref 23–29)
CREAT SERPL-MCNC: 0.6 MG/DL (ref 0.5–1.4)
DIFFERENTIAL METHOD BLD: ABNORMAL
EOSINOPHIL # BLD AUTO: 0 K/UL (ref 0–0.5)
EOSINOPHIL NFR BLD: 0.4 % (ref 0–8)
ERYTHROCYTE [DISTWIDTH] IN BLOOD BY AUTOMATED COUNT: 11.9 % (ref 11.5–14.5)
EST. GFR  (NO RACE VARIABLE): >60 ML/MIN/1.73 M^2
GLUCOSE SERPL-MCNC: 168 MG/DL (ref 70–110)
HCT VFR BLD AUTO: 27.7 % (ref 37–48.5)
HGB BLD-MCNC: 9.2 G/DL (ref 12–16)
IMM GRANULOCYTES # BLD AUTO: 0.05 K/UL (ref 0–0.04)
IMM GRANULOCYTES NFR BLD AUTO: 0.7 % (ref 0–0.5)
LYMPHOCYTES # BLD AUTO: 1.2 K/UL (ref 1–4.8)
LYMPHOCYTES NFR BLD: 17 % (ref 18–48)
MCH RBC QN AUTO: 31.7 PG (ref 27–31)
MCHC RBC AUTO-ENTMCNC: 33.2 G/DL (ref 32–36)
MCV RBC AUTO: 96 FL (ref 82–98)
MONOCYTES # BLD AUTO: 0.8 K/UL (ref 0.3–1)
MONOCYTES NFR BLD: 11.5 % (ref 4–15)
NEUTROPHILS # BLD AUTO: 4.8 K/UL (ref 1.8–7.7)
NEUTROPHILS NFR BLD: 70.1 % (ref 38–73)
NRBC BLD-RTO: 0 /100 WBC
OHS QRS DURATION: 82 MS
OHS QTC CALCULATION: 464 MS
PLATELET # BLD AUTO: 257 K/UL (ref 150–450)
PMV BLD AUTO: 10.2 FL (ref 9.2–12.9)
POTASSIUM SERPL-SCNC: 4.9 MMOL/L (ref 3.5–5.1)
RBC # BLD AUTO: 2.9 M/UL (ref 4–5.4)
SODIUM SERPL-SCNC: 133 MMOL/L (ref 136–145)
TROPONIN I SERPL DL<=0.01 NG/ML-MCNC: 0.01 NG/ML (ref 0–0.03)
WBC # BLD AUTO: 6.84 K/UL (ref 3.9–12.7)

## 2024-11-04 PROCEDURE — 84484 ASSAY OF TROPONIN QUANT: CPT | Mod: HCNC | Performed by: PHYSICIAN ASSISTANT

## 2024-11-04 PROCEDURE — 80048 BASIC METABOLIC PNL TOTAL CA: CPT | Mod: HCNC | Performed by: PHYSICIAN ASSISTANT

## 2024-11-04 PROCEDURE — 85025 COMPLETE CBC W/AUTO DIFF WBC: CPT | Mod: HCNC | Performed by: PHYSICIAN ASSISTANT

## 2024-11-04 PROCEDURE — 25000003 PHARM REV CODE 250: Mod: HCNC | Performed by: PHYSICIAN ASSISTANT

## 2024-11-04 PROCEDURE — G0378 HOSPITAL OBSERVATION PER HR: HCPCS | Mod: HCNC

## 2024-11-04 RX ADMIN — SACUBITRIL AND VALSARTAN 1 TABLET: 24; 26 TABLET, FILM COATED ORAL at 09:11

## 2024-11-04 RX ADMIN — FUROSEMIDE 20 MG: 20 TABLET ORAL at 09:11

## 2024-11-04 RX ADMIN — EZETIMIBE 10 MG: 10 TABLET ORAL at 09:11

## 2024-11-04 RX ADMIN — SPIRONOLACTONE 25 MG: 25 TABLET ORAL at 09:11

## 2024-11-04 RX ADMIN — CARVEDILOL 12.5 MG: 12.5 TABLET, FILM COATED ORAL at 09:11

## 2024-11-04 NOTE — PLAN OF CARE
[facility] - Emergency Dept  Initial Discharge Assessment       Primary Care Provider: Trixie Xie MD    Admission Diagnosis: Syncope, unspecified syncope type [R55]    Admission Date: 11/3/2024  Expected Discharge Date: 11/4/2024    Pt stated she is independent with her ambulation and ADL's and does not require assistance or equipment.      Pt to d/c home with no needs    Transition of Care Barriers: (P) None    Payor: [employer] MEDICARE / Plan: [employer] HMO PPO SPECIAL NEEDS / Product Type: Medicare Advantage /     Extended Emergency Contact Information  Primary Emergency Contact: Shayla Rosenthal   [facility]  Home Phone: [phone]  Mobile Phone: [phone]  Relation: Sister    Discharge Plan A: (P) Home  Discharge Plan B: (P) Home      [id number] - [place], [place] - [address] AT [facility] [address]  [address]  [place] [address]  Phone: [phone] Fax: [phone]    [facility] - [facility] [address]  [address] [facility] [address]  Phone: [phone] Fax: [phone]    [facility]  [address] [facility] [address]  [facility] [address]  Phone: [phone] Fax: [phone]      Initial Assessment (most recent)       Adult Discharge Assessment - 11/04/24 1055          Discharge Assessment    Assessment Type Discharge Planning Assessment (P)      Confirmed/corrected address, phone number and insurance Yes (P)      Confirmed Demographics Correct on Facesheet (P)      Source of Information patient (P)      Reason For Admission Syncope (P)      People in Home sibling(s) (P)      Facility Arrived From: home (P)      Do you expect to return to your current living situation? Yes (P)      Do you have help at home or someone to help you manage your care at home? No (P)      Prior to hospitilization cognitive status: Alert/Oriented;No Deficits (P)      Current cognitive status: No  Deficits;Alert/Oriented (P)      Walking or Climbing Stairs Difficulty no (P)      Dressing/Bathing Difficulty no (P)      Home Accessibility stairs to enter home (P)      Number of Stairs, Main Entrance four (P)      Home Layout Able to live on 1st floor (P)      Equipment Currently Used at Home none (P)      Patient currently being followed by outpatient case management? No (P)      Do you currently have service(s) that help you manage your care at home? No (P)      Do you have any problems affording any of your prescribed medications? No (P)      Is the patient taking medications as prescribed? yes (P)      Who is going to help you get home at discharge? family/friends (P)      How do you get to doctors appointments? family or friend will provide (P)      Are you on dialysis? No (P)      Do you take coumadin? No (P)      Discharge Plan A Home (P)      Discharge Plan B Home (P)      DME Needed Upon Discharge  none (P)      Discharge Plan discussed with: Patient (P)      Transition of Care Barriers None (P)         Physical Activity    On average, how many days per week do you engage in moderate to strenuous exercise (like a brisk walk)? 0 days (P)      On average, how many minutes do you engage in exercise at this level? 0 min (P)         Financial Resource Strain    How hard is it for you to pay for the very basics like food, housing, medical care, and heating? Not very hard (P)         Housing Stability    In the last 12 months, was there a time when you were not able to pay the mortgage or rent on time? No (P)      At any time in the past 12 months, were you homeless or living in a shelter (including now)? No (P)         Transportation Needs    Has the lack of transportation kept you from medical appointments, meetings, work or from getting things needed for daily living? No (P)         Food Insecurity    Within the past 12 months, you worried that your food would run out before you got the money to buy more.  Never true (P)      Within the past 12 months, the food you bought just didn't last and you didn't have money to get more. Never true (P)         Stress    Do you feel stress - tense, restless, nervous, or anxious, or unable to sleep at night because your mind is troubled all the time - these days? To some extent (P)         Social Isolation    How often do you feel lonely or isolated from those around you?  Rarely (P)         Alcohol Use    Q1: How often do you have a drink containing alcohol? Never (P)      Q2: How many drinks containing alcohol do you have on a typical day when you are drinking? Patient does not drink (P)      Q3: How often do you have six or more drinks on one occasion? Never (P)         Utilities    In the past 12 months has the electric, gas, oil, or water company threatened to shut off services in your home? No (P)         Health Literacy    How often do you need to have someone help you when you read instructions, pamphlets, or other written material from your doctor or pharmacy? Rarely (P)         OTHER    Name(s) of People in Home sister Shayla (P)                    Beatriz Pereira CD, MSW, LMSW, RSW   Case Management  [facility]  Email: [email or web address]

## 2024-11-04 NOTE — ED NOTES
Attempted to call sister contact in patient chart. Unable to contact family  member for patient ride

## 2024-11-04 NOTE — DISCHARGE SUMMARY
ED Observation Unit  Discharge Summary        History of Present Illness:    Patient is an 84 year old female with hypertension, diabetes, hyperlipidemia, combined diastolic and systolic heart failure, and neuroendocrine tumor with metastasis to liver and bone (with recent liver met embolization on 10/25) who presented to [facility] on 11/03/2024 with an episode of decreased responsiveness. Patient states that she has felt well since her procedure and doing well at home. She woke up this morning, had her breakfast and was preparing to watch the football game and was cooking lunch around 11:00 a.m. She states that she then vomited approximately 4 times. After this, she does not remember much until she was in the ambulance and returned to her baseline upon arrival to the emergency department. Other information was provided by her nephew who lives next door and checked on her who called the ambulance. Her sister Shayla (who is her power of  and lives with/cares for her) is at bedside and provided information as well -she arrived back home when the ambulance arrived. She states that she had a blank stare and was not responding. Upon arrival to the ED, patient was back to baseline and was completely altered. She denies abdominal pain, loose or bloody stools. She currently denies headache, dyspnea,chest pain, palpitations, numbness, weakness. She took all her morning medications.      Observation Course:    Admitted for an episode of unresponsiveness after an episode of nausea vomiting.  No focal neurological deficits.  ED workup are unremarkable aside from elevated BNP of 1065 however she is euvolemic on exam no signs of acute heart failure exacerbation.  No acute findings on head CT.  Monitored overnight with no acute events.  Suspect this was vasovagal secondary to nausea vomiting.  Comfortable with discharge home today with family.  Acute care at home services was consulted and will follow-up patient.    Consultants:     Acute care at home    Final Diagnosis:  Syncope, emesis    Discharge Condition: Good    Disposition: Home or Self Care     Time spent on the discharge of the patient including review of hospital course with the patient. reviewing discharge medications and arranging follow-up care 35 minutes.  Patient was seen and examined on the date of discharge and determined to be suitable for discharge.    Follow Up:  Future Appointments   Date Time Provider Department Center   11/5/2024 12:40 PM LAB, HEMONC CANCER BLDG [facility]   11/5/2024  1:40 PM Sebastian Granados MD [facility] HEMONC2 [facility]   11/5/2024  2:15 PM INJECTION, [facility] INFUSION [facility]   11/19/2024  9:00 AM Trixie Xie MD [facility]   11/25/2024 10:10 AM [facility]   12/2/2024  8:00 AM [facility] IR1 [facility] RAD IR [facility]   12/3/2024 10:00 AM LAB, HEMONC CANCER BLDG [facility]   12/3/2024 11:00 AM Marian Rainey PA-C [facility] HEMONC2 [facility]   12/3/2024 11:45 AM INJECTION, [facility] INFUSION [facility]   12/31/2024  9:20 AM LAB, HEMONC CANCER BLDG [facility]   12/31/2024  9:30 AM SPECIMEN, HEMON CANCER BLDG [facility] SPLABHO [facility]   12/31/2024 10:20 AM Sebastian Granados MD [facility] HEMONC2 [facility]   12/31/2024 11:00 AM INJECTION, [facility] INFUSION [facility]   2/3/2025 10:00 AM [facility]     Cardiology

## 2024-11-04 NOTE — PROGRESS NOTES
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  Patient states that she has felt well since her procedure and doing well at home.  She woke up this morning, had her breakfast and was preparing to watch the football game and was cooking lunch around 11:00 a.m. She states that she then vomited approximately 4 times.  After this, she does not remember much until she was in the ambulance and returned to her baseline upon arrival to the emergency department.  Other information was provided by her nephew who lives next door and checked on her who called the ambulance.  Her sister Shayla (who is her power of  and lives with/cares for her) is at bedside and provided information as well -she arrived back home when the ambulance arrived.  She states that she had a blank stare and was not responding.  Upon arrival to the ED, patient was back to baseline and was completely altered.  She denies abdominal pain, loose or bloody stools.  She currently denies headache, dyspnea,chest pain, palpitations, numbness, weakness. She took all her morning medications.      Interval History   ED workup: VSS  EKG: Sinus rhythm, 72 bpm. No STEMI. Occasional PVCs. Left axis deviation. QTc 464 ms. No acute changes from previous EKG from 10/26/24  Urinalysis: unremarkable   Troponin: WNL  BNP: 1065  CMP: mild hyperglycemia 179, corrected Na normal (137)  CBC: stable anemia   CXR and CT head without acute abnormality     Reassessment this morning no acute events overnight.  Resting comfortably eating breakfast.  Eager for discharge.    PMHx   Past Medical History:   Diagnosis Date    Anemia 07/11/2018    B12 deficiency     Depression      per pcp note 7/2017 and given prozac and diazepam     Hyperlipidemia     Neuroendocrine tumor     Systolic heart failure     Weight loss     reviewed prior pcp Dr. Russo notes 2017 - labs reviewed: cmp wnl x tbili 1.5, tsh wnl, A1c 5.0, cbc wnl,D 36, hiv neg, hep c neg, hep b surg ag neg       Past Surgical History:   Procedure Laterality Date    EMBOLIZATION N/A 02/14/2019    Procedure: EMBOLIZATION, BLOOD VESSEL;  Surgeon: [facility] Diagnostic Provider;  Location: [facility] OR 2ND FLR;  Service: Radiology;  Laterality: N/A;  Room 189/[place]    EMBOLIZATION N/A 03/21/2019    Procedure: EMBOLIZATION, BLOOD VESSEL;  Surgeon: [facility] Diagnostic Provider;  Location: [facility] OR 2ND FLR;  Service: Radiology;  Laterality: N/A;  Room 189/[facility]    ESOPHAGOGASTRODUODENOSCOPY  05/04/2018    esophageal ring, grade 1 esophagitis, gastritis     EYE SURGERY Bilateral     cataract removal    HYSTERECTOMY      fibroids        Family Hx   Family History   Problem Relation Name Age of Onset    Glaucoma Mother      Hypertension Mother      Heart attack Father      Cancer Father      Diabetes Sister Marilyn     Asthma Sister Nick Luke     No Known Problems Sister Stephanie     Hyperlipidemia Sister      Heart attack Sister      Cancer Brother          lung cancer, + tobacco    Diabetes Brother      Hypertension Brother      Stroke Brother      Emphysema Brother      COPD Brother          Social Hx   Social History     Socioeconomic History    Marital status:    Occupational History    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   Tobacco Use    Smoking status: Never    Smokeless tobacco: Never   Substance and Sexual Activity    Alcohol use: No     Comment: stopped in 2007 - hx of social drinking    Drug use: Never    Sexual activity: Not Currently     Partners: Male   Social History Narrative    Living in [facility]    Not getting reg exericse     Social Drivers of Health     Financial Resource Strain: Low Risk  (10/25/2024)    Overall  Financial Resource Strain (CARDIA)     Difficulty of Paying Living Expenses: Not very hard   Food Insecurity: No Food Insecurity (10/25/2024)    Hunger Vital Sign     Worried About Running Out of Food in the Last Year: Never true     Ran Out of Food in the Last Year: Never true   Transportation Needs: No Transportation Needs (10/25/2024)    TRANSPORTATION NEEDS     Transportation : No   Physical Activity: Inactive (11/10/2023)    Exercise Vital Sign     Days of Exercise per Week: 0 days     Minutes of Exercise per Session: 0 min   Stress: No Stress Concern Present (10/25/2024)    [employer] - Occupational Stress Questionnaire     Feeling of Stress : Only a little   Housing Stability: Low Risk  (10/25/2024)    Housing Stability Vital Sign     Unable to Pay for Housing in the Last Year: No     Homeless in the Last Year: No        Vital Signs   Vitals:    11/03/24 2134 11/03/24 2331 11/04/24 0533 11/04/24 0818   BP: (!) 183/86 (!) 140/63 (!) 148/55 (!) 155/61   BP Location:       Patient Position:       Pulse:  (!) 54 (!) 54 (!) 41   Resp:  17 16 16   Temp:   98.3 °F (36.8 °C) 98.3 °F (36.8 °C)   TempSrc:   Oral Oral   SpO2:  97% 97% 97%   Weight:       Height:            Review of Systems  ROS    Brief Physical Exam/Reassessment   Physical Exam  Vitals and nursing note reviewed.   Constitutional:       Appearance: Normal appearance.   HENT:      Head: Normocephalic and atraumatic.      Nose: Nose normal.   Eyes:      Conjunctiva/sclera: Conjunctivae normal.      Pupils: Pupils are equal, round, and reactive to light.   Cardiovascular:      Rate and Rhythm: Normal rate.   Pulmonary:      Effort: Pulmonary effort is normal.      Breath sounds: Normal breath sounds.   Abdominal:      General: Abdomen is flat.   Musculoskeletal:         General: Normal range of motion.   Skin:     General: Skin is warm.   Neurological:      General: No focal deficit present.      Mental Status: She is alert  and oriented to person, place, and time.         Labs/Imaging   Labs Reviewed   CBC W/ AUTO DIFFERENTIAL - Abnormal       Result Value    WBC 8.43      RBC 3.41 (*)     Hemoglobin 10.5 (*)     Hematocrit 32.2 (*)     MCV 94      MCH 30.8      MCHC 32.6      RDW 11.9      Platelets 280      MPV 10.1      Immature Granulocytes 0.9 (*)     Gran # (ANC) 6.5      Immature Grans (Abs) 0.08 (*)     Lymph # 0.9 (*)     Mono # 0.9      Eos # 0.0      Baso # 0.02      nRBC 0      Gran % 76.8 (*)     Lymph % 11.0 (*)     Mono % 10.6      Eosinophil % 0.5      Basophil % 0.2      Differential Method Automated     COMPREHENSIVE METABOLIC PANEL - Abnormal    Sodium 135 (*)     Potassium 5.1      Chloride 96      CO2 28      Glucose 179 (*)     BUN 9      Creatinine 0.8      Calcium 9.7      Total Protein 7.1      Albumin 2.8 (*)     Total Bilirubin 0.4      Alkaline Phosphatase 78      AST 20      ALT 19      eGFR >60.0      Anion Gap 11     B-TYPE NATRIURETIC PEPTIDE - Abnormal    BNP 1,065 (*)    URINALYSIS, REFLEX TO URINE CULTURE - Abnormal    Specimen UA Urine, Clean Catch      Color, UA Colorless (*)     Appearance, UA Clear      pH, UA 5.0      Specific Gravity, UA 1.005      Protein, UA Negative      Glucose, UA Negative      Ketones, UA Negative      Bilirubin (UA) Negative      Occult Blood UA Negative      Nitrite, UA Negative      Leukocytes, UA Negative      Narrative:     Specimen Source->Urine   BASIC METABOLIC PANEL - Abnormal    Sodium 133 (*)     Potassium 4.9      Chloride 102      CO2 24      Glucose 168 (*)     BUN 9      Creatinine 0.6      Calcium 8.1 (*)     Anion Gap 7 (*)     eGFR >60.0     CBC W/ AUTO DIFFERENTIAL - Abnormal    WBC 6.84      RBC 2.90 (*)     Hemoglobin 9.2 (*)     Hematocrit 27.7 (*)     MCV 96      MCH 31.7 (*)     MCHC 33.2      RDW 11.9      Platelets 257      MPV 10.2      Immature Granulocytes 0.7 (*)     Gran # (ANC) 4.8      Immature Grans (Abs) 0.05 (*)     Lymph # 1.2      Mono  # 0.8      Eos # 0.0      Baso # 0.02      nRBC 0      Gran % 70.1      Lymph % 17.0 (*)     Mono % 11.5      Eosinophil % 0.4      Basophil % 0.3      Differential Method Automated     POCT GLUCOSE - Abnormal    POCT Glucose 275 (*)    HIV 1 / 2 ANTIBODY    HIV 1/2 Ag/Ab Non-reactive      Narrative:     Release to patient->Immediate   HEPATITIS C ANTIBODY    Hepatitis C Ab Non-reactive      Narrative:     Release to patient->Immediate   LIPASE    Lipase 18     TROPONIN I    Troponin I <0.006     BETA - HYDROXYBUTYRATE, SERUM    Beta-Hydroxybutyrate 0.2     TROPONIN I    Troponin I 0.007     TROPONIN I    Troponin I 0.006     POCT GLUCOSE MONITORING CONTINUOUS      Imaging Results              CT Head Without Contrast (Final result)  Result time 11/03/24 15:03:57      Final result by Sameer Pan DO (11/03/24 15:03:57)                   Impression:      No acute intracranial findings as detailed above specifically without evidence for acute intracranial hemorrhage or sulcal effacement to suggest large territory recent infarction    Clinical correlation and further evaluation as warranted.      Electronically signed by: Sameer Pan DO  Date:    11/03/2024  Time:    15:03               Narrative:    EXAMINATION:  CT HEAD WITHOUT CONTRAST    CLINICAL HISTORY:  Mental status change, unknown cause;    TECHNIQUE:  Multiple sequential 5 mm axial images of the head without contrast.  Coronal and sagittal reformatted imaging from the axial acquisition.    COMPARISON:  08/14/2020 MRI brain    FINDINGS:  Prominence of the extra-axial space overlying the frontal parietal lobes at the vertex likely developmental variant.  Patchy and confluent decreased attenuation supratentorial white matter while nonspecific corresponds to T2 flair signal abnormality seen on MRI may be advanced chronic ischemic change.  There is no evidence for acute intracranial hemorrhage or sulcal effacement to suggest large territory recent  infarction.  No significant mass effect or midline shift.  Visualized paranasal sinuses and mastoid air cells are clear.  Small probable osteoma extends from the outer table of the left parasagittal frontal calvarium.                                       X-Ray Chest AP Portable (Final result)  Result time 11/03/24 15:55:13      Final result by Reynaldo Quiñones MD (11/03/24 15:55:13)                   Impression:      No acute abnormality.      Electronically signed by: Reynaldo Quiñones MD  Date:    11/03/2024  Time:    15:55               Narrative:    EXAMINATION:  XR CHEST AP PORTABLE    CLINICAL HISTORY:  Altered mental status, unspecified    TECHNIQUE:  Single frontal view of the chest was performed.    COMPARISON:  July 7, 2018    FINDINGS:  Lungs are clear.  No focal consolidation.    No effusion or pneumothorax.    No acute bone abnormality.                                       I reviewed all labs, imaging, EKGs.     Plan   Near syncope/ altered alertness               High suspicion for vasovagal event induced after episodes of emesis   Back to baseline               Neuro checks q4h while awake              If symptoms return, consider Neuro consult/ MRI brain/ EEG     Occasional PVCs              Seen on EKG and monitor, patient is asymptomatic               EKG if chest pain occurs or palpitations  Low suspicion for cardiogenic syncope  Trend troponin       Chronic combined systolic and diastolic CHF (congestive heart failure)  Patient is euvolemic on exam  Plan: continue home oral meds      Echo  Interpretation Summary    Left Ventricle: The left ventricle is normal in size. Ventricular mass is normal. Normal wall thickness. Moderate global hypokinesis present. There is moderately reduced systolic function with a visually estimated ejection fraction of 30 - 35%. Unable to assess diastolic function due to E-A fusion. compared to prior study LV function appears reduced.    Right Ventricle: Normal right  ventricular cavity size. Wall thickness is normal. Right ventricle wall motion  is normal. Systolic function is normal.    Left Atrium: Left atrium is mildly dilated.    Right Atrium: Normal right atrial size.    Aortic Valve: The aortic valve is a trileaflet valve. There is mild aortic valve sclerosis. There is mild annular calcification present. There is at least moderate aortic regurgitation with significant beat to beat variability that incrases to severe every 3rd of 4 th beat partly PVC related. Consider PVC suppression and re evaluating aortic regurgitation. Aortic regurigtation appears reduced. Wide pulse pressure is noted.    Mitral Valve: There is moderate bileaflet sclerosis. Moderately calcified leaflets. There is mild to moderate regurgitation with a posterolateral eccentriccally directed jet.    Aorta: Aortic root is normal in size measuring 2.99 cm. Ascending aorta is normal measuring 2.92 cm.    Pulmonary Artery: There is mild to moderate pulmonary hypertension. The estimated pulmonary artery systolic pressure is 40 mmHg.    IVC/SVC: Normal venous pressure at 3 mmHg.        Essential hypertension  Continue home meds       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  Patient had liver mets embolization by IR 10/25.   Continue home meds      Anemia  Anemia is stable likely due to  possible blood loss as she had hepatic met embolization on 10/25/24     Discharge planning:              Lives with sister (POA)              They are interested in [facility] home services               Cards outpatient f/u 11/4 at 10:20 am and Heme Onc on 11/5      Case was discussed with the ED provider, TEENA Stevens

## 2024-11-04 NOTE — ED NOTES
Patient identifiers for [place] 84 y.o. female checked and correct.  Chief Complaint   Patient presents with    Emesis     Patient states that she does not feel well. Vomited 4 times today. Sister at bedside states that grandson visited patient and patient was not herself. He noted that patient was just staring off and not responding to questions.      Past Medical History:   Diagnosis Date    Anemia 07/11/2018    B12 deficiency     Depression     per pcp note 7/2017 and given prozac and diazepam     Hyperlipidemia     Neuroendocrine tumor     Systolic heart failure     Weight loss     reviewed prior pcp Dr. Russo notes 2017 - labs reviewed: cmp wnl x tbili 1.5, tsh wnl, A1c 5.0, cbc wnl,D 36, hiv neg, hep c neg, hep b surg ag neg      Allergies reported:   Review of patient's allergies indicates:   Allergen Reactions    Epinephrine      carcinoid       Appearance: Pt awake, alert & oriented to person, place & time. Pt in no acute distress at present time. Pt is clean and well groomed with clothes appropriately fastened.   Skin: Skin warm, dry & intact. Color consistent with ethnicity. Mucous membranes moist. No breakdown or brusing noted.   Musculoskeletal: Patient moving all extremities well, no obvious swelling or deformities noted.   Respiratory: Respirations spontaneous, even, and non-labored. Visible chest rise noted. Airway is open and patent. No accessory muscle use noted.   Neurologic: Sensation is intact. Speech is clear and appropriate. Eyes open spontaneously, behavior appropriate to situation, follows commands, facial expression symmetrical, bilateral hand grasp equal and even, purposeful motor response noted.  Cardiac: All peripheral pulses present. No Bilateral lower extremity edema. Cap refill is <3 seconds.  Abdomen: Abdomen soft, non distended, non tender to palpation.   : Pt voids independently, denies dysuria, hematuria, frequency.

## 2024-11-05 ENCOUNTER — PATIENT OUTREACH (OUTPATIENT)
Dept: ADMINISTRATIVE | Facility: CLINIC | Age: 84
End: 2024-11-05
Payer: MEDICARE

## 2024-11-05 ENCOUNTER — INFUSION (OUTPATIENT)
Dept: INFUSION THERAPY | Facility: HOSPITAL | Age: 84
End: 2024-11-05
Payer: MEDICARE

## 2024-11-05 ENCOUNTER — OFFICE VISIT (OUTPATIENT)
Dept: HEMATOLOGY/ONCOLOGY | Facility: CLINIC | Age: 84
End: 2024-11-05
Payer: MEDICARE

## 2024-11-05 ENCOUNTER — LAB VISIT (OUTPATIENT)
Dept: LAB | Facility: HOSPITAL | Age: 84
End: 2024-11-05
Payer: MEDICARE

## 2024-11-05 VITALS
WEIGHT: 109 LBS | OXYGEN SATURATION: 98 % | SYSTOLIC BLOOD PRESSURE: 168 MMHG | DIASTOLIC BLOOD PRESSURE: 66 MMHG | BODY MASS INDEX: 18.61 KG/M2 | RESPIRATION RATE: 17 BRPM | TEMPERATURE: 98 F | HEART RATE: 76 BPM | HEIGHT: 64 IN

## 2024-11-05 DIAGNOSIS — E34.00 CARCINOID SYNDROME: ICD-10-CM

## 2024-11-05 DIAGNOSIS — Z79.899 IMMUNODEFICIENCY DUE TO DRUGS: ICD-10-CM

## 2024-11-05 DIAGNOSIS — E11.29 CONTROLLED TYPE 2 DIABETES MELLITUS WITH MICROALBUMINURIA, WITHOUT LONG-TERM CURRENT USE OF INSULIN: ICD-10-CM

## 2024-11-05 DIAGNOSIS — D84.81 IMMUNODEFICIENCY SECONDARY TO NEOPLASM: ICD-10-CM

## 2024-11-05 DIAGNOSIS — R80.9 CONTROLLED TYPE 2 DIABETES MELLITUS WITH MICROALBUMINURIA, WITHOUT LONG-TERM CURRENT USE OF INSULIN: ICD-10-CM

## 2024-11-05 DIAGNOSIS — C79.51 METASTASIS TO SPINAL COLUMN: ICD-10-CM

## 2024-11-05 DIAGNOSIS — C7B.8 SECONDARY NEUROENDOCRINE TUMOR OF BONE: ICD-10-CM

## 2024-11-05 DIAGNOSIS — C7A.012 MALIGNANT CARCINOID TUMOR OF ILEUM: ICD-10-CM

## 2024-11-05 DIAGNOSIS — D84.821 IMMUNODEFICIENCY DUE TO DRUGS: ICD-10-CM

## 2024-11-05 DIAGNOSIS — I50.9 CHRONIC CONGESTIVE HEART FAILURE, UNSPECIFIED HEART FAILURE TYPE: ICD-10-CM

## 2024-11-05 DIAGNOSIS — C7B.8 METASTATIC MALIGNANT NEUROENDOCRINE TUMOR TO LIVER: Primary | ICD-10-CM

## 2024-11-05 DIAGNOSIS — I10 ESSENTIAL HYPERTENSION: ICD-10-CM

## 2024-11-05 DIAGNOSIS — C7B.8 METASTATIC MALIGNANT NEUROENDOCRINE TUMOR TO LIVER: ICD-10-CM

## 2024-11-05 DIAGNOSIS — R11.0 NAUSEA: ICD-10-CM

## 2024-11-05 DIAGNOSIS — D49.9 IMMUNODEFICIENCY SECONDARY TO NEOPLASM: ICD-10-CM

## 2024-11-05 LAB
ALBUMIN SERPL BCP-MCNC: 2.8 G/DL (ref 3.5–5.2)
ALP SERPL-CCNC: 79 U/L (ref 40–150)
ALT SERPL W/O P-5'-P-CCNC: 17 U/L (ref 10–44)
ANION GAP SERPL CALC-SCNC: 13 MMOL/L (ref 8–16)
AST SERPL-CCNC: 16 U/L (ref 10–40)
BASOPHILS # BLD AUTO: 0.03 K/UL (ref 0–0.2)
BASOPHILS NFR BLD: 0.5 % (ref 0–1.9)
BILIRUB SERPL-MCNC: 0.3 MG/DL (ref 0.1–1)
BUN SERPL-MCNC: 8 MG/DL (ref 8–23)
CALCIUM SERPL-MCNC: 9.6 MG/DL (ref 8.7–10.5)
CHLORIDE SERPL-SCNC: 98 MMOL/L (ref 95–110)
CO2 SERPL-SCNC: 26 MMOL/L (ref 23–29)
CREAT SERPL-MCNC: 0.7 MG/DL (ref 0.5–1.4)
DIFFERENTIAL METHOD BLD: ABNORMAL
EOSINOPHIL # BLD AUTO: 0 K/UL (ref 0–0.5)
EOSINOPHIL NFR BLD: 0.5 % (ref 0–8)
ERYTHROCYTE [DISTWIDTH] IN BLOOD BY AUTOMATED COUNT: 11.8 % (ref 11.5–14.5)
EST. GFR  (NO RACE VARIABLE): >60 ML/MIN/1.73 M^2
GLUCOSE SERPL-MCNC: 161 MG/DL (ref 70–110)
HCT VFR BLD AUTO: 32.1 % (ref 37–48.5)
HGB BLD-MCNC: 10.3 G/DL (ref 12–16)
IMM GRANULOCYTES # BLD AUTO: 0.04 K/UL (ref 0–0.04)
IMM GRANULOCYTES NFR BLD AUTO: 0.6 % (ref 0–0.5)
LYMPHOCYTES # BLD AUTO: 1 K/UL (ref 1–4.8)
LYMPHOCYTES NFR BLD: 15.5 % (ref 18–48)
MCH RBC QN AUTO: 31 PG (ref 27–31)
MCHC RBC AUTO-ENTMCNC: 32.1 G/DL (ref 32–36)
MCV RBC AUTO: 97 FL (ref 82–98)
MONOCYTES # BLD AUTO: 0.6 K/UL (ref 0.3–1)
MONOCYTES NFR BLD: 9.7 % (ref 4–15)
NEUTROPHILS # BLD AUTO: 4.8 K/UL (ref 1.8–7.7)
NEUTROPHILS NFR BLD: 73.2 % (ref 38–73)
NRBC BLD-RTO: 0 /100 WBC
PLATELET # BLD AUTO: 314 K/UL (ref 150–450)
PMV BLD AUTO: 9.4 FL (ref 9.2–12.9)
POTASSIUM SERPL-SCNC: 4.6 MMOL/L (ref 3.5–5.1)
PROT SERPL-MCNC: 6.9 G/DL (ref 6–8.4)
RBC # BLD AUTO: 3.32 M/UL (ref 4–5.4)
SODIUM SERPL-SCNC: 137 MMOL/L (ref 136–145)
WBC # BLD AUTO: 6.57 K/UL (ref 3.9–12.7)

## 2024-11-05 PROCEDURE — 99999 PR PBB SHADOW E&M-EST. PATIENT-LVL IV: CPT | Mod: PBBFAC,HCNC,, | Performed by: INTERNAL MEDICINE

## 2024-11-05 PROCEDURE — 80053 COMPREHEN METABOLIC PANEL: CPT | Mod: HCNC | Performed by: PHYSICIAN ASSISTANT

## 2024-11-05 PROCEDURE — 99215 OFFICE O/P EST HI 40 MIN: CPT | Mod: HCNC,S$GLB,, | Performed by: INTERNAL MEDICINE

## 2024-11-05 PROCEDURE — 1159F MED LIST DOCD IN RCRD: CPT | Mod: HCNC,CPTII,S$GLB, | Performed by: INTERNAL MEDICINE

## 2024-11-05 PROCEDURE — 36415 COLL VENOUS BLD VENIPUNCTURE: CPT | Mod: HCNC | Performed by: PHYSICIAN ASSISTANT

## 2024-11-05 PROCEDURE — 3288F FALL RISK ASSESSMENT DOCD: CPT | Mod: HCNC,CPTII,S$GLB, | Performed by: INTERNAL MEDICINE

## 2024-11-05 PROCEDURE — G2211 COMPLEX E/M VISIT ADD ON: HCPCS | Mod: HCNC,S$GLB,, | Performed by: INTERNAL MEDICINE

## 2024-11-05 PROCEDURE — 63600175 PHARM REV CODE 636 W HCPCS: Mod: JZ,JG,HCNC | Performed by: INTERNAL MEDICINE

## 2024-11-05 PROCEDURE — 83519 RIA NONANTIBODY: CPT | Mod: HCNC | Performed by: PHYSICIAN ASSISTANT

## 2024-11-05 PROCEDURE — 96372 THER/PROPH/DIAG INJ SC/IM: CPT | Mod: HCNC

## 2024-11-05 PROCEDURE — 1101F PT FALLS ASSESS-DOCD LE1/YR: CPT | Mod: HCNC,CPTII,S$GLB, | Performed by: INTERNAL MEDICINE

## 2024-11-05 PROCEDURE — 83497 ASSAY OF 5-HIAA: CPT | Mod: HCNC | Performed by: PHYSICIAN ASSISTANT

## 2024-11-05 PROCEDURE — 1160F RVW MEDS BY RX/DR IN RCRD: CPT | Mod: HCNC,CPTII,S$GLB, | Performed by: INTERNAL MEDICINE

## 2024-11-05 PROCEDURE — 1157F ADVNC CARE PLAN IN RCRD: CPT | Mod: HCNC,CPTII,S$GLB, | Performed by: INTERNAL MEDICINE

## 2024-11-05 PROCEDURE — 84260 ASSAY OF SEROTONIN: CPT | Mod: HCNC | Performed by: PHYSICIAN ASSISTANT

## 2024-11-05 PROCEDURE — 1126F AMNT PAIN NOTED NONE PRSNT: CPT | Mod: HCNC,CPTII,S$GLB, | Performed by: INTERNAL MEDICINE

## 2024-11-05 PROCEDURE — 85025 COMPLETE CBC W/AUTO DIFF WBC: CPT | Mod: HCNC | Performed by: PHYSICIAN ASSISTANT

## 2024-11-05 RX ORDER — LANREOTIDE ACETATE 120 MG/.5ML
120 INJECTION SUBCUTANEOUS
OUTPATIENT
Start: 2024-11-05

## 2024-11-05 RX ORDER — LANREOTIDE ACETATE 120 MG/.5ML
120 INJECTION SUBCUTANEOUS
Status: DISCONTINUED | OUTPATIENT
Start: 2024-11-05 | End: 2024-11-05 | Stop reason: HOSPADM

## 2024-11-05 RX ORDER — ONDANSETRON 8 MG/1
8 TABLET, ORALLY DISINTEGRATING ORAL EVERY 8 HOURS PRN
Qty: 30 TABLET | Refills: 2 | Status: SHIPPED | OUTPATIENT
Start: 2024-11-05

## 2024-11-05 RX ADMIN — LANREOTIDE ACETATE 120 MG: 120 INJECTION SUBCUTANEOUS at 03:11

## 2024-11-05 NOTE — PROGRESS NOTES
"    Subjective:       Patient ID: Shahram Hills is an 84 y.o. female.     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 Ms. Hills is an 77 yo woman who initially saw me on 6/7/18 for further evaluation of neuroendocrine tumor. She initially presented with diarrhea, abdominal discomfort, weight loss. She was evaluated at [facility] and underwent workup below:  US abdomen on 3/14/18 showed numerous bilobar mixed echogenicity and mildly vascular hepatic lesions. Cholelithiasis without e/o acute cholecystitis.   MRI abdomen on 3/30/2018 showed innumerable hepatic metastases. L3 vertebral body metastasis with soft tissue component along the right margin of the L3 and upper L4 vertebral bodies, filling the right L3/4 neural foramen, and extending into the anterior aspect of the spinal canal on the right at the L3 and upper T4 levels. Associated with mild spinal canal narrowing.  CT chest on 4/6/2018 showed one lucent nodule measuring 7 mm in the T7 vertebral body, most likely representing metastatic disease.    EGD on 5/4/18 showed normal duodenum. Schatzki's ring in the lower third of the esophagus. Grade 1 esophagitis in the GE junction c/w mild distal esophagitis. Congestion and erythema in the antrum, pre-pyloric region and stomach body c/w gastritis. Biopsy of the stomach antrum showed mild chronic gastritis, neg for H. pylori.   Labs on 5/9/2018: WBC 6.9, H/H 11.6/34.3, MCV 87.5, plt count 279. On 3/9/18: Vit B12 level 173, folic acid 6.6  She was started on oral vit B12 and underwent a liver biopsy on 5/18/18. Pathology showed "metastatic well differentiated neuroendocrine tumor. Ki67 3%"     She saw me on 6/7/18 and complained of weight loss of 26 lbs over the past 3-4 months, also has diarrhea. No flushing, wheezing, palpitations. Has been having pain in right flank radiating " "down the right leg. No tingling/numbness, weakness. She can hold her bladder but when she urinates her diarrhea would come out as well. Started on dex 4 mg q6h the evening of 6/7/18.      2. MRI spine on 6/8/18 showed "Marrow replacing metastatic lesion of the L3 vertebral body with associated extra osseous expansion and complete effacement of the right L3-L4 neural foramina and additional effacement of the right lateral recess.  There is additional lateral extension and abutment of the right psoas muscle.  Superimposed degenerative change at this level results in mild spinal canal stenosis." I sent the patient to ED on 6/9/18 where she was evaluated by neurosurgery. Neurosurgery did not feel she was a candidate for surgical intervention.      3. Seen by Dr. Adames on 6/11/18. palliative radiation to the area of the L3 metastasis 6/18/18-6/29/18   4. Gallium study on 6/13/18: Distal ileal primary neoplasm consistent with a carcinoid.  There is an adjacent metastatic lymph node, diffuse liver metastases, and multiple bone metastases. Index primary neoplasm SUV max 33.13. Adjacent lymph node SUV max 23. L3 bone metastasis SUV max 36.86. Left lobe SUV max 46.13   5. Lanreotide every 4 weeks started on 6/22/18  6. TACE to the right hepatic lobe on 2/14/19. TACE to the left hepatic lobe on 3/21/19.   7. TACE to the right hepatic lobe on 2/11/22. S/p conventional chemoembolization performed of the segment 2, 3, 4 branch of the left hepatic artery and segment 8 branch of the left hepatic artery on 4/22/22.  8. Gallium PET 11/1/22 showed progression. Discussed PRRT. PRRT #1 on 12/14/2022. Zometa every 3 months started 12/27/22. PRRT #2 on 2/8/23. #3 on 4/12/23. #4 on 6/14/23.   9. Had bland embo on 10/25/24.      History of Present Illness:   Ms. Hills returns for follow up. Feeling well now. Had bland embo on 10/25/24. On 11/3/24, she was found to have N/V then was staring blankly, not responding. She was sent to ED. " Workup unremarkable. It was felt that she had vasovagal.     ECO     ROS:   See HPI. Otherwise negative.      Physical Examination:   Vital signs reviewed.   Gen: well hydrated, well developed, in no acute distress.  HEENT: normocephalic, anicteric, PERRLA, EOMI  Neck: supple, no JVD, thyromegaly, cervical or supraclavicular LAD  Lungs: CTAB, no wheezes or rales  Heart: regular rate, regular pulse, no M/R/G  Abdomen: soft, no tenderness, non-distended, no hepatomegaly, no splenomegaly, no mass, or hernia.   Ext: b/l LE  no edema  Neuro: alert and oriented x 4, no focal neuro deficit  Skin: no rash, open wounds or ulcers  Psych: pleasant and appropriate mood and affect     Objective:      Laboratory Data:  Labs reviewed. CBC, CMP adequate for treatment    Imaging Data:    Copper PET 24:  Impression:     Similar distribution and uptake of tracer avid disease involving the distal ileum, liver, and bones. Index lesions as above.  No definite new tracer avid lesion.     MRI 24:  Impression:     History of metastatic small bowel carcinoid.     Thickened loop of small bowel in the midline pelvis in the location of the known primary tumor, but incompletely characterized on this exam.     Partially imaged mesenteric montserrat conglomerate in the left lower quadrant.     Numerous liver metastases.  Some have decreased in size and demonstrate increased central hypoenhancement, compatible with treatment response.  Others are new from 2023.     Unchanged osseous metastasis in the L3 vertebral body.     Other findings as described.    MRI abdomen 9/3/24:  Impression:     History of metastatic small bowel carcinoid.  Thickened loop of small bowel in the midline pelvis in the location of the known primary tumor, incompletely characterized on current exam.     Numerous hepatic metastases, many are stable, some new and some are enlarged, as detailed above.     Unchanged osseous metastasis in the L3 vertebral body.        Assessment and Plan:      1. 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 Malignant carcinoid tumor of ileum    3. Secondary neuroendocrine tumor of bone    4. Metastasis to spinal column    5. 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 Carcinoid syndrome    8. Controlled type 2 diabetes mellitus with microalbuminuria, without long-term current use of insulin    9. Essential hypertension    10. Chronic congestive heart failure, unspecified heart failure type    11. Nausea        1-6  - Ms Hills is an 85 yo woman with well differentiated low-grade neuroendocrine tumor of the ileum with mets to liver and bones, diagnosed on 5/18/2018. Started lanreotide every 4 weeks on 6/22/2018. S/p TACE to the right hepatic lobe on 2/14/19. TACE to the left hepatic lobe on 3/21/19.   - CT/MRI 1/12/22 showed mild progression in the liver. S/p TACE on 2/11/22 and 4/22/22   - Gallium PET 11/1/22 showed progression. Discussed PRRT. PRRT #1 on 12/14/2022. Completed 4 cycles on 6/14/23. Zometa every 3 months started 12/27/22.  - MRI in Sep 2024 showed progression in the liver.   - s/p bland embo on 10/25/24. Scheduled for bland embo on 12/2/24  - niece who is on the phone has a lot of questions re how she is being treated. Discussed lanreotide for systemic disease. Liver-directed therapy for progression in the liver. Long discussion re the rationale and benefit of liver-directed therapy and how that prolongs survival. Discussed if there is systemic therapy, we will use chemo. Given her age, we will need to be careful with chemo side effects  - c/w lanreotide every 4 weeks and zometa every 3 months. Got zometa in Sep 2024. Next due in December  - RTC in 4 weeks for continued lanreotide    7.  - better after xermelo and PRRT  - c/w lanreotide every 4 weeks.   - c/w xermelo tid  - could not tolerate octreotide    8.  - BS controlled  - f/u with PCP    9.  - BP controlled  - c/w  current medication    10.  - reviewed echo results. She does have CHF but not symptomatic right now  - f/u with Dr Bergeron    11.  - sent zofran    I spent 50 minutes reviewing the chart, interpreting laboratory result and imaging data, coordinating patient's care, with at least 50% of the time on face-to-face counseling.       Sebastian Granados MD  Hematology and Medical Oncology  [facility]      Route Chart for Scheduling    Med Onc Chart Routing  Urgent    Follow up with physician . Patient will be in the hospital on 12/3. please move lab, visit, injection on 12/3 to 12/6. 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    Therapy Plan Information  LANREOTIDE for 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, noted on 6/7/2018  5. Medications  lanreotide injection 120 mg  120 mg, Subcutaneous, Every visit    LUTATHERA SUPPORT for 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, noted on 6/7/2018  LUTATHERA SUPPORT for Neuroendocrine carcinoma metastatic to bone, noted on 7/11/2018  LUTATHERA SUPPORT for Malignant carcinoid tumor of ileum, noted on 8/17/2018  None      No therapy plan of the specified type found.

## 2024-11-08 LAB — 5OH-INDOLEACETATE SERPL-MCNC: 156 NG/ML

## 2024-11-19 ENCOUNTER — OFFICE VISIT (OUTPATIENT)
Dept: INTERNAL MEDICINE | Facility: CLINIC | Age: 84
End: 2024-11-19
Attending: INTERNAL MEDICINE
Payer: MEDICARE

## 2024-11-19 VITALS
DIASTOLIC BLOOD PRESSURE: 75 MMHG | HEART RATE: 85 BPM | WEIGHT: 110 LBS | BODY MASS INDEX: 18.78 KG/M2 | OXYGEN SATURATION: 98 % | HEIGHT: 64 IN | SYSTOLIC BLOOD PRESSURE: 132 MMHG

## 2024-11-19 DIAGNOSIS — R80.9 CONTROLLED TYPE 2 DIABETES MELLITUS WITH MICROALBUMINURIA, WITHOUT LONG-TERM CURRENT USE OF INSULIN: Primary | ICD-10-CM

## 2024-11-19 DIAGNOSIS — E11.29 CONTROLLED TYPE 2 DIABETES MELLITUS WITH MICROALBUMINURIA, WITHOUT LONG-TERM CURRENT USE OF INSULIN: Primary | ICD-10-CM

## 2024-11-19 DIAGNOSIS — I50.42 CHRONIC COMBINED SYSTOLIC AND DIASTOLIC CHF (CONGESTIVE HEART FAILURE): ICD-10-CM

## 2024-11-19 DIAGNOSIS — I10 ESSENTIAL HYPERTENSION: ICD-10-CM

## 2024-11-19 DIAGNOSIS — E53.8 B12 DEFICIENCY: ICD-10-CM

## 2024-11-19 PROCEDURE — 3288F FALL RISK ASSESSMENT DOCD: CPT | Mod: HCNC,CPTII,S$GLB, | Performed by: INTERNAL MEDICINE

## 2024-11-19 PROCEDURE — 1126F AMNT PAIN NOTED NONE PRSNT: CPT | Mod: HCNC,CPTII,S$GLB, | Performed by: INTERNAL MEDICINE

## 2024-11-19 PROCEDURE — 99214 OFFICE O/P EST MOD 30 MIN: CPT | Mod: HCNC,S$GLB,, | Performed by: INTERNAL MEDICINE

## 2024-11-19 PROCEDURE — 1157F ADVNC CARE PLAN IN RCRD: CPT | Mod: HCNC,CPTII,S$GLB, | Performed by: INTERNAL MEDICINE

## 2024-11-19 PROCEDURE — 99999 PR PBB SHADOW E&M-EST. PATIENT-LVL V: CPT | Mod: PBBFAC,HCNC,, | Performed by: INTERNAL MEDICINE

## 2024-11-19 PROCEDURE — G2211 COMPLEX E/M VISIT ADD ON: HCPCS | Mod: HCNC,S$GLB,, | Performed by: INTERNAL MEDICINE

## 2024-11-19 PROCEDURE — 1101F PT FALLS ASSESS-DOCD LE1/YR: CPT | Mod: HCNC,CPTII,S$GLB, | Performed by: INTERNAL MEDICINE

## 2024-11-19 PROCEDURE — 1159F MED LIST DOCD IN RCRD: CPT | Mod: HCNC,CPTII,S$GLB, | Performed by: INTERNAL MEDICINE

## 2024-11-19 PROCEDURE — 1160F RVW MEDS BY RX/DR IN RCRD: CPT | Mod: HCNC,CPTII,S$GLB, | Performed by: INTERNAL MEDICINE

## 2024-11-19 NOTE — PROGRESS NOTES
Subjective:   Patient ID: Shahram Hills is a 84 y.o. female  Chief complaint:   Chief Complaint   Patient presents with    Diabetes     F/u     HPI  Here for diabetes follow up    Accomp by sister today     HTN:  Home readings 121/75, hr 61  In general 120-140/60-70s  Due for meds now - did not take this am   Prev:  Today well controlled on home readings   Bp 106/58 today - less po intake over past week or so as above - monitoring  - c/w meds on med card  - taking lasix daily, coreg, entresto, aldactone - takes in am daily   Previously:   Prev switched to coreg from amlodipine    Diabetes:   A1c 6.6 <- 6.6 <- 6.1<- 6.1 <-6.3<- 6.6  Bg 140-180s  - typically eating right before recent labs   - well controlled and henry metformin 500 daily  - no lows   At [facility]   morning bg 140-160s but eating sugary snack late at night - ice cream, pie or cookies   - eats breakfast around 9-10am  Foot exam utd 8/2024  Utd on eye exam 11/2023 - due dilated - goes to [facility]   Urine screening due   No lows     PVCs, compensated chronic systolic and diastolic HF (EF 40% on echo 9/2022 and worse on echo from recent hospitalization per cards note):  No further ankle swelling since taking lasix daily   Henry current regimen as above   No aranda or sob  Prev:  - started lasix at [facility] due to acute HF exacerbation - henry daily lasix - no further ankle swelling since then   Previously:   - noticed ankle swelling - at baseline gets ankle swelling worse in afternoon - better in am   Cards: Elyssa  Previously:    - 2/25/2022 for hospital discharge had TACE right hep lobe and found to have frequent PVCs  - we resumed losartan at that time   - seen by cards 3/14/2022 and completed holter - at this time no tx indicated    B12 def:  - is taking supplement daily - discussed dec to qod    ### not addressed today ####    Metastatic neuroendocrine tumor to liver, bones and LN dx 5/2018 s/p TACE to R hep lobe 2/2019 and L hep loab 3/2019, TACE to R 2/2022  "and 4/2022, palliative radiation to the area of the L3 metastasis 6/18/18-6/29/18:  - f/u with h/o 10/2024   - had embolization as above   - lanreotide every 4 weeks and zometa every 3 months   Prev:   Saw h/o 4/23/2024 - on Xermelo TID and lanreotide q4 weeks  12/2023 imaging showed stable disease  Prev:  - pET 11/2022 showed progression   - PRRT #1 on 12/14/2022. Zometa every 3 months started 12/27/22. PRRT #2 on 2/8  Followed by h/o - H last clinic visit March 8, 2023  Previously:   - 2/25/2022 for hospital discharge had TACE right hep lobe and found to have frequent PVCs  - we resumed losartan at that time   - seen by cards 3/14/2022 and completed holter - at this time no tx indicated    ##########    H/o: Du  Cards: Elyssa  Pod: Charles    HM:  Covid booster   Rsv  flu  Urine screen  Foot exam utd  Eye exam utd     Review of Systems    Objective:  Vitals:    11/19/24 0849 11/19/24 0943   BP: (!) 152/72 132/75   BP Location: Left arm    Patient Position: Sitting    Pulse: 85    SpO2: 98%    Weight: 49.9 kg (110 lb 0.2 oz)    Height: 5' 4" (1.626 m)      Body mass index is 18.88 kg/m².    Physical Exam  Vitals reviewed.   Constitutional:       Appearance: Normal appearance. She is well-developed.   HENT:      Head: Normocephalic and atraumatic.      Right Ear: Tympanic membrane, ear canal and external ear normal.      Left Ear: Tympanic membrane, ear canal and external ear normal.      Nose: Nose normal. No congestion.      Mouth/Throat:      Mouth: Mucous membranes are moist.      Pharynx: Oropharynx is clear. No oropharyngeal exudate.   Eyes:      Extraocular Movements: Extraocular movements intact.      Conjunctiva/sclera: Conjunctivae normal.   Neck:      Thyroid: No thyromegaly.   Cardiovascular:      Rate and Rhythm: Normal rate and regular rhythm.      Pulses: Normal pulses.      Heart sounds: Normal heart sounds.   Pulmonary:      Effort: Pulmonary effort is normal.      Breath sounds: Normal breath sounds. " "  Abdominal:      General: Bowel sounds are normal.      Palpations: Abdomen is soft. There is mass (ruq mass).   Musculoskeletal:         General: No swelling or tenderness. Normal range of motion.      Cervical back: Normal range of motion and neck supple.   Lymphadenopathy:      Cervical: No cervical adenopathy.   Skin:     General: Skin is warm and dry.      Capillary Refill: Capillary refill takes less than 2 seconds.      Nails: There is no clubbing.   Neurological:      General: No focal deficit present.      Mental Status: She is alert and oriented to person, place, and time. Mental status is at baseline.      Gait: Gait normal.   Psychiatric:         Mood and Affect: Mood normal.         Speech: Speech normal.         Behavior: Behavior normal.         Thought Content: Thought content normal.         Judgment: Judgment normal.       Assessment:  1. Controlled type 2 diabetes mellitus with microalbuminuria, without long-term current use of insulin    2. B12 deficiency    3. Chronic combined systolic and diastolic CHF (congestive heart failure)    4. Essential hypertension      Plan  Shahram Farmer" was seen today for diabetes.    Diagnoses and all orders for this visit:    Controlled type 2 diabetes mellitus with microalbuminuria, without long-term current use of insulin  Stable   Cont regimen   Monitor A1c and fasting bg   Diabetic diet     B12 deficiency  Stable   Cont suppl     Chronic combined systolic and diastolic CHF (congestive heart failure)  Euvolemic   Cont meds     Essential hypertension  Stable   Cont meds     Visit today included increased complexity associated with the care of the episodic problem htn, b12 def addressed and managing the longitudinal care of the patient due to the serious and/or complex managed problem(s) heart failure, diabetes      Health Maintenance   Topic Date Due    Eye Exam  11/04/2024    Hemoglobin A1c  02/13/2025    Lipid Panel  08/13/2025    TETANUS VACCINE  " 12/10/2029    Shingles Vaccine  Completed

## 2024-11-26 ENCOUNTER — OFFICE VISIT (OUTPATIENT)
Dept: CARDIOLOGY | Facility: CLINIC | Age: 84
End: 2024-11-26
Payer: MEDICARE

## 2024-11-26 ENCOUNTER — LAB VISIT (OUTPATIENT)
Dept: LAB | Facility: OTHER | Age: 84
End: 2024-11-26
Attending: PHYSICIAN ASSISTANT
Payer: MEDICARE

## 2024-11-26 DIAGNOSIS — E11.29 CONTROLLED TYPE 2 DIABETES MELLITUS WITH MICROALBUMINURIA, WITHOUT LONG-TERM CURRENT USE OF INSULIN: ICD-10-CM

## 2024-11-26 DIAGNOSIS — I50.42 HYPERTENSIVE HEART DISEASE WITH CHRONIC COMBINED SYSTOLIC AND DIASTOLIC CONGESTIVE HEART FAILURE: Primary | ICD-10-CM

## 2024-11-26 DIAGNOSIS — I10 PRIMARY HYPERTENSION: ICD-10-CM

## 2024-11-26 DIAGNOSIS — C7B.8 METASTATIC MALIGNANT NEUROENDOCRINE TUMOR TO LIVER: ICD-10-CM

## 2024-11-26 DIAGNOSIS — R80.9 CONTROLLED TYPE 2 DIABETES MELLITUS WITH MICROALBUMINURIA, WITHOUT LONG-TERM CURRENT USE OF INSULIN: ICD-10-CM

## 2024-11-26 DIAGNOSIS — C7B.8 METASTATIC MALIGNANT NEUROENDOCRINE TUMOR TO LIVER: Primary | ICD-10-CM

## 2024-11-26 DIAGNOSIS — I11.0 HYPERTENSIVE HEART DISEASE WITH CHRONIC COMBINED SYSTOLIC AND DIASTOLIC CONGESTIVE HEART FAILURE: Primary | ICD-10-CM

## 2024-11-26 LAB
ALBUMIN SERPL BCP-MCNC: 3.7 G/DL (ref 3.5–5.2)
ALP SERPL-CCNC: 68 U/L (ref 40–150)
ALT SERPL W/O P-5'-P-CCNC: 12 U/L (ref 10–44)
ANION GAP SERPL CALC-SCNC: 11 MMOL/L (ref 8–16)
AST SERPL-CCNC: 20 U/L (ref 10–40)
BASOPHILS # BLD AUTO: 0.02 K/UL (ref 0–0.2)
BASOPHILS NFR BLD: 0.4 % (ref 0–1.9)
BILIRUB DIRECT SERPL-MCNC: 0.2 MG/DL (ref 0.1–0.3)
BILIRUB SERPL-MCNC: 0.4 MG/DL (ref 0.1–1)
BUN SERPL-MCNC: 8 MG/DL (ref 8–23)
CALCIUM SERPL-MCNC: 9.8 MG/DL (ref 8.7–10.5)
CHLORIDE SERPL-SCNC: 102 MMOL/L (ref 95–110)
CO2 SERPL-SCNC: 29 MMOL/L (ref 23–29)
CREAT SERPL-MCNC: 0.8 MG/DL (ref 0.5–1.4)
DIFFERENTIAL METHOD BLD: ABNORMAL
EOSINOPHIL # BLD AUTO: 0 K/UL (ref 0–0.5)
EOSINOPHIL NFR BLD: 0.6 % (ref 0–8)
ERYTHROCYTE [DISTWIDTH] IN BLOOD BY AUTOMATED COUNT: 13.3 % (ref 11.5–14.5)
EST. GFR  (NO RACE VARIABLE): >60 ML/MIN/1.73 M^2
ESTIMATED AVG GLUCOSE: 131 MG/DL (ref 68–131)
GLUCOSE SERPL-MCNC: 158 MG/DL (ref 70–110)
HBA1C MFR BLD: 6.2 % (ref 4–5.6)
HCT VFR BLD AUTO: 36.7 % (ref 37–48.5)
HGB BLD-MCNC: 11.3 G/DL (ref 12–16)
IMM GRANULOCYTES # BLD AUTO: 0 K/UL (ref 0–0.04)
IMM GRANULOCYTES NFR BLD AUTO: 0 % (ref 0–0.5)
INR PPP: 1 (ref 0.8–1.2)
LYMPHOCYTES # BLD AUTO: 1.3 K/UL (ref 1–4.8)
LYMPHOCYTES NFR BLD: 26.3 % (ref 18–48)
MCH RBC QN AUTO: 30.7 PG (ref 27–31)
MCHC RBC AUTO-ENTMCNC: 30.8 G/DL (ref 32–36)
MCV RBC AUTO: 100 FL (ref 82–98)
MONOCYTES # BLD AUTO: 0.6 K/UL (ref 0.3–1)
MONOCYTES NFR BLD: 12.4 % (ref 4–15)
NEUTROPHILS # BLD AUTO: 3.1 K/UL (ref 1.8–7.7)
NEUTROPHILS NFR BLD: 60.3 % (ref 38–73)
NRBC BLD-RTO: 0 /100 WBC
PLATELET # BLD AUTO: 258 K/UL (ref 150–450)
PMV BLD AUTO: 9.8 FL (ref 9.2–12.9)
POTASSIUM SERPL-SCNC: 4.5 MMOL/L (ref 3.5–5.1)
PROT SERPL-MCNC: 7.7 G/DL (ref 6–8.4)
PROTHROMBIN TIME: 11.8 SEC (ref 9–12.5)
RBC # BLD AUTO: 3.68 M/UL (ref 4–5.4)
SODIUM SERPL-SCNC: 142 MMOL/L (ref 136–145)
WBC # BLD AUTO: 5.09 K/UL (ref 3.9–12.7)

## 2024-11-26 PROCEDURE — 85025 COMPLETE CBC W/AUTO DIFF WBC: CPT | Mod: HCNC | Performed by: PHYSICIAN ASSISTANT

## 2024-11-26 PROCEDURE — 99999 PR PBB SHADOW E&M-EST. PATIENT-LVL IV: CPT | Mod: PBBFAC,HCNC,, | Performed by: INTERNAL MEDICINE

## 2024-11-26 PROCEDURE — 3288F FALL RISK ASSESSMENT DOCD: CPT | Mod: HCNC,CPTII,S$GLB, | Performed by: INTERNAL MEDICINE

## 2024-11-26 PROCEDURE — 1101F PT FALLS ASSESS-DOCD LE1/YR: CPT | Mod: HCNC,CPTII,S$GLB, | Performed by: INTERNAL MEDICINE

## 2024-11-26 PROCEDURE — 85610 PROTHROMBIN TIME: CPT | Mod: HCNC | Performed by: PHYSICIAN ASSISTANT

## 2024-11-26 PROCEDURE — 80053 COMPREHEN METABOLIC PANEL: CPT | Mod: HCNC | Performed by: PHYSICIAN ASSISTANT

## 2024-11-26 PROCEDURE — 1157F ADVNC CARE PLAN IN RCRD: CPT | Mod: HCNC,CPTII,S$GLB, | Performed by: INTERNAL MEDICINE

## 2024-11-26 PROCEDURE — 1159F MED LIST DOCD IN RCRD: CPT | Mod: HCNC,CPTII,S$GLB, | Performed by: INTERNAL MEDICINE

## 2024-11-26 PROCEDURE — 36415 COLL VENOUS BLD VENIPUNCTURE: CPT | Mod: HCNC | Performed by: INTERNAL MEDICINE

## 2024-11-26 PROCEDURE — 99214 OFFICE O/P EST MOD 30 MIN: CPT | Mod: HCNC,S$GLB,, | Performed by: INTERNAL MEDICINE

## 2024-11-26 PROCEDURE — 83036 HEMOGLOBIN GLYCOSYLATED A1C: CPT | Mod: HCNC | Performed by: INTERNAL MEDICINE

## 2024-11-26 PROCEDURE — 82248 BILIRUBIN DIRECT: CPT | Mod: HCNC | Performed by: PHYSICIAN ASSISTANT

## 2024-11-26 NOTE — PROGRESS NOTES
"[facility]    Chief Complaint  Chief Complaint   Patient presents with    Congestive Heart Failure       HPI:    Doing well.  No complaints.  Doing well with dietary sodium restriction.  No exertional dyspnea or chest discomfort.  No syncope or presyncope.  Has another upcoming embolization.    Medications  Current Outpatient Medications   Medication Sig Dispense Refill    blood sugar diagnostic (TRUE METRIX GLUCOSE TEST STRIP) Strp USE TO CHECK BLOOD SUGAR TWICE DAILY 100 strip 11    blood-glucose meter kit To check BG 2 times daily, to use with insurance preferred meter 1 each 0    carvediloL (COREG) 12.5 MG tablet TAKE 1 TABLET(12.5 MG) BY MOUTH TWICE DAILY WITH MEALS 180 tablet 3    cyanocobalamin (VITAMIN B-12) 1000 MCG tablet Take 1 tablet (1,000 mcg total) by mouth once daily.      diphenoxylate-atropine 2.5-0.025 mg (LOMOTIL) 2.5-0.025 mg per tablet Take 1 tablet by mouth 4 (four) times daily as needed for Diarrhea. 90 tablet 2    ezetimibe (ZETIA) 10 mg tablet Take 1 tablet (10 mg total) by mouth once daily. 90 tablet 3    ferrous sulfate 325 (65 FE) MG EC tablet Take 325 mg by mouth once daily.      furosemide (LASIX) 20 MG tablet take 1 tab by mouth daily. If gain 2-3 pounds in 2-3 days then take 2 tabs daily for 3 days and then resume 1 dose daily 120 tablet 3    lancets Misc To check BG 2 times daily, to use with insurance preferred meter 100 each 11    latanoprost 0.005 % ophthalmic solution Place 1 drop into both eyes nightly.      metFORMIN (GLUCOPHAGE-XR) 500 MG ER 24hr tablet Take 1 tablet (500 mg total) by mouth once daily. 180 tablet 0    needle, disp, 22 G 22 gauge x 1" Ndle 1 application by Misc.(Non-Drug; Combo Route) route 3 (three) times daily as needed. 30 each 2    ondansetron (ZOFRAN-ODT) 8 MG TbDL Take 1 tablet (8 mg total) by mouth every 8 (eight) hours as needed (nausea). 30 tablet 2    sacubitriL-valsartan (ENTRESTO) 24-26 mg per tablet TAKE 1 TABLET BY MOUTH TWICE " DAILY 180 tablet 3    spironolactone (ALDACTONE) 25 MG tablet Take 1 tablet (25 mg total) by mouth once daily. 90 tablet 3    syringe, disposable, 1 mL Syrg 1 application by Misc.(Non-Drug; Combo Route) route 3 (three) times daily as needed (diarrhea). 30 each 2    telotristat ethyl (XERMELO) 250 mg Tab Take 1 tablet (250 mg) by mouth 3 (three) times daily. 90 tablet 3    TRUEPLUS LANCETS 33 gauge Misc USE TO CHECK BLOOD SUGAR TWICE DAILY 100 each 11    vitamin D (VITAMIN D3) 1000 units Tab Take 400 Units by mouth once daily.      XIIDRA 5 % Dpet INSTILL ONE DROP INTO OU BID  3     No current facility-administered medications for this visit.        History  Past Medical History:   Diagnosis Date    Anemia 07/11/2018    B12 deficiency     Depression     per pcp note 7/2017 and given prozac and diazepam     Hyperlipidemia     Neuroendocrine tumor     Systolic heart failure     Weight loss     reviewed prior pcp Dr. Russo notes 2017 - labs reviewed: cmp wnl x tbili 1.5, tsh wnl, A1c 5.0, cbc wnl,D 36, hiv neg, hep c neg, hep b surg ag neg      Past Surgical History:   Procedure Laterality Date    EMBOLIZATION N/A 02/14/2019    Procedure: EMBOLIZATION, BLOOD VESSEL;  Surgeon: [employer] [facility] Provider;  Location: [facility] [address] [place] [address];  Service: Radiology;  Laterality: N/A;  Room 189/[place]    EMBOLIZATION N/A 03/21/2019    Procedure: EMBOLIZATION, BLOOD VESSEL;  Surgeon: [employer] Diagnostic Provider;  Location: [facility] [address] [place] [address];  Service: Radiology;  Laterality: N/A;  Room 189/[place]    ESOPHAGOGASTRODUODENOSCOPY  05/04/2018    esophageal ring, grade 1 esophagitis, gastritis     EYE SURGERY Bilateral     cataract removal    HYSTERECTOMY      fibroids     Social History     Socioeconomic History    Marital status:    Occupational History    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   Tobacco Use    Smoking status: Never    Smokeless tobacco: Never   Substance and Sexual Activity    Alcohol use: No     Comment:  stopped in 2007 - hx of social drinking    Drug use: Never    Sexual activity: Not Currently     Partners: Male   Social History Narrative    Living in [facility]    Not getting reg exericse     Social Drivers of Health     Financial Resource Strain: Low Risk  (11/4/2024)    Overall Financial Resource Strain (CARDIA)     Difficulty of Paying Living Expenses: Not very hard   Food Insecurity: No Food Insecurity (11/4/2024)    Hunger Vital Sign     Worried About Running Out of Food in the Last Year: Never true     Ran Out of Food in the Last Year: Never true   Transportation Needs: No Transportation Needs (11/4/2024)    TRANSPORTATION NEEDS     Transportation : No   Physical Activity: Inactive (11/4/2024)    Exercise Vital Sign     Days of Exercise per Week: 0 days     Minutes of Exercise per Session: 0 min   Stress: Stress Concern Present (11/4/2024)    [place] [facility] [employer] - Occupational Stress Questionnaire     Feeling of Stress : To some extent   Housing Stability: Low Risk  (11/4/2024)    Housing Stability Vital Sign     Unable to Pay for Housing in the Last Year: No     Homeless in the Last Year: No     Family History   Problem Relation Name Age of Onset    Glaucoma Mother      Hypertension Mother      Heart attack Father      Cancer Father      Diabetes Sister Marilyn     Asthma Sister Nick Luke     No Known Problems Sister Stephanie     Hyperlipidemia Sister      Heart attack Sister      Cancer Brother          lung cancer, + tobacco    Diabetes Brother      Hypertension Brother      Stroke Brother      Emphysema Brother      COPD Brother          Allergies  Review of patient's allergies indicates:   Allergen Reactions    Epinephrine      carcinoid       Review of Systems   Review of Systems   Constitutional: Negative for malaise/fatigue, weight gain and weight loss.   Eyes:  Negative for visual disturbance.   Cardiovascular:  Negative for chest pain, claudication, cyanosis, dyspnea on exertion,  irregular heartbeat, leg swelling, near-syncope, orthopnea, palpitations, paroxysmal nocturnal dyspnea and syncope.   Respiratory:  Negative for cough, hemoptysis, shortness of breath, sleep disturbances due to breathing and wheezing.    Hematologic/Lymphatic: Negative for bleeding problem. Does not bruise/bleed easily.   Skin:  Negative for poor wound healing.   Musculoskeletal:  Negative for muscle cramps and myalgias.   Gastrointestinal:  Negative for abdominal pain, anorexia, diarrhea, heartburn, hematemesis, hematochezia, melena, nausea and vomiting.   Genitourinary:  Negative for hematuria and nocturia.   Neurological:  Negative for excessive daytime sleepiness, dizziness, focal weakness, light-headedness and weakness.       Physical Exam  Vitals:    11/26/24 1404   BP: (P) 136/72   Pulse: (P) 68     Wt Readings from Last 1 Encounters:   11/26/24 (P) 48.8 kg (107 lb 8 oz)     Physical Exam  Vitals and nursing note reviewed.   Constitutional:       General: She is not in acute distress.     Appearance: She is not toxic-appearing or diaphoretic.   HENT:      Head: Normocephalic and atraumatic.      Mouth/Throat:      Lips: Pink.      Mouth: Mucous membranes are moist.   Eyes:      General: No scleral icterus.     Conjunctiva/sclera: Conjunctivae normal.   Neck:      Thyroid: No thyromegaly.      Vascular: No carotid bruit, hepatojugular reflux or JVD.      Trachea: Trachea normal.   Cardiovascular:      Rate and Rhythm: Normal rate and regular rhythm. Occasional Extrasystoles are present.     Chest Wall: PMI is not displaced.      Pulses:           Carotid pulses are 2+ on the right side and 2+ on the left side.       Radial pulses are 2+ on the right side and 2+ on the left side.      Heart sounds: S1 normal and S2 normal. No murmur heard.     No friction rub. Gallop present. S4 sounds present. No S3 sounds.   Pulmonary:      Effort: Pulmonary effort is normal. No accessory muscle usage or respiratory distress.       Breath sounds: Normal breath sounds and air entry. No decreased breath sounds, wheezing, rhonchi or rales.   Abdominal:      General: Bowel sounds are normal. There is no distension or abdominal bruit.      Palpations: Abdomen is soft. There is hepatomegaly. There is no splenomegaly or pulsatile mass.      Tenderness: There is no abdominal tenderness.   Musculoskeletal:         General: No tenderness or deformity.      Right lower leg: No edema.      Left lower leg: No edema.   Skin:     General: Skin is warm and dry.      Capillary Refill: Capillary refill takes less than 2 seconds.      Coloration: Skin is not cyanotic or pale.      Nails: There is no clubbing.   Neurological:      General: No focal deficit present.      Mental Status: She is alert and oriented to person, place, and time.   Psychiatric:         Attention and Perception: Attention normal.         Mood and Affect: Mood normal.         Speech: Speech normal.         Behavior: Behavior normal. Behavior is cooperative.         Labs  Lab Visit on 11/26/2024   Component Date Value Ref Range Status    Sodium 11/26/2024 142  136 - 145 mmol/L Final    Potassium 11/26/2024 4.5  3.5 - 5.1 mmol/L Final    Chloride 11/26/2024 102  95 - 110 mmol/L Final    CO2 11/26/2024 29  23 - 29 mmol/L Final    Glucose 11/26/2024 158 (H)  70 - 110 mg/dL Final    BUN 11/26/2024 8  8 - 23 mg/dL Final    Creatinine 11/26/2024 0.8  0.5 - 1.4 mg/dL Final    Calcium 11/26/2024 9.8  8.7 - 10.5 mg/dL Final    Total Protein 11/26/2024 7.7  6.0 - 8.4 g/dL Final    Albumin 11/26/2024 3.7  3.5 - 5.2 g/dL Final    Total Bilirubin 11/26/2024 0.4  0.1 - 1.0 mg/dL Final    Comment: For infants and newborns, interpretation of results should be based  on gestational age, weight and in agreement with clinical  observations.    Premature Infant recommended reference ranges:  Up to 24 hours.............<8.0 mg/dL  Up to 48 hours............<12.0 mg/dL  3-5 days..................<15.0  mg/dL  6-29 days.................<15.0 mg/dL      Alkaline Phosphatase 11/26/2024 68  40 - 150 U/L Final    AST 11/26/2024 20  10 - 40 U/L Final    ALT 11/26/2024 12  10 - 44 U/L Final    eGFR 11/26/2024 >60  >60 mL/min/1.73 m^2 Final    Anion Gap 11/26/2024 11  8 - 16 mmol/L Final    Bilirubin, Direct 11/26/2024 0.2  0.1 - 0.3 mg/dL Final    Prothrombin Time 11/26/2024 11.8  9.0 - 12.5 sec Final    INR 11/26/2024 1.0  0.8 - 1.2 Final    Comment: Coumadin Therapy:  2.0 - 3.0 for INR for all indicators except mechanical heart valves  and antiphospholipid syndromes which should use 2.5 - 3.5.  LOT^040^PT Inn^[id number]      WBC 11/26/2024 5.09  3.90 - 12.70 K/uL Final    RBC 11/26/2024 3.68 (L)  4.00 - 5.40 M/uL Final    Hemoglobin 11/26/2024 11.3 (L)  12.0 - 16.0 g/dL Final    Hematocrit 11/26/2024 36.7 (L)  37.0 - 48.5 % Final    MCV 11/26/2024 100 (H)  82 - 98 fL Final    MCH 11/26/2024 30.7  27.0 - 31.0 pg Final    MCHC 11/26/2024 30.8 (L)  32.0 - 36.0 g/dL Final    RDW 11/26/2024 13.3  11.5 - 14.5 % Final    Platelets 11/26/2024 258  150 - 450 K/uL Final    MPV 11/26/2024 9.8  9.2 - 12.9 fL Final    Immature Granulocytes 11/26/2024 0.0  0.0 - 0.5 % Final    Gran # (ANC) 11/26/2024 3.1  1.8 - 7.7 K/uL Final    Immature Grans (Abs) 11/26/2024 0.00  0.00 - 0.04 K/uL Final    Comment: Mild elevation in immature granulocytes is non specific and   can be seen in a variety of conditions including stress response,   acute inflammation, trauma and pregnancy. Correlation with other   laboratory and clinical findings is essential.      Lymph # 11/26/2024 1.3  1.0 - 4.8 K/uL Final    Mono # 11/26/2024 0.6  0.3 - 1.0 K/uL Final    Eos # 11/26/2024 0.0  0.0 - 0.5 K/uL Final    Baso # 11/26/2024 0.02  0.00 - 0.20 K/uL Final    nRBC 11/26/2024 0  0 /100 WBC Final    Gran % 11/26/2024 60.3  38.0 - 73.0 % Final    Lymph % 11/26/2024 26.3  18.0 - 48.0 % Final    Mono % 11/26/2024 12.4  4.0 - 15.0 % Final    Eosinophil % 11/26/2024 0.6   0.0 - 8.0 % Final    Basophil % 11/26/2024 0.4  0.0 - 1.9 % Final    Differential Method 11/26/2024 Automated   Final   Lab Visit on 11/05/2024   Component Date Value Ref Range Status    Serotonin 11/05/2024 1930 (H)  <=230 ng/mL Final    Comment: -------------------ADDITIONAL INFORMATION-------------------  This test was developed and its performance characteristics   determined by [facility] in a manner consistent with CLIA   requirements. This test has not been cleared or approved by   the U.S. Food and Drug Administration.    Test Performed by:  [facility]  [address] [place] [address]  : Soni Owens Ph.D.; CLIA# [id number]      Pancreastatin 11/05/2024 1968 (H)  10 - 135 pg/mL Corrected    Comment: *Result verified by repeat analysis.    Test Performed by:  [facility]  [address] [place] [id number]  CORRECTED RESULT; previously reported as SEE BELOW on 11/15/2024 at   07:15.  REVISED REPORT, Previously reported as: Testing is complete. Final   report has been sent to the  referring laboratory.  Note: There may be a delay of up to 2 hours before  report is available to view.    Test Performed by:  [facility] [address] [address] (Reported 11/15/2024 07:13)      5-HIAA, Plasma (Neuroend) 11/05/2024 156 (H)  <=30 ng/mL Final    Comment: In this sample, the concentration of 5HIAA was markedly   elevated indicating the presence of a serotonin-producing   tumor.  Please note that consuming serotonin containing   foods and some medications within 24 hours of sample   collection may result in a temporary elevation of 5HIAA.    -------------------ADDITIONAL INFORMATION-------------------  Liquid Chromatography-Tandem Mass Spectrometry (LC-MS/MS).  Values obtained from different assay methods or kits may be   different and cannot be used interchangeably. The results   cannot be interpreted as  absolute evidence for the presence   or absence of malignant disease.  This test was developed and its performance characteristics   determined by [facility] in a manner consistent with CLIA   requirements. This test has not been cleared or approved by   the U.S. Food and Drug Administration.    Test Performed by:  [facility] [address] [address]  : Soni silver Ph.D.; CLIA# [id number]      WBC 11/05/2024 6.57  3.90 - 12.70 K/uL Final    RBC 11/05/2024 3.32 (L)  4.00 - 5.40 M/uL Final    Hemoglobin 11/05/2024 10.3 (L)  12.0 - 16.0 g/dL Final    Hematocrit 11/05/2024 32.1 (L)  37.0 - 48.5 % Final    MCV 11/05/2024 97  82 - 98 fL Final    MCH 11/05/2024 31.0  27.0 - 31.0 pg Final    MCHC 11/05/2024 32.1  32.0 - 36.0 g/dL Final    RDW 11/05/2024 11.8  11.5 - 14.5 % Final    Platelets 11/05/2024 314  150 - 450 K/uL Final    MPV 11/05/2024 9.4  9.2 - 12.9 fL Final    Immature Granulocytes 11/05/2024 0.6 (H)  0.0 - 0.5 % Final    Gran # (ANC) 11/05/2024 4.8  1.8 - 7.7 K/uL Final    Immature Grans (Abs) 11/05/2024 0.04  0.00 - 0.04 K/uL Final    Comment: Mild elevation in immature granulocytes is non specific and   can be seen in a variety of conditions including stress response,   acute inflammation, trauma and pregnancy. Correlation with other   laboratory and clinical findings is essential.      Lymph # 11/05/2024 1.0  1.0 - 4.8 K/uL Final    Mono # 11/05/2024 0.6  0.3 - 1.0 K/uL Final    Eos # 11/05/2024 0.0  0.0 - 0.5 K/uL Final    Baso # 11/05/2024 0.03  0.00 - 0.20 K/uL Final    nRBC 11/05/2024 0  0 /100 WBC Final    Gran % 11/05/2024 73.2 (H)  38.0 - 73.0 % Final    Lymph % 11/05/2024 15.5 (L)  18.0 - 48.0 % Final    Mono % 11/05/2024 9.7  4.0 - 15.0 % Final    Eosinophil % 11/05/2024 0.5  0.0 - 8.0 % Final    Basophil % 11/05/2024 0.5  0.0 - 1.9 % Final    Differential Method 11/05/2024 Automated   Final     Sodium 11/05/2024 137  136 - 145 mmol/L Final    Potassium 11/05/2024 4.6  3.5 - 5.1 mmol/L Final    Chloride 11/05/2024 98  95 - 110 mmol/L Final    CO2 11/05/2024 26  23 - 29 mmol/L Final    Glucose 11/05/2024 161 (H)  70 - 110 mg/dL Final    BUN 11/05/2024 8  8 - 23 mg/dL Final    Creatinine 11/05/2024 0.7  0.5 - 1.4 mg/dL Final    Calcium 11/05/2024 9.6  8.7 - 10.5 mg/dL Final    Total Protein 11/05/2024 6.9  6.0 - 8.4 g/dL Final    Albumin 11/05/2024 2.8 (L)  3.5 - 5.2 g/dL Final    Total Bilirubin 11/05/2024 0.3  0.1 - 1.0 mg/dL Final    Comment: For infants and newborns, interpretation of results should be based  on gestational age, weight and in agreement with clinical  observations.    Premature Infant recommended reference ranges:  Up to 24 hours.............<8.0 mg/dL  Up to 48 hours............<12.0 mg/dL  3-5 days..................<15.0 mg/dL  6-29 days.................<15.0 mg/dL      Alkaline Phosphatase 11/05/2024 79  40 - 150 U/L Final    AST 11/05/2024 16  10 - 40 U/L Final    ALT 11/05/2024 17  10 - 44 U/L Final    eGFR 11/05/2024 >60.0  >60 mL/min/1.73 m^2 Final    Anion Gap 11/05/2024 13  8 - 16 mmol/L Final   Admission on 11/03/2024, Discharged on 11/04/2024   Component Date Value Ref Range Status    HIV 1/2 Ag/Ab 11/03/2024 Non-reactive  Non-reactive Final    Hepatitis C Ab 11/03/2024 Non-reactive  Non-reactive Final    QRS Duration 11/03/2024 82  ms Final    [facility] QTC Calculation 11/03/2024 464  ms Final    WBC 11/03/2024 8.43  3.90 - 12.70 K/uL Final    RBC 11/03/2024 3.41 (L)  4.00 - 5.40 M/uL Final    Hemoglobin 11/03/2024 10.5 (L)  12.0 - 16.0 g/dL Final    Hematocrit 11/03/2024 32.2 (L)  37.0 - 48.5 % Final    MCV 11/03/2024 94  82 - 98 fL Final    MCH 11/03/2024 30.8  27.0 - 31.0 pg Final    MCHC 11/03/2024 32.6  32.0 - 36.0 g/dL Final    RDW 11/03/2024 11.9  11.5 - 14.5 % Final    Platelets 11/03/2024 280  150 - 450 K/uL Final    MPV 11/03/2024 10.1  9.2 - 12.9 fL Final    Immature  Granulocytes 11/03/2024 0.9 (H)  0.0 - 0.5 % Final    Gran # (ANC) 11/03/2024 6.5  1.8 - 7.7 K/uL Final    Immature Grans (Abs) 11/03/2024 0.08 (H)  0.00 - 0.04 K/uL Final    Comment: Mild elevation in immature granulocytes is non specific and   can be seen in a variety of conditions including stress response,   acute inflammation, trauma and pregnancy. Correlation with other   laboratory and clinical findings is essential.      Lymph # 11/03/2024 0.9 (L)  1.0 - 4.8 K/uL Final    Mono # 11/03/2024 0.9  0.3 - 1.0 K/uL Final    Eos # 11/03/2024 0.0  0.0 - 0.5 K/uL Final    Baso # 11/03/2024 0.02  0.00 - 0.20 K/uL Final    nRBC 11/03/2024 0  0 /100 WBC Final    Gran % 11/03/2024 76.8 (H)  38.0 - 73.0 % Final    Lymph % 11/03/2024 11.0 (L)  18.0 - 48.0 % Final    Mono % 11/03/2024 10.6  4.0 - 15.0 % Final    Eosinophil % 11/03/2024 0.5  0.0 - 8.0 % Final    Basophil % 11/03/2024 0.2  0.0 - 1.9 % Final    Differential Method 11/03/2024 Automated   Final    Sodium 11/03/2024 135 (L)  136 - 145 mmol/L Final    Potassium 11/03/2024 5.1  3.5 - 5.1 mmol/L Final    Chloride 11/03/2024 96  95 - 110 mmol/L Final    CO2 11/03/2024 28  23 - 29 mmol/L Final    Glucose 11/03/2024 179 (H)  70 - 110 mg/dL Final    BUN 11/03/2024 9  8 - 23 mg/dL Final    Creatinine 11/03/2024 0.8  0.5 - 1.4 mg/dL Final    Calcium 11/03/2024 9.7  8.7 - 10.5 mg/dL Final    Total Protein 11/03/2024 7.1  6.0 - 8.4 g/dL Final    Albumin 11/03/2024 2.8 (L)  3.5 - 5.2 g/dL Final    Total Bilirubin 11/03/2024 0.4  0.1 - 1.0 mg/dL Final    Comment: For infants and newborns, interpretation of results should be based  on gestational age, weight and in agreement with clinical  observations.    Premature Infant recommended reference ranges:  Up to 24 hours.............<8.0 mg/dL  Up to 48 hours............<12.0 mg/dL  3-5 days..................<15.0 mg/dL  6-29 days.................<15.0 mg/dL      Alkaline Phosphatase 11/03/2024 78  40 - 150 U/L Final    AST  11/03/2024 20  10 - 40 U/L Final    ALT 11/03/2024 19  10 - 44 U/L Final    eGFR 11/03/2024 >60.0  >60 mL/min/1.73 m^2 Final    Anion Gap 11/03/2024 11  8 - 16 mmol/L Final    Lipase 11/03/2024 18  4 - 60 U/L Final    Troponin I 11/03/2024 <0.006  0.000 - 0.026 ng/mL Final    Comment: The reference interval for Troponin I represents the 99th percentile   cutoff   for our facility and is consistent with 3rd generation assay   performance.      BNP 11/03/2024 1,065 (H)  0 - 99 pg/mL Final    Values of less than 100 pg/ml are consistent with non-CHF populations.    Specimen UA 11/03/2024 Urine, Clean Catch   Final    Color, UA 11/03/2024 Colorless (A)  Yellow, Straw, [place] Final    Appearance, UA 11/03/2024 Clear  Clear Final    pH, UA 11/03/2024 5.0  5.0 - 8.0 Final    Specific Gravity, UA 11/03/2024 1.005  1.005 - 1.030 Final    Protein, UA 11/03/2024 Negative  Negative Final    Comment: Recommend a 24 hour urine protein or a urine   protein/creatinine ratio if globulin induced proteinuria is  clinically suspected.      Glucose, UA 11/03/2024 Negative  Negative Final    Ketones, UA 11/03/2024 Negative  Negative Final    Bilirubin (UA) 11/03/2024 Negative  Negative Final    Occult Blood UA 11/03/2024 Negative  Negative Final    Nitrite, UA 11/03/2024 Negative  Negative Final    Leukocytes, UA 11/03/2024 Negative  Negative Final    Beta-Hydroxybutyrate 11/03/2024 0.2  0.0 - 0.5 mmol/L Final    Troponin I 11/03/2024 0.007  0.000 - 0.026 ng/mL Final    Comment: The reference interval for Troponin I represents the 99th percentile   cutoff   for our facility and is consistent with 3rd generation assay   performance.      POCT Glucose 11/03/2024 275 (H)  70 - 110 mg/dL Final    Troponin I 11/04/2024 0.006  0.000 - 0.026 ng/mL Final    Comment: The reference interval for Troponin I represents the 99th percentile   cutoff   for our facility and is consistent with 3rd generation assay   performance.      Sodium 11/04/2024 133  (L)  136 - 145 mmol/L Final    Potassium 11/04/2024 4.9  3.5 - 5.1 mmol/L Final    Chloride 11/04/2024 102  95 - 110 mmol/L Final    CO2 11/04/2024 24  23 - 29 mmol/L Final    Glucose 11/04/2024 168 (H)  70 - 110 mg/dL Final    BUN 11/04/2024 9  8 - 23 mg/dL Final    Creatinine 11/04/2024 0.6  0.5 - 1.4 mg/dL Final    Calcium 11/04/2024 8.1 (L)  8.7 - 10.5 mg/dL Final    Anion Gap 11/04/2024 7 (L)  8 - 16 mmol/L Final    eGFR 11/04/2024 >60.0  >60 mL/min/1.73 m^2 Final    WBC 11/04/2024 6.84  3.90 - 12.70 K/uL Final    RBC 11/04/2024 2.90 (L)  4.00 - 5.40 M/uL Final    Hemoglobin 11/04/2024 9.2 (L)  12.0 - 16.0 g/dL Final    Hematocrit 11/04/2024 27.7 (L)  37.0 - 48.5 % Final    MCV 11/04/2024 96  82 - 98 fL Final    MCH 11/04/2024 31.7 (H)  27.0 - 31.0 pg Final    MCHC 11/04/2024 33.2  32.0 - 36.0 g/dL Final    RDW 11/04/2024 11.9  11.5 - 14.5 % Final    Platelets 11/04/2024 257  150 - 450 K/uL Final    MPV 11/04/2024 10.2  9.2 - 12.9 fL Final    Immature Granulocytes 11/04/2024 0.7 (H)  0.0 - 0.5 % Final    Gran # (ANC) 11/04/2024 4.8  1.8 - 7.7 K/uL Final    Immature Grans (Abs) 11/04/2024 0.05 (H)  0.00 - 0.04 K/uL Final    Comment: Mild elevation in immature granulocytes is non specific and   can be seen in a variety of conditions including stress response,   acute inflammation, trauma and pregnancy. Correlation with other   laboratory and clinical findings is essential.      Lymph # 11/04/2024 1.2  1.0 - 4.8 K/uL Final    Mono # 11/04/2024 0.8  0.3 - 1.0 K/uL Final    Eos # 11/04/2024 0.0  0.0 - 0.5 K/uL Final    Baso # 11/04/2024 0.02  0.00 - 0.20 K/uL Final    nRBC 11/04/2024 0  0 /100 WBC Final    Gran % 11/04/2024 70.1  38.0 - 73.0 % Final    Lymph % 11/04/2024 17.0 (L)  18.0 - 48.0 % Final    Mono % 11/04/2024 11.5  4.0 - 15.0 % Final    Eosinophil % 11/04/2024 0.4  0.0 - 8.0 % Final    Basophil % 11/04/2024 0.3  0.0 - 1.9 % Final    Differential Method 11/04/2024 Automated   Final   [facility]ospital Outpatient  Visit on 11/01/2024   Component Date Value Ref Range Status    BSA 11/01/2024 1.51  m2 Final    LVOT stroke volume 11/01/2024 65.4  cm3 Final    LVIDd 11/01/2024 4.8  3.5 - 6.0 cm Final    LV Systolic Volume 11/01/2024 63.97  mL Final    LV Systolic Volume Index 11/01/2024 42.1  mL/m2 Final    LVIDs 11/01/2024 3.9  2.1 - 4.0 cm Final    LV ESV A2C 11/01/2024 53.41  mL Final    LV Diastolic Volume 11/01/2024 105.96  mL Final    LV ESV A4C 11/01/2024 71.72  mL Final    LV Diastolic Volume Index 11/01/2024 69.71  mL/m2 Final    Left Ventricular End Systolic Volu* 11/01/2024 63.97  mL Final    Left Ventricular End Diastolic Vol* 11/01/2024 105.96  mL Final    IVS 11/01/2024 0.8  0.6 - 1.1 cm Final    LVOT diameter 11/01/2024 1.8  cm Final    LVOT area 11/01/2024 2.5  cm2 Final    FS 11/01/2024 18.8 (A)  28 - 44 % Final    Left Ventricle Relative Wall Thick* 11/01/2024 0.33  cm Final    PW 11/01/2024 0.8  0.6 - 1.1 cm Final    LV mass 11/01/2024 126.7  g Final    LV Mass Index 11/01/2024 83.3  g/m2 Final    MV Peak E Anthony 11/01/2024 0.51  m/s Final    TDI LATERAL 11/01/2024 0.05  m/s Final    TDI SEPTAL 11/01/2024 0.03  m/s Final    E/E' ratio 11/01/2024 12.75  m/s Final    MV Peak A Anthony 11/01/2024 1.11  m/s Final    TR Max Anthony 11/01/2024 2.44  m/s Final    E/A ratio 11/01/2024 0.46   Final    IVRT 11/01/2024 144.62  msec Final    E wave deceleration time 11/01/2024 140.95  msec Final    LV SEPTAL E/E' RATIO 11/01/2024 17.00  m/s Final    LV LATERAL E/E' RATIO 11/01/2024 10.20  m/s Final    PV Peak S Anthony 11/01/2024 0.48  m/s Final    PV Peak D Anthony 11/01/2024 0.27  m/s Final    Pulm vein S/D ratio 11/01/2024 1.78   Final    LVOT peak anthony 11/01/2024 1.1  m/s Final    Left Ventricular Outflow Tract Yelena* 11/01/2024 0.85  cm/s Final    Left Ventricular Outflow Tract Yelena* 11/01/2024 3.06  mmHg Final    RV- burnette basal diam 11/01/2024 3.4  cm Final    RV S' 11/01/2024 13.05  cm/s Final    RVOT peak VTI 11/01/2024 18.3  cm Final     RV/LV Ratio 11/01/2024 0.71  cm Final    LA size 11/01/2024 3.16  cm Final    Left Atrium Minor Axis 11/01/2024 4.45  cm Final    Left Atrium Major Axis 11/01/2024 4.76  cm Final    LA Vol (MOD) 11/01/2024 64.37  mL Final    ANGELO (MOD) 11/01/2024 42.3  mL/m2 Final    RA Major [place] 11/01/2024 4.19  cm Final    AV regurgitation pressure 1/2 time 11/01/2024 388.0  ms Final    AR Max Bhargav 11/01/2024 4.50  m/s Final    AV mean gradient 11/01/2024 3.3  mmHg Final    AV peak gradient 11/01/2024 5.8  mmHg Final    Ao peak bhargav 11/01/2024 1.2  m/s Final    Ao VTI 11/01/2024 22.3  cm Final    LVOT peak VTI 11/01/2024 25.7  cm Final    AV valve area 11/01/2024 2.9  cm² Final    AV Velocity Ratio 11/01/2024 0.92   Final    AV index (prosthetic) 11/01/2024 1.15   Final    XAVI by Velocity Ratio 11/01/2024 2.3  cm² Final    Mr max bhargav 11/01/2024 4.18  m/s Final    MV stenosis pressure 1/2 time 11/01/2024 40.88  ms Final    MV valve area p 1/2 method 11/01/2024 5.38  cm2 Final    Triscuspid Valve Regurgitation Pea* 11/01/2024 24  mmHg Final    PV mean gradient 11/01/2024 1  mmHg Final    PV PEAK VELOCITY 11/01/2024 0.81  m/s Final    PV peak gradient 11/01/2024 3  mmHg Final    RVOT peak bhargav 11/01/2024 0.80  m/s Final    STJ 11/01/2024 3.03  cm Final    Ascending aorta 11/01/2024 3.11  cm Final    IVC diameter 11/01/2024 1.34  cm Final    Mean e' 11/01/2024 0.04  m/s Final    ZLVIDS 11/01/2024 2.72   Final    ZLVIDD 11/01/2024 0.75   Final    LA area A4C 11/01/2024 20.83  cm2 Final    LA area A2C 11/01/2024 17.14  cm2 Final    ANGELO 11/01/2024 39.0  mL/m2 Final    LA Vol 11/01/2024 59.30  cm3 Final    TAPSE 11/01/2024 3.10  cm Final    LA WIDTH 11/01/2024 4.8  cm Final    RA Width 11/01/2024 3.4  cm Final    Sinus 11/01/2024 2.8  cm Final    TV resting pulmonary artery pressu* 11/01/2024 27  mmHg Final    RV TB RVSP 11/01/2024 5  mmHg Final    Est. RA pres 11/01/2024 3  mmHg Final   Admission on 10/25/2024, Discharged on 10/28/2024    Component Date Value Ref Range Status    POCT Glucose 10/25/2024 150 (H)  70 - 110 mg/dL Final    WBC 10/25/2024 4.94  3.90 - 12.70 K/uL Final    RBC 10/25/2024 4.07  4.00 - 5.40 M/uL Final    Hemoglobin 10/25/2024 13.1  12.0 - 16.0 g/dL Final    Hematocrit 10/25/2024 40.3  37.0 - 48.5 % Final    MCV 10/25/2024 99 (H)  82 - 98 fL Final    MCH 10/25/2024 32.2 (H)  27.0 - 31.0 pg Final    MCHC 10/25/2024 32.5  32.0 - 36.0 g/dL Final    RDW 10/25/2024 12.7  11.5 - 14.5 % Final    Platelets 10/25/2024 166  150 - 450 K/uL Final    MPV 10/25/2024 9.6  9.2 - 12.9 fL Final    Immature Granulocytes 10/25/2024 0.2  0.0 - 0.5 % Final    Gran # (ANC) 10/25/2024 3.9  1.8 - 7.7 K/uL Final    Immature Grans (Abs) 10/25/2024 0.01  0.00 - 0.04 K/uL Final    Comment: Mild elevation in immature granulocytes is non specific and   can be seen in a variety of conditions including stress response,   acute inflammation, trauma and pregnancy. Correlation with other   laboratory and clinical findings is essential.      Lymph # 10/25/2024 0.7 (L)  1.0 - 4.8 K/uL Final    Mono # 10/25/2024 0.4  0.3 - 1.0 K/uL Final    Eos # 10/25/2024 0.0  0.0 - 0.5 K/uL Final    Baso # 10/25/2024 0.00  0.00 - 0.20 K/uL Final    nRBC 10/25/2024 0  0 /100 WBC Final    Gran % 10/25/2024 78.7 (H)  38.0 - 73.0 % Final    Lymph % 10/25/2024 13.8 (L)  18.0 - 48.0 % Final    Mono % 10/25/2024 7.3  4.0 - 15.0 % Final    Eosinophil % 10/25/2024 0.0  0.0 - 8.0 % Final    Basophil % 10/25/2024 0.0  0.0 - 1.9 % Final    Differential Method 10/25/2024 Automated   Final    Sodium 10/26/2024 140  136 - 145 mmol/L Final    Potassium 10/26/2024 3.9  3.5 - 5.1 mmol/L Final    Chloride 10/26/2024 107  95 - 110 mmol/L Final    CO2 10/26/2024 23  23 - 29 mmol/L Final    Glucose 10/26/2024 147 (H)  70 - 110 mg/dL Final    BUN 10/26/2024 7 (L)  8 - 23 mg/dL Final    Creatinine 10/26/2024 0.7  0.5 - 1.4 mg/dL Final    Calcium 10/26/2024 8.9  8.7 - 10.5 mg/dL Final    Anion Gap  10/26/2024 10  8 - 16 mmol/L Final    eGFR 10/26/2024 >60  >60 mL/min/1.73 m^2 Final    Magnesium 10/26/2024 2.1  1.6 - 2.6 mg/dL Final    Specimen slightly hemolyzed    WBC 10/26/2024 7.19  3.90 - 12.70 K/uL Final    RBC 10/26/2024 3.38 (L)  4.00 - 5.40 M/uL Final    Hemoglobin 10/26/2024 10.9 (L)  12.0 - 16.0 g/dL Final    Hematocrit 10/26/2024 32.5 (L)  37.0 - 48.5 % Final    MCV 10/26/2024 96  82 - 98 fL Final    MCH 10/26/2024 32.2 (H)  27.0 - 31.0 pg Final    MCHC 10/26/2024 33.5  32.0 - 36.0 g/dL Final    RDW 10/26/2024 12.5  11.5 - 14.5 % Final    Platelets 10/26/2024 153  150 - 450 K/uL Final    MPV 10/26/2024 9.7  9.2 - 12.9 fL Final    Immature Granulocytes 10/26/2024 0.1  0.0 - 0.5 % Final    Gran # (ANC) 10/26/2024 5.3  1.8 - 7.7 K/uL Final    Immature Grans (Abs) 10/26/2024 0.01  0.00 - 0.04 K/uL Final    Comment: Mild elevation in immature granulocytes is non specific and   can be seen in a variety of conditions including stress response,   acute inflammation, trauma and pregnancy. Correlation with other   laboratory and clinical findings is essential.      Lymph # 10/26/2024 1.0  1.0 - 4.8 K/uL Final    Mono # 10/26/2024 0.8  0.3 - 1.0 K/uL Final    Eos # 10/26/2024 0.0  0.0 - 0.5 K/uL Final    Baso # 10/26/2024 0.01  0.00 - 0.20 K/uL Final    nRBC 10/26/2024 0  0 /100 WBC Final    Gran % 10/26/2024 74.2 (H)  38.0 - 73.0 % Final    Lymph % 10/26/2024 14.3 (L)  18.0 - 48.0 % Final    Mono % 10/26/2024 11.3  4.0 - 15.0 % Final    Eosinophil % 10/26/2024 0.0  0.0 - 8.0 % Final    Basophil % 10/26/2024 0.1  0.0 - 1.9 % Final    Differential Method 10/26/2024 Automated   Final    WBC 10/26/2024 7.02  3.90 - 12.70 K/uL Final    RBC 10/26/2024 3.52 (L)  4.00 - 5.40 M/uL Final    Hemoglobin 10/26/2024 11.3 (L)  12.0 - 16.0 g/dL Final    Hematocrit 10/26/2024 33.8 (L)  37.0 - 48.5 % Final    MCV 10/26/2024 96  82 - 98 fL Final    MCH 10/26/2024 32.1 (H)  27.0 - 31.0 pg Final    MCHC 10/26/2024 33.4  32.0 -  36.0 g/dL Final    RDW 10/26/2024 12.6  11.5 - 14.5 % Final    Platelets 10/26/2024 161  150 - 450 K/uL Final    MPV 10/26/2024 10.0  9.2 - 12.9 fL Final    Immature Granulocytes 10/26/2024 0.3  0.0 - 0.5 % Final    Gran # (ANC) 10/26/2024 4.9  1.8 - 7.7 K/uL Final    Immature Grans (Abs) 10/26/2024 0.02  0.00 - 0.04 K/uL Final    Comment: Mild elevation in immature granulocytes is non specific and   can be seen in a variety of conditions including stress response,   acute inflammation, trauma and pregnancy. Correlation with other   laboratory and clinical findings is essential.      Lymph # 10/26/2024 1.3  1.0 - 4.8 K/uL Final    Mono # 10/26/2024 0.8  0.3 - 1.0 K/uL Final    Eos # 10/26/2024 0.0  0.0 - 0.5 K/uL Final    Baso # 10/26/2024 0.02  0.00 - 0.20 K/uL Final    nRBC 10/26/2024 0  0 /100 WBC Final    Gran % 10/26/2024 69.6  38.0 - 73.0 % Final    Lymph % 10/26/2024 18.9  18.0 - 48.0 % Final    Mono % 10/26/2024 10.8  4.0 - 15.0 % Final    Eosinophil % 10/26/2024 0.1  0.0 - 8.0 % Final    Basophil % 10/26/2024 0.3  0.0 - 1.9 % Final    Differential Method 10/26/2024 Automated   Final    QRS Duration 10/26/2024 84  ms Final    [facility] QTC Calculation 10/26/2024 484  ms Final    QRS Duration 10/26/2024 84  ms Final    [facility] QTC Calculation 10/26/2024 510  ms Final    Sodium 10/27/2024 135 (L)  136 - 145 mmol/L Final    Potassium 10/27/2024 3.6  3.5 - 5.1 mmol/L Final    Chloride 10/27/2024 99  95 - 110 mmol/L Final    CO2 10/27/2024 30 (H)  23 - 29 mmol/L Final    Glucose 10/27/2024 166 (H)  70 - 110 mg/dL Final    BUN 10/27/2024 7 (L)  8 - 23 mg/dL Final    Creatinine 10/27/2024 0.7  0.5 - 1.4 mg/dL Final    Calcium 10/27/2024 9.3  8.7 - 10.5 mg/dL Final    Anion Gap 10/27/2024 6 (L)  8 - 16 mmol/L Final    eGFR 10/27/2024 >60  >60 mL/min/1.73 m^2 Final    Magnesium 10/27/2024 1.9  1.6 - 2.6 mg/dL Final    WBC 10/27/2024 8.62  3.90 - 12.70 K/uL Final    RBC 10/27/2024 3.61 (L)  4.00 - 5.40 M/uL Final    Hemoglobin  10/27/2024 11.6 (L)  12.0 - 16.0 g/dL Final    Hematocrit 10/27/2024 34.3 (L)  37.0 - 48.5 % Final    MCV 10/27/2024 95  82 - 98 fL Final    MCH 10/27/2024 32.1 (H)  27.0 - 31.0 pg Final    MCHC 10/27/2024 33.8  32.0 - 36.0 g/dL Final    RDW 10/27/2024 12.1  11.5 - 14.5 % Final    Platelets 10/27/2024 149 (L)  150 - 450 K/uL Final    MPV 10/27/2024 10.0  9.2 - 12.9 fL Final    Immature Granulocytes 10/27/2024 0.5  0.0 - 0.5 % Final    Gran # (ANC) 10/27/2024 6.4  1.8 - 7.7 K/uL Final    Immature Grans (Abs) 10/27/2024 0.04  0.00 - 0.04 K/uL Final    Comment: Mild elevation in immature granulocytes is non specific and   can be seen in a variety of conditions including stress response,   acute inflammation, trauma and pregnancy. Correlation with other   laboratory and clinical findings is essential.      Lymph # 10/27/2024 1.0  1.0 - 4.8 K/uL Final    Mono # 10/27/2024 1.1 (H)  0.3 - 1.0 K/uL Final    Eos # 10/27/2024 0.0  0.0 - 0.5 K/uL Final    Baso # 10/27/2024 0.01  0.00 - 0.20 K/uL Final    nRBC 10/27/2024 0  0 /100 WBC Final    Gran % 10/27/2024 74.1 (H)  38.0 - 73.0 % Final    Lymph % 10/27/2024 12.1 (L)  18.0 - 48.0 % Final    Mono % 10/27/2024 13.1  4.0 - 15.0 % Final    Eosinophil % 10/27/2024 0.1  0.0 - 8.0 % Final    Basophil % 10/27/2024 0.1  0.0 - 1.9 % Final    Differential Method 10/27/2024 Automated   Final    Sodium 10/28/2024 136  136 - 145 mmol/L Final    Potassium 10/28/2024 3.3 (L)  3.5 - 5.1 mmol/L Final    Chloride 10/28/2024 98  95 - 110 mmol/L Final    CO2 10/28/2024 28  23 - 29 mmol/L Final    Glucose 10/28/2024 187 (H)  70 - 110 mg/dL Final    BUN 10/28/2024 11  8 - 23 mg/dL Final    Creatinine 10/28/2024 0.7  0.5 - 1.4 mg/dL Final    Calcium 10/28/2024 9.0  8.7 - 10.5 mg/dL Final    Anion Gap 10/28/2024 10  8 - 16 mmol/L Final    eGFR 10/28/2024 >60  >60 mL/min/1.73 m^2 Final    Magnesium 10/28/2024 1.9  1.6 - 2.6 mg/dL Final    WBC 10/28/2024 8.91  3.90 - 12.70 K/uL Final    RBC  10/28/2024 3.70 (L)  4.00 - 5.40 M/uL Final    Hemoglobin 10/28/2024 11.8 (L)  12.0 - 16.0 g/dL Final    Hematocrit 10/28/2024 34.7 (L)  37.0 - 48.5 % Final    MCV 10/28/2024 94  82 - 98 fL Final    MCH 10/28/2024 31.9 (H)  27.0 - 31.0 pg Final    MCHC 10/28/2024 34.0  32.0 - 36.0 g/dL Final    RDW 10/28/2024 12.0  11.5 - 14.5 % Final    Platelets 10/28/2024 150  150 - 450 K/uL Final    MPV 10/28/2024 10.3  9.2 - 12.9 fL Final    Immature Granulocytes 10/28/2024 0.7 (H)  0.0 - 0.5 % Final    Gran # (ANC) 10/28/2024 6.7  1.8 - 7.7 K/uL Final    Immature Grans (Abs) 10/28/2024 0.06 (H)  0.00 - 0.04 K/uL Final    Comment: Mild elevation in immature granulocytes is non specific and   can be seen in a variety of conditions including stress response,   acute inflammation, trauma and pregnancy. Correlation with other   laboratory and clinical findings is essential.      Lymph # 10/28/2024 1.0  1.0 - 4.8 K/uL Final    Mono # 10/28/2024 1.1 (H)  0.3 - 1.0 K/uL Final    Eos # 10/28/2024 0.0  0.0 - 0.5 K/uL Final    Baso # 10/28/2024 0.01  0.00 - 0.20 K/uL Final    nRBC 10/28/2024 0  0 /100 WBC Final    Gran % 10/28/2024 75.4 (H)  38.0 - 73.0 % Final    Lymph % 10/28/2024 11.7 (L)  18.0 - 48.0 % Final    Mono % 10/28/2024 12.0  4.0 - 15.0 % Final    Eosinophil % 10/28/2024 0.1  0.0 - 8.0 % Final    Basophil % 10/28/2024 0.1  0.0 - 1.9 % Final    Differential Method 10/28/2024 Automated   Final   Hospital Outpatient Visit on 10/21/2024   Component Date Value Ref Range Status    LV Diastolic Volume 10/21/2024 119.28  mL Final    Echo EF Estimated 10/21/2024 32  % Final    LV Systolic Volume 10/21/2024 80.93  mL Final    IVS 10/21/2024 0.7  0.6 - 1.1 cm Final    LVIDd 10/21/2024 5.0  3.5 - 6.0 cm Final    LVIDs 10/21/2024 4.3 (A)  2.1 - 4.0 cm Final    LVOT diameter 10/21/2024 2.0  cm Final    PW 10/21/2024 1.0  0.6 - 1.1 cm Final    AV LVOT peak gradient 10/21/2024 2  mmHg Final    LVOT mn grad 10/21/2024 1  mmHg Final    LVOT  peak anthony 10/21/2024 0.6  m/s Final    LVOT peak VTI 10/21/2024 13.0  cm Final    RV- burnette basal diam 10/21/2024 2.6  cm Final    RV S' 10/21/2024 9.98  cm/s Final    LA size 10/21/2024 3.62  cm Final    Left Atrium Major Axis 10/21/2024 4.13  cm Final    Left Atrium Minor Axis 10/21/2024 4.70  cm Final    LA Vol (MOD) 10/21/2024 53.52  mL Final    RA Major Axis 10/21/2024 4.78  cm Final    RA Area 10/21/2024 11.7  cm2 Final    AV valve area 10/21/2024 1.3  cm2 Final    AV area by cont VTI 10/21/2024 1.4  cm2 Final    AV peak gradient 10/21/2024 9.0  mmHg Final    AV mean gradient 10/21/2024 4.6  mmHg Final    Ao peak anthony 10/21/2024 1.5  m/s Final    Ao VTI 10/21/2024 30.4  cm Final    MV Peak E Anthony 10/21/2024 1.29  m/s Final    TDI SEPTAL 10/21/2024 0.02  m/s Final    TV peak gradient 10/21/2024 37  mmHg Final    TR Max Anthony 10/21/2024 3.04  m/s Final    Ascending aorta 10/21/2024 2.92  cm Final    STJ 10/21/2024 2.68  cm Final    IVC diameter 10/21/2024 0.92  cm Final    Sinus 10/21/2024 2.99  cm Final    RA Width 10/21/2024 2.98  cm Final    TAPSE 10/21/2024 1.68  cm Final    LA WIDTH 10/21/2024 4.04  cm Final    BSA 10/21/2024 1.48  m2 Final    LVOT stroke volume 10/21/2024 40.8  cm3 Final    LV Systolic Volume Index 10/21/2024 54.0  mL/m2 Final    LV Diastolic Volume Index 10/21/2024 79.52  mL/m2 Final    LVOT area 10/21/2024 3.1  cm2 Final    FS 10/21/2024 14.0 (A)  28 - 44 % Final    Left Ventricle Relative Wall Thick* 10/21/2024 0.40  cm Final    LV mass 10/21/2024 146.8  g Final    LV Mass Index 10/21/2024 97.9  g/m2 Final    LV SEPTAL E/E' RATIO 10/21/2024 64.50  m/s Final    ANGELO 10/21/2024 36.4  mL/m2 Final    LA Vol 10/21/2024 54.65  cm3 Final    RV/LV Ratio 10/21/2024 0.52  cm Final    ANGELO (MOD) 10/21/2024 35.7  mL/m2 Final    AV Velocity Ratio 10/21/2024 0.40   Final    AV index (prosthetic) 10/21/2024 0.43   Final    XAVI by Velocity Ratio 10/21/2024 1.3  cm² Final    Triscuspid Valve Regurgitation  Pea* 10/21/2024 37  mmHg Final    ZLVIDS 10/21/2024 3.53   Final    ZLVIDD 10/21/2024 1.21   Final    TV resting pulmonary artery pressu* 10/21/2024 40  mmHg Final    RV TB RVSP 10/21/2024 6  mmHg Final    Est. RA pres 10/21/2024 3  mmHg Final   Lab Visit on 10/18/2024   Component Date Value Ref Range Status    Sodium 10/18/2024 140  136 - 145 mmol/L Final    Potassium 10/18/2024 4.3  3.5 - 5.1 mmol/L Final    Chloride 10/18/2024 101  95 - 110 mmol/L Final    CO2 10/18/2024 28  23 - 29 mmol/L Final    Glucose 10/18/2024 158 (H)  70 - 110 mg/dL Final    BUN 10/18/2024 16  8 - 23 mg/dL Final    Creatinine 10/18/2024 0.8  0.5 - 1.4 mg/dL Final    Calcium 10/18/2024 10.1  8.7 - 10.5 mg/dL Final    Total Protein 10/18/2024 7.6  6.0 - 8.4 g/dL Final    Albumin 10/18/2024 3.9  3.5 - 5.2 g/dL Final    Total Bilirubin 10/18/2024 0.5  0.1 - 1.0 mg/dL Final    Comment: For infants and newborns, interpretation of results should be based  on gestational age, weight and in agreement with clinical  observations.    Premature Infant recommended reference ranges:  Up to 24 hours.............<8.0 mg/dL  Up to 48 hours............<12.0 mg/dL  3-5 days..................<15.0 mg/dL  6-29 days.................<15.0 mg/dL      Alkaline Phosphatase 10/18/2024 63  40 - 150 U/L Final    AST 10/18/2024 28  10 - 40 U/L Final    ALT 10/18/2024 24  10 - 44 U/L Final    eGFR 10/18/2024 >60.0  >60 mL/min/1.73 m^2 Final    Anion Gap 10/18/2024 11  8 - 16 mmol/L Final    Bilirubin, Direct 10/18/2024 0.2  0.1 - 0.3 mg/dL Final    Prothrombin Time 10/18/2024 11.4  9.0 - 12.5 sec Final    INR 10/18/2024 1.0  0.8 - 1.2 Final    Comment: Coumadin Therapy:  2.0 - 3.0 for INR for all indicators except mechanical heart valves  and antiphospholipid syndromes which should use 2.5 - 3.5.      WBC 10/18/2024 5.42  3.90 - 12.70 K/uL Final    RBC 10/18/2024 4.13  4.00 - 5.40 M/uL Final    Hemoglobin 10/18/2024 13.0  12.0 - 16.0 g/dL Final    Hematocrit 10/18/2024  40.4  37.0 - 48.5 % Final    MCV 10/18/2024 98  82 - 98 fL Final    MCH 10/18/2024 31.5 (H)  27.0 - 31.0 pg Final    MCHC 10/18/2024 32.2  32.0 - 36.0 g/dL Final    RDW 10/18/2024 12.5  11.5 - 14.5 % Final    Platelets 10/18/2024 194  150 - 450 K/uL Final    MPV 10/18/2024 10.3  9.2 - 12.9 fL Final    Immature Granulocytes 10/18/2024 0.4  0.0 - 0.5 % Final    Gran # (ANC) 10/18/2024 3.5  1.8 - 7.7 K/uL Final    Immature Grans (Abs) 10/18/2024 0.02  0.00 - 0.04 K/uL Final    Comment: Mild elevation in immature granulocytes is non specific and   can be seen in a variety of conditions including stress response,   acute inflammation, trauma and pregnancy. Correlation with other   laboratory and clinical findings is essential.      Lymph # 10/18/2024 1.4  1.0 - 4.8 K/uL Final    Mono # 10/18/2024 0.4  0.3 - 1.0 K/uL Final    Eos # 10/18/2024 0.0  0.0 - 0.5 K/uL Final    Baso # 10/18/2024 0.01  0.00 - 0.20 K/uL Final    nRBC 10/18/2024 0  0 /100 WBC Final    Gran % 10/18/2024 64.7  38.0 - 73.0 % Final    Lymph % 10/18/2024 26.6  18.0 - 48.0 % Final    Mono % 10/18/2024 7.4  4.0 - 15.0 % Final    Eosinophil % 10/18/2024 0.7  0.0 - 8.0 % Final    Basophil % 10/18/2024 0.2  0.0 - 1.9 % Final    Differential Method 10/18/2024 Automated   Final   Lab Visit on 10/10/2024   Component Date Value Ref Range Status    WBC 10/10/2024 4.96  3.90 - 12.70 K/uL Final    RBC 10/10/2024 3.87 (L)  4.00 - 5.40 M/uL Final    Hemoglobin 10/10/2024 12.5  12.0 - 16.0 g/dL Final    Hematocrit 10/10/2024 38.6  37.0 - 48.5 % Final    MCV 10/10/2024 100 (H)  82 - 98 fL Final    MCH 10/10/2024 32.3 (H)  27.0 - 31.0 pg Final    MCHC 10/10/2024 32.4  32.0 - 36.0 g/dL Final    RDW 10/10/2024 12.5  11.5 - 14.5 % Final    Platelets 10/10/2024 187  150 - 450 K/uL Final    MPV 10/10/2024 9.9  9.2 - 12.9 fL Final    Immature Granulocytes 10/10/2024 0.2  0.0 - 0.5 % Final    Gran # (ANC) 10/10/2024 3.6  1.8 - 7.7 K/uL Final    Immature Grans (Abs) 10/10/2024  0.01  0.00 - 0.04 K/uL Final    Comment: Mild elevation in immature granulocytes is non specific and   can be seen in a variety of conditions including stress response,   acute inflammation, trauma and pregnancy. Correlation with other   laboratory and clinical findings is essential.      Lymph # 10/10/2024 1.0  1.0 - 4.8 K/uL Final    Mono # 10/10/2024 0.4  0.3 - 1.0 K/uL Final    Eos # 10/10/2024 0.0  0.0 - 0.5 K/uL Final    Baso # 10/10/2024 0.01  0.00 - 0.20 K/uL Final    nRBC 10/10/2024 0  0 /100 WBC Final    Gran % 10/10/2024 72.5  38.0 - 73.0 % Final    Lymph % 10/10/2024 19.4  18.0 - 48.0 % Final    Mono % 10/10/2024 7.1  4.0 - 15.0 % Final    Eosinophil % 10/10/2024 0.6  0.0 - 8.0 % Final    Basophil % 10/10/2024 0.2  0.0 - 1.9 % Final    Differential Method 10/10/2024 Automated   Final    Sodium 10/10/2024 141  136 - 145 mmol/L Final    Potassium 10/10/2024 4.0  3.5 - 5.1 mmol/L Final    Chloride 10/10/2024 106  95 - 110 mmol/L Final    CO2 10/10/2024 25  23 - 29 mmol/L Final    Glucose 10/10/2024 230 (H)  70 - 110 mg/dL Final    BUN 10/10/2024 12  8 - 23 mg/dL Final    Creatinine 10/10/2024 0.8  0.5 - 1.4 mg/dL Final    Calcium 10/10/2024 9.4  8.7 - 10.5 mg/dL Final    Total Protein 10/10/2024 6.9  6.0 - 8.4 g/dL Final    Albumin 10/10/2024 3.5  3.5 - 5.2 g/dL Final    Total Bilirubin 10/10/2024 0.3  0.1 - 1.0 mg/dL Final    Comment: For infants and newborns, interpretation of results should be based  on gestational age, weight and in agreement with clinical  observations.    Premature Infant recommended reference ranges:  Up to 24 hours.............<8.0 mg/dL  Up to 48 hours............<12.0 mg/dL  3-5 days..................<15.0 mg/dL  6-29 days.................<15.0 mg/dL      Alkaline Phosphatase 10/10/2024 63  55 - 135 U/L Final    AST 10/10/2024 32  10 - 40 U/L Final    ALT 10/10/2024 26  10 - 44 U/L Final    eGFR 10/10/2024 >60.0  >60 mL/min/1.73 m^2 Final    Anion Gap 10/10/2024 10  8 - 16 mmol/L  Final    Serotonin 10/10/2024 2230 (H)  <=230 ng/mL Final    Comment: -------------------ADDITIONAL INFORMATION-------------------  This test was developed and its performance characteristics   determined by [facility] in a manner consistent with CLIA   requirements. This test has not been cleared or approved by   the [place] Food and Drug Administration.    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  Please note that consuming serotonin containing   foods and some medications within 24 hours of sample   collection may result in a temporary elevation of 5HIAA.    -------------------ADDITIONAL INFORMATION-------------------  Liquid Chromatography-Tandem Mass Spectrometry (LC-MS/MS).  Values obtained from different assay methods or kits may be   different and cannot be used interchangeably. The results   cannot be interpreted as absolute evidence for the presence   or absence of malignant disease.  This test was developed and its performance characteristics   determined by [facility] in a manner consistent with CLIA   requirements. This test has not been cleared or approved by   the [place] Food and Drug Administration.    Test Performed by:  [facility] - [facility]  [address] [place] [address]  : Soni silver Ph.D.; CLIA# [id number]     Lab Visit on 09/10/2024   Component Date Value Ref Range Status    Sodium 09/10/2024 141  136 - 145 mmol/L Final    Potassium 09/10/2024 5.0  3.5 - 5.1 mmol/L Final    Chloride  09/10/2024 104  95 - 110 mmol/L Final    CO2 09/10/2024 29  23 - 29 mmol/L Final    Glucose 09/10/2024 103  70 - 110 mg/dL Final    BUN 09/10/2024 11  8 - 23 mg/dL Final    Creatinine 09/10/2024 0.7  0.5 - 1.4 mg/dL Final    Calcium 09/10/2024 10.3  8.7 - 10.5 mg/dL Final    Total Protein 09/10/2024 7.1  6.0 - 8.4 g/dL Final    Albumin 09/10/2024 3.7  3.5 - 5.2 g/dL Final    Total Bilirubin 09/10/2024 0.4  0.1 - 1.0 mg/dL Final    Comment: For infants and newborns, interpretation of results should be based  on gestational age, weight and in agreement with clinical  observations.    Premature Infant recommended reference ranges:  Up to 24 hours.............<8.0 mg/dL  Up to 48 hours............<12.0 mg/dL  3-5 days..................<15.0 mg/dL  6-29 days.................<15.0 mg/dL      Alkaline Phosphatase 09/10/2024 51 (L)  55 - 135 U/L Final    AST 09/10/2024 23  10 - 40 U/L Final    ALT 09/10/2024 19  10 - 44 U/L Final    eGFR 09/10/2024 >60.0  >60 mL/min/1.73 m^2 Final    Anion Gap 09/10/2024 8  8 - 16 mmol/L Final   Lab Visit on 09/03/2024   Component Date Value Ref Range Status    WBC 09/03/2024 5.55  3.90 - 12.70 K/uL Final    RBC 09/03/2024 3.81 (L)  4.00 - 5.40 M/uL Final    Hemoglobin 09/03/2024 12.2  12.0 - 16.0 g/dL Final    Hematocrit 09/03/2024 37.3  37.0 - 48.5 % Final    MCV 09/03/2024 98  82 - 98 fL Final    MCH 09/03/2024 32.0 (H)  27.0 - 31.0 pg Final    MCHC 09/03/2024 32.7  32.0 - 36.0 g/dL Final    RDW 09/03/2024 12.8  11.5 - 14.5 % Final    Platelets 09/03/2024 192  150 - 450 K/uL Final    MPV 09/03/2024 9.9  9.2 - 12.9 fL Final    Immature Granulocytes 09/03/2024 0.4  0.0 - 0.5 % Final    Gran # (ANC) 09/03/2024 3.3  1.8 - 7.7 K/uL Final    Immature Grans (Abs) 09/03/2024 0.02  0.00 - 0.04 K/uL Final    Comment: Mild elevation in immature granulocytes is non specific and   can be seen in a variety of conditions including stress response,   acute inflammation, trauma and pregnancy. Correlation  with other   laboratory and clinical findings is essential.      Lymph # 09/03/2024 1.6  1.0 - 4.8 K/uL Final    Mono # 09/03/2024 0.6  0.3 - 1.0 K/uL Final    Eos # 09/03/2024 0.0  0.0 - 0.5 K/uL Final    Baso # 09/03/2024 0.01  0.00 - 0.20 K/uL Final    nRBC 09/03/2024 0  0 /100 WBC Final    Gran % 09/03/2024 59.3  38.0 - 73.0 % Final    Lymph % 09/03/2024 29.5  18.0 - 48.0 % Final    Mono % 09/03/2024 10.1  4.0 - 15.0 % Final    Eosinophil % 09/03/2024 0.5  0.0 - 8.0 % Final    Basophil % 09/03/2024 0.2  0.0 - 1.9 % Final    Differential Method 09/03/2024 Automated   Final    Sodium 09/03/2024 142  136 - 145 mmol/L Final    Potassium 09/03/2024 5.8 (H)  3.5 - 5.1 mmol/L Final    *No Visible Hemolysis    Chloride 09/03/2024 103  95 - 110 mmol/L Final    CO2 09/03/2024 29  23 - 29 mmol/L Final    Glucose 09/03/2024 185 (H)  70 - 110 mg/dL Final    BUN 09/03/2024 16  8 - 23 mg/dL Final    Creatinine 09/03/2024 0.9  0.5 - 1.4 mg/dL Final    Calcium 09/03/2024 10.3  8.7 - 10.5 mg/dL Final    Total Protein 09/03/2024 7.3  6.0 - 8.4 g/dL Final    Albumin 09/03/2024 3.8  3.5 - 5.2 g/dL Final    Total Bilirubin 09/03/2024 0.4  0.1 - 1.0 mg/dL Final    Comment: For infants and newborns, interpretation of results should be based  on gestational age, weight and in agreement with clinical  observations.    Premature Infant recommended reference ranges:  Up to 24 hours.............<8.0 mg/dL  Up to 48 hours............<12.0 mg/dL  3-5 days..................<15.0 mg/dL  6-29 days.................<15.0 mg/dL      Alkaline Phosphatase 09/03/2024 49 (L)  55 - 135 U/L Final    AST 09/03/2024 25  10 - 40 U/L Final    ALT 09/03/2024 19  10 - 44 U/L Final    eGFR 09/03/2024 >60.0  >60 mL/min/1.73 m^2 Final    Anion Gap 09/03/2024 10  8 - 16 mmol/L Final    5-HIAA, Plasma (Neuroend) 09/03/2024 85 (H)  <=30 ng/mL Final    Comment: In this sample, the concentration of 5HIAA was markedly   elevated indicating the presence of a  serotonin-producing   tumor.  Please note that consuming serotonin containing   foods and some medications within 24 hours of sample   collection may result in a temporary elevation of 5HIAA.    -------------------ADDITIONAL INFORMATION-------------------  Liquid Chromatography-Tandem Mass Spectrometry (LC-MS/MS).  Values obtained from different assay methods or kits may be   different and cannot be used interchangeably. The results   cannot be interpreted as absolute evidence for the presence   or absence of malignant disease.  This test was developed and its performance characteristics   determined by [facility] in a manner consistent with CLIA   requirements. This test has not been cleared or approved by   the U.S. Food and Drug Administration.    Test Performed by:  [place] [address]  : Soni silver Ph.D.; CLIA# [id number]      Chromogranin A 09/03/2024 1512 (H)  <93 ng/mL Final    Comment: Impaired renal or hepatic function or treatment with proton  pump inhibitors may result in artifactual elevations of   Chromogranin A.    -------------------ADDITIONAL INFORMATION-------------------  The testing method is a homogeneous time-resolved   immunofluorescent assay manufactured by [employer]   and performed on the [employer] Kryptor Compact Plus.    Values obtained with different assay methods or kits may be   different and cannot be used interchangeably.    Test results cannot be interpreted as absolute evidence for   the presence or absence of malignant disease.    In some immunoassays, the presence of unusually high   concentrations of analyte may result in a high-dose   &quot;hook&quot; effect. This may result in a lower or even normal   measured analyte concentration. If the reported result   is inconsistent with the clinical presentation, the   laboratory should be alerted for  troubleshooting. For   diagnostic purposes, these immunoassay results should   always be as                           sessed in conjunction with the patients medical   history, clinical examination and other findings.    Test Performed by:  [facility]  [address] [place] [address]  : Soni Owens Ph.D.; CLIA# [id number]      Pancreastatin 09/03/2024 3989 (H)  10 - 135 pg/mL Corrected    Comment: *Result verified by repeat analysis    -------------------ADDITIONAL INFORMATION-------------------  This radioimmunoassay was developed and its  performance characteristics determined by  [facility]. It has not been  cleared or approved by the [employer] and such  approval or clearance is not required at this  time. Values obtained with different methods,  different laboratories or with kits cannot  be used interchangeably with the results on  this report. The results cannot be interpreted  as absolute evidence of the presence or absence  of malignant disease.    Test Performed by:  [facility]  [address] [place] [address]  CORRECTED RESULT; previously reported as SEE BELOW on 09/12/2024 at   08:22.  REVISED REPORT, Previously reported as: Testing is complete. Final   report has been sent to the  referring laboratory.  Note: There may be a delay of up to 2 hours before  report is available to view.    -------------------ADDITIONAL INFORMATION-------------                           ------  This radioimmunoassay was developed and its  performance characteristics determined by  [facility]. It has not been  cleared or approved by the [employer] and such  approval or clearance is not required at this  time. Values obtained with different methods,  different laboratories or with kits cannot  be used interchangeably with the results on  this report. The results cannot be interpreted  as absolute evidence of the presence or  absence  of malignant disease.    Test Performed by:  [facility] [employer]  [address] [place] [address] (Reported 09/12/2024 08:19)      Serotonin 09/03/2024 1300 (H)  <=230 ng/mL Final    Comment: -------------------ADDITIONAL INFORMATION-------------------  This test was developed and its performance characteristics   determined by [facility] in a manner consistent with CLIA   requirements. This test has not been cleared or approved by   the U.S. Food and Drug Administration.    Test Performed by:  [facility]  [address] [place] [address]  : Soni Owens Ph.D.; CLIA# [id number]     There may be more visits with results that are not included.       Imaging  X-Ray Chest AP Portable    Result Date: 11/3/2024  EXAMINATION: XR CHEST AP PORTABLE CLINICAL HISTORY: Altered mental status, unspecified TECHNIQUE: Single frontal view of the chest was performed. COMPARISON: July 7, 2018 FINDINGS: Lungs are clear.  No focal consolidation. No effusion or pneumothorax. No acute bone abnormality.     No acute abnormality. Electronically signed by: Reynaldo Quiñones MD Date:    11/03/2024 Time:    15:55    CT Head Without Contrast    Result Date: 11/3/2024  EXAMINATION: CT HEAD WITHOUT CONTRAST CLINICAL HISTORY: Mental status change, unknown cause; TECHNIQUE: Multiple sequential 5 mm axial images of the head without contrast.  Coronal and sagittal reformatted imaging from the axial acquisition. COMPARISON: 08/14/2020 MRI brain FINDINGS: Prominence of the extra-axial space overlying the frontal parietal lobes at the vertex likely developmental variant.  Patchy and confluent decreased attenuation supratentorial white matter while nonspecific corresponds to T2 flair signal abnormality seen on MRI may be advanced chronic ischemic change.  There is no evidence for acute intracranial hemorrhage or sulcal effacement to suggest large territory recent  infarction.  No significant mass effect or midline shift.  Visualized paranasal sinuses and mastoid air cells are clear.  Small probable osteoma extends from the outer table of the left parasagittal frontal calvarium.     No acute intracranial findings as detailed above specifically without evidence for acute intracranial hemorrhage or sulcal effacement to suggest large territory recent infarction Clinical correlation and further evaluation as warranted. Electronically signed by: Sameer Pan DO Date:    11/03/2024 Time:    15:03    Echo    Result Date: 11/1/2024    Left Ventricle: The left ventricle is normal in size. Normal wall thickness. Moderate global hypokinesis present. There is moderately reduced systolic function with a visually estimated ejection fraction of 30 - 35%.   Right Ventricle: Normal right ventricular cavity size. Wall thickness is normal. Systolic function is normal.   Left Atrium: Left atrium is mildly dilated.   Aortic Valve: The aortic valve is a trileaflet valve. There is moderate aortic valve sclerosis. There is moderate aortic regurgitation.   Mitral Valve: There is moderate mitral annular calcification present. There is mild regurgitation.   Tricuspid Valve: There is mild regurgitation.   IVC/SVC: Normal venous pressure at 3 mmHg.       Assessment  1. 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 Primary hypertension  Controlled      Plan and Discussion    No change to present management.  Discussed that her cardiac status seems optimized for her upcoming embolization.      The ASCVD Risk score (Waleska DK, et al., 2019) failed to calculate for the following reasons:    The 2019 ASCVD risk score is only valid for ages 40 to 79     Follow Up  Follow up in about 6 months (around 5/26/2025).      Ralph Bergeron MD

## 2024-11-29 ENCOUNTER — PATIENT MESSAGE (OUTPATIENT)
Dept: INTERVENTIONAL RADIOLOGY/VASCULAR | Facility: HOSPITAL | Age: 84
End: 2024-11-29
Payer: MEDICARE

## 2024-11-29 ENCOUNTER — TELEPHONE (OUTPATIENT)
Dept: INTERVENTIONAL RADIOLOGY/VASCULAR | Facility: CLINIC | Age: 84
End: 2024-11-29
Payer: MEDICARE

## 2024-12-02 ENCOUNTER — ANESTHESIA (OUTPATIENT)
Dept: INTERVENTIONAL RADIOLOGY/VASCULAR | Facility: HOSPITAL | Age: 84
End: 2024-12-02
Payer: MEDICARE

## 2024-12-02 ENCOUNTER — HOSPITAL ENCOUNTER (OUTPATIENT)
Dept: INTERVENTIONAL RADIOLOGY/VASCULAR | Facility: HOSPITAL | Age: 84
Discharge: HOME | End: 2024-12-02
Attending: PHYSICIAN ASSISTANT
Payer: MEDICARE

## 2024-12-02 ENCOUNTER — HOSPITAL ENCOUNTER (OUTPATIENT)
Facility: HOSPITAL | Age: 84
Discharge: HOME | End: 2024-12-03
Attending: STUDENT IN AN ORGANIZED HEALTH CARE EDUCATION/TRAINING PROGRAM | Admitting: FAMILY MEDICINE
Payer: MEDICARE

## 2024-12-02 VITALS
SYSTOLIC BLOOD PRESSURE: 185 MMHG | WEIGHT: 110 LBS | HEART RATE: 88 BPM | RESPIRATION RATE: 20 BRPM | DIASTOLIC BLOOD PRESSURE: 84 MMHG | HEIGHT: 64 IN | OXYGEN SATURATION: 96 % | BODY MASS INDEX: 18.78 KG/M2 | TEMPERATURE: 98 F

## 2024-12-02 DIAGNOSIS — R41.82 ALTERED MENTAL STATUS: ICD-10-CM

## 2024-12-02 DIAGNOSIS — R47.9 DIFFICULTY WITH SPEECH: ICD-10-CM

## 2024-12-02 DIAGNOSIS — G93.40 ACUTE ENCEPHALOPATHY: Primary | ICD-10-CM

## 2024-12-02 DIAGNOSIS — C7B.8 NEUROENDOCRINE CARCINOMA METASTATIC TO BONE: Primary | ICD-10-CM

## 2024-12-02 DIAGNOSIS — I63.9 ACUTE ISCHEMIC STROKE: ICD-10-CM

## 2024-12-02 DIAGNOSIS — C22.0 HCC (HEPATOCELLULAR CARCINOMA): ICD-10-CM

## 2024-12-02 DIAGNOSIS — I63.9 STROKE: ICD-10-CM

## 2024-12-02 DIAGNOSIS — C7B.8 METASTATIC MALIGNANT NEUROENDOCRINE TUMOR TO LIVER: Primary | ICD-10-CM

## 2024-12-02 DIAGNOSIS — C7A.8 NEUROENDOCRINE CARCINOMA METASTATIC TO LIVER: ICD-10-CM

## 2024-12-02 DIAGNOSIS — C7B.8 METASTATIC MALIGNANT NEUROENDOCRINE TUMOR TO LIVER: ICD-10-CM

## 2024-12-02 DIAGNOSIS — R80.9 CONTROLLED TYPE 2 DIABETES MELLITUS WITH MICROALBUMINURIA, WITHOUT LONG-TERM CURRENT USE OF INSULIN: ICD-10-CM

## 2024-12-02 DIAGNOSIS — C7B.8 NEUROENDOCRINE CARCINOMA METASTATIC TO BONE: ICD-10-CM

## 2024-12-02 DIAGNOSIS — E11.29 CONTROLLED TYPE 2 DIABETES MELLITUS WITH MICROALBUMINURIA, WITHOUT LONG-TERM CURRENT USE OF INSULIN: ICD-10-CM

## 2024-12-02 DIAGNOSIS — C7A.8 NEUROENDOCRINE CARCINOMA METASTATIC TO BONE: Primary | ICD-10-CM

## 2024-12-02 DIAGNOSIS — C7A.8 NEUROENDOCRINE CARCINOMA METASTATIC TO BONE: ICD-10-CM

## 2024-12-02 DIAGNOSIS — R29.818 ACUTE FOCAL NEUROLOGICAL DEFICIT: ICD-10-CM

## 2024-12-02 DIAGNOSIS — R47.1 DYSARTHRIA: ICD-10-CM

## 2024-12-02 DIAGNOSIS — C7B.8 NEUROENDOCRINE CARCINOMA METASTATIC TO LIVER: ICD-10-CM

## 2024-12-02 PROBLEM — R79.89 ELEVATED BRAIN NATRIURETIC PEPTIDE (BNP) LEVEL: Status: ACTIVE | Noted: 2024-12-02

## 2024-12-02 PROBLEM — E87.20 LACTIC ACIDOSIS: Status: ACTIVE | Noted: 2024-12-02

## 2024-12-02 PROBLEM — E87.29 INCREASED ANION GAP METABOLIC ACIDOSIS: Status: ACTIVE | Noted: 2024-12-02

## 2024-12-02 LAB
ALBUMIN SERPL BCP-MCNC: 3.6 G/DL (ref 3.5–5.2)
ALP SERPL-CCNC: 75 U/L (ref 40–150)
ALT SERPL W/O P-5'-P-CCNC: 16 U/L (ref 10–44)
ANION GAP SERPL CALC-SCNC: 17 MMOL/L (ref 8–16)
AST SERPL-CCNC: 23 U/L (ref 10–40)
BASOPHILS # BLD AUTO: 0.01 K/UL (ref 0–0.2)
BASOPHILS NFR BLD: 0.2 % (ref 0–1.9)
BILIRUB SERPL-MCNC: 0.4 MG/DL (ref 0.1–1)
BNP SERPL-MCNC: 1347 PG/ML (ref 0–99)
BUN SERPL-MCNC: 7 MG/DL (ref 8–23)
CALCIUM SERPL-MCNC: 9.5 MG/DL (ref 8.7–10.5)
CHLORIDE SERPL-SCNC: 107 MMOL/L (ref 95–110)
CHOLEST SERPL-MCNC: 271 MG/DL (ref 120–199)
CHOLEST/HDLC SERPL: 2.6 {RATIO} (ref 2–5)
CO2 SERPL-SCNC: 18 MMOL/L (ref 23–29)
CREAT SERPL-MCNC: 0.5 MG/DL (ref 0.5–1.4)
CREAT SERPL-MCNC: 0.7 MG/DL (ref 0.5–1.4)
DIFFERENTIAL METHOD BLD: ABNORMAL
EOSINOPHIL # BLD AUTO: 0 K/UL (ref 0–0.5)
EOSINOPHIL NFR BLD: 0.2 % (ref 0–8)
ERYTHROCYTE [DISTWIDTH] IN BLOOD BY AUTOMATED COUNT: 13.3 % (ref 11.5–14.5)
EST. GFR  (NO RACE VARIABLE): >60 ML/MIN/1.73 M^2
GLUCOSE SERPL-MCNC: 194 MG/DL (ref 70–110)
HCT VFR BLD AUTO: 38.3 % (ref 37–48.5)
HDLC SERPL-MCNC: 105 MG/DL (ref 40–75)
HDLC SERPL: 38.7 % (ref 20–50)
HGB BLD-MCNC: 12.5 G/DL (ref 12–16)
IMM GRANULOCYTES # BLD AUTO: 0.01 K/UL (ref 0–0.04)
IMM GRANULOCYTES NFR BLD AUTO: 0.2 % (ref 0–0.5)
INR PPP: 1.1 (ref 0.8–1.2)
LACTATE SERPL-SCNC: 2.3 MMOL/L (ref 0.5–2.2)
LDLC SERPL CALC-MCNC: 151 MG/DL (ref 63–159)
LYMPHOCYTES # BLD AUTO: 0.9 K/UL (ref 1–4.8)
LYMPHOCYTES NFR BLD: 17.3 % (ref 18–48)
MCH RBC QN AUTO: 31.8 PG (ref 27–31)
MCHC RBC AUTO-ENTMCNC: 32.6 G/DL (ref 32–36)
MCV RBC AUTO: 98 FL (ref 82–98)
MONOCYTES # BLD AUTO: 0.2 K/UL (ref 0.3–1)
MONOCYTES NFR BLD: 3.3 % (ref 4–15)
NEUTROPHILS # BLD AUTO: 4 K/UL (ref 1.8–7.7)
NEUTROPHILS NFR BLD: 78.8 % (ref 38–73)
NONHDLC SERPL-MCNC: 166 MG/DL
NRBC BLD-RTO: 0 /100 WBC
PLATELET # BLD AUTO: 171 K/UL (ref 150–450)
PMV BLD AUTO: 9.7 FL (ref 9.2–12.9)
POC PTINR: 1.1 (ref 0.9–1.2)
POCT GLUCOSE: 170 MG/DL (ref 70–110)
POCT GLUCOSE: 176 MG/DL (ref 70–110)
POCT GLUCOSE: 194 MG/DL (ref 70–110)
POTASSIUM SERPL-SCNC: 3.6 MMOL/L (ref 3.5–5.1)
PROT SERPL-MCNC: 7.6 G/DL (ref 6–8.4)
PROTHROMBIN TIME: 12 SEC (ref 9–12.5)
RBC # BLD AUTO: 3.93 M/UL (ref 4–5.4)
SAMPLE: NORMAL
SAMPLE: NORMAL
SODIUM SERPL-SCNC: 142 MMOL/L (ref 136–145)
TRIGL SERPL-MCNC: 75 MG/DL (ref 30–150)
TROPONIN I SERPL DL<=0.01 NG/ML-MCNC: <0.006 NG/ML (ref 0–0.03)
TSH SERPL DL<=0.005 MIU/L-ACNC: 0.47 UIU/ML (ref 0.4–4)
WBC # BLD AUTO: 5.13 K/UL (ref 3.9–12.7)

## 2024-12-02 PROCEDURE — C1769 GUIDE WIRE: HCPCS | Mod: HCNC

## 2024-12-02 PROCEDURE — 75887 VEIN X-RAY LIVER W/O HEMODYN: CPT | Mod: 26,HCNC,, | Performed by: STUDENT IN AN ORGANIZED HEALTH CARE EDUCATION/TRAINING PROGRAM

## 2024-12-02 PROCEDURE — 36247 INS CATH ABD/L-EXT ART 3RD: CPT | Mod: HCNC,LT | Performed by: STUDENT IN AN ORGANIZED HEALTH CARE EDUCATION/TRAINING PROGRAM

## 2024-12-02 PROCEDURE — 85025 COMPLETE CBC W/AUTO DIFF WBC: CPT | Mod: HCNC | Performed by: STUDENT IN AN ORGANIZED HEALTH CARE EDUCATION/TRAINING PROGRAM

## 2024-12-02 PROCEDURE — 80061 LIPID PANEL: CPT | Mod: HCNC | Performed by: STUDENT IN AN ORGANIZED HEALTH CARE EDUCATION/TRAINING PROGRAM

## 2024-12-02 PROCEDURE — 25000003 PHARM REV CODE 250: Mod: HCNC | Performed by: PHYSICIAN ASSISTANT

## 2024-12-02 PROCEDURE — 96375 TX/PRO/DX INJ NEW DRUG ADDON: CPT

## 2024-12-02 PROCEDURE — 76937 US GUIDE VASCULAR ACCESS: CPT | Mod: 26,HCNC,, | Performed by: STUDENT IN AN ORGANIZED HEALTH CARE EDUCATION/TRAINING PROGRAM

## 2024-12-02 PROCEDURE — 82565 ASSAY OF CREATININE: CPT | Mod: HCNC

## 2024-12-02 PROCEDURE — 96372 THER/PROPH/DIAG INJ SC/IM: CPT

## 2024-12-02 PROCEDURE — 36248 INS CATH ABD/L-EXT ART ADDL: CPT | Mod: HCNC,,, | Performed by: STUDENT IN AN ORGANIZED HEALTH CARE EDUCATION/TRAINING PROGRAM

## 2024-12-02 PROCEDURE — 25000003 PHARM REV CODE 250: Mod: HCNC | Performed by: NURSE ANESTHETIST, CERTIFIED REGISTERED

## 2024-12-02 PROCEDURE — 96374 THER/PROPH/DIAG INJ IV PUSH: CPT

## 2024-12-02 PROCEDURE — 85610 PROTHROMBIN TIME: CPT | Mod: HCNC | Performed by: STUDENT IN AN ORGANIZED HEALTH CARE EDUCATION/TRAINING PROGRAM

## 2024-12-02 PROCEDURE — 93005 ELECTROCARDIOGRAM TRACING: CPT | Mod: HCNC

## 2024-12-02 PROCEDURE — 25000003 PHARM REV CODE 250: Mod: HCNC

## 2024-12-02 PROCEDURE — 75774 ARTERY X-RAY EACH VESSEL: CPT | Mod: 26,59,HCNC, | Performed by: STUDENT IN AN ORGANIZED HEALTH CARE EDUCATION/TRAINING PROGRAM

## 2024-12-02 PROCEDURE — 84484 ASSAY OF TROPONIN QUANT: CPT | Mod: HCNC | Performed by: STUDENT IN AN ORGANIZED HEALTH CARE EDUCATION/TRAINING PROGRAM

## 2024-12-02 PROCEDURE — 76377 3D RENDER W/INTRP POSTPROCES: CPT | Mod: TC,HCNC | Performed by: STUDENT IN AN ORGANIZED HEALTH CARE EDUCATION/TRAINING PROGRAM

## 2024-12-02 PROCEDURE — 75774 ARTERY X-RAY EACH VESSEL: CPT | Mod: TC,59,HCNC | Performed by: STUDENT IN AN ORGANIZED HEALTH CARE EDUCATION/TRAINING PROGRAM

## 2024-12-02 PROCEDURE — 99291 CRITICAL CARE FIRST HOUR: CPT | Mod: HCNC,95,, | Performed by: STUDENT IN AN ORGANIZED HEALTH CARE EDUCATION/TRAINING PROGRAM

## 2024-12-02 PROCEDURE — 63600175 PHARM REV CODE 636 W HCPCS: Mod: HCNC

## 2024-12-02 PROCEDURE — 36248 INS CATH ABD/L-EXT ART ADDL: CPT | Mod: HCNC | Performed by: STUDENT IN AN ORGANIZED HEALTH CARE EDUCATION/TRAINING PROGRAM

## 2024-12-02 PROCEDURE — 83880 ASSAY OF NATRIURETIC PEPTIDE: CPT | Mod: HCNC | Performed by: STUDENT IN AN ORGANIZED HEALTH CARE EDUCATION/TRAINING PROGRAM

## 2024-12-02 PROCEDURE — 84443 ASSAY THYROID STIM HORMONE: CPT | Mod: HCNC | Performed by: STUDENT IN AN ORGANIZED HEALTH CARE EDUCATION/TRAINING PROGRAM

## 2024-12-02 PROCEDURE — 63600175 PHARM REV CODE 636 W HCPCS: Mod: JA,HCNC | Performed by: PHYSICIAN ASSISTANT

## 2024-12-02 PROCEDURE — 25000003 PHARM REV CODE 250: Mod: HCNC | Performed by: STUDENT IN AN ORGANIZED HEALTH CARE EDUCATION/TRAINING PROGRAM

## 2024-12-02 PROCEDURE — 93010 ELECTROCARDIOGRAM REPORT: CPT | Mod: HCNC,,, | Performed by: INTERNAL MEDICINE

## 2024-12-02 PROCEDURE — 76380 CAT SCAN FOLLOW-UP STUDY: CPT | Mod: 26,59,HCNC, | Performed by: STUDENT IN AN ORGANIZED HEALTH CARE EDUCATION/TRAINING PROGRAM

## 2024-12-02 PROCEDURE — 63600175 PHARM REV CODE 636 W HCPCS: Mod: HCNC | Performed by: STUDENT IN AN ORGANIZED HEALTH CARE EDUCATION/TRAINING PROGRAM

## 2024-12-02 PROCEDURE — 75887 VEIN X-RAY LIVER W/O HEMODYN: CPT | Mod: TC,HCNC | Performed by: STUDENT IN AN ORGANIZED HEALTH CARE EDUCATION/TRAINING PROGRAM

## 2024-12-02 PROCEDURE — D9220A PRA ANESTHESIA: Mod: ANES,,, | Performed by: STUDENT IN AN ORGANIZED HEALTH CARE EDUCATION/TRAINING PROGRAM

## 2024-12-02 PROCEDURE — 37243 VASC EMBOLIZE/OCCLUDE ORGAN: CPT | Mod: HCNC | Performed by: STUDENT IN AN ORGANIZED HEALTH CARE EDUCATION/TRAINING PROGRAM

## 2024-12-02 PROCEDURE — 82962 GLUCOSE BLOOD TEST: CPT | Mod: HCNC

## 2024-12-02 PROCEDURE — 75726 ARTERY X-RAYS ABDOMEN: CPT | Mod: TC,59,HCNC | Performed by: STUDENT IN AN ORGANIZED HEALTH CARE EDUCATION/TRAINING PROGRAM

## 2024-12-02 PROCEDURE — D9220A PRA ANESTHESIA: Mod: CRNA,,, | Performed by: NURSE ANESTHETIST, CERTIFIED REGISTERED

## 2024-12-02 PROCEDURE — 36247 INS CATH ABD/L-EXT ART 3RD: CPT | Mod: 51,HCNC,LT, | Performed by: STUDENT IN AN ORGANIZED HEALTH CARE EDUCATION/TRAINING PROGRAM

## 2024-12-02 PROCEDURE — C1982 CATH, PRESSURE,VALVE-OCCLU: HCPCS | Mod: HCNC

## 2024-12-02 PROCEDURE — 37000008 HC ANESTHESIA 1ST 15 MINUTES: Mod: HCNC

## 2024-12-02 PROCEDURE — 83605 ASSAY OF LACTIC ACID: CPT | Mod: HCNC | Performed by: STUDENT IN AN ORGANIZED HEALTH CARE EDUCATION/TRAINING PROGRAM

## 2024-12-02 PROCEDURE — 63600175 PHARM REV CODE 636 W HCPCS: Mod: HCNC | Performed by: RADIOLOGY

## 2024-12-02 PROCEDURE — 76377 3D RENDER W/INTRP POSTPROCES: CPT | Mod: 26,HCNC,, | Performed by: STUDENT IN AN ORGANIZED HEALTH CARE EDUCATION/TRAINING PROGRAM

## 2024-12-02 PROCEDURE — 63600175 PHARM REV CODE 636 W HCPCS: Mod: HCNC | Performed by: NURSE ANESTHETIST, CERTIFIED REGISTERED

## 2024-12-02 PROCEDURE — 75726 ARTERY X-RAYS ABDOMEN: CPT | Mod: 26,59,HCNC, | Performed by: STUDENT IN AN ORGANIZED HEALTH CARE EDUCATION/TRAINING PROGRAM

## 2024-12-02 PROCEDURE — G0378 HOSPITAL OBSERVATION PER HR: HCPCS | Mod: HCNC

## 2024-12-02 PROCEDURE — C1760 CLOSURE DEV, VASC: HCPCS | Mod: HCNC

## 2024-12-02 PROCEDURE — 80053 COMPREHEN METABOLIC PANEL: CPT | Mod: HCNC | Performed by: STUDENT IN AN ORGANIZED HEALTH CARE EDUCATION/TRAINING PROGRAM

## 2024-12-02 PROCEDURE — 99900035 HC TECH TIME PER 15 MIN (STAT): Mod: HCNC

## 2024-12-02 PROCEDURE — 85610 PROTHROMBIN TIME: CPT | Mod: HCNC

## 2024-12-02 PROCEDURE — 76937 US GUIDE VASCULAR ACCESS: CPT | Mod: TC,HCNC | Performed by: STUDENT IN AN ORGANIZED HEALTH CARE EDUCATION/TRAINING PROGRAM

## 2024-12-02 PROCEDURE — C1894 INTRO/SHEATH, NON-LASER: HCPCS | Mod: HCNC

## 2024-12-02 PROCEDURE — 25500020 PHARM REV CODE 255: Mod: HCNC | Performed by: STUDENT IN AN ORGANIZED HEALTH CARE EDUCATION/TRAINING PROGRAM

## 2024-12-02 PROCEDURE — 63600175 PHARM REV CODE 636 W HCPCS: Mod: JA,HCNC

## 2024-12-02 PROCEDURE — 99285 EMERGENCY DEPT VISIT HI MDM: CPT | Mod: 25,HCNC

## 2024-12-02 PROCEDURE — 76380 CAT SCAN FOLLOW-UP STUDY: CPT | Mod: TC,59,HCNC | Performed by: STUDENT IN AN ORGANIZED HEALTH CARE EDUCATION/TRAINING PROGRAM

## 2024-12-02 PROCEDURE — 37000009 HC ANESTHESIA EA ADD 15 MINS: Mod: HCNC

## 2024-12-02 PROCEDURE — G0269 OCCLUSIVE DEVICE IN VEIN ART: HCPCS | Mod: HCNC | Performed by: STUDENT IN AN ORGANIZED HEALTH CARE EDUCATION/TRAINING PROGRAM

## 2024-12-02 RX ORDER — METOPROLOL TARTRATE 25 MG/1
25 TABLET, FILM COATED ORAL ONCE
Status: COMPLETED | OUTPATIENT
Start: 2024-12-02 | End: 2024-12-02

## 2024-12-02 RX ORDER — MAGNESIUM SULFATE HEPTAHYDRATE 40 MG/ML
2 INJECTION, SOLUTION INTRAVENOUS ONCE
Status: COMPLETED | OUTPATIENT
Start: 2024-12-02 | End: 2024-12-02

## 2024-12-02 RX ORDER — LIDOCAINE HYDROCHLORIDE 20 MG/ML
15 SOLUTION OROPHARYNGEAL ONCE
Status: COMPLETED | OUTPATIENT
Start: 2024-12-02 | End: 2024-12-02

## 2024-12-02 RX ORDER — OXYCODONE HYDROCHLORIDE 5 MG/1
5 TABLET ORAL EVERY 4 HOURS PRN
Status: DISCONTINUED | OUTPATIENT
Start: 2024-12-02 | End: 2024-12-03 | Stop reason: HOSPADM

## 2024-12-02 RX ORDER — ATORVASTATIN CALCIUM 40 MG/1
40 TABLET, FILM COATED ORAL DAILY
Status: DISCONTINUED | OUTPATIENT
Start: 2024-12-03 | End: 2024-12-03 | Stop reason: HOSPADM

## 2024-12-02 RX ORDER — ROCURONIUM BROMIDE 10 MG/ML
INJECTION, SOLUTION INTRAVENOUS
Status: DISCONTINUED | OUTPATIENT
Start: 2024-12-02 | End: 2024-12-02

## 2024-12-02 RX ORDER — LIDOCAINE HYDROCHLORIDE 10 MG/ML
INJECTION, SOLUTION INFILTRATION; PERINEURAL
Status: COMPLETED | OUTPATIENT
Start: 2024-12-02 | End: 2024-12-02

## 2024-12-02 RX ORDER — SPIRONOLACTONE 25 MG/1
25 TABLET ORAL DAILY
Status: DISCONTINUED | OUTPATIENT
Start: 2024-12-03 | End: 2024-12-03 | Stop reason: HOSPADM

## 2024-12-02 RX ORDER — CARVEDILOL 12.5 MG/1
12.5 TABLET ORAL 2 TIMES DAILY WITH MEALS
Status: DISCONTINUED | OUTPATIENT
Start: 2024-12-02 | End: 2024-12-03 | Stop reason: HOSPADM

## 2024-12-02 RX ORDER — ONDANSETRON HYDROCHLORIDE 2 MG/ML
4 INJECTION, SOLUTION INTRAVENOUS EVERY 6 HOURS PRN
Status: DISCONTINUED | OUTPATIENT
Start: 2024-12-02 | End: 2024-12-03 | Stop reason: HOSPADM

## 2024-12-02 RX ORDER — EZETIMIBE 10 MG/1
10 TABLET ORAL DAILY
COMMUNITY

## 2024-12-02 RX ORDER — OXYCODONE HYDROCHLORIDE 5 MG/1
5 TABLET ORAL
Status: DISCONTINUED | OUTPATIENT
Start: 2024-12-02 | End: 2024-12-03 | Stop reason: HOSPADM

## 2024-12-02 RX ORDER — INSULIN PUMP SYRINGE, 3 ML
EACH MISCELLANEOUS
COMMUNITY

## 2024-12-02 RX ORDER — SODIUM CHLORIDE 0.9 % (FLUSH) 0.9 %
10 SYRINGE (ML) INJECTION
Status: DISCONTINUED | OUTPATIENT
Start: 2024-12-02 | End: 2024-12-03 | Stop reason: HOSPADM

## 2024-12-02 RX ORDER — LABETALOL HYDROCHLORIDE 5 MG/ML
10 INJECTION, SOLUTION INTRAVENOUS
Status: DISCONTINUED | OUTPATIENT
Start: 2024-12-02 | End: 2024-12-03 | Stop reason: HOSPADM

## 2024-12-02 RX ORDER — FENTANYL CITRATE 50 UG/ML
INJECTION, SOLUTION INTRAMUSCULAR; INTRAVENOUS
Status: DISCONTINUED | OUTPATIENT
Start: 2024-12-02 | End: 2024-12-02

## 2024-12-02 RX ORDER — FUROSEMIDE 20 MG/1
20 TABLET ORAL DAILY
Status: DISCONTINUED | OUTPATIENT
Start: 2024-12-03 | End: 2024-12-02

## 2024-12-02 RX ORDER — ASPIRIN 325 MG
325 TABLET ORAL
Status: COMPLETED | OUTPATIENT
Start: 2024-12-02 | End: 2024-12-02

## 2024-12-02 RX ORDER — NAPROXEN SODIUM 220 MG/1
81 TABLET, FILM COATED ORAL DAILY
Status: DISCONTINUED | OUTPATIENT
Start: 2024-12-03 | End: 2024-12-03 | Stop reason: HOSPADM

## 2024-12-02 RX ORDER — LOSARTAN POTASSIUM 50 MG/1
TABLET ORAL
COMMUNITY

## 2024-12-02 RX ORDER — SCOLOPAMINE TRANSDERMAL SYSTEM 1 MG/1
1 PATCH, EXTENDED RELEASE TRANSDERMAL
Status: DISCONTINUED | OUTPATIENT
Start: 2024-12-02 | End: 2024-12-03 | Stop reason: HOSPADM

## 2024-12-02 RX ORDER — PROCHLORPERAZINE EDISYLATE 5 MG/ML
5 INJECTION INTRAMUSCULAR; INTRAVENOUS EVERY 30 MIN PRN
Status: DISCONTINUED | OUTPATIENT
Start: 2024-12-02 | End: 2024-12-03 | Stop reason: HOSPADM

## 2024-12-02 RX ORDER — PROPOFOL 10 MG/ML
VIAL (ML) INTRAVENOUS
Status: DISCONTINUED | OUTPATIENT
Start: 2024-12-02 | End: 2024-12-02

## 2024-12-02 RX ORDER — EZETIMIBE 10 MG/1
10 TABLET ORAL DAILY
Status: DISCONTINUED | OUTPATIENT
Start: 2024-12-03 | End: 2024-12-03 | Stop reason: HOSPADM

## 2024-12-02 RX ORDER — PROMETHAZINE HYDROCHLORIDE 25 MG/ML
INJECTION, SOLUTION INTRAMUSCULAR; INTRAVENOUS
Status: COMPLETED | OUTPATIENT
Start: 2024-12-02 | End: 2024-12-02

## 2024-12-02 RX ORDER — CLOPIDOGREL BISULFATE 75 MG/1
75 TABLET ORAL DAILY
Status: DISCONTINUED | OUTPATIENT
Start: 2024-12-03 | End: 2024-12-03 | Stop reason: HOSPADM

## 2024-12-02 RX ORDER — SACUBITRIL AND VALSARTAN 24; 26 MG/1; MG/1
1 TABLET, FILM COATED ORAL 2 TIMES DAILY
Status: DISCONTINUED | OUTPATIENT
Start: 2024-12-02 | End: 2024-12-03 | Stop reason: HOSPADM

## 2024-12-02 RX ORDER — BISACODYL 10 MG/1
10 SUPPOSITORY RECTAL DAILY PRN
Status: DISCONTINUED | OUTPATIENT
Start: 2024-12-02 | End: 2024-12-03 | Stop reason: HOSPADM

## 2024-12-02 RX ORDER — DEXTROSE MONOHYDRATE, SODIUM CHLORIDE, AND POTASSIUM CHLORIDE 50; 1.49; 4.5 G/1000ML; G/1000ML; G/1000ML
INJECTION, SOLUTION INTRAVENOUS CONTINUOUS
Status: DISCONTINUED | OUTPATIENT
Start: 2024-12-02 | End: 2024-12-03 | Stop reason: HOSPADM

## 2024-12-02 RX ORDER — HEPARIN SODIUM 200 [USP'U]/100ML
INJECTION, SOLUTION INTRAVENOUS
Status: COMPLETED | OUTPATIENT
Start: 2024-12-02 | End: 2024-12-02

## 2024-12-02 RX ORDER — CLOPIDOGREL BISULFATE 75 MG/1
300 TABLET ORAL DAILY
Status: DISCONTINUED | OUTPATIENT
Start: 2024-12-02 | End: 2024-12-02

## 2024-12-02 RX ORDER — GLUCAGON 1 MG
1 KIT INJECTION
Status: DISCONTINUED | OUTPATIENT
Start: 2024-12-02 | End: 2024-12-03 | Stop reason: HOSPADM

## 2024-12-02 RX ORDER — DEXAMETHASONE SODIUM PHOSPHATE 4 MG/ML
INJECTION, SOLUTION INTRA-ARTICULAR; INTRALESIONAL; INTRAMUSCULAR; INTRAVENOUS; SOFT TISSUE
Status: DISCONTINUED | OUTPATIENT
Start: 2024-12-02 | End: 2024-12-02

## 2024-12-02 RX ORDER — PHENYLEPHRINE HYDROCHLORIDE 10 MG/ML
INJECTION INTRAVENOUS
Status: DISCONTINUED | OUTPATIENT
Start: 2024-12-02 | End: 2024-12-02

## 2024-12-02 RX ORDER — HYDROMORPHONE HYDROCHLORIDE 2 MG/ML
0.2 INJECTION, SOLUTION INTRAMUSCULAR; INTRAVENOUS; SUBCUTANEOUS EVERY 5 MIN PRN
Status: DISCONTINUED | OUTPATIENT
Start: 2024-12-02 | End: 2024-12-03 | Stop reason: HOSPADM

## 2024-12-02 RX ORDER — DIPHENHYDRAMINE HYDROCHLORIDE 50 MG/ML
50 INJECTION INTRAMUSCULAR; INTRAVENOUS ONCE
Status: COMPLETED | OUTPATIENT
Start: 2024-12-02 | End: 2024-12-02

## 2024-12-02 RX ORDER — ONDANSETRON HYDROCHLORIDE 2 MG/ML
INJECTION, SOLUTION INTRAVENOUS
Status: DISCONTINUED | OUTPATIENT
Start: 2024-12-02 | End: 2024-12-02

## 2024-12-02 RX ORDER — FUROSEMIDE 10 MG/ML
40 INJECTION INTRAMUSCULAR; INTRAVENOUS DAILY
Status: DISCONTINUED | OUTPATIENT
Start: 2024-12-02 | End: 2024-12-03 | Stop reason: HOSPADM

## 2024-12-02 RX ORDER — ENOXAPARIN SODIUM 100 MG/ML
40 INJECTION SUBCUTANEOUS EVERY 24 HOURS
Status: DISCONTINUED | OUTPATIENT
Start: 2024-12-02 | End: 2024-12-03 | Stop reason: HOSPADM

## 2024-12-02 RX ORDER — LIDOCAINE HYDROCHLORIDE 20 MG/ML
INJECTION INTRAVENOUS
Status: DISCONTINUED | OUTPATIENT
Start: 2024-12-02 | End: 2024-12-02

## 2024-12-02 RX ORDER — ALUMINUM HYDROXIDE, MAGNESIUM HYDROXIDE, AND SIMETHICONE 1200; 120; 1200 MG/30ML; MG/30ML; MG/30ML
30 SUSPENSION ORAL ONCE
Status: COMPLETED | OUTPATIENT
Start: 2024-12-02 | End: 2024-12-02

## 2024-12-02 RX ADMIN — FENTANYL CITRATE 25 MCG: 50 INJECTION INTRAMUSCULAR; INTRAVENOUS at 09:12

## 2024-12-02 RX ADMIN — PHENYLEPHRINE HYDROCHLORIDE 80 MCG: 10 INJECTION INTRAVENOUS at 09:12

## 2024-12-02 RX ADMIN — LIDOCAINE HYDROCHLORIDE 60 MG: 20 INJECTION, SOLUTION INTRAVENOUS at 08:12

## 2024-12-02 RX ADMIN — AMPICILLIN SODIUM AND SULBACTAM SODIUM 3 G: 2; 1 INJECTION, POWDER, FOR SOLUTION INTRAMUSCULAR; INTRAVENOUS at 08:12

## 2024-12-02 RX ADMIN — LIDOCAINE HYDROCHLORIDE 5 ML: 10 INJECTION, SOLUTION INFILTRATION; PERINEURAL at 08:12

## 2024-12-02 RX ADMIN — CARVEDILOL 12.5 MG: 12.5 TABLET, FILM COATED ORAL at 04:12

## 2024-12-02 RX ADMIN — MAGNESIUM SULFATE HEPTAHYDRATE 2 G: 40 INJECTION, SOLUTION INTRAVENOUS at 08:12

## 2024-12-02 RX ADMIN — SCOPOLAMINE 1 PATCH: 1.5 PATCH, EXTENDED RELEASE TRANSDERMAL at 07:12

## 2024-12-02 RX ADMIN — SODIUM CHLORIDE 125 MCG/HR: 9 INJECTION, SOLUTION INTRAVENOUS at 10:12

## 2024-12-02 RX ADMIN — FUROSEMIDE 40 MG: 10 INJECTION, SOLUTION INTRAMUSCULAR; INTRAVENOUS at 08:12

## 2024-12-02 RX ADMIN — DEXAMETHASONE SODIUM PHOSPHATE 4 MG: 4 INJECTION, SOLUTION INTRA-ARTICULAR; INTRALESIONAL; INTRAMUSCULAR; INTRAVENOUS; SOFT TISSUE at 09:12

## 2024-12-02 RX ADMIN — ALUMINUM HYDROXIDE, MAGNESIUM HYDROXIDE, AND SIMETHICONE 30 ML: 200; 200; 20 SUSPENSION ORAL at 08:12

## 2024-12-02 RX ADMIN — METOPROLOL TARTRATE 25 MG: 25 TABLET, FILM COATED ORAL at 11:12

## 2024-12-02 RX ADMIN — DIPHENHYDRAMINE HYDROCHLORIDE 50 MG: 50 INJECTION, SOLUTION INTRAMUSCULAR; INTRAVENOUS at 08:12

## 2024-12-02 RX ADMIN — HEPARIN SODIUM 1000 UNITS/HR: 200 INJECTION, SOLUTION INTRAVENOUS at 08:12

## 2024-12-02 RX ADMIN — SACUBITRIL AND VALSARTAN 1 TABLET: 24; 26 TABLET, FILM COATED ORAL at 08:12

## 2024-12-02 RX ADMIN — PHENYLEPHRINE HYDROCHLORIDE 80 MCG: 10 INJECTION INTRAVENOUS at 08:12

## 2024-12-02 RX ADMIN — ONDANSETRON 16 MG: 2 INJECTION INTRAMUSCULAR; INTRAVENOUS at 07:12

## 2024-12-02 RX ADMIN — PROMETHAZINE HYDROCHLORIDE 12.5 MG: 25 INJECTION INTRAMUSCULAR; INTRAVENOUS at 12:12

## 2024-12-02 RX ADMIN — ROCURONIUM BROMIDE 50 MG: 10 INJECTION, SOLUTION INTRAVENOUS at 08:12

## 2024-12-02 RX ADMIN — ONDANSETRON 4 MG: 2 INJECTION, SOLUTION INTRAMUSCULAR; INTRAVENOUS at 09:12

## 2024-12-02 RX ADMIN — ONDANSETRON 4 MG: 2 INJECTION INTRAMUSCULAR; INTRAVENOUS at 08:12

## 2024-12-02 RX ADMIN — LIDOCAINE HYDROCHLORIDE 15 ML: 20 SOLUTION ORAL at 08:12

## 2024-12-02 RX ADMIN — PROPOFOL 50 MG: 10 INJECTION, EMULSION INTRAVENOUS at 08:12

## 2024-12-02 RX ADMIN — CLOPIDOGREL BISULFATE 300 MG: 75 TABLET ORAL at 04:12

## 2024-12-02 RX ADMIN — PROPOFOL 30 MG: 10 INJECTION, EMULSION INTRAVENOUS at 09:12

## 2024-12-02 RX ADMIN — SODIUM CHLORIDE: 0.9 INJECTION, SOLUTION INTRAVENOUS at 09:12

## 2024-12-02 RX ADMIN — PROPOFOL 30 MG: 10 INJECTION, EMULSION INTRAVENOUS at 08:12

## 2024-12-02 RX ADMIN — SODIUM CHLORIDE 250 MCG/HR: 9 INJECTION, SOLUTION INTRAVENOUS at 08:12

## 2024-12-02 RX ADMIN — PROMETHAZINE HYDROCHLORIDE 12.5 MG: 25 INJECTION INTRAMUSCULAR; INTRAVENOUS at 11:12

## 2024-12-02 RX ADMIN — SODIUM CHLORIDE: 0.9 INJECTION, SOLUTION INTRAVENOUS at 08:12

## 2024-12-02 RX ADMIN — FENTANYL CITRATE 50 MCG: 50 INJECTION INTRAMUSCULAR; INTRAVENOUS at 08:12

## 2024-12-02 RX ADMIN — DEXTROSE, SODIUM CHLORIDE, AND POTASSIUM CHLORIDE: 5; .45; .15 INJECTION INTRAVENOUS at 08:12

## 2024-12-02 RX ADMIN — OCTREOTIDE ACETATE 250 MCG: 500 INJECTION, SOLUTION INTRAVENOUS; SUBCUTANEOUS at 08:12

## 2024-12-02 RX ADMIN — IOHEXOL 110 ML: 300 INJECTION, SOLUTION INTRAVENOUS at 09:12

## 2024-12-02 RX ADMIN — ENOXAPARIN SODIUM 40 MG: 40 INJECTION SUBCUTANEOUS at 04:12

## 2024-12-02 RX ADMIN — ASPIRIN 325 MG ORAL TABLET 325 MG: 325 PILL ORAL at 04:12

## 2024-12-02 RX ADMIN — SUGAMMADEX 200 MG: 100 INJECTION, SOLUTION INTRAVENOUS at 09:12

## 2024-12-02 NOTE — PHARMACY MED REC
"  [facility]           Pharmacy  Admission Medication History     The home medication history was taken by Anahy Mckeon.      Medication history obtained from Medications listed below were obtained from: [employer] software- [employer]    Based on information gathered for medication list, you may go to "Admission" then "Reconcile Home Medications" tabs to review and/or act upon those items.     The home medication list has been updated by the Pharmacy department.   Please read ALL comments highlighted in yellow.   Please address this information as you see fit.    Feel free to contact us if you have any questions or require assistance.        Current Facility-Administered Medications on File Prior to Encounter   Medication Dose Route Frequency Provider Last Rate Last Admin    ampicillin-sulbactam (UNASYN) 3 g in 0.9% NaCl 100 mL IVPB (MB+)  3 g Intravenous On Call Procedure Alexandra Hollins PA-C   Stopped at 12/02/24 0903    dextrose 5 % and 0.45 % NaCl with KCl 20 mEq infusion   Intravenous Continuous GurvinderAlexandra PA-C 125 mL/hr at 12/02/24 0801 New Bag at 12/02/24 0801    [COMPLETED] diphenhydrAMINE injection 50 mg  50 mg Intravenous Once Alexandra Hollins PA-C   50 mg at 12/02/24 0807    glucagon (human recombinant) injection 1 mg  1 mg Intramuscular PRN Erwin Jackson MD        [COMPLETED] heparin infusion 1,000 units/500 ml in 0.9% NaCl (pressure line flush)    Continuous PRN Hardik Estrada MD   Stopped at 12/02/24 0938    HYDROmorphone (PF) injection 0.2 mg  0.2 mg Intravenous Q5 Min PRN Erwin Jackson MD        [COMPLETED] iohexoL (OMNIPAQUE 300) injection   Intravenous PRN Bridger Rock MD   110 mL at 12/02/24 0938    [COMPLETED] LIDOcaine HCL 10 mg/ml (1%) injection   Other PRN Bridger Rock MD   5 mL at 12/02/24 0853    [COMPLETED] magnesium sulfate 2g in water 50mL IVPB (premix)  2 g Intravenous Once Alexandra Hollins PA-C   Stopped at 12/02/24 1005    [COMPLETED] " metoprolol tartrate (LOPRESSOR) tablet 25 mg  25 mg Oral Once Bridger Rock MD   25 mg at 24 1139    [COMPLETED] octreotide (SANDOSTATIN) 250 mcg in 0.9% NaCl 50 mL IVPB  250 mcg Intravenous Once Alexandra Hollins PA-C   Stopped at 24 0837    octreotide (SANDOSTATIN) 5,000 mcg in 0.9% NaCl 100 mL infusion  250 mcg/hr Intravenous Continuous Alexandra Hollins PA-C 2.5 mL/hr at 24 1101 125 mcg/hr at 24 1101    [] octreotide (SANDOSTATIN) 5,000 mcg in 0.9% NaCl 100 mL infusion  250 mcg/hr Intravenous Continuous Alexandra Hollins PA-C        Followed by    octreotide (SANDOSTATIN) 5,000 mcg in 0.9% NaCl 100 mL infusion  125 mcg/hr Intravenous Continuous Alexandra Hollins PA-C        [COMPLETED] ondansetron (ZOFRAN) 16 mg in 0.9% NaCl 50 mL IVPB  16 mg Intravenous Once Alexandra Hollins PA-C   Stopped at 24 0812    oxyCODONE immediate release tablet 5 mg  5 mg Oral Q3H PRN Erwin Jackson MD        oxyCODONE immediate release tablet 5 mg  5 mg Oral Q4H PRN Bridger Rock MD        prochlorperazine injection Soln 5 mg  5 mg Intravenous Q30 Min PRN Erwin Jackson MD        [COMPLETED] promethazine injection    PRN Bridger Rock MD   12.5 mg at 24 1211    scopolamine 1.3-1.5 mg (1 mg over 3 days) 1 patch  1 patch Transdermal Q3 Days Alexandra Hollins PA-C   1 patch at 24 0744    [DISCONTINUED] carboxymethylcellulose sodium 0.25 %   Both Eyes PRN Abiola Arroyo CRNA   2 drop at 24 0833    [DISCONTINUED] dexAMETHasone injection   Intravenous PRN Abiola Arroyo CRNA   4 mg at 24 0920    [DISCONTINUED] fentaNYL injection   Intravenous PRN Erwin Jackson MD   25 mcg at 24 0935    [DISCONTINUED] LIDOcaine (cardiac) injection   Intravenous PRN Erwin Jackson MD   60 mg at 24 0829    [DISCONTINUED] ondansetron injection   Intravenous PRN Abiola Arroyo CRNA   4 mg at 24 0920    [DISCONTINUED] PHENYLephrine injection   Intravenous PRN Renetta,  MD Erwin   80 mcg at 12/02/24 0920    [DISCONTINUED] propofol (DIPRIVAN) 10 mg/mL infusion   Intravenous PRN Erwin Jackson MD   30 mg at 12/02/24 0902    [DISCONTINUED] rocuronium injection   Intravenous PRN Erwin Jackson MD   50 mg at 12/02/24 0831    [DISCONTINUED] sodium chloride 0.9% infusion   Intravenous Continuous PRN Abiola Arroyo CRNA   Stopped at 12/02/24 1000    [DISCONTINUED] sugammadex (BRIDION) 100 mg/mL injection   Intravenous PRN Abiola Arroyo CRNA   200 mg at 12/02/24 0939     Current Outpatient Medications on File Prior to Encounter   Medication Sig Dispense Refill    carvediloL (COREG) 12.5 MG tablet TAKE 1 TABLET(12.5 MG) BY MOUTH TWICE DAILY WITH MEALS (Patient taking differently: Take 12.5 mg by mouth 2 (two) times daily with meals.) 180 tablet 3    ezetimibe (ZETIA) 10 mg tablet Take 10 mg by mouth once daily.      furosemide (LASIX) 20 MG tablet take 1 tab by mouth daily. If gain 2-3 pounds in 2-3 days then take 2 tabs daily for 3 days and then resume 1 dose daily 120 tablet 3    metFORMIN (GLUCOPHAGE-XR) 500 MG ER 24hr tablet Take 1 tablet (500 mg total) by mouth once daily. 180 tablet 0    sacubitriL-valsartan (ENTRESTO) 24-26 mg per tablet TAKE 1 TABLET BY MOUTH TWICE DAILY 180 tablet 3    spironolactone (ALDACTONE) 25 MG tablet Take 1 tablet (25 mg total) by mouth once daily. 90 tablet 3    telotristat ethyl (XERMELO) 250 mg Tab Take 1 tablet (250 mg) by mouth 3 (three) times daily. 90 tablet 3    blood sugar diagnostic (TRUE METRIX GLUCOSE TEST STRIP) Strp USE TO CHECK BLOOD SUGAR TWICE DAILY 100 strip 11    blood-glucose meter kit by Other route. Use as instructed      cyanocobalamin (VITAMIN B-12) 1000 MCG tablet Take 1 tablet (1,000 mcg total) by mouth once daily.      diphenoxylate-atropine 2.5-0.025 mg (LOMOTIL) 2.5-0.025 mg per tablet Take 1 tablet by mouth 4 (four) times daily as needed for Diarrhea. 90 tablet 2    ferrous sulfate 325 (65 FE) MG EC tablet Take 325 mg by  "mouth once daily.      lancets Misc To check BG 2 times daily, to use with insurance preferred meter 100 each 11    needle, disp, 22 G 22 gauge x 1" Ndle 1 application by Misc.(Non-Drug; Combo Route) route 3 (three) times daily as needed. 30 each 2    ondansetron (ZOFRAN-ODT) 8 MG TbDL Take 1 tablet (8 mg total) by mouth every 8 (eight) hours as needed (nausea). 30 tablet 2    syringe, disposable, 1 mL Syrg 1 application by Misc.(Non-Drug; Combo Route) route 3 (three) times daily as needed (diarrhea). 30 each 2    TRUEPLUS LANCETS 33 gauge Misc USE TO CHECK BLOOD SUGAR TWICE DAILY 100 each 11    vitamin D (VITAMIN D3) 1000 units Tab Take 400 Units by mouth once daily.         Please address this information as you see fit.  Feel free to contact us if you have any questions or require assistance.    Anahy Mckeon  [phone]                .          "

## 2024-12-02 NOTE — NURSING
"Patient exhibited a change in status. At approximately 12:40 pm patient's speech was noted as "garbled". I brought a second nurse to bedside to confirm. Requested the provider and the OC come to pre/post. The patient's sister is brought in from the lobby who also notes the change in patient's speech. A bedside assessment is performed. Patient has left side weakness. Later, left side facial drooping is noted. The house supervisor is notified, and a "Code Stroke" is called overhead. Report is given in the hallway outside of CT to the collective staff that responded to the over head page. Patient is transferred to ED 13 for continuation of care. Patient's sister is escorted to ED and is placed in the House Supervisor's care. Patient is discharged from IR.   "

## 2024-12-02 NOTE — SUBJECTIVE & OBJECTIVE
HPI:  84 y.o. 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   NIHSS 2 (dysarthria and inability to answer all questions)     Images personally reviewed and interpreted:  Study: Head CT  Study Interpretation: No acute intracranial abnormalities, specifically no early signs of ischemia and no intracranial hemorrhage.        Assessment and plan:  NIHSS 2, non-focal exam w/ dysarthria and confusion.   Patient did receive several doses on phenergan and is HTNsive, w/ non-focal exam at the moment, thus possible other causes for observed current symptoms rather than an AIS.   Cannot explain the unilateral weakness, no resolved, which patient herself doesn't mention.   Reasonable to treat as a TIA if no contraindications to DAPT.   Recommendations:  MRA Head & Neck ASAP to evaluate cervico-cephalic vasculature for high risk culprit vessel disease or large/medium vessel occlusion  MRI brain to evaluate for presence and/or extent of ischemic injury  Allow for permissive HTNsion up to 220/110 until AIS is r/o w/ imaging   Lipid profile, A1c, TSH  ECHO w/o bubble study  PT/OT/SLP eval and Rx  IVF to allow for maximum cerebral perfusion  HOB flat   Load aspirin 325 mg & clopidogrel 300 mg x 1 now, followed by daily aspirin 81 mg /  clopidogrel 75 mg x 30 days followed by monotherapy thereafter  High intensity statin for LDL goal <70 long term     Recommendations discussed w/ Dr. Ribeiro      Lytics recommendation: Thrombolytic therapy not recommended due to Mild Non-Disabling Symptoms    Thrombectomy recommendation: Awaiting CTA results from [facility] for determination  and No; at this time symptoms not suggestive of large vessel occlusion  Placement recommendation: pending further studies  admit to  inpatient

## 2024-12-02 NOTE — TRANSFER OF CARE
"Anesthesia Transfer of Care Note    Patient: Shahram Hills    Procedure(s) Performed: * No procedures listed *    Patient location: PACU    Anesthesia Type: general    Transport from OR: Transported from OR on 6-10 L/min O2 by face mask with adequate spontaneous ventilation    Post pain: adequate analgesia    Post assessment: no apparent anesthetic complications    Post vital signs: stable    Level of consciousness: awake and alert    Nausea/Vomiting: no nausea/vomiting    Complications: none    Transfer of care protocol was followed      Last vitals: Visit Vitals  BP (!) 175/90   Pulse 76   Temp 35.9 °C (96.6 °F)   Resp 16   Ht 5' 4" (1.626 m)   Wt 49.9 kg (110 lb)   LMP  (LMP Unknown)   SpO2 95%   Breastfeeding No   BMI 18.88 kg/m²     "

## 2024-12-02 NOTE — TELEMEDICINE CONSULT
[facility]  Vascular Neurology  Comprehensive Stroke Center  TeleVascular Neurology Acute Consultation Note        Consult Information  Consult to Telemedicine - Acute Stroke  Consult performed by: Tania Weinstein MD  Consult ordered by: Martha Dover MD          Consulting Provider: MARTHA DOVER   Current Providers  No providers found    Patient Location:  [facility] EMERGENCY DEPARTMENT Emergency Department    Spoke hospital nurse at bedside with patient assisting consultant.  Patient information was obtained from patient, relative(s), and past medical records.     40 minutes of critical care time were spent on the date of this patient encounter, which includes: preparing to see the patient, reviewing previous history, obtaining new patient history, performing the physical exam, counseling and educating the patient and/or family/caregiver, ordering necessary medications or tests or referrals, documenting in the electronic medical record, coordinating care.       Stroke Documentation  Acute Stroke Times   Last Known Normal Date: 12/02/24  Last Known Normal Time: 1211  Symptom Onset Date: 12/02/24  Unknown Symptom Onset Time: Unknown Time  Stroke Team Called Date: 12/02/24  Stroke Team Called Time: 1305  Stroke Team Arrival Date: 12/02/24  Stroke Team Arrival Time: 1310  CT Interpretation Time: 1325  Thrombolytic Therapy Recommended: No    NIH Scale:  1a. Level of Consciousness: 0-->Alert, keenly responsive  1b. LOC Questions: 1-->Answers one question correctly  1c. LOC Commands: 0-->Performs both tasks correctly  2. Best Gaze: 0-->Normal  3. Visual: 0-->No visual loss  4. Facial Palsy: 0-->Normal symmetrical movements  5a. Motor Arm, Left: 0-->No drift, limb holds 90 (or 45) degrees for full 10 secs  5b. Motor Arm, Right: 0-->No drift, limb holds 90 (or 45) degrees for full 10 secs  6a. Motor Leg, Left: 0-->No drift, leg holds 30 degree position for full 5 secs  6b. Motor Leg, Right:  0-->No drift, leg holds 30 degree position for full 5 secs  7. Limb Ataxia: 0-->Absent  8. Sensory: 0-->Normal, no sensory loss  9. Best Language: 0-->No aphasia, normal  10. Dysarthria: 1-->Mild-to-moderate dysarthria, patient slurs at least some words and, at worst, can be understood with some difficulty  11. Extinction and Inattention (formerly Neglect): 0-->No abnormality  Total (NIH Stroke Scale): 2      Modified Jayashree: Score: 2  Carpenter Coma Scale:     ABCD2 Score:    UONC2CL8-JXD Score:    HAS -BLED Score:    ICH Score:    Hunt & Hart Classification:      Blood pressure (!) 194/101, pulse 79, resp. rate (!) 28, weight 49.9 kg (110 lb), SpO2 97%.      In my opinion, this was a: Tier 1; VAN Stroke Assessment: Negative     Medical Decision Making  HPI:  84 y.o. 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   NIHSS 2 (dysarthria and inability to answer all questions)     Images personally reviewed and interpreted:  Study: Head CT  Study Interpretation: No acute intracranial abnormalities, specifically no early signs of ischemia and no intracranial hemorrhage.        Assessment and plan:  NIHSS 2, non-focal exam w/ dysarthria and confusion.   Patient did receive several doses on phenergan and is HTNsive, w/ non-focal exam at the moment, thus possible other causes for observed current symptoms rather than an AIS.   Cannot explain the unilateral weakness, no resolved, which patient herself doesn't mention.   Reasonable to treat as a TIA if no contraindications to DAPT.   Recommendations:  MRA Head & Neck ASAP to evaluate cervico-cephalic vasculature for high risk culprit vessel disease or large/medium vessel occlusion  MRI brain to evaluate for presence and/or extent of ischemic  injury  Allow for permissive HTNsion up to 220/110 until AIS is r/o w/ imaging   Lipid profile, A1c, TSH  ECHO w/o bubble study  PT/OT/SLP eval and Rx  IVF to allow for maximum cerebral perfusion  HOB flat   Load aspirin 325 mg & clopidogrel 300 mg x 1 now, followed by daily aspirin 81 mg /  clopidogrel 75 mg x 30 days followed by monotherapy thereafter  High intensity statin for LDL goal <70 long term     Recommendations discussed w/ Dr. Ribeiro      Lytics recommendation: Thrombolytic therapy not recommended due to Mild Non-Disabling Symptoms    Thrombectomy recommendation: Awaiting CTA results from Spoke for determination  and No; at this time symptoms not suggestive of large vessel occlusion  Placement recommendation: pending further studies  admit to inpatient               ROS  Physical Exam  Past Medical History:   Diagnosis Date    Anemia 07/11/2018    B12 deficiency     Depression     per pcp note 7/2017 and given prozac and diazepam     Hyperlipidemia     Neuroendocrine tumor     Systolic heart failure     Weight loss     reviewed prior pcp Dr. Russo notes 2017 - labs reviewed: cmp wnl x tbili 1.5, tsh wnl, A1c 5.0, cbc wnl,D 36, hiv neg, hep c neg, hep b surg ag neg      Past Surgical History:   Procedure Laterality Date    EMBOLIZATION N/A 02/14/2019    Procedure: EMBOLIZATION, BLOOD VESSEL;  Surgeon: [facility] Diagnostic Provider;  [facility];  Service: Radiology;  Laterality: N/A;  Room 189/[facility]    EMBOLIZATION N/A 03/21/2019    Procedure: EMBOLIZATION, BLOOD VESSEL;  Surgeon: [facility] Diagnostic Provider;  Location: [facility] OR 2ND FLR;  Service: Radiology;  Laterality: N/A;  Room 189/[facility]    ESOPHAGOGASTRODUODENOSCOPY  05/04/2018    esophageal ring, grade 1 esophagitis, gastritis     EYE SURGERY Bilateral     cataract removal    HYSTERECTOMY      fibroids     Family History   Problem Relation Name Age of Onset    Glaucoma Mother      Hypertension Mother      Heart attack Father      Cancer  Father      Diabetes Sister Marilyn     Asthma Sister Nick Luke     No Known Problems Sister Stephanie     Hyperlipidemia Sister      Heart attack Sister      Cancer Brother          lung cancer, + tobacco    Diabetes Brother      Hypertension Brother      Stroke Brother      Emphysema Brother      COPD Brother         Diagnoses  No problems updated.    Tania Weinstein MD      Emergent/Acute neurological consultation requested by spoke provider due to critical concerns for possible cerebrovascular event that could result in permanent loss of neurologic/bodily function, severe disability or death of this patient.  Immediate/timely evaluation by a highly prepared expert is paramount for optimal outcomes  High risk for neurological deterioration if not properly diagnosed  High risk for neurological deterioration if not treated promplty/as soon as possible  Complex diagnostic evaluation may be required (advanced imaging)  High risk treatment options (thrombolytics and/or thrombectomy)    Patient care was coordinated with spoke provider, including but not limted to    Discussing likely diagnosis/etiology of symptoms  Making recommendations for further diagnostic studies  Making recommendations for intravenous thrombolytics or other advanced therapies  Making recommendations for disposition (admission/transfer for higher level of care)      Neurology consultation requested by spoke provider. Audiovisual encounter with the patient performed using a secure connection.  Results and impressions from the visit are documented on this note and were communicated to the consulting provider/team via direct communication. The note has been shared for addition to the patients electronic medical record.

## 2024-12-02 NOTE — CARE UPDATE
85 y/o F s/p bland embolization with anesthsia.  After patient returned to IR recovery area code stroke was called.  Telemedicine with vascular neurology assessed the patient with recommendations for MRA/MRI, echo, asa and plavix load.    See Vascular Neurology note for further recommendations regarding code stroke.  Zofran prn for nausea and oxy 5 mg prn for pain control.  Can escalate pain control as needed.  Avoid tylenol or tylenol containing products.  Cipro bid to start this evening, if not tolerating po can give 400 mg IV.  At DE, recommend Cipro 500 mg BID x 5 days.  Octreotide 125 mcg/hr (2.5 ml/hr) IV for 1 day post op (24 hrs post procedure).  Do not need to follow liver enzymes, we expect this to be elevated following the procedure.  Pt may also have mild leukocytosis and fever post procedure (post embolization syndrome).  Please contact IR if concern for infectious process.  At DE give less than 1 wks supply of PRN Zofran ODT for nausea, and PRN Oxycodone 5 mg for pain.  Avoid acetaminophen-containing products and EtOH for 2 weeks post procedure.  IR will arrange follow up imaging and clinic visit.    IR will continue to follow. Please contact with questions via [employer] secure chat.    Alexandra Hollins PA-C  Interventional Radiology

## 2024-12-02 NOTE — Clinical Note
Diagnosis: Acute encephalopathy [[id number]]   Future Attending Provider: ANAHY BOWEN III [[id number]]   Is the patient being sent to ED Observation?: No

## 2024-12-02 NOTE — ED PROVIDER NOTES
Encounter Date: 12/2/2024       History     Chief Complaint   Patient presents with    Dysphagia     Pt presents post IR procedure for chemo ablation, IR reports that pt began having confusion and slurred speech about 15 minutes post procedure, last known well ~12:40.      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  A stroke activation was called by IR nurse for dysarthria, concern for left-sided weakness, and change in mental status. Last known normal 12:11 pm.  Patient had a CT head in IR and it did not show acute ischemic or hemorrhagic stroke.    Once patient was brought to the emergency department, NIH 6 (+1 dysarthria, +1 questions, +2 LLE drift, +2 RLE drift). Consulted stroke team - they advise no tnk due to other causes likely leading to patient's presentation of encephalopathy, specifically, patient experienced nausea in IR and had gotten 2 doses of IV phenergan (12.5 mg) prior to onset of symptoms.     Presently, patient denies headache, CP, SOB, nausea. No recent fevers. Per sister: patient was in her usual state of health today prior to coming to the hospital for her procedure. She walked and talked at baseline prior to the procedure.       Review of patient's allergies indicates:   Allergen Reactions    Epinephrine      carcinoid     Past Medical History:   Diagnosis Date    Anemia 07/11/2018    B12 deficiency     Depression     per pcp note 7/2017 and given prozac and diazepam     Hyperlipidemia     Neuroendocrine tumor     Systolic heart failure     Weight loss     reviewed prior pcp Dr. Russo notes 2017 - labs reviewed: cmp wnl x tbili 1.5, tsh wnl, A1c 5.0, cbc wnl,D 36, hiv neg, hep c neg, hep b surg ag neg      Past  Surgical History:   Procedure Laterality Date    EMBOLIZATION N/A 02/14/2019    Procedure: EMBOLIZATION, BLOOD VESSEL;  Surgeon: [facility] Diagnostic Provider;  Location: [facility] OR 2ND FLR;  Service: Radiology;  Laterality: N/A;  Room 189/[facility]    EMBOLIZATION N/A 03/21/2019    Procedure: EMBOLIZATION, BLOOD VESSEL;  Surgeon: [facility] Diagnostic Provider;  Location: [facility] OR 2ND FLR;  Service: Radiology;  Laterality: N/A;  Room 189/[facility]    ESOPHAGOGASTRODUODENOSCOPY  05/04/2018    esophageal ring, grade 1 esophagitis, gastritis     EYE SURGERY Bilateral     cataract removal    HYSTERECTOMY      fibroids     Family History   Problem Relation Name Age of Onset    Glaucoma Mother      Hypertension Mother      Heart attack Father      Cancer Father      Diabetes Sister Marilyn     Asthma Sister Nick Luke     No Known Problems Sister Stephanie     Hyperlipidemia Sister      Heart attack Sister      Cancer Brother          lung cancer, + tobacco    Diabetes Brother      Hypertension Brother      Stroke Brother      Emphysema Brother      COPD Brother       Social History     Tobacco Use    Smoking status: Never    Smokeless tobacco: Never   Substance Use Topics    Alcohol use: No     Comment: stopped in 2007 - hx of social drinking    Drug use: Never     Review of Systems   All other systems reviewed and are negative.      Physical Exam     Initial Vitals   BP Pulse Resp Temp SpO2   12/02/24 1316 12/02/24 1316 12/02/24 1316 -- 12/02/24 1317   (!) 194/101 97 (!) 23  97 %      MAP       --                Physical Exam    Constitutional: She is not diaphoretic. No distress.   HENT:   Head: Normocephalic and atraumatic.   Eyes: Conjunctivae and EOM are normal. Pupils are equal, round, and reactive to light.   Neck: Neck supple.   Normal range of motion.  Cardiovascular:  Normal rate.           Pulmonary/Chest: Breath sounds normal. No respiratory distress.   Abdominal: Abdomen is soft. She exhibits no distension. There is no abdominal  tenderness.   Musculoskeletal:         General: No tenderness or edema. Normal range of motion.      Cervical back: Normal range of motion and neck supple.     Neurological: She is alert and oriented to person, place, and time. No cranial nerve deficit or sensory deficit. GCS eye subscore is 4. GCS verbal subscore is 5. GCS motor subscore is 6.   Initial NIH 6    Skin: Skin is warm and dry.         ED Course   Critical Care    Date/Time: 12/2/2024 3:59 PM    Performed by: Martha Ribeiro MD  Authorized by: Martha Ribeiro MD  Direct patient critical care time: 12 minutes  Additional history critical care time: 5 minutes  Ordering / reviewing critical care time: 5 minutes  Documentation critical care time: 5 minutes  Consulting other physicians critical care time: 5 minutes  Total critical care time (exclusive of procedural time) : 32 minutes  Critical care time was exclusive of separately billable procedures and treating other patients and teaching time.  Critical care was necessary to treat or prevent imminent or life-threatening deterioration of the following conditions: CNS failure or compromise.  Critical care was time spent personally by me on the following activities: development of treatment plan with patient or surrogate, discussions with consultants, discussions with primary provider, evaluation of patient's response to treatment, examination of patient, obtaining history from patient or surrogate, ordering and performing treatments and interventions, ordering and review of laboratory studies, ordering and review of radiographic studies, pulse oximetry and re-evaluation of patient's condition.        Labs Reviewed   CBC W/ AUTO DIFFERENTIAL - Abnormal       Result Value    WBC 5.13      RBC 3.93 (*)     Hemoglobin 12.5      Hematocrit 38.3      MCV 98      MCH 31.8 (*)     MCHC 32.6      RDW 13.3      Platelets 171      MPV 9.7      Immature Granulocytes 0.2      Gran # (ANC) 4.0      Immature Grans  (Abs) 0.01      Lymph # 0.9 (*)     Mono # 0.2 (*)     Eos # 0.0      Baso # 0.01      nRBC 0      Gran % 78.8 (*)     Lymph % 17.3 (*)     Mono % 3.3 (*)     Eosinophil % 0.2      Basophil % 0.2      Differential Method Automated     COMPREHENSIVE METABOLIC PANEL - Abnormal    Sodium 142      Potassium 3.6      Chloride 107      CO2 18 (*)     Glucose 194 (*)     BUN 7 (*)     Creatinine 0.7      Calcium 9.5      Total Protein 7.6      Albumin 3.6      Total Bilirubin 0.4      Alkaline Phosphatase 75      AST 23      ALT 16      eGFR >60      Anion Gap 17 (*)    LIPID PANEL - Abnormal    Cholesterol 271 (*)     Triglycerides 75       (*)     LDL Cholesterol 151.0      HDL/Cholesterol Ratio 38.7      Total Cholesterol/HDL Ratio 2.6      Non-HDL Cholesterol 166     B-TYPE NATRIURETIC PEPTIDE - Abnormal    BNP 1,347 (*)    LACTIC ACID, PLASMA - Abnormal    Lactate (Lactic Acid) 2.3 (*)    POCT GLUCOSE - Abnormal    POCT Glucose 194 (*)    PROTIME-INR    Prothrombin Time 12.0      INR 1.1     TSH    TSH 0.470     TROPONIN I    Troponin I <0.006     URINALYSIS   POCT GLUCOSE, HAND-HELD DEVICE   ISTAT PROCEDURE    POC PTINR 1.1      Sample unknown     ISTAT CREATININE    POC Creatinine 0.5      Sample VENOUS            Imaging Results              X-Ray Chest 1 View (In process)                   X-Rays:   Independently Interpreted Readings:   Chest X-Ray: Normal heart size. Normal vessels. No widened mediastinum, no focal consolidation, no effusion per my independent interpretation.      Medications - No data to display  Medical Decision Making    Differentials considered: ischemic or hemorrhagic stroke, hypertensive encephalopathy, hypertensive emergency, medication side effect among them    Though NIH 6 on arrival, the patient's neurologic deficits have been improving throughout the time she has been in the emergency department.  CT head without acute ischemic or hemorrhagic stroke.  Consulted stroke team, they  indicate other causes more likely as etiology of patient's presentation.  Note that patient got 2 doses of Phenergan prior to her acute onset change in mental status.  Stroke team recommended aspirin, Plavix, and getting MRI brain and MRV brain and neck.  These additional studies are pending at the time of her admission to Hospital Medicine.    Additionally: did not need to control patient's blood pressure. By the time she arrived back from MRI, her BP was 130/74, and back to baseline in terms of prior symptoms.     Labs significant for elevated BNP (above baseline), trop negative.     Problems Addressed:  Acute encephalopathy: complicated acute illness or injury  Acute focal neurological deficit: complicated acute illness or injury  Dysarthria: complicated acute illness or injury    Amount and/or Complexity of Data Reviewed  Labs: ordered.     Details: See [facility]  Radiology: ordered.     Details: See [facility]    Risk  OTC drugs.  Prescription drug management.  Decision regarding hospitalization.                                Clinical Impression:  Final diagnoses:  [R29.818] Acute focal neurological deficit  [I63.9] Stroke  [R41.82] Altered mental status  [R47.1] Dysarthria  [G93.40] Acute encephalopathy (Primary)        ED Disposition Condition    Admit Stable                Martha Ribeiro MD  12/02/24 [id number]

## 2024-12-02 NOTE — PROCEDURES
"  Pre Op Diagnosis: NET  Post Op Diagnosis: Same    Procedure: TAE    Procedure performed by: Pranav    Written Informed Consent Obtained: Yes  Specimen Removed: NO  Estimated Blood Loss: Minimal    Findings:   Successful TAE or left lobe and posterior right hepatic artery branch.      Patient tolerated procedure well, but developed slurred speech approximately 3 hours after the procedure was completed. Stroke code was initiated and patient went to ED.     Bridger Rock MD ([place])  Interventional Radiology  [phone]      "

## 2024-12-02 NOTE — CONSULTS
NEUROLOGY CONSULT EVALUATION  Shahram Hills  1940  [id number]    Reason for consult: stroke rule out    Informant: patient and chart       Other sources of information: chart and family    CC: dysphagia    HPI:     Shahram Hills is a 84 y.o. year old female who initially presented via outpatient procedure with interventional radiology for embolization of hepatic tumours.    Patient presented for embolization of tumours, she was sedated with propofol, fentanyl, and rocuronium.  She was reversed with sugammadex.  She then received 5mg oxycodone, and IV hydromorphone, and phenergan.  Patient was then noticed, once extubated and given pain medicine, that her speech was different and confused.  She was stroke activated.  CT head was negative for any acute changes.  MRI brain and MRA was completed also without any acute changes to indicate stroke.  Patient was given IV fluids and transferred to the ER for further assessment.  Patient, since being in the ER, has improved back to her baseline.      Patient has a past medical history of B12 deficiency, hyperlipidaemia, depression, neuroendocrine tumours with metastatic disease, oesophageal ring, diabetes, heart failure, and hypertension.      ROS: is as above    Histories:   Allergies:  Epinephrine    Current Medications:    Current Facility-Administered Medications   Medication Dose Route Frequency Provider Last Rate Last Admin    [START ON 12/3/2024] aspirin chewable tablet 81 mg  81 mg Oral Daily Hardik Milan MD        [START ON 12/3/2024] atorvastatin tablet 40 mg  40 mg Oral Daily Hardik Milan MD        bisacodyL suppository 10 mg  10 mg Rectal Daily PRN Hardik Milan MD        carvediloL tablet 12.5 mg  12.5 mg Oral BID WM Hardik Milan MD   12.5 mg at 12/02/24 1658    clopidogreL tablet 300 mg  300 mg Oral Daily Hardik Milan MD   300 mg at 12/02/24 1658    [START ON 12/3/2024] clopidogreL tablet 75 mg  75 mg Oral Daily Hardik Milan MD        enoxaparin  injection 40 mg  40 mg Subcutaneous Daily Hardik Milan MD   40 mg at 12/02/24 1658    [START ON 12/3/2024] ezetimibe tablet 10 mg  10 mg Oral Daily Hardik Milan MD        [START ON 12/3/2024] furosemide tablet 20 mg  20 mg Oral Daily Hardik Milan MD        labetaloL injection 10 mg  10 mg Intravenous Q15 Min PRN Hardik Milan MD        sacubitriL-valsartan 24-26 mg per tablet 1 tablet  1 tablet Oral BID Hardik Milan MD        sodium chloride 0.9% bolus 500 mL 500 mL  500 mL Intravenous PRN Hardik Milan MD        sodium chloride 0.9% flush 10 mL  10 mL Intravenous PRN Hardik Milan MD        [START ON 12/3/2024] spironolactone tablet 25 mg  25 mg Oral Daily Hardik Milan MD         Current Outpatient Medications   Medication Sig Dispense Refill    carvediloL (COREG) 12.5 MG tablet TAKE 1 TABLET(12.5 MG) BY MOUTH TWICE DAILY WITH MEALS (Patient taking differently: Take 12.5 mg by mouth 2 (two) times daily with meals.) 180 tablet 3    ezetimibe (ZETIA) 10 mg tablet Take 10 mg by mouth once daily.      furosemide (LASIX) 20 MG tablet take 1 tab by mouth daily. If gain 2-3 pounds in 2-3 days then take 2 tabs daily for 3 days and then resume 1 dose daily 120 tablet 3    metFORMIN (GLUCOPHAGE-XR) 500 MG ER 24hr tablet Take 1 tablet (500 mg total) by mouth once daily. 180 tablet 0    sacubitriL-valsartan (ENTRESTO) 24-26 mg per tablet TAKE 1 TABLET BY MOUTH TWICE DAILY 180 tablet 3    spironolactone (ALDACTONE) 25 MG tablet Take 1 tablet (25 mg total) by mouth once daily. 90 tablet 3    telotristat ethyl (XERMELO) 250 mg Tab Take 1 tablet (250 mg) by mouth 3 (three) times daily. 90 tablet 3    blood sugar diagnostic (TRUE METRIX GLUCOSE TEST STRIP) Strp USE TO CHECK BLOOD SUGAR TWICE DAILY 100 strip 11    blood-glucose meter kit by Other route. Use as instructed      cyanocobalamin (VITAMIN B-12) 1000 MCG tablet Take 1 tablet (1,000 mcg total) by mouth once daily.      diphenoxylate-atropine 2.5-0.025 mg (LOMOTIL) 2.5-0.025 mg  "per tablet Take 1 tablet by mouth 4 (four) times daily as needed for Diarrhea. 90 tablet 2    ferrous sulfate 325 (65 FE) MG EC tablet Take 325 mg by mouth once daily.      lancets Misc To check BG 2 times daily, to use with insurance preferred meter 100 each 11    latanoprost 0.005 % ophthalmic solution Place 1 drop into both eyes nightly. (Patient not taking: Reported on 12/2/2024)      losartan (COZAAR) 50 MG tablet  (Patient not taking: Reported on 12/2/2024)      needle, disp, 22 G 22 gauge x 1" Ndle 1 application by Misc.(Non-Drug; Combo Route) route 3 (three) times daily as needed. 30 each 2    ondansetron (ZOFRAN-ODT) 8 MG TbDL Take 1 tablet (8 mg total) by mouth every 8 (eight) hours as needed (nausea). 30 tablet 2    syringe, disposable, 1 mL Syrg 1 application by Misc.(Non-Drug; Combo Route) route 3 (three) times daily as needed (diarrhea). 30 each 2    TRUEPLUS LANCETS 33 gauge Misc USE TO CHECK BLOOD SUGAR TWICE DAILY 100 each 11    vitamin D (VITAMIN D3) 1000 units Tab Take 400 Units by mouth once daily.      XIIDRA 5 % Dpet INSTILL ONE DROP INTO OU BID (Patient not taking: Reported on 12/2/2024)  3     Facility-Administered Medications Ordered in Other Encounters   Medication Dose Route Frequency Provider Last Rate Last Admin    ampicillin-sulbactam (UNASYN) 3 g in 0.9% NaCl 100 mL IVPB (MB+)  3 g Intravenous On Call Procedure Alexandra Hollins PA-C   Stopped at 12/02/24 0903    dextrose 5 % and 0.45 % NaCl with KCl 20 mEq infusion   Intravenous Continuous Alexandra Hollins PA-C 125 mL/hr at 12/02/24 0801 New Bag at 12/02/24 0801    glucagon (human recombinant) injection 1 mg  1 mg Intramuscular PRN Erwin Jackson MD        HYDROmorphone (PF) injection 0.2 mg  0.2 mg Intravenous Q5 Min PRN Erwin Jackson MD        octreotide (SANDOSTATIN) 5,000 mcg in 0.9% NaCl 100 mL infusion  250 mcg/hr Intravenous Continuous Alexandra Hollins PA-C 2.5 mL/hr at 12/02/24 1101 125 mcg/hr at 12/02/24 1101    octreotide " (SANDOSTATIN) 5,000 mcg in 0.9% NaCl 100 mL infusion  125 mcg/hr Intravenous Continuous Alexandra Hollins PA-C        oxyCODONE immediate release tablet 5 mg  5 mg Oral Q3H PRN Erwin Jackson MD        oxyCODONE immediate release tablet 5 mg  5 mg Oral Q4H PRN Bridger Rock MD        prochlorperazine injection Soln 5 mg  5 mg Intravenous Q30 Min PRN Erwin Jackson MD        scopolamine 1.3-1.5 mg (1 mg over 3 days) 1 patch  1 patch Transdermal Q3 Days Alexandra Hollins PA-C   1 patch at 12/02/24 0744     Past Medical/Surgical/Family History:  PMHx:   Past Medical History:   Diagnosis Date    Anemia 07/11/2018    B12 deficiency     Depression     per pcp note 7/2017 and given prozac and diazepam     Hyperlipidemia     Neuroendocrine tumor     Systolic heart failure     Weight loss     reviewed prior pcp Dr. Russo notes 2017 - labs reviewed: cmp wnl x tbili 1.5, tsh wnl, A1c 5.0, cbc wnl,D 36, hiv neg, hep c neg, hep b surg ag neg       Surgeries:   Past Surgical History:   Procedure Laterality Date    EMBOLIZATION N/A 02/14/2019    Procedure: EMBOLIZATION, BLOOD VESSEL;  Surgeon: [facility] Diagnostic Provider;  Location: [facility] OR [address] [place] [address];  Service: Radiology;  Laterality: N/A;  Room 189/[place]    EMBOLIZATION N/A 03/21/2019    Procedure: EMBOLIZATION, BLOOD VESSEL;  Surgeon: [facility] Diagnostic Provider;  Location: [facility] OR 2ND FLR;  Service: Radiology;  Laterality: N/A;  Room 189/[place]    ESOPHAGOGASTRODUODENOSCOPY  05/04/2018    esophageal ring, grade 1 esophagitis, gastritis     EYE SURGERY Bilateral     cataract removal    HYSTERECTOMY      fibroids      Family  Hx:   Family History   Problem Relation Name Age of Onset    Glaucoma Mother      Hypertension Mother      Heart attack Father      Cancer Father      Diabetes Sister Marilyn     Asthma Sister JoCarina Luke     No Known Problems Sister Stephanie     Hyperlipidemia Sister      Heart attack Sister      Cancer Brother          lung cancer, + tobacco    Diabetes  Brother      Hypertension Brother      Stroke Brother      Emphysema Brother      COPD Brother        Social History:  Substance Abuse/Dependence Histor  Tobacco: denied  EtOH: none  Illicit drugs: none  Occupational/Employment History:  Occupation: retired    Current Evaluation:   Vital Signs:   Vitals:    12/02/24 1636   BP: 134/77   Pulse: 85   Resp: 20   Temp:       Neurological Examination   Orientation  Alert, awake, oriented to self, place, time, and situation  Memory  Recent and remote memory intact  Language  No dysarthria, No aphasia  Cranial Nerves  PERRL, VF intact, EOMI, V1-V3 intact, symmetric facial expression, hearing grossly intact, SCM & TPZ 5/5, tongue midline, symmetric palate elevation  Motor  Normal Bulk  Normal Tone  5/5 strength in 4 extremities  Sensory  Normal to light touch throughout  Romberg deferred  DTR   +2 symmetric  Cerebellar/Gait  Normal finger to nose and heel to shin  Gait deferred    Modified Jayashree Scale: 2    NIH Stroke Scale  1a. Level of consciousness 0=alert; keenly responsive   1b. LOC questions 0=Answers both tasks correctly   1c. LOC commands 0=Answers both tasks correctly   2. Best gaze 0=normal   3. Visual 0=No visual loss   4.  Facial palsy 0=Normal symmetric movement   5.  Motor left arm 0=No drift, limb holds 90 (or 45) degrees for full 10 seconds   5b. Motor right arm 0=No drift, limb holds 90 (or 45) degrees for full 10 seconds   6a. Motor left leg 0=No drift, limb holds 90 (or 45) degrees for full 10 seconds   6b. Motor right leg 0=No drift, limb holds 90 (or 45) degrees for full 10 seconds   7. Limb ataxia 0=Absent   8. Sensory 0=Normal; no sensory loss   9. Best language 0=No aphasia, normal   10. Dysarthria 0=Normal   11.  Extinction and inattention 0=No abnormality                                TOTAL: 0     LABORATORY STUDIES:  Reviewed rest of labs in EPIC    A1c: 6.2  LDL: 151  TSH: 0.470    RADIOLOGY STUDIES:  MRA Neck without contrast Result Date:  12/2/2024  Motion distorted MRA neck.  Irregularity of the carotid bifurcations likely related to motion however underlying stenosis not excluded this could be further evaluated with carotid ultrasonography.     MRI Brain Without Contrast Result Date: 12/2/2024  Generalized cerebral volume loss with patchy and confluent T2 FLAIR signal abnormality supratentorial white matter and umang while nonspecific concerning for moderate degree of chronic ischemic change. No acute intracranial findings specifically without evidence for acute infarction.     MRA Brain without contrast Result Date: 12/2/2024  Unremarkable noncontrast MRA head specifically without evidence for significant stenosis or proximal large vessel occlusion     CT Head Without Contrast Result Date: 12/2/2024  No significant change from prior specifically without evidence for acute intracranial hemorrhage or sulcal effacement to suggest large territory recent infarction.     CT Head Without Contrast Result Date: 11/3/2024  No acute intracranial findings as detailed above specifically without evidence for acute intracranial hemorrhage or sulcal effacement to suggest large territory recent infarction     Assessment and Plan:   84 year old female patient who initially presented for an IR procedure for embolization of hepatic tumours who was sedated intubated, and extubated post procedure who then received two doses of phenergan and had mild dysarthria and was confused post sedation and extubation.  Patient stroke activated and complete work up was negative.  She was still symptomatic at time of MRI imaging, where if this was an ischaemic event, DWI changes would be expected.  Patient has since returned to her baseline.  Not likely transient ischaemic attack and or stroke given the history of events and negative imaging.  This is likely secondary to her sedation and subsequent intubation.  No further work up and or management at this time from neurology.       Treatment Plan:  - MRI brain without contrast, negative for acute stroke  - MRA without any large vessel occlusion  - no need for aspirin at this time given low probability of transient ischaemic attack or stroke  - neurology to sign off    Differential diagnosis was explained to the patient. All questions were answered. Patient understood and agreed to adhere to plan.     Archie Junior MD  [facility] PGY-IV  [facility] Consult Service

## 2024-12-02 NOTE — DISCHARGE INSTRUCTIONS
TACE GROIN RECOVERY INSTRUCTIONS:    You have had a procedure called a TACE - Trans-Arterial Chemo-Embolization. This procedure is usually performed by a specially trained doctor called an interventional radiologist. The procedure allows the doctor to treat tumors in the liver by directly injecting chemotherapy spheres into the tumors. A catheter - a thin, flexible tube - was inserted into one of your blood vessels through a small incision in your groin and guided to the liver to deliver the therapy. Please follow the following instructions while recovering:    Monitor the groin site for bleeding. Apply firm pressure to the groin site if you need to cough, during sneezes, and in the event you have to vomit. This reduces the risk of your site bleeding. If bleeding does occurs, apply firm, manual pressure and seek medical assistance immediately!  Do not shower/bathe tonight. Shower tomorrow, at least 24 hours post-procedure. After your shower, you may remove the groin dressing.   Carefully cleanse the groin site with gentle soap and water; do not pick at any scab formation.  Monitor the groin site for signs and symptoms of infection (See below).      Recovering at Home:  Follow your doctor's recommendations on when it is safe to drive - at least THREE days after your procedure.  Do not lift anything heavier than a gallon of milk for the next TEN days.  Avoid strenuous activity/exercise for the next TEN days - this includes climbing stairs.   Do not submerge in a bathtub, pool, or Jacuzzi until the groin site is fully closed - at least 14 days.  Rest often. You may be more tired than usual.  Take your medications exactly as directed. Do not skip doses. Note - some medications should not be resumed after this procedure to prevent any adverse interaction with the contrast dye, which may be harmful to your kidneys. Be sure to ask your healthcare team about any potential reactions and for guidance on what medications to  hold.  Unless directed otherwise, drink six to eight glasses of water a day for the next TWO days to prevent dehydration and to help flush your body of the contrast dye used during your procedure.  Take your temperature and check the groin site for signs of infection - redness, swelling, drainage, or warmth - every day for one week.    When to call your doctor:  Fever of 100.4°F (38°C) or higher, or as directed by your health care provider.  Sudden shortness breath or any bleeding, bruising, or a large area of swelling at the groin site.  Signs of an allergic reaction to the contrast dye: rash, nausea, vomiting, or trouble breathing.  Signs of infection at the groin site: increased pain, redness, swelling, warmth, or foul-smelling drainage.   Constant or increasing pain or numbness in your leg.  Your leg feels cold, looks blue; numbness and/or tingling in the leg, especially near the catheter insertion site.   Rapid, pounding, or irregular heartbeat.  Blood in your urine or black, tarry stools.     [facility]    [phone], choose prompt 3, Monday - Friday, 8:00 am - 4:00 pm    [phone] After hours and on holidays. Ask to speak with the interventional radiologist on call.

## 2024-12-02 NOTE — ANESTHESIA POSTPROCEDURE EVALUATION
Anesthesia Post Evaluation    Patient: Shahram Hills    Procedure(s) Performed: * No procedures listed *    Final Anesthesia Type: general      Patient location during evaluation: PACU  Patient participation: Yes- Able to Participate  Level of consciousness: responds to stimulation  Post-procedure vital signs: reviewed and stable  Pain management: adequate  Airway patency: patent  SASHA mitigation strategies: Multimodal analgesia  PONV status at discharge: No PONV  Anesthetic complications: no      Cardiovascular status: hemodynamically stable  Respiratory status: spontaneous ventilation and room air  Hydration status: euvolemic  Follow-up not needed.              Vitals Value Taken Time   /101 12/02/24 1344   Temp 36.5 °C (97.7 °F) 12/02/24 1000   Pulse 79 12/02/24 1344   Resp 28 12/02/24 1344   SpO2 97 % 12/02/24 1317         Event Time   Out of Recovery 10:47:39         Pain/Juan Score: Pain Rating Prior to Med Admin: 0 (12/2/2024 10:30 AM)  Pain Rating Post Med Admin: 0 (12/2/2024 10:30 AM)  Juan Score: 10 (12/2/2024 12:30 PM)           Statement Selected

## 2024-12-02 NOTE — HPI
Patient is a 85 yo female w/ PMHx of neuroendocrine tumor, depression, systolic heart failure, HLD who presents today to the ED after altered mental status. Patient had IR appointment today for trans arterial embolization of hepatic tumors and subsequently had altered mentation en route. She began having confusion, slurred speech for about 15 minutes after the procedure. Her last known normal was around 12:40PM. Patient was also noted to have taken phenergan periprocedure which could have contributed to her symptoms.     In the ED, initial vital signs /101, HR 97, SpO2 94% on room air. Labs include CBC with stable H/H 12.5/38.3 and WBC within normal limits 5.13. CMP with Na 142 K 3.6 Cl 107 CO2 18 Glu 194 and BUN/Cr 7/0.7 (baseline Cr 0.7). CT Head w/ no significant change from prior specifically without evidence for acute intracranial hemorrhage or sulcal effacement to suggest large territory recent infarction. [facility] was consulted. NIHSS 2. TNK was not administered due to non-disabling symptoms.. Patient was loaded on ASA and Plavix. [facility] consulted for evaluation for admission for stroke rule out.     Upon speaking with patient at time of admissions, she states she is back to her baseline. She is accompanied by her sister who thinks she is at her baseline as well.

## 2024-12-02 NOTE — SEDATION DOCUMENTATION
IR procedure - [place] TACE - is completed. Patient is emerging from anesthesia sedation at this time; vital signs are stable. She will be moved to PACU to begin recovery. After satisfactory recovery from anesthesia, patient will return to IR pre/post to complete the prescribed recovery time of two hours post hemostasis (9:40). Anticipated transfer to room/discharge is 11:40.    76.2

## 2024-12-02 NOTE — ANESTHESIA PROCEDURE NOTES
Intubation    Date/Time: 12/2/2024 8:34 AM    Performed by: Nate Agrawal MD  Authorized by: Erwin Jackson MD    Intubation:     Induction:  Intravenous    Intubated:  Postinduction    Mask Ventilation:  Easy mask    Attempts:  1    Attempted By:  Other (see comments) (Family Medicine Resident)    Method of Intubation:  Video laryngoscopy    Blade:  [place] 3    Laryngeal View Grade: Grade I - full view of cords      Difficult Airway Encountered?: No      Complications:  None    Airway Device:  Oral endotracheal tube    Airway Device Size:  7.0    Style/Cuff Inflation:  Cuffed (inflated to minimal occlusive pressure)    Inflation Amount (mL):  5    Tube secured:  21    Secured at:  The teeth    Placement Verified By:  Capnometry    Complicating Factors:  None    Findings Post-Intubation:  BS equal bilateral and atraumatic/condition of teeth unchanged  Notes:      Lower dentures removed prior to intubation. Upper dentures glued securely in place

## 2024-12-02 NOTE — ASSESSMENT & PLAN NOTE
Patient presents with slurred speech following nick-operative phernergan PO intake for TAE of liver tumor  CT Head w/ no significant change from prior specifically without evidence for acute intracranial hemorrhage or sulcal effacement to suggest large territory recent infarction.   MRI brain w/ no acute intracranial findings specifically without evidence for acute infarction.   MRA brain w/ Unremarkable noncontrast MRA head specifically without evidence for significant stenosis or proximal large vessel occlusion   MRA neck inconclusive  Code Stroke activated in ED.  [facility] Neuro Consulted, appreciate recommendations.    Plan:  - Neurology consulted, appreciate recommendations.  - Neurochecks q4hrs.  - BG Goal 140 - 180.  - Allow permissive HTN with SBP goal < 220 and DBP < 110 for first 24 hrs. PRNs labetalol 10 mg IV or Hydralazine 10 mg IV for BP sustained above goal.  - NPO pending bedside swallow; if passes, diet to increase to liquids and PO meds.   - SLP consulted, appreciate recommendations.  - PT/OT consulted, appreciate recommendations.  - Aspirin and plavix loaded initially. Daily aspirin and plavix x 21days continued thereafter.  - Initiated on Atorvastatin 40 mg.  - Labs ordered: HgA1C, Lipid Panel, TSH, ESR, Vitamin B-12, Folate  - Imaging ordered: 2-D Echo w/ bubble study, Carotid U/S for   - DVT Prophylaxis: TEDs/SCDs.  - Stroke education provided

## 2024-12-02 NOTE — ANESTHESIA PREPROCEDURE EVALUATION
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 Russo notes 2017 - labs reviewed: cmp wnl x tbili 1.5, tsh wnl, A1c 5.0, cbc wnl,D 36, hiv neg, hep c neg, hep b surg ag neg      Past Surgical History:   Procedure Laterality Date    EMBOLIZATION N/A 02/14/2019    Procedure: EMBOLIZATION, BLOOD VESSEL;  Surgeon: [facility] Diagnostic Provider;  Location: [facility] OR 2ND FLR;  Service: Radiology;  Laterality: N/A;  Room 189/[facility]    EMBOLIZATION N/A 03/21/2019    Procedure: EMBOLIZATION, BLOOD VESSEL;  Surgeon: [facility] Diagnostic Provider;  Location: [facility] OR 2ND FLR;  Service: Radiology;  Laterality: N/A;  Room 189/[facility]    ESOPHAGOGASTRODUODENOSCOPY  05/04/2018    esophageal ring, grade 1 esophagitis, gastritis     EYE SURGERY Bilateral     cataract removal    HYSTERECTOMY      fibroids     ECHO 10/21/24    Left Ventricle: The left ventricle is normal in size. Ventricular mass is normal. Normal wall thickness. Moderate global hypokinesis present. There is moderately reduced systolic function with a visually estimated ejection fraction of 30 - 35%. Unable to assess diastolic function due to E-A fusion. compared to prior study LV function appears reduced.    Right Ventricle: Normal right ventricular cavity size. Wall thickness is normal. Right ventricle wall motion  is normal. Systolic function is normal.    Left Atrium: Left atrium is mildly dilated.    Right Atrium: Normal right atrial size.     Aortic Valve: The aortic valve is a trileaflet valve. There is mild aortic valve sclerosis. There is mild annular calcification present. There is at least moderate aortic regurgitation with significant beat to beat variability that incrases to severe every 3rd of 4 th beat partly PVC related. Consider PVC suppression and re evaluating aortic regurgitation. Aortic regurigtation appears reduced. Wide pulse pressure is noted.    Mitral Valve: There is moderate bileaflet sclerosis. Moderately calcified leaflets. There is mild to moderate regurgitation with a posterolateral eccentriccally directed jet.    Aorta: Aortic root is normal in size measuring 2.99 cm. Ascending aorta is normal measuring 2.92 cm.    Pulmonary Artery: There is mild to moderate pulmonary hypertension. The estimated pulmonary artery systolic pressure is 40 mmHg.    IVC/SVC: Normal venous pressure at 3 mmHg.     Pre-op Assessment    I have reviewed the Patient Summary Reports.     I have reviewed the Nursing Notes. I have reviewed the NPO Status.   I have reviewed the Medications.     Review of Systems  Anesthesia Hx:             Denies Family Hx of Anesthesia complications.     Social:  No Alcohol Use, Non-Smoker       Cardiovascular:     Hypertension       CHF                                   Renal/:  Chronic Renal Disease                Hepatic/GI:      Liver Disease,               Neurological:    Neuromuscular Disease,                                   Endocrine:  Diabetes           Psych:  Psychiatric History                  Physical Exam  General: Well nourished    Airway:  Mallampati: II   TM Distance: Normal  Neck ROM: Normal ROM    Dental:  Intact    Chest/Lungs:  Clear to auscultation    Heart:  Rate: Normal  Rhythm: Regular Rhythm        Anesthesia Plan  Type of Anesthesia, risks & benefits discussed:    Anesthesia Type: Gen ETT  Intra-op Monitoring Plan: Standard ASA Monitors  Post Op Pain Control Plan: multimodal  analgesia  Induction:  IV  Informed Consent: Informed consent signed with the Patient and all parties understand the risks and agree with anesthesia plan.  All questions answered.   ASA Score: 3    Ready For Surgery From Anesthesia Perspective.     .

## 2024-12-02 NOTE — H&P
Radiology History & Physical      SUBJECTIVE:     Chief Complaint: NET     History of Present Illness:  Shahram Hills is a 84 y.o. female who presents for bland embolization of hepatic tumors.    Past Medical History:   Diagnosis Date    Anemia 07/11/2018    B12 deficiency     Depression     per pcp note 7/2017 and given prozac and diazepam     Hyperlipidemia     Neuroendocrine tumor     Systolic heart failure     Weight loss     reviewed prior pcp Dr. Russo notes 2017 - labs reviewed: cmp wnl x tbili 1.5, tsh wnl, A1c 5.0, cbc wnl,D 36, hiv neg, hep c neg, hep b surg ag neg      Past Surgical History:   Procedure Laterality Date    EMBOLIZATION N/A 02/14/2019    Procedure: EMBOLIZATION, BLOOD VESSEL;  Surgeon: [facility] Diagnostic Provider;  Location: [facility] OR 2ND FLR;  Service: Radiology;  Laterality: N/A;  Room 189/[facility]    EMBOLIZATION N/A 03/21/2019    Procedure: EMBOLIZATION, BLOOD VESSEL;  Surgeon: [facility] Diagnostic Provider;  Location: [facility] OR 2ND FLR;  Service: Radiology;  Laterality: N/A;  Room 189/[facility]    ESOPHAGOGASTRODUODENOSCOPY  05/04/2018    esophageal ring, grade 1 esophagitis, gastritis     EYE SURGERY Bilateral     cataract removal    HYSTERECTOMY      fibroids       Home Meds:   Prior to Admission medications    Medication Sig Start Date End Date Taking? Authorizing Provider   carvediloL (COREG) 12.5 MG tablet TAKE 1 TABLET(12.5 MG) BY MOUTH TWICE DAILY WITH MEALS 10/8/24  Yes Trixie Xie MD   cyanocobalamin (VITAMIN B-12) 1000 MCG tablet Take 1 tablet (1,000 mcg total) by mouth once daily. 4/20/18  Yes Trixie Xie MD   diphenoxylate-atropine 2.5-0.025 mg (LOMOTIL) 2.5-0.025 mg per tablet Take 1 tablet by mouth 4 (four) times daily as needed for Diarrhea. 12/7/22  Yes Sebastian Granados MD   ferrous sulfate 325 (65 FE) MG EC tablet Take 325 mg by mouth once daily.   Yes Provider, Historical   furosemide (LASIX) 20 MG tablet take 1 tab by mouth daily. If gain 2-3 pounds in 2-3  "days then take 2 tabs daily for 3 days and then resume 1 dose daily 10/29/24  Yes Trixie Xie MD   latanoprost 0.005 % ophthalmic solution Place 1 drop into both eyes nightly. 11/8/21  Yes Provider, Historical   metFORMIN (GLUCOPHAGE-XR) 500 MG ER 24hr tablet Take 1 tablet (500 mg total) by mouth once daily. 10/7/24  Yes Benita Cordero MD   sacubitriL-valsartan (ENTRESTO) 24-26 mg per tablet TAKE 1 TABLET BY MOUTH TWICE DAILY 9/4/24  Yes Ralph Bergeron MD   spironolactone (ALDACTONE) 25 MG tablet Take 1 tablet (25 mg total) by mouth once daily. 7/9/24 7/9/25 Yes Ralph Bergeron MD   telotristat ethyl (XERMELO) 250 mg Tab Take 1 tablet (250 mg) by mouth 3 (three) times daily. 8/2/24  Yes Nayana Mendosa CNS   vitamin D (VITAMIN D3) 1000 units Tab Take 400 Units by mouth once daily.   Yes Provider, Historical   XIIDRA 5 % Dpet INSTILL ONE DROP INTO OU BID 4/2/18  Yes Provider, Historical   blood sugar diagnostic (TRUE METRIX GLUCOSE TEST STRIP) Strp USE TO CHECK BLOOD SUGAR TWICE DAILY 6/24/24   Trixie Xie MD   blood-glucose meter kit To check BG 2 times daily, to use with insurance preferred meter 5/25/21 11/26/24  Trixie Xie MD   ezetimibe (ZETIA) 10 mg tablet Take 1 tablet (10 mg total) by mouth once daily. 2/14/24 11/26/24  Trixie Xie MD   lancets [facility] To check BG 2 times daily, to use with insurance preferred meter 5/25/21   Trixie Xie MD   needle, disp, 22 G 22 gauge x 1" Ndle 1 application by Misc.(Non-Drug; Combo Route) route 3 (three) times daily as needed. 3/6/23   Trixie Xie MD   ondansetron (ZOFRAN-ODT) 8 MG TbDL Take 1 tablet (8 mg total) by mouth every 8 (eight) hours as needed (nausea). 11/5/24   Sebastian Granados MD   syringe, disposable, 1 mL Syrg 1 application by Misc.(Non-Drug; Combo Route) route 3 (three) times daily as needed (diarrhea). 7/15/22   Sebastian Granados MD   TRUEPLUS LANCETS 33 gauge Misc USE TO CHECK BLOOD SUGAR TWICE " "DAILY 6/24/24   Trixie Xie MD     Anticoagulants/Antiplatelets: no anticoagulation    Allergies:   Review of patient's allergies indicates:   Allergen Reactions    Epinephrine      carcinoid     Sedation History:  no adverse reactions    Labs:  Lab Results   Component Value Date    INR 1.0 11/26/2024       Lab Results   Component Value Date    WBC 5.09 11/26/2024    HGB 11.3 (L) 11/26/2024    HCT 36.7 (L) 11/26/2024     (H) 11/26/2024     11/26/2024     Lab Results   Component Value Date     (H) 11/26/2024     11/26/2024    K 4.5 11/26/2024     11/26/2024    CO2 29 11/26/2024    BUN 8 11/26/2024    CREATININE 0.8 11/26/2024    CALCIUM 9.8 11/26/2024    MG 1.9 10/28/2024    ALT 12 11/26/2024    AST 20 11/26/2024    ALBUMIN 3.7 11/26/2024    BILITOT 0.4 11/26/2024    BILIDIR 0.2 11/26/2024       Review of Systems:   Hematological: no known coagulopathies  Respiratory: no shortness of breath  Cardiovascular: no chest pain  Gastrointestinal: no abdominal pain  Genito-Urinary: no dysuria  Musculoskeletal: negative  Neurological: no TIA or stroke symptoms         OBJECTIVE:     Vital Signs (Most Recent)       Physical Exam:  ASA: 2  Mallampati: 2    General: no acute distress  Mental Status: alert and oriented to person, place and time  HEENT: normocephalic, atraumatic  Chest: unlabored breathing  Heart: regular heart rate  Abdomen: nondistended  Extremity: moves all extremities    ASSESSMENT/PLAN:     Sedation Plan: moderate  Patient will undergo TAE.    Bridger Rock MD ([place])  Interventional Radiology       "

## 2024-12-02 NOTE — SUBJECTIVE & OBJECTIVE
Past Medical History:   Diagnosis Date    Anemia 2018    B12 deficiency     Depression     per pcp note 2017 and given prozac and diazepam     Hyperlipidemia     Neuroendocrine tumor     Systolic heart failure     Weight loss     reviewed prior pcp Dr. Russo notes 2017 - labs reviewed: cmp wnl x tbili 1.5, tsh wnl, A1c 5.0, cbc wnl,D 36, hiv neg, hep c neg, hep b surg ag neg        Past Surgical History:   Procedure Laterality Date    EMBOLIZATION N/A 2019    Procedure: EMBOLIZATION, BLOOD VESSEL;  Surgeon: [facility] Diagnostic Provider;  [facility];  Service: Radiology;  Laterality: N/A;  Room 189/[place]    EMBOLIZATION N/A 2019    Procedure: EMBOLIZATION, BLOOD VESSEL;  Surgeon: [facility] Diagnostic Provider;  Location: [facility] OR [facility];  Service: Radiology;  Laterality: N/A;  Room 189/[place]    ESOPHAGOGASTRODUODENOSCOPY  2018    esophageal ring, grade 1 esophagitis, gastritis     EYE SURGERY Bilateral     cataract removal    HYSTERECTOMY      fibroids       Review of patient's allergies indicates:   Allergen Reactions    Epinephrine      carcinoid       Current Facility-Administered Medications on File Prior to Encounter   Medication    ampicillin-sulbactam (UNASYN) 3 g in 0.9% NaCl 100 mL IVPB (MB+)    dextrose 5 % and 0.45 % NaCl with KCl 20 mEq infusion    [COMPLETED] diphenhydrAMINE injection 50 mg    glucagon (human recombinant) injection 1 mg    [COMPLETED] heparin infusion 1,000 units/500 ml in 0.9% NaCl (pressure line flush)    HYDROmorphone (PF) injection 0.2 mg    [COMPLETED] iohexoL (OMNIPAQUE 300) injection    [COMPLETED] LIDOcaine HCL 10 mg/ml (1%) injection    [COMPLETED] magnesium sulfate 2g in water 50mL IVPB (premix)    [COMPLETED] metoprolol tartrate (LOPRESSOR) tablet 25 mg    [COMPLETED] octreotide (SANDOSTATIN) 250 mcg in 0.9% NaCl 50 mL IVPB    octreotide (SANDOSTATIN) 5,000 mcg in 0.9% NaCl 100 mL infusion    [] octreotide (SANDOSTATIN) 5,000 mcg  in 0.9% NaCl 100 mL infusion    Followed by    octreotide (SANDOSTATIN) 5,000 mcg in 0.9% NaCl 100 mL infusion    [COMPLETED] ondansetron (ZOFRAN) 16 mg in 0.9% NaCl 50 mL IVPB    oxyCODONE immediate release tablet 5 mg    oxyCODONE immediate release tablet 5 mg    prochlorperazine injection Soln 5 mg    [COMPLETED] promethazine injection    scopolamine 1.3-1.5 mg (1 mg over 3 days) 1 patch    [DISCONTINUED] carboxymethylcellulose sodium 0.25 %    [DISCONTINUED] dexAMETHasone injection    [DISCONTINUED] fentaNYL injection    [DISCONTINUED] LIDOcaine (cardiac) injection    [DISCONTINUED] ondansetron injection    [DISCONTINUED] PHENYLephrine injection    [DISCONTINUED] propofol (DIPRIVAN) 10 mg/mL infusion    [DISCONTINUED] rocuronium injection    [DISCONTINUED] sodium chloride 0.9% infusion    [DISCONTINUED] sugammadex (BRIDION) 100 mg/mL injection     Current Outpatient Medications on File Prior to Encounter   Medication Sig    carvediloL (COREG) 12.5 MG tablet TAKE 1 TABLET(12.5 MG) BY MOUTH TWICE DAILY WITH MEALS (Patient taking differently: Take 12.5 mg by mouth 2 (two) times daily with meals.)    ezetimibe (ZETIA) 10 mg tablet Take 10 mg by mouth once daily.    furosemide (LASIX) 20 MG tablet take 1 tab by mouth daily. If gain 2-3 pounds in 2-3 days then take 2 tabs daily for 3 days and then resume 1 dose daily    metFORMIN (GLUCOPHAGE-XR) 500 MG ER 24hr tablet Take 1 tablet (500 mg total) by mouth once daily.    sacubitriL-valsartan (ENTRESTO) 24-26 mg per tablet TAKE 1 TABLET BY MOUTH TWICE DAILY    spironolactone (ALDACTONE) 25 MG tablet Take 1 tablet (25 mg total) by mouth once daily.    telotristat ethyl (XERMELO) 250 mg Tab Take 1 tablet (250 mg) by mouth 3 (three) times daily.    blood sugar diagnostic (TRUE METRIX GLUCOSE TEST STRIP) Strp USE TO CHECK BLOOD SUGAR TWICE DAILY    blood-glucose meter kit by Other route. Use as instructed    cyanocobalamin (VITAMIN B-12) 1000 MCG tablet Take 1 tablet (1,000  "mcg total) by mouth once daily.    diphenoxylate-atropine 2.5-0.025 mg (LOMOTIL) 2.5-0.025 mg per tablet Take 1 tablet by mouth 4 (four) times daily as needed for Diarrhea.    ferrous sulfate 325 (65 FE) MG EC tablet Take 325 mg by mouth once daily.    lancets Misc To check BG 2 times daily, to use with insurance preferred meter    latanoprost 0.005 % ophthalmic solution Place 1 drop into both eyes nightly. (Patient not taking: Reported on 12/2/2024)    losartan (COZAAR) 50 MG tablet  (Patient not taking: Reported on 12/2/2024)    needle, disp, 22 G 22 gauge x 1" Ndle 1 application by Misc.(Non-Drug; Combo Route) route 3 (three) times daily as needed.    ondansetron (ZOFRAN-ODT) 8 MG TbDL Take 1 tablet (8 mg total) by mouth every 8 (eight) hours as needed (nausea).    syringe, disposable, 1 mL Syrg 1 application by Misc.(Non-Drug; Combo Route) route 3 (three) times daily as needed (diarrhea).    TRUEPLUS LANCETS 33 gauge Misc USE TO CHECK BLOOD SUGAR TWICE DAILY    vitamin D (VITAMIN D3) 1000 units Tab Take 400 Units by mouth once daily.    XIIDRA 5 % Dpet INSTILL ONE DROP INTO OU BID (Patient not taking: Reported on 12/2/2024)    [DISCONTINUED] blood-glucose meter kit To check BG 2 times daily, to use with insurance preferred meter    [DISCONTINUED] ezetimibe (ZETIA) 10 mg tablet Take 1 tablet (10 mg total) by mouth once daily.     Family History       Problem Relation (Age of Onset)    Asthma Sister    COPD Brother    Cancer Father, Brother    Diabetes Sister, Brother    Emphysema Brother    Glaucoma Mother    Heart attack Father, Sister    Hyperlipidemia Sister    Hypertension Mother, Brother    No Known Problems Sister    Stroke Brother          Tobacco Use    Smoking status: Never    Smokeless tobacco: Never   Substance and Sexual Activity    Alcohol use: No     Comment: stopped in 2007 - hx of social drinking    Drug use: Never    Sexual activity: Not Currently     Partners: Male     Review of Systems "   Constitutional:  Negative for chills and fever.   Respiratory:  Negative for shortness of breath and wheezing.    Endocrine: Negative for polydipsia and polyphagia.   Genitourinary:  Negative for dysuria and enuresis.   Neurological:  Negative for dizziness, facial asymmetry, speech difficulty, weakness, light-headedness, numbness and headaches.   Psychiatric/Behavioral:  Negative for behavioral problems and confusion. The patient is not nervous/anxious.      Objective:     Vital Signs (Most Recent):  Temp: 98 °F (36.7 °C) (24 1615)  Pulse: 85 (24 1636)  Resp: 20 (24 1636)  BP: 134/77 (24 1636)  SpO2: 97 % (24 1636) Vital Signs (24h Range):  Temp:  [96.6 °F (35.9 °C)-98 °F (36.7 °C)] 98 °F (36.7 °C)  Pulse:  [67-97] 85  Resp:  [12-28] 20  SpO2:  [91 %-100 %] 97 %  BP: (134-200)/() 134/77     Weight: 49.9 kg (110 lb)  Body mass index is 18.88 kg/m².     Physical Exam  Constitutional:       Appearance: Normal appearance.   HENT:      Head: Normocephalic and atraumatic.      Mouth/Throat:      Mouth: Mucous membranes are moist.      Pharynx: Oropharynx is clear.   Eyes:      Extraocular Movements: Extraocular movements intact.      Conjunctiva/sclera: Conjunctivae normal.      Pupils: Pupils are equal, round, and reactive to light.   Cardiovascular:      Rate and Rhythm: Normal rate and regular rhythm.      Pulses: Normal pulses.      Heart sounds: Normal heart sounds.   Pulmonary:      Effort: Pulmonary effort is normal.      Breath sounds: Normal breath sounds.   Abdominal:      General: Abdomen is flat.      Palpations: Abdomen is soft.   Musculoskeletal:         General: Normal range of motion.      Cervical back: Normal range of motion and neck supple.      Right lower le+ Edema present.      Left lower le+ Edema present.      Comments: CN 2-12 intact  Upper and lower extremity strength 4/5 bilaterally  Sensation intact   Skin:     General: Skin is warm and dry.       Capillary Refill: Capillary refill takes less than 2 seconds.   Neurological:      General: No focal deficit present.      Mental Status: She is alert.   Psychiatric:         Mood and Affect: Mood normal.              CRANIAL NERVES     CN III, IV, VI   Pupils are equal, round, and reactive to light.       Significant Labs: All pertinent labs within the past 24 hours have been reviewed.  CBC:   Recent Labs   Lab 12/02/24  1413   WBC 5.13   HGB 12.5   HCT 38.3        CMP:   Recent Labs   Lab 12/02/24  1413      K 3.6      CO2 18*   *   BUN 7*   CREATININE 0.7   CALCIUM 9.5   PROT 7.6   ALBUMIN 3.6   BILITOT 0.4   ALKPHOS 75   AST 23   ALT 16   ANIONGAP 17*       Significant Imaging: I have reviewed all pertinent imaging results/findings within the past 24 hours.  Imaging Results              MRA Neck without contrast (Final result)  Result time 12/02/24 15:57:17      Final result by Sameer Pan DO (12/02/24 15:57:17)                   Impression:      Motion distorted MRA neck.  Irregularity of the carotid bifurcations likely related to motion however underlying stenosis not excluded this could be further evaluated with carotid ultrasonography.      Electronically signed by: Sameer Pan DO  Date:    12/02/2024  Time:    15:57               Narrative:    EXAMINATION:  MRA NECK WITHOUT CONTRAST    CLINICAL HISTORY:  Neuro deficit, acute, stroke suspected;    TECHNIQUE:  And noncontrast 2-D time-of-flight MRA neck.  Three dimensional MIP reformats from the source imaging.    COMPARISON:  None    FINDINGS:  MRA neck: Study distorted by patient motion.  Allowing for artifacts the origins of the right brachycephalic,  left common carotid and left subclavian arteries from the arch are within normal limits. The origins of the vertebral arteries from the subclavian arteries are grossly patent.  The vertebral arteries are grossly patent throughout their course without significant focal  stenosis allowing for motion artifact.    The bilateral common carotid arteries, carotid bifurcations and extracranial portions of the internal carotid arteries are patent there is limitation of the carotid bifurcations and proximal ICA secondary to motion artifacts component of underlying stenosis cannot be excluded.  This could be further evaluated with carotid ultrasonography.    .                                       MRA Brain without contrast (Final result)  Result time 12/02/24 15:45:23      Final result by Sameer Pan DO (12/02/24 15:45:23)                   Impression:      MRI brain: Generalized cerebral volume loss with patchy and confluent T2 FLAIR signal abnormality supratentorial white matter and umang while nonspecific concerning for moderate degree of chronic ischemic change.    No acute intracranial findings specifically without evidence for acute infarction.    MRA head: Unremarkable noncontrast MRA head specifically without evidence for significant stenosis or proximal large vessel occlusion      Electronically signed by: Sameer Pan DO  Date:    12/02/2024  Time:    15:45               Narrative:    EXAMINATION:  MRI BRAIN WITHOUT CONTRAST; MRA BRAIN WITHOUT CONTRAST    CLINICAL HISTORY:  Transient ischemic attack (TIA);Mental status change, unknown cause;; Transient ischemic attack (TIA);Neuro deficit, acute, stroke suspected;    TECHNIQUE:  MRI brain: Sagittal and axial T1, axial T2, axial FLAIR, axial gradient and axial diffusion imaging of the whole brain without contrast.    MRA head: Noncontrast 3D time-of-flight MRA head with 3 dimensional MIP reformats    COMPARISON:  Noncontrast CT head earlier same day 12/02/2024    FINDINGS:  MRI brain: There is no restricted diffusion to suggest acute infarction.  Generalized cerebral volume loss with compensatory enlargement ventricles sulci and cisterns without hydrocephalus.  Patchy and confluent T2 FLAIR signal hyperintensity supratentorial  white matter and umang while nonspecific concerning for moderate degree of chronic ischemic change.  There is no abnormal parenchymal susceptibility to suggest parenchymal hemorrhage.  Major intracranial skull base T2 flow voids are present    Remote operative change bilateral lens replacement.  Degenerative change of the cervical spine partially included in the study.    MRA head:    Anterior circulation: Bilateral distal cervical, petrous, cavernous and supraclinoid segments of the ICAs as well as the visualized anterior middle cerebral arteries are patent without significant focal stenosis or aneurysm    Posterior circulation: Distal vertebral arteries, basilar artery and posterior cerebral arteries are patent without significant focal stenosis or aneurysm there is hypoplasia of the right P1 segment of the PCA with essentially persistent fetal circulation right PCA via right posterior communicating artery.                                       MRI Brain Without Contrast (Final result)  Result time 12/02/24 15:45:23      Final result by Sameer Pan DO (12/02/24 15:45:23)                   Impression:      MRI brain: Generalized cerebral volume loss with patchy and confluent T2 FLAIR signal abnormality supratentorial white matter and umang while nonspecific concerning for moderate degree of chronic ischemic change.    No acute intracranial findings specifically without evidence for acute infarction.    MRA head: Unremarkable noncontrast MRA head specifically without evidence for significant stenosis or proximal large vessel occlusion      Electronically signed by: Sameer Pan DO  Date:    12/02/2024  Time:    15:45               Narrative:    EXAMINATION:  MRI BRAIN WITHOUT CONTRAST; MRA BRAIN WITHOUT CONTRAST    CLINICAL HISTORY:  Transient ischemic attack (TIA);Mental status change, unknown cause;; Transient ischemic attack (TIA);Neuro deficit, acute, stroke suspected;    TECHNIQUE:  MRI brain: Sagittal and  axial T1, axial T2, axial FLAIR, axial gradient and axial diffusion imaging of the whole brain without contrast.    MRA head: Noncontrast 3D time-of-flight MRA head with 3 dimensional MIP reformats    COMPARISON:  Noncontrast CT head earlier same day 12/02/2024    FINDINGS:  MRI brain: There is no restricted diffusion to suggest acute infarction.  Generalized cerebral volume loss with compensatory enlargement ventricles sulci and cisterns without hydrocephalus.  Patchy and confluent T2 FLAIR signal hyperintensity supratentorial white matter and umang while nonspecific concerning for moderate degree of chronic ischemic change.  There is no abnormal parenchymal susceptibility to suggest parenchymal hemorrhage.  Major intracranial skull base T2 flow voids are present    Remote operative change bilateral lens replacement.  Degenerative change of the cervical spine partially included in the study.    MRA head:    Anterior circulation: Bilateral distal cervical, petrous, cavernous and supraclinoid segments of the ICAs as well as the visualized anterior middle cerebral arteries are patent without significant focal stenosis or aneurysm    Posterior circulation: Distal vertebral arteries, basilar artery and posterior cerebral arteries are patent without significant focal stenosis or aneurysm there is hypoplasia of the right P1 segment of the PCA with essentially persistent fetal circulation right PCA via right posterior communicating artery.                                       X-Ray Chest 1 View (Final result)  Result time 12/02/24 16:55:02      Final result by Gilles Vargas DO (12/02/24 16:55:02)                   Impression:      No acute abnormality.      Electronically signed by: Gilles Vargas  Date:    12/02/2024  Time:    16:55               Narrative:    EXAMINATION:  XR CHEST 1 VIEW    CLINICAL HISTORY:  Altered mental status, unspecified    TECHNIQUE:  Single frontal view of the chest was  performed.    COMPARISON:  11/03/2024.    FINDINGS:  The lungs are well expanded and clear. No focal opacities are seen. The pleural spaces are clear. The cardiac silhouette is unremarkable.  There are calcifications of the aortic arch.  The visualized osseous structures are unremarkable.

## 2024-12-02 NOTE — NURSING
"RAPID RESPONSE NURSE NOTE        Admit Date: 2024  LOS: 0  Code Status: Prior   Date of Consult: 2024  : 1940  Age: 84 y.o.  Weight:   Wt Readings from Last 1 Encounters:   24 49.9 kg (110 lb)     Sex: female  Race: Black or    Bed: ED 13/EXAM 13  MRN: [id number]  Time Rapid Response Team page Received: 1252  Time Rapid Response Team at Bedside: 1257  Time Rapid Response Team left Bedside: 1306  Was the patient discharged from an ICU this admission? No   Was the patient discharged from a PACU within last 24 hours? No   Did the patient receive conscious sedation/general anesthesia in last 24 hours? No   Was the patient in the ED within the past 24 hours? No   Was the patient on NIPPV within the past 24 hours? No   Did this progress into an ARC or CPA: No  Attending Physician: Maninder Wyatt III, MD  Primary Service: Family Medicine,Internal Medicine     SITUATION     Notified by overhead page.  Reason for alert: Code Stroke  Called to evaluate the patient for Neuro    Why is the patient in the hospital?: <principal problem not specified>    Patient has a past medical history of Anemia, B12 deficiency, Depression, Hyperlipidemia, Neuroendocrine tumor, Systolic heart failure, and Weight loss.    Last Vitals:  Temp: 97.7 °F (36.5 °C) ( 1000)  Pulse: 67 ( 1400)  Resp: 26 ( 1400)  BP: 187/78 ( 1400)  SpO2: 96 % ( 1400)    24 Hours Vitals Range:  Temp:  [96.6 °F (35.9 °C)-97.7 °F (36.5 °C)]   Pulse:  [67-97]   Resp:  [12-]   BP: (174-200)/()   SpO2:  [91 %-100 %]     Labs:  Recent Labs     24  1413   WBC 5.13   HGB 12.5   HCT 38.3          Recent Labs     24  1413      K 3.6      CO2 18*   BUN 7*   CREATININE 0.7   *        No results for input(s): "PH", "PCO2", "PO2", "HCO3", "POCSATURATED", "BE" in the last 72 hours.     ASSESSMENT/INTERVENTIONS    The patient was seen for a Neurological problem. Staff concerns " included suspected stroke activity. The following interventions were performed: stroke code initiated.  Patient s/p IR procedure who developed dysarthria and left-sided weakness.  Upon presentation to bedside, patient being rolled into CT for Head CT.  After completion, patient transferred to ER for stroke work-up as IR procedure was outpatient.    RECOMMENDATIONS    We recommend: Transfer to ER for work-up    Discussed plan of care with  Michael Oswald Supervisor & JC Jordan    PROVIDER ESCALATION    Orders received and case discussed with Dr. Doherty & Dr. Ribeiro .    Disposition: Tx to ER bed 13    FOLLOW UP  Call the Rapid Response NurseAmanda at x [phone] for additional questions or concerns.

## 2024-12-02 NOTE — ED NOTES
Received report and assumed care of pt. Pt arrived to ED from IR. Pt became dysphagic approximately 15min after chemo ablation. Pt's last known well was at 12:40PM  today. Pt received telestroke eval. Pt is AAO x4, placed on cardiac monitoring, automatic BP, and pusle ox. Lab at bedside for lab collect. Sister at bedside.

## 2024-12-02 NOTE — SEDATION DOCUMENTATION
Sedation for this case will be facilitated by anesthesia staff. For details regarding medication administration and vitals, please see documentation by the anesthesiologist(s) and/or CRNA.

## 2024-12-03 VITALS
RESPIRATION RATE: 18 BRPM | DIASTOLIC BLOOD PRESSURE: 83 MMHG | BODY MASS INDEX: 18.26 KG/M2 | SYSTOLIC BLOOD PRESSURE: 169 MMHG | HEIGHT: 64 IN | WEIGHT: 106.94 LBS | OXYGEN SATURATION: 97 % | TEMPERATURE: 99 F | HEART RATE: 71 BPM

## 2024-12-03 LAB
ALBUMIN SERPL BCP-MCNC: 3.7 G/DL (ref 3.5–5.2)
ALP SERPL-CCNC: 73 U/L (ref 40–150)
ALT SERPL W/O P-5'-P-CCNC: 223 U/L (ref 10–44)
ANION GAP SERPL CALC-SCNC: 18 MMOL/L (ref 8–16)
APICAL FOUR CHAMBER EJECTION FRACTION: 25 %
APICAL TWO CHAMBER EJECTION FRACTION: 21 %
APTT PPP: 21.9 SEC (ref 21–32)
ASCENDING AORTA: 2.96 CM
AST SERPL-CCNC: 309 U/L (ref 10–40)
AV INDEX (PROSTH): 0.49
AV MEAN GRADIENT: 7.6 MMHG
AV PEAK GRADIENT: 10.2 MMHG
AV REGURGITATION PRESSURE HALF TIME: 658.29 MS
AV VALVE AREA BY VELOCITY RATIO: 1.5 CM²
AV VALVE AREA: 1.7 CM²
AV VELOCITY RATIO: 0.44
BASOPHILS # BLD AUTO: 0.01 K/UL (ref 0–0.2)
BASOPHILS NFR BLD: 0.1 % (ref 0–1.9)
BILIRUB SERPL-MCNC: 0.7 MG/DL (ref 0.1–1)
BSA FOR ECHO PROCEDURE: 1.48 M2
BUN SERPL-MCNC: 7 MG/DL (ref 8–23)
CALCIUM SERPL-MCNC: 9.6 MG/DL (ref 8.7–10.5)
CHLORIDE SERPL-SCNC: 100 MMOL/L (ref 95–110)
CO2 SERPL-SCNC: 23 MMOL/L (ref 23–29)
CREAT SERPL-MCNC: 0.7 MG/DL (ref 0.5–1.4)
CV ECHO LV RWT: 0.33 CM
DIFFERENTIAL METHOD BLD: ABNORMAL
DOP CALC AO PEAK VEL: 1.6 M/S
DOP CALC AO VTI: 28.2 CM
DOP CALC LVOT AREA: 3.5 CM2
DOP CALC LVOT DIAMETER: 2.1 CM
DOP CALC LVOT PEAK VEL: 0.7 M/S
DOP CALC LVOT STROKE VOLUME: 47.4 CM3
DOP CALCLVOT PEAK VEL VTI: 13.7 CM
E/E' RATIO: 13.33 M/S
ECHO LV POSTERIOR WALL: 0.8 CM (ref 0.6–1.1)
EOSINOPHIL # BLD AUTO: 0 K/UL (ref 0–0.5)
EOSINOPHIL NFR BLD: 0 % (ref 0–8)
ERYTHROCYTE [DISTWIDTH] IN BLOOD BY AUTOMATED COUNT: 13.5 % (ref 11.5–14.5)
EST. GFR  (NO RACE VARIABLE): >60 ML/MIN/1.73 M^2
FRACTIONAL SHORTENING: 25 % (ref 28–44)
GLUCOSE SERPL-MCNC: 174 MG/DL (ref 70–110)
HCT VFR BLD AUTO: 35.7 % (ref 37–48.5)
HGB BLD-MCNC: 11.8 G/DL (ref 12–16)
IMM GRANULOCYTES # BLD AUTO: 0.06 K/UL (ref 0–0.04)
IMM GRANULOCYTES NFR BLD AUTO: 0.8 % (ref 0–0.5)
INR PPP: 1.2 (ref 0.8–1.2)
INTERVENTRICULAR SEPTUM: 0.7 CM (ref 0.6–1.1)
IVRT: 156.99 MSEC
LEFT ATRIUM AREA SYSTOLIC (APICAL 2 CHAMBER): 20.02 CM2
LEFT ATRIUM AREA SYSTOLIC (APICAL 4 CHAMBER): 18.23 CM2
LEFT ATRIUM VOLUME INDEX MOD: 37.2 ML/M2
LEFT ATRIUM VOLUME MOD: 55.82 ML
LEFT INTERNAL DIMENSION IN SYSTOLE: 3.6 CM (ref 2.1–4)
LEFT VENTRICLE DIASTOLIC VOLUME INDEX: 71.73 ML/M2
LEFT VENTRICLE DIASTOLIC VOLUME: 107.59 ML
LEFT VENTRICLE END DIASTOLIC VOLUME APICAL 2 CHAMBER: 58.05 ML
LEFT VENTRICLE END DIASTOLIC VOLUME APICAL 4 CHAMBER: 60.25 ML
LEFT VENTRICLE END SYSTOLIC VOLUME APICAL 2 CHAMBER: 58.11 ML
LEFT VENTRICLE END SYSTOLIC VOLUME APICAL 4 CHAMBER: 50.45 ML
LEFT VENTRICLE MASS INDEX: 77.8 G/M2
LEFT VENTRICLE SYSTOLIC VOLUME INDEX: 37.2 ML/M2
LEFT VENTRICLE SYSTOLIC VOLUME: 55.76 ML
LEFT VENTRICULAR INTERNAL DIMENSION IN DIASTOLE: 4.8 CM (ref 3.5–6)
LEFT VENTRICULAR MASS: 116.6 G
LV LATERAL E/E' RATIO: 11.11 M/S
LV SEPTAL E/E' RATIO: 16.67 M/S
LVED V (TEICH): 107.59 ML
LVES V (TEICH): 55.76 ML
LVOT MG: 1.54 MMHG
LVOT MV: 0.61 CM/S
LYMPHOCYTES # BLD AUTO: 1.2 K/UL (ref 1–4.8)
LYMPHOCYTES NFR BLD: 15 % (ref 18–48)
MAGNESIUM SERPL-MCNC: 2 MG/DL (ref 1.6–2.6)
MCH RBC QN AUTO: 31.1 PG (ref 27–31)
MCHC RBC AUTO-ENTMCNC: 33.1 G/DL (ref 32–36)
MCV RBC AUTO: 94 FL (ref 82–98)
MONOCYTES # BLD AUTO: 1.1 K/UL (ref 0.3–1)
MONOCYTES NFR BLD: 14.2 % (ref 4–15)
MV PEAK E VEL: 1 M/S
NEUTROPHILS # BLD AUTO: 5.5 K/UL (ref 1.8–7.7)
NEUTROPHILS NFR BLD: 69.9 % (ref 38–73)
NRBC BLD-RTO: 0 /100 WBC
OHS CV RV/LV RATIO: 0.52 CM
PHOSPHATE SERPL-MCNC: 3 MG/DL (ref 2.7–4.5)
PISA AR MAX VEL: 5.31 M/S
PISA TR MAX VEL: 2.29 M/S
PLATELET # BLD AUTO: 198 K/UL (ref 150–450)
PMV BLD AUTO: 9.8 FL (ref 9.2–12.9)
POCT GLUCOSE: 176 MG/DL (ref 70–110)
POTASSIUM SERPL-SCNC: 3.7 MMOL/L (ref 3.5–5.1)
PROT SERPL-MCNC: 7.9 G/DL (ref 6–8.4)
PROTHROMBIN TIME: 12.4 SEC (ref 9–12.5)
RA PRESSURE ESTIMATED: 3 MMHG
RBC # BLD AUTO: 3.79 M/UL (ref 4–5.4)
RIGHT VENTRICLE DIASTOLIC BASEL DIMENSION: 2.5 CM
RV TB RVSP: 5 MMHG
RV TISSUE DOPPLER FREE WALL SYSTOLIC VELOCITY 1 (APICAL 4 CHAMBER VIEW): 10.3 CM/S
SINUS: 3.24 CM
SODIUM SERPL-SCNC: 141 MMOL/L (ref 136–145)
STJ: 2.46 CM
TDI LATERAL: 0.09 M/S
TDI SEPTAL: 0.06 M/S
TDI: 0.08 M/S
TR MAX PG: 21 MMHG
TRICUSPID ANNULAR PLANE SYSTOLIC EXCURSION: 1.52 CM
TROPONIN I SERPL DL<=0.01 NG/ML-MCNC: 0.02 NG/ML (ref 0–0.03)
TV REST PULMONARY ARTERY PRESSURE: 24 MMHG
WBC # BLD AUTO: 7.89 K/UL (ref 3.9–12.7)
Z-SCORE OF LEFT VENTRICULAR DIMENSION IN END DIASTOLE: 0.8
Z-SCORE OF LEFT VENTRICULAR DIMENSION IN END SYSTOLE: 2.13

## 2024-12-03 PROCEDURE — 85610 PROTHROMBIN TIME: CPT | Mod: HCNC

## 2024-12-03 PROCEDURE — 85730 THROMBOPLASTIN TIME PARTIAL: CPT | Mod: HCNC

## 2024-12-03 PROCEDURE — 96375 TX/PRO/DX INJ NEW DRUG ADDON: CPT

## 2024-12-03 PROCEDURE — G0378 HOSPITAL OBSERVATION PER HR: HCPCS | Mod: HCNC

## 2024-12-03 PROCEDURE — 63600175 PHARM REV CODE 636 W HCPCS: Mod: HCNC

## 2024-12-03 PROCEDURE — 25000003 PHARM REV CODE 250: Mod: HCNC

## 2024-12-03 PROCEDURE — 36415 COLL VENOUS BLD VENIPUNCTURE: CPT | Mod: HCNC

## 2024-12-03 PROCEDURE — 80053 COMPREHEN METABOLIC PANEL: CPT | Mod: HCNC

## 2024-12-03 PROCEDURE — 92610 EVALUATE SWALLOWING FUNCTION: CPT | Mod: HCNC

## 2024-12-03 PROCEDURE — 96376 TX/PRO/DX INJ SAME DRUG ADON: CPT

## 2024-12-03 PROCEDURE — 97161 PT EVAL LOW COMPLEX 20 MIN: CPT | Mod: HCNC

## 2024-12-03 PROCEDURE — 84100 ASSAY OF PHOSPHORUS: CPT | Mod: HCNC

## 2024-12-03 PROCEDURE — 83735 ASSAY OF MAGNESIUM: CPT | Mod: HCNC

## 2024-12-03 PROCEDURE — 97165 OT EVAL LOW COMPLEX 30 MIN: CPT | Mod: HCNC

## 2024-12-03 PROCEDURE — 84484 ASSAY OF TROPONIN QUANT: CPT | Mod: HCNC

## 2024-12-03 PROCEDURE — 85025 COMPLETE CBC W/AUTO DIFF WBC: CPT | Mod: HCNC | Performed by: FAMILY MEDICINE

## 2024-12-03 PROCEDURE — 94761 N-INVAS EAR/PLS OXIMETRY MLT: CPT | Mod: HCNC

## 2024-12-03 PROCEDURE — 36415 COLL VENOUS BLD VENIPUNCTURE: CPT | Mod: HCNC | Performed by: FAMILY MEDICINE

## 2024-12-03 PROCEDURE — 99214 OFFICE O/P EST MOD 30 MIN: CPT | Mod: NSCH,HCNC,GC, | Performed by: PHYSICIAN ASSISTANT

## 2024-12-03 RX ORDER — OXYCODONE HYDROCHLORIDE 5 MG/1
5 CAPSULE ORAL EVERY 4 HOURS PRN
Qty: 28 CAPSULE | Refills: 0 | Status: SHIPPED | OUTPATIENT
Start: 2024-12-03 | End: 2024-12-03

## 2024-12-03 RX ORDER — ONDANSETRON 4 MG/1
4 TABLET, FILM COATED ORAL EVERY 8 HOURS PRN
Qty: 21 TABLET | Refills: 0 | Status: SHIPPED | OUTPATIENT
Start: 2024-12-03 | End: 2024-12-11

## 2024-12-03 RX ORDER — OXYCODONE HYDROCHLORIDE 5 MG/1
5 CAPSULE ORAL EVERY 4 HOURS PRN
Qty: 28 CAPSULE | Refills: 0 | Status: SHIPPED | OUTPATIENT
Start: 2024-12-03 | End: 2024-12-10

## 2024-12-03 RX ORDER — CIPROFLOXACIN 500 MG/1
500 TABLET ORAL 2 TIMES DAILY
Qty: 10 TABLET | Refills: 0 | Status: SHIPPED | OUTPATIENT
Start: 2024-12-03 | End: 2024-12-08

## 2024-12-03 RX ORDER — PROCHLORPERAZINE EDISYLATE 5 MG/ML
5 INJECTION INTRAMUSCULAR; INTRAVENOUS EVERY 6 HOURS PRN
Status: DISCONTINUED | OUTPATIENT
Start: 2024-12-03 | End: 2024-12-03 | Stop reason: HOSPADM

## 2024-12-03 RX ORDER — OXYCODONE AND ACETAMINOPHEN 5; 325 MG/1; MG/1
1 TABLET ORAL EVERY 4 HOURS PRN
Qty: 28 TABLET | Refills: 0 | OUTPATIENT
Start: 2024-12-03 | End: 2024-12-10

## 2024-12-03 RX ORDER — OXYCODONE AND ACETAMINOPHEN 5; 325 MG/1; MG/1
1 TABLET ORAL EVERY 4 HOURS PRN
Qty: 28 EACH | Refills: 0 | Status: SHIPPED | OUTPATIENT
Start: 2024-12-03 | End: 2024-12-03 | Stop reason: HOSPADM

## 2024-12-03 RX ADMIN — PROCHLORPERAZINE EDISYLATE 5 MG: 5 INJECTION INTRAMUSCULAR; INTRAVENOUS at 02:12

## 2024-12-03 RX ADMIN — CLOPIDOGREL BISULFATE 75 MG: 75 TABLET ORAL at 09:12

## 2024-12-03 RX ADMIN — CARVEDILOL 12.5 MG: 12.5 TABLET, FILM COATED ORAL at 09:12

## 2024-12-03 RX ADMIN — EZETIMIBE 10 MG: 10 TABLET ORAL at 09:12

## 2024-12-03 RX ADMIN — SPIRONOLACTONE 25 MG: 25 TABLET, FILM COATED ORAL at 09:12

## 2024-12-03 RX ADMIN — ATORVASTATIN CALCIUM 40 MG: 40 TABLET, FILM COATED ORAL at 09:12

## 2024-12-03 RX ADMIN — ASPIRIN 81 MG CHEWABLE TABLET 81 MG: 81 TABLET CHEWABLE at 09:12

## 2024-12-03 RX ADMIN — SACUBITRIL AND VALSARTAN 1 TABLET: 24; 26 TABLET, FILM COATED ORAL at 09:12

## 2024-12-03 RX ADMIN — FUROSEMIDE 40 MG: 10 INJECTION, SOLUTION INTRAMUSCULAR; INTRAVENOUS at 09:12

## 2024-12-03 NOTE — SUBJECTIVE & OBJECTIVE
Interval History: Patient seen today laying in bed comfortably with no acute concerns. Patient currently eating breakfast. Full sentence conversations held. AAOx4. VSS for the past 24hrs and has remained afebrile. Pending Octreotide 125 mcg/hr (2.5 ml/hr) IV for 1 day post op (24 hrs post procedure) and will be potentially ready for discharge.      Review of Systems   Constitutional:  Negative for chills and fever.   Respiratory:  Negative for shortness of breath and wheezing.    Endocrine: Negative for polydipsia and polyphagia.   Genitourinary:  Negative for dysuria and enuresis.   Neurological:  Negative for dizziness, facial asymmetry, speech difficulty, weakness, light-headedness, numbness and headaches.   Psychiatric/Behavioral:  Negative for behavioral problems and confusion. The patient is not nervous/anxious.      Objective:     Vital Signs (Most Recent):  Temp: 98.7 °F (37.1 °C) (12/03/24 1139)  Pulse: 71 (12/03/24 1139)  Resp: 18 (12/03/24 1139)  BP: (!) 169/83 (12/03/24 1139)  SpO2: 97 % (12/03/24 1556) Vital Signs (24h Range):  Temp:  [97.5 °F (36.4 °C)-98.7 °F (37.1 °C)] 98.7 °F (37.1 °C)  Pulse:  [71-94] 71  Resp:  [18-20] 18  SpO2:  [97 %-100 %] 97 %  BP: (133-182)/(77-86) 169/83     Weight: 48.5 kg (106 lb 14.8 oz)  Body mass index is 18.35 kg/m².    Intake/Output Summary (Last 24 hours) at 12/3/2024 1605  Last data filed at 12/3/2024 1410  Gross per 24 hour   Intake 420 ml   Output 1100 ml   Net -680 ml         Physical Exam  Constitutional:       Appearance: Normal appearance.   HENT:      Head: Normocephalic and atraumatic.      Mouth/Throat:      Mouth: Mucous membranes are moist.      Pharynx: Oropharynx is clear.   Eyes:      Extraocular Movements: Extraocular movements intact.      Conjunctiva/sclera: Conjunctivae normal.      Pupils: Pupils are equal, round, and reactive to light.   Cardiovascular:      Rate and Rhythm: Normal rate and regular rhythm.      Pulses: Normal pulses.      Heart  sounds: Normal heart sounds.   Pulmonary:      Effort: Pulmonary effort is normal.      Breath sounds: Normal breath sounds.   Abdominal:      General: Abdomen is flat.      Palpations: Abdomen is soft.   Musculoskeletal:         General: Normal range of motion.      Cervical back: Normal range of motion and neck supple.      Right lower le+ Edema present.      Left lower le+ Edema present.      Comments: CN 2-12 intact  Upper and lower extremity strength 4/5 bilaterally  Sensation intact   Skin:     General: Skin is warm and dry.      Capillary Refill: Capillary refill takes less than 2 seconds.   Neurological:      General: No focal deficit present.      Mental Status: She is alert.   Psychiatric:         Mood and Affect: Mood normal.           Significant Labs: All pertinent labs within the past 24 hours have been reviewed.    Significant Imaging: I have reviewed all pertinent imaging results/findings within the past 24 hours.

## 2024-12-03 NOTE — PLAN OF CARE
AVS and educational attachments shared with patient and sister via [employer]. Discussed thoroughly. Patient and sister verbalized clear understanding using teach back method. Notified bedside nurse of completion of education. At present no distress noted. Patient to be discharged via w/c with escort service and family with all of patient's belonings. Will cont to be available to patient and family and intervene prn.

## 2024-12-03 NOTE — PLAN OF CARE
VN note: VN completed AVS and attachments and notified bedside nurse, Carina. Will cont to be available and intervene prn.

## 2024-12-03 NOTE — PLAN OF CARE
VN note: Care plan, orders and labs reviewed.    Problem: Adult Inpatient Plan of Care  Goal: Plan of Care Review  Outcome: Progressing     Problem: Adult Inpatient Plan of Care  Goal: Patient-Specific Goal (Individualized)  Outcome: Progressing      None known

## 2024-12-03 NOTE — PT/OT/SLP EVAL
Speech Language Pathology Evaluation  Bedside Swallow    Patient Name:  Shahram Hills   MRN:  [id number]  Admitting Diagnosis: Difficulty with speech    Recommendations:                 General Recommendations:  Follow-up not indicated  Diet recommendations:  Regular Diet - IDDSI Level 7, Thin liquids - IDDSI Level 0   Aspiration Precautions: 1 bite/sip at a time, Alternating bites/sips, HOB to 90 degrees, Remain upright 30 minutes post meal, and Small bites/sips   General Precautions: Standard, fall  Communication strategies:  none    Assessment:     Shahram Hills is a 84 y.o. female with weakness and facial droop s/p recent procedure. Pt was stroke activated and ST was consulted.     History:     Past Medical History:   Diagnosis Date    Anemia 07/11/2018    B12 deficiency     Depression     per pcp note 7/2017 and given prozac and diazepam     Hyperlipidemia     Neuroendocrine tumor     Systolic heart failure     Weight loss     reviewed prior pcp Dr. Russo notes 2017 - labs reviewed: cmp wnl x tbili 1.5, tsh wnl, A1c 5.0, cbc wnl,D 36, hiv neg, hep c neg, hep b surg ag neg        Past Surgical History:   Procedure Laterality Date    EMBOLIZATION N/A 02/14/2019    Procedure: EMBOLIZATION, BLOOD VESSEL;  Surgeon: [facility] Diagnostic Provider;  Location: [facility] OR [address] [place] [address];  Service: Radiology;  Laterality: N/A;  Room 189/[facility]    EMBOLIZATION N/A 03/21/2019    Procedure: EMBOLIZATION, BLOOD VESSEL;  Surgeon: [facility] Diagnostic Provider;  Location: [facility] OR [address] [place] [address];  Service: Radiology;  Laterality: N/A;  [facility]    ESOPHAGOGASTRODUODENOSCOPY  05/04/2018    esophageal ring, grade 1 esophagitis, gastritis     EYE SURGERY Bilateral     cataract removal    HYSTERECTOMY      fibroids       Social History: Patient lives at home.    Prior Intubation HX:  NA    Modified Barium Swallow: NA    Chest X-Rays: No acute abnormality.     Prior diet: Regular/thin.    Subjective     Pt in bed awake and alert upon ST  "entry. ST confirmed with RN prior to entry who reported pt has been consuming regular/thin food tray with no overt s/s of dysphagia.   Patient goals: "I think I am just fine"     Pain/Comfort:  Pain Rating 1: 0/10    Respiratory Status: Room air    Objective:   Cognition/Communication: Awake, alert, able to sustain attention and demonstrated timely processing and initiation throughout session. Oriented to name, , current place, current month/year and reasoning for current admission. Able to follow all commands and fluently express all wants/needs during session. Able to recall events leading to current admission and other pmhx without difficulty. Also demonstrated good turn-taking and topic maintenance throughout session. Pt was able to name common objects in the room and participate in automatic speech tasks with 100% accy.     Oral Musculature Evaluation  Oral Musculature: WFL  Dentition: edentulous  Secretion Management: adequate  Mucosal Quality: good  Mandibular Strength and Mobility: WFL  Oral Labial Strength and Mobility: WFL  Lingual Strength and Mobility: WFL  Velar Elevation: WFL  Buccal Strength and Mobility: WFL  Volitional Swallow:  (fairly timely swallow initiation)    Bedside Swallow Eval:   Consistencies Assessed:  Thin liquids via straw sips of milk  Solids spoonfuls of eggs from breakfast tray      Oral Phase:   WFL  Prolonged mastication    Pharyngeal Phase:   no overt clinical signs/symptoms of aspiration  no overt clinical signs/symptoms of pharyngeal dysphagia    Compensatory Strategies  None    Treatment: Pt was in bed awake and alert upon ST entry. Pt had just consumed breakfast tray. Pt safely consumed straw sips of milk and bites of eggs with no overt s/s of dysphagia. Pt was able to fluently express all wants and needs with no anomia or dysarthria present. Pt is safe to continue regular/thin po diet following standard swallow precautions:  -meds- whole 1 at a time  -SMALL " bites/sips  -alternate bites/sips  -1 bite/sip at a time  -HOB to 90 degrees during all po intake  -remain upright for 30 min s/p meals    Goals:   Multidisciplinary Problems       SLP Goals       Not on file              Multidisciplinary Problems (Resolved)          Problem: SLP    Goal Priority Disciplines Outcome   SLP Goal   (Resolved)     SLP Met   Description: Short Term Goals:  1. Pt will participate in a clinical swallow eval to determine least restrictive diet. -MET 12/3  2. Pt will safely tolerate >75% of REGULAR with no overt s/s of aspiration. -MET 12/3  3. Pt will participate in informal speech-language and cognitive eval to r/o related deficits. -MET 12/3                         Plan:     Patient to be seen:      Plan of Care expires:  01/02/25  Plan of Care reviewed with:  patient   SLP Follow-Up:  No       Discharge recommendations:  No Therapy Indicated   Barriers to Discharge:  None    Time Tracking:     SLP Treatment Date:   12/03/24  Speech Start Time:  0845  Speech Stop Time:  0905     Speech Total Time (min):  20 min    Billable Minutes: Eval Swallow and Oral Function 20 12/03/2024

## 2024-12-03 NOTE — ASSESSMENT & PLAN NOTE
Elevated BNP to 1300   Trace pitting edema on exam  No dyspnea  Asymptomatic otherwise  On lasix 20 PO QD    Plan:  40 IV lasix QD

## 2024-12-03 NOTE — PT/OT/SLP EVAL
Occupational Therapy   Evaluation and Discharge Note    Name: Shahram Hills  MRN: [id number]  Admitting Diagnosis: Difficulty with speech  Recent Surgery: * No surgery found *      Recommendations:     Discharge Recommendations: No Therapy Indicated  Discharge Equipment Recommendations: none  Barriers to discharge:  None    Assessment:     Shahram Hills is a 84 y.o. female with a medical diagnosis of Difficulty with speech. At this time, patient is functioning at their prior level of function and does not require further acute OT services.     Plan:     During this hospitalization, patient does not require further acute OT services.  Please re-consult if situation changes.    Plan of Care Reviewed with: patient, sibling    Subjective     Chief Complaint: She wants to get dressed. 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  Assistance upon Discharge: Family, sister works 2 days/week but reports that other family members can assist if needed when she needs to return to work but will not return this weekend    Pain/Comfort:  Pain Rating 1: 0/10    Patients cultural, spiritual, Buddhist conflicts given the current situation:      Objective:     Communicated with: luis a prior to session.  Patient found HOB elevated with bed alarm upon OT entry to room.    General Precautions: Standard, fall  Orthopedic Precautions: N/A  Braces: N/A  Respiratory Status: Room air     Occupational Performance:    Bed Mobility:    Patient completed Rolling/Turning to Left with  supervision  Patient completed Scooting/Bridging  Speech Therapy      Visit Type: Evaluation  -  Clinical swallow  Reason for consult: Coughing episode with rice and spent an hour trying to cough it back up. Patient coughed up a large amount of phlegm and liquid. Vocal quality wet, oxygen was 95%. Patient stated he has been having episodes like this at home lately.     Relevant History/Co-morbidities: admit with constipation, weakness, fall  CT negative for acute findings, chest xray negative for acute findings  h/o parkinson's, HTN, hyperlipidemia, CVA, asthma    SUBJECTIVE  Patient agreed to participate in therapy this date.  Patient verbally agrees to allow the following to be present during the session:  daughter    Patient pleasant and alert, sitting up in bed.  Patient's daughter present.     Patient/family goals: return to previous functional status   Functional Cognition:    - Expression is verbal.     - Following commands intact.    Affect/Behavior: alert and cooperative  Pain at onset of session:   Patient reports pain is not an issue/concern.  Prior treatment: no therapies   Patient has not been hospitalized, in a skilled nursing facility, or seen by home health in the last 30 days.    OBJECTIVE     Oral Mechanism   Oral Motor Assessment: generalized weakness  Saliva Control: intact      - Dentition: intact  Swallow  No temperature spike noted.  Patient positioning: upright in bed  Pretrial vocal quality: normal  Volitional swallow: present. No pain associated with swallow.    Numbers listed with consistency indicate IDDSI diet level.    Thin (0), cup, straw, self fed, SLP fed   - Amount given: 5 oz   - Oral phase: impaired, slow oral transit, suspect bolus holding, suspect impaired bolus control and suspect premature spillage   - Pharyngeal phase: impaired, suspect delayed swallow response, suspect decreased hyolaryngeal elevation and multiple swallows noted  No overt ss of aspiration. Patient reported that liquids are more difficult to control when  drinking from a straw.    Pureed (4), leila, SLP fed   - Amount given: applesauce   - Oral: impaired, suspect impaired bolus control, slow oral transit, suspect bolus holding, suspect premature spillage and suspect reduced propulsion   - Pharyngeal phase: impaired, suspect delayed swallow response, multiple swallows noted and suspect decreased hyolaryngeal elevation  No overt ss of aspiration    Regular, self fed   - Amount given: blanca cracker   - Oral: impaired, suspect reduced propulsion, suspect impaired bolus control, slow mastication, slow oral transit and suspect premature spillage   - Pharyngeal phase: impaired, suspect decreased hyolaryngeal elevation, suspect delayed swallow response and multiple swallows noted  No overt ss of aspiration. Patient reported that he has been avoiding meats at home lately because they have been difficult to chew.                  Education:   - Present and ready to learn: patient and patient's family  Education provided during session:  - dysphagia and role of SLP  - Results of above outlined education: Verbalizes understanding    ASSESSMENT  Interferring components: complex medical history    Discharge needs based on today's assessment:  - Current level of function: slightly below baseline level of function  - Therapy needs at discharge: therapy 1-3 times per week  - ADL / IADLs (activities / instrumental activities of daily living) requiring support at discharge: nutrition management (eating/drinking)  - Impairments that require further therapy intervention: dysphagia    Patient presents with mild oropharyngeal dysphagia. Patient demonstrates decreased bolus control resulting in suspected premature spillage, decreased bolus propulsion, slow oral transit, bolus holding, and slow mastication. Swallow response delayed with decreased hyolaryngeal elevation noted on palpation. Multiple swallows observed with all consistencies. No overt ss of aspiration with any consistency.  with supervision  Patient completed Supine to Sit with supervision    Functional Mobility/Transfers:  Patient completed Sit <> Stand Transfer with stand by assistance  with  no assistive device   Patient completed Toilet Transfer Step Transfer technique with stand by assistance with  no AD  Functional Mobility: Pt with fair to fair+ dynamic seated and standing balance.     Activities of Daily Living:  Upper Body Dressing: modified independence and supervision to don bra, tank top and sweater overhead  Lower Body Dressing: modified independence and supervision to don undergarments, pants, and socks seated EOB  Toileting: modified independence for pericare and clothing management    Cognitive/Visual Perceptual:  Cognitive/Psychosocial Skills:     -       Oriented to: Person, Place, Time and Situation   -       Follows Commands/attention:Follows multistep  commands  -       Communication: clear/fluent  -       Memory: No Deficits noted  -       Safety awareness/insight to disability: intact   -       Mood/Affect/Coping skills/emotional control: Appropriate to situation    Physical Exam:  Postural examination/scapula alignment:    -       No postural abnormalities identified  Skin integrity: Visible skin intact  Sensation:    -       Intact  Motor Planning:    -       WFL  Dominant hand:    -       R handed  Upper Extremity Range of Motion: BUE WFL     Upper Extremity Strength:  BUE WFL   Strength:  B hands WFL  Fine Motor Coordination:    -       Intact  Gross motor coordination:   WFL though some involuntary head bobbing while seated     AMPAC 6 Click ADL:  AMPAC Total Score: 24    Treatment & Education:  Pt educated on role of OT and POC.   Pt performing skills as listed above.     Patient left sitting edge of bed with all lines intact, call button in reach, nsg notified, and sister present    GOALS:   Multidisciplinary Problems       Occupational Therapy Goals       Not on file              Multidisciplinary  Problems (Resolved)          Problem: Occupational Therapy    Goal Priority Disciplines Outcome Interventions   Occupational Therapy Goal   (Resolved)     OT, PT/OT Met    Description: No goals set 2/2 pt performing at/near baseline.                           History:     Past Medical History:   Diagnosis Date    Anemia 07/11/2018    B12 deficiency     Depression     per pcp note 7/2017 and given prozac and diazepam     Hyperlipidemia     Neuroendocrine tumor     Systolic heart failure     Weight loss     reviewed prior pcp Dr. Russo notes 2017 - labs reviewed: cmp wnl x tbili 1.5, tsh wnl, A1c 5.0, cbc wnl,D 36, hiv neg, hep c neg, hep b surg ag neg          Past Surgical History:   Procedure Laterality Date    EMBOLIZATION N/A 02/14/2019    Procedure: EMBOLIZATION, BLOOD VESSEL;  Surgeon: [facility] Diagnostic Provider;  Location: [facility] OR [address] [place] [address];  Service: Radiology;  Laterality: N/A;  Room 189/[facility]    EMBOLIZATION N/A 03/21/2019    Procedure: EMBOLIZATION, BLOOD VESSEL;  Surgeon: [facility] Diagnostic Provider;  [facility];  Service: Radiology;  Laterality: N/A;  Room 189/[facility]    ESOPHAGOGASTRODUODENOSCOPY  05/04/2018    esophageal ring, grade 1 esophagitis, gastritis     EYE SURGERY Bilateral     cataract removal    HYSTERECTOMY      fibroids       Time Tracking:     OT Date of Treatment: 12/03/24  OT Start Time: 1246  OT Stop Time: 1302  OT Total Time (min): 16 min    Billable Minutes:Evaluation 16    12/3/2024   Recommend video swallow study to further assess the patient's swallow function and rule out aspiration given patient's recent coughing/choking episode with food.  Recommend general diet with thin liquids, straws okay with constant supervision and feeding assistance. Patient preference for medications is whole with thin liquid.     PLAN (while hospitalized)      SLP Frequency: 3-5 x per week  Frequency Comments: (VIDEO/SW) 1 by 1/24. HERBIE. VFSS scheduled for 1/19 at 10am    SLP Identified Barriers to Discharge: none    Interventions:  Assess tolerance of least restrictive oral diet, video swallow and patient/family education    Plan/Goal Agreement:  Patient agrees with goals and individualized plan of care      RECOMMENDATIONS     -Diet:          *Liquid- Thin and Regular    -Medication Administration:         *whole and with liquids    -Feeding Guidelines:          *set up tray, sit up for 30 minutes after meal, as tolerated, small bites/sips, sit fully upright for all po intake, feed patient/total feeding assistance, constant supervision, allow extra time to swallow and eat slowly only when alert   -Instrumental Assessments:         *Videofluoroscopic swallow study (VFSS)    -Speech Reviewed Swallow:         *with patient/family, with clinical caregivers and feeding guidelines posted in room    GOALS  Review Date: 1/24/2024  Long Term Goals: (to be met by time of discharge from hospital)  Using compensatory swallow strategies, patient will manage Liquid- Thin and Regular diet with optimum safety and efficiency of swallow function with less than 10% signs/symptoms of aspiration.  Patient will complete 20 reps of oral motor exercises with min cues.   Patient will complete 20 reps of pharyngeal strengthening exercises with min cues.     Documented in the chart in the following areas: Prior Living.    Assessment.    Patient at End of Session:   Location: in bed  Safety measures: call light within reach  Handoff to: nurse  and family/caregiver    Admitting diagnosis: Lightheadedness [R42];Fever, unspecified fever cause [R50.9];Constipation, unspecified constipation type [K59.00]    Co-morbidities and problem list:  Patient Active Problem List:     Carotid stenosis, symptomatic, with infarction (CMD)     Parkinson disease     Gait instability     Acute postoperative pain     Encephalopathy acute     Lightheadedness        The referring provider's electronic signature on the evaluation authorizes the therapy plan of care and certifies the need for these services, furnished under this plan of care while under their care.      Therapy procedure time and total treatment time can be found documented on the Time Entry flowsheet

## 2024-12-03 NOTE — PLAN OF CARE
Problem: Physical Therapy  Goal: Physical Therapy Goal  Outcome: Met   Patient is performing functional mobility at her baseline with no further PT needs; please re-consult if condition changes.

## 2024-12-03 NOTE — PROGRESS NOTES
VIRTUAL NURSE:  Cued into patient's room.  Permission received per patient to turn camera to view patient.  Introduced as VN that will be working with floor nurse and nursing assistant.  Educated patient on VN's role in patient care and  VIP model.  Plan of care reviewed with patient.  Education per flowsheet.   Informed patient that staff will round on them every 2 hours but to use call light for any other needs they may have; informed of fall risk and fall precautions.  Patient verbalized understanding.  Call light within reach; bed siderails up x3.  Opportunity given for questions and questions answered.  Admission assessment questions answered.  Patient denies complaints or any needs at this time. Instructed to call for assistance.  Will cont to monitor and intervene as needed.    Labs, notes, orders, and careplan reviewed.        12/1940   Admission   Initial VN Admission Questions Complete   Communication Issues? None   Shift   Virtual Nurse - Rounding Complete   Pain Management Interventions quiet environment facilitated;relaxation techniques promoted   Virtual Nurse - Patient Verbalized Approval Of VN Rounding;Camera Use   Type of Frequent Check   Type Patient Rounds   Safety/Activity   Patient Rounds bed in low position;bed wheels locked;call light in patient/parent reach;ID band on;clutter free environment maintained;visualized patient;placement of personal items at bedside   Safety Promotion/Fall Prevention Fall Risk reviewed with patient/family   Positioning   Body Position sitting up in bed   Head of Bed (HOB) Positioning HOB at 30-45 degrees

## 2024-12-03 NOTE — PLAN OF CARE
CM met with pt and sister Shayla Rosenthal  [phone]    Pt is sleeping quietly at this time.    Dx:  NET - s/p ablation with subsequent AMS.    Pt lives with sister Shayla who will transport pt to home   No dme, no hh prior to admit      Future Appointments   Date Time Provider Department Center   12/6/2024 10:50 AM LAB, HEMON CANCER BLDG [facility]   12/6/2024 12:00 PM Nayana Mendosa CNS [facility] [id number] [facility]   12/6/2024 12:30 PM INJECTION, [facility] INFUS[facility]   12/16/2024  9:00 AM [facility] MRI1 350 LB LIMIT B[facility] MRI [facility]   1/3/2025  8:00 AM LAB, [facility]EM[facility] CANCER BLDG [facility]   1/3/2025  8:10 AM SPECIMEN, [facility] CANCER BLDG [facility] SPLABHO [facility]   1/3/2025 10:00 AM INJECTION, [facility] INFUSION [facility]   2/3/2025 10:00 AM Trixie Xie MD [facility] IM [facility]   5/27/2025 10:20 AM Ralph Bergeron MD [facility] [id number] [facility]      12/03/24 1705   Discharge Planning   Assessment Type Discharge Planning Brief Assessment   Resource/Environmental Concerns none   Support Systems Family members  (sister Shayla Rosenthal  [phone])   Equipment Currently Used at Home none   Current Living Arrangements home   Patient/Family Anticipates Transition to home;home with family   Patient/Family Anticipated Services at Transition none   DME Needed Upon Discharge  none   Discharge Plan A Home;Home with family   Discharge Plan B Home;Home with family;Home Health

## 2024-12-03 NOTE — PROGRESS NOTES
Interventional Radiology   Progress Note      SUBJECTIVE:     History of Present Illness:  Shahram Hills is a 84 y.o. female with a PMHx of neuroendocrine tumor, depression, systolic heart failure, HLD who presented to the ED for altered mental status post bland embolization 12/2 with IR. Patient tolerated procedure well, but developed slurred speech approximately 3 hours after the procedure was completed. Stroke code was initiated and patient went to ED.        Interval History:  Pt reports back to baseline.  Breakfast tray arrived during visit and pt reports ready to eat, no nausea or abdominal pain this AM    Review of Systems   Constitutional:  Negative for chills, fever, malaise/fatigue and weight loss.   Respiratory:  Negative for cough and shortness of breath.    Cardiovascular:  Negative for chest pain, palpitations and leg swelling.   Gastrointestinal:  Negative for abdominal pain, diarrhea, nausea and vomiting.   Genitourinary:  Negative for dysuria and flank pain.   Musculoskeletal:  Negative for back pain and myalgias.   Neurological:  Negative for weakness and headaches.       Scheduled Meds:   aspirin  81 mg Oral Daily    atorvastatin  40 mg Oral Daily    carvediloL  12.5 mg Oral BID WM    clopidogreL  75 mg Oral Daily    enoxparin  40 mg Subcutaneous Daily    ezetimibe  10 mg Oral Daily    furosemide (LASIX) injection  40 mg Intravenous Daily    sacubitriL-valsartan  1 tablet Oral BID    spironolactone  25 mg Oral Daily     Continuous Infusions:   octreotide (SANDOSTATIN) 5,000 mcg in 0.9% NaCl 100 mL infusion  125 mcg/hr Intravenous Continuous 2.5 mL/hr at 12/02/24 2204 125 mcg/hr at 12/02/24 2204     PRN Meds:  Current Facility-Administered Medications:     bisacodyL, 10 mg, Rectal, Daily PRN    labetalol, 10 mg, Intravenous, Q15 Min PRN    ondansetron, 4 mg, Intravenous, Q6H PRN    prochlorperazine, 5 mg, Intravenous, Q6H PRN    sodium chloride 0.9%, 500 mL, Intravenous, PRN    sodium chloride  0.9%, 10 mL, Intravenous, PRN    Review of patient's allergies indicates:   Allergen Reactions    Epinephrine      carcinoid       Past Medical History:   Diagnosis Date    Anemia 07/11/2018    B12 deficiency     Depression     per pcp note 7/2017 and given prozac and diazepam     Hyperlipidemia     Neuroendocrine tumor     Systolic heart failure     Weight loss     reviewed prior pcp Dr. Russo notes 2017 - labs reviewed: cmp wnl x tbili 1.5, tsh wnl, A1c 5.0, cbc wnl,D 36, hiv neg, hep c neg, hep b surg ag neg      Past Surgical History:   Procedure Laterality Date    EMBOLIZATION N/A 02/14/2019    Procedure: EMBOLIZATION, BLOOD VESSEL;  Surgeon: [facility] Provider;  Location: [facility] OR 2ND FLR;  Service: Radiology;  Laterality: N/A;  Room 189/[facility]    EMBOLIZATION N/A 03/21/2019    Procedure: EMBOLIZATION, BLOOD VESSEL;  Surgeon: [facility] Diagnostic Provider;  Location: [facility] OR 2ND FLR;  Service: Radiology;  Laterality: N/A;  Room 189/[facility]    ESOPHAGOGASTRODUODENOSCOPY  05/04/2018    esophageal ring, grade 1 esophagitis, gastritis     EYE SURGERY Bilateral     cataract removal    HYSTERECTOMY      fibroids     Family History   Problem Relation Name Age of Onset    Glaucoma Mother      Hypertension Mother      Heart attack Father      Cancer Father      Diabetes Sister Marilyn     Asthma Sister Nick Luke     No Known Problems Sister Stephanie     Hyperlipidemia Sister      Heart attack Sister      Cancer Brother          lung cancer, + tobacco    Diabetes Brother      Hypertension Brother      Stroke Brother      Emphysema Brother      COPD Brother       Social History     Tobacco Use    Smoking status: Never    Smokeless tobacco: Never   Substance Use Topics    Alcohol use: No     Comment: stopped in 2007 - hx of social drinking    Drug use: Never       OBJECTIVE:     Vital Signs (Most Recent)  Temp: 98 °F (36.7 °C) (12/03/24 0738)  Pulse: 85 (12/03/24 0738)  Resp: 18 (12/03/24 0738)  BP: (!) 173/82  (12/03/24 0738)  SpO2: 97 % (12/03/24 0738)    Physical Exam:  Physical Exam  Vitals and nursing note reviewed.   Constitutional:       Appearance: Normal appearance.   HENT:      Head: Normocephalic and atraumatic.      Right Ear: External ear normal.      Left Ear: External ear normal.      Nose: Nose normal.   Eyes:      Extraocular Movements: Extraocular movements intact.   Cardiovascular:      Rate and Rhythm: Normal rate.      Pulses: Normal pulses.   Pulmonary:      Effort: Pulmonary effort is normal.   Abdominal:      General: Abdomen is flat.   Musculoskeletal:      Cervical back: Normal range of motion and neck supple.   Skin:     General: Skin is warm and dry.   Neurological:      General: No focal deficit present.      Mental Status: She is alert and oriented to person, place, and time.   Psychiatric:         Mood and Affect: Mood normal.         Behavior: Behavior normal.         Laboratory  I have reviewed all pertinent lab results within the past 24 hours.  CBC:   Recent Labs   Lab 12/03/24  0620   WBC 7.89   RBC 3.79*   HGB 11.8*   HCT 35.7*      MCV 94   MCH 31.1*   MCHC 33.1     CMP:   Recent Labs   Lab 12/03/24  0417   *   CALCIUM 9.6   ALBUMIN 3.7   PROT 7.9      K 3.7   CO2 23      BUN 7*   CREATININE 0.7   ALKPHOS 73   *   *   BILITOT 0.7     Coagulation:   Recent Labs   Lab 12/03/24 0417   LABPROT 12.4   INR 1.2   APTT 21.9       ASSESSMENT/PLAN:     Assessment:  84 y.o. female with a PMHx of neuroendocrine tumor, depression, systolic heart failure, HLD is s/p IR TAE on 12/3.  Patient tolerated procedure well, but developed slurred speech approximately 3 hours after the procedure was completed. Stroke code was initiated and patient went to ED.  Vascular Neurology following.    Plan:  Zofran prn for nausea and oxy 5 mg prn for pain control.  Can escalate pain control as needed.  Avoid tylenol or tylenol containing products.  At OK, recommend Cipro 500 mg  BID x 5 days.  Octreotide 125 mcg/hr (2.5 ml/hr) IV for 1 day post op (24 hrs post procedure).  Do not need to follow liver enzymes, we expect this to be elevated following the procedure.  Pt may also have mild leukocytosis and fever post procedure (post embolization syndrome).  Please contact IR if concern for infectious process.  At DC give less than 1 wks supply of PRN Zofran ODT for nausea, and PRN Oxycodone 5 mg for pain.  Avoid acetaminophen-containing products and EtOH for 2 weeks post procedure.  IR will arrange follow up imaging and clinic visit.    IR will sign off at this time. Please contact with questions via [employer] secure chat or spectra link.    Alexandra Hollins PA-C  Interventional Radiology

## 2024-12-03 NOTE — H&P
[facility]  [facility] Medicine  History & Physical    Patient Name: Shahram Hills  MRN: [id number]  Patient Class: OP- Observation  Admission Date: 12/2/2024  Attending Physician: Maninder Wyatt III, MD   Primary Care Provider: Trixie Xie MD      Patient information was obtained from patient, past medical records, and ER records.     Subjective:     Principal Problem:Difficulty with speech    Chief Complaint:   Chief Complaint   Patient presents with    Dysphagia     Pt presents post IR procedure for chemo ablation, IR reports that pt began having confusion and slurred speech about 15 minutes post procedure, last known well ~12:40.         HPI: Patient is a 85 yo female w/ PMHx of neuroendocrine tumor, depression, systolic heart failure, HLD who presents today to the ED after altered mental status. Patient had IR appointment today for trans arterial embolization of hepatic tumors and subsequently had altered mentation en route. She began having confusion, slurred speech for about 15 minutes after the procedure. Her last known normal was around 12:40PM. Patient was also noted to have taken phenergan periprocedure which could have contributed to her symptoms.     In the ED, initial vital signs /101, HR 97, SpO2 94% on room air. Labs include CBC with stable H/H 12.5/38.3 and WBC within normal limits 5.13. CMP with Na 142 K 3.6 Cl 107 CO2 18 Glu 194 and BUN/Cr 7/0.7 (baseline Cr 0.7). CT Head w/ no significant change from prior specifically without evidence for acute intracranial hemorrhage or sulcal effacement to suggest large territory recent infarction. TeleVascular Neurology was consulted. NIHSS 2. TNK was not administered due to non-disabling symptoms.. Patient was loaded on ASA and Plavix. [facility] consulted for evaluation for admission for stroke rule out.     Upon speaking with patient at time of admissions, she states she is back to her baseline. She is accompanied by  her sister who thinks she is at her baseline as well.     Past Medical History:   Diagnosis Date    Anemia 07/11/2018    B12 deficiency     Depression     per pcp note 7/2017 and given prozac and diazepam     Hyperlipidemia     Neuroendocrine tumor     Systolic heart failure     Weight loss     reviewed prior pcp Dr. Russo notes 2017 - labs reviewed: cmp wnl x tbili 1.5, tsh wnl, A1c 5.0, cbc wnl,D 36, hiv neg, hep c neg, hep b surg ag neg        Past Surgical History:   Procedure Laterality Date    EMBOLIZATION N/A 02/14/2019    Procedure: EMBOLIZATION, BLOOD VESSEL;  Surgeon: [facility] Diagnostic Provider;  [facility];  Service: Radiology;  Laterality: N/A;  Room 189/[place]    EMBOLIZATION N/A 03/21/2019    Procedure: EMBOLIZATION, BLOOD VESSEL;  Surgeon: [facility] Diagnostic Provider;  [facility];  Service: Radiology;  Laterality: N/A;  Room 189/[place]    ESOPHAGOGASTRODUODENOSCOPY  05/04/2018    esophageal ring, grade 1 esophagitis, gastritis     EYE SURGERY Bilateral     cataract removal    HYSTERECTOMY      fibroids       Review of patient's allergies indicates:   Allergen Reactions    Epinephrine      carcinoid       Current Facility-Administered Medications on File Prior to Encounter   Medication    ampicillin-sulbactam (UNASYN) 3 g in 0.9% NaCl 100 mL IVPB (MB+)    dextrose 5 % and 0.45 % NaCl with KCl 20 mEq infusion    [COMPLETED] diphenhydrAMINE injection 50 mg    glucagon (human recombinant) injection 1 mg    [COMPLETED] heparin infusion 1,000 units/500 ml in 0.9% NaCl (pressure line flush)    HYDROmorphone (PF) injection 0.2 mg    [COMPLETED] iohexoL (OMNIPAQUE 300) injection    [COMPLETED] LIDOcaine HCL 10 mg/ml (1%) injection    [COMPLETED] magnesium sulfate 2g in water 50mL IVPB (premix)    [COMPLETED] metoprolol tartrate (LOPRESSOR) tablet 25 mg    [COMPLETED] octreotide (SANDOSTATIN) 250 mcg in 0.9% NaCl 50 mL IVPB    octreotide (SANDOSTATIN) 5,000 mcg in 0.9% NaCl 100 mL  infusion    [] octreotide (SANDOSTATIN) 5,000 mcg in 0.9% NaCl 100 mL infusion    Followed by    octreotide (SANDOSTATIN) 5,000 mcg in 0.9% NaCl 100 mL infusion    [COMPLETED] ondansetron (ZOFRAN) 16 mg in 0.9% NaCl 50 mL IVPB    oxyCODONE immediate release tablet 5 mg    oxyCODONE immediate release tablet 5 mg    prochlorperazine injection Soln 5 mg    [COMPLETED] promethazine injection    scopolamine 1.3-1.5 mg (1 mg over 3 days) 1 patch    [DISCONTINUED] carboxymethylcellulose sodium 0.25 %    [DISCONTINUED] dexAMETHasone injection    [DISCONTINUED] fentaNYL injection    [DISCONTINUED] LIDOcaine (cardiac) injection    [DISCONTINUED] ondansetron injection    [DISCONTINUED] PHENYLephrine injection    [DISCONTINUED] propofol (DIPRIVAN) 10 mg/mL infusion    [DISCONTINUED] rocuronium injection    [DISCONTINUED] sodium chloride 0.9% infusion    [DISCONTINUED] sugammadex (BRIDION) 100 mg/mL injection     Current Outpatient Medications on File Prior to Encounter   Medication Sig    carvediloL (COREG) 12.5 MG tablet TAKE 1 TABLET(12.5 MG) BY MOUTH TWICE DAILY WITH MEALS (Patient taking differently: Take 12.5 mg by mouth 2 (two) times daily with meals.)    ezetimibe (ZETIA) 10 mg tablet Take 10 mg by mouth once daily.    furosemide (LASIX) 20 MG tablet take 1 tab by mouth daily. If gain 2-3 pounds in 2-3 days then take 2 tabs daily for 3 days and then resume 1 dose daily    metFORMIN (GLUCOPHAGE-XR) 500 MG ER 24hr tablet Take 1 tablet (500 mg total) by mouth once daily.    sacubitriL-valsartan (ENTRESTO) 24-26 mg per tablet TAKE 1 TABLET BY MOUTH TWICE DAILY    spironolactone (ALDACTONE) 25 MG tablet Take 1 tablet (25 mg total) by mouth once daily.    telotristat ethyl (XERMELO) 250 mg Tab Take 1 tablet (250 mg) by mouth 3 (three) times daily.    blood sugar diagnostic (TRUE METRIX GLUCOSE TEST STRIP) Strp USE TO CHECK BLOOD SUGAR TWICE DAILY    blood-glucose meter kit by Other route. Use as instructed     "cyanocobalamin (VITAMIN B-12) 1000 MCG tablet Take 1 tablet (1,000 mcg total) by mouth once daily.    diphenoxylate-atropine 2.5-0.025 mg (LOMOTIL) 2.5-0.025 mg per tablet Take 1 tablet by mouth 4 (four) times daily as needed for Diarrhea.    ferrous sulfate 325 (65 FE) MG EC tablet Take 325 mg by mouth once daily.    lancets Misc To check BG 2 times daily, to use with insurance preferred meter    latanoprost 0.005 % ophthalmic solution Place 1 drop into both eyes nightly. (Patient not taking: Reported on 12/2/2024)    losartan (COZAAR) 50 MG tablet  (Patient not taking: Reported on 12/2/2024)    needle, disp, 22 G 22 gauge x 1" Ndle 1 application by Misc.(Non-Drug; Combo Route) route 3 (three) times daily as needed.    ondansetron (ZOFRAN-ODT) 8 MG TbDL Take 1 tablet (8 mg total) by mouth every 8 (eight) hours as needed (nausea).    syringe, disposable, 1 mL Syrg 1 application by Misc.(Non-Drug; Combo Route) route 3 (three) times daily as needed (diarrhea).    TRUEPLUS LANCETS 33 gauge Misc USE TO CHECK BLOOD SUGAR TWICE DAILY    vitamin D (VITAMIN D3) 1000 units Tab Take 400 Units by mouth once daily.    XIIDRA 5 % Dpet INSTILL ONE DROP INTO OU BID (Patient not taking: Reported on 12/2/2024)    [DISCONTINUED] blood-glucose meter kit To check BG 2 times daily, to use with insurance preferred meter    [DISCONTINUED] ezetimibe (ZETIA) 10 mg tablet Take 1 tablet (10 mg total) by mouth once daily.     Family History       Problem Relation (Age of Onset)    Asthma Sister    COPD Brother    Cancer Father, Brother    Diabetes Sister, Brother    Emphysema Brother    Glaucoma Mother    Heart attack Father, Sister    Hyperlipidemia Sister    Hypertension Mother, Brother    No Known Problems Sister    Stroke Brother          Tobacco Use    Smoking status: Never    Smokeless tobacco: Never   Substance and Sexual Activity    Alcohol use: No     Comment: stopped in 2007 - hx of social drinking    Drug use: Never    Sexual " activity: Not Currently     Partners: Male     Review of Systems   Constitutional:  Negative for chills and fever.   Respiratory:  Negative for shortness of breath and wheezing.    Endocrine: Negative for polydipsia and polyphagia.   Genitourinary:  Negative for dysuria and enuresis.   Neurological:  Negative for dizziness, facial asymmetry, speech difficulty, weakness, light-headedness, numbness and headaches.   Psychiatric/Behavioral:  Negative for behavioral problems and confusion. The patient is not nervous/anxious.      Objective:     Vital Signs (Most Recent):  Temp: 98 °F (36.7 °C) (24 1615)  Pulse: 85 (24 1636)  Resp: 20 (24 1636)  BP: 134/77 (24 1636)  SpO2: 97 % (24 1636) Vital Signs (24h Range):  Temp:  [96.6 °F (35.9 °C)-98 °F (36.7 °C)] 98 °F (36.7 °C)  Pulse:  [67-97] 85  Resp:  [12-28] 20  SpO2:  [91 %-100 %] 97 %  BP: (134-200)/() 134/77     Weight: 49.9 kg (110 lb)  Body mass index is 18.88 kg/m².     Physical Exam  Constitutional:       Appearance: Normal appearance.   HENT:      Head: Normocephalic and atraumatic.      Mouth/Throat:      Mouth: Mucous membranes are moist.      Pharynx: Oropharynx is clear.   Eyes:      Extraocular Movements: Extraocular movements intact.      Conjunctiva/sclera: Conjunctivae normal.      Pupils: Pupils are equal, round, and reactive to light.   Cardiovascular:      Rate and Rhythm: Normal rate and regular rhythm.      Pulses: Normal pulses.      Heart sounds: Normal heart sounds.   Pulmonary:      Effort: Pulmonary effort is normal.      Breath sounds: Normal breath sounds.   Abdominal:      General: Abdomen is flat.      Palpations: Abdomen is soft.   Musculoskeletal:         General: Normal range of motion.      Cervical back: Normal range of motion and neck supple.      Right lower le+ Edema present.      Left lower le+ Edema present.      Comments: CN 2-12 intact  Upper and lower extremity strength 4/5  bilaterally  Sensation intact   Skin:     General: Skin is warm and dry.      Capillary Refill: Capillary refill takes less than 2 seconds.   Neurological:      General: No focal deficit present.      Mental Status: She is alert.   Psychiatric:         Mood and Affect: Mood normal.              CRANIAL NERVES     CN III, IV, VI   Pupils are equal, round, and reactive to light.       Significant Labs: All pertinent labs within the past 24 hours have been reviewed.  CBC:   Recent Labs   Lab 12/02/24  1413   WBC 5.13   HGB 12.5   HCT 38.3        CMP:   Recent Labs   Lab 12/02/24  1413      K 3.6      CO2 18*   *   BUN 7*   CREATININE 0.7   CALCIUM 9.5   PROT 7.6   ALBUMIN 3.6   BILITOT 0.4   ALKPHOS 75   AST 23   ALT 16   ANIONGAP 17*       Significant Imaging: I have reviewed all pertinent imaging results/findings within the past 24 hours.  Imaging Results              MRA Neck without contrast (Final result)  Result time 12/02/24 15:57:17      Final result by Sameer Pan DO (12/02/24 15:57:17)                   Impression:      Motion distorted MRA neck.  Irregularity of the carotid bifurcations likely related to motion however underlying stenosis not excluded this could be further evaluated with carotid ultrasonography.      Electronically signed by: Sameer Pan DO  Date:    12/02/2024  Time:    15:57               Narrative:    EXAMINATION:  MRA NECK WITHOUT CONTRAST    CLINICAL HISTORY:  Neuro deficit, acute, stroke suspected;    TECHNIQUE:  And noncontrast 2-D time-of-flight MRA neck.  Three dimensional MIP reformats from the source imaging.    COMPARISON:  None    FINDINGS:  MRA neck: Study distorted by patient motion.  Allowing for artifacts the origins of the right brachycephalic,  left common carotid and left subclavian arteries from the arch are within normal limits. The origins of the vertebral arteries from the subclavian arteries are grossly patent.  The vertebral  arteries are grossly patent throughout their course without significant focal stenosis allowing for motion artifact.    The bilateral common carotid arteries, carotid bifurcations and extracranial portions of the internal carotid arteries are patent there is limitation of the carotid bifurcations and proximal ICA secondary to motion artifacts component of underlying stenosis cannot be excluded.  This could be further evaluated with carotid ultrasonography.    .                                       MRA Brain without contrast (Final result)  Result time 12/02/24 15:45:23      Final result by Sameer Pan DO (12/02/24 15:45:23)                   Impression:      MRI brain: Generalized cerebral volume loss with patchy and confluent T2 FLAIR signal abnormality supratentorial white matter and umang while nonspecific concerning for moderate degree of chronic ischemic change.    No acute intracranial findings specifically without evidence for acute infarction.    MRA head: Unremarkable noncontrast MRA head specifically without evidence for significant stenosis or proximal large vessel occlusion      Electronically signed by: Sameer Pan DO  Date:    12/02/2024  Time:    15:45               Narrative:    EXAMINATION:  MRI BRAIN WITHOUT CONTRAST; MRA BRAIN WITHOUT CONTRAST    CLINICAL HISTORY:  Transient ischemic attack (TIA);Mental status change, unknown cause;; Transient ischemic attack (TIA);Neuro deficit, acute, stroke suspected;    TECHNIQUE:  MRI brain: Sagittal and axial T1, axial T2, axial FLAIR, axial gradient and axial diffusion imaging of the whole brain without contrast.    MRA head: Noncontrast 3D time-of-flight MRA head with 3 dimensional MIP reformats    COMPARISON:  Noncontrast CT head earlier same day 12/02/2024    FINDINGS:  MRI brain: There is no restricted diffusion to suggest acute infarction.  Generalized cerebral volume loss with compensatory enlargement ventricles sulci and cisterns without  hydrocephalus.  Patchy and confluent T2 FLAIR signal hyperintensity supratentorial white matter and umang while nonspecific concerning for moderate degree of chronic ischemic change.  There is no abnormal parenchymal susceptibility to suggest parenchymal hemorrhage.  Major intracranial skull base T2 flow voids are present    Remote operative change bilateral lens replacement.  Degenerative change of the cervical spine partially included in the study.    MRA head:    Anterior circulation: Bilateral distal cervical, petrous, cavernous and supraclinoid segments of the ICAs as well as the visualized anterior middle cerebral arteries are patent without significant focal stenosis or aneurysm    Posterior circulation: Distal vertebral arteries, basilar artery and posterior cerebral arteries are patent without significant focal stenosis or aneurysm there is hypoplasia of the right P1 segment of the PCA with essentially persistent fetal circulation right PCA via right posterior communicating artery.                                       MRI Brain Without Contrast (Final result)  Result time 12/02/24 15:45:23      Final result by Sameer Pan DO (12/02/24 15:45:23)                   Impression:      MRI brain: Generalized cerebral volume loss with patchy and confluent T2 FLAIR signal abnormality supratentorial white matter and umang while nonspecific concerning for moderate degree of chronic ischemic change.    No acute intracranial findings specifically without evidence for acute infarction.    MRA head: Unremarkable noncontrast MRA head specifically without evidence for significant stenosis or proximal large vessel occlusion      Electronically signed by: Sameer Pan DO  Date:    12/02/2024  Time:    15:45               Narrative:    EXAMINATION:  MRI BRAIN WITHOUT CONTRAST; MRA BRAIN WITHOUT CONTRAST    CLINICAL HISTORY:  Transient ischemic attack (TIA);Mental status change, unknown cause;; Transient ischemic attack  (TIA);Neuro deficit, acute, stroke suspected;    TECHNIQUE:  MRI brain: Sagittal and axial T1, axial T2, axial FLAIR, axial gradient and axial diffusion imaging of the whole brain without contrast.    MRA head: Noncontrast 3D time-of-flight MRA head with 3 dimensional MIP reformats    COMPARISON:  Noncontrast CT head earlier same day 12/02/2024    FINDINGS:  MRI brain: There is no restricted diffusion to suggest acute infarction.  Generalized cerebral volume loss with compensatory enlargement ventricles sulci and cisterns without hydrocephalus.  Patchy and confluent T2 FLAIR signal hyperintensity supratentorial white matter and umang while nonspecific concerning for moderate degree of chronic ischemic change.  There is no abnormal parenchymal susceptibility to suggest parenchymal hemorrhage.  Major intracranial skull base T2 flow voids are present    Remote operative change bilateral lens replacement.  Degenerative change of the cervical spine partially included in the study.    MRA head:    Anterior circulation: Bilateral distal cervical, petrous, cavernous and supraclinoid segments of the ICAs as well as the visualized anterior middle cerebral arteries are patent without significant focal stenosis or aneurysm    Posterior circulation: Distal vertebral arteries, basilar artery and posterior cerebral arteries are patent without significant focal stenosis or aneurysm there is hypoplasia of the right P1 segment of the PCA with essentially persistent fetal circulation right PCA via right posterior communicating artery.                                       X-Ray Chest 1 View (Final result)  Result time 12/02/24 16:55:02      Final result by Gilles Vargas DO (12/02/24 16:55:02)                   Impression:      No acute abnormality.      Electronically signed by: Gilles Vargas  Date:    12/02/2024  Time:    16:55               Narrative:    EXAMINATION:  XR CHEST 1 VIEW    CLINICAL HISTORY:  Altered mental status,  unspecified    TECHNIQUE:  Single frontal view of the chest was performed.    COMPARISON:  11/03/2024.    FINDINGS:  The lungs are well expanded and clear. No focal opacities are seen. The pleural spaces are clear. The cardiac silhouette is unremarkable.  There are calcifications of the aortic arch.  The visualized osseous structures are unremarkable.                                     Assessment/Plan:     * Difficulty with speech  Patient presents with slurred speech following nick-operative phernergan PO intake for TAE of liver tumor  CT Head w/ no significant change from prior specifically without evidence for acute intracranial hemorrhage or sulcal effacement to suggest large territory recent infarction.   MRI brain w/ no acute intracranial findings specifically without evidence for acute infarction.   MRA brain w/ Unremarkable noncontrast MRA head specifically without evidence for significant stenosis or proximal large vessel occlusion   MRA neck inconclusive  Code Stroke activated in ED.  Tele-Vascular Neuro Consulted, appreciate recommendations.    Plan:  - Neurology consulted, appreciate recommendations.  - Neurochecks q4hrs.  - BG Goal 140 - 180.  - Allow permissive HTN with SBP goal < 220 and DBP < 110 for first 24 hrs. PRNs labetalol 10 mg IV or Hydralazine 10 mg IV for BP sustained above goal.  - NPO pending bedside swallow; if passes, diet to increase to liquids and PO meds.   - SLP consulted, appreciate recommendations.  - PT/OT consulted, appreciate recommendations.  - Aspirin and plavix loaded initially. Daily aspirin and plavix x 21days continued thereafter.  - Initiated on Atorvastatin 40 mg.  - Labs ordered: HgA1C, Lipid Panel, TSH, ESR, Vitamin B-12, Folate  - Imaging ordered: 2-D Echo w/ bubble study, Carotid U/S for   - DVT Prophylaxis: TEDs/SCDs.  - Stroke education provided     Elevated brain natriuretic peptide (BNP) level  Elevated BNP to 1300   Trace pitting edema on exam  No  dyspnea  Asymptomatic otherwise  On lasix 20 PO QD    Plan:  40 IV lasix QD    Increased anion gap metabolic acidosis  Increased AG to 17  Lactate mildly elevated to 2.3  On metformin at home, likely etiology of lactate    Plan:  Repeat lactate in the AM  Encourage PO intake once able      VTE Risk Mitigation (From admission, onward)           Ordered     enoxaparin injection 40 mg  Daily         12/02/24 1643     IP VTE HIGH RISK PATIENT  Once         12/02/24 1643     Place sequential compression device  Until discontinued         12/02/24 1643                      On 12/02/2024, patient should be placed in hospital observation services under my care in collaboration with Dr. Wyatt.         Hardik Milan MD  Department of Hospital Medicine  [place] - Emergency Dept

## 2024-12-03 NOTE — PLAN OF CARE
Problem: Occupational Therapy  Goal: Occupational Therapy Goal  Description: No goals set 2/2 pt performing at/near baseline.      12/3/2024 1409 by Nikki Herrera, OT  Outcome: Met     Pt performing skills at baseline with no further OT needs; please reconsult if necessary.

## 2024-12-03 NOTE — ASSESSMENT & PLAN NOTE
Increased AG to 17  Lactate mildly elevated to 2.3  On metformin at home, likely etiology of lactate    Plan:  Repeat lactate in the AM  Encourage PO intake once able

## 2024-12-03 NOTE — PLAN OF CARE
Pt for d/c to home today - no dme, no hh ordered   Family to transport pt to home.     Discharging nurse to review all d/c meds/instructions        12/03/24 1708   Final Note   Assessment Type Final Discharge Note   Anticipated Discharge Disposition Home   What phone number can be called within the next 1-3 days to see how you are doing after discharge? [phone]   Hospital Resources/Appts/Education Provided Appointments scheduled and added to AVS   Post-Acute Status   Discharge Delays None known at this time

## 2024-12-03 NOTE — NURSING
Patient discharge home self care. A copy of all discharge instruction, education, and Rxs giving to patient/family to review with the Virtual nurse. PIV removed with cath tip intact. Medication delivered to bedside by [facility] outpatient pharmacy. Patient discharge via w/c per hospital transport.

## 2024-12-03 NOTE — PT/OT/SLP PROGRESS
Occupational Therapy      Patient Name:  Shahram Hills   MRN:  [id number]    Patient not seen today secondary to Other (Comment) (cardiology diagnostics). Will follow-up as able.    12/3/2024

## 2024-12-03 NOTE — PLAN OF CARE
Problem: SLP  Goal: SLP Goal  Description: Short Term Goals:  1. Pt will participate in a clinical swallow eval to determine least restrictive diet. -MET 12/3  2. Pt will safely tolerate >75% of REGULAR with no overt s/s of aspiration. -MET 12/3  3. Pt will participate in informal speech-language and cognitive eval to r/o related deficits. -MET 12/3    Outcome: Met       Pt participated in clinical swallow eval this date. Pt is safe to continue regular/thin po diet following standard swallow precautions:  -meds- whole 1 at a time  -SMALL bites/sips  -alternate bites/sips  -1 bite/sip at a time  -HOB to 90 degrees during all po intake  -remain upright for 30 min s/p meals

## 2024-12-03 NOTE — PROGRESS NOTES
Future Appointments   Date Time Provider Department Center   12/6/2024 10:50 AM LAB, [facility]ONC CANCER BLDG [facility]   12/6/2024 12:00 PM Nayana Mendosa, [facility] HEMONC2 [facility]   12/6/2024 12:30 PM INJECTION, [facility] INF[facility]ION [facility]   12/16/2024  9:00 AM [facility]1 350 LB LIMIT [facility]   1/3/2025  8:00 AM LAB, [facility]EMONC CANCER BLDG [facility]   1/3/2025  8:10 AM SPECIMEN, [facility] CANCER BLDG [facility] SPLABHO [facility]   1/3/2025 10:00 AM INJECTION, [facility] INF[facility]   2/3/2025 10:00 AM Trixie Xie MD [facility] IM [facility]   5/27/2025 10:20 AM Ralph Bergeron MD [facility] [id number] [facility]

## 2024-12-04 ENCOUNTER — PATIENT OUTREACH (OUTPATIENT)
Dept: ADMINISTRATIVE | Facility: CLINIC | Age: 84
End: 2024-12-04
Payer: MEDICARE

## 2024-12-04 ENCOUNTER — PATIENT MESSAGE (OUTPATIENT)
Dept: INTERNAL MEDICINE | Facility: CLINIC | Age: 84
End: 2024-12-04
Payer: MEDICARE

## 2024-12-04 LAB
OHS QRS DURATION: 82 MS
OHS QRS DURATION: 84 MS
OHS QTC CALCULATION: 513 MS
OHS QTC CALCULATION: 513 MS

## 2024-12-04 NOTE — ASSESSMENT & PLAN NOTE
Patient presents with slurred speech following nick-operative phernergan PO intake for TAE of liver tumor  CT Head w/ no significant change from prior specifically without evidence for acute intracranial hemorrhage or sulcal effacement to suggest large territory recent infarction.   MRI brain w/ no acute intracranial findings specifically without evidence for acute infarction.   MRA brain w/ Unremarkable noncontrast MRA head specifically without evidence for significant stenosis or proximal large vessel occlusion   MRA neck inconclusive  Code Stroke activated in ED.  Tele-Vascular Neuro Consulted, appreciate recommendations.    Plan:  - Neurology consulted, appreciate recommendations.  - Neurochecks q4hrs.  - BG Goal 140 - 180.  - Allow permissive HTN with SBP goal < 220 and DBP < 110 for first 24 hrs. PRNs labetalol 10 mg IV or Hydralazine 10 mg IV for BP sustained above goal.  - SLP consulted, appreciate recommendations.  - PT/OT consulted, appreciate recommendations.  - Aspirin and plavix loaded initially. no need for aspirin at this time given low probability of transient ischaemic attack or stroke after MRA review.   - Initiated on Atorvastatin 40 mg.  - Labs ordered: HgA1C, Lipid Panel, TSH, ESR, Vitamin B-12, Folate  - Imaging ordered: 2-D Echo w/ bubble study, Carotid U/S for   - DVT Prophylaxis: TEDs/SCDs.  - Stroke education provided

## 2024-12-04 NOTE — PT/OT/SLP EVAL
Physical Therapy Evaluation    Patient Name:  Shahram Hills   MRN:  [id number]    Recommendations:     Discharge Recommendations: No Therapy Indicated   Discharge Equipment Recommendations: none   Barriers to discharge: None    Assessment:     Shahram Hills is a 84 y.o. female admitted with a medical diagnosis of Difficulty with speech.  Currently, pt is at her prior level of function and does not require further acute PT services.    Recent Surgery: * No surgery found *      Plan:     During this hospitalization, patient does not require further acute PT services; please re-consult if condition changes.    Plan of Care Expires:  24    Subjective     Chief Complaint: pt reports all symptoms resolved and wants to get dressed to go home  Patient/Family Comments/goals: to return home  Pain/Comfort:  Pain Rating 1: 0/10   Post Session Pain Ratin/10    Patients cultural, spiritual, Syn[employer] conflicts given the current situation: no    Living Environment:  Patient lives with sister in [facility] with 4 PATRICK, B handrails, tub/shower combo  Prior to admission, patients level of function was independent with amb and ADLs without AD; able to sit in bottom of tub for bathing but reports she will need to stand at this time to shower.  Equipment used at home: none. Upon discharge, patient will have assistance from family, sister works 2 days/week but other family members can substitute for her when needed..    Objective:     Communicated with nurse prior to session.  Patient found HOB elevated with bed alarm  upon PT entry to room.    General Precautions: Standard, fall  Orthopedic Precautions:N/A   Braces: N/A  Respiratory Status: Room air    Exams:  Cognitive Exam:  Patient is oriented to Person, Place, Time, Situation, and multistep commands  Gross Motor Coordination:  WFL except for minimal involuntary head bobbing  Postural Exam:  Patient presented with the following abnormalities:    -       No postural  abnormalities identified  Sensation:    -       Intact  RLE ROM: WFL  RLE Strength: WFL  LLE ROM: WFL  LLE Strength: WFL    Functional Mobility:  Bed Mobility:     Rolling Left:  modified independence and supervision  Scooting: modified independence and supervision  Supine to Sit: modified independence and supervision  Transfers:     Sit to Stand:  stand by assistance/Diane with no AD  Toilet Transfer: stand by assistance with  no AD  using  Step Transfer  Gait: Patient amb with SBAbwithout AD for in room distances with no LOB  Balance: dynamic amb~fair+ to good-    AM-PAC 6 CLICK MOBILITY  Total Score: 22    Treatment & Education:  -pt educated in role of PT/POC with good verbal understanding given  -pt performed activities as described above    Patient left sitting edge of bed with all lines intact, call button in reach, nurse notified, and sister present.    GOALS:   Multidisciplinary Problems       Physical Therapy Goals       Not on file              Multidisciplinary Problems (Resolved)          Problem: Physical Therapy    Goal Priority Disciplines Outcome Interventions   Physical Therapy Goal   (Resolved)     PT, PT/OT Met                        History:     Past Medical History:   Diagnosis Date    Anemia 07/11/2018    B12 deficiency     Depression     per pcp note 7/2017 and given prozac and diazepam     Hyperlipidemia     Neuroendocrine tumor     Systolic heart failure     Weight loss     reviewed prior pcp Dr. Russo notes 2017 - labs reviewed: cmp wnl x tbili 1.5, tsh wnl, A1c 5.0, cbc wnl,D 36, hiv neg, hep c neg, hep b surg ag neg        Past Surgical History:   Procedure Laterality Date    EMBOLIZATION N/A 02/14/2019    Procedure: EMBOLIZATION, BLOOD VESSEL;  Surgeon: [facility] Diagnostic Provider;  Location: [facility] OR [address] [place] [address];  Service: Radiology;  Laterality: N/A;  Room 189/[place]    EMBOLIZATION N/A 03/21/2019    Procedure: EMBOLIZATION, BLOOD VESSEL;  Surgeon: [facility] Diagnostic Provider;  Location: [facility]  OR 2ND FLR;  Service: Radiology;  Laterality: N/A;  Room [facility]    ESOPHAGOGASTRODUODENOSCOPY  05/04/2018    esophageal ring, grade 1 esophagitis, gastritis     EYE SURGERY Bilateral     cataract removal    HYSTERECTOMY      fibroids       Time Tracking:     PT Received On: 12/04/24  PT Start Time: 1246     PT Stop Time: 1302  PT Total Time (min): 16 min     Billable Minutes: Evaluation 16      12/03/2024

## 2024-12-04 NOTE — HOSPITAL COURSE
84yr old lady admitted for observation after episode of altered mentation after IR appointment for trans arterial embolization of hepatic tumors. Patient was loaded on ASA and Plavix in ED and had resolution of symptoms upon admission, however was kept overnight. Vascular Neurology and IR. MRA without any large vessel occlusion and there was no need for aspirin at this time given low probability of transient ischaemic attack or stroke.    On the day of discharge patient was back to baseline and hemodynamically stable. She was sent home to resume home meds (as below). Added cipro 500mg BID x5 days, provided Zofran prn for nausea and oxy 5 mg prn for pain control x7 days. Recommended to avoid acetaminophen-containing products and EtOH for 2 weeks post procedure. Close outpatient f/u was advised with PCP, Hematologist and IR. The importance of medication adherence was again stressed to the patient. Patient agreeable to discharge plan, expressed understanding, all questions answered, return precautions were discussed.

## 2024-12-04 NOTE — DISCHARGE SUMMARY
[facility] Medicine  Discharge Summary      Patient Name: Shahram Hills  MRN: [id number]  SHUBHAM: [id number]  Patient Class: OP- Observation  Admission Date: 12/2/2024  Hospital Length of Stay: 0 days  Discharge Date and Time:  12/03/2024 7:10 PM  Attending Physician: No att. providers found   Discharging Provider: Deric Fernandez MD  Primary Care Provider: rTixie Xie MD    Primary Care Team: Networked reference to record PCT     HPI:   Patient is a 85 yo female w/ PMHx of neuroendocrine tumor, depression, systolic heart failure, HLD who presents today to the ED after altered mental status. Patient had IR appointment today for trans arterial embolization of hepatic tumors and subsequently had altered mentation en route. She began having confusion, slurred speech for about 15 minutes after the procedure. Her last known normal was around 12:40PM. Patient was also noted to have taken phenergan periprocedure which could have contributed to her symptoms.     In the ED, initial vital signs /101, HR 97, SpO2 94% on room air. Labs include CBC with stable H/H 12.5/38.3 and WBC within normal limits 5.13. CMP with Na 142 K 3.6 Cl 107 CO2 18 Glu 194 and BUN/Cr 7/0.7 (baseline Cr 0.7). CT Head w/ no significant change from prior specifically without evidence for acute intracranial hemorrhage or sulcal effacement to suggest large territory recent infarction. TeleVascular Neurology was consulted. NIHSS 2. TNK was not administered due to non-disabling symptoms.. Patient was loaded on ASA and Plavix. [facility] consulted for evaluation for admission for stroke rule out.     Upon speaking with patient at time of admissions, she states she is back to her baseline. She is accompanied by her sister who thinks she is at her baseline as well.     * No surgery found *      Hospital Course:   84yr old lady admitted for observation after episode of altered mentation after IR appointment for  trans arterial embolization of hepatic tumors. Patient was loaded on ASA and Plavix in ED and had resolution of symptoms upon admission, however was kept overnight. Vascular Neurology and IR. MRA without any large vessel occlusion and there was no need for aspirin at this time given low probability of transient ischaemic attack or stroke.    On the day of discharge patient was back to baseline and hemodynamically stable. She was sent home to resume home meds (as below). Added cipro 500mg BID x5 days, provided Zofran prn for nausea and oxy 5 mg prn for pain control x7 days. Recommended to avoid acetaminophen-containing products and EtOH for 2 weeks post procedure. Close outpatient f/u was advised with PCP, Hematologist and IR. The importance of medication adherence was again stressed to the patient. Patient agreeable to discharge plan, expressed understanding, all questions answered, return precautions were discussed.        Goals of Care Treatment Preferences:  Code Status: Full Code    Living Will: Yes              SDOH Screening:  The patient was screened for utility difficulties, food insecurity, transport difficulties, housing insecurity, and interpersonal safety and there were no concerns identified this admission.     Consults:   Consults (From admission, onward)          Status Ordering Provider     Inpatient consult to [facility]  Once        Provider:  (Not yet assigned)    Completed ROBYN VASQUEZ     Inpatient consult to Registered Dietitian/Nutritionist  Once        Provider:  (Not yet assigned)    Acknowledged ROBYN VASQUEZ     IP consult to case management/social work  Once        Provider:  (Not yet assigned)    Completed ROBYN VASQUEZ     Consult to Telemedicine - Acute Stroke  Once        Provider:  (Not yet assigned)    Completed RO DOVER            * Difficulty with speech  Patient presents with slurred speech following nick-operative phernergan PO intake for TAE of liver tumor  CT Head w/ no  significant change from prior specifically without evidence for acute intracranial hemorrhage or sulcal effacement to suggest large territory recent infarction.   MRI brain w/ no acute intracranial findings specifically without evidence for acute infarction.   MRA brain w/ Unremarkable noncontrast MRA head specifically without evidence for significant stenosis or proximal large vessel occlusion   MRA neck inconclusive  Code Stroke activated in ED.  Tele-Vascular Neuro Consulted, appreciate recommendations.    Plan:  - Neurology consulted, appreciate recommendations.  - Neurochecks q4hrs.  - BG Goal 140 - 180.  - Allow permissive HTN with SBP goal < 220 and DBP < 110 for first 24 hrs. PRNs labetalol 10 mg IV or Hydralazine 10 mg IV for BP sustained above goal.  - SLP consulted, appreciate recommendations.  - PT/OT consulted, appreciate recommendations.  - Aspirin and plavix loaded initially. no need for aspirin at this time given low probability of transient ischaemic attack or stroke after MRA review.   - Initiated on Atorvastatin 40 mg.  - Labs ordered: HgA1C, Lipid Panel, TSH, ESR, Vitamin B-12, Folate  - Imaging ordered: 2-D Echo w/ bubble study, Carotid U/S for   - DVT Prophylaxis: TEDs/SCDs.  - Stroke education provided     Elevated brain natriuretic peptide (BNP) level  Elevated BNP to 1300   Trace pitting edema on exam  No dyspnea  Asymptomatic otherwise  On lasix 20 PO QD    Plan:  40 IV lasix QD    Increased anion gap metabolic acidosis  Increased AG to 17  Lactate mildly elevated to 2.3  On metformin at home, likely etiology of lactate    Plan:  Repeat lactate in the AM  Encourage PO intake once able      Final Active Diagnoses:    Diagnosis Date Noted POA    PRINCIPAL PROBLEM:  Difficulty with speech [R47.9] 12/02/2024 Unknown    Increased anion gap metabolic acidosis [E87.29] 12/02/2024 Unknown    Elevated brain natriuretic peptide (BNP) level [R79.89] 12/02/2024 Unknown      Problems Resolved During  this Admission:       Discharged Condition: good    Disposition: Home or Self Care    Follow Up:   Follow-up Information       follow up appointments Follow up.    Why: Future Appointments  Date Time Provider Department Center  12/6/2024 10:50 AM LAB, HEMONC CANCER BLDG [facility]  12/6/2024 12:00 PM Nayana Mendosa CNS [facility] HEMONC2 [facility]  12/6/2024 12:30 PM INJECTION, [facility] INFUSION [facility]  12/16/2024  9:00 AM [facility] MRI1 350 LB LIMIT [facility]  1/3/2025  8:00 AM LAB, HEMON CANCER BLDG [facility]  1/3/2025  8:10 AM SPECIMEN, HEMON CANCER DG [facility] SPLABHO [facility]  1/3/2025 10:00 AM INJECTION, [facility] INFUSION [facility]  2/3/2025 10:00 AM Trixie Xie MD [facility] IM [facility]  5/27/2025 10:20 AM Ralph Bergeron MD [facility] [id number] [facility]                         Patient Instructions:      Diet Cardiac     Notify your health care provider if you experience any of the following:  temperature >100.4     Notify your health care provider if you experience any of the following:  persistent nausea and vomiting or diarrhea     Notify your health care provider if you experience any of the following:  increased confusion or weakness     Notify your health care provider if you experience any of the following:  persistent dizziness, light-headedness, or visual disturbances     Notify your health care provider if you experience any of the following:  severe persistent headache     Notify your health care provider if you experience any of the following:  redness, tenderness, or signs of infection (pain, swelling, redness, odor or green/yellow discharge around incision site)     Activity as tolerated       Significant Diagnostic Studies: N/A    Pending Diagnostic Studies:       None           Medications:  Reconciled Home Medications:      Medication List        START taking these medications      ciprofloxacin HCl 500 MG tablet  Commonly  "known as: CIPRO  Take 1 tablet (500 mg total) by mouth 2 (two) times daily. for 5 days     ondansetron 4 MG tablet  Commonly known as: ZOFRAN  Take 1 tablet (4 mg total) by mouth every 8 (eight) hours as needed for Nausea.     oxyCODONE 5 mg Cap  Commonly known as: OXY-IR  Take 1 capsule (5 mg total) by mouth every 4 (four) hours as needed for Pain.            CONTINUE taking these medications      blood sugar diagnostic Strp  Commonly known as: TRUE METRIX GLUCOSE TEST STRIP  USE TO CHECK BLOOD SUGAR TWICE DAILY     blood-glucose meter kit  by Other route. Use as instructed     carvediloL 12.5 MG tablet  Commonly known as: COREG  TAKE 1 TABLET(12.5 MG) BY MOUTH TWICE DAILY WITH MEALS     cyanocobalamin 1000 MCG tablet  Commonly known as: VITAMIN B-12  Take 1 tablet (1,000 mcg total) by mouth once daily.     diphenoxylate-atropine 2.5-0.025 mg 2.5-0.025 mg per tablet  Commonly known as: LOMOTIL  Take 1 tablet by mouth 4 (four) times daily as needed for Diarrhea.     ENTRESTO 24-26 mg per tablet  Generic drug: sacubitriL-valsartan  TAKE 1 TABLET BY MOUTH TWICE DAILY     ezetimibe 10 mg tablet  Commonly known as: ZETIA  Take 10 mg by mouth once daily.     ferrous sulfate 325 (65 FE) MG EC tablet  Take 325 mg by mouth once daily.     furosemide 20 MG tablet  Commonly known as: LASIX  take 1 tab by mouth daily. If gain 2-3 pounds in 2-3 days then take 2 tabs daily for 3 days and then resume 1 dose daily     * lancets [facility]  To check BG 2 times daily, to use with insurance preferred meter     * TRUEPLUS LANCETS 33 gauge Misc  Generic drug: lancets  USE TO CHECK BLOOD SUGAR TWICE DAILY     metFORMIN 500 MG ER 24hr tablet  Commonly known as: GLUCOPHAGE-XR  Take 1 tablet (500 mg total) by mouth once daily.     needle (disp) 22 G 22 gauge x 1" Ndle  1 application by Misc.(Non-Drug; Combo Route) route 3 (three) times daily as needed.     ondansetron 8 MG Tbdl  Commonly known as: ZOFRAN-ODT  Take 1 tablet (8 mg total) by mouth " every 8 (eight) hours as needed (nausea).     spironolactone 25 MG tablet  Commonly known as: ALDACTONE  Take 1 tablet (25 mg total) by mouth once daily.     syringe (disposable) 1 mL Syrg  1 application by Misc.(Non-Drug; Combo Route) route 3 (three) times daily as needed (diarrhea).     vitamin D 1000 units Tab  Commonly known as: VITAMIN D3  Take 400 Units by mouth once daily.     XERMELO 250 mg Tab  Generic drug: telotristat ethyl  Take 1 tablet (250 mg) by mouth 3 (three) times daily.           * This list has 2 medication(s) that are the same as other medications prescribed for you. Read the directions carefully, and ask your doctor or other care provider to review them with you.                ASK your doctor about these medications      latanoprost 0.005 % ophthalmic solution  Place 1 drop into both eyes nightly.     losartan 50 MG tablet  Commonly known as: COZAAR     XIIDRA 5 % Dpet  Generic drug: lifitegrast  INSTILL ONE DROP INTO OU BID              Indwelling Lines/Drains at time of discharge:   Lines/Drains/Airways       Drain  Duration             Female External Urinary Catheter w/ Suction 12/02/24 1500 1 day                    Time spent on the discharge of patient: 60 minutes         Deric Fernandez MD  Department of Hospital Medicine  [facility]

## 2024-12-04 NOTE — PROGRESS NOTES
C3 nurse spoke with Shahram Hills  for a TCC post hospital discharge follow up call. The patient does not have a scheduled HOSFU appointment with Trixie Xie MD  within 5-7 days post hospital discharge date 12/3/24. C3 nurse was unable to schedule HOSFU appointment in [facility].    Message sent to PCP staff requesting they contact patient and schedule follow up appointment.

## 2024-12-06 ENCOUNTER — INFUSION (OUTPATIENT)
Dept: INFUSION THERAPY | Facility: HOSPITAL | Age: 84
End: 2024-12-06
Payer: MEDICARE

## 2024-12-06 ENCOUNTER — OFFICE VISIT (OUTPATIENT)
Dept: HEMATOLOGY/ONCOLOGY | Facility: CLINIC | Age: 84
End: 2024-12-06
Payer: MEDICARE

## 2024-12-06 ENCOUNTER — TELEPHONE (OUTPATIENT)
Dept: HEMATOLOGY/ONCOLOGY | Facility: CLINIC | Age: 84
End: 2024-12-06

## 2024-12-06 VITALS
TEMPERATURE: 98 F | OXYGEN SATURATION: 97 % | BODY MASS INDEX: 16.51 KG/M2 | SYSTOLIC BLOOD PRESSURE: 139 MMHG | RESPIRATION RATE: 17 BRPM | WEIGHT: 96.69 LBS | DIASTOLIC BLOOD PRESSURE: 85 MMHG | HEART RATE: 88 BPM | HEIGHT: 64 IN

## 2024-12-06 DIAGNOSIS — C79.51 METASTASIS TO SPINAL COLUMN: ICD-10-CM

## 2024-12-06 DIAGNOSIS — C7B.8 METASTATIC MALIGNANT NEUROENDOCRINE TUMOR TO LIVER: Primary | ICD-10-CM

## 2024-12-06 DIAGNOSIS — Z79.899 IMMUNODEFICIENCY DUE TO DRUGS: ICD-10-CM

## 2024-12-06 DIAGNOSIS — C7A.012 MALIGNANT CARCINOID TUMOR OF ILEUM: ICD-10-CM

## 2024-12-06 DIAGNOSIS — R11.0 NAUSEA: ICD-10-CM

## 2024-12-06 DIAGNOSIS — D49.9 IMMUNODEFICIENCY SECONDARY TO NEOPLASM: ICD-10-CM

## 2024-12-06 DIAGNOSIS — D84.821 IMMUNODEFICIENCY DUE TO DRUGS: ICD-10-CM

## 2024-12-06 DIAGNOSIS — I10 ESSENTIAL HYPERTENSION: ICD-10-CM

## 2024-12-06 DIAGNOSIS — D84.81 IMMUNODEFICIENCY SECONDARY TO NEOPLASM: ICD-10-CM

## 2024-12-06 DIAGNOSIS — R80.9 CONTROLLED TYPE 2 DIABETES MELLITUS WITH MICROALBUMINURIA, WITHOUT LONG-TERM CURRENT USE OF INSULIN: ICD-10-CM

## 2024-12-06 DIAGNOSIS — I50.9 CHRONIC CONGESTIVE HEART FAILURE, UNSPECIFIED HEART FAILURE TYPE: ICD-10-CM

## 2024-12-06 DIAGNOSIS — C7B.8 SECONDARY NEUROENDOCRINE TUMOR OF BONE: ICD-10-CM

## 2024-12-06 DIAGNOSIS — E34.00 CARCINOID SYNDROME: ICD-10-CM

## 2024-12-06 DIAGNOSIS — E11.29 CONTROLLED TYPE 2 DIABETES MELLITUS WITH MICROALBUMINURIA, WITHOUT LONG-TERM CURRENT USE OF INSULIN: ICD-10-CM

## 2024-12-06 PROCEDURE — 99999 PR PBB SHADOW E&M-EST. PATIENT-LVL V: CPT | Mod: PBBFAC,HCNC,, | Performed by: REGISTERED NURSE

## 2024-12-06 PROCEDURE — 96372 THER/PROPH/DIAG INJ SC/IM: CPT | Mod: HCNC

## 2024-12-06 RX ORDER — LANREOTIDE ACETATE 120 MG/.5ML
120 INJECTION SUBCUTANEOUS
OUTPATIENT
Start: 2024-12-06

## 2024-12-06 RX ORDER — LANREOTIDE ACETATE 120 MG/.5ML
120 INJECTION SUBCUTANEOUS
Status: DISCONTINUED | OUTPATIENT
Start: 2024-12-06 | End: 2024-12-06 | Stop reason: HOSPADM

## 2024-12-06 RX ADMIN — LANREOTIDE ACETATE 120 MG: 120 INJECTION SUBCUTANEOUS at 12:12

## 2024-12-06 NOTE — PROGRESS NOTES
"Subjective:       Patient ID: Shahram Hills is an 84 y.o. female.     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 Ms. Hills is an 77 yo woman who initially saw me on 6/7/18 for further evaluation of neuroendocrine tumor. She initially presented with diarrhea, abdominal discomfort, weight loss. She was evaluated at [facility] and underwent workup below:  US abdomen on 3/14/18 showed numerous bilobar mixed echogenicity and mildly vascular hepatic lesions. Cholelithiasis without e/o acute cholecystitis.   MRI abdomen on 3/30/2018 showed innumerable hepatic metastases. L3 vertebral body metastasis with soft tissue component along the right margin of the L3 and upper L4 vertebral bodies, filling the right L3/4 neural foramen, and extending into the anterior aspect of the spinal canal on the right at the L3 and upper T4 levels. Associated with mild spinal canal narrowing.  CT chest on 4/6/2018 showed one lucent nodule measuring 7 mm in the T7 vertebral body, most likely representing metastatic disease.    EGD on 5/4/18 showed normal duodenum. Schatzki's ring in the lower third of the esophagus. Grade 1 esophagitis in the GE junction c/w mild distal esophagitis. Congestion and erythema in the antrum, pre-pyloric region and stomach body c/w gastritis. Biopsy of the stomach antrum showed mild chronic gastritis, neg for H. pylori.   Labs on 5/9/2018: WBC 6.9, H/H 11.6/34.3, MCV 87.5, plt count 279. On 3/9/18: Vit B12 level 173, folic acid 6.6  She was started on oral vit B12 and underwent a liver biopsy on 5/18/18. Pathology showed "metastatic well differentiated neuroendocrine tumor. Ki67 3%"     She saw me on 6/7/18 and complained of weight loss of 26 lbs over the past 3-4 months, also has diarrhea. No flushing, wheezing, palpitations. Has been having pain in right flank radiating down " "the right leg. No tingling/numbness, weakness. She can hold her bladder but when she urinates her diarrhea would come out as well. Started on dex 4 mg q6h the evening of 6/7/18.      2. MRI spine on 6/8/18 showed "Marrow replacing metastatic lesion of the L3 vertebral body with associated extra osseous expansion and complete effacement of the right L3-L4 neural foramina and additional effacement of the right lateral recess.  There is additional lateral extension and abutment of the right psoas muscle.  Superimposed degenerative change at this level results in mild spinal canal stenosis." I sent the patient to ED on 6/9/18 where she was evaluated by neurosurgery. Neurosurgery did not feel she was a candidate for surgical intervention.      3. Seen by Dr. Adames on 6/11/18. palliative radiation to the area of the L3 metastasis 6/18/18-6/29/18   4. Gallium study on 6/13/18: Distal ileal primary neoplasm consistent with a carcinoid.  There is an adjacent metastatic lymph node, diffuse liver metastases, and multiple bone metastases. Index primary neoplasm SUV max 33.13. Adjacent lymph node SUV max 23. L3 bone metastasis SUV max 36.86. Left lobe SUV max 46.13   5. Lanreotide every 4 weeks started on 6/22/18  6. TACE to the right hepatic lobe on 2/14/19. TACE to the left hepatic lobe on 3/21/19.   7. TACE to the right hepatic lobe on 2/11/22. S/p conventional chemoembolization performed of the segment 2, 3, 4 branch of the left hepatic artery and segment 8 branch of the left hepatic artery on 4/22/22.  8. Gallium PET 11/1/22 showed progression. Discussed PRRT. PRRT #1 on 12/14/2022. Zometa every 3 months started 12/27/22. PRRT #2 on 2/8/23. #3 on 4/12/23. #4 on 6/14/23.   9. Had bland embo on 10/25/24.      History of Present Illness:   Ms. Hills returns for follow up. Had embolization on Monday, 12/2/24. She had trouble speaking after the procedure and thought she had a stroke. She was admitted with unremarkable workup. " Intermittent pain in abdomen, improves with pain medication. She has noticed constipation since the procedure, only with small BM yesterday.    Presents with her family today. ECO     ROS:   See HPI. Otherwise negative.      Physical Examination:   Vital signs reviewed.   Gen: well hydrated, well developed, in no acute distress.  HEENT: normocephalic, anicteric, PERRLA, EOMI  Neck: supple, no JVD, thyromegaly, cervical or supraclavicular LAD  Lungs: CTAB, no wheezes or rales  Heart: regular rate, regular pulse, no M/R/G  Abdomen: soft, no tenderness, non-distended, no hepatomegaly, no splenomegaly, no mass, or hernia.   Ext: b/l LE  no edema  Neuro: alert and oriented x 4, no focal neuro deficit  Skin: no rash, open wounds or ulcers  Psych: pleasant and appropriate mood and affect     Objective:      Laboratory Data:  Labs reviewed. CBC, CMP adequate for treatment.     Imaging Data:    Copper PET 24:  Impression:     Similar distribution and uptake of tracer avid disease involving the distal ileum, liver, and bones. Index lesions as above.  No definite new tracer avid lesion.     MRI 24:  Impression:     History of metastatic small bowel carcinoid.     Thickened loop of small bowel in the midline pelvis in the location of the known primary tumor, but incompletely characterized on this exam.     Partially imaged mesenteric montserrat conglomerate in the left lower quadrant.     Numerous liver metastases.  Some have decreased in size and demonstrate increased central hypoenhancement, compatible with treatment response.  Others are new from 2023.     Unchanged osseous metastasis in the L3 vertebral body.     Other findings as described.    MRI abdomen 9/3/24:  Impression:     History of metastatic small bowel carcinoid.  Thickened loop of small bowel in the midline pelvis in the location of the known primary tumor, incompletely characterized on current exam.     Numerous hepatic metastases, many are  stable, some new and some are enlarged, as detailed above.     Unchanged osseous metastasis in the L3 vertebral body.       Assessment and Plan:      1. 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 Malignant carcinoid tumor of ileum    3. Secondary neuroendocrine tumor of bone    4. Metastasis to spinal column    5. 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 Carcinoid syndrome    8. Controlled type 2 diabetes mellitus with microalbuminuria, without long-term current use of insulin    9. Essential hypertension    10. Chronic congestive heart failure, unspecified heart failure type    11. Nausea      1-6  - Ms Hills is an 85 yo woman with well differentiated low-grade neuroendocrine tumor of the ileum with mets to liver and bones, diagnosed on 5/18/2018. Started lanreotide every 4 weeks on 6/22/2018. S/p TACE to the right hepatic lobe on 2/14/19. TACE to the left hepatic lobe on 3/21/19.   - CT/MRI 1/12/22 showed mild progression in the liver. S/p TACE on 2/11/22 and 4/22/22   - Gallium PET 11/1/22 showed progression. Discussed PRRT. PRRT #1 on 12/14/2022. Completed 4 cycles on 6/14/23. Zometa every 3 months started 12/27/22.  - MRI in Sep 2024 showed progression in the liver.   - s/p bland embo on 10/25/24. Underwent second bland embo on 12/2/24.   - c/w lanreotide every 4 weeks and zometa every 3 months. Got zometa in Sep 2024. Due at next visit.   - RTC in 4 weeks for continued lanreotide    7.  - better after xermelo and PRRT  - c/w lanreotide every 4 weeks.   - c/w xermelo tid  - could not tolerate octreotide    8.  - BS controlled  - f/u with PCP    9.  - BP controlled  - c/w current medication    10.  - reviewed echo results at prior visit. She does have CHF but not symptomatic right now.  - f/u with Dr Bergeron    11.  - continue zofran as needed     Patient is in agreement with the proposed treatment plan. All questions were answered to the patient's  satisfaction. Pt knows to call clinic if anything is needed before the next clinic visit.    Patient discussed with collaborating physician, Dr. Granados.    At least 40 minutes were spent today on this encounter including face to face time with the patient, data gathering/interpretation and documentation.       Nayana Mendosa, MSN, APRN, [facility]  Hematology and Medical Oncology  Clinical Nurse Specialist to Dr. Woods, Dr. Mckoy & Dr. Granados    Route Chart for Scheduling    Med Onc Chart Routing      Follow up with physician 4 weeks. with labs for lanreotide and zometa   Follow up with JUNAID . 8 weeks with labs for lanreotide   Infusion scheduling note    Injection scheduling note lanreotide every 4 weeks. 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    Therapy Plan Information  LANREOTIDE for 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, noted on 6/7/2018  5. Medications  lanreotide injection 120 mg  120 mg, Subcutaneous, Every visit    LUTATHERA SUPPORT for 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, noted on 6/7/2018  LUTATHERA SUPPORT for Neuroendocrine carcinoma metastatic to bone, noted on 7/11/2018  LUTATHERA SUPPORT for Malignant carcinoid tumor of ileum, noted on 8/17/2018  None      No therapy plan of the specified type found.

## 2024-12-06 NOTE — TELEPHONE ENCOUNTER
Secure Chat from Dr Granados:  Blood sugar 407. I don't think she uses insulin. can you send her to ER?     Dr Xie added to the chat.  Confirmed pt not on insulin and agreed pt should go to ER    Called pt.  Spoke with sister and nephew.  They will help bring Ms Shahram Farmer to ER.

## 2024-12-09 ENCOUNTER — TELEPHONE (OUTPATIENT)
Dept: INTERNAL MEDICINE | Facility: CLINIC | Age: 84
End: 2024-12-09
Payer: MEDICARE

## 2024-12-09 DIAGNOSIS — E11.65 CONTROLLED TYPE 2 DIABETES MELLITUS WITH HYPERGLYCEMIA, WITHOUT LONG-TERM CURRENT USE OF INSULIN: Primary | ICD-10-CM

## 2024-12-09 RX ORDER — GLIPIZIDE 5 MG/1
5 TABLET ORAL 2 TIMES DAILY WITH MEALS
Qty: 180 TABLET | Refills: 3 | Status: SHIPPED | OUTPATIENT
Start: 2024-12-09 | End: 2024-12-11 | Stop reason: SDUPTHER

## 2024-12-09 NOTE — TELEPHONE ENCOUNTER
----- Message from Satcy sent at 12/9/2024 10:18 AM CST -----  Contact: Patient  Type: Patient Call          Who Called: Patient's  sisterMo Mcguire      Does the patient know what this is regarding? Requesting a call back to discuss the pt's sugar level and constipation. Please advise          Does the patient rather a call back or a response via [facility]? call        Best Call Back Number: [phone]         Additional Information:

## 2024-12-09 NOTE — TELEPHONE ENCOUNTER
Called and spoke directly to Shayla Hassan pt sister and primary caregiver   Bg before meals (which are at least 2 hours after eating prior snack or meal) have been 250-270s  Sugar now is 220    Will order insulin and cpeptide levels - they will go [facility] to check these   Will start glip now - if now type 1 will switch to insulin     They will keep appt with me this week to review sugar log and mgmt

## 2024-12-09 NOTE — TELEPHONE ENCOUNTER
Spoke with Shayla, pt's blood sugar is going up and down. Right now it is 250. It was 270 earlier. She is wondering what she can take to get this down.    She was also constipated. She ended up having a bowel movement and feels fine now, but they'd like to know what to do for this in the future.

## 2024-12-10 ENCOUNTER — LAB VISIT (OUTPATIENT)
Dept: LAB | Facility: OTHER | Age: 84
End: 2024-12-10
Attending: INTERNAL MEDICINE
Payer: MEDICARE

## 2024-12-10 DIAGNOSIS — E11.65 CONTROLLED TYPE 2 DIABETES MELLITUS WITH HYPERGLYCEMIA, WITHOUT LONG-TERM CURRENT USE OF INSULIN: ICD-10-CM

## 2024-12-10 LAB
C PEPTIDE SERPL-MCNC: 1.15 NG/ML (ref 0.78–5.19)
INSULIN COLLECTION INTERVAL: NORMAL
INSULIN SERPL-ACNC: <1.6 UU/ML

## 2024-12-10 PROCEDURE — 36415 COLL VENOUS BLD VENIPUNCTURE: CPT | Mod: HCNC | Performed by: INTERNAL MEDICINE

## 2024-12-10 PROCEDURE — 83525 ASSAY OF INSULIN: CPT | Mod: HCNC | Performed by: INTERNAL MEDICINE

## 2024-12-10 PROCEDURE — 84681 ASSAY OF C-PEPTIDE: CPT | Mod: HCNC | Performed by: INTERNAL MEDICINE

## 2024-12-11 ENCOUNTER — OFFICE VISIT (OUTPATIENT)
Dept: INTERNAL MEDICINE | Facility: CLINIC | Age: 84
End: 2024-12-11
Attending: INTERNAL MEDICINE
Payer: MEDICARE

## 2024-12-11 VITALS
HEIGHT: 64 IN | SYSTOLIC BLOOD PRESSURE: 112 MMHG | OXYGEN SATURATION: 95 % | DIASTOLIC BLOOD PRESSURE: 76 MMHG | HEART RATE: 82 BPM | BODY MASS INDEX: 17.77 KG/M2 | WEIGHT: 104.06 LBS

## 2024-12-11 DIAGNOSIS — E11.29 CONTROLLED TYPE 2 DIABETES MELLITUS WITH MICROALBUMINURIA, WITHOUT LONG-TERM CURRENT USE OF INSULIN: Primary | ICD-10-CM

## 2024-12-11 DIAGNOSIS — R80.9 CONTROLLED TYPE 2 DIABETES MELLITUS WITH MICROALBUMINURIA, WITHOUT LONG-TERM CURRENT USE OF INSULIN: Primary | ICD-10-CM

## 2024-12-11 DIAGNOSIS — I10 ESSENTIAL HYPERTENSION: ICD-10-CM

## 2024-12-11 DIAGNOSIS — E11.65 CONTROLLED TYPE 2 DIABETES MELLITUS WITH HYPERGLYCEMIA, WITHOUT LONG-TERM CURRENT USE OF INSULIN: ICD-10-CM

## 2024-12-11 DIAGNOSIS — R35.0 URINARY FREQUENCY: ICD-10-CM

## 2024-12-11 DIAGNOSIS — C7B.8 METASTATIC MALIGNANT NEUROENDOCRINE TUMOR TO LIVER: ICD-10-CM

## 2024-12-11 LAB — GLUCOSE SERPL-MCNC: 191 MG/DL (ref 70–110)

## 2024-12-11 PROCEDURE — 82962 GLUCOSE BLOOD TEST: CPT | Mod: HCNC,S$GLB,, | Performed by: INTERNAL MEDICINE

## 2024-12-11 PROCEDURE — 1157F ADVNC CARE PLAN IN RCRD: CPT | Mod: HCNC,CPTII,S$GLB, | Performed by: INTERNAL MEDICINE

## 2024-12-11 PROCEDURE — 1160F RVW MEDS BY RX/DR IN RCRD: CPT | Mod: HCNC,CPTII,S$GLB, | Performed by: INTERNAL MEDICINE

## 2024-12-11 PROCEDURE — G2211 COMPLEX E/M VISIT ADD ON: HCPCS | Mod: HCNC,S$GLB,, | Performed by: INTERNAL MEDICINE

## 2024-12-11 PROCEDURE — 99999 PR PBB SHADOW E&M-EST. PATIENT-LVL V: CPT | Mod: PBBFAC,HCNC,, | Performed by: INTERNAL MEDICINE

## 2024-12-11 PROCEDURE — 1101F PT FALLS ASSESS-DOCD LE1/YR: CPT | Mod: HCNC,CPTII,S$GLB, | Performed by: INTERNAL MEDICINE

## 2024-12-11 PROCEDURE — 1159F MED LIST DOCD IN RCRD: CPT | Mod: HCNC,CPTII,S$GLB, | Performed by: INTERNAL MEDICINE

## 2024-12-11 PROCEDURE — 3288F FALL RISK ASSESSMENT DOCD: CPT | Mod: HCNC,CPTII,S$GLB, | Performed by: INTERNAL MEDICINE

## 2024-12-11 PROCEDURE — 99215 OFFICE O/P EST HI 40 MIN: CPT | Mod: HCNC,S$GLB,, | Performed by: INTERNAL MEDICINE

## 2024-12-11 PROCEDURE — 1126F AMNT PAIN NOTED NONE PRSNT: CPT | Mod: HCNC,CPTII,S$GLB, | Performed by: INTERNAL MEDICINE

## 2024-12-11 RX ORDER — GLIPIZIDE 5 MG/1
7.5 TABLET ORAL 2 TIMES DAILY WITH MEALS
Qty: 270 TABLET | Refills: 3 | Status: SHIPPED | OUTPATIENT
Start: 2024-12-11 | End: 2025-12-11

## 2024-12-11 NOTE — PROGRESS NOTES
Subjective:   Patient ID: Shahram Hills is a 84 y.o. female  Chief complaint:   Chief Complaint   Patient presents with    Hospital Follow Up     HPI  Here for er follow up of hyperglycemia, diabetes follow up    Accomp by sister today   Niece is on speaker phone as well     Constipation:   Ate green smoothie   Had bm yesterday and then this am     Reviewed er labs     Lactic acidosis seen:   Discussed stopping metformin give LA which improved    HTN:  - controlled today on current regimen  Prev:  Home readings 121/75, hr 61  In general 120-140/60-70s  Due for meds now - did not take this am   Prev:  Today well controlled on home readings   Bp 106/58 today - less po intake over past week or so as above - monitoring  - c/w meds on med card  - taking lasix daily, coreg, entresto, aldactone - takes in am daily   Previously:   Prev switched to coreg from amlodipine    Diabetes:   A1c 6.6 <- 6.6 <- 6.1<- 6.1 <-6.3<- 6.6  Reviewed bg log   Bg 216-270,  201 today   Bg trending down with starting glip  Reviewed recent labs and cpeptide measurable but insulin low   Discussed may need insulin in future for now bg trends dec to low 200s with starting glip since er visit  No lows   Previously:   Bg 140-180s  - typically eating right before recent labs   - well controlled and henry metformin 500 daily  - no lows   At [facility]   morning bg 140-160s but eating sugary snack late at night - ice cream, pie or cookies   - eats breakfast around 9-10am  Foot exam utd 8/2024  Utd on eye exam 11/2023 - due dilated - goes to [facility]   Urine screening due   No lows     ### not addressed today ####    PVCs, compensated chronic systolic and diastolic HF (EF 40% on echo 9/2022 and worse on echo from recent hospitalization per cards note):  No further ankle swelling since taking lasix daily   Henry current regimen as above   No aranda or sob  Prev:  - started lasix at [facility] due to acute HF exacerbation - henry daily lasix - no further ankle swelling  "since then   Previously:   - noticed ankle swelling - at baseline gets ankle swelling worse in afternoon - better in am   Cards: Elyssa  Previously:    - 2/25/2022 for hospital discharge had TACE right hep lobe and found to have frequent PVCs  - we resumed losartan at that time   - seen by cards 3/14/2022 and completed holter - at this time no tx indicated    B12 def:  - is taking supplement daily - discussed dec to qod    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 Zometa every 3 months started 12/27/22. PRRT #2 on 2/8  Followed by h/o - H last clinic visit March 8, 2023  Previously:   - 2/25/2022 for hospital discharge had TACE right hep lobe and found to have frequent PVCs  - we resumed losartan at that time   - seen by cards 3/14/2022 and completed holter - at this time no tx indicated    ##########    H/o: Darwin  Cards: Elyssa  Pod: Luper    HM:  Covid booster   Rsv  Urine screen utd  Foot exam utd  Eye exam utd     Review of Systems    Objective:  Vitals:    12/11/24 1446   BP: 112/76   BP Location: Left arm   Patient Position: Sitting   Pulse: 82   SpO2: 95%   Weight: 47.2 kg (104 lb 0.9 oz)   Height: 5' 4" (1.626 m)       Body mass index is 17.86 kg/m².    Physical Exam  Vitals reviewed.   Constitutional:       Appearance: Normal appearance. She is well-developed.   HENT:      Head: Normocephalic and atraumatic.   Eyes:      Extraocular Movements: Extraocular movements intact.      Conjunctiva/sclera: Conjunctivae normal.   Neck:      Thyroid: No thyromegaly. "   Cardiovascular:      Rate and Rhythm: Normal rate and regular rhythm.      Pulses: Normal pulses.      Heart sounds: Normal heart sounds.   Pulmonary:      Effort: Pulmonary effort is normal.      Breath sounds: Normal breath sounds.   Abdominal:      General: Bowel sounds are normal.      Palpations: Abdomen is soft. There is mass (ruq mass).   Musculoskeletal:         General: No swelling or tenderness. Normal range of motion.      Cervical back: Normal range of motion and neck supple.   Lymphadenopathy:      Cervical: No cervical adenopathy.   Skin:     General: Skin is warm and dry.      Capillary Refill: Capillary refill takes less than 2 seconds.      Nails: There is no clubbing.   Neurological:      General: No focal deficit present.      Mental Status: She is alert and oriented to person, place, and time. Mental status is at baseline.      Gait: Gait normal.   Psychiatric:         Mood and Affect: Mood normal.         Speech: Speech normal.         Behavior: Behavior normal.         Thought Content: Thought content normal.         Judgment: Judgment normal.       Assessment:  1. Controlled type 2 diabetes mellitus with microalbuminuria, without long-term current use of insulin    2. Urinary frequency    3. Controlled type 2 diabetes mellitus with hyperglycemia, without long-term current use of insulin    4. Essential hypertension    5. 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        Plan  Controlled type 2 diabetes mellitus with microalbuminuria, without long-term current use of insulin  -     POCT Glucose, Hand-Held Device  -     Urinalysis; Future; Expected date: 12/11/2024  -     Urine culture; Future; Expected date: 12/11/2024    Urinary frequency  -     Urinalysis; Future; Expected date: 12/11/2024  -     Urine culture; Future; Expected date: 12/11/2024    Controlled type 2 diabetes mellitus with hyperglycemia, without long-term current use of insulin  -     glipiZIDE (GLUCOTROL) 5 MG  tablet; Take 1.5 tablets (7.5 mg total) by mouth 2 (two) times daily with meals.  Dispense: 270 tablet; Refill: 3  -     Ambulatory referral/consult to Endocrinology; Future; Expected date: 12/18/2024  -     Ambulatory referral/consult to Diabetes Education; Future; Expected date: 12/18/2024  -     C-PEPTIDE; Future; Expected date: 02/25/2025  -     Hemoglobin A1C; Future; Expected date: 02/25/2025    Essential hypertension    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      Today:   Inc glip to 7.5-7.5  Diab edu  Stop metformin   Rtc in 4 weeks for f/u   191 today    Health Maintenance   Topic Date Due    RSV Vaccine (Age 60+ and Pregnant patients) (1 - 1-dose 75+ series) Never done    COVID-19 Vaccine (4 - 2024-25 season) 09/01/2024    Eye Exam  11/04/2024    Hemoglobin A1c  05/26/2025    Diabetes Urine Screening  11/26/2025    Lipid Panel  12/02/2025    TETANUS VACCINE  12/10/2029    Shingles Vaccine  Completed    Influenza Vaccine  Completed    Pneumococcal Vaccines (Age 65+)  Completed     44 min spent in care of patient including chart review, history, orders, physical, orders and coordination of care  Visit today included increased complexity associated with the care of the episodic problem diabetes, urinary frequency addressed and managing the longitudinal care of the patient due to the serious and/or complex managed problem(s) htn, neuroendocrine tumor.

## 2024-12-13 ENCOUNTER — TELEPHONE (OUTPATIENT)
Dept: INTERVENTIONAL RADIOLOGY/VASCULAR | Facility: HOSPITAL | Age: 84
End: 2024-12-13
Payer: MEDICARE

## 2024-12-16 ENCOUNTER — TELEPHONE (OUTPATIENT)
Dept: INTERNAL MEDICINE | Facility: CLINIC | Age: 84
End: 2024-12-16
Payer: MEDICARE

## 2024-12-16 ENCOUNTER — HOSPITAL ENCOUNTER (OUTPATIENT)
Dept: RADIOLOGY | Facility: OTHER | Age: 84
Discharge: HOME | End: 2024-12-16
Attending: PHYSICIAN ASSISTANT
Payer: MEDICARE

## 2024-12-16 DIAGNOSIS — E34.00 CARCINOID SYNDROME: ICD-10-CM

## 2024-12-16 DIAGNOSIS — C7B.8 METASTATIC MALIGNANT NEUROENDOCRINE TUMOR TO LIVER: ICD-10-CM

## 2024-12-16 DIAGNOSIS — R19.7 DIARRHEA, UNSPECIFIED TYPE: ICD-10-CM

## 2024-12-16 DIAGNOSIS — C7A.012 MALIGNANT CARCINOID TUMOR OF ILEUM: ICD-10-CM

## 2024-12-16 PROBLEM — R29.818 ACUTE FOCAL NEUROLOGICAL DEFICIT: Status: ACTIVE | Noted: 2024-12-16

## 2024-12-16 PROBLEM — R47.1 DYSARTHRIA: Status: ACTIVE | Noted: 2024-12-16

## 2024-12-16 PROCEDURE — 74183 MRI ABD W/O CNTR FLWD CNTR: CPT | Mod: 26,HCNC,, | Performed by: RADIOLOGY

## 2024-12-16 PROCEDURE — 25500020 PHARM REV CODE 255: Mod: HCNC | Performed by: PHYSICIAN ASSISTANT

## 2024-12-16 PROCEDURE — 74183 MRI ABD W/O CNTR FLWD CNTR: CPT | Mod: TC,HCNC

## 2024-12-16 PROCEDURE — A9585 GADOBUTROL INJECTION: HCPCS | Mod: HCNC | Performed by: PHYSICIAN ASSISTANT

## 2024-12-16 RX ORDER — TELOTRISTAT ETHYL 250 MG/1
250 TABLET ORAL 3 TIMES DAILY
Qty: 90 TABLET | Refills: 3 | Status: CANCELLED | OUTPATIENT
Start: 2024-12-16

## 2024-12-16 RX ORDER — GADOBUTROL 604.72 MG/ML
5 INJECTION INTRAVENOUS
Status: COMPLETED | OUTPATIENT
Start: 2024-12-16 | End: 2024-12-16

## 2024-12-16 RX ADMIN — GADOBUTROL 5 ML: 604.72 INJECTION INTRAVENOUS at 09:12

## 2024-12-16 NOTE — TELEPHONE ENCOUNTER
----- Message from Arlin sent at 12/16/2024 12:17 PM CST -----  Regarding: Medication Question  Name of Who is Calling: Shayla-Sister            What is the request in detail:Pt sister is requesting a call back because she has questions about her new medication.            Can the clinic reply by [facility]:No            [facility]:[phone]

## 2024-12-16 NOTE — TELEPHONE ENCOUNTER
Spoke with patient stating message below:      Trixie Xie MD Physician Signed1:42 PM       Low blood sugar is less than 70-80s so should hold glipizide if blood sugars are less than 80     - sounds like sugars are moving in the right direction   - please ask her to call in readings in 1-2 weeks and give numbers for morning and before bedtime (2h after dinner)        Patient had no further questions and will start documenting readings tonight

## 2024-12-16 NOTE — TELEPHONE ENCOUNTER
Patient sister would like to know at what number on her blood sugars should she hold the glipizide,  Asked sister what is the lowest her blood pressure has been and she responded 160's.

## 2024-12-16 NOTE — TELEPHONE ENCOUNTER
Low blood sugar is less than 70-80s so should hold glipizide if blood sugars are less than 80    - sounds like sugars are moving in the right direction   - please ask her to call in readings in 1-2 weeks and give numbers for morning and before bedtime (2h after dinner)

## 2024-12-17 ENCOUNTER — TELEPHONE (OUTPATIENT)
Dept: INTERVENTIONAL RADIOLOGY/VASCULAR | Facility: CLINIC | Age: 84
End: 2024-12-17
Payer: MEDICARE

## 2024-12-17 RX ORDER — TELOTRISTAT ETHYL 250 MG/1
250 TABLET ORAL 3 TIMES DAILY
Qty: 90 TABLET | Refills: 3 | Status: ACTIVE | OUTPATIENT
Start: 2024-12-17

## 2024-12-19 ENCOUNTER — HOSPITAL ENCOUNTER (EMERGENCY)
Facility: OTHER | Age: 84
Discharge: HOME | End: 2024-12-19
Attending: STUDENT IN AN ORGANIZED HEALTH CARE EDUCATION/TRAINING PROGRAM
Payer: MEDICARE

## 2024-12-19 ENCOUNTER — NURSE TRIAGE (OUTPATIENT)
Dept: ADMINISTRATIVE | Facility: CLINIC | Age: 84
End: 2024-12-19
Payer: MEDICARE

## 2024-12-19 ENCOUNTER — OFFICE VISIT (OUTPATIENT)
Dept: INTERNAL MEDICINE | Facility: CLINIC | Age: 84
End: 2024-12-19
Payer: MEDICARE

## 2024-12-19 VITALS
HEART RATE: 95 BPM | OXYGEN SATURATION: 94 % | WEIGHT: 105.63 LBS | SYSTOLIC BLOOD PRESSURE: 112 MMHG | DIASTOLIC BLOOD PRESSURE: 60 MMHG | HEIGHT: 64 IN | BODY MASS INDEX: 18.03 KG/M2

## 2024-12-19 VITALS
SYSTOLIC BLOOD PRESSURE: 148 MMHG | HEART RATE: 62 BPM | RESPIRATION RATE: 16 BRPM | TEMPERATURE: 98 F | DIASTOLIC BLOOD PRESSURE: 69 MMHG | OXYGEN SATURATION: 97 %

## 2024-12-19 DIAGNOSIS — K59.00 CONSTIPATION: ICD-10-CM

## 2024-12-19 DIAGNOSIS — K59.00 CONSTIPATION, UNSPECIFIED CONSTIPATION TYPE: ICD-10-CM

## 2024-12-19 DIAGNOSIS — K59.00 CONSTIPATION, UNSPECIFIED CONSTIPATION TYPE: Primary | ICD-10-CM

## 2024-12-19 DIAGNOSIS — C78.7 METASTATIC CANCER TO LIVER: ICD-10-CM

## 2024-12-19 DIAGNOSIS — E08.43 DIABETIC AUTONOMIC NEUROPATHY ASSOCIATED WITH DIABETES MELLITUS DUE TO UNDERLYING CONDITION: ICD-10-CM

## 2024-12-19 DIAGNOSIS — R10.32 ACUTE ABDOMINAL PAIN IN LEFT LOWER QUADRANT: Primary | ICD-10-CM

## 2024-12-19 LAB
ALBUMIN SERPL BCP-MCNC: 2.9 G/DL (ref 3.5–5.2)
ALP SERPL-CCNC: 160 U/L (ref 40–150)
ALT SERPL W/O P-5'-P-CCNC: 67 U/L (ref 10–44)
ANION GAP SERPL CALC-SCNC: 14 MMOL/L (ref 8–16)
AST SERPL-CCNC: 45 U/L (ref 10–40)
BASOPHILS # BLD AUTO: 0.02 K/UL (ref 0–0.2)
BASOPHILS NFR BLD: 0.2 % (ref 0–1.9)
BILIRUB SERPL-MCNC: 0.7 MG/DL (ref 0.1–1)
BILIRUB UR QL STRIP: NEGATIVE
BUN SERPL-MCNC: 20 MG/DL (ref 8–23)
CALCIUM SERPL-MCNC: 9.5 MG/DL (ref 8.7–10.5)
CHLORIDE SERPL-SCNC: 94 MMOL/L (ref 95–110)
CLARITY UR: CLEAR
CO2 SERPL-SCNC: 23 MMOL/L (ref 23–29)
COLOR UR: YELLOW
CREAT SERPL-MCNC: 0.8 MG/DL (ref 0.5–1.4)
DIFFERENTIAL METHOD BLD: ABNORMAL
EOSINOPHIL # BLD AUTO: 0 K/UL (ref 0–0.5)
EOSINOPHIL NFR BLD: 0.2 % (ref 0–8)
ERYTHROCYTE [DISTWIDTH] IN BLOOD BY AUTOMATED COUNT: 13.1 % (ref 11.5–14.5)
EST. GFR  (NO RACE VARIABLE): >60 ML/MIN/1.73 M^2
GLUCOSE SERPL-MCNC: 139 MG/DL (ref 70–110)
GLUCOSE UR QL STRIP: NEGATIVE
HCT VFR BLD AUTO: 39.1 % (ref 37–48.5)
HGB BLD-MCNC: 12.8 G/DL (ref 12–16)
HGB UR QL STRIP: NEGATIVE
IMM GRANULOCYTES # BLD AUTO: 0.07 K/UL (ref 0–0.04)
IMM GRANULOCYTES NFR BLD AUTO: 0.6 % (ref 0–0.5)
KETONES UR QL STRIP: NEGATIVE
LEUKOCYTE ESTERASE UR QL STRIP: NEGATIVE
LIPASE SERPL-CCNC: 5 U/L (ref 4–60)
LYMPHOCYTES # BLD AUTO: 1.4 K/UL (ref 1–4.8)
LYMPHOCYTES NFR BLD: 12.4 % (ref 18–48)
MCH RBC QN AUTO: 31.7 PG (ref 27–31)
MCHC RBC AUTO-ENTMCNC: 32.7 G/DL (ref 32–36)
MCV RBC AUTO: 97 FL (ref 82–98)
MONOCYTES # BLD AUTO: 1 K/UL (ref 0.3–1)
MONOCYTES NFR BLD: 8.8 % (ref 4–15)
NEUTROPHILS # BLD AUTO: 8.8 K/UL (ref 1.8–7.7)
NEUTROPHILS NFR BLD: 77.8 % (ref 38–73)
NITRITE UR QL STRIP: NEGATIVE
NRBC BLD-RTO: 0 /100 WBC
PH UR STRIP: 5 [PH] (ref 5–8)
PLATELET # BLD AUTO: 343 K/UL (ref 150–450)
PMV BLD AUTO: 9.2 FL (ref 9.2–12.9)
POTASSIUM SERPL-SCNC: 4.4 MMOL/L (ref 3.5–5.1)
PROT SERPL-MCNC: 7.2 G/DL (ref 6–8.4)
PROT UR QL STRIP: NEGATIVE
RBC # BLD AUTO: 4.04 M/UL (ref 4–5.4)
SODIUM SERPL-SCNC: 131 MMOL/L (ref 136–145)
SP GR UR STRIP: 1.01 (ref 1–1.03)
TSH SERPL DL<=0.005 MIU/L-ACNC: 1.31 UIU/ML (ref 0.4–4)
URN SPEC COLLECT METH UR: NORMAL
UROBILINOGEN UR STRIP-ACNC: NEGATIVE EU/DL
WBC # BLD AUTO: 11.26 K/UL (ref 3.9–12.7)

## 2024-12-19 PROCEDURE — 84443 ASSAY THYROID STIM HORMONE: CPT | Mod: HCNC | Performed by: NURSE PRACTITIONER

## 2024-12-19 PROCEDURE — 25000003 PHARM REV CODE 250: Mod: HCNC | Performed by: NURSE PRACTITIONER

## 2024-12-19 PROCEDURE — 85025 COMPLETE CBC W/AUTO DIFF WBC: CPT | Mod: HCNC | Performed by: NURSE PRACTITIONER

## 2024-12-19 PROCEDURE — 80053 COMPREHEN METABOLIC PANEL: CPT | Mod: HCNC | Performed by: NURSE PRACTITIONER

## 2024-12-19 PROCEDURE — 99284 EMERGENCY DEPT VISIT MOD MDM: CPT | Mod: 25,HCNC

## 2024-12-19 PROCEDURE — 81003 URINALYSIS AUTO W/O SCOPE: CPT | Mod: HCNC | Performed by: NURSE PRACTITIONER

## 2024-12-19 PROCEDURE — 99999 PR PBB SHADOW E&M-EST. PATIENT-LVL IV: CPT | Mod: PBBFAC,HCNC,, | Performed by: INTERNAL MEDICINE

## 2024-12-19 PROCEDURE — 83690 ASSAY OF LIPASE: CPT | Mod: HCNC | Performed by: NURSE PRACTITIONER

## 2024-12-19 RX ADMIN — CITROMA MAGNESIUM CITRATE: 1.75 LIQUID ORAL at 05:12

## 2024-12-19 NOTE — ED PROVIDER NOTES
Encounter Date: 12/19/2024       History     Chief Complaint   Patient presents with    Constipation     X4 days. Pt reports lower abdominal pain.      Chief complaint: Constipation     History of present illness: Patient is a 84-year-old female who reports she has not had a bowel movement 4 days in his lower abdominal pain she took Colace without relief.  She denies fever nausea vomiting vaginal bleeding or discharge urinary frequency urgency hematuria or dysuria.    The history is provided by the patient. No  was used.     Review of patient's allergies indicates:   Allergen Reactions    Epinephrine      carcinoid     Past Medical History:   Diagnosis Date    Anemia 07/11/2018    B12 deficiency     Depression     per pcp note 7/2017 and given prozac and diazepam     Hyperlipidemia     Neuroendocrine tumor     Systolic heart failure     Weight loss     reviewed prior pcp Dr. Russo notes 2017 - labs reviewed: cmp wnl x tbili 1.5, tsh wnl, A1c 5.0, cbc wnl,D 36, hiv neg, hep c neg, hep b surg ag neg      Past Surgical History:   Procedure Laterality Date    EMBOLIZATION N/A 02/14/2019    Procedure: EMBOLIZATION, BLOOD VESSEL;  Surgeon: [facility] Diagnostic Provider;  Location: [facility] OR [address] [place] [address];  Service: Radiology;  Laterality: N/A;  Room 189/[facility]    EMBOLIZATION N/A 03/21/2019    Procedure: EMBOLIZATION, BLOOD VESSEL;  Surgeon: [facility] Diagnostic Provider;  [facility];  Service: Radiology;  Laterality: N/A;  Room 189/[facility]    ESOPHAGOGASTRODUODENOSCOPY  05/04/2018    esophageal ring, grade 1 esophagitis, gastritis     EYE SURGERY Bilateral     cataract removal    HYSTERECTOMY      fibroids     Family History   Problem Relation Name Age of Onset    Glaucoma Mother      Hypertension Mother      Heart attack Father      Cancer Father      Diabetes Sister Marilyn     Asthma Sister JoCarina Luke     No Known Problems Sister Stephanie     Hyperlipidemia Sister      Heart attack Sister       Cancer Brother          lung cancer, + tobacco    Diabetes Brother      Hypertension Brother      Stroke Brother      Emphysema Brother      COPD Brother       Social History     Tobacco Use    Smoking status: Never    Smokeless tobacco: Never   Substance Use Topics    Alcohol use: No     Comment: stopped in 2007 - hx of social drinking    Drug use: Never     Review of Systems   Gastrointestinal:  Positive for abdominal pain.       Physical Exam     Initial Vitals [12/19/24 1348]   BP Pulse Resp Temp SpO2   (!) 113/50 (!) 51 20 98.4 °F (36.9 °C) 100 %      MAP       --         Physical Exam    Nursing note and vitals reviewed.  Constitutional: She appears well-developed and well-nourished.   HENT:   Head: Normocephalic and atraumatic.   Right Ear: External ear normal.   Left Ear: External ear normal.   Nose: Nose normal.   Eyes: Conjunctivae and EOM are normal. Pupils are equal, round, and reactive to light. Right eye exhibits no discharge. Left eye exhibits no discharge.   Neck:   Normal range of motion.  Abdominal: Abdomen is soft. Bowel sounds are normal. She exhibits no distension. There is no abdominal tenderness.   Musculoskeletal:         General: Normal range of motion.      Cervical back: Normal range of motion.     Neurological: She is alert and oriented to person, place, and time.   Skin: Skin is dry. Capillary refill takes less than 2 seconds.         ED Course   Procedures  Labs Reviewed   CBC W/ AUTO DIFFERENTIAL - Abnormal       Result Value    WBC 11.26      RBC 4.04      Hemoglobin 12.8      Hematocrit 39.1      MCV 97      MCH 31.7 (*)     MCHC 32.7      RDW 13.1      Platelets 343      MPV 9.2      Immature Granulocytes 0.6 (*)     Gran # (ANC) 8.8 (*)     Immature Grans (Abs) 0.07 (*)     Lymph # 1.4      Mono # 1.0      Eos # 0.0      Baso # 0.02      nRBC 0      Gran % 77.8 (*)     Lymph % 12.4 (*)     Mono % 8.8      Eosinophil % 0.2      Basophil % 0.2      Differential Method Automated      COMPREHENSIVE METABOLIC PANEL - Abnormal    Sodium 131 (*)     Potassium 4.4      Chloride 94 (*)     CO2 23      Glucose 139 (*)     BUN 20      Creatinine 0.8      Calcium 9.5      Total Protein 7.2      Albumin 2.9 (*)     Total Bilirubin 0.7      Alkaline Phosphatase 160 (*)     AST 45 (*)     ALT 67 (*)     eGFR >60      Anion Gap 14     LIPASE    Lipase 5     URINALYSIS, REFLEX TO URINE CULTURE    Specimen UA Urine, Catheterized      Color, UA Yellow      Appearance, UA Clear      pH, UA 5.0      Specific Gravity, UA 1.015      Protein, UA Negative      Glucose, UA Negative      Ketones, UA Negative      Bilirubin (UA) Negative      Occult Blood UA Negative      Nitrite, UA Negative      Urobilinogen, UA Negative      Leukocytes, UA Negative      Narrative:     Specimen Source->Urine   TSH   TSH    TSH 1.311      Narrative:     TSH add-on per Jordi Arthur DNP          Imaging Results              X-Ray Abdomen AP 1 View (KUB) (Final result)  Result time 12/19/24 15:09:02      Final result by Aamir Harper III, MD (12/19/24 15:09:02)                   Narrative:    EXAMINATION:  XR ABDOMEN AP 1 VIEW    CLINICAL HISTORY:  Constipation, unspecified    FINDINGS:  Abdomen one view:    There is mild-moderate constipation.  No obstruction, ileus, or perforation seen.  Lung bases are clear.      Electronically signed by: Aamir Harper MD  Date:    12/19/2024  Time:    15:09                                     Medications   Brown Bomb (magnesium citrate 300 mL, glycerin 100 mL,  mL) 900 mL enema ( Rectal Given 12/19/24 1709)     Medical Decision Making  Patient is a 84-year-old female who reports she has not had a bowel movement 4 days in his lower abdominal pain she took Colace without relief.  She denies fever nausea vomiting vaginal bleeding or discharge urinary frequency urgency hematuria or dysuria.    On physical examination the patient is afebrile nontoxic in no apparent distress the abdomen is  soft and nontender x4 quadrants.    Differential diagnosis includes constipation, UTI, neoplasm    Problems Addressed:  Constipation, unspecified constipation type: acute illness or injury     Details: Constipation resolved following brown bomber enema administration the patient reported a reduction in her discomforts.  I believe she can safely be discharged home to follow-up as directed with a bowel regimen.    Amount and/or Complexity of Data Reviewed  Labs: ordered. Decision-making details documented in ED Course.  Radiology: ordered. Decision-making details documented in ED Course.  Discussion of management or test interpretation with external provider(s): Vital signs at the time of disposition were:  BP (!) 148/69 (BP Location: Left arm, Patient Position: Lying)   Pulse 62   Temp 98.2 °F (36.8 °C) (Oral)   Resp 16   LMP  (LMP Unknown)   SpO2 97%       See AVS for additional recommendations. Medications listed herein were prescribed after reviewing the patient's allergies, medication list, history, most recent laboratories as available.  Referrals below were provided after reviewing the patient's previous medical providers. She understands she  should return for any worsening or changes in condition.  Prior to discharge the patient was asked if she  had any additional concerns or complaints and she declined. The patient was given an opportunity to ask questions and all were answered to her satisfaction.     Risk  OTC drugs.  Diagnosis or treatment significantly limited by social determinants of health.               ED Course as of 12/19/24 2046   Thu Dec 19, 2024   1611 Lipase: 5 [VC]   1611 CBC W/ AUTO DIFFERENTIAL(!)  Normal CBC. [VC]   1611 BP(!): 113/50 [VC]   1611 Temp: 98.4 °F (36.9 °C) [VC]   1611 Temp Source: Oral [VC]   1611 Pulse(!): 51 [VC]   1611 Resp: 20 [VC]   1611 SpO2: 100 % [VC]   1612 Comp. Metabolic Panel(!)  Transaminase elevation.  Hyponatremia chronic.  Patient has a known  neuroendocrine tumor of the liver. [VC]   1614 X-Ray Abdomen AP 1 View (KUB)  There is mild-moderate constipation.  No obstruction, ileus, or perforation seen.  Lung bases are clear. [VC]   1656 Urinalysis, Reflex to Urine Culture Urine, Catheterized  Normal urinalysis. [VC]   1753 TSH: 1.311 [VC]   1833 BP(!): 152/65 [VC]   1833 Pulse: 83 [VC]   1833 SpO2: 97 % [VC]      ED Course User Index  [VC] Jordi Arthur DNP                           Clinical Impression:  Final diagnoses:  [K59.00] Constipation  [K59.00] Constipation, unspecified constipation type (Primary)          ED Disposition Condition    Discharge Stable          ED Prescriptions    None       Follow-up Information       Follow up With Specialties Details Why Contact Info    Trixie Xie MD Internal Medicine Schedule an appointment as soon as possible for a visit   [address] [facility].  [phone]  [facility] [address]  [phone]               Jordi Arthur DNP  12/19/24 2046

## 2024-12-19 NOTE — FIRST PROVIDER EVALUATION
Emergency Department TeleTriage Encounter Note      CHIEF COMPLAINT    Chief Complaint   Patient presents with    Constipation     X4 days. Pt reports lower abdominal pain.        VITAL SIGNS   Initial Vitals [12/19/24 1348]   BP Pulse Resp Temp SpO2   (!) 113/50 (!) 51 20 98.4 °F (36.9 °C) 100 %      MAP       --            ALLERGIES    Review of patient's allergies indicates:   Allergen Reactions    Epinephrine      carcinoid       PROVIDER TRIAGE NOTE  This is a teletriage evaluation of a 84 y.o. female presenting to the ED complaining of abdominal pain. Patient reports generalized abdominal pain and constipation for 4 days.     Patient is alert and oriented. She speaks in complete sentences. She is sitting upright in the chair in no distress.     Initial orders will be placed and care will be transferred to an alternate provider when patient is roomed for a full evaluation. Any additional orders and the final disposition will be determined by that provider.         ORDERS  Labs Reviewed   CBC W/ AUTO DIFFERENTIAL   COMPREHENSIVE METABOLIC PANEL   LIPASE   URINALYSIS, REFLEX TO URINE CULTURE       ED Orders (720h ago, onward)      Start Ordered     Status Ordering Provider    12/19/24 1355 12/19/24 1354  Vital signs  Every 2 hours         Ordered CHAMPAGNE, RATNA A.    12/19/24 1355 12/19/24 1354  Diet NPO  Diet effective now         Ordered CHAMPAGNE, RATNA A.    12/19/24 1355 12/19/24 1354  Insert peripheral IV  Once         Ordered CHAMPAGNE, RATNA A.    12/19/24 1355 12/19/24 1354  CBC W/ AUTO DIFFERENTIAL  STAT         Ordered CHAMPAGNE, RATNA A.    12/19/24 1355 12/19/24 1354  Comp. Metabolic Panel  STAT         Ordered CHAMPAGNE, RATNA A.    12/19/24 1355 12/19/24 1354  Lipase  STAT         Ordered CHAMPAGNE, RATNA A.    12/19/24 1355 12/19/24 1354  Urinalysis, Reflex to Urine Culture Urine, Clean Catch  STAT         Ordered CHAMPAGNE, RATNA A.    12/19/24 1355 12/19/24 1354  X-Ray Abdomen AP 1 View (KUB)   1 time imaging         Final result RATNA GARAY              Virtual Visit Note: The provider triage portion of this emergency department evaluation and documentation was performed via [employer], a HIPAA-compliant telemedicine application, in concert with a tele-presenter in the room. A face to face patient evaluation with one of my colleagues will occur once the patient is placed in an emergency department room.      DISCLAIMER: This note was prepared with [employer] voice recognition transcription software. Garbled syntax, mangled pronouns, and other bizarre constructions may be attributed to that software system.

## 2024-12-19 NOTE — ED TRIAGE NOTES
Pt reports abd pain for the past four days. This is not a normal issue for her. She took colace with no improvement.

## 2024-12-19 NOTE — PROGRESS NOTES
Subjective:      Patient ID: Shahram Hills is a 84 y.o. female.    Chief Complaint: Constipation and Abdominal Pain      History of Present Illness      Patient presents today with abdominal pain with constipation for four days.  She is 84 years old  lady who is brought by her daughter for acute visit because of acute onset abdominal pain mostly located left lower quadrant with constipation for 4 days.  And pain has been gradually worsening over past 2 day.  She does not have nausea, no vomiting, fever, and diarrhea.  She now her constipation this is a 1st time have constipation symptoms with left lower abdominal pain.  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  In additionally she has multiple comorbidities including malnutrition with low albumin secondary to liver damage from hepatic cancer.  She does have diabetes with complication renal insufficiency.  She might have underlying autonomic nerve dysfunction associated with diabetes they may contribute her constipation.  She is taking Lasix 20 mg daily spironolactone 25 mg daily they may also contribute to constipation as well from physical examinations she has significant tenderness with rebound guarding of left lower quadrant abdomen mass.  After discussed with patient self, patient's daughter, patient's granddaughter on the phone, the agree to go to emergency room for further evaluation management.  ER physician has been contacted by phone about the patient's case and positive finding in history and physical examination.  ER physician agreed to accept the patients in the emergency room for further evaluation and treatment.  Patient will be sent to ER by wheelchair accompanied by our nurse.  She has told her new to myself.  Her primary care doctor is Dr. Marcum  Anne-Marie.  Her previous medical records, laboratory results, imaging study results, you today by myself.  It takes me total 40 minutes in addition.    CONSTIPATION:  She reports no bowel movement for 4 days. She feels the urge but is unable to pass stool. Stools are soft when passed, neither watery nor hard. She has a history of constipation. She maintains adequate hydration and consumes vegetables including string beans and cabbage.    MEDICAL HISTORY:  She has a history of liver cancer diagnosed in 2017/2018 with metastasis. She is experiencing malnourishment with low albumin levels attributed to her liver condition.    MEDICATIONS:  She takes iron supplements which may contribute to constipation.      ROS:  General: -fever, -chills, -fatigue, -weight gain, -weight loss  Eyes: -vision changes, -redness, -discharge  ENT: -ear pain, -nasal congestion, -sore throat  Cardiovascular: -chest pain, -palpitations, -lower extremity edema  Respiratory: -cough, -shortness of breath  Gastrointestinal: +abdominal pain, -nausea, -vomiting, -diarrhea, +constipation, -blood in stool  Genitourinary: -dysuria, -hematuria, -frequency  Musculoskeletal: -joint pain, -muscle pain  Skin: -rash, -lesion  Neurological: -headache, -dizziness, -numbness, -tingling  Psychiatric: -anxiety, -depression, -sleep difficulty         Active Problem List with Overview Notes    Diagnosis Date Noted    Dysarthria 12/16/2024    Acute focal neurological deficit 12/16/2024    Difficulty with speech 12/02/2024    Lactic acidosis 12/02/2024    Increased anion gap metabolic acidosis 12/02/2024    Elevated brain natriuretic peptide (BNP) level 12/02/2024    Syncope 11/04/2024    Glaucoma 10/18/2024    H/O: hysterectomy 10/18/2024    Chronic combined systolic and diastolic CHF (congestive heart failure) 01/23/2023    Tortuous aorta 08/29/2022     Noted on MRI abdomen from 7/23/21      PVC's (premature ventricular contractions) 02/11/2022    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    Spine metastasis 06/26/2018     IMO Regualtory Update 4/1/23      Secondary neuroendocrine tumor of bone 06/11/2018    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 06/07/2018    B12 deficiency 05/28/2018    Memory deficits 03/23/2018    Calculus of gallbladder without cholecystitis without obstruction 03/23/2018    Essential hypertension 03/23/2018    Hyperlipidemia 03/23/2018          Current Outpatient Medications:     blood sugar diagnostic (TRUE METRIX GLUCOSE TEST STRIP) Strp, USE TO CHECK BLOOD SUGAR TWICE DAILY, Disp: 100 strip, Rfl: 11    blood-glucose meter kit, by Other route. Use as instructed, Disp: , Rfl:     carvediloL (COREG) 12.5 MG tablet, TAKE 1 TABLET(12.5 MG) BY MOUTH TWICE DAILY WITH MEALS, Disp: 180 tablet, Rfl: 3    cyanocobalamin (VITAMIN B-12) 1000 MCG tablet, Take 1 tablet (1,000 mcg total) by mouth once daily., Disp: , Rfl:     diphenoxylate-atropine 2.5-0.025 mg (LOMOTIL) 2.5-0.025 mg per tablet, Take 1 tablet by mouth 4 (four) times daily as needed for Diarrhea., Disp: 90 tablet, Rfl: 2    ezetimibe (ZETIA) 10 mg tablet, Take 10 mg by mouth once daily., Disp: , Rfl:     ferrous sulfate 325 (65 FE) MG EC tablet, Take 325 mg by mouth once daily., Disp: , Rfl:     furosemide (LASIX) 20 MG tablet, take 1 tab by mouth daily. If gain 2-3 pounds in 2-3 days then take 2 tabs daily for 3 days and then resume 1 dose daily, Disp: 120  "tablet, Rfl: 3    glipiZIDE (GLUCOTROL) 5 MG tablet, Take 1.5 tablets (7.5 mg total) by mouth 2 (two) times daily with meals., Disp: 270 tablet, Rfl: 3    lancets Misc, To check BG 2 times daily, to use with insurance preferred meter, Disp: 100 each, Rfl: 11    latanoprost 0.005 % ophthalmic solution, Place 1 drop into both eyes nightly., Disp: , Rfl:     losartan (COZAAR) 50 MG tablet, , Disp: , Rfl:     needle, disp, 22 G 22 gauge x 1" Ndle, 1 application by Misc.(Non-Drug; Combo Route) route 3 (three) times daily as needed., Disp: 30 each, Rfl: 2    ondansetron (ZOFRAN-ODT) 8 MG TbDL, Take 1 tablet (8 mg total) by mouth every 8 (eight) hours as needed (nausea)., Disp: 30 tablet, Rfl: 2    sacubitriL-valsartan (ENTRESTO) 24-26 mg per tablet, TAKE 1 TABLET BY MOUTH TWICE DAILY, Disp: 180 tablet, Rfl: 3    spironolactone (ALDACTONE) 25 MG tablet, Take 1 tablet (25 mg total) by mouth once daily., Disp: 90 tablet, Rfl: 3    syringe, disposable, 1 mL Syrg, 1 application by Misc.(Non-Drug; Combo Route) route 3 (three) times daily as needed (diarrhea)., Disp: 30 each, Rfl: 2    telotristat ethyl (XERMELO) 250 mg Tab, Take 1 tablet (250 mg) by mouth 3 (three) times daily., Disp: 90 tablet, Rfl: 3    TRUEPLUS LANCETS 33 gauge Misc, USE TO CHECK BLOOD SUGAR TWICE DAILY, Disp: 100 each, Rfl: 11    vitamin D (VITAMIN D3) 1000 units Tab, Take 400 Units by mouth once daily., Disp: , Rfl:     XIIDRA 5 % Dpet, , Disp: , Rfl: 3  Review of patient's allergies indicates:   Allergen Reactions    Epinephrine      carcinoid        Past Medical History:   Diagnosis Date    Anemia 07/11/2018    B12 deficiency     Depression     per pcp note 7/2017 and given prozac and diazepam     Hyperlipidemia     Neuroendocrine tumor     Systolic heart failure     Weight loss     reviewed prior pcp Dr. Russo notes 2017 - labs reviewed: cmp wnl x tbili 1.5, tsh wnl, A1c 5.0, cbc wnl,D 36, hiv neg, hep c neg, hep b surg ag neg      Past Surgical " "History:   Procedure Laterality Date    EMBOLIZATION N/A 02/14/2019    Procedure: EMBOLIZATION, BLOOD VESSEL;  Surgeon: [facility] Diagnostic Provider;  Location: [facility] OR 2ND FLR;  Service: Radiology;  Laterality: N/A;  Room 189/[facility]    EMBOLIZATION N/A 03/21/2019    Procedure: EMBOLIZATION, BLOOD VESSEL;  Surgeon: [facility] Diagnostic Provider;  Location: [facility] OR 2ND FLR;  Service: Radiology;  Laterality: N/A;  Room 189/[facility]    ESOPHAGOGASTRODUODENOSCOPY  05/04/2018    esophageal ring, grade 1 esophagitis, gastritis     EYE SURGERY Bilateral     cataract removal    HYSTERECTOMY      fibroids     Social History     Social History Narrative    Living in [facility]    Not getting reg exericse     Family History   Problem Relation Name Age of Onset    Glaucoma Mother      Hypertension Mother      Heart attack Father      Cancer Father      Diabetes Sister Marilyn     Asthma Sister Nick Luke     No Known Problems Sister Stephanie     Hyperlipidemia Sister      Heart attack Sister      Cancer Brother          lung cancer, + tobacco    Diabetes Brother      Hypertension Brother      Stroke Brother      Emphysema Brother      COPD Brother         Vitals:    12/19/24 1249   BP: 112/60   Pulse: 95   SpO2: (!) 94%   Weight: 47.9 kg (105 lb 9.6 oz)   Height: 5' 4" (1.626 m)   PainSc: 10-Worst pain ever   PainLoc: Abdomen       Review of Systems     Lab Results   Component Value Date    HGBA1C 6.2 (H) 11/26/2024    HGBA1C 6.6 (H) 08/13/2024    HGBA1C 6.6 (H) 08/13/2024     Lab Results   Component Value Date    MICALBCREAT 206.6 (H) 11/26/2024     Lab Results   Component Value Date    LDLCALC 151.0 12/02/2024    LDLCALC 115.8 08/13/2024    CHOL 271 (H) 12/02/2024     (H) 12/02/2024    TRIG 75 12/02/2024       Lab Results   Component Value Date     (L) 12/06/2024    K 4.6 12/06/2024    CL 89 (L) 12/06/2024    CO2 30 (H) 12/06/2024     (H) 12/06/2024    BUN 14 12/06/2024    CREATININE 0.8 12/06/2024    CALCIUM 9.6 " 12/06/2024    PROT 7.6 12/06/2024    ALBUMIN 3.1 (L) 12/06/2024    BILITOT 0.5 12/06/2024    ALKPHOS 75 12/06/2024    AST 38 12/06/2024    ALT 76 (H) 12/06/2024    ANIONGAP 12 12/06/2024    ESTGFRAFRICA >60 07/12/2022    EGFRNONAA >60 07/12/2022    WBC 12.47 12/06/2024    HGB 12.1 12/06/2024    HGB 12.0 12/06/2024    HCT 36.7 (L) 12/06/2024    MCV 96 12/06/2024     12/06/2024    TSH 0.470 12/02/2024    HEPCAB Non-reactive 11/03/2024       Lab Results   Component Value Date    NYYHCUGY22TU 34 08/13/2024    EQMANZAM92HC 38 07/12/2023    MHLXUDHE58 >2000 (H) 08/13/2024    FERRITIN 36 09/17/2018    IRON 199 (H) 09/17/2018    TRANSFERRIN 265 09/17/2018    TIBC 392 09/17/2018    FESATURATED 51 (H) 09/17/2018       Objective:   Physical Exam   Physical Exam    General: No acute distress. Well-developed. Well-nourished.  Eyes: EOMI. Sclerae anicteric.  HENT: Normocephalic. Atraumatic. Nares patent. Moist oral mucosa.  Ears: Bilateral TMs clear. Bilateral EACs clear.  Cardiovascular: Regular rate. Regular rhythm. No murmurs. No rubs. No gallops. Normal S1, S2.  Respiratory: Normal respiratory effort. Clear to auscultation bilaterally. No rales. No rhonchi. No wheezing. Lungs clear.  Abdomen: LLQ area tender mass with guarding and mild rebounding . Non-distended. Hyper active bowel sounds.  Musculoskeletal: No  obvious deformity.  Extremities: No lower extremity edema.  Neurological: Alert & oriented x3. No slurred speech. Normal gait.  Psychiatric: Normal mood. Normal affect. Good insight. Good judgment.  Skin: Warm. Dry. No rash.         Assessment:     1. Acute abdominal pain in left lower quadrant    2. Constipation, unspecified constipation type    3. Metastatic cancer to liver    4. Diabetic autonomic neuropathy associated with diabetes mellitus due to underlying condition      Plan:   Assessment & Plan   Acute abdominal pain in left lower quadrant    Constipation, unspecified constipation type    Metastatic  cancer to liver    Diabetic autonomic neuropathy associated with diabetes mellitus due to underlying condition       Assessment & Plan    IMPRESSION:  - Concerned about patient's GI obstruction for underlying haptic cancer metastasis or constipation associated with diabetic autoneuropathy and abdominal tenderness, particularly given history of liver cancer  - Suspect possible bowel obstruction related to cancer progression  - Determined emergency evaluation with CT necessary to assess for blockage  -I will contact ER physician today and he agreed to accept the patient is in the emergency room for further evaluation and treatment.  LIVER CANCER of stage 4:  -f/u in oncology clinic for the treatment  EMERGENCY EVALUATION:  - Referred patient to emergency room for immediate evaluation.  - Contacted emergency room to inform them of patient's condition and impending arrival.     _ after consent patient's family agreed to go to emergency room and [facility]         Follow up as needed, and she needs to be f/u in pcp clinic after the ED/hospitalization  discharge.         Visit Checklist (as applicable):  1. Status of new and prior symptoms discussed? yes  2. Imaging reviewed/ ordered as appropriate? yes  3. Lab study reviewed/ ordered as appropriate? yes  4. Plan for work-up and treatment discussed with patient? yes  5. Potential medication side-effects and monitoring plan discussed? yes  6. Review of outside medical records was performed and pertinent details are summarized in the HPI above? yes     Time spent on this encounter: 60 minutes. This includes face to face time and non-face to face time preparing to see the patient (eg, review of tests), obtaining and/or reviewing separately obtained history, documenting clinical information in the electronic or other health record, independently interpreting results (not separately reported) and  communicating results to the patient/family/caregiver, or care coordination (not separately reported). Also patient education regarding chronic and acute medical conditions reviewed. Patient handouts given if appropriate.     Each patient to whom he or she provides medical services by telemedicine is:  (1) informed of the relationship between the physician and patient and the respective role of any other health care provider with respect to management of the patient; and (2) notified that he or she may decline to receive medical services by telemedicine and may withdraw from such care at any time.     This note was generated with the assistance of ambient listening technology. Verbal consent was obtained by the patient and accompanying visitor(s) for the recording of patient appointment to facilitate this note. I attest to having reviewed and edited the generated note for accuracy, though some syntax or spelling errors may persist. Please contact the author of this note for any clarification.       Charles Joseph MD  [facility] Emergency Department

## 2024-12-20 NOTE — DISCHARGE INSTRUCTIONS
Miralax 17gm in water each day to prevent further constipation.  Return to the Emergency Department for any worsening, change in condition, or any emergent concerns.

## 2024-12-20 NOTE — ED NOTES
Moose nair complete. Pt tolerated well with output as a result. Reports improvement in symptoms. ED provider aware.

## 2024-12-21 ENCOUNTER — ON-DEMAND VIRTUAL (OUTPATIENT)
Dept: URGENT CARE | Facility: CLINIC | Age: 84
End: 2024-12-21
Payer: MEDICARE

## 2024-12-21 DIAGNOSIS — K59.00 CONSTIPATION, UNSPECIFIED CONSTIPATION TYPE: Primary | ICD-10-CM

## 2024-12-21 PROCEDURE — 99214 OFFICE O/P EST MOD 30 MIN: CPT | Mod: 95,,, | Performed by: NURSE PRACTITIONER

## 2024-12-22 NOTE — PROGRESS NOTES
Subjective:      Patient ID: Shahram Hills is a 84 y.o. female.    Vitals:  vitals were not taken for this visit.     Chief Complaint: Constipation (Constipation-told by  to use miralax, colace which she has been using but no relief. Had enema a couple of days ago.  Now requesting linzess.)      Visit Type: TELE AUDIOVISUAL    Present with the patient at the time of consultation: TELEMED PRESENT WITH PATIENT: family member    Past Medical History:   Diagnosis Date    Anemia 07/11/2018    B12 deficiency     Depression     per pcp note 7/2017 and given prozac and diazepam     Hyperlipidemia     Neuroendocrine tumor     Systolic heart failure     Weight loss     reviewed prior pcp Dr. Russo notes 2017 - labs reviewed: cmp wnl x tbili 1.5, tsh wnl, A1c 5.0, cbc wnl,D 36, hiv neg, hep c neg, hep b surg ag neg      Past Surgical History:   Procedure Laterality Date    EMBOLIZATION N/A 02/14/2019    Procedure: EMBOLIZATION, BLOOD VESSEL;  Surgeon: [facility] Diagnostic Provider;  Location: [facility] OR [address] [place] [address];  Service: Radiology;  Laterality: N/A;  Room 189/[facility]    EMBOLIZATION N/A 03/21/2019    Procedure: EMBOLIZATION, BLOOD VESSEL;  Surgeon: [facility] Diagnostic Provider;  [facility];  Service: Radiology;  Laterality: N/A;  [facility]    ESOPHAGOGASTRODUODENOSCOPY  05/04/2018    esophageal ring, grade 1 esophagitis, gastritis     EYE SURGERY Bilateral     cataract removal    HYSTERECTOMY      fibroids     Review of patient's allergies indicates:   Allergen Reactions    Epinephrine      carcinoid     Current Outpatient Medications on File Prior to Visit   Medication Sig Dispense Refill    blood sugar diagnostic (TRUE METRIX GLUCOSE TEST STRIP) Strp USE TO CHECK BLOOD SUGAR TWICE DAILY 100 strip 11    blood-glucose meter kit by Other route. Use as instructed      carvediloL (COREG) 12.5 MG tablet TAKE 1 TABLET(12.5 MG) BY MOUTH TWICE DAILY WITH MEALS 180 tablet 3    cyanocobalamin (VITAMIN B-12) 1000  "MCG tablet Take 1 tablet (1,000 mcg total) by mouth once daily.      diphenoxylate-atropine 2.5-0.025 mg (LOMOTIL) 2.5-0.025 mg per tablet Take 1 tablet by mouth 4 (four) times daily as needed for Diarrhea. 90 tablet 2    ezetimibe (ZETIA) 10 mg tablet Take 10 mg by mouth once daily.      ferrous sulfate 325 (65 FE) MG EC tablet Take 325 mg by mouth once daily.      furosemide (LASIX) 20 MG tablet take 1 tab by mouth daily. If gain 2-3 pounds in 2-3 days then take 2 tabs daily for 3 days and then resume 1 dose daily 120 tablet 3    glipiZIDE (GLUCOTROL) 5 MG tablet Take 1.5 tablets (7.5 mg total) by mouth 2 (two) times daily with meals. 270 tablet 3    lancets Misc To check BG 2 times daily, to use with insurance preferred meter 100 each 11    latanoprost 0.005 % ophthalmic solution Place 1 drop into both eyes nightly.      losartan (COZAAR) 50 MG tablet       needle, disp, 22 G 22 gauge x 1" Ndle 1 application by Misc.(Non-Drug; Combo Route) route 3 (three) times daily as needed. 30 each 2    ondansetron (ZOFRAN-ODT) 8 MG TbDL Take 1 tablet (8 mg total) by mouth every 8 (eight) hours as needed (nausea). 30 tablet 2    sacubitriL-valsartan (ENTRESTO) 24-26 mg per tablet TAKE 1 TABLET BY MOUTH TWICE DAILY 180 tablet 3    spironolactone (ALDACTONE) 25 MG tablet Take 1 tablet (25 mg total) by mouth once daily. 90 tablet 3    syringe, disposable, 1 mL Syrg 1 application by Misc.(Non-Drug; Combo Route) route 3 (three) times daily as needed (diarrhea). 30 each 2    telotristat ethyl (XERMELO) 250 mg Tab Take 1 tablet (250 mg) by mouth 3 (three) times daily. 90 tablet 3    TRUEPLUS LANCETS 33 gauge Misc USE TO CHECK BLOOD SUGAR TWICE DAILY 100 each 11    vitamin D (VITAMIN D3) 1000 units Tab Take 400 Units by mouth once daily.      XIIDRA 5 % Dpet   3     No current facility-administered medications on file prior to visit.     Family History   Problem Relation Name Age of Onset    Glaucoma Mother      Hypertension Mother   "    Heart attack Father      Cancer Father      Diabetes Sister Marilyn     Asthma Sister Nick Luke     No Known Problems Sister Stephanie     Hyperlipidemia Sister      Heart attack Sister      Cancer Brother          lung cancer, + tobacco    Diabetes Brother      Hypertension Brother      Stroke Brother      Emphysema Brother      COPD Brother         Medications Ordered                [id number] - [place], [place] - [address] AT [facility] [address]   [address], [facility] [address]    Telephone: [phone]   Fax: [phone]   Hours: Not open 24 hours                         E-Prescribed (1 of 1)              linaCLOtide (LINZESS) 72 mcg Cap capsule    Sig: Take 1 capsule (72 mcg total) by mouth before breakfast.       Start: 12/21/24     Quantity: 30 each Refills: 0                           Ohs Peq Odvv Intake    12/21/2024  7:32 PM CST - Filed by Patient   What is your current physical address in the event of a medical emergency?    Are you able to take your vital signs? No   Please attach any relevant images or files    Is your employer contracted with [facility]? No         HPI     Constipation     Additional comments: Constipation-told by  to use miralax, colace which she has been using but no relief. Had enema a couple of days ago.  Now requesting linzess.  Two patient identifiers were used-name was repeated verbally as well as date of birth.  The patient is located in her home in [place] accompanied by her daughter                    Gastrointestinal:  Positive for constipation. Negative for nausea and vomiting.        Objective:   The physical exam was conducted virtually.  Physical Exam   Constitutional: No distress.   HENT:   Head: Normocephalic and atraumatic.   Neck: Neck supple.   Pulmonary/Chest: Effort normal. No respiratory distress.   Abdominal: Normal appearance.   Musculoskeletal: Normal range of motion.         General: Normal range of  motion.   Neurological: no focal deficit. She is alert. She displays tremor.   Skin: Skin is not pale.   Psychiatric: Her behavior is normal. Mood, judgment and thought content normal.       Assessment:     1. Constipation, unspecified constipation type        Plan:       Constipation, unspecified constipation type    Other orders  -     linaCLOtide (LINZESS) 72 mcg Cap capsule; Take 1 capsule (72 mcg total) by mouth before breakfast.  Dispense: 30 each; Refill: 0    Push fluids. If abdominal pain becomes worse or N/V develop, should go to the ER.    You must understand that you've received a virtual Care treatment only and that you may be released before all your medical problems are known or treated. You, the patient, will arrange for follow up care as instructed.  If your condition worsens we recommend that you receive another evaluation at an urgent care in person, the emergency room or contact your primary medical clinics after hours call service to discuss your concerns.

## 2024-12-26 DIAGNOSIS — C7A.012 MALIGNANT CARCINOID TUMOR OF ILEUM: Primary | ICD-10-CM

## 2025-01-02 ENCOUNTER — TELEPHONE (OUTPATIENT)
Dept: HEMATOLOGY/ONCOLOGY | Facility: CLINIC | Age: 85
End: 2025-01-02
Payer: MEDICARE

## 2025-01-03 ENCOUNTER — OFFICE VISIT (OUTPATIENT)
Dept: HEMATOLOGY/ONCOLOGY | Facility: CLINIC | Age: 85
End: 2025-01-03
Payer: MEDICARE

## 2025-01-03 ENCOUNTER — INFUSION (OUTPATIENT)
Dept: INFUSION THERAPY | Facility: HOSPITAL | Age: 85
End: 2025-01-03
Payer: MEDICARE

## 2025-01-03 ENCOUNTER — LAB VISIT (OUTPATIENT)
Dept: LAB | Facility: HOSPITAL | Age: 85
End: 2025-01-03
Attending: INTERNAL MEDICINE
Payer: MEDICARE

## 2025-01-03 VITALS
SYSTOLIC BLOOD PRESSURE: 125 MMHG | HEIGHT: 64 IN | WEIGHT: 102.19 LBS | RESPIRATION RATE: 16 BRPM | TEMPERATURE: 97 F | HEART RATE: 74 BPM | DIASTOLIC BLOOD PRESSURE: 64 MMHG | BODY MASS INDEX: 17.45 KG/M2 | OXYGEN SATURATION: 99 %

## 2025-01-03 VITALS
DIASTOLIC BLOOD PRESSURE: 64 MMHG | HEART RATE: 77 BPM | TEMPERATURE: 98 F | RESPIRATION RATE: 18 BRPM | SYSTOLIC BLOOD PRESSURE: 136 MMHG

## 2025-01-03 DIAGNOSIS — Z79.899 IMMUNODEFICIENCY DUE TO DRUGS: ICD-10-CM

## 2025-01-03 DIAGNOSIS — C7B.8 NEUROENDOCRINE CARCINOMA METASTATIC TO BONE: ICD-10-CM

## 2025-01-03 DIAGNOSIS — R19.7 DIARRHEA, UNSPECIFIED TYPE: ICD-10-CM

## 2025-01-03 DIAGNOSIS — R80.9 CONTROLLED TYPE 2 DIABETES MELLITUS WITH MICROALBUMINURIA, WITHOUT LONG-TERM CURRENT USE OF INSULIN: ICD-10-CM

## 2025-01-03 DIAGNOSIS — D84.821 IMMUNODEFICIENCY DUE TO DRUGS: ICD-10-CM

## 2025-01-03 DIAGNOSIS — C79.51 METASTASIS TO SPINAL COLUMN: ICD-10-CM

## 2025-01-03 DIAGNOSIS — C7A.8 NEUROENDOCRINE CARCINOMA METASTATIC TO BONE: ICD-10-CM

## 2025-01-03 DIAGNOSIS — E34.00 CARCINOID SYNDROME: ICD-10-CM

## 2025-01-03 DIAGNOSIS — E11.29 CONTROLLED TYPE 2 DIABETES MELLITUS WITH MICROALBUMINURIA, WITHOUT LONG-TERM CURRENT USE OF INSULIN: ICD-10-CM

## 2025-01-03 DIAGNOSIS — C7B.8 METASTATIC MALIGNANT NEUROENDOCRINE TUMOR TO LIVER: Primary | ICD-10-CM

## 2025-01-03 DIAGNOSIS — C7A.012 MALIGNANT CARCINOID TUMOR OF ILEUM: Primary | ICD-10-CM

## 2025-01-03 DIAGNOSIS — C78.7 METASTATIC CANCER TO LIVER: ICD-10-CM

## 2025-01-03 DIAGNOSIS — I50.9 CHRONIC CONGESTIVE HEART FAILURE, UNSPECIFIED HEART FAILURE TYPE: ICD-10-CM

## 2025-01-03 DIAGNOSIS — I10 ESSENTIAL HYPERTENSION: ICD-10-CM

## 2025-01-03 DIAGNOSIS — D84.81 IMMUNODEFICIENCY SECONDARY TO NEOPLASM: ICD-10-CM

## 2025-01-03 DIAGNOSIS — C7B.8 SECONDARY NEUROENDOCRINE TUMOR OF BONE: ICD-10-CM

## 2025-01-03 DIAGNOSIS — C7B.8 METASTATIC MALIGNANT NEUROENDOCRINE TUMOR TO LIVER: ICD-10-CM

## 2025-01-03 DIAGNOSIS — D49.9 IMMUNODEFICIENCY SECONDARY TO NEOPLASM: ICD-10-CM

## 2025-01-03 LAB
ALBUMIN/CREAT UR: 38.5 UG/MG (ref 0–30)
CREAT UR-MCNC: 26 MG/DL (ref 15–325)
MICROALBUMIN UR DL<=1MG/L-MCNC: 10 UG/ML

## 2025-01-03 PROCEDURE — 25000003 PHARM REV CODE 250: Mod: HCNC | Performed by: INTERNAL MEDICINE

## 2025-01-03 PROCEDURE — 99999 PR PBB SHADOW E&M-EST. PATIENT-LVL V: CPT | Mod: PBBFAC,HCNC,, | Performed by: INTERNAL MEDICINE

## 2025-01-03 PROCEDURE — 82570 ASSAY OF URINE CREATININE: CPT | Mod: HCNC | Performed by: INTERNAL MEDICINE

## 2025-01-03 PROCEDURE — 96372 THER/PROPH/DIAG INJ SC/IM: CPT | Mod: HCNC,59

## 2025-01-03 PROCEDURE — 63600175 PHARM REV CODE 636 W HCPCS: Mod: JZ,TB,HCNC | Performed by: INTERNAL MEDICINE

## 2025-01-03 PROCEDURE — 96374 THER/PROPH/DIAG INJ IV PUSH: CPT | Mod: HCNC

## 2025-01-03 RX ORDER — SODIUM CHLORIDE 0.9 % (FLUSH) 0.9 %
10 SYRINGE (ML) INJECTION
Status: CANCELLED | OUTPATIENT
Start: 2025-01-03

## 2025-01-03 RX ORDER — HEPARIN 100 UNIT/ML
500 SYRINGE INTRAVENOUS
Status: DISCONTINUED | OUTPATIENT
Start: 2025-01-03 | End: 2025-01-03 | Stop reason: HOSPADM

## 2025-01-03 RX ORDER — LANREOTIDE ACETATE 120 MG/.5ML
120 INJECTION SUBCUTANEOUS
Status: DISCONTINUED | OUTPATIENT
Start: 2025-01-03 | End: 2025-01-03 | Stop reason: HOSPADM

## 2025-01-03 RX ORDER — LANREOTIDE ACETATE 120 MG/.5ML
120 INJECTION SUBCUTANEOUS
OUTPATIENT
Start: 2025-01-03

## 2025-01-03 RX ORDER — HEPARIN 100 UNIT/ML
500 SYRINGE INTRAVENOUS
Status: CANCELLED | OUTPATIENT
Start: 2025-01-03

## 2025-01-03 RX ORDER — SODIUM CHLORIDE 0.9 % (FLUSH) 0.9 %
10 SYRINGE (ML) INJECTION
Status: DISCONTINUED | OUTPATIENT
Start: 2025-01-03 | End: 2025-01-03 | Stop reason: HOSPADM

## 2025-01-03 RX ADMIN — ZOLEDRONIC ACID 3 MG: 4 INJECTION, SOLUTION, CONCENTRATE INTRAVENOUS at 04:01

## 2025-01-03 RX ADMIN — SODIUM CHLORIDE: 9 INJECTION, SOLUTION INTRAVENOUS at 04:01

## 2025-01-03 RX ADMIN — LANREOTIDE ACETATE 120 MG: 120 INJECTION SUBCUTANEOUS at 04:01

## 2025-01-03 NOTE — PLAN OF CARE
1700  Infusion completed, injection administered, pt tolerated; pt instructed to increase water hydration daily and prior to IV sticks; discussed when to contact MD, when to report to ED; pt verbalized understanding of all discussed and when to report next

## 2025-01-03 NOTE — NURSING
1511  Pt here for Lanreotide injection, also Zometa infusion, no new complaints or concerns, reports tolerating treatment; discussed treatment plan for today, all questions answered and pt agrees to proceed

## 2025-01-03 NOTE — PROGRESS NOTES
"    Subjective:       Patient ID: Shahram Hills is an 84 y.o. female.     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 Ms. Hills is an 77 yo woman who initially saw me on 6/7/18 for further evaluation of neuroendocrine tumor. She initially presented with diarrhea, abdominal discomfort, weight loss. She was evaluated at [facility] and underwent workup below:  US abdomen on 3/14/18 showed numerous bilobar mixed echogenicity and mildly vascular hepatic lesions. Cholelithiasis without e/o acute cholecystitis.   MRI abdomen on 3/30/2018 showed innumerable hepatic metastases. L3 vertebral body metastasis with soft tissue component along the right margin of the L3 and upper L4 vertebral bodies, filling the right L3/4 neural foramen, and extending into the anterior aspect of the spinal canal on the right at the L3 and upper T4 levels. Associated with mild spinal canal narrowing.  CT chest on 4/6/2018 showed one lucent nodule measuring 7 mm in the T7 vertebral body, most likely representing metastatic disease.    EGD on 5/4/18 showed normal duodenum. Schatzki's ring in the lower third of the esophagus. Grade 1 esophagitis in the GE junction c/w mild distal esophagitis. Congestion and erythema in the antrum, pre-pyloric region and stomach body c/w gastritis. Biopsy of the stomach antrum showed mild chronic gastritis, neg for H. pylori.   Labs on 5/9/2018: WBC 6.9, H/H 11.6/34.3, MCV 87.5, plt count 279. On 3/9/18: Vit B12 level 173, folic acid 6.6  She was started on oral vit B12 and underwent a liver biopsy on 5/18/18. Pathology showed "metastatic well differentiated neuroendocrine tumor. Ki67 3%"     She saw me on 6/7/18 and complained of weight loss of 26 lbs over the past 3-4 months, also has diarrhea. No flushing, wheezing, palpitations. Has been having pain in right flank radiating " "down the right leg. No tingling/numbness, weakness. She can hold her bladder but when she urinates her diarrhea would come out as well. Started on dex 4 mg q6h the evening of 6/7/18.      2. MRI spine on 6/8/18 showed "Marrow replacing metastatic lesion of the L3 vertebral body with associated extra osseous expansion and complete effacement of the right L3-L4 neural foramina and additional effacement of the right lateral recess.  There is additional lateral extension and abutment of the right psoas muscle.  Superimposed degenerative change at this level results in mild spinal canal stenosis." I sent the patient to ED on 6/9/18 where she was evaluated by neurosurgery. Neurosurgery did not feel she was a candidate for surgical intervention.      3. Seen by Dr. Adames on 6/11/18. palliative radiation to the area of the L3 metastasis 6/18/18-6/29/18   4. Gallium study on 6/13/18: Distal ileal primary neoplasm consistent with a carcinoid.  There is an adjacent metastatic lymph node, diffuse liver metastases, and multiple bone metastases. Index primary neoplasm SUV max 33.13. Adjacent lymph node SUV max 23. L3 bone metastasis SUV max 36.86. Left lobe SUV max 46.13   5. Lanreotide every 4 weeks started on 6/22/18  6. TACE to the right hepatic lobe on 2/14/19. TACE to the left hepatic lobe on 3/21/19.   7. TACE to the right hepatic lobe on 2/11/22. S/p conventional chemoembolization performed of the segment 2, 3, 4 branch of the left hepatic artery and segment 8 branch of the left hepatic artery on 4/22/22.  8. Gallium PET 11/1/22 showed progression. Discussed PRRT. PRRT #1 on 12/14/2022. Zometa every 3 months started 12/27/22. PRRT #2 on 2/8/23. #3 on 4/12/23. #4 on 6/14/23.   9. Had bland embo on 10/25/24 and 12/2/24. Had slurry speech after procedure on 12/2/24 and was sent to ED. Brain images did not show acute findings. Slurry speech was attributed to phenergan. Was also hospitalized in mid December for high BS level in " the 400s range.      History of Present Illness:   Ms. Hills returns for follow up. Feeling well now. Had bland embo on 10/25/24. Has been on everlimus 5 mg daily. Has mouth sores.     ECO     ROS:   See HPI. Otherwise negative.      Physical Examination:   Vital signs reviewed.   Gen: well hydrated, well developed, in no acute distress.  HEENT: normocephalic, anicteric, EOMI  Neck: supple, no JVD, thyromegaly, cervical or supraclavicular LAD  Lungs: CTAB, no wheezes or rales  Heart: regular rate, regular pulse, no M/R/G  Abdomen: soft, no tenderness, non-distended, no hepatomegaly, no splenomegaly, no mass, or hernia.   Ext: b/l LE  no edema  Neuro: alert and oriented x 4, no focal neuro deficit  Skin: no rash, open wounds or ulcers  Psych: pleasant and appropriate mood and affect     Objective:      Laboratory Data:  Labs reviewed. CBC, CMP adequate for treatment    Imaging Data:    Copper PET 24:  Impression:     Similar distribution and uptake of tracer avid disease involving the distal ileum, liver, and bones. Index lesions as above.  No definite new tracer avid lesion.     MRI 24:  Impression:     History of metastatic small bowel carcinoid.     Thickened loop of small bowel in the midline pelvis in the location of the known primary tumor, but incompletely characterized on this exam.     Partially imaged mesenteric montserrat conglomerate in the left lower quadrant.     Numerous liver metastases.  Some have decreased in size and demonstrate increased central hypoenhancement, compatible with treatment response.  Others are new from 2023.     Unchanged osseous metastasis in the L3 vertebral body.     Other findings as described.    MRI abdomen 9/3/24:  Impression:     History of metastatic small bowel carcinoid.  Thickened loop of small bowel in the midline pelvis in the location of the known primary tumor, incompletely characterized on current exam.     Numerous hepatic metastases, many are  stable, some new and some are enlarged, as detailed above.     Unchanged osseous metastasis in the L3 vertebral body.     MRI abdomen 12/16/24:  Impression:     Please note, this exam was performed at a different facility with a different scanner, resulting in differences in technique.     Multiple liver masses in this patient with known metastatic neuroendocrine tumor.  The vast majority are decreased in size compared to prior studies.  I am concerned for a new subcentimeter lesion in the left hepatic lobe as above.     Stable metastasis at L3.     Cholelithiasis with stable bile duct dilatation.          Assessment and Plan:      1. Malignant carcinoid tumor of ileum    2. Metastatic cancer to liver    3. 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    4. Secondary neuroendocrine tumor of bone    5. Metastasis to spinal column    6. Immunodeficiency secondary to neoplasm    7. Immunodeficiency due to drugs    8. Essential hypertension    9. Carcinoid syndrome    10. Diarrhea, unspecified type    11. Controlled type 2 diabetes mellitus with microalbuminuria, without long-term current use of insulin    12. Chronic congestive heart failure, unspecified heart failure type        1-7  - Ms Hills is an 85 yo woman with well differentiated low-grade neuroendocrine tumor of the ileum with mets to liver and bones, diagnosed on 5/18/2018. Started lanreotide every 4 weeks on 6/22/2018. S/p TACE to the right hepatic lobe on 2/14/19. TACE to the left hepatic lobe on 3/21/19.   - CT/MRI 1/12/22 showed mild progression in the liver. S/p TACE on 2/11/22 and 4/22/22   - Gallium PET 11/1/22 showed progression. Discussed PRRT. PRRT #1 on 12/14/2022. Completed 4 cycles on 6/14/23. Zometa every 3 months started 12/27/22.  - MRI in Sep 2024 showed progression in the liver.   - s/p bland embo on 10/25/24 and 12/2/24  - spoke with Dr Denis. She is scheduled for touchup bland embo on 1/14/24.   - today I had a long discussion  with patient and family re why we have been doing embolization to the liver. When cancer is only progressing in the liver, we do embo to control disease in the liver. If there is more systemic progression, we will need systemic therapy to treat cancer, for her, will be chemo or cabozantinib (everolimus causes hyperglycemia so favor to save for later) which all come with side effects. Patient understands and agrees with liver-directed therapy  - c/w lanreotide every 4 weeks and zometa every 3 months. Got zometa in Sep 2024. Will get zometa today  - RTC in 4 weeks for continued lanreotide  - will get MRI and PET in mid Feb    8.  - BP controlled  - c/w current medication    9.10  - better after xermelo and PRRT  - c/w lanreotide every 4 weeks.   - c/w xermelo tid  - could not tolerate octreotide    11.  - BS controlled  - f/u with PCP    12.  - previously reviewed echo results. She does have CHF but not symptomatic right now  - f/u with Dr Bergeron      I spent 40 minutes reviewing the chart, interpreting laboratory result and imaging data, coordinating patient's care, with at least 50% of the time on face-to-face counseling.       Sebastian Granados MD  Hematology and Medical Oncology  [facility]      Route Chart for Scheduling    Med Onc Chart Routing      Follow up with physician . Keep scheduled appts. get labs , MRI abdomen, copper PET on 2/20. 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 Medications  lanreotide injection 120 mg  120 mg, Subcutaneous, Every visit    LUTATHERA SUPPORT for 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, noted on 6/7/2018  LUTATHERA SUPPORT for Neuroendocrine carcinoma metastatic to bone, noted on 7/11/2018  LUTATHERA SUPPORT for Malignant carcinoid tumor of ileum, noted on 8/17/2018  None      No therapy plan of the specified type found.

## 2025-01-10 ENCOUNTER — OFFICE VISIT (OUTPATIENT)
Dept: INTERNAL MEDICINE | Facility: CLINIC | Age: 85
End: 2025-01-10
Attending: INTERNAL MEDICINE
Payer: MEDICARE

## 2025-01-10 ENCOUNTER — TELEPHONE (OUTPATIENT)
Dept: HEMATOLOGY/ONCOLOGY | Facility: CLINIC | Age: 85
End: 2025-01-10
Payer: MEDICARE

## 2025-01-10 ENCOUNTER — CLINICAL SUPPORT (OUTPATIENT)
Dept: DIABETES | Facility: CLINIC | Age: 85
End: 2025-01-10
Attending: INTERNAL MEDICINE
Payer: MEDICARE

## 2025-01-10 VITALS
WEIGHT: 102.94 LBS | SYSTOLIC BLOOD PRESSURE: 120 MMHG | DIASTOLIC BLOOD PRESSURE: 82 MMHG | OXYGEN SATURATION: 98 % | HEIGHT: 64 IN | BODY MASS INDEX: 17.57 KG/M2 | HEART RATE: 41 BPM

## 2025-01-10 DIAGNOSIS — E11.29 CONTROLLED TYPE 2 DIABETES MELLITUS WITH MICROALBUMINURIA, WITHOUT LONG-TERM CURRENT USE OF INSULIN: ICD-10-CM

## 2025-01-10 DIAGNOSIS — I10 ESSENTIAL HYPERTENSION: ICD-10-CM

## 2025-01-10 DIAGNOSIS — R80.9 CONTROLLED TYPE 2 DIABETES MELLITUS WITH MICROALBUMINURIA, WITHOUT LONG-TERM CURRENT USE OF INSULIN: ICD-10-CM

## 2025-01-10 DIAGNOSIS — E11.65 CONTROLLED TYPE 2 DIABETES MELLITUS WITH HYPERGLYCEMIA, WITHOUT LONG-TERM CURRENT USE OF INSULIN: ICD-10-CM

## 2025-01-10 DIAGNOSIS — K59.00 CONSTIPATION, UNSPECIFIED CONSTIPATION TYPE: Primary | ICD-10-CM

## 2025-01-10 PROCEDURE — 1160F RVW MEDS BY RX/DR IN RCRD: CPT | Mod: HCNC,CPTII,S$GLB, | Performed by: INTERNAL MEDICINE

## 2025-01-10 PROCEDURE — 99999 PR PBB SHADOW E&M-EST. PATIENT-LVL V: CPT | Mod: PBBFAC,HCNC,, | Performed by: INTERNAL MEDICINE

## 2025-01-10 PROCEDURE — 1126F AMNT PAIN NOTED NONE PRSNT: CPT | Mod: HCNC,CPTII,S$GLB, | Performed by: INTERNAL MEDICINE

## 2025-01-10 PROCEDURE — 1159F MED LIST DOCD IN RCRD: CPT | Mod: HCNC,CPTII,S$GLB, | Performed by: INTERNAL MEDICINE

## 2025-01-10 PROCEDURE — G2211 COMPLEX E/M VISIT ADD ON: HCPCS | Mod: HCNC,S$GLB,, | Performed by: INTERNAL MEDICINE

## 2025-01-10 PROCEDURE — 1157F ADVNC CARE PLAN IN RCRD: CPT | Mod: HCNC,CPTII,S$GLB, | Performed by: INTERNAL MEDICINE

## 2025-01-10 PROCEDURE — 1101F PT FALLS ASSESS-DOCD LE1/YR: CPT | Mod: HCNC,CPTII,S$GLB, | Performed by: INTERNAL MEDICINE

## 2025-01-10 PROCEDURE — 99214 OFFICE O/P EST MOD 30 MIN: CPT | Mod: HCNC,S$GLB,, | Performed by: INTERNAL MEDICINE

## 2025-01-10 PROCEDURE — 99999 PR PBB SHADOW E&M-EST. PATIENT-LVL I: CPT | Mod: PBBFAC,HCNC,, | Performed by: DIETITIAN, REGISTERED

## 2025-01-10 PROCEDURE — 3288F FALL RISK ASSESSMENT DOCD: CPT | Mod: HCNC,CPTII,S$GLB, | Performed by: INTERNAL MEDICINE

## 2025-01-10 RX ORDER — LACTULOSE 10 G/15ML
20 SOLUTION ORAL DAILY PRN
Qty: 450 ML | Refills: 3 | Status: SHIPPED | OUTPATIENT
Start: 2025-01-10 | End: 2025-01-24

## 2025-01-10 NOTE — PROGRESS NOTES
Diabetes Care Specialist Progress Note  Author: Telma Arellano RD, CDE  Date: 1/10/2025    Intake    Program Intake  Reason for Diabetes Program Visit:: Initial Diabetes Assessment  Type of Intervention:: Individual  Individual: Education  Education: Self-Management Skill Review  Current diabetes risk level:: low  In the last month, have you used the ER or been admitted to the hospital: Yes  Was the ER or hospital admission related to diabetes?: No  Permission to speak with others about care:: yes (sister attended visit with her and granddaughter on the phone)    Current Diabetes Treatment: Oral Medications  Oral Medication Type/Dose: glipizide 7.5mg BID    Continuous Glucose Monitoring  Patient has CGM: No    Lab Results   Component Value Date    HGBA1C 6.2 (H) 11/26/2024       Lifestyle Coping Support & Clinical    Lifestyle/Coping/Support  Does anyone in your family have diabetes or does anyone in your family support you in your diabetes care?: has great family support - sister, granddaughter and grandson all assisting her  Learning Barriers:: Other (Comment) (memory deficits)  Culture or Religion beliefs that may impact ability to access healthcare: No  Psychosocial/Coping Skills Assessment Completed: : Yes  Assessment indicates:: Adequate understanding  Area of need?: No         Diabetes Self-Management Skills Assessment    Medication Skills Assessment  Patient is able to identify current diabetes medications, dosages, and appropriate timing of medications.: yes  Patient reports problems or concerns with current medication regimen.: yes  Medication regimen problems/concerns:: concerned about side effects (occaisonal lows)  Patient is  aware that some diabetes medications can cause low blood sugar?: Yes  Medication Skills Assessment Completed:: Yes  Assessment indicates:: Instruction Needed  Area of need?: Yes    Diabetes Disease Process/Treatment Options  Diabetes Type?: Type II  If previous diabetes  education, when/where:: last seen march 2024  Is patient aware of what causes diabetes?: Yes  Does patient understand the pathophysiology of diabetes?: No  Diabetes Disease Process/Treatment Options: Skills Assessment Completed: Yes  Assessment indicates:: Adequate understanding  Area of need?: No    Nutrition/Healthy Eating  Meal Plan 24 Hour Recall - Breakfast: grits and eggs, water  Meal Plan 24 Hour Recall - Lunch: leftovers from dinner  Meal Plan 24 Hour Recall - Dinner: meat sauce and spaghetti  Meal Plan 24 Hour Recall - Snack: Glucerna, zero sugar greek yogurt but also likes ice cream, cakes, cookies  Meal Plan 24 Hour Recall - Beverage: water, crystal light, zero sugar soda  Who shops/cooks?: family  Patient can identify foods that impact blood sugar.: no (see comments)  Challenges to healthy eating:: snacking between meals and at night, other (see comments) (sweets)  Nutrition/Healthy Eating Skills Assessment Completed:: Yes  Assessment indicates:: Instruction Needed  Area of need?: Yes    Physical Activity/Exercise  Patient's daily activity level:: lightly active  Patient formally exercises outside of work.: no  Reasons for not exercising:: physically unable to exercise currently  Patient can identify forms of physical activity.: yes  Physical Activity/Exercise Skills Assessment Completed: : Yes  Assessment indicates:: Adequate understanding  Area of need?: No    Home Blood Glucose Monitoring  Patient states that blood sugar is checked at home daily.: yes  Monitoring Method:: home glucometer  Fasting BG range history:: 120-145  Bedtime BG range history:: lower readings - lowest was 68  How often do you check your blood sugar?: 2 times daily  Home Blood Glucose Monitoring Skills Assessment Completed: : Yes  Assessment indicates:: Adequate understanding  Area of need?: No    Acute Complications  Have you ever had hypoglycemia (low BG 70 or less)?: yes  How often and what are your symptoms?: occasional  lows  How do you treat hypoglycemia?: margie d or regular coke  Acute Complications Skills Assessment Completed: : Yes  Assessment indicates:: Instruction Needed  Area of need?: Yes    Chronic Complications  Chronic Complications Skills Assessment Completed: : No  Deferred due to:: Time  Area of need?: Deferred      Assessment Summary and Plan    Based on today's diabetes care assessment, the following areas of need were identified:      Identified Areas of Need      Medication/Current Diabetes Treatment: Yes - see care planning   Lifestyle Coping/Support:  No   Diabetes Disease Process/Treatment Options: No   Nutrition/Healthy Eating: Yes - see care planning   Physical Activity/Exercise: No    Home Blood Glucose Monitoring: No    Acute Complications: Yes  -   Ed on hypoglycemia:  What is considered a low BG  Signs and symptoms  How to treat w/ 15/15 rule   Discussed appropriate treatment options: juice, soda, glucose tabs   When to call clinic for medication adjustment r/t frequent hypoglycemia   Pt has goal to carry fast acting sugar source in vehicle for emergencies   Chronic Complications: Deferred       Today's interventions were provided through individual discussion, instruction, and written materials were provided.      Patient verbalized understanding of instruction and written materials.  Pt was able to return back demonstration of instructions today. Patient understood key points, needs reinforcement and further instruction.     Diabetes Self-Management Care Plan:    Today's Diabetes Self-Management Care Plan was developed with Shahram's input. Shahram has agreed to work toward the following goal(s) to improve his/her overall diabetes control.      Care Plan: Diabetes Management   Updates made since 1/11/2024 12:00 AM        Problem: Healthy Eating         Goal: Will balance meals with plate method and limit snacks to 15-20g/1 serving carb.    Start Date: 1/10/2025   Expected End Date: 2/9/2025   Priority:  "Medium   Barriers: No Barriers Identified   Note:    1/10/25 - She likes a lot of sweets - particularly ice cream and cookies. Discussed some healthier snack choices and portion sizes. Grandson was making lower carb "ice cream" using protein shakes in [employer] in the past - plan to start back on this. Also talked about trying [employer] Yogurt Bar as an ice cream alternative. Also discussed eating consistent, balanced meals with protein, carb and non-starchy vegetables to help keep BG more stable and aid in hypoglycemia prevention. Reviewed sources of carbohydrate, choosing more high fiber/complex carbs, portion sizes, and using plate method with 1/2 plate non-starchy vegetable, 1/4 plate protein, and 1/4 plate carb. Granddaughter (on the phone during visit) plans to put together some meal plans for her.        Task: Reviewed the sources and role of Carbohydrate, Protein, and Fat and how each nutrient impacts blood sugar. Completed 1/10/2025        Task: Provided visual examples using dry measuring cups, food models, and other familiar objects such as computer mouse, deck or cards, tennis ball etc. to help with visualization of portions. Completed 1/10/2025        Task: Review the importance of balancing carbohydrates with each meal using portion control techniques to count servings of carbohydrate and label reading to identify serving size and amount of total carbs per serving. Completed 1/10/2025        Task: Provided Sample plate method and reviewed the use of the plate to estimate amounts of carbohydrate per meal. Completed 1/10/2025        Problem: Medications         Goal: Will time Glipizide 20-30 min before breakfast and dinner.    Start Date: 1/10/2025   Expected End Date: 2/9/2025   Priority: High   Barriers: No Barriers Identified   Note:    1/10/25 - Pt sometimes taking glipizide after she eats. Explained MOA of glipizide and how taking without food or late can cause low BG. Encouraged timing " glipizide at least 20 min but preferably 30 min before meal to help medication action time match up and prevent hypoglycemia. Pt and family verbalized better understanding.        Task: Reviewed with patient all current diabetes medications and provided basic review of the purpose, dosage, frequency, side effects, and storage of both oral and injectable diabetes medications. Completed 1/10/2025        Task: Discussed guidelines for preventing, detecting and treating hypoglycemia and hyperglycemia and reviewed the importance of meal and medication timing with diabetes mediations for prevention of hypoglycemia and maximum drug benefit. Completed 1/10/2025          Follow Up Plan     Follow up in about 1 month (around 2/10/2025) for 1 month follow-up.    Today's care plan and follow up schedule was discussed with patient.  Shahram verbalized understanding of the care plan, goals, and agrees to follow up plan.        The patient was encouraged to communicate with his/her health care provider/physician and care team regarding his/her condition(s) and treatment.  I provided the patient with my contact information today and encouraged to contact me via phone or [facility] Portal as needed.     Length of Visit   Total Time: 30 Minutes

## 2025-01-10 NOTE — TELEPHONE ENCOUNTER
----- Message from Laurie sent at 1/10/2025  9:33 AM CST -----  Regarding: Procedure Questions  Contact: Judy @ [phone]  Pt's jennifer Valdez  is calling asking to speak with someone in the office to discuss pt's upcoming procedure. Caller has questions and would like to speak with someone before completing the procedure. Please call. Thank you.

## 2025-01-10 NOTE — PROGRESS NOTES
Subjective:   Patient ID: Shahram Hills is a 84 y.o. female  Chief complaint:   Chief Complaint   Patient presents with    Diabetes     F/u     HPI  Here for er follow up of constipation, diabetes follow up    Accomp by sister today   Niece is on speaker phone as well     Constipation:   Er visit for constipation - given linzess but too powerful as reports caused diarrhea   Tried colace and miralax at home and regimen working well to regular bm at this time    HTN:  - controlled today on current regimen  Prev:  Home readings 121/75, hr 61  In general 120-140/60-70s  Due for meds now - did not take this am   Prev:  Today well controlled on home readings   Bp 106/58 today - less po intake over past week or so as above - monitoring  - c/w meds on med card  - taking lasix daily, coreg, entresto, aldactone - takes in am daily   Previously:   Prev switched to coreg from amlodipine    Diabetes:   A1c 6.2<- 6.6 <- 6.6 <- 6.1<- 6.1 <-6.3<- 6.6  Reviewed bg log   Taking glip 7.5mg - 7.5 and discussed dec to 5-5 as having some lows with cutting back on sweets over past few weeks  Bg improved   Stopped metformin due to LA  Bg 216-270,  201 today   Bg trending down with starting glip  Reviewed recent labs and cpeptide measurable but insulin low   Discussed may need insulin in future for now bg trends dec to low 200s with starting glip since er visit  No lows   Previously:   Bg 140-180s  - typically eating right before recent labs   - well controlled and henry metformin 500 daily  - no lows   At [facility]   morning bg 140-160s but eating sugary snack late at night - ice cream, pie or cookies   - eats breakfast around 9-10am  Foot exam utd 8/2024  Utd on eye exam 11/2023 - due dilated - goes to [facility]   Urine screening due   No lows     ### not addressed today ####    PVCs, compensated chronic systolic and diastolic HF (EF 40% on echo 9/2022 and worse on echo from recent hospitalization per cards note):  No further ankle  "swelling since taking lasix daily   Henry current regimen as above   No aranda or sob  Prev:  - started lasix at [facility] due to acute HF exacerbation - henry daily lasix - no further ankle swelling since then   Previously:   - noticed ankle swelling - at baseline gets ankle swelling worse in afternoon - better in am   Cards: Elyssa  Previously:    - 2/25/2022 for hospital discharge had TACE right hep lobe and found to have frequent PVCs  - we resumed losartan at that time   - seen by cards 3/14/2022 and completed holter - at this time no tx indicated    B12 def:  - is taking supplement daily - discussed dec to qod    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 Zometa every 3 months started 12/27/22. PRRT #2 on 2/8  Followed by h/o - H last clinic visit March 8, 2023  Previously:   - 2/25/2022 for hospital discharge had TACE right hep lobe and found to have frequent PVCs  - we resumed losartan at that time   - seen by cards 3/14/2022 and completed holter - at this time no tx indicated    ##########    H/o: Du  Cards: Elyssa  Pod: Luper    HM:  Covid booster   Rsv  Urine screen utd  Foot exam utd  Eye exam utd     Review of Systems    Objective:  Vitals:    01/10/25 0857 01/10/25 1019   BP: (!) 152/64 120/82   BP Location: Left arm    Patient Position: Sitting    Pulse: (!) 41    SpO2: 98%    Weight: 46.7 kg (102 lb 15.3 oz)    Height: 5' 4" (1.626 m)          Body mass index is 17.67 kg/m².    Physical Exam  Vitals reviewed.   Constitutional:       Appearance: Normal appearance. " She is well-developed.   HENT:      Head: Normocephalic and atraumatic.   Eyes:      Extraocular Movements: Extraocular movements intact.      Conjunctiva/sclera: Conjunctivae normal.   Neck:      Thyroid: No thyromegaly.   Cardiovascular:      Rate and Rhythm: Normal rate and regular rhythm.      Pulses: Normal pulses.      Heart sounds: Normal heart sounds.   Pulmonary:      Effort: Pulmonary effort is normal.      Breath sounds: Normal breath sounds.   Abdominal:      General: Bowel sounds are normal.      Palpations: Abdomen is soft. There is mass (ruq mass).   Musculoskeletal:         General: No swelling or tenderness. Normal range of motion.      Cervical back: Normal range of motion and neck supple.   Lymphadenopathy:      Cervical: No cervical adenopathy.   Skin:     General: Skin is warm and dry.      Capillary Refill: Capillary refill takes less than 2 seconds.      Nails: There is no clubbing.   Neurological:      General: No focal deficit present.      Mental Status: She is alert and oriented to person, place, and time. Mental status is at baseline.      Gait: Gait normal.   Psychiatric:         Mood and Affect: Mood normal.         Speech: Speech normal.         Behavior: Behavior normal.         Thought Content: Thought content normal.         Judgment: Judgment normal.       Assessment:  1. Constipation, unspecified constipation type    2. Controlled type 2 diabetes mellitus with microalbuminuria, without long-term current use of insulin    3. Essential hypertension          Plan  Constipation, unspecified constipation type  -     lactulose (CHRONULAC) 20 gram/30 mL Soln; Take 30 mLs (20 g total) by mouth daily as needed (constipation).  Dispense: 450 mL; Refill: 3    Controlled type 2 diabetes mellitus with microalbuminuria, without long-term current use of insulin    Essential hypertension    Cont regimen but dec glip back down to 5-5  Check A1c and cpeptide with next labs   Cont to monitor bp and  bg and bring logs to appt   Cont mgmt for constipation   All questions were answered and pt verbalized understanding of plan.         Health Maintenance   Topic Date Due    RSV Vaccine (Age 60+ and Pregnant patients) (1 - 1-dose 75+ series) Never done    COVID-19 Vaccine (4 - 2024-25 season) 09/01/2024    Diabetic Eye Exam  11/04/2024    Hemoglobin A1c  05/26/2025    Lipid Panel  12/02/2025    Diabetes Urine Screening  01/03/2026    TETANUS VACCINE  12/10/2029    Shingles Vaccine  Completed    Influenza Vaccine  Completed    Pneumococcal Vaccines (Age 50+)  Completed     Visit today included increased complexity associated with the care of the episodic problem constipation addressed and managing the longitudinal care of the patient due to the serious and/or complex managed problem(s) htn, diabetes, neuroendocrine tumor.

## 2025-01-13 ENCOUNTER — TELEPHONE (OUTPATIENT)
Dept: HEMATOLOGY/ONCOLOGY | Facility: CLINIC | Age: 85
End: 2025-01-13
Payer: MEDICARE

## 2025-01-13 ENCOUNTER — LAB VISIT (OUTPATIENT)
Dept: LAB | Facility: HOSPITAL | Age: 85
End: 2025-01-13
Payer: MEDICARE

## 2025-01-13 ENCOUNTER — TELEPHONE (OUTPATIENT)
Dept: INTERVENTIONAL RADIOLOGY/VASCULAR | Facility: CLINIC | Age: 85
End: 2025-01-13
Payer: MEDICARE

## 2025-01-13 DIAGNOSIS — E87.6 HYPOKALEMIA: Primary | ICD-10-CM

## 2025-01-13 DIAGNOSIS — C7B.8 METASTATIC MALIGNANT NEUROENDOCRINE TUMOR TO LIVER: ICD-10-CM

## 2025-01-13 DIAGNOSIS — Z00.00 ENCOUNTER FOR MEDICARE ANNUAL WELLNESS EXAM: ICD-10-CM

## 2025-01-13 LAB
ALBUMIN SERPL BCP-MCNC: 3.2 G/DL (ref 3.5–5.2)
ALP SERPL-CCNC: 73 U/L (ref 40–150)
ALT SERPL W/O P-5'-P-CCNC: 13 U/L (ref 10–44)
ANION GAP SERPL CALC-SCNC: 9 MMOL/L (ref 8–16)
AST SERPL-CCNC: 27 U/L (ref 10–40)
BASOPHILS # BLD AUTO: 0.01 K/UL (ref 0–0.2)
BASOPHILS NFR BLD: 0.2 % (ref 0–1.9)
BILIRUB SERPL-MCNC: 0.9 MG/DL (ref 0.1–1)
BUN SERPL-MCNC: 11 MG/DL (ref 8–23)
CALCIUM SERPL-MCNC: 8.9 MG/DL (ref 8.7–10.5)
CHLORIDE SERPL-SCNC: 100 MMOL/L (ref 95–110)
CO2 SERPL-SCNC: 29 MMOL/L (ref 23–29)
CREAT SERPL-MCNC: 0.7 MG/DL (ref 0.5–1.4)
DIFFERENTIAL METHOD BLD: ABNORMAL
EOSINOPHIL # BLD AUTO: 0.1 K/UL (ref 0–0.5)
EOSINOPHIL NFR BLD: 1.1 % (ref 0–8)
ERYTHROCYTE [DISTWIDTH] IN BLOOD BY AUTOMATED COUNT: 13.2 % (ref 11.5–14.5)
EST. GFR  (NO RACE VARIABLE): >60 ML/MIN/1.73 M^2
GLUCOSE SERPL-MCNC: 143 MG/DL (ref 70–110)
HCT VFR BLD AUTO: 37.4 % (ref 37–48.5)
HGB BLD-MCNC: 11.9 G/DL (ref 12–16)
IMM GRANULOCYTES # BLD AUTO: 0.02 K/UL (ref 0–0.04)
IMM GRANULOCYTES NFR BLD AUTO: 0.4 % (ref 0–0.5)
LYMPHOCYTES # BLD AUTO: 1 K/UL (ref 1–4.8)
LYMPHOCYTES NFR BLD: 18.5 % (ref 18–48)
MCH RBC QN AUTO: 30.4 PG (ref 27–31)
MCHC RBC AUTO-ENTMCNC: 31.8 G/DL (ref 32–36)
MCV RBC AUTO: 95 FL (ref 82–98)
MONOCYTES # BLD AUTO: 0.5 K/UL (ref 0.3–1)
MONOCYTES NFR BLD: 9.6 % (ref 4–15)
NEUTROPHILS # BLD AUTO: 3.9 K/UL (ref 1.8–7.7)
NEUTROPHILS NFR BLD: 70.2 % (ref 38–73)
NRBC BLD-RTO: 0 /100 WBC
PLATELET # BLD AUTO: 207 K/UL (ref 150–450)
PMV BLD AUTO: 9.8 FL (ref 9.2–12.9)
POTASSIUM SERPL-SCNC: 2.9 MMOL/L (ref 3.5–5.1)
PROT SERPL-MCNC: 7.1 G/DL (ref 6–8.4)
RBC # BLD AUTO: 3.92 M/UL (ref 4–5.4)
SODIUM SERPL-SCNC: 138 MMOL/L (ref 136–145)
WBC # BLD AUTO: 5.5 K/UL (ref 3.9–12.7)

## 2025-01-13 PROCEDURE — 80053 COMPREHEN METABOLIC PANEL: CPT | Mod: HCNC | Performed by: PHYSICIAN ASSISTANT

## 2025-01-13 PROCEDURE — 83497 ASSAY OF 5-HIAA: CPT | Mod: HCNC | Performed by: INTERNAL MEDICINE

## 2025-01-13 PROCEDURE — 85025 COMPLETE CBC W/AUTO DIFF WBC: CPT | Mod: HCNC | Performed by: PHYSICIAN ASSISTANT

## 2025-01-13 PROCEDURE — 36415 COLL VENOUS BLD VENIPUNCTURE: CPT | Mod: HCNC | Performed by: INTERNAL MEDICINE

## 2025-01-13 RX ORDER — POTASSIUM CHLORIDE 20 MEQ/1
40 TABLET, EXTENDED RELEASE ORAL 2 TIMES DAILY
Qty: 8 TABLET | Refills: 0 | Status: SHIPPED | OUTPATIENT
Start: 2025-01-13 | End: 2025-01-15

## 2025-01-13 NOTE — TELEPHONE ENCOUNTER
Called and spoke with patient's niece, Bassam. Long discussion re the rationale of liver-directed therapy for liver-only progression. For more systemic progression or if patient does not want to get liver-directed therapy, treatment will be systemic therapy such as chemo and will have their own side effects. Ms Banegas agrees with c/w liver-directed therapy. Discussed IR told me tomorrow will be consult visit. We will recheck CMP tomorrow. Niece understands and agrees with the plan.

## 2025-01-13 NOTE — TELEPHONE ENCOUNTER
Reviewed low K 2.9 with Dr. Granados.  Dr Granados's team will call patient with instructions to take potassium 40 mEq 2 times daily today (so two 20 mEq tabs 2 times today, total of 80 mEq), then take 40 mEq tomorrow morning then recheck CMP.    Alexandra Hollins PA-C  Interventional Radiology

## 2025-01-13 NOTE — TELEPHONE ENCOUNTER
Unable to reach pt.  Called Courtney rodriguez and relayed instructions.    ===============  Sec. Chat with Dr Granados:  Korin, can you ask her to take potassium 40 mEq 2 times daily today (so two 20 mEq tabs 2 times today, total of 80 mEq), then take 40 mEq tomorrow morning. then recheck CMP

## 2025-01-14 ENCOUNTER — LAB VISIT (OUTPATIENT)
Dept: LAB | Facility: OTHER | Age: 85
End: 2025-01-14
Attending: INTERNAL MEDICINE
Payer: MEDICARE

## 2025-01-14 ENCOUNTER — OFFICE VISIT (OUTPATIENT)
Dept: INTERVENTIONAL RADIOLOGY/VASCULAR | Facility: CLINIC | Age: 85
End: 2025-01-14
Payer: MEDICARE

## 2025-01-14 VITALS — WEIGHT: 104.63 LBS | BODY MASS INDEX: 17.96 KG/M2

## 2025-01-14 DIAGNOSIS — C7B.8 METASTATIC MALIGNANT NEUROENDOCRINE TUMOR TO LIVER: Primary | ICD-10-CM

## 2025-01-14 DIAGNOSIS — E87.6 HYPOKALEMIA: ICD-10-CM

## 2025-01-14 LAB
ALBUMIN SERPL BCP-MCNC: 3.5 G/DL (ref 3.5–5.2)
ALP SERPL-CCNC: 81 U/L (ref 40–150)
ALT SERPL W/O P-5'-P-CCNC: 14 U/L (ref 10–44)
ANION GAP SERPL CALC-SCNC: 8 MMOL/L (ref 8–16)
AST SERPL-CCNC: 30 U/L (ref 10–40)
BILIRUB SERPL-MCNC: 1 MG/DL (ref 0.1–1)
BUN SERPL-MCNC: 9 MG/DL (ref 8–23)
CALCIUM SERPL-MCNC: 9.6 MG/DL (ref 8.7–10.5)
CHLORIDE SERPL-SCNC: 101 MMOL/L (ref 95–110)
CO2 SERPL-SCNC: 27 MMOL/L (ref 23–29)
CREAT SERPL-MCNC: 0.7 MG/DL (ref 0.5–1.4)
EST. GFR  (NO RACE VARIABLE): >60 ML/MIN/1.73 M^2
GLUCOSE SERPL-MCNC: 136 MG/DL (ref 70–110)
POTASSIUM SERPL-SCNC: 4.1 MMOL/L (ref 3.5–5.1)
PROT SERPL-MCNC: 7.6 G/DL (ref 6–8.4)
SODIUM SERPL-SCNC: 136 MMOL/L (ref 136–145)

## 2025-01-14 PROCEDURE — 99999 PR PBB SHADOW E&M-EST. PATIENT-LVL III: CPT | Mod: PBBFAC,HCNC,, | Performed by: FAMILY MEDICINE

## 2025-01-14 PROCEDURE — 1159F MED LIST DOCD IN RCRD: CPT | Mod: HCNC,CPTII,S$GLB, | Performed by: FAMILY MEDICINE

## 2025-01-14 PROCEDURE — 99214 OFFICE O/P EST MOD 30 MIN: CPT | Mod: HCNC,S$GLB,, | Performed by: FAMILY MEDICINE

## 2025-01-14 PROCEDURE — 1157F ADVNC CARE PLAN IN RCRD: CPT | Mod: HCNC,CPTII,S$GLB, | Performed by: FAMILY MEDICINE

## 2025-01-14 PROCEDURE — 36415 COLL VENOUS BLD VENIPUNCTURE: CPT | Mod: HCNC | Performed by: INTERNAL MEDICINE

## 2025-01-14 PROCEDURE — 80053 COMPREHEN METABOLIC PANEL: CPT | Mod: HCNC | Performed by: INTERNAL MEDICINE

## 2025-01-14 PROCEDURE — 1160F RVW MEDS BY RX/DR IN RCRD: CPT | Mod: HCNC,CPTII,S$GLB, | Performed by: FAMILY MEDICINE

## 2025-01-16 LAB — 5OH-INDOLEACETATE SERPL-MCNC: 279 NG/ML

## 2025-01-16 NOTE — PROGRESS NOTES
Subjective     Patient ID: Shahram Hills is a 84 y.o. female.    Chief Complaint: No chief complaint on file.    Patient here for follow up of metastatic neuroendocrine tumor to liver recently retreated with bland embolization on 12/2/2024. Today she reports feeling well. She denies any abdominal pain or abdominal distention. She is accompanied by family. She is in care with oncology. She had a follow up  MRI on 12/16/2024.       Review of Systems   Constitutional:  Negative for activity change, appetite change, chills, fatigue and fever.   Respiratory:  Negative for cough, shortness of breath, wheezing and stridor.    Cardiovascular:  Negative for chest pain, palpitations and leg swelling.   Gastrointestinal:  Negative for abdominal distention, abdominal pain, constipation, diarrhea, nausea and vomiting.          Objective     Physical Exam  Constitutional:       General: She is not in acute distress.     Appearance: She is well-developed. She is not diaphoretic.   HENT:      Head: Normocephalic and atraumatic.   Pulmonary:      Effort: Pulmonary effort is normal. No respiratory distress.   Neurological:      Mental Status: She is alert and oriented to person, place, and time.   Psychiatric:         Behavior: Behavior normal.         Thought Content: Thought content normal.         Judgment: Judgment normal.       MRI 12/16/2024  FINDINGS:  ** please note, image quality significantly degraded on account of heavy breathing motion artifact.  This exam was performed on a different scanner resulting in inherent differences in scanning technique.**     Multiple similar appearing liver masses, all showing thick-walled peripheral enhancement following contrast.  For the purposes of this exam, I found these easiest to measure on precontrast T1 weighted series 10.     -largest lesion in the left hepatic lobe now shows intrinsic T1 signal hyperintensity.  Lesion measures 3 x 3.6 cm on image 50 (previously 4.6 x 4.3  cm).     -index right hepatic lobe lesion measures 2.6 cm image 29 (previously 2.8 cm).     -index lesion at the hepatic dome measures 4.3 cm on image 12 (previously 4.8 cm).     There may be a new enhancing lesion in segment 3, series 15, image 23.     Liver: Normal homogeneous background signal.  Hepatic and portal veins are patent.     Biliary: Gallbladder is filled with stones.  Mild intrahepatic bile duct dilatation, unchanged.     Pancreas: Diffusely atrophic with mild diffuse dilatation of the pancreatic duct, 0.4 cm.     Spleen: Normal size.  No focal lesions.     Adrenal glands: Unremarkable.     Kidneys: Tiny left renal cyst masses.  No hydronephrosis.     Miscellaneous: Visualized bowel loops are unremarkable.  Persistent enhancing lesion along the right L3 vertebral body.  Patient reportedly has disease in the central mesentery, not well evaluated on this exam due to heavy breathing motion artifact.     Impression:     Please note, this exam was performed at a different facility with a different scanner, resulting in differences in technique.     Multiple liver masses in this patient with known metastatic neuroendocrine tumor.  The vast majority are decreased in size compared to prior studies.  I am concerned for a new subcentimeter lesion in the left hepatic lobe as above.     Stable metastasis at L3.     Cholelithiasis with stable bile duct dilatation.     Assessment and Plan     1. 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  -     IR Embolization for Tumor_Organ Ischemia; Future; Expected date: 01/14/2025  -     Comprehensive Metabolic Panel; Future; Expected date: 01/14/2025  -     Bilirubin, Direct; Future; Expected date: 01/14/2025  -     Protime-INR; Future; Expected date: 01/14/2025  -     CBC Auto Differential; Future; Expected date: 01/14/2025        Reviewed MRI with Dr. Denis. Explained to patient there was an overall good response, but there are areas of residual noted in the  liver. Recommendation is to repeat bland embolization. Discussed how the procedure will be performed, risks (including, but not limited to, pain, bleeding, infection, damage to nearby structures, and the need for additional procedures), benefits, possible complications, pre-post procedure expectations, and alternatives. The patient voices understanding and all questions have been answered. The patient is hesitant regarding repeat treatment, but agrees to scheduling. We will call with available dates and times. Pre-procedure handout with clinic phone number provided.

## 2025-01-31 ENCOUNTER — INFUSION (OUTPATIENT)
Dept: INFUSION THERAPY | Facility: HOSPITAL | Age: 85
End: 2025-01-31
Payer: MEDICARE

## 2025-01-31 ENCOUNTER — LAB VISIT (OUTPATIENT)
Dept: LAB | Facility: OTHER | Age: 85
End: 2025-01-31
Attending: FAMILY MEDICINE
Payer: MEDICARE

## 2025-01-31 ENCOUNTER — OFFICE VISIT (OUTPATIENT)
Dept: HEMATOLOGY/ONCOLOGY | Facility: CLINIC | Age: 85
End: 2025-01-31
Payer: MEDICARE

## 2025-01-31 VITALS
BODY MASS INDEX: 17.16 KG/M2 | TEMPERATURE: 97 F | OXYGEN SATURATION: 95 % | RESPIRATION RATE: 16 BRPM | SYSTOLIC BLOOD PRESSURE: 119 MMHG | DIASTOLIC BLOOD PRESSURE: 75 MMHG | HEIGHT: 64 IN | WEIGHT: 100.5 LBS | HEART RATE: 105 BPM

## 2025-01-31 DIAGNOSIS — E11.29 CONTROLLED TYPE 2 DIABETES MELLITUS WITH MICROALBUMINURIA, WITHOUT LONG-TERM CURRENT USE OF INSULIN: ICD-10-CM

## 2025-01-31 DIAGNOSIS — C7B.8 METASTATIC MALIGNANT NEUROENDOCRINE TUMOR TO LIVER: Primary | ICD-10-CM

## 2025-01-31 DIAGNOSIS — C7B.8 METASTATIC MALIGNANT NEUROENDOCRINE TUMOR TO LIVER: ICD-10-CM

## 2025-01-31 DIAGNOSIS — C78.7 METASTATIC CANCER TO LIVER: ICD-10-CM

## 2025-01-31 DIAGNOSIS — R80.9 CONTROLLED TYPE 2 DIABETES MELLITUS WITH MICROALBUMINURIA, WITHOUT LONG-TERM CURRENT USE OF INSULIN: ICD-10-CM

## 2025-01-31 DIAGNOSIS — I10 ESSENTIAL HYPERTENSION: ICD-10-CM

## 2025-01-31 DIAGNOSIS — C7B.8 SECONDARY NEUROENDOCRINE TUMOR OF BONE: ICD-10-CM

## 2025-01-31 DIAGNOSIS — E34.00 CARCINOID SYNDROME: ICD-10-CM

## 2025-01-31 DIAGNOSIS — D84.821 IMMUNODEFICIENCY DUE TO DRUGS: ICD-10-CM

## 2025-01-31 DIAGNOSIS — R19.7 DIARRHEA, UNSPECIFIED TYPE: ICD-10-CM

## 2025-01-31 DIAGNOSIS — C79.51 METASTASIS TO SPINAL COLUMN: ICD-10-CM

## 2025-01-31 DIAGNOSIS — D49.9 IMMUNODEFICIENCY SECONDARY TO NEOPLASM: ICD-10-CM

## 2025-01-31 DIAGNOSIS — C7A.012 MALIGNANT CARCINOID TUMOR OF ILEUM: Primary | ICD-10-CM

## 2025-01-31 DIAGNOSIS — Z79.899 IMMUNODEFICIENCY DUE TO DRUGS: ICD-10-CM

## 2025-01-31 DIAGNOSIS — I50.9 CHRONIC CONGESTIVE HEART FAILURE, UNSPECIFIED HEART FAILURE TYPE: ICD-10-CM

## 2025-01-31 DIAGNOSIS — D84.81 IMMUNODEFICIENCY SECONDARY TO NEOPLASM: ICD-10-CM

## 2025-01-31 LAB
ALBUMIN SERPL BCP-MCNC: 3.5 G/DL (ref 3.5–5.2)
ALP SERPL-CCNC: 61 U/L (ref 40–150)
ALT SERPL W/O P-5'-P-CCNC: 12 U/L (ref 10–44)
ANION GAP SERPL CALC-SCNC: 11 MMOL/L (ref 8–16)
AST SERPL-CCNC: 23 U/L (ref 10–40)
BASOPHILS # BLD AUTO: 0.01 K/UL (ref 0–0.2)
BASOPHILS NFR BLD: 0.2 % (ref 0–1.9)
BILIRUB DIRECT SERPL-MCNC: 0.4 MG/DL (ref 0.1–0.3)
BILIRUB SERPL-MCNC: 0.9 MG/DL (ref 0.1–1)
BUN SERPL-MCNC: 13 MG/DL (ref 8–23)
CALCIUM SERPL-MCNC: 9.7 MG/DL (ref 8.7–10.5)
CHLORIDE SERPL-SCNC: 102 MMOL/L (ref 95–110)
CO2 SERPL-SCNC: 26 MMOL/L (ref 23–29)
CREAT SERPL-MCNC: 0.7 MG/DL (ref 0.5–1.4)
DIFFERENTIAL METHOD BLD: ABNORMAL
EOSINOPHIL # BLD AUTO: 0.1 K/UL (ref 0–0.5)
EOSINOPHIL NFR BLD: 1.1 % (ref 0–8)
ERYTHROCYTE [DISTWIDTH] IN BLOOD BY AUTOMATED COUNT: 13.3 % (ref 11.5–14.5)
EST. GFR  (NO RACE VARIABLE): >60 ML/MIN/1.73 M^2
GLUCOSE SERPL-MCNC: 124 MG/DL (ref 70–110)
HCT VFR BLD AUTO: 34.9 % (ref 37–48.5)
HGB BLD-MCNC: 11.5 G/DL (ref 12–16)
IMM GRANULOCYTES # BLD AUTO: 0.02 K/UL (ref 0–0.04)
IMM GRANULOCYTES NFR BLD AUTO: 0.4 % (ref 0–0.5)
INR PPP: 1.1 (ref 0.8–1.2)
LYMPHOCYTES # BLD AUTO: 1.1 K/UL (ref 1–4.8)
LYMPHOCYTES NFR BLD: 20.3 % (ref 18–48)
MCH RBC QN AUTO: 31.5 PG (ref 27–31)
MCHC RBC AUTO-ENTMCNC: 33 G/DL (ref 32–36)
MCV RBC AUTO: 96 FL (ref 82–98)
MONOCYTES # BLD AUTO: 0.6 K/UL (ref 0.3–1)
MONOCYTES NFR BLD: 10.2 % (ref 4–15)
NEUTROPHILS # BLD AUTO: 3.7 K/UL (ref 1.8–7.7)
NEUTROPHILS NFR BLD: 67.8 % (ref 38–73)
NRBC BLD-RTO: 0 /100 WBC
PLATELET # BLD AUTO: 204 K/UL (ref 150–450)
PMV BLD AUTO: 9.4 FL (ref 9.2–12.9)
POTASSIUM SERPL-SCNC: 3.8 MMOL/L (ref 3.5–5.1)
PROT SERPL-MCNC: 7.2 G/DL (ref 6–8.4)
PROTHROMBIN TIME: 11.9 SEC (ref 9–12.5)
RBC # BLD AUTO: 3.65 M/UL (ref 4–5.4)
SODIUM SERPL-SCNC: 139 MMOL/L (ref 136–145)
WBC # BLD AUTO: 5.38 K/UL (ref 3.9–12.7)

## 2025-01-31 PROCEDURE — 99215 OFFICE O/P EST HI 40 MIN: CPT | Mod: HCNC,S$GLB,, | Performed by: REGISTERED NURSE

## 2025-01-31 PROCEDURE — 82248 BILIRUBIN DIRECT: CPT | Mod: HCNC | Performed by: FAMILY MEDICINE

## 2025-01-31 PROCEDURE — 63600175 PHARM REV CODE 636 W HCPCS: Mod: JZ,TB,HCNC | Performed by: INTERNAL MEDICINE

## 2025-01-31 PROCEDURE — 1126F AMNT PAIN NOTED NONE PRSNT: CPT | Mod: HCNC,CPTII,S$GLB, | Performed by: REGISTERED NURSE

## 2025-01-31 PROCEDURE — 80053 COMPREHEN METABOLIC PANEL: CPT | Mod: HCNC | Performed by: FAMILY MEDICINE

## 2025-01-31 PROCEDURE — 1160F RVW MEDS BY RX/DR IN RCRD: CPT | Mod: HCNC,CPTII,S$GLB, | Performed by: REGISTERED NURSE

## 2025-01-31 PROCEDURE — 36415 COLL VENOUS BLD VENIPUNCTURE: CPT | Mod: HCNC | Performed by: FAMILY MEDICINE

## 2025-01-31 PROCEDURE — 85610 PROTHROMBIN TIME: CPT | Mod: HCNC | Performed by: FAMILY MEDICINE

## 2025-01-31 PROCEDURE — 1157F ADVNC CARE PLAN IN RCRD: CPT | Mod: HCNC,CPTII,S$GLB, | Performed by: REGISTERED NURSE

## 2025-01-31 PROCEDURE — 96372 THER/PROPH/DIAG INJ SC/IM: CPT | Mod: HCNC

## 2025-01-31 PROCEDURE — G2211 COMPLEX E/M VISIT ADD ON: HCPCS | Mod: HCNC,S$GLB,, | Performed by: REGISTERED NURSE

## 2025-01-31 PROCEDURE — 1159F MED LIST DOCD IN RCRD: CPT | Mod: HCNC,CPTII,S$GLB, | Performed by: REGISTERED NURSE

## 2025-01-31 PROCEDURE — 99999 PR PBB SHADOW E&M-EST. PATIENT-LVL V: CPT | Mod: PBBFAC,HCNC,, | Performed by: REGISTERED NURSE

## 2025-01-31 PROCEDURE — 85025 COMPLETE CBC W/AUTO DIFF WBC: CPT | Mod: HCNC | Performed by: FAMILY MEDICINE

## 2025-01-31 RX ORDER — LANREOTIDE ACETATE 120 MG/.5ML
120 INJECTION SUBCUTANEOUS
OUTPATIENT
Start: 2025-01-31

## 2025-01-31 RX ORDER — LANREOTIDE ACETATE 120 MG/.5ML
120 INJECTION SUBCUTANEOUS
Status: DISCONTINUED | OUTPATIENT
Start: 2025-01-31 | End: 2025-01-31 | Stop reason: HOSPADM

## 2025-01-31 RX ADMIN — LANREOTIDE ACETATE 120 MG: 120 INJECTION SUBCUTANEOUS at 12:01

## 2025-01-31 NOTE — PROGRESS NOTES
"Subjective:      Patient ID: Shahram Hills is an 84 y.o. female.     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 Ms. Hills is an 77 yo woman who initially saw me on 6/7/18 for further evaluation of neuroendocrine tumor. She initially presented with diarrhea, abdominal discomfort, weight loss. She was evaluated at [facility] and underwent workup below:  US abdomen on 3/14/18 showed numerous bilobar mixed echogenicity and mildly vascular hepatic lesions. Cholelithiasis without e/o acute cholecystitis.   MRI abdomen on 3/30/2018 showed innumerable hepatic metastases. L3 vertebral body metastasis with soft tissue component along the right margin of the L3 and upper L4 vertebral bodies, filling the right L3/4 neural foramen, and extending into the anterior aspect of the spinal canal on the right at the L3 and upper T4 levels. Associated with mild spinal canal narrowing.  CT chest on 4/6/2018 showed one lucent nodule measuring 7 mm in the T7 vertebral body, most likely representing metastatic disease.    EGD on 5/4/18 showed normal duodenum. Schatzki's ring in the lower third of the esophagus. Grade 1 esophagitis in the GE junction c/w mild distal esophagitis. Congestion and erythema in the antrum, pre-pyloric region and stomach body c/w gastritis. Biopsy of the stomach antrum showed mild chronic gastritis, neg for H. pylori.   Labs on 5/9/2018: WBC 6.9, H/H 11.6/34.3, MCV 87.5, plt count 279. On 3/9/18: Vit B12 level 173, folic acid 6.6  She was started on oral vit B12 and underwent a liver biopsy on 5/18/18. Pathology showed "metastatic well differentiated neuroendocrine tumor. Ki67 3%"     She saw me on 6/7/18 and complained of weight loss of 26 lbs over the past 3-4 months, also has diarrhea. No flushing, wheezing, palpitations. Has been having pain in right flank radiating down " "the right leg. No tingling/numbness, weakness. She can hold her bladder but when she urinates her diarrhea would come out as well. Started on dex 4 mg q6h the evening of 6/7/18.      2. MRI spine on 6/8/18 showed "Marrow replacing metastatic lesion of the L3 vertebral body with associated extra osseous expansion and complete effacement of the right L3-L4 neural foramina and additional effacement of the right lateral recess.  There is additional lateral extension and abutment of the right psoas muscle.  Superimposed degenerative change at this level results in mild spinal canal stenosis." I sent the patient to ED on 6/9/18 where she was evaluated by neurosurgery. Neurosurgery did not feel she was a candidate for surgical intervention.      3. Seen by Dr. Adames on 6/11/18. palliative radiation to the area of the L3 metastasis 6/18/18-6/29/18   4. Gallium study on 6/13/18: Distal ileal primary neoplasm consistent with a carcinoid.  There is an adjacent metastatic lymph node, diffuse liver metastases, and multiple bone metastases. Index primary neoplasm SUV max 33.13. Adjacent lymph node SUV max 23. L3 bone metastasis SUV max 36.86. Left lobe SUV max 46.13   5. Lanreotide every 4 weeks started on 6/22/18  6. TACE to the right hepatic lobe on 2/14/19. TACE to the left hepatic lobe on 3/21/19.   7. TACE to the right hepatic lobe on 2/11/22. S/p conventional chemoembolization performed of the segment 2, 3, 4 branch of the left hepatic artery and segment 8 branch of the left hepatic artery on 4/22/22.  8. Gallium PET 11/1/22 showed progression. Discussed PRRT. PRRT #1 on 12/14/2022. Zometa every 3 months started 12/27/22. PRRT #2 on 2/8/23. #3 on 4/12/23. #4 on 6/14/23.   9. Had bland embo on 10/25/24 and 12/2/24. Had slurry speech after procedure on 12/2/24 and was sent to ED. Brain images did not show acute findings. Slurry speech was attributed to phenergan. Was also hospitalized in mid December for high BS level in the " 400s range.      History of Present Illness:   Ms. Hills returns for follow up. Doing well overall. Appetite is good and staying hydrated. No nausea or vomiting. Occasional diarrhea, generally well managed. Taking xermelo. No mouth sores. Scheduled for repeat bland embo on 25.     ECO     ROS:   See HPI. Otherwise negative.      Physical Examination:   Vital signs reviewed.   Gen: well hydrated, well developed, in no acute distress.  HEENT: normocephalic, anicteric, EOMI  Neck: supple, no JVD, thyromegaly, cervical or supraclavicular LAD  Lungs: CTAB, no wheezes or rales  Heart: regular rate, regular pulse, no M/R/G  Abdomen: soft, no tenderness, non-distended, no hepatomegaly, no splenomegaly, no mass, or hernia.   Ext: b/l LE  no edema  Neuro: alert and oriented x 4, no focal neuro deficit  Skin: no rash, open wounds or ulcers  Psych: pleasant and appropriate mood and affect     Objective:      Laboratory Data:  Labs reviewed. CBC, CMP adequate for treatment    Imaging Data:    Copper PET 24:  Impression:     Similar distribution and uptake of tracer avid disease involving the distal ileum, liver, and bones. Index lesions as above.  No definite new tracer avid lesion.     MRI 24:  Impression:     History of metastatic small bowel carcinoid.     Thickened loop of small bowel in the midline pelvis in the location of the known primary tumor, but incompletely characterized on this exam.     Partially imaged mesenteric montserrat conglomerate in the left lower quadrant.     Numerous liver metastases.  Some have decreased in size and demonstrate increased central hypoenhancement, compatible with treatment response.  Others are new from 2023.     Unchanged osseous metastasis in the L3 vertebral body.     Other findings as described.    MRI abdomen 9/3/24:  Impression:     History of metastatic small bowel carcinoid.  Thickened loop of small bowel in the midline pelvis in the location of the known  primary tumor, incompletely characterized on current exam.     Numerous hepatic metastases, many are stable, some new and some are enlarged, as detailed above.     Unchanged osseous metastasis in the L3 vertebral body.     MRI abdomen 12/16/24:  Impression:     Please note, this exam was performed at a different facility with a different scanner, resulting in differences in technique.     Multiple liver masses in this patient with known metastatic neuroendocrine tumor.  The vast majority are decreased in size compared to prior studies.  I am concerned for a new subcentimeter lesion in the left hepatic lobe as above.     Stable metastasis at L3.     Cholelithiasis with stable bile duct dilatation.     Assessment and Plan:      1. Malignant carcinoid tumor of ileum    2. Metastatic cancer to liver    3. 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    4. Secondary neuroendocrine tumor of bone    5. Metastasis to spinal column    6. Immunodeficiency secondary to neoplasm    7. Immunodeficiency due to drugs    8. Essential hypertension    9. Carcinoid syndrome    10. Diarrhea, unspecified type    11. Controlled type 2 diabetes mellitus with microalbuminuria, without long-term current use of insulin    12. Chronic congestive heart failure, unspecified heart failure type      1-7  - Ms Hills is an 85 yo woman with well differentiated low-grade neuroendocrine tumor of the ileum with mets to liver and bones, diagnosed on 5/18/2018. Started lanreotide every 4 weeks on 6/22/2018. S/p TACE to the right hepatic lobe on 2/14/19. TACE to the left hepatic lobe on 3/21/19.   - CT/MRI 1/12/22 showed mild progression in the liver. S/p TACE on 2/11/22 and 4/22/22   - Gallium PET 11/1/22 showed progression. Discussed PRRT. PRRT #1 on 12/14/2022. Completed 4 cycles on 6/14/23. Zometa every 3 months started 12/27/22.  - MRI in Sep 2024 showed progression in the liver.   - s/p kathe padilla on 10/25/24 and 12/2/24  - spoke with   Adeel. She is scheduled for touchup bland embo on 1/14/24.   - Dr. Granados previously had a long discussion with patient and family re why we have been doing embolization to the liver. When cancer is only progressing in the liver, we do embo to control disease in the liver. If there is more systemic progression, we will need systemic therapy to treat cancer, for her, will be chemo or cabozantinib (everolimus causes hyperglycemia so favor to save for later) which all come with side effects. Patient understands and agrees with liver-directed therapy  - c/w lanreotide every 4 weeks and zometa every 3 months. Got zometa in January 2025. Repeat in April.   - RTC in 4 weeks for continued lanreotide  - will get MRI and PET in mid Feb    8.  - BP controlled  - c/w current medication    9.10  - better after xermelo and PRRT  - c/w lanreotide every 4 weeks.   - c/w xermelo tid  - could not tolerate octreotide    11.  - BS controlled  - f/u with PCP    12.  - previously reviewed echo results. She does have CHF but not symptomatic right now  - f/u with Dr Bergeron    Patient is in agreement with the proposed treatment plan. All questions were answered to the patient's satisfaction. Pt knows to call clinic if anything is needed before the next clinic visit.    Patient discussed with collaborating physician, Dr. Granados.    At least 40 minutes were spent today on this encounter including face to face time with the patient, data gathering/interpretation and documentation.       Nayana Mendosa, MSN, APRN, ACCNS-AG  Hematology and Medical Oncology  Clinical Nurse Specialist to Dr. Woods, Dr. Mckoy & Dr. Granados    Route Chart for Scheduling    Med Onc Chart Routing      Follow up with physician 4 weeks. 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    Therapy Plan Information  LANREOTIDE for 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, noted on 6/7/2018  5. Medications  lanreotide injection 120 mg  120 mg, Subcutaneous, Every visit    LUTATHERA SUPPORT for 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, noted on 6/7/2018  LUTATHERA SUPPORT for Neuroendocrine carcinoma metastatic to bone, noted on 7/11/2018  LUTATHERA SUPPORT for Malignant carcinoid tumor of ileum, noted on 8/17/2018  None      No therapy plan of the specified type found.

## 2025-01-31 NOTE — NURSING
Patient tolerated Lanreotide injection well to the Right buttocks, D/C with next appt scheduled.

## 2025-02-03 ENCOUNTER — TELEPHONE (OUTPATIENT)
Dept: INTERNAL MEDICINE | Facility: CLINIC | Age: 85
End: 2025-02-03
Payer: MEDICARE

## 2025-02-03 DIAGNOSIS — E11.65 CONTROLLED TYPE 2 DIABETES MELLITUS WITH HYPERGLYCEMIA, WITHOUT LONG-TERM CURRENT USE OF INSULIN: ICD-10-CM

## 2025-02-03 RX ORDER — GLIPIZIDE 5 MG/1
TABLET ORAL
Status: ON HOLD
Start: 2025-02-03 | End: 2025-02-21 | Stop reason: HOSPADM

## 2025-02-03 NOTE — TELEPHONE ENCOUNTER
Please call patient's sister Shayla Rosenthal within in the chart who is her primary caregiver and notify her that I was able to review the blood sugar logs that were dropped off    - sugars in the morning are better and at goal as majority are less than 130  - sugars in the afternoon were reviewed and several are in the 60- 70s which are too low     - I recommend continuing with the 5 mg dose at night but decreasing the morning dose in half to 2.5 mg  - with any further low sugars in the 60s or 70s occurred please ask them to let me now

## 2025-02-04 ENCOUNTER — TELEPHONE (OUTPATIENT)
Dept: INTERVENTIONAL RADIOLOGY/VASCULAR | Facility: HOSPITAL | Age: 85
End: 2025-02-04
Payer: MEDICARE

## 2025-02-04 NOTE — TELEPHONE ENCOUNTER
----- Message from Summer sent at 2/4/2025 10:27 AM CST -----  Regarding: return call  Type:  Patient Returning Call     Who Called:pt    Who Left Message for Patient:Nanette Jiménez  Does the patient know what this is regarding?:no  Would the patient rather a call back or a response via [facility]? Call back   Best Call Back Number: [phone]  Additional Information:

## 2025-02-05 ENCOUNTER — ANESTHESIA (OUTPATIENT)
Dept: INTERVENTIONAL RADIOLOGY/VASCULAR | Facility: HOSPITAL | Age: 85
End: 2025-02-05
Payer: MEDICARE

## 2025-02-05 ENCOUNTER — HOSPITAL ENCOUNTER (OUTPATIENT)
Dept: INTERVENTIONAL RADIOLOGY/VASCULAR | Facility: HOSPITAL | Age: 85
Discharge: HOME | End: 2025-02-06
Attending: INTERNAL MEDICINE | Admitting: INTERNAL MEDICINE
Payer: MEDICARE

## 2025-02-05 DIAGNOSIS — R11.0 NAUSEA: ICD-10-CM

## 2025-02-05 DIAGNOSIS — C7B.8 METASTATIC MALIGNANT NEUROENDOCRINE TUMOR TO LIVER: Primary | ICD-10-CM

## 2025-02-05 DIAGNOSIS — C7A.8 NEUROENDOCRINE CARCINOMA METASTATIC TO LIVER: ICD-10-CM

## 2025-02-05 DIAGNOSIS — R07.9 CHEST PAIN: ICD-10-CM

## 2025-02-05 DIAGNOSIS — C7B.8 NEUROENDOCRINE CARCINOMA METASTATIC TO LIVER: ICD-10-CM

## 2025-02-05 LAB
POCT GLUCOSE: 127 MG/DL (ref 70–110)
POCT GLUCOSE: 145 MG/DL (ref 70–110)
POCT GLUCOSE: 220 MG/DL (ref 70–110)

## 2025-02-05 PROCEDURE — 96372 THER/PROPH/DIAG INJ SC/IM: CPT | Performed by: INTERNAL MEDICINE

## 2025-02-05 PROCEDURE — C1889 IMPLANT/INSERT DEVICE, NOC: HCPCS | Mod: HCNC

## 2025-02-05 PROCEDURE — 25000003 PHARM REV CODE 250: Mod: HCNC | Performed by: NURSE ANESTHETIST, CERTIFIED REGISTERED

## 2025-02-05 PROCEDURE — 63600175 PHARM REV CODE 636 W HCPCS: Mod: HCNC | Performed by: NURSE ANESTHETIST, CERTIFIED REGISTERED

## 2025-02-05 PROCEDURE — 96375 TX/PRO/DX INJ NEW DRUG ADDON: CPT

## 2025-02-05 PROCEDURE — 37243 VASC EMBOLIZE/OCCLUDE ORGAN: CPT | Mod: HCNC,,, | Performed by: RADIOLOGY

## 2025-02-05 PROCEDURE — 76937 US GUIDE VASCULAR ACCESS: CPT | Mod: TC,HCNC | Performed by: RADIOLOGY

## 2025-02-05 PROCEDURE — G0379 DIRECT REFER HOSPITAL OBSERV: HCPCS | Mod: HCNC

## 2025-02-05 PROCEDURE — 63600175 PHARM REV CODE 636 W HCPCS: Mod: JA,HCNC | Performed by: PHYSICIAN ASSISTANT

## 2025-02-05 PROCEDURE — G0378 HOSPITAL OBSERVATION PER HR: HCPCS | Mod: HCNC

## 2025-02-05 PROCEDURE — 96365 THER/PROPH/DIAG IV INF INIT: CPT

## 2025-02-05 PROCEDURE — 75774 ARTERY X-RAY EACH VESSEL: CPT | Mod: 26,59,HCNC, | Performed by: RADIOLOGY

## 2025-02-05 PROCEDURE — 63600175 PHARM REV CODE 636 W HCPCS: Mod: HCNC | Performed by: PHYSICIAN ASSISTANT

## 2025-02-05 PROCEDURE — G0269 OCCLUSIVE DEVICE IN VEIN ART: HCPCS | Mod: HCNC | Performed by: RADIOLOGY

## 2025-02-05 PROCEDURE — 36245 INS CATH ABD/L-EXT ART 1ST: CPT | Mod: 59,HCNC | Performed by: RADIOLOGY

## 2025-02-05 PROCEDURE — 63600175 PHARM REV CODE 636 W HCPCS: Mod: HCNC | Performed by: RADIOLOGY

## 2025-02-05 PROCEDURE — 75726 ARTERY X-RAYS ABDOMEN: CPT | Mod: TC,59,HCNC | Performed by: RADIOLOGY

## 2025-02-05 PROCEDURE — C1887 CATHETER, GUIDING: HCPCS | Mod: HCNC

## 2025-02-05 PROCEDURE — 25000003 PHARM REV CODE 250: Mod: HCNC | Performed by: INTERNAL MEDICINE

## 2025-02-05 PROCEDURE — 36245 INS CATH ABD/L-EXT ART 1ST: CPT | Mod: 59,HCNC,, | Performed by: RADIOLOGY

## 2025-02-05 PROCEDURE — 96374 THER/PROPH/DIAG INJ IV PUSH: CPT | Mod: 59

## 2025-02-05 PROCEDURE — C1769 GUIDE WIRE: HCPCS | Mod: HCNC

## 2025-02-05 PROCEDURE — 76937 US GUIDE VASCULAR ACCESS: CPT | Mod: 26,HCNC,, | Performed by: RADIOLOGY

## 2025-02-05 PROCEDURE — 75726 ARTERY X-RAYS ABDOMEN: CPT | Mod: 26,59,HCNC, | Performed by: RADIOLOGY

## 2025-02-05 PROCEDURE — 37000008 HC ANESTHESIA 1ST 15 MINUTES: Mod: HCNC

## 2025-02-05 PROCEDURE — 96366 THER/PROPH/DIAG IV INF ADDON: CPT

## 2025-02-05 PROCEDURE — C1760 CLOSURE DEV, VASC: HCPCS | Mod: HCNC

## 2025-02-05 PROCEDURE — 63600175 PHARM REV CODE 636 W HCPCS: Mod: HCNC | Performed by: INTERNAL MEDICINE

## 2025-02-05 PROCEDURE — 75774 ARTERY X-RAY EACH VESSEL: CPT | Mod: TC,59,HCNC | Performed by: RADIOLOGY

## 2025-02-05 PROCEDURE — 36247 INS CATH ABD/L-EXT ART 3RD: CPT | Mod: 51,HCNC,RT, | Performed by: RADIOLOGY

## 2025-02-05 PROCEDURE — 36247 INS CATH ABD/L-EXT ART 3RD: CPT | Mod: HCNC,RT | Performed by: RADIOLOGY

## 2025-02-05 PROCEDURE — 37000009 HC ANESTHESIA EA ADD 15 MINS: Mod: HCNC

## 2025-02-05 PROCEDURE — 37243 VASC EMBOLIZE/OCCLUDE ORGAN: CPT | Mod: HCNC | Performed by: RADIOLOGY

## 2025-02-05 PROCEDURE — 25000003 PHARM REV CODE 250: Mod: HCNC | Performed by: PHYSICIAN ASSISTANT

## 2025-02-05 PROCEDURE — 63600175 PHARM REV CODE 636 W HCPCS: Mod: HCNC | Performed by: STUDENT IN AN ORGANIZED HEALTH CARE EDUCATION/TRAINING PROGRAM

## 2025-02-05 PROCEDURE — 25500020 PHARM REV CODE 255: Mod: HCNC | Performed by: RADIOLOGY

## 2025-02-05 PROCEDURE — C1894 INTRO/SHEATH, NON-LASER: HCPCS | Mod: HCNC

## 2025-02-05 RX ORDER — DEXTROSE MONOHYDRATE, SODIUM CHLORIDE, AND POTASSIUM CHLORIDE 50; 1.49; 4.5 G/1000ML; G/1000ML; G/1000ML
INJECTION, SOLUTION INTRAVENOUS CONTINUOUS
Status: DISCONTINUED | OUTPATIENT
Start: 2025-02-05 | End: 2025-02-05

## 2025-02-05 RX ORDER — LIDOCAINE HYDROCHLORIDE 10 MG/ML
INJECTION, SOLUTION INFILTRATION; PERINEURAL
Status: COMPLETED | OUTPATIENT
Start: 2025-02-05 | End: 2025-02-05

## 2025-02-05 RX ORDER — HEPARIN SODIUM 200 [USP'U]/100ML
INJECTION, SOLUTION INTRAVENOUS
Status: COMPLETED | OUTPATIENT
Start: 2025-02-05 | End: 2025-02-05

## 2025-02-05 RX ORDER — SPIRONOLACTONE 25 MG/1
25 TABLET ORAL DAILY
Status: DISCONTINUED | OUTPATIENT
Start: 2025-02-06 | End: 2025-02-06 | Stop reason: HOSPADM

## 2025-02-05 RX ORDER — IBUPROFEN 200 MG
24 TABLET ORAL
Status: DISCONTINUED | OUTPATIENT
Start: 2025-02-05 | End: 2025-02-06 | Stop reason: HOSPADM

## 2025-02-05 RX ORDER — PROMETHAZINE HYDROCHLORIDE 25 MG/ML
INJECTION, SOLUTION INTRAMUSCULAR; INTRAVENOUS
Status: COMPLETED | OUTPATIENT
Start: 2025-02-05 | End: 2025-02-05

## 2025-02-05 RX ORDER — PHENYLEPHRINE HYDROCHLORIDE 10 MG/ML
INJECTION INTRAVENOUS
Status: DISCONTINUED | OUTPATIENT
Start: 2025-02-05 | End: 2025-02-05

## 2025-02-05 RX ORDER — NALOXONE HCL 0.4 MG/ML
0.02 VIAL (ML) INJECTION
Status: DISCONTINUED | OUTPATIENT
Start: 2025-02-05 | End: 2025-02-06 | Stop reason: HOSPADM

## 2025-02-05 RX ORDER — INSULIN ASPART 100 [IU]/ML
0-10 INJECTION, SOLUTION INTRAVENOUS; SUBCUTANEOUS
Status: DISCONTINUED | OUTPATIENT
Start: 2025-02-05 | End: 2025-02-06 | Stop reason: HOSPADM

## 2025-02-05 RX ORDER — LOSARTAN POTASSIUM 50 MG/1
50 TABLET ORAL DAILY
Status: DISCONTINUED | OUTPATIENT
Start: 2025-02-06 | End: 2025-02-05

## 2025-02-05 RX ORDER — LATANOPROST 50 UG/ML
1 SOLUTION/ DROPS OPHTHALMIC NIGHTLY
Status: DISCONTINUED | OUTPATIENT
Start: 2025-02-05 | End: 2025-02-06 | Stop reason: HOSPADM

## 2025-02-05 RX ORDER — OXYCODONE HYDROCHLORIDE 5 MG/1
5 TABLET ORAL EVERY 6 HOURS PRN
Status: DISCONTINUED | OUTPATIENT
Start: 2025-02-05 | End: 2025-02-06 | Stop reason: HOSPADM

## 2025-02-05 RX ORDER — LANOLIN ALCOHOL/MO/W.PET/CERES
1000 CREAM (GRAM) TOPICAL DAILY
Status: DISCONTINUED | OUTPATIENT
Start: 2025-02-05 | End: 2025-02-06 | Stop reason: HOSPADM

## 2025-02-05 RX ORDER — SCOPOLAMINE 1 MG/3D
1 PATCH, EXTENDED RELEASE TRANSDERMAL
Status: DISCONTINUED | OUTPATIENT
Start: 2025-02-05 | End: 2025-02-06 | Stop reason: HOSPADM

## 2025-02-05 RX ORDER — MAGNESIUM SULFATE HEPTAHYDRATE 40 MG/ML
2 INJECTION, SOLUTION INTRAVENOUS ONCE
Status: COMPLETED | OUTPATIENT
Start: 2025-02-05 | End: 2025-02-05

## 2025-02-05 RX ORDER — IBUPROFEN 200 MG
16 TABLET ORAL
Status: DISCONTINUED | OUTPATIENT
Start: 2025-02-05 | End: 2025-02-06 | Stop reason: HOSPADM

## 2025-02-05 RX ORDER — SACUBITRIL AND VALSARTAN 24; 26 MG/1; MG/1
1 TABLET, FILM COATED ORAL 2 TIMES DAILY
Status: DISCONTINUED | OUTPATIENT
Start: 2025-02-05 | End: 2025-02-06 | Stop reason: HOSPADM

## 2025-02-05 RX ORDER — EZETIMIBE 10 MG/1
10 TABLET ORAL DAILY
Status: DISCONTINUED | OUTPATIENT
Start: 2025-02-05 | End: 2025-02-06 | Stop reason: HOSPADM

## 2025-02-05 RX ORDER — PROCHLORPERAZINE EDISYLATE 5 MG/ML
5 INJECTION INTRAMUSCULAR; INTRAVENOUS EVERY 6 HOURS PRN
Status: DISCONTINUED | OUTPATIENT
Start: 2025-02-05 | End: 2025-02-06 | Stop reason: HOSPADM

## 2025-02-05 RX ORDER — CIPROFLOXACIN 500 MG/1
500 TABLET ORAL EVERY 12 HOURS
Status: DISCONTINUED | OUTPATIENT
Start: 2025-02-05 | End: 2025-02-06 | Stop reason: HOSPADM

## 2025-02-05 RX ORDER — CARVEDILOL 12.5 MG/1
12.5 TABLET ORAL 2 TIMES DAILY
Status: DISCONTINUED | OUTPATIENT
Start: 2025-02-05 | End: 2025-02-06 | Stop reason: HOSPADM

## 2025-02-05 RX ORDER — SODIUM CHLORIDE 0.9 % (FLUSH) 0.9 %
10 SYRINGE (ML) INJECTION EVERY 12 HOURS PRN
Status: DISCONTINUED | OUTPATIENT
Start: 2025-02-05 | End: 2025-02-06 | Stop reason: HOSPADM

## 2025-02-05 RX ORDER — LIDOCAINE HYDROCHLORIDE 20 MG/ML
INJECTION INTRAVENOUS
Status: DISCONTINUED | OUTPATIENT
Start: 2025-02-05 | End: 2025-02-05

## 2025-02-05 RX ORDER — DIPHENHYDRAMINE HYDROCHLORIDE 50 MG/ML
50 INJECTION INTRAMUSCULAR; INTRAVENOUS ONCE
Status: COMPLETED | OUTPATIENT
Start: 2025-02-05 | End: 2025-02-05

## 2025-02-05 RX ORDER — LANOLIN ALCOHOL/MO/W.PET/CERES
1 CREAM (GRAM) TOPICAL DAILY
Status: DISCONTINUED | OUTPATIENT
Start: 2025-02-06 | End: 2025-02-06 | Stop reason: HOSPADM

## 2025-02-05 RX ORDER — ONDANSETRON HYDROCHLORIDE 2 MG/ML
4 INJECTION, SOLUTION INTRAVENOUS EVERY 6 HOURS PRN
Status: DISCONTINUED | OUTPATIENT
Start: 2025-02-05 | End: 2025-02-06 | Stop reason: HOSPADM

## 2025-02-05 RX ORDER — ETOMIDATE 2 MG/ML
INJECTION INTRAVENOUS
Status: DISCONTINUED | OUTPATIENT
Start: 2025-02-05 | End: 2025-02-05

## 2025-02-05 RX ORDER — PROCHLORPERAZINE EDISYLATE 5 MG/ML
INJECTION INTRAMUSCULAR; INTRAVENOUS
Status: COMPLETED | OUTPATIENT
Start: 2025-02-05 | End: 2025-02-05

## 2025-02-05 RX ORDER — DEXAMETHASONE SODIUM PHOSPHATE 4 MG/ML
INJECTION, SOLUTION INTRA-ARTICULAR; INTRALESIONAL; INTRAMUSCULAR; INTRAVENOUS; SOFT TISSUE
Status: DISCONTINUED | OUTPATIENT
Start: 2025-02-05 | End: 2025-02-05

## 2025-02-05 RX ORDER — PROPOFOL 10 MG/ML
VIAL (ML) INTRAVENOUS
Status: DISCONTINUED | OUTPATIENT
Start: 2025-02-05 | End: 2025-02-05

## 2025-02-05 RX ORDER — ROCURONIUM BROMIDE 10 MG/ML
INJECTION, SOLUTION INTRAVENOUS
Status: DISCONTINUED | OUTPATIENT
Start: 2025-02-05 | End: 2025-02-05

## 2025-02-05 RX ORDER — FENTANYL CITRATE 50 UG/ML
INJECTION, SOLUTION INTRAMUSCULAR; INTRAVENOUS
Status: DISCONTINUED | OUTPATIENT
Start: 2025-02-05 | End: 2025-02-05

## 2025-02-05 RX ORDER — GLUCAGON 1 MG
1 KIT INJECTION
Status: DISCONTINUED | OUTPATIENT
Start: 2025-02-05 | End: 2025-02-06 | Stop reason: HOSPADM

## 2025-02-05 RX ADMIN — PROCHLORPERAZINE EDISYLATE 5 MG: 5 INJECTION INTRAMUSCULAR; INTRAVENOUS at 08:02

## 2025-02-05 RX ADMIN — SCOPOLAMINE 1 PATCH: 1.5 PATCH, EXTENDED RELEASE TRANSDERMAL at 07:02

## 2025-02-05 RX ADMIN — SODIUM CHLORIDE 250 MCG/HR: 9 INJECTION, SOLUTION INTRAVENOUS at 09:02

## 2025-02-05 RX ADMIN — PHENYLEPHRINE HYDROCHLORIDE 100 MCG: 10 INJECTION INTRAVENOUS at 09:02

## 2025-02-05 RX ADMIN — HEPARIN SODIUM 1000 UNITS/HR: 200 INJECTION, SOLUTION INTRAVENOUS at 09:02

## 2025-02-05 RX ADMIN — SACUBITRIL AND VALSARTAN 1 TABLET: 24; 26 TABLET, FILM COATED ORAL at 09:02

## 2025-02-05 RX ADMIN — CIPROFLOXACIN 500 MG: 500 TABLET ORAL at 09:02

## 2025-02-05 RX ADMIN — PHENYLEPHRINE HYDROCHLORIDE 150 MCG: 10 INJECTION INTRAVENOUS at 09:02

## 2025-02-05 RX ADMIN — PROPOFOL 20 MG: 10 INJECTION, EMULSION INTRAVENOUS at 09:02

## 2025-02-05 RX ADMIN — ONDANSETRON 4 MG: 2 INJECTION INTRAMUSCULAR; INTRAVENOUS at 03:02

## 2025-02-05 RX ADMIN — ROCURONIUM BROMIDE 50 MG: 10 INJECTION, SOLUTION INTRAVENOUS at 09:02

## 2025-02-05 RX ADMIN — DEXTROSE, SODIUM CHLORIDE, AND POTASSIUM CHLORIDE: 5; .45; .15 INJECTION INTRAVENOUS at 08:02

## 2025-02-05 RX ADMIN — SUGAMMADEX 200 MG: 100 INJECTION, SOLUTION INTRAVENOUS at 09:02

## 2025-02-05 RX ADMIN — DEXAMETHASONE SODIUM PHOSPHATE 8 MG: 4 INJECTION, SOLUTION INTRA-ARTICULAR; INTRALESIONAL; INTRAMUSCULAR; INTRAVENOUS; SOFT TISSUE at 09:02

## 2025-02-05 RX ADMIN — INSULIN ASPART 4 UNITS: 100 INJECTION, SOLUTION INTRAVENOUS; SUBCUTANEOUS at 05:02

## 2025-02-05 RX ADMIN — MAGNESIUM SULFATE HEPTAHYDRATE 2 G: 40 INJECTION, SOLUTION INTRAVENOUS at 08:02

## 2025-02-05 RX ADMIN — OCTREOTIDE ACETATE 250 MCG: 100 INJECTION, SOLUTION INTRAVENOUS; SUBCUTANEOUS at 08:02

## 2025-02-05 RX ADMIN — LIDOCAINE HYDROCHLORIDE 75 MG: 20 INJECTION, SOLUTION INTRAVENOUS at 09:02

## 2025-02-05 RX ADMIN — CARVEDILOL 12.5 MG: 12.5 TABLET, FILM COATED ORAL at 09:02

## 2025-02-05 RX ADMIN — LIDOCAINE HYDROCHLORIDE 3 ML: 10 INJECTION, SOLUTION INFILTRATION; PERINEURAL at 09:02

## 2025-02-05 RX ADMIN — OCTREOTIDE ACETATE 125 MCG/HR: 1000 INJECTION, SOLUTION INTRAVENOUS; SUBCUTANEOUS at 01:02

## 2025-02-05 RX ADMIN — IOHEXOL 75 ML: 300 INJECTION, SOLUTION INTRAVENOUS at 09:02

## 2025-02-05 RX ADMIN — FENTANYL CITRATE 50 MCG: 0.05 INJECTION, SOLUTION INTRAMUSCULAR; INTRAVENOUS at 09:02

## 2025-02-05 RX ADMIN — AMPICILLIN SODIUM AND SULBACTAM SODIUM 3 G: 2; 1 INJECTION, POWDER, FOR SOLUTION INTRAMUSCULAR; INTRAVENOUS at 08:02

## 2025-02-05 RX ADMIN — PROMETHAZINE HYDROCHLORIDE 12.5 MG: 25 INJECTION INTRAMUSCULAR; INTRAVENOUS at 12:02

## 2025-02-05 RX ADMIN — PROCHLORPERAZINE EDISYLATE 5 MG: 5 INJECTION INTRAMUSCULAR; INTRAVENOUS at 12:02

## 2025-02-05 RX ADMIN — ETOMIDATE 8 MG: 2 INJECTION INTRAVENOUS at 09:02

## 2025-02-05 RX ADMIN — LATANOPROST 1 DROP: 50 SOLUTION OPHTHALMIC at 09:02

## 2025-02-05 RX ADMIN — DIPHENHYDRAMINE HYDROCHLORIDE 50 MG: 50 INJECTION, SOLUTION INTRAMUSCULAR; INTRAVENOUS at 08:02

## 2025-02-05 RX ADMIN — PROMETHAZINE HYDROCHLORIDE 12.5 MG: 25 INJECTION INTRAMUSCULAR; INTRAVENOUS at 11:02

## 2025-02-05 RX ADMIN — SODIUM CHLORIDE: 0.9 INJECTION, SOLUTION INTRAVENOUS at 08:02

## 2025-02-05 NOTE — CARE UPDATE
85 y/o F s/p bland embolization under anesthesia    Zofran prn for nausea and oxy 5 mg prn for pain control.  Can escalate pain control as needed.  Avoid tylenol or tylenol containing products.  Cipro bid to start this evening, if not tolerating po can give 400 mg IV.  At [place], recommend Cipro 500 mg BID x 5 days.  Octreotide 125 mcg/hr (2.5 ml/hr) IV for 1 day post op (24 hrs post procedure).  Do not need to follow liver enzymes, we expect this to be elevated following the procedure.  Pt may also have mild leukocytosis and fever post procedure (post embolization syndrome).  Please contact IR if concern for infectious process.  At DC give less than 1 wks supply of PRN Zofran ODT for nausea, and PRN Oxycodone 5 mg for pain.  Avoid acetaminophen-containing products and EtOH for 2 weeks post procedure.  IR will arrange follow up imaging and clinic visit.    IR will continue to follow. Please contact with questions via "[employer]" secure chat.    Alexandra Hollins PA-C  Interventional Radiology

## 2025-02-05 NOTE — DISCHARGE INSTRUCTIONS
TACE GROIN RECOVERY INSTRUCTIONS:    You have had a procedure called a TACE - Trans-Arterial Chemo-Embolization. This procedure is usually performed by a specially trained doctor called an interventional radiologist. The procedure allows the doctor to treat tumors in the liver by directly injecting chemotherapy spheres into the tumors. A catheter - a thin, flexible tube - was inserted into one of your blood vessels through a small incision in your groin and guided to the liver to deliver the therapy. Please follow the following instructions while recovering:    Monitor the groin site for bleeding. Apply firm pressure to the groin site if you need to cough, during sneezes, and in the event you have to vomit. This reduces the risk of your site bleeding. If bleeding does occurs, apply firm, manual pressure and seek medical assistance immediately!  Do not shower/bathe tonight. Shower tomorrow, at least 24 hours post-procedure. After your shower, you may remove the groin dressing.   Carefully cleanse the groin site with gentle soap and water; do not pick at any scab formation.  Monitor the groin site for signs and symptoms of infection (See below).      Recovering at Home:  Follow your doctor's recommendations on when it is safe to drive - at least THREE days after your procedure.  Do not lift anything heavier than a gallon of milk for the next TEN days.  Avoid strenuous activity/exercise for the next TEN days - this includes climbing stairs.   Do not submerge in a bathtub, pool, or Jacuzzi until the groin site is fully closed - at least 14 days.  Rest often. You may be more tired than usual.  Take your medications exactly as directed. Do not skip doses. Note - some medications should not be resumed after this procedure to prevent any adverse interaction with the contrast dye, which may be harmful to your kidneys. Be sure to ask your healthcare team about any potential reactions and for guidance on what medications to  hold.  Unless directed otherwise, drink six to eight glasses of water a day for the next TWO days to prevent dehydration and to help flush your body of the contrast dye used during your procedure.  Take your temperature and check the groin site for signs of infection - redness, swelling, drainage, or warmth - every day for one week.    When to call your doctor:  Fever of 100.4°F (38°C) or higher, or as directed by your health care provider.  Sudden shortness breath or any bleeding, bruising, or a large area of swelling at the groin site.  Signs of an allergic reaction to the contrast dye: rash, nausea, vomiting, or trouble breathing.  Signs of infection at the groin site: increased pain, redness, swelling, warmth, or foul-smelling drainage.   Constant or increasing pain or numbness in your leg.  Your leg feels cold, looks blue; numbness and/or tingling in the leg, especially near the catheter insertion site.   Rapid, pounding, or irregular heartbeat.  Blood in your urine or black, tarry stools.     Interventional Radiology Clinic    [phone], choose prompt 3, Monday - Friday, 8:00 am - 4:00 pm    [phone] After hours and on holidays. Ask to speak with the interventional radiologist on call.

## 2025-02-05 NOTE — PROCEDURES
Radiology Post-Procedure Note    Pre Op Diagnosis: NET  Post Op Diagnosis: Same    Procedure: Visceral angiogram and anterior right hepatic artery embolization    Procedure performed by: Hardik Estrada MD    Written Informed Consent Obtained: Yes  Specimen Removed: NO  Estimated Blood Loss: Minimal    Findings:   Patent main portal vein. Prior angiogram demonstrates posterior right hepatic artery replaced to left hepatic artery. On angiogram today, the left and replaced posterior right hepatic arteries are atretic s/p TAE in December. Patent anterior right hepatic artery and cystic artery.     TAE of the anterior right hepatic artery to stasis with one vial of 100-300 embospheres. Post embolization angiogram with stasis in the anterior right hepatic artery without evidence of non-target embolization. Widely patent cystic artery post embolization.     Patient tolerated procedure well.    Bedrest for two hours with right leg extended.   Admit for overnight observation and pain/nausea control.   Follow-up as per Dr. Granados/tumor board.    Hardik Estrada MD  Interventional Radiology  Department of Radiology

## 2025-02-05 NOTE — ANESTHESIA POSTPROCEDURE EVALUATION
Anesthesia Post Evaluation    Patient: Shahram Hills    Procedure(s) Performed: * No procedures listed *    Final Anesthesia Type: general      Patient location during evaluation: PACU  Patient participation: Yes- Able to Participate  Level of consciousness: awake and alert  Post-procedure vital signs: reviewed and stable  Pain management: adequate  Airway patency: patent    PONV status at discharge: No PONV  Anesthetic complications: no      Cardiovascular status: blood pressure returned to baseline and hemodynamically stable  Respiratory status: unassisted  Hydration status: euvolemic  Follow-up not needed.              Vitals Value Taken Time   /82 02/05/25 1032   Temp 37.2 °C (98.9 °F) 02/05/25 1020   Pulse 72 02/05/25 1036   Resp 17 02/05/25 1036   SpO2 100 % 02/05/25 1036   Vitals shown include unfiled device data.      No case tracking events are documented in the log.      Pain/Juan Score: Juan Score: 8 (2/5/2025 10:25 AM)

## 2025-02-05 NOTE — SEDATION DOCUMENTATION
Embospheres delivered per MD at this time. Pt tolerating procedure well.   
Procedure completed. Patient tolerated well; VSS. Hemostasis achieved to right groin via 6fr angioseal at 0956; patient to remain flat until 1156. Site CDI without hematoma. Patient to be transported to PACU accompanied by RN and CRNA; report to be given at the bedside.     
Pt arrived to IR suite for bland TACE with anesthesia. Pt oriented to unit and staff. Plan of care reviewed with patient, patient verbalizes understanding. Comfort measures utilized. Pt safely transferred from stretcher to procedural table. Fall risk reviewed with patient, fall risk interventions maintained. Safety strap applied, positioner pillows utilized to minimize pressure points. Blankets applied. Pt prepped and draped utilizing standard sterile technique. Patient placed on continuous monitoring, as required by sedation policy. Timeouts completed utilizing standard universal time-out, per department and facility policy. RN to remain at bedside, continuous monitoring maintained. Pt resting comfortably. Denies pain/discomfort. Care assumed per CRNA, will continue to monitor. See flow sheets for monitoring, medication administration, and updates.     
[facility]

## 2025-02-05 NOTE — TRANSFER OF CARE
"Anesthesia Transfer of Care Note    Patient: Shahram Hills    Procedure(s) Performed: * No procedures listed *    Patient location: PACU    Anesthesia Type: general    Transport from OR: Transported from OR on 6-10 L/min O2 by face mask with adequate spontaneous ventilation    Post pain: adequate analgesia    Post assessment: no apparent anesthetic complications and tolerated procedure well    Post vital signs: stable    Level of consciousness: awake    Nausea/Vomiting: no nausea/vomiting    Complications: none    Transfer of care protocol was followed      Last vitals: Visit Vitals  BP (!) 166/81   Pulse 68   Temp 36.3 °C (97.4 °F) (Temporal)   Resp 20   Ht 5' 4" (1.626 m)   Wt 49.4 kg (109 lb)   LMP  (LMP Unknown)   SpO2 99%   Breastfeeding No   BMI 18.71 kg/m²     "

## 2025-02-05 NOTE — H&P
[facility] Medicine H&P Note     Attending Physician: Zeynep Carpio MD    Date of Admit: 2/5/2025    Chief Complaint     S/p bland embolization     Assessment/Plan:     Shahram Hills is a 84 y.o. 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    Microalbuminuria due to type 2 diabetes mellitus 10/13/2020    Atherosclerosis of aorta 06/22/2020    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  Can escalate pain control as needed.  Avoid tylenol or tylenol containing  products.  Cipro bid to start this evening, if not tolerating po can give 400 mg IV.  At [place], recommend Cipro 500 mg BID x 5 days.  Octreotide 125 mcg/hr (2.5 ml/hr) IV for 1 day post op (24 hrs post procedure).  Do not need to follow liver enzymes, we expect this to be elevated following the procedure.  Pt may also have mild leukocytosis and fever post procedure (post embolization syndrome).  Please contact IR if concern for infectious process.  At [place] give less than 1 wks supply of PRN Zofran ODT for nausea, and PRN Oxycodone 5 mg for pain.  Avoid acetaminophen-containing products and EtOH for 2 weeks post procedure.  IR will arrange follow up imaging and clinic visit.        HFrEF  Last echo 12/2024    Left Ventricle: The left ventricle is normal in size. Normal wall thickness. Moderate global hypokinesis present. There is moderately reduced systolic function with a visually estimated ejection fraction of 30 - 35%. Grade I diastolic dysfunction.    Right Ventricle: Small right ventricular cavity size. Systolic function is mildly reduced.    Left Atrium: Left atrium is mildly dilated. Agitated saline study of the atrial septum is negative, suggesting absence of intracardiac shunt at the atrial level.    Aortic Valve: The aortic valve is a trileaflet valve. Aortic valve peak velocity is 1.6 m/s. Mean gradient is 7.6 mmHg. There is moderate aortic regurgitation.    Aorta: Aortic root is normal in size measuring 3.24 cm.    Pulmonary Artery: The estimated pulmonary artery systolic pressure is 24 mmHg.    Pericardium: There is no pericardial effusion.  Continue GDMT    Admit to observation under my care    Subjective:      History of Present Illness:  Shahram Hills is a 84 y.o.  female who  has a past medical history of Anemia (07/11/2018), B12 deficiency, Depression, Hyperlipidemia, Neuroendocrine tumor, Systolic heart failure, and Weight loss.. The patient presented to [facility] on 2/5/2025 with a  primary complaint of scheduled IR embolization.    Patient tolerated procedure well, of note in December developed symptoms concerning for TIA and work up was negative, likely had a side effect from phenergan.      Past Medical History:   Diagnosis Date    Anemia 07/11/2018    B12 deficiency     Depression     per pcp note 7/2017 and given prozac and diazepam     Hyperlipidemia     Neuroendocrine tumor     Systolic heart failure     Weight loss     reviewed prior pcp Dr. Russo notes 2017 - labs reviewed: cmp wnl x tbili 1.5, tsh wnl, A1c 5.0, cbc wnl,D 36, hiv neg, hep c neg, hep b surg ag neg        Past Surgical History:   Procedure Laterality Date    EMBOLIZATION N/A 02/14/2019    Procedure: EMBOLIZATION, BLOOD VESSEL;  Surgeon: [facility] Diagnostic Provider;  [facility];  Service: Radiology;  Laterality: N/A;  Room 189/[place]    EMBOLIZATION N/A 03/21/2019    Procedure: EMBOLIZATION, BLOOD VESSEL;  Surgeon: [facility] Diagnostic Provider;  [facility];  Service: Radiology;  Laterality: N/A;  Room 189/[place]    ESOPHAGOGASTRODUODENOSCOPY  05/04/2018    esophageal ring, grade 1 esophagitis, gastritis     EYE SURGERY Bilateral     cataract removal    HYSTERECTOMY      fibroids       Allergies:  Review of patient's allergies indicates:   Allergen Reactions    Epinephrine      carcinoid       Home Medications:  Prior to Admission medications    Medication Sig Start Date End Date Taking? Authorizing Provider   blood sugar diagnostic (TRUE METRIX GLUCOSE TEST STRIP) Strp USE TO CHECK BLOOD SUGAR TWICE DAILY 6/24/24  Yes Trixie Xie MD   blood-glucose meter kit by Other route. Use as instructed   Yes Provider, Historical   carvediloL (COREG) 12.5 MG tablet TAKE 1 TABLET(12.5 MG) BY MOUTH TWICE DAILY WITH MEALS 10/8/24  Yes Trixie Xie MD   cyanocobalamin (VITAMIN B-12) 1000 MCG tablet Take 1 tablet (1,000 mcg total) by mouth once daily. 4/20/18  Yes Trixie Xie MD  "  ezetimibe (ZETIA) 10 mg tablet Take 10 mg by mouth once daily.   Yes Provider, Historical   ferrous sulfate 325 (65 FE) MG EC tablet Take 325 mg by mouth once daily.   Yes Provider, Historical   glipiZIDE (GLUCOTROL) 5 MG tablet Take 2.5mg by mouth with breakfast and take 5 mg with dinner 2/3/25  Yes Trixie Xie MD   lancets [facility] To check BG 2 times daily, to use with insurance preferred meter 5/25/21  Yes Trixie Xie MD   latanoprost 0.005 % ophthalmic solution Place 1 drop into both eyes nightly. 11/8/21  Yes Provider, Historical   linaCLOtide (LINZESS) 72 mcg Cap capsule Take 1 capsule (72 mcg total) by mouth before breakfast. 12/21/24  Yes Daksha Clarke FNP   losartan (COZAAR) 50 MG tablet    Yes Provider, Historical   needle, disp, 22 G 22 gauge x 1" Ndle 1 application by Misc.(Non-Drug; Combo Route) route 3 (three) times daily as needed. 3/6/23  Yes Trixie Xie MD   ondansetron (ZOFRAN-ODT) 8 MG TbDL Take 1 tablet (8 mg total) by mouth every 8 (eight) hours as needed (nausea). 11/5/24  Yes Sebastian Granados MD   sacubitriL-valsartan (ENTRESTO) 24-26 mg per tablet TAKE 1 TABLET BY MOUTH TWICE DAILY 9/4/24  Yes Ralph Bergeron MD   spironolactone (ALDACTONE) 25 MG tablet Take 1 tablet (25 mg total) by mouth once daily. 7/9/24 7/9/25 Yes Ralph Bergeron MD   syringe, disposable, 1 mL Syrg 1 application by Misc.(Non-Drug; Combo Route) route 3 (three) times daily as needed (diarrhea). 7/15/22  Yes Sebastian Granados MD   telotristat ethyl (XERMELO) 250 mg Tab Take 1 tablet (250 mg) by mouth 3 (three) times daily. 12/17/24  Yes Sebastian Granados MD   TRUEPLUS LANCETS 33 gauge Misc USE TO CHECK BLOOD SUGAR TWICE DAILY 6/24/24  Yes Trixie Xie MD   vitamin D (VITAMIN D3) 1000 units Tab Take 400 Units by mouth once daily.   Yes Provider, Historical   XIIDRA 5 % Dpet  4/2/18  Yes Provider, Historical   diphenoxylate-atropine 2.5-0.025 mg (LOMOTIL) 2.5-0.025 mg per tablet Take 1 " "tablet by mouth 4 (four) times daily as needed for Diarrhea. 22   Sebastian Granados MD   furosemide (LASIX) 20 MG tablet take 1 tab by mouth daily. If gain 2-3 pounds in 2-3 days then take 2 tabs daily for 3 days and then resume 1 dose daily 10/29/24   Trixie Xie MD       Family History   Problem Relation Name Age of Onset    Glaucoma Mother      Hypertension Mother      Heart attack Father      Cancer Father      Diabetes Sister Marilyn     Asthma Sister Nick Luke     No Known Problems Sister Stephanie     Hyperlipidemia Sister      Heart attack Sister      Cancer Brother          lung cancer, + tobacco    Diabetes Brother      Hypertension Brother      Stroke Brother      Emphysema Brother      COPD Brother       Social History     Tobacco Use    Smoking status: Never    Smokeless tobacco: Never   Substance Use Topics    Alcohol use: No     Comment: stopped in  -  of social drinking    Drug use: Never       Review of Systems   Constitutional:  Negative for chills and fever.   HENT:  Negative for congestion and sore throat.    Respiratory:  Negative for cough and shortness of breath.    Cardiovascular:  Negative for chest pain.   Gastrointestinal:  Positive for abdominal pain. Negative for nausea and vomiting.   Genitourinary:  Negative for dysuria and urgency.   Musculoskeletal:  Negative for myalgias and neck pain.   Neurological:  Negative for dizziness and headaches.          Objective:   Last 24 Hour Vital Signs:  BP  Min: 126/79  Max: 173/88  Temp  Av.1 °F (36.7 °C)  Min: 97.4 °F (36.3 °C)  Max: 98.9 °F (37.2 °C)  Pulse  Av.8  Min: 62  Max: 74  Resp  Avg: 15.3  Min: 13  Max: 20  SpO2  Av.2 %  Min: 97 %  Max: 100 %  Height  Av' 4" (162.6 cm)  Min: 5' 4" (162.6 cm)  Max: 5' 4" (162.6 cm)  Weight  Av kg (103 lb 8.8 oz)  Min: 44.5 kg (98 lb 1.7 oz)  Max: 49.4 kg (109 lb)  Body mass index is 16.84 kg/m².  No intake/output data recorded.    Physical Exam  Constitutional:      "  General: She is not in acute distress.     Appearance: Normal appearance.   HENT:      Head: Normocephalic and atraumatic.   Eyes:      Extraocular Movements: Extraocular movements intact.      Pupils: Pupils are equal, round, and reactive to light.   Cardiovascular:      Rate and Rhythm: Normal rate and regular rhythm.      Heart sounds: No murmur heard.  Pulmonary:      Effort: Pulmonary effort is normal. No respiratory distress.      Breath sounds: Normal breath sounds.   Abdominal:      General: Abdomen is flat. Bowel sounds are normal. There is no distension.      Palpations: Abdomen is soft.   Musculoskeletal:      Right lower leg: No edema.      Left lower leg: No edema.   Skin:     General: Skin is warm and dry.   Neurological:      General: No focal deficit present.      Mental Status: She is alert and oriented to person, place, and time.         Laboratory:  Most Recent Data:  CBC:   Lab Results   Component Value Date    WBC 5.38 01/31/2025    HGB 11.5 (L) 01/31/2025    HCT 34.9 (L) 01/31/2025     01/31/2025    MCV 96 01/31/2025    RDW 13.3 01/31/2025       BMP:   Lab Results   Component Value Date     01/31/2025    K 3.8 01/31/2025     01/31/2025    CO2 26 01/31/2025    BUN 13 01/31/2025    CREATININE 0.7 01/31/2025     (H) 01/31/2025    CALCIUM 9.7 01/31/2025    MG 1.9 12/06/2024    PHOS 3.0 12/03/2024     LFTs:   Lab Results   Component Value Date    PROT 7.2 01/31/2025    ALBUMIN 3.5 01/31/2025    BILITOT 0.9 01/31/2025    AST 23 01/31/2025    ALKPHOS 61 01/31/2025    ALT 12 01/31/2025     Coags:   Lab Results   Component Value Date    INR 1.1 01/31/2025     FLP:   Lab Results   Component Value Date    CHOL 271 (H) 12/02/2024     (H) 12/02/2024    LDLCALC 151.0 12/02/2024    TRIG 75 12/02/2024    CHOLHDL 38.7 12/02/2024     DM:   Lab Results   Component Value Date    HGBA1C 6.2 (H) 11/26/2024    HGBA1C 6.6 (H) 08/13/2024    HGBA1C 6.6 (H) 08/13/2024    GLUF 144 (H)  "02/26/2020    LDLCALC 151.0 12/02/2024    CREATININE 0.7 01/31/2025     Thyroid:   Lab Results   Component Value Date    TSH 1.311 12/19/2024     Anemia:   Lab Results   Component Value Date    IRON 199 (H) 09/17/2018    TIBC 392 09/17/2018    FERRITIN 36 09/17/2018    [id number] >2000 (H) 08/13/2024     Cardiac:   Lab Results   Component Value Date    TROPONINI 0.016 12/03/2024     (H) 12/06/2024     Urinalysis:   Lab Results   Component Value Date    LABURIN  07/11/2018     Multiple organisms isolated. None in predominance.  Repeat if    LABURIN clinically necessary. 07/11/2018    COLORU Yellow 12/19/2024    SPECGRAV 1.015 12/19/2024    NITRITE Negative 12/19/2024    KETONESU Negative 12/19/2024    UROBILINOGEN Negative 12/19/2024    WBCUA 6 (H) 07/11/2018       Trended Lab Data:  Recent Labs   Lab 01/31/25  0905   WBC 5.38   HGB 11.5*   HCT 34.9*      MCV 96   RDW 13.3      K 3.8      CO2 26   BUN 13   CREATININE 0.7   *   PROT 7.2   ALBUMIN 3.5   BILITOT 0.9   AST 23   ALKPHOS 61   ALT 12       Trended Cardiac Data:  No results for input(s): "TROPONINI", "CKTOTAL", "CKMB", "BNP" in the last 168 hours.    Microbiology Data:      Other Results:  EKG (my interpretation): .    Radiology:            Code Status:     Full    Zeynep Carpio MD  [facility] Medicine        "

## 2025-02-05 NOTE — PLAN OF CARE
Pt VSS and in NAD. Pt questions and concerns addressed. Report given to JC Breen. No further needs at this time. Pt discharged from recovery area at 1300.

## 2025-02-05 NOTE — PLAN OF CARE
02/05/25 1524   Admission   Initial VN Admission Questions Complete   Communication Issues? Patient Hearing  (Pt does not wear her hearing aids)   Shift   Virtual Nurse - Patient Verbalized Approval Of Camera Use;VN Rounding  (Sister approved, pt sleeping)   Safety/Activity   Patient Rounds bed in low position;placement of personal items at bedside;bed wheels locked;call light in patient/parent reach;visualized patient;clutter free environment maintained;ID band on   Safety Promotion/Fall Prevention assistive device/personal item within reach;Fall Risk reviewed with patient/family;instructed to call staff for mobility;side rails raised x 2;medications reviewed   Positioning   Head of Bed (HOB) Positioning HOB at 20-30 degrees     VN cued into patient's room for introduction with patient's sister's permission.  VN role explained and informed patient/family that VN would be working with bedside nurse and rest of care team.  Plan of care reviewed, pt encouraged to refer to whiteboard to determine staff for the day and pt/family educated on VTE,  fall risk and advised to call for assistance at all times to ensure pt's safety. Pt is without any signs of pain, anxiety, dyspnea or nausea. Patient's chart, labs and vital signs reviewed.  Allowed time for questions.  Will continue to be available as needed.

## 2025-02-05 NOTE — H&P
Radiology History & Physical      SUBJECTIVE:     Chief Complaint: NET    History of Present Illness:  Shahram Hills is a 84 y.o. female who presents for bland hepatic TAE for metastatic NET.      Past Medical History:   Diagnosis Date    Anemia 07/11/2018    B12 deficiency     Depression     per pcp note 7/2017 and given prozac and diazepam     Hyperlipidemia     Neuroendocrine tumor     Systolic heart failure     Weight loss     reviewed prior pcp Dr. Russo notes 2017 - labs reviewed: cmp wnl x tbili 1.5, tsh wnl, A1c 5.0, cbc wnl,D 36, hiv neg, hep c neg, hep b surg ag neg      Past Surgical History:   Procedure Laterality Date    EMBOLIZATION N/A 02/14/2019    Procedure: EMBOLIZATION, BLOOD VESSEL;  Surgeon: [facility] Diagnostic Provider;  [facility];  Service: Radiology;  Laterality: N/A;  Room 189/[facility]    EMBOLIZATION N/A 03/21/2019    Procedure: EMBOLIZATION, BLOOD VESSEL;  Surgeon: [facility] Diagnostic Provider;  [facility];  Service: Radiology;  Laterality: N/A;  Room 189/[facility]    ESOPHAGOGASTRODUODENOSCOPY  05/04/2018    esophageal ring, grade 1 esophagitis, gastritis     EYE SURGERY Bilateral     cataract removal    HYSTERECTOMY      fibroids       Home Meds:   Prior to Admission medications    Medication Sig Start Date End Date Taking? Authorizing Provider   blood sugar diagnostic (TRUE METRIX GLUCOSE TEST STRIP) Strp USE TO CHECK BLOOD SUGAR TWICE DAILY 6/24/24  Yes Trixie Xie MD   blood-glucose meter kit by Other route. Use as instructed   Yes Provider, Historical   carvediloL (COREG) 12.5 MG tablet TAKE 1 TABLET(12.5 MG) BY MOUTH TWICE DAILY WITH MEALS 10/8/24  Yes Trixie Xie MD   cyanocobalamin (VITAMIN B-12) 1000 MCG tablet Take 1 tablet (1,000 mcg total) by mouth once daily. 4/20/18  Yes Trixie Xie MD   ezetimibe (ZETIA) 10 mg tablet Take 10 mg by mouth once daily.   Yes Provider, Historical   ferrous sulfate 325 (65 FE) MG EC tablet  "Take 325 mg by mouth once daily.   Yes Provider, Historical   glipiZIDE (GLUCOTROL) 5 MG tablet Take 2.5mg by mouth with breakfast and take 5 mg with dinner 2/3/25  Yes Trixie Xie MD   lancets [facility] To check BG 2 times daily, to use with insurance preferred meter 5/25/21  Yes Trixie Xie MD   latanoprost 0.005 % ophthalmic solution Place 1 drop into both eyes nightly. 11/8/21  Yes Provider, Historical   linaCLOtide (LINZESS) 72 mcg Cap capsule Take 1 capsule (72 mcg total) by mouth before breakfast. 12/21/24  Yes Daksha Clarke FNP   losartan (COZAAR) 50 MG tablet    Yes Provider, Historical   needle, disp, 22 G 22 gauge x 1" Ndle 1 application by Misc.(Non-Drug; Combo Route) route 3 (three) times daily as needed. 3/6/23  Yes Trixie Xie MD   ondansetron (ZOFRAN-ODT) 8 MG TbDL Take 1 tablet (8 mg total) by mouth every 8 (eight) hours as needed (nausea). 11/5/24  Yes Sebastian Granados MD   sacubitriL-valsartan (ENTRESTO) 24-26 mg per tablet TAKE 1 TABLET BY MOUTH TWICE DAILY 9/4/24  Yes Ralph Bergeron MD   spironolactone (ALDACTONE) 25 MG tablet Take 1 tablet (25 mg total) by mouth once daily. 7/9/24 7/9/25 Yes Ralph Bergeron MD   syringe, disposable, 1 mL Syrg 1 application by Misc.(Non-Drug; Combo Route) route 3 (three) times daily as needed (diarrhea). 7/15/22  Yes Sebastian Granados MD   telotristat ethyl (XERMELO) 250 mg Tab Take 1 tablet (250 mg) by mouth 3 (three) times daily. 12/17/24  Yes Sebastian Granados MD   TRUEPLUS LANCETS 33 gauge Misc USE TO CHECK BLOOD SUGAR TWICE DAILY 6/24/24  Yes Trixie Xie MD   vitamin D (VITAMIN D3) 1000 units Tab Take 400 Units by mouth once daily.   Yes Provider, Historical   XIIDRA 5 % Dpet  4/2/18  Yes Provider, Historical   diphenoxylate-atropine 2.5-0.025 mg (LOMOTIL) 2.5-0.025 mg per tablet Take 1 tablet by mouth 4 (four) times daily as needed for Diarrhea. 12/7/22   Sebastian Granados MD   furosemide (LASIX) 20 MG tablet take 1 tab by " mouth daily. If gain 2-3 pounds in 2-3 days then take 2 tabs daily for 3 days and then resume 1 dose daily 10/29/24   Trixie Xie MD     Anticoagulants/Antiplatelets: no anticoagulation    Allergies:   Review of patient's allergies indicates:   Allergen Reactions    Epinephrine      carcinoid     Sedation History:  no adverse reactions            OBJECTIVE:     Vital Signs (Most Recent)  Temp: 97.4 °F (36.3 °C) (02/05/25 0733)  Pulse: 69 (02/05/25 0733)  Resp: 20 (02/05/25 0733)  BP: (!) 166/81 (02/05/25 0758)  SpO2: 99 % (02/05/25 0733)    Physical Exam:  ASA: 2  Mallampati: 2    General: no acute distress  Mental Status: alert and oriented to person, place and time  HEENT: normocephalic, atraumatic  Chest: unlabored breathing  Heart: regular heart rate  Abdomen: nondistended  Extremity: moves all extremities    Laboratory  Lab Results   Component Value Date    INR 1.1 01/31/2025       Lab Results   Component Value Date    WBC 5.38 01/31/2025    HGB 11.5 (L) 01/31/2025    HCT 34.9 (L) 01/31/2025    MCV 96 01/31/2025     01/31/2025      Lab Results   Component Value Date     (H) 01/31/2025     01/31/2025    K 3.8 01/31/2025     01/31/2025    CO2 26 01/31/2025    BUN 13 01/31/2025    CREATININE 0.7 01/31/2025    CALCIUM 9.7 01/31/2025    MG 1.9 12/06/2024    ALT 12 01/31/2025    AST 23 01/31/2025    ALBUMIN 3.5 01/31/2025    BILITOT 0.9 01/31/2025    BILIDIR 0.4 (H) 01/31/2025       ASSESSMENT/PLAN:     Sedation Plan: GA  Patient will undergo hepatic artery embolization.    Hardik Estrada MD  Interventional Radiology  Department of Radiology

## 2025-02-05 NOTE — ANESTHESIA PROCEDURE NOTES
Intubation    Date/Time: 2/5/2025 9:07 AM    Performed by: Anthony Hartman Jr., CRNA  Authorized by: Moses Garcia MD    Intubation:     Induction:  Intravenous    Intubated:  Postinduction    Mask Ventilation:  Easy with oral airway    Attempts:  1    Attempted By:  Student (NOEMY Germain)    Method of Intubation:  Video laryngoscopy    Blade:  [place] 3    Laryngeal View Grade: Grade I - full view of cords      Difficult Airway Encountered?: No      Complications:  None    Airway Device:  Direct and oral endotracheal tube    Airway Device Size:  7.0    Style/Cuff Inflation:  Cuffed (inflated to minimal occlusive pressure)    Tube secured:  21    Secured at:  The lips    Placement Verified By:  Capnometry    Complicating Factors:  None    Findings Post-Intubation:  BS equal bilateral

## 2025-02-05 NOTE — ANESTHESIA PREPROCEDURE EVALUATION
02/05/2025  Shahram Hills is a 84 y.o., female.      Pre-op Assessment     I have reviewed the Nursing Notes.    I have reviewed the Medications.     Review of Systems  Anesthesia Hx:  No problems with previous Anesthesia             Denies Family Hx of Anesthesia complications.     Social:  Non-Smoker, No Alcohol Use       Hematology/Oncology:  Hematology Normal   Oncology Normal                                   EENT/Dental:  EENT/Dental Normal           Cardiovascular:  Exercise tolerance: good   Hypertension       CHF                Congestive Heart Failure (CHF)                Hypertension         Pulmonary:  Pulmonary Normal                       Renal/:  Chronic Renal Disease        Kidney Function/Disease             Hepatic/GI:      Liver Disease,            Liver Disease        Musculoskeletal:  Musculoskeletal Normal                Neurological:    Neuromuscular Disease,                                 Neuromuscular Disease   Endocrine:  Diabetes    Diabetes                      Psych:  Psychiatric History                Physical Exam  General: Well nourished    Airway:  Mallampati: II / II  Mouth Opening: Normal  TM Distance: Normal  Tongue: Normal  Neck ROM: Normal ROM    Dental:  Intact      Anesthesia Plan  Type of Anesthesia, risks & benefits discussed:    Anesthesia Type: Gen ETT, Gen Natural Airway  Intra-op Monitoring Plan: Standard ASA Monitors  Post Op Pain Control Plan: multimodal analgesia  Induction:  IV  Airway Plan: Direct, Post-Induction  Informed Consent: Informed consent signed with the Patient and all parties understand the risks and agree with anesthesia plan.  All questions answered.   ASA Score: 3    Ready For Surgery From Anesthesia Perspective.     .

## 2025-02-06 VITALS
HEART RATE: 45 BPM | BODY MASS INDEX: 17.35 KG/M2 | DIASTOLIC BLOOD PRESSURE: 76 MMHG | RESPIRATION RATE: 18 BRPM | WEIGHT: 101.63 LBS | TEMPERATURE: 99 F | OXYGEN SATURATION: 97 % | HEIGHT: 64 IN | SYSTOLIC BLOOD PRESSURE: 175 MMHG

## 2025-02-06 LAB
ANION GAP SERPL CALC-SCNC: 12 MMOL/L (ref 8–16)
BASOPHILS # BLD AUTO: 0.01 K/UL (ref 0–0.2)
BASOPHILS NFR BLD: 0.1 % (ref 0–1.9)
BUN SERPL-MCNC: 10 MG/DL (ref 8–23)
CALCIUM SERPL-MCNC: 9.2 MG/DL (ref 8.7–10.5)
CHLORIDE SERPL-SCNC: 101 MMOL/L (ref 95–110)
CO2 SERPL-SCNC: 26 MMOL/L (ref 23–29)
CREAT SERPL-MCNC: 0.7 MG/DL (ref 0.5–1.4)
DIFFERENTIAL METHOD BLD: ABNORMAL
EOSINOPHIL # BLD AUTO: 0 K/UL (ref 0–0.5)
EOSINOPHIL NFR BLD: 0 % (ref 0–8)
ERYTHROCYTE [DISTWIDTH] IN BLOOD BY AUTOMATED COUNT: 13.2 % (ref 11.5–14.5)
EST. GFR  (NO RACE VARIABLE): >60 ML/MIN/1.73 M^2
GLUCOSE SERPL-MCNC: 141 MG/DL (ref 70–110)
HCT VFR BLD AUTO: 38 % (ref 37–48.5)
HGB BLD-MCNC: 12.3 G/DL (ref 12–16)
IMM GRANULOCYTES # BLD AUTO: 0.03 K/UL (ref 0–0.04)
IMM GRANULOCYTES NFR BLD AUTO: 0.4 % (ref 0–0.5)
LYMPHOCYTES # BLD AUTO: 1.1 K/UL (ref 1–4.8)
LYMPHOCYTES NFR BLD: 12.9 % (ref 18–48)
MCH RBC QN AUTO: 30.4 PG (ref 27–31)
MCHC RBC AUTO-ENTMCNC: 32.4 G/DL (ref 32–36)
MCV RBC AUTO: 94 FL (ref 82–98)
MONOCYTES # BLD AUTO: 0.9 K/UL (ref 0.3–1)
MONOCYTES NFR BLD: 10.7 % (ref 4–15)
NEUTROPHILS # BLD AUTO: 6.4 K/UL (ref 1.8–7.7)
NEUTROPHILS NFR BLD: 75.9 % (ref 38–73)
NRBC BLD-RTO: 0 /100 WBC
PLATELET # BLD AUTO: 196 K/UL (ref 150–450)
PMV BLD AUTO: 9.6 FL (ref 9.2–12.9)
POCT GLUCOSE: 154 MG/DL (ref 70–110)
POCT GLUCOSE: 159 MG/DL (ref 70–110)
POCT GLUCOSE: 193 MG/DL (ref 70–110)
POTASSIUM SERPL-SCNC: 3.2 MMOL/L (ref 3.5–5.1)
RBC # BLD AUTO: 4.04 M/UL (ref 4–5.4)
SODIUM SERPL-SCNC: 139 MMOL/L (ref 136–145)
WBC # BLD AUTO: 8.48 K/UL (ref 3.9–12.7)

## 2025-02-06 PROCEDURE — 96366 THER/PROPH/DIAG IV INF ADDON: CPT

## 2025-02-06 PROCEDURE — 99214 OFFICE O/P EST MOD 30 MIN: CPT | Mod: NSCH,HCNC,GC, | Performed by: PHYSICIAN ASSISTANT

## 2025-02-06 PROCEDURE — 85025 COMPLETE CBC W/AUTO DIFF WBC: CPT | Mod: HCNC | Performed by: INTERNAL MEDICINE

## 2025-02-06 PROCEDURE — 25000003 PHARM REV CODE 250: Mod: HCNC | Performed by: INTERNAL MEDICINE

## 2025-02-06 PROCEDURE — 36415 COLL VENOUS BLD VENIPUNCTURE: CPT | Mod: HCNC | Performed by: INTERNAL MEDICINE

## 2025-02-06 PROCEDURE — 80048 BASIC METABOLIC PNL TOTAL CA: CPT | Mod: HCNC | Performed by: INTERNAL MEDICINE

## 2025-02-06 PROCEDURE — G0378 HOSPITAL OBSERVATION PER HR: HCPCS | Mod: HCNC

## 2025-02-06 RX ORDER — OXYCODONE HYDROCHLORIDE 5 MG/1
5 TABLET ORAL EVERY 6 HOURS PRN
Qty: 15 TABLET | Refills: 0 | Status: SHIPPED | OUTPATIENT
Start: 2025-02-06 | End: 2025-02-11

## 2025-02-06 RX ORDER — ONDANSETRON 8 MG/1
8 TABLET, ORALLY DISINTEGRATING ORAL EVERY 8 HOURS PRN
Qty: 21 TABLET | Refills: 0 | Status: SHIPPED | OUTPATIENT
Start: 2025-02-06 | End: 2025-02-15

## 2025-02-06 RX ORDER — CIPROFLOXACIN 500 MG/1
500 TABLET ORAL EVERY 12 HOURS
Qty: 8 TABLET | Refills: 0 | Status: SHIPPED | OUTPATIENT
Start: 2025-02-06 | End: 2025-02-10

## 2025-02-06 RX ADMIN — INSULIN ASPART 2 UNITS: 100 INJECTION, SOLUTION INTRAVENOUS; SUBCUTANEOUS at 11:02

## 2025-02-06 RX ADMIN — INSULIN ASPART 2 UNITS: 100 INJECTION, SOLUTION INTRAVENOUS; SUBCUTANEOUS at 06:02

## 2025-02-06 RX ADMIN — CARVEDILOL 12.5 MG: 12.5 TABLET, FILM COATED ORAL at 08:02

## 2025-02-06 RX ADMIN — SACUBITRIL AND VALSARTAN 1 TABLET: 24; 26 TABLET, FILM COATED ORAL at 08:02

## 2025-02-06 RX ADMIN — POTASSIUM BICARBONATE 50 MEQ: 978 TABLET, EFFERVESCENT ORAL at 11:02

## 2025-02-06 RX ADMIN — CYANOCOBALAMIN TAB 1000 MCG 1000 MCG: 1000 TAB at 08:02

## 2025-02-06 RX ADMIN — CIPROFLOXACIN 500 MG: 500 TABLET ORAL at 08:02

## 2025-02-06 RX ADMIN — EZETIMIBE 10 MG: 10 TABLET ORAL at 08:02

## 2025-02-06 RX ADMIN — SPIRONOLACTONE 25 MG: 25 TABLET, FILM COATED ORAL at 08:02

## 2025-02-06 RX ADMIN — FERROUS SULFATE TAB 325 MG (65 MG ELEMENTAL FE) 1 EACH: 325 (65 FE) TAB at 08:02

## 2025-02-06 NOTE — PLAN OF CARE
MOON Message     Medicare Outpatient and Observation Notification regarding financial responsibility Explained to patient/caregiver; Other (comments)Medicare Outpatient and Observation Notification regarding financial responsibility. Explained to patient/caregiver; Other (comments). The comment is Patient gave verbal consent. Taken on 2/6/25 2568   Date MARTINEZ was signed 2/6/2025   Time MARTINEZ was signed 2075

## 2025-02-06 NOTE — PROGRESS NOTES
Interventional Radiology Progress Note      SUBJECTIVE:     History of Present Illness:  Shahram Hills is a 84 y.o. 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 Pt tolerated the procedure well.  Pt reports some abdominal pain and fatigue this morning.  She has not attempted to eat breakfast yet    Review of Systems   Constitutional:  Positive for malaise/fatigue. Negative for chills, fever and weight loss.   Respiratory:  Negative for cough and shortness of breath.    Cardiovascular:  Negative for chest pain, palpitations and leg swelling.   Gastrointestinal:  Positive for abdominal pain. Negative for diarrhea, nausea and vomiting.   Genitourinary:  Negative for dysuria and flank pain.   Musculoskeletal:  Negative for back pain and myalgias.   Neurological:  Negative for weakness and headaches.       Scheduled Meds:   carvediloL  12.5 mg Oral BID    ciprofloxacin HCl  500 mg Oral Q12H    cyanocobalamin  1,000 mcg Oral Daily    ezetimibe  10 mg Oral Daily    ferrous sulfate  1 tablet Oral Daily    latanoprost  1 drop Both Eyes Nightly    sacubitriL-valsartan  1 tablet Oral BID    scopolamine  1 patch Transdermal Q3 Days    spironolactone  25 mg Oral Daily     Continuous Infusions:   octreotide (SANDOSTATIN) 5,000 mcg in 0.9% NaCl 100 mL infusion  125 mcg/hr Intravenous Continuous 2.5 mL/hr at 02/06/25 0527 125 mcg/hr at 02/06/25 0527     PRN Meds:  Current Facility-Administered Medications:     ampicillin-sulbactam, 3 g, Intravenous, On Call Procedure    dextrose 50%, 12.5 g, Intravenous, PRN    dextrose 50%, 25 g, Intravenous, PRN    glucagon (human recombinant), 1 mg, Intramuscular, PRN    glucose, 16 g, Oral, PRN    glucose, 24 g, Oral, PRN    insulin aspart U-100, 0-10 Units, Subcutaneous, QID (AC + HS) PRN    naloxone, 0.02 mg, Intravenous, PRN    ondansetron, 4 mg, Intravenous, Q6H PRN    oxyCODONE, 5 mg, Oral, Q6H PRN    prochlorperazine, 5  mg, Intravenous, Q6H PRN    promethazine (PHENERGAN) 12.5 mg in 0.9% NaCl 50 mL IVPB, 12.5 mg, Intravenous, Q6H PRN    sodium chloride 0.9%, 10 mL, Intravenous, Q12H PRN    Review of patient's allergies indicates:   Allergen Reactions    Epinephrine      carcinoid       Past Medical History:   Diagnosis Date    Anemia 07/11/2018    B12 deficiency     Depression     per pcp note 7/2017 and given prozac and diazepam     Hyperlipidemia     Neuroendocrine tumor     Systolic heart failure     Weight loss     reviewed prior pcp Dr. Russo notes 2017 - labs reviewed: cmp wnl x tbili 1.5, tsh wnl, A1c 5.0, cbc wnl,D 36, hiv neg, hep c neg, hep b surg ag neg      Past Surgical History:   Procedure Laterality Date    EMBOLIZATION N/A 02/14/2019    Procedure: EMBOLIZATION, BLOOD VESSEL;  Surgeon: [facility] Diagnostic Provider;  Location: [facility] OR 2ND FLR;  Service: Radiology;  Laterality: N/A;  Room 189/[facility]    EMBOLIZATION N/A 03/21/2019    Procedure: EMBOLIZATION, BLOOD VESSEL;  Surgeon: [facility] Diagnostic Provider;  Location: [facility] OR 2ND FLR;  Service: Radiology;  Laterality: N/A;  Room 189/[facility]    ESOPHAGOGASTRODUODENOSCOPY  05/04/2018    esophageal ring, grade 1 esophagitis, gastritis     EYE SURGERY Bilateral     cataract removal    HYSTERECTOMY      fibroids     Family History   Problem Relation Name Age of Onset    Glaucoma Mother      Hypertension Mother      Heart attack Father      Cancer Father      Diabetes Sister Marilyn     Asthma Sister Nick Luke     No Known Problems Sister Stephanie     Hyperlipidemia Sister      Heart attack Sister      Cancer Brother          lung cancer, + tobacco    Diabetes Brother      Hypertension Brother      Stroke Brother      Emphysema Brother      COPD Brother       Social History     Tobacco Use    Smoking status: Never    Smokeless tobacco: Never   Substance Use Topics    Alcohol use: No     Comment: stopped in 2007 - hx of social drinking    Drug use: Never       OBJECTIVE:      Vital Signs (Most Recent)  Temp: 98.6 °F (37 °C) (02/06/25 0747)  Pulse: 77 (02/06/25 0747)  Resp: 18 (02/06/25 0747)  BP: (!) 154/87 (02/06/25 0747)  SpO2: 100 % (02/06/25 0747)    Physical Exam:  Physical Exam  Vitals and nursing note reviewed.   Constitutional:       Appearance: Normal appearance.   HENT:      Head: Normocephalic and atraumatic.      Right Ear: External ear normal.      Left Ear: External ear normal.      Nose: Nose normal.   Eyes:      Extraocular Movements: Extraocular movements intact.   Cardiovascular:      Rate and Rhythm: Normal rate.      Pulses: Normal pulses.   Pulmonary:      Effort: Pulmonary effort is normal.   Abdominal:      General: Abdomen is flat.   Musculoskeletal:      Cervical back: Normal range of motion and neck supple.   Skin:     General: Skin is warm and dry.   Neurological:      General: No focal deficit present.      Mental Status: She is alert and oriented to person, place, and time.   Psychiatric:         Mood and Affect: Mood normal.         Behavior: Behavior normal.         Laboratory  I have reviewed all pertinent lab results within the past 24 hours.  CBC:   Recent Labs   Lab 02/06/25  0231   WBC 8.48   RBC 4.04   HGB 12.3   HCT 38.0      MCV 94   MCH 30.4   MCHC 32.4     CMP:   Recent Labs   Lab 01/31/25  0905 02/06/25  0231   * 141*   CALCIUM 9.7 9.2   ALBUMIN 3.5  --    PROT 7.2  --     139   K 3.8 3.2*   CO2 26 26    101   BUN 13 10   CREATININE 0.7 0.7   ALKPHOS 61  --    ALT 12  --    AST 23  --    BILITOT 0.9  --      Coagulation:   Recent Labs   Lab 01/31/25  0905   LABPROT 11.9   INR 1.1         ASSESSMENT/PLAN:     Assessment:  84 y.o. 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    Plan:  Zofran prn for nausea and oxy 5 mg prn for pain control.  Can escalate pain control as needed.  Avoid tylenol or tylenol containing products.  At OK, recommend Cipro 500 mg BID x 5  days.  Octreotide 125 mcg/hr (2.5 ml/hr) IV for 1 day post op (24 hrs post procedure).  Do not need to follow liver enzymes, we expect this to be elevated following the procedure.  Pt may also have mild leukocytosis and fever post procedure (post embolization syndrome).  Please contact IR if concern for infectious process.  At DC give less than 1 wks supply of PRN Zofran ODT for nausea, and PRN Oxycodone 5 mg for pain.  Avoid acetaminophen-containing products and EtOH for 2 weeks post procedure.  IR will arrange follow up imaging and clinic visit.    IR will continue to follow. Please contact with questions via [employer] secure chat.    Alexandra Hollins PA-C  Interventional Radiology

## 2025-02-06 NOTE — NURSING
Pt c/o N/V. PRN zofran not available at this time. Dr. Recinos notified. Orders received for PRN compazine and PRN phenergan.

## 2025-02-06 NOTE — PLAN OF CARE
Problem: Wound  Goal: Optimal Pain Control and Function  Outcome: Progressing  Goal: Skin Health and Integrity  Outcome: Progressing     Problem: Adult Inpatient Plan of Care  Goal: Plan of Care Review  Outcome: Progressing  Goal: Absence of Hospital-Acquired Illness or Injury  Outcome: Progressing  Goal: Optimal Comfort and Wellbeing  Outcome: Progressing     Problem: Diabetes Comorbidity  Goal: Blood Glucose Level Within Targeted Range  Outcome: Progressing     Problem: Fall Injury Risk  Goal: Absence of Fall and Fall-Related Injury  Outcome: Progressing     Problem: Surgery Nonspecified  Goal: Absence of Bleeding  Outcome: Progressing  Goal: Anesthesia/Sedation Recovery  Outcome: Progressing  Goal: Effective Urinary Elimination  Outcome: Progressing  Goal: Effective Oxygenation and Ventilation  Outcome: Progressing

## 2025-02-06 NOTE — PLAN OF CARE
Discharge orders noted. AVS prepared with medication list, importance of medication compliance, follow up appointments, diet, home care instructions, treatment plan, self management, and when to seek medical attention. Detailed clinical reference list attached. AVS printed and handed to patient by bedside nurse. VN reviewed discharge instructions with patient and sister using teachback method.  Allowed time for questions, all questions answered.  Patient  and sister verbalized complete understanding of discharge instructions and voices no concerns.      Discharge instructions complete.  Bedside delivery complete.  Transport wheelchair not requested, awaiting bp recheck. Bedside nurse notified.

## 2025-02-06 NOTE — PLAN OF CARE
02/06/25 1030   Rounds   Attendance Provider;Nurse    Discharge Plan A Home with family       1030  CM was informed by Dr Carpio that the pt will be medically stable to discharge home today after completion of the octreotide gtt at 1400.    [place] - Med Surg  Initial Discharge Assessment       Primary Care Provider: Trixie Xie MD    Admission Diagnosis: Neuroendocrine carcinoma metastatic to liver [C7A.8, C7B.8]    Admission Date: 2/5/2025  Expected Discharge Date: 2/6/2025    Payor: [employer] MEDICARE / Plan: [employer] HMO PPO SPECIAL NEEDS / Product Type: Medicare Advantage /     Extended Emergency Contact Information  Primary Emergency Contact: AriadnaShayla   [facility]  Home Phone: [phone]  Mobile Phone: [phone]  Relation: Sister  Secondary Emergency Contact: Geraldine Rosenthal  Mobile Phone: [phone]  Relation: Relative   needed? No    Discharge Plan A: (P) Home with family  Discharge Plan B: (P) Home Health      [id number] - [place], [place] - [address] [facility] [address]  [address]  [facility] [address]  Phone: [phone] Fax: [phone]    [facility] - [facility] [address]  [address] [facility] [address]  Phone: [phone] Fax: [phone]    [facility]  [address] [facility] [address]  Phone: [phone] Fax: [phone]      Initial Assessment (most recent)       Adult Discharge Assessment - 02/06/25 1135          Discharge Assessment    Assessment Type Discharge Planning Assessment (P)      Confirmed/corrected address, phone number and insurance Yes (P)      Confirmed Demographics Correct on Facesheet (P)      Source of Information patient (P)      Communicated ISH with patient/caregiver Yes (P)      People in Home sibling(s) (P)    Shayla devi ([phone])    Do you expect to return to your  current living situation? Yes (P)      Do you have help at home or someone to help you manage your care at home? Yes (P)      Prior to hospitilization cognitive status: Alert/Oriented (P)      Current cognitive status: Alert/Oriented (P)      Equipment Currently Used at Home blood pressure machine (P)      Readmission within 30 days? No (P)      Patient currently being followed by outpatient case management? No (P)      Do you currently have service(s) that help you manage your care at home? No (P)      Do you take prescription medications? Yes (P)      Do you have prescription coverage? Yes (P)      Do you have any problems affording any of your prescribed medications? No (P)      Is the patient taking medications as prescribed? yes (P)      How do you get to doctors appointments? family or friend will provide (P)      Are you on dialysis? No (P)      Do you take coumadin? No (P)      Discharge Plan A Home with family (P)      Discharge Plan B Home Health (P)      DME Needed Upon Discharge  none (P)      Discharge Plan discussed with: Patient (P)         Physical Activity    On average, how many days per week do you engage in moderate to strenuous exercise (like a brisk walk)? 0 days (P)      On average, how many minutes do you engage in exercise at this level? 0 min (P)         Financial Resource Strain    How hard is it for you to pay for the very basics like food, housing, medical care, and heating? Not hard at all (P)         Housing Stability    In the last 12 months, was there a time when you were not able to pay the mortgage or rent on time? No (P)      At any time in the past 12 months, were you homeless or living in a shelter (including now)? No (P)         Transportation Needs    Has the lack of transportation kept you from medical appointments, meetings, work or from getting things needed for daily living? No (P)         Food Insecurity    Within the past 12 months, you worried that your food would run  out before you got the money to buy more. Never true (P)      Within the past 12 months, the food you bought just didn't last and you didn't have money to get more. Never true (P)         Stress    Do you feel stress - tense, restless, nervous, or anxious, or unable to sleep at night because your mind is troubled all the time - these days? Not at all (P)         Social Isolation    How often do you feel lonely or isolated from those around you?  Never (P)         Alcohol Use    Q1: How often do you have a drink containing alcohol? Never (P)      Q2: How many drinks containing alcohol do you have on a typical day when you are drinking? Patient does not drink (P)      Q3: How often do you have six or more drinks on one occasion? Never (P)         [employer]    In the past 12 months has the electric, gas, oil, or water company threatened to shut off services in your home? No (P)         Health Literacy    How often do you need to have someone help you when you read instructions, pamphlets, or other written material from your doctor or pharmacy? Rarely (P)    pt wears glasses                  1135  Patient resting quietly in bed when CM rounded. No family present. Patient was admitted with neuro-endo cancer with mets to the liver & TIA sumptoms following an embolization done in IR on 2/5/2025. Continuous octreotide gtt in progress at this time.     Patient lives with her sister, Shayla Rosenthal, is independent of all ADLs, & denied the need for assistance with transportation at time of discharge. Patient in agreement with the plan to discharge home today after completion of octreotide gtt.     Previously scheduled appointment with Dr Trixie Xie (PCP) on 2/24/2025 at 1130  noted. Information added to the pt's discharge paperwork.    CM updated patient's whiteboard with CM name & contact information.     1430  DC order noted. CM informed Shayla via phone of the pt's discharge status. Shayla verbalized understanding, agreement,  & stated she was en route to the hospital now.     1440  Message sent to nurse Danielle, charge nurse Irma, & virtual nurse Amanda informing that the pt is cleared to discharge.       Will continue to follow.

## 2025-02-06 NOTE — NURSING
Per MD Carpio, ok to discharge .  BP recheck of 175/76. Per MD, pt can resume BP regimen at home.  Nurse verbalized understanding.

## 2025-02-06 NOTE — PLAN OF CARE
Problem: Adult Inpatient Plan of Care  Goal: Plan of Care Review  Outcome: Progressing  Goal: Absence of Hospital-Acquired Illness or Injury  Outcome: Progressing  Goal: Optimal Comfort and Wellbeing  Outcome: Progressing     Problem: Diabetes Comorbidity  Goal: Blood Glucose Level Within Targeted Range  Outcome: Progressing     Problem: Fall Injury Risk  Goal: Absence of Fall and Fall-Related Injury  Outcome: Progressing     Problem: Surgery Nonspecified  Goal: Absence of Bleeding  Outcome: Progressing  Goal: Fluid and Electrolyte Balance  Outcome: Progressing  Goal: Absence of Infection Signs and Symptoms  Outcome: Progressing  Goal: Optimal Pain Control and Function  Outcome: Progressing  Goal: Nausea and Vomiting Relief  Outcome: Progressing  Goal: Effective Urinary Elimination  Outcome: Progressing  Goal: Effective Oxygenation and Ventilation  Outcome: Progressing     Problem: Comorbidity Management  Goal: Blood Pressure in Desired Range  Outcome: Progressing     Pt AAOx3. No c/o pain. PRN compazine given for c/o nausea. Medications administered per MAR. Amirah infusing @ 2.5cc/hr. On RA. Blood glucose monitored. R groin dressing CDI. Voiding spontaneously without difficulty. Bed alarm set, side rails raised, and call light in reach.

## 2025-02-07 ENCOUNTER — PATIENT OUTREACH (OUTPATIENT)
Dept: ADMINISTRATIVE | Facility: CLINIC | Age: 85
End: 2025-02-07
Payer: MEDICARE

## 2025-02-07 NOTE — PLAN OF CARE
[facility] - Med Surg  Discharge Final Note    Primary Care Provider: Trixie Xie MD    Expected Discharge Date: 2/6/2025    Final Discharge Note (most recent)       Final Note - 02/07/25 0810          Final Note    Assessment Type Final Discharge Note (P)      Anticipated Discharge Disposition Home or Self Care (P)      Hospital Resources/Appts/Education Provided Appointments scheduled and added to AVS (P)                      Contact Info       Trixie Xie MD   Specialty: Internal Medicine   Relationship: PCP - General    [address] [place], [place] [id number]   Phone: [phone]       Next Steps: Follow up on 2/24/2025    Instructions: at 11:30pm; previously scheduled PCP appointment

## 2025-02-07 NOTE — TELEPHONE ENCOUNTER
Aurora Morales and myself spoke to Kerri on the phone on 2/6/2025 in the afternoon. We tried calling the patient earlier and she was able to call us back briefly after the missed call.     We reviewed Kerri's birth plan and prenatal expectations with our team and brought up some concerns today over the phone concerning some of her requests being inconsistent with the standard of care.  We discussed that we follow ACOG guidelines, our professional societies recommendations for best practice and standard of care. We went over different things that Kerri had requested at her initial prenatal appointment that would not align with our practice.      Kerri has a history of gestational diabetes.  She failed the 1 hour glucose tolerance test and declined the 3-hour test but has been monitoring her glucose at home.  We reviewed that so far some of those levels have been elevated and if they are to persistently remain elevated we would recommend following with the diabetic educators at the [facility] office for management of gestational diabetes.  Kerri had previously mentioned that she would not be meeting with him as this would incur an extra cost to her prenatal care.  Today, we reviewed the importance of managing gestational diabetes and Kerri agrees that if her glucose checks are elevated that she will meet with the diabetes educators.    We also reviewed that we do not recommend going beyond 41 weeks of pregnancy and Kerri had previously stated that she would not be willing to get induced prior to 41 weeks under any circumstance.  We reviewed guidelines today and that depending on any complications that may arise in her pregnancy we may recommend an earlier induction and then in the 41st week of pregnancy.  We also discussed the dangers of leaving the placenta in place after delivery of the baby and waiting for it to come out per her request for at least an hour.  She understands that if she were to  Luzma Rosenthal calling on behalf pt. Call placed to pt on conference. C/o pt being constipated. States that last BM was 4 days ago. States that provider advised colace, but still no BM. Also reports intermittent abdominal pain 8-9/10. Denies vomiting. Advised to be seen today per protocol. Appt scheduled per protocol. VU. Encounter routed to provider.   Reason for Disposition   Unable to have a bowel movement (BM) without using a laxative, suppository, or enema    Additional Information   Negative: Vomiting bile (green color)   Negative: Patient sounds very sick or weak to the triager   Negative: Constant abdominal pain lasting > 2 hours   Negative: Vomiting and abdomen looks much more swollen than usual   Negative: Rectal pain or fullness from fecal impaction (rectum full of stool) and NOT better after SITZ bath, suppository or enema   Negative: Abdomen is more swollen than usual   Negative: Last bowel movement (BM) > 4 days ago   Negative: Leaking stool   Negative: MILD abdominal pain (e.g., does not interfere with normal activities) that comes and goes (cramps) and fever   Negative: Unable to have a bowel movement (BM) without manually removing stool (using finger to pull out stool or perform disimpaction)    Protocols used: Constipation-A-OH     deliver at [facility] we would proceed with removal of the placenta with gentle traction after delivery of the baby.  We also discussed her original reluctance to prophylactic Pitocin postpartum.  We reviewed that if bleeding is noted to be heavy after the baby is born we would want to administer Pitocin.  I reminded the patient that in the setting of a history of postpartum hemorrhage she is higher risk for recurrence.     Lastly Kerri asked if we would perform a breech TOLAC if her baby were to be in breech position this time.  When she lived in [place] she had found a provider who would perform a breech vaginal delivery and after a foot was palpated in the vaginal canal she was taken for  section.  She states that she really wishes to stick to her birth plan this time and would like to try to have a vaginal delivery this time around.  I reviewed to that we do not offer breech vaginal deliveries at [facility].  Kerri asked if there may be other providers at [facility] who are trained in it and would be amenable to doing it, to which I responded that it is a [facility] policy that we do not perform elective vaginal breech deliveries.  Emilia then asked various times if we knew of any other providers in the area who would be willing to perform a breech vaginal delivery for her and unfortunately I am not familiar with any myself.  Kerri then wanted to know if breech vaginal deliveries are illegal in the [facility] and if that is why we do not perform them -I reported that I am not sure about the legal status of breech deliveries in the state but do not believe that to be a factor.    We also had a brief discussion regarding her being a high risk delivery overall considering her history of  section and postpartum hemorrhage.  I advised that she be cautious if she were to consider a home delivery, if a provider were to offer a breech delivery at home for her.  That is  not something I would recommend.  Kerri verbalized understanding that and states that she would like to deliver in a hospital.

## 2025-02-11 ENCOUNTER — NURSE TRIAGE (OUTPATIENT)
Dept: ADMINISTRATIVE | Facility: CLINIC | Age: 85
End: 2025-02-11
Payer: MEDICARE

## 2025-02-11 ENCOUNTER — HOSPITAL ENCOUNTER (EMERGENCY)
Facility: HOSPITAL | Age: 85
Discharge: HOME | End: 2025-02-11
Attending: STUDENT IN AN ORGANIZED HEALTH CARE EDUCATION/TRAINING PROGRAM
Payer: MEDICARE

## 2025-02-11 ENCOUNTER — OFFICE VISIT (OUTPATIENT)
Dept: INTERNAL MEDICINE | Facility: CLINIC | Age: 85
End: 2025-02-11
Payer: MEDICARE

## 2025-02-11 VITALS
SYSTOLIC BLOOD PRESSURE: 142 MMHG | DIASTOLIC BLOOD PRESSURE: 80 MMHG | WEIGHT: 95.44 LBS | HEIGHT: 64 IN | OXYGEN SATURATION: 100 % | HEART RATE: 88 BPM | BODY MASS INDEX: 16.29 KG/M2

## 2025-02-11 VITALS
WEIGHT: 95 LBS | HEIGHT: 64 IN | OXYGEN SATURATION: 99 % | BODY MASS INDEX: 16.22 KG/M2 | TEMPERATURE: 99 F | RESPIRATION RATE: 18 BRPM | HEART RATE: 77 BPM | SYSTOLIC BLOOD PRESSURE: 147 MMHG | DIASTOLIC BLOOD PRESSURE: 80 MMHG

## 2025-02-11 DIAGNOSIS — C7B.8 METASTATIC MALIGNANT NEUROENDOCRINE TUMOR TO LIVER: ICD-10-CM

## 2025-02-11 DIAGNOSIS — R10.84 GENERALIZED ABDOMINAL PAIN: Primary | ICD-10-CM

## 2025-02-11 DIAGNOSIS — K59.00 CONSTIPATION, UNSPECIFIED CONSTIPATION TYPE: Primary | ICD-10-CM

## 2025-02-11 DIAGNOSIS — K59.00 CONSTIPATION, UNSPECIFIED CONSTIPATION TYPE: ICD-10-CM

## 2025-02-11 DIAGNOSIS — R10.9 ABDOMINAL PAIN: ICD-10-CM

## 2025-02-11 LAB
ALBUMIN SERPL BCP-MCNC: 2.8 G/DL (ref 3.5–5.2)
ALP SERPL-CCNC: 151 U/L (ref 40–150)
ALT SERPL W/O P-5'-P-CCNC: 114 U/L (ref 10–44)
ANION GAP SERPL CALC-SCNC: 11 MMOL/L (ref 8–16)
AST SERPL-CCNC: 130 U/L (ref 10–40)
BASOPHILS # BLD AUTO: 0.01 K/UL (ref 0–0.2)
BASOPHILS NFR BLD: 0.1 % (ref 0–1.9)
BILIRUB SERPL-MCNC: 0.6 MG/DL (ref 0.1–1)
BUN SERPL-MCNC: 5 MG/DL (ref 8–23)
CALCIUM SERPL-MCNC: 8.9 MG/DL (ref 8.7–10.5)
CHLORIDE SERPL-SCNC: 87 MMOL/L (ref 95–110)
CO2 SERPL-SCNC: 32 MMOL/L (ref 23–29)
CREAT SERPL-MCNC: 0.7 MG/DL (ref 0.5–1.4)
DIFFERENTIAL METHOD BLD: ABNORMAL
EOSINOPHIL # BLD AUTO: 0 K/UL (ref 0–0.5)
EOSINOPHIL NFR BLD: 0.1 % (ref 0–8)
ERYTHROCYTE [DISTWIDTH] IN BLOOD BY AUTOMATED COUNT: 12.2 % (ref 11.5–14.5)
EST. GFR  (NO RACE VARIABLE): >60 ML/MIN/1.73 M^2
GLUCOSE SERPL-MCNC: 176 MG/DL (ref 70–110)
HCT VFR BLD AUTO: 37.6 % (ref 37–48.5)
HGB BLD-MCNC: 12.7 G/DL (ref 12–16)
IMM GRANULOCYTES # BLD AUTO: 0.04 K/UL (ref 0–0.04)
IMM GRANULOCYTES NFR BLD AUTO: 0.4 % (ref 0–0.5)
LYMPHOCYTES # BLD AUTO: 0.9 K/UL (ref 1–4.8)
LYMPHOCYTES NFR BLD: 9.6 % (ref 18–48)
MAGNESIUM SERPL-MCNC: 1.9 MG/DL (ref 1.6–2.6)
MCH RBC QN AUTO: 30.5 PG (ref 27–31)
MCHC RBC AUTO-ENTMCNC: 33.8 G/DL (ref 32–36)
MCV RBC AUTO: 90 FL (ref 82–98)
MONOCYTES # BLD AUTO: 1.3 K/UL (ref 0.3–1)
MONOCYTES NFR BLD: 13.9 % (ref 4–15)
NEUTROPHILS # BLD AUTO: 7.3 K/UL (ref 1.8–7.7)
NEUTROPHILS NFR BLD: 75.9 % (ref 38–73)
NRBC BLD-RTO: 0 /100 WBC
PLATELET # BLD AUTO: 221 K/UL (ref 150–450)
PMV BLD AUTO: 9.8 FL (ref 9.2–12.9)
POTASSIUM SERPL-SCNC: 3.3 MMOL/L (ref 3.5–5.1)
PROT SERPL-MCNC: 6.9 G/DL (ref 6–8.4)
RBC # BLD AUTO: 4.16 M/UL (ref 4–5.4)
SODIUM SERPL-SCNC: 130 MMOL/L (ref 136–145)
WBC # BLD AUTO: 9.67 K/UL (ref 3.9–12.7)

## 2025-02-11 PROCEDURE — 1159F MED LIST DOCD IN RCRD: CPT | Mod: HCNC,CPTII,S$GLB, | Performed by: INTERNAL MEDICINE

## 2025-02-11 PROCEDURE — 3288F FALL RISK ASSESSMENT DOCD: CPT | Mod: HCNC,CPTII,S$GLB, | Performed by: INTERNAL MEDICINE

## 2025-02-11 PROCEDURE — 99284 EMERGENCY DEPT VISIT MOD MDM: CPT | Mod: 25,HCNC

## 2025-02-11 PROCEDURE — 1101F PT FALLS ASSESS-DOCD LE1/YR: CPT | Mod: HCNC,CPTII,S$GLB, | Performed by: INTERNAL MEDICINE

## 2025-02-11 PROCEDURE — 1157F ADVNC CARE PLAN IN RCRD: CPT | Mod: HCNC,CPTII,S$GLB, | Performed by: INTERNAL MEDICINE

## 2025-02-11 PROCEDURE — 85025 COMPLETE CBC W/AUTO DIFF WBC: CPT | Mod: HCNC

## 2025-02-11 PROCEDURE — 83735 ASSAY OF MAGNESIUM: CPT | Mod: HCNC

## 2025-02-11 PROCEDURE — 99999 PR PBB SHADOW E&M-EST. PATIENT-LVL IV: CPT | Mod: PBBFAC,HCNC,, | Performed by: INTERNAL MEDICINE

## 2025-02-11 PROCEDURE — 99214 OFFICE O/P EST MOD 30 MIN: CPT | Mod: HCNC,S$GLB,, | Performed by: INTERNAL MEDICINE

## 2025-02-11 PROCEDURE — 80053 COMPREHEN METABOLIC PANEL: CPT | Mod: HCNC

## 2025-02-11 PROCEDURE — 1125F AMNT PAIN NOTED PAIN PRSNT: CPT | Mod: HCNC,CPTII,S$GLB, | Performed by: INTERNAL MEDICINE

## 2025-02-11 RX ORDER — POLYETHYLENE GLYCOL 3350 17 G/17G
17 POWDER, FOR SOLUTION ORAL DAILY
Qty: 30 PACKET | Refills: 3 | Status: SHIPPED | OUTPATIENT
Start: 2025-02-11

## 2025-02-11 NOTE — ED PROVIDER NOTES
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  Patient reports constipation began on 02/08 and unresolved with home MiraLax.  She reports flatulence.  Sister at bedside reports similar constipation incident s/p previous embolization.  Per chart check constipation resolved with brown bag enema.  Patient reports abdominal pain and chronic nausea but denies vomiting, abdominal distention, and trauma.  She denies chest pain, shortness of breath, dysuria, hematuria, or body aches        Review of patient's allergies indicates:   Allergen Reactions    Epinephrine      carcinoid     Past Medical History:   Diagnosis Date    Anemia 07/11/2018    B12 deficiency     Depression     per pcp note 7/2017 and given prozac and diazepam     Hyperlipidemia     Neuroendocrine tumor     Systolic heart failure     Weight loss     reviewed prior pcp Dr. Russo notes 2017 - labs reviewed: cmp wnl x tbili 1.5, tsh wnl, A1c 5.0, cbc wnl,D 36, hiv neg, hep c neg, hep b surg ag neg      Past Surgical History:   Procedure Laterality Date    EMBOLIZATION N/A 02/14/2019    Procedure: EMBOLIZATION, BLOOD VESSEL;  Surgeon: [facility] Diagnostic Provider;  Location: [facility] OR [address] [place] [address];  Service: Radiology;  Laterality: N/A;  Room 189/[facility]    EMBOLIZATION N/A 03/21/2019    Procedure: EMBOLIZATION, BLOOD VESSEL;  Surgeon: [facility] Diagnostic Provider;  [facility];  Service: Radiology;  Laterality: N/A;  Room 189/[facility]    ESOPHAGOGASTRODUODENOSCOPY  05/04/2018    esophageal ring, grade 1 esophagitis, gastritis     EYE SURGERY Bilateral     cataract removal     HYSTERECTOMY      fibroids     Family History   Problem Relation Name Age of Onset    Glaucoma Mother      Hypertension Mother      Heart attack Father      Cancer Father      Diabetes Sister Marilyn     Asthma Sister Nick Luke     No Known Problems Sister Stephanie     Hyperlipidemia Sister      Heart attack Sister      Cancer Brother          lung cancer, + tobacco    Diabetes Brother      Hypertension Brother      Stroke Brother      Emphysema Brother      COPD Brother       Social History     Tobacco Use    Smoking status: Never    Smokeless tobacco: Never   Substance Use Topics    Alcohol use: No     Comment: stopped in 2007 - hx of social drinking    Drug use: Never     Review of Systems    Physical Exam     Initial Vitals [02/11/25 1130]   BP Pulse Resp Temp SpO2   132/74 78 18 98.7 °F (37.1 °C) 98 %      MAP       --         Physical Exam    Constitutional: She appears well-developed and well-nourished. She is not diaphoretic.   HENT:   Head: Normocephalic and atraumatic.   Pulmonary/Chest: No respiratory distress.   Abdominal: Abdomen is soft. She exhibits no distension. There is generalized abdominal tenderness. No hernia. There is no guarding, no tenderness at McBurney's point and negative Pacheco's sign.   Genitourinary: Rectum:      External hemorrhoid present.      No rectal mass or tenderness.     Musculoskeletal:         General: No tenderness or edema.     Neurological: She is alert and oriented to person, place, and time. She has normal strength.   Skin: Skin is warm.         ED Course   Procedures  Labs Reviewed   CBC W/ AUTO DIFFERENTIAL   COMPREHENSIVE METABOLIC PANEL   MAGNESIUM          Imaging Results              X-Ray Abdomen Flat And Erect (Final result)  Result time 02/11/25 12:50:15      Final result by Courtney Sena MD (02/11/25 12:50:15)                   Impression:      As above      Electronically signed by: Courtney Sena MD  Date:    02/11/2025  Time:    12:50                Narrative:    EXAMINATION:  XR ABDOMEN FLAT AND ERECT    CLINICAL HISTORY:  Unspecified abdominal pain    TECHNIQUE:  Flat and erect AP views of the abdomen were performed.    COMPARISON:  December 19, 2024    FINDINGS:  There is no free air.  There is moderate stool within the colon, with no evidence of bowel distension.  Osseous structures show mild degenerative change.  There are no abnormal calcifications with a few phleboliths in the pelvis.                                       Medications   sodium phosphates 19-7 gram/118 mL enema 1 enema (has no administration in time range)     Medical Decision Making  Differential diagnosis including but not limited to electrolyte abnormality, ileus, volvulus, and bowel obstruction.    Unable to elicit stool on digital impaction attempt.  KUB unremarkable for concerns of obstruction.  Therefore enema was ordered.  Patient was pending labs and administration of enema sign-out.  Anticipate that patient may be able to discharge pending resolution of constipation s/p enema and re-evaluation.    Amount and/or Complexity of Data Reviewed  Labs: ordered.  Radiology: ordered.    Risk  OTC drugs.                                      Clinical Impression:  Final diagnoses:  [R10.9] Abdominal pain  [K59.00] Constipation, unspecified constipation type (Primary)                 Cierra Wright MD  Resident  02/11/25 1400

## 2025-02-11 NOTE — TELEPHONE ENCOUNTER
Patient c/o constant abdominal pain and constipation. Advised per protocol to be seen in the office now. Advised the patient to call back with any further questions or if symptoms worsen.        Reason for Disposition   Constant abdominal pain lasting > 2 hours    Additional Information   Negative: Vomiting bile (green color)   Negative: Patient sounds very sick or weak to the triager    Protocols used: Constipation-A-OH

## 2025-02-11 NOTE — ED NOTES
C/C: 84 y.o. female arrived to the ED via POV for c/o constipation. Patient w/ hx of metastatic neuroendocrine tumor to the liver, s/p chemoembolization of liver approximately 5 days ago reports inability to have BM x3 days. She usually reports episodes of constipation following procedure that is often relieved by administration of brown bomb enema. She has chronic nausea, however, denies emesis. Denies abdominal pain, diarrhea, changes in urinary symptoms, fever, chills, chest pain, or SOB.    APPEARANCE: awake, alert, and appears to be in NAD. Pain score 0/10. Placed on cont pulse ox, auto BP cuff, and changed into hospital gown. Bed in lowest and locked position w/ side rails up x2. Supportive sister at bedside.   SKIN: warm, dry and intact. No visible breakdown or bruising. +dry intact dressing with tegaderm to right groin s/p chemembolization  MUSCULOSKELETAL: Patient moving all extremities spontaneously, no obvious swelling or deformities noted. Ambulates independently.  RESPIRATORY: Airway open and patent. Denies shortness of breath. Respirations unlabored.   CARDIAC: Denies CP, 2+ distal pulses; no peripheral edema  ABDOMEN: S/ND/NT, + nausea w/o vomiting. Denies changes in appetite, acute weight loss/gain. +constipation - last BM Saturday, 2/8/25  : voids spontaneously, denies difficulty  NEUROLOGIC: AAO x 4; speaking and following commands appropriately. Equal strength in all extremities; denies numbness/tingling. Denies dizziness    Follow up with PMD or Walkin PRN or within 3 days if not improving.

## 2025-02-11 NOTE — DISCHARGE INSTRUCTIONS
Please take up to 5 Glycolax packet per day until you have a good bowel movement.  After that please take 1 Glycolax packet per day to stay regular.  If there is any further concern for constipation you can take up to 5 Glycolax packet per day to resolve the issue.  Please return to the emergency department if you develop any worsening constipation, are unable to have a bowel movement in spite of the Glycolax packet, or unable to keep anything down and started throwing up or vomiting.  Please also follow up with your primary care provider to get your liver enzymes and electrolytes rechecked.  Your liver enzymes are little bit high and that she would be monitored by your primary care doctor.  Please follow up with your primary care doctor within the next 4 5 days regarding your elevated liver enzymes and low electrolytes.

## 2025-02-11 NOTE — TELEPHONE ENCOUNTER
Spoke to Ms. Hills sister to schedule appt and was told that they are on their way to the [facility] [place] for office visit.

## 2025-02-11 NOTE — PROGRESS NOTES
Consultant Subjective:       Patient ID: Shahram Hills is a 84 y.o. female.    Chief Complaint: Abdominal Pain and Constipation      HPI  Shahram Hills is a 84 y.o. year old female with history of metastatic neuroendocrine tumor to the liver, status post chemoembolization of liver 5 days ago, history of systolic heart failure presents for urgent care evaluation of abdominal pain starting this morning.  Patient reports she has been constipated since Saturday.  Accompanied by daughter.  States pain is 10/10 localized across her entire abdomen, aching sensation.  Reports chronic nausea and has been taking Zofran twice a day for this.  Denies any vomiting today.  States she has no problems with urination.  Has had similar complaints previously after chemoembolization requiring brown bomb enema to relieve her symptoms.    Patient states she feels very uncomfortable and can not tolerate it any longer.    Review of Systems   Constitutional:  Negative for chills and fever.   Gastrointestinal:  Positive for abdominal distention, abdominal pain, constipation, nausea and vomiting (Last vomited Friday). Negative for diarrhea and rectal pain.         Past Medical History:   Diagnosis Date    Anemia 07/11/2018    B12 deficiency     Depression     per pcp note 7/2017 and given prozac and diazepam     Hyperlipidemia     Neuroendocrine tumor     Systolic heart failure     Weight loss     reviewed prior pcp Dr. Russo notes 2017 - labs reviewed: cmp wnl x tbili 1.5, tsh wnl, A1c 5.0, cbc wnl,D 36, hiv neg, hep c neg, hep b surg ag neg         Prior to Admission medications    Medication Sig Start Date End Date Taking? Authorizing Provider   blood sugar diagnostic (TRUE METRIX GLUCOSE TEST STRIP) Strp USE TO CHECK BLOOD SUGAR TWICE DAILY 6/24/24  Yes Trixie Xie MD   blood-glucose meter kit by Other route. Use as instructed   Yes Provider, Historical   carvediloL (COREG) 12.5 MG tablet TAKE 1 TABLET(12.5 MG) BY MOUTH TWICE  "DAILY WITH MEALS 10/8/24  Yes Trixie Xie MD   cyanocobalamin (VITAMIN B-12) 1000 MCG tablet Take 1 tablet (1,000 mcg total) by mouth once daily. 4/20/18  Yes Trixie Xie MD   diphenoxylate-atropine 2.5-0.025 mg (LOMOTIL) 2.5-0.025 mg per tablet Take 1 tablet by mouth 4 (four) times daily as needed for Diarrhea. 12/7/22  Yes Sebastian Granados MD   ezetimibe (ZETIA) 10 mg tablet Take 10 mg by mouth once daily.   Yes Provider, Historical   furosemide (LASIX) 20 MG tablet take 1 tab by mouth daily. If gain 2-3 pounds in 2-3 days then take 2 tabs daily for 3 days and then resume 1 dose daily 10/29/24  Yes Trixie Xie MD   glipiZIDE (GLUCOTROL) 5 MG tablet Take 2.5mg by mouth with breakfast and take 5 mg with dinner 2/3/25  Yes Trixie Xie MD   lancets [facility] To check BG 2 times daily, to use with insurance preferred meter 5/25/21  Yes Trixie Xie MD   latanoprost 0.005 % ophthalmic solution Place 1 drop into both eyes nightly. 11/8/21  Yes Provider, Historical   linaCLOtide (LINZESS) 72 mcg Cap capsule Take 1 capsule (72 mcg total) by mouth before breakfast. 12/21/24  Yes Daksha Clarke, VIPUL   needle, disp, 22 G 22 gauge x 1" Ndle 1 application by Misc.(Non-Drug; Combo Route) route 3 (three) times daily as needed. 3/6/23  Yes Trixie Xie MD   ondansetron (ZOFRAN-ODT) 8 MG TbDL Take 1 tablet (8 mg total) by mouth every 8 (eight) hours as needed (nausea). 2/6/25 2/13/25 Yes Zeynep Carpio MD   oxyCODONE (ROXICODONE) 5 MG immediate release tablet Take 1 tablet (5 mg total) by mouth every 6 (six) hours as needed. 2/6/25 2/11/25 Yes Zeynep Carpio MD   sacubitriL-valsartan (ENTRESTO) 24-26 mg per tablet TAKE 1 TABLET BY MOUTH TWICE DAILY 9/4/24  Yes Ralph Bergeron MD   spironolactone (ALDACTONE) 25 MG tablet Take 1 tablet (25 mg total) by mouth once daily. 7/9/24 7/9/25 Yes Ralph Bergeron MD   syringe, disposable, 1 mL Syrg 1 application by " "Misc.(Non-Drug; Combo Route) route 3 (three) times daily as needed (diarrhea). 7/15/22  Yes Sebastian Granados MD   telotristat ethyl (XERMELO) 250 mg Tab Take 1 tablet (250 mg) by mouth 3 (three) times daily. 12/17/24  Yes Sebastian Granados MD   TRUEPLUS LANCETS 33 gauge Misc USE TO CHECK BLOOD SUGAR TWICE DAILY 6/24/24  Yes Trixie Xie MD   vitamin D (VITAMIN D3) 1000 units Tab Take 400 Units by mouth once daily.   Yes Provider, Historical   XIIDRA 5 % Dpet  4/2/18  Yes Provider, Historical   ciprofloxacin HCl (CIPRO) 500 MG tablet Take 1 tablet (500 mg total) by mouth every 12 (twelve) hours. for 8 doses 2/6/25 2/10/25  Zeynep Carpio MD   ferrous sulfate 325 (65 FE) MG EC tablet Take 325 mg by mouth once daily.    Provider, Historical        Past medical history, surgical history, and family medical history reviewed and updated as appropriate.    Medications and allergies reviewed.     Objective:          Vitals:    02/11/25 1042   BP: (!) 142/80   BP Location: Left arm   Patient Position: Sitting   Pulse: 88   SpO2: 100%   Weight: 43.3 kg (95 lb 7.4 oz)   Height: 5' 4" (1.626 m)     Body mass index is 16.39 kg/m².  Physical Exam  Constitutional:       Appearance: She is ill-appearing.      Comments: Thin  Uncomfortable appearing  Fatigued appearing   HENT:      Head: Normocephalic and atraumatic.   Cardiovascular:      Rate and Rhythm: Normal rate.   Pulmonary:      Effort: Pulmonary effort is normal.      Comments: In no respiratory distress  Abdominal:      General: Bowel sounds are normal. There is distension.      Palpations: There is no mass.      Tenderness: There is abdominal tenderness. There is no right CVA tenderness, left CVA tenderness, guarding or rebound.      Hernia: No hernia is present.   Musculoskeletal:      Right lower leg: No edema.      Left lower leg: No edema.   Neurological:      Mental Status: She is oriented to person, place, and time. Mental status is at baseline.      " "Comments: Baseline mentation  Has had vasovagal syncope previously due to pain         Lab Results   Component Value Date    WBC 8.48 02/06/2025    HGB 12.3 02/06/2025    HCT 38.0 02/06/2025     02/06/2025    CHOL 271 (H) 12/02/2024    TRIG 75 12/02/2024     (H) 12/02/2024    ALT 12 01/31/2025    AST 23 01/31/2025     02/06/2025    K 3.2 (L) 02/06/2025     02/06/2025    CREATININE 0.7 02/06/2025    BUN 10 02/06/2025    CO2 26 02/06/2025    TSH 1.311 12/19/2024    INR 1.1 01/31/2025    GLUF 144 (H) 02/26/2020    HGBA1C 6.2 (H) 11/26/2024       Assessment:       1. Generalized abdominal pain    2. Constipation, unspecified constipation type    3. 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          Plan:     Shahram "Shahram Farmer" was seen today for abdominal pain and constipation.    Diagnoses and all orders for this visit:    Generalized abdominal pain  Comments:  acute onset, distended abdomen, no peritoneal signs. constipated, no stool appreciated on examination. chronic nausea on zofran. pain s/p procedure on opiate    Constipation, unspecified constipation type  Comments:  on miralax and fiber daily, taking linzess daily; last BM saturday. Still passing gas. No changes to urination    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  Comments:  s/p chemoembolization 5 days ago, has been on oxycodone since procedure. cipro prophylaxis.    Recommended patient to present to emergency room for treatment of constipation as she has failed conservative measures at home and with prescription Linzess.  Clinic not equipped to do manual disimpaction or brown bomb enema.  Possibility of partial obstruction, would likely need at least a KUB to start.   Daughter will bring patient to emergency room for evaluation.     Follow up if symptoms worsen or fail to improve.    Angel Pires MD  Internal Medicine / Primary Care  [facility]  2/11/2025    "

## 2025-02-13 ENCOUNTER — TELEPHONE (OUTPATIENT)
Dept: INTERNAL MEDICINE | Facility: CLINIC | Age: 85
End: 2025-02-13
Payer: MEDICARE

## 2025-02-13 NOTE — ASSESSMENT & PLAN NOTE
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  Can escalate pain control as needed.  Avoid tylenol or tylenol containing products.  Cipro bid to start this evening, if not tolerating po can give 400 mg IV.  At MA, recommend Cipro 500 mg BID x 5 days.  Octreotide 125 mcg/hr (2.5 ml/hr) IV for 1 day post op (24 hrs post procedure).  Do not need to follow liver enzymes, we expect this to be elevated following the procedure.  Pt may also have mild leukocytosis and fever post procedure (post embolization syndrome).  Please contact IR if concern for infectious process.  At MA give less than 1 wks supply of PRN Zofran ODT for nausea, and PRN Oxycodone 5 mg for pain.  Avoid acetaminophen-containing products and EtOH for 2 weeks post procedure.  IR will arrange follow up imaging and clinic visit.

## 2025-02-13 NOTE — TELEPHONE ENCOUNTER
----- Message from [employer] sent at 2/12/2025  3:33 PM CST -----   Name of Who is Calling:     What is the request in detail:  patient request call back in reference to er follow up appointment and labs via ER orders  Please contact to further discuss and advise      Can the clinic reply by [facility]:     What [facility]:  [phone] / lenny / jennifer

## 2025-02-13 NOTE — DISCHARGE SUMMARY
[facility]  Discharge Summary      Patient Name: Shahram Hills  MRN: [id number]  SHUBHAM: [id number]  Patient Class: OP- Observation  Admission Date: 2/5/2025  Hospital Length of Stay: 0 days  Discharge Date and Time: 2/6/2025  4:49 PM  Attending Physician: No att. 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  Can escalate pain control as needed.  Avoid tylenol or tylenol containing products.  Cipro bid to start this evening, if not tolerating po can give 400 mg IV.  At CT, recommend Cipro 500 mg BID x 5 days.  Octreotide 125 mcg/hr (2.5 ml/hr) IV for 1 day post op (24 hrs post procedure).  Do not need to follow liver enzymes, we expect this to be elevated following the procedure.  Pt may also have mild leukocytosis and fever post procedure (post embolization syndrome).  Please contact IR if concern for infectious process.  At CT give less than 1 wks supply of PRN Zofran ODT for nausea, and PRN Oxycodone 5 mg for pain.  Avoid acetaminophen-containing products and EtOH for 2 weeks post procedure.  IR will arrange follow up imaging and clinic visit.      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    Specialty: Internal Medicine  Why: at 11:30pm; previously scheduled PCP appointment  Contact information:  [address] [facility] [address]  [facility] [address]  [phone]                           Patient Instructions:      Notify your health care provider if you experience any of the following:  increased confusion or weakness     Notify your health care provider if you experience any of the following:  persistent dizziness, light-headedness, or visual disturbances     Notify your health care provider if you experience any of the following:  worsening rash     Notify your health care provider if you experience any of the following:  difficulty breathing or increased cough     Notify your health care provider if you experience any of the following:  redness, tenderness, or signs of infection (pain, swelling, redness, odor or green/yellow discharge around incision site)     Notify your health care provider if you experience any of the following:  severe uncontrolled pain     Notify your health care provider if you experience any of the following:  persistent nausea and vomiting or diarrhea     Notify your health care provider if you experience any of the following:  temperature >100.4     Notify your health care provider if you experience any of the following:  severe persistent headache     Activity as tolerated       Significant Diagnostic Studies: N/A    Pending Diagnostic Studies:       None           Medications:  Reconciled Home Medications:      Medication List        CONTINUE taking these medications      blood sugar diagnostic Strp  Commonly known as: TRUE METRIX GLUCOSE  "TEST STRIP  USE TO CHECK BLOOD SUGAR TWICE DAILY     blood-glucose meter kit  by Other route. Use as instructed     carvediloL 12.5 MG tablet  Commonly known as: COREG  TAKE 1 TABLET(12.5 MG) BY MOUTH TWICE DAILY WITH MEALS     cyanocobalamin 1000 MCG tablet  Commonly known as: VITAMIN B-12  Take 1 tablet (1,000 mcg total) by mouth once daily.     diphenoxylate-atropine 2.5-0.025 mg 2.5-0.025 mg per tablet  Commonly known as: LOMOTIL  Take 1 tablet by mouth 4 (four) times daily as needed for Diarrhea.     ENTRESTO 24-26 mg per tablet  Generic drug: sacubitriL-valsartan  TAKE 1 TABLET BY MOUTH TWICE DAILY     ezetimibe 10 mg tablet  Commonly known as: ZETIA  Take 10 mg by mouth once daily.     ferrous sulfate 325 (65 FE) MG EC tablet  Take 325 mg by mouth once daily.     furosemide 20 MG tablet  Commonly known as: LASIX  take 1 tab by mouth daily. If gain 2-3 pounds in 2-3 days then take 2 tabs daily for 3 days and then resume 1 dose daily     glipiZIDE 5 MG tablet  Commonly known as: GLUCOTROL  Take 2.5mg by mouth with breakfast and take 5 mg with dinner     * lancets Misc  To check BG 2 times daily, to use with insurance preferred meter     * TRUEPLUS LANCETS 33 gauge Misc  Generic drug: lancets  USE TO CHECK BLOOD SUGAR TWICE DAILY     latanoprost 0.005 % ophthalmic solution  Place 1 drop into both eyes nightly.     linaCLOtide 72 mcg Cap capsule  Commonly known as: LINZESS  Take 1 capsule (72 mcg total) by mouth before breakfast.     needle (disp) 22 G 22 gauge x 1" Ndle  1 application by Misc.(Non-Drug; Combo Route) route 3 (three) times daily as needed.     ondansetron 8 MG Tbdl  Commonly known as: ZOFRAN-ODT  Take 1 tablet (8 mg total) by mouth every 8 (eight) hours as needed (nausea).     spironolactone 25 MG tablet  Commonly known as: ALDACTONE  Take 1 tablet (25 mg total) by mouth once daily.     syringe (disposable) 1 mL Syrg  1 application by Misc.(Non-Drug; Combo Route) route 3 (three) times daily as " needed (diarrhea).     vitamin D 1000 units Tab  Commonly known as: VITAMIN D3  Take 400 Units by mouth once daily.     XERMELO 250 mg Tab  Generic drug: telotristat ethyl  Take 1 tablet (250 mg) by mouth 3 (three) times daily.     XIIDRA 5 % Dpet  Generic drug: lifitegrast           * This list has 2 medication(s) that are the same as other medications prescribed for you. Read the directions carefully, and ask your doctor or other care provider to review them with you.                STOP taking these medications      losartan 50 MG tablet  Commonly known as: COZAAR            ASK your doctor about these medications      ciprofloxacin HCl 500 MG tablet  Commonly known as: CIPRO  Take 1 tablet (500 mg total) by mouth every 12 (twelve) hours. for 8 doses  Ask about: Should I take this medication?     oxyCODONE 5 MG immediate release tablet  Commonly known as: ROXICODONE  Take 1 tablet (5 mg total) by mouth every 6 (six) hours as needed.  Ask about: Should I take this medication?              Indwelling Lines/Drains at time of discharge:   Lines/Drains/Airways       None                   Time spent on the discharge of patient: 35 minutes         Zeynep Carpio MD  Department of Hospital Medicine  [facility]

## 2025-02-14 ENCOUNTER — TELEPHONE (OUTPATIENT)
Dept: HEMATOLOGY/ONCOLOGY | Facility: CLINIC | Age: 85
End: 2025-02-14
Payer: MEDICARE

## 2025-02-14 ENCOUNTER — TELEPHONE (OUTPATIENT)
Dept: INTERNAL MEDICINE | Facility: CLINIC | Age: 85
End: 2025-02-14
Payer: MEDICARE

## 2025-02-14 ENCOUNTER — HOSPITAL ENCOUNTER (INPATIENT)
Facility: HOSPITAL | Age: 85
LOS: 5 days | Discharge: HOSPICE | DRG: 299 | End: 2025-02-21
Attending: EMERGENCY MEDICINE | Admitting: STUDENT IN AN ORGANIZED HEALTH CARE EDUCATION/TRAINING PROGRAM
Payer: MEDICARE

## 2025-02-14 DIAGNOSIS — E83.42 HYPOMAGNESEMIA: ICD-10-CM

## 2025-02-14 DIAGNOSIS — R79.89 ELEVATED LFTS: ICD-10-CM

## 2025-02-14 DIAGNOSIS — E87.1 HYPONATREMIA: ICD-10-CM

## 2025-02-14 DIAGNOSIS — E87.6 HYPOKALEMIA: Primary | ICD-10-CM

## 2025-02-14 DIAGNOSIS — Z13.6 SCREENING FOR CARDIOVASCULAR CONDITION: ICD-10-CM

## 2025-02-14 DIAGNOSIS — R07.9 CHEST PAIN: ICD-10-CM

## 2025-02-14 PROBLEM — R55 SYNCOPE: Status: RESOLVED | Noted: 2024-11-04 | Resolved: 2025-02-14

## 2025-02-14 PROBLEM — T81.718A POST-EMBOLIZATION SYNDROME FOLLOWING CHEMOEMBOLIZATION OF LIVER: Status: ACTIVE | Noted: 2025-02-14

## 2025-02-14 PROBLEM — R74.8 ELEVATED LIVER ENZYMES: Status: ACTIVE | Noted: 2025-02-14

## 2025-02-14 LAB
ALBUMIN SERPL BCP-MCNC: 2.4 G/DL (ref 3.5–5.2)
ALLENS TEST: ABNORMAL
ALP SERPL-CCNC: 915 U/L (ref 40–150)
ALT SERPL W/O P-5'-P-CCNC: 517 U/L (ref 10–44)
ANION GAP SERPL CALC-SCNC: 11 MMOL/L (ref 8–16)
AST SERPL-CCNC: 448 U/L (ref 10–40)
BASOPHILS # BLD AUTO: 0.01 K/UL (ref 0–0.2)
BASOPHILS NFR BLD: 0.1 % (ref 0–1.9)
BILIRUB SERPL-MCNC: 1.5 MG/DL (ref 0.1–1)
BILIRUB UR QL STRIP: NEGATIVE
BIPAP: 0
BNP SERPL-MCNC: 267 PG/ML (ref 0–99)
BUN SERPL-MCNC: 8 MG/DL (ref 6–30)
BUN SERPL-MCNC: 9 MG/DL (ref 8–23)
CALCIUM SERPL-MCNC: 8.2 MG/DL (ref 8.7–10.5)
CHLORIDE SERPL-SCNC: 81 MMOL/L (ref 95–110)
CHLORIDE SERPL-SCNC: 85 MMOL/L (ref 95–110)
CHLORIDE UR-SCNC: 64 MMOL/L (ref 25–200)
CLARITY UR REFRACT.AUTO: CLEAR
CO2 SERPL-SCNC: 30 MMOL/L (ref 23–29)
COLOR UR AUTO: YELLOW
CREAT SERPL-MCNC: 0.4 MG/DL (ref 0.5–1.4)
CREAT SERPL-MCNC: 0.5 MG/DL (ref 0.5–1.4)
CREAT UR-MCNC: 45 MG/DL (ref 15–325)
DIFFERENTIAL METHOD BLD: ABNORMAL
EOSINOPHIL # BLD AUTO: 0 K/UL (ref 0–0.5)
EOSINOPHIL NFR BLD: 0 % (ref 0–8)
ERYTHROCYTE [DISTWIDTH] IN BLOOD BY AUTOMATED COUNT: 12.1 % (ref 11.5–14.5)
EST. GFR  (NO RACE VARIABLE): >60 ML/MIN/1.73 M^2
FIO2: 21 %
GLUCOSE SERPL-MCNC: 185 MG/DL (ref 70–110)
GLUCOSE SERPL-MCNC: 190 MG/DL (ref 70–110)
GLUCOSE UR QL STRIP: ABNORMAL
HCO3 UR-SCNC: 34.5 MMOL/L (ref 24–28)
HCT VFR BLD AUTO: 33 % (ref 37–48.5)
HCT VFR BLD CALC: 35 %PCV (ref 36–54)
HGB BLD-MCNC: 11.4 G/DL (ref 12–16)
HGB UR QL STRIP: NEGATIVE
IMM GRANULOCYTES # BLD AUTO: 0.14 K/UL (ref 0–0.04)
IMM GRANULOCYTES NFR BLD AUTO: 1.3 % (ref 0–0.5)
KETONES UR QL STRIP: ABNORMAL
LDH SERPL L TO P-CCNC: 1 MMOL/L
LEUKOCYTE ESTERASE UR QL STRIP: NEGATIVE
LIPASE SERPL-CCNC: 53 U/L (ref 4–60)
LYMPHOCYTES # BLD AUTO: 0.6 K/UL (ref 1–4.8)
LYMPHOCYTES NFR BLD: 5.3 % (ref 18–48)
MAGNESIUM SERPL-MCNC: 1.4 MG/DL (ref 1.6–2.6)
MCH RBC QN AUTO: 30.6 PG (ref 27–31)
MCHC RBC AUTO-ENTMCNC: 34.5 G/DL (ref 32–36)
MCV RBC AUTO: 89 FL (ref 82–98)
MONOCYTES # BLD AUTO: 0.9 K/UL (ref 0.3–1)
MONOCYTES NFR BLD: 8.4 % (ref 4–15)
NEUTROPHILS # BLD AUTO: 9 K/UL (ref 1.8–7.7)
NEUTROPHILS NFR BLD: 84.9 % (ref 38–73)
NITRITE UR QL STRIP: NEGATIVE
NRBC BLD-RTO: 0 /100 WBC
OHS QRS DURATION: 90 MS
OHS QTC CALCULATION: 519 MS
OSMOLALITY SERPL: 270 MOSM/KG (ref 275–295)
OSMOLALITY UR: 351 MOSM/KG (ref 50–1200)
PCO2 BLDA: 52.7 MMHG (ref 35–45)
PH SMN: 7.42 [PH] (ref 7.35–7.45)
PH UR STRIP: 6 [PH] (ref 5–8)
PLATELET # BLD AUTO: 225 K/UL (ref 150–450)
PMV BLD AUTO: 9.8 FL (ref 9.2–12.9)
PO2 BLDA: 30 MMHG (ref 40–60)
POC BE: 10 MMOL/L
POC IONIZED CALCIUM: 1.02 MMOL/L (ref 1.06–1.42)
POC PERFORMED BY: NORMAL
POC SATURATED O2: 58 % (ref 95–100)
POC TCO2 (MEASURED): 31 MMOL/L (ref 23–29)
POC TCO2: 36 MMOL/L (ref 24–29)
POC TEMPERATURE: 37 C
POTASSIUM BLD-SCNC: 2.4 MMOL/L (ref 3.5–5.1)
POTASSIUM SERPL-SCNC: 2.5 MMOL/L (ref 3.5–5.1)
PROT SERPL-MCNC: 5.7 G/DL (ref 6–8.4)
PROT UR QL STRIP: NEGATIVE
RBC # BLD AUTO: 3.73 M/UL (ref 4–5.4)
SAMPLE: ABNORMAL
SAMPLE: ABNORMAL
SITE: ABNORMAL
SODIUM BLD-SCNC: 125 MMOL/L (ref 136–145)
SODIUM SERPL-SCNC: 126 MMOL/L (ref 136–145)
SODIUM UR-SCNC: 33 MMOL/L (ref 20–250)
SP GR UR STRIP: 1.01 (ref 1–1.03)
SPECIMEN SOURCE: NORMAL
TROPONIN I SERPL DL<=0.01 NG/ML-MCNC: <3 NG/L (ref 0–14)
TSH SERPL DL<=0.005 MIU/L-ACNC: 0.69 UIU/ML (ref 0.4–4)
URN SPEC COLLECT METH UR: ABNORMAL
WBC # BLD AUTO: 10.55 K/UL (ref 3.9–12.7)

## 2025-02-14 PROCEDURE — 96365 THER/PROPH/DIAG IV INF INIT: CPT | Mod: HCNC

## 2025-02-14 PROCEDURE — 63600175 PHARM REV CODE 636 W HCPCS: Mod: HCNC | Performed by: EMERGENCY MEDICINE

## 2025-02-14 PROCEDURE — 83935 ASSAY OF URINE OSMOLALITY: CPT | Mod: HCNC | Performed by: EMERGENCY MEDICINE

## 2025-02-14 PROCEDURE — 83735 ASSAY OF MAGNESIUM: CPT | Mod: 91,HCNC | Performed by: HOSPITALIST

## 2025-02-14 PROCEDURE — 96367 TX/PROPH/DG ADDL SEQ IV INF: CPT | Mod: HCNC

## 2025-02-14 PROCEDURE — 25500020 PHARM REV CODE 255: Mod: HCNC | Performed by: EMERGENCY MEDICINE

## 2025-02-14 PROCEDURE — 93005 ELECTROCARDIOGRAM TRACING: CPT | Mod: HCNC

## 2025-02-14 PROCEDURE — 63600175 PHARM REV CODE 636 W HCPCS: Mod: HCNC | Performed by: HOSPITALIST

## 2025-02-14 PROCEDURE — 82436 ASSAY OF URINE CHLORIDE: CPT | Mod: HCNC | Performed by: EMERGENCY MEDICINE

## 2025-02-14 PROCEDURE — 82803 BLOOD GASES ANY COMBINATION: CPT | Mod: HCNC

## 2025-02-14 PROCEDURE — 85025 COMPLETE CBC W/AUTO DIFF WBC: CPT | Mod: HCNC | Performed by: EMERGENCY MEDICINE

## 2025-02-14 PROCEDURE — 81003 URINALYSIS AUTO W/O SCOPE: CPT | Mod: HCNC | Performed by: EMERGENCY MEDICINE

## 2025-02-14 PROCEDURE — 80047 BASIC METABLC PNL IONIZED CA: CPT | Mod: HCNC

## 2025-02-14 PROCEDURE — 99285 EMERGENCY DEPT VISIT HI MDM: CPT | Mod: 25,HCNC

## 2025-02-14 PROCEDURE — 25000003 PHARM REV CODE 250: Mod: HCNC | Performed by: HOSPITALIST

## 2025-02-14 PROCEDURE — 84300 ASSAY OF URINE SODIUM: CPT | Mod: HCNC | Performed by: EMERGENCY MEDICINE

## 2025-02-14 PROCEDURE — 96368 THER/DIAG CONCURRENT INF: CPT | Mod: HCNC

## 2025-02-14 PROCEDURE — 99900035 HC TECH TIME PER 15 MIN (STAT): Mod: HCNC

## 2025-02-14 PROCEDURE — 25000003 PHARM REV CODE 250: Mod: HCNC | Performed by: EMERGENCY MEDICINE

## 2025-02-14 PROCEDURE — 83880 ASSAY OF NATRIURETIC PEPTIDE: CPT | Mod: HCNC | Performed by: EMERGENCY MEDICINE

## 2025-02-14 PROCEDURE — 83735 ASSAY OF MAGNESIUM: CPT | Mod: HCNC | Performed by: EMERGENCY MEDICINE

## 2025-02-14 PROCEDURE — 63600175 PHARM REV CODE 636 W HCPCS: Mod: JZ,TB,HCNC | Performed by: HOSPITALIST

## 2025-02-14 PROCEDURE — 87040 BLOOD CULTURE FOR BACTERIA: CPT | Mod: 59,HCNC | Performed by: EMERGENCY MEDICINE

## 2025-02-14 PROCEDURE — 83690 ASSAY OF LIPASE: CPT | Mod: HCNC | Performed by: EMERGENCY MEDICINE

## 2025-02-14 PROCEDURE — 84484 ASSAY OF TROPONIN QUANT: CPT | Mod: HCNC | Performed by: EMERGENCY MEDICINE

## 2025-02-14 PROCEDURE — 83605 ASSAY OF LACTIC ACID: CPT | Mod: HCNC

## 2025-02-14 PROCEDURE — 82570 ASSAY OF URINE CREATININE: CPT | Mod: HCNC | Performed by: EMERGENCY MEDICINE

## 2025-02-14 PROCEDURE — 96375 TX/PRO/DX INJ NEW DRUG ADDON: CPT | Mod: HCNC

## 2025-02-14 PROCEDURE — 93010 ELECTROCARDIOGRAM REPORT: CPT | Mod: HCNC,,, | Performed by: INTERNAL MEDICINE

## 2025-02-14 PROCEDURE — 84443 ASSAY THYROID STIM HORMONE: CPT | Mod: HCNC | Performed by: EMERGENCY MEDICINE

## 2025-02-14 PROCEDURE — 80069 RENAL FUNCTION PANEL: CPT | Mod: HCNC | Performed by: HOSPITALIST

## 2025-02-14 PROCEDURE — G0378 HOSPITAL OBSERVATION PER HR: HCPCS | Mod: HCNC

## 2025-02-14 PROCEDURE — 96360 HYDRATION IV INFUSION INIT: CPT | Mod: 59,HCNC

## 2025-02-14 PROCEDURE — 80053 COMPREHEN METABOLIC PANEL: CPT | Mod: HCNC | Performed by: EMERGENCY MEDICINE

## 2025-02-14 PROCEDURE — 83930 ASSAY OF BLOOD OSMOLALITY: CPT | Mod: HCNC | Performed by: HOSPITALIST

## 2025-02-14 RX ORDER — HYDROMORPHONE HYDROCHLORIDE 1 MG/ML
0.5 INJECTION, SOLUTION INTRAMUSCULAR; INTRAVENOUS; SUBCUTANEOUS ONCE
Status: COMPLETED | OUTPATIENT
Start: 2025-02-14 | End: 2025-02-14

## 2025-02-14 RX ORDER — POTASSIUM CHLORIDE 7.45 MG/ML
10 INJECTION INTRAVENOUS
Status: COMPLETED | OUTPATIENT
Start: 2025-02-14 | End: 2025-02-15

## 2025-02-14 RX ORDER — POTASSIUM CHLORIDE 7.45 MG/ML
10 INJECTION INTRAVENOUS
Status: COMPLETED | OUTPATIENT
Start: 2025-02-14 | End: 2025-02-14

## 2025-02-14 RX ORDER — LACTULOSE 10 G/15ML
30 SOLUTION ORAL; RECTAL DAILY PRN
Status: ON HOLD | COMMUNITY
Start: 2025-01-10 | End: 2025-02-21 | Stop reason: HOSPADM

## 2025-02-14 RX ORDER — SODIUM CHLORIDE 9 MG/ML
INJECTION, SOLUTION INTRAVENOUS CONTINUOUS
Status: ACTIVE | OUTPATIENT
Start: 2025-02-14 | End: 2025-02-15

## 2025-02-14 RX ORDER — MAGNESIUM SULFATE HEPTAHYDRATE 40 MG/ML
2 INJECTION, SOLUTION INTRAVENOUS ONCE
Status: COMPLETED | OUTPATIENT
Start: 2025-02-14 | End: 2025-02-14

## 2025-02-14 RX ADMIN — HYDROMORPHONE HYDROCHLORIDE 0.5 MG: 1 INJECTION, SOLUTION INTRAMUSCULAR; INTRAVENOUS; SUBCUTANEOUS at 10:02

## 2025-02-14 RX ADMIN — SODIUM CHLORIDE 1000 ML: 9 INJECTION, SOLUTION INTRAVENOUS at 01:02

## 2025-02-14 RX ADMIN — PIPERACILLIN SODIUM AND TAZOBACTAM SODIUM 4.5 G: 4; .5 INJECTION, POWDER, FOR SOLUTION INTRAVENOUS at 04:02

## 2025-02-14 RX ADMIN — MAGNESIUM SULFATE HEPTAHYDRATE 2 G: 40 INJECTION, SOLUTION INTRAVENOUS at 05:02

## 2025-02-14 RX ADMIN — POTASSIUM CHLORIDE 10 MEQ: 7.46 INJECTION, SOLUTION INTRAVENOUS at 11:02

## 2025-02-14 RX ADMIN — SODIUM CHLORIDE: 9 INJECTION, SOLUTION INTRAVENOUS at 11:02

## 2025-02-14 RX ADMIN — VANCOMYCIN HYDROCHLORIDE 1000 MG: 1 INJECTION, POWDER, LYOPHILIZED, FOR SOLUTION INTRAVENOUS at 05:02

## 2025-02-14 RX ADMIN — IOHEXOL 75 ML: 350 INJECTION, SOLUTION INTRAVENOUS at 05:02

## 2025-02-14 RX ADMIN — POTASSIUM CHLORIDE 10 MEQ: 7.46 INJECTION, SOLUTION INTRAVENOUS at 05:02

## 2025-02-14 RX ADMIN — POTASSIUM BICARBONATE 50 MEQ: 978 TABLET, EFFERVESCENT ORAL at 10:02

## 2025-02-14 NOTE — TELEPHONE ENCOUNTER
Received call from patient access rep about patient.  Niece on the phone as well as nephew who is with patient.  Nephew reports patient is still constipated even after going to the ED and taking glycolax as directed by ED.  Still unable to have a bowel movement, only diarrhea.  Pain is 9/10 for constipation.  Patient also fell off the toilet this morning.  She fell on her right side and did not hit her head.  She is able to move but is in pain, 8 or 9/10.  Informed family clinic does not have any medication in the office to help with pain.  Pain medication ordered by ED - explained to patient family narcotics can increase constipation.  Advised family to bring patient back to ED.  Family verbalized understanding.

## 2025-02-14 NOTE — TELEPHONE ENCOUNTER
Called and spoke with sister.  Informed  advice from MD is to go to ER. States they are their now and they are working on her.       From Dr. Velazco   Recommend pt go to ED

## 2025-02-14 NOTE — TELEPHONE ENCOUNTER
Returned call to Ms. Hills. Spoke with her sister    States 1. Condition seems worsened since getting home              2. She is giving her Polyeth Glycol every 3 hrs as directed by the hospital but no solid                  Stool just diarrhea.              3. Her Right side is continuing to hurt from being constipated.              4. She slid to the floor this AM from the toilet but she did not hit her head                4. Informed that Dr. Xie is not in clinic, but will inform her colleague. The other option may be to bring Ms. Lo back to the ER

## 2025-02-14 NOTE — ED TRIAGE NOTES
Patient arrives to ED via POV c/o constipation. Seen in ED 02/11/24. Family member at bedside states 1-2 episodes of diarrhea since discharge. Patient endorsing abdominal pain. Denies fever, chills. Patient lethargic and weak

## 2025-02-14 NOTE — TELEPHONE ENCOUNTER
----- Message from Killian sent at 2/14/2025  8:50 AM CST -----  Regarding: Urgent  Type : Patient Call        Who Called : Patient Niharriet (Kamila)        Does the patient know what this is regarding?: Requesting a call back pt has a hospital follow up appt on 2/20 but needs a sooner appt.  . Pt had a procedure on the 5th and she is in pain , she is also constipated. Pt fell off the toilet today . She was seen in the ER on 2/11 but did receive any relief. Pt does not want to go back to the ER because she feel as though the ER visit are not helping her. Only wants to see Dr. Xie. I did attempt to schedule a sooner appt next appt is not until March. Pt is also open to seeing the dr. Leiva. Pt needs some type of guidance they are at a lost and dont know what to do Please Advise         Would the patient rather a call back or a response via [facility]? Call         Best Call Back Number: [phone] or [phone]              Additional Information:

## 2025-02-14 NOTE — ED PROVIDER NOTES
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  Patient reports diffuse abdominal pain that is severe.  She started taking the MiraLax after discharge in his had multiple bowel movements and some diarrhea, particularly over the last day.  Patient has decreased p.o. intake as well.  No fevers or chills reported.    The history is provided by the patient and a relative.     Review of patient's allergies indicates:   Allergen Reactions    Epinephrine      carcinoid     Past Medical History:   Diagnosis Date    Anemia 07/11/2018    B12 deficiency     Depression     per pcp note 7/2017 and given prozac and diazepam     Hyperlipidemia     Neuroendocrine tumor     Syncope 11/04/2024    Systolic heart failure     Weight loss     reviewed prior pcp Dr. Russo notes 2017 - labs reviewed: cmp wnl x tbili 1.5, tsh wnl, A1c 5.0, cbc wnl,D 36, hiv neg, hep c neg, hep b surg ag neg      Past Surgical History:   Procedure Laterality Date    EMBOLIZATION N/A 02/14/2019    Procedure: EMBOLIZATION, BLOOD VESSEL;  Surgeon: Saravanan Diagnostic Provider;  Location: [facility] OR [address] [place] [address];  Service: Radiology;  Laterality: N/A;  Room 189/[place]    EMBOLIZATION N/A 03/21/2019    Procedure: EMBOLIZATION, BLOOD VESSEL;  Surgeon: Saravanan Diagnostic Provider;  Location: [facility] OR [address] [place] [address];  Service: Radiology;  Laterality: N/A;  Room 189/[place]    ESOPHAGOGASTRODUODENOSCOPY  05/04/2018    esophageal ring, grade 1 esophagitis, gastritis     EYE SURGERY Bilateral     cataract removal     HYSTERECTOMY      fibroids     Family History   Problem Relation Name Age of Onset    Glaucoma Mother      Hypertension Mother      Heart attack Father      Cancer Father      Diabetes Sister Marilyn     Asthma Sister Nick Luke     No Known Problems Sister Stephanie     Hyperlipidemia Sister      Heart attack Sister      Cancer Brother          lung cancer, + tobacco    Diabetes Brother      Hypertension Brother      Stroke Brother      Emphysema Brother      COPD Brother       Social History     Tobacco Use    Smoking status: Never    Smokeless tobacco: Never   Substance Use Topics    Alcohol use: No     Comment: stopped in 2007 - hx of social drinking    Drug use: Never     Review of Systems    Physical Exam     Initial Vitals [02/14/25 1326]   BP Pulse Resp Temp SpO2   (!) 81/49 80 20 97.6 °F (36.4 °C) 95 %      MAP       --         Physical Exam    Nursing note and vitals reviewed.  Constitutional: She appears well-developed and well-nourished. She is not diaphoretic. No distress.   HENT: Mouth/Throat: Oropharynx is clear and moist.   Eyes: Conjunctivae are normal.   Neck: Neck supple.   Cardiovascular:  Normal rate.           Pulmonary/Chest: No respiratory distress.   Abdominal: Abdomen is soft. She exhibits distension. There is abdominal tenderness. There is no rebound and no guarding.   Musculoskeletal:         General: No tenderness or edema.      Cervical back: Neck supple.     Neurological: She is alert and oriented to person, place, and time. She has normal strength.   Skin: Skin is warm. No pallor.         ED Course   Procedures  Labs Reviewed   CBC W/ AUTO DIFFERENTIAL - Abnormal       Result Value    WBC 10.55      RBC 3.73 (*)     Hemoglobin 11.4 (*)     Hematocrit 33.0 (*)     MCV 89      MCH 30.6      MCHC 34.5      RDW 12.1      Platelets 225      MPV 9.8      Immature Granulocytes 1.3 (*)     Gran # (ANC) 9.0 (*)     Immature Grans (Abs) 0.14 (*)     Lymph # 0.6 (*)     Mono # 0.9      Eos # 0.0       Baso # 0.01      nRBC 0      Gran % 84.9 (*)     Lymph % 5.3 (*)     Mono % 8.4      Eosinophil % 0.0      Basophil % 0.1      Differential Method Automated      Narrative:     ADD ON HS TROP PER PAIGE TRACEY MD ORDER# [id number] @    02/14/2025  15:46    COMPREHENSIVE METABOLIC PANEL - Abnormal    Sodium 126 (*)     Potassium 2.5 (*)     Chloride 85 (*)     CO2 30 (*)     Glucose 185 (*)     BUN 9      Creatinine 0.5      Calcium 8.2 (*)     Total Protein 5.7 (*)     Albumin 2.4 (*)     Total Bilirubin 1.5 (*)     Alkaline Phosphatase 915 (*)      (*)      (*)     eGFR >60.0      Anion Gap 11      Narrative:     ADD ON BRAIN NATRIURETIC PEPTIDE TO [id number]  PER PAIGE TRACEY MD.  02/14/2025  16:25     ADD ON HS TROP PER PAIGE TRACEY MD ORDER# [id number] @    02/14/2025  15:46    URINALYSIS, REFLEX TO URINE CULTURE - Abnormal    Specimen UA Urine, Clean Catch      Color, UA Yellow      Appearance, UA Clear      pH, UA 6.0      Specific Gravity, UA 1.010      Protein, UA Negative      Glucose, UA 2+ (*)     Ketones, UA Trace (*)     Bilirubin (UA) Negative      Occult Blood UA Negative      Nitrite, UA Negative      Leukocytes, UA Negative      Narrative:     Specimen Source->Urine   MAGNESIUM - Abnormal    Magnesium 1.4 (*)     Narrative:     ADD ON BRAIN NATRIURETIC PEPTIDE TO [id number]  PER PAIGE TRACEY MD.  02/14/2025  16:25     ADD ON HS TROP PER PAIGE TRACEY MD ORDER# [id number] @    02/14/2025  15:46    B-TYPE NATRIURETIC PEPTIDE - Abnormal     (*)     Narrative:     ADD ON BRAIN NATRIURETIC PEPTIDE TO [id number]  PER PAIGE TRACEY MD.  02/14/2025  16:25     ADD ON HS TROP PER PAIGE TRACEY MD ORDER# [id number] @    02/14/2025  15:46    RENAL FUNCTION PANEL - Abnormal    Glucose 172 (*)     Sodium 125 (*)     Potassium 3.0 (*)     Chloride 87 (*)     CO2 26      BUN 9      Calcium 8.6 (*)     Creatinine 0.5      Albumin 2.4 (*)     Phosphorus 3.0       eGFR >60.0      Anion Gap 12     OSMOLALITY, SERUM - Abnormal    Osmolality 270 (*)    ISTAT PROCEDURE - Abnormal    POC PH 7.424      POC PCO2 52.7 (*)     POC PO2 30 (*)     POC HCO3 34.5 (*)     POC BE 10 (*)     POC SATURATED O2 58      POC TCO2 36 (*)     Sample VENOUS      Site Other      Allens Test N/A     ISTAT PROCEDURE - Abnormal    POC Glucose 190 (*)     POC BUN 8      POC Creatinine 0.4 (*)     POC Sodium 125 (*)     POC Potassium 2.4 (*)     POC Chloride 81 (*)     POC TCO2 (MEASURED) 31 (*)     POC Ionized Calcium 1.02 (*)     POC Hematocrit 35 (*)     Sample NARINDER     TSH    TSH 0.685      Narrative:     ADD ON BRAIN NATRIURETIC PEPTIDE TO [id number]  PER PAIGE TRACEY MD.  02/14/2025  16:25     ADD ON HS TROP PER PAIGE TRACEY MD ORDER# [id number] @    02/14/2025  15:46    LIPASE    Lipase 53      Narrative:     ADD ON BRAIN NATRIURETIC PEPTIDE TO [id number]  PER PAIGE TRACEY MD.  02/14/2025  16:25     ADD ON HS TROP PER PAIGE TRACEY MD ORDER# [id number] @    02/14/2025  15:46    B-TYPE NATRIURETIC PEPTIDE   TROPONIN I HIGH SENSITIVITY   TROPONIN I HIGH SENSITIVITY    Troponin I High Sensitivity <3      Narrative:     ADD ON BRAIN NATRIURETIC PEPTIDE TO [id number]  PER PAIGE TRACEY MD.  02/14/2025  16:25     ADD ON HS TROP PER PAIGE TRACEY MD ORDER# [id number] @    02/14/2025  15:46    OSMOLALITY, URINE RANDOM   SODIUM, URINE, RANDOM   CREATININE, URINE, RANDOM   CHLORIDE, URINE, RANDOM   CHLORIDE, URINE, RANDOM    Chloride, Urine 64      Narrative:     ADD ON CHLORIDE,CREATININE,SODIUM,& OSMOLALITY TO [id number]  PER LISA BOWERS MD. 02/14/2025  20:39     Specimen Source->Urine   CREATININE, URINE, RANDOM    Creatinine, Urine 45.0      Narrative:     ADD ON CHLORIDE,CREATININE,SODIUM,& OSMOLALITY TO [id number]  PER LISA BOWERS MD. 02/14/2025  20:39     Specimen Source->Urine   SODIUM, URINE, RANDOM    Sodium, Urine 33      Narrative:     ADD ON  CHLORIDE,CREATININE,SODIUM,& OSMOLALITY TO [id number]  PER LISA BOWERS MD. 02/14/2025  20:39     Specimen Source->Urine   OSMOLALITY, URINE RANDOM    Osmolality, Urine 351      Narrative:     ADD ON CHLORIDE,CREATININE,SODIUM,& OSMOLALITY TO [id number]  PER LISA BOWERS MD. 02/14/2025  20:39     Specimen Source->Urine   MAGNESIUM    Magnesium 2.3          ECG Results              EKG 12-lead (Final result)        Collection Time Result Time QRS Duration OHS QTC Calculation    02/14/25 13:44:05 02/14/25 13:51:18 90 519                     Final result by Interface, Lab In [facility] (02/14/25 13:51:21)                   Narrative:    Test Reason : Z13.6,    Vent. Rate :  87 BPM     Atrial Rate :  87 BPM     P-R Int : 166 ms          QRS Dur :  90 ms      QT Int : 432 ms       P-R-T Axes :  70 -46 102 degrees    QTcB Int : 519 ms    Sinus rhythm with frequent Premature ventricular complexes in a pattern of  bigeminy  Possible Left atrial enlargement  Left axis deviation  LVH with repolarization abnormality  Abnormal ECG  When compared with ECG of 06-Dec-2024 22:00,  No significant change was found  Confirmed by Jr Sesay ([id number]) on 2/14/2025 1:51:16 PM    Referred By:            Confirmed By: Jr Sesay                                  Imaging Results               CT Abdomen Pelvis With IV Contrast NO Oral Contrast (Final result)  Result time 02/14/25 18:50:25      Final result by William Laguerre MD (02/14/25 18:50:25)                   Impression:      This report was flagged in Epic as abnormal.    1. Liquid stool throughout the large bowel, may reflect diarrheal illness, clinical correlation is advised.  2. Short segment small bowel intussusception, no findings to suggest obstruction.  This finding is often transient however correlation and follow-up as warranted.  3. Post treatment changes of the hepatic parenchyma.  In comparison to the previous MRI, low attenuating regions have increased since that  time, noting ablation has been performed since that exam and likely reflect sequela of the same.  Clinical correlation is advised, continued follow-up recommended.  No discrete collections to definitively suggest abscess.  4. Cholelithiasis, no secondary findings to suggest acute cholecystitis.  5. Pulmonary nodule within the left lower lobe, not confidently identified on the previous PET-CT.  Infectious or inflammatory process is a consideration although malignancy is not excluded.  Follow-up is advised.  6. Please see above for several additional findings.      Electronically signed by: William Laguerre MD  Date:    02/14/2025  Time:    18:50               Narrative:    EXAMINATION:  CT ABDOMEN PELVIS WITH IV CONTRAST    CLINICAL HISTORY:  Abdominal pain, acute, nonlocalized;    TECHNIQUE:  Low dose axial images, sagittal and coronal reformations were obtained from the lung bases to the pubic symphysis following the IV administration of 75 mL of Omnipaque 350 .  Oral contrast was not given.    COMPARISON:  Radiograph 02/11/2025, MRI abdomen 12/16/2024, PET-CT 06/13/2024    FINDINGS:  Images of the lower thorax are remarkable for bilateral dependent atelectasis.  There is a 3-4 mm pulmonary nodule within the left lower lobe.    Allowing for motion artifact, the spleen is unremarkable.  There is some atrophic change of the pancreas.  There is prominence of the pancreatic duct at the head/neck junction measuring up to 4 mm.  There is cholelithiasis noting lobular configuration of the gallbladder.  There is intra and extrahepatic biliary prominence, similar to the previous exam.  No convincing choledocholithiasis.  The stomach is decompressed without wall thickening.  The portal vein, splenic vein, SMV, celiac axis and SMA all are patent.  There is scattered fluid in the abdomen.  There is thickening of the bilateral adrenal glands.    Patient has history of hepatic malignancy status post ablation.  There are large  "cystic regions within the right hepatic lobe consistent with prior ablation.  Numerous low attenuating lesions are noted throughout the hepatic parenchyma, some of which with wall thickening.  Comparison with the previous MRI is limited given significant motion artifact at that time.  Several of the lesions appear stable noting some cystic components are new since that time suggesting possible treatment change.  No rim enhancing collections to definitively suggest abscess.    The kidneys enhance symmetrically without hydronephrosis or nephrolithiasis.  The bilateral ureters are unable to be followed in their entirety to the urinary bladder, no definite calculi seen or secondary findings to suggest obstructive uropathy.  The urinary bladder is distended without wall thickening.  The uterus is absent the adnexa is unremarkable.    There is scattered liquid stool throughout the large bowel noting the large bowel is distended with the same.  The terminal ileum is unremarkable.  The appendix is unremarkable.  There is a short segment intussusception involving the mid to distal small bowel, a finding that is typically transient.  No findings to suggest obstruction.  There is atherosclerotic calcification of the aorta and its branches.  There are several scattered shotty periaortic, pericaval, and mesenteric lymph nodes.    There are degenerative changes of the spine.  There is sclerotic appearance of the osseous structures.                                       X-Ray Chest AP Portable (Final result)  Result time 02/14/25 14:48:31      Final result by Charles Singer MD (02/14/25 14:48:31)                   Impression:      No acute finding identified on this limited single view.    Mild cardiomegaly.      Electronically signed by: Charles Singer MD  Date:    02/14/2025  Time:    14:48               Narrative:    EXAMINATION:  XR CHEST AP PORTABLE    CLINICAL HISTORY:  Provided history is "Sepsis;  " "".    TECHNIQUE:  One view of the chest.    COMPARISON:  12/06/2024.    FINDINGS:  Cardiac wires overlie the chest.  Exam quality is limited by soft tissue attenuation of the x-ray beam and portable technique.  Cardiomediastinal silhouette is mildly enlarged.  No confluent area of consolidation.  No large pleural effusion.  No pneumothorax.                                       Medications   0.9% NaCl infusion ( Intravenous Not Given 2/15/25 0913)   glucose chewable tablet 16 g (has no administration in time range)   glucose chewable tablet 24 g (has no administration in time range)   dextrose 50% injection 12.5 g (has no administration in time range)   dextrose 50% injection 25 g (has no administration in time range)   glucagon (human recombinant) injection 1 mg (has no administration in time range)   insulin aspart U-100 pen 0-5 Units (has no administration in time range)   cyanocobalamin tablet 1,000 mcg (1,000 mcg Oral Given 2/15/25 0841)   diphenoxylate-atropine 2.5-0.025 mg per tablet 1 tablet (has no administration in time range)   ezetimibe tablet 10 mg (10 mg Oral Given 2/15/25 0841)   ferrous sulfate tablet 1 each (1 each Oral Given 2/15/25 0841)   latanoprost 0.005 % ophthalmic solution 1 drop (1 drop Both Eyes Given 2/15/25 0045)   polyethylene glycol packet 17 g (has no administration in time range)   vitamin D 1000 units tablet 1,000 Units (1,000 Units Oral Given 2/15/25 0841)   sodium chloride 0.9% flush 10 mL (has no administration in time range)   melatonin tablet 6 mg (has no administration in time range)   ondansetron injection 4 mg (has no administration in time range)   prochlorperazine injection Soln 5 mg (has no administration in time range)   aluminum-magnesium hydroxide-simethicone 200-200-20 mg/5 mL suspension 30 mL (has no administration in time range)   acetaminophen tablet 650 mg (has no administration in time range)   naloxone 0.4 mg/mL injection 0.02 mg (has no administration in time " range)   enoxaparin injection 40 mg (has no administration in time range)   HYDROmorphone injection 0.5 mg (0.5 mg Intravenous Given 2/15/25 0910)   HYDROmorphone injection 1 mg (has no administration in time range)   piperacillin-tazobactam (ZOSYN) 4.5 g in D5W 100 mL IVPB (MB+) (4.5 g Intravenous New Bag 2/15/25 0920)   sodium chloride 0.9% bolus 1,000 mL 1,000 mL (0 mLs Intravenous Stopped 2/14/25 1454)   vancomycin (VANCOCIN) 1,000 mg in 0.9% NaCl 250 mL IVPB (admixture device) (0 mg Intravenous Stopped 2/14/25 1837)   piperacillin-tazobactam (ZOSYN) 4.5 g in D5W 100 mL IVPB (MB+) (0 g Intravenous Stopped 2/14/25 1705)   potassium chloride 10 mEq in 100 mL IVPB (0 mEq Intravenous Stopped 2/14/25 1831)   magnesium sulfate 2g in water 50mL IVPB (premix) (0 g Intravenous Stopped 2/14/25 1831)   iohexoL (OMNIPAQUE 350) injection 100 mL (75 mLs Intravenous Given 2/14/25 1750)   potassium bicarbonate disintegrating tablet 50 mEq (50 mEq Oral Given 2/14/25 2253)   potassium chloride 10 mEq in 100 mL IVPB (10 mEq Intravenous New Bag 2/15/25 0130)   HYDROmorphone injection 0.5 mg (0.5 mg Intravenous Given 2/14/25 2253)     Medical Decision Making  Differential diagnosis for hypotension would include hypovolemia 2/2 dehydration or blood loss, distributive process like sepsis or anaphylaxis, endocrine crisis like adrenal shock or hypothyroidism, cardiopulmonary process like cardiogenic shock, PE or tamponade, or polypharmacy.    At this point in time, I think the most likely etiology of this patient's hypotension is dehydration, possibly 2/2 infection.    The following tests and interventions will be performed to determine the source of this patient's hypotension:  -Labs: CBC, CMP, lactate, UA, cultures  -Imaging: CT abd/pelvis  -Close monitoring with frequent VS checks  -Intravenous fluid boluses: small fluid bolus given h/o diarrhea and hypotension  -Empiric treatment with broad spectrum antibiotics  -Medication  review    -Disposition: admission      Amount and/or Complexity of Data Reviewed  External Data Reviewed: labs, radiology and notes.  Labs: ordered. Decision-making details documented in ED Course.  Radiology: ordered and independent interpretation performed. Decision-making details documented in ED Course.  ECG/medicine tests: ordered and independent interpretation performed. Decision-making details documented in ED Course.    Risk  Prescription drug management.  Decision regarding hospitalization.              Attending Attestation:         Attending Critical Care:   Critical Care Times:   ==============================================================  Total Critical Care Time - exclusive of procedural time: 35 minutes.  ==============================================================  Critical care was necessary to treat or prevent imminent or life-threatening deterioration of the following conditions: hypotension.   Critical care was time spent personally by me on the following activities: obtaining history from patient or relative, examination of patient, review of old charts, review of x-rays / CT sent with the patient, ordering lab, x-rays, and/or EKG, development of treatment plan with patient or relative, ordering and performing treatments and interventions, evaluation of patient's response to treatment and re-evaluation of patient's conition.   Critical Care Condition: potentially life-threatening           ED Course as of 02/15/25 0949   Fri Feb 14, 2025   1519 POC Lactate: 1.0 [AS]   1519 POC PH: 7.424 [AS]      ED Course User Index  [AS] Cierra Ken MD                           Clinical Impression:  Final diagnoses:  [Z13.6] Screening for cardiovascular condition  [E87.6] Hypokalemia (Primary)  [E87.1] Hyponatremia  [E83.42] Hypomagnesemia  [R79.89] Elevated LFTs          ED Disposition Condition    Observation Stable                Cierra Ken MD  02/15/25 0923

## 2025-02-15 PROBLEM — K56.1 INTUSSUSCEPTION: Status: ACTIVE | Noted: 2025-02-15

## 2025-02-15 PROBLEM — Z71.89 ACP (ADVANCE CARE PLANNING): Status: ACTIVE | Noted: 2025-02-15

## 2025-02-15 LAB
ALBUMIN SERPL BCP-MCNC: 2.3 G/DL (ref 3.5–5.2)
ALBUMIN SERPL BCP-MCNC: 2.4 G/DL (ref 3.5–5.2)
ALP SERPL-CCNC: 784 U/L (ref 40–150)
ALT SERPL W/O P-5'-P-CCNC: 426 U/L (ref 10–44)
ANION GAP SERPL CALC-SCNC: 12 MMOL/L (ref 8–16)
ANION GAP SERPL CALC-SCNC: 13 MMOL/L (ref 8–16)
AST SERPL-CCNC: 292 U/L (ref 10–40)
BASOPHILS # BLD AUTO: 0.01 K/UL (ref 0–0.2)
BASOPHILS NFR BLD: 0.1 % (ref 0–1.9)
BILIRUB SERPL-MCNC: 1.5 MG/DL (ref 0.1–1)
BUN SERPL-MCNC: 11 MG/DL (ref 8–23)
BUN SERPL-MCNC: 9 MG/DL (ref 8–23)
CALCIUM SERPL-MCNC: 8.4 MG/DL (ref 8.7–10.5)
CALCIUM SERPL-MCNC: 8.6 MG/DL (ref 8.7–10.5)
CHLORIDE SERPL-SCNC: 87 MMOL/L (ref 95–110)
CHLORIDE SERPL-SCNC: 90 MMOL/L (ref 95–110)
CO2 SERPL-SCNC: 22 MMOL/L (ref 23–29)
CO2 SERPL-SCNC: 26 MMOL/L (ref 23–29)
CREAT SERPL-MCNC: 0.5 MG/DL (ref 0.5–1.4)
CREAT SERPL-MCNC: 0.6 MG/DL (ref 0.5–1.4)
DIFFERENTIAL METHOD BLD: ABNORMAL
EOSINOPHIL # BLD AUTO: 0 K/UL (ref 0–0.5)
EOSINOPHIL NFR BLD: 0 % (ref 0–8)
ERYTHROCYTE [DISTWIDTH] IN BLOOD BY AUTOMATED COUNT: 12.5 % (ref 11.5–14.5)
EST. GFR  (NO RACE VARIABLE): >60 ML/MIN/1.73 M^2
EST. GFR  (NO RACE VARIABLE): >60 ML/MIN/1.73 M^2
GLUCOSE SERPL-MCNC: 172 MG/DL (ref 70–110)
GLUCOSE SERPL-MCNC: 176 MG/DL (ref 70–110)
HCT VFR BLD AUTO: 39.1 % (ref 37–48.5)
HGB BLD-MCNC: 13.8 G/DL (ref 12–16)
IMM GRANULOCYTES # BLD AUTO: 0.04 K/UL (ref 0–0.04)
IMM GRANULOCYTES NFR BLD AUTO: 0.6 % (ref 0–0.5)
LYMPHOCYTES # BLD AUTO: 0.8 K/UL (ref 1–4.8)
LYMPHOCYTES NFR BLD: 12.5 % (ref 18–48)
MAGNESIUM SERPL-MCNC: 2.1 MG/DL (ref 1.6–2.6)
MAGNESIUM SERPL-MCNC: 2.3 MG/DL (ref 1.6–2.6)
MCH RBC QN AUTO: 30.9 PG (ref 27–31)
MCHC RBC AUTO-ENTMCNC: 35.3 G/DL (ref 32–36)
MCV RBC AUTO: 88 FL (ref 82–98)
MONOCYTES # BLD AUTO: 0.9 K/UL (ref 0.3–1)
MONOCYTES NFR BLD: 13.7 % (ref 4–15)
NEUTROPHILS # BLD AUTO: 4.9 K/UL (ref 1.8–7.7)
NEUTROPHILS NFR BLD: 73.1 % (ref 38–73)
NRBC BLD-RTO: 0 /100 WBC
PHOSPHATE SERPL-MCNC: 3 MG/DL (ref 2.7–4.5)
PHOSPHATE SERPL-MCNC: 3.4 MG/DL (ref 2.7–4.5)
PLATELET # BLD AUTO: 314 K/UL (ref 150–450)
PMV BLD AUTO: 9.8 FL (ref 9.2–12.9)
POCT GLUCOSE: 199 MG/DL (ref 70–110)
POCT GLUCOSE: 216 MG/DL (ref 70–110)
POCT GLUCOSE: 229 MG/DL (ref 70–110)
POCT GLUCOSE: 248 MG/DL (ref 70–110)
POTASSIUM SERPL-SCNC: 3 MMOL/L (ref 3.5–5.1)
POTASSIUM SERPL-SCNC: 4.1 MMOL/L (ref 3.5–5.1)
PROT SERPL-MCNC: 5.9 G/DL (ref 6–8.4)
RBC # BLD AUTO: 4.47 M/UL (ref 4–5.4)
SODIUM SERPL-SCNC: 125 MMOL/L (ref 136–145)
SODIUM SERPL-SCNC: 125 MMOL/L (ref 136–145)
WBC # BLD AUTO: 6.73 K/UL (ref 3.9–12.7)

## 2025-02-15 PROCEDURE — G0378 HOSPITAL OBSERVATION PER HR: HCPCS | Mod: HCNC

## 2025-02-15 PROCEDURE — 80053 COMPREHEN METABOLIC PANEL: CPT | Mod: HCNC | Performed by: STUDENT IN AN ORGANIZED HEALTH CARE EDUCATION/TRAINING PROGRAM

## 2025-02-15 PROCEDURE — 83735 ASSAY OF MAGNESIUM: CPT | Mod: HCNC | Performed by: STUDENT IN AN ORGANIZED HEALTH CARE EDUCATION/TRAINING PROGRAM

## 2025-02-15 PROCEDURE — 63600175 PHARM REV CODE 636 W HCPCS: Mod: HCNC | Performed by: STUDENT IN AN ORGANIZED HEALTH CARE EDUCATION/TRAINING PROGRAM

## 2025-02-15 PROCEDURE — 84100 ASSAY OF PHOSPHORUS: CPT | Mod: HCNC | Performed by: STUDENT IN AN ORGANIZED HEALTH CARE EDUCATION/TRAINING PROGRAM

## 2025-02-15 PROCEDURE — 96372 THER/PROPH/DIAG INJ SC/IM: CPT | Performed by: STUDENT IN AN ORGANIZED HEALTH CARE EDUCATION/TRAINING PROGRAM

## 2025-02-15 PROCEDURE — 36415 COLL VENOUS BLD VENIPUNCTURE: CPT | Mod: HCNC | Performed by: STUDENT IN AN ORGANIZED HEALTH CARE EDUCATION/TRAINING PROGRAM

## 2025-02-15 PROCEDURE — 25000003 PHARM REV CODE 250: Mod: HCNC | Performed by: HOSPITALIST

## 2025-02-15 PROCEDURE — 96372 THER/PROPH/DIAG INJ SC/IM: CPT | Performed by: HOSPITALIST

## 2025-02-15 PROCEDURE — 63600175 PHARM REV CODE 636 W HCPCS: Mod: HCNC | Performed by: HOSPITALIST

## 2025-02-15 PROCEDURE — 85025 COMPLETE CBC W/AUTO DIFF WBC: CPT | Mod: HCNC | Performed by: STUDENT IN AN ORGANIZED HEALTH CARE EDUCATION/TRAINING PROGRAM

## 2025-02-15 PROCEDURE — 25000003 PHARM REV CODE 250: Mod: HCNC | Performed by: STUDENT IN AN ORGANIZED HEALTH CARE EDUCATION/TRAINING PROGRAM

## 2025-02-15 RX ORDER — DIPHENOXYLATE HYDROCHLORIDE AND ATROPINE SULFATE 2.5; .025 MG/1; MG/1
1 TABLET ORAL 4 TIMES DAILY PRN
Status: DISCONTINUED | OUTPATIENT
Start: 2025-02-15 | End: 2025-02-21 | Stop reason: HOSPADM

## 2025-02-15 RX ORDER — LATANOPROST 50 UG/ML
1 SOLUTION/ DROPS OPHTHALMIC NIGHTLY
Status: DISCONTINUED | OUTPATIENT
Start: 2025-02-15 | End: 2025-02-21 | Stop reason: HOSPADM

## 2025-02-15 RX ORDER — SODIUM CHLORIDE 9 MG/ML
INJECTION, SOLUTION INTRAVENOUS CONTINUOUS
Status: ACTIVE | OUTPATIENT
Start: 2025-02-15 | End: 2025-02-15

## 2025-02-15 RX ORDER — TALC
6 POWDER (GRAM) TOPICAL NIGHTLY PRN
Status: DISCONTINUED | OUTPATIENT
Start: 2025-02-15 | End: 2025-02-21 | Stop reason: HOSPADM

## 2025-02-15 RX ORDER — LANOLIN ALCOHOL/MO/W.PET/CERES
1 CREAM (GRAM) TOPICAL DAILY
Status: DISCONTINUED | OUTPATIENT
Start: 2025-02-15 | End: 2025-02-21 | Stop reason: HOSPADM

## 2025-02-15 RX ORDER — IBUPROFEN 200 MG
24 TABLET ORAL
Status: DISCONTINUED | OUTPATIENT
Start: 2025-02-15 | End: 2025-02-21 | Stop reason: HOSPADM

## 2025-02-15 RX ORDER — HYDROMORPHONE HYDROCHLORIDE 1 MG/ML
0.5 INJECTION, SOLUTION INTRAMUSCULAR; INTRAVENOUS; SUBCUTANEOUS EVERY 4 HOURS PRN
Status: DISCONTINUED | OUTPATIENT
Start: 2025-02-15 | End: 2025-02-16

## 2025-02-15 RX ORDER — INSULIN ASPART 100 [IU]/ML
0-5 INJECTION, SOLUTION INTRAVENOUS; SUBCUTANEOUS
Status: DISCONTINUED | OUTPATIENT
Start: 2025-02-15 | End: 2025-02-21 | Stop reason: HOSPADM

## 2025-02-15 RX ORDER — IBUPROFEN 200 MG
16 TABLET ORAL
Status: DISCONTINUED | OUTPATIENT
Start: 2025-02-15 | End: 2025-02-21 | Stop reason: HOSPADM

## 2025-02-15 RX ORDER — ACETAMINOPHEN 325 MG/1
650 TABLET ORAL EVERY 4 HOURS PRN
Status: DISCONTINUED | OUTPATIENT
Start: 2025-02-15 | End: 2025-02-21 | Stop reason: HOSPADM

## 2025-02-15 RX ORDER — POLYETHYLENE GLYCOL 3350 17 G/17G
17 POWDER, FOR SOLUTION ORAL 2 TIMES DAILY PRN
Status: DISCONTINUED | OUTPATIENT
Start: 2025-02-15 | End: 2025-02-21 | Stop reason: HOSPADM

## 2025-02-15 RX ORDER — PROCHLORPERAZINE EDISYLATE 5 MG/ML
5 INJECTION INTRAMUSCULAR; INTRAVENOUS EVERY 6 HOURS PRN
Status: DISCONTINUED | OUTPATIENT
Start: 2025-02-15 | End: 2025-02-21 | Stop reason: HOSPADM

## 2025-02-15 RX ORDER — LANOLIN ALCOHOL/MO/W.PET/CERES
1000 CREAM (GRAM) TOPICAL DAILY
Status: DISCONTINUED | OUTPATIENT
Start: 2025-02-15 | End: 2025-02-21 | Stop reason: HOSPADM

## 2025-02-15 RX ORDER — EZETIMIBE 10 MG/1
10 TABLET ORAL DAILY
Status: DISCONTINUED | OUTPATIENT
Start: 2025-02-15 | End: 2025-02-21 | Stop reason: HOSPADM

## 2025-02-15 RX ORDER — ENOXAPARIN SODIUM 100 MG/ML
40 INJECTION SUBCUTANEOUS EVERY 24 HOURS
Status: DISCONTINUED | OUTPATIENT
Start: 2025-02-15 | End: 2025-02-15

## 2025-02-15 RX ORDER — ENOXAPARIN SODIUM 100 MG/ML
30 INJECTION SUBCUTANEOUS EVERY 24 HOURS
Status: DISCONTINUED | OUTPATIENT
Start: 2025-02-15 | End: 2025-02-21 | Stop reason: HOSPADM

## 2025-02-15 RX ORDER — HYDROMORPHONE HYDROCHLORIDE 1 MG/ML
1 INJECTION, SOLUTION INTRAMUSCULAR; INTRAVENOUS; SUBCUTANEOUS EVERY 4 HOURS PRN
Status: DISCONTINUED | OUTPATIENT
Start: 2025-02-15 | End: 2025-02-16

## 2025-02-15 RX ORDER — NALOXONE HCL 0.4 MG/ML
0.02 VIAL (ML) INJECTION
Status: DISCONTINUED | OUTPATIENT
Start: 2025-02-15 | End: 2025-02-21 | Stop reason: HOSPADM

## 2025-02-15 RX ORDER — SODIUM CHLORIDE 1 G/1
1000 TABLET ORAL DAILY
Status: DISCONTINUED | OUTPATIENT
Start: 2025-02-15 | End: 2025-02-15

## 2025-02-15 RX ORDER — ALUMINUM HYDROXIDE, MAGNESIUM HYDROXIDE, AND SIMETHICONE 1200; 120; 1200 MG/30ML; MG/30ML; MG/30ML
30 SUSPENSION ORAL 4 TIMES DAILY PRN
Status: DISCONTINUED | OUTPATIENT
Start: 2025-02-15 | End: 2025-02-21 | Stop reason: HOSPADM

## 2025-02-15 RX ORDER — GLUCAGON 1 MG
1 KIT INJECTION
Status: DISCONTINUED | OUTPATIENT
Start: 2025-02-15 | End: 2025-02-21 | Stop reason: HOSPADM

## 2025-02-15 RX ORDER — ONDANSETRON HYDROCHLORIDE 2 MG/ML
4 INJECTION, SOLUTION INTRAVENOUS EVERY 8 HOURS PRN
Status: DISCONTINUED | OUTPATIENT
Start: 2025-02-15 | End: 2025-02-21 | Stop reason: HOSPADM

## 2025-02-15 RX ORDER — SODIUM CHLORIDE 0.9 % (FLUSH) 0.9 %
10 SYRINGE (ML) INJECTION EVERY 6 HOURS PRN
Status: DISCONTINUED | OUTPATIENT
Start: 2025-02-15 | End: 2025-02-21 | Stop reason: HOSPADM

## 2025-02-15 RX ORDER — CHOLECALCIFEROL (VITAMIN D3) 25 MCG
1000 TABLET ORAL DAILY
Status: DISCONTINUED | OUTPATIENT
Start: 2025-02-15 | End: 2025-02-21 | Stop reason: HOSPADM

## 2025-02-15 RX ADMIN — SODIUM CHLORIDE: 9 INJECTION, SOLUTION INTRAVENOUS at 02:02

## 2025-02-15 RX ADMIN — HYDROMORPHONE HYDROCHLORIDE 0.5 MG: 1 INJECTION, SOLUTION INTRAMUSCULAR; INTRAVENOUS; SUBCUTANEOUS at 09:02

## 2025-02-15 RX ADMIN — HYDROMORPHONE HYDROCHLORIDE 0.5 MG: 1 INJECTION, SOLUTION INTRAMUSCULAR; INTRAVENOUS; SUBCUTANEOUS at 05:02

## 2025-02-15 RX ADMIN — CYANOCOBALAMIN TAB 1000 MCG 1000 MCG: 1000 TAB at 08:02

## 2025-02-15 RX ADMIN — ENOXAPARIN SODIUM 30 MG: 30 INJECTION SUBCUTANEOUS at 04:02

## 2025-02-15 RX ADMIN — HYDROMORPHONE HYDROCHLORIDE 0.5 MG: 1 INJECTION, SOLUTION INTRAMUSCULAR; INTRAVENOUS; SUBCUTANEOUS at 02:02

## 2025-02-15 RX ADMIN — PIPERACILLIN SODIUM AND TAZOBACTAM SODIUM 4.5 G: 4; .5 INJECTION, POWDER, LYOPHILIZED, FOR SOLUTION INTRAVENOUS at 02:02

## 2025-02-15 RX ADMIN — PIPERACILLIN SODIUM AND TAZOBACTAM SODIUM 4.5 G: 4; .5 INJECTION, POWDER, LYOPHILIZED, FOR SOLUTION INTRAVENOUS at 09:02

## 2025-02-15 RX ADMIN — EZETIMIBE 10 MG: 10 TABLET ORAL at 08:02

## 2025-02-15 RX ADMIN — CHOLECALCIFEROL TAB 25 MCG (1000 UNIT) 1000 UNITS: 25 TAB at 08:02

## 2025-02-15 RX ADMIN — FERROUS SULFATE TAB EC 325 MG (65 MG FE EQUIVALENT) 1 EACH: 325 (65 FE) TABLET DELAYED RESPONSE at 08:02

## 2025-02-15 RX ADMIN — POTASSIUM CHLORIDE 10 MEQ: 7.46 INJECTION, SOLUTION INTRAVENOUS at 01:02

## 2025-02-15 RX ADMIN — LATANOPROST 1 DROP: 50 SOLUTION OPHTHALMIC at 09:02

## 2025-02-15 RX ADMIN — POTASSIUM CHLORIDE 10 MEQ: 7.46 INJECTION, SOLUTION INTRAVENOUS at 12:02

## 2025-02-15 RX ADMIN — INSULIN ASPART 2 UNITS: 100 INJECTION, SOLUTION INTRAVENOUS; SUBCUTANEOUS at 04:02

## 2025-02-15 RX ADMIN — INSULIN ASPART 2 UNITS: 100 INJECTION, SOLUTION INTRAVENOUS; SUBCUTANEOUS at 12:02

## 2025-02-15 RX ADMIN — PIPERACILLIN SODIUM AND TAZOBACTAM SODIUM 4.5 G: 4; .5 INJECTION, POWDER, LYOPHILIZED, FOR SOLUTION INTRAVENOUS at 05:02

## 2025-02-15 RX ADMIN — LATANOPROST 1 DROP: 50 SOLUTION OPHTHALMIC at 12:02

## 2025-02-15 NOTE — ASSESSMENT & PLAN NOTE
-after patient admitted for last embolization, post-procedure recs indicated immediately post-op pt expected to have rising hepatic enzymes, fever and abdominal pain are also possible.   -s/p blood clutures + empiric antibiotics in ED tonight,  can continue Zosyn alone empirically. 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

## 2025-02-15 NOTE — CONSULTS
[facility] Dept  General Surgery  Consult Note    Patient Name: Shahram Hills  MRN: [id number]  Code Status: Prior  Admission Date: 2/14/2025  Hospital Length of Stay: 0 days  Attending Physician: Renata Jorge MD  Primary Care Provider: Trixie Xie MD    Patient information was obtained from patient and past medical records.     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 She was rescently seen in the ED for constipation, where she had a brown bomb enema and relieved her constipation for the time being. She usually has changes in her bowel function after embolizations and has chronic abdominal pain and nausea. Her pain is similar in quality to her chronic pain, but more intense. She reports constipation, but has been having diarrhea. Hypertensive in the ED. Labs significant for hyponatremia to 126, hypokalemia to 2.5, Tbili 1.5, and rising AST/ALT now 448/517. CT was performed which showed liquid stool throughout her colon. She has large masses consistent with metastases in her liver. Intussusception was seen without evidence of obstruction. General surgery consulted for evaluation for intussusception.           No current facility-administered medications on file prior to encounter.     Current Outpatient Medications on File Prior to Encounter   Medication Sig    blood sugar diagnostic (TRUE METRIX GLUCOSE TEST STRIP) Strp USE TO CHECK BLOOD SUGAR TWICE DAILY    blood-glucose meter kit by Other route. Use as instructed    carvediloL (COREG) 12.5 MG tablet TAKE 1 TABLET(12.5 MG)  "BY MOUTH TWICE DAILY WITH MEALS    cyanocobalamin (VITAMIN B-12) 1000 MCG tablet Take 1 tablet (1,000 mcg total) by mouth once daily.    diphenoxylate-atropine 2.5-0.025 mg (LOMOTIL) 2.5-0.025 mg per tablet Take 1 tablet by mouth 4 (four) times daily as needed for Diarrhea.    ezetimibe (ZETIA) 10 mg tablet Take 10 mg by mouth once daily.    ferrous sulfate 325 (65 FE) MG EC tablet Take 325 mg by mouth once daily.    furosemide (LASIX) 20 MG tablet take 1 tab by mouth daily. If gain 2-3 pounds in 2-3 days then take 2 tabs daily for 3 days and then resume 1 dose daily    glipiZIDE (GLUCOTROL) 5 MG tablet Take 2.5mg by mouth with breakfast and take 5 mg with dinner    lancets Misc To check BG 2 times daily, to use with insurance preferred meter    latanoprost 0.005 % ophthalmic solution Place 1 drop into both eyes nightly.    linaCLOtide (LINZESS) 72 mcg Cap capsule Take 1 capsule (72 mcg total) by mouth before breakfast.    needle, disp, 22 G 22 gauge x 1" Ndle 1 application by Misc.(Non-Drug; Combo Route) route 3 (three) times daily as needed.    [] ondansetron (ZOFRAN-ODT) 8 MG TbDL Take 1 tablet (8 mg total) by mouth every 8 (eight) hours as needed (nausea).    polyethylene glycol (GLYCOLAX) 17 gram PwPk Take 17 g by mouth once daily.    sacubitriL-valsartan (ENTRESTO) 24-26 mg per tablet TAKE 1 TABLET BY MOUTH TWICE DAILY    spironolactone (ALDACTONE) 25 MG tablet Take 1 tablet (25 mg total) by mouth once daily.    syringe, disposable, 1 mL Syrg 1 application by Misc.(Non-Drug; Combo Route) route 3 (three) times daily as needed (diarrhea).    telotristat ethyl (XERMELO) 250 mg Tab Take 1 tablet (250 mg) by mouth 3 (three) times daily.    TRUEPLUS LANCETS 33 gauge Misc USE TO CHECK BLOOD SUGAR TWICE DAILY    vitamin D (VITAMIN D3) 1000 units Tab Take 400 Units by mouth once daily.    XIIDRA 5 % Dpet        Review of patient's allergies indicates:   Allergen Reactions    Epinephrine      carcinoid       Past " Medical History:   Diagnosis Date    Anemia 07/11/2018    B12 deficiency     Depression     per pcp note 7/2017 and given prozac and diazepam     Hyperlipidemia     Neuroendocrine tumor     Systolic heart failure     Weight loss     reviewed prior pcp Dr. Russo notes 2017 - labs reviewed: cmp wnl x tbili 1.5, tsh wnl, A1c 5.0, cbc wnl,D 36, hiv neg, hep c neg, hep b surg ag neg      Past Surgical History:   Procedure Laterality Date    EMBOLIZATION N/A 02/14/2019    Procedure: EMBOLIZATION, BLOOD VESSEL;  Surgeon: [facility] Diagnostic Provider;  [facility];  Service: Radiology;  Laterality: N/A;  Room 189/[facility]    EMBOLIZATION N/A 03/21/2019    Procedure: EMBOLIZATION, BLOOD VESSEL;  Surgeon: [facility] Diagnostic Provider;  Location: [facility] OR [facility];  Service: Radiology;  Laterality: N/A;  Room 189/[facility]    ESOPHAGOGASTRODUODENOSCOPY  05/04/2018    esophageal ring, grade 1 esophagitis, gastritis     EYE SURGERY Bilateral     cataract removal    HYSTERECTOMY      fibroids     Family History       Problem Relation (Age of Onset)    Asthma Sister    COPD Brother    Cancer Father, Brother    Diabetes Sister, Brother    Emphysema Brother    Glaucoma Mother    Heart attack Father, Sister    Hyperlipidemia Sister    Hypertension Mother, Brother    No Known Problems Sister    Stroke Brother          Tobacco Use    Smoking status: Never    Smokeless tobacco: Never   Substance and Sexual Activity    Alcohol use: No     Comment: stopped in 2007 - hx of social drinking    Drug use: Never    Sexual activity: Not Currently     Partners: Male     Review of Systems   Constitutional:  Negative for chills and fever.   HENT:  Negative for hearing loss and trouble swallowing.    Eyes:  Negative for discharge and visual disturbance.   Respiratory:  Negative for chest tightness and shortness of breath.    Cardiovascular:  Negative for chest pain and palpitations.   Gastrointestinal:  Positive for abdominal pain, constipation,  diarrhea and nausea. Negative for abdominal distention and vomiting.   Genitourinary:  Negative for difficulty urinating and hematuria.   Musculoskeletal:  Negative for arthralgias and back pain.   Skin:  Negative for rash and wound.   Neurological:  Negative for dizziness and headaches.     Objective:     Vital Signs (Most Recent):  Temp: 97.6 °F (36.4 °C) (02/14/25 1326)  Pulse: 60 (02/14/25 1832)  Resp: (!) 21 (02/14/25 1832)  BP: (!) 170/73 (02/14/25 1832)  SpO2: 96 % (02/14/25 1832) Vital Signs (24h Range):  Temp:  [97.6 °F (36.4 °C)] 97.6 °F (36.4 °C)  Pulse:  [43-80] 60  Resp:  [20-25] 21  SpO2:  [92 %-96 %] 96 %  BP: ()/(49-73) 170/73     Weight: 43.1 kg (95 lb)  Body mass index is 16.31 kg/m².     Physical Exam  Constitutional:       General: She is not in acute distress.     Appearance: Normal appearance.   HENT:      Head: Normocephalic and atraumatic.   Cardiovascular:      Rate and Rhythm: Normal rate and regular rhythm.   Pulmonary:      Effort: Pulmonary effort is normal. No respiratory distress.      Breath sounds: Normal breath sounds.   Abdominal:      General: Abdomen is flat. There is no distension.      Palpations: Abdomen is soft.      Tenderness: There is abdominal tenderness.      Comments: Moderate epigastric tenderness   Neurological:      General: No focal deficit present.      Mental Status: She is alert and oriented to person, place, and time.   Psychiatric:         Behavior: Behavior normal.         Thought Content: Thought content normal.            I have reviewed all pertinent lab results within the past 24 hours.  CBC:   Recent Labs   Lab 02/14/25  1539   WBC 10.55   RBC 3.73*   HGB 11.4*   HCT 33.0*      MCV 89   MCH 30.6   MCHC 34.5     CMP:   Recent Labs   Lab 02/14/25  1539   *   CALCIUM 8.2*   ALBUMIN 2.4*   PROT 5.7*   *   K 2.5*   CO2 30*   CL 85*   BUN 9   CREATININE 0.5   ALKPHOS 915*   *   *   BILITOT 1.5*       Significant  Diagnostics:  I have reviewed all pertinent imaging results/findings within the past 24 hours.    Assessment/Plan:     HCC (hepatocellular carcinoma)  Shahram Hills is a 84 y.o. lady with metastatic neuroendocrine tumor s/p embolization here with abdominal pain and intussusception seen on CT scan    - Intussusception is transient and not causing obstruction. This is a normal physiologic finding and is not contributing to her clinical picture  - suspect her elevated liver enzymes are due to her embolization  - having liquid stool and there is no dilated bowel. She does not have a bowel obstruction  - I am concerned that a lot of her symptoms are secondary to her oncologic disease  - dispo per ED        VTE Risk Mitigation (From admission, onward)      None            Marco A Granados MD  General Surgery  [facility]

## 2025-02-15 NOTE — CONSULTS
Inpatient Radiology Pre-procedure Note    History of Present Illness:  Shahram Hills is a 84 y.o. female who presents for pain following bland embolization of neuroendocrine mets to the liver. Patient was discharged the day after the procedure. She now presents with abdominal pain possibly with constipation.    The patient is confused, but notes that her pain is better controlled since yesterday.  Admission H&P reviewed.  Past Medical History:   Diagnosis Date    Anemia 07/11/2018    B12 deficiency     Depression     per pcp note 7/2017 and given prozac and diazepam     Hyperlipidemia     Neuroendocrine tumor     Syncope 11/04/2024    Systolic heart failure     Weight loss     reviewed prior pcp Dr. Russo notes 2017 - labs reviewed: cmp wnl x tbili 1.5, tsh wnl, A1c 5.0, cbc wnl,D 36, hiv neg, hep c neg, hep b surg ag neg      Past Surgical History:   Procedure Laterality Date    EMBOLIZATION N/A 02/14/2019    Procedure: EMBOLIZATION, BLOOD VESSEL;  Surgeon: [facility] Diagnostic Provider;  [facility];  Service: Radiology;  Laterality: N/A;  Room 189/[facility]    EMBOLIZATION N/A 03/21/2019    Procedure: EMBOLIZATION, BLOOD VESSEL;  Surgeon: [facility] Diagnostic Provider;  [facility];  Service: Radiology;  Laterality: N/A;  Room 189/[facility]    ESOPHAGOGASTRODUODENOSCOPY  05/04/2018    esophageal ring, grade 1 esophagitis, gastritis     EYE SURGERY Bilateral     cataract removal    HYSTERECTOMY      fibroids       Review of Systems:   As documented in primary team H&P    Home Meds:   Prior to Admission medications    Medication Sig Start Date End Date Taking? Authorizing Provider   blood sugar diagnostic (TRUE METRIX GLUCOSE TEST STRIP) Strp USE TO CHECK BLOOD SUGAR TWICE DAILY 6/24/24  Yes Trixie Xie MD   blood-glucose meter kit by Other route. Use as instructed   Yes Provider, Historical   carvediloL (COREG) 12.5 MG tablet TAKE 1 TABLET(12.5 MG) BY MOUTH TWICE DAILY WITH MEALS  10/8/24  Yes Trixie Xie MD   cyanocobalamin (VITAMIN B-12) 1000 MCG tablet Take 1 tablet (1,000 mcg total) by mouth once daily. 4/20/18  Yes Trixie Xie MD   diphenoxylate-atropine 2.5-0.025 mg (LOMOTIL) 2.5-0.025 mg per tablet Take 1 tablet by mouth 4 (four) times daily as needed for Diarrhea. 12/7/22  Yes Sebastian Granados MD   ezetimibe (ZETIA) 10 mg tablet Take 10 mg by mouth once daily.   Yes Provider, Historical   ferrous sulfate 325 (65 FE) MG EC tablet Take 325 mg by mouth once daily.   Yes Provider, Historical   furosemide (LASIX) 20 MG tablet take 1 tab by mouth daily. If gain 2-3 pounds in 2-3 days then take 2 tabs daily for 3 days and then resume 1 dose daily 10/29/24  Yes Trixie Xie MD   glipiZIDE (GLUCOTROL) 5 MG tablet Take 2.5mg by mouth with breakfast and take 5 mg with dinner 2/3/25  Yes Trixie Xie MD   lactulose (CHRONULAC) 10 gram/15 mL solution Take 30 g by mouth daily as needed (constipation). 1/10/25  Yes Provider, Historical   latanoprost 0.005 % ophthalmic solution Place 1 drop into both eyes nightly. 11/8/21  Yes Provider, Historical   linaCLOtide (LINZESS) 72 mcg Cap capsule Take 1 capsule (72 mcg total) by mouth before breakfast. 12/21/24  Yes Daksha Clarke, ASHLYP   ondansetron (ZOFRAN-ODT) 8 MG TbDL Take 1 tablet (8 mg total) by mouth every 8 (eight) hours as needed (nausea). 2/6/25 2/15/25 Yes Zeynep Carpio MD   polyethylene glycol (GLYCOLAX) 17 gram PwPk Take 17 g by mouth once daily. 2/11/25  Yes Miguel White MD   sacubitriL-valsartan (ENTRESTO) 24-26 mg per tablet TAKE 1 TABLET BY MOUTH TWICE DAILY 9/4/24  Yes Ralph Bergeron MD   spironolactone (ALDACTONE) 25 MG tablet Take 1 tablet (25 mg total) by mouth once daily. 7/9/24 7/9/25 Yes Ralph Bergeron MD   telotristat ethyl (XERMELO) 250 mg Tab Take 1 tablet (250 mg) by mouth 3 (three) times daily. 12/17/24  Yes Sebastian Granados MD   vitamin D (VITAMIN D3) 1000 units Tab Take 1,000  "Units by mouth once daily.   Yes Provider, Historical   lancets Misc To check BG 2 times daily, to use with insurance preferred meter 5/25/21   Trixie Xie MD   needle, disp, 22 G 22 gauge x 1" Ndle 1 application by Misc.(Non-Drug; Combo Route) route 3 (three) times daily as needed. 3/6/23   Trixie Xie MD   syringe, disposable, 1 mL Syrg 1 application by Misc.(Non-Drug; Combo Route) route 3 (three) times daily as needed (diarrhea). 7/15/22   Sebastian Granados MD   TRUEPLUS LANCETS 33 gauge Misc USE TO CHECK BLOOD SUGAR TWICE DAILY 6/24/24   Trixie Xie MD   XIIDRA 5 % Dpet  4/2/18   Provider, Historical     Scheduled Meds:    cyanocobalamin  1,000 mcg Oral Daily    enoxparin  40 mg Subcutaneous Daily    ezetimibe  10 mg Oral Daily    ferrous sulfate  1 tablet Oral Daily    latanoprost  1 drop Both Eyes Nightly    piperacillin-tazobactam (Zosyn) IV (PEDS and ADULTS) (extended infusion is not appropriate)  4.5 g Intravenous Q8H    vitamin D  1,000 Units Oral Daily     Continuous Infusions:    0.9% NaCl   Intravenous Continuous 125 mL/hr at 02/14/25 2330 New Bag at 02/14/25 2330     PRN Meds:  Current Facility-Administered Medications:     acetaminophen, 650 mg, Oral, Q4H PRN    aluminum-magnesium hydroxide-simethicone, 30 mL, Oral, QID PRN    dextrose 50%, 12.5 g, Intravenous, PRN    dextrose 50%, 25 g, Intravenous, PRN    diphenoxylate-atropine 2.5-0.025 mg, 1 tablet, Oral, QID PRN    glucagon (human recombinant), 1 mg, Intramuscular, PRN    glucose, 16 g, Oral, PRN    glucose, 24 g, Oral, PRN    HYDROmorphone, 0.5 mg, Intravenous, Q4H PRN    HYDROmorphone, 1 mg, Intravenous, Q4H PRN    insulin aspart U-100, 0-5 Units, Subcutaneous, QID (AC + HS) PRN    melatonin, 6 mg, Oral, Nightly PRN    naloxone, 0.02 mg, Intravenous, PRN    ondansetron, 4 mg, Intravenous, Q8H PRN    polyethylene glycol, 17 g, Oral, BID PRN    prochlorperazine, 5 mg, Intravenous, Q6H PRN    sodium chloride 0.9%, 10 mL, " "Intravenous, Q6H PRN  Anticoagulants/Antiplatelets: no anticoagulation    Allergies:   Review of patient's allergies indicates:   Allergen Reactions    Epinephrine      carcinoid     Sedation Hx: have not been any systemic reactions    Labs:  No results for input(s): "INR", "PT", "PTT" in the last 168 hours.    Recent Labs   Lab 02/14/25  1539   WBC 10.55   HGB 11.4*   HCT 33.0*   MCV 89         Recent Labs   Lab 02/14/25  1539 02/14/25  2259   * 172*   * 125*   K 2.5* 3.0*   CL 85* 87*   CO2 30* 26   BUN 9 9   CREATININE 0.5 0.5   CALCIUM 8.2* 8.6*   MG 1.4* 2.3   *  --    *  --    ALBUMIN 2.4* 2.4*   BILITOT 1.5*  --          Vitals:  Temp: 98.2 °F (36.8 °C) (02/15/25 0740)  Pulse: 73 (02/15/25 0740)  Resp: 18 (02/15/25 0740)  BP: (!) 127/59 (02/15/25 0740)  SpO2: 97 % (02/15/25 0740)     Physical Exam:  ASA: 3      General: no acute distress  Mental Status: alert and oriented to person, however is confused as far time and place  HEENT: normocephalic, atraumatic  Chest: unlabored breathing  Heart: regular heart rate  Abdomen: nondistended, mildy tender to palpation  Extremity: moves all extremities    Assessment: 83 y/o female with NET mets to liver. Patient has probable postembolic syndrome. A portion of pain may be related to constipation    Plan:   Recommend medical management of pain symptoms. LFTs are expected to rize after the embolization, however they should decrease over time. Recommend trending LFTs. No abscess formation noted on CT.     Please recontact IR if there are any changes.    Bridger Rock MD ([facility])  Interventional Radiology          "

## 2025-02-15 NOTE — ASSESSMENT & PLAN NOTE
Patient's blood pressure range in the last 24 hours was: BP  Min: 81/49  Max: 171/69.The patient's inpatient anti-hypertensive regimen is listed below:    Patient presented hypotensive, given 2L of IV fluids, started on continuous fluids.   Will hold home Entresto + Spironolactone + Furosemide.  Resume when clinically appropriate.     - monitor and adjust regimen as needed

## 2025-02-15 NOTE — HPI
83 y/o AAF w/ Metastatic neuroendocrine tumor, HFrEF, Type 2 DM, HTN, presents to the ED w/ complaints of abdominal pain.   She had recent hepatic artery embolization for management of metastatic liver lesions on 02/07.  Over the weekend patient had the onset of abdominal pain, her niece spoke with patient and noted that patient had also c/o of constipation.  Patient was seen in [facility] ED on 2/11 for constipation, s/p KUB,  lab studies - transaminase values were in 100s, patient given an enema and discharged with Rx for Miralax.   Since this time no recurrent constipation, patient has had 2-3 loose bowel movements per day.  She has had poor appetite, mainly drinking fluids at home.   She has c/o of right sided and right flank abdominal pain.     She has had prior hepatic embolization before for management of liver metastases, she has experienced abdominal pain afterward, but has not required hospitalization post-procedure.    She denies any fevers/chills, no chest pain or dyspnea, no skin rashes, no reported bleeding/bruising abnormality.     She is found with multiple electrolyte abnormalities today and rising hepatic enzyme values.     ED Treatment: Vancomycin 1grm, Zosyn 4.5gm, Potassium IV 10meq x1, Magnesium 2gram IVx1,

## 2025-02-15 NOTE — ASSESSMENT & PLAN NOTE
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 The patient is not undergoing active chemotherapy. .Their staging information is listed below.   Cancer Staging   Neuroendocrine carcinoma metastatic to bone  Staging form: Bone - Appendicular Skeleton, Trunk, Skull, and Facial Bones, AJCC 8th Edition  - Clinical stage from 5/28/2018: Stage IVB (cT1, cNX, pM1b) - Signed by Sebastian Granados MD on 8/24/2021    - recent liver embolization    None Known

## 2025-02-15 NOTE — ASSESSMENT & PLAN NOTE
Patient has Abnormal Magnesium: hypomagnesemia. Will continue to monitor electrolytes closely. Will replace the affected electrolytes and repeat labs to be done after interventions completed. The patient's magnesium results have been reviewed and are listed below.  Recent Labs   Lab 02/15/25  0934   MG 2.1

## 2025-02-15 NOTE — ASSESSMENT & PLAN NOTE
-after patient admitted for last embolization, post-procedure recs indicated immediately post-op pt expected to have rising hepatic enzymes, fever and abdominal pain are also possible.  Given her presentation is 1 week from procedure, unclear if this is still routine symptoms to be expected.   -s/p blood clutures + empiric antibiotics in ED tonight,  can continue Zosyn alone empirically   -consult oncology and IR  in AM re: further diagnostic or treatment guideance.     -trend CBC/CMP/coags  -PRN pain medication

## 2025-02-15 NOTE — ASSESSMENT & PLAN NOTE
Advance Care Planning     Date: 02/15/2025    Code Status  In light of the patients advanced and life limiting illness,I engaged the the patient in a voluntary conversation about the patient's preferences for care  at the very end of life. The patient wishes to have a natural, peaceful death.  Along those lines, the patient does not wish to have CPR or other invasive treatments performed when her heart and/or breathing stops. I communicated to the patient that a DNR order would be placed in her medical record to reflect this preference.    A total of 12 min was spent on advance care planning, goals of care discussion, emotional support, formulating and communicating prognosis and exploring burden/benefit of various approaches of treatment. This discussion occurred on a fully voluntary basis with the verbal consent of the patient and/or family.        Sister - Shayla Sandovals present.     Follow up - recommend completion of LAPOST form with patient with either palliative care or PCP

## 2025-02-15 NOTE — ASSESSMENT & PLAN NOTE
Patient has Combined Systolic and Diastolic heart failure that is Chronic. On presentation their CHF was well compensated.  Last EF 30-35%    Due to presenting hypotension, hyponatremia and concern for hypovolemia.  Will hold home Entresto + Spironolactone + Furosemide.  Resume when clinically appropriate.

## 2025-02-15 NOTE — ASSESSMENT & PLAN NOTE
Patient's blood pressure range in the last 24 hours was: BP  Min: 81/49  Max: 171/69.The patient's inpatient anti-hypertensive regimen is listed below:    Patient presented hypotensive, given 2L of IV fluids so far, starting continuous fluids.     Will hold home Entresto + Spironolactone + Furosemide.  Resume when clinically appropriate.

## 2025-02-15 NOTE — SUBJECTIVE & OBJECTIVE
Past Medical History:   Diagnosis Date    Anemia 07/11/2018    B12 deficiency     Depression     per pcp note 7/2017 and given prozac and diazepam     Hyperlipidemia     Neuroendocrine tumor     Syncope 11/04/2024    Systolic heart failure     Weight loss     reviewed prior pcp Dr. Russo notes 2017 - labs reviewed: cmp wnl x tbili 1.5, tsh wnl, A1c 5.0, cbc wnl,D 36, hiv neg, hep c neg, hep b surg ag neg        Past Surgical History:   Procedure Laterality Date    EMBOLIZATION N/A 02/14/2019    Procedure: EMBOLIZATION, BLOOD VESSEL;  Surgeon: [facility] Diagnostic Provider;  [facility];  Service: Radiology;  Laterality: N/A;  Room 189/[facility]    EMBOLIZATION N/A 03/21/2019    Procedure: EMBOLIZATION, BLOOD VESSEL;  Surgeon: [facility] Diagnostic Provider;  [facility];  Service: Radiology;  Laterality: N/A;  Room 189/[facility]    ESOPHAGOGASTRODUODENOSCOPY  05/04/2018    esophageal ring, grade 1 esophagitis, gastritis     EYE SURGERY Bilateral     cataract removal    HYSTERECTOMY      fibroids       Review of patient's allergies indicates:   Allergen Reactions    Epinephrine      carcinoid       No current facility-administered medications on file prior to encounter.     Current Outpatient Medications on File Prior to Encounter   Medication Sig    blood sugar diagnostic (TRUE METRIX GLUCOSE TEST STRIP) Strp USE TO CHECK BLOOD SUGAR TWICE DAILY    blood-glucose meter kit by Other route. Use as instructed    carvediloL (COREG) 12.5 MG tablet TAKE 1 TABLET(12.5 MG) BY MOUTH TWICE DAILY WITH MEALS    cyanocobalamin (VITAMIN B-12) 1000 MCG tablet Take 1 tablet (1,000 mcg total) by mouth once daily.    diphenoxylate-atropine 2.5-0.025 mg (LOMOTIL) 2.5-0.025 mg per tablet Take 1 tablet by mouth 4 (four) times daily as needed for Diarrhea.    ezetimibe (ZETIA) 10 mg tablet Take 10 mg by mouth once daily.    ferrous sulfate 325 (65 FE) MG EC tablet Take 325 mg by mouth once daily.    furosemide (LASIX) 20  "MG tablet take 1 tab by mouth daily. If gain 2-3 pounds in 2-3 days then take 2 tabs daily for 3 days and then resume 1 dose daily    glipiZIDE (GLUCOTROL) 5 MG tablet Take 2.5mg by mouth with breakfast and take 5 mg with dinner    lactulose (CHRONULAC) 10 gram/15 mL solution Take 30 g by mouth daily as needed (constipation).    lancets Misc To check BG 2 times daily, to use with insurance preferred meter    latanoprost 0.005 % ophthalmic solution Place 1 drop into both eyes nightly.    linaCLOtide (LINZESS) 72 mcg Cap capsule Take 1 capsule (72 mcg total) by mouth before breakfast.    needle, disp, 22 G 22 gauge x 1" Ndle 1 application by Misc.(Non-Drug; Combo Route) route 3 (three) times daily as needed.    polyethylene glycol (GLYCOLAX) 17 gram PwPk Take 17 g by mouth once daily.    sacubitriL-valsartan (ENTRESTO) 24-26 mg per tablet TAKE 1 TABLET BY MOUTH TWICE DAILY    spironolactone (ALDACTONE) 25 MG tablet Take 1 tablet (25 mg total) by mouth once daily.    syringe, disposable, 1 mL Syrg 1 application by Misc.(Non-Drug; Combo Route) route 3 (three) times daily as needed (diarrhea).    telotristat ethyl (XERMELO) 250 mg Tab Take 1 tablet (250 mg) by mouth 3 (three) times daily.    TRUEPLUS LANCETS 33 gauge Misc USE TO CHECK BLOOD SUGAR TWICE DAILY    vitamin D (VITAMIN D3) 1000 units Tab Take 400 Units by mouth once daily.    XIIDRA 5 % Dpet      Family History       Problem Relation (Age of Onset)    Asthma Sister    COPD Brother    Cancer Father, Brother    Diabetes Sister, Brother    Emphysema Brother    Glaucoma Mother    Heart attack Father, Sister    Hyperlipidemia Sister    Hypertension Mother, Brother    No Known Problems Sister    Stroke Brother          Tobacco Use    Smoking status: Never    Smokeless tobacco: Never   Substance and Sexual Activity    Alcohol use: No     Comment: stopped in 2007 - hx of social drinking    Drug use: Never    Sexual activity: Not Currently     Partners: Male     Review " of Systems   Constitutional:  Positive for activity change and appetite change. Negative for chills and fever.   HENT:  Negative for sore throat.    Respiratory: Negative.     Cardiovascular: Negative.    Genitourinary: Negative.    Neurological:  Positive for weakness.     Objective:     Vital Signs (Most Recent):  Temp: 98 °F (36.7 °C) (02/14/25 2116)  Pulse: 65 (02/14/25 2116)  Resp: 17 (02/14/25 2253)  BP: (!) 149/64 (02/14/25 2106)  SpO2: 98 % (02/14/25 2106) Vital Signs (24h Range):  Temp:  [97.6 °F (36.4 °C)-98 °F (36.7 °C)] 98 °F (36.7 °C)  Pulse:  [43-80] 65  Resp:  [17-25] 17  SpO2:  [92 %-98 %] 98 %  BP: ()/(49-84) 149/64     Weight: 43.1 kg (95 lb)  Body mass index is 16.31 kg/m².     Physical Exam  Vitals and nursing note reviewed.   Constitutional:       General: She is not in acute distress.     Appearance: She is ill-appearing.   HENT:      Head: Normocephalic and atraumatic.      Mouth/Throat:      Mouth: Mucous membranes are dry.   Eyes:      General: No scleral icterus.     Pupils: Pupils are equal, round, and reactive to light.   Cardiovascular:      Rate and Rhythm: Normal rate and regular rhythm.      Pulses: Normal pulses.   Pulmonary:      Effort: Pulmonary effort is normal. No respiratory distress.      Breath sounds: No wheezing.   Abdominal:      Palpations: Abdomen is soft.      Tenderness: There is abdominal tenderness in the right upper quadrant. There is right CVA tenderness. There is no guarding.   Musculoskeletal:      Cervical back: Neck supple.      Right lower leg: No edema.      Left lower leg: No edema.   Skin:     General: Skin is dry.   Neurological:      General: No focal deficit present.      Mental Status: She is alert.   Psychiatric:         Mood and Affect: Mood is depressed.         Behavior: Behavior is slowed.              CRANIAL NERVES     CN III, IV, VI   Pupils are equal, round, and reactive to light.       Significant Labs: All pertinent labs within the past  "24 hours have been reviewed.  ABGs:   Recent Labs   Lab 02/14/25  1512   PH 7.424   PCO2 52.7*   HCO3 34.5*   POCSATURATED 58   BE 10*   PO2 30*     CBC:   Recent Labs   Lab 02/14/25  1527 02/14/25  1539   WBC  --  10.55   HGB  --  11.4*   HCT 35* 33.0*   PLT  --  225     CMP:   Recent Labs   Lab 02/14/25  1539 02/14/25  2259   * 125*   K 2.5* 3.0*   CL 85* 87*   CO2 30* 26   * 172*   BUN 9 9   CREATININE 0.5 0.5   CALCIUM 8.2* 8.6*   PROT 5.7*  --    ALBUMIN 2.4* 2.4*   BILITOT 1.5*  --    ALKPHOS 915*  --    *  --    *  --    ANIONGAP 11 12     Cardiac Markers:   Recent Labs   Lab 02/14/25  1539   *     Coagulation: No results for input(s): "PT", "INR", "APTT" in the last 48 hours.  Lactic Acid: No results for input(s): "LACTATE" in the last 48 hours.  Magnesium:   Recent Labs   Lab 02/14/25  1539 02/14/25  2259   MG 1.4* 2.3     Troponin:   Recent Labs   Lab 02/14/25  1539   TROPONINIHS <3     Urine Studies:   Recent Labs   Lab 02/14/25  1738   COLORU Yellow   APPEARANCEUA Clear   PHUR 6.0   SPECGRAV 1.010   PROTEINUA Negative   GLUCUA 2+*   KETONESU Trace*   BILIRUBINUA Negative   OCCULTUA Negative   NITRITE Negative   LEUKOCYTESUR Negative       Significant Imaging: I have reviewed all pertinent imaging results/findings within the past 24 hours.    CT ABDOMEN PELVIS WITH IV CONTRAST     CLINICAL HISTORY:  Abdominal pain, acute, nonlocalized;     TECHNIQUE:  Low dose axial images, sagittal and coronal reformations were obtained from the lung bases to the pubic symphysis following the IV administration of 75 mL of Omnipaque 350 .  Oral contrast was not given.     COMPARISON:  Radiograph 02/11/2025, MRI abdomen 12/16/2024, PET-CT 06/13/2024     FINDINGS:  Images of the lower thorax are remarkable for bilateral dependent atelectasis.  There is a 3-4 mm pulmonary nodule within the left lower lobe.     Allowing for motion artifact, the spleen is unremarkable.  There is some atrophic " change of the pancreas.  There is prominence of the pancreatic duct at the head/neck junction measuring up to 4 mm.  There is cholelithiasis noting lobular configuration of the gallbladder.  There is intra and extrahepatic biliary prominence, similar to the previous exam.  No convincing choledocholithiasis.  The stomach is decompressed without wall thickening.  The portal vein, splenic vein, SMV, celiac axis and SMA all are patent.  There is scattered fluid in the abdomen.  There is thickening of the bilateral adrenal glands.     Patient has history of hepatic malignancy status post ablation.  There are large cystic regions within the right hepatic lobe consistent with prior ablation.  Numerous low attenuating lesions are noted throughout the hepatic parenchyma, some of which with wall thickening.  Comparison with the previous MRI is limited given significant motion artifact at that time.  Several of the lesions appear stable noting some cystic components are new since that time suggesting possible treatment change.  No rim enhancing collections to definitively suggest abscess.     The kidneys enhance symmetrically without hydronephrosis or nephrolithiasis.  The bilateral ureters are unable to be followed in their entirety to the urinary bladder, no definite calculi seen or secondary findings to suggest obstructive uropathy.  The urinary bladder is distended without wall thickening.  The uterus is absent the adnexa is unremarkable.     There is scattered liquid stool throughout the large bowel noting the large bowel is distended with the same.  The terminal ileum is unremarkable.  The appendix is unremarkable.  There is a short segment intussusception involving the mid to distal small bowel, a finding that is typically transient.  No findings to suggest obstruction.  There is atherosclerotic calcification of the aorta and its branches.  There are several scattered shotty periaortic, pericaval, and mesenteric lymph  nodes.     There are degenerative changes of the spine.  There is sclerotic appearance of the osseous structures.     Impression:     This report was flagged in Epic as abnormal.     1. Liquid stool throughout the large bowel, may reflect diarrheal illness, clinical correlation is advised.  2. Short segment small bowel intussusception, no findings to suggest obstruction.  This finding is often transient however correlation and follow-up as warranted.  3. Post treatment changes of the hepatic parenchyma.  In comparison to the previous MRI, low attenuating regions have increased since that time, noting ablation has been performed since that exam and likely reflect sequela of the same.  Clinical correlation is advised, continued follow-up recommended.  No discrete collections to definitively suggest abscess.  4. Cholelithiasis, no secondary findings to suggest acute cholecystitis.  5. Pulmonary nodule within the left lower lobe, not confidently identified on the previous PET-CT.  Infectious or inflammatory process is a consideration although malignancy is not excluded.  Follow-up is advised.  6. Please see above for several additional findings.        Electronically signed by: William Laguerre MD  Date:                                            02/14/2025  Time:                                           18:50

## 2025-02-15 NOTE — HPI
"Shahram Hills is a 84 y.o. 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 She was admitted to  2/15 for abdominal pain, transaminitis,  and correction of electrolytes with presumed post embolization syndrome. CT scan was performed day of admission in ED due to abdominal pain which showed short segment small bowel-small bowel intussusception, copious liquid stool within the colon, large hepatic metastasis and ascites. General surgery was consulted given CT findings of intussusception. Given lack of obstructing findings and lack of significant change in her chronic abdominal pain, no further intervention was recommended. She is now unsure if passing gas and has had low PO intake. No emesis. Last BM recorded yesterday. Repeat CT was obtained yesterday due to continued pain, distention yesterday evening for "bowel obstruction suspected."    CT again demonstrated liquid stool within the colon, ascites that is more evident with peritoneal thickening c/f possible SBP. Small bowel intussusception continues without proximal small bowel dilation. General surgery again consulted for intussusception. Her vitals are normal with borderline low but baseline BP. She continues to have electrolyte derangements including hyponatremia, although LFTs are downtrending.   "

## 2025-02-15 NOTE — ASSESSMENT & PLAN NOTE
Patient's most recent potassium results are listed below.   Recent Labs     02/14/25  1539 02/14/25  2839   K 2.5* 3.0*     Plan  - Replete potassium per protocol  - Monitor potassium Every 12 hours  - Patient's hypokalemia is improving

## 2025-02-15 NOTE — HOSPITAL COURSE
Admitted for abd pain likely 2/2 post embolization syndrome. Started on MM pain control.   Electrolyte abnormalities consistent with decreased PO intake. Started on IVF. Replacing electrolytes as needed. Started salt tabs, hyponatremia was slowly improving. Hyponatremia worse, started fluid restriction and nephrology consulted. On repeat CT with anasarca and ascites given lasix x1. 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 Mixed picture as well volume depletion likely contributing given poor PO intake/anorexia.   Worsening abd distension and reports not passing gas. Repeat CT without obstruction and patient had BM with improvement of distension.   CT concerning for peritonitis, para ordered but small volume ascites, no safe window for drainage. Ascites thought to be from metastatic disease. Started on empiric CTX for SBP; dc'd as surgery do not feel physical exam consistent with peritonitis.  CT also showed persistent intussusception, surgery consulted and report risks of surgery outweigh any benefit of a segmental resection of her intussusception given her metastatic status.   Palliative consulted for GOC discussion given advanced metastatic disease. Decided to go home with hospice, SW made aware. Lifted fluid restriction allowing to eat/drink as wanted.

## 2025-02-15 NOTE — ASSESSMENT & PLAN NOTE
Patient's FSGs are controlled on current medication regimen.  Last A1c reviewed-   Lab Results   Component Value Date    HGBA1C 6.2 (H) 11/26/2024     Most recent fingerstick glucose reviewed-   Recent Labs   Lab 02/15/25  0740 02/15/25  1142   POCTGLUCOSE 199* 216*     Current correctional scale  Low  Maintain anti-hyperglycemic dose as follows-   Antihyperglycemics (From admission, onward)    Start     Stop Route Frequency Ordered    02/15/25 0127  insulin aspart U-100 pen 0-5 Units         -- SubQ Before meals & nightly PRN 02/15/25 0027        Hold Oral hypoglycemics while patient is in the hospital.

## 2025-02-15 NOTE — ASSESSMENT & PLAN NOTE
- see on initial imaging  - surgical eval in ED reports: Intussusception is transient and not causing obstruction. This is a normal physiologic finding and is not contributing to her clinical picture   - abd exam with tenderness but normal BS and soft

## 2025-02-15 NOTE — SUBJECTIVE & OBJECTIVE
"No current facility-administered medications on file prior to encounter.     Current Outpatient Medications on File Prior to Encounter   Medication Sig    blood sugar diagnostic (TRUE METRIX GLUCOSE TEST STRIP) Strp USE TO CHECK BLOOD SUGAR TWICE DAILY    blood-glucose meter kit by Other route. Use as instructed    carvediloL (COREG) 12.5 MG tablet TAKE 1 TABLET(12.5 MG) BY MOUTH TWICE DAILY WITH MEALS    cyanocobalamin (VITAMIN B-12) 1000 MCG tablet Take 1 tablet (1,000 mcg total) by mouth once daily.    diphenoxylate-atropine 2.5-0.025 mg (LOMOTIL) 2.5-0.025 mg per tablet Take 1 tablet by mouth 4 (four) times daily as needed for Diarrhea.    ezetimibe (ZETIA) 10 mg tablet Take 10 mg by mouth once daily.    ferrous sulfate 325 (65 FE) MG EC tablet Take 325 mg by mouth once daily.    furosemide (LASIX) 20 MG tablet take 1 tab by mouth daily. If gain 2-3 pounds in 2-3 days then take 2 tabs daily for 3 days and then resume 1 dose daily    glipiZIDE (GLUCOTROL) 5 MG tablet Take 2.5mg by mouth with breakfast and take 5 mg with dinner    lancets Misc To check BG 2 times daily, to use with insurance preferred meter    latanoprost 0.005 % ophthalmic solution Place 1 drop into both eyes nightly.    linaCLOtide (LINZESS) 72 mcg Cap capsule Take 1 capsule (72 mcg total) by mouth before breakfast.    needle, disp, 22 G 22 gauge x 1" Ndle 1 application by Misc.(Non-Drug; Combo Route) route 3 (three) times daily as needed.    [] ondansetron (ZOFRAN-ODT) 8 MG TbDL Take 1 tablet (8 mg total) by mouth every 8 (eight) hours as needed (nausea).    polyethylene glycol (GLYCOLAX) 17 gram PwPk Take 17 g by mouth once daily.    sacubitriL-valsartan (ENTRESTO) 24-26 mg per tablet TAKE 1 TABLET BY MOUTH TWICE DAILY    spironolactone (ALDACTONE) 25 MG tablet Take 1 tablet (25 mg total) by mouth once daily.    syringe, disposable, 1 mL Syrg 1 application by Misc.(Non-Drug; Combo Route) route 3 (three) times daily as needed (diarrhea). "    telotristat ethyl (XERMELO) 250 mg Tab Take 1 tablet (250 mg) by mouth 3 (three) times daily.    TRUEPLUS LANCETS 33 gauge Misc USE TO CHECK BLOOD SUGAR TWICE DAILY    vitamin D (VITAMIN D3) 1000 units Tab Take 400 Units by mouth once daily.    XIIDRA 5 % Dpet        Review of patient's allergies indicates:   Allergen Reactions    Epinephrine      carcinoid       Past Medical History:   Diagnosis Date    Anemia 07/11/2018    B12 deficiency     Depression     per pcp note 7/2017 and given prozac and diazepam     Hyperlipidemia     Neuroendocrine tumor     Systolic heart failure     Weight loss     reviewed prior pcp Dr. Russo notes 2017 - labs reviewed: cmp wnl x tbili 1.5, tsh wnl, A1c 5.0, cbc wnl,D 36, hiv neg, hep c neg, hep b surg ag neg      Past Surgical History:   Procedure Laterality Date    EMBOLIZATION N/A 02/14/2019    Procedure: EMBOLIZATION, BLOOD VESSEL;  Surgeon: [facility] Diagnostic Provider;  Location: [facility] OR [address] [place] [address];  Service: Radiology;  Laterality: N/A;  Room 189/[facility]    EMBOLIZATION N/A 03/21/2019    Procedure: EMBOLIZATION, BLOOD VESSEL;  Surgeon: [facility] Diagnostic Provider;  [facility];  Service: Radiology;  Laterality: N/A;  Room 189/[facility]    ESOPHAGOGASTRODUODENOSCOPY  05/04/2018    esophageal ring, grade 1 esophagitis, gastritis     EYE SURGERY Bilateral     cataract removal    HYSTERECTOMY      fibroids     Family History       Problem Relation (Age of Onset)    Asthma Sister    COPD Brother    Cancer Father, Brother    Diabetes Sister, Brother    Emphysema Brother    Glaucoma Mother    Heart attack Father, Sister    Hyperlipidemia Sister    Hypertension Mother, Brother    No Known Problems Sister    Stroke Brother          Tobacco Use    Smoking status: Never    Smokeless tobacco: Never   Substance and Sexual Activity    Alcohol use: No     Comment: stopped in 2007 - hx of social drinking    Drug use: Never    Sexual activity: Not Currently     Partners: Male      Review of Systems   Constitutional:  Negative for chills and fever.   HENT:  Negative for hearing loss and trouble swallowing.    Eyes:  Negative for discharge and visual disturbance.   Respiratory:  Negative for chest tightness and shortness of breath.    Cardiovascular:  Negative for chest pain and palpitations.   Gastrointestinal:  Positive for abdominal pain, constipation, diarrhea and nausea. Negative for abdominal distention and vomiting.   Genitourinary:  Negative for difficulty urinating and hematuria.   Musculoskeletal:  Negative for arthralgias and back pain.   Skin:  Negative for rash and wound.   Neurological:  Negative for dizziness and headaches.     Objective:     Vital Signs (Most Recent):  Temp: 97.6 °F (36.4 °C) (02/14/25 1326)  Pulse: 60 (02/14/25 1832)  Resp: (!) 21 (02/14/25 1832)  BP: (!) 170/73 (02/14/25 1832)  SpO2: 96 % (02/14/25 1832) Vital Signs (24h Range):  Temp:  [97.6 °F (36.4 °C)] 97.6 °F (36.4 °C)  Pulse:  [43-80] 60  Resp:  [20-25] 21  SpO2:  [92 %-96 %] 96 %  BP: ()/(49-73) 170/73     Weight: 43.1 kg (95 lb)  Body mass index is 16.31 kg/m².     Physical Exam  Constitutional:       General: She is not in acute distress.     Appearance: Normal appearance.   HENT:      Head: Normocephalic and atraumatic.   Cardiovascular:      Rate and Rhythm: Normal rate and regular rhythm.   Pulmonary:      Effort: Pulmonary effort is normal. No respiratory distress.      Breath sounds: Normal breath sounds.   Abdominal:      General: Abdomen is flat. There is no distension.      Palpations: Abdomen is soft.      Tenderness: There is abdominal tenderness.      Comments: Moderate epigastric tenderness   Neurological:      General: No focal deficit present.      Mental Status: She is alert and oriented to person, place, and time.   Psychiatric:         Behavior: Behavior normal.         Thought Content: Thought content normal.            I have reviewed all pertinent lab results within the  past 24 hours.  CBC:   Recent Labs   Lab 02/14/25  1539   WBC 10.55   RBC 3.73*   HGB 11.4*   HCT 33.0*      MCV 89   MCH 30.6   MCHC 34.5     CMP:   Recent Labs   Lab 02/14/25  1539   *   CALCIUM 8.2*   ALBUMIN 2.4*   PROT 5.7*   *   K 2.5*   CO2 30*   CL 85*   BUN 9   CREATININE 0.5   ALKPHOS 915*   *   *   BILITOT 1.5*       Significant Diagnostics:  I have reviewed all pertinent imaging results/findings within the past 24 hours.

## 2025-02-15 NOTE — ASSESSMENT & PLAN NOTE
Shahram Hills is a 84 y.o. lady with metastatic neuroendocrine tumor s/p embolization here with abdominal pain and intussusception seen on CT scan    - Intussusception is transient and not causing obstruction. This is a normal physiologic finding and is not contributing to her clinical picture  - suspect her elevated liver enzymes are due to her embolization. She had a similar pattern in 12/2024 after her last embolization. However, could work up choledocho per medicine. She is absolutely not a candidate for cholecystectomy  - having liquid stool and there is no dilated bowel. She does not have a bowel obstruction  - I am concerned that a lot of her symptoms are secondary to her oncologic disease  - dispo per ED

## 2025-02-15 NOTE — ASSESSMENT & PLAN NOTE
Hyponatremia is likely due to Dehydration/hypovolemia. The patient's most recent sodium results are listed below.  Recent Labs     02/14/25  1539 02/14/25  2259 02/15/25  0934   * 125* 125*       Plan  - Correct the sodium by 4-6mEq in 24 hours.   - Obtain the following studies: Urine sodium, urine osmolality, serum osmolality. Low serum osm consistent with decreased solute intake  - Will treat the hyponatremia with IV fluids as follows: Normal saline IVF, monitoring volume status closely given hx HF  - Monitor sodium Every 6 hours.   - Patient hyponatremia is stable  - if fails to correct with IVF given concern for overload given her HF, consider salt tabs

## 2025-02-15 NOTE — ASSESSMENT & PLAN NOTE
Patient has Abnormal Magnesium: hypomagnesemia. Will continue to monitor electrolytes closely. Will replace the affected electrolytes and repeat labs to be done after interventions completed. The patient's magnesium results have been reviewed and are listed below.  Recent Labs   Lab 02/14/25  2259   MG 2.3

## 2025-02-15 NOTE — H&P
[facility] [address] [place] [address] Medicine  History & Physical    Patient Name: Shahram Hills  MRN: [id number]  Patient Class: OP- Observation  Admission Date: 2/14/2025  Attending Physician: Renata Jorge MD   Primary Care Provider: Trixie Xie MD         Patient information was obtained from patient, past medical records, ER records, and sister-Shayla Rosenthal .     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   She had recent hepatic artery embolization for management of metastatic liver lesions on 02/07.  Over the weekend patient had the onset of abdominal pain, her niece spoke with patient and noted that patient had also c/o of constipation.  Patient was seen in [facility] ED on 2/11 for constipation, s/p KUB,  lab studies - transaminase values were in 100s, patient given an enema and discharged with Rx for Miralax.   Since this time no recurrent constipation, patient has had 2-3 loose bowel movements per day.  She has had poor appetite, mainly drinking fluids at home.   She has c/o of right sided and right flank abdominal pain.     She has had prior hepatic embolization before for management of liver metastases, she has experienced abdominal pain afterward, but has not required hospitalization post-procedure.    She denies any fevers/chills, no chest pain or dyspnea, no skin rashes, no reported bleeding/bruising abnormality.     She is found with multiple electrolyte abnormalities today and rising hepatic enzyme values.     ED Treatment: Vancomycin 1grm, Zosyn 4.5gm, Potassium IV 10meq x1, Magnesium 2gram IVx1,     Past Medical History:   Diagnosis Date     Anemia 07/11/2018    B12 deficiency     Depression     per pcp note 7/2017 and given prozac and diazepam     Hyperlipidemia     Neuroendocrine tumor     Syncope 11/04/2024    Systolic heart failure     Weight loss     reviewed prior pcp Dr. Russo notes 2017 - labs reviewed: cmp wnl x tbili 1.5, tsh wnl, A1c 5.0, cbc wnl,D 36, hiv neg, hep c neg, hep b surg ag neg        Past Surgical History:   Procedure Laterality Date    EMBOLIZATION N/A 02/14/2019    Procedure: EMBOLIZATION, BLOOD VESSEL;  Surgeon: [facility];  [facility];  Service: Radiology;  Laterality: N/A;  Room 189/[facility]    EMBOLIZATION N/A 03/21/2019    Procedure: EMBOLIZATION, BLOOD VESSEL;  Surgeon: [facility] Diagnostic Provider;  [facility];  Service: Radiology;  Laterality: N/A;  Room 189/[facility]    ESOPHAGOGASTRODUODENOSCOPY  05/04/2018    esophageal ring, grade 1 esophagitis, gastritis     EYE SURGERY Bilateral     cataract removal    HYSTERECTOMY      fibroids       Review of patient's allergies indicates:   Allergen Reactions    Epinephrine      carcinoid       No current facility-administered medications on file prior to encounter.     Current Outpatient Medications on File Prior to Encounter   Medication Sig    blood sugar diagnostic (TRUE METRIX GLUCOSE TEST STRIP) Strp USE TO CHECK BLOOD SUGAR TWICE DAILY    blood-glucose meter kit by Other route. Use as instructed    carvediloL (COREG) 12.5 MG tablet TAKE 1 TABLET(12.5 MG) BY MOUTH TWICE DAILY WITH MEALS    cyanocobalamin (VITAMIN B-12) 1000 MCG tablet Take 1 tablet (1,000 mcg total) by mouth once daily.    diphenoxylate-atropine 2.5-0.025 mg (LOMOTIL) 2.5-0.025 mg per tablet Take 1 tablet by mouth 4 (four) times daily as needed for Diarrhea.    ezetimibe (ZETIA) 10 mg tablet Take 10 mg by mouth once daily.    ferrous sulfate 325 (65 FE) MG EC tablet Take 325 mg by mouth once daily.    furosemide (LASIX) 20 MG tablet take 1 tab by mouth daily. If  "gain 2-3 pounds in 2-3 days then take 2 tabs daily for 3 days and then resume 1 dose daily    glipiZIDE (GLUCOTROL) 5 MG tablet Take 2.5mg by mouth with breakfast and take 5 mg with dinner    lactulose (CHRONULAC) 10 gram/15 mL solution Take 30 g by mouth daily as needed (constipation).    lancets Misc To check BG 2 times daily, to use with insurance preferred meter    latanoprost 0.005 % ophthalmic solution Place 1 drop into both eyes nightly.    linaCLOtide (LINZESS) 72 mcg Cap capsule Take 1 capsule (72 mcg total) by mouth before breakfast.    needle, disp, 22 G 22 gauge x 1" Ndle 1 application by Misc.(Non-Drug; Combo Route) route 3 (three) times daily as needed.    polyethylene glycol (GLYCOLAX) 17 gram PwPk Take 17 g by mouth once daily.    sacubitriL-valsartan (ENTRESTO) 24-26 mg per tablet TAKE 1 TABLET BY MOUTH TWICE DAILY    spironolactone (ALDACTONE) 25 MG tablet Take 1 tablet (25 mg total) by mouth once daily.    syringe, disposable, 1 mL Syrg 1 application by Misc.(Non-Drug; Combo Route) route 3 (three) times daily as needed (diarrhea).    telotristat ethyl (XERMELO) 250 mg Tab Take 1 tablet (250 mg) by mouth 3 (three) times daily.    TRUEPLUS LANCETS 33 gauge Misc USE TO CHECK BLOOD SUGAR TWICE DAILY    vitamin D (VITAMIN D3) 1000 units Tab Take 400 Units by mouth once daily.    XIIDRA 5 % Dpet      Family History       Problem Relation (Age of Onset)    Asthma Sister    COPD Brother    Cancer Father, Brother    Diabetes Sister, Brother    Emphysema Brother    Glaucoma Mother    Heart attack Father, Sister    Hyperlipidemia Sister    Hypertension Mother, Brother    No Known Problems Sister    Stroke Brother          Tobacco Use    Smoking status: Never    Smokeless tobacco: Never   Substance and Sexual Activity    Alcohol use: No     Comment: stopped in 2007 - hx of social drinking    Drug use: Never    Sexual activity: Not Currently     Partners: Male     Review of Systems   Constitutional:  Positive " for activity change and appetite change. Negative for chills and fever.   HENT:  Negative for sore throat.    Respiratory: Negative.     Cardiovascular: Negative.    Genitourinary: Negative.    Neurological:  Positive for weakness.     Objective:     Vital Signs (Most Recent):  Temp: 98 °F (36.7 °C) (02/14/25 2116)  Pulse: 65 (02/14/25 2116)  Resp: 17 (02/14/25 2253)  BP: (!) 149/64 (02/14/25 2106)  SpO2: 98 % (02/14/25 2106) Vital Signs (24h Range):  Temp:  [97.6 °F (36.4 °C)-98 °F (36.7 °C)] 98 °F (36.7 °C)  Pulse:  [43-80] 65  Resp:  [17-25] 17  SpO2:  [92 %-98 %] 98 %  BP: ()/(49-84) 149/64     Weight: 43.1 kg (95 lb)  Body mass index is 16.31 kg/m².     Physical Exam  Vitals and nursing note reviewed.   Constitutional:       General: She is not in acute distress.     Appearance: She is ill-appearing.   HENT:      Head: Normocephalic and atraumatic.      Mouth/Throat:      Mouth: Mucous membranes are dry.   Eyes:      General: No scleral icterus.     Pupils: Pupils are equal, round, and reactive to light.   Cardiovascular:      Rate and Rhythm: Normal rate and regular rhythm.      Pulses: Normal pulses.   Pulmonary:      Effort: Pulmonary effort is normal. No respiratory distress.      Breath sounds: No wheezing.   Abdominal:      Palpations: Abdomen is soft.      Tenderness: There is abdominal tenderness in the right upper quadrant. There is right CVA tenderness. There is no guarding.   Musculoskeletal:      Cervical back: Neck supple.      Right lower leg: No edema.      Left lower leg: No edema.   Skin:     General: Skin is dry.   Neurological:      General: No focal deficit present.      Mental Status: She is alert.   Psychiatric:         Mood and Affect: Mood is depressed.         Behavior: Behavior is slowed.              CRANIAL NERVES     CN III, IV, VI   Pupils are equal, round, and reactive to light.       Significant Labs: All pertinent labs within the past 24 hours have been reviewed.  ABGs:  "  Recent Labs   Lab 02/14/25  1512   PH 7.424   PCO2 52.7*   HCO3 34.5*   POCSATURATED 58   BE 10*   PO2 30*     CBC:   Recent Labs   Lab 02/14/25  1527 02/14/25  1539   WBC  --  10.55   HGB  --  11.4*   HCT 35* 33.0*   PLT  --  225     CMP:   Recent Labs   Lab 02/14/25  1539 02/14/25  2259   * 125*   K 2.5* 3.0*   CL 85* 87*   CO2 30* 26   * 172*   BUN 9 9   CREATININE 0.5 0.5   CALCIUM 8.2* 8.6*   PROT 5.7*  --    ALBUMIN 2.4* 2.4*   BILITOT 1.5*  --    ALKPHOS 915*  --    *  --    *  --    ANIONGAP 11 12     Cardiac Markers:   Recent Labs   Lab 02/14/25  1539   *     Coagulation: No results for input(s): "PT", "INR", "APTT" in the last 48 hours.  Lactic Acid: No results for input(s): "LACTATE" in the last 48 hours.  Magnesium:   Recent Labs   Lab 02/14/25  1539 02/14/25  2259   MG 1.4* 2.3     Troponin:   Recent Labs   Lab 02/14/25  1539   TROPONINIHS <3     Urine Studies:   Recent Labs   Lab 02/14/25  1738   COLORU Yellow   APPEARANCEUA Clear   PHUR 6.0   SPECGRAV 1.010   PROTEINUA Negative   GLUCUA 2+*   KETONESU Trace*   BILIRUBINUA Negative   OCCULTUA Negative   NITRITE Negative   LEUKOCYTESUR Negative       Significant Imaging: I have reviewed all pertinent imaging results/findings within the past 24 hours.    CT ABDOMEN PELVIS WITH IV CONTRAST     CLINICAL HISTORY:  Abdominal pain, acute, nonlocalized;     TECHNIQUE:  Low dose axial images, sagittal and coronal reformations were obtained from the lung bases to the pubic symphysis following the IV administration of 75 mL of Omnipaque 350 .  Oral contrast was not given.     COMPARISON:  Radiograph 02/11/2025, MRI abdomen 12/16/2024, PET-CT 06/13/2024     FINDINGS:  Images of the lower thorax are remarkable for bilateral dependent atelectasis.  There is a 3-4 mm pulmonary nodule within the left lower lobe.     Allowing for motion artifact, the spleen is unremarkable.  There is some atrophic change of the pancreas.  There is " prominence of the pancreatic duct at the head/neck junction measuring up to 4 mm.  There is cholelithiasis noting lobular configuration of the gallbladder.  There is intra and extrahepatic biliary prominence, similar to the previous exam.  No convincing choledocholithiasis.  The stomach is decompressed without wall thickening.  The portal vein, splenic vein, SMV, celiac axis and SMA all are patent.  There is scattered fluid in the abdomen.  There is thickening of the bilateral adrenal glands.     Patient has history of hepatic malignancy status post ablation.  There are large cystic regions within the right hepatic lobe consistent with prior ablation.  Numerous low attenuating lesions are noted throughout the hepatic parenchyma, some of which with wall thickening.  Comparison with the previous MRI is limited given significant motion artifact at that time.  Several of the lesions appear stable noting some cystic components are new since that time suggesting possible treatment change.  No rim enhancing collections to definitively suggest abscess.     The kidneys enhance symmetrically without hydronephrosis or nephrolithiasis.  The bilateral ureters are unable to be followed in their entirety to the urinary bladder, no definite calculi seen or secondary findings to suggest obstructive uropathy.  The urinary bladder is distended without wall thickening.  The uterus is absent the adnexa is unremarkable.     There is scattered liquid stool throughout the large bowel noting the large bowel is distended with the same.  The terminal ileum is unremarkable.  The appendix is unremarkable.  There is a short segment intussusception involving the mid to distal small bowel, a finding that is typically transient.  No findings to suggest obstruction.  There is atherosclerotic calcification of the aorta and its branches.  There are several scattered shotty periaortic, pericaval, and mesenteric lymph nodes.     There are degenerative  changes of the spine.  There is sclerotic appearance of the osseous structures.     Impression:     This report was flagged in Epic as abnormal.     1. Liquid stool throughout the large bowel, may reflect diarrheal illness, clinical correlation is advised.  2. Short segment small bowel intussusception, no findings to suggest obstruction.  This finding is often transient however correlation and follow-up as warranted.  3. Post treatment changes of the hepatic parenchyma.  In comparison to the previous MRI, low attenuating regions have increased since that time, noting ablation has been performed since that exam and likely reflect sequela of the same.  Clinical correlation is advised, continued follow-up recommended.  No discrete collections to definitively suggest abscess.  4. Cholelithiasis, no secondary findings to suggest acute cholecystitis.  5. Pulmonary nodule within the left lower lobe, not confidently identified on the previous PET-CT.  Infectious or inflammatory process is a consideration although malignancy is not excluded.  Follow-up is advised.  6. Please see above for several additional findings.        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  Given her presentation is 1 week from procedure, unclear if this is still routine symptoms to be expected.   -s/p blood clutures + empiric antibiotics in ED tonight,  can continue Zosyn alone empirically   -consult oncology and IR  in AM re: further diagnostic or treatment guideance.     -trend CBC/CMP/coags  -PRN pain medication      Hypokalemia  Patient's most  recent potassium results are listed below.   Recent Labs     02/14/25  1539 02/14/25  2259   K 2.5* 3.0*     Plan  - Replete potassium per protocol  - Monitor potassium Every 12 hours  - Patient's hypokalemia is improving    Hyponatremia  Hyponatremia is likely due to Dehydration/hypovolemia. The patient's most recent sodium results are listed below.  Recent Labs     02/14/25  1539 02/14/25  2259   * 125*     Plan  - Correct the sodium by 4-6mEq in 24 hours.   - Obtain the following studies: Urine sodium, urine osmolality, serum osmolality.  - Will treat the hyponatremia with IV fluids as follows: Normal saline @ 125cc/hr x 10 hours  - Monitor sodium Every 6 hours.   - Patient hyponatremia is improving      Hypomagnesemia  Patient has Abnormal Magnesium: hypomagnesemia. Will continue to monitor electrolytes closely. Will replace the affected electrolytes and repeat labs to be done after interventions completed. The patient's magnesium results have been reviewed and are listed below.  Recent Labs   Lab 02/14/25  2259   MG 2.3        Essential hypertension  Patient's blood pressure range in the last 24 hours was: BP  Min: 81/49  Max: 171/69.The patient's inpatient anti-hypertensive regimen is listed below:    Patient presented hypotensive, given 2L of IV fluids so far, starting continuous fluids.     Will hold home Entresto + Spironolactone + Furosemide.  Resume when clinically appropriate.     ACP (advance care planning)  Advance Care Planning    Date: 02/15/2025    Code Status  In light of the patients advanced and life limiting illness,I engaged the the patient in a voluntary conversation about the patient's preferences for care  at the very end of life. The patient wishes to have a natural, peaceful death.  Along those lines, the patient does not wish to have CPR or other invasive treatments performed when her heart and/or breathing stops. I communicated to the patient that a DNR order would be placed in  "her medical record to reflect this preference.    A total of 12 min was spent on advance care planning, goals of care discussion, emotional support, formulating and communicating prognosis and exploring burden/benefit of various approaches of treatment. This discussion occurred on a fully voluntary basis with the verbal consent of the patient and/or family.        Sister - Shayla Rosenthal present.     Follow up - prior to discharge, recommend completion of LAPOST form with patient.       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 On presentation their CHF was well compensated.  Last EF 30-35%    Due to presenting hypotension, hyponatremia and concern for hypovolemia.  Will hold home Entresto + Spironolactone + Furosemide.  Resume when clinically appropriate.       Controlled type 2 diabetes mellitus with microalbuminuria, without long-term current use of insulin  Patient's FSGs are controlled on current medication regimen.  Last A1c reviewed-   Lab Results   Component Value Date    HGBA1C 6.2 (H) 11/26/2024     Most recent fingerstick glucose reviewed- No results for input(s): "POCTGLUCOSE" in the last 24 hours.  Current correctional scale  Low  Maintain anti-hyperglycemic dose as follows-   Antihyperglycemics (From admission, onward)      Start     Stop Route Frequency Ordered    02/15/25 0127  insulin aspart U-100 pen 0-5 Units         -- SubQ Before meals & nightly PRN 02/15/25 0027          Hold Oral hypoglycemics while patient is in the hospital.          VTE Risk Mitigation (From admission, onward)      None               On 02/14/2025, patient should be placed in hospital observation services under my care.             Mil Rodriguez MD  Department of Hospital Medicine  [facility]          "

## 2025-02-15 NOTE — PROGRESS NOTES
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   She had recent hepatic artery embolization for management of metastatic liver lesions on 02/07.  Over the weekend patient had the onset of abdominal pain, her niece spoke with patient and noted that patient had also c/o of constipation.  [place] [facility] [place] for constipation, s/p KUB,  lab studies - transaminase values were in 100s, patient given an enema and discharged with Rx for Miralax.   Since this time no recurrent constipation, patient has had 2-3 loose bowel movements per day.  She has had poor appetite, mainly drinking fluids at home.   She has c/o of right sided and right flank abdominal pain.     She has had prior hepatic embolization before for management of liver metastases, she has experienced abdominal pain afterward, but has not required hospitalization post-procedure.    She denies any fevers/chills, no chest pain or dyspnea, no skin rashes, no reported bleeding/bruising abnormality.     She is found with multiple electrolyte abnormalities today and rising hepatic enzyme values.     ED Treatment: Vancomycin 1grm, Zosyn 4.5gm, Potassium IV 10meq x1, Magnesium 2gram IVx1,     Overview/Hospital Course:  Admitted for abd pain likely 2/2 post embolization syndrome. Started on MM pain control.   Electrolyte abnormalities consistent with decreased PO intake. Started on IVF. Replacing electrolytes as needed.     Interval History: Reports some improvement in abd pain, still  with minimal PO intake.     Review of Systems   Constitutional:  Positive for appetite change.   Gastrointestinal:  Positive for abdominal pain.     Objective:     Vital Signs (Most Recent):  Temp: 98.4 °F (36.9 °C) (02/15/25 1142)  Pulse: 98 (02/15/25 1142)  Resp: 18 (02/15/25 1142)  BP: (!) 126/58 (02/15/25 1142)  SpO2: 100 % (02/15/25 1142) Vital Signs (24h Range):  Temp:  [97.4 °F (36.3 °C)-98.4 °F (36.9 °C)] 98.4 °F (36.9 °C)  Pulse:  [] 98  Resp:  [16-25] 18  SpO2:  [92 %-100 %] 100 %  BP: ()/(49-84) 126/58     Weight: 44.9 kg (98 lb 15.8 oz)  Body mass index is 16.99 kg/m².    Intake/Output Summary (Last 24 hours) at 2/15/2025 1251  Last data filed at 2/14/2025 1837  Gross per 24 hour   Intake 1455.49 ml   Output --   Net 1455.49 ml         Physical Exam  Vitals and nursing note reviewed.   Constitutional:       General: She is not in acute distress.     Appearance: She is ill-appearing (chronically).   HENT:      Head: Normocephalic and atraumatic.      Nose: Nose normal.      Mouth/Throat:      Mouth: Mucous membranes are dry.      Pharynx: Oropharynx is clear.   Eyes:      Extraocular Movements: Extraocular movements intact.      Conjunctiva/sclera: Conjunctivae normal.   Cardiovascular:      Rate and Rhythm: Normal rate and regular rhythm.      Pulses: Normal pulses.      Heart sounds: Normal heart sounds.   Pulmonary:      Effort: Pulmonary effort is normal.      Breath sounds: Normal breath sounds.   Abdominal:      General: Bowel sounds are normal.      Palpations: Abdomen is soft.      Tenderness: There is abdominal tenderness (RUQ).   Musculoskeletal:      Cervical back: Normal range of motion.      Right lower leg: No edema.      Left lower leg: No edema.   Skin:     General: Skin is warm and dry.   Neurological:      General: No focal deficit present.      Mental Status: She is alert.             Significant Labs: All pertinent labs within the past 24 hours have been  reviewed.    Significant Imaging: I have reviewed all pertinent imaging results/findings within the past 24 hours.    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   -s/p blood clutures + empiric antibiotics in ED tonight,  can continue Zosyn alone empirically. Deescalate if cx remain NG  -consult oncology and IR- IR report symptoms consistent with post embolic syndrome and recommend medical management of pain, expected rise in LFTs should resolve, cont to monitor   -trend CBC/CMP/coags  -PRN pain medication      Intussusception  - see on initial imaging  - surgical eval in ED reports: Intussusception is transient and not causing obstruction. This is a normal physiologic finding and is not contributing to her clinical picture   - abd exam is benign      ACP (advance care planning)  Advance Care Planning    Date: 02/15/2025    Code Status  In light of the patients advanced and life limiting illness,I engaged the the patient in a voluntary conversation about the patient's preferences for care  at the very end of life. The patient wishes to have a natural, peaceful death.  Along those lines, the patient does not wish to have CPR or other invasive treatments performed when her heart and/or breathing stops. I communicated to the patient that a DNR order would be placed in her medical record to reflect this preference.    A total of 12 min was spent on advance care planning, goals of care discussion, emotional support, formulating and communicating prognosis and exploring burden/benefit of various approaches of treatment. This discussion occurred on a fully voluntary basis with the verbal consent of the patient and/or family.        Sister - Shayla Rosenthal present.     Follow up - recommend  completion of LAPOST form with patient with either palliative care or PCP      Hypomagnesemia  Patient has Abnormal Magnesium: hypomagnesemia. Will continue to monitor electrolytes closely. Will replace the affected electrolytes and repeat labs to be done after interventions completed. The patient's magnesium results have been reviewed and are listed below.  Recent Labs   Lab 02/15/25  0934   MG 2.1        Hypokalemia  Patient's most recent potassium results are listed below.   Recent Labs     02/14/25  1539 02/14/25  2259 02/15/25  0934   K 2.5* 3.0* 4.1       Plan  - Replete potassium per protocol  - Monitor potassium Daily  - Patient's hypokalemia is improving    Chronic combined systolic and diastolic CHF (congestive heart failure)  Patient has Combined Systolic and Diastolic heart failure that is Chronic. On presentation their CHF was well compensated.  Last EF 30-35%    Due to presenting hypotension, hyponatremia and concern for hypovolemia.  Will hold home Entresto + Spironolactone + Furosemide.  Resume when clinically appropriate.       Controlled type 2 diabetes mellitus with microalbuminuria, without long-term current use of insulin  Patient's FSGs are controlled on current medication regimen.  Last A1c reviewed-   Lab Results   Component Value Date    HGBA1C 6.2 (H) 11/26/2024     Most recent fingerstick glucose reviewed-   Recent Labs   Lab 02/15/25  0740 02/15/25  1142   POCTGLUCOSE 199* 216*     Current correctional scale  Low  Maintain anti-hyperglycemic dose as follows-   Antihyperglycemics (From admission, onward)      Start     Stop Route Frequency Ordered    02/15/25 0127  insulin aspart U-100 pen 0-5 Units         -- SubQ Before meals & nightly PRN 02/15/25 0027          Hold Oral hypoglycemics while patient is in the hospital.        Hyponatremia  Hyponatremia is likely due to Dehydration/hypovolemia. The patient's most recent sodium results are listed below.  Recent Labs     02/14/25  1539  02/14/25  2259 02/15/25  0934   * 125* 125*       Plan  - Correct the sodium by 4-6mEq in 24 hours.   - Obtain the following studies: Urine sodium, urine osmolality, serum osmolality. 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 The patient is not undergoing active chemotherapy. .Their staging information is listed below.   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   Will hold home Entresto + Spironolactone + Furosemide.  Resume when clinically appropriate.     - monitor and adjust regimen as needed      VTE Risk Mitigation (From admission, onward)           Ordered     enoxaparin injection 30 mg  Daily         02/15/25 1231     IP VTE HIGH RISK PATIENT  Once         02/15/25 0038     Place sequential compression device  Until discontinued         02/15/25 0038                    Discharge Planning   ISH:      Code Status: DNR   Medical Readiness for Discharge Date:                             Renata Jorge MD  Department of Hospital Medicine   [facility] - Observation 11H

## 2025-02-15 NOTE — ASSESSMENT & PLAN NOTE
Hyponatremia is likely due to Dehydration/hypovolemia. The patient's most recent sodium results are listed below.  Recent Labs     02/14/25  1539 02/14/25  2259   * 125*     Plan  - Correct the sodium by 4-6mEq in 24 hours.   - Obtain the following studies: Urine sodium, urine osmolality, serum osmolality.  - Will treat the hyponatremia with IV fluids as follows: Normal saline @ 125cc/hr x 10 hours  - Monitor sodium Every 6 hours.   - Patient hyponatremia is improving

## 2025-02-15 NOTE — CONSULTS
Medical Oncology Plan of care Note      Ms Shahram Hills is a 85 yo F with history of metastatic neuroendocrine tumor, HTN, T2DM who presented to the ER with abdominal pain. Recently has bland embolization under anesthesia with IR on 02/05/25.     For her metastatic neuroendocrine tumor, she follows with Dr. Woods and is maintained on lanreotide and telotristat. Next appt on 2/25/2025.    Agree with current line of management. She will follow up on discharge with Dr. Granados.      Case discussed with staff, Dr. Shin.    Kindly feel free to reach out with questions or concerns.      Rojelio Chanel MD

## 2025-02-15 NOTE — PROGRESS NOTES
Pharmacist Renal Dose Adjustment Note    Shahram Hills is a 84 y.o. female being treated with the medication enoxaparin    Patient Data:    Vital Signs (Most Recent):  Temp: 98.4 °F (36.9 °C) (02/15/25 1142)  Pulse: 98 (02/15/25 1142)  Resp: 18 (02/15/25 1142)  BP: (!) 126/58 (02/15/25 1142)  SpO2: 100 % (02/15/25 1142) Vital Signs (72h Range):  Temp:  [97.4 °F (36.3 °C)-98.4 °F (36.9 °C)]   Pulse:  []   Resp:  [16-25]   BP: ()/(49-84)   SpO2:  [92 %-100 %]      Recent Labs   Lab 02/14/25  1539 02/14/25  2259 02/15/25  0934   CREATININE 0.5 0.5 0.6     Serum creatinine: 0.6 mg/dL 02/15/25 0934  Estimated creatinine clearance: 47.7 mL/min    Enoxaparin 40 mg SubQ Q 24 hours will be changed to enoxaparin 30 mg SubQ Q 24 hours    Pharmacist's Name: Cherie Cohen PharmD  Pharmacist's Extension: [phone]

## 2025-02-15 NOTE — ASSESSMENT & PLAN NOTE
"Patient's FSGs are controlled on current medication regimen.  Last A1c reviewed-   Lab Results   Component Value Date    HGBA1C 6.2 (H) 11/26/2024     Most recent fingerstick glucose reviewed- No results for input(s): "POCTGLUCOSE" in the last 24 hours.  Current correctional scale  Low  Maintain anti-hyperglycemic dose as follows-   Antihyperglycemics (From admission, onward)      Start     Stop Route Frequency Ordered    02/15/25 0127  insulin aspart U-100 pen 0-5 Units         -- SubQ Before meals & nightly PRN 02/15/25 0027          Hold Oral hypoglycemics while patient is in the hospital.      "

## 2025-02-15 NOTE — SUBJECTIVE & OBJECTIVE
Interval History: Reports some improvement in abd pain, still with minimal PO intake.     Review of Systems   Constitutional:  Positive for appetite change.   Gastrointestinal:  Positive for abdominal pain.     Objective:     Vital Signs (Most Recent):  Temp: 98.4 °F (36.9 °C) (02/15/25 1142)  Pulse: 98 (02/15/25 1142)  Resp: 18 (02/15/25 1142)  BP: (!) 126/58 (02/15/25 1142)  SpO2: 100 % (02/15/25 1142) Vital Signs (24h Range):  Temp:  [97.4 °F (36.3 °C)-98.4 °F (36.9 °C)] 98.4 °F (36.9 °C)  Pulse:  [] 98  Resp:  [16-25] 18  SpO2:  [92 %-100 %] 100 %  BP: ()/(49-84) 126/58     Weight: 44.9 kg (98 lb 15.8 oz)  Body mass index is 16.99 kg/m².    Intake/Output Summary (Last 24 hours) at 2/15/2025 1251  Last data filed at 2/14/2025 1837  Gross per 24 hour   Intake 1455.49 ml   Output --   Net 1455.49 ml         Physical Exam  Vitals and nursing note reviewed.   Constitutional:       General: She is not in acute distress.     Appearance: She is ill-appearing (chronically).   HENT:      Head: Normocephalic and atraumatic.      Nose: Nose normal.      Mouth/Throat:      Mouth: Mucous membranes are dry.      Pharynx: Oropharynx is clear.   Eyes:      Extraocular Movements: Extraocular movements intact.      Conjunctiva/sclera: Conjunctivae normal.   Cardiovascular:      Rate and Rhythm: Normal rate and regular rhythm.      Pulses: Normal pulses.      Heart sounds: Normal heart sounds.   Pulmonary:      Effort: Pulmonary effort is normal.      Breath sounds: Normal breath sounds.   Abdominal:      General: Bowel sounds are normal.      Palpations: Abdomen is soft.      Tenderness: There is abdominal tenderness (RUQ).   Musculoskeletal:      Cervical back: Normal range of motion.      Right lower leg: No edema.      Left lower leg: No edema.   Skin:     General: Skin is warm and dry.   Neurological:      General: No focal deficit present.      Mental Status: She is alert.             Significant Labs: All  pertinent labs within the past 24 hours have been reviewed.    Significant Imaging: I have reviewed all pertinent imaging results/findings within the past 24 hours.

## 2025-02-15 NOTE — ED NOTES
Telemetry Verification   Patient placed on Telemetry Box  Verified with War Room  Box # [id number]   Monitor Tech    Rate 91   Rhythm NS

## 2025-02-16 PROBLEM — R74.01 TRANSAMINITIS: Status: ACTIVE | Noted: 2025-02-16

## 2025-02-16 LAB
ALBUMIN SERPL BCP-MCNC: 1.8 G/DL (ref 3.5–5.2)
ALP SERPL-CCNC: 540 U/L (ref 40–150)
ALT SERPL W/O P-5'-P-CCNC: 542 U/L (ref 10–44)
ANION GAP SERPL CALC-SCNC: 12 MMOL/L (ref 8–16)
AST SERPL-CCNC: 524 U/L (ref 10–40)
BILIRUB SERPL-MCNC: 1.3 MG/DL (ref 0.1–1)
BUN SERPL-MCNC: 17 MG/DL (ref 8–23)
CALCIUM SERPL-MCNC: 8 MG/DL (ref 8.7–10.5)
CHLORIDE SERPL-SCNC: 92 MMOL/L (ref 95–110)
CO2 SERPL-SCNC: 24 MMOL/L (ref 23–29)
CREAT SERPL-MCNC: 0.8 MG/DL (ref 0.5–1.4)
EST. GFR  (NO RACE VARIABLE): >60 ML/MIN/1.73 M^2
GLUCOSE SERPL-MCNC: 185 MG/DL (ref 70–110)
MAGNESIUM SERPL-MCNC: 2.3 MG/DL (ref 1.6–2.6)
PHOSPHATE SERPL-MCNC: 3.8 MG/DL (ref 2.7–4.5)
POCT GLUCOSE: 192 MG/DL (ref 70–110)
POCT GLUCOSE: 194 MG/DL (ref 70–110)
POCT GLUCOSE: 210 MG/DL (ref 70–110)
POCT GLUCOSE: 231 MG/DL (ref 70–110)
POTASSIUM SERPL-SCNC: 4.5 MMOL/L (ref 3.5–5.1)
PROT SERPL-MCNC: 5.2 G/DL (ref 6–8.4)
SODIUM SERPL-SCNC: 128 MMOL/L (ref 136–145)

## 2025-02-16 PROCEDURE — 36415 COLL VENOUS BLD VENIPUNCTURE: CPT | Mod: HCNC | Performed by: STUDENT IN AN ORGANIZED HEALTH CARE EDUCATION/TRAINING PROGRAM

## 2025-02-16 PROCEDURE — 25000003 PHARM REV CODE 250: Mod: HCNC | Performed by: STUDENT IN AN ORGANIZED HEALTH CARE EDUCATION/TRAINING PROGRAM

## 2025-02-16 PROCEDURE — 80053 COMPREHEN METABOLIC PANEL: CPT | Mod: HCNC | Performed by: STUDENT IN AN ORGANIZED HEALTH CARE EDUCATION/TRAINING PROGRAM

## 2025-02-16 PROCEDURE — 83735 ASSAY OF MAGNESIUM: CPT | Mod: HCNC | Performed by: STUDENT IN AN ORGANIZED HEALTH CARE EDUCATION/TRAINING PROGRAM

## 2025-02-16 PROCEDURE — 21400001 HC TELEMETRY ROOM: Mod: HCNC

## 2025-02-16 PROCEDURE — 63600175 PHARM REV CODE 636 W HCPCS: Mod: HCNC | Performed by: HOSPITALIST

## 2025-02-16 PROCEDURE — 84100 ASSAY OF PHOSPHORUS: CPT | Mod: HCNC | Performed by: STUDENT IN AN ORGANIZED HEALTH CARE EDUCATION/TRAINING PROGRAM

## 2025-02-16 PROCEDURE — 63600175 PHARM REV CODE 636 W HCPCS: Mod: HCNC | Performed by: STUDENT IN AN ORGANIZED HEALTH CARE EDUCATION/TRAINING PROGRAM

## 2025-02-16 PROCEDURE — 25000003 PHARM REV CODE 250: Mod: HCNC | Performed by: HOSPITALIST

## 2025-02-16 RX ORDER — OXYCODONE HYDROCHLORIDE 5 MG/1
5 TABLET ORAL EVERY 6 HOURS PRN
Refills: 0 | Status: DISCONTINUED | OUTPATIENT
Start: 2025-02-16 | End: 2025-02-21 | Stop reason: HOSPADM

## 2025-02-16 RX ORDER — SODIUM CHLORIDE 1 G/1
1000 TABLET ORAL DAILY
Status: DISCONTINUED | OUTPATIENT
Start: 2025-02-16 | End: 2025-02-18

## 2025-02-16 RX ADMIN — PIPERACILLIN SODIUM AND TAZOBACTAM SODIUM 4.5 G: 4; .5 INJECTION, POWDER, LYOPHILIZED, FOR SOLUTION INTRAVENOUS at 01:02

## 2025-02-16 RX ADMIN — CYANOCOBALAMIN TAB 1000 MCG 1000 MCG: 1000 TAB at 08:02

## 2025-02-16 RX ADMIN — EZETIMIBE 10 MG: 10 TABLET ORAL at 08:02

## 2025-02-16 RX ADMIN — INSULIN ASPART 2 UNITS: 100 INJECTION, SOLUTION INTRAVENOUS; SUBCUTANEOUS at 12:02

## 2025-02-16 RX ADMIN — SODIUM CHLORIDE 1000 MG: 1 TABLET ORAL at 10:02

## 2025-02-16 RX ADMIN — INSULIN ASPART 2 UNITS: 100 INJECTION, SOLUTION INTRAVENOUS; SUBCUTANEOUS at 05:02

## 2025-02-16 RX ADMIN — FERROUS SULFATE TAB EC 325 MG (65 MG FE EQUIVALENT) 1 EACH: 325 (65 FE) TABLET DELAYED RESPONSE at 08:02

## 2025-02-16 RX ADMIN — LATANOPROST 1 DROP: 50 SOLUTION OPHTHALMIC at 09:02

## 2025-02-16 RX ADMIN — ENOXAPARIN SODIUM 30 MG: 30 INJECTION SUBCUTANEOUS at 06:02

## 2025-02-16 RX ADMIN — CHOLECALCIFEROL TAB 25 MCG (1000 UNIT) 1000 UNITS: 25 TAB at 08:02

## 2025-02-16 RX ADMIN — OXYCODONE HYDROCHLORIDE 5 MG: 5 TABLET ORAL at 11:02

## 2025-02-16 RX ADMIN — OXYCODONE HYDROCHLORIDE 5 MG: 5 TABLET ORAL at 05:02

## 2025-02-16 RX ADMIN — ONDANSETRON 4 MG: 2 INJECTION INTRAMUSCULAR; INTRAVENOUS at 10:02

## 2025-02-16 RX ADMIN — PIPERACILLIN SODIUM AND TAZOBACTAM SODIUM 4.5 G: 4; .5 INJECTION, POWDER, LYOPHILIZED, FOR SOLUTION INTRAVENOUS at 10:02

## 2025-02-16 NOTE — ASSESSMENT & PLAN NOTE
- expected post embolization, per IR should improve   - imaging with gallstones without cholecystitis or convincing choledocholithiasis, no abscess  - trend LFTs  - consider repeat imaging if cont to rise

## 2025-02-16 NOTE — ASSESSMENT & PLAN NOTE
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 The patient is not undergoing active chemotherapy. .Their staging information is listed below.    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

## 2025-02-16 NOTE — ASSESSMENT & PLAN NOTE
Patient's FSGs are controlled on current medication regimen.  Last A1c reviewed-   Lab Results   Component Value Date    HGBA1C 6.2 (H) 11/26/2024     Most recent fingerstick glucose reviewed-   Recent Labs   Lab 02/15/25  1633 02/15/25  2120 02/16/25  0731 02/16/25  1108   POCTGLUCOSE 248* 229* 192* 210*       Current correctional scale  Low  Maintain anti-hyperglycemic dose as follows-   Antihyperglycemics (From admission, onward)    Start     Stop Route Frequency Ordered    02/15/25 0127  insulin aspart U-100 pen 0-5 Units         -- SubQ Before meals & nightly PRN 02/15/25 0027        Hold Oral hypoglycemics while patient is in the hospital.

## 2025-02-16 NOTE — ASSESSMENT & PLAN NOTE
Patient's most recent potassium results are listed below.   Recent Labs     02/14/25  2259 02/15/25  0934 02/16/25  0512   K 3.0* 4.1 4.5       Plan  - Replete potassium per protocol  - Monitor potassium Daily  - Patient's hypokalemia is improving

## 2025-02-16 NOTE — ASSESSMENT & PLAN NOTE
Patient's blood pressure range in the last 24 hours was: BP  Min: 99/74  Max: 140/59.The patient's inpatient anti-hypertensive regimen is listed below:    Patient presented hypotensive, given 2L of IV fluids, started on continuous fluids.   Will hold home Entresto + Spironolactone + Furosemide.  Resume when clinically appropriate.     - monitor and adjust regimen as needed

## 2025-02-16 NOTE — ASSESSMENT & PLAN NOTE
Patient has Abnormal Magnesium: hypomagnesemia. Will continue to monitor electrolytes closely. Will replace the affected electrolytes and repeat labs to be done after interventions completed. The patient's magnesium results have been reviewed and are listed below.  Recent Labs   Lab 02/16/25  0512   MG 2.3

## 2025-02-16 NOTE — ASSESSMENT & PLAN NOTE
-after patient admitted for last embolization, post-procedure recs indicated immediately post-op pt expected to have rising hepatic enzymes, fever and abdominal pain are also possible.   -s/p blood clutures + empiric antibiotics in ED tonight,  can continue Zosyn alone empirically. Deescalate if cx remain NG- stop abx  -consult oncology and IR- IR report symptoms consistent with post embolic syndrome and recommend medical management of pain, expected rise in LFTs should resolve, cont to monitor   -trend CBC/CMP/coags  -PRN pain medication

## 2025-02-16 NOTE — SUBJECTIVE & OBJECTIVE
Interval History: reports abd pain. Nurse reporting RUE is cool to touch, on assessment under blankets warm with normal pulses.     Review of Systems   Constitutional:  Positive for appetite change.   Gastrointestinal:  Positive for abdominal pain.     Objective:     Vital Signs (Most Recent):  Temp: 97.7 °F (36.5 °C) (02/16/25 1050)  Pulse: 104 (02/16/25 1050)  Resp: 18 (02/16/25 1111)  BP: 124/65 (02/16/25 1050)  SpO2: 96 % (02/16/25 1050) Vital Signs (24h Range):  Temp:  [97.7 °F (36.5 °C)-98 °F (36.7 °C)] 97.7 °F (36.5 °C)  Pulse:  [] 104  Resp:  [16-20] 18  SpO2:  [92 %-96 %] 96 %  BP: ()/(55-74) 124/65     Weight: 44.9 kg (98 lb 15.8 oz)  Body mass index is 16.99 kg/m².    Intake/Output Summary (Last 24 hours) at 2/16/2025 1440  Last data filed at 2/15/2025 1833  Gross per 24 hour   Intake --   Output 500 ml   Net -500 ml         Physical Exam  Vitals and nursing note reviewed.   Constitutional:       General: She is not in acute distress.     Appearance: She is ill-appearing (chronically).   HENT:      Head: Normocephalic and atraumatic.      Nose: Nose normal.      Mouth/Throat:      Mouth: Mucous membranes are moist.      Pharynx: Oropharynx is clear.   Eyes:      Extraocular Movements: Extraocular movements intact.      Conjunctiva/sclera: Conjunctivae normal.   Cardiovascular:      Rate and Rhythm: Normal rate and regular rhythm.      Pulses: Normal pulses.      Heart sounds: Normal heart sounds.   Pulmonary:      Effort: Pulmonary effort is normal.      Breath sounds: Normal breath sounds.   Abdominal:      General: Bowel sounds are normal.      Palpations: Abdomen is soft.      Tenderness: There is abdominal tenderness (generalized).   Musculoskeletal:      Cervical back: Normal range of motion.      Right lower leg: No edema.      Left lower leg: No edema.      Comments: RUE warm under blankets, pulses intact   Skin:     General: Skin is warm and dry.   Neurological:      General: No focal  deficit present.      Mental Status: She is alert.             Significant Labs: All pertinent labs within the past 24 hours have been reviewed.    Significant Imaging: I have reviewed all pertinent imaging results/findings within the past 24 hours.

## 2025-02-16 NOTE — PLAN OF CARE
[facility]  Discharge Assessment    Primary Care Provider: Trixie Xie MD     Admission Date: 2/14/2025    Recent hospitalization at [facility] [id number] - 2/6/2025. Please refer to initial discharge assessment completed at that time for additional details.    Discharge Assessment (most recent)       BRIEF DISCHARGE ASSESSMENT - 02/15/25 1330          Discharge Planning    Assessment Type Discharge Planning Brief Assessment     Resource/Environmental Concerns none     Support Systems Family members     Equipment Currently Used at Home blood pressure machine     Current Living Arrangements home     Patient/Family Anticipates Transition to home with family     Patient/Family Anticipated Services at Transition none     DME Needed Upon Discharge  none     Discharge Plan A Home with family     Discharge Plan B Home                 Discharge Plan A and Plan B have been determined by review of patient's clinical status, future medical and therapeutic needs, and coverage/benefits for post-acute care in coordination with multidisciplinary team members.       Meagan Limon RN  Weekend  - [facility]  Spectralink: [phone]

## 2025-02-16 NOTE — ASSESSMENT & PLAN NOTE
Hyponatremia is likely due to Dehydration/hypovolemia. The patient's most recent sodium results are listed below.  Recent Labs     02/14/25  2259 02/15/25  0934 02/16/25  0512   * 125* 128*       Plan  - Correct the sodium by 4-6mEq in 24 hours.   - Obtain the following studies: Urine sodium, urine osmolality, serum osmolality. Low serum osm consistent with decreased solute intake  - Will treat the hyponatremia with IV fluids as follows: s/p Normal saline IVF, monitoring volume status closely given hx HF  - Monitor sodium daily  - Patient hyponatremia is improving  - if fails to correct with IVF given concern for overload given her HF, consider salt tabs- started 1g salt tab, encourage PO as tolerated

## 2025-02-16 NOTE — PLAN OF CARE
Problem: Adult Inpatient Plan of Care  Goal: Plan of Care Review  Outcome: Progressing  Goal: Patient-Specific Goal (Individualized)  Outcome: Progressing  Goal: Absence of Hospital-Acquired Illness or Injury  Outcome: Progressing  Goal: Optimal Comfort and Wellbeing  Outcome: Progressing  Goal: Readiness for Transition of Care  Outcome: Progressing     Problem: Diabetes Comorbidity  Goal: Blood Glucose Level Within Targeted Range  Outcome: Progressing     Problem: Infection  Goal: Absence of Infection Signs and Symptoms  Outcome: Progressing     Problem: Fall Injury Risk  Goal: Absence of Fall and Fall-Related Injury  Outcome: Progressing     Problem: Skin Injury Risk Increased  Goal: Skin Health and Integrity  Outcome: Progressing

## 2025-02-16 NOTE — PROGRESS NOTES
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   She had recent hepatic artery embolization for management of metastatic liver lesions on 02/07.  Over the weekend patient had the onset of abdominal pain, her niece spoke with patient and noted that patient had also c/o of constipation.  Patient was seen in [facility] [place] on [place]11 for constipation, s/p KUB,  lab studies - transaminase values were in 100s, patient given an enema and discharged with Rx for Miralax.   Since this time no recurrent constipation, patient has had 2-3 loose bowel movements per day.  She has had poor appetite, mainly drinking fluids at home.   She has c/o of right sided and right flank abdominal pain.     She has had prior hepatic embolization before for management of liver metastases, she has experienced abdominal pain afterward, but has not required hospitalization post-procedure.    She denies any fevers/chills, no chest pain or dyspnea, no skin rashes, no reported bleeding/bruising abnormality.     She is found with multiple electrolyte abnormalities today and rising hepatic enzyme values.     ED Treatment: Vancomycin 1grm, Zosyn 4.5gm, Potassium IV 10meq x1, Magnesium 2gram IVx1,     Overview/Hospital Course:  Admitted for abd pain likely 2/2 post embolization syndrome. Started on MM pain control.   Electrolyte abnormalities consistent with decreased PO intake. Started on IVF. Replacing electrolytes as needed.     Interval History: reports abd pain. Nurse reporting RUE is cool to  touch, on assessment under blankets warm with normal pulses.     Review of Systems   Constitutional:  Positive for appetite change.   Gastrointestinal:  Positive for abdominal pain.     Objective:     Vital Signs (Most Recent):  Temp: 97.7 °F (36.5 °C) (02/16/25 1050)  Pulse: 104 (02/16/25 1050)  Resp: 18 (02/16/25 1111)  BP: 124/65 (02/16/25 1050)  SpO2: 96 % (02/16/25 1050) Vital Signs (24h Range):  Temp:  [97.7 °F (36.5 °C)-98 °F (36.7 °C)] 97.7 °F (36.5 °C)  Pulse:  [] 104  Resp:  [16-20] 18  SpO2:  [92 %-96 %] 96 %  BP: ()/(55-74) 124/65     Weight: 44.9 kg (98 lb 15.8 oz)  Body mass index is 16.99 kg/m².    Intake/Output Summary (Last 24 hours) at 2/16/2025 1440  Last data filed at 2/15/2025 1833  Gross per 24 hour   Intake --   Output 500 ml   Net -500 ml         Physical Exam  Vitals and nursing note reviewed.   Constitutional:       General: She is not in acute distress.     Appearance: She is ill-appearing (chronically).   HENT:      Head: Normocephalic and atraumatic.      Nose: Nose normal.      Mouth/Throat:      Mouth: Mucous membranes are moist.      Pharynx: Oropharynx is clear.   Eyes:      Extraocular Movements: Extraocular movements intact.      Conjunctiva/sclera: Conjunctivae normal.   Cardiovascular:      Rate and Rhythm: Normal rate and regular rhythm.      Pulses: Normal pulses.      Heart sounds: Normal heart sounds.   Pulmonary:      Effort: Pulmonary effort is normal.      Breath sounds: Normal breath sounds.   Abdominal:      General: Bowel sounds are normal.      Palpations: Abdomen is soft.      Tenderness: There is abdominal tenderness (generalized).   Musculoskeletal:      Cervical back: Normal range of motion.      Right lower leg: No edema.      Left lower leg: No edema.      Comments: RUE warm under blankets, pulses intact   Skin:     General: Skin is warm and dry.   Neurological:      General: No focal deficit present.      Mental Status: She is alert.              Significant Labs: All pertinent labs within the past 24 hours have been reviewed.    Significant Imaging: I have reviewed all pertinent imaging results/findings within the past 24 hours.    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   -s/p blood clutures + empiric antibiotics in ED tonight,  can continue Zosyn alone empirically. Deescalate if cx remain NG- stop abx  -consult oncology and IR- IR report symptoms consistent with post embolic syndrome and recommend medical management of pain, expected rise in LFTs should resolve, cont to monitor   -trend CBC/CMP/coags  -PRN pain medication      Transaminitis  - expected post embolization, per IR should improve   - imaging with gallstones without cholecystitis or convincing choledocholithiasis, no abscess  - trend LFTs  - consider repeat imaging if cont to rise        Intussusception  - see on initial imaging  - surgical eval in ED reports: Intussusception is transient and not causing obstruction. This is a normal physiologic finding and is not contributing to her clinical picture   - abd exam with tenderness but normal BS and soft       ACP (advance care planning)  Advance Care Planning    Date: 02/15/2025    Code Status  In light of the patients advanced and life limiting illness,I engaged the the patient in a voluntary conversation about the patient's preferences for care  at the very end of life. The patient wishes to have a natural, peaceful death.  Along those lines, the patient does not wish to have CPR or other invasive treatments performed when her heart and/or breathing stops. I communicated to the patient that a DNR order would be placed in her medical record to reflect this preference.    A total of 12 min was spent on  advance care planning, goals of care discussion, emotional support, formulating and communicating prognosis and exploring burden/benefit of various approaches of treatment. This discussion occurred on a fully voluntary basis with the verbal consent of the patient and/or family.       Sister - Shayla Rosenthal present.     Follow up - recommend completion of LAPOST form with patient with either palliative care or PCP      Hypomagnesemia  Patient has Abnormal Magnesium: hypomagnesemia. Will continue to monitor electrolytes closely. Will replace the affected electrolytes and repeat labs to be done after interventions completed. The patient's magnesium results have been reviewed and are listed below.  Recent Labs   Lab 02/16/25  0512   MG 2.3          Hypokalemia  Patient's most recent potassium results are listed below.   Recent Labs     02/14/25  2259 02/15/25  0934 02/16/25  0512   K 3.0* 4.1 4.5       Plan  - Replete potassium per protocol  - Monitor potassium Daily  - Patient's hypokalemia is improving    Chronic combined systolic and diastolic CHF (congestive heart failure)  Patient has Combined Systolic and Diastolic heart failure that is Chronic. On presentation their CHF was well compensated.  Last EF 30-35%    Due to presenting hypotension, hyponatremia and concern for hypovolemia.  Will hold home Entresto + Spironolactone + Furosemide.  Resume when clinically appropriate.       Controlled type 2 diabetes mellitus with microalbuminuria, without long-term current use of insulin  Patient's FSGs are controlled on current medication regimen.  Last A1c reviewed-   Lab Results   Component Value Date    HGBA1C 6.2 (H) 11/26/2024     Most recent fingerstick glucose reviewed-   Recent Labs   Lab 02/15/25  1633 02/15/25  2120 02/16/25  0731 02/16/25  1108   POCTGLUCOSE 248* 229* 192* 210*       Current correctional scale  Low  Maintain anti-hyperglycemic dose as follows-   Antihyperglycemics (From admission, onward)      Start      Stop Route Frequency Ordered    02/15/25 0127  insulin aspart U-100 pen 0-5 Units         -- SubQ Before meals & nightly PRN 02/15/25 0027          Hold Oral hypoglycemics while patient is in the hospital.        Hyponatremia  Hyponatremia is likely due to Dehydration/hypovolemia. The patient's most recent sodium results are listed below.  Recent Labs     02/14/25  2259 02/15/25  0934 02/16/25  0512   * 125* 128*       Plan  - Correct the sodium by 4-6mEq in 24 hours.   - Obtain the following studies: Urine sodium, urine osmolality, serum osmolality. 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 The patient is not undergoing active chemotherapy. .Their staging information is listed below.    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   Will hold home Entresto + Spironolactone + Furosemide.  Resume when clinically  appropriate.     - monitor and adjust regimen as needed      VTE Risk Mitigation (From admission, onward)           Ordered     enoxaparin injection 30 mg  Daily         02/15/25 1231     IP VTE HIGH RISK PATIENT  Once         02/15/25 0038     Place sequential compression device  Until discontinued         02/15/25 0038                    Discharge Planning   SIH: 2/18/2025     Code Status: DNR   Medical Readiness for Discharge Date:   Discharge Plan A: Home with family                        Renata Jorge MD  Department of Hospital Medicine   [facility] - Observation 11H

## 2025-02-17 ENCOUNTER — TELEPHONE (OUTPATIENT)
Dept: INTERNAL MEDICINE | Facility: CLINIC | Age: 85
End: 2025-02-17
Payer: MEDICARE

## 2025-02-17 PROBLEM — E44.0 MODERATE PROTEIN-CALORIE MALNUTRITION: Status: ACTIVE | Noted: 2025-02-17

## 2025-02-17 LAB
ALBUMIN SERPL BCP-MCNC: 1.9 G/DL (ref 3.5–5.2)
ALP SERPL-CCNC: 498 U/L (ref 40–150)
ALT SERPL W/O P-5'-P-CCNC: 411 U/L (ref 10–44)
ANION GAP SERPL CALC-SCNC: 12 MMOL/L (ref 8–16)
AST SERPL-CCNC: 248 U/L (ref 10–40)
BASOPHILS # BLD AUTO: 0.03 K/UL (ref 0–0.2)
BASOPHILS NFR BLD: 0.3 % (ref 0–1.9)
BILIRUB SERPL-MCNC: 1.5 MG/DL (ref 0.1–1)
BUN SERPL-MCNC: 30 MG/DL (ref 8–23)
CALCIUM SERPL-MCNC: 8.3 MG/DL (ref 8.7–10.5)
CHLORIDE SERPL-SCNC: 92 MMOL/L (ref 95–110)
CO2 SERPL-SCNC: 25 MMOL/L (ref 23–29)
CREAT SERPL-MCNC: 1.2 MG/DL (ref 0.5–1.4)
DIFFERENTIAL METHOD BLD: ABNORMAL
EOSINOPHIL # BLD AUTO: 0 K/UL (ref 0–0.5)
EOSINOPHIL NFR BLD: 0.2 % (ref 0–8)
ERYTHROCYTE [DISTWIDTH] IN BLOOD BY AUTOMATED COUNT: 12.8 % (ref 11.5–14.5)
EST. GFR  (NO RACE VARIABLE): 44.1 ML/MIN/1.73 M^2
GLUCOSE SERPL-MCNC: 158 MG/DL (ref 70–110)
HCT VFR BLD AUTO: 36 % (ref 37–48.5)
HGB BLD-MCNC: 12.1 G/DL (ref 12–16)
IMM GRANULOCYTES # BLD AUTO: 0.15 K/UL (ref 0–0.04)
IMM GRANULOCYTES NFR BLD AUTO: 1.3 % (ref 0–0.5)
LYMPHOCYTES # BLD AUTO: 1 K/UL (ref 1–4.8)
LYMPHOCYTES NFR BLD: 9 % (ref 18–48)
MAGNESIUM SERPL-MCNC: 2.2 MG/DL (ref 1.6–2.6)
MCH RBC QN AUTO: 30.9 PG (ref 27–31)
MCHC RBC AUTO-ENTMCNC: 33.6 G/DL (ref 32–36)
MCV RBC AUTO: 92 FL (ref 82–98)
MONOCYTES # BLD AUTO: 1 K/UL (ref 0.3–1)
MONOCYTES NFR BLD: 8.8 % (ref 4–15)
NEUTROPHILS # BLD AUTO: 9 K/UL (ref 1.8–7.7)
NEUTROPHILS NFR BLD: 80.4 % (ref 38–73)
NRBC BLD-RTO: 0 /100 WBC
PHOSPHATE SERPL-MCNC: 3.7 MG/DL (ref 2.7–4.5)
PLATELET # BLD AUTO: 270 K/UL (ref 150–450)
PMV BLD AUTO: 9.7 FL (ref 9.2–12.9)
POCT GLUCOSE: 163 MG/DL (ref 70–110)
POCT GLUCOSE: 177 MG/DL (ref 70–110)
POCT GLUCOSE: 214 MG/DL (ref 70–110)
POCT GLUCOSE: 254 MG/DL (ref 70–110)
POTASSIUM SERPL-SCNC: 3.9 MMOL/L (ref 3.5–5.1)
PROT SERPL-MCNC: 5.6 G/DL (ref 6–8.4)
RBC # BLD AUTO: 3.92 M/UL (ref 4–5.4)
SODIUM SERPL-SCNC: 129 MMOL/L (ref 136–145)
WBC # BLD AUTO: 11.17 K/UL (ref 3.9–12.7)

## 2025-02-17 PROCEDURE — 97530 THERAPEUTIC ACTIVITIES: CPT | Mod: HCNC

## 2025-02-17 PROCEDURE — 80053 COMPREHEN METABOLIC PANEL: CPT | Mod: HCNC | Performed by: STUDENT IN AN ORGANIZED HEALTH CARE EDUCATION/TRAINING PROGRAM

## 2025-02-17 PROCEDURE — 84100 ASSAY OF PHOSPHORUS: CPT | Mod: HCNC | Performed by: STUDENT IN AN ORGANIZED HEALTH CARE EDUCATION/TRAINING PROGRAM

## 2025-02-17 PROCEDURE — 25500020 PHARM REV CODE 255: Mod: HCNC

## 2025-02-17 PROCEDURE — 36415 COLL VENOUS BLD VENIPUNCTURE: CPT | Mod: HCNC | Performed by: STUDENT IN AN ORGANIZED HEALTH CARE EDUCATION/TRAINING PROGRAM

## 2025-02-17 PROCEDURE — 83735 ASSAY OF MAGNESIUM: CPT | Mod: HCNC | Performed by: STUDENT IN AN ORGANIZED HEALTH CARE EDUCATION/TRAINING PROGRAM

## 2025-02-17 PROCEDURE — 25500020 PHARM REV CODE 255: Mod: HCNC | Performed by: STUDENT IN AN ORGANIZED HEALTH CARE EDUCATION/TRAINING PROGRAM

## 2025-02-17 PROCEDURE — 85025 COMPLETE CBC W/AUTO DIFF WBC: CPT | Mod: HCNC | Performed by: STUDENT IN AN ORGANIZED HEALTH CARE EDUCATION/TRAINING PROGRAM

## 2025-02-17 PROCEDURE — 63600175 PHARM REV CODE 636 W HCPCS: Mod: HCNC | Performed by: STUDENT IN AN ORGANIZED HEALTH CARE EDUCATION/TRAINING PROGRAM

## 2025-02-17 PROCEDURE — 97165 OT EVAL LOW COMPLEX 30 MIN: CPT | Mod: HCNC

## 2025-02-17 PROCEDURE — 25000003 PHARM REV CODE 250: Mod: HCNC | Performed by: STUDENT IN AN ORGANIZED HEALTH CARE EDUCATION/TRAINING PROGRAM

## 2025-02-17 PROCEDURE — 97162 PT EVAL MOD COMPLEX 30 MIN: CPT | Mod: HCNC

## 2025-02-17 PROCEDURE — 21400001 HC TELEMETRY ROOM: Mod: HCNC

## 2025-02-17 PROCEDURE — 25000003 PHARM REV CODE 250: Mod: HCNC | Performed by: HOSPITALIST

## 2025-02-17 RX ADMIN — CHOLECALCIFEROL TAB 25 MCG (1000 UNIT) 1000 UNITS: 25 TAB at 10:02

## 2025-02-17 RX ADMIN — ENOXAPARIN SODIUM 30 MG: 30 INJECTION SUBCUTANEOUS at 05:02

## 2025-02-17 RX ADMIN — IOHEXOL 15 ML: 350 INJECTION, SOLUTION INTRAVENOUS at 02:02

## 2025-02-17 RX ADMIN — INSULIN ASPART 2 UNITS: 100 INJECTION, SOLUTION INTRAVENOUS; SUBCUTANEOUS at 12:02

## 2025-02-17 RX ADMIN — LATANOPROST 1 DROP: 50 SOLUTION OPHTHALMIC at 09:02

## 2025-02-17 RX ADMIN — FERROUS SULFATE TAB EC 325 MG (65 MG FE EQUIVALENT) 1 EACH: 325 (65 FE) TABLET DELAYED RESPONSE at 10:02

## 2025-02-17 RX ADMIN — SODIUM CHLORIDE 1000 MG: 1 TABLET ORAL at 10:02

## 2025-02-17 RX ADMIN — CYANOCOBALAMIN TAB 1000 MCG 1000 MCG: 1000 TAB at 10:02

## 2025-02-17 RX ADMIN — EZETIMIBE 10 MG: 10 TABLET ORAL at 10:02

## 2025-02-17 RX ADMIN — IOHEXOL 75 ML: 350 INJECTION, SOLUTION INTRAVENOUS at 05:02

## 2025-02-17 RX ADMIN — IOHEXOL 15 ML: 350 INJECTION, SOLUTION INTRAVENOUS at 03:02

## 2025-02-17 NOTE — PROGRESS NOTES
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   She had recent hepatic artery embolization for management of metastatic liver lesions on 02/07.  Over the weekend patient had the onset of abdominal pain, her niece spoke with patient and noted that patient had also c/o of constipation.  [place] [facility] [place] for const[place], s/p KUB,  lab studies - transaminase values were in 100s, patient given an enema and discharged with Rx for Miralax.   Since this time no recurrent constipation, patient has had 2-3 loose bowel movements per day.  She has had poor appetite, mainly drinking fluids at home.   She has c/o of right sided and right flank abdominal pain.     She has had prior hepatic embolization before for management of liver metastases, she has experienced abdominal pain afterward, but has not required hospitalization post-procedure.    She denies any fevers/chills, no chest pain or dyspnea, no skin rashes, no reported bleeding/bruising abnormality.     She is found with multiple electrolyte abnormalities today and rising hepatic enzyme values.     ED Treatment: Vancomycin 1grm, Zosyn 4.5gm, Potassium IV 10meq x1, Magnesium 2gram IVx1,     Overview/Hospital Course:  Admitted for abd pain likely 2/2 post embolization syndrome. Started on MM pain control.   Electrolyte abnormalities consistent with decreased PO intake. Started on IVF. Replacing electrolytes as needed. Started salt tabs, hyponatremia slowly improving.   Worsening abd  distension and reports not passing gas. Repeating abd CT.       Interval History: Continues to report generalized pain and abd pain. Unsure if she is passing gas. No vomiting, but minimal PO intake.     Review of Systems   Constitutional:  Positive for appetite change.   Gastrointestinal:  Positive for abdominal pain.     Objective:     Vital Signs (Most Recent):  Temp: 97.6 °F (36.4 °C) (02/17/25 1541)  Pulse: 102 (02/17/25 1541)  Resp: 18 (02/17/25 1541)  BP: 135/77 (02/17/25 1541)  SpO2: 97 % (02/17/25 1541) Vital Signs (24h Range):  Temp:  [97.6 °F (36.4 °C)-98.3 °F (36.8 °C)] 97.6 °F (36.4 °C)  Pulse:  [] 102  Resp:  [16-20] 18  SpO2:  [95 %-100 %] 97 %  BP: ()/(56-77) 135/77     Weight: 51.8 kg (114 lb 3.2 oz)  Body mass index is 19.6 kg/m².    Intake/Output Summary (Last 24 hours) at 2/17/2025 1640  Last data filed at 2/17/2025 1500  Gross per 24 hour   Intake 800 ml   Output 300 ml   Net 500 ml         Physical Exam  Vitals and nursing note reviewed.   Constitutional:       General: She is not in acute distress.     Appearance: She is ill-appearing (chronically).   HENT:      Head: Normocephalic and atraumatic.      Nose: Nose normal.      Mouth/Throat:      Mouth: Mucous membranes are moist.      Pharynx: Oropharynx is clear.   Eyes:      Extraocular Movements: Extraocular movements intact.      Conjunctiva/sclera: Conjunctivae normal.   Cardiovascular:      Rate and Rhythm: Normal rate and regular rhythm.      Pulses: Normal pulses.      Heart sounds: Normal heart sounds.   Pulmonary:      Effort: Pulmonary effort is normal.      Breath sounds: Normal breath sounds.   Abdominal:      General: Bowel sounds are normal. There is distension.      Tenderness: There is abdominal tenderness (generalized).      Comments: Abd more firm and tender   Musculoskeletal:      Cervical back: Normal range of motion.      Right lower leg: No edema.      Left lower leg: No edema.      Comments: RUE warm under  blankets, pulses intact   Skin:     General: Skin is warm and dry.   Neurological:      General: No focal deficit present.      Mental Status: She is alert.             Significant Labs: All pertinent labs within the past 24 hours have been reviewed.    Significant Imaging: I have reviewed all pertinent imaging results/findings within the past 24 hours.    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   -s/p blood clutures + empiric antibiotics in ED tonight,  can continue Zosyn alone empirically. Deescalate if cx remain NG- stop abx  -consult oncology and IR- IR report symptoms consistent with post embolic syndrome and recommend medical management of pain, expected rise in LFTs should resolve, cont to monitor   -trend CBC/CMP/coags  -PRN pain medication      Moderate protein-calorie malnutrition  Nutrition consulted. Most recent weight and BMI monitored-     Measurements:  Wt Readings from Last 1 Encounters:   02/17/25 51.8 kg (114 lb 3.2 oz)   Body mass index is 19.6 kg/m².    Patient has been screened and assessed by RD.    Malnutrition Type:  Context: chronic illness  Level: moderate    Malnutrition Characteristic Summary:  Subcutaneous Fat (Malnutrition): moderate depletion  Muscle Mass (Malnutrition): moderate depletion    Interventions/Recommendations (treatment strategy):  1.) cont encourage PO intake, supplement added      Transaminitis  - expected post embolization, per IR should improve   - imaging with gallstones without cholecystitis or convincing choledocholithiasis, no abscess  - trend LFTs  - repeating CT abd      Intussusception  - see on initial imaging  - surgical eval in ED reports: Intussusception is transient and not causing obstruction. This is a normal physiologic  finding and is not contributing to her clinical picture   - abd exam with tenderness       ACP (advance care planning)  Advance Care Planning    Date: 02/15/2025    Code Status  In light of the patients advanced and life limiting illness,I engaged the the patient in a voluntary conversation about the patient's preferences for care  at the very end of life. The patient wishes to have a natural, peaceful death.  Along those lines, the patient does not wish to have CPR or other invasive treatments performed when her heart and/or breathing stops. I communicated to the patient that a DNR order would be placed in her medical record to reflect this preference.    A total of 12 min was spent on advance care planning, goals of care discussion, emotional support, formulating and communicating prognosis and exploring burden/benefit of various approaches of treatment. This discussion occurred on a fully voluntary basis with the verbal consent of the patient and/or family.       Sister - Shayla Rosenthal present.     Follow up - recommend completion of LAPOST form with patient with either palliative care or PCP      Hypomagnesemia  Patient has Abnormal Magnesium: hypomagnesemia. Will continue to monitor electrolytes closely. Will replace the affected electrolytes and repeat labs to be done after interventions completed. The patient's magnesium results have been reviewed and are listed below.  Recent Labs   Lab 02/17/25  0657   MG 2.2          Hypokalemia  Patient's most recent potassium results are listed below.   Recent Labs     02/15/25  0934 02/16/25  0512 02/17/25  0657   K 4.1 4.5 3.9       Plan  - Replete potassium per protocol  - Monitor potassium Daily  - Patient's hypokalemia is improving    Chronic combined systolic and diastolic CHF (congestive heart failure)  Patient has Combined Systolic and Diastolic heart failure that is Chronic. On presentation their CHF was well compensated.  Last EF 30-35%    Due to presenting  hypotension, hyponatremia and concern for hypovolemia.  Will hold home Entresto + Spironolactone + Furosemide.  Resume when clinically appropriate.       Controlled type 2 diabetes mellitus with microalbuminuria, without long-term current use of insulin  Patient's FSGs are controlled on current medication regimen.  Last A1c reviewed-   Lab Results   Component Value Date    HGBA1C 6.2 (H) 11/26/2024     Most recent fingerstick glucose reviewed-   Recent Labs   Lab 02/16/25  2110 02/17/25  0713 02/17/25  1039 02/17/25  1541   POCTGLUCOSE 194* 163* 214* 177*       Current correctional scale  Low  Maintain anti-hyperglycemic dose as follows-   Antihyperglycemics (From admission, onward)      Start     Stop Route Frequency Ordered    02/15/25 0127  insulin aspart U-100 pen 0-5 Units         -- SubQ Before meals & nightly PRN 02/15/25 0027          Hold Oral hypoglycemics while patient is in the hospital.        Hyponatremia  Hyponatremia is likely due to Dehydration/hypovolemia. The patient's most recent sodium results are listed below.  Recent Labs     02/15/25  0934 02/16/25  0512 02/17/25  0657   * 128* 129*       Plan  - Correct the sodium by 4-6mEq in 24 hours.   - Obtain the following studies: Urine sodium, urine osmolality, serum osmolality. 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 The  patient is not undergoing active chemotherapy. .Their staging information is listed below.    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   Will hold home Entresto + Spironolactone + Furosemide.  Resume when clinically appropriate.     - monitor and adjust regimen as needed      VTE Risk Mitigation (From admission, onward)           Ordered     enoxaparin injection 30 mg  Daily         02/15/25 1231     IP VTE HIGH RISK PATIENT  Once         02/15/25 0038     Place sequential compression device  Until discontinued         02/15/25 0038                    Discharge Planning   ISH: 2/18/2025     Code Status: DNR   Medical Readiness for Discharge Date:   Discharge Plan A: Home with family                Please place Justification for DME        Renaat Jorge MD  Department of Hospital Medicine   [facility]

## 2025-02-17 NOTE — PLAN OF CARE
Problem: Physical Therapy  Goal: Physical Therapy Goal  Description: Goals to be met by: 3/6     Patient will increase functional independence with mobility by performin. Supine to sit with Stand-by Assistance  2. Sit to supine with Stand-by Assistance  3. Sit to stand transfer with Stand-by Assistance  4. Gait  x 100 feet with Contact Guard Assistance using LRAD.   5. Ascend/descend 4 stair with bilateral Handrails Contact Guard Assistance using LRAD or no AD.     Outcome: Progressing   Evaluation completed, initiated plan of care.   Concetta Marie, PT  2025

## 2025-02-17 NOTE — PLAN OF CARE
Problem: Occupational Therapy  Goal: Occupational Therapy Goal  Description: Goals to be met by: 3/19/2025     Patient will increase functional independence with ADLs by performing:    UE Dressing with Supervision.  LE Dressing with Supervision.  Grooming while standing with Supervision.  Toileting from toilet with Contact Guard Assistance for hygiene and clothing management.   Supine to sit with Supervision.  Toilet transfer to toilet with Supervision.      Outcome: Progressing     OT eval completed.

## 2025-02-17 NOTE — SUBJECTIVE & OBJECTIVE
Interval History: Continues to report generalized pain and abd pain. Unsure if she is passing gas. No vomiting, but minimal PO intake.     Review of Systems   Constitutional:  Positive for appetite change.   Gastrointestinal:  Positive for abdominal pain.     Objective:     Vital Signs (Most Recent):  Temp: 97.6 °F (36.4 °C) (02/17/25 1541)  Pulse: 102 (02/17/25 1541)  Resp: 18 (02/17/25 1541)  BP: 135/77 (02/17/25 1541)  SpO2: 97 % (02/17/25 1541) Vital Signs (24h Range):  Temp:  [97.6 °F (36.4 °C)-98.3 °F (36.8 °C)] 97.6 °F (36.4 °C)  Pulse:  [] 102  Resp:  [16-20] 18  SpO2:  [95 %-100 %] 97 %  BP: ()/(56-77) 135/77     Weight: 51.8 kg (114 lb 3.2 oz)  Body mass index is 19.6 kg/m².    Intake/Output Summary (Last 24 hours) at 2/17/2025 1640  Last data filed at 2/17/2025 1500  Gross per 24 hour   Intake 800 ml   Output 300 ml   Net 500 ml         Physical Exam  Vitals and nursing note reviewed.   Constitutional:       General: She is not in acute distress.     Appearance: She is ill-appearing (chronically).   HENT:      Head: Normocephalic and atraumatic.      Nose: Nose normal.      Mouth/Throat:      Mouth: Mucous membranes are moist.      Pharynx: Oropharynx is clear.   Eyes:      Extraocular Movements: Extraocular movements intact.      Conjunctiva/sclera: Conjunctivae normal.   Cardiovascular:      Rate and Rhythm: Normal rate and regular rhythm.      Pulses: Normal pulses.      Heart sounds: Normal heart sounds.   Pulmonary:      Effort: Pulmonary effort is normal.      Breath sounds: Normal breath sounds.   Abdominal:      General: Bowel sounds are normal. There is distension.      Tenderness: There is abdominal tenderness (generalized).      Comments: Abd more firm and tender   Musculoskeletal:      Cervical back: Normal range of motion.      Right lower leg: No edema.      Left lower leg: No edema.      Comments: RUE warm under blankets, pulses intact   Skin:     General: Skin is warm and dry.    Neurological:      General: No focal deficit present.      Mental Status: She is alert.             Significant Labs: All pertinent labs within the past 24 hours have been reviewed.    Significant Imaging: I have reviewed all pertinent imaging results/findings within the past 24 hours.

## 2025-02-17 NOTE — ASSESSMENT & PLAN NOTE
Patient's FSGs are controlled on current medication regimen.  Last A1c reviewed-   Lab Results   Component Value Date    HGBA1C 6.2 (H) 11/26/2024     Most recent fingerstick glucose reviewed-   Recent Labs   Lab 02/16/25  2110 02/17/25  0713 02/17/25  1039 02/17/25  1541   POCTGLUCOSE 194* 163* 214* 177*       Current correctional scale  Low  Maintain anti-hyperglycemic dose as follows-   Antihyperglycemics (From admission, onward)    Start     Stop Route Frequency Ordered    02/15/25 0127  insulin aspart U-100 pen 0-5 Units         -- SubQ Before meals & nightly PRN 02/15/25 0027        Hold Oral hypoglycemics while patient is in the hospital.

## 2025-02-17 NOTE — ASSESSMENT & PLAN NOTE
Malnutrition Type:  Context: chronic illness  Level: moderate    Related to (etiology):   Inadequate protein- energy intake    Signs and Symptoms (as evidenced by):   Moderate muscle/fat wasting     Malnutrition Characteristic Summary:  Subcutaneous Fat (Malnutrition): moderate depletion  Muscle Mass (Malnutrition): moderate depletion    Interventions/Recommendations (treatment strategy):  Collaboration of nutritional care with other providers.      Nutrition Diagnosis Status:   New

## 2025-02-17 NOTE — PT/OT/SLP EVAL
"Physical Therapy Evaluation    Patient Name:  Shahram Hills   MRN:  [id number]    Recommendations:     Discharge Recommendations: Moderate Intensity Therapy   Discharge Equipment Recommendations: wheelchair, walker, rolling, bedside commode   Barriers to discharge: Inaccessible home and patient below functional baseline    Assessment:     Shahram Hills is a 84 y.o. 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  She presents with the following impairments/functional limitations: weakness, impaired endurance, impaired self care skills, impaired functional mobility, gait instability, pain, impaired cognition, decreased coordination, decreased upper extremity function, decreased lower extremity function, impaired coordination, impaired cardiopulmonary response to activity. The patient's mobility is primarily limited due to lethargy, pain, deconditioning due to prolonged bed rest. She needed increased encouragement to attempt to mobilize. She required minimum assistance with RW to stand from edge of bed, she was only able to tolerate 4 sidesteps using the RW with minimum assistance. Prior to admission, she was independent with gait, did not use AD. Patient currently demonstrates a need for moderate intensity therapy on a daily basis post acute secondary to a decline in functional status due to illness     Rehab Prognosis: Good; patient would benefit from acute skilled PT services to address these deficits and reach maximum level of function.    Recent Surgery: * No surgery found *      Plan:     During this hospitalization, patient to be seen 4 x/week to address the identified rehab impairments via gait training, therapeutic activities, therapeutic exercises, neuromuscular re-education and progress toward the following goals:    Plan of Care Expires:  03/19/25    Subjective     Chief Complaint: "I already got out of bed, I'm tired and I'm in " "pain"  Patient/Family Comments/goals: return to PLOF  Pain/Comfort:  Pain Rating 1:  (abdominal pain, did not rate)  Pain Addressed 1: Reposition, Distraction, Cessation of Activity    Patients cultural, spiritual, Congregation conflicts given the current situation: no    Living Environment:  The patient lives with her sister in a [facility], 4 PATRICK with ADOLFO HR, T/S.   Prior to admission, patients level of function was independent with gait.  Equipment used at home: none.  DME owned (not currently used): none.  Upon discharge, patient will have assistance from sister, family.    Objective:     Communicated with RN prior to session.  Patient found HOB elevated with peripheral IV, PureWick  upon PT entry to room. Sister at bedside, encouraging patient participation.     General Precautions: Standard, fall  Orthopedic Precautions:N/A   Braces: N/A  Respiratory Status: Room air    Exams:    Cognitive Exam  Patient is A&O x4 and follows 100% of one -step commands , delayed, lethargic   Fine Motor Coordination   -       WFL     Postural Exam Patient presented with the following abnormalities:    -       Rounded shoulders  -       Forward head  -       Kyphosis  -       Posterior pelvic tilt  -       Weight shift posterior, cervical flexion   Sensation    -       Light touch intact ADOLFO LE   Skin Integrity/Edema     -       Skin integrity: visibly intact  -       Edema: NA   R LE ROM WFL   R LE Strength 3+/5 hip flexion, 4-/5 knee ext/flex, and ankle DF/PF   L LE ROM WFl   L LE Strength  3+/5 hip flexion,4-/5 knee ext/flex, and ankle DF/PF       Balance   Static Sitting contact guard assist    Dynamic Sitting contact guard assist    Static Standing minimum assistance with RW   Dynamic Standing       minimum assistance with RW        Functional Mobility:    Bed Mobility  Supine to Sit on the R side:  moderate assistance , HOB elevated, use of bed rails  Sit to supine: moderate assistance   Scoot to EOB in sitting: moderate assistance "    Transfers Sit to Stand:  minimum assistance with RW from edge of bed   Gait  Gait Distance: 4 sidesteps to R with RW  Assistance Level: minimum assistance   Description: forward flexed posture, ambulating outside RW CONNIE, decreased step length, short shuffling steps, slow deliberate gait speed, difficulty progressing RW, impaired sequencing and weight shift           AM-PAC 6 CLICK MOBILITY  Total Score:10       Treatment & Education:  Patient and sister educated on:  -role of therapy  -goals of session  -PT POC  -benefits of out of bed mobility and consequences of immobility  -calling for staff assist to mobilize safely  Patient agreeable to mobilize with therapy.      Gait training: cued for upright posture, cued for sequencing with stepping and RW progression, assist to progress RW, assist with weight shift, cued to ambulate inside RW CONNIE, pacing for energy conservation     Patient encouraged to ambulate, sit up in chair 3x/day to prevent deconditioning during hospitalization. Patient verbalized understanding and agreement to mobilize only with RN assist for safety.     Patient left HOB elevated with all lines intact, call button in reach, bed alarm on, and sister present.    GOALS:   Multidisciplinary Problems       Physical Therapy Goals          Problem: Physical Therapy    Goal Priority Disciplines Outcome Interventions   Physical Therapy Goal     PT, PT/OT Progressing    Description: Goals to be met by: 3/6     Patient will increase functional independence with mobility by performin. Supine to sit with Stand-by Assistance  2. Sit to supine with Stand-by Assistance  3. Sit to stand transfer with Stand-by Assistance  4. Gait  x 100 feet with Contact Guard Assistance using LRAD.   5. Ascend/descend 4 stair with bilateral Handrails Contact Guard Assistance using LRAD or no AD.                          DME Justifications:  Patient has a mobility limitation that significantly impairs their ability to  participate in one or more mobility related activities of daily living, including toileting. This deficit can be resolved by using a bedside commode. Patient demonstrates mobility limitations that will cause them to be confined to one room at home without bathroom access for up to 30 days. Using a bedside commode will greatly improve the patient's ability to participate in MRADLs.    Shahram Hills has a mobility limitation that significantly impairs her ability to participate in one or more mobility related activities of daily living (MRADLs) such as toileting, feeding, dressing, grooming, and bathing in customary locations in the home.  The mobility limitation cannot be sufficiently resolved by the use of a cane or walker.   The use of a manual wheelchair will significantly improve the patients ability to participate in MRADLS and the patient will use it on regular basis in the home.  Shahram Hills has expressed her willingness to use a manual wheelchair in the home. Patients upper body strength is sufficient for propulsion.  He/She also has a caregiver who is available, willing, and able to provide assistance with the wheelchair when needed.      In addition to a wheelchair for community distance mobility, patient needs a rolling walker for safety with mobility household distances due to fall risk.      Shahram's mobility limitation cannot be sufficiently resolved by the use of a cane. Her functional mobility deficit can be sufficiently resolved with the use of a Walker. Patient's mobility limitation significantly impairs their ability to participate in one of more activities of daily living.  The use of a Walker will significantly improve the patient's ability to participate in MRADLS and the patient will use it on regular basis in the home.    History:     Past Medical History:   Diagnosis Date    Anemia 07/11/2018    B12 deficiency     Depression     per pcp note 7/2017 and given prozac and diazepam      Hyperlipidemia     Neuroendocrine tumor     Syncope 11/04/2024    Systolic heart failure     Weight loss     reviewed prior pcp Dr. Russo notes 2017 - labs reviewed: cmp wnl x tbili 1.5, tsh wnl, A1c 5.0, cbc wnl,D 36, hiv neg, hep c neg, hep b surg ag neg        Past Surgical History:   Procedure Laterality Date    EMBOLIZATION N/A 02/14/2019    Procedure: EMBOLIZATION, BLOOD VESSEL;  Surgeon: [facility] Diagnostic Provider;  Location: [facility] OR 2ND FLR;  Service: Radiology;  Laterality: N/A;  Room 189/[facility]    EMBOLIZATION N/A 03/21/2019    Procedure: EMBOLIZATION, BLOOD VESSEL;  Surgeon: [facility] Diagnostic Provider;  Location: [facility] OR 2ND FLR;  Service: Radiology;  Laterality: N/A;  Room 189/[facility]    ESOPHAGOGASTRODUODENOSCOPY  05/04/2018    esophageal ring, grade 1 esophagitis, gastritis     EYE SURGERY Bilateral     cataract removal    HYSTERECTOMY      fibroids       Time Tracking:     PT Received On: 02/17/25  PT Start Time: 1105     PT Stop Time: 1122  PT Total Time (min): 17 min     Billable Minutes: Evaluation 9 and Therapeutic Activity 8      02/17/2025

## 2025-02-17 NOTE — PLAN OF CARE
Problem: Adult Inpatient Plan of Care  Goal: Plan of Care Review  Outcome: Progressing  Goal: Patient-Specific Goal (Individualized)  Outcome: Progressing  Goal: Absence of Hospital-Acquired Illness or Injury  Outcome: Progressing  Goal: Optimal Comfort and Wellbeing  Outcome: Progressing  Goal: Readiness for Transition of Care  Outcome: Progressing     Problem: Diabetes Comorbidity  Goal: Blood Glucose Level Within Targeted Range  Outcome: Progressing     Problem: Fall Injury Risk  Goal: Absence of Fall and Fall-Related Injury  Outcome: Progressing     Problem: Skin Injury Risk Increased  Goal: Skin Health and Integrity  Outcome: Progressing     Problem: Fatigue  Goal: Improved Activity Tolerance  Outcome: Progressing     Problem: Pain Acute  Goal: Optimal Pain Control and Function  Outcome: Progressing

## 2025-02-17 NOTE — PT/OT/SLP EVAL
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  She presents with increased pain in abdominal area w/ all bed mobility and decreased arousal throughout session. Pt able to awake and follow directions for a ~30 seconds at a time and then nods off again. Pt w/ decreased command following throughout session, likely due to arousal. Performance deficits affecting function: weakness, impaired endurance, decreased ROM, impaired cognition, impaired self care skills, impaired balance, impaired functional mobility, decreased lower extremity function, decreased upper extremity function, gait instability, decreased safety awareness, pain.      Rehab Prognosis: Good; patient would benefit from acute skilled OT services to address these deficits and reach maximum level of function. Pt previously indep and mobile at home w/out AE.     Plan:     Patient to be seen 3 x/week to address the above listed problems via self-care/home management, therapeutic activities, community/work re-entry, therapeutic exercises  Plan of Care Expires: 03/19/25  Plan of Care Reviewed with: patient, sibling    Subjective     Chief Complaint: "I don't feel very good"  Patient/Family Comments/goals: "She moves well at home, she walks and takes care of herself just like I do" -sister     Occupational Profile:  Living Environment: Pt lives in [facility] w/ x4 PATRICK w/ " sister. Sister works away from home x2 days/wk, has nanny cam to keep eye on pt. Tub shower.    Previous level of function: indep w/ fxl mobility & ADLs  Roles and Routines: Retired, enjoys time at home w/ family and watching TV   Equipment Used at Home: none - sister states they have various DME in attic but unable to recall exactly what is available.   Assistance upon Discharge: Sister available most days, no assistance ever Tues & Thurs.     Pain/Comfort:  Pain Rating 1:  (pt endorses abdominal pain w/ bed mobility however does not quantify/describe pain)  Pain Addressed 1: Reposition, Nurse notified, Cessation of Activity    Patients cultural, spiritual, [employer]ybeterian conflicts given the current situation: no    Objective:     Communicated with: RN prior to session.  Patient found supine with PureWick, peripheral IV upon OT entry to room.    General Precautions: Standard, fall  Orthopedic Precautions: N/A  Braces: N/A  Respiratory Status: Room air    Occupational Performance:    Bed Mobility:    Patient completed Rolling to Right with moderate assistance  Patient completed Scooting anteriorly to EOB with minimum assistance  Patient completed Supine to Sit with moderate assistance for BLE & trunk  Patient completed Sit to Supine with moderate assistance to manage BLE     Cognitive/Visual Perceptual:  Cognitive/Psychosocial Skills:     -       Oriented to: Person and Place   -       Follows Commands/attention:Follows one-step commands however unable to maintain arousal   -       Safety awareness/insight to disability: impaired   -       Mood/Affect/Coping skills/emotional control: Lethargic    Physical Exam:  Upper Extremity Range of Motion:     -       Right Upper Extremity: Deficits: shoulder flexion limited, performed w/ active assist   -       Left Upper Extremity: Deficits: shoulder flexion limited, performed w/ active assist   Upper Extremity Strength:    -       Right Upper Extremity: exam limited secondary to  arousal, anticipate WFL   -       Left Upper Extremity: exam limited secondary to arousal, anticipate WFL    Strength:    -       Right Upper Extremity: WFL  -       Left Upper Extremity: WFL    AMPAC 6 Click ADL:  AMPAC Total Score: 12    Treatment & Education:  Pt pleasant and agreeable however w/ significant lethargy throughout session. Abdominal pain also a limited factor w/ bed mobility during session. At EOB, pt demo's good dynamic sitting balance w/ SBA. Additional activity & ADLs deferred secondary to somnolence.     Patient left supine with all lines intact, call button in reach, bed alarm on, RN notified, and sister present at bedside     GOALS:   Multidisciplinary Problems       Occupational Therapy Goals          Problem: Occupational Therapy    Goal Priority Disciplines Outcome Interventions   Occupational Therapy Goal     OT, PT/OT     Description: Goals to be met by: 3/19/2025     Patient will increase functional independence with ADLs by performing:    UE Dressing with Supervision.  LE Dressing with Supervision.  Grooming while standing with Supervision.  Toileting from toilet with Contact Guard Assistance for hygiene and clothing management.   Supine to sit with Supervision.  Toilet transfer to toilet with Supervision.                           History:     Past Medical History:   Diagnosis Date    Anemia 07/11/2018    B12 deficiency     Depression     per pcp note 7/2017 and given prozac and diazepam     Hyperlipidemia     Neuroendocrine tumor     Syncope 11/04/2024    Systolic heart failure     Weight loss     reviewed prior pcp Dr. Russo notes 2017 - labs reviewed: cmp wnl x tbili 1.5, tsh wnl, A1c 5.0, cbc wnl,D 36, hiv neg, hep c neg, hep b surg ag neg          Past Surgical History:   Procedure Laterality Date    EMBOLIZATION N/A 02/14/2019    Procedure: EMBOLIZATION, BLOOD VESSEL;  [facility];  Location: [facility] OR [address] [place] [address];  Service: Radiology;  Laterality: N/A;   [facility]    EMBOLIZATION N/A 03/21/2019    Procedure: EMBOLIZATION, BLOOD VESSEL;  Surgeon: [facility];  Location: [facility] OR [address] [place] [address];  Service: Radiology;  Laterality: N/A;  Room 189/[facility]    ESOPHAGOGASTRODUODENOSCOPY  05/04/2018    esophageal ring, grade 1 esophagitis, gastritis     EYE SURGERY Bilateral     cataract removal    HYSTERECTOMY      fibroids       Time Tracking:     OT Date of Treatment: 02/17/25  OT Start Time: 0940  OT Stop Time: 1004  OT Total Time (min): 24 min    Billable Minutes:Evaluation 10  Therapeutic Activity 14    2/17/2025

## 2025-02-17 NOTE — ASSESSMENT & PLAN NOTE
Patient's blood pressure range in the last 24 hours was: BP  Min: 95/63  Max: 135/77.The patient's inpatient anti-hypertensive regimen is listed below:    Patient presented hypotensive, given 2L of IV fluids, started on continuous fluids.   Will hold home Entresto + Spironolactone + Furosemide.  Resume when clinically appropriate.     - monitor and adjust regimen as needed

## 2025-02-17 NOTE — ASSESSMENT & PLAN NOTE
- expected post embolization, per IR should improve   - imaging with gallstones without cholecystitis or convincing choledocholithiasis, no abscess  - trend LFTs  - repeating CT abd

## 2025-02-17 NOTE — ASSESSMENT & PLAN NOTE
Hyponatremia is likely due to Dehydration/hypovolemia. The patient's most recent sodium results are listed below.  Recent Labs     02/15/25  0934 02/16/25  0512 02/17/25  0657   * 128* 129*       Plan  - Correct the sodium by 4-6mEq in 24 hours.   - Obtain the following studies: Urine sodium, urine osmolality, serum osmolality. Low serum osm consistent with decreased solute intake  - Will treat the hyponatremia with IV fluids as follows: s/p Normal saline IVF, monitoring volume status closely given hx HF  - Monitor sodium daily  - Patient hyponatremia is improving  - if fails to correct with IVF given concern for overload given her HF, consider salt tabs- started 1g salt tab, encourage PO as tolerated, added nutritional supplement

## 2025-02-17 NOTE — TELEPHONE ENCOUNTER
----- Message from Loly sent at 2/17/2025  9:58 AM CST -----  Regarding: Oncologist    Name of Who is Calling:  Lauren patient Charisma is the request in detail:  Lauren wanted Dr. Xie know she reached out to patient Oncologist about her aunt been in the hospital.  Kamila stated her aunt is in the hospital because of pain from a procedure on 02/05/2025       Can the clinic reply by [facility]: NoWhat Number to Call Back [facility]: [phone]

## 2025-02-17 NOTE — ASSESSMENT & PLAN NOTE
Nutrition consulted. Most recent weight and BMI monitored-     Measurements:  Wt Readings from Last 1 Encounters:   02/17/25 51.8 kg (114 lb 3.2 oz)   Body mass index is 19.6 kg/m².    Patient has been screened and assessed by RD.    Malnutrition Type:  Context: chronic illness  Level: moderate    Malnutrition Characteristic Summary:  Subcutaneous Fat (Malnutrition): moderate depletion  Muscle Mass (Malnutrition): moderate depletion    Interventions/Recommendations (treatment strategy):  1.) cont encourage PO intake, supplement added

## 2025-02-17 NOTE — ASSESSMENT & PLAN NOTE
- see on initial imaging  - surgical eval in ED reports: Intussusception is transient and not causing obstruction. This is a normal physiologic finding and is not contributing to her clinical picture   - abd exam with tenderness

## 2025-02-17 NOTE — PROGRESS NOTES
[facility]  Adult Nutrition  Progress Note    SUMMARY       Recommendations    1.) Recommend to liberalize the diet to Regular Diet, as tolerated- to help increase food choices and increase PO intake.     2.) Continue Boost Glucose Control TID to help meet needs.     3.) Consider an appetite stimulant to help increase PO intake.     4.) RD to monitor wt, PO intake, skin, labs.        Goals: To meet % of EEN/EPN by next RD f/u   Nutrition Goal Status: new  Communication of RD Recs:  (POC)    Assessment and Plan    Endocrine  Moderate protein-calorie malnutrition  Malnutrition Type:  Context: chronic illness  Level: moderate    Related to (etiology):   Inadequate protein- energy intake    Signs and Symptoms (as evidenced by):   Moderate muscle/fat wasting     Malnutrition Characteristic Summary:  Subcutaneous Fat (Malnutrition): moderate depletion  Muscle Mass (Malnutrition): moderate depletion    Interventions/Recommendations (treatment strategy):  Collaboration of nutritional care with other providers.      Nutrition Diagnosis Status:   New    Malnutrition Assessment  Malnutrition Context: chronic illness  Malnutrition Level: moderate          Subcutaneous Fat (Malnutrition): moderate depletion  Muscle Mass (Malnutrition): moderate depletion   Orbital Region (Subcutaneous Fat Loss): mild depletion  Upper Arm Region (Subcutaneous Fat Loss): moderate depletion  Thoracic and Lumbar Region: moderate depletion   Yarsani Region (Muscle Loss): moderate depletion  Clavicle Bone Region (Muscle Loss): moderate depletion  Clavicle and Acromion Bone Region (Muscle Loss): moderate depletion  Dorsal Hand (Muscle Loss): mild depletion     Reason for Assessment    Reason For Assessment: identified at risk by screening criteria  Diagnosis: cancer diagnosis/related complications (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 PMHx:  "metastatic neuroendocrine tumor, CHF, DM2, HTN. Pt denies n/v/d/c. Pt endorses poor appetite since they came into hospital, consuming 0-25%. Pt stated that -125#, CBW  114#. Pt's caregiver stated that pt will typically eat 3 small meals/day. Pt is consuming ~50% of their Boost as ordered. RD was able to perform NFPE today: RD feels pt meets the criteria for moderate malnutrition. Please see findings and PES statement. RD team to monitor and f/u.     Nutrition Discharge Planning: Regular Diet and Boost GC TID    Nutrition Related Social Determinants of Health: SDOH: Adequate food in home environment     Food Insecurity: No Food Insecurity (2/15/2025)    Hunger Vital Sign     Worried About Running Out of Food in the Last Year: Never true     Ran Out of Food in the Last Year: Never true      Nutrition/Diet History    Spiritual, Cultural Beliefs, Hoahaoism Practices, Values that Affect Care: no  Food Allergies: NKFA    Anthropometrics    Height: 5' 4" (162.6 cm)  Height (inches): 64 in  Height Method: Stated  Weight: 51.8 kg (114 lb 3.2 oz)  Weight (lb): 114.2 lb  Weight Method: Bed Scale  Ideal Body Weight (IBW), Female: 120 lb  % Ideal Body Weight, Female (lb): 79.17 %  BMI (Calculated): 19.6    Lab/Procedures/Meds    Pertinent Labs Reviewed: reviewed  Pertinent Labs Comments: Na: 129, Cl: 92, BUN: 30, GFR: 44.1, gluc: 158, ALP: 498, PRO: 5.6, AST: 248, ALT: 411    Pertinent Medications Reviewed: reviewed  Pertinent Medications Comments: VitB12, enoxaparin, Fe Sulfate, NaCl, VitD    Estimated/Assessed Needs    Weight Used For Calorie Calculations: 51.8 kg (114 lb 3.2 oz)  Energy Calorie Requirements (kcal): 1554- 1813 kcal  Energy Need Method: Kcal/kg (25-30 kcal/kg)    Protein Requirements: 62g (1.2g/kg)  Weight Used For Protein Calculations: 51.8 kg (114 lb 3.2 oz)    Fluid Requirements (mL): as per MD or RDA  Estimated Fluid Requirement Method: RDA Method  RDA Method (mL): 1554    CHO Requirement: 194- " 227g    Nutrition Prescription Ordered    Current Diet Order: Diabetic/Cardiac Diet  Oral Nutrition Supplement: Boost Glucose Control TID    Evaluation of Received Nutrient/Fluid Intake    I/O: +656 ml since admit  Fluid Required:  (as per MD)  Comments: LBM 2/14  Tolerance: tolerating  % Intake of Estimated Energy Needs: 0 - 25 %  % Meal Intake: 0 - 25 %    Nutrition Risk    Level of Risk/Frequency of Follow-up: low     Monitor and Evaluation    Food and Nutrient Intake: food and beverage intake  Food and Nutrient Adminstration: diet order  Anthropometric Measurements: weight, weight change, body mass index  Biochemical Data, Medical Tests and Procedures: electrolyte and renal panel, gastrointestinal profile, glucose/endocrine profile, inflammatory profile, lipid profile  Nutrition-Focused Physical Findings: overall appearance, skin     Nutrition Follow-Up    RD Follow-up?: Yes

## 2025-02-17 NOTE — PLAN OF CARE
Recommendations     1.) Recommend to liberalize the diet to Regular Diet, as tolerated- to help increase food choices and increase PO intake.      2.) Continue Boost Glucose Control TID to help meet needs.      3.) Consider an appetite stimulant to help increase PO intake.      4.) RD to monitor wt, PO intake, skin, labs.          Goals: To meet % of EEN/EPN by next RD f/u   Nutrition Goal Status: new  Communication of RD Recs:  (POC)

## 2025-02-17 NOTE — ASSESSMENT & PLAN NOTE
Patient has Abnormal Magnesium: hypomagnesemia. Will continue to monitor electrolytes closely. Will replace the affected electrolytes and repeat labs to be done after interventions completed. The patient's magnesium results have been reviewed and are listed below.  Recent Labs   Lab 02/17/25  0657   MG 2.2

## 2025-02-17 NOTE — ASSESSMENT & PLAN NOTE
Patient's most recent potassium results are listed below.   Recent Labs     02/15/25  0934 02/16/25  0512 02/17/25  0657   K 4.1 4.5 3.9       Plan  - Replete potassium per protocol  - Monitor potassium Daily  - Patient's hypokalemia is improving

## 2025-02-18 PROBLEM — K65.9 PERITONITIS: Status: ACTIVE | Noted: 2025-02-18

## 2025-02-18 LAB
ALBUMIN SERPL BCP-MCNC: 1.8 G/DL (ref 3.5–5.2)
ALP SERPL-CCNC: 482 U/L (ref 40–150)
ALT SERPL W/O P-5'-P-CCNC: 264 U/L (ref 10–44)
ANION GAP SERPL CALC-SCNC: 11 MMOL/L (ref 8–16)
ANION GAP SERPL CALC-SCNC: 12 MMOL/L (ref 8–16)
ANION GAP SERPL CALC-SCNC: 9 MMOL/L (ref 8–16)
AST SERPL-CCNC: 115 U/L (ref 10–40)
BACTERIA #/AREA URNS AUTO: ABNORMAL /HPF
BASOPHILS # BLD AUTO: 0.04 K/UL (ref 0–0.2)
BASOPHILS NFR BLD: 0.5 % (ref 0–1.9)
BILIRUB SERPL-MCNC: 1.4 MG/DL (ref 0.1–1)
BILIRUB UR QL STRIP: NEGATIVE
BUN SERPL-MCNC: 37 MG/DL (ref 8–23)
BUN SERPL-MCNC: 38 MG/DL (ref 8–23)
BUN SERPL-MCNC: 38 MG/DL (ref 8–23)
CALCIUM SERPL-MCNC: 8.2 MG/DL (ref 8.7–10.5)
CALCIUM SERPL-MCNC: 8.2 MG/DL (ref 8.7–10.5)
CALCIUM SERPL-MCNC: 8.3 MG/DL (ref 8.7–10.5)
CHLORIDE SERPL-SCNC: 91 MMOL/L (ref 95–110)
CHLORIDE SERPL-SCNC: 92 MMOL/L (ref 95–110)
CHLORIDE SERPL-SCNC: 92 MMOL/L (ref 95–110)
CLARITY UR REFRACT.AUTO: CLEAR
CO2 SERPL-SCNC: 22 MMOL/L (ref 23–29)
CO2 SERPL-SCNC: 24 MMOL/L (ref 23–29)
CO2 SERPL-SCNC: 26 MMOL/L (ref 23–29)
COLOR UR AUTO: YELLOW
CREAT SERPL-MCNC: 0.9 MG/DL (ref 0.5–1.4)
CREAT SERPL-MCNC: 1 MG/DL (ref 0.5–1.4)
CREAT SERPL-MCNC: 1 MG/DL (ref 0.5–1.4)
CRP SERPL-MCNC: 263.9 MG/L (ref 0–8.2)
DIFFERENTIAL METHOD BLD: ABNORMAL
EOSINOPHIL # BLD AUTO: 0 K/UL (ref 0–0.5)
EOSINOPHIL NFR BLD: 0.5 % (ref 0–8)
ERYTHROCYTE [DISTWIDTH] IN BLOOD BY AUTOMATED COUNT: 12.9 % (ref 11.5–14.5)
EST. GFR  (NO RACE VARIABLE): 54.9 ML/MIN/1.73 M^2
EST. GFR  (NO RACE VARIABLE): 54.9 ML/MIN/1.73 M^2
EST. GFR  (NO RACE VARIABLE): >60 ML/MIN/1.73 M^2
GLUCOSE SERPL-MCNC: 161 MG/DL (ref 70–110)
GLUCOSE SERPL-MCNC: 182 MG/DL (ref 70–110)
GLUCOSE SERPL-MCNC: 235 MG/DL (ref 70–110)
GLUCOSE UR QL STRIP: NEGATIVE
HCT VFR BLD AUTO: 35.8 % (ref 37–48.5)
HGB BLD-MCNC: 11.4 G/DL (ref 12–16)
HGB UR QL STRIP: NEGATIVE
IMM GRANULOCYTES # BLD AUTO: 0.15 K/UL (ref 0–0.04)
IMM GRANULOCYTES NFR BLD AUTO: 1.8 % (ref 0–0.5)
KETONES UR QL STRIP: NEGATIVE
LEUKOCYTE ESTERASE UR QL STRIP: ABNORMAL
LYMPHOCYTES # BLD AUTO: 0.7 K/UL (ref 1–4.8)
LYMPHOCYTES NFR BLD: 8.6 % (ref 18–48)
MAGNESIUM SERPL-MCNC: 2.3 MG/DL (ref 1.6–2.6)
MCH RBC QN AUTO: 29.6 PG (ref 27–31)
MCHC RBC AUTO-ENTMCNC: 31.8 G/DL (ref 32–36)
MCV RBC AUTO: 93 FL (ref 82–98)
MICROSCOPIC COMMENT: ABNORMAL
MONOCYTES # BLD AUTO: 0.5 K/UL (ref 0.3–1)
MONOCYTES NFR BLD: 6.1 % (ref 4–15)
NEUTROPHILS # BLD AUTO: 7.1 K/UL (ref 1.8–7.7)
NEUTROPHILS NFR BLD: 82.5 % (ref 38–73)
NITRITE UR QL STRIP: NEGATIVE
NRBC BLD-RTO: 0 /100 WBC
OSMOLALITY SERPL: 280 MOSM/KG (ref 275–295)
OSMOLALITY UR: 394 MOSM/KG (ref 50–1200)
PH UR STRIP: 6 [PH] (ref 5–8)
PHOSPHATE SERPL-MCNC: 3.4 MG/DL (ref 2.7–4.5)
PLATELET # BLD AUTO: 298 K/UL (ref 150–450)
PMV BLD AUTO: 9.3 FL (ref 9.2–12.9)
POCT GLUCOSE: 165 MG/DL (ref 70–110)
POCT GLUCOSE: 189 MG/DL (ref 70–110)
POCT GLUCOSE: 194 MG/DL (ref 70–110)
POCT GLUCOSE: 226 MG/DL (ref 70–110)
POTASSIUM SERPL-SCNC: 3.6 MMOL/L (ref 3.5–5.1)
POTASSIUM SERPL-SCNC: 3.8 MMOL/L (ref 3.5–5.1)
POTASSIUM SERPL-SCNC: 4.1 MMOL/L (ref 3.5–5.1)
PROCALCITONIN SERPL IA-MCNC: 1.53 NG/ML
PROT SERPL-MCNC: 5.4 G/DL (ref 6–8.4)
PROT UR QL STRIP: ABNORMAL
RBC # BLD AUTO: 3.85 M/UL (ref 4–5.4)
SODIUM SERPL-SCNC: 126 MMOL/L (ref 136–145)
SODIUM SERPL-SCNC: 126 MMOL/L (ref 136–145)
SODIUM SERPL-SCNC: 127 MMOL/L (ref 136–145)
SODIUM UR-SCNC: 13 MMOL/L (ref 20–250)
SP GR UR STRIP: >1.03 (ref 1–1.03)
SQUAMOUS #/AREA URNS AUTO: 0 /HPF
URN SPEC COLLECT METH UR: ABNORMAL
WBC # BLD AUTO: 8.56 K/UL (ref 3.9–12.7)
WBC #/AREA URNS AUTO: 9 /HPF (ref 0–5)
YEAST UR QL AUTO: ABNORMAL

## 2025-02-18 PROCEDURE — 85025 COMPLETE CBC W/AUTO DIFF WBC: CPT | Mod: HCNC | Performed by: STUDENT IN AN ORGANIZED HEALTH CARE EDUCATION/TRAINING PROGRAM

## 2025-02-18 PROCEDURE — 84145 PROCALCITONIN (PCT): CPT | Mod: HCNC | Performed by: STUDENT IN AN ORGANIZED HEALTH CARE EDUCATION/TRAINING PROGRAM

## 2025-02-18 PROCEDURE — 81001 URINALYSIS AUTO W/SCOPE: CPT | Mod: HCNC | Performed by: STUDENT IN AN ORGANIZED HEALTH CARE EDUCATION/TRAINING PROGRAM

## 2025-02-18 PROCEDURE — 84300 ASSAY OF URINE SODIUM: CPT | Mod: HCNC | Performed by: STUDENT IN AN ORGANIZED HEALTH CARE EDUCATION/TRAINING PROGRAM

## 2025-02-18 PROCEDURE — 83935 ASSAY OF URINE OSMOLALITY: CPT | Mod: HCNC | Performed by: STUDENT IN AN ORGANIZED HEALTH CARE EDUCATION/TRAINING PROGRAM

## 2025-02-18 PROCEDURE — 83735 ASSAY OF MAGNESIUM: CPT | Mod: HCNC | Performed by: STUDENT IN AN ORGANIZED HEALTH CARE EDUCATION/TRAINING PROGRAM

## 2025-02-18 PROCEDURE — 84100 ASSAY OF PHOSPHORUS: CPT | Mod: HCNC | Performed by: STUDENT IN AN ORGANIZED HEALTH CARE EDUCATION/TRAINING PROGRAM

## 2025-02-18 PROCEDURE — 80048 BASIC METABOLIC PNL TOTAL CA: CPT | Mod: 91,HCNC,XB | Performed by: STUDENT IN AN ORGANIZED HEALTH CARE EDUCATION/TRAINING PROGRAM

## 2025-02-18 PROCEDURE — 99223 1ST HOSP IP/OBS HIGH 75: CPT | Mod: HCNC,,, | Performed by: INTERNAL MEDICINE

## 2025-02-18 PROCEDURE — 83930 ASSAY OF BLOOD OSMOLALITY: CPT | Mod: HCNC | Performed by: STUDENT IN AN ORGANIZED HEALTH CARE EDUCATION/TRAINING PROGRAM

## 2025-02-18 PROCEDURE — 80053 COMPREHEN METABOLIC PANEL: CPT | Mod: HCNC | Performed by: STUDENT IN AN ORGANIZED HEALTH CARE EDUCATION/TRAINING PROGRAM

## 2025-02-18 PROCEDURE — 63600175 PHARM REV CODE 636 W HCPCS: Mod: HCNC | Performed by: STUDENT IN AN ORGANIZED HEALTH CARE EDUCATION/TRAINING PROGRAM

## 2025-02-18 PROCEDURE — 86140 C-REACTIVE PROTEIN: CPT | Mod: HCNC | Performed by: STUDENT IN AN ORGANIZED HEALTH CARE EDUCATION/TRAINING PROGRAM

## 2025-02-18 PROCEDURE — 21400001 HC TELEMETRY ROOM: Mod: HCNC

## 2025-02-18 PROCEDURE — 25000003 PHARM REV CODE 250: Mod: HCNC | Performed by: HOSPITALIST

## 2025-02-18 PROCEDURE — 87040 BLOOD CULTURE FOR BACTERIA: CPT | Mod: HCNC | Performed by: STUDENT IN AN ORGANIZED HEALTH CARE EDUCATION/TRAINING PROGRAM

## 2025-02-18 PROCEDURE — 63600175 PHARM REV CODE 636 W HCPCS: Mod: HCNC | Performed by: HOSPITALIST

## 2025-02-18 PROCEDURE — 36415 COLL VENOUS BLD VENIPUNCTURE: CPT | Mod: HCNC,XB | Performed by: STUDENT IN AN ORGANIZED HEALTH CARE EDUCATION/TRAINING PROGRAM

## 2025-02-18 PROCEDURE — 25000003 PHARM REV CODE 250: Mod: HCNC | Performed by: STUDENT IN AN ORGANIZED HEALTH CARE EDUCATION/TRAINING PROGRAM

## 2025-02-18 RX ORDER — FUROSEMIDE 40 MG/1
40 TABLET ORAL DAILY
Status: DISCONTINUED | OUTPATIENT
Start: 2025-02-18 | End: 2025-02-18

## 2025-02-18 RX ORDER — SIMETHICONE 80 MG
1 TABLET,CHEWABLE ORAL
Status: DISCONTINUED | OUTPATIENT
Start: 2025-02-18 | End: 2025-02-21 | Stop reason: HOSPADM

## 2025-02-18 RX ORDER — FUROSEMIDE 40 MG/1
40 TABLET ORAL ONCE
Status: COMPLETED | OUTPATIENT
Start: 2025-02-18 | End: 2025-02-18

## 2025-02-18 RX ORDER — CEFTRIAXONE 2 G/1
2 INJECTION, POWDER, FOR SOLUTION INTRAMUSCULAR; INTRAVENOUS
Status: DISCONTINUED | OUTPATIENT
Start: 2025-02-18 | End: 2025-02-21

## 2025-02-18 RX ADMIN — FERROUS SULFATE TAB EC 325 MG (65 MG FE EQUIVALENT) 1 EACH: 325 (65 FE) TABLET DELAYED RESPONSE at 09:02

## 2025-02-18 RX ADMIN — CHOLECALCIFEROL TAB 25 MCG (1000 UNIT) 1000 UNITS: 25 TAB at 09:02

## 2025-02-18 RX ADMIN — SIMETHICONE 80 MG: 80 TABLET, CHEWABLE ORAL at 09:02

## 2025-02-18 RX ADMIN — CYANOCOBALAMIN TAB 1000 MCG 1000 MCG: 1000 TAB at 09:02

## 2025-02-18 RX ADMIN — INSULIN ASPART 1 UNITS: 100 INJECTION, SOLUTION INTRAVENOUS; SUBCUTANEOUS at 09:02

## 2025-02-18 RX ADMIN — FUROSEMIDE 40 MG: 40 TABLET ORAL at 09:02

## 2025-02-18 RX ADMIN — CEFTRIAXONE 2 G: 2 INJECTION, POWDER, FOR SOLUTION INTRAMUSCULAR; INTRAVENOUS at 09:02

## 2025-02-18 RX ADMIN — EZETIMIBE 10 MG: 10 TABLET ORAL at 09:02

## 2025-02-18 RX ADMIN — LATANOPROST 1 DROP: 50 SOLUTION OPHTHALMIC at 09:02

## 2025-02-18 RX ADMIN — ENOXAPARIN SODIUM 30 MG: 30 INJECTION SUBCUTANEOUS at 05:02

## 2025-02-18 NOTE — HPI
85 y/o  female with PMH of / Metastatic neuroendocrine tumor, HFrEF, Type 2 DM, HTN, presents to the ED w/ complaints of abdominal pain.   She had recent hepatic artery embolization for management of metastatic liver lesions on 02/07.  Over the weekend patient had the onset of abdominal pain,  Patient was seen in [facility] [place] on 2/11 for constipation, s/p KUB,  lab studies - transaminase values were in 100s, patient given an enema and discharged with Rx for Miralax. Since this time no recurrent constipation, patient has had 2-3 loose bowel movements per day.  She has had poor appetite, mainly drinking fluids at home.  She has c/o of right sided and right flank abdominal pain.      She has had prior hepatic embolization before for management of liver metastases, she has experienced abdominal pain afterward, but has not required hospitalization post-procedure.  Patient endorsed thirst feeling, not able to quantify the amount of fluid that she has been consuming, denied any history of recent medication change, polyuria, polydipsia, depression or anxiety medication, she is not on insomnia medication neither. She denies any fevers/chills, no chest pain or dyspnea, no skin rashes, no reported bleeding/bruising abnormality.  Nephrology has been consulted to manage hyponatremia

## 2025-02-18 NOTE — ASSESSMENT & PLAN NOTE
Patient's FSGs are controlled on current medication regimen.  Last A1c reviewed-   Lab Results   Component Value Date    HGBA1C 6.2 (H) 11/26/2024     Most recent fingerstick glucose reviewed-   Recent Labs   Lab 02/17/25  2111 02/18/25  0727 02/18/25  1117 02/18/25  1400   POCTGLUCOSE 254* 194* 189* 165*       Current correctional scale  Low  Maintain anti-hyperglycemic dose as follows-   Antihyperglycemics (From admission, onward)    Start     Stop Route Frequency Ordered    02/15/25 0127  insulin aspart U-100 pen 0-5 Units         -- SubQ Before meals & nightly PRN 02/15/25 0027        Hold Oral hypoglycemics while patient is in the hospital.

## 2025-02-18 NOTE — ASSESSMENT & PLAN NOTE
Patient has Combined Systolic and Diastolic heart failure that is Chronic. On presentation their CHF was well compensated.  Last EF 30-35%    Due to presenting hypotension, hyponatremia and initially concern for hypovolemia.  Will hold home Entresto + Spironolactone + Furosemide.  Resume when clinically appropriate.   - CT with sign of volume overload- anasarca and pleural effusion. Given soft pressures and cont hyponatremia will spot dose diuretics and fluid restrict, adjusting regimen as needed

## 2025-02-18 NOTE — CONSULTS
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   She had recent hepatic artery embolization for management of metastatic liver lesions on 02/07.  Over the weekend patient had the onset of abdominal pain,  Patient was seen in [facility] [place] ED on 2/11 for constipation, s/p KUB,  lab studies - transaminase values were in 100s, patient given an enema and discharged with Rx for Miralax. Since this time no recurrent constipation, patient has had 2-3 loose bowel movements per day.  She has had poor appetite, mainly drinking fluids at home.  She has c/o of right sided and right flank abdominal pain.      She has had prior hepatic embolization before for management of liver metastases, she has experienced abdominal pain afterward, but has not required hospitalization post-procedure.  Patient endorsed thirst feeling, not able to quantify the amount of fluid that she has been consuming, denied any history of recent medication change, polyuria, polydipsia, depression or anxiety medication, she is not on insomnia medication neither. She denies any fevers/chills, no chest pain or dyspnea, no skin rashes, no reported bleeding/bruising abnormality.  Nephrology has been consulted to manage hyponatremia           Past Medical History:   Diagnosis Date    Anemia 07/11/2018    B12 deficiency     Depression     per pcp note  7/2017 and given prozac and diazepam     Hyperlipidemia     Neuroendocrine tumor     Syncope 11/04/2024    Systolic heart failure     Weight loss     reviewed prior pcp Dr. Russo notes 2017 - labs reviewed: cmp wnl x tbili 1.5, tsh wnl, A1c 5.0, cbc wnl,D 36, hiv neg, hep c neg, hep b surg ag neg        Past Surgical History:   Procedure Laterality Date    EMBOLIZATION N/A 02/14/2019    Procedure: EMBOLIZATION, BLOOD VESSEL;  Surgeon: [facility] Diagnostic Provider;  Location: [facility] OR 2ND FLR;  Service: Radiology;  Laterality: N/A;  Room 189/[place]    EMBOLIZATION N/A 03/21/2019    Procedure: EMBOLIZATION, BLOOD VESSEL;  Surgeon: [facility] Diagnostic Provider;  Location: [facility] OR 2ND FLR;  Service: Radiology;  Laterality: N/A;  Room 189/[place]    ESOPHAGOGASTRODUODENOSCOPY  05/04/2018    esophageal ring, grade 1 esophagitis, gastritis     EYE SURGERY Bilateral     cataract removal    HYSTERECTOMY      fibroids       Review of patient's allergies indicates:   Allergen Reactions    Epinephrine      carcinoid     Current Facility-Administered Medications   Medication Frequency    acetaminophen tablet 650 mg Q4H PRN    aluminum-magnesium hydroxide-simethicone 200-200-20 mg/5 mL suspension 30 mL QID PRN    cefTRIAXone injection 2 g Q24H    cyanocobalamin tablet 1,000 mcg Daily    dextrose 50% injection 12.5 g PRN    dextrose 50% injection 25 g PRN    diphenoxylate-atropine 2.5-0.025 mg per tablet 1 tablet QID PRN    enoxaparin injection 30 mg Daily    ezetimibe tablet 10 mg Daily    ferrous sulfate tablet 1 each Daily    glucagon (human recombinant) injection 1 mg PRN    glucose chewable tablet 16 g PRN    glucose chewable tablet 24 g PRN    insulin aspart U-100 pen 0-5 Units QID (AC + HS) PRN    latanoprost 0.005 % ophthalmic solution 1 drop Nightly    melatonin tablet 6 mg Nightly PRN    naloxone 0.4 mg/mL injection 0.02 mg PRN    ondansetron injection 4 mg Q8H PRN    oxyCODONE immediate release tablet 5 mg Q6H PRN     polyethylene glycol packet 17 g BID PRN    prochlorperazine injection Soln 5 mg Q6H PRN    simethicone chewable tablet 80 mg TID PC    sodium chloride 0.9% flush 10 mL Q6H PRN    vitamin D 1000 units tablet 1,000 Units Daily     Family History       Problem Relation (Age of Onset)    Asthma Sister    COPD Brother    Cancer Father, Brother    Diabetes Sister, Brother    Emphysema Brother    Glaucoma Mother    Heart attack Father, Sister    Hyperlipidemia Sister    Hypertension Mother, Brother    No Known Problems Sister    Stroke Brother          Tobacco Use    Smoking status: Never    Smokeless tobacco: Never   Substance and Sexual Activity    Alcohol use: No     Comment: stopped in 2007 - hx of social drinking    Drug use: Never    Sexual activity: Not Currently     Partners: Male     Review of Systems   Respiratory:  Negative for cough, chest tightness and shortness of breath.    Cardiovascular:  Negative for chest pain and leg swelling.   Gastrointestinal:  Negative for abdominal distention, diarrhea, nausea and vomiting.   Genitourinary:  Negative for dysuria, flank pain and hematuria.     Objective:     Vital Signs (Most Recent):  Temp: 97.5 °F (36.4 °C) (02/18/25 1402)  Pulse: 92 (02/18/25 1431)  Resp: 18 (02/18/25 1402)  BP: 103/64 (02/18/25 1402)  SpO2: 97 % (02/18/25 1402) Vital Signs (24h Range):  Temp:  [97.4 °F (36.3 °C)-98 °F (36.7 °C)] 97.5 °F (36.4 °C)  Pulse:  [] 92  Resp:  [16-18] 18  SpO2:  [96 %-99 %] 97 %  BP: ()/(51-64) 103/64     Weight: 51.8 kg (114 lb 3.2 oz) (02/17/25 0505)  Body mass index is 19.6 kg/m².  Body surface area is 1.53 meters squared.    I/O last 3 completed shifts:  In: 1250 [P.O.:350; Other:900]  Out: 500 [Urine:500]     Physical Exam  Constitutional:       General: She is not in acute distress.     Appearance: She is ill-appearing. She is not toxic-appearing.   HENT:      Head: Normocephalic and atraumatic.   Eyes:      Extraocular Movements: Extraocular movements  "intact.      Pupils: Pupils are equal, round, and reactive to light.   Cardiovascular:      Rate and Rhythm: Normal rate.      Heart sounds: No murmur heard.  Pulmonary:      Effort: No respiratory distress.      Breath sounds: No wheezing or rales.   Abdominal:      General: There is distension.      Tenderness: There is abdominal tenderness.   Musculoskeletal:      Right lower leg: No edema.      Left lower leg: No edema.   Skin:     Coloration: Skin is pale. Skin is not jaundiced.          Significant Labs:  ABGs:   Recent Labs   Lab 02/14/25  1512   PH 7.424   PCO2 52.7*   HCO3 34.5*   POCSATURATED 58   BE 10*     CBC:   Recent Labs   Lab 02/18/25  0912   WBC 8.56   RBC 3.85*   HGB 11.4*   HCT 35.8*      MCV 93   MCH 29.6   MCHC 31.8*     CMP:   Recent Labs   Lab 02/18/25  0446 02/18/25  1032   * 161*   CALCIUM 8.3* 8.2*   ALBUMIN 1.8*  --    PROT 5.4*  --    * 127*   K 3.8 4.1   CO2 22* 24   CL 92* 92*   BUN 38* 37*   CREATININE 1.0 0.9   ALKPHOS 482*  --    *  --    *  --    BILITOT 1.4*  --      LFTs:   Recent Labs   Lab 02/18/25  0446   *   *   ALKPHOS 482*   BILITOT 1.4*   PROT 5.4*   ALBUMIN 1.8*     PTH: No results for input(s): "PTH" in the last 168 hours.  TSH:   Recent Labs   Lab 02/14/25  1539   TSH 0.685     Recent Labs   Lab 02/18/25  1319   COLORU Yellow   SPECGRAV >1.030*   PHUR 6.0   PROTEINUA Trace*   BACTERIA Occasional   NITRITE Negative   LEUKOCYTESUR 2+*     Assessment/Plan:     Endocrine  Hyponatremia  Hyponatremia        Recent Labs     02/15/25  0934 02/16/25  0512 02/17/25  0657   * 128* 129*         Plan    - Correct the sodium by 4-6mEq in 24 hours.   - Hyponatremia likely 2/2 syndrome of inappropriate antidiuretic hormone, secondary to chronic pain,and metastatic neuroendocrine cancer  - Recent urine study showed urine sodium 33, urine osmolality 351, urine creatinine 45, urine chloride 64  - We will repeat new labs, yet lab " readings and interpretation points towards syndrome of inappropriate antidiuretic hormone SIADH  - We recommend water restriction to 800- 1 L per day  - Proper pain control  - Monitor sodium daily          Thank you for your consult. I will follow-up with patient. Please contact us if you have any additional questions.    Shakira Alaniz MD  Nephrology  [facility] - Observation 11H

## 2025-02-18 NOTE — ASSESSMENT & PLAN NOTE
-after patient admitted for last embolization, post-procedure recs indicated immediately post-op pt expected to have rising hepatic enzymes, fever and abdominal pain are also possible.   -s/p blood clutures + empiric antibiotics in ED, continue Zosyn alone empirically. Deescalate if cx remain NG- stopped zosyn given culture NG  -consult oncology and IR- IR report symptoms consistent with post embolic syndrome and recommend medical management of pain, expected rise in LFTs should resolve, cont to monitor   -trend CBC/CMP/coags- LFT improving   -PRN pain medication

## 2025-02-18 NOTE — CONSULTS
"Pt consulted for "pt at risk for PI". RD already following pt. Please see Full nutritional assessment on 2/17. Malnutrition dx continues. Please review recs below.     Recommendations     1.) Recommend to liberalize the diet to Regular Diet, as tolerated- to help increase food choices and increase PO intake.      2.) Continue Boost Glucose Control TID to help meet needs.      3.) Consider an appetite stimulant to help increase PO intake.      4.) RD to monitor wt, PO intake, skin, labs.        Thank you!     Benita Tyson, MS RDN LDN  "

## 2025-02-18 NOTE — ASSESSMENT & PLAN NOTE
- see on initial imaging  - surgical eval in ED reports: Intussusception is transient and not causing obstruction. This is a normal physiologic finding and is not contributing to her clinical picture   - abd exam with tenderness  - repeat CT with continued intussusception, surgery reconsulted and report: Her intussusception is short segment small bowel without signs of bowel ischemia or obstruction. Contrast has easily traversed this area and she continues to have bowel movements. Given her malignancy and overall clinical status, would not recommend exploratory laparotomy at this time. The risks of surgery outweigh any benefit of a segmental resection of her intussusception given her metastatic status.

## 2025-02-18 NOTE — ASSESSMENT & PLAN NOTE
Hyponatremia        Recent Labs     02/15/25  0934 02/16/25  0512 02/17/25  0657   * 128* 129*         Plan    - Correct the sodium by 4-6mEq in 24 hours.   - Hyponatremia likely 2/2 syndrome of inappropriate antidiuretic hormone, secondary to chronic pain,and metastatic neuroendocrine cancer  - Recent urine study showed urine sodium 33, urine osmolality 351, urine creatinine 45, urine chloride 64  - We will repeat new labs, yet lab readings and interpretation points towards syndrome of inappropriate antidiuretic hormone SIADH  - We recommend water restriction to 800- 1 L per day  - Proper pain control  - Monitor sodium daily

## 2025-02-18 NOTE — ASSESSMENT & PLAN NOTE
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 The patient's most recent sodium results are listed below.  Recent Labs     02/17/25  0657 02/18/25  0446 02/18/25  1032   * 126* 127*     Plan  - Correct the sodium by 4-6mEq in 24 hours.   - Obtain the following studies:  urine study showed urine sodium 33, urine osmolality 351, urine creatinine 45, urine chloride 64   - Will treat the hyponatremia with fluid restriction   - Monitor sodium daily  - Patient hyponatremia is worsening. Will adjust treatment as follows: start fluid restriction   - nephrology consulted, workup consistent with SIADH, cont fluid restriction

## 2025-02-18 NOTE — ASSESSMENT & PLAN NOTE
- repeat CT with concern for peritonitis and ascites  - para ordered but no safe window  - started empiric CTX for SBP  - surgery do not feel physical exam consistent with peritonitis, but she has tenderness and elevated inflammatory markers, will cont empiric treatment

## 2025-02-18 NOTE — ASSESSMENT & PLAN NOTE
Shahram Hills is a 84 y.o. lady with metastatic neuroendocrine tumor s/p embolization here with abdominal pain and intussusception seen on CT scan. Her intussusception is short segment small bowel without signs of bowel ischemia or obstruction. Contrast has easily traversed this area and she continues to have bowel movements. She has diffuse pain and right sided pain.     Given her malignancy and overall clinical status, would not recommend exploratory laparotomy at this time     She is not obstructed and had a bowel movement this morning    She may have a malignant or lymphatic lead point, but is not obstructed. Her ascites is likely 2/2 disease and reactive. She does not have peritonitis on physical exam. The risks of surgery outweigh any benefit of a segmental resection of her intussusception given her metastatic status.

## 2025-02-18 NOTE — ASSESSMENT & PLAN NOTE
Advance Care Planning     Date: 02/15/2025    Code Status  In light of the patients advanced and life limiting illness,I engaged the the patient in a voluntary conversation about the patient's preferences for care  at the very end of life. The patient wishes to have a natural, peaceful death.  Along those lines, the patient does not wish to have CPR or other invasive treatments performed when her heart and/or breathing stops. I communicated to the patient that a DNR order would be placed in her medical record to reflect this preference.    A total of 12 min was spent on advance care planning, goals of care discussion, emotional support, formulating and communicating prognosis and exploring burden/benefit of various approaches of treatment. This discussion occurred on a fully voluntary basis with the verbal consent of the patient and/or family.        Sister - Shayla Rosenthal present.     Follow up - recommend completion of LAPOST form with patient with either palliative care or PCP  - palliative care consulted, appreciate assistance with GOC discussions

## 2025-02-18 NOTE — ASSESSMENT & PLAN NOTE
Patient's most recent potassium results are listed below.   Recent Labs     02/17/25  0657 02/18/25  0446 02/18/25  1032   K 3.9 3.8 4.1       Plan  - Replete potassium per protocol  - Monitor potassium Daily  - Patient's hypokalemia is improving

## 2025-02-18 NOTE — ASSESSMENT & PLAN NOTE
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 The patient is not undergoing active chemotherapy. .Their staging information is listed below.    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

## 2025-02-18 NOTE — CONSULTS
Palliative Medicine consult acknowledged. Palliative Medicine was consulted by primary team for end of life and goals of care discussions.     Briefly, Shahram Hills is a 84 y.o. 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 She was admitted to  2/15 for abdominal pain, transaminitis, and correction of electrolytes with presumed post embolization syndrome. CT scan was performed day of admission in ED due to abdominal pain which showed short segment small bowel-small bowel intussusception, copious liquid stool within the colon, large hepatic metastasis and ascites. General surgery was consulted given CT findings of intussusception. Per General Surgery, risks outweigh any benefits of segmental resection of her intussusception.    Palliative Medicine at bedside when patient was brought back to room from IR. Patient was transferred to bed with max assist, cleaned secondary to bowel incontinence. Patient's sister, Shayla, is present at bedside. Palliative Medicine was introduced to patient and Shayla. Shayla requests that patient's niece, Geraldine Rosenthal, is present via telephone. Niece lives in [place], but participates in patient's decision making, along with patient's sister, and niece's son. Tentative plan to have a phone conference at bedside on 2/19/25 at 1030 to initiate goals of care discussions. Family is agreeable. Primary team updated.            .  Dinorah Toledo, MIGUEL, APRN, FNP-C  Department of Palliative Medicine  [facility]  [phone]

## 2025-02-18 NOTE — PROGRESS NOTES
Initial US imaging of the abdomen was performed. Small volume ascites was noted. A safe percutaneous window could not be identified. Paracentesis not completed.     Esperanza Schmidt PA-C  Interventional Radiology  [phone]

## 2025-02-18 NOTE — ASSESSMENT & PLAN NOTE
Patient has Abnormal Magnesium: hypomagnesemia. Will continue to monitor electrolytes closely. Will replace the affected electrolytes and repeat labs to be done after interventions completed. The patient's magnesium results have been reviewed and are listed below.  Recent Labs   Lab 02/18/25  0446   MG 2.3

## 2025-02-18 NOTE — ASSESSMENT & PLAN NOTE
- expected post embolization, per IR should improve   - imaging with gallstones without cholecystitis or convincing choledocholithiasis, no abscess  - trend LFTs now downtrending

## 2025-02-18 NOTE — H&P
Inpatient Radiology Pre-procedure Note    History of Present Illness:  Shahram Hills is a 84 y.o. female who presents for US guided paracentesis.    Admission H&P reviewed.  Past Medical History:   Diagnosis Date    Anemia 07/11/2018    B12 deficiency     Depression     per pcp note 7/2017 and given prozac and diazepam     Hyperlipidemia     Neuroendocrine tumor     Syncope 11/04/2024    Systolic heart failure     Weight loss     reviewed prior pcp Dr. Russo notes 2017 - labs reviewed: cmp wnl x tbili 1.5, tsh wnl, A1c 5.0, cbc wnl,D 36, hiv neg, hep c neg, hep b surg ag neg      Past Surgical History:   Procedure Laterality Date    EMBOLIZATION N/A 02/14/2019    Procedure: EMBOLIZATION, BLOOD VESSEL;  Surgeon: [facility] Diagnostic Provider;  Location: [facility] OR 2ND FLR;  Service: Radiology;  Laterality: N/A;  Room 189/[place]    EMBOLIZATION N/A 03/21/2019    Procedure: EMBOLIZATION, BLOOD VESSEL;  Surgeon: [facility] Diagnostic Provider;  Location: [facility] OR [facility] FLR;  Service: Radiology;  Laterality: N/A;  Room 189/[place]    ESOPHAGOGASTRODUODENOSCOPY  05/04/2018    esophageal ring, grade 1 esophagitis, gastritis     EYE SURGERY Bilateral     cataract removal    HYSTERECTOMY      fibroids       Review of Systems:   As documented in primary team H&P    Home Meds:   Prior to Admission medications    Medication Sig Start Date End Date Taking? Authorizing Provider   blood sugar diagnostic (TRUE METRIX GLUCOSE TEST STRIP) Strp USE TO CHECK BLOOD SUGAR TWICE DAILY 6/24/24  Yes Trixie Xie MD   blood-glucose meter kit by Other route. Use as instructed   Yes Provider, Historical   carvediloL (COREG) 12.5 MG tablet TAKE 1 TABLET(12.5 MG) BY MOUTH TWICE DAILY WITH MEALS 10/8/24  Yes Trixie Xie MD   cyanocobalamin (VITAMIN B-12) 1000 MCG tablet Take 1 tablet (1,000 mcg total) by mouth once daily. 4/20/18  Yes Trixie Xie MD   diphenoxylate-atropine 2.5-0.025 mg (LOMOTIL) 2.5-0.025 mg per tablet Take  "1 tablet by mouth 4 (four) times daily as needed for Diarrhea. 12/7/22  Yes Sebastian Granados MD   ezetimibe (ZETIA) 10 mg tablet Take 10 mg by mouth once daily.   Yes Provider, Historical   ferrous sulfate 325 (65 FE) MG EC tablet Take 325 mg by mouth once daily.   Yes Provider, Historical   furosemide (LASIX) 20 MG tablet take 1 tab by mouth daily. If gain 2-3 pounds in 2-3 days then take 2 tabs daily for 3 days and then resume 1 dose daily 10/29/24  Yes Trixie Xie MD   glipiZIDE (GLUCOTROL) 5 MG tablet Take 2.5mg by mouth with breakfast and take 5 mg with dinner 2/3/25  Yes Trixie Xie MD   lactulose (CHRONULAC) 10 gram/15 mL solution Take 30 g by mouth daily as needed (constipation). 1/10/25  Yes Provider, Historical   latanoprost 0.005 % ophthalmic solution Place 1 drop into both eyes nightly. 11/8/21  Yes Provider, Historical   linaCLOtide (LINZESS) 72 mcg Cap capsule Take 1 capsule (72 mcg total) by mouth before breakfast. 12/21/24  Yes Daksha Clarke, VIPUL   polyethylene glycol (GLYCOLAX) 17 gram PwPk Take 17 g by mouth once daily. 2/11/25  Yes Miguel White MD   sacubitriL-valsartan (ENTRESTO) 24-26 mg per tablet TAKE 1 TABLET BY MOUTH TWICE DAILY 9/4/24  Yes Ralph Bergeron MD   spironolactone (ALDACTONE) 25 MG tablet Take 1 tablet (25 mg total) by mouth once daily. 7/9/24 7/9/25 Yes Ralph Bergeron MD   telotristat ethyl (XERMELO) 250 mg Tab Take 1 tablet (250 mg) by mouth 3 (three) times daily. 12/17/24  Yes Sebastian Granados MD   vitamin D (VITAMIN D3) 1000 units Tab Take 1,000 Units by mouth once daily.   Yes Provider, Historical   lancets Misc To check BG 2 times daily, to use with insurance preferred meter 5/25/21   Trixie Xie MD   needle, disp, 22 G 22 gauge x 1" Ndle 1 application by Misc.(Non-Drug; Combo Route) route 3 (three) times daily as needed. 3/6/23   Trixie Xie MD   syringe, disposable, 1 mL Syrg 1 application by Misc.(Non-Drug; Combo Route) " route 3 (three) times daily as needed (diarrhea). 7/15/22   Sebastian Granados MD   TRUEPLUS LANCETS 33 gauge Misc USE TO CHECK BLOOD SUGAR TWICE DAILY 6/24/24   Trixie Xie MD   XIIDRA 5 % Dpet  4/2/18   Provider, Historical     Scheduled Meds:    cefTRIAXone (Rocephin) IV (PEDS and ADULTS)  2 g Intravenous Q24H    cyanocobalamin  1,000 mcg Oral Daily    enoxparin  30 mg Subcutaneous Daily    ezetimibe  10 mg Oral Daily    ferrous sulfate  1 tablet Oral Daily    latanoprost  1 drop Both Eyes Nightly    simethicone  1 tablet Oral TID PC    vitamin D  1,000 Units Oral Daily     Continuous Infusions:   PRN Meds:  Current Facility-Administered Medications:     acetaminophen, 650 mg, Oral, Q4H PRN    aluminum-magnesium hydroxide-simethicone, 30 mL, Oral, QID PRN    dextrose 50%, 12.5 g, Intravenous, PRN    dextrose 50%, 25 g, Intravenous, PRN    diphenoxylate-atropine 2.5-0.025 mg, 1 tablet, Oral, QID PRN    glucagon (human recombinant), 1 mg, Intramuscular, PRN    glucose, 16 g, Oral, PRN    glucose, 24 g, Oral, PRN    insulin aspart U-100, 0-5 Units, Subcutaneous, QID (AC + HS) PRN    melatonin, 6 mg, Oral, Nightly PRN    naloxone, 0.02 mg, Intravenous, PRN    ondansetron, 4 mg, Intravenous, Q8H PRN    oxyCODONE, 5 mg, Oral, Q6H PRN    polyethylene glycol, 17 g, Oral, BID PRN    prochlorperazine, 5 mg, Intravenous, Q6H PRN    sodium chloride 0.9%, 10 mL, Intravenous, Q6H PRN  Anticoagulants/Antiplatelets: no anticoagulation    Allergies:   Review of patient's allergies indicates:   Allergen Reactions    Epinephrine      carcinoid     Sedation Hx: have not been any systemic reactions    Vitals:  Temp: 97.4 °F (36.3 °C) (02/18/25 1119)  Pulse: 98 (02/18/25 1137)  Resp: 16 (02/18/25 1137)  BP: (!) 116/58 (02/18/25 1137)  SpO2: 99 % (02/18/25 1137)     Physical Exam:  ASA: 3  Mallampati: n/a    General: no acute distress  Mental Status: Altered. Consent obtained from Healthcare POA. Sister Shayla.   HEENT:  normocephalic, atraumatic  Chest: unlabored breathing  Heart: regular heart rate  Abdomen: distended  Extremity: moves all extremities    Plan: US guided paracentesis  Sedation Plan: Local    Esperanza Schmidt PA-C  Interventional Radiology  [phone]

## 2025-02-18 NOTE — ASSESSMENT & PLAN NOTE
Patient's blood pressure range in the last 24 hours was: BP  Min: 95/52  Max: 121/57.The patient's inpatient anti-hypertensive regimen is listed below:    Patient presented hypotensive, given 2L of IV fluids, started on continuous fluids.   Will hold home Entresto + Spironolactone + Furosemide.  Resume when clinically appropriate.     - monitor and adjust regimen as needed  - BP still low, cont to hold, given lasix x1

## 2025-02-18 NOTE — CONSULTS
"[facility] - [facility]  General Surgery  Consult Note    Patient Name: Shahram Hills  MRN: [id number]  Code Status: DNR  Admission Date: 2/14/2025  Hospital Length of Stay: 2 days  Attending Physician: Renata Jorge MD  Primary Care Provider: Trixie Xie MD    Patient information was obtained from patient and past medical records.     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 She was admitted to  2/15 for abdominal pain, transaminitis,  and correction of electrolytes with presumed post embolization syndrome. CT scan was performed day of admission in ED due to abdominal pain which showed short segment small bowel-small bowel intussusception, copious liquid stool within the colon, large hepatic metastasis and ascites. General surgery was consulted given CT findings of intussusception. Given lack of obstructing findings and lack of significant change in her chronic abdominal pain, no further intervention was recommended. She is now unsure if passing gas and has had low PO intake. No emesis. Last BM recorded yesterday. Repeat CT was obtained yesterday due to continued pain, distention yesterday evening for "bowel obstruction suspected."    CT again demonstrated liquid stool within the colon, ascites that is more evident with peritoneal thickening c/f possible SBP. Small bowel intussusception continues without proximal small bowel dilation. General surgery again consulted for intussusception. Her vitals are normal with borderline low but baseline BP. She continues to have electrolyte " derangements including hyponatremia, although LFTs are downtrending.     No new subjective & objective note has been filed under this hospital service since the last note was generated.    Assessment/Plan:     Intussusception  Shahram Hills is a 84 y.o. lady with metastatic neuroendocrine tumor s/p embolization here with abdominal pain and intussusception seen on CT scan. Her intussusception is short segment small bowel without signs of bowel ischemia or obstruction. Contrast has easily traversed this area and she continues to have bowel movements. She has diffuse pain and right sided pain.     Given her malignancy and overall clinical status, would not recommend exploratory laparotomy at this time     She is not obstructed and had a bowel movement this morning    She may have a malignant or lymphatic lead point, but is not obstructed. Her ascites is likely 2/2 disease and reactive. She does not have peritonitis on physical exam. The risks of surgery outweigh any benefit of a segmental resection of her intussusception given her metastatic status.           VTE Risk Mitigation (From admission, onward)           Ordered     enoxaparin injection 30 mg  Daily         02/15/25 1231     IP VTE HIGH RISK PATIENT  Once         02/15/25 0038     Place sequential compression device  Until discontinued         02/15/25 0038                    Marco A Granados MD  General Surgery  [facility] - Observation 11H

## 2025-02-18 NOTE — SUBJECTIVE & OBJECTIVE
Past Medical History:   Diagnosis Date    Anemia 07/11/2018    B12 deficiency     Depression     per pcp note 7/2017 and given prozac and diazepam     Hyperlipidemia     Neuroendocrine tumor     Syncope 11/04/2024    Systolic heart failure     Weight loss     reviewed prior pcp Dr. Russo notes 2017 - labs reviewed: cmp wnl x tbili 1.5, tsh wnl, A1c 5.0, cbc wnl,D 36, hiv neg, hep c neg, hep b surg ag neg        Past Surgical History:   Procedure Laterality Date    EMBOLIZATION N/A 02/14/2019    Procedure: EMBOLIZATION, BLOOD VESSEL;  Surgeon: [facility] Diagnostic Provider;  Location: [facility] OR 2ND FLR;  Service: Radiology;  Laterality: N/A;  Room 189/[place]    EMBOLIZATION N/A 03/21/2019    Procedure: EMBOLIZATION, BLOOD VESSEL;  Surgeon: [facility] Diagnostic Provider;  Location: [facility] OR [facility] FLR;  Service: Radiology;  Laterality: N/A;  Room 189/[place]    ESOPHAGOGASTRODUODENOSCOPY  05/04/2018    esophageal ring, grade 1 esophagitis, gastritis     EYE SURGERY Bilateral     cataract removal    HYSTERECTOMY      fibroids       Review of patient's allergies indicates:   Allergen Reactions    Epinephrine      carcinoid     Current Facility-Administered Medications   Medication Frequency    acetaminophen tablet 650 mg Q4H PRN    aluminum-magnesium hydroxide-simethicone 200-200-20 mg/5 mL suspension 30 mL QID PRN    cefTRIAXone injection 2 g Q24H    cyanocobalamin tablet 1,000 mcg Daily    dextrose 50% injection 12.5 g PRN    dextrose 50% injection 25 g PRN    diphenoxylate-atropine 2.5-0.025 mg per tablet 1 tablet QID PRN    enoxaparin injection 30 mg Daily    ezetimibe tablet 10 mg Daily    ferrous sulfate tablet 1 each Daily    glucagon (human recombinant) injection 1 mg PRN    glucose chewable tablet 16 g PRN    glucose chewable tablet 24 g PRN    insulin aspart U-100 pen 0-5 Units QID (AC + HS) PRN    latanoprost 0.005 % ophthalmic solution 1 drop Nightly    melatonin tablet 6 mg Nightly PRN    naloxone 0.4 mg/mL injection  0.02 mg PRN    ondansetron injection 4 mg Q8H PRN    oxyCODONE immediate release tablet 5 mg Q6H PRN    polyethylene glycol packet 17 g BID PRN    prochlorperazine injection Soln 5 mg Q6H PRN    simethicone chewable tablet 80 mg TID PC    sodium chloride 0.9% flush 10 mL Q6H PRN    vitamin D 1000 units tablet 1,000 Units Daily     Family History       Problem Relation (Age of Onset)    Asthma Sister    COPD Brother    Cancer Father, Brother    Diabetes Sister, Brother    Emphysema Brother    Glaucoma Mother    Heart attack Father, Sister    Hyperlipidemia Sister    Hypertension Mother, Brother    No Known Problems Sister    Stroke Brother          Tobacco Use    Smoking status: Never    Smokeless tobacco: Never   Substance and Sexual Activity    Alcohol use: No     Comment: stopped in 2007 - hx of social drinking    Drug use: Never    Sexual activity: Not Currently     Partners: Male     Review of Systems   Respiratory:  Negative for cough, chest tightness and shortness of breath.    Cardiovascular:  Negative for chest pain and leg swelling.   Gastrointestinal:  Negative for abdominal distention, diarrhea, nausea and vomiting.   Genitourinary:  Negative for dysuria, flank pain and hematuria.     Objective:     Vital Signs (Most Recent):  Temp: 97.5 °F (36.4 °C) (02/18/25 1402)  Pulse: 92 (02/18/25 1431)  Resp: 18 (02/18/25 1402)  BP: 103/64 (02/18/25 1402)  SpO2: 97 % (02/18/25 1402) Vital Signs (24h Range):  Temp:  [97.4 °F (36.3 °C)-98 °F (36.7 °C)] 97.5 °F (36.4 °C)  Pulse:  [] 92  Resp:  [16-18] 18  SpO2:  [96 %-99 %] 97 %  BP: ()/(51-64) 103/64     Weight: 51.8 kg (114 lb 3.2 oz) (02/17/25 0505)  Body mass index is 19.6 kg/m².  Body surface area is 1.53 meters squared.    I/O last 3 completed shifts:  In: 1250 [P.O.:350; Other:900]  Out: 500 [Urine:500]     Physical Exam  Constitutional:       General: She is not in acute distress.     Appearance: She is ill-appearing. She is not toxic-appearing.  "  HENT:      Head: Normocephalic and atraumatic.   Eyes:      Extraocular Movements: Extraocular movements intact.      Pupils: Pupils are equal, round, and reactive to light.   Cardiovascular:      Rate and Rhythm: Normal rate.      Heart sounds: No murmur heard.  Pulmonary:      Effort: No respiratory distress.      Breath sounds: No wheezing or rales.   Abdominal:      General: There is distension.      Tenderness: There is abdominal tenderness.   Musculoskeletal:      Right lower leg: No edema.      Left lower leg: No edema.   Skin:     Coloration: Skin is pale. Skin is not jaundiced.          Significant Labs:  ABGs:   Recent Labs   Lab 02/14/25  1512   PH 7.424   PCO2 52.7*   HCO3 34.5*   POCSATURATED 58   BE 10*     CBC:   Recent Labs   Lab 02/18/25  0912   WBC 8.56   RBC 3.85*   HGB 11.4*   HCT 35.8*      MCV 93   MCH 29.6   MCHC 31.8*     CMP:   Recent Labs   Lab 02/18/25  0446 02/18/25  1032   * 161*   CALCIUM 8.3* 8.2*   ALBUMIN 1.8*  --    PROT 5.4*  --    * 127*   K 3.8 4.1   CO2 22* 24   CL 92* 92*   BUN 38* 37*   CREATININE 1.0 0.9   ALKPHOS 482*  --    *  --    *  --    BILITOT 1.4*  --      LFTs:   Recent Labs   Lab 02/18/25  0446   *   *   ALKPHOS 482*   BILITOT 1.4*   PROT 5.4*   ALBUMIN 1.8*     PTH: No results for input(s): "PTH" in the last 168 hours.  TSH:   Recent Labs   Lab 02/14/25  1539   TSH 0.685     Recent Labs   Lab 02/18/25  1319   COLORU Yellow   SPECGRAV >1.030*   PHUR 6.0   PROTEINUA Trace*   BACTERIA Occasional   NITRITE Negative   LEUKOCYTESUR 2+*     "

## 2025-02-18 NOTE — PROGRESS NOTES
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   She had recent hepatic artery embolization for management of metastatic liver lesions on 02/07.  Over the weekend patient had the onset of abdominal pain, her niece spoke with patient and noted that patient had also c/o of constipation.  Patient was seen in [facility] ED on 2/11 for constipation, s/p KUB,  lab studies - transaminase values were in 100s, patient given an enema and discharged with Rx for Miralax.   Since this time no recurrent constipation, patient has had 2-3 loose bowel movements per day.  She has had poor appetite, mainly drinking fluids at home.   She has c/o of right sided and right flank abdominal pain.     She has had prior hepatic embolization before for management of liver metastases, she has experienced abdominal pain afterward, but has not required hospitalization post-procedure.    She denies any fevers/chills, no chest pain or dyspnea, no skin rashes, no reported bleeding/bruising abnormality.     She is found with multiple electrolyte abnormalities today and rising hepatic enzyme values.     ED Treatment: Vancomycin 1grm, Zosyn 4.5gm, Potassium IV 10meq x1, Magnesium 2gram IVx1,     Overview/Hospital Course:  Admitted for abd pain likely 2/2 post embolization syndrome. Started on MM pain control.   Electrolyte abnormalities consistent with decreased PO intake. Started on IVF. Replacing electrolytes as needed. Started salt tabs, hyponatremia was slowly improving. Hyponatremia  worse, started fluid restriction and nephrology consulted. On repeat CT with anasarca and ascites given lasix x1. 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   Worsening abd distension and reports not passing gas. Repeat CT without obstruction and patient had BM with improvement of distension.   CT concerning for peritonitis, para ordered but small volume ascites, no safe window for drainage. Ascites thought to be from metastatic disease. Started on empiric CTX for SBP. Surgery do not feel physical exam consistent with peritonitis. Elevated inflammatory markers, will cont empiric abx to complete treatment course for SBP.   CT also showed persistent intussusception, surgery consulted and report risks of surgery outweigh any benefit of a segmental resection of her intussusception given her metastatic status.   Palliative consulted for GOC discussion given advanced metastatic disease.       Interval History: reporting generalized pain and abd pain. Had BM. Afebrile.     Review of Systems   Constitutional:  Positive for appetite change.   Gastrointestinal:  Positive for abdominal pain.     Objective:     Vital Signs (Most Recent):  Temp: 97.5 °F (36.4 °C) (02/18/25 1402)  Pulse: 92 (02/18/25 1431)  Resp: 18 (02/18/25 1402)  BP: 103/64 (02/18/25 1402)  SpO2: 97 % (02/18/25 1402) Vital Signs (24h Range):  Temp:  [97.4 °F (36.3 °C)-98 °F (36.7 °C)] 97.5 °F (36.4 °C)  Pulse:  [] 92  Resp:  [16-18] 18  SpO2:  [96 %-99 %] 97 %  BP: ()/(51-64) 103/64     Weight: 51.8 kg (114 lb 3.2 oz)  Body mass index is 19.6 kg/m².    Intake/Output Summary (Last 24 hours) at 2/18/2025 1631  Last data filed at 2/18/2025 0011  Gross per 24 hour   Intake --   Output 500 ml   Net -500 ml         Physical Exam  Vitals and nursing note reviewed.   Constitutional:       General: She is not in acute distress.     Appearance: She is ill-appearing (chronically).   HENT:      Head: Normocephalic and atraumatic.       Nose: Nose normal.      Mouth/Throat:      Mouth: Mucous membranes are moist.      Pharynx: Oropharynx is clear.   Eyes:      Extraocular Movements: Extraocular movements intact.      Conjunctiva/sclera: Conjunctivae normal.   Cardiovascular:      Rate and Rhythm: Normal rate and regular rhythm.      Pulses: Normal pulses.      Heart sounds: Normal heart sounds.   Pulmonary:      Effort: Pulmonary effort is normal.      Breath sounds: Normal breath sounds.   Abdominal:      General: Bowel sounds are normal. There is distension.      Palpations: Abdomen is soft.      Tenderness: There is abdominal tenderness (generalized).      Comments: Less firm and soft     Musculoskeletal:      Cervical back: Normal range of motion.      Right lower leg: No edema.      Left lower leg: No edema.   Skin:     General: Skin is warm and dry.   Neurological:      General: No focal deficit present.      Mental Status: She is alert.             Significant Labs: All pertinent labs within the past 24 hours have been reviewed.    Significant Imaging: I have reviewed all pertinent imaging results/findings within the past 24 hours.    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   -s/p blood clutures + empiric antibiotics in ED, continue Zosyn alone empirically. Deescalate if cx remain NG- stopped zosyn given culture NG  -consult oncology and IR- IR report symptoms consistent with post embolic syndrome and recommend medical management of pain, expected rise in LFTs should resolve, cont to monitor   -trend CBC/CMP/coags- LFT improving   -PRN pain medication      Peritonitis  - repeat CT with concern for peritonitis and ascites  - para ordered but no safe window  - started empiric CTX for SBP  - surgery do not feel  physical exam consistent with peritonitis, but she has tenderness and elevated inflammatory markers, will cont empiric treatment       Moderate protein-calorie malnutrition  Nutrition consulted. Most recent weight and BMI monitored-     Measurements:  Wt Readings from Last 1 Encounters:   02/17/25 51.8 kg (114 lb 3.2 oz)   Body mass index is 19.6 kg/m².    Patient has been screened and assessed by RD.    Malnutrition Type:  Context: chronic illness  Level: moderate    Malnutrition Characteristic Summary:  Subcutaneous Fat (Malnutrition): moderate depletion  Muscle Mass (Malnutrition): moderate depletion    Interventions/Recommendations (treatment strategy):  1.) cont encourage PO intake, supplement added      Transaminitis  - expected post embolization, per IR should improve   - imaging with gallstones without cholecystitis or convincing choledocholithiasis, no abscess  - trend LFTs now downtrending         Intussusception  - see on initial imaging  - surgical eval in ED reports: Intussusception is transient and not causing obstruction. This is a normal physiologic finding and is not contributing to her clinical picture   - abd exam with tenderness  - repeat CT with continued intussusception, surgery reconsulted and report: Her intussusception is short segment small bowel without signs of bowel ischemia or obstruction. Contrast has easily traversed this area and she continues to have bowel movements. Given her malignancy and overall clinical status, would not recommend exploratory laparotomy at this time. The risks of surgery outweigh any benefit of a segmental resection of her intussusception given her metastatic status.       ACP (advance care planning)  Advance Care Planning    Date: 02/15/2025    Code Status  In light of the patients advanced and life limiting illness,I engaged the the patient in a voluntary conversation about the patient's preferences for care  at the very end of life. The patient wishes to  have a natural, peaceful death.  Along those lines, the patient does not wish to have CPR or other invasive treatments performed when her heart and/or breathing stops. I communicated to the patient that a DNR order would be placed in her medical record to reflect this preference.    A total of 12 min was spent on advance care planning, goals of care discussion, emotional support, formulating and communicating prognosis and exploring burden/benefit of various approaches of treatment. This discussion occurred on a fully voluntary basis with the verbal consent of the patient and/or family.        Sister - Shayla Rosenthal present.     Follow up - recommend completion of LAPOST form with patient with either palliative care or PCP  - palliative care consulted, appreciate assistance with GOC discussions      Hypomagnesemia  Patient has Abnormal Magnesium: hypomagnesemia. Will continue to monitor electrolytes closely. Will replace the affected electrolytes and repeat labs to be done after interventions completed. The patient's magnesium results have been reviewed and are listed below.  Recent Labs   Lab 02/18/25  0446   MG 2.3          Hypokalemia  Patient's most recent potassium results are listed below.   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 On presentation their CHF was well compensated.  Last EF 30-35%    Due to presenting hypotension, hyponatremia and initially concern for hypovolemia.  Will hold home Entresto + Spironolactone + Furosemide.  Resume when clinically appropriate.   - CT with sign of volume overload- anasarca and pleural effusion. Given soft pressures and cont hyponatremia will spot dose diuretics and fluid restrict,  adjusting regimen as needed      Controlled type 2 diabetes mellitus with microalbuminuria, without long-term current use of insulin  Patient's FSGs are controlled on current medication regimen.  Last A1c reviewed-   Lab Results   Component Value Date    HGBA1C 6.2 (H) 11/26/2024     Most recent fingerstick glucose reviewed-   Recent Labs   Lab 02/17/25  2111 02/18/25  0727 02/18/25  1117 02/18/25  1400   POCTGLUCOSE 254* 194* 189* 165*       Current correctional scale  Low  Maintain anti-hyperglycemic dose as follows-   Antihyperglycemics (From admission, onward)      Start     Stop Route Frequency Ordered    02/15/25 0127  insulin aspart U-100 pen 0-5 Units         -- SubQ Before meals & nightly PRN 02/15/25 0027          Hold Oral hypoglycemics while patient is in the hospital.        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 The patient's most recent sodium results are listed below.  Recent Labs     02/17/25  0657 02/18/25  0446 02/18/25  1032   * 126* 127*     Plan  - Correct the sodium by 4-6mEq in 24 hours.   - Obtain the following studies:  urine study showed urine sodium 33, urine osmolality 351, urine creatinine 45, urine chloride 64   - Will treat the hyponatremia with fluid restriction   - Monitor sodium daily  - Patient hyponatremia is worsening. 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 The patient is not undergoing active chemotherapy. .Their staging information is listed below.    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   Will hold home Entresto + Spironolactone + Furosemide.  Resume when clinically appropriate.     - monitor and adjust regimen as needed  - BP still low, cont to hold, given lasix x1      VTE Risk Mitigation (From admission, onward)           Ordered     enoxaparin injection 30 mg  Daily         02/15/25 1231     IP VTE HIGH RISK PATIENT  Once         02/15/25 0038     Place sequential compression device  Until discontinued         02/15/25 0038                    Discharge Planning   ISH: 2/21/2025     Code Status: DNR   Medical Readiness for Discharge Date:   Discharge Plan A: Home with family                Please place Justification for DME        Renata Jorge MD  Department of Hospital Medicine   [facility]

## 2025-02-18 NOTE — SUBJECTIVE & OBJECTIVE
Interval History: reporting generalized pain and abd pain. Had BM. Afebrile.     Review of Systems   Constitutional:  Positive for appetite change.   Gastrointestinal:  Positive for abdominal pain.     Objective:     Vital Signs (Most Recent):  Temp: 97.5 °F (36.4 °C) (02/18/25 1402)  Pulse: 92 (02/18/25 1431)  Resp: 18 (02/18/25 1402)  BP: 103/64 (02/18/25 1402)  SpO2: 97 % (02/18/25 1402) Vital Signs (24h Range):  Temp:  [97.4 °F (36.3 °C)-98 °F (36.7 °C)] 97.5 °F (36.4 °C)  Pulse:  [] 92  Resp:  [16-18] 18  SpO2:  [96 %-99 %] 97 %  BP: ()/(51-64) 103/64     Weight: 51.8 kg (114 lb 3.2 oz)  Body mass index is 19.6 kg/m².    Intake/Output Summary (Last 24 hours) at 2/18/2025 1631  Last data filed at 2/18/2025 0011  Gross per 24 hour   Intake --   Output 500 ml   Net -500 ml         Physical Exam  Vitals and nursing note reviewed.   Constitutional:       General: She is not in acute distress.     Appearance: She is ill-appearing (chronically).   HENT:      Head: Normocephalic and atraumatic.      Nose: Nose normal.      Mouth/Throat:      Mouth: Mucous membranes are moist.      Pharynx: Oropharynx is clear.   Eyes:      Extraocular Movements: Extraocular movements intact.      Conjunctiva/sclera: Conjunctivae normal.   Cardiovascular:      Rate and Rhythm: Normal rate and regular rhythm.      Pulses: Normal pulses.      Heart sounds: Normal heart sounds.   Pulmonary:      Effort: Pulmonary effort is normal.      Breath sounds: Normal breath sounds.   Abdominal:      General: Bowel sounds are normal. There is distension.      Palpations: Abdomen is soft.      Tenderness: There is abdominal tenderness (generalized).      Comments: Less firm and soft     Musculoskeletal:      Cervical back: Normal range of motion.      Right lower leg: No edema.      Left lower leg: No edema.   Skin:     General: Skin is warm and dry.   Neurological:      General: No focal deficit present.      Mental Status: She is alert.              Significant Labs: All pertinent labs within the past 24 hours have been reviewed.    Significant Imaging: I have reviewed all pertinent imaging results/findings within the past 24 hours.

## 2025-02-19 LAB
ALBUMIN SERPL BCP-MCNC: 1.8 G/DL (ref 3.5–5.2)
ALP SERPL-CCNC: 535 U/L (ref 40–150)
ALT SERPL W/O P-5'-P-CCNC: 181 U/L (ref 10–44)
ANION GAP SERPL CALC-SCNC: 12 MMOL/L (ref 8–16)
ANION GAP SERPL CALC-SCNC: 8 MMOL/L (ref 8–16)
ANION GAP SERPL CALC-SCNC: 9 MMOL/L (ref 8–16)
AST SERPL-CCNC: 77 U/L (ref 10–40)
BACTERIA BLD CULT: NORMAL
BACTERIA BLD CULT: NORMAL
BASOPHILS # BLD AUTO: 0.02 K/UL (ref 0–0.2)
BASOPHILS NFR BLD: 0.3 % (ref 0–1.9)
BILIRUB SERPL-MCNC: 1.2 MG/DL (ref 0.1–1)
BUN SERPL-MCNC: 34 MG/DL (ref 8–23)
BUN SERPL-MCNC: 35 MG/DL (ref 8–23)
BUN SERPL-MCNC: 36 MG/DL (ref 8–23)
CALCIUM SERPL-MCNC: 8.2 MG/DL (ref 8.7–10.5)
CALCIUM SERPL-MCNC: 8.3 MG/DL (ref 8.7–10.5)
CALCIUM SERPL-MCNC: 8.4 MG/DL (ref 8.7–10.5)
CHLORIDE SERPL-SCNC: 89 MMOL/L (ref 95–110)
CHLORIDE SERPL-SCNC: 92 MMOL/L (ref 95–110)
CHLORIDE SERPL-SCNC: 93 MMOL/L (ref 95–110)
CHLORIDE UR-SCNC: <20 MMOL/L (ref 25–200)
CO2 SERPL-SCNC: 24 MMOL/L (ref 23–29)
CO2 SERPL-SCNC: 26 MMOL/L (ref 23–29)
CO2 SERPL-SCNC: 27 MMOL/L (ref 23–29)
CREAT SERPL-MCNC: 0.7 MG/DL (ref 0.5–1.4)
CREAT SERPL-MCNC: 0.7 MG/DL (ref 0.5–1.4)
CREAT SERPL-MCNC: 0.9 MG/DL (ref 0.5–1.4)
CREAT UR-MCNC: 41 MG/DL (ref 15–325)
CREAT UR-MCNC: 41 MG/DL (ref 15–325)
DIFFERENTIAL METHOD BLD: ABNORMAL
EOSINOPHIL # BLD AUTO: 0.1 K/UL (ref 0–0.5)
EOSINOPHIL NFR BLD: 0.6 % (ref 0–8)
ERYTHROCYTE [DISTWIDTH] IN BLOOD BY AUTOMATED COUNT: 12.8 % (ref 11.5–14.5)
EST. GFR  (NO RACE VARIABLE): >60 ML/MIN/1.73 M^2
GLUCOSE SERPL-MCNC: 180 MG/DL (ref 70–110)
GLUCOSE SERPL-MCNC: 182 MG/DL (ref 70–110)
GLUCOSE SERPL-MCNC: 187 MG/DL (ref 70–110)
HCT VFR BLD AUTO: 30.3 % (ref 37–48.5)
HGB BLD-MCNC: 10.3 G/DL (ref 12–16)
IMM GRANULOCYTES # BLD AUTO: 0.13 K/UL (ref 0–0.04)
IMM GRANULOCYTES NFR BLD AUTO: 1.6 % (ref 0–0.5)
LYMPHOCYTES # BLD AUTO: 0.9 K/UL (ref 1–4.8)
LYMPHOCYTES NFR BLD: 10.8 % (ref 18–48)
MAGNESIUM SERPL-MCNC: 2.4 MG/DL (ref 1.6–2.6)
MCH RBC QN AUTO: 30 PG (ref 27–31)
MCHC RBC AUTO-ENTMCNC: 34 G/DL (ref 32–36)
MCV RBC AUTO: 88 FL (ref 82–98)
MONOCYTES # BLD AUTO: 0.6 K/UL (ref 0.3–1)
MONOCYTES NFR BLD: 7.2 % (ref 4–15)
NEUTROPHILS # BLD AUTO: 6.3 K/UL (ref 1.8–7.7)
NEUTROPHILS NFR BLD: 79.5 % (ref 38–73)
NRBC BLD-RTO: 0 /100 WBC
OSMOLALITY SERPL: 280 MOSM/KG (ref 275–295)
OSMOLALITY UR: 337 MOSM/KG (ref 50–1200)
PHOSPHATE SERPL-MCNC: 3.3 MG/DL (ref 2.7–4.5)
PLATELET # BLD AUTO: 270 K/UL (ref 150–450)
PMV BLD AUTO: 9.2 FL (ref 9.2–12.9)
POCT GLUCOSE: 183 MG/DL (ref 70–110)
POCT GLUCOSE: 189 MG/DL (ref 70–110)
POCT GLUCOSE: 253 MG/DL (ref 70–110)
POCT GLUCOSE: 290 MG/DL (ref 70–110)
POTASSIUM SERPL-SCNC: 3.4 MMOL/L (ref 3.5–5.1)
POTASSIUM SERPL-SCNC: 3.4 MMOL/L (ref 3.5–5.1)
POTASSIUM SERPL-SCNC: 3.7 MMOL/L (ref 3.5–5.1)
POTASSIUM UR-SCNC: 13 MMOL/L (ref 15–95)
PROT SERPL-MCNC: 5.4 G/DL (ref 6–8.4)
PROT UR-MCNC: 13 MG/DL (ref 0–15)
PROT/CREAT UR: 0.32 MG/G{CREAT} (ref 0–0.2)
RBC # BLD AUTO: 3.43 M/UL (ref 4–5.4)
SODIUM SERPL-SCNC: 125 MMOL/L (ref 136–145)
SODIUM SERPL-SCNC: 127 MMOL/L (ref 136–145)
SODIUM SERPL-SCNC: 128 MMOL/L (ref 136–145)
SODIUM UR-SCNC: <10 MMOL/L (ref 20–250)
WBC # BLD AUTO: 7.96 K/UL (ref 3.9–12.7)

## 2025-02-19 PROCEDURE — 84100 ASSAY OF PHOSPHORUS: CPT | Mod: HCNC | Performed by: STUDENT IN AN ORGANIZED HEALTH CARE EDUCATION/TRAINING PROGRAM

## 2025-02-19 PROCEDURE — 80053 COMPREHEN METABOLIC PANEL: CPT | Mod: HCNC | Performed by: STUDENT IN AN ORGANIZED HEALTH CARE EDUCATION/TRAINING PROGRAM

## 2025-02-19 PROCEDURE — 84156 ASSAY OF PROTEIN URINE: CPT | Mod: HCNC | Performed by: STUDENT IN AN ORGANIZED HEALTH CARE EDUCATION/TRAINING PROGRAM

## 2025-02-19 PROCEDURE — 63600175 PHARM REV CODE 636 W HCPCS: Mod: HCNC | Performed by: STUDENT IN AN ORGANIZED HEALTH CARE EDUCATION/TRAINING PROGRAM

## 2025-02-19 PROCEDURE — 99233 SBSQ HOSP IP/OBS HIGH 50: CPT | Mod: HCNC,,, | Performed by: INTERNAL MEDICINE

## 2025-02-19 PROCEDURE — 25000003 PHARM REV CODE 250: Mod: HCNC | Performed by: STUDENT IN AN ORGANIZED HEALTH CARE EDUCATION/TRAINING PROGRAM

## 2025-02-19 PROCEDURE — 84133 ASSAY OF URINE POTASSIUM: CPT | Mod: HCNC | Performed by: STUDENT IN AN ORGANIZED HEALTH CARE EDUCATION/TRAINING PROGRAM

## 2025-02-19 PROCEDURE — 97530 THERAPEUTIC ACTIVITIES: CPT | Mod: HCNC

## 2025-02-19 PROCEDURE — 83930 ASSAY OF BLOOD OSMOLALITY: CPT | Mod: HCNC | Performed by: STUDENT IN AN ORGANIZED HEALTH CARE EDUCATION/TRAINING PROGRAM

## 2025-02-19 PROCEDURE — 83735 ASSAY OF MAGNESIUM: CPT | Mod: HCNC | Performed by: STUDENT IN AN ORGANIZED HEALTH CARE EDUCATION/TRAINING PROGRAM

## 2025-02-19 PROCEDURE — 80048 BASIC METABOLIC PNL TOTAL CA: CPT | Mod: HCNC | Performed by: STUDENT IN AN ORGANIZED HEALTH CARE EDUCATION/TRAINING PROGRAM

## 2025-02-19 PROCEDURE — 99497 ADVNCD CARE PLAN 30 MIN: CPT | Mod: HCNC,25,, | Performed by: STUDENT IN AN ORGANIZED HEALTH CARE EDUCATION/TRAINING PROGRAM

## 2025-02-19 PROCEDURE — 25000003 PHARM REV CODE 250: Mod: HCNC | Performed by: HOSPITALIST

## 2025-02-19 PROCEDURE — 36415 COLL VENOUS BLD VENIPUNCTURE: CPT | Mod: HCNC | Performed by: STUDENT IN AN ORGANIZED HEALTH CARE EDUCATION/TRAINING PROGRAM

## 2025-02-19 PROCEDURE — 21400001 HC TELEMETRY ROOM: Mod: HCNC

## 2025-02-19 PROCEDURE — 99498 ADVNCD CARE PLAN ADDL 30 MIN: CPT | Mod: HCNC,,, | Performed by: STUDENT IN AN ORGANIZED HEALTH CARE EDUCATION/TRAINING PROGRAM

## 2025-02-19 PROCEDURE — 80048 BASIC METABOLIC PNL TOTAL CA: CPT | Mod: 91,HCNC,XB | Performed by: STUDENT IN AN ORGANIZED HEALTH CARE EDUCATION/TRAINING PROGRAM

## 2025-02-19 PROCEDURE — 85025 COMPLETE CBC W/AUTO DIFF WBC: CPT | Mod: HCNC | Performed by: STUDENT IN AN ORGANIZED HEALTH CARE EDUCATION/TRAINING PROGRAM

## 2025-02-19 PROCEDURE — 82436 ASSAY OF URINE CHLORIDE: CPT | Mod: HCNC | Performed by: STUDENT IN AN ORGANIZED HEALTH CARE EDUCATION/TRAINING PROGRAM

## 2025-02-19 PROCEDURE — 99223 1ST HOSP IP/OBS HIGH 75: CPT | Mod: HCNC,,, | Performed by: STUDENT IN AN ORGANIZED HEALTH CARE EDUCATION/TRAINING PROGRAM

## 2025-02-19 PROCEDURE — 83935 ASSAY OF URINE OSMOLALITY: CPT | Mod: HCNC | Performed by: STUDENT IN AN ORGANIZED HEALTH CARE EDUCATION/TRAINING PROGRAM

## 2025-02-19 PROCEDURE — 84300 ASSAY OF URINE SODIUM: CPT | Mod: HCNC | Performed by: STUDENT IN AN ORGANIZED HEALTH CARE EDUCATION/TRAINING PROGRAM

## 2025-02-19 RX ORDER — SODIUM CHLORIDE 9 MG/ML
INJECTION, SOLUTION INTRAVENOUS CONTINUOUS
Status: ACTIVE | OUTPATIENT
Start: 2025-02-19 | End: 2025-02-19

## 2025-02-19 RX ADMIN — CHOLECALCIFEROL TAB 25 MCG (1000 UNIT) 1000 UNITS: 25 TAB at 08:02

## 2025-02-19 RX ADMIN — SIMETHICONE 80 MG: 80 TABLET, CHEWABLE ORAL at 03:02

## 2025-02-19 RX ADMIN — CYANOCOBALAMIN TAB 1000 MCG 1000 MCG: 1000 TAB at 08:02

## 2025-02-19 RX ADMIN — Medication 6 MG: at 11:02

## 2025-02-19 RX ADMIN — LATANOPROST 1 DROP: 50 SOLUTION OPHTHALMIC at 09:02

## 2025-02-19 RX ADMIN — CEFTRIAXONE 2 G: 2 INJECTION, POWDER, FOR SOLUTION INTRAMUSCULAR; INTRAVENOUS at 08:02

## 2025-02-19 RX ADMIN — INSULIN ASPART 3 UNITS: 100 INJECTION, SOLUTION INTRAVENOUS; SUBCUTANEOUS at 12:02

## 2025-02-19 RX ADMIN — SIMETHICONE 80 MG: 80 TABLET, CHEWABLE ORAL at 08:02

## 2025-02-19 RX ADMIN — EZETIMIBE 10 MG: 10 TABLET ORAL at 08:02

## 2025-02-19 RX ADMIN — FERROUS SULFATE TAB EC 325 MG (65 MG FE EQUIVALENT) 1 EACH: 325 (65 FE) TABLET DELAYED RESPONSE at 08:02

## 2025-02-19 RX ADMIN — SODIUM CHLORIDE: 9 INJECTION, SOLUTION INTRAVENOUS at 03:02

## 2025-02-19 RX ADMIN — INSULIN ASPART 3 UNITS: 100 INJECTION, SOLUTION INTRAVENOUS; SUBCUTANEOUS at 04:02

## 2025-02-19 RX ADMIN — ENOXAPARIN SODIUM 30 MG: 30 INJECTION SUBCUTANEOUS at 04:02

## 2025-02-19 RX ADMIN — SIMETHICONE 80 MG: 80 TABLET, CHEWABLE ORAL at 06:02

## 2025-02-19 NOTE — ASSESSMENT & PLAN NOTE
Patient's blood pressure range in the last 24 hours was: BP  Min: 105/59  Max: 141/84.The patient's inpatient anti-hypertensive regimen is listed below:    Patient presented hypotensive, given 2L of IV fluids, started on continuous fluids.   Will hold home Entresto + Spironolactone + Furosemide.  Resume when clinically appropriate.     - monitor and adjust regimen as needed  - BP still low, cont to hold

## 2025-02-19 NOTE — ASSESSMENT & PLAN NOTE
Advance Care Planning     Date: 02/15/2025    Code Status  In light of the patients advanced and life limiting illness,I engaged the the patient in a voluntary conversation about the patient's preferences for care  at the very end of life. The patient wishes to have a natural, peaceful death.  Along those lines, the patient does not wish to have CPR or other invasive treatments performed when her heart and/or breathing stops. I communicated to the patient that a DNR order would be placed in her medical record to reflect this preference.    A total of 20 min was spent on advance care planning, goals of care discussion, emotional support, formulating and communicating prognosis and exploring burden/benefit of various approaches of treatment. This discussion occurred on a fully voluntary basis with the verbal consent of the patient and/or family.        Sister - Shayla Sandovals present. Niece on phone     Follow up - recommend completion of LAPOST form with patient with either palliative care or PCP  - palliative care consulted, appreciate assistance with GOC discussions- decision made to go home with hospice

## 2025-02-19 NOTE — SUBJECTIVE & OBJECTIVE
Interval History: Still reporting abd pain but says its a little better today, but comes and goes.   Updated Shayla at bedside and niece on phone.   After palliative discussion plan to go home with hospice. Will liberalize diet and fluid restriction allowing to eat/drink as wanted.     Review of Systems   Constitutional:  Positive for appetite change.   Gastrointestinal:  Positive for abdominal pain.     Objective:     Vital Signs (Most Recent):  Temp: 97.9 °F (36.6 °C) (02/19/25 1035)  Pulse: 90 (02/19/25 1441)  Resp: 18 (02/19/25 1035)  BP: (!) 141/84 (02/19/25 1035)  SpO2: 95 % (02/19/25 1035) Vital Signs (24h Range):  Temp:  [97.7 °F (36.5 °C)-98.5 °F (36.9 °C)] 97.9 °F (36.6 °C)  Pulse:  [72-99] 90  Resp:  [16-18] 18  SpO2:  [95 %-97 %] 95 %  BP: (105-141)/(57-84) 141/84     Weight: 51.8 kg (114 lb 3.2 oz)  Body mass index is 19.6 kg/m².    Intake/Output Summary (Last 24 hours) at 2/19/2025 1459  Last data filed at 2/19/2025 1210  Gross per 24 hour   Intake 200 ml   Output 600 ml   Net -400 ml         Physical Exam  Vitals and nursing note reviewed.   Constitutional:       General: She is not in acute distress.     Appearance: She is ill-appearing (chronically).   HENT:      Head: Normocephalic and atraumatic.      Nose: Nose normal.      Mouth/Throat:      Mouth: Mucous membranes are moist.      Pharynx: Oropharynx is clear.   Eyes:      Extraocular Movements: Extraocular movements intact.      Conjunctiva/sclera: Conjunctivae normal.   Cardiovascular:      Rate and Rhythm: Normal rate and regular rhythm.      Pulses: Normal pulses.      Heart sounds: Normal heart sounds.   Pulmonary:      Effort: Pulmonary effort is normal.      Breath sounds: Normal breath sounds.   Abdominal:      General: Bowel sounds are normal. There is distension (improving).      Palpations: Abdomen is soft.      Tenderness: There is abdominal tenderness (generalized).   Musculoskeletal:      Cervical back: Normal range of motion.       Right lower leg: No edema.      Left lower leg: No edema.   Skin:     General: Skin is warm and dry.   Neurological:      General: No focal deficit present.      Mental Status: She is alert.             Significant Labs: All pertinent labs within the past 24 hours have been reviewed.    Significant Imaging: I have reviewed all pertinent imaging results/findings within the past 24 hours.

## 2025-02-19 NOTE — ASSESSMENT & PLAN NOTE
Hyponatremia        Plan  - Na is 128 up from 127 improving   - Correct the sodium by 4-6mEq in 24 hours.   - Hyponatremia likely mixed picture  inappropriate antidiuretic hormone, secondary to chronic pain,and metastatic neuroendocrine cancer/intravascular volume depletion  - Due to poor oral intake and anorexia  - Repeated lab showed urine sodium of 13, urine osmolality 394, urine chloride 64, urine creatinine 45, urine specific gravity more than 1030  - We will recommend 0.5 L of normal saline  - We will repeat new labs, yet lab readings and interpretation points towards syndrome of inappropriate antidiuretic hormone SIADH  - Proper pain control  - Monitor sodium daily

## 2025-02-19 NOTE — ASSESSMENT & PLAN NOTE
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 The patient's most recent sodium results are listed below.  Recent Labs     02/18/25  1718 02/19/25  0009 02/19/25  0534   * 125* 128*  127*       Plan  - Correct the sodium by 4-6mEq in 24 hours.   - Obtain the following studies:  urine study showed urine sodium 33, urine osmolality 351, urine creatinine 45, urine chloride 64. Repeat Repeated lab showed urine sodium of 13, urine osmolality 394, urine chloride 64, urine creatinine 45, urine specific gravity more than 1030   - Will treat the hyponatremia with fluid restriction - lifted restriction since patient/family decided to go home with hospice  - Monitor sodium daily  - Patient hyponatremia is improving  - nephrology consulted, workup consistent with mixed picture SIADH and volume depletion from poor PO intake- recommend IVF .5L, lifted fluid restriction allowing to eat/drink as wanted

## 2025-02-19 NOTE — SUBJECTIVE & OBJECTIVE
"Interval History:   Met with patient and sister/mPOA at bedside, and included other family members via telephone in family meeting;  see ACP note below for details.    Physically patient reporting some abdominal pain, but improved from days prior. Pain is worse with movement and palpation. Describes as sharp pain. Difficult to engage further in pain assessment, but reports she is feeling "OK" currently.     Medications:  Continuous Infusions:  Scheduled Meds:   cefTRIAXone (Rocephin) IV (PEDS and ADULTS)  2 g Intravenous Q24H    cyanocobalamin  1,000 mcg Oral Daily    enoxparin  30 mg Subcutaneous Daily    ezetimibe  10 mg Oral Daily    ferrous sulfate  1 tablet Oral Daily    latanoprost  1 drop Both Eyes Nightly    simethicone  1 tablet Oral TID PC    vitamin D  1,000 Units Oral Daily     PRN Meds:  Current Facility-Administered Medications:     acetaminophen, 650 mg, Oral, Q4H PRN    aluminum-magnesium hydroxide-simethicone, 30 mL, Oral, QID PRN    dextrose 50%, 12.5 g, Intravenous, PRN    dextrose 50%, 25 g, Intravenous, PRN    diphenoxylate-atropine 2.5-0.025 mg, 1 tablet, Oral, QID PRN    glucagon (human recombinant), 1 mg, Intramuscular, PRN    glucose, 16 g, Oral, PRN    glucose, 24 g, Oral, PRN    insulin aspart U-100, 0-5 Units, Subcutaneous, QID (AC + HS) PRN    melatonin, 6 mg, Oral, Nightly PRN    naloxone, 0.02 mg, Intravenous, PRN    ondansetron, 4 mg, Intravenous, Q8H PRN    oxyCODONE, 5 mg, Oral, Q6H PRN    polyethylene glycol, 17 g, Oral, BID PRN    prochlorperazine, 5 mg, Intravenous, Q6H PRN    sodium chloride 0.9%, 10 mL, Intravenous, Q6H PRN    Objective:     Vital Signs (Most Recent):  Temp: 97.9 °F (36.6 °C) (02/19/25 1035)  Pulse: 91 (02/19/25 1041)  Resp: 18 (02/19/25 1035)  BP: (!) 141/84 (02/19/25 1035)  SpO2: 95 % (02/19/25 1035) Vital Signs (24h Range):  Temp:  [97.5 °F (36.4 °C)-98.5 °F (36.9 °C)] 97.9 °F (36.6 °C)  Pulse:  [72-99] 91  Resp:  [16-18] 18  SpO2:  [95 %-97 %] 95 %  BP: " "(103-141)/(57-84) 141/84     Weight: 51.8 kg (114 lb 3.2 oz)  Body mass index is 19.6 kg/m².    Constitutional:       General: NAD. They appear comfortable.      Appearance: They are ill-appearing. They are not diaphoretic.   HENT:      Head: Normocephalic and atraumatic.   Cardiovascular:      Pulses: Strong radial pulse detected.   Pulmonary:      Effort: No respiratory distress. No increased work of breathing.      RR 20  Abdominal:      General: There is no distension.      Palpation: Soft and moderately tender.   Skin:     General: Skin is warm and dry.      Coloration: Skin is not jaundiced.      Findings: Bruising present on extremities.   Neurological:      General: Waxing-and-waning alertness (falling asleep at various points). No focal neurological deficits. AAOx3.  Psychiatric:      Mood: "OK"     Affect: Euthymic; mood-congruent     Behavior: Pleasant and appropriate.      Review of Symptoms      Symptom Assessment (ESAS 0-10 Scale)  Pain:  5  Dyspnea:  0  Anxiety:  0  Nausea:  0  Depression:  0  Anorexia:  4  Fatigue:  7  Insomnia:  0  Restlessness:  0  Agitation:  0         Pain Assessment:    Location(s): abdomen    Abdomen       Location: generalized        Quality: Sharp        Quantity: 6/10 in intensity        Chronicity: Onset 6 week(s) ago, gradually improving        Aggravating Factors: Activity, pressure and movement        Alleviating Factors: None        Associated Symptoms: Anorexia    Living Arrangements:  Lives with family    Psychosocial/Cultural:   See Palliative Psychosocial Note: No  Social Issues Identified: None  Bereavement Risk: No  Caregiver Needs Discussed: yes; no needs identified   Caregiver Distress: No:   **Primary  to Follow**  Palliative Care  Consult: No    Spiritual:  F - Vianey and Belief:  Ad[employer]ist [employer]  I - Importance:  "Very"  C - Community:  Strong Rastafarian community  A - Address in Care:  Discussed in relation to support throughout " illness trajectory     Time-Based Charting:  No        Advance Care Planning   Advance Directives:   Living Will: Yes        Copy on chart: Yes        Oral Declaration: Yes  LaPOST: No    Do Not Resuscitate Status: Yes    Medical Power of : Yes    Agent's Name:  Shayla Rosenthal (sister)   Agent's Contact Number:  [phone]    Decision Making:  Patient answered questions and Family answered questions  Goals of Care: Met with patient at bedside along with sister/mPOA Shayla who was there in person. Shayla's daughter Geraldine (alternate mPOA) and grandson Hector joined conversation via phone.     Patient able to participate at times in conversation when awoken. She enjoys being functionally independent and does all the cooking at home for her family.    She does not know details about her cancer diagnosis and does NOT want to know details, deferring all discussions to her sister/mPOA.     She does not want to spend the rest of her life going back and forth to the hospital and would rather be at home and kept comfortable, prioritizing quality of life even if that meant her life expectancy were shorter.     Family amenable to home hospice, but would like to optimize patient medically as much as possible prior to discharge.         Significant Labs: BMP:   Recent Labs   Lab 02/19/25  0534   *  187*   *  127*   K 3.7  3.4*   CL 92*  93*   CO2 24  26   BUN 35*  34*   CREATININE 0.7  0.7   CALCIUM 8.4*  8.2*   MG 2.4     CBC:   Recent Labs   Lab 02/18/25  0912 02/19/25  0534   WBC 8.56 7.96   HGB 11.4* 10.3*   HCT 35.8* 30.3*    270     CBC:   Recent Labs   Lab 02/19/25  0534   WBC 7.96   HGB 10.3*   HCT 30.3*   MCV 88        BMP:  Recent Labs   Lab 02/19/25  0534   *  187*   *  127*   K 3.7  3.4*   CL 92*  93*   CO2 24  26   BUN 35*  34*   CREATININE 0.7  0.7   CALCIUM 8.4*  8.2*   MG 2.4     LFT:  Lab Results   Component Value Date    AST 77 (H) 02/19/2025    ALKPHOS  535 (H) 02/19/2025    BILITOT 1.2 (H) 02/19/2025     Albumin:   Albumin   Date Value Ref Range Status   02/19/2025 1.8 (L) 3.5 - 5.2 g/dL Final     Protein:   Total Protein   Date Value Ref Range Status   02/19/2025 5.4 (L) 6.0 - 8.4 g/dL Final     Lactic acid:   Lab Results   Component Value Date    LACTATE 3.5 (HH) 12/06/2024    LACTATE 2.3 (H) 12/02/2024       Significant Imaging:   CT abdomen / pelvis (02/17):  1. There appears to have been administration of oral contrast prior to the exam.  Contrast is noted to the level of the distal to mid transverse colon and within several small bowel loops.  Focus of intussusception within the mid pelvis again noted, contrast is seen beyond the lesion however there is slow flow through the segment.  Continued follow-up is advised as well as exclusion of lesion acting as lead point as malignancy can present in this fashion..  2. Fluid in gaseous distention of the large bowel, somewhat increased since the previous examination.  No pneumatosis.  3. There is loculated abdominopelvic ascites noting some peritoneal thickening and enhancement, peritonitis is of concern.  4. Body wall anasarca and right pleural effusion, increased since the previous exam, correlation with volume status advised.  5. Please see above for several additional findings.

## 2025-02-19 NOTE — PROGRESS NOTES
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   She had recent hepatic artery embolization for management of metastatic liver lesions on 02/07.  Over the weekend patient had the onset of abdominal pain,  Patient [place] [facility] [place] for constipation, s/p KUB,  lab studies - transaminase values were in 100s, patient given an enema and discharged with Rx for Miralax. Since this time no recurrent constipation, patient has had 2-3 loose bowel movements per day.  She has had poor appetite, mainly drinking fluids at home.  She has c/o of right sided and right flank abdominal pain.      She has had prior hepatic embolization before for management of liver metastases, she has experienced abdominal pain afterward, but has not required hospitalization post-procedure.  Patient endorsed thirst feeling, not able to quantify the amount of fluid that she has been consuming, denied any history of recent medication change, polyuria, polydipsia, depression or anxiety medication, she is not on insomnia medication neither. She denies any fevers/chills, no chest pain or dyspnea, no skin rashes, no reported bleeding/bruising abnormality.  Nephrology has been consulted to manage hyponatremia           Interval History:     LINDA ZARCO, A&O x 3, sodium is 128 improved from 127 yesterday.  cc    Review of patient's allergies indicates:   Allergen Reactions    Epinephrine      carcinoid     Current Facility-Administered Medications   Medication Frequency     acetaminophen tablet 650 mg Q4H PRN    aluminum-magnesium hydroxide-simethicone 200-200-20 mg/5 mL suspension 30 mL QID PRN    cefTRIAXone injection 2 g Q24H    cyanocobalamin tablet 1,000 mcg Daily    dextrose 50% injection 12.5 g PRN    dextrose 50% injection 25 g PRN    diphenoxylate-atropine 2.5-0.025 mg per tablet 1 tablet QID PRN    enoxaparin injection 30 mg Daily    ezetimibe tablet 10 mg Daily    ferrous sulfate tablet 1 each Daily    glucagon (human recombinant) injection 1 mg PRN    glucose chewable tablet 16 g PRN    glucose chewable tablet 24 g PRN    insulin aspart U-100 pen 0-5 Units QID (AC + HS) PRN    latanoprost 0.005 % ophthalmic solution 1 drop Nightly    melatonin tablet 6 mg Nightly PRN    naloxone 0.4 mg/mL injection 0.02 mg PRN    ondansetron injection 4 mg Q8H PRN    oxyCODONE immediate release tablet 5 mg Q6H PRN    polyethylene glycol packet 17 g BID PRN    prochlorperazine injection Soln 5 mg Q6H PRN    simethicone chewable tablet 80 mg TID PC    sodium chloride 0.9% flush 10 mL Q6H PRN    vitamin D 1000 units tablet 1,000 Units Daily       Objective:     Vital Signs (Most Recent):  Temp: 97.9 °F (36.6 °C) (02/19/25 1035)  Pulse: 91 (02/19/25 1041)  Resp: 18 (02/19/25 1035)  BP: (!) 141/84 (02/19/25 1035)  SpO2: 95 % (02/19/25 1035) Vital Signs (24h Range):  Temp:  [97.5 °F (36.4 °C)-98.5 °F (36.9 °C)] 97.9 °F (36.6 °C)  Pulse:  [72-99] 91  Resp:  [16-18] 18  SpO2:  [95 %-97 %] 95 %  BP: (103-141)/(57-84) 141/84     Weight: 51.8 kg (114 lb 3.2 oz) (02/17/25 0505)  Body mass index is 19.6 kg/m².  Body surface area is 1.53 meters squared.    I/O last 3 completed shifts:  In: 200 [P.O.:200]  Out: 700 [Urine:700]     Physical Exam  Constitutional:       General: She is not in acute distress.     Appearance: She is ill-appearing. She is not toxic-appearing.   HENT:      Head: Normocephalic and atraumatic.   Eyes:      Extraocular Movements: Extraocular movements intact.      Pupils: Pupils  "are equal, round, and reactive to light.   Cardiovascular:      Rate and Rhythm: Normal rate.   Pulmonary:      Effort: No respiratory distress.      Breath sounds: No wheezing or rales.   Abdominal:      General: There is distension.      Tenderness: There is no abdominal tenderness. There is no guarding.   Musculoskeletal:      Right lower leg: No edema.      Left lower leg: No edema.   Skin:     Coloration: Skin is pale. Skin is not jaundiced.   Neurological:      Mental Status: She is oriented to person, place, and time.          Significant Labs:  ABGs:   Recent Labs   Lab 02/14/25  1512   PH 7.424   PCO2 52.7*   HCO3 34.5*   POCSATURATED 58   BE 10*     CBC:   Recent Labs   Lab 02/19/25  0534   WBC 7.96   RBC 3.43*   HGB 10.3*   HCT 30.3*      MCV 88   MCH 30.0   MCHC 34.0     CMP:   Recent Labs   Lab 02/19/25  0534   *  187*   CALCIUM 8.4*  8.2*   ALBUMIN 1.8*   PROT 5.4*   *  127*   K 3.7  3.4*   CO2 24  26   CL 92*  93*   BUN 35*  34*   CREATININE 0.7  0.7   ALKPHOS 535*   *   AST 77*   BILITOT 1.2*     LFTs:   Recent Labs   Lab 02/19/25  0534   *   AST 77*   ALKPHOS 535*   BILITOT 1.2*   PROT 5.4*   ALBUMIN 1.8*     PTH: No results for input(s): "PTH" in the last 168 hours.  TSH:   Recent Labs   Lab 02/14/25  1539   TSH 0.685     Recent Labs   Lab 02/18/25  1319   COLORU Yellow   SPECGRAV >1.030*   PHUR 6.0   PROTEINUA Trace*   BACTERIA Occasional   NITRITE Negative   LEUKOCYTESUR 2+*       Assessment/Plan:     Endocrine  Hyponatremia  Hyponatremia        Plan  - Na is 128 up from 127 improving   - Correct the sodium by 4-6mEq in 24 hours.   - Hyponatremia likely mixed picture  inappropriate antidiuretic hormone, secondary to chronic pain,and metastatic neuroendocrine cancer/intravascular volume depletion  - Due to poor oral intake and anorexia  - Repeated lab showed urine sodium of 13, urine osmolality 394, urine chloride 64, urine creatinine 45, urine specific " gravity more than 1030  - We will recommend 0.5 L of normal saline  - Proper pain nausea control  - Monitor sodium daily      Thank you for your consult. I will follow-up with patient. Please contact us if you have any additional questions.    Shakira Alaniz MD  Nephrology  [facility] - [facility]

## 2025-02-19 NOTE — PLAN OF CARE
MACKENZIE notified by Dr. Jorge that pt and family are interested in pt discharging home on hospice.    MACEKNZIE met with pt and sister, Shayla at bedside, along with nieceGeraldine via telephone to provide a list of hospice agencies for them to review. Geraldine requested SW email her the list to [email or web address] for her to go over. MACKENZIE sent email for review and informed family once they have a preferred agency, MACKENZIE will send referral to them.    MACKENZIE will continue to follow up.      Janel Pressley LMSW  [facility]  Ext. [phone]

## 2025-02-19 NOTE — PT/OT/SLP PROGRESS
Physical Therapy      Patient Name:  Shahram Hills   MRN:  [id number]    Patient not seen today secondary to Other (Comment) (awaiting goals of care discussion). Will follow-up as appropriate.     pt resting in bed, repositioned into R sidelying for pressure relief, moisture barrier cream applied to buttocks and groin, denies c/o, bed pending

## 2025-02-19 NOTE — PROGRESS NOTES
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   She had recent hepatic artery embolization for management of metastatic liver lesions on 02/07.  Over the weekend patient had the onset of abdominal pain, her niece spoke with patient and noted that patient had also c/o of constipation.  Patient was seen in [facility] ED on 2/11 for constipation, s/p KUB,  lab studies - transaminase values were in 100s, patient given an enema and discharged with Rx for Miralax.   Since this time no recurrent constipation, patient has had 2-3 loose bowel movements per day.  She has had poor appetite, mainly drinking fluids at home.   She has c/o of right sided and right flank abdominal pain.     She has had prior hepatic embolization before for management of liver metastases, she has experienced abdominal pain afterward, but has not required hospitalization post-procedure.    She denies any fevers/chills, no chest pain or dyspnea, no skin rashes, no reported bleeding/bruising abnormality.     She is found with multiple electrolyte abnormalities today and rising hepatic enzyme values.     ED Treatment: Vancomycin 1grm, Zosyn 4.5gm, Potassium IV 10meq x1, Magnesium 2gram IVx1,     Overview/Hospital Course:  Admitted for abd pain likely 2/2 post embolization syndrome. Started on MM pain control.   Electrolyte abnormalities consistent with decreased PO intake. Started on IVF. Replacing electrolytes as needed. Started salt tabs, hyponatremia was slowly improving. Hyponatremia  worse, started fluid restriction and nephrology consulted. On repeat CT with anasarca and ascites given lasix x1. 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 Mixed picture as well volume depletion likely contributing given poor PO intake/anorexia.   Worsening abd distension and reports not passing gas. Repeat CT without obstruction and patient had BM with improvement of distension.   CT concerning for peritonitis, para ordered but small volume ascites, no safe window for drainage. Ascites thought to be from metastatic disease. Started on empiric CTX for SBP. Surgery do not feel physical exam consistent with peritonitis. Elevated inflammatory markers, will cont empiric abx to complete treatment course for SBP.   CT also showed persistent intussusception, surgery consulted and report risks of surgery outweigh any benefit of a segmental resection of her intussusception given her metastatic status.   Palliative consulted for GOC discussion given advanced metastatic disease. Decided to go home with hospice, SW made aware. Lifted fluid restriction allowing to eat/drink as wanted.       Interval History: Still reporting abd pain but says its a little better today, but comes and goes.   Updated Shayla at bedside and niece on phone.   After palliative discussion plan to go home with hospice. Will liberalize diet and fluid restriction allowing to eat/drink as wanted.     Review of Systems   Constitutional:  Positive for appetite change.   Gastrointestinal:  Positive for abdominal pain.     Objective:     Vital Signs (Most Recent):  Temp: 97.9 °F (36.6 °C) (02/19/25 1035)  Pulse: 90 (02/19/25 1441)  Resp: 18 (02/19/25 1035)  BP: (!) 141/84 (02/19/25 1035)  SpO2: 95 % (02/19/25 1035) Vital Signs (24h Range):  Temp:  [97.7 °F (36.5 °C)-98.5 °F (36.9 °C)] 97.9 °F (36.6 °C)  Pulse:  [72-99] 90  Resp:  [16-18] 18  SpO2:  [95 %-97 %] 95 %  BP: (105-141)/(57-84) 141/84     Weight: 51.8 kg (114 lb 3.2 oz)  Body  mass index is 19.6 kg/m².    Intake/Output Summary (Last 24 hours) at 2/19/2025 1459  Last data filed at 2/19/2025 1210  Gross per 24 hour   Intake 200 ml   Output 600 ml   Net -400 ml         Physical Exam  Vitals and nursing note reviewed.   Constitutional:       General: She is not in acute distress.     Appearance: She is ill-appearing (chronically).   HENT:      Head: Normocephalic and atraumatic.      Nose: Nose normal.      Mouth/Throat:      Mouth: Mucous membranes are moist.      Pharynx: Oropharynx is clear.   Eyes:      Extraocular Movements: Extraocular movements intact.      Conjunctiva/sclera: Conjunctivae normal.   Cardiovascular:      Rate and Rhythm: Normal rate and regular rhythm.      Pulses: Normal pulses.      Heart sounds: Normal heart sounds.   Pulmonary:      Effort: Pulmonary effort is normal.      Breath sounds: Normal breath sounds.   Abdominal:      General: Bowel sounds are normal. There is distension (improving).      Palpations: Abdomen is soft.      Tenderness: There is abdominal tenderness (generalized).   Musculoskeletal:      Cervical back: Normal range of motion.      Right lower leg: No edema.      Left lower leg: No edema.   Skin:     General: Skin is warm and dry.   Neurological:      General: No focal deficit present.      Mental Status: She is alert.             Significant Labs: All pertinent labs within the past 24 hours have been reviewed.    Significant Imaging: I have reviewed all pertinent imaging results/findings within the past 24 hours.    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   -s/p blood clutures + empiric antibiotics in ED, continue Zosyn alone empirically. Deescalate if cx remain NG- stopped zosyn given culture NG  -consult  oncology and IR- IR report symptoms consistent with post embolic syndrome and recommend medical management of pain, expected rise in LFTs should resolve, cont to monitor   -trend CBC/CMP/coags- LFT improving   -PRN pain medication      Peritonitis  - repeat CT with concern for peritonitis and ascites  - para ordered but no safe window  - started empiric CTX for SBP  - surgery do not feel physical exam consistent with peritonitis, but she has tenderness and elevated inflammatory markers, will cont empiric treatment       Moderate protein-calorie malnutrition  Nutrition consulted. Most recent weight and BMI monitored-     Measurements:  Wt Readings from Last 1 Encounters:   02/17/25 51.8 kg (114 lb 3.2 oz)   Body mass index is 19.6 kg/m².    Patient has been screened and assessed by RD.    Malnutrition Type:  Context: chronic illness  Level: moderate    Malnutrition Characteristic Summary:  Subcutaneous Fat (Malnutrition): moderate depletion  Muscle Mass (Malnutrition): moderate depletion    Interventions/Recommendations (treatment strategy):  1.) cont encourage PO intake, supplement added      Transaminitis  - expected post embolization, per IR should improve   - imaging with gallstones without cholecystitis or convincing choledocholithiasis, no abscess  - trend LFTs now downtrending         Intussusception  - see on initial imaging  - surgical eval in ED reports: Intussusception is transient and not causing obstruction. This is a normal physiologic finding and is not contributing to her clinical picture   - abd exam with tenderness  - repeat CT with continued intussusception, surgery reconsulted and report: Her intussusception is short segment small bowel without signs of bowel ischemia or obstruction. Contrast has easily traversed this area and she continues to have bowel movements. Given her malignancy and overall clinical status, would not recommend exploratory laparotomy at this time. The risks of surgery  outweigh any benefit of a segmental resection of her intussusception given her metastatic status.       ACP (advance care planning)  Advance Care Planning    Date: 02/15/2025    Code Status  In light of the patients advanced and life limiting illness,I engaged the the patient in a voluntary conversation about the patient's preferences for care  at the very end of life. The patient wishes to have a natural, peaceful death.  Along those lines, the patient does not wish to have CPR or other invasive treatments performed when her heart and/or breathing stops. I communicated to the patient that a DNR order would be placed in her medical record to reflect this preference.    A total of 20 min was spent on advance care planning, goals of care discussion, emotional support, formulating and communicating prognosis and exploring burden/benefit of various approaches of treatment. This discussion occurred on a fully voluntary basis with the verbal consent of the patient and/or family.        Sister - Shayla Rosenthal present. Niece on phone     Follow up - recommend completion of LAPOST form with patient with either palliative care or PCP  - palliative care consulted, appreciate assistance with GOC discussions- decision made to go home with hospice       Hypomagnesemia  Patient has Abnormal Magnesium: hypomagnesemia. Will continue to monitor electrolytes closely. Will replace the affected electrolytes and repeat labs to be done after interventions completed. The patient's magnesium results have been reviewed and are listed below.  Recent Labs   Lab 02/19/25  0534   MG 2.4          Hypokalemia  Patient's most recent potassium results are listed below.   Recent Labs     02/18/25  1718 02/19/25  0009 02/19/25  0534   K 3.6 3.4* 3.7  3.4*     Plan  - Replete potassium per protocol  - Monitor potassium Daily  - Patient's hypokalemia is worsening. Will adjust treatment as follows: replace PRN    Chronic combined systolic and diastolic CHF  (congestive heart failure)  Patient has Combined Systolic and Diastolic heart failure that is Chronic. On presentation their CHF was well compensated.  Last EF 30-35%    Due to presenting hypotension, hyponatremia and initially concern for hypovolemia.  Will hold home Entresto + Spironolactone + Furosemide.  Resume when clinically appropriate.   - CT with sign of volume overload- anasarca and pleural effusion. Given soft pressures and cont hyponatremia will spot dose diuretics and fluid restrict, adjusting regimen as needed      Controlled type 2 diabetes mellitus with microalbuminuria, without long-term current use of insulin  Patient's FSGs are controlled on current medication regimen.  Last A1c reviewed-   Lab Results   Component Value Date    HGBA1C 6.2 (H) 11/26/2024     Most recent fingerstick glucose reviewed-   Recent Labs   Lab 02/18/25  2058 02/19/25  0706 02/19/25  1033   POCTGLUCOSE 226* 183* 290*       Current correctional scale  Low  Maintain anti-hyperglycemic dose as follows-   Antihyperglycemics (From admission, onward)      Start     Stop Route Frequency Ordered    02/15/25 0127  insulin aspart U-100 pen 0-5 Units         -- SubQ Before meals & nightly PRN 02/15/25 0027          Hold Oral hypoglycemics while patient is in the hospital.        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 The patient's most recent sodium results are listed below.  Recent Labs     02/18/25  1718 02/19/25  0009 02/19/25  0534   * 125* 128*  127*       Plan  - Correct the sodium by 4-6mEq in 24 hours.   - Obtain the following studies:  urine study showed urine sodium 33, urine osmolality 351, urine creatinine 45, urine chloride 64. 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 The patient is not undergoing active chemotherapy. .Their staging information is listed below.    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   Will hold home Entresto + Spironolactone + Furosemide.  Resume when clinically appropriate.     - monitor and adjust regimen as needed  - BP still low, cont to hold      VTE Risk Mitigation (From admission, onward)           Ordered     enoxaparin injection 30 mg  Daily         02/15/25 1231     IP VTE HIGH RISK PATIENT  Once         02/15/25 0038     Place sequential compression device  Until discontinued         02/15/25 0038                    Discharge Planning   ISH: 2/21/2025     Code Status: DNR   Medical Readiness for Discharge Date:   Discharge Plan A: Home with family                Please place Justification for DME        Renata Jorge MD  Department of Hospital Medicine   [facility]  [facility]

## 2025-02-19 NOTE — ASSESSMENT & PLAN NOTE
Patient has Abnormal Magnesium: hypomagnesemia. Will continue to monitor electrolytes closely. Will replace the affected electrolytes and repeat labs to be done after interventions completed. The patient's magnesium results have been reviewed and are listed below.  Recent Labs   Lab 02/19/25  0534   MG 2.4

## 2025-02-19 NOTE — PLAN OF CARE
Problem: Adult Inpatient Plan of Care  Goal: Plan of Care Review  Outcome: Progressing  Goal: Patient-Specific Goal (Individualized)  Outcome: Progressing  Goal: Absence of Hospital-Acquired Illness or Injury  Outcome: Progressing  Goal: Optimal Comfort and Wellbeing  Outcome: Progressing  Goal: Readiness for Transition of Care  Outcome: Progressing     Problem: Diabetes Comorbidity  Goal: Blood Glucose Level Within Targeted Range  Outcome: Progressing     Problem: Infection  Goal: Absence of Infection Signs and Symptoms  Outcome: Progressing     Problem: Fall Injury Risk  Goal: Absence of Fall and Fall-Related Injury  Outcome: Progressing     Problem: Skin Injury Risk Increased  Goal: Skin Health and Integrity  Outcome: Progressing     Problem: Fatigue  Goal: Improved Activity Tolerance  Outcome: Progressing     Problem: Pain Acute  Goal: Optimal Pain Control and Function  Outcome: Progressing     Problem: Coping Ineffective  Goal: Effective Coping  Outcome: Progressing

## 2025-02-19 NOTE — PT/OT/SLP PROGRESS
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  She presents with limited session due to pt requesting no EOB/OOB.  Per family pt to do hospice & family was agreeable to continued therapy if pt wants to perform therapy. Performance deficits affecting function are weakness, impaired endurance, impaired self care skills, impaired functional mobility, impaired balance, decreased lower extremity function, decreased upper extremity function, impaired cardiopulmonary response to activity, decreased safety awareness.     Rehab Prognosis:  limited; patient would benefit from acute skilled OT services to address these deficits and reach maximum level of function.       Plan:     Patient to be seen 3 x/week to address the above listed problems via self-care/home management, therapeutic activities, therapeutic exercises  Plan of Care Expires: 03/19/25  Plan of Care Reviewed with: patient, sibling    Subjective     Chief Complaint: none stated  Patient/Family Comments/goals: Pt was agreeable to OT providing educational session with her & her sister.  Pain/Comfort:  Pain Rating 1:  (did not report)  Pain Rating Post-Intervention 1:  (did not report)    Objective:     Communicated with: RN prior to session.  Patient found supine with [employer] (sister present) upon OT entry to room.    General Precautions: Standard, fall (DNR)    Orthopedic Precautions:N/A  Braces: N/A     Occupational Performance:       [facility] 6 Click ADL: 12    Treatment &  Education:  Provided education to pt & family (some family via phone conversation) regarding potential equipment needs at home. Provided education on mobilizing pt within pt comfort and wishes however also within pt & caregiver safety. Pt's family questioned if pt was appropriate to get to chair, therefore recommended that could perform with or without RW depending on level of (A) needed as long as safety ensured.  If not safe or pt not able to assist with balance should only do in bed or EOB at the most.  Provided education on when appropriate to use vs not use RW for transfers pending pt participation and level of (A) needed.    Recommended hospital bed to facilitate ease of pt care & pt comfort.  Provided verbal & demonstrated/hands-on instruction on how to make drawsheet, where to place drawsheet, how to place drawsheet, how to roll pt with & without drawsheet (where to provide (A) on pt body), and how to perform drawsheet transfer up HOB while supine.        Patient left supine with all lines intact, call button in reach, RN notified, and sister present    GOALS:   Multidisciplinary Problems       Occupational Therapy Goals          Problem: Occupational Therapy    Goal Priority Disciplines Outcome Interventions   Occupational Therapy Goal     OT, PT/OT Progressing    Description: Goals to be met by: 3/19/2025     Patient will increase functional independence with ADLs by performing:    UE Dressing with Supervision.  LE Dressing with Supervision.  Grooming while standing with Supervision.  Toileting from toilet with Contact Guard Assistance for hygiene and clothing management.   Supine to sit with Supervision.  Toilet transfer to toilet with Supervision.                           DME Justifications:  Shahram requires a hospital bed due to her requiring positioning of the body in ways not feasible with an ordinary bed due to limited ability and cannot independently make changes in body position without the use of  the bed.The positioning of the body cannot be sufficiently resolved by the use of pillows and wedges.  Shahram Hills has a mobility limitation that significantly impairs her ability to participate in one or more mobility related activities of daily living (MRADLs) such as toileting, feeding, dressing, grooming, and bathing in customary locations in the home.  The mobility limitation cannot be sufficiently resolved by the use of a cane or walker.   The use of a manual wheelchair will significantly improve the patients ability to participate in MRADLS and the patient will use it on regular basis in the home.  Shahram Hills has expressed her willingness to use a manual wheelchair in the home. Patients upper body strength is sufficient for propulsion.  He/She also has a caregiver who is available, willing, and able to provide assistance with the wheelchair when needed.     Shahram's mobility limitation cannot be sufficiently resolved by the use of a cane. Her functional mobility deficit can be sufficiently resolved with the use of a Rolling Walker. Patient's mobility limitation significantly impairs their ability to participate in one of more activities of daily living.  The use of a RW will significantly improve the patient's ability to participate in MRADLS and the patient will use it on regular basis in the home.    Bedside Commode- Patient has a mobility limitation that significantly impairs their ability to participate in one or more mobility related activities of daily living, including toileting. This deficit can be resolved by using a bedside commode. Patient demonstrates mobility limitations that will cause them to be confined to one room at home without bathroom access for up to 30 days. Using a bedside commode will greatly improve the patient's ability to participate in MRADLs.       Time Tracking:     OT Date of Treatment: 02/19/25  OT Start Time: 1423  OT Stop Time: 1450  OT Total Time (min): 27  min    Billable Minutes:Therapeutic Activity 24    OT/LENCHO: OT          2/19/2025

## 2025-02-19 NOTE — ASSESSMENT & PLAN NOTE
Patient's most recent potassium results are listed below.   Recent Labs     02/18/25  1718 02/19/25  0009 02/19/25  0534   K 3.6 3.4* 3.7  3.4*     Plan  - Replete potassium per protocol  - Monitor potassium Daily  - Patient's hypokalemia is worsening. Will adjust treatment as follows: replace PRN

## 2025-02-19 NOTE — SUBJECTIVE & OBJECTIVE
Interval History:     NAEON, AFVSS, A&O x 3, sodium is 128 improved from 127 yesterday.  cc    Review of patient's allergies indicates:   Allergen Reactions    Epinephrine      carcinoid     Current Facility-Administered Medications   Medication Frequency    acetaminophen tablet 650 mg Q4H PRN    aluminum-magnesium hydroxide-simethicone 200-200-20 mg/5 mL suspension 30 mL QID PRN    cefTRIAXone injection 2 g Q24H    cyanocobalamin tablet 1,000 mcg Daily    dextrose 50% injection 12.5 g PRN    dextrose 50% injection 25 g PRN    diphenoxylate-atropine 2.5-0.025 mg per tablet 1 tablet QID PRN    enoxaparin injection 30 mg Daily    ezetimibe tablet 10 mg Daily    ferrous sulfate tablet 1 each Daily    glucagon (human recombinant) injection 1 mg PRN    glucose chewable tablet 16 g PRN    glucose chewable tablet 24 g PRN    insulin aspart U-100 pen 0-5 Units QID (AC + HS) PRN    latanoprost 0.005 % ophthalmic solution 1 drop Nightly    melatonin tablet 6 mg Nightly PRN    naloxone 0.4 mg/mL injection 0.02 mg PRN    ondansetron injection 4 mg Q8H PRN    oxyCODONE immediate release tablet 5 mg Q6H PRN    polyethylene glycol packet 17 g BID PRN    prochlorperazine injection Soln 5 mg Q6H PRN    simethicone chewable tablet 80 mg TID PC    sodium chloride 0.9% flush 10 mL Q6H PRN    vitamin D 1000 units tablet 1,000 Units Daily       Objective:     Vital Signs (Most Recent):  Temp: 97.9 °F (36.6 °C) (02/19/25 1035)  Pulse: 91 (02/19/25 1041)  Resp: 18 (02/19/25 1035)  BP: (!) 141/84 (02/19/25 1035)  SpO2: 95 % (02/19/25 1035) Vital Signs (24h Range):  Temp:  [97.5 °F (36.4 °C)-98.5 °F (36.9 °C)] 97.9 °F (36.6 °C)  Pulse:  [72-99] 91  Resp:  [16-18] 18  SpO2:  [95 %-97 %] 95 %  BP: (103-141)/(57-84) 141/84     Weight: 51.8 kg (114 lb 3.2 oz) (02/17/25 1956)  Body mass index is 19.6 kg/m².  Body surface area is 1.53 meters squared.    I/O last 3 completed shifts:  In: 200 [P.O.:200]  Out: 700 [Urine:700]     Physical  "Exam  Constitutional:       General: She is not in acute distress.     Appearance: She is ill-appearing. She is not toxic-appearing.   HENT:      Head: Normocephalic and atraumatic.   Eyes:      Extraocular Movements: Extraocular movements intact.      Pupils: Pupils are equal, round, and reactive to light.   Cardiovascular:      Rate and Rhythm: Normal rate.   Pulmonary:      Effort: No respiratory distress.      Breath sounds: No wheezing or rales.   Abdominal:      General: There is distension.      Tenderness: There is no abdominal tenderness. There is no guarding.   Musculoskeletal:      Right lower leg: No edema.      Left lower leg: No edema.   Skin:     Coloration: Skin is pale. Skin is not jaundiced.   Neurological:      Mental Status: She is oriented to person, place, and time.          Significant Labs:  ABGs:   Recent Labs   Lab 02/14/25  1512   PH 7.424   PCO2 52.7*   HCO3 34.5*   POCSATURATED 58   BE 10*     CBC:   Recent Labs   Lab 02/19/25  0534   WBC 7.96   RBC 3.43*   HGB 10.3*   HCT 30.3*      MCV 88   MCH 30.0   MCHC 34.0     CMP:   Recent Labs   Lab 02/19/25  0534   *  187*   CALCIUM 8.4*  8.2*   ALBUMIN 1.8*   PROT 5.4*   *  127*   K 3.7  3.4*   CO2 24  26   CL 92*  93*   BUN 35*  34*   CREATININE 0.7  0.7   ALKPHOS 535*   *   AST 77*   BILITOT 1.2*     LFTs:   Recent Labs   Lab 02/19/25  0534   *   AST 77*   ALKPHOS 535*   BILITOT 1.2*   PROT 5.4*   ALBUMIN 1.8*     PTH: No results for input(s): "PTH" in the last 168 hours.  TSH:   Recent Labs   Lab 02/14/25  1539   TSH 0.685     Recent Labs   Lab 02/18/25  1319   COLORU Yellow   SPECGRAV >1.030*   PHUR 6.0   PROTEINUA Trace*   BACTERIA Occasional   NITRITE Negative   LEUKOCYTESUR 2+*       "

## 2025-02-19 NOTE — PROGRESS NOTES
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      Assessment/Plan:   Shahram Hills is a 84 y.o. 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 Palliative Care consulted to explore GoC. Patient found to have non-obstructing intussusception on imaging, but is not a surgical candidate. She also has evidence of ascites which could not be drained due to volume, and peritonitis which seems to be improving some with antibiotics. Patient defers conversations regarding illness details to family who are aware that patient is declining and cancer is progressing. Patient vocalized that she would like to be home and kept comfortable prioritizing QoL over prolonging life as long as possible. They are amenable to hospice at home if there are no further non-invasive interventions that could optimize her magi first.   Plan to pursue SW consult tomorrow to explore Hospice options at home    I will follow-up with patient. Please contact us if you have any additional questions.    In my care of this patient with acute- on chronic-severe illness which poses threat to life and/or bodily function, I am recommending goal-concordant care as outlined above. I spent a significant amount of time reviewing of prior external records and recommendations of other care providing teams including General Surgery, Hospital Medicine, and Interventional Radiology, reviewed relevant test results / studies including CBC, CMP, and CT results, and discussed care with other  "specialists / providers involved in this patient's care.     Also, I spent an additional 50min of time on voluntary advance care planning and/or goals of care discussion, providing emotional support, discussing prognosis, and exploring the burden vs benefit of various treatment options.     Subjective:     Chief Complaint:   Chief Complaint   Patient presents with    Constipation     Seen here last week for same thing and admitted last night, on cipro    Hypotension       HPI:   Shahram Hills is a 84 y.o. 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 She was admitted to  2/15 for abdominal pain, transaminitis, and correction of electrolytes with presumed post embolization syndrome. CT scan was performed day of admission in ED due to abdominal pain which showed short segment small bowel-small bowel intussusception, copious liquid stool within the colon, large hepatic metastasis and ascites. General surgery was consulted given CT findings of intussusception. Per General Surgery, risks outweigh any benefits of segmental resection of her intussusception. Palliative Care consulted to explore GoC.     Hospital Course:  No notes on file    Interval History:   Met with patient and sister/mPOA at bedside, and included other family members via telephone in family meeting;  see ACP note below for details.    Physically patient reporting some abdominal pain, but improved from days prior. Pain is worse with movement and palpation. Describes as sharp pain. Difficult to engage further in pain assessment, but reports she is feeling "OK" currently.     Medications:  Continuous Infusions:  Scheduled Meds:   cefTRIAXone (Rocephin) IV (PEDS and ADULTS)  2 g Intravenous Q24H    cyanocobalamin  1,000 mcg Oral Daily    enoxparin  30 mg Subcutaneous Daily    ezetimibe  10 mg Oral Daily    ferrous " sulfate  1 tablet Oral Daily    latanoprost  1 drop Both Eyes Nightly    simethicone  1 tablet Oral TID PC    vitamin D  1,000 Units Oral Daily     PRN Meds:  Current Facility-Administered Medications:     acetaminophen, 650 mg, Oral, Q4H PRN    aluminum-magnesium hydroxide-simethicone, 30 mL, Oral, QID PRN    dextrose 50%, 12.5 g, Intravenous, PRN    dextrose 50%, 25 g, Intravenous, PRN    diphenoxylate-atropine 2.5-0.025 mg, 1 tablet, Oral, QID PRN    glucagon (human recombinant), 1 mg, Intramuscular, PRN    glucose, 16 g, Oral, PRN    glucose, 24 g, Oral, PRN    insulin aspart U-100, 0-5 Units, Subcutaneous, QID (AC + HS) PRN    melatonin, 6 mg, Oral, Nightly PRN    naloxone, 0.02 mg, Intravenous, PRN    ondansetron, 4 mg, Intravenous, Q8H PRN    oxyCODONE, 5 mg, Oral, Q6H PRN    polyethylene glycol, 17 g, Oral, BID PRN    prochlorperazine, 5 mg, Intravenous, Q6H PRN    sodium chloride 0.9%, 10 mL, Intravenous, Q6H PRN    Objective:     Vital Signs (Most Recent):  Temp: 97.9 °F (36.6 °C) (02/19/25 1035)  Pulse: 91 (02/19/25 1041)  Resp: 18 (02/19/25 1035)  BP: (!) 141/84 (02/19/25 1035)  SpO2: 95 % (02/19/25 1035) Vital Signs (24h Range):  Temp:  [97.5 °F (36.4 °C)-98.5 °F (36.9 °C)] 97.9 °F (36.6 °C)  Pulse:  [72-99] 91  Resp:  [16-18] 18  SpO2:  [95 %-97 %] 95 %  BP: (103-141)/(57-84) 141/84     Weight: 51.8 kg (114 lb 3.2 oz)  Body mass index is 19.6 kg/m².    Constitutional:       General: NAD. They appear comfortable.      Appearance: They are ill-appearing. They are not diaphoretic.   HENT:      Head: Normocephalic and atraumatic.   Cardiovascular:      Pulses: Strong radial pulse detected.   Pulmonary:      Effort: No respiratory distress. No increased work of breathing.      RR 20  Abdominal:      General: There is no distension.      Palpation: Soft and moderately tender.   Skin:     General: Skin is warm and dry.      Coloration: Skin is not jaundiced.      Findings: Bruising present on extremities.  "  Neurological:      General: Waxing-and-waning alertness (falling asleep at various points). No focal neurological deficits. AAOx3.  Psychiatric:      Mood: "OK"     Affect: Euthymic; mood-congruent     Behavior: Pleasant and appropriate.      Review of Symptoms      Symptom Assessment (ESAS 0-10 Scale)  Pain:  5  Dyspnea:  0  Anxiety:  0  Nausea:  0  Depression:  0  Anorexia:  4  Fatigue:  7  Insomnia:  0  Restlessness:  0  Agitation:  0         Pain Assessment:    Location(s): abdomen    Abdomen       Location: generalized        Quality: Sharp        Quantity: 6/10 in intensity        Chronicity: Onset 6 week(s) ago, gradually improving        Aggravating Factors: Activity, pressure and movement        Alleviating Factors: None        Associated Symptoms: Anorexia    Living Arrangements:  Lives with family    Psychosocial/Cultural:   See Palliative Psychosocial Note: No  Social Issues Identified: None  Bereavement Risk: No  Caregiver Needs Discussed: yes; no needs identified   Caregiver Distress: No:   **Primary  to Follow**  Palliative Care  Consult: No    Spiritual:  F - Vianey and Belief:  Hinduisms[employer]ud [employer]  I - Importance:  "Very"  C - Community:  [employer] community  A - Address in Care:  Discussed in relation to support throughout illness trajectory     Time-Based Charting:  No        Advance Care Planning   Advance Directives:   Living Will: Yes        Copy on chart: Yes        Oral Declaration: Yes  LaPOST: No    Do Not Resuscitate Status: Yes    Medical Power of : Yes    Agent's Name:  Shayla Rosenthal (sister)   Agent's Contact Number:  [phone]    Decision Making:  Patient answered questions and Family answered questions  Goals of Care: Met with patient at bedside along with sister/Ann Mcguire who was there in person. Shayla's daughter Geraldine (alternate mPOA) and grandson Hector joined conversation via phone.     Patient able to participate at times in conversation when " awoken. She enjoys being functionally independent and does all the cooking at home for her family.    She does not know details about her cancer diagnosis and does NOT want to know details, deferring all discussions to her sister/mPOA.     She does not want to spend the rest of her life going back and forth to the hospital and would rather be at home and kept comfortable, prioritizing quality of life even if that meant her life expectancy were shorter.     Family amenable to home hospice, but would like to optimize patient medically as much as possible prior to discharge.         Significant Labs: BMP:   Recent Labs   Lab 02/19/25  0534   *  187*   *  127*   K 3.7  3.4*   CL 92*  93*   CO2 24  26   BUN 35*  34*   CREATININE 0.7  0.7   CALCIUM 8.4*  8.2*   MG 2.4     CBC:   Recent Labs   Lab 02/18/25  0912 02/19/25  0534   WBC 8.56 7.96   HGB 11.4* 10.3*   HCT 35.8* 30.3*    270     CBC:   Recent Labs   Lab 02/19/25  0534   WBC 7.96   HGB 10.3*   HCT 30.3*   MCV 88        BMP:  Recent Labs   Lab 02/19/25  0534   *  187*   *  127*   K 3.7  3.4*   CL 92*  93*   CO2 24  26   BUN 35*  34*   CREATININE 0.7  0.7   CALCIUM 8.4*  8.2*   MG 2.4     LFT:  Lab Results   Component Value Date    AST 77 (H) 02/19/2025    ALKPHOS 535 (H) 02/19/2025    BILITOT 1.2 (H) 02/19/2025     Albumin:   Albumin   Date Value Ref Range Status   02/19/2025 1.8 (L) 3.5 - 5.2 g/dL Final     Protein:   Total Protein   Date Value Ref Range Status   02/19/2025 5.4 (L) 6.0 - 8.4 g/dL Final     Lactic acid:   Lab Results   Component Value Date    LACTATE 3.5 (HH) 12/06/2024    LACTATE 2.3 (H) 12/02/2024       Significant Imaging:   CT abdomen / pelvis (02/17):  1. There appears to have been administration of oral contrast prior to the exam.  Contrast is noted to the level of the distal to mid transverse colon and within several small bowel loops.  Focus of intussusception within the mid pelvis  again noted, contrast is seen beyond the lesion however there is slow flow through the segment.  Continued follow-up is advised as well as exclusion of lesion acting as lead point as malignancy can present in this fashion..  2. Fluid in gaseous distention of the large bowel, somewhat increased since the previous examination.  No pneumatosis.  3. There is loculated abdominopelvic ascites noting some peritoneal thickening and enhancement, peritonitis is of concern.  4. Body wall anasarca and right pleural effusion, increased since the previous exam, correlation with volume status advised.  5. Please see above for several additional findings.    Rosi Betancur MD  Palliative Medicine  [facility] - [facility]

## 2025-02-19 NOTE — HPI
Shahram Hills is a 84 y.o. 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 She was admitted to  2/15 for abdominal pain, transaminitis, and correction of electrolytes with presumed post embolization syndrome. CT scan was performed day of admission in ED due to abdominal pain which showed short segment small bowel-small bowel intussusception, copious liquid stool within the colon, large hepatic metastasis and ascites. General surgery was consulted given CT findings of intussusception. Per General Surgery, risks outweigh any benefits of segmental resection of her intussusception. Palliative Care consulted to explore GoC.

## 2025-02-19 NOTE — PLAN OF CARE
Problem: Occupational Therapy  Goal: Occupational Therapy Goal  Description: Goals to be met by: 3/19/2025     Patient will increase functional independence with ADLs by performing:    UE Dressing with Supervision.  LE Dressing with Supervision.  Grooming while standing with Supervision.  Toileting from toilet with Contact Guard Assistance for hygiene and clothing management.   Supine to sit with Supervision.  Toilet transfer to toilet with Supervision.      Outcome: Progressing     Goals remain appropriate

## 2025-02-19 NOTE — ASSESSMENT & PLAN NOTE
Patient's FSGs are controlled on current medication regimen.  Last A1c reviewed-   Lab Results   Component Value Date    HGBA1C 6.2 (H) 11/26/2024     Most recent fingerstick glucose reviewed-   Recent Labs   Lab 02/18/25 2058 02/19/25  0706 02/19/25  1033   POCTGLUCOSE 226* 183* 290*       Current correctional scale  Low  Maintain anti-hyperglycemic dose as follows-   Antihyperglycemics (From admission, onward)    Start     Stop Route Frequency Ordered    02/15/25 0127  insulin aspart U-100 pen 0-5 Units         -- SubQ Before meals & nightly PRN 02/15/25 0027        Hold Oral hypoglycemics while patient is in the hospital.

## 2025-02-19 NOTE — PLAN OF CARE
Problem: Adult Inpatient Plan of Care  Goal: Plan of Care Review  Outcome: Progressing  Flowsheets (Taken 2/19/2025 4963)  Plan of Care Reviewed With: patient  Goal: Patient-Specific Goal (Individualized)  Outcome: Progressing  Goal: Absence of Hospital-Acquired Illness or Injury  Outcome: Progressing  Goal: Optimal Comfort and Wellbeing  Outcome: Progressing  Goal: Readiness for Transition of Care  Outcome: Progressing     Problem: Diabetes Comorbidity  Goal: Blood Glucose Level Within Targeted Range  Outcome: Progressing     Problem: Infection  Goal: Absence of Infection Signs and Symptoms  Outcome: Progressing     Problem: Fall Injury Risk  Goal: Absence of Fall and Fall-Related Injury  Outcome: Progressing     Problem: Skin Injury Risk Increased  Goal: Skin Health and Integrity  Outcome: Progressing     Problem: Fatigue  Goal: Improved Activity Tolerance  Outcome: Progressing     Problem: Pain Acute  Goal: Optimal Pain Control and Function  Outcome: Progressing     Problem: Coping Ineffective  Goal: Effective Coping  Outcome: Progressing

## 2025-02-19 NOTE — ASSESSMENT & PLAN NOTE
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 The patient is not undergoing active chemotherapy. .Their staging information is listed below.    Cancer Staging   Neuroendocrine carcinoma metastatic to bone  Staging form: Bone - Appendicular Skeleton, Trunk, Skull, and Facial Bones, AJCC 8th Edition  - Clinical stage from 5/28/2018: Stage IVB (cT1, cNX, pM1b) - Signed by Sebastian Granados MD on 8/24/2021    - recent liver embolization   - palliative care consulted for GOC, decision to go home with hospice SW made aware

## 2025-02-19 NOTE — PLAN OF CARE
Problem: Adult Inpatient Plan of Care  Goal: Plan of Care Review  Outcome: Progressing  Goal: Patient-Specific Goal (Individualized)  Outcome: Progressing  Goal: Absence of Hospital-Acquired Illness or Injury  Outcome: Progressing  Goal: Optimal Comfort and Wellbeing  Outcome: Progressing  Goal: Readiness for Transition of Care  Outcome: Progressing     Problem: Diabetes Comorbidity  Goal: Blood Glucose Level Within Targeted Range  Outcome: Progressing     Problem: Infection  Goal: Absence of Infection Signs and Symptoms  Outcome: Progressing     Problem: Fall Injury Risk  Goal: Absence of Fall and Fall-Related Injury  Outcome: Progressing     Problem: Skin Injury Risk Increased  Goal: Skin Health and Integrity  Outcome: Progressing     Problem: Fatigue  Goal: Improved Activity Tolerance  Outcome: Progressing     Problem: Pain Acute  Goal: Optimal Pain Control and Function  Outcome: Progressing     Problem: Coping Ineffective  Goal: Effective Coping  Outcome: Progressing   Pt progressing toward goals. No distress noted. No falls or injuries during shift. Pt bed in lowest position. Side rails x2. Bed alarm activated. Call bell and personal belongs within reach. Telemetry maintained. Safety precautions maintained.

## 2025-02-20 LAB
ALBUMIN SERPL BCP-MCNC: 1.8 G/DL (ref 3.5–5.2)
ALP SERPL-CCNC: 594 U/L (ref 40–150)
ALT SERPL W/O P-5'-P-CCNC: 141 U/L (ref 10–44)
ANION GAP SERPL CALC-SCNC: 10 MMOL/L (ref 8–16)
AST SERPL-CCNC: 62 U/L (ref 10–40)
BASOPHILS # BLD AUTO: 0.02 K/UL (ref 0–0.2)
BASOPHILS NFR BLD: 0.3 % (ref 0–1.9)
BILIRUB SERPL-MCNC: 1 MG/DL (ref 0.1–1)
BUN SERPL-MCNC: 25 MG/DL (ref 8–23)
CALCIUM SERPL-MCNC: 8.5 MG/DL (ref 8.7–10.5)
CHLORIDE SERPL-SCNC: 94 MMOL/L (ref 95–110)
CO2 SERPL-SCNC: 28 MMOL/L (ref 23–29)
CREAT SERPL-MCNC: 0.6 MG/DL (ref 0.5–1.4)
DIFFERENTIAL METHOD BLD: ABNORMAL
EOSINOPHIL # BLD AUTO: 0.1 K/UL (ref 0–0.5)
EOSINOPHIL NFR BLD: 1 % (ref 0–8)
ERYTHROCYTE [DISTWIDTH] IN BLOOD BY AUTOMATED COUNT: 12.8 % (ref 11.5–14.5)
EST. GFR  (NO RACE VARIABLE): >60 ML/MIN/1.73 M^2
GLUCOSE SERPL-MCNC: 161 MG/DL (ref 70–110)
HCT VFR BLD AUTO: 31.3 % (ref 37–48.5)
HGB BLD-MCNC: 10.4 G/DL (ref 12–16)
IMM GRANULOCYTES # BLD AUTO: 0.14 K/UL (ref 0–0.04)
IMM GRANULOCYTES NFR BLD AUTO: 2.2 % (ref 0–0.5)
LYMPHOCYTES # BLD AUTO: 0.8 K/UL (ref 1–4.8)
LYMPHOCYTES NFR BLD: 12.8 % (ref 18–48)
MAGNESIUM SERPL-MCNC: 2.2 MG/DL (ref 1.6–2.6)
MCH RBC QN AUTO: 30.1 PG (ref 27–31)
MCHC RBC AUTO-ENTMCNC: 33.2 G/DL (ref 32–36)
MCV RBC AUTO: 91 FL (ref 82–98)
MONOCYTES # BLD AUTO: 0.5 K/UL (ref 0.3–1)
MONOCYTES NFR BLD: 8.5 % (ref 4–15)
NEUTROPHILS # BLD AUTO: 4.7 K/UL (ref 1.8–7.7)
NEUTROPHILS NFR BLD: 75.2 % (ref 38–73)
NRBC BLD-RTO: 0 /100 WBC
PHOSPHATE SERPL-MCNC: 3.1 MG/DL (ref 2.7–4.5)
PLATELET # BLD AUTO: 281 K/UL (ref 150–450)
PMV BLD AUTO: 9.5 FL (ref 9.2–12.9)
POCT GLUCOSE: 181 MG/DL (ref 70–110)
POCT GLUCOSE: 182 MG/DL (ref 70–110)
POCT GLUCOSE: 224 MG/DL (ref 70–110)
POCT GLUCOSE: 241 MG/DL (ref 70–110)
POTASSIUM SERPL-SCNC: 3.8 MMOL/L (ref 3.5–5.1)
PROT SERPL-MCNC: 5.5 G/DL (ref 6–8.4)
RBC # BLD AUTO: 3.45 M/UL (ref 4–5.4)
SODIUM SERPL-SCNC: 132 MMOL/L (ref 136–145)
WBC # BLD AUTO: 6.25 K/UL (ref 3.9–12.7)

## 2025-02-20 PROCEDURE — 25000003 PHARM REV CODE 250: Mod: HCNC | Performed by: HOSPITALIST

## 2025-02-20 PROCEDURE — 99233 SBSQ HOSP IP/OBS HIGH 50: CPT | Mod: HCNC,,, | Performed by: INTERNAL MEDICINE

## 2025-02-20 PROCEDURE — 85025 COMPLETE CBC W/AUTO DIFF WBC: CPT | Mod: HCNC | Performed by: STUDENT IN AN ORGANIZED HEALTH CARE EDUCATION/TRAINING PROGRAM

## 2025-02-20 PROCEDURE — 99497 ADVNCD CARE PLAN 30 MIN: CPT | Mod: HCNC,,, | Performed by: STUDENT IN AN ORGANIZED HEALTH CARE EDUCATION/TRAINING PROGRAM

## 2025-02-20 PROCEDURE — 80053 COMPREHEN METABOLIC PANEL: CPT | Mod: HCNC | Performed by: STUDENT IN AN ORGANIZED HEALTH CARE EDUCATION/TRAINING PROGRAM

## 2025-02-20 PROCEDURE — 25000003 PHARM REV CODE 250: Mod: HCNC | Performed by: STUDENT IN AN ORGANIZED HEALTH CARE EDUCATION/TRAINING PROGRAM

## 2025-02-20 PROCEDURE — 36415 COLL VENOUS BLD VENIPUNCTURE: CPT | Mod: HCNC | Performed by: STUDENT IN AN ORGANIZED HEALTH CARE EDUCATION/TRAINING PROGRAM

## 2025-02-20 PROCEDURE — 21400001 HC TELEMETRY ROOM: Mod: HCNC

## 2025-02-20 PROCEDURE — 83735 ASSAY OF MAGNESIUM: CPT | Mod: HCNC | Performed by: STUDENT IN AN ORGANIZED HEALTH CARE EDUCATION/TRAINING PROGRAM

## 2025-02-20 PROCEDURE — 63600175 PHARM REV CODE 636 W HCPCS: Mod: HCNC | Performed by: STUDENT IN AN ORGANIZED HEALTH CARE EDUCATION/TRAINING PROGRAM

## 2025-02-20 PROCEDURE — 84100 ASSAY OF PHOSPHORUS: CPT | Mod: HCNC | Performed by: STUDENT IN AN ORGANIZED HEALTH CARE EDUCATION/TRAINING PROGRAM

## 2025-02-20 PROCEDURE — 99233 SBSQ HOSP IP/OBS HIGH 50: CPT | Mod: HCNC,,, | Performed by: STUDENT IN AN ORGANIZED HEALTH CARE EDUCATION/TRAINING PROGRAM

## 2025-02-20 RX ADMIN — ENOXAPARIN SODIUM 30 MG: 30 INJECTION SUBCUTANEOUS at 04:02

## 2025-02-20 RX ADMIN — LATANOPROST 1 DROP: 50 SOLUTION OPHTHALMIC at 10:02

## 2025-02-20 RX ADMIN — EZETIMIBE 10 MG: 10 TABLET ORAL at 09:02

## 2025-02-20 RX ADMIN — SIMETHICONE 80 MG: 80 TABLET, CHEWABLE ORAL at 09:02

## 2025-02-20 RX ADMIN — CYANOCOBALAMIN TAB 1000 MCG 1000 MCG: 1000 TAB at 09:02

## 2025-02-20 RX ADMIN — INSULIN ASPART 2 UNITS: 100 INJECTION, SOLUTION INTRAVENOUS; SUBCUTANEOUS at 04:02

## 2025-02-20 RX ADMIN — SIMETHICONE 80 MG: 80 TABLET, CHEWABLE ORAL at 01:02

## 2025-02-20 RX ADMIN — CHOLECALCIFEROL TAB 25 MCG (1000 UNIT) 1000 UNITS: 25 TAB at 09:02

## 2025-02-20 RX ADMIN — INSULIN ASPART 2 UNITS: 100 INJECTION, SOLUTION INTRAVENOUS; SUBCUTANEOUS at 11:02

## 2025-02-20 RX ADMIN — FERROUS SULFATE TAB EC 325 MG (65 MG FE EQUIVALENT) 1 EACH: 325 (65 FE) TABLET DELAYED RESPONSE at 09:02

## 2025-02-20 RX ADMIN — SIMETHICONE 80 MG: 80 TABLET, CHEWABLE ORAL at 05:02

## 2025-02-20 RX ADMIN — CEFTRIAXONE 2 G: 2 INJECTION, POWDER, FOR SOLUTION INTRAMUSCULAR; INTRAVENOUS at 09:02

## 2025-02-20 NOTE — SUBJECTIVE & OBJECTIVE
Interval History:  Seen and evaluated on general medical floor  No acute events overnight    Clinical status unchanged  No new complaints  Family deciding on home hospice company  All questions and concerns addressed at bedside    Objective:     Vital Signs (Most Recent):  Temp: 98.3 °F (36.8 °C) (02/20/25 1112)  Pulse: 89 (02/20/25 1112)  Resp: 20 (02/20/25 1112)  BP: (!) 140/71 (02/20/25 1112)  SpO2: (!) 93 % (02/20/25 1112) Vital Signs (24h Range):  Temp:  [97.6 °F (36.4 °C)-98.4 °F (36.9 °C)] 98.3 °F (36.8 °C)  Pulse:  [83-98] 89  Resp:  [17-20] 20  SpO2:  [93 %-97 %] 93 %  BP: (117-148)/(57-79) 140/71     Weight: 51.8 kg (114 lb 3.2 oz)  Body mass index is 19.6 kg/m².    Intake/Output Summary (Last 24 hours) at 2/20/2025 1132  Last data filed at 2/20/2025 0513  Gross per 24 hour   Intake --   Output 1650 ml   Net -1650 ml         Physical Exam  Vitals and nursing note reviewed.   Constitutional:       General: She is not in acute distress.     Appearance: She is ill-appearing (chronically).   HENT:      Head: Normocephalic and atraumatic.      Nose: Nose normal.   Eyes:      Extraocular Movements: Extraocular movements intact.      Conjunctiva/sclera: Conjunctivae normal.   Pulmonary:      Effort: Pulmonary effort is normal.   Abdominal:      Palpations: Abdomen is soft.   Musculoskeletal:      Cervical back: Normal range of motion.   Neurological:      General: No focal deficit present.      Mental Status: She is alert.             Significant Labs: All pertinent labs within the past 24 hours have been reviewed.    Significant Imaging: I have reviewed all pertinent imaging results/findings within the past 24 hours.

## 2025-02-20 NOTE — PROGRESS NOTES
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    Assessment/Plan:   Shahram Hills is a 84 y.o. 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 Palliative Care consulted to explore GoC. Patient found to have non-obstructing intussusception on imaging, but is not a surgical candidate. She also has evidence of ascites which could not be drained due to volume, and peritonitis which seems to be improving some with antibiotics. Patient defers conversations regarding illness details to family who are aware that patient is declining and cancer is progressing. Patient vocalized that she would like to be home and kept comfortable prioritizing QoL over prolonging life as long as possible.     Impression:  Family remains interested in pursuing home hospice and have selected [facility]. Patient excited to go home. No symptomatic complaints at this time.     Billing:  In my care of this patient with acute- on chronic-severe illness which poses threat to life and/or bodily function, I am recommending goal-concordant care as outlined below. I spent a significant amount of time reviewing of prior external records and recommendations of other care providing teams including Hospital Medicine and Nephrology, reviewed relevant test results / studies including CBC, and discussed care with other specialists / providers involved in this patient's care.     Also, I spent an additional 17min of time on voluntary advance care planning and/or goals of care discussion,  providing emotional support, discussing prognosis, and exploring the burden vs benefit of various treatment options.       Recommendations:  #Neuroendocrine tumour with liver and bone mets : comfort-focused care:  No further escalation of care  DISCONTINUE lab draws and additional disease-directed interventions & investigations not contributing to comfort  Code Status: DNR/DNI    #GOC Discussions  Advance Care Planning  2/20/2025: Rosi Betancur MD  Family and patient both remain interested in pursuing comfort-focused care with hospice at home. They have chosen [facility] as their agency of choice who will be visiting with family later today.     Code status: DNR/DNI    Recommendations communicated directly to primary team on 02/20/2025 via EPIC chat.    Subjective:   Brief HPI:   Shahram Hills is a 84 y.o. 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 She was admitted to  2/15 for abdominal pain, transaminitis, and correction of electrolytes with presumed post embolization syndrome. CT scan was performed day of admission in ED due to abdominal pain which showed short segment small bowel-small bowel intussusception, copious liquid stool within the colon, large hepatic metastasis and ascites. General surgery was consulted given CT findings of intussusception. Per General Surgery, risks outweigh any benefits of segmental resection of her intussusception. Palliative Care consulted to explore GoC.     Today's Update:   Re-engaged family in GoC discussion to finalize plan and goals in context of patient's expressed values;  see ACP note above for details. Patient denying any pain at this time or symptomatic complaints. Sleeping mostly throughout discussion with family.     Pain Assessment: N/A    [place] Symptom Assessment (ESAS):   Pain: None  Dyspnea: None  Anxiety:  None  Nausea: None  Depression: None  Anorexia: None  Fatigue: Moderate  Insomnia: Not assessed  Restlessness: None  Agitation: None    Objective:   Vitals: Temp: 98.3 °F (36.8 °C) (02/20/25 1112)  Pulse: 91 (02/20/25 1420)  Resp: 20 (02/20/25 1112)  BP: (!) 140/71 (02/20/25 1112)  SpO2: (!) 93 % (02/20/25 1112)    Medications:  Continuous Infusions: None    Scheduled Meds:    cefTRIAXone (Rocephin) IV (PEDS and ADULTS)  2 g Intravenous Q24H    cyanocobalamin  1,000 mcg Oral Daily    enoxparin  30 mg Subcutaneous Daily    ezetimibe  10 mg Oral Daily    ferrous sulfate  1 tablet Oral Daily    latanoprost  1 drop Both Eyes Nightly    simethicone  1 tablet Oral TID PC    vitamin D  1,000 Units Oral Daily       PRN Meds:  Current Facility-Administered Medications:     acetaminophen, 650 mg, Oral, Q4H PRN    aluminum-magnesium hydroxide-simethicone, 30 mL, Oral, QID PRN    dextrose 50%, 12.5 g, Intravenous, PRN    dextrose 50%, 25 g, Intravenous, PRN    diphenoxylate-atropine 2.5-0.025 mg, 1 tablet, Oral, QID PRN    glucagon (human recombinant), 1 mg, Intramuscular, PRN    glucose, 16 g, Oral, PRN    glucose, 24 g, Oral, PRN    insulin aspart U-100, 0-5 Units, Subcutaneous, QID (AC + HS) PRN    melatonin, 6 mg, Oral, Nightly PRN    naloxone, 0.02 mg, Intravenous, PRN    ondansetron, 4 mg, Intravenous, Q8H PRN    oxyCODONE, 5 mg, Oral, Q6H PRN    polyethylene glycol, 17 g, Oral, BID PRN    prochlorperazine, 5 mg, Intravenous, Q6H PRN    sodium chloride 0.9%, 10 mL, Intravenous, Q6H PRN     Physical Exam:  Constitutional:       General: NAD. They appear comfortable.      Appearance: They are ill-appearing. They are not diaphoretic.   HENT:      Head: Normocephalic and atraumatic.   Cardiovascular:      Pulses: Strong radial pulse detected.   Pulmonary:      Effort: No respiratory distress. No increased work of breathing.      RR 20  Abdominal:      General: There is no distension.      Palpation: Soft and moderately  "tender.   Skin:     General: Skin is warm and dry.      Coloration: Skin is not jaundiced.      Findings: Bruising present on extremities.   Neurological:      General: Waxing-and-waning alertness (falling asleep at various points). No focal neurological deficits. AAOx3.  Psychiatric:      Mood: "OK"     Affect: Euthymic; mood-congruent     Behavior: Pleasant and appropriate.    Labs:   Creatinine   Date Value Ref Range Status   02/20/2025 0.6 0.5 - 1.4 mg/dL Final     POC Creatinine   Date Value Ref Range Status   02/14/2025 0.4 (L) 0.5 - 1.4 mg/dL Final      Hemoglobin   Date Value Ref Range Status   02/20/2025 10.4 (L) 12.0 - 16.0 g/dL Final      Albumin   Date Value Ref Range Status   02/20/2025 1.8 (L) 3.5 - 5.2 g/dL Final   02/19/2025 1.8 (L) 3.5 - 5.2 g/dL Final   02/18/2025 1.8 (L) 3.5 - 5.2 g/dL Final        Imaging & Investigations: reviewed on 02/20/2025    Thank you for your consult. I will sign off. Please contact us if you have any additional questions.    Signed,  Rosi Betancur MD  Hospice & Palliative Medicine  [facility]                "

## 2025-02-20 NOTE — PROGRESS NOTES
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   She had recent hepatic artery embolization for management of metastatic liver lesions on 02/07.  Over the weekend patient had the onset of abdominal pain,  Patient was seen in [facility] ED on 2/11 for constipation, s/p KUB,  lab studies - transaminase values were in 100s, patient given an enema and discharged with Rx for Miralax. Since this time no recurrent constipation, patient has had 2-3 loose bowel movements per day.  She has had poor appetite, mainly drinking fluids at home.  She has c/o of right sided and right flank abdominal pain.      She has had prior hepatic embolization before for management of liver metastases, she has experienced abdominal pain afterward, but has not required hospitalization post-procedure.  Patient endorsed thirst feeling, not able to quantify the amount of fluid that she has been consuming, denied any history of recent medication change, polyuria, polydipsia, depression or anxiety medication, she is not on insomnia medication neither. She denies any fevers/chills, no chest pain or dyspnea, no skin rashes, no reported bleeding/bruising abnormality.  Nephrology has been consulted to manage hyponatremia           Interval History:     LINDA ZARCO, Na has trened up 132 from 128. Revoeved 0.5 L of NS yesterday. UOP 1650cc    Review of patient's allergies indicates:   Allergen Reactions    Epinephrine      carcinoid     Current Facility-Administered Medications   Medication Frequency     acetaminophen tablet 650 mg Q4H PRN    aluminum-magnesium hydroxide-simethicone 200-200-20 mg/5 mL suspension 30 mL QID PRN    cefTRIAXone injection 2 g Q24H    cyanocobalamin tablet 1,000 mcg Daily    dextrose 50% injection 12.5 g PRN    dextrose 50% injection 25 g PRN    diphenoxylate-atropine 2.5-0.025 mg per tablet 1 tablet QID PRN    enoxaparin injection 30 mg Daily    ezetimibe tablet 10 mg Daily    ferrous sulfate tablet 1 each Daily    glucagon (human recombinant) injection 1 mg PRN    glucose chewable tablet 16 g PRN    glucose chewable tablet 24 g PRN    insulin aspart U-100 pen 0-5 Units QID (AC + HS) PRN    latanoprost 0.005 % ophthalmic solution 1 drop Nightly    melatonin tablet 6 mg Nightly PRN    naloxone 0.4 mg/mL injection 0.02 mg PRN    ondansetron injection 4 mg Q8H PRN    oxyCODONE immediate release tablet 5 mg Q6H PRN    polyethylene glycol packet 17 g BID PRN    prochlorperazine injection Soln 5 mg Q6H PRN    simethicone chewable tablet 80 mg TID PC    sodium chloride 0.9% flush 10 mL Q6H PRN    vitamin D 1000 units tablet 1,000 Units Daily       Objective:     Vital Signs (Most Recent):  Temp: 97.7 °F (36.5 °C) (02/20/25 0729)  Pulse: 96 (02/20/25 0807)  Resp: 20 (02/20/25 0729)  BP: (!) 117/57 (02/20/25 0729)  SpO2: (!) 94 % (02/20/25 0729) Vital Signs (24h Range):  Temp:  [97.6 °F (36.4 °C)-98.4 °F (36.9 °C)] 97.7 °F (36.5 °C)  Pulse:  [72-98] 96  Resp:  [17-20] 20  SpO2:  [93 %-97 %] 94 %  BP: (117-148)/(57-84) 117/57     Weight: 51.8 kg (114 lb 3.2 oz) (02/17/25 0505)  Body mass index is 19.6 kg/m².  Body surface area is 1.53 meters squared.    I/O last 3 completed shifts:  In: 200 [P.O.:200]  Out: 1850 [Urine:1850]     Physical Exam  Constitutional:       General: She is not in acute distress.     Appearance: She is ill-appearing. She is not toxic-appearing.   HENT:      Head: Normocephalic and atraumatic.   Eyes:      Extraocular Movements: Extraocular movements intact.      Pupils:  "Pupils are equal, round, and reactive to light.   Cardiovascular:      Rate and Rhythm: Normal rate.      Heart sounds: No murmur heard.  Pulmonary:      Effort: No respiratory distress.      Breath sounds: No wheezing or rales.   Abdominal:      General: There is distension.      Tenderness: There is no abdominal tenderness. There is no guarding.   Musculoskeletal:      Right lower leg: No edema.      Left lower leg: No edema.   Skin:     Coloration: Skin is pale. Skin is not jaundiced.   Neurological:      Mental Status: She is oriented to person, place, and time.   Psychiatric:         Behavior: Behavior normal.          Significant Labs:  ABGs:   Recent Labs   Lab 02/14/25  1512   PH 7.424   PCO2 52.7*   HCO3 34.5*   POCSATURATED 58   BE 10*     CBC:   Recent Labs   Lab 02/20/25  0255   WBC 6.25   RBC 3.45*   HGB 10.4*   HCT 31.3*      MCV 91   MCH 30.1   MCHC 33.2     CMP:   Recent Labs   Lab 02/20/25  0255   *   CALCIUM 8.5*   ALBUMIN 1.8*   PROT 5.5*   *   K 3.8   CO2 28   CL 94*   BUN 25*   CREATININE 0.6   ALKPHOS 594*   *   AST 62*   BILITOT 1.0     LFTs:   Recent Labs   Lab 02/20/25  0255   *   AST 62*   ALKPHOS 594*   BILITOT 1.0   PROT 5.5*   ALBUMIN 1.8*     PTH: No results for input(s): "PTH" in the last 168 hours.  TSH:   Recent Labs   Lab 02/14/25  1539   TSH 0.685     Recent Labs   Lab 02/18/25  1319   COLORU Yellow   SPECGRAV >1.030*   PHUR 6.0   PROTEINUA Trace*   BACTERIA Occasional   NITRITE Negative   LEUKOCYTESUR 2+*       Assessment/Plan:     Endocrine  Hyponatremia     Plan and recommendation      - Na is 132 128 up from 128 improving   - status post 0.5 L of normal saline, yesterday,  - We recommed to REMOVE fluid restriction.   - Plan is to Correct the sodium by 4-6mEq in 24 hours.   - Hyponatremia likely mixed picture of syndrome of  inappropriate antidiuretic hormone, secondary to chronic pain and metastatic neuroendocrine cancer/intravascular volume " depletion due to poor oral intake due cancer anorexia   - Repeated lab showed urine sodium of 13, urine osmolality 394, urine chloride 64, urine creatinine 45, urine specific gravity more than 1030  - Proper pain control  - Monitor sodium daily          Thank you for your consult. I will follow-up with patient. Please contact us if you have any additional questions.    Shakira Alaniz MD  Nephrology  [facility]

## 2025-02-20 NOTE — SUBJECTIVE & OBJECTIVE
Interval History:     CAROLEE LINDA, Na has trened up 132 from 128. Revoeved 0.5 L of NS yesterday. UOP 1650cc    Review of patient's allergies indicates:   Allergen Reactions    Epinephrine      carcinoid     Current Facility-Administered Medications   Medication Frequency    acetaminophen tablet 650 mg Q4H PRN    aluminum-magnesium hydroxide-simethicone 200-200-20 mg/5 mL suspension 30 mL QID PRN    cefTRIAXone injection 2 g Q24H    cyanocobalamin tablet 1,000 mcg Daily    dextrose 50% injection 12.5 g PRN    dextrose 50% injection 25 g PRN    diphenoxylate-atropine 2.5-0.025 mg per tablet 1 tablet QID PRN    enoxaparin injection 30 mg Daily    ezetimibe tablet 10 mg Daily    ferrous sulfate tablet 1 each Daily    glucagon (human recombinant) injection 1 mg PRN    glucose chewable tablet 16 g PRN    glucose chewable tablet 24 g PRN    insulin aspart U-100 pen 0-5 Units QID (AC + HS) PRN    latanoprost 0.005 % ophthalmic solution 1 drop Nightly    melatonin tablet 6 mg Nightly PRN    naloxone 0.4 mg/mL injection 0.02 mg PRN    ondansetron injection 4 mg Q8H PRN    oxyCODONE immediate release tablet 5 mg Q6H PRN    polyethylene glycol packet 17 g BID PRN    prochlorperazine injection Soln 5 mg Q6H PRN    simethicone chewable tablet 80 mg TID PC    sodium chloride 0.9% flush 10 mL Q6H PRN    vitamin D 1000 units tablet 1,000 Units Daily       Objective:     Vital Signs (Most Recent):  Temp: 97.7 °F (36.5 °C) (02/20/25 0729)  Pulse: 96 (02/20/25 0807)  Resp: 20 (02/20/25 0729)  BP: (!) 117/57 (02/20/25 0729)  SpO2: (!) 94 % (02/20/25 0729) Vital Signs (24h Range):  Temp:  [97.6 °F (36.4 °C)-98.4 °F (36.9 °C)] 97.7 °F (36.5 °C)  Pulse:  [72-98] 96  Resp:  [17-20] 20  SpO2:  [93 %-97 %] 94 %  BP: (117-148)/(57-84) 117/57     Weight: 51.8 kg (114 lb 3.2 oz) (02/17/25 6425)  Body mass index is 19.6 kg/m².  Body surface area is 1.53 meters squared.    I/O last 3 completed shifts:  In: 200 [P.O.:200]  Out: 8000  "[Urine:1850]     Physical Exam  Constitutional:       General: She is not in acute distress.     Appearance: She is ill-appearing. She is not toxic-appearing.   HENT:      Head: Normocephalic and atraumatic.   Eyes:      Extraocular Movements: Extraocular movements intact.      Pupils: Pupils are equal, round, and reactive to light.   Cardiovascular:      Rate and Rhythm: Normal rate.      Heart sounds: No murmur heard.  Pulmonary:      Effort: No respiratory distress.      Breath sounds: No wheezing or rales.   Abdominal:      General: There is distension.      Tenderness: There is no abdominal tenderness. There is no guarding.   Musculoskeletal:      Right lower leg: No edema.      Left lower leg: No edema.   Skin:     Coloration: Skin is pale. Skin is not jaundiced.   Neurological:      Mental Status: She is oriented to person, place, and time.   Psychiatric:         Behavior: Behavior normal.          Significant Labs:  ABGs:   Recent Labs   Lab 02/14/25  1512   PH 7.424   PCO2 52.7*   HCO3 34.5*   POCSATURATED 58   BE 10*     CBC:   Recent Labs   Lab 02/20/25  0255   WBC 6.25   RBC 3.45*   HGB 10.4*   HCT 31.3*      MCV 91   MCH 30.1   MCHC 33.2     CMP:   Recent Labs   Lab 02/20/25  0255   *   CALCIUM 8.5*   ALBUMIN 1.8*   PROT 5.5*   *   K 3.8   CO2 28   CL 94*   BUN 25*   CREATININE 0.6   ALKPHOS 594*   *   AST 62*   BILITOT 1.0     LFTs:   Recent Labs   Lab 02/20/25  0255   *   AST 62*   ALKPHOS 594*   BILITOT 1.0   PROT 5.5*   ALBUMIN 1.8*     PTH: No results for input(s): "PTH" in the last 168 hours.  TSH:   Recent Labs   Lab 02/14/25  1539   TSH 0.685     Recent Labs   Lab 02/18/25  1319   COLORU Yellow   SPECGRAV >1.030*   PHUR 6.0   PROTEINUA Trace*   BACTERIA Occasional   NITRITE Negative   LEUKOCYTESUR 2+*       "

## 2025-02-20 NOTE — PROGRESS NOTES
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   She had recent hepatic artery embolization for management of metastatic liver lesions on 02/07.  Over the weekend patient had the onset of abdominal pain, her niece spoke with patient and noted that patient had also c/o of constipation.  [place] [facility] [place] const[place], s/p KUB,  lab studies - transaminase values were in 100s, patient given an enema and discharged with Rx for Miralax.   Since this time no recurrent constipation, patient has had 2-3 loose bowel movements per day.  She has had poor appetite, mainly drinking fluids at home.   She has c/o of right sided and right flank abdominal pain.     She has had prior hepatic embolization before for management of liver metastases, she has experienced abdominal pain afterward, but has not required hospitalization post-procedure.    She denies any fevers/chills, no chest pain or dyspnea, no skin rashes, no reported bleeding/bruising abnormality.     She is found with multiple electrolyte abnormalities today and rising hepatic enzyme values.     ED Treatment: Vancomycin 1grm, Zosyn 4.5gm, Potassium IV 10meq x1, Magnesium 2gram IVx1,     Overview/Hospital Course:  Admitted for abd pain likely 2/2 post embolization syndrome. Started on MM pain control.   Electrolyte abnormalities consistent with decreased PO intake. Started on IVF. Replacing electrolytes as needed. Started salt tabs, hyponatremia was slowly improving. Hyponatremia  worse, started fluid restriction and nephrology consulted. On repeat CT with anasarca and ascites given lasix x1. 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 Mixed picture as well volume depletion likely contributing given poor PO intake/anorexia.   Worsening abd distension and reports not passing gas. Repeat CT without obstruction and patient had BM with improvement of distension.   CT concerning for peritonitis, para ordered but small volume ascites, no safe window for drainage. Ascites thought to be from metastatic disease. Started on empiric CTX for SBP. Surgery do not feel physical exam consistent with peritonitis. Elevated inflammatory markers, will cont empiric abx to complete treatment course for SBP.   CT also showed persistent intussusception, surgery consulted and report risks of surgery outweigh any benefit of a segmental resection of her intussusception given her metastatic status.   Palliative consulted for GOC discussion given advanced metastatic disease. Decided to go home with hospice, SW made aware. Lifted fluid restriction allowing to eat/drink as wanted.       Interval History:  Seen and evaluated on general medical floor  No acute events overnight    Clinical status unchanged  No new complaints  Family deciding on home hospice company  All questions and concerns addressed at bedside    Objective:     Vital Signs (Most Recent):  Temp: 98.3 °F (36.8 °C) (02/20/25 1112)  Pulse: 89 (02/20/25 1112)  Resp: 20 (02/20/25 1112)  BP: (!) 140/71 (02/20/25 1112)  SpO2: (!) 93 % (02/20/25 1112) Vital Signs (24h Range):  Temp:  [97.6 °F (36.4 °C)-98.4 °F (36.9 °C)] 98.3 °F (36.8 °C)  Pulse:  [83-98] 89  Resp:  [17-20] 20  SpO2:  [93 %-97 %] 93 %  BP: (117-148)/(57-79) 140/71     Weight: 51.8 kg (114 lb 3.2 oz)  Body mass index is 19.6 kg/m².    Intake/Output Summary (Last 24 hours) at 2/20/2025 1130  Last data filed at 2/20/2025 0578  Gross per 24 hour   Intake --   Output 1650 ml    Net -1650 ml         Physical Exam  Vitals and nursing note reviewed.   Constitutional:       General: She is not in acute distress.     Appearance: She is ill-appearing (chronically).   HENT:      Head: Normocephalic and atraumatic.      Nose: Nose normal.   Eyes:      Extraocular Movements: Extraocular movements intact.      Conjunctiva/sclera: Conjunctivae normal.   Pulmonary:      Effort: Pulmonary effort is normal.   Abdominal:      Palpations: Abdomen is soft.   Musculoskeletal:      Cervical back: Normal range of motion.   Neurological:      General: No focal deficit present.      Mental Status: She is alert.             Significant Labs: All pertinent labs within the past 24 hours have been reviewed.    Significant Imaging: I have reviewed all pertinent imaging results/findings within the past 24 hours.    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   -s/p blood clutures + empiric antibiotics in ED, continue Zosyn alone empirically. Deescalate if cx remain NG- stopped zosyn given culture NG  -consult oncology and IR- IR report symptoms consistent with post embolic syndrome and recommend medical management of pain, expected rise in LFTs should resolve, cont to monitor   -trend CBC/CMP/coags- LFT improving   -PRN pain medication      Peritonitis  - repeat CT with concern for peritonitis and ascites  - para ordered but no safe window  - started empiric CTX for SBP  - surgery do not feel physical exam consistent with peritonitis, but she has tenderness and elevated inflammatory markers, will cont empiric treatment       Moderate protein-calorie malnutrition  Nutrition consulted. Most recent weight and BMI monitored-     Measurements:  Wt Readings from Last 1 Encounters:   02/17/25  51.8 kg (114 lb 3.2 oz)   Body mass index is 19.6 kg/m².    Patient has been screened and assessed by RD.    Malnutrition Type:  Context: chronic illness  Level: moderate    Malnutrition Characteristic Summary:  Subcutaneous Fat (Malnutrition): moderate depletion  Muscle Mass (Malnutrition): moderate depletion    Interventions/Recommendations (treatment strategy):  1.) cont encourage PO intake, supplement added      Transaminitis  - expected post embolization, per IR should improve   - imaging with gallstones without cholecystitis or convincing choledocholithiasis, no abscess  - trend LFTs now downtrending         Intussusception  - see on initial imaging  - surgical eval in ED reports: Intussusception is transient and not causing obstruction. This is a normal physiologic finding and is not contributing to her clinical picture   - abd exam with tenderness  - repeat CT with continued intussusception, surgery reconsulted and report: Her intussusception is short segment small bowel without signs of bowel ischemia or obstruction. Contrast has easily traversed this area and she continues to have bowel movements. Given her malignancy and overall clinical status, would not recommend exploratory laparotomy at this time. The risks of surgery outweigh any benefit of a segmental resection of her intussusception given her metastatic status.       ACP (advance care planning)  Advance Care Planning    Date: 02/15/2025    Code Status  In light of the patients advanced and life limiting illness,I engaged the the patient in a voluntary conversation about the patient's preferences for care  at the very end of life. The patient wishes to have a natural, peaceful death.  Along those lines, the patient does not wish to have CPR or other invasive treatments performed when her heart and/or breathing stops. I communicated to the patient that a DNR order would be placed in her medical record to reflect this preference.    A total of 20 min  was spent on advance care planning, goals of care discussion, emotional support, formulating and communicating prognosis and exploring burden/benefit of various approaches of treatment. This discussion occurred on a fully voluntary basis with the verbal consent of the patient and/or family.        Sister - Shayla Rosenthal present. Niece on phone     Follow up - recommend completion of LAPOST form with patient with either palliative care or PCP  - palliative care consulted, appreciate assistance with GOC discussions- decision made to go home with hospice       Hypomagnesemia  Patient has Abnormal Magnesium: hypomagnesemia. Will continue to monitor electrolytes closely. Will replace the affected electrolytes and repeat labs to be done after interventions completed. The patient's magnesium results have been reviewed and are listed below.  Recent Labs   Lab 02/19/25  0534   MG 2.4          Hypokalemia  Patient's most recent potassium results are listed below.   Recent Labs     02/18/25  1718 02/19/25  0009 02/19/25  0534   K 3.6 3.4* 3.7  3.4*     Plan  - Replete potassium per protocol  - Monitor potassium Daily  - Patient's hypokalemia is worsening. Will adjust treatment as follows: replace PRN    Chronic combined systolic and diastolic CHF (congestive heart failure)  Patient has Combined Systolic and Diastolic heart failure that is Chronic. On presentation their CHF was well compensated.  Last EF 30-35%    Due to presenting hypotension, hyponatremia and initially concern for hypovolemia.  Will hold home Entresto + Spironolactone + Furosemide.  Resume when clinically appropriate.   - CT with sign of volume overload- anasarca and pleural effusion. Given soft pressures and cont hyponatremia will spot dose diuretics and fluid restrict, adjusting regimen as needed      Controlled type 2 diabetes mellitus with microalbuminuria, without long-term current use of insulin  Patient's FSGs are controlled on current medication  regimen.  Last A1c reviewed-   Lab Results   Component Value Date    HGBA1C 6.2 (H) 11/26/2024     Most recent fingerstick glucose reviewed-   Recent Labs   Lab 02/18/25  2058 02/19/25  0706 02/19/25  1033   POCTGLUCOSE 226* 183* 290*       Current correctional scale  Low  Maintain anti-hyperglycemic dose as follows-   Antihyperglycemics (From admission, onward)      Start     Stop Route Frequency Ordered    02/15/25 0127  insulin aspart U-100 pen 0-5 Units         -- SubQ Before meals & nightly PRN 02/15/25 0027          Hold Oral hypoglycemics while patient is in the hospital.        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 The patient's most recent sodium results are listed below.  Recent Labs     02/18/25  1718 02/19/25  0009 02/19/25  0534   * 125* 128*  127*       Plan  - Correct the sodium by 4-6mEq in 24 hours.   - Obtain the following studies:  urine study showed urine sodium 33, urine osmolality 351, urine creatinine 45, urine chloride 64. 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 The patient is not undergoing active chemotherapy. .Their staging information is listed below.    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   Will hold home Entresto + Spironolactone + Furosemide.  Resume when clinically appropriate.     - monitor and adjust regimen as needed  - BP still low, cont to hold      VTE Risk Mitigation (From admission, onward)           Ordered     enoxaparin injection 30 mg  Daily         02/15/25 1231     IP VTE HIGH RISK PATIENT  Once         02/15/25 0038     Place sequential compression device  Until discontinued         02/15/25 0038                    Discharge Planning   ISH: 2/20/2025     Code Status: DNR   Medical Readiness for Discharge Date: 2/20/2025  Discharge Plan A: Home with family                Please place Justification for DME        Omega Quiñones DO  Department of Hospital Medicine   [facility]

## 2025-02-20 NOTE — NURSING
Patient awake, alert, and oriented x 2. Lying in bed-semi fowlers position. NAD noted. Respirations are even and unlabored. Plan of care reviewed. Education provided. Instruction given on use of call light. Bed locked and in lowest position. All safety measures are in place. Communication board updated with direct nursing extension. Ms. Hills's plan of care will continue.

## 2025-02-20 NOTE — ASSESSMENT & PLAN NOTE
Hyponatremia        Plan and recommendation      - Na is 132 128 up from 128 improving   - status post 0.5 L of normal saline, yesterday,  - Plan is to Correct the sodium by 4-6mEq in 24 hours.   - Hyponatremia likely mixed picture of syndrome of  inappropriate antidiuretic hormone, secondary to chronic pain and metastatic neuroendocrine cancer/intravascular volume depletion due to poor oral intake due cancer anorexia   - Repeated lab showed urine sodium of 13, urine osmolality 394, urine chloride 64, urine creatinine 45, urine specific gravity more than 1030  - Proper pain control  - Monitor sodium daily

## 2025-02-20 NOTE — PLAN OF CARE
02/20/25 1602   Post-Acute Status   Post-Acute Authorization Hospice   Hospice Status Referrals Sent     SW followed up with pt's sister, Shayla and niece, Geraldine regarding hospice referrals. Geraldine reported that she would like a referral sent to [facility].    SW faxed referral to [facility] at [phone] for review and will continue to follow up.    UPDATE    SW contacted [facility] ([phone]) regarding the referral and was informed that they have a meeting scheduled with the family this afternoon.    MACKENZIE reached out to Dr. Duff for hospice orders and will fax over for review once placed.      Janel Pressley, YARIEL  [facility]  Ext. [phone]

## 2025-02-21 VITALS
RESPIRATION RATE: 18 BRPM | SYSTOLIC BLOOD PRESSURE: 146 MMHG | DIASTOLIC BLOOD PRESSURE: 78 MMHG | HEART RATE: 101 BPM | OXYGEN SATURATION: 97 % | TEMPERATURE: 98 F | WEIGHT: 114.19 LBS | BODY MASS INDEX: 19.5 KG/M2 | HEIGHT: 64 IN

## 2025-02-21 DIAGNOSIS — E87.6 HYPOKALEMIA: ICD-10-CM

## 2025-02-21 LAB
ALBUMIN SERPL BCP-MCNC: 2 G/DL (ref 3.5–5.2)
ALP SERPL-CCNC: 715 U/L (ref 40–150)
ALT SERPL W/O P-5'-P-CCNC: 109 U/L (ref 10–44)
ANION GAP SERPL CALC-SCNC: 8 MMOL/L (ref 8–16)
AST SERPL-CCNC: 51 U/L (ref 10–40)
BASOPHILS # BLD AUTO: 0.04 K/UL (ref 0–0.2)
BASOPHILS NFR BLD: 0.6 % (ref 0–1.9)
BILIRUB SERPL-MCNC: 1.1 MG/DL (ref 0.1–1)
BUN SERPL-MCNC: 13 MG/DL (ref 8–23)
CALCIUM SERPL-MCNC: 8.7 MG/DL (ref 8.7–10.5)
CHLORIDE SERPL-SCNC: 94 MMOL/L (ref 95–110)
CO2 SERPL-SCNC: 30 MMOL/L (ref 23–29)
CREAT SERPL-MCNC: 0.6 MG/DL (ref 0.5–1.4)
DIFFERENTIAL METHOD BLD: ABNORMAL
EOSINOPHIL # BLD AUTO: 0.1 K/UL (ref 0–0.5)
EOSINOPHIL NFR BLD: 0.7 % (ref 0–8)
ERYTHROCYTE [DISTWIDTH] IN BLOOD BY AUTOMATED COUNT: 12.6 % (ref 11.5–14.5)
EST. GFR  (NO RACE VARIABLE): >60 ML/MIN/1.73 M^2
GLUCOSE SERPL-MCNC: 161 MG/DL (ref 70–110)
HCT VFR BLD AUTO: 37.8 % (ref 37–48.5)
HGB BLD-MCNC: 12 G/DL (ref 12–16)
IMM GRANULOCYTES # BLD AUTO: 0.15 K/UL (ref 0–0.04)
IMM GRANULOCYTES NFR BLD AUTO: 2.1 % (ref 0–0.5)
LYMPHOCYTES # BLD AUTO: 1 K/UL (ref 1–4.8)
LYMPHOCYTES NFR BLD: 14.1 % (ref 18–48)
MAGNESIUM SERPL-MCNC: 1.8 MG/DL (ref 1.6–2.6)
MCH RBC QN AUTO: 29.8 PG (ref 27–31)
MCHC RBC AUTO-ENTMCNC: 31.7 G/DL (ref 32–36)
MCV RBC AUTO: 94 FL (ref 82–98)
MONOCYTES # BLD AUTO: 0.6 K/UL (ref 0.3–1)
MONOCYTES NFR BLD: 8.4 % (ref 4–15)
NEUTROPHILS # BLD AUTO: 5.2 K/UL (ref 1.8–7.7)
NEUTROPHILS NFR BLD: 74.1 % (ref 38–73)
NRBC BLD-RTO: 0 /100 WBC
PHOSPHATE SERPL-MCNC: 3.3 MG/DL (ref 2.7–4.5)
PLATELET # BLD AUTO: 283 K/UL (ref 150–450)
PMV BLD AUTO: 9.2 FL (ref 9.2–12.9)
POCT GLUCOSE: 166 MG/DL (ref 70–110)
POCT GLUCOSE: 195 MG/DL (ref 70–110)
POTASSIUM SERPL-SCNC: 4.2 MMOL/L (ref 3.5–5.1)
PROT SERPL-MCNC: 5.9 G/DL (ref 6–8.4)
RBC # BLD AUTO: 4.03 M/UL (ref 4–5.4)
SODIUM SERPL-SCNC: 132 MMOL/L (ref 136–145)
WBC # BLD AUTO: 7.03 K/UL (ref 3.9–12.7)

## 2025-02-21 PROCEDURE — 97535 SELF CARE MNGMENT TRAINING: CPT | Mod: HCNC,CO

## 2025-02-21 PROCEDURE — 25000003 PHARM REV CODE 250: Mod: HCNC | Performed by: HOSPITALIST

## 2025-02-21 PROCEDURE — 83735 ASSAY OF MAGNESIUM: CPT | Mod: HCNC | Performed by: STUDENT IN AN ORGANIZED HEALTH CARE EDUCATION/TRAINING PROGRAM

## 2025-02-21 PROCEDURE — 97116 GAIT TRAINING THERAPY: CPT | Mod: HCNC

## 2025-02-21 PROCEDURE — 63600175 PHARM REV CODE 636 W HCPCS: Mod: HCNC | Performed by: STUDENT IN AN ORGANIZED HEALTH CARE EDUCATION/TRAINING PROGRAM

## 2025-02-21 PROCEDURE — 36415 COLL VENOUS BLD VENIPUNCTURE: CPT | Mod: HCNC | Performed by: STUDENT IN AN ORGANIZED HEALTH CARE EDUCATION/TRAINING PROGRAM

## 2025-02-21 PROCEDURE — 84100 ASSAY OF PHOSPHORUS: CPT | Mod: HCNC | Performed by: STUDENT IN AN ORGANIZED HEALTH CARE EDUCATION/TRAINING PROGRAM

## 2025-02-21 PROCEDURE — 25000003 PHARM REV CODE 250: Mod: HCNC

## 2025-02-21 PROCEDURE — 97530 THERAPEUTIC ACTIVITIES: CPT | Mod: HCNC,CO

## 2025-02-21 PROCEDURE — 99233 SBSQ HOSP IP/OBS HIGH 50: CPT | Mod: HCNC,,, | Performed by: INTERNAL MEDICINE

## 2025-02-21 PROCEDURE — 25000003 PHARM REV CODE 250: Mod: HCNC | Performed by: STUDENT IN AN ORGANIZED HEALTH CARE EDUCATION/TRAINING PROGRAM

## 2025-02-21 PROCEDURE — 85025 COMPLETE CBC W/AUTO DIFF WBC: CPT | Mod: HCNC | Performed by: STUDENT IN AN ORGANIZED HEALTH CARE EDUCATION/TRAINING PROGRAM

## 2025-02-21 PROCEDURE — 80053 COMPREHEN METABOLIC PANEL: CPT | Mod: HCNC | Performed by: STUDENT IN AN ORGANIZED HEALTH CARE EDUCATION/TRAINING PROGRAM

## 2025-02-21 RX ORDER — SODIUM CHLORIDE 9 MG/ML
INJECTION, SOLUTION INTRAVENOUS CONTINUOUS
Status: DISCONTINUED | OUTPATIENT
Start: 2025-02-21 | End: 2025-02-21 | Stop reason: HOSPADM

## 2025-02-21 RX ADMIN — CHOLECALCIFEROL TAB 25 MCG (1000 UNIT) 1000 UNITS: 25 TAB at 09:02

## 2025-02-21 RX ADMIN — CEFTRIAXONE 2 G: 2 INJECTION, POWDER, FOR SOLUTION INTRAMUSCULAR; INTRAVENOUS at 09:02

## 2025-02-21 RX ADMIN — SODIUM CHLORIDE: 9 INJECTION, SOLUTION INTRAVENOUS at 01:02

## 2025-02-21 RX ADMIN — FERROUS SULFATE TAB EC 325 MG (65 MG FE EQUIVALENT) 1 EACH: 325 (65 FE) TABLET DELAYED RESPONSE at 09:02

## 2025-02-21 RX ADMIN — EZETIMIBE 10 MG: 10 TABLET ORAL at 09:02

## 2025-02-21 RX ADMIN — SIMETHICONE 80 MG: 80 TABLET, CHEWABLE ORAL at 09:02

## 2025-02-21 RX ADMIN — CYANOCOBALAMIN TAB 1000 MCG 1000 MCG: 1000 TAB at 09:02

## 2025-02-21 NOTE — ASSESSMENT & PLAN NOTE
Hyponatremia        Plan and recommendation      - Na is 132 stable and improved from 128   - CO2 30, hypochloremic, chloride 94, poor oral intake, poor fluid intake (metabolic alkalosis)  - we recommed 0.5 L of normal saline,   - Hyponatremia likely mixed picture of syndrome of  inappropriate antidiuretic hormone, secondary to chronic pain and metastatic neuroendocrine cancer/intravascular volume depletion due to poor oral intake due cancer anorexia    Plan is to Correct the sodium by 4-6mEq in 24 hours.   - Repeated lab showed urine sodium of 13, urine osmolality 394, urine chloride 64, urine creatinine 45, urine specific gravity more than 1030  - Proper pain control  - Monitor sodium daily  - Per primary team discharge disposition, patient is leaving to hospice

## 2025-02-21 NOTE — PLAN OF CARE
02/21/25 0920   Post-Acute Status   Post-Acute Authorization Hospice   Hospice Status Pending equipment/medication delivery     MACKENZIE spoke with Evelyn with [facility] ([phone]) who reported that their nurse Shelly met with pt and family on yesterday and they are in agreement with moving forward with [facility]. Evelyn reported that pt's DME will be delivered to the home this morning.    MACKENZIE faxed hospice orders to [phone] for review.    MACKENZIE will continue to follow up.      Janel Pressley LMSW  [facility]  Ext. [phone]

## 2025-02-21 NOTE — SUBJECTIVE & OBJECTIVE
Interval History:     NAEON, AFVSS, blood pressure 165/79 mmHg, serum sodium 132, stable, urine output 850 cc, CO2 30, with low chloride. Picture of metabolic alkalosis    Review of patient's allergies indicates:   Allergen Reactions    Epinephrine      carcinoid     Current Facility-Administered Medications   Medication Frequency    acetaminophen tablet 650 mg Q4H PRN    aluminum-magnesium hydroxide-simethicone 200-200-20 mg/5 mL suspension 30 mL QID PRN    cefTRIAXone injection 2 g Q24H    cyanocobalamin tablet 1,000 mcg Daily    dextrose 50% injection 12.5 g PRN    dextrose 50% injection 25 g PRN    diphenoxylate-atropine 2.5-0.025 mg per tablet 1 tablet QID PRN    enoxaparin injection 30 mg Daily    ezetimibe tablet 10 mg Daily    ferrous sulfate tablet 1 each Daily    glucagon (human recombinant) injection 1 mg PRN    glucose chewable tablet 16 g PRN    glucose chewable tablet 24 g PRN    insulin aspart U-100 pen 0-5 Units QID (AC + HS) PRN    latanoprost 0.005 % ophthalmic solution 1 drop Nightly    melatonin tablet 6 mg Nightly PRN    naloxone 0.4 mg/mL injection 0.02 mg PRN    ondansetron injection 4 mg Q8H PRN    oxyCODONE immediate release tablet 5 mg Q6H PRN    polyethylene glycol packet 17 g BID PRN    prochlorperazine injection Soln 5 mg Q6H PRN    simethicone chewable tablet 80 mg TID PC    sodium chloride 0.9% flush 10 mL Q6H PRN    vitamin D 1000 units tablet 1,000 Units Daily       Objective:     Vital Signs (Most Recent):  Temp: 98 °F (36.7 °C) (02/21/25 0722)  Pulse: 90 (02/21/25 0722)  Resp: (!) 2 (02/21/25 0722)  BP: (!) 165/79 (02/21/25 0722)  SpO2: 97 % (02/21/25 0722) Vital Signs (24h Range):  Temp:  [98 °F (36.7 °C)-98.7 °F (37.1 °C)] 98 °F (36.7 °C)  Pulse:  [] 90  Resp:  [2-20] 2  SpO2:  [93 %-97 %] 97 %  BP: (120-165)/() 165/79     Weight: 51.8 kg (114 lb 3.2 oz) (02/17/25 0238)  Body mass index is 19.6 kg/m².  Body surface area is 1.53 meters squared.    I/O last 3 completed  shifts:  In: -   Out: 1650 [Urine:1650]     Physical Exam  Constitutional:       General: She is not in acute distress.     Appearance: She is ill-appearing. She is not toxic-appearing.   Eyes:      Extraocular Movements: Extraocular movements intact.      Pupils: Pupils are equal, round, and reactive to light.   Cardiovascular:      Rate and Rhythm: Normal rate.      Heart sounds: No murmur heard.  Pulmonary:      Effort: No respiratory distress.      Breath sounds: No wheezing or rales.   Abdominal:      General: There is no distension.      Tenderness: There is no abdominal tenderness. There is no guarding.   Musculoskeletal:      Right lower leg: No edema.      Left lower leg: No edema.   Skin:     Coloration: Skin is pale. Skin is not jaundiced.   Neurological:      Mental Status: She is oriented to person, place, and time.          Significant Labs:  ABGs:   Recent Labs   Lab 02/14/25  1512   PH 7.424   PCO2 52.7*   HCO3 34.5*   POCSATURATED 58   BE 10*     CBC:   Recent Labs   Lab 02/21/25  0645   WBC 7.03   RBC 4.03   HGB 12.0   HCT 37.8      MCV 94   MCH 29.8   MCHC 31.7*     CMP:   Recent Labs   Lab 02/21/25  0645   *   CALCIUM 8.7   ALBUMIN 2.0*   PROT 5.9*   *   K 4.2   CO2 30*   CL 94*   BUN 13   CREATININE 0.6   ALKPHOS 715*   *   AST 51*   BILITOT 1.1*     Recent Labs   Lab 02/14/25  1539   TSH 0.685     Recent Labs   Lab 02/18/25  1319   COLORU Yellow   SPECGRAV >1.030*   PHUR 6.0   PROTEINUA Trace*   BACTERIA Occasional   NITRITE Negative   LEUKOCYTESUR 2+*

## 2025-02-21 NOTE — PT/OT/SLP PROGRESS
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  She presents with the following performance deficits affecting function are weakness, impaired endurance, impaired self care skills, impaired functional mobility, gait instability, impaired balance, impaired cardiopulmonary response to activity, decreased safety awareness, decreased lower extremity function, decreased upper extremity function, decreased coordination. Patient is demonstrated overall good effort and improved occupational performance with functional transfer/mobility today. Patient continues to demonstrate the need for moderate intensity therapy on a daily basis post acute exhibited by decreased independence with self-care and functional mobility    Rehab Prognosis:  Good; patient would benefit from acute skilled OT services to address these deficits and reach maximum level of function.       Plan:     Patient to be seen 3 x/week to address the above listed problems via self-care/home management, therapeutic activities, therapeutic exercises  Plan of Care Expires: 03/19/25  Plan of Care Reviewed with: patient    Subjective     Chief Complaint: "I will try. I haven't walked since I've been here and my legs feel heavy."  Patient/Family Comments/goals: "Hopefully I get to go home today."  Pain/Comfort:  Pain Rating 1: 0/10  Pain Rating Post-Intervention 1: 0/10    Objective:     Communicated with: nurse harding and OTR prior to session.  Patient found HOB " elevated with telemetry, Alex upon OT entry to room.  A client care conference was completed by the OTR and the FIGUEROA prior to treatment by the FIGUEROA to discuss the patient's POC and current status.    General Precautions: Standard, fall    Orthopedic Precautions:N/A  Braces: N/A  Respiratory Status: Room air     Occupational Performance:     Bed Mobility:    Patient completed Scooting/Bridging with contact guard assistance  Patient completed Supine to Sit with contact guard assistance     Functional Mobility/Transfers:  Patient completed Sit <> Stand Transfer with minimum assistance  with  rolling walker   Patient completed Bed > Chair Transfer using Step Transfer technique with minimum assistance with rolling walker  Functional Mobility: within room 4 ft forward using RW with CGA    Activities of Daily Living:  Grooming: stand by assistance facial hygiene seated in chair  Upper Body Dressing: minimum assistance don gown as a robe seated EOB      [facility] 6 Click ADL: 15    Treatment & Education:  Discussed current progress. Patient instructed to press call button when ready to get into bed with nurse assist. Patient verbalized understanding. Addressed all patient questions/concerns within FIGUEROA scope of practice.     Patient left up in chair with all lines intact, call button in reach, and nurse notified    GOALS:   Multidisciplinary Problems       Occupational Therapy Goals          Problem: Occupational Therapy    Goal Priority Disciplines Outcome Interventions   Occupational Therapy Goal     OT, PT/OT Progressing    Description: Goals to be met by: 3/19/2025     Patient will increase functional independence with ADLs by performing:    UE Dressing with Supervision.  LE Dressing with Supervision.  Grooming while standing with Supervision.  Toileting from toilet with Contact Guard Assistance for hygiene and clothing management.   Supine to sit with Supervision.  Toilet transfer to toilet with Supervision.                            DME Justifications:   Shahram requires a commode for home use because she is confined to a single room.  Shahram requires a hospital bed due to her requiring positioning of the body in ways not feasible with an ordinary bed to alleviate pain and cannot independently make changes in body position without the use of the bed.The positioning of the body cannot be sufficiently resolved by the use of pillows and wedges.  Shahram Hills has a mobility limitation that significantly impairs her ability to participate in one or more mobility related activities of daily living (MRADLs) such as toileting, feeding, dressing, grooming, and bathing in customary locations in the home.  The mobility limitation cannot be sufficiently resolved by the use of a cane or walker.   The use of a manual wheelchair will significantly improve the patients ability to participate in MRADLS and the patient will use it on regular basis in the home.  Shahram Hills has expressed her willingness to use a manual wheelchair in the home. Patients upper body strength is sufficient for propulsion.  She also has a caregiver who is available, willing, and able to provide assistance with the wheelchair when needed.     Shahram's mobility limitation cannot be sufficiently resolved by the use of a cane. Her functional mobility deficit can be sufficiently resolved with the use of a Rolling Walker. Patient's mobility limitation significantly impairs their ability to participate in one of more activities of daily living.  The use of a RW will significantly improve the patient's ability to participate in MRADLS and the patient will use it on regular basis in the home.    Time Tracking:     OT Date of Treatment: 02/21/25  OT Start Time: 0842  OT Stop Time: 0905  OT Total Time (min): 23 min    Billable Minutes:Self Care/Home Management 10  Therapeutic Activity 13    OT/LENCHO: LENCHO     Number of LENCHO visits since last OT visit: 1 2/21/2025

## 2025-02-21 NOTE — PLAN OF CARE
02/21/25 1414   Post-Acute Status   Post-Acute Authorization Hospice   Hospice Status Set-up Complete/Auth obtained     SW met with pt and sister, Shayla at bedside and confirmed that pt's DME has been delivered to the home.    SW informed pt and Shayla that she was going to arrange transport to pick pt up; however, Shayla reported that she will be calling family to come pick them up.    SW let pt's nurseAlicia know that pt is medically ready to discharge from a case management standpoint.      Janel Pressley LMSW  [facility]  Ext. [phone]

## 2025-02-21 NOTE — PT/OT/SLP PROGRESS
Physical Therapy Treatment/Change Plan of Care    Patient Name:  Shahram Hills   MRN:  [id number]    Recommendations:     Discharge Recommendations: Moderate Intensity Therapy  Discharge Equipment Recommendations: walker, rolling  Barriers to discharge: Decreased caregiver support    Assessment:     Shahram Hills is a 84 y.o. 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  She presents with the following impairments/functional limitations: impaired endurance, impaired functional mobility, gait instability, impaired balance, decreased safety awareness pt tolerated treatment well and will benefit from skilled PT 3x/wk to progress physically.  Pt is significantly below previous functional level, increased risk of falls and increased burden of care currently. Patient continues to demonstrate the need for moderate intensity therapy on a daily basis post acute exhibited by decreased independence with functional mobility    Rehab Prognosis: Good; patient would benefit from acute skilled PT services to address these deficits and reach maximum level of function.    Recent Surgery: * No surgery found *      Plan:     During this hospitalization, patient to be seen 3 x/week to address the identified rehab impairments via gait training, therapeutic activities, therapeutic exercises and progress toward the following goals:    Plan of Care Expires:  03/19/25    Subjective     Chief Complaint: pt had no complaints during treatment.   Patient/Family Comments/goals: sister's goals:pt to be able to help herself  Pain/Comfort:  Pain Rating 1: 0/10  Pain Rating Post-Intervention 1: 0/10      Objective:     Communicated with nurse  prior to session.  Patient found up in chair with telemetry, PureWick (hep lock IV) upon PT entry to room.     General Precautions: Standard, fall  Orthopedic Precautions: N/A  Braces: N/A  Respiratory Status: Room air     Functional  Mobility:  Transfers:  pt needed verbal cues for hand placement and sequencing for functional mobility.    Sit to Stand:  contact guard assistance with rolling walker    Gait: pt received gait training ~ 34 ft with RW and CGA.    Pt white board updated with current therapists name and level of mobility assistance needed.       AM-PAC 6 CLICK MOBILITY  Turning over in bed (including adjusting bedclothes, sheets and blankets)?: 3  Sitting down on and standing up from a chair with arms (e.g., wheelchair, bedside commode, etc.): 3  Moving from lying on back to sitting on the side of the bed?: 3  Moving to and from a bed to a chair (including a wheelchair)?: 3  Need to walk in hospital room?: 3  Climbing 3-5 steps with a railing?: 2  Basic Mobility Total Score: 17       Treatment & Education:  Pt and sister received verbal instructions in PT POC and both verbally expressed understanding of such.     Patient left up in chair with all lines intact, call button in reach, and sister present..    GOALS:   Multidisciplinary Problems       Physical Therapy Goals          Problem: Physical Therapy    Goal Priority Disciplines Outcome Interventions   Physical Therapy Goal     PT, PT/OT Progressing    Description: Goals to be met by: 3/6/25    Patient will increase functional independence with mobility by performin. Supine to sit with Stand-by Assistance  2. Sit to supine with Stand-by Assistance  3. Sit to stand transfer with Stand-by Assistance  4. Gait  x 100 feet with Contact Guard Assistance using LRAD.   5. Ascend/descend 4 stair with bilateral Handrails Contact Guard Assistance using LRAD or no AD.                          DME Justifications:   Shahram's mobility limitation cannot be sufficiently resolved by the use of a cane. Her functional mobility deficit can be sufficiently resolved with the use of a Rolling Walker. Patient's mobility limitation significantly impairs their ability to participate in one of more  activities of daily living.  The use of a RW will significantly improve the patient's ability to participate in MRADLS and the patient will use it on regular basis in the home.    Time Tracking:     PT Received On: 02/21/25  PT Start Time: 1056     PT Stop Time: 1107  PT Total Time (min): 11 min     Billable Minutes: Gait Training 11 min     Treatment Type: Treatment  PT/PTA: PT     Number of PTA visits since last PT visit: 0     02/21/2025

## 2025-02-21 NOTE — PLAN OF CARE
Problem: Physical Therapy  Goal: Physical Therapy Goal  Description: Goals to be met by: 3/6/25    Patient will increase functional independence with mobility by performin. Supine to sit with Stand-by Assistance  2. Sit to supine with Stand-by Assistance  3. Sit to stand transfer with Stand-by Assistance  4. Gait  x 100 feet with Contact Guard Assistance using LRAD.   5. Ascend/descend 4 stair with bilateral Handrails Contact Guard Assistance using LRAD or no AD.     Outcome: Progressing   Goals remain appropriate. 2025

## 2025-02-21 NOTE — PROGRESS NOTES
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   She had recent hepatic artery embolization for management of metastatic liver lesions on 02/07.  Over the weekend patient had the onset of abdominal pain,  [place] [facility] [place] for constipation, s/p KUB,  lab studies - transaminase values were in 100s, patient given an enema and discharged with Rx for Miralax. Since this time no recurrent constipation, patient has had 2-3 loose bowel movements per day.  She has had poor appetite, mainly drinking fluids at home.  She has c/o of right sided and right flank abdominal pain.      She has had prior hepatic embolization before for management of liver metastases, she has experienced abdominal pain afterward, but has not required hospitalization post-procedure.  Patient endorsed thirst feeling, not able to quantify the amount of fluid that she has been consuming, denied any history of recent medication change, polyuria, polydipsia, depression or anxiety medication, she is not on insomnia medication neither. She denies any fevers/chills, no chest pain or dyspnea, no skin rashes, no reported bleeding/bruising abnormality.  Nephrology has been consulted to manage hyponatremia           Interval History:     LINDA ZARCO, blood pressure 165/79 mmHg, serum sodium 132, stable, urine output 850 cc, CO2 30, with low chloride. Picture of metabolic alkalosis    Review of patient's allergies indicates:   Allergen Reactions    Epinephrine      carcinoid     Current  Facility-Administered Medications   Medication Frequency    acetaminophen tablet 650 mg Q4H PRN    aluminum-magnesium hydroxide-simethicone 200-200-20 mg/5 mL suspension 30 mL QID PRN    cefTRIAXone injection 2 g Q24H    cyanocobalamin tablet 1,000 mcg Daily    dextrose 50% injection 12.5 g PRN    dextrose 50% injection 25 g PRN    diphenoxylate-atropine 2.5-0.025 mg per tablet 1 tablet QID PRN    enoxaparin injection 30 mg Daily    ezetimibe tablet 10 mg Daily    ferrous sulfate tablet 1 each Daily    glucagon (human recombinant) injection 1 mg PRN    glucose chewable tablet 16 g PRN    glucose chewable tablet 24 g PRN    insulin aspart U-100 pen 0-5 Units QID (AC + HS) PRN    latanoprost 0.005 % ophthalmic solution 1 drop Nightly    melatonin tablet 6 mg Nightly PRN    naloxone 0.4 mg/mL injection 0.02 mg PRN    ondansetron injection 4 mg Q8H PRN    oxyCODONE immediate release tablet 5 mg Q6H PRN    polyethylene glycol packet 17 g BID PRN    prochlorperazine injection Soln 5 mg Q6H PRN    simethicone chewable tablet 80 mg TID PC    sodium chloride 0.9% flush 10 mL Q6H PRN    vitamin D 1000 units tablet 1,000 Units Daily       Objective:     Vital Signs (Most Recent):  Temp: 98 °F (36.7 °C) (02/21/25 0722)  Pulse: 90 (02/21/25 0722)  Resp: (!) 2 (02/21/25 0722)  BP: (!) 165/79 (02/21/25 0722)  SpO2: 97 % (02/21/25 0722) Vital Signs (24h Range):  Temp:  [98 °F (36.7 °C)-98.7 °F (37.1 °C)] 98 °F (36.7 °C)  Pulse:  [] 90  Resp:  [2-20] 2  SpO2:  [93 %-97 %] 97 %  BP: (120-165)/() 165/79     Weight: 51.8 kg (114 lb 3.2 oz) (02/17/25 0505)  Body mass index is 19.6 kg/m².  Body surface area is 1.53 meters squared.    I/O last 3 completed shifts:  In: -   Out: 1650 [Urine:1650]     Physical Exam  Constitutional:       General: She is not in acute distress.     Appearance: She is ill-appearing. She is not toxic-appearing.   Eyes:      Extraocular Movements: Extraocular movements intact.      Pupils: Pupils are  equal, round, and reactive to light.   Cardiovascular:      Rate and Rhythm: Normal rate.      Heart sounds: No murmur heard.  Pulmonary:      Effort: No respiratory distress.      Breath sounds: No wheezing or rales.   Abdominal:      General: There is no distension.      Tenderness: There is no abdominal tenderness. There is no guarding.   Musculoskeletal:      Right lower leg: No edema.      Left lower leg: No edema.   Skin:     Coloration: Skin is pale. Skin is not jaundiced.   Neurological:      Mental Status: She is oriented to person, place, and time.          Significant Labs:  ABGs:   Recent Labs   Lab 02/14/25  1512   PH 7.424   PCO2 52.7*   HCO3 34.5*   POCSATURATED 58   BE 10*     CBC:   Recent Labs   Lab 02/21/25  0645   WBC 7.03   RBC 4.03   HGB 12.0   HCT 37.8      MCV 94   MCH 29.8   MCHC 31.7*     CMP:   Recent Labs   Lab 02/21/25  0645   *   CALCIUM 8.7   ALBUMIN 2.0*   PROT 5.9*   *   K 4.2   CO2 30*   CL 94*   BUN 13   CREATININE 0.6   ALKPHOS 715*   *   AST 51*   BILITOT 1.1*     Recent Labs   Lab 02/14/25  1539   TSH 0.685     Recent Labs   Lab 02/18/25  1319   COLORU Yellow   SPECGRAV >1.030*   PHUR 6.0   PROTEINUA Trace*   BACTERIA Occasional   NITRITE Negative   LEUKOCYTESUR 2+*     Assessment/Plan:     Endocrine  Hyponatremia  Hyponatremia        Plan and recommendation      - Na is 132 stable and improved from 128   - CO2 30, hypochloremic, chloride 94, poor oral intake, poor fluid intake (metabolic alkalosis)  - we recommed 0.5 L of normal saline,   - Hyponatremia likely mixed picture of syndrome of  inappropriate antidiuretic hormone, secondary to chronic pain and metastatic neuroendocrine cancer/intravascular volume depletion due to poor oral intake due cancer anorexia    Plan is to Correct the sodium by 4-6mEq in 24 hours.   - Repeated lab showed urine sodium of 13, urine osmolality 394, urine chloride 64, urine creatinine 45, urine specific gravity more than  1030  - Proper pain control  - Monitor sodium daily  - Per primary team discharge disposition, patient is leaving to hospice          Thank you for your consult. I will sign off. Please contact us if you have any additional questions.    Shakira Alaniz MD  Nephrology  [facility]

## 2025-02-21 NOTE — PLAN OF CARE
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 [T81.718A]  Yes    Peritonitis [K65.9]  No    Moderate protein-calorie malnutrition [E44.0]  Yes    Transaminitis [R74.01]  Yes    ACP (advance care planning) [Z71.89]  Not Applicable    Intussusception [K56.1]  Yes    Hypokalemia [E87.6]  Yes    Hypomagnesemia [E83.42]  Yes    Chronic combined systolic and diastolic CHF (congestive heart failure) [I50.42]  Yes    Controlled type 2 diabetes mellitus with microalbuminuria, without long-term current use of insulin [E11.29, R80.9]  Yes    Hyponatremia [E87.1]  Yes    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 [C7B.8]  Yes    Essential hypertension [I10]  Yes      Resolved Hospital Problems   No resolved problems to display.       Hospice Qualifying Diagnoses: Metastatic cancer       Patient has a life expectancy < 6 months due to:  Primary Hospice Diagnosis:  Metastatic cancer  Comorbid Conditions Contributing to Decline:  recurrent infection    Vital Signs: Routine per Hospice Protocol.    Code Status: DNR    Allergies:   Review of patient's allergies indicates:   Allergen Reactions    Epinephrine      carcinoid       Diet: Soft and bite sized    Activities: As tolerated    Goals of Care Treatment Preferences:  Code Status: DNR    Health care agent: Shayla Rosenthal (sister)  Health care agent number: [phone]    Living Will: Yes              Nursing: Per Hospice Routine.    Routine Skin for Bedridden Patients: Apply moisture barrier cream to all skin folds and   wet areas in perineal area daily and after baths and all bowel movements.       Medication List        CONTINUE taking these medications   "    cyanocobalamin 1000 MCG tablet  Commonly known as: VITAMIN B-12  Take 1 tablet (1,000 mcg total) by mouth once daily.     diphenoxylate-atropine 2.5-0.025 mg 2.5-0.025 mg per tablet  Commonly known as: LOMOTIL  Take 1 tablet by mouth 4 (four) times daily as needed for Diarrhea.     ezetimibe 10 mg tablet  Commonly known as: ZETIA  Take 10 mg by mouth once daily.     ferrous sulfate 325 (65 FE) MG EC tablet  Take 325 mg by mouth once daily.     latanoprost 0.005 % ophthalmic solution  Place 1 drop into both eyes nightly.     polyethylene glycol 17 gram Pwpk  Commonly known as: GLYCOLAX  Take 17 g by mouth once daily.     vitamin D 1000 units Tab  Commonly known as: VITAMIN D3  Take 1,000 Units by mouth once daily.            STOP taking these medications      blood sugar diagnostic Strp  Commonly known as: TRUE METRIX GLUCOSE TEST STRIP     blood-glucose meter kit     carvediloL 12.5 MG tablet  Commonly known as: COREG     ENTRESTO 24-26 mg per tablet  Generic drug: sacubitriL-valsartan     furosemide 20 MG tablet  Commonly known as: LASIX     glipiZIDE 5 MG tablet  Commonly known as: GLUCOTROL     lactulose 10 gram/15 mL solution  Commonly known as: CHRONULAC     lancets Misc     linaCLOtide 72 mcg Cap capsule  Commonly known as: LINZESS     needle (disp) 22 G 22 gauge x 1" Ndle     ondansetron 8 MG Tbdl  Commonly known as: ZOFRAN-ODT     spironolactone 25 MG tablet  Commonly known as: ALDACTONE     syringe (disposable) 1 mL Syrg     TRUEPLUS LANCETS 33 gauge Misc  Generic drug: lancets     XERMELO 250 mg Tab  Generic drug: telotristat ethyl     XIIDRA 5 % Dpet  Generic drug: lifitegrast            DIABETES CARE:   Nurse to perform and educate diabetic management with blood glucose monitoring:   Fingerstick blood sugar a.m. and p.m.    Report CBG < 60 or > 350 to physician.         Insulin Sliding Scale         Glucose  Novolog Insulin Subcutaneous        0 - 60   Orange juice or glucose tablet      No " insulin   201-250  2 units   251-300  4 units   301-350  6 units   351-400  8 units   >400   10 units then call physician      Future Orders:  Hospice Medical Director may dictate new orders for comfortable care measures & sign death certificate.        _________________________________  Omega Quiñones, DO  02/21/2025

## 2025-02-22 NOTE — DISCHARGE SUMMARY
[facility] [address] [place] [address] Medicine  Discharge Summary      Patient Name: Shahram Hills  MRN: [id number]  SHUBHAM: [id number]  Patient Class: IP- Inpatient  Admission Date: 2/14/2025  Hospital Length of Stay: 5 days  Discharge Date and Time: 2/21/2025  4:15 PM  Attending Physician: Vandana att. providers found   Discharging Provider: Omega Quiñones DO  Primary Care Provider: Trixie Xie MD  [facility] Medicine Team: [facility] MED O Omega Quiñones DO  Primary Care Team: [facility] HOS[facility] MED O    HPI:   85 y/o AAF w/ Metastatic neuroendocrine tumor, HFrEF, Type 2 DM, HTN, presents to the ED w/ complaints of abdominal pain.   She had recent hepatic artery embolization for management of metastatic liver lesions on 02/07.  Over the weekend patient had the onset of abdominal pain, her niece spoke with patient and noted that patient had also c/o of constipation.  [facility]11 for constipation, s/p KUB,  lab studies - transaminase values were in 100s, patient given an enema and discharged with Rx for Miralax.   Since this time no recurrent constipation, patient has had 2-3 loose bowel movements per day.  She has had poor appetite, mainly drinking fluids at home.   She has c/o of right sided and right flank abdominal pain.     She has had prior hepatic embolization before for management of liver metastases, she has experienced abdominal pain afterward, but has not required hospitalization post-procedure.    She denies any fevers/chills, no chest pain or dyspnea, no skin rashes, no reported bleeding/bruising abnormality.     She is found with multiple electrolyte abnormalities today and rising hepatic enzyme values.     ED Treatment: Vancomycin 1grm, Zosyn 4.5gm, Potassium IV 10meq x1, Magnesium 2gram IVx1,     * No surgery found *      Hospital Course:   Admitted for abd pain likely 2/2 post embolization syndrome. Started on MM pain control.   Electrolyte abnormalities consistent with decreased PO  intake. Started on IVF. Replacing electrolytes as needed. Started salt tabs, hyponatremia was slowly improving. Hyponatremia worse, started fluid restriction and nephrology consulted. On repeat CT with anasarca and ascites given lasix x1. 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 Mixed picture as well volume depletion likely contributing given poor PO intake/anorexia.   Worsening abd distension and reports not passing gas. Repeat CT without obstruction and patient had BM with improvement of distension.   CT concerning for peritonitis, para ordered but small volume ascites, no safe window for drainage. Ascites thought to be from metastatic disease. Started on empiric CTX for SBP; dc'd as surgery do not feel physical exam consistent with peritonitis.  CT also showed persistent intussusception, surgery consulted and report risks of surgery outweigh any benefit of a segmental resection of her intussusception given her metastatic status.   Palliative consulted for GOC discussion given advanced metastatic disease. Decided to go home with hospice, SW made aware. Lifted fluid restriction allowing to eat/drink as wanted.     Goals of Care Treatment Preferences:  Code Status: DNR    Health care agent: Shayla Rosenthal (sister)  Health care agent number: [phone]    Living Will: Yes              [facility] Screening:  The patient was screened for utility difficulties, food insecurity, transport difficulties, housing insecurity, and interpersonal safety and there were no concerns identified this admission.     Consults:   Consults (From admission, onward)          Status Ordering Provider     Inpatient consult to Palliative Care  Once        Provider:  (Not yet assigned)    Completed LUISA MERAZ     Inpatient consult to Registered Dietitian/Nutritionist  Once        Provider:  (Not yet assigned)    Completed LUISA MERAZ     Inpatient consult to Nephrology  Once        Provider:  (Not yet assigned)     Completed LUISA MERAZ     Inpatient consult to General Surgery  Once        Provider:  (Not yet assigned)    Completed LUISA MERAZ     Inpatient consult to Hematology/Oncology  Once        Provider:  (Not yet assigned)    Completed LISA BOWERS     Inpatient consult to Interventional Radiology  Once        Provider:  (Not yet assigned)    Completed LISA BOWERS     Inpatient consult to General surgery  Once        Provider:  (Not yet assigned)    Completed ROYAL TRUJILLO            * 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   -s/p blood clutures + empiric antibiotics in ED, continue Zosyn alone empirically. Deescalate if cx remain NG- stopped zosyn given culture NG  -consult oncology and IR- IR report symptoms consistent with post embolic syndrome and recommend medical management of pain, expected rise in LFTs should resolve, cont to monitor   -trend CBC/CMP/coags- LFT improving   -PRN pain medication      Peritonitis  - repeat CT with concern for peritonitis and ascites  - para ordered but no safe window  - started empiric CTX for SBP  - surgery do not feel physical exam consistent with peritonitis, but she has tenderness and elevated inflammatory markers, will cont empiric treatment       Moderate protein-calorie malnutrition  Nutrition consulted. Most recent weight and BMI monitored-     Measurements:  Wt Readings from Last 1 Encounters:   02/17/25 51.8 kg (114 lb 3.2 oz)   Body mass index is 19.6 kg/m².    Patient has been screened and assessed by RD.    Malnutrition Type:  Context: chronic illness  Level: moderate    Malnutrition Characteristic Summary:  Subcutaneous Fat (Malnutrition): moderate depletion  Muscle Mass (Malnutrition): moderate depletion    Interventions/Recommendations  (treatment strategy):  1.) cont encourage PO intake, supplement added      Transaminitis  - expected post embolization, per IR should improve   - imaging with gallstones without cholecystitis or convincing choledocholithiasis, no abscess  - trend LFTs now downtrending         Intussusception  - see on initial imaging  - surgical eval in ED reports: Intussusception is transient and not causing obstruction. This is a normal physiologic finding and is not contributing to her clinical picture   - abd exam with tenderness  - repeat CT with continued intussusception, surgery reconsulted and report: Her intussusception is short segment small bowel without signs of bowel ischemia or obstruction. Contrast has easily traversed this area and she continues to have bowel movements. Given her malignancy and overall clinical status, would not recommend exploratory laparotomy at this time. The risks of surgery outweigh any benefit of a segmental resection of her intussusception given her metastatic status.       ACP (advance care planning)  Advance Care Planning    Date: 02/15/2025    Code Status  In light of the patients advanced and life limiting illness,I engaged the the patient in a voluntary conversation about the patient's preferences for care  at the very end of life. The patient wishes to have a natural, peaceful death.  Along those lines, the patient does not wish to have CPR or other invasive treatments performed when her heart and/or breathing stops. I communicated to the patient that a DNR order would be placed in her medical record to reflect this preference.    A total of 20 min was spent on advance care planning, goals of care discussion, emotional support, formulating and communicating prognosis and exploring burden/benefit of various approaches of treatment. This discussion occurred on a fully voluntary basis with the verbal consent of the patient and/or family.        Sister - Shayla Rosenthal present. Niece on phone      Follow up - recommend completion of LAPOST form with patient with either palliative care or PCP  - palliative care consulted, appreciate assistance with GOC discussions- decision made to go home with hospice       Hypomagnesemia  Patient has Abnormal Magnesium: hypomagnesemia. Will continue to monitor electrolytes closely. Will replace the affected electrolytes and repeat labs to be done after interventions completed. The patient's magnesium results have been reviewed and are listed below.  Recent Labs   Lab 02/19/25  0534   MG 2.4          Hypokalemia  Patient's most recent potassium results are listed below.   Recent Labs     02/18/25  1718 02/19/25  0009 02/19/25  0534   K 3.6 3.4* 3.7  3.4*     Plan  - Replete potassium per protocol  - Monitor potassium Daily  - Patient's hypokalemia is worsening. Will adjust treatment as follows: replace PRN    Chronic combined systolic and diastolic CHF (congestive heart failure)  Patient has Combined Systolic and Diastolic heart failure that is Chronic. On presentation their CHF was well compensated.  Last EF 30-35%    Due to presenting hypotension, hyponatremia and initially concern for hypovolemia.  Will hold home Entresto + Spironolactone + Furosemide.  Resume when clinically appropriate.   - CT with sign of volume overload- anasarca and pleural effusion. Given soft pressures and cont hyponatremia will spot dose diuretics and fluid restrict, adjusting regimen as needed      Controlled type 2 diabetes mellitus with microalbuminuria, without long-term current use of insulin  Patient's FSGs are controlled on current medication regimen.  Last A1c reviewed-   Lab Results   Component Value Date    HGBA1C 6.2 (H) 11/26/2024     Most recent fingerstick glucose reviewed-   Recent Labs   Lab 02/18/25  2058 02/19/25  0706 02/19/25  1033   POCTGLUCOSE 226* 183* 290*       Current correctional scale  Low  Maintain anti-hyperglycemic dose as follows-   Antihyperglycemics (From  admission, onward)      Start     Stop Route Frequency Ordered    02/15/25 0127  insulin aspart U-100 pen 0-5 Units         -- SubQ Before meals & nightly PRN 02/15/25 0027          Hold Oral hypoglycemics while patient is in the hospital.        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 The patient's most recent sodium results are listed below.  Recent Labs     02/18/25  1718 02/19/25  0009 02/19/25  0534   * 125* 128*  127*       Plan  - Correct the sodium by 4-6mEq in 24 hours.   - Obtain the following studies:  urine study showed urine sodium 33, urine osmolality 351, urine creatinine 45, urine chloride 64. 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 The patient is not undergoing active chemotherapy. .Their staging information is listed below.    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   Will hold home Entresto + Spironolactone + Furosemide.  Resume when clinically appropriate.     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 [T81.718A] 02/14/2025 Yes    Peritonitis [K65.9] 02/18/2025 No    Moderate protein-calorie malnutrition [E44.0] 02/17/2025 Yes    Transaminitis [R74.01] 02/16/2025 Yes    ACP (advance care planning) [Z71.89] 02/15/2025 Not Applicable    Intussusception [K56.1] 02/15/2025 Yes    Hypokalemia [E87.6] 02/14/2025 Yes    Hypomagnesemia [E83.42] 02/14/2025 Yes    Chronic combined systolic and diastolic CHF (congestive heart failure) [I50.42] 01/23/2023 Yes    Controlled type 2 diabetes mellitus with microalbuminuria, without long-term current use of insulin [E11.29, R80.9] 12/06/2019 Yes    Hyponatremia [E87.1] 07/11/2018 Yes    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 [C7B.8] 06/07/2018 Yes    Essential hypertension [I10] 03/23/2018 Yes      Problems Resolved During this Admission:       Discharged Condition:  stable    Disposition: Home or Self Care    Follow Up:    Patient Instructions:      Ambulatory referral/consult to Outpatient Case Management   Referral Priority: Routine Referral Type: Consultation   Referral Reason: Specialty Services Required   Number of Visits Requested: 1       Significant Diagnostic Studies: N/A    Pending Diagnostic Studies:       None           Medications:  Reconciled Home Medications:      Medication List        CONTINUE taking these medications      cyanocobalamin 1000 MCG tablet  Commonly known as: VITAMIN B-12  Take 1 tablet (1,000 mcg total) by mouth once daily.    "  diphenoxylate-atropine 2.5-0.025 mg 2.5-0.025 mg per tablet  Commonly known as: LOMOTIL  Take 1 tablet by mouth 4 (four) times daily as needed for Diarrhea.     ezetimibe 10 mg tablet  Commonly known as: ZETIA  Take 10 mg by mouth once daily.     ferrous sulfate 325 (65 FE) MG EC tablet  Take 325 mg by mouth once daily.     latanoprost 0.005 % ophthalmic solution  Place 1 drop into both eyes nightly.     polyethylene glycol 17 gram Pwpk  Commonly known as: GLYCOLAX  Take 17 g by mouth once daily.     vitamin D 1000 units Tab  Commonly known as: VITAMIN D3  Take 1,000 Units by mouth once daily.            STOP taking these medications      blood sugar diagnostic Strp  Commonly known as: TRUE METRIX GLUCOSE TEST STRIP     blood-glucose meter kit     carvediloL 12.5 MG tablet  Commonly known as: COREG     ENTRESTO 24-26 mg per tablet  Generic drug: sacubitriL-valsartan     furosemide 20 MG tablet  Commonly known as: LASIX     glipiZIDE 5 MG tablet  Commonly known as: GLUCOTROL     lactulose 10 gram/15 mL solution  Commonly known as: CHRONULAC     lancets Misc     linaCLOtide 72 mcg Cap capsule  Commonly known as: LINZESS     needle (disp) 22 G 22 gauge x 1" Ndle     ondansetron 8 MG Tbdl  Commonly known as: ZOFRAN-ODT     spironolactone 25 MG tablet  Commonly known as: ALDACTONE     syringe (disposable) 1 mL Syrg     TRUEPLUS LANCETS 33 gauge Misc  Generic drug: lancets     XERMELO 250 mg Tab  Generic drug: telotristat ethyl     XIIDRA 5 % Dpet  Generic drug: lifitegrast              Indwelling Lines/Drains at time of discharge:   Lines/Drains/Airways       Drain  Duration             Female External Urinary Catheter w/ Suction 02/14/25 1553 7 days                    Time spent on the discharge of patient: >35 minutes         Omega Quiñones DO  Department of Hospital Medicine  [facility]  "

## 2025-02-23 LAB
BACTERIA BLD CULT: NORMAL
BACTERIA BLD CULT: NORMAL

## 2025-02-24 ENCOUNTER — PATIENT OUTREACH (OUTPATIENT)
Dept: ADMINISTRATIVE | Facility: CLINIC | Age: 85
End: 2025-02-24
Payer: MEDICARE

## 2025-02-24 ENCOUNTER — OUTPATIENT CASE MANAGEMENT (OUTPATIENT)
Dept: ADMINISTRATIVE | Facility: OTHER | Age: 85
End: 2025-02-24
Payer: MEDICARE

## 2025-02-24 ENCOUNTER — TELEPHONE (OUTPATIENT)
Dept: HEMATOLOGY/ONCOLOGY | Facility: CLINIC | Age: 85
End: 2025-02-24
Payer: MEDICARE

## 2025-02-24 RX ORDER — POTASSIUM CHLORIDE 20 MEQ/1
TABLET, EXTENDED RELEASE ORAL
Qty: 8 TABLET | Refills: 0 | OUTPATIENT
Start: 2025-02-24

## 2025-02-24 NOTE — PROGRESS NOTES
Outpatient Care Management  Patient Not Qualified    Patient: Shahram Hills  MRN:  [id number]  Date of Service:  2/24/2025  Completed by:  Vidya Bahena RN    Chief Complaint   Patient presents with    Case Closure       Patient Summary           Reason Not Qualified:  Other (see comment)  Followed by [facility].

## 2025-02-24 NOTE — PLAN OF CARE
[facility]  Discharge Final Note    Primary Care Provider: Trixie Xie MD    Expected Discharge Date: 2/21/2025    Patient discharged to home via personal transportation.     Patient's bedside nurse and patient/sister, Shayla notified of the above.    Discharge Plan A and Plan B have been determined by review of patient's clinical status, future medical and therapeutic needs, and coverage/benefits for post-acute care in coordination with multidisciplinary team members.        Final Discharge Note (most recent)       Final Note - 02/24/25 0857          Final Note    Assessment Type Final Discharge Note (P)      Anticipated Discharge Disposition Hospice/Home (P)         Post-Acute Status    Post-Acute Authorization Hospice (P)      Hospice Status Set-up Complete/Auth obtained (P)                      Important Message from Medicare                 Future Appointments   Date Time Provider Department Center   2/24/2025  1:00 PM Telma Arellano RD, CDE [facility] [id number] [facility]   2/25/2025  2:40 PM Sebastian Granados MD [facility] [id number] [address]   2/28/2025 11:15 AM INJECTION, [facility] INF[facility]ION [facility]   3/13/2025 10:00 AM Che Atwood MD [facility] ENDODIA [address]   3/17/2025 11:30 AM Trixie Xie MD [facility] IM [facility]   5/27/2025 10:20 AM Ralph Bergeron MD [facility] [id number] [facility]       SW scheduled post-discharge follow-up appointment and information added to AVS.     Patient has been accepted with [facility] and DME has been delivered to patient's home.      Janel Pressley, YARIEL  [facility]  Ext. [phone]   negative...

## 2025-02-24 NOTE — TELEPHONE ENCOUNTER
Called patient per MD to inform her she does not need to continue to take potassium.  Spoke with caregiver and she verbalized understanding.

## 2025-02-24 NOTE — TELEPHONE ENCOUNTER
Spoke to patient's sister to remind pt of upcoming apt she explain that her sister is unable to make apt on 3/25/25,because she is very sick, and just been discharged from the hospital on 2/21/25,and unsure when she will be able to see provider.

## 2025-03-22 NOTE — PROGRESS NOTES
"  Subjective:       Patient ID: Shahram Hills is an 83 y.o. female.     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 Ms. Hills is an 79 yo woman who initially saw me on 6/7/18 for further evaluation of neuroendocrine tumor. She initially presented with diarrhea, abdominal discomfort, weight loss. She was evaluated at [facility] and underwent workup below:  US abdomen on 3/14/18 showed numerous bilobar mixed echogenicity and mildly vascular hepatic lesions. Cholelithiasis without e/o acute cholecystitis.   MRI abdomen on 3/30/2018 showed innumerable hepatic metastases. L3 vertebral body metastasis with soft tissue component along the right margin of the L3 and upper L4 vertebral bodies, filling the right L3/4 neural foramen, and extending into the anterior aspect of the spinal canal on the right at the L3 and upper T4 levels. Associated with mild spinal canal narrowing.  CT chest on 4/6/2018 showed one lucent nodule measuring 7 mm in the T7 vertebral body, most likely representing metastatic disease.    EGD on 5/4/18 showed normal duodenum. Schatzki's ring in the lower third of the esophagus. Grade 1 esophagitis in the GE junction c/w mild distal esophagitis. Congestion and erythema in the antrum, pre-pyloric region and stomach body c/w gastritis. Biopsy of the stomach antrum showed mild chronic gastritis, neg for H. pylori.   Labs on 5/9/2018: WBC 6.9, H/H 11.6/34.3, MCV 87.5, plt count 279. On 3/9/18: Vit B12 level 173, folic acid 6.6  She was started on oral vit B12 and underwent a liver biopsy on 5/18/18. Pathology showed "metastatic well differentiated neuroendocrine tumor. Ki67 3%"     She saw me on 6/7/18 and complained of weight loss of 26 lbs over the past 3-4 months, also has diarrhea. No flushing, wheezing, palpitations. Has been having pain in right flank radiating down " "the right leg. No tingling/numbness, weakness. She can hold her bladder but when she urinates her diarrhea would come out as well. Started on dex 4 mg q6h the evening of 18.      2. MRI spine on 18 showed "Marrow replacing metastatic lesion of the L3 vertebral body with associated extra osseous expansion and complete effacement of the right L3-L4 neural foramina and additional effacement of the right lateral recess.  There is additional lateral extension and abutment of the right psoas muscle.  Superimposed degenerative change at this level results in mild spinal canal stenosis." I sent the patient to ED on 18 where she was evaluated by neurosurgery. Neurosurgery did not feel she was a candidate for surgical intervention.      3. Seen by Dr. Adames on 18. palliative radiation to the area of the L3 metastasis 18-18   4. 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  There is an adjacent metastatic lymph node, diffuse liver metastases, and multiple bone metastases. Index primary neoplasm SUV max 33.13. Adjacent lymph node SUV max 23. L3 bone metastasis SUV max 36.86. Left lobe SUV max 46.13   5. Lanreotide every 4 weeks started on 18  6. TACE to the right hepatic lobe on 19. TACE to the left hepatic lobe on 3/21/19.   7. TACE to the right hepatic lobe on 22. S/p conventional chemoembolization performed of the segment 2, 3, 4 branch of the left hepatic artery and segment 8 branch of the left hepatic artery on 22.  8. Gallium PET 22 showed progression. Discussed PRRT. PRRT #1 on 2022. Zometa every 3 months started 22. PRRT #2 on 23. #3 on 23. #4 on 23.      History of Present Illness:   Ms. Hills returns for follow up. Feeling well. Diarrhea controlled. On xermelo tid.     ECO     ROS:   See HPI. Otherwise negative.      Physical Examination:   Vital signs reviewed.   Gen: well hydrated, well " developed, in no acute distress.  HEENT: normocephalic, anicteric, PERRLA, EOMI  Neck: supple, no JVD, thyromegaly, cervical or supraclavicular LAD  Lungs: CTAB, no wheezes or rales  Heart: regular rate, regular pulse, no M/R/G  Abdomen: soft, no tenderness, non-distended, no hepatomegaly, no splenomegaly, no mass, or hernia.   Ext: b/l LE 1+ ankle edema  Neuro: alert and oriented x 4, no focal neuro deficit  Skin: no rash, open wounds or ulcers  Psych: pleasant and appropriate mood and affect     Objective:      Laboratory Data:  Labs reviewed.     Imaging Data:     MRI abdomen 11/1/22:  1. History of neuroendocrine malignancy.  Numerous hepatic lesions, some remaining stable while others have mildly enlarged consistent with disease progression.  Stable osseous lesion in the L3 vertebral body.  2. Cholelithiasis and stable mild dilatation of the common bile duct and central intrahepatic biliary duct dilatation.  Of note, there is mass effect on the midportion of the common bile duct by dominant left hepatic lobe lesion.  3. Additional findings detailed above.  RECIST SUMMARY:     Date of prior examination for comparison: 05/16/2022     Lesion 1: Left hepatic lobe.  7.1 cm.  Series 9 image 52.  Prior measurement 6.3 cm.     Lesion 2: Right hepatic lobe lateral.  3.1 cm.  Series 9 image 30.  Prior measurement 3.2 cm.     Lesion 3: Right hepatic dome.  5.1 cm.  Series 9 image 19.  Prior measurement 4.4 cm.    Gallium PET 11/1/22:     Interval development of somatostatin tracer avid lesions in the skull, concerning for new metastatic lesions.     Numerous tracer avid hypoattenuating masses throughout the liver with increased SUV max compared to prior exam.     Tracer avid lesions in the C7 and L3 vertebral bodies, sternum, distal ileum and mesenteric node are similar to prior exam.     Judged by tracer avidity of the patient's tumor burden, PRRT would be a consideration if clinically indicated.      Gallium PET  7/12/23:  Impression:     In this patient with carcinoid tumor of the ileum, there is overall similar distribution of tracer avid disease involving distal ileum, liver, mesenteric lymph node, and bones.  Index lesions as above.  No definite new tracer avid lesions.    Assessment and Plan:      1. Malignant carcinoid tumor of ileum    2. 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 Secondary neuroendocrine tumor of bone    4. Spine metastasis    5. 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 Carcinoid syndrome    8. Controlled type 2 diabetes mellitus with microalbuminuria, without long-term current use of insulin    9. 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 Started lanreotide every 4 weeks on 6/22/2018. S/p TACE to the right hepatic lobe on 2/14/19. TACE to the left hepatic lobe on 3/21/19.   - CT/MRI 1/12/22 showed mild progression in the liver. S/p TACE on 2/11/22 and 4/22/22   - Gallium PET 11/1/22 showed progression. Discussed PRRT. PRRT #1 on 12/14/2022. Completed 4 cycles on 6/14/23. Zometa every 3 months started 12/27/22. Last zometa 6/15/23.   - doing well. PET in July 2023 showed stable disease  - symptoms well controlled  - c/w lanreotide every 4 weeks and zometa every 3 months. Will give lanreotide today. Will give zometa today  - RTC in 4 weeks. If biomarkers and symptoms stable, will do PET and MRI in Jan 2024    7.  - better after xermelo and PRRT  - c/w lanreotide every 4 weeks.   - c/w xermelo tid    8.  - BS controlled  - c/w current medication    9.  - BP controlled  - c/w current medication    10.  - last echo in 2022 showed LVEF 40%  - saw Dr Bergeron in July 2023. Felt to be well compensated  - f/u with cardiology      Sebastian Granados MD  Hematology and Medical  Oncology  [facility]        Route Chart for Scheduling    Med Onc Chart Routing      Follow up with physician . Keep scheduled appts   Follow up with JUNAID    Infusion scheduling note    Injection scheduling note    Labs    Imaging    Pharmacy appointment    Other referrals        Supportive Plan Information  OP ZOLEDRONIC ACID (ZOMETA) Q4W   Sebastian Granados MD   Upcoming Treatment Dates - OP ZOLEDRONIC ACID (ZOMETA) Q4W    9/23/2023       Medications       zoledronic 4 mg/100 mL infusion 4 mg  12/22/2023       Medications       zoledronic 4 mg/100 mL infusion 4 mg  3/21/2024       Medications       zoledronic 4 mg/100 mL infusion 4 mg  6/19/2024       Medications       zoledronic 4 mg/100 mL infusion 4 mg    Therapy Plan Information  LANREOTIDE  5. Medications  lanreotide injection 120 mg  120 mg, Subcutaneous, Every visit                     no

## 2025-06-12 NOTE — PROGRESS NOTES
Refill Routing Note   Medication(s) are not appropriate for processing by [facility] for the following reason(s):        Drug-disease interaction:     [facility] action(s):  Defer    Medication Therapy Plan: Drug-Disease: allopurinoL and Stage 3b chronic kidney disease    Pharmacist review requested: Yes     Appointments  past 12m or future 3m with PCP    Date Provider   Last Visit   5/23/2025 Reji Lemos MD   Next Visit   11/11/2025 Reji Lemos MD   ED visits in past 90 days: 0        Note composed:11:25 AM 06/12/2025                 Report called to RN [id number]. Left wrist site CDI. VSS. Pt transferred via stretcher with transport staff to [id number].